# Patient Record
Sex: MALE | Race: WHITE | NOT HISPANIC OR LATINO | Employment: UNEMPLOYED | URBAN - METROPOLITAN AREA
[De-identification: names, ages, dates, MRNs, and addresses within clinical notes are randomized per-mention and may not be internally consistent; named-entity substitution may affect disease eponyms.]

---

## 2018-07-09 ENCOUNTER — COMMUNICATION - HEALTHEAST (OUTPATIENT)
Dept: TELEHEALTH | Facility: CLINIC | Age: 43
End: 2018-07-09

## 2018-07-09 ENCOUNTER — OFFICE VISIT - HEALTHEAST (OUTPATIENT)
Dept: FAMILY MEDICINE | Facility: CLINIC | Age: 43
End: 2018-07-09

## 2018-07-09 DIAGNOSIS — M62.830 BACK MUSCLE SPASM: ICD-10-CM

## 2018-07-09 RX ORDER — CYCLOBENZAPRINE HCL 5 MG
5-10 TABLET ORAL EVERY 8 HOURS PRN
Qty: 30 TABLET | Refills: 0 | Status: SHIPPED | OUTPATIENT
Start: 2018-07-09 | End: 2023-08-23

## 2021-05-28 ENCOUNTER — RECORDS - HEALTHEAST (OUTPATIENT)
Dept: ADMINISTRATIVE | Facility: CLINIC | Age: 46
End: 2021-05-28

## 2021-06-01 VITALS — WEIGHT: 281.1 LBS | BODY MASS INDEX: 40.33 KG/M2

## 2021-06-19 NOTE — PROGRESS NOTES
Assessment/Plan:        1. Back muscle spasm  Exam findings were discussed  Plan:   ibuprofen up to 800 mg three times a day with food.   Try - cyclobenzaprine (FLEXERIL) 5 MG tablet; Take 1-2 tablets (5-10 mg total) by mouth every 8 (eight) hours as needed for muscle spasms.  Dispense: 30 tablet; Refill: 0      Reviewed potential side effects of cognitive disturbance, to avoid operating heavy machinery or driving while under the influence.  Back stretching exercises reviewed  Consider physical therapy if not better in the next few coming days.    Work note given     Subjective:    Patient ID:   Floyd Capps is a 42 y.o. male comes in with having to lower back pain since yesterday following cleaning a ceiling fan.  He was at his usual state of health prior to this, but stating that somehow he has strained his back.  He has no radiation of pain is worse with movement.  He has taken the day off today and is to rest today and tomorrow needs a work note.  His history of having low back pains on and off but nothing chronic.      Review of Systems  Constitutional: negative  Respiratory: negative  CV: negative  GI: negative  : neg   Skin: negative   Musculoskeletal:see HPI      The following patient's history were reviewed and updated as appropriate:   He  has a past medical history of Diabetes type 2, controlled (H)..      Outpatient Encounter Prescriptions as of 7/9/2018   Medication Sig Dispense Refill     cyclobenzaprine (FLEXERIL) 5 MG tablet Take 1-2 tablets (5-10 mg total) by mouth every 8 (eight) hours as needed for muscle spasms. 30 tablet 0     No facility-administered encounter medications on file as of 7/9/2018.          Objective:   /74 (Patient Site: Right Arm, Patient Position: Sitting, Cuff Size: Adult Large)  Pulse 94  Temp 98.2  F (36.8  C) (Oral)   Wt (!) 281 lb 1.6 oz (127.5 kg)  SpO2 96%  BMI 40.33 kg/m2      Physical Exam  General appearance - alert, well appearing, and in no  distress  Neurological - neg SLR  Musculoskeletal - palpable tenderness to the mid to lower back musculature. No mid line or vertebral tenderness.   Skin - normal coloration and turgor, no rashes, no suspicious skin lesions noted

## 2021-09-27 ENCOUNTER — ANCILLARY PROCEDURE (OUTPATIENT)
Dept: ULTRASOUND IMAGING | Facility: HOSPITAL | Age: 46
End: 2021-09-27
Attending: EMERGENCY MEDICINE
Payer: COMMERCIAL

## 2021-09-27 ENCOUNTER — HOSPITAL ENCOUNTER (EMERGENCY)
Facility: HOSPITAL | Age: 46
Discharge: HOME | End: 2021-09-27
Attending: EMERGENCY MEDICINE | Admitting: EMERGENCY MEDICINE
Payer: COMMERCIAL

## 2021-09-27 VITALS
RESPIRATION RATE: 20 BRPM | HEIGHT: 70 IN | WEIGHT: 270 LBS | OXYGEN SATURATION: 100 % | HEART RATE: 90 BPM | SYSTOLIC BLOOD PRESSURE: 177 MMHG | BODY MASS INDEX: 38.65 KG/M2 | DIASTOLIC BLOOD PRESSURE: 91 MMHG | TEMPERATURE: 97.9 F

## 2021-09-27 DIAGNOSIS — L02.214 ABSCESS OF GROIN, LEFT: ICD-10-CM

## 2021-09-27 PROCEDURE — 10060 I&D ABSCESS SIMPLE/SINGLE: CPT

## 2021-09-27 PROCEDURE — 76882 US LMTD JT/FCL EVL NVASC XTR: CPT

## 2021-09-27 PROCEDURE — 99284 EMERGENCY DEPT VISIT MOD MDM: CPT | Mod: 25

## 2021-09-27 RX ORDER — CEPHALEXIN 500 MG/1
500 CAPSULE ORAL 4 TIMES DAILY
Qty: 28 CAPSULE | Refills: 0 | Status: SHIPPED | OUTPATIENT
Start: 2021-09-27 | End: 2021-10-04

## 2021-09-27 RX ORDER — SULFAMETHOXAZOLE/TRIMETHOPRIM 800-160 MG
1 TABLET ORAL 2 TIMES DAILY
Qty: 20 TABLET | Refills: 0 | Status: SHIPPED | OUTPATIENT
Start: 2021-09-27 | End: 2021-10-07

## 2021-09-27 ASSESSMENT — ENCOUNTER SYMPTOMS
WOUND: 1
CONFUSION: 0
ADENOPATHY: 0
FEVER: 0

## 2021-09-27 ASSESSMENT — MIFFLIN-ST. JEOR: SCORE: 2105.96

## 2021-09-27 NOTE — ED TRIAGE NOTES
"Patient reports a cyst on left groin \"that grew overnight\". He is worried about infection. Pain a 2. He states its starting to ooze.  "

## 2021-09-27 NOTE — ED PROVIDER NOTES
EMERGENCY DEPARTMENT ENCOUNTER      NAME: Floyd Capps  AGE: 45 year old male  YOB: 1975  MRN: [id number]  EVALUATION DATE & TIME: 9/27/2021  7:37 AM    PCP: No Ref-Primary, Physician        Chief Complaint   Patient presents with     left groin cyst         FINAL IMPRESSION:  1. Abscess of groin, left          ED COURSE & MEDICAL DECISION MAKING:    Pertinent Labs & Imaging studies reviewed. (See chart for details)  PPE: Goggles and N95 were worn.   45 year old male presents to the Emergency Department for evaluation of left groin wound    ED Course as of Sep 27 0827   Mon Sep 27, 2021   0744 Initial history and physical performed. Plan of care discussed.         0813 Performed incision and drainage procedure. I discussed the plan for discharge with the patient, and patient is agreeable.  We discussed supportive cares at home and reasons for return to the ER including new or worsening symptoms - all questions and concerns addressed.  Patient to be discharged by RN.         Well-appearing overweight male with history of diabetes who is not taking any of his medications who presents with a nontraumatic tender nodule to left groin.  Has had previous groin abscesses that required surgery and hospitalization.  Not septic appearing.  Nuys any new sexual contacts therefore STI unlikely.  Area feels more indurated and not fluctuant however point-of-care ultrasound does show a 1 cm x 1 cm area of fluid collection therefore incision and drainage performed.     Small amount of purulent discharge was expressed from wound.    Plan for discharge home with antibiotics because patient has diabetes and not on his medications.  Will discharge home with Bactrim and Keflex.  Wound care instructions given    At the conclusion of the encounter I discussed the results of all of the tests and the disposition. The questions were answered. The patient or family acknowledged understanding and was agreeable with the care  plan.           MEDICATIONS GIVEN IN THE EMERGENCY:  Medications - No data to display    NEW PRESCRIPTIONS STARTED AT TODAY'S ER VISIT  New Prescriptions    CEPHALEXIN (KEFLEX) 500 MG CAPSULE    Take 1 capsule (500 mg) by mouth 4 times daily for 7 days    SULFAMETHOXAZOLE-TRIMETHOPRIM (BACTRIM DS) 800-160 MG TABLET    Take 1 tablet by mouth 2 times daily for 10 days            =================================================================    HPI    Patient information was obtained from: patient    Use of Intrepreter: N/A      Floyd Capps is a 45 year old male with a pertinent history of diabetes mellitus type 2, obesity, and incision and drainage of a groin abscess who presents to this ED by walk in for evaluation of a left groin cyst.     This morning, patient was going about his day when he noticed blood on his hand which prompted him to discover his opened left groin cyst which is ~1 cm in diameter. Patient reports abscess was not present last night. Patient denies intentionally opening up the cyst. Patient was concerned because he has been hospitalized for an abscess before.   No new sexual partners.     Patient reports he no longer monitors his blood sugar. Patient reports no other complaints at this time.     SHx: smoker, works as     REVIEW OF SYSTEMS   Review of Systems   Constitutional: Negative for fever.   Endocrine: Negative for polyuria.   Skin: Positive for wound.        Positive for left groin abscess.   Allergic/Immunologic: Negative for immunocompromised state.   Hematological: Negative for adenopathy.   Psychiatric/Behavioral: Negative for confusion.   All other systems reviewed and are negative.        PAST MEDICAL HISTORY:  History reviewed. No pertinent past medical history.    PAST SURGICAL HISTORY:  Past Surgical History:   Procedure Laterality Date     APPENDECTOMY       INCISION AND DRAINAGE OF WOUND      groin abscess           CURRENT MEDICATIONS:    Patient's Medications   New  Prescriptions    CEPHALEXIN (KEFLEX) 500 MG CAPSULE    Take 1 capsule (500 mg) by mouth 4 times daily for 7 days    SULFAMETHOXAZOLE-TRIMETHOPRIM (BACTRIM DS) 800-160 MG TABLET    Take 1 tablet by mouth 2 times daily for 10 days   Previous Medications    CYCLOBENZAPRINE (FLEXERIL) 5 MG TABLET    [CYCLOBENZAPRINE (FLEXERIL) 5 MG TABLET] Take 1-2 tablets (5-10 mg total) by mouth every 8 (eight) hours as needed for muscle spasms.   Modified Medications    No medications on file   Discontinued Medications    No medications on file       ALLERGIES:  No Known Allergies    FAMILY HISTORY:  History reviewed. No pertinent family history.    SOCIAL HISTORY:   Social History     Socioeconomic History     Marital status: Single     Spouse name: Not on file     Number of children: Not on file     Years of education: Not on file     Highest education level: Not on file   Occupational History     Not on file   Tobacco Use     Smoking status: Current Every Day Smoker     Types: Cigarettes, Cigarettes     Smokeless tobacco: Never Used   Substance and Sexual Activity     Alcohol use: No     Drug use: No     Sexual activity: Not on file   Other Topics Concern     Not on file   Social History Narrative    Patient reports that he does not have health insurance.     Social Determinants of Health     Financial Resource Strain:      Difficulty of Paying Living Expenses:    Food Insecurity:      Worried About Running Out of Food in the Last Year:      Ran Out of Food in the Last Year:    Transportation Needs:      Lack of Transportation (Medical):      Lack of Transportation (Non-Medical):    Physical Activity:      Days of Exercise per Week:      Minutes of Exercise per Session:    Stress:      Feeling of Stress :    Social Connections:      Frequency of Communication with Friends and Family:      Frequency of Social Gatherings with Friends and Family:      Attends [employer] Services:      Active Member of Clubs or Organizations:       "Attends Club or Organization Meetings:      Marital Status:    Intimate Partner Violence:      Fear of Current or Ex-Partner:      Emotionally Abused:      Physically Abused:      Sexually Abused:        VITALS:  Patient Vitals for the past 24 hrs:   BP Temp Temp src Pulse Resp SpO2 Height Weight   09/27/21 0509 (!) 177/91 97.9  F (36.6  C) Oral 90 20 100 % 1.778 m (5' 10\") 122.5 kg (270 lb)       PHYSICAL EXAM      Vitals: BP (!) 177/91   Pulse 90   Temp 97.9  F (36.6  C) (Oral)   Resp 20   Ht 1.778 m (5' 10\")   Wt 122.5 kg (270 lb)   SpO2 100%   BMI 38.74 kg/m    General: Appears in no acute distress, awake, alert, interactive.  Eyes: Conjunctivae non-injected. Sclera anicteric.  HENT: Atraumatic.  Neck: Supple.  Respiratory/Chest: Respiration unlabored.  Heart: RRR  Abdomen: non distended  Musculoskeletal: Normal extremities. No edema or erythema.  Skin: 1 cm x 1 cm indurated subcutaneous induration that is tender.  No palpable fluctuance.  Appears to be an ingrown hair and she does have a small area of excoriation to top of nodule. Mild 3 cm surrounding erythema. No crepitus.   Neurologic: Face symmetric, moves all extremities spontaneously. Speech clear.  Psychiatric: Oriented to person, place, and time. Affect appropriate.    LAB:  All pertinent labs reviewed and interpreted.       RADIOLOGY:  Reviewed all pertinent imaging. Please see official radiology report.  POC US SOFT TISSUE    (Results Pending)           PROCEDURES:     POC US SOFT TISSUE    Date/Time: 9/27/2021 8:22 AM  Performed by: Jorge A Casey MD  Authorized by: Jorge A Casey MD     Procedure Details & Findings:      Left groin:  Indications: abscess vs lymph node vs focal edema   findings small 1 cm x 1 cm abscess.   Images saved and uploaded      PROCEDURE: Incision and Drainage   INDICATIONS: Localized abscess   PROCEDURE PROVIDER: Duane Casey    SITE: Left groin   MEDICATION: 3 mLs of 1% Lidocaine with epinephrine   NOTE: The area " was prepped with betadine and draped off in the usual sterile fashion.  Local anesthetic was injected subcutaneously with anesthesia effects demonstrated prior to proceeding.  The area of maximal fluctuance was opened with a # 11 Blade (Sharp [place]) using a Single Straight incision to allow for drainage.  The abscess was drained.  The abscess cavity was bluntly explored to separate any loculations. No Packing was placed into the abscess cavity.  A sterile dressing was placed over the area.   COMPLEXITY: Simple       COMPLICATIONS: Patient tolerated procedure well, without complication         I, Sonja Pappas, am serving as a scribe to document services personally performed by Dr. Casey based on my observation and the provider's statements to me. I, Duane Casey MD attest that Sonja Pappas is acting in a scribe capacity, has observed my performance of the services and has documented them in accordance with my direction.    Duane Casey M.D.  Emergency Medicine  [facility] EMERGENCY DEPARTMENT  [address] [place] [id number] [phone]  Dept: [phone]     Jorge A Casey MD  09/27/21 0839

## 2021-09-27 NOTE — DISCHARGE INSTRUCTIONS
Recommend cleaning wound with soap and water daily.  Take Bactrim twice a day for 10 days.  Take Keflex 4 times a day for 7 days.  Use Ibuprofen or Tylenol for pain.  Return to ER for worsening symptoms

## 2021-09-27 NOTE — Clinical Note
Floyd Capps was seen and treated in our emergency department on 9/27/2021.  He may return to work on 09/29/2021.       If you have any questions or concerns, please don't hesitate to call.      Jorge A Casey MD

## 2022-11-13 ENCOUNTER — HOSPITAL ENCOUNTER (EMERGENCY)
Facility: HOSPITAL | Age: 47
Discharge: HOME | End: 2022-11-13
Attending: EMERGENCY MEDICINE | Admitting: EMERGENCY MEDICINE

## 2022-11-13 VITALS
HEART RATE: 97 BPM | OXYGEN SATURATION: 99 % | WEIGHT: 270 LBS | DIASTOLIC BLOOD PRESSURE: 87 MMHG | RESPIRATION RATE: 18 BRPM | SYSTOLIC BLOOD PRESSURE: 199 MMHG | BODY MASS INDEX: 38.65 KG/M2 | TEMPERATURE: 98.9 F | HEIGHT: 70 IN

## 2022-11-13 DIAGNOSIS — K04.7 DENTAL INFECTION: ICD-10-CM

## 2022-11-13 PROCEDURE — 99283 EMERGENCY DEPT VISIT LOW MDM: CPT

## 2022-11-13 NOTE — ED TRIAGE NOTES
Pt presents with sinus pressure and swelling of face and right eye. Pt states it began 2 days ago. No pain unless he pushes on it. Denies fevers.     Triage Assessment     Row Name 11/13/22 0721       Triage Assessment (Adult)    Airway WDL WDL       Respiratory WDL    Respiratory WDL WDL       Skin Circulation/Temperature WDL    Skin Circulation/Temperature WDL WDL       Cardiac WDL    Cardiac WDL WDL       Peripheral/Neurovascular WDL    Peripheral Neurovascular WDL WDL       Cognitive/Neuro/Behavioral WDL    Cognitive/Neuro/Behavioral WDL WDL

## 2022-11-13 NOTE — ED PROVIDER NOTES
EMERGENCY DEPARTMENT ENCOUNTER      NAME: Floyd Capps  AGE: 46 year old male  YOB: 1975  MRN: [id number]  EVALUATION DATE & TIME: 2022  7:23 AM    PCP: No Ref-Primary, Physician    ED PROVIDER: Richie Abarca D.O.      Chief Complaint   Patient presents with     Facial Pain     Facial Swelling       FINAL IMPRESSION:  1. Dental infection        ED COURSE & MEDICAL DECISION MAKIN:33 AM I met with the patient to gather history and to perform my initial exam. I discussed the plan for care while in the Emergency Department.         Pertinent Labs & Imaging studies reviewed. (See chart for details)  46 year old male presents to the Emergency Department for evaluation of swelling of the right side of his face.  Swelling does not extend from rear molar on the right maxilla, no obvious drainable abscess.  There is no throat involvement, and I do not believe this to represent proctitis.  There is no concern for impending airway compromise.  No clinical evidence suggestive of Loy's angina.  At this time I believe the patient can be safely discharged home on oral antibiotics and follow-up as an outpatient with his dentist.  Return precautions were discussed.    Medical Decision Making    Supplemental history from: N/A    External Record(s) Reviewed: N/A    Differential Diagnosis: See MDM charting for differential considered.     I performed an independent interpretation of the: N/A    Discussed with radiology regarding test interpretation: N/A    Discussion of management with another provider: See ED Course    The following testing was considered but ultimately not selected: None    I considered prescription management with: Antibiotic    The patient's care impacted: None    Consideration of Admission/Observation: N/A - Patient discharged without consideration for admission    Care significantly affected by Social Determinants of Health including: N/A    At the conclusion of the encounter I  discussed the results of all of the tests and the disposition. The questions were answered. The patient or family acknowledged understanding and was agreeable with the care plan.        HPI    Patient information was obtained from: Patient     Use of : N/A        Floyd Capps is a 46 year old male with a pertinent medical history of hyperlipidemia and type II diabetes who presents to this ED by private vehicle for evaluation of sinus pressure and facial swelling.     Patient reports right-sided facial swelling that started yesterday morning without associated pain. He notes that he thought that this was related to a sinus infection as he was sick on Tuesday in which he experienced vomiting and diarrhea which has since resolved. Patient denies cough, congestion, sore throat, headache, jaw pain, or additional symptoms or complaints at this time.     Social History: Current smoker      REVIEW OF SYSTEMS  Constitutional:  Denies fever, chills, weight loss or weakness  Eyes:  No pain, discharge, redness  HENT:  Denies sore throat or ear pain. Positive for congestion and facial swelling  Respiratory: No SOB, wheeze or cough  Cardiovascular:  No CP, palpitations  GI:  Denies abdominal pain, nausea, vomiting, diarrhea  : Denies dysuria, hematuria  Musculoskeletal:  Denies any new muscle/joint pain, swelling or loss of function.  Skin:  Denies rash, pallor  Neurologic:  Denies headache, focal weakness or sensory changes  Lymph: Denies swollen nodes    All other systems negative unless noted in HPI.    PAST MEDICAL HISTORY:  History reviewed. No pertinent past medical history.    PAST SURGICAL HISTORY:  Past Surgical History:   Procedure Laterality Date     APPENDECTOMY       INCISION AND DRAINAGE OF WOUND      groin abscess         CURRENT MEDICATIONS:    No current facility-administered medications for this encounter.     Current Outpatient Medications   Medication     amoxicillin-clavulanate (AUGMENTIN)  "875-125 MG tablet     cyclobenzaprine (FLEXERIL) 5 MG tablet         ALLERGIES:  No Known Allergies    FAMILY HISTORY:  History reviewed. No pertinent family history.    SOCIAL HISTORY:  Social History     Socioeconomic History     Marital status: Single   Tobacco Use     Smoking status: Every Day     Types: Cigarettes     Smokeless tobacco: Never   Substance and Sexual Activity     Alcohol use: No     Drug use: No   Social History Narrative    Patient reports that he does not have health insurance.       VITALS:  Patient Vitals for the past 24 hrs:   BP Temp Temp src Pulse Resp SpO2 Height Weight   11/13/22 0720 (!) 199/87 98.9  F (37.2  C) Temporal 97 18 99 % 1.778 m (5' 10\") 122.5 kg (270 lb)       PHYSICAL EXAM    VITAL SIGNS: BP (!) 199/87   Pulse 97   Temp 98.9  F (37.2  C) (Temporal)   Resp 18   Ht 1.778 m (5' 10\")   Wt 122.5 kg (270 lb)   SpO2 99%   BMI 38.74 kg/m      General Appearance: Well-appearing, well-nourished, no acute distress   Head:  Normocephalic, without obvious abnormality, atraumatic  Eyes:  PERRL, conjunctiva/corneas clear, EOM's intact,  ENT:  Lips, mucosa, and tongue normal, membranes are moist without pallor. Right posterior molar missing with exposed root, swollen gumline, right facial swelling  Neck:  Normal ROM, symmetrical, trachea midline    Cardio:  Regular rate and rhythm, no murmur, rub or gallop, 2+ pulses symmetric in all extremities  Pulm:  Clear to auscultation bilaterally, respirations unlabored,  Musculoskeletal: Full ROM, no edema, no cyanosis, good ROM of major joints  Integument:  Warm, Dry, No erythema, No rash.    Neurologic:  Alert & oriented.  No focal deficits appreciated.  Ambulatory.  Psychiatric:  Affect normal, Judgment normal, Mood normal.      LABS  No results found for this or any previous visit (from the past 24 hour(s)).      RADIOLOGY  No orders to display          MEDICATIONS GIVEN IN THE EMERGENCY:  Medications - No data to display    NEW " PRESCRIPTIONS STARTED AT TODAY'S ER VISIT  Discharge Medication List as of 11/13/2022  8:07 AM      START taking these medications    Details   amoxicillin-clavulanate (AUGMENTIN) 875-125 MG tablet Take 1 tablet by mouth 2 times daily for 7 days, Disp-14 tablet, R-0, Local Print            I, Marcus Do, am serving as a scribe to document services personally performed by Dr. Abarca based on my observation and the provider's statements to me. I, Richie Abarca, DO attest that Marcus Do is acting in a scribe capacity, has observed my performance of the services and has documented them in accordance with my direction.     Richie Abarca D.O.  Emergency Medicine  [facility] EMERGENCY DEPARTMENT  [facility] [id number] [phone]  Dept: [phone]     Richie Abarca DO  11/13/22 7551

## 2023-03-26 ENCOUNTER — HOSPITAL ENCOUNTER (EMERGENCY)
Facility: HOSPITAL | Age: 48
Discharge: HOME | End: 2023-03-26

## 2023-03-26 VITALS
TEMPERATURE: 98.2 F | RESPIRATION RATE: 16 BRPM | HEART RATE: 98 BPM | WEIGHT: 270 LBS | BODY MASS INDEX: 38.74 KG/M2 | SYSTOLIC BLOOD PRESSURE: 192 MMHG | DIASTOLIC BLOOD PRESSURE: 92 MMHG | OXYGEN SATURATION: 99 %

## 2023-03-26 DIAGNOSIS — R03.0 ELEVATED BLOOD PRESSURE READING WITHOUT DIAGNOSIS OF HYPERTENSION: ICD-10-CM

## 2023-03-26 DIAGNOSIS — L03.90 CELLULITIS: Primary | ICD-10-CM

## 2023-03-26 DIAGNOSIS — L98.9 SKIN LESION: ICD-10-CM

## 2023-03-26 PROCEDURE — 99283 EMERGENCY DEPT VISIT LOW MDM: CPT

## 2023-03-26 RX ORDER — CEPHALEXIN 500 MG/1
500 CAPSULE ORAL 4 TIMES DAILY
Qty: 28 CAPSULE | Refills: 0 | Status: SHIPPED | OUTPATIENT
Start: 2023-03-26 | End: 2023-04-02

## 2023-03-26 RX ORDER — SULFAMETHOXAZOLE/TRIMETHOPRIM 800-160 MG
1 TABLET ORAL 2 TIMES DAILY
Qty: 14 TABLET | Refills: 0 | Status: SHIPPED | OUTPATIENT
Start: 2023-03-26 | End: 2023-04-02

## 2023-03-26 ASSESSMENT — ENCOUNTER SYMPTOMS
HEMATURIA: 0
CONSTIPATION: 0
ABDOMINAL PAIN: 0
FLANK PAIN: 0
FREQUENCY: 0
DIARRHEA: 0
DIFFICULTY URINATING: 0
WOUND: 1
CHILLS: 0
BACK PAIN: 0
VOMITING: 0
NAUSEA: 0
FEVER: 0
DYSURIA: 0

## 2023-03-26 NOTE — ED TRIAGE NOTES
Pt presents to triage ambulatory for skin problem. Pt has hx of MRSA and reports last night he popped what felt like a zit near his pubic region. Pt felt it growing through the night. Now scabbed over. Denies fevers or chills.

## 2023-03-26 NOTE — DISCHARGE INSTRUCTIONS
Please bring this paperwork with you to your follow-up appointment.    You were seen in the urgent care/emergency department for skin infection.     You have a skin infection, however we did not feel that this needed to be drained during today's exam.  We will treat with 2 antibiotics for this.  Please take Bactrim twice a day for 7 days and Keflex 4 times a day for 7 days.  If the antibiotics do not help clear the infection or you have any drainage, please come back to the ER for us to drain the wound.    For your symptoms:  place warm packs and heat to the area to help clear the infection    Tylenol/ibuprofen as needed  You may take up to 650 mg of Tylenol (acetaminophen) up to 4 times daily and up to 600 mg of ibuprofen up to 4 times daily as needed for fever, pain.  Please do not take more than the daily maximum recommended dose (tylenol = 4 grams, ibuprofen = 2.4 grams) as it can cause harm to your liver, kidneys, stomach.  It is best to take ibuprofen with food. Please read labels of any over-the-counter medicine you may be taking as it may contain Tylenol (acetaminophen) or Advil (ibuprofen).     Follow up with your primary care provider for recheck in 3 days for ER follow up.  Your blood pressure is elevated today, please establish care with a primary care doctor to have repeat your blood pressure check and to start medication if necessary.    Return to the emergency department if you develop worsening drainage, redness, warmth, fevers, chills or other signs of infection, or any other new worsening or concerning symptoms. We'd be happy to see you again.    Thank you for allowing us to be part of your care today.    Take care!  -Penny Hartley PA-C

## 2023-03-26 NOTE — ED PROVIDER NOTES
EMERGENCY DEPARTMENT ENCOUNTER      NAME: Floyd Capps  AGE: 47 year old male  YOB: 1975  MRN: [id number]  EVALUATION DATE & TIME: No admission date for patient encounter.    PCP: No Ref-Primary, Physician    ED PROVIDER: Penny Hartley PA-C    Chief Complaint   Patient presents with     Skin Check     FINAL IMPRESSION:  1. Cellulitis    2. Skin lesion    3. Elevated blood pressure reading without diagnosis of hypertension      MEDICAL DECISION MAKING:    Pertinent Labs & Imaging studies reviewed. (See chart for details)  Floyd Capps is a 47 year old male who presents for evaluation of skin lesion to anterior abdominal wall x1 week.  Reports purulent drainage last week, however has scabbed over since then.  History of groin abscess in the past that required I&D, which concerned the patient prompting ER evaluation today..  Denies systemic symptoms such as fever, chills, nausea, vomiting abdominal pain.    On my initial evaluation, patient hypertensive, remainder of vital signs normal. On physical exam patient is awake, alert, no acute distress, resting comfortably in chair.  He has an obese abdomen.  Heart sounds are normal, lungs are clear without wheezing or crackles.  On inspection of abdominal wall with nurse chaperone present, he does have a 3 cm wound to his perineum just below his umbilicus.  There is a 1 cm area of well-healing scab with mild surrounding erythema.  No obvious purulent drainage.  No tenderness on palpation of this area.  No areas of fluctuance appreciated.  No other groin swelling, testicular swelling, penile lesions, discharge or areas of fluctuance appreciated.  No abdominal tenderness on palpation.  No distention, rebound, guarding or masses..    Differential diagnosis includes cellulitis, erysipelas, necrotizing fasciitis, abscess, Lisa's gangrene, STD.     Based on his exam today, I suspect this is cellulitis versus abscess.  I do not appreciate any areas of  fluctuance that would require incision and drainage today.  Wound does appear to be scabbing over.  No scrotal swelling, penile discharge or concern for STD, so this this were not obtained today.  Low suspicion for necrotizing infection or Lisa's gangrene based on exam today.  No abdominal tenderness to suggest need for laboratory or imaging evaluation today.  Will treat with Bactrim and Keflex.  Otherwise reassuring exam today, low suspicion for any emergent process that require further ER intervention or hospital admission.  Provided expectant management as well as strict return precautions.    Did discuss with patient his hypertension today, he does not take medicines for this as an outpatient.  Advised him to follow-up with his PCP for recheck of blood pressure and ongoing management of hypertension.    Patient has had serial examinations and notes significant improvement.     Patient was discharged in stable condition with treatment plan as below. Instructed to follow up with primary care provider in 3 days and return to the emergency department with any new or worsening of symptoms. Patient expressed understanding, feels comfortable, and is in agreement with this plan. All questions addressed prior to discharge.    Medical Decision Making    History:    Supplemental history from: Documented in chart, if applicable    External Record(s) reviewed: Documented in chart, if applicable. and Inpatient Record: previous ER visits    Work Up:    Chart documentation includes differential considered and any EKGs or imaging independently interpreted by provider, where specified.    In additional to work up documented, I considered the following work up: Documented in chart, if applicable.    External consultation:    Discussion of management with another provider: Documented in chart, if applicable    Complicating factors:    Care impacted by chronic illness: N/A    Care affected by social determinants of health:  N/A    Disposition considerations: Discharge. I prescribed additional prescription strength medication(s) as charted. N/A.    ED COURSE:  8:49 AM  I reviewed the patient's chart. I met with the patient to gather history and to perform my initial exam.    I wore appropriate PPE during this encounter including: facemask & eye protection   9:24 AM I performed  exam with nurse chaperone present. We discussed plan for discharge including treatment plan, follow-up and return precautions to emergency department.  Patient voiced understanding and in agreement with this plan.    At the conclusion of the encounter I discussed the results of all of the tests and the disposition. The questions were answered. The patient or family acknowledged understanding and was agreeable with the care plan.     MEDICATIONS GIVEN IN THE EMERGENCY:  Medications - No data to display    NEW PRESCRIPTIONS STARTED AT TODAY'S ER VISIT  Discharge Medication List as of 3/26/2023  9:36 AM      START taking these medications    Details   cephALEXin (KEFLEX) 500 MG capsule Take 1 capsule (500 mg) by mouth 4 times daily for 7 days, Disp-28 capsule, R-0, E-Prescribe      sulfamethoxazole-trimethoprim (BACTRIM DS) 800-160 MG tablet Take 1 tablet by mouth 2 times daily for 7 days, Disp-14 tablet, R-0, E-Prescribe           =================================================================    HPI:    Patient information was obtained from: patient    Use of Interpretor: N/A     Floyd Capps is a 47 year old male with a pertinent history of type 2 diabetes, hyperlipidemia, hypothyroidism, recurrent skin infection who presents to this ED for evaluation of skin problem.    Per chart review, patient seen multiple times in the past in the ED for abscess of groin in 2015, 2016 and most recently on 9/27/2021.  I&D was performed and sent home on antibiotics.     Patient reports for the past few years, he has been doing well in terms of his skin infections.  However  "about 1 week ago, started developing another skin infection.  Reports in the past he has had infections in his groin and testicular regions, however this is an infection to his lower abdomen.  Patient reports a history of MRSA (I am unable to see a documented result of this).  He reports last week, he \"popped a zit to his lower abdomen\" that drained a little, ultimately it scabbed over without ongoing draining.  Presented to the ER this morning as he was concerned that this would develop into a worsening infection like he has had previously and wanted to \"get it checked out to see if he needs drainage or just antibiotics\".  Denies significant pain, has not taken any medication for the pain.  Denies fever, chills, nausea, vomiting, diarrhea, constipation, chest pain, shortness of breath, urinary symptoms or other symptoms.  Denies scrotal swelling, penile discharge, lesions or concern for STDs.    REVIEW OF SYSTEMS:  Review of Systems   Constitutional: Negative for chills and fever.   Gastrointestinal: Negative for abdominal pain, constipation, diarrhea, nausea and vomiting.   Genitourinary: Negative for difficulty urinating, dysuria, flank pain, frequency, genital sores, hematuria, penile discharge, penile swelling, scrotal swelling, testicular pain and urgency.   Musculoskeletal: Negative for back pain.   Skin: Positive for wound (to skin of lower abdomen).   All other systems reviewed and are negative.       PAST MEDICAL HISTORY:  No past medical history on file.    PAST SURGICAL HISTORY:  Past Surgical History:   Procedure Laterality Date     APPENDECTOMY       INCISION AND DRAINAGE OF WOUND      groin abscess       CURRENT MEDICATIONS:    No current facility-administered medications for this encounter.    Current Outpatient Medications:      cephALEXin (KEFLEX) 500 MG capsule, Take 1 capsule (500 mg) by mouth 4 times daily for 7 days, Disp: 28 capsule, Rfl: 0     sulfamethoxazole-trimethoprim (BACTRIM DS) " 800-160 MG tablet, Take 1 tablet by mouth 2 times daily for 7 days, Disp: 14 tablet, Rfl: 0     cyclobenzaprine (FLEXERIL) 5 MG tablet, [CYCLOBENZAPRINE (FLEXERIL) 5 MG TABLET] Take 1-2 tablets (5-10 mg total) by mouth every 8 (eight) hours as needed for muscle spasms., Disp: 30 tablet, Rfl: 0    ALLERGIES:  No Known Allergies    FAMILY HISTORY:  No family history on file.    SOCIAL HISTORY:   Social History     Socioeconomic History     Marital status: Single   Tobacco Use     Smoking status: Every Day     Types: Cigarettes     Smokeless tobacco: Never   Substance and Sexual Activity     Alcohol use: No     Drug use: No   Social History Narrative    Patient reports that he does not have health insurance.       VITALS:  Patient Vitals for the past 24 hrs:   BP Temp Temp src Pulse Resp SpO2 Weight   03/26/23 0938 (!) 192/92 -- -- 98 16 99 % --   03/26/23 0838 (!) 191/90 98.2  F (36.8  C) Oral 99 16 100 % 122.5 kg (270 lb)       PHYSICAL EXAM    Constitutional: obese, Well nourished, NAD  HENT: Normocephalic, Atraumatic, Bilateral external ears normal, Oropharynx normal, mucous membranes moist, Nose normal.   Neck: Normal range of motion, No tenderness, Supple, No stridor.  Eyes: PERRL, EOMI, Conjunctiva normal, No discharge.   GI//integument: On inspection of abdominal wall with nurse chaperone present, he does have a 3 cm wound to his perineum just below his umbilicus.  There is a 1 cm area of well-healing scab with mild surrounding erythema.  No obvious purulent drainage.  No tenderness on palpation of this area.  No areas of fluctuance appreciated.  No other groin swelling, testicular swelling, penile lesions, discharge or areas of fluctuance appreciated.  No abdominal tenderness on palpation.  No distention, rebound, guarding or masses..Soft,   Musculoskeletal: 2+ DP pulses. No edema. No cyanosis, No clubbing. Good range of motion in all major joints. No tenderness to palpation or major deformities noted. No  tenderness of the CTLS spine.   Neurologic: Alert & oriented x 3, Normal motor function, Normal sensory function, No focal deficits noted. Normal gait.  Psychiatric: Affect normal, Judgment normal, Mood normal. Cooperative.    LAB:  All pertinent labs reviewed and interpreted.  Labs Ordered and Resulted from Time of ED Arrival to Time of ED Departure - No data to display    RADIOLOGY:  Reviewed all pertinent imaging. Please see official radiology report.  No orders to display       EKG:    None     PROCEDURES:   None     Diagnosis:  1. Cellulitis    2. Skin lesion    3. Elevated blood pressure reading without diagnosis of hypertension        Penny Hartley PA-C  Emergency Medicine  [facility]  3/26/2023       Penny Hartley PA-C  03/26/23 1746

## 2023-04-30 ENCOUNTER — HOSPITAL ENCOUNTER (EMERGENCY)
Facility: HOSPITAL | Age: 48
Discharge: HOME | End: 2023-04-30
Admitting: STUDENT IN AN ORGANIZED HEALTH CARE EDUCATION/TRAINING PROGRAM

## 2023-04-30 VITALS
WEIGHT: 270 LBS | OXYGEN SATURATION: 99 % | BODY MASS INDEX: 39.99 KG/M2 | RESPIRATION RATE: 16 BRPM | TEMPERATURE: 97.8 F | HEART RATE: 97 BPM | SYSTOLIC BLOOD PRESSURE: 164 MMHG | HEIGHT: 69 IN | DIASTOLIC BLOOD PRESSURE: 74 MMHG

## 2023-04-30 DIAGNOSIS — L03.115 CELLULITIS OF RIGHT LOWER EXTREMITY: ICD-10-CM

## 2023-04-30 PROCEDURE — 99284 EMERGENCY DEPT VISIT MOD MDM: CPT

## 2023-04-30 RX ORDER — CEPHALEXIN 500 MG/1
500 CAPSULE ORAL 4 TIMES DAILY
Qty: 28 CAPSULE | Refills: 0 | Status: SHIPPED | OUTPATIENT
Start: 2023-04-30 | End: 2023-05-07

## 2023-04-30 RX ORDER — SULFAMETHOXAZOLE/TRIMETHOPRIM 800-160 MG
1 TABLET ORAL 2 TIMES DAILY
Qty: 14 TABLET | Refills: 0 | Status: SHIPPED | OUTPATIENT
Start: 2023-04-30 | End: 2023-05-07

## 2023-04-30 ASSESSMENT — ENCOUNTER SYMPTOMS
FEVER: 0
WOUND: 1
CHILLS: 0

## 2023-04-30 ASSESSMENT — ACTIVITIES OF DAILY LIVING (ADL): ADLS_ACUITY_SCORE: 33

## 2023-04-30 NOTE — ED PROVIDER NOTES
EMERGENCY DEPARTMENT ENCOUNTER      NAME: Floyd Capps  AGE: 47 year old male  YOB: 1975  MRN: [id number]  EVALUATION DATE & TIME: No admission date for patient encounter.    PCP: No Ref-Primary, Physician    ED PROVIDER: Windy Holliday PA-C      Chief Complaint   Patient presents with     right thigh boil         FINAL IMPRESSION:  1. Cellulitis of right lower extremity          MEDICAL DECISION MAKING:    Pertinent Labs & Imaging studies reviewed. (See chart for details)  47 year old male presents to the Emergency Department for evaluation of boil to right thigh x 1.5 weeks. Getting bigger. Denies fevers. Patient is diabetic and has a history of MRSA. Gets boils often.    Vitals reviewed and notable for elevated blood pressure. Afebrile, non-toxic appearing. On exam, scab to right anterior mid thigh with 5 cm of surrounding erythema and induration. No fluctuance or crepitus. No spontaneous drainage. No tenderness to palpation. Differential diagnosis includes abscess, cellulitis, cyst, necrotizing fasciitis.    No fluctuance to drain. Skin is indurated and erythematous consistent with cellulitis. Will put him on keflex and bactrim. History of MRSA and reports this combination is what typically works for him when this happens. We discussed strict return precautions and close follow up with PCP for recheck in 3 days. Patient expressed understanding and discharged home in stable condition.     Medical Decision Making    History:    Supplemental history from: N/A    External Record(s) reviewed: Documented in chart, if applicable.    Work Up:    Chart documentation includes differential considered and any EKGs or imaging independently interpreted by provider, where specified.    In additional to work up documented, I considered the following work up: Documented in chart, if applicable.    External consultation:    Discussion of management with another provider: Documented in chart, if  applicable    Complicating factors:    Care impacted by chronic illness: Diabetes    Care affected by social determinants of health: N/A    Disposition considerations: Discharge. I prescribed additional prescription strength medication(s) as charted. N/A.        0 minutes of critical care time     ED COURSE:  8:24 AM I met with the patient, obtained history, performed an initial exam, and discussed options and plan for diagnostics and treatment here in the ED.  8:46 AM Patient discharged after being provided with extensive anticipatory guidance and given return precautions, importance of PCP follow-up emphasized.    At the conclusion of the encounter I discussed the results of all of the tests and the disposition. The questions were answered. The patient or family acknowledged understanding and was agreeable with the care plan.     MEDICATIONS GIVEN IN THE EMERGENCY:  Medications - No data to display    NEW PRESCRIPTIONS STARTED AT TODAY'S ER VISIT  New Prescriptions    CEPHALEXIN (KEFLEX) 500 MG CAPSULE    Take 1 capsule (500 mg) by mouth 4 times daily for 7 days    SULFAMETHOXAZOLE-TRIMETHOPRIM (BACTRIM DS) 800-160 MG TABLET    Take 1 tablet by mouth 2 times daily for 7 days            =================================================================    HPI:    Patient information was obtained from: Patient    Use of Interpretor: N/A      Floyd Capps is a 47 year old male with a pertinent history of T2DM, hypothyroidism, HLD who presents to this ED via private vehicle for evaluation of abscess.    Per chart review: Patient seen in ED on 3/26/23 for cellulitis to abdominal wall. Started on keflex and bactrim.     Patient reports developing a boil on his right anterior thigh about 1-1/2 weeks ago.  He has been able to squeeze out some purulent drainage last week.  The area of redness has worsened.  He reports a history of MRSA and gets multiple boils.  He denies any fevers, chills, elevated blood sugars.  He is  "diabetic.    REVIEW OF SYSTEMS:  Review of Systems   Constitutional: Negative for chills and fever.   Skin: Positive for wound (boil to right anterior thigh).   Allergic/Immunologic: Positive for immunocompromised state (T2DM).   All other systems reviewed and are negative.      PAST MEDICAL HISTORY:  No past medical history on file.    PAST SURGICAL HISTORY:  Past Surgical History:   Procedure Laterality Date     APPENDECTOMY       INCISION AND DRAINAGE OF WOUND      groin abscess           CURRENT MEDICATIONS:    No current facility-administered medications for this encounter.    Current Outpatient Medications:      cephALEXin (KEFLEX) 500 MG capsule, Take 1 capsule (500 mg) by mouth 4 times daily for 7 days, Disp: 28 capsule, Rfl: 0     sulfamethoxazole-trimethoprim (BACTRIM DS) 800-160 MG tablet, Take 1 tablet by mouth 2 times daily for 7 days, Disp: 14 tablet, Rfl: 0     cyclobenzaprine (FLEXERIL) 5 MG tablet, [CYCLOBENZAPRINE (FLEXERIL) 5 MG TABLET] Take 1-2 tablets (5-10 mg total) by mouth every 8 (eight) hours as needed for muscle spasms., Disp: 30 tablet, Rfl: 0      ALLERGIES:  No Known Allergies    FAMILY HISTORY:  No family history on file.    SOCIAL HISTORY:   Social History     Socioeconomic History     Marital status: Single   Tobacco Use     Smoking status: Every Day     Types: Cigarettes     Smokeless tobacco: Never   Substance and Sexual Activity     Alcohol use: No     Drug use: No   Social History Narrative    Patient reports that he does not have health insurance.       VITALS:  Patient Vitals for the past 24 hrs:   BP Temp Temp src Pulse Resp SpO2 Height Weight   04/30/23 0814 (!) 164/74 97.8  F (36.6  C) Tympanic 97 16 99 % 1.753 m (5' 9\") 122.5 kg (270 lb)       PHYSICAL EXAM  General: Appears in no acute distress, awake, alert, interactive.  Eyes: Conjunctivae non-injected. Sclera anicteric.  HENT: Atraumatic.  Neck: Supple.  Respiratory/Chest: Respiration unlabored.  Abdomen: non " distended  Musculoskeletal: Normal extremities. No edema or erythema.  Skin: Normal color. No rash or diaphoresis. Scab to right anterior mid thigh with 5 cm of surrounding erythema and induration. No fluctuance or crepitus. No spontaneous drainage. No tenderness to palpation. Several scabs to lower extremities.  Neurologic: Face symmetric, moves all extremities spontaneously. Speech clear.  Psychiatric: Oriented to person, place, and time. Affect appropriate.        Windy Holliday PA-C  Emergency Medicine  [facility]  4/30/2023     Windy Holliday PA-C  04/30/23 0849

## 2023-04-30 NOTE — ED TRIAGE NOTES
Pt is here as he has a rigt thigh boil that developed about 1.5 weeks ago. He did squeeze some pus out of it last week but it has gotten bigger.  Pt has multiple sores on LEs. Pt states he has MRSA. Has diabetes.

## 2023-04-30 NOTE — DISCHARGE INSTRUCTIONS
Take both antibiotics as prescribed for the next 7 days.  Complete the entire course of antibiotics even if feeling better.  If you develop any new or worsening symptoms including fevers, chills, significantly elevated blood sugars, increasing redness or drainage you need to return to the emergency department.  Follow-up with primary care provider next week for recheck.

## 2023-05-19 ENCOUNTER — HOSPITAL ENCOUNTER (EMERGENCY)
Facility: HOSPITAL | Age: 48
Discharge: HOME | End: 2023-05-19
Admitting: PHYSICIAN ASSISTANT

## 2023-05-19 VITALS
WEIGHT: 271.17 LBS | RESPIRATION RATE: 18 BRPM | BODY MASS INDEX: 40.04 KG/M2 | TEMPERATURE: 98.3 F | OXYGEN SATURATION: 97 % | SYSTOLIC BLOOD PRESSURE: 155 MMHG | HEART RATE: 94 BPM | DIASTOLIC BLOOD PRESSURE: 101 MMHG

## 2023-05-19 DIAGNOSIS — L03.317 CELLULITIS AND ABSCESS OF BUTTOCK: ICD-10-CM

## 2023-05-19 DIAGNOSIS — L02.31 CELLULITIS AND ABSCESS OF BUTTOCK: ICD-10-CM

## 2023-05-19 DIAGNOSIS — L02.419 CELLULITIS AND ABSCESS OF LEG: ICD-10-CM

## 2023-05-19 DIAGNOSIS — L03.119 CELLULITIS AND ABSCESS OF LEG: ICD-10-CM

## 2023-05-19 PROCEDURE — 10060 I&D ABSCESS SIMPLE/SINGLE: CPT

## 2023-05-19 PROCEDURE — 99284 EMERGENCY DEPT VISIT MOD MDM: CPT | Mod: 25

## 2023-05-19 RX ORDER — SULFAMETHOXAZOLE/TRIMETHOPRIM 800-160 MG
1 TABLET ORAL 2 TIMES DAILY
Qty: 20 TABLET | Refills: 0 | Status: SHIPPED | OUTPATIENT
Start: 2023-05-19 | End: 2023-05-29

## 2023-05-19 RX ORDER — CEPHALEXIN 500 MG/1
500 CAPSULE ORAL 4 TIMES DAILY
Qty: 40 CAPSULE | Refills: 0 | Status: SHIPPED | OUTPATIENT
Start: 2023-05-19 | End: 2023-05-29

## 2023-05-19 ASSESSMENT — ENCOUNTER SYMPTOMS
WOUND: 1
NAUSEA: 0
FEVER: 0
ABDOMINAL PAIN: 0
VOMITING: 0

## 2023-05-19 NOTE — DISCHARGE INSTRUCTIONS
Use warm packs to the area on your thigh and buttocks to help bring the infection out.  Use tylenol and ibuprofen as needed for pain.  Take the antibiotics as prescribed: Bactrim 2 times daily for 10 days and Keflex 4 times daily for 10 days.    I want your wounds re-checked in 2 days. See contact information for [facility] - I believe they have income-based care for those who don't have insurance coverage.     Keep a close eye on your wounds and return to the emergency department if you develop worsening swelling, drainage, redness, or new fevers, nausea, or any other concerning symptoms.

## 2023-05-19 NOTE — ED PROVIDER NOTES
EMERGENCY DEPARTMENT ENCOUNTER      NAME: Floyd Capps  AGE: 47 year old male  YOB: 1975  MRN: [id number]  EVALUATION DATE & TIME: No admission date for patient encounter.    PCP: No Ref-Primary, Physician    ED PROVIDER: Tish Ortega PA-C      Chief Complaint   Patient presents with     Cyst         FINAL IMPRESSION:  1. Cellulitis and abscess of leg    2. Cellulitis and abscess of buttock          ED COURSE & MEDICAL DECISION MAKIN:40 PM I introduced myself to patient, performed initial HPI and examination.   5:05 PM I discussed the plan for discharge with the patient, and patient is agreeable. We discussed supportive cares at home and reasons for return to the ER including new or worsening symptoms - all questions and concerns addressed. Patient to be discharged by RN.     47 year old male with PMH Type 2 diabetes, hypothyroidism presents to the Emergency Department for evaluation of abscess/wound on buttocks. Reports boil for approximately 1 week, denies active bleeding but does have central scab and suspect it has been draining. No associated fevers, chills, nausea or other systemic symptoms. History of prior abscess/cellulitis and history of MRSA. Patient was seen here 3/26 for cellulitis and again  with cellulitis to anterior thigh. This wound is still present, patient drained it by self at home yesterday with moderate amount of purulent drainage. On examination it is still inflamed, surrounding erythema but no active drainage. No indication for further incision and drainage at this time. Tdap up to date with at home I&D. Patient is a diabetic. Wounds most consistent with abscess, likely MRSA. Nothing to suggest necrotizing fasciitis. With no systemic symptoms, further lab testing deferred. I considered admission, but wounds are appropriately drained, no evidence of systemic symptoms and ultimately patient would be appropriately managed with trial of oral antibiotics and close  ED return precautions.     I&D performed to buttocks abscess with very scant purulent drainage, likely because it has already been draining. No indication for packing.    Will treat with bactrim and keflex. Encouraged close follow up with PCP for recheck in 2-3 days but patient does not have established PCP and does not currently have insurance coverage which is a barrier to patient's care currently. Encouraged at home supportive management, provided contact information for [place] [facility], and given close ED return precautions. All questions were answered to the best of my ability and patient is agreeable with plan.         Medical Decision Making    History:    Supplemental history from: Documented in chart, if applicable    External Record(s) reviewed: Documented in chart, if applicable.    Work Up:    Chart documentation includes differential considered and any EKGs or imaging independently interpreted by provider.    In additional to work up documented, I considered the following work up: Documented in chart, if applicable.    External consultation:    Discussion of management with another provider: Documented in chart, if applicable    Complicating factors:    Care impacted by chronic illness: Diabetes and Hyperlipidemia    Care affected by social determinants of health: Access to Affordable Health Care    Disposition considerations: Discharge. I prescribed additional prescription strength medication(s) as charted. See documentation for any additional details.      MEDICATIONS GIVEN IN THE EMERGENCY:  Medications - No data to display    NEW PRESCRIPTIONS STARTED AT TODAY'S ER VISIT  Discharge Medication List as of 5/19/2023  5:24 PM      START taking these medications    Details   cephALEXin (KEFLEX) 500 MG capsule Take 1 capsule (500 mg) by mouth 4 times daily for 10 days, Disp-40 capsule, R-0, Local Print      sulfamethoxazole-trimethoprim (BACTRIM DS) 800-160 MG tablet Take 1 tablet by mouth 2 times daily  for 10 days, Disp-20 tablet, R-0, Local Print                =================================================================    HPI    Patient information was obtained from: Patient    Use of : N/A        Floyd Capps is a 47 year old male with a pertinent history of type 2 diabetes mellitus and hyperlipidemia who presents to this ED via walk in for evaluation of cellulitis/boil on buttocks.     The patient reports the onset of a boil on his buttocks one week ago. He states that he had a similar boil on his right upper thigh, which was diagnosed as cellulitis. He was started on antibiotics for that boil, and he ended up draining it at home. He had draining begin again from the boil on his right upper thigh yesterday (5/18), but he denies any drainage from the wound on his left buttock. The patient denies buttock pain, nausea, vomiting, fever, and any other symptoms or complaints at this time.     ScHx: Patient notes he does not currently have insurance or current primary care follow-up.      REVIEW OF SYSTEMS   Review of Systems   Constitutional: Negative for fever.   Gastrointestinal: Negative for abdominal pain, nausea and vomiting.   Skin: Positive for wound.   All other systems reviewed and are negative.       PAST MEDICAL HISTORY:  History reviewed. No pertinent past medical history.    PAST SURGICAL HISTORY:  Past Surgical History:   Procedure Laterality Date     APPENDECTOMY       INCISION AND DRAINAGE OF WOUND      groin abscess       CURRENT MEDICATIONS:    cephALEXin (KEFLEX) 500 MG capsule  sulfamethoxazole-trimethoprim (BACTRIM DS) 800-160 MG tablet  cyclobenzaprine (FLEXERIL) 5 MG tablet        ALLERGIES:  No Known Allergies    FAMILY HISTORY:  History reviewed. No pertinent family history.    SOCIAL HISTORY:   Social History     Socioeconomic History     Marital status: Single   Tobacco Use     Smoking status: Every Day     Types: Cigarettes     Smokeless tobacco: Never   Substance and  Sexual Activity     Alcohol use: No     Drug use: No   Social History Narrative    Patient reports that he does not have health insurance.       VITALS:  BP (!) 155/101   Pulse 94   Temp 98.3  F (36.8  C) (Temporal)   Resp 18   Wt 123 kg (271 lb 2.7 oz)   SpO2 97%   BMI 40.04 kg/m      PHYSICAL EXAM    Constitutional: Well developed, Well nourished, NAD, GCS 15   HENT: Normocephalic, Atraumatic  Neck- Supple, Normal ROM  Eyes: Conjunctiva normal.  Respiratory: No respiratory distress, speaking in full sentences.   Musculoskeletal: No deformities, Moves all extremities equally.   Integument: Large raised erythematous area to anterior right thigh with central opening and scabbing, no active drainage. No notable tenderness. Raised, erythematous fluctuant area to left buttocks/posterior thigh measuring approximately 1.5 cm diameter. Central scab, no active bleeding.   Neurologic: Alert & oriented x 3, Normal sensory function. No focal deficits.   Psychiatric: Affect normal, Judgment normal, Mood normal. Cooperative.      LAB:  None    RADIOLOGY:  None    Procedures:   PROCEDURE: Incision and Drainage   INDICATIONS: Localized abscess   PROCEDURE PROVIDER: Tish Ortega PA-C   SITE: Left buttocks   MEDICATION: 2 mLs of 1% Lidocaine with epinephrine   NOTE: The area was prepped with chlorhexidine and draped off in the usual sterile fashion.  Local anesthetic was injected subcutaneously with anesthesia effects demonstrated prior to proceeding.  The area of maximal fluctuance was opened with a # 11 Blade (Sharp Point) using cruciate incision to allow for drainage.  The abscess was drained.  The abscess cavity was bluntly explored to separate any loculations. No Packing was placed into the abscess cavity.  A sterile dressing was placed over the area.   COMPLEXITY: Simple    Simple = single, furuncle, paronychia, superficial  Complex = multiple or abscess requiring probing, loculations, packing placement    COMPLICATIONS: Patient tolerated procedure well, without complication         I, Mariaelena Hernandez, am serving as a scribe to document services personally performed by Tish Ortega PA-C based on my observation and the provider's statements to me. I, Tish Ortega PA-C, attest that Mariaelena Hernandez is acting in a scribe capacity, has observed my performance of the services and has documented them in accordance with my direction.    Tish Ortega PA-C  Emergency Medicine  [facility] EMERGENCY DEPARTMENT  [address] [place] [id number]  [phone]             Tish Ortega PA-C  05/19/23 1736

## 2023-07-10 ENCOUNTER — HOSPITAL ENCOUNTER (EMERGENCY)
Facility: HOSPITAL | Age: 48
Discharge: HOME | End: 2023-07-10
Admitting: PHYSICIAN ASSISTANT

## 2023-07-10 VITALS
DIASTOLIC BLOOD PRESSURE: 75 MMHG | RESPIRATION RATE: 18 BRPM | SYSTOLIC BLOOD PRESSURE: 138 MMHG | WEIGHT: 290 LBS | HEART RATE: 97 BPM | HEIGHT: 70 IN | TEMPERATURE: 98.3 F | OXYGEN SATURATION: 97 % | BODY MASS INDEX: 41.52 KG/M2

## 2023-07-10 DIAGNOSIS — L02.811 SCALP ABSCESS: ICD-10-CM

## 2023-07-10 DIAGNOSIS — A49.02 MRSA INFECTION: ICD-10-CM

## 2023-07-10 PROCEDURE — 99284 EMERGENCY DEPT VISIT MOD MDM: CPT | Mod: 25

## 2023-07-10 PROCEDURE — 10060 I&D ABSCESS SIMPLE/SINGLE: CPT

## 2023-07-10 RX ORDER — SULFAMETHOXAZOLE/TRIMETHOPRIM 800-160 MG
1 TABLET ORAL 2 TIMES DAILY
Qty: 20 TABLET | Refills: 0 | Status: SHIPPED | OUTPATIENT
Start: 2023-07-10 | End: 2023-07-20

## 2023-07-10 RX ORDER — CEPHALEXIN 500 MG/1
500 CAPSULE ORAL 4 TIMES DAILY
Qty: 40 CAPSULE | Refills: 0 | Status: SHIPPED | OUTPATIENT
Start: 2023-07-10 | End: 2023-07-20

## 2023-07-10 ASSESSMENT — ACTIVITIES OF DAILY LIVING (ADL): ADLS_ACUITY_SCORE: 33

## 2023-07-10 NOTE — ED PROVIDER NOTES
ED PROVIDER NOTE    EMERGENCY DEPARTMENT ENCOUNTER      NAME: Floyd Capps  AGE: 47 year old male  YOB: 1975  MRN: [id number]  EVALUATION DATE & TIME: 7/10/2023  4:53 PM    PCP: No Ref-Primary, Physician    ED PROVIDER: Violetta Johnston PA-C      Chief Complaint   Patient presents with     Headache         FINAL IMPRESSION:  1. MRSA infection    2. Scalp abscess          MEDICAL DECISION MAKING:    Pertinent Labs & Imaging studies reviewed. (See chart for details)    47-year-old male with a history of MRSA, type 2 diabetes, hypothyroidism, presenting with 2 lesions on his scalp that he believes are MRSA infections.  Had similar presentations in the past with cellulitis and abscesses but more in the groin.    He has 2 pustular lesions on the scalp.  With some mild erythema.  Nothing to suggest sepsis, or bony involvement.  Considered labs like CBC but these infections are simple and superficial and no signs of systemic illness. A small I&D was performed of each of thes after discussion with the patient and will place on Keflex and Bactrim which has worked well for him in the past.     At the conclusion of the encounter I discussed the results of all of the tests and the disposition. The questions were answered. The patient or family acknowledged understanding and was agreeable with the care plan.       Medical Decision Making    History:    Supplemental history from: N/A    External Record(s) reviewed: Documented in chart, if applicable.    Work Up:    Chart documentation includes differential considered and any EKGs or imaging independently interpreted by provider, where specified.    In additional to work up documented, I considered the following work up: Documented in chart, if applicable.    External consultation:    Discussion of management with another provider: Documented in chart, if applicable    Complicating factors:    Care impacted by chronic illness: Other: MRSA    Care affected by social  determinants of health: N/A    Disposition considerations: Discharge. I prescribed additional prescription strength medication(s) as charted. See documentation for any additional details.          ED COURSE  5:00 PM  Met and evaluated patient. Discussed ED plan.   5:55 PM  Incision and drainage procedure. See procedures section of this note for detailed documentation of this procedure.  6:10 PM We discussed the plan for discharge and the patient is agreeable. Reviewed supportive cares, symptomatic treatment, outpatient follow up, and reasons to return to the Emergency Department. Patient to be discharged by ED RN.       MEDICATIONS GIVEN IN THE EMERGENCY:  Medications - No data to display    NEW PRESCRIPTIONS STARTED AT TODAY'S ER VISIT  New Prescriptions    CEPHALEXIN (KEFLEX) 500 MG CAPSULE    Take 1 capsule (500 mg) by mouth 4 times daily for 10 days    SULFAMETHOXAZOLE-TRIMETHOPRIM (BACTRIM DS) 800-160 MG TABLET    Take 1 tablet by mouth 2 times daily for 10 days          =================================================================    HPI    Patient information was obtained from: Patient    Use of : None        Floyd Capps is a 47 year old male, history of diabetes mellitus type II, HLD, and MRSA, who presents for evaluation of scalp lesions.    The patient reports painful spots on his scalp that feel similar to his previous abscess from MRSA infection. He first noticed the spots a couple weeks ago, but over the past couple weeks the spots have become larger and more painful. He describes the pain as pressure-like. He has been taking Excedrin, 5x today, without relief of the pain. He reports a history of similar abscesses in the groin/rectal area. He has not had any fevers or chills.    Chart Review  Per chart review, the patient was seen in [facility] Emergency Department on 5/19/23 presenting with abscess/wound on his buttocks. The patient reported the wound felt similar to his previous  "abscess with MRSA. Patient drained the abscess prior to arrival. Tdap updated. No signs of necrotizing fasciitis. Patient was discharged to home with new prescription(s) for Bactrim and Keflex and plan for out-patient follow up with PCP for wound recheck.      REVIEW OF SYSTEMS   See HPI, otherwise all other systems reviewed and are negative    PAST MEDICAL HISTORY:  History reviewed. No pertinent past medical history.    PAST SURGICAL HISTORY:  Past Surgical History:   Procedure Laterality Date     APPENDECTOMY       INCISION AND DRAINAGE OF WOUND      groin abscess           CURRENT MEDICATIONS:    No current facility-administered medications for this encounter.    Current Outpatient Medications:      cephALEXin (KEFLEX) 500 MG capsule, Take 1 capsule (500 mg) by mouth 4 times daily for 10 days, Disp: 40 capsule, Rfl: 0     sulfamethoxazole-trimethoprim (BACTRIM DS) 800-160 MG tablet, Take 1 tablet by mouth 2 times daily for 10 days, Disp: 20 tablet, Rfl: 0     cyclobenzaprine (FLEXERIL) 5 MG tablet, [CYCLOBENZAPRINE (FLEXERIL) 5 MG TABLET] Take 1-2 tablets (5-10 mg total) by mouth every 8 (eight) hours as needed for muscle spasms., Disp: 30 tablet, Rfl: 0    ALLERGIES:  No Known Allergies    FAMILY HISTORY:  History reviewed. No pertinent family history.    SOCIAL HISTORY:   Smoking: Every Day  Alcohol: None  Illicit drug: None    VITALS:  Vitals:    07/10/23 1548   BP: 138/75   Pulse: 97   Resp: 18   Temp: 98.3  F (36.8  C)   TempSrc: Oral   SpO2: 97%   Weight: 131.5 kg (290 lb)   Height: 1.778 m (5' 10\")       PHYSICAL EXAM    General Appearance:  Alert, cooperative, no distress, appears stated age  HENT: Normocephalic. Mucous membranes moist. Two pustular lesions on the scalp, one over the left parietal scalp and one over the right posterior scalp, with slight erythema and tenderness to palpation and some scabbing.   Eyes: Conjunctiva clear, Lids normal. No discharge.   Respiratory: No " distress.  Musculoskeletal: Moving all extremities. No gross deformities  Integument: Warm, dry, no rashes or lesions  Neurologic: Alert and orientated x3. No focal deficits.  Psych: Normal mood and affect    LAB:  Labs Ordered and Resulted from Time of ED Arrival to Time of ED Departure - No data to display    RADIOLOGY:  No orders to display       PROCEDURES:   PROCEDURE: Incision and Drainage   INDICATIONS: Localized abscesses x2   PROCEDURE PROVIDER: Violetta Johnston PA-C   SITE: Scalp as in PE   MEDICATION: 1.5 mLs of 2% Lidocaine with epinephrine   NOTE: The area was prepped with chlorhexidine and draped off in the usual sterile fashion.  Local anesthetic was injected subcutaneously with anesthesia effects demonstrated prior to proceeding.  The area of maximal fluctuance was opened with a # 11 Blade (Sharp Point) using a Single Straight incision to allow for drainage.  The abscess was drained.  The abscess cavity was bluntly explored to separate any loculations. No Packing was placed into the abscess cavity.  A sterile dressing was placed over the area.   COMPLEXITY: Simple    Simple = single, furuncle, paronychia, superficial  Complex = multiple or abscess requiring probing, loculations, packing placement   COMPLICATIONS: Patient tolerated procedure well, without complication         I, Robert Hidalgo, am serving as a scribe to document services personally performed by Violetta Johnston PA-C based on my observation and the provider's statements to me. I, Violetta Johnston PA-C attest that Robert Hidalgo is acting in a scribe capacity, has observed my performance of the services and has documented them in accordance with my direction.    Violetta Johnston PA-C   Emergency Medicine           Violetta Johnston PA-C  07/10/23 3395

## 2023-07-10 NOTE — ED TRIAGE NOTES
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 Had them for a couple of weeks but has worsen over the weekend. Pt tried to squeeze them but nothing came out. Hx of MRSA 20 years ago in different areas. Pt took 5 Excedrin at 130pm.       Triage Assessment     Row Name 07/10/23 1549       Triage Assessment (Adult)    Airway WDL WDL       Respiratory WDL    Respiratory WDL WDL              "

## 2023-07-10 NOTE — DISCHARGE INSTRUCTIONS
We have performed incision and drainage of the 2 small lesions on your scalp that are likely MRSA.  We did get some purulent material out.  Recommend warm compresses or soaks a couple times a day and close monitoring of the sites.  Would ideally recommend a wound recheck in 2 to 3 days to make sure things are healing well.    Take the antibiotics as prescribed.  Return to the ER if you notice worsening pain, redness, swelling or fevers.

## 2023-07-12 ENCOUNTER — TELEPHONE (OUTPATIENT)
Dept: SCHEDULING | Facility: CLINIC | Age: 48
End: 2023-07-12

## 2023-07-12 ENCOUNTER — HOSPITAL ENCOUNTER (EMERGENCY)
Facility: HOSPITAL | Age: 48
Discharge: HOME | End: 2023-07-12

## 2023-07-12 VITALS
RESPIRATION RATE: 20 BRPM | BODY MASS INDEX: 41.52 KG/M2 | DIASTOLIC BLOOD PRESSURE: 81 MMHG | HEART RATE: 93 BPM | TEMPERATURE: 97.4 F | WEIGHT: 290 LBS | HEIGHT: 70 IN | OXYGEN SATURATION: 97 % | SYSTOLIC BLOOD PRESSURE: 170 MMHG

## 2023-07-12 DIAGNOSIS — L03.811 ABSCESS OR CELLULITIS OF SCALP: ICD-10-CM

## 2023-07-12 DIAGNOSIS — Z86.14 HISTORY OF MRSA INFECTION: ICD-10-CM

## 2023-07-12 DIAGNOSIS — L03.213 PRESEPTAL CELLULITIS OF LEFT EYE: ICD-10-CM

## 2023-07-12 PROCEDURE — 99283 EMERGENCY DEPT VISIT LOW MDM: CPT

## 2023-07-12 RX ORDER — DOXYCYCLINE 100 MG/1
100 CAPSULE ORAL 2 TIMES DAILY
Qty: 20 CAPSULE | Refills: 0 | Status: SHIPPED | OUTPATIENT
Start: 2023-07-12 | End: 2023-07-22

## 2023-07-12 ASSESSMENT — ENCOUNTER SYMPTOMS
EYE DISCHARGE: 1
FEVER: 0
EYE PAIN: 1
EYE ITCHING: 0
EYE REDNESS: 1
CHILLS: 0
NECK PAIN: 0
PHOTOPHOBIA: 0
SHORTNESS OF BREATH: 0
NECK STIFFNESS: 0
HEADACHES: 0

## 2023-07-12 ASSESSMENT — ACTIVITIES OF DAILY LIVING (ADL)
ADLS_ACUITY_SCORE: 35
ADLS_ACUITY_SCORE: 33

## 2023-07-12 NOTE — ED TRIAGE NOTES
On Bactrim for recent scalp infection. He noticed his left eye lid swelled up at 1 PM yesterday, and did not go away. Denies pain.  No vision changes.

## 2023-07-12 NOTE — DISCHARGE INSTRUCTIONS
You were seen in the ER for evaluation of eye swelling and diagnosed with preseptal cellulitis. This does not appear to be an allergic reaction.     Please resume Bactrim twice daily as previously instructed. Please begin Doxycycline 100 mg twice daily as directed. You can hold on Keflex at this time.    Please follow up with your primary care provider in 2 days for reevaluation and ongoing management, especially if your symptoms are not improving, or sooner as needed.    Return to the ER if any new or worsening symptoms develop including increased swelling, redness, warmth, pain with eye movement, fevers/chills, neck stiffness, headache, shortness of breath, chest pain, or any other concerning symptoms.

## 2023-07-12 NOTE — ED PROVIDER NOTES
EMERGENCY DEPARTMENT ENCOUNTER      NAME: Floyd Capps  AGE: 47 year old male  YOB: 1975  MRN: [id number]  EVALUATION DATE & TIME: 2023  6:53 AM    PCP: No Ref-Primary, Physician    ED PROVIDER: Olya Em PA-C      Chief Complaint   Patient presents with     eye problem         FINAL IMPRESSION:  1. Preseptal cellulitis of left eye    2. Abscess or cellulitis of scalp    3. History of MRSA infection          ED COURSE & MEDICAL DECISION MAKIN:02 AM AM Met with patient for initial interview. Plan for care discussed.  8:10 AM Spoke with Pharmacy regarding patient plan of care. Waiting to hear back from pharmacy regarding recommendations.   8:20 AM Discussed case with ED MD who recommends continuation of oral antibiotics.   8:55 AM Discussed case with pharmacy who recommends oral doxycycline, continue Bactrim, and hold Keflex.  9:00 AM Reevaluated and updated patient. Discussed new antibiotic regimen and importance of medication compliance. I discussed the plan for discharge with the patient, and patient is agreeable. We discussed supportive cares at home and reasons for return to the ER including new or worsening symptoms. All questions and concerns addressed. Patient to be discharged by RN.    Pertinent Labs & Imaging studies reviewed. (See chart for details)  47 year old male with a history of non-insulin dependent DM II, hypothyroid, HLD, tobacco use, and MRSA infection who presents to the Emergency Department for evaluation of left eyelid swelling that developed after taking antibiotics yesterday afternoon. He has taken both antibiotics in the past without reaction. No associated rash/hives, itching, oral swelling, or airway involvement. Upon exam, patient is afebrile, hypertensive, but in no acute distress. Unilateral left periorbital edema, redness, and mild warmth concerning for preseptal cellulitis. EOM full without pain, no evidence of orbital cellulitis. Recently drained  scalp abscesses crusted and appear to be healing. No nuchal rigidity. Full ROM neck without pain. Discussed option of laboratories with patient who declines secondary to cost. Given patient only took 1 dose of Bactrim and 2 doses of Keflex yesterday afternoon, I would not consider this a failure of treatment or worsening on treatment. Discussed case with pharmacy and ED MD who recommend oral doxycycline, continue Bactrim, and hold Keflex.     Symptoms and workup most consistent with preseptal cellulitis, no evidence of allergic reaction. Patient was made aware of the above findings. Plan to discharge patient home with prescription Doxycycline and continue Bactrim (hold Keflex). Discussed new antibiotic regimen and importance of medication compliance. Discussed strict return precautions and close follow up with their PCP for reevaluation and ongoing management. The patient was stable and well appearing upon discharge. The patient was advised to return to the ER if any new or worsening symptoms develop. The patient verbalizes understanding and agrees with the plan.     Medical Decision Making    History:    Supplemental history from: Documented in chart, if applicable    External Record(s) reviewed: Inpatient Record: Review from ED visit on 7/10/23    Work Up:    Chart documentation includes differential considered and any EKGs or imaging independently interpreted by provider, where specified.    In additional to work up documented, I considered the following work up: Documented in chart, if applicable.    External consultation:    Discussion of management with another provider: Pharmacist    Complicating factors:    Care impacted by chronic illness: Diabetes, Hyperlipidemia and Smoking / Nicotine Use    Care affected by social determinants of health: N/A    Disposition considerations: Discharge. I prescribed additional prescription strength medication(s) as charted. See documentation for any additional  details.        MEDICATIONS GIVEN IN THE EMERGENCY:  Medications - No data to display    NEW PRESCRIPTIONS STARTED AT TODAY'S ER VISIT  New Prescriptions    DOXYCYCLINE HYCLATE (VIBRAMYCIN) 100 MG CAPSULE    Take 1 capsule (100 mg) by mouth 2 times daily for 10 days          =================================================================    HPI    Patient information was obtained from: Patient    Use of : N/A       Floyd Capps is a 47 year old male with a pertinent history of tobacco use disorder, hyperlipidemia, T2DM, and hypothyroidism who presents to this ED via walk in for evaluation of an eye problem.    Per chart review, the patient presented to [facility] ED on 7/10/23 for evaluation of MRSA infection. He had 2 pustular lesions on the scalp. Nothing to suggest sepsis or bony involvement. A small I&D was performed of each of these after discussion with the patient. He was prescribed Keflex and Bactrim which has worked well for him in the past. Discharged with recommended PCP follow-up.     The patient reports worsening left eye swelling that began yesterday at 1 PM after taking his prescribed dose of Keflex and Bactrim. States that his last dose was at 3 PM, which he was prescribed in response to MRSA of the scalp. He endorses associated pain and drainage. The pain is not exacerbated with movement of the eye. He also notes that he feels flushed, but denies fever or chills. The patient has had similar episodes of cellulitis before; however, they have not been localized to the scalp or face but rather his armpit and groin region. Notes a history of T2DM. He is not currently on insulin nor checking his sugars, but states that he knows how to manage his levels. He would not like any lab work done today.     Denies chest pain, shortness of breath, neck pain, mouth/tongue/throat swelling, or any other complaints at this time.    REVIEW OF SYSTEMS   Review of Systems   Constitutional: Negative for  "chills and fever.   HENT:        Negative for mouth/tongue/throat swelling.   Eyes: Positive for pain (left, mild superficial), discharge (left) and redness. Negative for photophobia, itching and visual disturbance.        Positive for left eye swelling.   Respiratory: Negative for shortness of breath.    Cardiovascular: Negative for chest pain.   Musculoskeletal: Negative for neck pain and neck stiffness.   Skin: Negative for rash.   Neurological: Negative for headaches.   All other systems reviewed and are negative.       PAST MEDICAL HISTORY:  History reviewed. No pertinent past medical history.    PAST SURGICAL HISTORY:  Past Surgical History:   Procedure Laterality Date     APPENDECTOMY       INCISION AND DRAINAGE OF WOUND      groin abscess           CURRENT MEDICATIONS:    doxycycline hyclate (VIBRAMYCIN) 100 MG capsule  cephALEXin (KEFLEX) 500 MG capsule  cyclobenzaprine (FLEXERIL) 5 MG tablet  sulfamethoxazole-trimethoprim (BACTRIM DS) 800-160 MG tablet        ALLERGIES:  No Known Allergies    FAMILY HISTORY:  History reviewed. No pertinent family history.    SOCIAL HISTORY:   Social History     Socioeconomic History     Marital status: Single   Tobacco Use     Smoking status: Every Day     Types: Cigarettes     Smokeless tobacco: Never   Substance and Sexual Activity     Alcohol use: No     Drug use: No   Social History Narrative    Patient reports that he does not have health insurance.       VITALS:  BP (!) 189/88   Pulse 96   Temp 97.4  F (36.3  C) (Temporal)   Resp 20   Ht 1.778 m (5' 10\")   Wt 131.5 kg (290 lb)   SpO2 97%   BMI 41.61 kg/m      PHYSICAL EXAM    Constitutional:  Alert, in no acute distress. Cooperative.  EYES: Left eye: periorbital edema, redness, and mild warmth. Minimal conjunctival injection. EOM full without pain.   HENT:  Atraumatic, normocephalic. Scalp abscesses with crusted appearance from recent I&D, healing well with minimal overlying erythema. No nuchal rigidity. Full " ROM neck without pain.  Respiratory:  Respirations even, unlabored, in no acute respiratory distress. Lungs clear to auscultation bilaterally without wheeze, rhonchi, or rales. No cough. Speaks in full sentences easily.  Cardiovascular:  Regular rate and rhythm, good peripheral perfusion. No peripheral edema. No chest wall tenderness.  GI: Soft, flat.  Musculoskeletal:  No edema. No cyanosis. Range of motion major extremities intact.    Integument: Warm, Dry. No rash to visualized skin. No evidence of hives.  Neurologic:  Alert & oriented. No focal deficits noted.   Psych: Normal mood and affect.      I, Kemi Lyon, am serving as a scribe to document services personally performed by Olya Em PA-C based on my observation and the provider's statements to me. I, Olya Em PA-C, attest that Kemi Lyon is acting in a scribe capacity, has observed my performance of the services and has documented them in accordance with my direction.    Olya Em PA-C  [facility] EMERGENCY DEPARTMENT  [address] [place] [id number]  [phone]      Olya Em PA-C  07/12/23 0905

## 2023-07-12 NOTE — TELEPHONE ENCOUNTER
Reason for Call:  Appointment Request    Patient requesting this type of appt:  Hospital/ED Follow-Up     Requested provider:  Ellis    Reason patient unable to be scheduled: Not within requested timeframe    When does patient want to be seen/preferred time: 1-2 days    Comments: wants Ellis    Okay to leave a detailed message?: Yes at Cell number on file:    Telephone Information:   Mobile [phone]       Call taken on 7/12/2023 at 9:19 AM by Daisha GREGORIO

## 2023-07-12 NOTE — Clinical Note
Floyd Capps was seen and treated in our emergency department on 7/12/2023.  He may return to work on 07/13/2023.       If you have any questions or concerns, please don't hesitate to call.      Olya Em PA-C

## 2023-07-13 NOTE — TELEPHONE ENCOUNTER
Detailed voicemail left for patient at below number-    Okay to leave a detailed message?: Yes at Cell number on file:        Telephone Information:   Mobile [phone]       Message informed that there are no Hosp/ED Follow up appts available this week. Soonest would be sometime next week. Informed that pt can call scheduling back to look at other clinics as well for availability.     If pt calls back and wants to schedule here, please assist in scheduling as needed. Can we also check if pt has a primary care provider they see? And if so, should reach out to that clinic for an appt.

## 2023-07-25 ENCOUNTER — APPOINTMENT (OUTPATIENT)
Dept: CT IMAGING | Facility: HOSPITAL | Age: 48
End: 2023-07-25
Attending: EMERGENCY MEDICINE

## 2023-07-25 ENCOUNTER — HOSPITAL ENCOUNTER (EMERGENCY)
Facility: HOSPITAL | Age: 48
Discharge: HOME | End: 2023-07-25
Attending: EMERGENCY MEDICINE | Admitting: EMERGENCY MEDICINE

## 2023-07-25 VITALS
BODY MASS INDEX: 41.52 KG/M2 | RESPIRATION RATE: 16 BRPM | SYSTOLIC BLOOD PRESSURE: 149 MMHG | WEIGHT: 290 LBS | OXYGEN SATURATION: 98 % | TEMPERATURE: 98.7 F | HEIGHT: 70 IN | HEART RATE: 92 BPM | DIASTOLIC BLOOD PRESSURE: 85 MMHG

## 2023-07-25 DIAGNOSIS — L03.811 CELLULITIS OF HEAD OR SCALP: ICD-10-CM

## 2023-07-25 LAB
ANION GAP SERPL CALCULATED.3IONS-SCNC: 12 MMOL/L (ref 7–15)
BUN SERPL-MCNC: 17.1 MG/DL (ref 6–20)
CALCIUM SERPL-MCNC: 9.3 MG/DL (ref 8.6–10)
CHLORIDE SERPL-SCNC: 100 MMOL/L (ref 98–107)
CREAT SERPL-MCNC: 0.75 MG/DL (ref 0.67–1.17)
DEPRECATED HCO3 PLAS-SCNC: 23 MMOL/L (ref 22–29)
ERYTHROCYTE [DISTWIDTH] IN BLOOD BY AUTOMATED COUNT: 11.9 % (ref 10–15)
GFR SERPL CREATININE-BSD FRML MDRD: >90 ML/MIN/1.73M2
GLUCOSE SERPL-MCNC: 350 MG/DL (ref 70–99)
HCT VFR BLD AUTO: 41.9 % (ref 40–53)
HGB BLD-MCNC: 14.4 G/DL (ref 13.3–17.7)
MCH RBC QN AUTO: 31.6 PG (ref 26.5–33)
MCHC RBC AUTO-ENTMCNC: 34.4 G/DL (ref 31.5–36.5)
MCV RBC AUTO: 92 FL (ref 78–100)
PLATELET # BLD AUTO: 316 10E3/UL (ref 150–450)
POTASSIUM SERPL-SCNC: 4.3 MMOL/L (ref 3.4–5.3)
RBC # BLD AUTO: 4.55 10E6/UL (ref 4.4–5.9)
SODIUM SERPL-SCNC: 135 MMOL/L (ref 136–145)
WBC # BLD AUTO: 13.5 10E3/UL (ref 4–11)

## 2023-07-25 PROCEDURE — 70491 CT SOFT TISSUE NECK W/DYE: CPT

## 2023-07-25 PROCEDURE — 36415 COLL VENOUS BLD VENIPUNCTURE: CPT | Performed by: EMERGENCY MEDICINE

## 2023-07-25 PROCEDURE — 99285 EMERGENCY DEPT VISIT HI MDM: CPT | Mod: 25

## 2023-07-25 PROCEDURE — 85027 COMPLETE CBC AUTOMATED: CPT | Performed by: EMERGENCY MEDICINE

## 2023-07-25 PROCEDURE — 250N000011 HC RX IP 250 OP 636: Performed by: EMERGENCY MEDICINE

## 2023-07-25 PROCEDURE — 80048 BASIC METABOLIC PNL TOTAL CA: CPT | Performed by: EMERGENCY MEDICINE

## 2023-07-25 RX ORDER — SULFAMETHOXAZOLE/TRIMETHOPRIM 800-160 MG
1 TABLET ORAL 2 TIMES DAILY
Qty: 20 TABLET | Refills: 0 | Status: SHIPPED | OUTPATIENT
Start: 2023-07-25 | End: 2023-08-04

## 2023-07-25 RX ORDER — IOPAMIDOL 755 MG/ML
90 INJECTION, SOLUTION INTRAVASCULAR ONCE
Status: COMPLETED | OUTPATIENT
Start: 2023-07-25 | End: 2023-07-25

## 2023-07-25 RX ADMIN — IOPAMIDOL 90 ML: 755 INJECTION, SOLUTION INTRAVENOUS at 05:59

## 2023-07-25 ASSESSMENT — ACTIVITIES OF DAILY LIVING (ADL): ADLS_ACUITY_SCORE: 33

## 2023-07-25 NOTE — ED TRIAGE NOTES
Pt was seen here for scalp abscesses on July 10 th. A couple of those abscesses were drained here. Pt has one on the posterior right side of his head that has caused swelling and pain radiating toward his neck. Reports increased pain.

## 2023-07-25 NOTE — Clinical Note
Floyd Capps was seen and treated in our emergency department on 7/25/2023.         Sincerely,     [facility] Emergency Department

## 2023-07-25 NOTE — DISCHARGE INSTRUCTIONS
You were found to have a skin infection on your scalp.  Take the prescribed Bactrim along with your previously prescribed Keflex and follow-up closely with your primary care doctor.  Return to the ER for any worsening symptoms or other concerns.

## 2023-07-25 NOTE — ED PROVIDER NOTES
EMERGENCY DEPARTMENT ENCOUnter      NAME: Floyd Capps  AGE: 47 year old male  YOB: 1975  MRN: [id number]  EVALUATION DATE & TIME: No admission date for patient encounter.    PCP: No Ref-Primary, Physician    ED PROVIDER: Marc Rothman DO      Chief Complaint   Patient presents with    Scalp Abscesses         FINAL IMPRESSION:  1. Cellulitis of head or scalp          ED COURSE & MEDICAL DECISION MAKIN:07 AM I met with the patient in room 17 and performed my initial interview and exam       The patient presented to the emergency department today complaining of swelling over the posterior right scalp.  He had a large area of swelling and tenderness concerning for possible abscess.  CT imaging was performed showing cellulitis in this area but no drainable abscess.  He has a history of MRSA.  Plan will be to start the patient on antibiotics and have him follow-up closely as an outpatient.  He is comfortable with this plan.      Medical Decision Making    History:  Supplemental history from: Documented in chart, if applicable  External Record(s) reviewed: Documented in chart, if applicable.    Work Up:  Chart documentation includes differential considered and any EKGs or imaging independently interpreted by provider, where specified.  In additional to work up documented, I considered the following work up: Documented in chart, if applicable.    External consultation:  Discussion of management with another provider: Documented in chart, if applicable    Complicating factors:  Care impacted by chronic illness: Diabetes and Smoking / Nicotine Use  Care affected by social determinants of health: N/A    Disposition considerations: Discharge. I prescribed additional prescription strength medication(s) as charted. I considered admission, but discharged the patient after share decision making conversation.        At the conclusion of the encounter I discussed the results of all of the tests and the  "disposition. The questions were answered. The patient or family acknowledged understanding and was agreeable with the care plan.         MEDICATIONS GIVEN IN THE EMERGENCY:  Medications   iopamidol (ISOVUE-370) solution 90 mL (90 mLs Intravenous $Given 7/25/23 0559)       NEW PRESCRIPTIONS STARTED AT TODAY'S ER VISIT  Discharge Medication List as of 7/25/2023  6:58 AM        START taking these medications    Details   sulfamethoxazole-trimethoprim (BACTRIM DS) 800-160 MG tablet Take 1 tablet by mouth 2 times daily for 10 days, Disp-20 tablet, R-0, E-Prescribe                =================================================================    HPI        Floyd Capps is a 47 year old male with a pertinent history of DM type II, HLD, and tobacco use who presents to this ED via walk-in for evaluation of scalp abscess    Per chart review: The patient was seen in this ED on 7/10/23 for a scalp abscess. The patient had two I&D procedures performed and was prescribed Keflex and Bactrim.    The patient reports a history of scalp abscesses requiring incision and drainage (see chart review). The patient now reports a new abscess to the right posterior scalp for two days, but has \"grown really fast\". The patient notes it is painful and feels like \"pressure\". The patient denies any other symptoms or complaints.     PAST MEDICAL HISTORY:  No past medical history on file.    PAST SURGICAL HISTORY:  Past Surgical History:   Procedure Laterality Date    APPENDECTOMY      INCISION AND DRAINAGE OF WOUND      groin abscess           CURRENT MEDICATIONS:    sulfamethoxazole-trimethoprim (BACTRIM DS) 800-160 MG tablet  cyclobenzaprine (FLEXERIL) 5 MG tablet        ALLERGIES:  No Known Allergies    FAMILY HISTORY:  No family history on file.    SOCIAL HISTORY:   Social History     Socioeconomic History    Marital status: Single   Tobacco Use    Smoking status: Every Day     Types: Cigarettes    Smokeless tobacco: Never   Substance and " "Sexual Activity    Alcohol use: No    Drug use: No   Social History Narrative    Patient reports that he does not have health insurance.       VITALS:  Patient Vitals for the past 24 hrs:   BP Temp Temp src Pulse Resp SpO2 Height Weight   07/25/23 0700 (!) 149/85 -- -- -- -- 98 % -- --   07/25/23 0453 (!) 175/91 98.7  F (37.1  C) Oral 92 16 98 % 1.778 m (5' 10\") 131.5 kg (290 lb)       PHYSICAL EXAM    Constitutional:  Well developed, Well nourished,  HENT:  Atraumatic, Oropharynx moist, Nose normal. Large area of swelling and induration to right lower occipital scalp with moderate tenderness, no skin breaks   Eyes:  EOMI, Conjunctiva normal, No discharge.   Respiratory:  Normal breath sounds, No respiratory distress, No wheezing, No chest tenderness.   Cardiovascular:  Normal heart rate, Normal rhythm, No murmurs  GI:  Soft, No tenderness, No guarding, No CVA tenderness.   Musculoskeletal:  No tenderness to palpation or major deformities noted.   Extremities: No lower extremity edema.  Neurologic:  Alert & oriented x 3, No focal deficits noted.   Psychiatric:  Affect normal, Judgment normal, Mood normal.        LAB:  All pertinent labs reviewed and interpreted.  Results for orders placed or performed during the hospital encounter of 07/25/23              CBC with platelets   Result Value Ref Range    WBC Count 13.5 (H) 4.0 - 11.0 10e3/uL    RBC Count 4.55 4.40 - 5.90 10e6/uL    Hemoglobin 14.4 13.3 - 17.7 g/dL    Hematocrit 41.9 40.0 - 53.0 %    MCV 92 78 - 100 fL    MCH 31.6 26.5 - 33.0 pg    MCHC 34.4 31.5 - 36.5 g/dL    RDW 11.9 10.0 - 15.0 %    Platelet Count 316 150 - 450 10e3/uL   Basic metabolic panel   Result Value Ref Range    Sodium 135 (L) 136 - 145 mmol/L    Potassium 4.3 3.4 - 5.3 mmol/L    Chloride 100 98 - 107 mmol/L    Carbon Dioxide (CO2) 23 22 - 29 mmol/L    Anion Gap 12 7 - 15 mmol/L    Urea Nitrogen 17.1 6.0 - 20.0 mg/dL    Creatinine 0.75 0.67 - 1.17 mg/dL    Calcium 9.3 8.6 - 10.0 mg/dL    " Glucose 350 (H) 70 - 99 mg/dL    GFR Estimate >90 >60 mL/min/1.73m2       RADIOLOGY:  I have independently reviewed and interpreted the above imaging, pending the final radiology read.  Soft tissue neck CT w contrast   Final Result   IMPRESSION:    1.  There is nonspecific stranding of the subcutaneous fat in the right occipital and suboccipital area, extending along the length of the posterior right paraspinous musculature. No drainable abscess.   2.  Otherwise unremarkable.               I, Day Piedra, am serving as a scribe to document services personally performed by Dr. Rothman based on my observation and the provider's statements to me. I, Marc Rothman, DO attest that Day Piedra is acting in a scribe capacity, has observed my performance of the services and has documented them in accordance with my direction.    Marc Rothman DO  Emergency Medicine  [facility] EMERGENCY DEPARTMENT  [facility] [facility] [id number]  [phone]  Dept: [phone]     Marc Rothman MD  07/26/23 0413

## 2023-07-25 NOTE — Clinical Note
Floyd Capps was seen and treated in our emergency department on 7/25/2023.         Sincerely,     [facility] Emergency Department

## 2023-08-23 ENCOUNTER — APPOINTMENT (OUTPATIENT)
Dept: CARDIOLOGY | Facility: HOSPITAL | Age: 48
DRG: 065 | End: 2023-08-23

## 2023-08-23 ENCOUNTER — HOSPITAL ENCOUNTER (INPATIENT)
Facility: HOSPITAL | Age: 48
LOS: 3 days | Discharge: HOME | DRG: 065 | End: 2023-08-26
Attending: STUDENT IN AN ORGANIZED HEALTH CARE EDUCATION/TRAINING PROGRAM | Admitting: FAMILY MEDICINE

## 2023-08-23 ENCOUNTER — APPOINTMENT (OUTPATIENT)
Dept: CT IMAGING | Facility: HOSPITAL | Age: 48
DRG: 065 | End: 2023-08-23
Attending: STUDENT IN AN ORGANIZED HEALTH CARE EDUCATION/TRAINING PROGRAM

## 2023-08-23 ENCOUNTER — APPOINTMENT (OUTPATIENT)
Dept: MRI IMAGING | Facility: HOSPITAL | Age: 48
DRG: 065 | End: 2023-08-23
Attending: STUDENT IN AN ORGANIZED HEALTH CARE EDUCATION/TRAINING PROGRAM

## 2023-08-23 ENCOUNTER — APPOINTMENT (OUTPATIENT)
Dept: PHYSICAL THERAPY | Facility: HOSPITAL | Age: 48
DRG: 065 | End: 2023-08-23

## 2023-08-23 DIAGNOSIS — E11.65 TYPE 2 DIABETES MELLITUS WITH HYPERGLYCEMIA, UNSPECIFIED WHETHER LONG TERM INSULIN USE (H): ICD-10-CM

## 2023-08-23 DIAGNOSIS — I63.9 ISCHEMIC STROKE (H): ICD-10-CM

## 2023-08-23 DIAGNOSIS — I10 HYPERTENSION, UNSPECIFIED TYPE: ICD-10-CM

## 2023-08-23 DIAGNOSIS — Z86.79 HISTORY OF ISCHEMIC HEART DISEASE: Primary | ICD-10-CM

## 2023-08-23 DIAGNOSIS — R79.89 ELEVATED TROPONIN: ICD-10-CM

## 2023-08-23 LAB
ANION GAP SERPL CALCULATED.3IONS-SCNC: 12 MMOL/L (ref 7–15)
APTT PPP: 26 SECONDS (ref 22–38)
BASOPHILS # BLD AUTO: 0.1 10E3/UL (ref 0–0.2)
BASOPHILS NFR BLD AUTO: 1 %
BUN SERPL-MCNC: 14.5 MG/DL (ref 6–20)
CALCIUM SERPL-MCNC: 9 MG/DL (ref 8.6–10)
CHLORIDE SERPL-SCNC: 100 MMOL/L (ref 98–107)
CHOLEST SERPL-MCNC: 330 MG/DL
CREAT SERPL-MCNC: 0.78 MG/DL (ref 0.67–1.17)
DEPRECATED HCO3 PLAS-SCNC: 23 MMOL/L (ref 22–29)
EOSINOPHIL # BLD AUTO: 0.3 10E3/UL (ref 0–0.7)
EOSINOPHIL NFR BLD AUTO: 2 %
ERYTHROCYTE [DISTWIDTH] IN BLOOD BY AUTOMATED COUNT: 12.2 % (ref 10–15)
GFR SERPL CREATININE-BSD FRML MDRD: >90 ML/MIN/1.73M2
GLUCOSE BLDC GLUCOMTR-MCNC: 247 MG/DL (ref 70–99)
GLUCOSE BLDC GLUCOMTR-MCNC: 251 MG/DL (ref 70–99)
GLUCOSE BLDC GLUCOMTR-MCNC: 252 MG/DL (ref 70–99)
GLUCOSE BLDC GLUCOMTR-MCNC: 337 MG/DL (ref 70–99)
GLUCOSE SERPL-MCNC: 366 MG/DL (ref 70–99)
HBA1C MFR BLD: 11.1 %
HCT VFR BLD AUTO: 40.1 % (ref 40–53)
HDLC SERPL-MCNC: 55 MG/DL
HGB BLD-MCNC: 13.9 G/DL (ref 13.3–17.7)
IMM GRANULOCYTES # BLD: 0.1 10E3/UL
IMM GRANULOCYTES NFR BLD: 1 %
INR PPP: 0.95 (ref 0.85–1.15)
LDLC SERPL CALC-MCNC: 226 MG/DL
LYMPHOCYTES # BLD AUTO: 1.7 10E3/UL (ref 0.8–5.3)
LYMPHOCYTES NFR BLD AUTO: 15 %
MCH RBC QN AUTO: 31.7 PG (ref 26.5–33)
MCHC RBC AUTO-ENTMCNC: 34.7 G/DL (ref 31.5–36.5)
MCV RBC AUTO: 92 FL (ref 78–100)
MONOCYTES # BLD AUTO: 0.8 10E3/UL (ref 0–1.3)
MONOCYTES NFR BLD AUTO: 7 %
NEUTROPHILS # BLD AUTO: 8.3 10E3/UL (ref 1.6–8.3)
NEUTROPHILS NFR BLD AUTO: 74 %
NONHDLC SERPL-MCNC: 275 MG/DL
NRBC # BLD AUTO: 0 10E3/UL
NRBC BLD AUTO-RTO: 0 /100
NT-PROBNP SERPL-MCNC: 857 PG/ML (ref 0–450)
PLATELET # BLD AUTO: 290 10E3/UL (ref 150–450)
POTASSIUM SERPL-SCNC: 4.1 MMOL/L (ref 3.4–5.3)
RBC # BLD AUTO: 4.38 10E6/UL (ref 4.4–5.9)
SODIUM SERPL-SCNC: 135 MMOL/L (ref 136–145)
TRIGL SERPL-MCNC: 246 MG/DL
TROPONIN T SERPL HS-MCNC: 152 NG/L
TROPONIN T SERPL HS-MCNC: 157 NG/L
TROPONIN T SERPL HS-MCNC: 158 NG/L
TROPONIN T SERPL HS-MCNC: 162 NG/L
TSH SERPL DL<=0.005 MIU/L-ACNC: 2.72 UIU/ML (ref 0.3–4.2)
WBC # BLD AUTO: 11.2 10E3/UL (ref 4–11)

## 2023-08-23 PROCEDURE — 0042T CT HEAD PERFUSION W CONTRAST: CPT

## 2023-08-23 PROCEDURE — 85610 PROTHROMBIN TIME: CPT | Performed by: STUDENT IN AN ORGANIZED HEALTH CARE EDUCATION/TRAINING PROGRAM

## 2023-08-23 PROCEDURE — A9585 GADOBUTROL INJECTION: HCPCS | Mod: JZ | Performed by: STUDENT IN AN ORGANIZED HEALTH CARE EDUCATION/TRAINING PROGRAM

## 2023-08-23 PROCEDURE — 84443 ASSAY THYROID STIM HORMONE: CPT

## 2023-08-23 PROCEDURE — 36415 COLL VENOUS BLD VENIPUNCTURE: CPT | Performed by: STUDENT IN AN ORGANIZED HEALTH CARE EDUCATION/TRAINING PROGRAM

## 2023-08-23 PROCEDURE — 83880 ASSAY OF NATRIURETIC PEPTIDE: CPT

## 2023-08-23 PROCEDURE — 255N000002 HC RX 255 OP 636: Mod: JZ | Performed by: STUDENT IN AN ORGANIZED HEALTH CARE EDUCATION/TRAINING PROGRAM

## 2023-08-23 PROCEDURE — 99207 PR NO CHARGE LOS: CPT | Performed by: NURSE PRACTITIONER

## 2023-08-23 PROCEDURE — 97161 PT EVAL LOW COMPLEX 20 MIN: CPT | Mod: GP

## 2023-08-23 PROCEDURE — 250N000011 HC RX IP 250 OP 636: Performed by: STUDENT IN AN ORGANIZED HEALTH CARE EDUCATION/TRAINING PROGRAM

## 2023-08-23 PROCEDURE — 84484 ASSAY OF TROPONIN QUANT: CPT

## 2023-08-23 PROCEDURE — 83036 HEMOGLOBIN GLYCOSYLATED A1C: CPT

## 2023-08-23 PROCEDURE — 93005 ELECTROCARDIOGRAM TRACING: CPT

## 2023-08-23 PROCEDURE — 250N000013 HC RX MED GY IP 250 OP 250 PS 637: Performed by: STUDENT IN AN ORGANIZED HEALTH CARE EDUCATION/TRAINING PROGRAM

## 2023-08-23 PROCEDURE — 85025 COMPLETE CBC W/AUTO DIFF WBC: CPT | Performed by: STUDENT IN AN ORGANIZED HEALTH CARE EDUCATION/TRAINING PROGRAM

## 2023-08-23 PROCEDURE — 82962 GLUCOSE BLOOD TEST: CPT

## 2023-08-23 PROCEDURE — 36415 COLL VENOUS BLD VENIPUNCTURE: CPT

## 2023-08-23 PROCEDURE — 84484 ASSAY OF TROPONIN QUANT: CPT | Performed by: STUDENT IN AN ORGANIZED HEALTH CARE EDUCATION/TRAINING PROGRAM

## 2023-08-23 PROCEDURE — 80061 LIPID PANEL: CPT

## 2023-08-23 PROCEDURE — 97530 THERAPEUTIC ACTIVITIES: CPT | Mod: GP

## 2023-08-23 PROCEDURE — 80048 BASIC METABOLIC PNL TOTAL CA: CPT | Performed by: STUDENT IN AN ORGANIZED HEALTH CARE EDUCATION/TRAINING PROGRAM

## 2023-08-23 PROCEDURE — 85730 THROMBOPLASTIN TIME PARTIAL: CPT | Performed by: STUDENT IN AN ORGANIZED HEALTH CARE EDUCATION/TRAINING PROGRAM

## 2023-08-23 PROCEDURE — 250N000013 HC RX MED GY IP 250 OP 250 PS 637: Performed by: INTERNAL MEDICINE

## 2023-08-23 PROCEDURE — 70496 CT ANGIOGRAPHY HEAD: CPT

## 2023-08-23 PROCEDURE — 99285 EMERGENCY DEPT VISIT HI MDM: CPT | Mod: 25

## 2023-08-23 PROCEDURE — 255N000002 HC RX 255 OP 636: Performed by: FAMILY MEDICINE

## 2023-08-23 PROCEDURE — 250N000012 HC RX MED GY IP 250 OP 636 PS 637

## 2023-08-23 PROCEDURE — 120N000001 HC R&B MED SURG/OB

## 2023-08-23 PROCEDURE — 93005 ELECTROCARDIOGRAM TRACING: CPT | Performed by: STUDENT IN AN ORGANIZED HEALTH CARE EDUCATION/TRAINING PROGRAM

## 2023-08-23 PROCEDURE — 99222 1ST HOSP IP/OBS MODERATE 55: CPT | Mod: AI

## 2023-08-23 PROCEDURE — 99255 IP/OBS CONSLTJ NEW/EST HI 80: CPT | Performed by: PSYCHIATRY & NEUROLOGY

## 2023-08-23 PROCEDURE — 70553 MRI BRAIN STEM W/O & W/DYE: CPT

## 2023-08-23 PROCEDURE — 99254 IP/OBS CNSLTJ NEW/EST MOD 60: CPT | Mod: 25 | Performed by: INTERNAL MEDICINE

## 2023-08-23 PROCEDURE — 93306 TTE W/DOPPLER COMPLETE: CPT | Mod: 26 | Performed by: INTERNAL MEDICINE

## 2023-08-23 PROCEDURE — 70498 CT ANGIOGRAPHY NECK: CPT

## 2023-08-23 RX ORDER — CLOPIDOGREL BISULFATE 75 MG/1
300 TABLET ORAL ONCE
Status: COMPLETED | OUTPATIENT
Start: 2023-08-23 | End: 2023-08-23

## 2023-08-23 RX ORDER — DEXTROSE MONOHYDRATE 25 G/50ML
25-50 INJECTION, SOLUTION INTRAVENOUS
Status: DISCONTINUED | OUTPATIENT
Start: 2023-08-23 | End: 2023-08-26 | Stop reason: HOSPADM

## 2023-08-23 RX ORDER — GADOBUTROL 604.72 MG/ML
10 INJECTION INTRAVENOUS ONCE
Status: COMPLETED | OUTPATIENT
Start: 2023-08-23 | End: 2023-08-23

## 2023-08-23 RX ORDER — IOPAMIDOL 755 MG/ML
75 INJECTION, SOLUTION INTRAVASCULAR ONCE
Status: COMPLETED | OUTPATIENT
Start: 2023-08-23 | End: 2023-08-23

## 2023-08-23 RX ORDER — ATORVASTATIN CALCIUM 40 MG/1
80 TABLET, FILM COATED ORAL EVERY EVENING
Status: DISCONTINUED | OUTPATIENT
Start: 2023-08-23 | End: 2023-08-26 | Stop reason: HOSPADM

## 2023-08-23 RX ORDER — LOSARTAN POTASSIUM 50 MG/1
50 TABLET ORAL 2 TIMES DAILY
Status: DISCONTINUED | OUTPATIENT
Start: 2023-08-23 | End: 2023-08-24

## 2023-08-23 RX ORDER — IOPAMIDOL 755 MG/ML
50 INJECTION, SOLUTION INTRAVASCULAR ONCE
Status: COMPLETED | OUTPATIENT
Start: 2023-08-23 | End: 2023-08-23

## 2023-08-23 RX ORDER — LIDOCAINE 40 MG/G
CREAM TOPICAL
Status: DISCONTINUED | OUTPATIENT
Start: 2023-08-23 | End: 2023-08-26 | Stop reason: HOSPADM

## 2023-08-23 RX ORDER — ASPIRIN 81 MG/1
81 TABLET, CHEWABLE ORAL ONCE
Status: COMPLETED | OUTPATIENT
Start: 2023-08-23 | End: 2023-08-23

## 2023-08-23 RX ORDER — NICOTINE POLACRILEX 4 MG
15-30 LOZENGE BUCCAL
Status: DISCONTINUED | OUTPATIENT
Start: 2023-08-23 | End: 2023-08-26 | Stop reason: HOSPADM

## 2023-08-23 RX ADMIN — LOSARTAN POTASSIUM 50 MG: 50 TABLET, FILM COATED ORAL at 20:57

## 2023-08-23 RX ADMIN — CLOPIDOGREL BISULFATE 300 MG: 75 TABLET ORAL at 10:44

## 2023-08-23 RX ADMIN — NICOTINE 1 PATCH: 7 PATCH, EXTENDED RELEASE TRANSDERMAL at 18:37

## 2023-08-23 RX ADMIN — INSULIN GLARGINE 10 UNITS: 100 INJECTION, SOLUTION SUBCUTANEOUS at 22:31

## 2023-08-23 RX ADMIN — PERFLUTREN 2 ML: 6.52 INJECTION, SUSPENSION INTRAVENOUS at 15:30

## 2023-08-23 RX ADMIN — ATORVASTATIN CALCIUM 80 MG: 40 TABLET, FILM COATED ORAL at 20:57

## 2023-08-23 RX ADMIN — GADOBUTROL 10 ML: 604.72 INJECTION INTRAVENOUS at 13:18

## 2023-08-23 RX ADMIN — INSULIN ASPART 3 UNITS: 100 INJECTION, SOLUTION INTRAVENOUS; SUBCUTANEOUS at 18:41

## 2023-08-23 RX ADMIN — IOPAMIDOL 75 ML: 755 INJECTION, SOLUTION INTRAVENOUS at 09:59

## 2023-08-23 RX ADMIN — ASPIRIN 81 MG CHEWABLE TABLET 81 MG: 81 TABLET CHEWABLE at 10:44

## 2023-08-23 RX ADMIN — IOPAMIDOL 50 ML: 755 INJECTION, SOLUTION INTRAVENOUS at 10:01

## 2023-08-23 ASSESSMENT — ACTIVITIES OF DAILY LIVING (ADL)
WEAR_GLASSES_OR_BLIND: NO
ADLS_ACUITY_SCORE: 36
DRESS: 0-->INDEPENDENT
DIFFICULTY_EATING/SWALLOWING: NO
CONCENTRATING,_REMEMBERING_OR_MAKING_DECISIONS_DIFFICULTY: NO
ADLS_ACUITY_SCORE: 20
TRANSFERRING: 0-->INDEPENDENT
DRESS: 0-->INDEPENDENT
FALL_HISTORY_WITHIN_LAST_SIX_MONTHS: NO
CHANGE_IN_FUNCTIONAL_STATUS_SINCE_ONSET_OF_CURRENT_ILLNESS/INJURY: YES
BATHING: 0-->INDEPENDENT
DEPENDENT_IADLS:: INDEPENDENT
TRANSFERRING: 0-->INDEPENDENT
HEARING_DIFFICULTY_OR_DEAF: NO
WALKING_OR_CLIMBING_STAIRS_DIFFICULTY: YES
DIFFICULTY_COMMUNICATING: NO
ADLS_ACUITY_SCORE: 20
DRESSING/BATHING_DIFFICULTY: YES
ADLS_ACUITY_SCORE: 36
TOILETING_ISSUES: NO
ADLS_ACUITY_SCORE: 35
WALKING_OR_CLIMBING_STAIRS: STAIR CLIMBING DIFFICULTY, ASSISTANCE 1 PERSON
ADLS_ACUITY_SCORE: 20
ADLS_ACUITY_SCORE: 36
DOING_ERRANDS_INDEPENDENTLY_DIFFICULTY: YES

## 2023-08-23 NOTE — CONSULTS
[facility] team is asked to see Floyd Capps in consultation to evaluate elevated troponin.      Assessment:     Elevated cardiac troponin.  Flat pattern of troponin elevation is not suggestive of acute coronary syndrome.  Symptoms not suggestive of acute coronary syndrome.  Possible regional wall motion abnormalities is worrisome, however and stress testing is appropriate  Acute stroke with subacute presentation.  Apical wall motion normality raises possibility of cardioembolic event, no evidence of atrial fibrillation.  Lacunar infarct more likely to be associated with poorly controlled hypertension.  Hypertension, noncompliant with medical therapy.  Emphasized the importance of treatment to prevent recurrent stroke, heart failure  Hyperlipidemia, noncompliant with medical therapy.  Emphasized the importance of treatment to prevent recurrent stroke, heart failure  Morbid obesity, possible obstructive sleep apnea by symptom description  Diabetes mellitus type II with suspected peripheral neuropathy  Ongoing tobacco use, with no interest in cessation.  Offered nicotine patch     Plan:     Atorvastatin 80 mg daily  Aspirin 81 mg daily  Losartan 50 mg daily  Monitor on telemetry  Serial cardiac troponins  Check pharmacologic nuclear stress test to look for evidence of inducible ischemia  Nicotine patch, encouraged smoking cessation  Discussed benefits of regular moderate exercise regimen.       Current History:     Floyd Capps is a 47-year-old gentleman with a history of morbid obesity, diabetes mellitus type 2, hyperlipidemia, noncompliant with medication therapy, no previous history of cardiac disease.  He does smoke, and presented to the emergency room today with right arm weakness, along with gait instability which initiated yesterday.  Found to have acute stroke.  Initial troponin 59, second level elevated at 120.  Denies lightheadedness, syncope or falls.  Has had lower  "extremity edema intermittently for a couple of years    He reports stopping all medicines couple of years ago, cost was an issue.  Does not believe he has ever been on treatment for hypertension.  Reports that his activities are limited by vague foot pain, bilaterally, though feet are numb to the touch.    For the last 5 weeks or so he has had occasional racing heartbeat when working in the heat.  He has not experienced any chest pain or palpitations otherwise.        Past Medical History:   No past medical history on file.  Sleep history: Frequent nocturnal awakenings for urination, occasionally awakens himself with snoring.  No apnea reported.  Naps daily when not working    Past Surgical History:     Past Surgical History:   Procedure Laterality Date    APPENDECTOMY      INCISION AND DRAINAGE OF WOUND      groin abscess       Family History:   Family history of premature coronary atherosclerosis.  Father  of first myocardial infarction at age 57.  Mother had her first myocardial infarction at 54    Social History:    reports that he has been smoking cigarettes and cigarettes. He has never used smokeless tobacco. He reports that he does not drink alcohol and does not use drugs.  Smokes 1/2 pack of cigarettes daily, with no interest in quitting  Drinks 5 or 6 alcoholic beverages over the course of a month  Exercise history: Minimal.  Lives with mom in her apartment, helps care for her.    Meds:     No current outpatient medications on file.       Allergies:   Patient has no known allergies.    Review of Systems:   A 12 point comprehensive review of systems was negative except as noted in the current history.    Objective:      Physical Exam  Wt Readings from Last 3 Encounters:   23 131.5 kg (290 lb)   23 131.5 kg (290 lb)   23 131.5 kg (290 lb)     Body mass index is 41.61 kg/m .  BP (!) 146/85   Pulse 89   Temp 97.8  F (36.6  C) (Oral)   Resp 19   Ht 1.778 m (5' 10\")   Wt 131.5 kg (290 " lb)   SpO2 96%   BMI 41.61 kg/m      General Appearance:  No apparent distress.  HEENT: No scleral icterus; the mucous membranes were pink and moist.  Conjunctiva slightly injected  Neck:  No cervical bruits, jugular venous distention, or thyromegaly.  Chest:The spine was straight. The chest was symmetric.  Lungs:  Respirations unlabored; the lungs are clear to auscultation.  Cardiovascular:    Nonpalpable point of maximal impulse. Auscultation reveals normal first and second heart sounds with no murmurs, rubs, or gallops. Carotid, radial, and dorsalis pedal pulses are intact and symmetric.  Abdomen: Obese.  No organomegaly, masses, bruits, or tenderness. Bowels sounds are present  Extremities: No cyanosis, clubbing, or edema.  Skin:  No xanthelasma.  Neurologic: Mood and affect are appropriate. Speech is fluent.      EKG (personally reviewed): Sinus rhythm 95 bpm.  Rightward axis.  Cannot rule out anterior infarct.  PVCs on prior study    Imaging   Brain MRI today:  1.  Acute lacunar infarct of the left corona radiata and basal ganglia corresponding with abnormality from prior head CT.  2.  Additional punctate focus of acute ischemia at the right frontal centrum semiovale.  3.  Nonspecific enhancing lesion at the left parietal scalp. Malignancy/abscess not excluded. Recommend correlation with direct visualization.  4.  Soft tissue prominence of the nasopharyngeal lymphoid tissues, suspect mucous retention cysts. If additional evaluation is indicated recommend visualization could be considered.      Cardiac diagnostics    Echocardiogram today:  1. Technically difficult study.  2. Normal left ventricular size and systolic performance with a visually  estimated ejection fraction of 55%.  3. On selected views, there appears to be a fairly small discrete apical wall  motion abnormality (no apical mural thrombus is detected).  4. No significant valvular heart disease is identified on this study.  5. Probable normal  "right ventricular size and systolic performance though  right-sided structures are not clearly visualized on all views on this study.  6. Echo contrast examination was performed using agitated NS as contrast  agent. The right heart opacity was suboptimal. There was no obvious evidence  of right to left shunting during spontaneous respiration or following release  of Valsalva though the sensitivity &specificity are both reduced due to  suboptimal acoustic imaging..        Lab Review     Lab Results   Component Value Date    HGB 13.9 08/23/2023     Lab Results   Component Value Date     08/23/2023     Lab Results   Component Value Date    POTASSIUM 4.1 08/23/2023     Lab Results   Component Value Date    BUN 14.5 08/23/2023     Lab Results   Component Value Date    CR 0.78 08/23/2023     Lab Results   Component Value Date    CHOL 330 08/23/2023    HDL 55 08/23/2023     08/23/2023    TRIG 246 08/23/2023           Dimitry Riley MD  [facility]  8/23/2023    Note created using Dragon voice recognition software.  Sound alike errors may have escaped editing.    Clinically Significant Risk Factors Present on Admission                  # DMII: A1C = 11.1 % (Ref range: <5.7 %) within past 6 months  # Severe Obesity: Estimated body mass index is 41.61 kg/m  as calculated from the following:    Height as of this encounter: 1.778 m (5' 10\").    Weight as of this encounter: 131.5 kg (290 lb).                                  "

## 2023-08-23 NOTE — ED PROVIDER NOTES
EMERGENCY DEPARTMENT ENCOUNTER      NAME: Floyd Capps  AGE: 47 year old male  YOB: 1975  MRN: [id number]  EVALUATION DATE & TIME: 8/23/2023  9:27 AM    PCP: No Ref-Primary, Physician    ED PROVIDER: Salvatore Whitfield M.D.      Chief Complaint   Patient presents with    Stroke Symptoms         FINAL IMPRESSION:  1. History of ischemic heart disease    2. Ischemic stroke (H)    3. Elevated troponin    4. Hypertension, unspecified type    5. Type 2 diabetes mellitus with hyperglycemia, unspecified whether long term insulin use (H)          ED COURSE & MEDICAL DECISION MAKING:    Pertinent Labs & Imaging studies reviewed. (See chart for details)  47 year old male presents to the Emergency Department for evaluation of stroke symptoms.  Initially was concerned with the possibility of stroke.  Patient had a stroke code called and as below lacunar infarct was noted.  Patient's symptoms are fairly stable and if anything are improving.  He is outside of the window for tPA and there is no large vessel occlusion amenable to neuro intervention although both of these were considered.  Patient has an elevated troponin but without any sort of chest pain or ACS symptoms.  I considered heparinization or even an echo here in the ER however without symptoms a nonacute EKG I spoke with cardiologist and we decided to pursue repeat troponin and we will admit the patient to the hospital for further care.  If he develops symptoms or there are any EKG changes or other issues we will reconsider.  9:28 AM I met with the patient for initial interview.   9:50 AM I spoke with the Radiologist.  10:06 AM I spoke with the Stroke Team.  There does appear to be a lacunar infarct noted which would account for the patient's symptoms in his right upper extremity.  This is subacute appearing.  At this time patient is not a candidate for tPA and there is no evidence for large vessel occlusion amenable to neuro intervention.  We will pursue  MRI and admit to the hospital.  11:31 AM PATIENT'S INITIAL TROPONIN IS ELEVATED.  I REEXAMINED HIM SPOKE WITH HIM AND HE HAS NO CHEST PAIN AND HAS NOT HAD ANY RECENTLY.  I spoke with Cardiologist, Dr. Riley.  At this time we will cycle the troponins as the patient does not have any chest pain whatsoever and the EKG is normal appearing.  We will hold off on heparinization.  12:07 PM I spoke with [place] resident.   At the conclusion of the encounter I discussed the results of all of the tests and the disposition. The questions were answered. The patient or family acknowledged understanding and was agreeable with the care plan.     ED Course as of 08/28/23 0843   Wed Aug 23, 2023   1012 Spoke with neurolo   1013 Spoke to radiologist and neurologist.  There is a small lacunar infarct.  Patient has right upper extremity weakness   1052 Troponin is elevated.  Patient does not have any ACS symptoms.  EKG is non acute.  Will call cardiology and consider heparin.  Elevated troponin could be related to stroke.   1257 Second troponin is stable.  Will hold off on heparin bending cardiology input           Medical Decision Making    History:  Supplemental history from: Documented in chart, if applicable  External Record(s) reviewed: Documented in chart, if applicable. and Inpatient Record: hospitalizations    Work Up:  Chart documentation includes differential considered and any EKGs or imaging independently interpreted by provider, where specified.  In additional to work up documented, I considered the following work up: Documented in chart, if applicable.    External consultation:  Discussion of management with another provider: Documented in chart, if applicable    Complicating factors:  Care impacted by chronic illness: Cerebrovascular Disease, Heart Disease, Hyperlipidemia, and Hypertension  Care affected by social determinants of health: Access to Medical Care    Disposition considerations: Admit.        This  patient involved a high degree of complexity in medical decision making, as significant risks were present and assessed. Recent encounters & results in medical record reviewed by me.     All workup (i.e. any EKG/labs/imaging as per charting below) reviewed and independently interpreted by me. See respective sections for details.      30 minutes of critical care time     MEDICATIONS GIVEN IN THE EMERGENCY:  Medications   aminophylline 50 mg (50 mg Intravenous $Given 8/25/23 0759)   iopamidol (ISOVUE-370) solution 75 mL (75 mLs Intravenous $Given 8/23/23 0959)   iopamidol (ISOVUE-370) solution 50 mL (50 mLs Intravenous $Given 8/23/23 1001)   clopidogrel (PLAVIX) tablet 300 mg (300 mg Oral $Given 8/23/23 1044)   aspirin (ASA) chewable tablet 81 mg (81 mg Oral $Given 8/23/23 1044)   gadobutrol (GADAVIST) injection 10 mL (10 mLs Intravenous $Given 8/23/23 1318)   perflutren lipid microsphere (DEFINITY) injection SUSP 2 mL (2 mLs Intravenous $Given 8/23/23 1530)   technetium sestamibi 1 UD per study (Tc99m MiBi) radioisotope injection 7-12 millicurie (10.6 millicuries Intravenous $Given 8/25/23 0709)   technetium sestamibi 2 UD per study (Tc99m MiBi) radioisotope injection 25-45 millicurie (42.6 millicuries Intravenous $Given 8/25/23 0755)   regadenoson (LEXISCAN) injection 0.4 mg (0.4 mg Intravenous $Given 8/25/23 0751)   sodium chloride 0.9% infusion (0 mLs Intravenous Stopped 8/25/23 0844)   lidocaine (viscous) (XYLOCAINE) 2 % solution (15 mLs Mouth/Throat $Given 8/25/23 0824)   benzocaine 20% (HURRICAINE/TOPEX) 20 % spray (0.5 mLs Mouth/Throat $Given 8/25/23 0825)   midazolam (VERSED) injection (1 mg Intravenous $Given 8/25/23 0829)   fentaNYL (PF) (SUBLIMAZE) injection (100 mcg Intravenous $Given 8/25/23 0826)       NEW PRESCRIPTIONS STARTED AT TODAY'S ER VISIT  Discharge Medication List as of 8/26/2023  3:41 PM        START taking these medications    Details   aspirin 81 MG EC tablet Take 1 tablet (81 mg) by mouth  daily for 30 days, Disp-30 tablet, R-0, E-Prescribe      atorvastatin (LIPITOR) 80 MG tablet Take 1 tablet (80 mg) by mouth every evening for 30 days, Disp-30 tablet, R-0, E-Prescribe      carvedilol (COREG) 12.5 MG tablet Take 1 tablet (12.5 mg) by mouth 2 times daily (with meals) for 30 days, Disp-60 tablet, R-0, E-Prescribe      clopidogrel (PLAVIX) 75 MG tablet Take 1 tablet (75 mg) by mouth daily for 30 days, Disp-30 tablet, R-0, E-Prescribe      hydrochlorothiazide (HYDRODIURIL) 12.5 MG tablet Take 1 tablet (12.5 mg) by mouth daily for 30 days, Disp-30 tablet, R-0, E-Prescribe      insulin glargine (LANTUS PEN) 100 UNIT/ML pen Inject 24 Units Subcutaneous At Bedtime for 30 days, Disp-7.2 mL, R-0, E-PrescribeIf Lantus is not covered by insurance, may substitute Basaglar or Semglee or other insulin glargine product per insurance preference at same dose and frequency.        losartan (COZAAR) 100 MG tablet Take 1 tablet (100 mg) by mouth daily for 30 days, Disp-30 tablet, R-0, E-Prescribe      metFORMIN (GLUCOPHAGE) 500 MG tablet Take 1 tablet (500 mg) by mouth 2 times daily (with meals) for 30 days, Disp-60 tablet, R-0, E-Prescribe                =================================================================    HPI    Patient information was obtained from: Patient.     Use of :         Floyd Capps is a 47 year old male with a pertinent history of type 2 diabetes, hypothyroidism, obesity, and hyperlipidemia who presents for evaluation of stroke symptoms.   Patient woke up on 8/22 and noticed right sided arm and leg weakness. He states that he has a hard time grabbing items. Patient notes that the more stressed he gets the miya the weakness increases. He notes that he feels anxious upon arrival to the ED. Patient has never experienced these symptoms before. When he walks, he feels like his right leg isn't working properly.   Patient denies headache, difficulty speaking, numbness or tingling, neck  "pain and other acute symptoms.     REVIEW OF SYSTEMS   Review of Systems   Patient denies headache, difficulty speaking, numbness or tingling, and neck pain.  PAST MEDICAL HISTORY:  No past medical history on file.    PAST SURGICAL HISTORY:  Past Surgical History:   Procedure Laterality Date    APPENDECTOMY      INCISION AND DRAINAGE OF WOUND      groin abscess           CURRENT MEDICATIONS:    aspirin 81 MG EC tablet  atorvastatin (LIPITOR) 80 MG tablet  carvedilol (COREG) 12.5 MG tablet  clopidogrel (PLAVIX) 75 MG tablet  hydrochlorothiazide (HYDRODIURIL) 12.5 MG tablet  insulin glargine (LANTUS PEN) 100 UNIT/ML pen  losartan (COZAAR) 100 MG tablet  metFORMIN (GLUCOPHAGE) 500 MG tablet        ALLERGIES:  No Known Allergies    FAMILY HISTORY:  No family history on file.    SOCIAL HISTORY:   Social History     Socioeconomic History    Marital status: Single   Tobacco Use    Smoking status: Every Day     Types: Cigarettes    Smokeless tobacco: Never   Substance and Sexual Activity    Alcohol use: No    Drug use: No   Social History Narrative    Patient reports that he does not have health insurance.       VITALS:  BP (!) 157/79 (BP Location: Right arm)   Pulse 89   Temp 97.9  F (36.6  C) (Oral)   Resp 16   Ht 1.778 m (5' 10\")   Wt 122.2 kg (269 lb 6.4 oz)   SpO2 98%   BMI 38.65 kg/m        PHYSICAL EXAM    Constitutional: Well developed, Well nourished, NAD, GCS 15  HENT: Normocephalic, Atraumatic, Bilateral external ears normal, Oropharynx normal, mucous membranes moist, Nose normal. Neck-  Normal range of motion, No tenderness, Supple, No stridor.  Eyes: PERRL, EOMI, Conjunctiva normal, No discharge.   Respiratory: Normal breath sounds, No respiratory distress, No wheezing, Speaks full sentences easily. No cough.  Cardiovascular: Normal heart rate, Regular rhythm, No murmurs, No rubs, No gallops. Chest wall nontender.  : Chaperone    Musculoskeletal: 2+ DP pulses. No edema.No cyanosis, No clubbing. Good " range of motion in all major joints. No tenderness to palpation or major deformities noted.   Integument: Warm, Dry, No erythema, No rash. No petechiae.   Neurologic: Alert & oriented x 3,  CN 3-12 intact Normal motor function other than a subtle decrease in strength noticed with  on the left no pronator drift, Normal sensory function, No focal deficits noted. Normal gait. Normal finger to nose bilaterally    Psychiatric: Affect normal, Judgment normal, Mood normal. Cooperative.           LAB:  All pertinent labs reviewed and interpreted.  Labs Ordered and Resulted from Time of ED Arrival to Time of ED Departure   BASIC METABOLIC PANEL - Abnormal       Result Value    Sodium 135 (*)     Potassium 4.1      Chloride 100      Carbon Dioxide (CO2) 23      Anion Gap 12      Urea Nitrogen 14.5      Creatinine 0.78      Calcium 9.0      Glucose 366 (*)     GFR Estimate >90     TROPONIN T, HIGH SENSITIVITY - Abnormal    Troponin T, High Sensitivity 158 (*)    GLUCOSE BY METER - Abnormal    GLUCOSE BY METER POCT 337 (*)    CBC WITH PLATELETS AND DIFFERENTIAL - Abnormal    WBC Count 11.2 (*)     RBC Count 4.38 (*)     Hemoglobin 13.9      Hematocrit 40.1      MCV 92      MCH 31.7      MCHC 34.7      RDW 12.2      Platelet Count 290      % Neutrophils 74      % Lymphocytes 15      % Monocytes 7      % Eosinophils 2      % Basophils 1      % Immature Granulocytes 1      NRBCs per 100 WBC 0      Absolute Neutrophils 8.3      Absolute Lymphocytes 1.7      Absolute Monocytes 0.8      Absolute Eosinophils 0.3      Absolute Basophils 0.1      Absolute Immature Granulocytes 0.1      Absolute NRBCs 0.0     TROPONIN T, HIGH SENSITIVITY - Abnormal    Troponin T, High Sensitivity 162 (*)    TROPONIN T, HIGH SENSITIVITY - Abnormal    Troponin T, High Sensitivity 157 (*)    LIPID PROFILE - Abnormal    Cholesterol 330 (*)     Triglycerides 246 (*)     Direct Measure HDL 55      LDL Cholesterol Calculated 226 (*)     Non HDL Cholesterol  275 (*)    HEMOGLOBIN A1C - Abnormal    Hemoglobin A1C 11.1 (*)    NT PROBNP INPATIENT - Abnormal    N terminal Pro BNP Inpatient 857 (*)    INR - Normal    INR 0.95     PARTIAL THROMBOPLASTIN TIME - Normal    aPTT 26     TSH WITH FREE T4 REFLEX - Normal    TSH 2.72         RADIOLOGY:  Reviewed all pertinent imaging. Please see official radiology report.  NM MPI with Lexiscan   Final Result      Transesophageal Echocardiogram   Final Result      Echocardiogram Complete with Bubble Study   Final Result      MR Brain w/o & w Contrast   Final Result   IMPRESSION:   1.  Acute lacunar infarct of the left corona radiata and basal ganglia corresponding with abnormality from prior head CT.   2.  Additional punctate focus of acute ischemia at the right frontal centrum semiovale.   3.  Nonspecific enhancing lesion at the left parietal scalp. Malignancy/abscess not excluded. Recommend correlation with direct visualization.   4.  Soft tissue prominence of the nasopharyngeal lymphoid tissues, suspect mucous retention cysts. If additional evaluation is indicated recommend visualization could be considered.      CT Head Perfusion w Contrast - For Tier 2 Stroke   Final Result   IMPRESSION:       CT PERFUSION:   1.  Normal cerebral perfusion.      CTA Head Neck with Contrast   Final Result   IMPRESSION:    HEAD CT:   1.  Suspect subacute lacunar infarct involving the left subinsular cortex/left basal ganglia.      2.  Soft tissue swelling over the left paracentral midline scalp vertex may represent a posttraumatic hematoma/soft tissue swelling if there is recent trauma in this region. Other etiologies include a sebaceous cyst. Would recommend direct    visualization/palpation of this finding to determine if it has been long-standing in origin.      HEAD CTA:    1.  Atherosclerotic calcification involving the carotid siphons with weblike plaque involving the paraclinoid segment of the left ICA which results in vessel narrowing less  than 40%. No evidence of large vessel occlusion within the intracranial    circulation.      NECK CTA:   1.  Atherosclerotic calcification at the origin of the right ICA results in moderate stenosis (55-60%).      2.  Mild stenosis less than 25% at the origin of the left ICA within the neck.            Results were discussed with Dr. Whitfield at 9:50 AM          EKG:    Performed at: 10:03 AM    Impression: Sinus rhythm with occasional Premature ventricular complexes. Rightward axis.     Rate: 100 BPM  Rhythm: Sinus   Axis: 57 99 15  MD Interval: 154 ms  QRS Interval: 90 ms  QTc Interval: 461 ms   ST Changes: N/A  Comparison: No change.     I have independently reviewed and interpreted the EKG(s) documented above.    PROCEDURES:         I, Eiljah Rizo, am serving as a scribe to document services personally performed by Dr. Salvatore Whitfield based on my observation and the provider's statements to me. ISalvatore MD attest that Elijah Rizo is acting in a scribe capacity, has observed my performance of the services and has documented them in accordance with my direction.    Salvatore Whitfield M.D.  Emergency Medicine  [facility] EMERGENCY DEPARTMENT  [facility] [facility] [id number] [phone]  Dept: [phone]       Salvatore Whitfield MD  08/28/23 6498

## 2023-08-23 NOTE — CONSULTS
"[facility]    Stroke Telephone Note    I was called by Salvatore Whitfield on 08/23/23 regarding patient Floyd Capps. The patient is a 47 year old male with PMHx significant for DM2, HLD, hypothyroid, tobacco use. He presents with right arm and leg weakness that started yesterday morning. Presenting VS as below; . He is not on antiplatelet therapy or statin PTA.     BP (!) 159/88   Pulse 100   Temp 97.8  F (36.6  C) (Oral)   Resp 20   Ht 1.778 m (5' 10\")   Wt 131.5 kg (290 lb)   SpO2 95%   BMI 41.61 kg/m       Stroke Code Data (for stroke code without tele)  Stroke code activated 08/23/23   0928   Stroke provider first response  08/23/23   0932            Last known normal 08/22/23    (\"morning\")        Time of discovery   (or onset of symptoms) 08/22/23    (\"morning\")   Head CT read by Stroke Neuro Dr/Provider 08/23/23   1004   Was stroke code de-escalated? Yes 08/23/23 1004          Imaging Findings  HEAD CT:  1.  Suspect subacute lacunar infarct involving the left subinsular cortex/left basal ganglia.     2.  Soft tissue swelling over the left paracentral midline scalp vertex may represent a posttraumatic hematoma/soft tissue swelling if there is recent trauma in this region. Other etiologies include a sebaceous cyst. Would recommend direct   visualization/palpation of this finding to determine if it has been long-standing in origin.     HEAD CTA:   1.  Atherosclerotic calcification involving the carotid siphons with weblike plaque involving the paraclinoid segment of the left ICA which results in vessel narrowing less than 40%. No evidence of large vessel occlusion within the intracranial   circulation.     NECK CTA:  1.  Atherosclerotic calcification at the origin of the right ICA results in moderate stenosis (55-60%).     2.  Mild stenosis less than 25% at the origin of the left ICA within the neck.    CT PERFUSION:  1.  Normal cerebral perfusion.    Intravenous " "Thrombolysis  Not given due to:   - unclear or unfavorable risk-benefit profile for extended window thrombolysis beyond the conventional 4.5 hour time window    Endovascular Treatment  Not initiated due to absence of proximal vessel occlusion    Impression  Left basal ganglia lacunar infarct, suspect d/t poorly controlled risk factors     Recommendations   - Use orderset: \"Ischemic Stroke Routine Admission\" or \"Ischemic Stroke No Thrombolytics/No Thrombectomy ICU Admission\"  - Neurochecks and Vital Signs every 4 hours    - long term blood pressure goal 130/80; PRNs for SBP > 180  - Daily aspirin 81 mg for secondary stroke prevention  - Plavix (clopidogrel) 300 mg PO loading dose x 1  - Plavix (clopidogrel) 75 mg PO Daily  - start atorvastatin 40 mg daily   - MRI Brain with and without contrast  - TTE with bubble study   - Telemetry, EKG  - Bedside Glucose Monitoring  - Nutrition: A Mediterranean diet recommended; please provide handout at discharge   - A1c, Lipid Panel, Troponin x 3  - PT/OT/SLP  - Stroke Education/Stroke Class per Patient [facility] ([facility])  - Smoking Cessation  - Depression Screen  - Apnea screening questions  - Euthermia, Euglycemia  - please place IP neurology consult     Case discussed with vascular neurology attending Dr. Us.     My recommendations are based on the information provided over the phone by Floyd Capps's in-person providers. They are not intended to replace the clinical judgment of his in-person providers. I was not requested to personally see or examine the patient at this time.    Mariaelena Doherty NP  Vascular Neurology    To page me or covering stroke neurology team member, click here: [facility]  Choose \"On Call\" tab at top, then select \"NEUROLOGY/ALL SITES\" from middle drop-down box, press Enter, then look for \"stroke\" or \"telestroke\" for your site.   "

## 2023-08-23 NOTE — CONSULTS
"Care Management Initial Consult    General Information  Assessment completed with: PatientFloyd  Type of CM/SW Visit: Initial Assessment    Primary Care Provider verified and updated as needed:  (\"no PMD\")   Readmission within the last 30 days: no previous admission in last 30 days      Reason for Consult: discharge planning  Advance Care Planning: Advance Care Planning Reviewed: no concerns identified          Communication Assessment  Patient's communication style: spoken language (English or Bilingual)          Living Environment:   People in home: parent(s)  Floyd and mother Penny  Current living Arrangements: apartment (\"No elevator access. But we live on the 1st level so don't have any steps we have to use\".)      Able to return to prior arrangements: yes       Family/Social Support:  Care provided by: self  Provides care for: parent(s)     Parent(s), Sibling(s)          Description of Support System: Supportive, Involved    Support Assessment: Adequate family and caregiver support, Adequate social supports, Patient communicates needs well met    Current Resources:   Patient receiving home care services: No     Community Resources: None  Equipment currently used at home: none  Supplies currently used at home: None    Employment/Financial:  Employment Status: unemployed, employment seeking (\"I missed an interview today to come to the ER instead\")     Employment/ Comments: \"no  history\"  Financial Concerns: insurance, none   Referral to Financial Worker: Yes (Darleen Lyons notified.)       Does the patient's insurance plan have a 3 day qualifying hospital stay waiver?  No    Lifestyle & Psychosocial Needs:  Social Determinants of Health     Tobacco Use: High Risk (7/12/2023)    Patient History     Smoking Tobacco Use: Every Day     Smokeless Tobacco Use: Never     Passive Exposure: Not on file   Alcohol Use: Not on file   Financial Resource Strain: Not on file   Food Insecurity: Not on file " "  Transportation Needs: Not on file   Physical Activity: Not on file   Stress: Not on file   Social Connections: Not on file   Intimate Partner Violence: Not on file   Depression: Not on file   Housing Stability: Not on file       Functional Status:  Prior to admission patient needed assistance:   Dependent ADLs:: Independent, Ambulation-no assistive device  Dependent IADLs:: Independent (\"I do all this for me and my mother\")  Assesssment of Functional Status: At functional baseline    Mental Health Status:          Chemical Dependency Status:                Values/Beliefs:  Spiritual, Cultural Beliefs, Gnosticism Practices, Values that affect care:                 Additional Information:  Floyd lives in an apartment with his mother. \"There is no elevator access. But we live on the 1st level so we don't have any steps we have to use\".     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 He also states, \"She also has nursing help for things like bathing. My sister will be checking in on her and will make her meals\". He is independent with ADLs and all IADLs.    He has no insurance and no PMD for follow up. Darleen Lyons was alerted. He states, \"I had a job interview today, but missed it because I had to come to the ER\".    Unknown discharge needs at this time, but he should be able to return home.    CM to follow for medical progression of care, discharge recommendations, and final discharge plan.    Inés Damico RN    "

## 2023-08-23 NOTE — PROGRESS NOTES
08/23/23 1600   Appointment Info   Signing Clinician's Name / Credentials (PT) Gadiel Cifuentes, PT   Living Environment   People in Home parent(s)  (Pt takes care of mother.)   Current Living Arrangements apartment   Home Accessibility no concerns   Living Environment Comments Pt lives with mother in an apartment. No stairs that he needs to do. Takes care of mother.   Self-Care   Usual Activity Tolerance good   Current Activity Tolerance moderate   Equipment Currently Used at Home none   Fall history within last six months no   Activity/Exercise/Self-Care Comment Pt typically IND at baseline with mobility. Works as a .   General Information   Onset of Illness/Injury or Date of Surgery 08/23/23   Referring Physician Dr. Richie Pennington.   Patient/Family Therapy Goals Statement (PT) None stated.   Pertinent History of Current Problem (include personal factors and/or comorbidities that impact the POC) From chart: Floyd Capps is a 47 year old male who presents with right arm weakness.  He woke up yesterday morning and noticed his right arm was weak.  He thought he just slept on it wrong and tried to ignore it throughout the day.  But it continued to act funny and he also noticed his right leg was getting weak. He went to bed last night and when he woke up today the right arm and leg weakness was still there.  Denies any trouble talking.  His mom encouraged him to come in and so he did and on imaging in the ED was found to have a subacute lacunar stroke.   Existing Precautions/Restrictions fall   Weight-Bearing Status - LLE full weight-bearing   Weight-Bearing Status - RLE full weight-bearing   Heart Disease Risk Factors Medical history;Gender   General Observations Male supine in bed.   Cognition   Affect/Mental Status (Cognition) WFL   Orientation Status (Cognition) oriented x 4   Follows Commands (Cognition) WFL   Cognitive Status Comments Pleasant, cooperative.   Pain Assessment   Patient Currently in Pain No    Posture    Posture Forward head position   Range of Motion (ROM)   ROM Comment Grossly WFL except RUE, which is limited d/t weakness.   Strength (Manual Muscle Testing)   Strength Comments WFL except RUE.   Bed Mobility   Comment, (Bed Mobility) Supine-sit with SBA.   Transfers   Comment, (Transfers) Sit-stand with FWW, SBA.   Gait/Stairs (Locomotion)   Comment, (Gait/Stairs) Ambulated x 10 ft with FWW, CGA. Generally steady, though reported feeling like his RLE was dragging. Did not feel safe to attempt ambulation without FWW.   Balance   Balance Comments Adequate for ambulation with AD.   Sensory Examination   Sensory Perception Comments Pt reports some numbness in B feet.   Coordination   Coordination Comments Slightly impaired in RUE.   Muscle Tone   Muscle Tone Comments RUE possibly slightly hypotonic.   Clinical Impression   Criteria for Skilled Therapeutic Intervention Yes, treatment indicated   PT Diagnosis (PT) Impaired ambulation.   Influenced by the following impairments Medical.   Functional limitations due to impairments Ambulation, balance, falls risk.   Clinical Presentation (PT Evaluation Complexity) Stable/Uncomplicated   Clinical Presentation Rationale Clinician judgment.   Clinical Decision Making (Complexity) low complexity   Planned Therapy Interventions (PT) balance training;gait training;home exercise program;neuromuscular re-education;patient/family education;strengthening;transfer training   Anticipated Equipment Needs at Discharge (PT)   (TBD, possibly walker.)   Risk & Benefits of therapy have been explained patient   PT Total Evaluation Time   PT Quinn Low Complexity Minutes (52586) 8   Physical Therapy Goals   PT Frequency Daily   PT Predicted Duration/Target Date for Goal Attainment 08/30/23   PT Goals Transfers;Gait;PT Goal 1   PT: Transfers Independent;Sit to/from stand   PT: Gait Independent;Greater than 200 feet   PT: Goal 1 Pt to score >45/56 on Patel or >22/30 on FGA to  demonstrate low falls risk.   Interventions   Interventions Quick Adds Therapeutic Activity   Therapeutic Activity   Therapeutic Activities: dynamic activities to improve functional performance Minutes (86959) 10   Symptoms Noted During/After Treatment Fatigue   Treatment Detail/Skilled Intervention PT: Eval completed, treatment initiated. Demo'd how to use walker. Pt ambulated additional 90 ft with FWW, CGA. Good gait speed though felt like R foot was dragging and might give out. I did not observe this, however. Given this, decided to defer ambulation without AD until tomorrow. Static balance series- feet together eyes open, then closed, SLS- able on L, tandem stance- generally steady. Discussed recommendation for OP PT. Session short as neurologist in to assess pt.   PT Discharge Planning   PT Plan PT: Ambulate without AD, Patel or FGA.   PT Discharge Recommendation (DC Rec) home;home with outpatient physical therapy   PT Rationale for DC Rec Pt mobilizing well, anticipate will be safe to discharge home when medically cleared. Would recommend OP PT if has residual deficits.   PT Brief overview of current status PT eval completed. Pt is SBA for bed mobility, SBA for sit-stand with FWW, ambulated ~100 ft with FWW, CGA.   Total Session Time   Timed Code Treatment Minutes 10   Total Session Time (sum of timed and untimed services) 18

## 2023-08-23 NOTE — PHARMACY-ADMISSION MEDICATION HISTORY
Pharmacist Admission Medication History    Admission medication history is complete. The information provided in this note is only as accurate as the sources available at the time of the update.    Medication reconciliation/reorder completed by provider prior to medication history? No    Information Source(s): Patient via in-person    Pertinent Information: None    Changes made to PTA medication list:  Added: None  Deleted: cyclobenzaprine  Changed: None    Medication Affordability:  Not including over the counter (OTC) medications, was there a time in the past 3 months when you did not take your medications as prescribed because of cost?: No    Allergies reviewed with patient and updates made in EHR: yes    Medication History Completed By: Violette Campa RPH 8/23/2023 11:00 AM    Prior to Admission medications    Not on File

## 2023-08-23 NOTE — H&P
"    [facility]    History and Physical - Hospitalist Service       Date of Admission:  8/23/2023    Assessment & Plan      Floyd Capps is a 47 year old male admitted on 8/23/2023 for subacute ischemic stroke.    Subacute Stroke  Neurology consulted  Patient is not a candidate for IV thrombolysis or Endovascular treatment.  - frequent neuro checks  - NPO until dysphagia evaluation  - Aspirin 81mg daily  - Clopidogrel 75mg daily  - atorvastatin 40mg daily  - MRI brain  - TTE with bubble study  - telemetry  - A1c, Lipid panel  - PT/OT/SLP consult  - SW consult    Elevated troponin  Elevated BNP  Asymptomatic but has risk factors for coronary artery disease.  He also has had significant lower extremity edema for the past year.  Cardiology consulted  - ECHO with bubble study   - recommend no heparin  - trend troponin    Type 2 Diabetes  Uncontrolled and not on any medications for many years  - startingsliding scale insulin medium resistance  - basal coverage with 10U glargine  - diabetes education consult placed  - Glucose monitoring  - will need diabetes discharge planning    HLD  - cont atorvastatin         Diet: NPO for Medical/Clinical Reasons Except for: No Exceptions    DVT Prophylaxis: Pneumatic Compression Devices  Cazares Catheter: Not present  Fluids: PO  Lines: None     Cardiac Monitoring: None  Code Status:  Full    Clinically Significant Risk Factors Present on Admission                       # Severe Obesity: Estimated body mass index is 41.61 kg/m  as calculated from the following:    Height as of this encounter: 1.778 m (5' 10\").    Weight as of this encounter: 131.5 kg (290 lb).              Disposition Plan      Expected Discharge Date: 08/25/2023                The patient's care was discussed with the Attending Physician, Dr. Pennington .      David Quintero MD  Hospitalist Service  [facility]  Securely message with Vocera (more info)  Text page via " [facility] Paging/Directory   ______________________________________________________________________    Chief Complaint   Right arm weakness    History is obtained from the patient    History of Present Illness   Floyd Capps is a 47 year old male who presents with right arm weakness.  He woke up yesterday morning and noticed his right arm was weak.  He thought he just slept on it wrong and tried to ignore it throughout the day.  But it continued to act funny and he also noticed his right leg was getting weak. He went to bed last night and when he woke up today the right arm and leg weakness was still there.  Denies any trouble talking.  His mom encouraged him to come in and so he did and on imaging in the ED was found to have a subacute lacunar stroke.    He has a past medical history of type 2 diabetes.  He has elevated A1c dating back to 2010.  He was on insulin for a very short time.  He has otherwise been untreated.    He also has hyperlipidemia and hypertension that is untreated.  He smokes 1/2 pack per day and drinks alcohol occasionally.  He denies drug use.    He has been to the ED multiple times for skin infections.  He has had boils that have been surgically drained.      Social Hx: Lives in apartment with his mother.  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  Currently not working.  Was scheduled for a job interview today.  He is a     ROS:  He has noticed lower extremity edema for the past year and also has been waking up with a cough for the past year.  He has tingling burning sensations in his feet    ROS negative for chest pain, shortness of breath, headache, occasionally gets anxiety attacks where his heart will race.    Past Medical History    No past medical history on file.    Past Surgical History   Past Surgical History:   Procedure Laterality Date    APPENDECTOMY      INCISION AND DRAINAGE OF WOUND      groin abscess       Prior to Admission  Medications   None        Allergies   No Known Allergies     Physical Exam   Vital Signs: Temp: 97.8  F (36.6  C) Temp src: Oral BP: (!) 152/82 Pulse: 96   Resp: 20 SpO2: 94 %      Weight: 290 lbs 0 oz    General Appearance: Lying in bed, vitally stable  Mouth: poor dentition, lesion on surface of tongue  Respiratory: normal  Cardiovascular: normal  GIAbdomen:   Obese abdomen, non-tender  Skin: Currently has boils on his scalp and neck.  He also has  skin wounds in various stages of healing on his lower extremities.  Other:   Neuro:  Right upper extremity weakness present    Data     I have personally reviewed the following data over the past 24 hrs:    11.2 (H)  \   13.9   / 290     135 (L) 100 14.5 /  366 (H)   4.1 23 0.78 \     Trop: 157 (HH) BNP: 857 (H)     TSH: 2.72 T4: N/A A1C: 11.1 (H)     INR:  0.95 PTT:  26   D-dimer:  N/A Fibrinogen:  N/A       Imaging results reviewed over the past 24 hrs:   Recent Results (from the past 24 hour(s))   CTA Head Neck with Contrast    Narrative    EXAM: CTA HEAD NECK W CONTRAST  LOCATION: [facility]  DATE: 8/23/2023    INDICATION: Code Stroke to evaluate for potential thrombolysis and thrombectomy. PLEASE READ IMMEDIATELY.  COMPARISON: None.  CONTRAST: isovue 370 75ml  TECHNIQUE: Head and neck CT angiogram with IV contrast. Noncontrast head CT followed by axial helical CT images of the head and neck vessels obtained during the arterial phase of intravenous contrast administration. Axial 2D reconstructed images and   multiplanar 3D MIP reconstructed images of the head and neck vessels were performed by the technologist. Dose reduction techniques were used. All stenosis measurements made according to NASCET criteria unless otherwise specified.    FINDINGS:   NONCONTRAST HEAD CT:   INTRACRANIAL CONTENTS: Small lacunar infarct with ill-defined borders involving the left subinsular cortex extending into the posterior margin of the left basal ganglia at  the level of the putamen. No associated hemorrhage. Posterior fossa structures   including cerebellar hemispheres, fourth ventricle and CP angle cisterns are clear. Nasopharynx is clear. Region of the sella and suprasellar cistern are clear.     VISUALIZED ORBITS/SINUSES/MASTOIDS: No intraorbital abnormality. No paranasal sinus mucosal disease. No middle ear or mastoid effusion.    BONES/SOFT TISSUES: Soft tissue swelling over the left midline scalp vertex (series 7 image #44) may represent a posttraumatic hematoma/soft tissue swelling if there was recent trauma in this region or other etiologies such as a sebaceous cyst.    HEAD CTA:  ANTERIOR CIRCULATION: Atherosclerotic calcification involving the carotid siphons bilaterally. Weblike plaque involving the paraclinoid segment of the left ICA results in vessel narrowing of approximately 40%. No evidence of large vessel occlusion   intracranially.    POSTERIOR CIRCULATION: No stenosis/occlusion, aneurysm, or high flow vascular malformation. Balanced vertebral arteries supply a normal basilar artery.     DURAL VENOUS SINUSES: Expected enhancement of the major dural venous sinuses.    NECK CTA:  RIGHT CAROTID: No measurable stenosis or dissection.    LEFT CAROTID: No measurable stenosis or dissection.    VERTEBRAL ARTERIES: The origin of the vertebral arteries at the level of the thoracic inlet demonstrate calcification at the origin of the right and left vertebral arteries. The degree of luminal narrowing at the origin of the vertebral arteries is   difficult to assess due to beam hardening artifact which obscures fine detail at the origin of these vessels. The remainder of the caliber of the vertebral arteries throughout the neck is normal.    AORTIC ARCH: Classic aortic arch anatomy with no significant stenosis at the origin of the great vessels.    NONVASCULAR STRUCTURES: Unremarkable.      Impression    IMPRESSION:   HEAD CT:  1.  Suspect subacute lacunar infarct  involving the left subinsular cortex/left basal ganglia.    2.  Soft tissue swelling over the left paracentral midline scalp vertex may represent a posttraumatic hematoma/soft tissue swelling if there is recent trauma in this region. Other etiologies include a sebaceous cyst. Would recommend direct   visualization/palpation of this finding to determine if it has been long-standing in origin.    HEAD CTA:   1.  Atherosclerotic calcification involving the carotid siphons with weblike plaque involving the paraclinoid segment of the left ICA which results in vessel narrowing less than 40%. No evidence of large vessel occlusion within the intracranial   circulation.    NECK CTA:  1.  Atherosclerotic calcification at the origin of the right ICA results in moderate stenosis (55-60%).    2.  Mild stenosis less than 25% at the origin of the left ICA within the neck.        Results were discussed with Dr. Whitfield at 9:50 AM   CT Head Perfusion w Contrast - For Tier 2 Stroke    Narrative    EXAM: CT HEAD PERFUSION W CONTRAST  LOCATION: [facility]  DATE: 8/23/2023    INDICATION: Code Stroke to evaluate for potential thrombolysis and thrombectomy. Evaluate mismatch between penumbra and core infarct. READ IMMEDIATELY.  COMPARISON: CT brain/CTA performed same day  TECHNIQUE: CT cerebral perfusion was performed utilizing a second contrast bolus. Perfusion data were post processed with generation of standard perfusion maps and estimation of ischemic/infarcted volumes utilizing standard threshold values. Dose   reduction techniques were used. All stenosis measurements made according to NASCET criteria unless otherwise specified.  CONTRAST: isovue 370 50ml    FINDINGS:     CT PERFUSION:  PERFUSION MAPS: Symmetrical cerebral perfusion. No focal deficits in cerebral blood flow or volume to suggest ischemia/oligemia.    RAPID ANALYSIS:  CBF<30%: 0  Tmax>6sec: 0  Mismatch volume: 0  Mismatch ratio: None       Impression    IMPRESSION:     CT PERFUSION:  1.  Normal cerebral perfusion.   MR Brain w/o & w Contrast    Narrative    EXAM: MR BRAIN W/O and W CONTRAST  LOCATION: [facility]  DATE: 8/23/2023    INDICATION: right arm weakness and lacunar infarct on ct scan  COMPARISON: CT head 08/23/2023  CONTRAST: 10ml gadavist  TECHNIQUE: Routine multiplanar multisequence head MRI without and with intravenous contrast.    FINDINGS:  INTRACRANIAL CONTENTS: There is 15 x 7 mm focus of restricted diffusion at the left corona radiata extending to the left lentiform nucleus. This is consistent with acute lacunar infarct and corresponds with the focal hypodensity on prior head CT. There   is additional 4 mm focus of restricted diffusion at the right frontal centrum semiovale consistent with additional area of acute ischemia. No mass, acute hemorrhage, or extra-axial fluid collections. Scattered nonspecific T2/FLAIR hyperintensities within   the cerebral white matter most consistent with mild chronic microvascular ischemic change. Normal ventricles and sulci. Normal position of the cerebellar tonsils. No pathologic contrast enhancement.    SELLA: No abnormality accounting for technique.    OSSEOUS STRUCTURES/SOFT TISSUES: Normal marrow signal. The major intracranial vascular flow voids are maintained. Nonspecific enhancing ovoid lesion at the left parietal scalp measuring 1.9 x 1.2 cm best seen series 17 image 25.    ORBITS: No abnormality accounting for technique.     SINUSES/MASTOIDS: No paranasal sinus mucosal disease. No middle ear or mastoid effusion. There is nonspecific soft tissue prominence involving the nasopharyngeal endplate tissues without associated enhancement. This is favored to represent mucous   retention cysts.      Impression    IMPRESSION:  1.  Acute lacunar infarct of the left corona radiata and basal ganglia corresponding with abnormality from prior head CT.  2.  Additional punctate focus  of acute ischemia at the right frontal centrum semiovale.  3.  Nonspecific enhancing lesion at the left parietal scalp. Malignancy/abscess not excluded. Recommend correlation with direct visualization.  4.  Soft tissue prominence of the nasopharyngeal lymphoid tissues, suspect mucous retention cysts. If additional evaluation is indicated recommend visualization could be considered.   Echocardiogram Complete with Bubble Study    Narrative    [id number]  [id number]  [id number]  [id number]^SINTIA^MARIAN^[place] [address]     Name: LUIS HODGES  MRN: [id number]  : 1975  Study Date: 2023 03:10 PM  Age: 47 yrs  Gender: Male  Patient [facility]  Reason For Study: Cardiovascular Incident  Ordering Physician: MARIAN PATRICIO  Referring Physician: MARIAN PATRICIO  Performed By: RADHA     BSA: 2.4 m2  Height: 70 in  Weight: 290 lb  HR: 87  ______________________________________________________________________________  Procedure  Complete Bubble Echo Adult. Definity (NDC #63051-280) given intravenously.  ______________________________________________________________________________  Interpretation Summary     1. Technically difficult study.  2. Normal left ventricular size and systolic performance with a visually  estimated ejection fraction of 55%.  3. On selected views, there appears to be a fairly small discrete apical wall  motion abnormality (no apical mural thrombus is detected).  4. No significant valvular heart disease is identified on this study.  5. Probable normal right ventricular size and systolic performance though  right-sided structures are not clearly visualized on all views on this study.  6. Echo contrast examination was performed using agitated NS as contrast  agent. The right heart opacity was suboptimal. There was no obvious evidence  of right to left shunting during spontaneous respiration or following release  of Valsalva though the  sensitivity &specificity are both reduced due to  suboptimal acoustic imaging..  ______________________________________________________________________________  Left ventricle:  Normal left ventricular size and systolic performance with a visually  estimated ejection fraction of 55%. On selected views, there appears to be a  fairly small discrete apical wall motion abnormality (no apical mural thrombus  is detected). Left ventricular wall thickness is normal.     Assessment of LV Diastolic Function: The cumulative findings suggest normal  diastolic filling.     Right ventricle:  Probable normal right ventricular size and systolic performance though right-  sided structures are not clearly visualized on all views on this study.     Left atrium:  There is mild left atrial enlargement.     Right atrium:  The right atrium is of normal size.     IVC:  The IVC is of normal caliber.     Aortic valve:  The aortic valve is not well visualized, but suspected to be comprised of  three cusps. No significant aortic stenosis or aortic insufficiency is  detected on this study.     Mitral valve:  The mitral valve appears morphologically normal. There is probable trace  mitral insufficiency.     Tricuspid valve:  The tricuspid valve is grossly morphologically normal. There is probable trace  tricuspid insufficiency.     Pulmonic valve:  The pulmonic valve is grossly morphologically normal.     Thoracic aorta:  The aortic root and proximal ascending aorta are of normal dimension.     Pericardium:  There is no significant pericardial effusion.  ______________________________________________________________________________  ______________________________________________________________________________  MMode/2D Measurements & Calculations  IVSd: 1.1 cm  LVIDd: 4.7 cm  LVIDs: 2.8 cm  LVPWd: 1.1 cm  FS: 40.8 %  LV mass(C)d: 183.4 grams  LV mass(C)dI: 75.0 grams/m2  Ao root diam: 3.3 cm  LA dimension: 5.2 cm  asc Aorta Diam: 3.1  cm  LA/Ao: 1.6  LVOT diam: 2.6 cm  LVOT area: 5.3 cm2  LA Volume Indexed (AL/bp): 24.5 ml/m2     RV Base: 3.6 cm  RWT: 0.45  TAPSE: 2.6 cm     Time Measurements  MM HR: 87.0 BPM     Doppler Measurements & Calculations  MV E max servando: 68.1 cm/sec  MV A max servando: 59.7 cm/sec  MV E/A: 1.1  MV dec slope: 320.0 cm/sec2  MV dec time: 0.21 sec  Ao V2 max: 125.0 cm/sec  Ao max P.0 mmHg  Ao V2 mean: 93.3 cm/sec  Ao mean P.0 mmHg  Ao V2 VTI: 25.2 cm  NILSA(I,D): 3.8 cm2  NILSA(V,D): 3.8 cm2  LV V1 max PG: 3.3 mmHg  LV V1 max: 90.2 cm/sec  LV V1 VTI: 18.1 cm  SV(LVOT): 96.1 ml  SI(LVOT): 39.3 ml/m2  PA acc time: 0.10 sec  AV Servando Ratio (DI): 0.72  NILSA Index (cm2/m2): 1.6     E/E' av.8  Lateral E/e': 7.6  Medial E/e': 7.9  RV S Servando: 15.6 cm/sec     ______________________________________________________________________________  Report approved by: Ladonna Hall 2023 03:58 PM

## 2023-08-23 NOTE — CONSULTS
This is a neurological consultation    47-year-old admitted to hospital 8/23/2023    Chief complaint  Left subinsular cortex/left basal ganglia subacute stroke  Right arm/leg weakness    HPI  47-year-old presents to the hospital on 8/23/2023.  States that he awoke the day before admission with right arm and leg weakness.  Arm seemed a lot worse though  Denied any specific speech trouble      Patient went to bed feeling okay and then awoke with his arm being clumsy  Had trouble using the mouse  Did not have a lot of trouble talking  Denied any significant headache or chest pain  Did have some clumsiness of his right leg with gait but the arm was weaker than the leg          Past medical history  Diabetes  Hyperlipidemia  Hypothyroidism    Habits  Smoker half a pack per day  Alcohol 5-4/month  Works as a         Family history  Mother with MI and heart disease  Dad with MI and heart disease  Brother older arthritis  Sister younger no diabetes      Work-up  HEAD CT: 8/23/2023  1.  Suspect subacute lacunar infarct involving the left subinsular cortex/left basal ganglia.  2.  Soft tissue swelling over the left paracentral midline scalp vertex may represent a posttraumatic hematoma/soft tissue swelling        if there is recent trauma in this region.        Other etiologies include a sebaceous cyst. Would recommend direct  visualization/palpation of this finding to determine if it has been long-standing in origin.  HEAD CTA: 8/23/2023  1.  Atherosclerotic calcification involving the carotid siphons with weblike plaque involving the paraclinoid segment of the left ICA which results in vessel narrowing less than 40%.   No evidence of large vessel occlusion within the intracranial circulation.  NECK CTA: 8/23/2023  1.  Atherosclerotic calcification at the origin of the right ICA results in moderate stenosis (55-60%).  2.  Mild stenosis less than 25% at the origin of the left ICA within the neck.  CT head perfusion  8/23/2023  1.  Normal cerebral perfusion  MRI head 8/23/2023  1.  Acute lacunar infarct of the left corona radiata and basal ganglia corresponding with abnormality from prior head CT.  2.  Additional punctate focus of acute ischemia at the right frontal centrum semiovale.  3.  Nonspecific enhancing lesion at the left parietal scalp.        Malignancy/abscess not excluded.         Recommend correlation with direct visualization.  4.  Soft tissue prominence of the nasopharyngeal lymphoid tissues, suspect mucous retention cysts.        If additional evaluation is indicated recommend visualization could be considered.    Troponin 158-162      Labs  Sodium 135 potassium 4.1  BUN 14.5, creatinine 0.78  Glucose 366  WBC 11.2, hemoglobin 13.9, platelets 290,000        Exam  Blood pressure 157/81, pulse 91, temperature 97.8  Blood pressure range 152//86  Pulse range 87-1 02  Tmax 97.8    Review of system  Pertinent positives and negatives  No headache no shortness of breath denied chest pain    No diplopia dysarthria dysphagia  Right arm clumsy weak greater than right leg clumsy weak  Trouble with gait due to the above    Otherwise review of systems negative    General exam per primary MD  Alert orient x3  HEENT with abscess/swelling right occipital neck region  Lungs clear  Heart rate regular  Abdomen soft    Neurologic exam  Alert orient x3  Normal prosody of speech  Normal naming  Normal comprehension  Normal repetition  No aphasia  No neglect  Memory recall okay    Cranial nerves II through XII  No ophthalmoplegia  No nystagmus  Visual fields intact  Face symmetrical  Tongue twisters are okay  Smile is symmetrical  Possible slight decreased nasolabial fold on the right compared to left    Upper extremities  Right arm proximal 4 out of 5  Right arm distal 4- out of 5 clumsy rapid alternating movements slow fatigues easily    Left arm normal    Lower extremities  Right leg clumsy decreased coordination  Left leg  okay    Gait  Ambulated with PT with a walker  Has some in balance                Assessment/plan    Left subacute insular cortex/basal ganglion subacute stroke        Additional punctate focus of acute ischemia at the right frontal centrum semiovale.    2.  Intracranial atherosclerosis/calcification carotid siphons, with a weblike plaque left ICA paraclinoid segment (narrowing less than 40%)    3.  Right ICA 55-60% stenosis    4.  Vascular risk factors       Diabetes ( HGBA1C 11.2, 2013)/intracranial atherosclerosis/right ICA stenosis/hyperlipidemia    5.  Elevated troponin on admission       Troponin 158-162        Further assessment per primary MD/cardiology    6.  Soft tissue swelling left paracentral midline scalp vertex.  (Patient with history of scalp abscess drainage 7/25/2023)       Seen on CT and MRI       Nonspecific enhancing lesion at the left parietal scalp.        Malignancy/abscess not excluded.       Further work-up per primary MD      Diagnosis  Left CVA subacute  History of poorly controlled diabetes    Previously was not taking antiplatelet medication  Loaded with Plavix in the  mg  Stroke team recommends    Plavix 75 mg once per day  Aspirin 81 mg once per day  Lipitor 40 mg daily  Risk factor reduction per primary MD (needs an outpatient primary MD)      Echo  PT/OT/speech    80 minutes total care time today

## 2023-08-23 NOTE — ED NOTES
"[facility] ED Handoff Report    ED Chief Complaint: R arm/leg weakness    ED Diagnosis:  (I63.9) Ischemic stroke (H)  Comment: MRI completed  Plan: Q4 neuro checks    (R77.8) Elevated troponin  Plan: Cardiac monitoring, ECHO        PMH:  No past medical history on file.     Code Status:  Full Code     Falls Risk: Yes Band: Applied    Current Living Situation/Residence: lives in an apartment, with mother    Elimination Status: Continent: Yes     Activity Level: SBA    Patients Preferred Language:  English     Needed: No    Vital Signs:  BP (!) 146/85   Pulse 93   Temp 97.8  F (36.6  C) (Oral)   Resp 21   Ht 1.778 m (5' 10\")   Wt 131.5 kg (290 lb)   SpO2 96%   BMI 41.61 kg/m       Cardiac Rhythm: NSR, PVCs    Pain Score: 1/10 - generalized, \"bed is uncomfortable\"    Is the Patient Confused:  No    Last Food or Drink: 08/23/23 at 1300    Focused Assessment:    Eyes: PERRL  Respiratory: Normal breath sounds  Cardiovascular: Normal rate and rhythm  Musculoskeletal: RUE weakness  Neurologic: A&Ox4    Tests Performed: Done: Labs and Imaging    Treatments Provided:  aspirin, plavix    Family Dynamics/Concerns: No    Family Updated On Visitor Policy: Yes    Plan of Care Communicated to Family: Yes    Who Was Updated about Plan of Care: sister and mother    Belongings Checklist Done and Signed by Patient: Yes    Medications sent with patient: insulin    Covid: asymptomatic    Additional Information: Pt arrived to ED on 8/23 with stoke symptoms. Reports waking up on 8/22 and noticed right sided arm and leg weakness. Speech slightly slurred on arrival to ED.    RN: Serenity Bonds RN   8/23/2023 3:05 PM       "

## 2023-08-23 NOTE — ED TRIAGE NOTES
Woke up yesterday morning with rt arm/leg weakness. Arm weakness more significant. No speech deficits. Pt is very anxious and concerned.

## 2023-08-24 ENCOUNTER — APPOINTMENT (OUTPATIENT)
Dept: PHYSICAL THERAPY | Facility: HOSPITAL | Age: 48
DRG: 065 | End: 2023-08-24

## 2023-08-24 ENCOUNTER — APPOINTMENT (OUTPATIENT)
Dept: OCCUPATIONAL THERAPY | Facility: HOSPITAL | Age: 48
DRG: 065 | End: 2023-08-24

## 2023-08-24 ENCOUNTER — APPOINTMENT (OUTPATIENT)
Dept: SPEECH THERAPY | Facility: HOSPITAL | Age: 48
DRG: 065 | End: 2023-08-24

## 2023-08-24 LAB
ANION GAP SERPL CALCULATED.3IONS-SCNC: 9 MMOL/L (ref 7–15)
BUN SERPL-MCNC: 10.1 MG/DL (ref 6–20)
CALCIUM SERPL-MCNC: 8.7 MG/DL (ref 8.6–10)
CHLORIDE SERPL-SCNC: 100 MMOL/L (ref 98–107)
CREAT SERPL-MCNC: 0.64 MG/DL (ref 0.67–1.17)
DEPRECATED HCO3 PLAS-SCNC: 24 MMOL/L (ref 22–29)
ERYTHROCYTE [DISTWIDTH] IN BLOOD BY AUTOMATED COUNT: 12.2 % (ref 10–15)
GFR SERPL CREATININE-BSD FRML MDRD: >90 ML/MIN/1.73M2
GLUCOSE BLDC GLUCOMTR-MCNC: 267 MG/DL (ref 70–99)
GLUCOSE BLDC GLUCOMTR-MCNC: 272 MG/DL (ref 70–99)
GLUCOSE BLDC GLUCOMTR-MCNC: 286 MG/DL (ref 70–99)
GLUCOSE BLDC GLUCOMTR-MCNC: 297 MG/DL (ref 70–99)
GLUCOSE BLDC GLUCOMTR-MCNC: 334 MG/DL (ref 70–99)
GLUCOSE SERPL-MCNC: 272 MG/DL (ref 70–99)
HCT VFR BLD AUTO: 38 % (ref 40–53)
HGB BLD-MCNC: 13.1 G/DL (ref 13.3–17.7)
MCH RBC QN AUTO: 31.6 PG (ref 26.5–33)
MCHC RBC AUTO-ENTMCNC: 34.5 G/DL (ref 31.5–36.5)
MCV RBC AUTO: 92 FL (ref 78–100)
PLATELET # BLD AUTO: 271 10E3/UL (ref 150–450)
POTASSIUM SERPL-SCNC: 3.9 MMOL/L (ref 3.4–5.3)
RBC # BLD AUTO: 4.14 10E6/UL (ref 4.4–5.9)
SODIUM SERPL-SCNC: 133 MMOL/L (ref 136–145)
WBC # BLD AUTO: 11.3 10E3/UL (ref 4–11)

## 2023-08-24 PROCEDURE — 250N000013 HC RX MED GY IP 250 OP 250 PS 637: Performed by: INTERNAL MEDICINE

## 2023-08-24 PROCEDURE — 120N000001 HC R&B MED SURG/OB

## 2023-08-24 PROCEDURE — 250N000013 HC RX MED GY IP 250 OP 250 PS 637: Performed by: PSYCHIATRY & NEUROLOGY

## 2023-08-24 PROCEDURE — 80048 BASIC METABOLIC PNL TOTAL CA: CPT

## 2023-08-24 PROCEDURE — 99232 SBSQ HOSP IP/OBS MODERATE 35: CPT | Mod: GC | Performed by: FAMILY MEDICINE

## 2023-08-24 PROCEDURE — 36415 COLL VENOUS BLD VENIPUNCTURE: CPT

## 2023-08-24 PROCEDURE — 97535 SELF CARE MNGMENT TRAINING: CPT | Mod: GO

## 2023-08-24 PROCEDURE — 97116 GAIT TRAINING THERAPY: CPT | Mod: GP

## 2023-08-24 PROCEDURE — 97110 THERAPEUTIC EXERCISES: CPT | Mod: GO

## 2023-08-24 PROCEDURE — 99232 SBSQ HOSP IP/OBS MODERATE 35: CPT | Performed by: PSYCHIATRY & NEUROLOGY

## 2023-08-24 PROCEDURE — 97165 OT EVAL LOW COMPLEX 30 MIN: CPT | Mod: GO

## 2023-08-24 PROCEDURE — 99232 SBSQ HOSP IP/OBS MODERATE 35: CPT | Performed by: INTERNAL MEDICINE

## 2023-08-24 PROCEDURE — 97110 THERAPEUTIC EXERCISES: CPT | Mod: GP

## 2023-08-24 PROCEDURE — 92523 SPEECH SOUND LANG COMPREHEN: CPT | Mod: GN

## 2023-08-24 PROCEDURE — 85027 COMPLETE CBC AUTOMATED: CPT

## 2023-08-24 PROCEDURE — 999N000033 HC STATISTIC CHRONIC PULMONARY DISEASE SPECIALIST

## 2023-08-24 PROCEDURE — 92610 EVALUATE SWALLOWING FUNCTION: CPT | Mod: GN

## 2023-08-24 RX ORDER — CARVEDILOL 3.12 MG/1
6.25 TABLET ORAL 2 TIMES DAILY WITH MEALS
Status: DISCONTINUED | OUTPATIENT
Start: 2023-08-24 | End: 2023-08-25

## 2023-08-24 RX ORDER — ASPIRIN 81 MG/1
81 TABLET ORAL DAILY
Status: DISCONTINUED | OUTPATIENT
Start: 2023-08-24 | End: 2023-08-26 | Stop reason: HOSPADM

## 2023-08-24 RX ORDER — CLOPIDOGREL BISULFATE 75 MG/1
75 TABLET ORAL DAILY
Status: DISCONTINUED | OUTPATIENT
Start: 2023-08-24 | End: 2023-08-26 | Stop reason: HOSPADM

## 2023-08-24 RX ORDER — LOSARTAN POTASSIUM 50 MG/1
100 TABLET ORAL DAILY
Status: DISCONTINUED | OUTPATIENT
Start: 2023-08-24 | End: 2023-08-26 | Stop reason: HOSPADM

## 2023-08-24 RX ADMIN — CARVEDILOL 6.25 MG: 3.12 TABLET, FILM COATED ORAL at 17:17

## 2023-08-24 RX ADMIN — CLOPIDOGREL BISULFATE 75 MG: 75 TABLET ORAL at 09:30

## 2023-08-24 RX ADMIN — INSULIN ASPART 3 UNITS: 100 INJECTION, SOLUTION INTRAVENOUS; SUBCUTANEOUS at 17:14

## 2023-08-24 RX ADMIN — Medication 81 MG: at 09:30

## 2023-08-24 RX ADMIN — LOSARTAN POTASSIUM 100 MG: 50 TABLET, FILM COATED ORAL at 17:17

## 2023-08-24 RX ADMIN — ATORVASTATIN CALCIUM 80 MG: 40 TABLET, FILM COATED ORAL at 21:37

## 2023-08-24 RX ADMIN — INSULIN ASPART 3 UNITS: 100 INJECTION, SOLUTION INTRAVENOUS; SUBCUTANEOUS at 09:32

## 2023-08-24 RX ADMIN — INSULIN ASPART 4 UNITS: 100 INJECTION, SOLUTION INTRAVENOUS; SUBCUTANEOUS at 13:06

## 2023-08-24 RX ADMIN — NICOTINE 1 PATCH: 7 PATCH, EXTENDED RELEASE TRANSDERMAL at 09:35

## 2023-08-24 RX ADMIN — LOSARTAN POTASSIUM 50 MG: 50 TABLET, FILM COATED ORAL at 09:30

## 2023-08-24 ASSESSMENT — ACTIVITIES OF DAILY LIVING (ADL)
ADLS_ACUITY_SCORE: 20
PREVIOUS_RESPONSIBILITIES: DRIVING;FINANCES;MEDICATION MANAGEMENT;SHOPPING;LAUNDRY;HOUSEKEEPING;MEAL PREP
ADLS_ACUITY_SCORE: 20

## 2023-08-24 NOTE — PROGRESS NOTES
NEUROLOGY INPATIENT PROGRESS NOTE       [facility] NEUROLOGY [place]  [address] [place] [address]  Tel: [phone]  Fax: [phone]  [email or web address]     ASSESSMENT & PLAN   Date: 08/24/2023  Hospital Day#: 1  Visit diagnosis: Cerebral infarction    Left corona radiata, basal ganglia & right frontal semiovale infarction (Risk factors: HLD, DM 2, cigarette smoking)  47-year-old male with history of DM 2, H LD, cigarette smoking who presented to the emergency room with the right arm and leg weakness  CT of the head showed a hypodensity in the left subinsular cortex.  CTA showed intracranial atherosclerosis with 40% left ICA stenosis and 55 to 60% right ICA stenosis.  CT perfusion normal  MRI brain showed acute infarct in the left corona radiata, basal ganglia and additional focus of acute ischemia in the right frontal semiovale  Enhancing lesion in the left parietal scalp and has history of scalp abscess drainage in July 2023.  Echocardiogram showed a normal ejection fraction with a fairly small discrete apical wall motion abnormality but no thrombus detected.  Also no significant valvular heart abnormality  In view of bilateral CVA I would recommend GAIL  Telemetry monitoring and if no cardiac arrhythmia schedule patient for outpatient 30-day event monitor  Dual antiplatelet therapy with Plavix and aspirin for 90 days, afterwards switch to aspirin 325 mg daily  LDL is 226, patient was not on statin.  Started on Lipitor 80 mg daily with goal of LDL 40-70  Permissive hypertension, keep systolic blood pressure less than 180 and diastolic less than 100  Elevated troponin, cardiology recommends no heparin  Physical, occupational, speech and pharmacy consult  Vascular risk factors modification: Healthy diet (fruits, vegetables, low fat dairy & reduced saturated fat), weight loss, exercise at least 30 minutes 5 days/week, BMI goal <25.  Keep systolic blood pressure goal <130.  LDL goal  <70.  Hemoglobin A1c goal <7. If applicable, STOP smoking    Neurology Discharge Planning :   CONCEPCIÓN Rodrigez MD  [facility]  (Former[facility], [facility]     PROBLEM LIST      Patient Active Problem List   Diagnosis Code    Dizziness R42    Type 2 Diabetes Mellitus - Uncomplicated, Controlled E11.9    Hyperlipidemia E78.5    Hypothyroidism E03.9    Obesity E66.9    Tobacco use disorder F17.200    Elevated troponin R77.8    Ischemic stroke (H) I63.9     Past Medical History:   Patient  has no past medical history on file.     SUBJECTIVE     No new complaints.  Denies any weakness.  Refuses to stop smoking realizes it increases the risk of further events     OBJECTIVE     Vital signs in last 24 hours  Temp:  [97.8  F (36.6  C)-98.7  F (37.1  C)] 98.4  F (36.9  C)  Pulse:  [] 90  Resp:  [16-24] 18  BP: (133-170)/(78-88) 170/87  SpO2:  [92 %-98 %] 95 %    Review of Systems   Pertinent items are noted in HPI.    General Physical Exam: Patient is alert and oriented x 3. Vital signs were reviewed and are documented in EMR. Neck was supple, no Carotid bruit, thyromegaly, JVD or lymphadenopathy noted.  Neurological Exam:  Speech is normal with no dysarthria or aphasia.  Comprehension intact cranial nerves II through XII are intact motor strength right upper extremity 4+/5.  Right lower extremity 5 -/5 on the left strength is 5/5.  Right dysmetria on finger-nose testing.  Sensation intact.  Patient walks with a walker     DIAGNOSTIC STUDIES     Pertinent Radiology   Following imaging studies were reviewed:    CT  HEAD CT:  1.  Suspect subacute lacunar infarct involving the left subinsular cortex/left basal ganglia.     2.  Soft tissue swelling over the left paracentral midline scalp vertex may represent a posttraumatic hematoma/soft tissue swelling if there is recent trauma in this region. Other etiologies include a sebaceous cyst. Would recommend direct    visualization/palpation of this finding to determine if it has been long-standing in origin.     HEAD CTA:   1.  Atherosclerotic calcification involving the carotid siphons with weblike plaque involving the paraclinoid segment of the left ICA which results in vessel narrowing less than 40%. No evidence of large vessel occlusion within the intracranial   circulation.     NECK CTA:  1.  Atherosclerotic calcification at the origin of the right ICA results in moderate stenosis (55-60%).    CT PERFUSION:  1.  Normal cerebral perfusion.    MRI  1.  Acute lacunar infarct of the left corona radiata and basal ganglia corresponding with abnormality from prior head CT.  2.  Additional punctate focus of acute ischemia at the right frontal centrum semiovale.  3.  Nonspecific enhancing lesion at the left parietal scalp. Malignancy/abscess not excluded. Recommend correlation with direct visualization.  4.  Soft tissue prominence of the nasopharyngeal lymphoid tissues, suspect mucous retention cysts. If additional evaluation is indicated recommend visualization could be considered.    Echocardiogram  1. Technically difficult study.  2. Normal left ventricular size and systolic performance with a visually  estimated ejection fraction of 55%.  3. On selected views, there appears to be a fairly small discrete apical wall  motion abnormality (no apical mural thrombus is detected).  4. No significant valvular heart disease is identified on this study.  5. Probable normal right ventricular size and systolic performance though  right-sided structures are not clearly visualized on all views on this study.  6. Echo contrast examination was performed using agitated NS as contrast  agent. The right heart opacity was suboptimal. There was no obvious evidence  of right to left shunting during spontaneous respiration or following release  of Valsalva though the sensitivity &specificity are both reduced due to  suboptimal acoustic  imaging..    Pertinent Labs   Lab Results: Personally Reviewed  Recent Results (from the past 24 hour(s))   Glucose by meter    Collection Time: 08/23/23  9:25 AM   Result Value Ref Range    GLUCOSE BY METER POCT 337 (H) 70 - 99 mg/dL   Basic metabolic panel    Collection Time: 08/23/23  9:46 AM   Result Value Ref Range    Sodium 135 (L) 136 - 145 mmol/L    Potassium 4.1 3.4 - 5.3 mmol/L    Chloride 100 98 - 107 mmol/L    Carbon Dioxide (CO2) 23 22 - 29 mmol/L    Anion Gap 12 7 - 15 mmol/L    Urea Nitrogen 14.5 6.0 - 20.0 mg/dL    Creatinine 0.78 0.67 - 1.17 mg/dL    Calcium 9.0 8.6 - 10.0 mg/dL    Glucose 366 (H) 70 - 99 mg/dL    GFR Estimate >90 >60 mL/min/1.73m2   INR    Collection Time: 08/23/23  9:46 AM   Result Value Ref Range    INR 0.95 0.85 - 1.15   Partial thromboplastin time    Collection Time: 08/23/23  9:46 AM   Result Value Ref Range    aPTT 26 22 - 38 Seconds   Troponin T, High Sensitivity    Collection Time: 08/23/23  9:46 AM   Result Value Ref Range    Troponin T, High Sensitivity 158 (HH) <=22 ng/L   CBC with platelets and differential    Collection Time: 08/23/23  9:46 AM   Result Value Ref Range    WBC Count 11.2 (H) 4.0 - 11.0 10e3/uL    RBC Count 4.38 (L) 4.40 - 5.90 10e6/uL    Hemoglobin 13.9 13.3 - 17.7 g/dL    Hematocrit 40.1 40.0 - 53.0 %    MCV 92 78 - 100 fL    MCH 31.7 26.5 - 33.0 pg    MCHC 34.7 31.5 - 36.5 g/dL    RDW 12.2 10.0 - 15.0 %    Platelet Count 290 150 - 450 10e3/uL    % Neutrophils 74 %    % Lymphocytes 15 %    % Monocytes 7 %    % Eosinophils 2 %    % Basophils 1 %    % Immature Granulocytes 1 %    NRBCs per 100 WBC 0 <1 /100    Absolute Neutrophils 8.3 1.6 - 8.3 10e3/uL    Absolute Lymphocytes 1.7 0.8 - 5.3 10e3/uL    Absolute Monocytes 0.8 0.0 - 1.3 10e3/uL    Absolute Eosinophils 0.3 0.0 - 0.7 10e3/uL    Absolute Basophils 0.1 0.0 - 0.2 10e3/uL    Absolute Immature Granulocytes 0.1 <=0.4 10e3/uL    Absolute NRBCs 0.0 10e3/uL   Lipid panel reflex to direct LDL:  Non-fasting    Collection Time: 08/23/23  9:46 AM   Result Value Ref Range    Cholesterol 330 (H) <200 mg/dL    Triglycerides 246 (H) <150 mg/dL    Direct Measure HDL 55 >=40 mg/dL    LDL Cholesterol Calculated 226 (H) <=100 mg/dL    Non HDL Cholesterol 275 (H) <130 mg/dL   Hemoglobin A1c    Collection Time: 08/23/23  9:46 AM   Result Value Ref Range    Hemoglobin A1C 11.1 (H) <5.7 %   Nt probnp inpatient    Collection Time: 08/23/23  9:46 AM   Result Value Ref Range    N terminal Pro BNP Inpatient 857 (H) 0 - 450 pg/mL   Troponin T, High Sensitivity (now)    Collection Time: 08/23/23 12:31 PM   Result Value Ref Range    Troponin T, High Sensitivity 162 (HH) <=22 ng/L   Troponin T, High Sensitivity    Collection Time: 08/23/23  2:33 PM   Result Value Ref Range    Troponin T, High Sensitivity 157 (HH) <=22 ng/L   TSH with free T4 reflex    Collection Time: 08/23/23  2:33 PM   Result Value Ref Range    TSH 2.72 0.30 - 4.20 uIU/mL   Troponin T, High Sensitivity    Collection Time: 08/23/23  5:01 PM   Result Value Ref Range    Troponin T, High Sensitivity 152 (HH) <=22 ng/L   Glucose by meter    Collection Time: 08/23/23  6:40 PM   Result Value Ref Range    GLUCOSE BY METER POCT 247 (H) 70 - 99 mg/dL   Glucose by meter    Collection Time: 08/23/23  9:09 PM   Result Value Ref Range    GLUCOSE BY METER POCT 251 (H) 70 - 99 mg/dL   Glucose by meter    Collection Time: 08/23/23 10:24 PM   Result Value Ref Range    GLUCOSE BY METER POCT 252 (H) 70 - 99 mg/dL   CBC with platelets    Collection Time: 08/24/23  6:45 AM   Result Value Ref Range    WBC Count 11.3 (H) 4.0 - 11.0 10e3/uL    RBC Count 4.14 (L) 4.40 - 5.90 10e6/uL    Hemoglobin 13.1 (L) 13.3 - 17.7 g/dL    Hematocrit 38.0 (L) 40.0 - 53.0 %    MCV 92 78 - 100 fL    MCH 31.6 26.5 - 33.0 pg    MCHC 34.5 31.5 - 36.5 g/dL    RDW 12.2 10.0 - 15.0 %    Platelet Count 271 150 - 450 10e3/uL   Basic metabolic panel    Collection Time: 08/24/23  6:45 AM   Result Value Ref Range     Sodium 133 (L) 136 - 145 mmol/L    Potassium 3.9 3.4 - 5.3 mmol/L    Chloride 100 98 - 107 mmol/L    Carbon Dioxide (CO2) 24 22 - 29 mmol/L    Anion Gap 9 7 - 15 mmol/L    Urea Nitrogen 10.1 6.0 - 20.0 mg/dL    Creatinine 0.64 (L) 0.67 - 1.17 mg/dL    Calcium 8.7 8.6 - 10.0 mg/dL    Glucose 272 (H) 70 - 99 mg/dL    GFR Estimate >90 >60 mL/min/1.73m2         HOSPITAL MEDICATIONS      aspirin  81 mg Oral Daily    atorvastatin  80 mg Oral QPM    clopidogrel  75 mg Oral Daily    insulin aspart  1-7 Units Subcutaneous TID AC    insulin aspart  1-5 Units Subcutaneous At Bedtime    insulin aspart   Subcutaneous Daily with breakfast    insulin aspart   Subcutaneous Daily with lunch    insulin aspart   Subcutaneous Daily with supper    insulin glargine  10 Units Subcutaneous At Bedtime    losartan  50 mg Oral BID    nicotine  1 patch Transdermal Daily    nicotine   Transdermal Q8H    sodium chloride (PF)  3 mL Intracatheter Q8H        Total time spent for face to face visit, reviewing labs/imaging studies, counseling and coordination of care was: 45 Minutes More than 50% of this time was spent on counseling and coordination of care.      This note was dictated using voice recognition software.  Any grammatical or context distortions are unintentional and inherent to the software.

## 2023-08-24 NOTE — PROGRESS NOTES
08/24/23 0730   Appointment Info   Signing Clinician's Name / Credentials (OT) Rossana Sy OTR/L OTD   Living Environment   People in Home parent(s)  (mother)   Current Living Arrangements apartment   Home Accessibility no concerns   Living Environment Comments Tub/shower with grab bars, has tub bench if needed,, grab bars by toilet with standard height toilet. Hoping to find an apartment with walk in shower soon   Self-Care   Equipment Currently Used at Home none   Activity/Exercise/Self-Care Comment Ind with all ADLs and mobility, caregiver for mother, completes all IADLs, driving   Instrumental Activities of Daily Living (IADL)   Previous Responsibilities driving;finances;medication management;shopping;laundry;housekeeping;meal prep   IADL Comments Caregiver for mother   General Information   Onset of Illness/Injury or Date of Surgery 08/23/23   Referring Physician Richie Pennington MD   Patient/Family Therapy Goal Statement (OT) To return home   Additional Occupational Profile Info/Pertinent History of Current Problem Floyd Capps is a 47 year old male who presents with right arm weakness.  He woke up yesterday morning and noticed his right arm was weak.  He thought he just slept on it wrong and tried to ignore it throughout the day.  But it continued to act funny and he also noticed his right leg was getting weak. He went to bed last night and when he woke up today the right arm and leg weakness was still there.  Denies any trouble talking.  His mom encouraged him to come in and so he did and on imaging in the ED was found to have a subacute lacunar stroke.   Cognitive Status Examination   Orientation Status orientation to person, place and time   Affect/Mental Status (Cognitive) WFL   Follows Commands WFL   Cognitive Status Comments Demos some agitation with situation   Visual Perception   Visual Impairment/Limitations WFL   Impact of Vision Impairment on Function (Vision) No visual changes since admission or  prior   Sensory   Sensory Comments No c/o numbness or tingling   Posture   Posture forward head position   Range of Motion Comprehensive   General Range of Motion bilateral upper extremity ROM WFL   Strength Comprehensive (MMT)   General Manual Muscle Testing (MMT) Assessment upper extremity strength deficits identified   Comment, General Manual Muscle Testing (MMT) Assessment R shoulder flexion 4-/5, decreased R  strength   Coordination   Upper Extremity Coordination Right UE impaired   Gross Motor Coordination Demos slow coordination with finger to nose test   Coordination Comments RUE slightly delayed with   Bed Mobility   Bed Mobility supine-sit   Supine-Sit [place] (Bed Mobility) modified independence   Assistive Device (Bed Mobility) bed rails   Transfers   Transfers sit-stand transfer;toilet transfer;shower transfer   Sit-Stand Transfer   Sit-Stand [place] (Transfers) supervision   Sit/Stand Transfer Comments No AD   Shower Transfer   [place] Level (Shower Transfer) not tested   Shower Transfer Comments CGA per clinical judgement   Toilet Transfer   [place] Level (Toilet Transfer) supervision   Assistive Device (Toilet Transfer) grab bars/safety frame   Balance   Balance Assessment standing balance: dynamic   Balance Comments Good with in room mobility   Activities of Daily Living   BADL Assessment/Intervention lower body dressing;toileting;grooming   Lower Body Dressing Assessment/Training   [place] Level (Lower Body Dressing) contact guard assist   Grooming Assessment/Training   [place] Level (Grooming) supervision   Toileting   [place] Level (Toileting) supervision   Clinical Impression   Criteria for Skilled Therapeutic Interventions Met (OT) Yes, treatment indicated   OT Diagnosis Decreased ind with ADLs and safety   Influenced by the following impairments Ischemic stroke   OT Problem List-Impairments impacting ADL activity tolerance impaired;coordination;strength    Assessment of Occupational Performance 3-5 Performance Deficits   Identified Performance Deficits dressing, g/h, fine/gross motor coordination, fxl transfers   Planned Therapy Interventions (OT) ADL retraining;fine motor coordination training;transfer training;home program guidelines;progressive activity/exercise   Clinical Decision Making Complexity (OT) low complexity   Risk & Benefits of therapy have been explained evaluation/treatment results reviewed;care plan/treatment goals reviewed;risks/benefits reviewed;current/potential barriers reviewed;patient   OT Total Evaluation Time   OT Eval, Low Complexity Minutes (09239) 10   OT Goals   Therapy Frequency (OT) Daily   OT Predicted Duration/Target Date for Goal Attainment 08/31/23   OT Goals Hygiene/Grooming;Transfers;OT Goal 1   OT: Hygiene/Grooming modified independent;while standing   OT: Transfer Modified independent  (tub/shower transfer)   OT: Goal 1 Pt will demo understanding of fine motor coordination HEP to progress fxl use of RUE for ADL tasks.   Interventions   Interventions Quick Adds Self-Care/Home Management;Therapeutic Procedures/Exercise   Self-Care/Home Management   Self-Care/Home Mgmt/ADL, Compensatory, Meal Prep Minutes (10221) 10   Symptoms Noted During/After Treatment (Meal Preparation/Planning Training) none   Treatment Detail/Skilled Intervention Evaluation completed, treatment initiated. Fxl mob within room no AD SBA. toileting completed SBA with cueing for pacing and safety. Provided visual demo of hand placement for safety with transfers. Returned to bed SBA , supine SBA.   Therapeutic Procedures/Exercise   Therapeutic Procedure: strength, endurance, ROM, flexibillity minutes (41294) 10   Symptoms Noted During/After Treatment none   Treatment Detail/Skilled Intervention Instructed on UE shoulder AAROM against gravity as well as fine motor coordination activities. Visual demo of all exercises with pt - pt completed 1 set x 10 reps of all  exer with encouragement.   OT Discharge Planning   OT Plan LB dressing, g/h, fine motor coordination for RUE - bring foam or theraputty   OT Discharge Recommendation (DC Rec) home with outpatient occupational therapy;home with assist   OT Rationale for DC Rec Pt mobilizing well and progressing toward OT goals, will benefit from OP OT to progress RUE strength and coordination for return to work and caregiving for mother   OT Brief overview of current status SBA fxl mob, decreased RUE fine and gross motor coordination   Total Session Time   Timed Code Treatment Minutes 20   Total Session Time (sum of timed and untimed services) 30

## 2023-08-24 NOTE — PROGRESS NOTES
Patient is scheduled for NM stress test tomorrow at 0700. After NM stress is complete, patient will go straight to GAIL, and once GAIL complete, patient will have second set of NM images completed. Please keep patient NPO after midnight 8/25/23. Please be sure IV site is patent. Discussed with Belen CHEEK.  iVoletta Dozier RN  [facility]

## 2023-08-24 NOTE — UTILIZATION REVIEW
Admission Status; Secondary Review Determination   Under the authority of the Utilization Management Committee, the utilization review process indicated a secondary review on Floyd Capps. The review outcome is based on review of the medical records, discussions with staff, and applying clinical experience noted on the date of the review.   (x) Inpatient Status Appropriate - This patient's medical care is consistent with medical management for inpatient care and reasonable inpatient medical practice.     RATIONALE FOR DETERMINATION   Floyd Capps is a 47 yr old male with morbid obesity, DM2, Tobacco use, HTN, HLD who presented with acute neuro symptoms.  MRI wth acute infarct in left corona radiata, basal ganglia and acute ischemia in right frontal semiovale.  ECHO with apical wall motion abnormality.  Bilateral CVA thus GAIL needed to assess for thrombus/embolic etiology.  Ongoing neurologic work-up for the CVA but also with cardiac findings, needs also a stress test--troponin elevated but more flat on presentation.  Not stable for discharge with post-acute CVA evaluations and monitoring for stability and etiology for the wall motion abnormality.    At the time of admission with the information available to the attending physician more than 2 nights Hospital complex care was anticipated, based on patient risk of adverse outcome if treated as outpatient and complex care required. Inpatient admission is appropriate based on the Medicare guidelines.   The information on this document is developed by the utilization review team in order for the business office to ensure compliance. This only denotes the appropriateness of proper admission status and does not reflect the quality of care rendered.   The definitions of Inpatient Status and Observation Status used in making the determination above are those provided in the CMS Coverage Manual, Chapter 1 and Chapter 6, section 70.4.   Sincerely,   Glendy Edmonds,  MD  Utilization Review  Physician Advisor  [facility]

## 2023-08-24 NOTE — PROGRESS NOTES
"Care Management Follow Up    Length of Stay (days): 1    Expected Discharge Date: 08/25/2023     Concerns to be Addressed: discharge planning     Patient plan of care discussed at interdisciplinary rounds: Yes    Anticipated Discharge Disposition:       Anticipated Discharge Services:      Education Provided on the Discharge Plan:  Per Care Team   Patient/Family in Agreement with the Plan:      Referrals Placed by CM/SW:    Private pay costs discussed: Not applicable    Additional Information:  Chart reviewed.    Cm updates:  Per rounds pt to get GAIL tomorrow.  Stress test today.     Therapy recs OP PT, pt may not be able to afford no health insurance.       Pt will need follow up appointment, but will likely have to pay out of pocket, writer tried to talk to pt about, pt was tied up this afternoon. Writer will talk with pt in the morning.     Social Hx:  \"Pt lives in an apartment with his mother. \"There is no elevator access. But we live on the 1st level so we don't have any steps we have to use\".      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 He also states, \"She also has nursing help for things like bathing. My sister will be checking in on her and will make her meals\". He is independent with ADLs and all IADLs.     Pt has no insurance and no PMD for follow up. Darleen Lyons was alerted. He states, \"I had a job interview today, but missed it because I had to come to the ER\".        Cm will continue to follow plan of care, review recommendations, and assist with any discharge needs anticipated.     Darleen Bansal RN      "

## 2023-08-24 NOTE — PROGRESS NOTES
"Speech-Language Pathology:  Clinical Swallow Evaluation and Speech Language Cognitive Evaluation     08/24/23 1100   Appointment Info   Signing Clinician's Name / Credentials (SLP) Shobha David MS, CCC-SLP   General Information   Onset of Illness/Injury or Date of Surgery 08/23/23   Referring Physician David Quintero MD   Pertinent History of Current Problem Per H&P, \"47 year old male who presents with right arm weakness.  He woke up yesterday morning and noticed his right arm was weak.  He thought he just slept on it wrong and tried to ignore it throughout the day.  But it continued to act funny and he also noticed his right leg was getting weak. He went to bed last night and when he woke up today the right arm and leg weakness was still there.  Denies any trouble talking.  His mom encouraged him to come in and so he did and on imaging in the ED was found to have a subacute lacunar stroke.\"   General Observations Pt alert and cooperative sitting upright in bed.   Type of Evaluation   Type of Evaluation Swallow Evaluation;Speech, Language, Cognition   Oral Motor   Oral Musculature generally intact   Structural Abnormalities present   Mucosal Quality good   Dentition (Oral Motor)   Comment, Dentition (Oral Motor) Pt reports that he just avoids chewing hard foods and knows what he is able to chew.   Dentition (Oral Motor) natural dentition;significant number of missing teeth   Facial Symmetry (Oral Motor)   Facial Symmetry (Oral Motor) WNL   Lip Function (Oral Motor)   Lip Range of Motion (Oral Motor) WNL   Lip Strength (Oral Motor) WNL   Tongue Function (Oral Motor)   Tongue Strength (Oral Motor) WNL   Tongue Coordination/Speed (Oral Motor) WNL   Tongue ROM (Oral Motor) WNL   Comment, Tongue Function (Oral Motor) Patient with single, red, lesion on lingual surface.   Jaw Function (Oral Motor)   Jaw Function (Oral Motor) WNL   Cough/Swallow/Gag Reflex (Oral Motor)   Soft Palate/Velum (Oral Motor) WNL "   Volitional Throat Clear/Cough (Oral Motor) WNL   Volitional Swallow (Oral Motor) WNL   Vocal Quality/Secretion Management (Oral Motor)   Vocal Quality (Oral Motor) WNL   Secretion Management (Oral Motor) WNL   General Swallowing Observations   Past History of Dysphagia Per EMR review & pt report, none.   Comment, General Swallowing Observations RN completed nursing bedside with no s/s of aspiration and initiated reg/thin diet. Per RN report, pt has consumed about 5 cups of water with no concerns.   Current Diet/Method of Nutritional Intake (General Swallowing Observations, NIS) thin liquids (level 0);regular diet   Swallowing Evaluation Clinical swallow evaluation   Clinical Swallow Evaluation   Feeding Assistance no assistance needed   Additional evaluation(s) completed today No   Clinical Swallow Evaluation Textures Trialed thin liquids;pureed;solid foods   Clinical Swallow Eval: Thin Liquid Texture Trial   Mode of Presentation, Thin Liquids straw;self-fed   Volume of Liquid or Food Presented 6 oz   Oral Phase of Swallow WFL   Pharyngeal Phase of Swallow intact   Clinical Swallow Evaluation: Puree Solid Texture Trial   Mode of Presentation, Puree spoon;self-fed   Volume of Puree Presented 4 oz   Oral Phase, Puree WFL   Pharyngeal Phase, Puree intact   Clinical Swallow Evaluation: Solid Food Texture Trial   Mode of Presentation self-fed   Volume Presented 2 mesha crackers   Oral Phase WFL   Pharyngeal Phase intact   Esophageal Phase of Swallow   Patient reports or presents with symptoms of esophageal dysphagia Yes   Esophageal comments Pt reports acid reflux, which sometimes makes him cough if he doesn't eat.   Swallowing Recommendations   Diet Consistency Recommendations thin liquids (level 0);regular diet   Supervision Level for Intake patient independent   Recommended Feeding/Eating Techniques (Swallow Eval) patient is independent, no specific recommendations;maintain upright sitting position for eating    Medication Administration Recommendations, Swallowing (SLP) Per patient preference   Instrumental Assessment Recommendations instrumental evaluation not recommended at this time   Motor Speech   Vocal Loudness (Motor Speech) WNL   Speech Intelligibility (Motor Speech) WNL   Breath Support (Motor Speech) intact   Resonance (Motor Speech) WNL   Speech Fluency (Motor Speech) WNL   Rate/Prosody (Motor Speech) WNL   Articulation (Motor Speech) WNL   Respiration (motor speech) None   Phonation (motor speech) Adequate   Auditory Comprehension   Follows Commands (Auditory Comprehension) WNL;1-step command;2-step commands   Comment, Assessment (Auditory Comprehension) WNL   Yes/No Questions (Auditory Comprehension) WNL;simple/factual questions;biographical/personal questions;complex questions   1 Step, Follows Commands (Auditory Comprehension) intact   2 Step, Follows Commands (Auditory Comprehension) intact   Biographical/Personal Questions (Auditory Comprehension) intact   Simple/Factual Questions (Auditory Comprehension) intact   Complex Questions (Auditory Comprehension) intact   Verbal Expression   Comment, Assesment (Verbal Expression) WNL   Confrontational Naming (Verbal Expression) WNL;body parts;objects   Conversational Speech (Verbal Expression) WNL;connected speech   Repetition Skills (Verbal Expression) WNL   Word Finding Skills (Verbal Expression) WNL   Objects, Confrontational Naming (Verbal Expression) intact   Body Parts, Confrontational Naming (Verbal Expression) intact   Connected Speech, Conversational (Verbal Expression) intact   Pragmatic Language   Verbal Skills (Pragmatic Language) WNL   Nonverbal Skills (Pragmatic Language) WNL   Reading Comprehension   Comment, Assessment (Reading Comprehension) Patient reading menu upon arrival with no apparent difficulties per observation.   Cognition   Cognitive Function WNL   Cognitive Status Alert and cooperative   Cognitive Status Exam Comments Patient  presents with WNL expressive and receptive language skills and suspected baseline cognition with no deficits. Pt was educated on resources, if new concerns arise related to speech and language, through PCP.   Orientation Status (Cognition) oriented x 4   Affect/Mental Status (Cognition) WNL   Follows Commands (Cognition) WNL   Clinical Impression   Criteria for Skilled Therapeutic Interventions Met (SLP Eval) Evaluation only;Current level of function same as previous level of function   Risks & Benefits of therapy have been explained evaluation/treatment results reviewed;participants voiced agreement with care plan;participants included;patient   Clinical Impression Comments Clinical Swallow Evaluation & Cognitive-Communication Evaluation completed. Patient had no s/s aspiration and no signs of dysphagia. Oral motor function was WNL. Noted small, red lesion at midline of anterior portion of tongue. Mastication was timely & complete. Hyolaryngeal elevation appears intact. Recommend diet of regular solids and thin liquids. Patient presents with WNL expressive and receptive language skills and suspected baseline cognition with no deficits. Pt was educated on resources, if new concerns arise related to speech and language, through PCP. No further ST is warranted at this time. Contact SLP if any questions or concerns.   SLP Total Evaluation Time   Eval: oral/pharyngeal swallow function, clinical swallow Minutes (56784) 15   Eval: Sound production with lang comprehension and expression Minutes (34391) 15   SLP Discharge Planning   SLP Plan Complete orders   SLP Discharge Recommendation   (defer to medical team)   SLP Rationale for DC Rec No further ST warrented. Pt at baseline   SLP Brief overview of current status  Recommend regular solids & thin liquids with no swallowing restrictions. Patient at suspected baseline for cognitive-communication skills. No further speech therapy warrented at this time.   Total Session Time    Total Session Time (sum of timed and untimed services) 30

## 2023-08-24 NOTE — CONSULTS
DIABETES CARE    Situation:  Consulted by Provider for Diabetes Education.  47-year-old male with history of DM 2, H LD, cigarette smoking who presented to the emergency room with the right arm and leg weakness, found to have a subacute lacunar stroke.   Background:  Related comorbidities include: Untreated HTN, HLD, DM, DPN, smokes (doesn't want to quit)  PCP: None  Social: Lives with  mom, is a  but currently unemployed, no insurance    Diabetes History:   Dx in 2007, was on Metformin and Glipizide then, had educationo then    Meds for BG Management PTA:  None    Current Inpatient Meds for BG Management:  10 Lantus at hs  I:C ratio at meals 1:15, Novolog to carb grams  Novolog correction scale: 1/50 over 140 at meals, over 200 at hs      Labs:  Hemoglobin A1C: 11.1%  (estimated average  mg/dL)   GFR: >90 mL/min/1.73m^2    Blood Glucose POC:    Latest Reference Range & Units 08/23/23 09:25 08/23/23 18:40 08/23/23 21:09 08/23/23 22:24 08/24/23 08:17 08/24/23 09:16   GLUCOSE BY METER POCT 70 - 99 mg/dL 337 (H) 247 (H) 251 (H) 252 (H) 286 (H) 267 (H)   (H): Data is abnormally high    Diet Order: 60 grams CHO   Weight: 124.1 kg    BMI: 39.26 kg/m^2    DM EDUCATION/COUNSELING:  Barriers to Learning and/or DM Self-Management: $  Previous DM Education: Years ago  Current education and/or visit with patient   Educated/reviewed diabetes basics: pathophysiology, hyperglycemia, long term complications, treatment.  Educated/reviewed hypoglycemia: symptoms, causes, treatment.  Explained normal/goals of blood sugar control, A1C  Educated/reviewed use of home glucose meter, [employer] Premier Classic (he can get with voucher) and patient able to demonstrate its use.  Educated on normal pancreas function, how oral medications work, GLP1 meds (in future if obtains insurance), and possible need for basal insulin and mealtime insulin.  Specific insulins were explained including how they each acted on blood sugar, their  "timing, and differences in dosing.   The insulin pen technique was demonstrated by patient, right hand weak.   Discussed injection site rotation, proper storage  Carbohydrates were briefly discussed and their effect on BG. Reinforced healthy eating. RD to see here     Written handouts given on all education provided.     Assessment:  Patient broke now so will need to figure out resources for meds/supplies    Recommendations:  1. Will need to use a voucher for 2 free pen boxes until he can get insurance or Job/$  Orals and basal insulin?  Basal/bolus insulin?  Just combo orals Metformin and Glipizide  I can give him a voucher for two free boxes of pens, stop gap til insurance? It would be Novolog and Tresiba or if to use Metformin and Glipizide with basal, could get 2 boxes of Tresiba    2. Will need increase in basal/bolus insulin amounts now, see guidelines (See \"Guidelines for Insulin Initiation and Care in Hospitalized Adults\" link in the Diabetes Management order set for dosing guidelines)  3. Assess BG pattern daily and adjust doses as needed.  Will need PCP; perhaps could go to [facility] where also once a patient, has pharmacy      Refer to \"Guidelines for Insulin Initiation and Care in Hospitalized Adults\" link in Diabetes Management Order set for dosing guidelines.  Hospital goals for blood glucose levels are < 180 mg/dL for improved health outcomes.    DISCHARGE NEEDS:  Await plan for medication at discharge  If insulins, Tresiba Flextouch pens and Novolog Flexpens (or two Tresiba if to be on basal and orals)  Can get pen needles, Premier strips for his free meter at [facility] (voucher)  Will have lancets and lancing device already and some sample pen needles    Thank you,         Sadia Young RN, Certified Diabetes Care and     [id number] [place] [address]  [email or web address]   Office: [phone]  Pager: " [phone]

## 2023-08-24 NOTE — CONSULTS
Clinical Nutrition Services    RN consult: uncontrolled diabetes    Met with pt to discuss carb counting, consistent carbs. Pt states has heard information before and sometimes with follow at home. Asked if wanting to go over information again and pt declined. Did agree to take a handout on foods that contain carbohydrates. Pt seemed very disinterested and do not expect compliance.

## 2023-08-24 NOTE — PLAN OF CARE
Goal Outcome Evaluation:NIH score of 1 for right arm drift. NSR on tele. Insulin given per order. Insulin administration done and patient demonstrate understanding. GAIL and Stress test schedule for tomorrow 7 am. Patient should be NPO at midnight.( Pt. Updated)    Problem: Stroke, Ischemic (Includes Transient Ischemic Attack)  Goal: Optimal Eating and Swallowing without Aspiration  Outcome: Progressing  Patient cleared by speech therapist no difficulty eating and swallowing.      Problem: Stroke, Ischemic (Includes Transient Ischemic Attack)  Goal: Optimal Functional Ability  Outcome: Progressing      Independent with transfers.

## 2023-08-24 NOTE — PLAN OF CARE
Goal Outcome Evaluation:       Patient took his med, took off his PCD's stating he doesn't want them because they are too hot. Patient score a 2 on NIH. Had a small emesis.

## 2023-08-24 NOTE — PHARMACY-CONSULT NOTE
Pharmacy Consult to evaluate for medication related stroke core measures    Floyd Capps, 47 year old male admitted for lacunar infarct on 8/23/2023.    Thrombolytic was not given because of Time from onset contraindications    VTE Prophylaxis: Pneumatic compression devices have been ordered, but are not currently in place. Need mechanical or pharmacologic VTE prophylaxis, pharmacist to follow up with RN and/or MD.    Antithrombotic: aspirin and clopidogrel started on 8/23/23, as appropriate by end of hospital day 2. Continue antithrombotic therapy on discharge to meet quality measures, unless contraindicated.    Anticoagulation if history of A-fib/flutter: Patient does not have history of A-fib/flutter - anticoagulation not required for medication related stroke core measures.     LDL Cholesterol Calculated   Date Value Ref Range Status   08/23/2023 226 (H) <=100 mg/dL Final       Patient currently receiving Lipitor (atorvastatin) continue statin on discharge to meet quality measures, unless contraindicated.    Recommendations:  Ensure VTE prophylaxis is addressed today.    Thank you for the consult.    Meghan Damico RPH 8/24/2023 10:25 AM

## 2023-08-24 NOTE — PROGRESS NOTES
Cardiology Progress Note    Assessment/Plan:    Elevated cardiac troponin, not suggestive of acute coronary syndrome.  With regional wall motion abnormalities on echo, will schedule pharmacologic nuclear stress test to exclude ischemia.  Hypertension, with some improvement in control on medical therapy.  Will increase losartan, add carvedilol.  Hyperlipidemia now on high-dose treatment  Morbid obesity with possible obstructive sleep apnea.  We will check overnight oximetry  Tobacco abuse, ongoing, with no interest in cessation.  Tolerating a nicotine patch    Principal Problem:    Elevated troponin  Active Problems:    Ischemic stroke (H)     LOS: 1 day     Subjective:  No chest pain or shortness of breath.  Right arm still weakened.  Denies lightheadedness or palpitations.      Objective:   Vital signs in last 24 hours:  Vitals:    08/24/23 0713 08/24/23 0926 08/24/23 1108 08/24/23 1500   BP: (!) 170/87 (!) 158/91 (!) 147/72 (!) 141/83   BP Location: Left arm Left arm Left arm Left arm   Patient Position:  Sitting     Cuff Size:  Adult Regular     Pulse: 90 87 95 94   Resp: 18  16 16   Temp: 98.4  F (36.9  C)  98.5  F (36.9  C) 98.4  F (36.9  C)   TempSrc: Oral  Oral Oral   SpO2: 95%  96% 93%   Weight:       Height:         Weight:   Wt Readings from Last 3 Encounters:   08/23/23 124.1 kg (273 lb 9.5 oz)   07/25/23 131.5 kg (290 lb)   07/12/23 131.5 kg (290 lb)           PHYSICAL EXAM    Conjunctive injected bilaterally  Respiratory:  Normal breath sounds, No respiratory distress, No wheezing, No chest tenderness.  Few widely scattered rhonchi.  Cardiovascular: Distant heart tones.  Normal heart rate, Normal rhythm, No murmurs, No rubs, No gallops.   GI: Obese.  Bowel sounds normal, Soft, No tenderness, No masses  Extremities: no edema       Cardiographics:   Telemetry sinus rhythm, 75 to 90 bpm.    Brain MRI 8/23:  1.  Acute lacunar infarct of the left corona radiata and basal ganglia corresponding with  abnormality from prior head CT.  2.  Additional punctate focus of acute ischemia at the right frontal centrum semiovale.  3.  Nonspecific enhancing lesion at the left parietal scalp. Malignancy/abscess not excluded. Recommend correlation with direct visualization.  4.  Soft tissue prominence of the nasopharyngeal lymphoid tissues, suspect mucous retention cysts. If additional evaluation is indicated recommend visualization could be considered.        Cardiac diagnostics    Echocardiogram 8/23:  1. Technically difficult study.  2. Normal left ventricular size and systolic performance with a visually  estimated ejection fraction of 55%.  3. On selected views, there appears to be a fairly small discrete apical wall  motion abnormality (no apical mural thrombus is detected).  4. No significant valvular heart disease is identified on this study.  5. Probable normal right ventricular size and systolic performance though  right-sided structures are not clearly visualized on all views on this study.  6. Echo contrast examination was performed using agitated NS as contrast  agent. The right heart opacity was suboptimal. There was no obvious evidence  of right to left shunting during spontaneous respiration or following release  of Valsalva though the sensitivity &specificity are both reduced due to  suboptimal acoustic imaging..    Lab Results:   Lab Results   Component Value Date    WBC 11.3 (H) 08/24/2023    HGB 13.1 (L) 08/24/2023    HCT 38.0 (L) 08/24/2023     08/24/2023    CHOL 330 (H) 08/23/2023    TRIG 246 (H) 08/23/2023    HDL 55 08/23/2023     (L) 08/24/2023    BUN 10.1 08/24/2023    CO2 24 08/24/2023    TSH 2.72 08/23/2023    INR 0.95 08/23/2023     No results found for: CKTOTAL, CKMB, TROPONINI      Dimitry Riley MD [facility]  8/24/2023

## 2023-08-24 NOTE — DISCHARGE INSTRUCTIONS
Diabetes Care:  1. Check blood sugar 4 x daily (could rotate breakfast/supper with lunch/bedtime so 2x per day), goals before meals  (if check two hours after the start of meals goal is < 180).   2. Call doctor if 2 or more unexplained low blood sugars in a week or if blood sugar is over 400  3. Follow medication regimen on discharge orders until able to see provider where doses may be adjusted based on blood sugar patterns.

## 2023-08-24 NOTE — PLAN OF CARE
Problem: Plan of Care - These are the overarching goals to be used throughout the patient stay.    Goal: Plan of Care Review  Description: The Plan of Care Review/Shift note should be completed every shift.  The Outcome Evaluation is a brief statement about your assessment that the patient is improving, declining, or no change.  This information will be displayed automatically on your shift note.  Outcome: Progressing     Problem: Plan of Care - These are the overarching goals to be used throughout the patient stay.    Goal: Optimal Comfort and Wellbeing  Outcome: Progressing     Problem: Stroke, Ischemic (Includes Transient Ischemic Attack)  Goal: Optimal Cognitive Function  Outcome: Progressing   Goal Outcome Evaluation:       Patient stable through out the night, no complaints of pain in the night.

## 2023-08-24 NOTE — CONSULTS
Tobacco Treatment Consult  8/24/2023, 11:04 AM    Patient admitted on: 8/23/2023   Patient admitted for: Elevated troponin [R77.8]  Ischemic stroke (H) [I63.9]   Patient seen on: 8/24/2023    Reviewed patient's smoking history with patient and updated their smoking status in patient's substance use history. They state  they are currently smoking 1/2 pack a day, started when he was 14. Pt has a 33 pack year history. Floyd declined tobacco treatment counseling and cessation resource materials. He has no interest in quitting smoking at this time. As an inpatient they have been using a 7 mg NRT patch and it is currently working.     Total 2 minutes spent in smoking cessation, and 20 minutes spent in chart review, care coordination, and documentation.    Evelin Crouch, RT, Chronic Pulmonary Disease Specialist & Certified Tobacco Treatment Specialist  Phone [phone]

## 2023-08-24 NOTE — PROGRESS NOTES
[facility]    Progress Note - Hospitalist Service       Date of Admission:  8/23/2023    Assessment & Plan   Floyd Capps is a 47 year old male with history of DM2, HLD, tobacco use wo presents with right arm and leg weakness, found to have a left corona radiata, basal ganglia and right frontal semiovale infarction. He is admitted for stroke work-up and management.      Acute left basal ganglia lacunar infarct  Patient woke up on 8/22 with right sided arm and leg weakness. CT and MRI w/ acute lacunar infarct of the left corona radiata and basal ganglia. Stroke code called in ED-- no thrombolysis due to outside 4.5 hour window. Risk factors notable for uncontrolled diabetes, tobacco use, hypertension and HLD.   - neurology consult  - DAPT w/ aspirin 81 and plavix for 90 days, then switch to asa 325  - s/p ECHO w/ bubble, no obvious evidence of R to L shunting  - GAIL 8/25  - telemetry, if no arrythmia schedule 30 day monitor at discharge  - start on lipitor 80mg, goal LDL 40-70  - permissive HTN, keep SBP <180, diastolic <100  - physical therapy  - occupational therapy  - SLP     Elevated troponin, stable x4  Elevated BNP  Asymptomatic but has risk factors for coronary artery disease. Per cardiology, flat pattern not suggestive ACS; however, possible regional wall motion abnormalities on ECHO is concerning. EF on ECHO 55%, no other valvular abnormalities.   - nuc stress test tomorrow, NPO at [place]   - liptior 80mg  - aspirin 81 mg  - losartan 50mg  - telemetry  - serial troponin     Type 2 diabetes  Hyperglycemia  Uncontrolled and not on any medications for many years. A1C 11.1 on admission.  - 18 units lantus at bedtime  - 1:10 insulin to carb ratio  - medium sliding scale correction  - diabetic educator consult  - will need diabetes discharge planning     Insurance/finance concerns  Patient is without insurance and unemployed at the moment.  - social work consult     Diet: Moderate  "Consistent Carb (60 g CHO per Meal) Diet    DVT Prophylaxis: Pneumatic Compression Devices  Cazares Catheter: Not present  Fluids: none  Lines: None     Cardiac Monitoring: ACTIVE order. Indication: Stroke, acute (48 hours)  Code Status: Full Code      Clinically Significant Risk Factors Present on Admission                      # DMII: A1C = 11.1 % (Ref range: <5.7 %) within past 6 months    # Obesity: Estimated body mass index is 39.26 kg/m  as calculated from the following:    Height as of this encounter: 1.778 m (5' 10\").    Weight as of this encounter: 124.1 kg (273 lb 9.5 oz).              Disposition Plan     Expected Discharge Date: 08/25/2023      Destination: home with family          The patient's care was discussed with the Attending Physician, Dr. Rosenstein .    Violetta Mehta MD  Hospitalist Service  [facility]  Securely message with [employer] (more info)  Text page via [employer] Paging/Directory   ______________________________________________________________________    Interval History   Reviewed chart. No acute events overnight. Patient states he does not have any symptoms of headache, chest pain, shortness of breath. No questions. Awaiting call from financial counselor.     Physical Exam   Vital Signs: Temp: 98.4  F (36.9  C) Temp src: Oral BP: (!) 158/91 Pulse: 87   Resp: 18 SpO2: 95 % O2 Device: None (Room air)    Weight: 273 lbs 9.45 oz    Constitutional: awake, alert, cooperative, no apparent distress, and appears stated age  Eyes: extra-ocular muscles intact  ENT: normocepalic, without obvious abnormality, atramatic  Respiratory: CTAB, no increased WOB  Cardiovascular: RRR  GI: soft, not distended or tender, normal bowel sound  Skin: normal skin color, texture, turgor  Musculoskeletal: no lower extremity pitting edema present  Neurologic: Awake, alert, oriented to name, place and time.  Normal speech. Cranial nerves II-XII are grossly intact.  RUE 4/5, RLE 4/5 compared to 5/5 " strength on left  Neuropsychiatric: General: normal and calm      Data     I have personally reviewed the following data over the past 24 hrs:    11.3 (H)  \   13.1 (L)   / 271     133 (L) 100 10.1 /  297 (H)   3.9 24 0.64 (L) \     Trop: 152 (HH) BNP: N/A     TSH: 2.72 T4: N/A A1C: N/A       Imaging results reviewed over the past 24 hrs:   Recent Results (from the past 24 hour(s))   Echocardiogram Complete with Bubble Study    Narrative    [id number]  [id number]  [id number]  [id number]^SINTIA^MARIAN^[place] [address]     Name: LUIS HODGES  MRN: [id number]  : 1975  Study Date: 2023 03:10 PM  Age: 47 yrs  Gender: Male  Patient [facility]: [facility]  Reason For Study: Cardiovascular Incident  Ordering Physician: MARIAN PATRICIO  Referring Physician: MARIAN PATRICIO  Performed By:      BSA: 2.4 m2  Height: 70 in  Weight: 290 lb  HR: 87  ______________________________________________________________________________  Procedure  Complete Bubble Echo Adult. Definity (NDC #61021-523) given intravenously.  ______________________________________________________________________________  Interpretation Summary     1. Technically difficult study.  2. Normal left ventricular size and systolic performance with a visually  estimated ejection fraction of 55%.  3. On selected views, there appears to be a fairly small discrete apical wall  motion abnormality (no apical mural thrombus is detected).  4. No significant valvular heart disease is identified on this study.  5. Probable normal right ventricular size and systolic performance though  right-sided structures are not clearly visualized on all views on this study.  6. Echo contrast examination was performed using agitated NS as contrast  agent. The right heart opacity was suboptimal. There was no obvious evidence  of right to left shunting during spontaneous respiration or following release  of Valsalva though the  sensitivity &specificity are both reduced due to  suboptimal acoustic imaging..  ______________________________________________________________________________  Left ventricle:  Normal left ventricular size and systolic performance with a visually  estimated ejection fraction of 55%. On selected views, there appears to be a  fairly small discrete apical wall motion abnormality (no apical mural thrombus  is detected). Left ventricular wall thickness is normal.     Assessment of LV Diastolic Function: The cumulative findings suggest normal  diastolic filling.     Right ventricle:  Probable normal right ventricular size and systolic performance though right-  sided structures are not clearly visualized on all views on this study.     Left atrium:  There is mild left atrial enlargement.     Right atrium:  The right atrium is of normal size.     IVC:  The IVC is of normal caliber.     Aortic valve:  The aortic valve is not well visualized, but suspected to be comprised of  three cusps. No significant aortic stenosis or aortic insufficiency is  detected on this study.     Mitral valve:  The mitral valve appears morphologically normal. There is probable trace  mitral insufficiency.     Tricuspid valve:  The tricuspid valve is grossly morphologically normal. There is probable trace  tricuspid insufficiency.     Pulmonic valve:  The pulmonic valve is grossly morphologically normal.     Thoracic aorta:  The aortic root and proximal ascending aorta are of normal dimension.     Pericardium:  There is no significant pericardial effusion.  ______________________________________________________________________________  ______________________________________________________________________________  MMode/2D Measurements & Calculations  IVSd: 1.1 cm  LVIDd: 4.7 cm  LVIDs: 2.8 cm  LVPWd: 1.1 cm  FS: 40.8 %  LV mass(C)d: 183.4 grams  LV mass(C)dI: 75.0 grams/m2  Ao root diam: 3.3 cm  LA dimension: 5.2 cm  asc Aorta Diam: 3.1  cm  LA/Ao: 1.6  LVOT diam: 2.6 cm  LVOT area: 5.3 cm2  LA Volume Indexed (AL/bp): 24.5 ml/m2     RV Base: 3.6 cm  RWT: 0.45  TAPSE: 2.6 cm     Time Measurements  MM HR: 87.0 BPM     Doppler Measurements & Calculations  MV E max servando: 68.1 cm/sec  MV A max servando: 59.7 cm/sec  MV E/A: 1.1  MV dec slope: 320.0 cm/sec2  MV dec time: 0.21 sec  Ao V2 max: 125.0 cm/sec  Ao max P.0 mmHg  Ao V2 mean: 93.3 cm/sec  Ao mean P.0 mmHg  Ao V2 VTI: 25.2 cm  NILSA(I,D): 3.8 cm2  NILSA(V,D): 3.8 cm2  LV V1 max PG: 3.3 mmHg  LV V1 max: 90.2 cm/sec  LV V1 VTI: 18.1 cm  SV(LVOT): 96.1 ml  SI(LVOT): 39.3 ml/m2  PA acc time: 0.10 sec  AV Servando Ratio (DI): 0.72  NILSA Index (cm2/m2): 1.6     E/E' av.8  Lateral E/e': 7.6  Medial E/e': 7.9  RV S Servando: 15.6 cm/sec     ______________________________________________________________________________  Report approved by: Ladonna Hall 2023 03:58 PM

## 2023-08-25 ENCOUNTER — APPOINTMENT (OUTPATIENT)
Dept: CARDIOLOGY | Facility: HOSPITAL | Age: 48
DRG: 065 | End: 2023-08-25
Attending: PSYCHIATRY & NEUROLOGY

## 2023-08-25 ENCOUNTER — APPOINTMENT (OUTPATIENT)
Dept: NUCLEAR MEDICINE | Facility: HOSPITAL | Age: 48
DRG: 065 | End: 2023-08-25
Attending: INTERNAL MEDICINE

## 2023-08-25 ENCOUNTER — APPOINTMENT (OUTPATIENT)
Dept: OCCUPATIONAL THERAPY | Facility: HOSPITAL | Age: 48
DRG: 065 | End: 2023-08-25

## 2023-08-25 ENCOUNTER — APPOINTMENT (OUTPATIENT)
Dept: CARDIOLOGY | Facility: HOSPITAL | Age: 48
DRG: 065 | End: 2023-08-25
Attending: INTERNAL MEDICINE

## 2023-08-25 LAB
CV STRESS CURRENT BP HE: NORMAL
CV STRESS CURRENT HR HE: 81
CV STRESS CURRENT HR HE: 81
CV STRESS CURRENT HR HE: 85
CV STRESS CURRENT HR HE: 86
CV STRESS CURRENT HR HE: 86
CV STRESS CURRENT HR HE: 88
CV STRESS CURRENT HR HE: 89
CV STRESS CURRENT HR HE: 92
CV STRESS CURRENT HR HE: 93
CV STRESS CURRENT HR HE: 94
CV STRESS CURRENT HR HE: 94
CV STRESS CURRENT HR HE: 95
CV STRESS CURRENT HR HE: 95
CV STRESS CURRENT HR HE: 99
CV STRESS DEVIATION TIME HE: NORMAL
CV STRESS ECHO PERCENT HR HE: NORMAL
CV STRESS EXERCISE STAGE HE: NORMAL
CV STRESS FINAL RESTING BP HE: NORMAL
CV STRESS FINAL RESTING HR HE: 85
CV STRESS MAX HR HE: 101
CV STRESS MAX TREADMILL GRADE HE: 0
CV STRESS MAX TREADMILL SPEED HE: 0
CV STRESS PEAK DIA BP HE: NORMAL
CV STRESS PEAK SYS BP HE: NORMAL
CV STRESS PHASE HE: NORMAL
CV STRESS PROTOCOL HE: NORMAL
CV STRESS RESTING PT POSITION HE: NORMAL
CV STRESS ST DEVIATION AMOUNT HE: NORMAL
CV STRESS ST DEVIATION ELEVATION HE: NORMAL
CV STRESS ST EVELATION AMOUNT HE: NORMAL
CV STRESS TEST TYPE HE: NORMAL
CV STRESS TOTAL STAGE TIME MIN 1 HE: NORMAL
GLUCOSE BLDC GLUCOMTR-MCNC: 224 MG/DL (ref 70–99)
GLUCOSE BLDC GLUCOMTR-MCNC: 225 MG/DL (ref 70–99)
GLUCOSE BLDC GLUCOMTR-MCNC: 288 MG/DL (ref 70–99)
GLUCOSE BLDC GLUCOMTR-MCNC: 291 MG/DL (ref 70–99)
GLUCOSE BLDC GLUCOMTR-MCNC: 307 MG/DL (ref 70–99)
NUC STRESS EJECTION FRACTION: 55 %
RATE PRESSURE PRODUCT: NORMAL
STRESS ECHO BASELINE DIASTOLIC HE: 84
STRESS ECHO BASELINE HR: 82
STRESS ECHO BASELINE SYSTOLIC BP: 139
STRESS ECHO CALCULATED PERCENT HR: 59 %
STRESS ECHO LAST STRESS DIASTOLIC BP: 79
STRESS ECHO LAST STRESS HR: 94
STRESS ECHO LAST STRESS SYSTOLIC BP: 147
STRESS ECHO TARGET HR: 171

## 2023-08-25 PROCEDURE — 94762 N-INVAS EAR/PLS OXIMTRY CONT: CPT

## 2023-08-25 PROCEDURE — 93018 CV STRESS TEST I&R ONLY: CPT | Performed by: INTERNAL MEDICINE

## 2023-08-25 PROCEDURE — 93320 DOPPLER ECHO COMPLETE: CPT | Mod: 26 | Performed by: INTERNAL MEDICINE

## 2023-08-25 PROCEDURE — 99232 SBSQ HOSP IP/OBS MODERATE 35: CPT | Performed by: PSYCHIATRY & NEUROLOGY

## 2023-08-25 PROCEDURE — 99152 MOD SED SAME PHYS/QHP 5/>YRS: CPT | Performed by: INTERNAL MEDICINE

## 2023-08-25 PROCEDURE — 93325 DOPPLER ECHO COLOR FLOW MAPG: CPT | Mod: 26 | Performed by: INTERNAL MEDICINE

## 2023-08-25 PROCEDURE — 250N000013 HC RX MED GY IP 250 OP 250 PS 637: Performed by: INTERNAL MEDICINE

## 2023-08-25 PROCEDURE — 93016 CV STRESS TEST SUPVJ ONLY: CPT | Performed by: INTERNAL MEDICINE

## 2023-08-25 PROCEDURE — 78452 HT MUSCLE IMAGE SPECT MULT: CPT | Mod: 26 | Performed by: INTERNAL MEDICINE

## 2023-08-25 PROCEDURE — 250N000013 HC RX MED GY IP 250 OP 250 PS 637: Performed by: PSYCHIATRY & NEUROLOGY

## 2023-08-25 PROCEDURE — 97110 THERAPEUTIC EXERCISES: CPT | Mod: GO

## 2023-08-25 PROCEDURE — 250N000011 HC RX IP 250 OP 636: Performed by: INTERNAL MEDICINE

## 2023-08-25 PROCEDURE — 250N000009 HC RX 250: Performed by: INTERNAL MEDICINE

## 2023-08-25 PROCEDURE — 99232 SBSQ HOSP IP/OBS MODERATE 35: CPT | Mod: GC

## 2023-08-25 PROCEDURE — 250N000011 HC RX IP 250 OP 636: Mod: JZ | Performed by: INTERNAL MEDICINE

## 2023-08-25 PROCEDURE — 78452 HT MUSCLE IMAGE SPECT MULT: CPT

## 2023-08-25 PROCEDURE — 93325 DOPPLER ECHO COLOR FLOW MAPG: CPT

## 2023-08-25 PROCEDURE — 93017 CV STRESS TEST TRACING ONLY: CPT

## 2023-08-25 PROCEDURE — 93312 ECHO TRANSESOPHAGEAL: CPT

## 2023-08-25 PROCEDURE — 343N000001 HC RX 343: Performed by: FAMILY MEDICINE

## 2023-08-25 PROCEDURE — 258N000003 HC RX IP 258 OP 636: Performed by: INTERNAL MEDICINE

## 2023-08-25 PROCEDURE — 99232 SBSQ HOSP IP/OBS MODERATE 35: CPT | Mod: 25 | Performed by: INTERNAL MEDICINE

## 2023-08-25 PROCEDURE — 120N000001 HC R&B MED SURG/OB

## 2023-08-25 PROCEDURE — A9500 TC99M SESTAMIBI: HCPCS | Performed by: FAMILY MEDICINE

## 2023-08-25 PROCEDURE — 93312 ECHO TRANSESOPHAGEAL: CPT | Mod: 26 | Performed by: INTERNAL MEDICINE

## 2023-08-25 PROCEDURE — 250N000013 HC RX MED GY IP 250 OP 250 PS 637

## 2023-08-25 RX ORDER — HYDROCHLOROTHIAZIDE 12.5 MG/1
12.5 TABLET ORAL DAILY
Status: DISCONTINUED | OUTPATIENT
Start: 2023-08-25 | End: 2023-08-26 | Stop reason: HOSPADM

## 2023-08-25 RX ORDER — AMINOPHYLLINE 25 MG/ML
50 INJECTION, SOLUTION INTRAVENOUS
Status: ACTIVE | OUTPATIENT
Start: 2023-08-25 | End: 2023-08-25

## 2023-08-25 RX ORDER — REGADENOSON 0.08 MG/ML
0.4 INJECTION, SOLUTION INTRAVENOUS ONCE
Status: COMPLETED | OUTPATIENT
Start: 2023-08-25 | End: 2023-08-25

## 2023-08-25 RX ORDER — SODIUM CHLORIDE 9 MG/ML
INJECTION, SOLUTION INTRAVENOUS CONTINUOUS
Status: CANCELLED | OUTPATIENT
Start: 2023-08-25

## 2023-08-25 RX ORDER — SODIUM CHLORIDE 9 MG/ML
INJECTION, SOLUTION INTRAVENOUS CONTINUOUS PRN
Status: COMPLETED | OUTPATIENT
Start: 2023-08-25 | End: 2023-08-25

## 2023-08-25 RX ORDER — NICOTINE 21 MG/24HR
1 PATCH, TRANSDERMAL 24 HOURS TRANSDERMAL DAILY
Status: DISCONTINUED | OUTPATIENT
Start: 2023-08-25 | End: 2023-08-26 | Stop reason: HOSPADM

## 2023-08-25 RX ORDER — CARVEDILOL 12.5 MG/1
12.5 TABLET ORAL 2 TIMES DAILY WITH MEALS
Status: DISCONTINUED | OUTPATIENT
Start: 2023-08-25 | End: 2023-08-26 | Stop reason: HOSPADM

## 2023-08-25 RX ORDER — LIDOCAINE HYDROCHLORIDE 20 MG/ML
SOLUTION OROPHARYNGEAL
Status: COMPLETED | OUTPATIENT
Start: 2023-08-25 | End: 2023-08-25

## 2023-08-25 RX ORDER — FENTANYL CITRATE 50 UG/ML
INJECTION, SOLUTION INTRAMUSCULAR; INTRAVENOUS
Status: COMPLETED | OUTPATIENT
Start: 2023-08-25 | End: 2023-08-25

## 2023-08-25 RX ADMIN — AMINOPHYLLINE 50 MG: 25 INJECTION, SOLUTION INTRAVENOUS at 07:59

## 2023-08-25 RX ADMIN — INSULIN ASPART 2 UNITS: 100 INJECTION, SOLUTION INTRAVENOUS; SUBCUTANEOUS at 15:28

## 2023-08-25 RX ADMIN — TOPICAL ANESTHETIC 0.5 ML: 200 SPRAY DENTAL; PERIODONTAL at 08:25

## 2023-08-25 RX ADMIN — Medication 10.6 MILLICURIE: at 07:09

## 2023-08-25 RX ADMIN — MIDAZOLAM 1 MG: 1 INJECTION INTRAMUSCULAR; INTRAVENOUS at 08:25

## 2023-08-25 RX ADMIN — NICOTINE 1 PATCH: 14 PATCH, EXTENDED RELEASE TRANSDERMAL at 11:07

## 2023-08-25 RX ADMIN — INSULIN ASPART 4 UNITS: 100 INJECTION, SOLUTION INTRAVENOUS; SUBCUTANEOUS at 11:04

## 2023-08-25 RX ADMIN — CARVEDILOL 6.25 MG: 3.12 TABLET, FILM COATED ORAL at 11:02

## 2023-08-25 RX ADMIN — MIDAZOLAM 1 MG: 1 INJECTION INTRAMUSCULAR; INTRAVENOUS at 08:29

## 2023-08-25 RX ADMIN — CARVEDILOL 12.5 MG: 12.5 TABLET, FILM COATED ORAL at 17:31

## 2023-08-25 RX ADMIN — HYDROCHLOROTHIAZIDE 12.5 MG: 12.5 TABLET ORAL at 15:23

## 2023-08-25 RX ADMIN — INSULIN ASPART 4 UNITS: 100 INJECTION, SOLUTION INTRAVENOUS; SUBCUTANEOUS at 17:32

## 2023-08-25 RX ADMIN — CLOPIDOGREL BISULFATE 75 MG: 75 TABLET ORAL at 11:03

## 2023-08-25 RX ADMIN — SODIUM CHLORIDE 10 ML/HR: 9 INJECTION, SOLUTION INTRAVENOUS at 08:18

## 2023-08-25 RX ADMIN — REGADENOSON 0.4 MG: 0.08 INJECTION, SOLUTION INTRAVENOUS at 07:51

## 2023-08-25 RX ADMIN — Medication 42.6 MILLICURIE: at 07:55

## 2023-08-25 RX ADMIN — Medication 81 MG: at 11:03

## 2023-08-25 RX ADMIN — LIDOCAINE HYDROCHLORIDE 15 ML: 20 SOLUTION ORAL; TOPICAL at 08:24

## 2023-08-25 RX ADMIN — ATORVASTATIN CALCIUM 80 MG: 40 TABLET, FILM COATED ORAL at 21:30

## 2023-08-25 RX ADMIN — FENTANYL CITRATE 100 MCG: 50 INJECTION, SOLUTION INTRAMUSCULAR; INTRAVENOUS at 08:26

## 2023-08-25 RX ADMIN — LOSARTAN POTASSIUM 100 MG: 50 TABLET, FILM COATED ORAL at 11:03

## 2023-08-25 ASSESSMENT — ACTIVITIES OF DAILY LIVING (ADL)
ADLS_ACUITY_SCORE: 20
ADLS_ACUITY_SCORE: 20
ADLS_ACUITY_SCORE: 22
ADLS_ACUITY_SCORE: 20
ADLS_ACUITY_SCORE: 21
ADLS_ACUITY_SCORE: 22
ADLS_ACUITY_SCORE: 20
ADLS_ACUITY_SCORE: 21
ADLS_ACUITY_SCORE: 22
ADLS_ACUITY_SCORE: 22

## 2023-08-25 NOTE — PROGRESS NOTES
Cardiology Progress Note    Assessment/Plan:    Elevation of cardiac troponin not suggestive of acute coronary syndrome with evidence of fixed defect on nuclear stress testing, minimal perla-infarct ischemia, and preserved left ventricular function.  Stressed the importance of risk factor control to help prevent recurrent events, patient dubious as to whether he will pursue these treatments.  Plan to continue aspirin, clopidogrel long-term.  Stroke syndrome likely related to LV thrombus with infarct of uncertain date.,  Likely within the last few months.  Hypertension with inadequate control  on present medical regimen.  Will increase carvedilol and add HCTZ to help with control.  Hyperlipidemia now on high-dose treatment  Probable obstructive sleep apnea, merits formal evaluation after hospital discharge  Tobacco abuse, ongoing.  No interest in cessation    May benefit from  involvement to help with financial pressures.  Emphasized need for close follow-up with a primary care provider, he currently has none.  Encouraged follow-up to see me in 1 month.  Please call if we can be of further assistance    Principal Problem:    Elevated troponin  Active Problems:    Ischemic stroke (H)     LOS: 2 days     Subjective:  Shocked to hear that he had suffered a stroke, and a heart attack, and probably has sleep apnea.  Denies pain or shortness of breath.  No interest in quitting smoking.  Concerned about costs of therapy, and obtaining medical assistance.      Objective:   Vital signs in last 24 hours:  Vitals:    08/25/23 0842 08/25/23 0845 08/25/23 0850 08/25/23 1046   BP: (!) 166/92 (!) 153/91 (!) 141/84 (!) 152/85   BP Location:    Left arm   Pulse: 83 79 78 84   Resp: 13 14 18 18   Temp:    97.7  F (36.5  C)   TempSrc:    Oral   SpO2: 93% 93% 93% 97%   Weight:       Height:         Weight:   Wt Readings from Last 3 Encounters:   08/23/23 124.1 kg (273 lb 9.5 oz)   07/25/23 131.5 kg (290 lb)   07/12/23  131.5 kg (290 lb)           PHYSICAL EXAM      Respiratory: Few scattered wheezes.  Cardiovascular:   Normal heart rate, Normal rhythm, No murmurs, No rubs, No gallops.   GI: Obese.  Bowel sounds normal, Soft, No tenderness, No masses  Extremities: no edema       Cardiographics:   Telemetry sinus rhythm, 75 to 100 bpm    Overnight oximetry suggests saturation less than 90 for 17% of the night    Transesophageal echo today: Personally reviewed  1. Normal left ventricular size and systolic performance with a visually  estimated ejection fraction of 55-60%.  2. There appears to be a fairly small discrete apical wall motion abnormality  (no apical mural thrombus is detected).  3. No significant valvular heart disease is identified on this study.  4. Normal right ventricular size and systolic performance.  5. There is mild left atrial enlargement.  6. No intracardiac mass or thrombus is detected.  7. Echo contrast examination was performed using agitated NS as contrast  agent. The right heart opacity was good and the left heart was well  visualized. There was no evidence of right to left shunting during spontaneous  respiration or following release of Valsalva.  8. No cardiac source of systemic embolism is identified on the study.    Imaging:   Pharmacologic nuclear stress test today:    Pharmacological regadenoson stress ECG is negative for inducible myocardial ischemia.    Pharmacological regadenoson nuclear study is abnormal.  There is previous almost transmural myocardial infarction in inferior wall and apex.  There is small size of perla-infarct ischemia in distal anteroseptal segment.    Normal left ventricular size with no significant wall motion abnormality.  The calculated left ventricular ejection fraction is 55%.    The patient is at a low risk of future cardiac ischemic events.      Lab Results:   Lab Results   Component Value Date    WBC 11.3 (H) 08/24/2023    HGB 13.1 (L) 08/24/2023    HCT 38.0 (L)  08/24/2023     08/24/2023    CHOL 330 (H) 08/23/2023    TRIG 246 (H) 08/23/2023    HDL 55 08/23/2023     (L) 08/24/2023    BUN 10.1 08/24/2023    CO2 24 08/24/2023    TSH 2.72 08/23/2023    INR 0.95 08/23/2023     No results found for: CKTOTAL, CKMB, TROPONINI      Dimitry Riley MD [facility]  8/25/2023

## 2023-08-25 NOTE — PLAN OF CARE
[id number] Patient off unit doing stress test and GAIL.  Aox4. VSS on RA. Neuros q4. NIHSS score 1. Tele discontinued. Denies chest pain, n/v and sob. Up independently in room, steady gait.   Educating pt on diabetic care; blood sugar checks, sliding scale, carb count and insulin admin. Pt participating with process at times. Encouraging questions and involvement.     Goal Outcome Evaluation:  Problem: Plan of Care - These are the overarching goals to be used throughout the patient stay.    Goal: Absence of Hospital-Acquired Illness or Injury  Intervention: Identify and Manage Fall Risk  Recent Flowsheet Documentation  Taken 8/25/2023 1520 by Kaleigh Bernard, RN  Safety Promotion/Fall Prevention:   assistive device/personal items within reach   clutter free environment maintained   nonskid shoes/slippers when out of bed   patient and family education  Taken 8/25/2023 1050 by Kaleigh Bernard, RN  Safety Promotion/Fall Prevention:   assistive device/personal items within reach   clutter free environment maintained   nonskid shoes/slippers when out of bed   patient and family education  Problem: Plan of Care - These are the overarching goals to be used throughout the patient stay.    Goal: Optimal Comfort and Wellbeing  Outcome: Progressing

## 2023-08-25 NOTE — PLAN OF CARE
"  Problem: Plan of Care - These are the overarching goals to be used throughout the patient stay.    Goal: Plan of Care Review  Description: The Plan of Care Review/Shift note should be completed every shift.  The Outcome Evaluation is a brief statement about your assessment that the patient is improving, declining, or no change.  This information will be displayed automatically on your shift note.  Outcome: Progressing   Goal Outcome Evaluation:    A&O x4. RA. Denies pain. NIH = 2 for right leg drift and slight facial droop. Tele = NSR. Has been NPO since midnight. Patient is aware of the plan today. No new concerns noted.     BP (!) 145/73 (BP Location: Left arm)   Pulse 79   Temp 97.9  F (36.6  C) (Oral)   Resp 18   Ht 1.778 m (5' 10\")   Wt 124.1 kg (273 lb 9.5 oz)   SpO2 94%   BMI 39.26 kg/m        "

## 2023-08-25 NOTE — PROGRESS NOTES
Diabetes Care Follow-Up:    Reviewed with patient regarding previous education provided. Patient denied questions. Reviewed hypoglycemia treatment. Provided patient with voucher for immediate use for two boxes of free insulin. Reviewed the insulin would either be Tresiba Flextouch pens and Novolog Flexpens if using meal insulin (or two Tresiba boxes if to be on basal and orals). Also provided patient with longer term use savings card if on disability or employed but insurance coverage >$35 per insulin fill. Savings card will max cost at $35. Additionally provided patient with 100 insulin pen needles to get him started. Can get additional pen needles at [facility] for low cost, as well as his free meter with voucher and purchase of test strips.     Patient provided with some lancets and lancing device yesterday.     Thank you,  Aminata Don, NATHAN, RDN, CSPCC, LD, [facility]  [address] [place] [address]  [email or web address]   Office: [phone]  Pager: [phone]

## 2023-08-25 NOTE — PLAN OF CARE
Problem: Plan of Care - These are the overarching goals to be used throughout the patient stay.    Goal: Absence of Hospital-Acquired Illness or Injury  Outcome: Progressing  Intervention: Identify and Manage Fall Risk  Recent Flowsheet Documentation  Taken 8/24/2023 1926 by Claudia Su RN  Safety Promotion/Fall Prevention:   room organization consistent   safety round/check completed  Taken 8/24/2023 1552 by Claudia Su RN  Safety Promotion/Fall Prevention:   room organization consistent   safety round/check completed  Intervention: Prevent and Manage VTE (Venous Thromboembolism) Risk  Recent Flowsheet Documentation  Taken 8/24/2023 1926 by Claudia Su RN  VTE Prevention/Management: patient refused intervention  Taken 8/24/2023 1552 by Claudia Su RN  VTE Prevention/Management: patient refused intervention     Problem: Stroke, Ischemic (Includes Transient Ischemic Attack)  Goal: Optimal Functional Ability  Intervention: Optimize Functional Ability  Recent Flowsheet Documentation  Taken 8/24/2023 1926 by Claudia Su RN  Activity Management: up ad macey  Taken 8/24/2023 1552 by Claudia Su RN  Activity Management: up ad macey  Goal: Optimal Eating and Swallowing without Aspiration  Outcome: Progressing  Goal: Effective Urinary Elimination  Outcome: Progressing   Goal Outcome Evaluation: Patient alert and oriented. NIH score 1 at start of shift for R arm drift. Scored 2 midshift for very slight right facial droop and slight right leg drift. Patient denies pain. Up independently in room.

## 2023-08-25 NOTE — PROGRESS NOTES
Nuclear Pharmacological Stress Test:  Sitting Protocol. Lexiscan 0.4mg IV administered over 15sec. Peak VS: HR= 101, BP= 147/79. No symptoms of CP produced. Patient felt typical vasodilator side effects from Lexiscan including nausea so Aminophylline 50mg IV administered to patient which resolved symptoms. No further dietary restrictions according to stress test. Patient having GAIL next. Results pending cardiology interpretation.   Violetta Dozier RN  [facility]

## 2023-08-25 NOTE — PROGRESS NOTES
[facility] [place]    Progress Note - Hospitalist Service       Date of Admission:  8/23/2023    Assessment & Plan   Floyd Capps is a 47 year old male with history of DM2, HLD, tobacco use wo presents with right arm and leg weakness, found to have a left corona radiata, basal ganglia and right frontal semiovale infarction. He is admitted for stroke work-up and management.      Acute left basal ganglia lacunar infarct  Patient woke up on 8/22 with right sided arm and leg weakness. CT and MRI w/ acute lacunar infarct of the left corona radiata and basal ganglia. Stroke code called in ED-- no thrombolysis due to outside 4.5 hour window. Risk factors notable for uncontrolled diabetes, tobacco use, hypertension and HLD.   - neurology consult  - DAPT w/ aspirin 81 and plavix for 90 days, then switch to asa 325  - s/p ECHO w/ bubble, no obvious evidence of R to L shunting  - GAIL completed, awaiting read   - telemetry, if no arrythmia schedule 30 day monitor at discharge  - start on lipitor 80mg, goal LDL 40-70  - permissive HTN, keep SBP <180, diastolic <100  - physical therapy, recommending home vs. home w/ OP PT   - occupational therapy, recommending home OT  - SLP     Elevated troponin, stable x4  Elevated BNP  Asymptomatic but has risk factors for coronary artery disease. Per cardiology, flat pattern not suggestive ACS; however, possible regional wall motion abnormalities on ECHO is concerning. EF on ECHO 55%, no other valvular abnormalities.   - cardiology consult  - nuc stress test 8/25: no inducible ischemia, possible previous infarction in inferior wall  - liptior 80mg  - aspirin 81 mg  - losartan 100mg daily  - carvedilol 6.25 mg BID  - telemetry  - overnight oximeter     Type 2 diabetes  Hyperglycemia  Uncontrolled and not on any medications for many years. A1C 11.1 on admission.  - increase to 24 units lantus at bedtime  - increase to 1:7 insulin to carb ratio  - medium sliding scale  "correction  - diabetic educator consult  - will need diabetes discharge planning; this relies on insurance planning    Tobacco use  Patient has ongoing tobacco use. No interest in quitting.  - nicotine patch     Insurance/finance concerns  Patient is without insurance. Works as a , recently let go due to missing time related to other medical issues. Also caring for, and paying for care for, his mother. Significant financial strains to be considered with discharge plans - will be seeing Financial SW to assist, discuss emergency MA.   - social work consult     Diet: Moderate Consistent Carb (60 g CHO per Meal) Diet    DVT Prophylaxis: Pneumatic Compression Devices  Cazares Catheter: Not present  Fluids: none  Lines: None     Cardiac Monitoring: ACTIVE order. Indication: Stroke, acute (48 hours)  Code Status: Full Code      Clinically Significant Risk Factors                        # DMII: A1C = 11.1 % (Ref range: <5.7 %) within past 6 months  , PRESENT ON ADMISSION    # Obesity: Estimated body mass index is 39.26 kg/m  as calculated from the following:    Height as of this encounter: 1.778 m (5' 10\").    Weight as of this encounter: 124.1 kg (273 lb 9.5 oz)., PRESENT ON ADMISSION            Disposition Plan      Expected Discharge Date: 08/26/2023      Destination: home with family  Discharge Comments: Further blood glucose management; insurance/finance concerns        The patient's care was discussed with the Attending Physician, Dr. Lui .    Violetta Mehta MD  Hospitalist Service  [facility]  Securely message with [employer] (more info)  Text page via [employer] Paging/Directory   ______________________________________________________________________    Interval History   Reviewed chart: no acute events overnight. OT recommending home w/ outpatient OT for RUE strength and helping care for his mother. PT recommending home or home with outpatient therapy.    Today, patient is feeling unchanged. " Still notes some weakness in his right arm and dexterity. Notes some weakness in his right lower extremity. Inquiring about increasing his nicotine patch. Still adamant he has no interest in quitting.      Physical Exam   Vital Signs: Temp: 97.7  F (36.5  C) Temp src: Oral BP: (!) 152/85 (RN notified) Pulse: 84   Resp: 18 SpO2: 97 % O2 Device: None (Room air) Oxygen Delivery: 2 LPM  Weight: 273 lbs 9.45 oz    Constitutional: awake, alert, cooperative, no apparent distress, and appears stated age  Eyes: extra-ocular muscles intact  ENT: normocepalic, without obvious abnormality, atramatic  Respiratory: CTAB, no increased WOB  Cardiovascular: RRR  GI: soft, not distended or tender, normal bowel sound  Skin: normal skin color, texture, turgor  Musculoskeletal: no lower extremity pitting edema present  Neurologic: Awake, alert, oriented to name, place and time.  Normal speech. Cranial nerves II-XII are grossly intact.  RUE 4/5, RLE 4/5 compared to 5/5 strength on left  Neuropsychiatric: General: normal and calm      Data         Imaging results reviewed over the past 24 hrs:   Recent Results (from the past 24 hour(s))   NM MPI with Lexiscan   Result Value    Pharmacologic Protocol Lexiscan    Test Type Pharmacological    Baseline HR 82    Baseline Systolic     Baseline Diastolic BP 84    Last Stress HR 94    Last Stress Systolic     Last Stress Diastolic BP 79    Target     PERCENT HR 85%    ST Deviation Elevation V4 0.1mm    Deviation Time III -0.2mm    ST Elevation Amount V2 1.1mm    ST Deviation Amount he aVR -0.8mm    Final Resting /83    Final Resting HR 85    Max Treadmill Speed 0.0    Max Treadmill Grade 0.0    Peak Systolic /79    Peak Diastolic /83    Max HR  101    Stress Phase Resting    Stress Resting Pt Position MANUAL EVENT    Current HR 95    Current /79    Stress Phase Stress    Stage Minute EXE 00:00    Exercise Stage STAGE 2    Current HR 81    Current BP  139/84    Stress Phase Stress    Stage Minute EXE 01:00    Exercise Stage STAGE 3    Current HR 81    Current /84    Stress Phase Stress    Stage Minute EXE 02:00    Exercise Stage STAGE 4    Current HR 99    Current /84    Stress Phase Stress    Stage Minute EXE 02:01    Exercise Stage STAGE 4    Current HR 99    Current /72    Stress Phase Stress    Stage Minute EXE 03:00    Exercise Stage STAGE 5    Current HR 99    Current /72    Stress Phase Stress    Stage Minute EXE 03:06    Exercise Stage STAGE 5    Current HR 99    Current /79    Stress Phase Stress    Stage Minute EXE 03:11    Exercise Stage STAGE 5    Current HR 99    Current /79    Stress Phase Stress    Stage Minute EXE 04:00    Exercise Stage STAGE 6    Current HR 94    Current /79    Stress Phase Stress    Stage Minute EXE 04:01    Exercise Stage STAGE 6    Current HR 94    Current /79    Stress Phase Recovery    Stage Minute REC 00:22    Exercise Stage Recovery    Current HR 95    Current /79    Stress Phase Recovery    Stage Minute REC 00:53    Exercise Stage Recovery    Current HR 93    Current /79    Stress Phase Recovery    Stage Minute REC 00:58    Exercise Stage Recovery    Current HR 92    Current /79    Stress Phase Recovery    Stage Minute REC 01:58    Exercise Stage Recovery    Current HR 88    Current /79    Stress Phase Recovery    Stage Minute REC 02:18    Exercise Stage Recovery    Current HR 89    Current /83    Stress Phase Recovery    Stage Minute REC 02:58    Exercise Stage Recovery    Current HR 86    Current /83    Stress Phase Recovery    Stage Minute REC 03:58    Exercise Stage Recovery    Current HR 86    Current /83    Stress Phase Recovery    Stage Minute REC 04:05    Exercise Stage Recovery    Current HR 85    Current /83    Max Predicted HR  59    Rate Pressure Product 14,847.0    Left Ventricular EF 55    Narrative      There is  no prior study for comparison.    Pharmacological regadenoson stress ECG is negative for inducible   myocardial ischemia.    Pharmacological regadenoson nuclear study is abnormal.  There is   previous almost transmural myocardial infarction in inferior wall and   apex.  There is small size of perla-infarct ischemia in distal anteroseptal   segment.    Normal left ventricular size with no significant wall motion   abnormality.  The calculated left ventricular ejection fraction is 55%.    The patient is at a low risk of future cardiac ischemic events.      There is no prior study for comparison.

## 2023-08-25 NOTE — PRE-PROCEDURE
GENERAL PRE-PROCEDURE:   Procedure:  Transesophageal echocardiogram  Date/Time:  8/25/2023 8:19 AM    Written consent obtained?: Yes    Risks and benefits: Risks, benefits and alternatives were discussed    Consent given by:  Patient  Patient states understanding of procedure being performed: Yes    Patient's understanding of procedure matches consent: Yes    Procedure consent matches procedure scheduled: Yes    Expected level of sedation:  Moderate  Appropriately NPO:  Yes  Mallampati  :  Grade 1- soft palate, uvula, tonsillar pillars, and posterior pharyngeal wall visible  Lungs:  Lungs clear with good breath sounds bilaterally  Heart:  Normal heart sounds and rate  History & Physical reviewed:  History and physical reviewed and no updates needed  Statement of review:  I have reviewed the lab findings, diagnostic data, medications, and the plan for sedation

## 2023-08-25 NOTE — SEDATION DOCUMENTATION
Transesophageal Echocardiogram:  Patient toleratedprocedure well. VSS. See MAR for sedation dosages. NPO until 0940 and no hot foods/liquids til 1440. Talked to patient's nurse Kaleigh CHEEK to update. Results pending cardiology interpretation. Patient going for final set of NM images next.  Violetta Dozier RN  [facility]

## 2023-08-25 NOTE — PROGRESS NOTES
"Care Management Follow Up    Length of Stay (days): 2    Expected Discharge Date: 08/25/2023     Concerns to be Addressed:  discharge planning      Patient plan of care discussed at interdisciplinary rounds: Yes    Anticipated Discharge Disposition:       Anticipated Discharge Services:    Education Provided on the Discharge Plan:  Per Care Team   Patient/Family in Agreement with the Plan:  Yes    Referrals Placed by CM/SW:    Private pay costs discussed: Not applicable    Additional Information:    Cm updates:  Pt having GAIL today.  Pt will need PCP appointment set up day of discharge pt has not insurance, writer will discuss with pt preferences for clinics and if pt can pay.       Social Hx:  \"Pt lives in an apartment with his mother. \"There is no elevator access. But we live on the 1st level so we don't have any steps we have to use\".      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 He also states, \"She also has nursing help for things like bathing. My sister will be checking in on her and will make her meals\". He is independent with ADLs and all IADLs.     Pt has no insurance and no PMD for follow up. Darleen Lyons was alerted. He states, \"I had a job interview today, but missed it because I had to come to the ER\".           Cm will continue to follow plan of care, review recommendations, and assist with any discharge needs anticipated.          Darleen Bansal RN      "

## 2023-08-25 NOTE — PROGRESS NOTES
NEUROLOGY INPATIENT PROGRESS NOTE       [facility] NEUROLOGY [place]  [address] [place] [address]  Tel: [phone]  Fax: [phone]  [email or web address]     ASSESSMENT & PLAN   Date: 08/25/2023  Hospital Day#: 2  Visit diagnosis:      Left corona radiata, basal ganglia & right frontal semiovale infarction (Risk factors: HLD, DM 2, cigarette smoking)  47-year-old male with history of DM 2, H LD, cigarette smoking who presented to the emergency room with the right arm and leg weakness  CT brain with hypodensity in the subinsular cortex.  CTA with diffuse intracranial atherosclerosis with 40% left ICA stenosis and 55 to 60% right ICA stenosis.  CT perfusion did not show any tissue at risk  MRI brain confirmed left corona radiata, basal ganglia and right frontal semiovale infarction.  Incidentally there was an enhancing lesion in the left parietal scalp likely due to history of scalp abscess that was drained July 2023 echocardiogram with small discrete apical wall motion abnormality but no thrombus detected.  Ejection fraction was normal and no valvular heart abnormality.  However, in view of bilateral CVA,  GAIL is scheduled today.  He will first get NM stress test followed by GAIL  DAPT with aspirin and Plavix for 90 days, afterwards switch to enteric-coated aspirin 325 mg daily  Untreated hyperlipidemia with  on admission.  Started on Lipitor 80 mg daily with goal LDL 40-70  Physical, occupational and speech therapy consults appreciated they feel patient can return home with assistance  Vascular risk factors modification: Healthy diet (fruits, vegetables, low fat dairy & reduced saturated fat), weight loss, exercise at least 30 minutes 5 days/week, BMI goal <25.  Keep systolic blood pressure goal <130.  LDL goal <70.  Hemoglobin A1c goal <7.  I have strongly advised patient to stop smoking but he has no interest in smoking cessation.    Neurology Discharge Planning :    TBD    Michelet Rodrigez MD  [facility]  [facility], [facility]     PROBLEM LIST      Patient Active Problem List   Diagnosis Code    Dizziness R42    Type 2 Diabetes Mellitus - Uncomplicated, Controlled E11.9    Hyperlipidemia E78.5    Hypothyroidism E03.9    Obesity E66.9    Tobacco use disorder F17.200    Elevated troponin R77.8    Ischemic stroke (H) I63.9       Past Medical History:   Patient  has no past medical history on file.     SUBJECTIVE     No change in neuro status.  Continues to have right-sided weakness denies any chest pain     OBJECTIVE     Vital signs in last 24 hours  Temp:  [97.9  F (36.6  C)-98.5  F (36.9  C)] 97.9  F (36.6  C)  Pulse:  [79-95] 79  Resp:  [16-18] 18  BP: (126-170)/(68-91) 145/73  SpO2:  [93 %-96 %] 94 %    Review of Systems   Pertinent items are noted in HPI.    General Physical Exam: Patient is alert and oriented x 3. Vital signs were reviewed and are documented in EMR. Neck was supple, no Carotid bruit, thyromegaly, JVD or lymphadenopathy noted.  Neurological Exam:  Speech is normal with no dysarthria or aphasia.  Comprehension intact cranial nerves II through XII are intact motor strength right upper extremity 4+/5.  Right lower extremity 5 -/5 on the left strength is 5/5.  Right dysmetria on finger-nose testing.  Sensation intact.  Gait testing deferred     DIAGNOSTIC STUDIES     Pertinent Radiology   Following imaging studies were reviewed:    CT  HEAD CT:  1.  Suspect subacute lacunar infarct involving the left subinsular cortex/left basal ganglia.     2.  Soft tissue swelling over the left paracentral midline scalp vertex may represent a posttraumatic hematoma/soft tissue swelling if there is recent trauma in this region. Other etiologies include a sebaceous cyst. Would recommend direct   visualization/palpation of this finding to determine if it has been long-standing in origin.     HEAD CTA:   1.  Atherosclerotic  calcification involving the carotid siphons with weblike plaque involving the paraclinoid segment of the left ICA which results in vessel narrowing less than 40%. No evidence of large vessel occlusion within the intracranial   circulation.     NECK CTA:  1.  Atherosclerotic calcification at the origin of the right ICA results in moderate stenosis (55-60%).     CT PERFUSION:  1.  Normal cerebral perfusion.     MRI  1.  Acute lacunar infarct of the left corona radiata and basal ganglia corresponding with abnormality from prior head CT.  2.  Additional punctate focus of acute ischemia at the right frontal centrum semiovale.  3.  Nonspecific enhancing lesion at the left parietal scalp. Malignancy/abscess not excluded. Recommend correlation with direct visualization.  4.  Soft tissue prominence of the nasopharyngeal lymphoid tissues, suspect mucous retention cysts. If additional evaluation is indicated recommend visualization could be considered.    Echocardiogram  Echo result w/o MOPS: Interpretation Summary 1. Technically difficult study.2. Normal left ventricular size and systolic performance with a visuallyestimated ejection fraction of 55%.3. On selected views, there appears to be a fairly small discrete apical wallmotion abnormality (no apical mural thrombus is detected).4. No significant valvular heart disease is identified on this study.5. Probable normal right ventricular size and systolic performance thoughright-sided structures are not clearly visualized on all views on this study.6. Echo contrast examination was performed using agitated NS as contrastagent. The right heart opacity was suboptimal. There was no obvious evidenceof right to left shunting during spontaneous respiration or following releaseof Valsalva though the sensitivity &specificity are both reduced due tosuboptimal acoustic imaging..    Pertinent Labs   Lab Results: Personally Reviewed  Recent Results (from the past 24 hour(s))   CBC with  platelets    Collection Time: 08/24/23  6:45 AM   Result Value Ref Range    WBC Count 11.3 (H) 4.0 - 11.0 10e3/uL    RBC Count 4.14 (L) 4.40 - 5.90 10e6/uL    Hemoglobin 13.1 (L) 13.3 - 17.7 g/dL    Hematocrit 38.0 (L) 40.0 - 53.0 %    MCV 92 78 - 100 fL    MCH 31.6 26.5 - 33.0 pg    MCHC 34.5 31.5 - 36.5 g/dL    RDW 12.2 10.0 - 15.0 %    Platelet Count 271 150 - 450 10e3/uL   Basic metabolic panel    Collection Time: 08/24/23  6:45 AM   Result Value Ref Range    Sodium 133 (L) 136 - 145 mmol/L    Potassium 3.9 3.4 - 5.3 mmol/L    Chloride 100 98 - 107 mmol/L    Carbon Dioxide (CO2) 24 22 - 29 mmol/L    Anion Gap 9 7 - 15 mmol/L    Urea Nitrogen 10.1 6.0 - 20.0 mg/dL    Creatinine 0.64 (L) 0.67 - 1.17 mg/dL    Calcium 8.7 8.6 - 10.0 mg/dL    Glucose 272 (H) 70 - 99 mg/dL    GFR Estimate >90 >60 mL/min/1.73m2   Glucose by meter    Collection Time: 08/24/23  8:17 AM   Result Value Ref Range    GLUCOSE BY METER POCT 286 (H) 70 - 99 mg/dL   Glucose by meter    Collection Time: 08/24/23  9:16 AM   Result Value Ref Range    GLUCOSE BY METER POCT 267 (H) 70 - 99 mg/dL   Glucose by meter    Collection Time: 08/24/23 12:51 PM   Result Value Ref Range    GLUCOSE BY METER POCT 297 (H) 70 - 99 mg/dL   Glucose by meter    Collection Time: 08/24/23  4:49 PM   Result Value Ref Range    GLUCOSE BY METER POCT 272 (H) 70 - 99 mg/dL   Glucose by meter    Collection Time: 08/24/23  9:01 PM   Result Value Ref Range    GLUCOSE BY METER POCT 334 (H) 70 - 99 mg/dL   Glucose by meter    Collection Time: 08/25/23  1:56 AM   Result Value Ref Range    GLUCOSE BY METER POCT 288 (H) 70 - 99 mg/dL         HOSPITAL MEDICATIONS      aspirin  81 mg Oral Daily    atorvastatin  80 mg Oral QPM    carvedilol  6.25 mg Oral BID w/meals    clopidogrel  75 mg Oral Daily    insulin aspart  1-7 Units Subcutaneous TID AC    insulin aspart  1-5 Units Subcutaneous At Bedtime    insulin aspart   Subcutaneous Daily with breakfast    insulin aspart   Subcutaneous  Daily with lunch    insulin aspart   Subcutaneous Daily with supper    insulin glargine  18 Units Subcutaneous At Bedtime    losartan  100 mg Oral Daily    nicotine  1 patch Transdermal Daily    nicotine   Transdermal Q8H    sodium chloride (PF)  3 mL Intracatheter Q8H        Total time spent for face to face visit, reviewing labs/imaging studies, counseling and coordination of care was: 45 Minutes More than 50% of this time was spent on counseling and coordination of care.      This note was dictated using voice recognition software.  Any grammatical or context distortions are unintentional and inherent to the software.

## 2023-08-26 ENCOUNTER — APPOINTMENT (OUTPATIENT)
Dept: OCCUPATIONAL THERAPY | Facility: HOSPITAL | Age: 48
DRG: 065 | End: 2023-08-26

## 2023-08-26 ENCOUNTER — APPOINTMENT (OUTPATIENT)
Dept: PHYSICAL THERAPY | Facility: HOSPITAL | Age: 48
DRG: 065 | End: 2023-08-26

## 2023-08-26 VITALS
RESPIRATION RATE: 16 BRPM | HEART RATE: 89 BPM | HEIGHT: 70 IN | BODY MASS INDEX: 38.57 KG/M2 | OXYGEN SATURATION: 98 % | WEIGHT: 269.4 LBS | TEMPERATURE: 97.9 F | DIASTOLIC BLOOD PRESSURE: 79 MMHG | SYSTOLIC BLOOD PRESSURE: 157 MMHG

## 2023-08-26 LAB
GLUCOSE BLDC GLUCOMTR-MCNC: 219 MG/DL (ref 70–99)
GLUCOSE BLDC GLUCOMTR-MCNC: 222 MG/DL (ref 70–99)
GLUCOSE BLDC GLUCOMTR-MCNC: 306 MG/DL (ref 70–99)

## 2023-08-26 PROCEDURE — 99238 HOSP IP/OBS DSCHRG MGMT 30/<: CPT | Mod: GC

## 2023-08-26 PROCEDURE — 250N000013 HC RX MED GY IP 250 OP 250 PS 637: Performed by: PSYCHIATRY & NEUROLOGY

## 2023-08-26 PROCEDURE — 250N000013 HC RX MED GY IP 250 OP 250 PS 637

## 2023-08-26 PROCEDURE — 97116 GAIT TRAINING THERAPY: CPT | Mod: GP

## 2023-08-26 PROCEDURE — 99233 SBSQ HOSP IP/OBS HIGH 50: CPT | Performed by: PSYCHIATRY & NEUROLOGY

## 2023-08-26 PROCEDURE — 97535 SELF CARE MNGMENT TRAINING: CPT | Mod: GO

## 2023-08-26 PROCEDURE — 250N000013 HC RX MED GY IP 250 OP 250 PS 637: Performed by: INTERNAL MEDICINE

## 2023-08-26 RX ORDER — ASPIRIN 81 MG/1
81 TABLET ORAL DAILY
Qty: 30 TABLET | Refills: 0 | Status: SHIPPED | OUTPATIENT
Start: 2023-08-27 | End: 2023-09-26

## 2023-08-26 RX ORDER — LOSARTAN POTASSIUM 100 MG/1
100 TABLET ORAL DAILY
Qty: 30 TABLET | Refills: 0 | Status: SHIPPED | OUTPATIENT
Start: 2023-08-27 | End: 2023-10-12

## 2023-08-26 RX ORDER — CARVEDILOL 12.5 MG/1
12.5 TABLET ORAL 2 TIMES DAILY WITH MEALS
Qty: 60 TABLET | Refills: 0 | Status: SHIPPED | OUTPATIENT
Start: 2023-08-26 | End: 2023-10-12

## 2023-08-26 RX ORDER — HYDROCHLOROTHIAZIDE 12.5 MG/1
12.5 TABLET ORAL DAILY
Qty: 30 TABLET | Refills: 0 | Status: SHIPPED | OUTPATIENT
Start: 2023-08-27 | End: 2023-10-12

## 2023-08-26 RX ORDER — ATORVASTATIN CALCIUM 80 MG/1
80 TABLET, FILM COATED ORAL EVERY EVENING
Qty: 30 TABLET | Refills: 0 | Status: SHIPPED | OUTPATIENT
Start: 2023-08-26 | End: 2023-10-12

## 2023-08-26 RX ORDER — CLOPIDOGREL BISULFATE 75 MG/1
75 TABLET ORAL DAILY
Qty: 30 TABLET | Refills: 0 | Status: SHIPPED | OUTPATIENT
Start: 2023-08-27 | End: 2023-10-12

## 2023-08-26 RX ADMIN — INSULIN ASPART 2 UNITS: 100 INJECTION, SOLUTION INTRAVENOUS; SUBCUTANEOUS at 08:10

## 2023-08-26 RX ADMIN — INSULIN ASPART 4 UNITS: 100 INJECTION, SOLUTION INTRAVENOUS; SUBCUTANEOUS at 11:53

## 2023-08-26 RX ADMIN — CLOPIDOGREL BISULFATE 75 MG: 75 TABLET ORAL at 08:01

## 2023-08-26 RX ADMIN — NICOTINE 1 PATCH: 14 PATCH, EXTENDED RELEASE TRANSDERMAL at 08:01

## 2023-08-26 RX ADMIN — Medication 81 MG: at 08:01

## 2023-08-26 RX ADMIN — HYDROCHLOROTHIAZIDE 12.5 MG: 12.5 TABLET ORAL at 08:01

## 2023-08-26 RX ADMIN — LOSARTAN POTASSIUM 100 MG: 50 TABLET, FILM COATED ORAL at 08:01

## 2023-08-26 RX ADMIN — CARVEDILOL 12.5 MG: 12.5 TABLET, FILM COATED ORAL at 08:01

## 2023-08-26 ASSESSMENT — ACTIVITIES OF DAILY LIVING (ADL)
ADLS_ACUITY_SCORE: 22

## 2023-08-26 NOTE — PLAN OF CARE
Physical Therapy Discharge Summary    Reason for therapy discharge:    Discharged to home with home therapy.    Progress towards therapy goal(s). See goals on Care Plan in [facility] electronic health record for goal details.  Goals partially met.  Barriers to achieving goals:   discharge from facility.    Therapy recommendation(s):    Continued therapy is recommended.  Rationale/Recommendations:  to improve strength ad safety with amb.

## 2023-08-26 NOTE — PLAN OF CARE
Problem: Plan of Care - These are the overarching goals to be used throughout the patient stay.    Goal: Absence of Hospital-Acquired Illness or Injury  Intervention: Prevent Skin Injury  Recent Flowsheet Documentation  Taken 8/26/2023 0803 by Pinky Mullen RN  Body Position: position changed independently     Problem: Plan of Care - These are the overarching goals to be used throughout the patient stay.    Goal: Absence of Hospital-Acquired Illness or Injury  Intervention: Identify and Manage Fall Risk  Recent Flowsheet Documentation  Taken 8/26/2023 0803 by Pinky Mullen RN  Safety Promotion/Fall Prevention:   assistive device/personal items within reach   clutter free environment maintained   nonskid shoes/slippers when out of bed   patient and family education   room organization consistent   safety round/check completed   Goal Outcome Evaluation:       Patient AAO. Denies pain. Not scoring on NIHSS, but patient reports ongoing noticeable weakness on right side. No observed or reported changes in neuro status this shift. Ambulating independently in room. Blood sugars 219 and 306 this shift. Wife is present at bedside supporting patient now. Call light within reach. Calls appropriately.

## 2023-08-26 NOTE — PLAN OF CARE
"Goal Outcome Evaluation:      Problem: Stroke, Ischemic (Includes Transient Ischemic Attack)  Goal: Optimal Coping  Outcome: Progressing  Goal: Effective Bowel Elimination  Outcome: Progressing  Goal: Optimal Cerebral Tissue Perfusion  Outcome: Progressing  Goal: Optimal Cognitive Function  Outcome: Progressing  Goal: Improved Communication Skills  Outcome: Progressing  Goal: Optimal Functional Ability  Outcome: Progressing  Intervention: Optimize Functional Ability  Recent Flowsheet Documentation  Taken 8/25/2023 1935 by Holli Melton, RN  Activity Management: ambulated outside room  Goal: Effective Oxygenation and Ventilation  Outcome: Progressing  Goal: Improved Sensorimotor Function  Outcome: Progressing  Goal: Optimal Eating and Swallowing without Aspiration  Outcome: Progressing  Goal: Effective Urinary Elimination  Outcome: Progressing     Pt alert and oriented x4. NIH score 1. Pt tolerated diet. Independent and ambulated in the hallway. He is anxious to go home. Writer answered questions and explained discharge expected 8/26. He refused to self administer insulin at 2130, he stated, \"I am too tired.\"       "

## 2023-08-26 NOTE — PROGRESS NOTES
NEUROLOGY PROGRESS NOTE     ASSESSMENT & PLAN     Hospital Day#: 3  Visit diagnosis:    Left corona radiata, basal ganglia & right frontal semiovale infarction (Risk factors: HLD, DM 2, cigarette smoking)    47-year-old male with history of DM 2, HLD, cigarette smoking who presented to the emergency room with the right arm and leg weakness    CT brain with hypodensity in the subinsular cortex.  CTA with diffuse intracranial atherosclerosis with 40% left ICA stenosis and 55 to 60% right ICA stenosis.  CT perfusion did not show any tissue at risk  MRI brain confirmed left corona radiata, basal ganglia and right frontal semiovale infarction.  Incidentally there was an enhancing lesion in the left parietal scalp likely due to history of scalp abscess that was drained July 2023   Echocardiogram with small discrete apical wall motion abnormality but no thrombus detected.  Ejection fraction was normal and no valvular heart abnormality.  However, in view of bilateral CVA,  NM stress test negative for inducible ischemia however prior infarct noted, GAIL is without shunt  DAPT with aspirin and Plavix for 90 days, afterwards switch to enteric-coated aspirin 325mg daily  Untreated hyperlipidemia with  on admission.  Started on Lipitor 80mg daily with goal LDL 40-70  Physical, occupational and speech therapy consults appreciated; they feel patient can return home with assistance  Vascular risk factors modification: Healthy diet (fruits, vegetables, low fat dairy & reduced saturated fat), weight loss, exercise at least 30 minutes 5 days/week, BMI goal <25.  Keep systolic blood pressure goal <130.  LDL goal <70.  Hemoglobin A1c goal <7 (currently uncontrolled DM with A1c of 11.1. Needs diabetic educator as outpatient - seen inpatient).  We have strongly advised patient to stop smoking but he has no interest in smoking cessation  Sleep apnea assessment as outpatient  Neurology will sign off.  Patient should follow-up in  neurology clinic in 2-3 months  Spoke with primary team about the plan.  I agree that Floyd needs a primary physician to follow as an outpatient     Neurology Discharge Planning: Okay from neurology standpoint.    Patient Active Problem List    Diagnosis Date Noted    Elevated troponin 08/23/2023     Priority: Medium    Ischemic stroke (H) 08/23/2023     Priority: Medium    Dizziness      Priority: Medium     Created by Conversion        Type 2 Diabetes Mellitus - Uncomplicated, Controlled      Priority: Medium     Created by Conversion        Hypothyroidism 10/12/2007     Priority: Medium     Formatting of this note might be different from the original.  Hypothyroidism Primary      Hyperlipidemia 10/08/2007     Priority: Medium    Obesity 08/27/2007     Priority: Medium    Tobacco use disorder 08/27/2007     Priority: Medium     Formatting of this note might be different from the original.  Tobacco Abuse       Medical History  No past medical history on file.     SUBJECTIVE     47-year-old man with history of type 2 diabetes, tobacco use and hyperlipidemia who presented on 8/23 with 1 day history of right arm and leg weakness.  Initially evaluated by the telestroke team.  Imaging revealed bilateral acute strokes.    No acute events overnight per nursing notes.  NIH score of 1.  He was refusing to self-administer his insulin last night.    Floyd says he is getting a little bit more movement back in the right hand.  He is able to walk okay but does not feel like the lower extremity is back to normal yet either.  He is somewhat discouraged.  Wants to know if there are more exercises that he can be doing for strengthening.     OBJECTIVE     Vital signs in last 24 hours  Temp:  [97.7  F (36.5  C)-98.6  F (37  C)] 97.7  F (36.5  C)  Pulse:  [75-92] 77  Resp:  [16-18] 18  BP: (122-166)/(71-86) 166/86  SpO2:  [92 %-97 %] 96 %    Weight:   [unfilled]    Review of Systems   Pertinent items are noted in HPI.    General  Physical Exam: Patient is alert and oriented x 3. Vital signs were reviewed and are documented in EMR.  Blunted affect.  Neurological Exam:  Speech is normal with no dysarthria or aphasia.  Comprehension intact cranial nerves II through XII are intact motor strength right upper extremity 4+/5.  Right lower extremity 5 -/5 on the left strength is 5/5.  Right dysmetria on finger-nose testing, finger tapping is slower on the right.  Toe tapping is slower on the right compared to the left.  Sensation intact.  Gait testing deferred for safety     DIAGNOSTIC STUDIES     Pertinent Radiology   Radiology Results: Personally reviewed image/s    CT/CTA/CTP:  IMPRESSION:   HEAD CT:  1.  Suspect subacute lacunar infarct involving the left subinsular cortex/left basal ganglia.     2.  Soft tissue swelling over the left paracentral midline scalp vertex may represent a posttraumatic hematoma/soft tissue swelling if there is recent trauma in this region. Other etiologies include a sebaceous cyst. Would recommend direct   visualization/palpation of this finding to determine if it has been long-standing in origin.     HEAD CTA:   1.  Atherosclerotic calcification involving the carotid siphons with weblike plaque involving the paraclinoid segment of the left ICA which results in vessel narrowing less than 40%. No evidence of large vessel occlusion within the intracranial   circulation.     NECK CTA:  1.  Atherosclerotic calcification at the origin of the right ICA results in moderate stenosis (55-60%).     2.  Mild stenosis less than 25% at the origin of the left ICA within the neck.    MRI  IMPRESSION:  1.  Acute lacunar infarct of the left corona radiata and basal ganglia corresponding with abnormality from prior head CT.  2.  Additional punctate focus of acute ischemia at the right frontal centrum semiovale.  3.  Nonspecific enhancing lesion at the left parietal scalp. Malignancy/abscess not excluded. Recommend correlation with  direct visualization.  4.  Soft tissue prominence of the nasopharyngeal lymphoid tissues, suspect mucous retention cysts. If additional evaluation is indicated recommend visualization could be considered.    Echocardiogram:  Interpretation Summary     TRANSESOPHAGEAL ECHOCARDIOGRAM     1. Normal left ventricular size and systolic performance with a visually  estimated ejection fraction of 55-60%.  2. There appears to be a fairly small discrete apical wall motion abnormality  (no apical mural thrombus is detected).  3. No significant valvular heart disease is identified on this study.  4. Normal right ventricular size and systolic performance.  5. There is mild left atrial enlargement.  6. No intracardiac mass or thrombus is detected.  7. Echo contrast examination was performed using agitated NS as contrast  agent. The right heart opacity was good and the left heart was well  visualized. There was no evidence of right to left shunting during spontaneous  respiration or following release of Valsalva.  8. No cardiac source of systemic embolism is identified on the study.    Pertinent Labs   Lab Results: personally reviewed.   Recent Results (from the past 24 hour(s))   NM MPI with Lexiscan    Collection Time: 08/25/23  9:37 AM   Result Value Ref Range    Pharmacologic Protocol Lexiscan     Test Type Pharmacological     Baseline HR 82     Baseline Systolic      Baseline Diastolic BP 84     Last Stress HR 94     Last Stress Systolic      Last Stress Diastolic BP 79     Target      PERCENT HR 85%     ST Deviation Elevation V4 0.1mm     Deviation Time III -0.2mm     ST Elevation Amount V2 1.1mm     ST Deviation Amount he aVR -0.8mm     Final Resting /83     Final Resting HR 85     Max Treadmill Speed 0.0     Max Treadmill Grade 0.0     Peak Systolic /79     Peak Diastolic /83     Max HR  101     Stress Phase Resting     Stress Resting Pt Position MANUAL EVENT     Current HR 95      Current /79     Stress Phase Stress     Stage Minute EXE 00:00     Exercise Stage STAGE 2     Current HR 81     Current /84     Stress Phase Stress     Stage Minute EXE 01:00     Exercise Stage STAGE 3     Current HR 81     Current /84     Stress Phase Stress     Stage Minute EXE 02:00     Exercise Stage STAGE 4     Current HR 99     Current /84     Stress Phase Stress     Stage Minute EXE 02:01     Exercise Stage STAGE 4     Current HR 99     Current /72     Stress Phase Stress     Stage Minute EXE 03:00     Exercise Stage STAGE 5     Current HR 99     Current /72     Stress Phase Stress     Stage Minute EXE 03:06     Exercise Stage STAGE 5     Current HR 99     Current /79     Stress Phase Stress     Stage Minute EXE 03:11     Exercise Stage STAGE 5     Current HR 99     Current /79     Stress Phase Stress     Stage Minute EXE 04:00     Exercise Stage STAGE 6     Current HR 94     Current /79     Stress Phase Stress     Stage Minute EXE 04:01     Exercise Stage STAGE 6     Current HR 94     Current /79     Stress Phase Recovery     Stage Minute REC 00:22     Exercise Stage Recovery     Current HR 95     Current /79     Stress Phase Recovery     Stage Minute REC 00:53     Exercise Stage Recovery     Current HR 93     Current /79     Stress Phase Recovery     Stage Minute REC 00:58     Exercise Stage Recovery     Current HR 92     Current /79     Stress Phase Recovery     Stage Minute REC 01:58     Exercise Stage Recovery     Current HR 88     Current /79     Stress Phase Recovery     Stage Minute REC 02:18     Exercise Stage Recovery     Current HR 89     Current /83     Stress Phase Recovery     Stage Minute REC 02:58     Exercise Stage Recovery     Current HR 86     Current /83     Stress Phase Recovery     Stage Minute REC 03:58     Exercise Stage Recovery     Current HR 86     Current /83     Stress Phase  Recovery     Stage Minute REC 04:05     Exercise Stage Recovery     Current HR 85     Current /83     Max Predicted HR  59 %    Rate Pressure Product 14,847.0     Left Ventricular EF 55 %   Glucose by meter    Collection Time: 08/25/23 10:39 AM   Result Value Ref Range    GLUCOSE BY METER POCT 291 (H) 70 - 99 mg/dL   Glucose by meter    Collection Time: 08/25/23  2:29 PM   Result Value Ref Range    GLUCOSE BY METER POCT 225 (H) 70 - 99 mg/dL   Glucose by meter    Collection Time: 08/25/23  4:59 PM   Result Value Ref Range    GLUCOSE BY METER POCT 307 (H) 70 - 99 mg/dL   Glucose by meter    Collection Time: 08/25/23  9:12 PM   Result Value Ref Range    GLUCOSE BY METER POCT 224 (H) 70 - 99 mg/dL   Glucose by meter    Collection Time: 08/26/23  2:02 AM   Result Value Ref Range    GLUCOSE BY METER POCT 222 (H) 70 - 99 mg/dL   Glucose by meter    Collection Time: 08/26/23  7:31 AM   Result Value Ref Range    GLUCOSE BY METER POCT 219 (H) 70 - 99 mg/dL         HOSPITAL MEDICATIONS      aspirin  81 mg Oral Daily    atorvastatin  80 mg Oral QPM    carvedilol  12.5 mg Oral BID w/meals    clopidogrel  75 mg Oral Daily    hydrochlorothiazide  12.5 mg Oral Daily    insulin aspart   Subcutaneous Daily with breakfast    insulin aspart   Subcutaneous Daily with lunch    insulin aspart   Subcutaneous Daily with supper    insulin aspart  1-7 Units Subcutaneous TID AC    insulin aspart  1-5 Units Subcutaneous At Bedtime    insulin glargine  24 Units Subcutaneous At Bedtime    losartan  100 mg Oral Daily    nicotine  1 patch Transdermal Daily    nicotine   Transdermal Q8H    sodium chloride (PF)  3 mL Intracatheter Q8H        Total time spent for face to face visit, reviewing labs/imaging studies, counseling and coordination of care was: 1 Hour. More than 50% of this time was spent on counseling and coordination of care.    Dragon software used in the dictation on this note.    Kathryn Cali MD  [facility]  [facility] - [address], [address]  [place] [address]

## 2023-08-26 NOTE — PLAN OF CARE
Problem: Plan of Care - These are the overarching goals to be used throughout the patient stay.    Goal: Optimal Comfort and Wellbeing  Outcome: Progressing   Goal Outcome Evaluation:  Independent pt. Denies pain. Scoring (0) on NIH but has right sided weakness with hand  and dorsiflexion/plantarflexion. 0200 . Nicotine patch in place on right chest.

## 2023-08-26 NOTE — PROGRESS NOTES
Care Management Discharge Note    Discharge Date: 08/26/2023       Discharge Disposition: Home, Outpatient Rehab (PT, OT, SLP, Cardiac or Pulmonary)    Discharge Services: None    Discharge DME: None    Discharge Transportation: family or friend will provide    Private pay costs discussed: Not applicable    Does the patient's insurance plan have a 3 day qualifying hospital stay waiver?  No    PAS Confirmation Code:  NA  Patient/family educated on Medicare website which has current facility and service quality ratings:  NA    Education Provided on the Discharge Plan:  Yes  Persons Notified of Discharge Plans: patient - per team  Patient/Family in Agreement with the Plan: yes    Handoff Referral Completed: Yes    Additional Information:    Met patient at bedside with his sister, Rubina. Patient discharge to home with recommendations for outpatient PT/OT therapy. Patient has no PCP and no current health insurance. List of clinics given to patient for follow up s/p hospital discharge.    Madison Carter RN

## 2023-08-26 NOTE — PLAN OF CARE
Goal Outcome Evaluation:                 Problem: Plan of Care - These are the overarching goals to be used throughout the patient stay.    Goal: Optimal Comfort and Wellbeing  Outcome: Met     Problem: Plan of Care - These are the overarching goals to be used throughout the patient stay.    Goal: Absence of Hospital-Acquired Illness or Injury  Outcome: Met  Intervention: Identify and Manage Fall Risk  Recent Flowsheet Documentation  Taken 8/26/2023 1548 by Zee Fatima RN  Safety Promotion/Fall Prevention: safety round/check completed  Intervention: Prevent and Manage VTE (Venous Thromboembolism) Risk  Recent Flowsheet Documentation  Taken 8/26/2023 1548 by Zee Fatima RN  VTE Prevention/Management: SCDs (sequential compression devices) off        Pt is alert and oriented x4, on room air. Pt denies any pain or discomfort. Scored 0 on NIHS, reports some weakness in right arm, especially with fine motor skills like writing. Reviewed AVS with pt and wife, answered questions regarding medications. Pt will discharge to home, wife will provide a ride.     Zee Fatima RN.

## 2023-08-26 NOTE — PLAN OF CARE
Occupational Therapy Discharge Summary    Reason for therapy discharge:    All goals and outcomes met, no further needs identified.    Progress towards therapy goal(s). See goals on Care Plan in [facility] electronic health record for goal details.  Goals met    Therapy recommendation(s):    Continued therapy is recommended.  Rationale/Recommendations:  Recommend Home OT for home safety.

## 2023-08-26 NOTE — DISCHARGE SUMMARY
"[facility]  Discharge Summary - Medicine & Pediatrics       Date of Admission:  8/23/2023  Date of Discharge:  8/26/2023  Discharging Provider: JOI Hines  Discharge Service: Hospitalist Service    Discharge Diagnoses   Ischemic stroke (H)  Elevated troponin  History of ischemic heart disease  Hypertension, unspecified type  Type 2 diabetes mellitus with hyperglycemia, unspecified whether long term insulin use (H)        Clinically Significant Risk Factors     # DMII: A1C = 11.1 % (Ref range: <5.7 %) within past 6 months    # Obesity: Estimated body mass index is 38.65 kg/m  as calculated from the following:    Height as of this encounter: 1.778 m (5' 10\").    Weight as of this encounter: 122.2 kg (269 lb 6.4 oz).       Follow-ups Needed After Discharge   Follow up with primary care provider within one week of discharge  Follow up with neurology outpatient in 2-3 months.        Discharge Disposition   Discharged to home with outpatient OT.    Condition at discharge: Stable    Hospital Course   Floyd Capps was admitted on 8/23/2023  with right arm and leg weakness, found to have a left corona radiata, basal ganglia and right frontal semiovale infarction. He is admitted for stroke work-up and management.  The following problems were addressed during his hospitalization:    Ischemic stroke  Acute left basal ganglia lacunar infarct  Patient woke up on 8/22 with right sided arm and leg weakness. CT and MRI w/ acute lacunar infarct of the left corona radiata and basal ganglia. Stroke code called in ED-- no thrombolysis due to outside 4.5 hour window. Risk factors notable for uncontrolled diabetes, tobacco use, hypertension and HLD. Patient was seen by neurology and had workup including brain that confirmed stroke findings.  Also echocardiogram with small discrete apical wall motion abnormality but no thrombus was detected.  Otherwise no abnormal ejection fraction or valvular heart " abnormalities.  Patient had NM stress testing which was negative for inducible ischemia however prior infarct was noted and subsequently GAIL showed no left to right shunt.  Patient was managed with DAPT aspirin and Plavix for 90 days and plan to switch to  mg.  Discussion with patient to quit smoking given the high risk associated since stroke and prior cardiac ischemic findings but patient declines at this time.  Patient had PT/OT while inpatient with improving right sided weakness but still has issues with right arm dexterity.  And plan to continue with OT outpatient.   -Occupational Therapy Referral; Future  - aspirin 81 MG EC tablet; Take 1 tablet (81 mg) by mouth daily for 30 days  - atorvastatin (LIPITOR) 80 MG tablet; Take 1 tablet (80 mg) by mouth every evening for 30 days  - clopidogrel (PLAVIX) 75 MG tablet; Take 1 tablet (75 mg) by mouth daily for 30 days  -Neurology follow-up within 2 to 3 months.  -Information for primary care options given in the AVS    Elevated troponin, stable x4  Elevated BNP  Asymptomatic but has risk factors for coronary artery disease. Per cardiology, flat pattern not suggestive ACS; however, possible regional wall motion abnormalities on ECHO is concerning. EF on ECHO 55%, no other valvular abnormalities.  Cardiology consulted while inpatient regarding elevated troponins.  Patient had multiple work-up including GAIL and NM stress test  8/25: no inducible ischemia, possible previous infarction in inferior wall, recs for lifestyle modification and to continue DAPT.  -carvedilol (COREG) 12.5 MG tablet; Take 1 tablet (12.5 mg) by mouth 2 times daily (with meals) for 30 days  -Sleep apnea study outpatient  - hydrochlorothiazide (HYDRODIURIL) 12.5 MG tablet; Take 1 tablet (12.5 mg) by mouth daily for 30 days  - losartan (COZAAR) 100 MG tablet; Take 1 tablet (100 mg) by mouth daily for 30 days     Type 2 diabetes  Hyperglycemia  Uncontrolled and not on any medications for many  years. A1C 11.1 on admission. Diabetes eduction done while inpatient. Stressed importance of close follow up and medication compliance.   - insulin glargine (LANTUS PEN) 100 UNIT/ML pen; Inject 24 Units Subcutaneous At Bedtime for 30 days  - metFORMIN (GLUCOPHAGE) 500 MG tablet; Take 1 tablet (500 mg) by mouth 2 times daily (with meals) for 30 days  - follow up with primary care provider within one week.       HTN  Poorly controlled blood pressure.  Initially permissive hypertension given acute stroke.  Followed by cardiology while inpatient with recommendation to start HCTZ carvedilol and to continue carvedilol for better control.    Tobacco use  Patient has ongoing tobacco use.  Had few discussions with primary team and neurology but no interest in quitting.  - nicotine patch     Insurance/finance concerns  Patient is without insurance. Works as a , recently let go due to missing time related to other medical issues. Also caring for, and paying for care for, his mother. Significant financial strains to be considered with discharge plans - will be seeing Financial SW to assist, discuss emergency MA. social work consulted and helped coordinate with patient resources.       Consultations This Hospital Stay   CARDIOLOGY IP CONSULT  CARE MANAGEMENT / SOCIAL WORK IP CONSULT  SPEECH LANGUAGE PATH ADULT IP CONSULT  PHARMACY IP CONSULT  PHARMACY IP CONSULT  PHARMACY IP CONSULT  PHYSICAL THERAPY ADULT IP CONSULT  OCCUPATIONAL THERAPY ADULT IP CONSULT  REHAB ADMISSIONS LIAISON IP CONSULT  CARE MANAGEMENT / SOCIAL WORK IP CONSULT  NEUROLOGY IP STROKE CONSULT  DIABETES EDUCATION IP CONSULT  NUTRITION SERVICES ADULT IP CONSULT  CARE MANAGEMENT / SOCIAL WORK IP CONSULT  SMOKING CESSATION PROGRAM IP CONSULT    Code Status   Full Code       The patient was discussed with JOI Gil  [facility] PGY2  [place] [facility]  [address]  [facility] [address]  Phone: [phone]  Fax: [phone]  ______________________________________________________________________    Physical Exam   Vital Signs: Temp: 98.2  F (36.8  C) Temp src: Oral BP: 134/77 Pulse: 77   Resp: 18 SpO2: 96 % O2 Device: None (Room air)    Weight: 269 lbs 6.4 oz  General Appearance: Awake, alert, cooperative, NAD.  Respiratory: No increased work of breathing, clear lungs bilaterally.  Cardiovascular: Normal S1, S2, no murmur  GI: Soft, nondistended or tender.  Normal bowel sounds.  Skin: Warm, no jaundice, no visible rash.  Neuro awake, alert, oriented x3.  Normal speech.  Cranial nerves II to XII grossly intact.  Right upper extremity 4/5, right lower extremity 4/ 5 compared to the left upper and lower extremity.      Primary Care Physician   Physician No Ref-Primary    Discharge Orders       Significant Results and Procedures   Most Recent 3 CBC's:  Recent Labs   Lab Test 08/24/23  0645 08/23/23  0946 07/25/23  0523   WBC 11.3* 11.2* 13.5*   HGB 13.1* 13.9 14.4   MCV 92 92 92    290 316     Most Recent 3 Creatinines:  Recent Labs   Lab Test 08/24/23  0645 08/23/23  0946 07/25/23  0523   CR 0.64* 0.78 0.75     Most Recent 3 Hemoglobins:  Recent Labs   Lab Test 08/24/23  0645 08/23/23  0946 07/25/23  0523   HGB 13.1* 13.9 14.4     Most Recent 3 Troponin's:No lab results found.,   Results for orders placed or performed during the hospital encounter of 08/23/23   CTA Head Neck with Contrast    Narrative    EXAM: CTA HEAD NECK W CONTRAST  LOCATION: [facility]  DATE: 8/23/2023    INDICATION: Code Stroke to evaluate for potential thrombolysis and thrombectomy. PLEASE READ IMMEDIATELY.  COMPARISON: None.  CONTRAST: isovue 370 75ml  TECHNIQUE: Head and neck CT angiogram with IV contrast. Noncontrast head CT followed by axial helical CT images of the head and neck vessels obtained during the arterial phase of intravenous contrast administration.  Axial 2D reconstructed images and   multiplanar 3D MIP reconstructed images of the head and neck vessels were performed by the technologist. Dose reduction techniques were used. All stenosis measurements made according to NASCET criteria unless otherwise specified.    FINDINGS:   NONCONTRAST HEAD CT:   INTRACRANIAL CONTENTS: Small lacunar infarct with ill-defined borders involving the left subinsular cortex extending into the posterior margin of the left basal ganglia at the level of the putamen. No associated hemorrhage. Posterior fossa structures   including cerebellar hemispheres, fourth ventricle and CP angle cisterns are clear. Nasopharynx is clear. Region of the sella and suprasellar cistern are clear.     VISUALIZED ORBITS/SINUSES/MASTOIDS: No intraorbital abnormality. No paranasal sinus mucosal disease. No middle ear or mastoid effusion.    BONES/SOFT TISSUES: Soft tissue swelling over the left midline scalp vertex (series 7 image #44) may represent a posttraumatic hematoma/soft tissue swelling if there was recent trauma in this region or other etiologies such as a sebaceous cyst.    HEAD CTA:  ANTERIOR CIRCULATION: Atherosclerotic calcification involving the carotid siphons bilaterally. Weblike plaque involving the paraclinoid segment of the left ICA results in vessel narrowing of approximately 40%. No evidence of large vessel occlusion   intracranially.    POSTERIOR CIRCULATION: No stenosis/occlusion, aneurysm, or high flow vascular malformation. Balanced vertebral arteries supply a normal basilar artery.     DURAL VENOUS SINUSES: Expected enhancement of the major dural venous sinuses.    NECK CTA:  RIGHT CAROTID: No measurable stenosis or dissection.    LEFT CAROTID: No measurable stenosis or dissection.    VERTEBRAL ARTERIES: The origin of the vertebral arteries at the level of the thoracic inlet demonstrate calcification at the origin of the right and left vertebral arteries. The degree of luminal  narrowing at the origin of the vertebral arteries is   difficult to assess due to beam hardening artifact which obscures fine detail at the origin of these vessels. The remainder of the caliber of the vertebral arteries throughout the neck is normal.    AORTIC ARCH: Classic aortic arch anatomy with no significant stenosis at the origin of the great vessels.    NONVASCULAR STRUCTURES: Unremarkable.      Impression    IMPRESSION:   HEAD CT:  1.  Suspect subacute lacunar infarct involving the left subinsular cortex/left basal ganglia.    2.  Soft tissue swelling over the left paracentral midline scalp vertex may represent a posttraumatic hematoma/soft tissue swelling if there is recent trauma in this region. Other etiologies include a sebaceous cyst. Would recommend direct   visualization/palpation of this finding to determine if it has been long-standing in origin.    HEAD CTA:   1.  Atherosclerotic calcification involving the carotid siphons with weblike plaque involving the paraclinoid segment of the left ICA which results in vessel narrowing less than 40%. No evidence of large vessel occlusion within the intracranial   circulation.    NECK CTA:  1.  Atherosclerotic calcification at the origin of the right ICA results in moderate stenosis (55-60%).    2.  Mild stenosis less than 25% at the origin of the left ICA within the neck.        Results were discussed with Dr. Whitfield at 9:50 AM   CT Head Perfusion w Contrast - For Tier 2 Stroke    Narrative    EXAM: CT HEAD PERFUSION W CONTRAST  LOCATION: [facility]  DATE: 8/23/2023    INDICATION: Code Stroke to evaluate for potential thrombolysis and thrombectomy. Evaluate mismatch between penumbra and core infarct. READ IMMEDIATELY.  COMPARISON: CT brain/CTA performed same day  TECHNIQUE: CT cerebral perfusion was performed utilizing a second contrast bolus. Perfusion data were post processed with generation of standard perfusion maps and estimation  of ischemic/infarcted volumes utilizing standard threshold values. Dose   reduction techniques were used. All stenosis measurements made according to NASCET criteria unless otherwise specified.  CONTRAST: isovue 370 50ml    FINDINGS:     CT PERFUSION:  PERFUSION MAPS: Symmetrical cerebral perfusion. No focal deficits in cerebral blood flow or volume to suggest ischemia/oligemia.    RAPID ANALYSIS:  CBF<30%: 0  Tmax>6sec: 0  Mismatch volume: 0  Mismatch ratio: None      Impression    IMPRESSION:     CT PERFUSION:  1.  Normal cerebral perfusion.   MR Brain w/o & w Contrast    Narrative    EXAM: MR BRAIN W/O and W CONTRAST  LOCATION: [facility]  DATE: 8/23/2023    INDICATION: right arm weakness and lacunar infarct on ct scan  COMPARISON: CT head 08/23/2023  CONTRAST: 10ml gadavist  TECHNIQUE: Routine multiplanar multisequence head MRI without and with intravenous contrast.    FINDINGS:  INTRACRANIAL CONTENTS: There is 15 x 7 mm focus of restricted diffusion at the left corona radiata extending to the left lentiform nucleus. This is consistent with acute lacunar infarct and corresponds with the focal hypodensity on prior head CT. There   is additional 4 mm focus of restricted diffusion at the right frontal centrum semiovale consistent with additional area of acute ischemia. No mass, acute hemorrhage, or extra-axial fluid collections. Scattered nonspecific T2/FLAIR hyperintensities within   the cerebral white matter most consistent with mild chronic microvascular ischemic change. Normal ventricles and sulci. Normal position of the cerebellar tonsils. No pathologic contrast enhancement.    SELLA: No abnormality accounting for technique.    OSSEOUS STRUCTURES/SOFT TISSUES: Normal marrow signal. The major intracranial vascular flow voids are maintained. Nonspecific enhancing ovoid lesion at the left parietal scalp measuring 1.9 x 1.2 cm best seen series 17 image 25.    ORBITS: No abnormality  accounting for technique.     SINUSES/MASTOIDS: No paranasal sinus mucosal disease. No middle ear or mastoid effusion. There is nonspecific soft tissue prominence involving the nasopharyngeal endplate tissues without associated enhancement. This is favored to represent mucous   retention cysts.      Impression    IMPRESSION:  1.  Acute lacunar infarct of the left corona radiata and basal ganglia corresponding with abnormality from prior head CT.  2.  Additional punctate focus of acute ischemia at the right frontal centrum semiovale.  3.  Nonspecific enhancing lesion at the left parietal scalp. Malignancy/abscess not excluded. Recommend correlation with direct visualization.  4.  Soft tissue prominence of the nasopharyngeal lymphoid tissues, suspect mucous retention cysts. If additional evaluation is indicated recommend visualization could be considered.   Echocardiogram Complete with Bubble Study    Narrative    [id number]  [id number]  [id number]  [id number]^SINTIA^MARIAN^[place] [address]     Name: LUIS HODGES  MRN: [id number]  : 1975  Study Date: 2023 03:10 PM  Age: 47 yrs  Gender: Male  Patient Location: [facility]  Reason For Study: Cardiovascular Incident  Ordering Physician: MARIAN PATRICIO  Referring Physician: MARIAN PATRICIO  Performed By:      BSA: 2.4 m2  Height: 70 in  Weight: 290 lb  HR: 87  ______________________________________________________________________________  Procedure  Complete Bubble Echo Adult. Definity (NDC #02536-559) given intravenously.  ______________________________________________________________________________  Interpretation Summary     1. Technically difficult study.  2. Normal left ventricular size and systolic performance with a visually  estimated ejection fraction of 55%.  3. On selected views, there appears to be a fairly small discrete apical wall  motion abnormality (no apical mural thrombus is  detected).  4. No significant valvular heart disease is identified on this study.  5. Probable normal right ventricular size and systolic performance though  right-sided structures are not clearly visualized on all views on this study.  6. Echo contrast examination was performed using agitated NS as contrast  agent. The right heart opacity was suboptimal. There was no obvious evidence  of right to left shunting during spontaneous respiration or following release  of Valsalva though the sensitivity &specificity are both reduced due to  suboptimal acoustic imaging..  ______________________________________________________________________________  Left ventricle:  Normal left ventricular size and systolic performance with a visually  estimated ejection fraction of 55%. On selected views, there appears to be a  fairly small discrete apical wall motion abnormality (no apical mural thrombus  is detected). Left ventricular wall thickness is normal.     Assessment of LV Diastolic Function: The cumulative findings suggest normal  diastolic filling.     Right ventricle:  Probable normal right ventricular size and systolic performance though right-  sided structures are not clearly visualized on all views on this study.     Left atrium:  There is mild left atrial enlargement.     Right atrium:  The right atrium is of normal size.     IVC:  The IVC is of normal caliber.     Aortic valve:  The aortic valve is not well visualized, but suspected to be comprised of  three cusps. No significant aortic stenosis or aortic insufficiency is  detected on this study.     Mitral valve:  The mitral valve appears morphologically normal. There is probable trace  mitral insufficiency.     Tricuspid valve:  The tricuspid valve is grossly morphologically normal. There is probable trace  tricuspid insufficiency.     Pulmonic valve:  The pulmonic valve is grossly morphologically normal.     Thoracic aorta:  The aortic root and proximal ascending  aorta are of normal dimension.     Pericardium:  There is no significant pericardial effusion.  ______________________________________________________________________________  ______________________________________________________________________________  MMode/2D Measurements & Calculations  IVSd: 1.1 cm  LVIDd: 4.7 cm  LVIDs: 2.8 cm  LVPWd: 1.1 cm  FS: 40.8 %  LV mass(C)d: 183.4 grams  LV mass(C)dI: 75.0 grams/m2  Ao root diam: 3.3 cm  LA dimension: 5.2 cm  asc Aorta Diam: 3.1 cm  LA/Ao: 1.6  LVOT diam: 2.6 cm  LVOT area: 5.3 cm2  LA Volume Indexed (AL/bp): 24.5 ml/m2     RV Base: 3.6 cm  RWT: 0.45  TAPSE: 2.6 cm     Time Measurements  MM HR: 87.0 BPM     Doppler Measurements & Calculations  MV E max servando: 68.1 cm/sec  MV A max servando: 59.7 cm/sec  MV E/A: 1.1  MV dec slope: 320.0 cm/sec2  MV dec time: 0.21 sec  Ao V2 max: 125.0 cm/sec  Ao max P.0 mmHg  Ao V2 mean: 93.3 cm/sec  Ao mean P.0 mmHg  Ao V2 VTI: 25.2 cm  NILSA(I,D): 3.8 cm2  NILSA(V,D): 3.8 cm2  LV V1 max PG: 3.3 mmHg  LV V1 max: 90.2 cm/sec  LV V1 VTI: 18.1 cm  SV(LVOT): 96.1 ml  SI(LVOT): 39.3 ml/m2  PA acc time: 0.10 sec  AV Servando Ratio (DI): 0.72  NILSA Index (cm2/m2): 1.6     E/E' av.8  Lateral E/e': 7.6  Medial E/e': 7.9  RV S Servando: 15.6 cm/sec     ______________________________________________________________________________  Report approved by: Ladonna Hall 2023 03:58 PM         Transesophageal Echocardiogram    Narrative    [id number]  [facility]  [id number]  [id number]^CHICHI^[place] [address]     Name: LUIS HODGES  MRN: [id number]  : 1975  Study Date: 2023 08:17 AM  Age: 47 yrs  Gender: Male  Patient Location: [facility]  Reason For Study: CVA  Ordering Physician: KELLE CADET  Referring Physician: KELLE CADET  Performed By: /RADHA     BSA: 2.4 m2  Height: 70 in  Weight: 273 lb  HR:  85  ______________________________________________________________________________  Procedure  Complete GAIL Adult.  ______________________________________________________________________________  Interpretation Summary     TRANSESOPHAGEAL ECHOCARDIOGRAM     1. Normal left ventricular size and systolic performance with a visually  estimated ejection fraction of 55-60%.  2. There appears to be a fairly small discrete apical wall motion abnormality  (no apical mural thrombus is detected).  3. No significant valvular heart disease is identified on this study.  4. Normal right ventricular size and systolic performance.  5. There is mild left atrial enlargement.  6. No intracardiac mass or thrombus is detected.  7. Echo contrast examination was performed using agitated NS as contrast  agent. The right heart opacity was good and the left heart was well  visualized. There was no evidence of right to left shunting during spontaneous  respiration or following release of Valsalva.  8. No cardiac source of systemic embolism is identified on the study.  ______________________________________________________________________________  Left ventricle:  Normal left ventricular size and systolic performance with a visually  estimated ejection fraction of 55%. There appears to be a fairly small  discrete apical wall motion abnormality (no apical mural thrombus is  detected). Left ventricular wall thickness is normal.     Right ventricle:  Normal right ventricular size and systolic performance.     Left atrium:  There is mild left atrial enlargement.     Right atrium:  The right atrium is of normal size.     Aortic valve:  The aortic valve is comprised of three cusps. No significant aortic stenosis  or aortic insufficiency is detected on this study.     Mitral valve:  The mitral valve appears morphologically normal. There is trace mitral  insufficiency.     Tricuspid valve:  The tricuspid valve is grossly morphologically normal. There  is trace  tricuspid insufficiency.     Pulmonic valve:  The pulmonic valve is grossly morphologically normal. There is trace pulmonic  insufficiency.     Thoracic aorta:  The aortic root and proximal ascending aorta are of normal dimension.     Pericardium:  There is no significant pericardial effusion.  ______________________________________________________________________________  The study was performed using a multiplane adult transducer. 2-D, colorflow  Doppler, and spectral Doppler analysis was performed. Informed consent was  obtained prior to the procedure. The patient was assessed upon my arrival to  the procedure room. The patient was felt to be an appropriate candidate for  the procedure and planned sedation. Sedation: fentanyl 100 mcg & versed 2.0  mg  iv. Topical anesthesia was performed with viscous lidocaine and benzocaine  spray. The transducer was introduced without difficulty. Heart rate,  respiratory rate, oxygen saturations, blood pressure, and response to care  were monitored throughout the procedure with nursing assistance. There were no  complications of the procedure. Total sedation time: 16 minutes of continuous  bedside 1:1 monitoring.  ______________________________________________________________________________  GAIL  Patient was sedated using Fentanyl 100 mcg. Patient was sedated using Versed 2  mg. The heart rate, respiratory rate, oxygen saturations, blood pressure, and  response to care were monitored throughout the procedure with the assistance  of the nurse. I determined this patient to be an appropriate candidate for the  planned sedation and procedure and have reassessed the patient immediately  prior to sedation and procedure. Total sedation time: 16 minutes of continuous  bedside 1:1 monitoring. 15ml of lidocaine, 0.5ml of hurricaine spray.  ______________________________________________________________________________  Report approved by: Ladonna Hall 08/25/2023 12:54  PM     ______________________________________________________________________________      NM MPI with Lexiscan     Value    Pharmacologic Protocol Lexiscan    Test Type Pharmacological    Baseline HR 82    Baseline Systolic     Baseline Diastolic BP 84    Last Stress HR 94    Last Stress Systolic     Last Stress Diastolic BP 79    Target     PERCENT HR 85%    ST Deviation Elevation V4 0.1mm    Deviation Time III -0.2mm    ST Elevation Amount V2 1.1mm    ST Deviation Amount he aVR -0.8mm    Final Resting /83    Final Resting HR 85    Max Treadmill Speed 0.0    Max Treadmill Grade 0.0    Peak Systolic /79    Peak Diastolic /83    Max HR  101    Stress Phase Resting    Stress Resting Pt Position MANUAL EVENT    Current HR 95    Current /79    Stress Phase Stress    Stage Minute EXE 00:00    Exercise Stage STAGE 2    Current HR 81    Current /84    Stress Phase Stress    Stage Minute EXE 01:00    Exercise Stage STAGE 3    Current HR 81    Current /84    Stress Phase Stress    Stage Minute EXE 02:00    Exercise Stage STAGE 4    Current HR 99    Current /84    Stress Phase Stress    Stage Minute EXE 02:01    Exercise Stage STAGE 4    Current HR 99    Current /72    Stress Phase Stress    Stage Minute EXE 03:00    Exercise Stage STAGE 5    Current HR 99    Current /72    Stress Phase Stress    Stage Minute EXE 03:06    Exercise Stage STAGE 5    Current HR 99    Current /79    Stress Phase Stress    Stage Minute EXE 03:11    Exercise Stage STAGE 5    Current HR 99    Current /79    Stress Phase Stress    Stage Minute EXE 04:00    Exercise Stage STAGE 6    Current HR 94    Current /79    Stress Phase Stress    Stage Minute EXE 04:01    Exercise Stage STAGE 6    Current HR 94    Current /79    Stress Phase Recovery    Stage Minute REC 00:22    Exercise Stage Recovery    Current HR 95    Current /79    Stress Phase Recovery     Stage Minute REC 00:53    Exercise Stage Recovery    Current HR 93    Current /79    Stress Phase Recovery    Stage Minute REC 00:58    Exercise Stage Recovery    Current HR 92    Current /79    Stress Phase Recovery    Stage Minute REC 01:58    Exercise Stage Recovery    Current HR 88    Current /79    Stress Phase Recovery    Stage Minute REC 02:18    Exercise Stage Recovery    Current HR 89    Current /83    Stress Phase Recovery    Stage Minute REC 02:58    Exercise Stage Recovery    Current HR 86    Current /83    Stress Phase Recovery    Stage Minute REC 03:58    Exercise Stage Recovery    Current HR 86    Current /83    Stress Phase Recovery    Stage Minute REC 04:05    Exercise Stage Recovery    Current HR 85    Current /83    Max Predicted HR  59    Rate Pressure Product 14,847.0    Left Ventricular EF 55    Narrative      There is no prior study for comparison.    Pharmacological regadenoson stress ECG is negative for inducible   myocardial ischemia.    Pharmacological regadenoson nuclear study is abnormal.  There is   previous almost transmural myocardial infarction in inferior wall and   apex.  There is small size of perla-infarct ischemia in distal anteroseptal   segment.    Normal left ventricular size with no significant wall motion   abnormality.  The calculated left ventricular ejection fraction is 55%.    The patient is at a low risk of future cardiac ischemic events.      There is no prior study for comparison.       Discharge Medications   Current Discharge Medication List        START taking these medications    Details   aspirin 81 MG EC tablet Take 1 tablet (81 mg) by mouth daily for 30 days  Qty: 30 tablet, Refills: 0    Associated Diagnoses: Ischemic stroke (H)      atorvastatin (LIPITOR) 80 MG tablet Take 1 tablet (80 mg) by mouth every evening for 30 days  Qty: 30 tablet, Refills: 0    Associated Diagnoses: Ischemic stroke (H)      carvedilol  (COREG) 12.5 MG tablet Take 1 tablet (12.5 mg) by mouth 2 times daily (with meals) for 30 days  Qty: 60 tablet, Refills: 0    Associated Diagnoses: Elevated troponin      clopidogrel (PLAVIX) 75 MG tablet Take 1 tablet (75 mg) by mouth daily for 30 days  Qty: 30 tablet, Refills: 0    Associated Diagnoses: Ischemic stroke (H)      hydrochlorothiazide (HYDRODIURIL) 12.5 MG tablet Take 1 tablet (12.5 mg) by mouth daily for 30 days  Qty: 30 tablet, Refills: 0    Associated Diagnoses: Hypertension, unspecified type      insulin glargine (LANTUS PEN) 100 UNIT/ML pen Inject 24 Units Subcutaneous At Bedtime for 30 days  Qty: 7.2 mL, Refills: 0    Comments: If Lantus is not covered by insurance, may substitute Basaglar or Semglee or other insulin glargine product per insurance preference at same dose and frequency.    Associated Diagnoses: Type 2 diabetes mellitus with hyperglycemia, unspecified whether long term insulin use (H)      losartan (COZAAR) 100 MG tablet Take 1 tablet (100 mg) by mouth daily for 30 days  Qty: 30 tablet, Refills: 0    Associated Diagnoses: Hypertension, unspecified type      metFORMIN (GLUCOPHAGE) 500 MG tablet Take 1 tablet (500 mg) by mouth 2 times daily (with meals) for 30 days  Qty: 60 tablet, Refills: 0    Associated Diagnoses: Type 2 diabetes mellitus with hyperglycemia, unspecified whether long term insulin use (H)           Allergies   No Known Allergies

## 2023-08-28 ENCOUNTER — PATIENT OUTREACH (OUTPATIENT)
Dept: CARE COORDINATION | Facility: CLINIC | Age: 48
End: 2023-08-28

## 2023-08-28 NOTE — PROGRESS NOTES
Clinic Care Coordination Contact  [facility]: Post-Discharge Note  SITUATION                                                      Admission:    Admission Date: 08/23/23   Reason for Admission: Elevated troponin  Discharge:   Discharge Date: 08/26/23  Discharge Diagnosis: Elevated troponin    BACKGROUND                                                      Floyd Capps was admitted on 8/23/2023  with right arm and leg weakness, found to have a left corona radiata, basal ganglia and right frontal semiovale infarction. He is admitted for stroke work-up and management.  The following problems were addressed during his hospitalization:     Ischemic stroke  Acute left basal ganglia lacunar infarct  Patient woke up on 8/22 with right sided arm and leg weakness. CT and MRI w/ acute lacunar infarct of the left corona radiata and basal ganglia. Stroke code called in ED-- no thrombolysis due to outside 4.5 hour window. Risk factors notable for uncontrolled diabetes, tobacco use, hypertension and HLD. Patient was seen by neurology and had workup including brain that confirmed stroke findings.  Also echocardiogram with small discrete apical wall motion abnormality but no thrombus was detected.  Otherwise no abnormal ejection fraction or valvular heart abnormalities.  Patient had NM stress testing which was negative for inducible ischemia however prior infarct was noted and subsequently GAIL showed no left to right shunt.  Patient was managed with DAPT aspirin and Plavix for 90 days and plan to switch to  mg.  Discussion with patient to quit smoking given the high risk associated since stroke and prior cardiac ischemic findings but patient declines at this time.  Patient had PT/OT while inpatient with improving right sided weakness but still has issues with right arm dexterity.  And plan to continue with OT outpatient.   -Occupational Therapy Referral; Future  - aspirin 81 MG EC tablet; Take 1 tablet (81 mg) by mouth  daily for 30 days  - atorvastatin (LIPITOR) 80 MG tablet; Take 1 tablet (80 mg) by mouth every evening for 30 days  - clopidogrel (PLAVIX) 75 MG tablet; Take 1 tablet (75 mg) by mouth daily for 30 days  -Neurology follow-up within 2 to 3 months.  -Information for primary care options given in the AVS     Elevated troponin, stable x4  Elevated BNP  Asymptomatic but has risk factors for coronary artery disease. Per cardiology, flat pattern not suggestive ACS; however, possible regional wall motion abnormalities on ECHO is concerning. EF on ECHO 55%, no other valvular abnormalities.  Cardiology consulted while inpatient regarding elevated troponins.  Patient had multiple work-up including GAIL and NM stress test  8/25: no inducible ischemia, possible previous infarction in inferior wall, recs for lifestyle modification and to continue DAPT.  -carvedilol (COREG) 12.5 MG tablet; Take 1 tablet (12.5 mg) by mouth 2 times daily (with meals) for 30 days  -Sleep apnea study outpatient  - hydrochlorothiazide (HYDRODIURIL) 12.5 MG tablet; Take 1 tablet (12.5 mg) by mouth daily for 30 days  - losartan (COZAAR) 100 MG tablet; Take 1 tablet (100 mg) by mouth daily for 30 days     Type 2 diabetes  Hyperglycemia  Uncontrolled and not on any medications for many years. A1C 11.1 on admission. Diabetes eduction done while inpatient. Stressed importance of close follow up and medication compliance.   - insulin glargine (LANTUS PEN) 100 UNIT/ML pen; Inject 24 Units Subcutaneous At Bedtime for 30 days  - metFORMIN (GLUCOPHAGE) 500 MG tablet; Take 1 tablet (500 mg) by mouth 2 times daily (with meals) for 30 days  - follow up with primary care provider within one week.        HTN  Poorly controlled blood pressure.  Initially permissive hypertension given acute stroke.  Followed by cardiology while inpatient with recommendation to start HCTZ carvedilol and to continue carvedilol for better control.     Tobacco use  Patient has ongoing tobacco  use.  Had few discussions with primary team and neurology but no interest in quitting.  - nicotine patch     Insurance/finance concerns  Patient is without insurance. Works as a , recently let go due to missing time related to other medical issues. Also caring for, and paying for care for, his mother. Significant financial strains to be considered with discharge plans - will be seeing Financial SW to assist, discuss emergency MA. social work consulted and helped coordinate with patient resources.              ASSESSMENT           Discharge Assessment  How are you doing now that you are home?: Patient shares that he is doing well. Is waiting on SW to call back and go over insurance with her but he thinks she may be off this week. Writer offers to send referral to [facility] [place] as they can help him apply for insurance. Patient in agreement with this plan. Patient shares that he is waiting on unemployment, but is no sure if he will be approved or not.  How are your symptoms? (Red Flag symptoms escalate to triage hotline per guidelines): Improved  Do you feel your condition is stable enough to be safe at home until your provider visit?: Yes  Does the patient have their discharge instructions? : Yes  Does the patient have questions regarding their discharge instructions? : No  Were you started on any new medications or were there changes to any of your previous medications? : Yes  Does the patient have all of their medications?: Yes  Do you have questions regarding any of your medications? : No  Do you have all of your needed medical supplies or equipment (DME)?  (i.e. oxygen tank, CPAP, cane, etc.): Yes  Discharge follow-up appointment scheduled within 14 calendar days? : Yes  Discharge Follow Up Appointment Date: 08/22/23  Discharge Follow Up Appointment Scheduled with?: Specialty Care Provider    Post-op ([facility]A Only)  If the patient had a surgery or procedure, do they have any questions for a nurse?: No    Post-op  (Clinicians Only)  Did the patient have surgery or a procedure: No      PLAN                                                      Outpatient Plan:  Follow up with primary care provider, Physician No Ref-Primary, within 7 days to evaluate medication change and for  hospital follow- up.  Close by clinic options that you can establish with for primary care.  [facility]  [address] [place] [address]  [facility]  [address] [place] [address]  Phone:  [phone]  Occupational Therapy Referral  Please be aware that coverage of these services is subject to the terms and limitations of your health insurance plan. Call  member services at your health plan with any benefit or coverage questions.  If you have not heard from the scheduling office within 2 business days, please call [phone] for [facility] [employer],  [phone] for [facility] and [phone] for [place].  Preferred Location: [facility]  Scheduling Instructions: If you have not heard from the scheduling office within 2 business days, please call [phone]  for [employer], [phone] for [place] and [phone] for [place].  Course of Action: Evaluation and Treatment  Adult or Pediatrics: Adult  Specialty Services: Per Associated Diagnosis  Additional Information: Ischemic stroke  Occupational Therapy Referral  Please be aware that coverage    Future Appointments   Date Time Provider Department Center   9/22/2023  1:00 PM Concepcion Hidalgo, OT [facility]   12/6/2023  8:00 AM Daya Hidalgo OTR [facility]         For any urgent concerns, please contact our 24 hour nurse triage line: [phone] ([phone])         TERESA Hebert

## 2023-08-28 NOTE — PROGRESS NOTES
Clinic Care Coordination Contact  Care Team Conversations    Referral sent to [employer] for help with insurance. Information links sent to patient to review.    TERESA Ca   Social Work Clinic Care Coordinator   [facility]  PH: [phone]  [email or web address]

## 2023-09-10 LAB
ATRIAL RATE - MUSE: 100 BPM
ATRIAL RATE - MUSE: 95 BPM
DIASTOLIC BLOOD PRESSURE - MUSE: 85 MMHG
DIASTOLIC BLOOD PRESSURE - MUSE: 88 MMHG
INTERPRETATION ECG - MUSE: NORMAL
INTERPRETATION ECG - MUSE: NORMAL
P AXIS - MUSE: 57 DEGREES
P AXIS - MUSE: 77 DEGREES
PR INTERVAL - MUSE: 146 MS
PR INTERVAL - MUSE: 154 MS
QRS DURATION - MUSE: 90 MS
QRS DURATION - MUSE: 94 MS
QT - MUSE: 358 MS
QT - MUSE: 364 MS
QTC - MUSE: 457 MS
QTC - MUSE: 461 MS
R AXIS - MUSE: 97 DEGREES
R AXIS - MUSE: 99 DEGREES
SYSTOLIC BLOOD PRESSURE - MUSE: 146 MMHG
SYSTOLIC BLOOD PRESSURE - MUSE: 159 MMHG
T AXIS - MUSE: 15 DEGREES
T AXIS - MUSE: 38 DEGREES
VENTRICULAR RATE- MUSE: 100 BPM
VENTRICULAR RATE- MUSE: 95 BPM

## 2023-09-23 ENCOUNTER — HOSPITAL ENCOUNTER (EMERGENCY)
Facility: HOSPITAL | Age: 48
Discharge: HOME | End: 2023-09-23
Attending: FAMILY MEDICINE | Admitting: FAMILY MEDICINE

## 2023-09-23 VITALS
SYSTOLIC BLOOD PRESSURE: 144 MMHG | BODY MASS INDEX: 41.12 KG/M2 | DIASTOLIC BLOOD PRESSURE: 78 MMHG | HEART RATE: 90 BPM | TEMPERATURE: 98 F | RESPIRATION RATE: 18 BRPM | OXYGEN SATURATION: 97 % | WEIGHT: 286.6 LBS

## 2023-09-23 DIAGNOSIS — Z79.4 TYPE 2 DIABETES MELLITUS WITH HYPERGLYCEMIA, WITH LONG-TERM CURRENT USE OF INSULIN (H): ICD-10-CM

## 2023-09-23 DIAGNOSIS — Z86.73 H/O: CVA (CEREBROVASCULAR ACCIDENT): ICD-10-CM

## 2023-09-23 DIAGNOSIS — E11.65 TYPE 2 DIABETES MELLITUS WITH HYPERGLYCEMIA, WITH LONG-TERM CURRENT USE OF INSULIN (H): ICD-10-CM

## 2023-09-23 DIAGNOSIS — L02.11 NECK ABSCESS: ICD-10-CM

## 2023-09-23 LAB
ANION GAP SERPL CALCULATED.3IONS-SCNC: 11 MMOL/L (ref 7–15)
BASOPHILS # BLD AUTO: 0.1 10E3/UL (ref 0–0.2)
BASOPHILS NFR BLD AUTO: 1 %
BUN SERPL-MCNC: 17.8 MG/DL (ref 6–20)
CALCIUM SERPL-MCNC: 9.6 MG/DL (ref 8.6–10)
CHLORIDE SERPL-SCNC: 100 MMOL/L (ref 98–107)
CREAT SERPL-MCNC: 0.68 MG/DL (ref 0.67–1.17)
DEPRECATED HCO3 PLAS-SCNC: 22 MMOL/L (ref 22–29)
EGFRCR SERPLBLD CKD-EPI 2021: >90 ML/MIN/1.73M2
EOSINOPHIL # BLD AUTO: 0.3 10E3/UL (ref 0–0.7)
EOSINOPHIL NFR BLD AUTO: 2 %
ERYTHROCYTE [DISTWIDTH] IN BLOOD BY AUTOMATED COUNT: 11.9 % (ref 10–15)
GLUCOSE SERPL-MCNC: 324 MG/DL (ref 70–99)
HCT VFR BLD AUTO: 41.1 % (ref 40–53)
HGB BLD-MCNC: 14.5 G/DL (ref 13.3–17.7)
IMM GRANULOCYTES # BLD: 0.1 10E3/UL
IMM GRANULOCYTES NFR BLD: 1 %
LYMPHOCYTES # BLD AUTO: 2.7 10E3/UL (ref 0.8–5.3)
LYMPHOCYTES NFR BLD AUTO: 21 %
MCH RBC QN AUTO: 31.7 PG (ref 26.5–33)
MCHC RBC AUTO-ENTMCNC: 35.3 G/DL (ref 31.5–36.5)
MCV RBC AUTO: 90 FL (ref 78–100)
MONOCYTES # BLD AUTO: 1 10E3/UL (ref 0–1.3)
MONOCYTES NFR BLD AUTO: 8 %
NEUTROPHILS # BLD AUTO: 8.5 10E3/UL (ref 1.6–8.3)
NEUTROPHILS NFR BLD AUTO: 67 %
NRBC # BLD AUTO: 0 10E3/UL
NRBC BLD AUTO-RTO: 0 /100
PLATELET # BLD AUTO: 306 10E3/UL (ref 150–450)
POTASSIUM SERPL-SCNC: 4.8 MMOL/L (ref 3.4–5.3)
RBC # BLD AUTO: 4.58 10E6/UL (ref 4.4–5.9)
SODIUM SERPL-SCNC: 133 MMOL/L (ref 136–145)
WBC # BLD AUTO: 12.7 10E3/UL (ref 4–11)

## 2023-09-23 PROCEDURE — 87205 SMEAR GRAM STAIN: CPT | Performed by: FAMILY MEDICINE

## 2023-09-23 PROCEDURE — 99284 EMERGENCY DEPT VISIT MOD MDM: CPT | Mod: 25

## 2023-09-23 PROCEDURE — 258N000003 HC RX IP 258 OP 636: Performed by: FAMILY MEDICINE

## 2023-09-23 PROCEDURE — 80048 BASIC METABOLIC PNL TOTAL CA: CPT | Performed by: FAMILY MEDICINE

## 2023-09-23 PROCEDURE — 85025 COMPLETE CBC W/AUTO DIFF WBC: CPT | Performed by: FAMILY MEDICINE

## 2023-09-23 PROCEDURE — 96365 THER/PROPH/DIAG IV INF INIT: CPT

## 2023-09-23 PROCEDURE — 36415 COLL VENOUS BLD VENIPUNCTURE: CPT | Performed by: FAMILY MEDICINE

## 2023-09-23 PROCEDURE — 250N000012 HC RX MED GY IP 250 OP 636 PS 637: Performed by: FAMILY MEDICINE

## 2023-09-23 PROCEDURE — 96367 TX/PROPH/DG ADDL SEQ IV INF: CPT

## 2023-09-23 PROCEDURE — 250N000011 HC RX IP 250 OP 636: Mod: JZ | Performed by: FAMILY MEDICINE

## 2023-09-23 PROCEDURE — 10061 I&D ABSCESS COMP/MULTIPLE: CPT

## 2023-09-23 PROCEDURE — 87077 CULTURE AEROBIC IDENTIFY: CPT | Performed by: FAMILY MEDICINE

## 2023-09-23 PROCEDURE — 96366 THER/PROPH/DIAG IV INF ADDON: CPT

## 2023-09-23 RX ORDER — CEFAZOLIN SODIUM 1 G/50ML
2500 SOLUTION INTRAVENOUS ONCE
Status: COMPLETED | OUTPATIENT
Start: 2023-09-23 | End: 2023-09-23

## 2023-09-23 RX ORDER — CARVEDILOL 12.5 MG/1
12.5 TABLET ORAL 2 TIMES DAILY WITH MEALS
Qty: 180 TABLET | Refills: 0 | Status: SHIPPED | OUTPATIENT
Start: 2023-09-23 | End: 2024-02-14

## 2023-09-23 RX ORDER — CEFAZOLIN SODIUM 2 G/100ML
2 INJECTION, SOLUTION INTRAVENOUS ONCE
Status: COMPLETED | OUTPATIENT
Start: 2023-09-23 | End: 2023-09-23

## 2023-09-23 RX ORDER — CLOPIDOGREL BISULFATE 75 MG/1
75 TABLET ORAL DAILY
Qty: 90 TABLET | Refills: 0 | Status: SHIPPED | OUTPATIENT
Start: 2023-09-23 | End: 2024-02-14

## 2023-09-23 RX ORDER — LOSARTAN POTASSIUM 100 MG/1
100 TABLET ORAL DAILY
Qty: 90 TABLET | Refills: 0 | Status: SHIPPED | OUTPATIENT
Start: 2023-09-23 | End: 2024-02-14

## 2023-09-23 RX ORDER — ATORVASTATIN CALCIUM 80 MG/1
80 TABLET, FILM COATED ORAL DAILY
Qty: 90 TABLET | Refills: 0 | Status: SHIPPED | OUTPATIENT
Start: 2023-09-23 | End: 2024-02-14

## 2023-09-23 RX ORDER — DOXYCYCLINE 100 MG/1
100 CAPSULE ORAL 2 TIMES DAILY
Qty: 20 CAPSULE | Refills: 0 | Status: SHIPPED | OUTPATIENT
Start: 2023-09-23 | End: 2023-10-03

## 2023-09-23 RX ORDER — HYDROCHLOROTHIAZIDE 12.5 MG/1
12.5 TABLET ORAL DAILY
Qty: 90 TABLET | Refills: 0 | Status: SHIPPED | OUTPATIENT
Start: 2023-09-23 | End: 2023-10-20

## 2023-09-23 RX ADMIN — INSULIN GLARGINE 24 UNITS: 100 INJECTION, SOLUTION SUBCUTANEOUS at 20:18

## 2023-09-23 RX ADMIN — INSULIN ASPART 10 UNITS: 100 INJECTION, SOLUTION INTRAVENOUS; SUBCUTANEOUS at 20:18

## 2023-09-23 RX ADMIN — CEFAZOLIN SODIUM 2 G: 2 INJECTION, SOLUTION INTRAVENOUS at 18:54

## 2023-09-23 RX ADMIN — VANCOMYCIN HYDROCHLORIDE 2500 MG: 1 INJECTION, POWDER, LYOPHILIZED, FOR SOLUTION INTRAVENOUS at 19:32

## 2023-09-23 ASSESSMENT — ACTIVITIES OF DAILY LIVING (ADL)
ADLS_ACUITY_SCORE: 35
ADLS_ACUITY_SCORE: 35

## 2023-09-23 NOTE — ED PROVIDER NOTES
EMERGENCY DEPARTMENT ENCOUNTER      NAME: Floyd Capps  AGE: 47 year old male  YOB: 1975  MRN: [id number]  EVALUATION DATE & TIME: No admission date for patient encounter.    PCP: No Ref-Primary, Physician    ED PROVIDER: Hugh Briones M.D.    Chief Complaint   Patient presents with    Cyst       FINAL IMPRESSION:  1. Type 2 diabetes mellitus with hyperglycemia, with long-term current use of insulin (H)    2. H/O: CVA (cerebrovascular accident)    3. Neck abscess        ED COURSE & MEDICAL DECISION MAKING:    Pertinent Labs & Imaging studies independently interpreted by me. (See chart for details)  5:35 PM Patient seen and examined, reviewed prior emergency department visit from July 15, no July 12, July 10 when patient was treated for abscesses of the scalp and face.  Also reviewed most recent emergency department visit and hospital admission on August 23 when patient was seen and admitted for ischemic stroke and hyperglycemia.  Patient presents today with a mass on the right side of his neck.  This is erythematous and tender consistent with infection.  Consider aneurysm but nonpulsatile, also consider solid mass such as lymph node or lipoma but this is soft and obviously infected.  Discussed treatment with the patient.  He has a history of diabetes and has not been out of his Lantus for quite some time, is not checking his sugars but is taking metformin.  Labs ordered and plan for incision and drainage of abscess on the neck.  5:45 PM Lidocaine with epinephrine injected and will recheck patient after IV start to perform incision and drainage.  6:15 PM I and D performed, culture sent  7:19 PM labs independently turbid by me demonstrate hyperglycemia, mild leukocytosis.  Patient remains stable in the emergency department and is stable for discharge.    At the conclusion of the encounter I discussed the results of all of the tests and the disposition. The questions were answered. The patient or  family acknowledged understanding and was agreeable with the care plan.     Medical Decision Making    History:  Supplemental history from: Documented in chart, if applicable  External Record(s) reviewed: Documented in chart, if applicable.    Work Up:  Chart documentation includes differential considered and any EKGs or imaging independently interpreted by provider, where specified.  In additional to work up documented, I considered the following work up: Documented in chart, if applicable.    External consultation:  Discussion of management with another provider: Documented in chart, if applicable    Complicating factors:  Care impacted by chronic illness: Diabetes and Hyperlipidemia  Care affected by social determinants of health: Access to Medical Care and Medication Noncompliance    Disposition considerations: Discharge. I prescribed additional prescription strength medication(s) as charted. I considered admission, but discharged the patient after share decision making conversation.      PROCEDURES:   PROCEDURE: Incision and Drainage   INDICATIONS: Localized abscess   PROCEDURE PROVIDER: Dr Hugh Briones   SITE: Right posterior neck   MEDICATION: 3 mLs of 2% Lidocaine with epinephrine   NOTE: The area was prepped with chlorhexidine and draped off in the usual sterile fashion.  Local anesthetic was injected subcutaneously with anesthesia effects demonstrated prior to proceeding.  The area of maximal fluctuance was opened with a # 11 Blade ([place]) using a Single Straight incision to allow for drainage.  The abscess was drained.  The abscess cavity was bluntly explored to separate any loculations. Iodoform Gauze was placed into the abscess cavity.  A sterile dressing was placed over the area.   COMPLEXITY: Complex    Simple = single, furuncle, paronychia, superficial  Complex = multiple or abscess requiring probing, loculations, packing placement   COMPLICATIONS: Patient tolerated procedure well, without  complication       MEDICATIONS GIVEN IN THE EMERGENCY:  Medications   vancomycin (VANCOCIN) 2,500 mg in sodium chloride 0.9 % 500 mL intermittent infusion (has no administration in time range)   insulin glargine (LANTUS PEN) injection 24 Units (has no administration in time range)   ceFAZolin (ANCEF) 2 g in 100 mL D5W intermittent infusion (2 g Intravenous $New Bag 9/23/23 1031)       NEW PRESCRIPTIONS STARTED AT TODAY'S ER VISIT  New Prescriptions    AMOXICILLIN-CLAVULANATE (AUGMENTIN) 875-125 MG TABLET    Take 1 tablet by mouth 2 times daily for 10 days    ATORVASTATIN (LIPITOR) 80 MG TABLET    Take 1 tablet (80 mg) by mouth daily    CARVEDILOL (COREG) 12.5 MG TABLET    Take 1 tablet (12.5 mg) by mouth 2 times daily (with meals) for 90 days    CLOPIDOGREL (PLAVIX) 75 MG TABLET    Take 1 tablet (75 mg) by mouth daily for 90 days    DOXYCYCLINE HYCLATE (VIBRAMYCIN) 100 MG CAPSULE    Take 1 capsule (100 mg) by mouth 2 times daily for 10 days    HYDROCHLOROTHIAZIDE (HYDRODIURIL) 12.5 MG TABLET    Take 1 tablet (12.5 mg) by mouth daily    INSULIN GLARGINE (LANTUS PEN) 100 UNIT/ML PEN    Inject 24 Units Subcutaneous At Bedtime for 125 days    LOSARTAN (COZAAR) 100 MG TABLET    Take 1 tablet (100 mg) by mouth daily    METFORMIN (GLUCOPHAGE) 500 MG TABLET    Take 1 tablet (500 mg) by mouth 2 times daily (with meals) for 90 days       =================================================================    HPI    Patient information was obtained from: the patient      Floyd Capps is a 47 year old male with a pertinent history of T2DM, HLD, hypothyroidism, and ischemic stroke, who presents to this ED by private vehicle for evaluation of an abscess.     The patient has a history of abscesses requiring drainage. He currently has one on the back of his neck that has been there since July. Normally, he has them drained and uses antibiotics without issue. He states that he ran out of antibiotics, but when he was on them the  abscess began to improve. He is on Plavix due to a stroke. He is also on losartin for HTN. The patient has been off of his insulin for the past few months due to insurance issues. He denies any other symptoms at this time.      REVIEW OF SYSTEMS   Review of Systems   Skin:         Positive for abscess to back of neck   All other systems reviewed and are negative.     All other systems reviewed and negative    PAST MEDICAL HISTORY:  No past medical history on file.    PAST SURGICAL HISTORY:  Past Surgical History:   Procedure Laterality Date    APPENDECTOMY      INCISION AND DRAINAGE OF WOUND      groin abscess       CURRENT MEDICATIONS:    Current Facility-Administered Medications   Medication    insulin glargine (LANTUS PEN) injection 24 Units    vancomycin (VANCOCIN) 2,500 mg in sodium chloride 0.9 % 500 mL intermittent infusion     Current Outpatient Medications   Medication    amoxicillin-clavulanate (AUGMENTIN) 875-125 MG tablet    atorvastatin (LIPITOR) 80 MG tablet    carvedilol (COREG) 12.5 MG tablet    clopidogrel (PLAVIX) 75 MG tablet    doxycycline hyclate (VIBRAMYCIN) 100 MG capsule    hydrochlorothiazide (HYDRODIURIL) 12.5 MG tablet    insulin glargine (LANTUS PEN) 100 UNIT/ML pen    losartan (COZAAR) 100 MG tablet    metFORMIN (GLUCOPHAGE) 500 MG tablet    aspirin 81 MG EC tablet    atorvastatin (LIPITOR) 80 MG tablet    carvedilol (COREG) 12.5 MG tablet    clopidogrel (PLAVIX) 75 MG tablet    hydrochlorothiazide (HYDRODIURIL) 12.5 MG tablet    insulin glargine (LANTUS PEN) 100 UNIT/ML pen    losartan (COZAAR) 100 MG tablet    metFORMIN (GLUCOPHAGE) 500 MG tablet       ALLERGIES:  No Known Allergies    FAMILY HISTORY:  No family history on file.    SOCIAL HISTORY:   Social History     Socioeconomic History    Marital status: Single   Tobacco Use    Smoking status: Every Day     Packs/day: 0.50     Types: Cigarettes    Smokeless tobacco: Never    Tobacco comments:     Seen IP by [facility] on 8/24/23 and  declined cessation services and materials.    Substance and Sexual Activity    Alcohol use: No    Drug use: No   Social History Narrative    Patient reports that he does not have health insurance.       VITALS:  BP (!) 151/85 (BP Location: Right arm, Patient Position: Sitting)   Pulse 91   Temp 97.8  F (36.6  C) (Temporal)   Resp 18   Wt 130 kg (286 lb 9.6 oz)   SpO2 96%   BMI 41.12 kg/m      PHYSICAL EXAM:  Physical Exam  Vitals and nursing note reviewed.   Constitutional:       Appearance: Normal appearance.   HENT:      Head: Normocephalic and atraumatic.      Right Ear: External ear normal.      Left Ear: External ear normal.      Nose: Nose normal.   Eyes:      Extraocular Movements: Extraocular movements intact.      Conjunctiva/sclera: Conjunctivae normal.      Pupils: Pupils are equal, round, and reactive to light.   Pulmonary:      Effort: Pulmonary effort is normal.   Musculoskeletal:         General: No swelling or deformity. Normal range of motion.      Cervical back: Normal range of motion.   Skin:     Comments: 6 cm area of erythema and induration with central flocculate.   Neurological:      General: No focal deficit present.      Mental Status: He is alert and oriented to person, place, and time. Mental status is at baseline.   Psychiatric:         Mood and Affect: Mood normal.         Behavior: Behavior normal.         Thought Content: Thought content normal.          LAB:  All pertinent labs reviewed and interpreted.  Results for orders placed or performed during the hospital encounter of 09/23/23   Basic metabolic panel   Result Value Ref [place]    Sodium 133 (L) 136 - 145 mmol/L    Potassium 4.8 3.4 - 5.3 mmol/L    Chloride 100 98 - 107 mmol/L    Carbon Dioxide (CO2) 22 22 - 29 mmol/L    Anion Gap 11 7 - 15 mmol/L    Urea Nitrogen 17.8 6.0 - 20.0 mg/dL    Creatinine 0.68 0.67 - 1.17 mg/dL    Calcium 9.6 8.6 - 10.0 mg/dL    Glucose 324 (H) 70 - 99 mg/dL    GFR Estimate >90 >60 mL/min/1.73m2    CBC with platelets and differential   Result Value Ref [place]    WBC Count 12.7 (H) 4.0 - 11.0 10e3/uL    RBC Count 4.58 4.40 - 5.90 10e6/uL    Hemoglobin 14.5 13.3 - 17.7 g/dL    Hematocrit 41.1 40.0 - 53.0 %    MCV 90 78 - 100 fL    MCH 31.7 26.5 - 33.0 pg    MCHC 35.3 31.5 - 36.5 g/dL    RDW 11.9 10.0 - 15.0 %    Platelet Count 306 150 - 450 10e3/uL    % Neutrophils 67 %    % Lymphocytes 21 %    % Monocytes 8 %    % Eosinophils 2 %    % Basophils 1 %    % Immature Granulocytes 1 %    NRBCs per 100 WBC 0 <1 /100    Absolute Neutrophils 8.5 (H) 1.6 - 8.3 10e3/uL    Absolute Lymphocytes 2.7 0.8 - 5.3 10e3/uL    Absolute Monocytes 1.0 0.0 - 1.3 10e3/uL    Absolute Eosinophils 0.3 0.0 - 0.7 10e3/uL    Absolute Basophils 0.1 0.0 - 0.2 10e3/uL    Absolute Immature Granulocytes 0.1 <=0.4 10e3/uL    Absolute NRBCs 0.0 10e3/uL       RADIOLOGY:  Reviewed all pertinent imaging. Please see official radiology report.  No orders to display       I, Opal Marks, am serving as a scribe to document services personally performed by Dr. Briones based on my observation and the provider's statements to me. I, Hugh Briones MD attest that Opal Marks is acting in a scribe capacity, has observed my performance of the services and has documented them in accordance with my direction.    Hugh Briones M.D.  Emergency Medicine  [facility] EMERGENCY DEPARTMENT  [address] [facility] [address] [phone]  Dept: [phone]       Hugh Briones MD  09/23/23 3780

## 2023-09-23 NOTE — ED TRIAGE NOTES
He has a swollen cyst appearing redness on his right neck area. He has been treated for it and has been on antibiotics in the past for it.

## 2023-09-24 NOTE — DISCHARGE INSTRUCTIONS
Take antibiotics as prescribed    Remove packing in 3 days.  You may wet it with a little water if it is sticking to the wound edges.  Insert a new packing of 1 to 2 inches to keep the skin open.  Remove the second packing in 3 days.    You may get the area wet.

## 2023-09-26 LAB
BACTERIA ABSC ANAEROBE+AEROBE CULT: ABNORMAL
BACTERIA ABSC ANAEROBE+AEROBE CULT: ABNORMAL
GRAM STAIN RESULT: ABNORMAL
GRAM STAIN RESULT: ABNORMAL

## 2023-10-12 ENCOUNTER — OFFICE VISIT (OUTPATIENT)
Dept: SURGERY | Facility: CLINIC | Age: 48
End: 2023-10-12
Attending: FAMILY MEDICINE

## 2023-10-12 VITALS — WEIGHT: 283 LBS | BODY MASS INDEX: 40.61 KG/M2

## 2023-10-12 DIAGNOSIS — L02.11 NECK ABSCESS: ICD-10-CM

## 2023-10-12 PROCEDURE — 10060 I&D ABSCESS SIMPLE/SINGLE: CPT | Performed by: SURGERY

## 2023-10-12 PROCEDURE — 99203 OFFICE O/P NEW LOW 30 MIN: CPT | Mod: 25 | Performed by: SURGERY

## 2023-10-12 NOTE — LETTER
10/12/2023         RE: Floyd Capps  [address]  [place] [address]        Dear Colleague,    Thank you for referring your patient, Floyd Capps, to the [facility] [place]. Please see a copy of my visit note below.    HPI  Floyd Capps is a 47 year old male who was referred to me from the emergency department for evaluation of a neck wound.  Patient has a history of multiple cysts that required drainage.  He developed an infected wound on his right neck that was drained in the emergency department several weeks ago.  He was started on doxycycline and amoxicillin which he has already finished.  He states that the area has gotten much smaller since his initial evaluation.  He has a recent history of a stroke and is on Plavix.      Allergies:Patient has no known allergies.    PMH:  Diabetes  Stroke    Past Surgical History:   Procedure Laterality Date     APPENDECTOMY       INCISION AND DRAINAGE OF WOUND      groin abscess       Current Outpatient Medications   Medication Sig Dispense Refill     atorvastatin (LIPITOR) 80 MG tablet Take 1 tablet (80 mg) by mouth daily 90 tablet 0     carvedilol (COREG) 12.5 MG tablet Take 1 tablet (12.5 mg) by mouth 2 times daily (with meals) for 90 days 180 tablet 0     clopidogrel (PLAVIX) 75 MG tablet Take 1 tablet (75 mg) by mouth daily for 90 days 90 tablet 0     hydrochlorothiazide (HYDRODIURIL) 12.5 MG tablet Take 1 tablet (12.5 mg) by mouth daily 90 tablet 0     losartan (COZAAR) 100 MG tablet Take 1 tablet (100 mg) by mouth daily 90 tablet 0     metFORMIN (GLUCOPHAGE) 500 MG tablet Take 1 tablet (500 mg) by mouth 2 times daily (with meals) for 90 days 180 tablet 0     insulin glargine (LANTUS PEN) 100 UNIT/ML pen Inject 24 Units Subcutaneous At Bedtime for 125 days (Patient not taking: Reported on 10/12/2023) 30 mL 0       No family history on file.     reports that he has been smoking cigarettes. He has been  smoking an average of .5 packs per day. He has never used smokeless tobacco. He reports that he does not drink alcohol and does not use drugs.      Wt 128.4 kg (283 lb)   BMI 40.61 kg/m    Body mass index is 40.61 kg/m .    EXAM:  GENERAL: Well developed male in NAD  HEENT: Pupils are round and reactive, sclera are anicteric.   NECK: Firm indurated lesion in the posterior right neck.  There is no clear fluctuance that I can feel.  He has some surrounding erythema as well.  CV: RRR  PULM: Non-labored breathing on RA  NEURO: No obvious deficits noted.  EXT: No edema, no obvious deformities or any other abnormalities    Assessment    Floyd Capps is a 47 year old male presenting with an infection of his right neck.  He has an ultrasound was able to see a small amount of fluid.  I consequently decided to perform a small incision and drainage.    Procedure  The patient's neck was prepped and I injected local anesthetic.  I made a small stab incision with an 11 blade scalpel.  Using blunt dissection hide I found my way into a fluid pocket that had some purulent fluid.  This was easily expressed.  I then packed the wound with quarter inch strip gauze.    Plan  Remove packing in 24 hours  If looks more infected or does not continue to improve, consider antibiotics.       Feliciano Su MD  General Surgeon  [facility]  Surgery [address]  [address]  [place] [address]  Office: [phone]      Again, thank you for allowing me to participate in the care of your patient.        Sincerely,        Feliciano Su MD

## 2023-10-12 NOTE — PROGRESS NOTES
HPI  Floyd Capps is a 47 year old male who was referred to me from the emergency department for evaluation of a neck wound.  Patient has a history of multiple cysts that required drainage.  He developed an infected wound on his right neck that was drained in the emergency department several weeks ago.  He was started on doxycycline and amoxicillin which he has already finished.  He states that the area has gotten much smaller since his initial evaluation.  He has a recent history of a stroke and is on Plavix.      Allergies:Patient has no known allergies.    PMH:  Diabetes  Stroke    Past Surgical History:   Procedure Laterality Date    APPENDECTOMY      INCISION AND DRAINAGE OF WOUND      groin abscess       Current Outpatient Medications   Medication Sig Dispense Refill    atorvastatin (LIPITOR) 80 MG tablet Take 1 tablet (80 mg) by mouth daily 90 tablet 0    carvedilol (COREG) 12.5 MG tablet Take 1 tablet (12.5 mg) by mouth 2 times daily (with meals) for 90 days 180 tablet 0    clopidogrel (PLAVIX) 75 MG tablet Take 1 tablet (75 mg) by mouth daily for 90 days 90 tablet 0    hydrochlorothiazide (HYDRODIURIL) 12.5 MG tablet Take 1 tablet (12.5 mg) by mouth daily 90 tablet 0    losartan (COZAAR) 100 MG tablet Take 1 tablet (100 mg) by mouth daily 90 tablet 0    metFORMIN (GLUCOPHAGE) 500 MG tablet Take 1 tablet (500 mg) by mouth 2 times daily (with meals) for 90 days 180 tablet 0    insulin glargine (LANTUS PEN) 100 UNIT/ML pen Inject 24 Units Subcutaneous At Bedtime for 125 days (Patient not taking: Reported on 10/12/2023) 30 mL 0       No family history on file.     reports that he has been smoking cigarettes. He has been smoking an average of .5 packs per day. He has never used smokeless tobacco. He reports that he does not drink alcohol and does not use drugs.      Wt 128.4 kg (283 lb)   BMI 40.61 kg/m    Body mass index is 40.61 kg/m .    EXAM:  GENERAL: Well developed male in NAD  HEENT: Pupils are round  and reactive, sclera are anicteric.   NECK: Firm indurated lesion in the posterior right neck.  There is no clear fluctuance that I can feel.  He has some surrounding erythema as well.  CV: RRR  PULM: Non-labored breathing on RA  NEURO: No obvious deficits noted.  EXT: No edema, no obvious deformities or any other abnormalities    Assessment    Floyd Capps is a 47 year old male presenting with an infection of his right neck.  He has an ultrasound was able to see a small amount of fluid.  I consequently decided to perform a small incision and drainage.    Procedure  The patient's neck was prepped and I injected local anesthetic.  I made a small stab incision with an 11 blade scalpel.  Using blunt dissection hide I found my way into a fluid pocket that had some purulent fluid.  This was easily expressed.  I then packed the wound with quarter inch strip gauze.    Plan  Remove packing in 24 hours  If looks more infected or does not continue to improve, consider antibiotics.       Feliciano Su MD  General Surgeon  [facility]  Surgery [address]  [address]  [place] [address]  Office: [phone]

## 2023-10-20 ENCOUNTER — OFFICE VISIT (OUTPATIENT)
Dept: INTERNAL MEDICINE | Facility: CLINIC | Age: 48
End: 2023-10-20

## 2023-10-20 ENCOUNTER — TELEPHONE (OUTPATIENT)
Dept: INTERNAL MEDICINE | Facility: CLINIC | Age: 48
End: 2023-10-20

## 2023-10-20 VITALS
WEIGHT: 285.1 LBS | RESPIRATION RATE: 16 BRPM | HEART RATE: 88 BPM | TEMPERATURE: 98.2 F | OXYGEN SATURATION: 96 % | HEIGHT: 70 IN | DIASTOLIC BLOOD PRESSURE: 62 MMHG | BODY MASS INDEX: 40.82 KG/M2 | SYSTOLIC BLOOD PRESSURE: 134 MMHG

## 2023-10-20 DIAGNOSIS — Z79.4 UNCONTROLLED TYPE 2 DIABETES MELLITUS WITH HYPERGLYCEMIA, WITH LONG-TERM CURRENT USE OF INSULIN (H): ICD-10-CM

## 2023-10-20 DIAGNOSIS — Z12.11 SCREEN FOR COLON CANCER: Primary | ICD-10-CM

## 2023-10-20 DIAGNOSIS — L02.411 CUTANEOUS ABSCESS OF RIGHT AXILLA: ICD-10-CM

## 2023-10-20 DIAGNOSIS — Z86.73 HISTORY OF TIA (TRANSIENT ISCHEMIC ATTACK) AND STROKE: ICD-10-CM

## 2023-10-20 DIAGNOSIS — Z11.4 SCREENING FOR HIV (HUMAN IMMUNODEFICIENCY VIRUS): ICD-10-CM

## 2023-10-20 DIAGNOSIS — K13.70 ORAL LESION: ICD-10-CM

## 2023-10-20 DIAGNOSIS — Z11.59 NEED FOR HEPATITIS C SCREENING TEST: ICD-10-CM

## 2023-10-20 DIAGNOSIS — K14.8 TONGUE LESION: ICD-10-CM

## 2023-10-20 DIAGNOSIS — E11.65 UNCONTROLLED TYPE 2 DIABETES MELLITUS WITH HYPERGLYCEMIA, WITH LONG-TERM CURRENT USE OF INSULIN (H): ICD-10-CM

## 2023-10-20 PROCEDURE — 99204 OFFICE O/P NEW MOD 45 MIN: CPT | Performed by: NURSE PRACTITIONER

## 2023-10-20 RX ORDER — SULFAMETHOXAZOLE/TRIMETHOPRIM 800-160 MG
1 TABLET ORAL 2 TIMES DAILY
Qty: 20 TABLET | Refills: 0 | Status: SHIPPED | OUTPATIENT
Start: 2023-10-20 | End: 2023-10-30

## 2023-10-20 NOTE — PROGRESS NOTES
"  Assessment & Plan   Problem List Items Addressed This Visit    None  Visit Diagnoses     Screen for colon cancer    -  Primary    Relevant Orders    Colonoscopy Screening  Referral    Screening for HIV (human immunodeficiency virus)        Need for hepatitis C screening test        Relevant Orders    Hepatitis C Screen Reflex to HCV RNA Quant and Genotype    Uncontrolled type 2 diabetes mellitus with hyperglycemia, with long-term current use of insulin (H)        Relevant Orders    Albumin Random Urine Quantitative with Creat Ratio    Adult Eye  Referral    Primary Care - Care Coordination Referral    History of TIA (transient ischemic attack) and stroke        Relevant Orders    Adult Neurology  Referral    Oral lesion        Tongue lesion        Relevant Orders    Adult ENT  Referral    Cutaneous abscess of right axilla             Patient indicates it takes more time to \"get my coordination and get going\", Patient may have exacerate movement and spacial awareness.  Patient states at times he feels like his right leg will \"flayl\"  increase speed patient indicates will \"wabble\"  He is now able to shave and feels confident with steady hand and hand/eye coordination.      Patient is not checking his blood sugars.  Patient does not have insulin due to cost     47-year-old male patient presents to clinic today for follow-up.  Patient was seen in the emergency department on 2 separate occasions 8/23/2023 and 9/23/2023.  Patient is history of ischemic heart disease, ischemic stroke, hypertension, type 2 diabetes.    Referrals been made to care coordination to assist with options available for patient for insurance as well as medication coverage.     Nicotine/Tobacco Cessation:  He reports that he has been smoking cigarettes. He has been smoking an average of .5 packs per day. He has never used smokeless tobacco.  Nicotine/Tobacco Cessation Plan:   Information offered: Patient not " "interested at this time      BMI:   Estimated body mass index is 40.91 kg/m  as calculated from the following:    Height as of this encounter: 1.778 m (5' 10\").    Weight as of this encounter: 129.3 kg (285 lb 1.6 oz).   Weight management plan: Discussed healthy diet and exercise guidelines        Glendy Benavides NP  [facility] YOLIE Barrientos is a 47 year old, presenting for the following health issues:  Establish Care      10/20/2023     7:36 AM   Additional Questions   Roomed by Jazmyn       History of Present Illness       Reason for visit:  Stroke assesment    He eats 0-1 servings of fruits and vegetables daily.He consumes 3 sweetened beverage(s) daily.He exercises with enough effort to increase his heart rate 9 or less minutes per day.  He exercises with enough effort to increase his heart rate 3 or less days per week.   He is taking medications regularly.                 Review of Systems   Constitutional, HEENT, cardiovascular, pulmonary, GI, , musculoskeletal, neuro, skin, endocrine and psych systems are negative, except as otherwise noted.      Objective    /62 (BP Location: Right arm, Patient Position: Sitting, Cuff Size: Adult Large)   Pulse 88   Temp 98.2  F (36.8  C) (Oral)   Resp 16   Ht 1.778 m (5' 10\")   Wt 129.3 kg (285 lb 1.6 oz)   SpO2 96%   BMI 40.91 kg/m    Body mass index is 40.91 kg/m .  Physical Exam   GENERAL: healthy, alert and no distress  EYES: Eyes grossly normal to inspection, PERRL and conjunctivae and sclerae normal  HENT: ear canals and TM's normal, nose and mouth noted to have a lesion on his tongue   NECK: no adenopathy   RESP: lungs clear to auscultation - no rales, rhonchi or wheezes  CV: regular rate and rhythm, normal S1 S2, no S3 or S4, no murmur, click or rub, no peripheral edema and peripheral pulses strong  MS: no gross musculoskeletal defects noted, no edema  SKIN: abscess right axillary   NEURO: Normal strength and tone, " mentation intact and speech normal  PSYCH: mentation appears normal, affect normal/bright    Admission on 09/23/2023, Discharged on 09/23/2023   Component Date Value Ref Range Status     Sodium 09/23/2023 133 (L)  136 - 145 mmol/L Final     Potassium 09/23/2023 4.8  3.4 - 5.3 mmol/L Final    Specimen slightly hemolyzed. The reported potassium value may be falsely elevated. Analysis of a non-hemolyzed specimen (i.e. re-draw) may result in a lower potassium value.     Chloride 09/23/2023 100  98 - 107 mmol/L Final     Carbon Dioxide (CO2) 09/23/2023 22  22 - 29 mmol/L Final     Anion Gap 09/23/2023 11  7 - 15 mmol/L Final     Urea Nitrogen 09/23/2023 17.8  6.0 - 20.0 mg/dL Final     Creatinine 09/23/2023 0.68  0.67 - 1.17 mg/dL Final     Calcium 09/23/2023 9.6  8.6 - 10.0 mg/dL Final     Glucose 09/23/2023 324 (H)  70 - 99 mg/dL Final     GFR Estimate 09/23/2023 >90  >60 mL/min/1.73m2 Final     Culture 09/23/2023 2+ Staphylococcus aureus MRSA (A)   Final     Culture 09/23/2023 2+ Staphylococcus aureus MRSA (A)   Final     Gram Stain Result 09/23/2023 1+ Gram positive cocci (A)   Final     Gram Stain Result 09/23/2023 4+ WBC seen (A)   Final    Predominantly PMNs     WBC Count 09/23/2023 12.7 (H)  4.0 - 11.0 10e3/uL Final     RBC Count 09/23/2023 4.58  4.40 - 5.90 10e6/uL Final     Hemoglobin 09/23/2023 14.5  13.3 - 17.7 g/dL Final     Hematocrit 09/23/2023 41.1  40.0 - 53.0 % Final     MCV 09/23/2023 90  78 - 100 fL Final     MCH 09/23/2023 31.7  26.5 - 33.0 pg Final     MCHC 09/23/2023 35.3  31.5 - 36.5 g/dL Final     RDW 09/23/2023 11.9  10.0 - 15.0 % Final     Platelet Count 09/23/2023 306  150 - 450 10e3/uL Final     % Neutrophils 09/23/2023 67  % Final     % Lymphocytes 09/23/2023 21  % Final     % Monocytes 09/23/2023 8  % Final     % Eosinophils 09/23/2023 2  % Final     % Basophils 09/23/2023 1  % Final     % Immature Granulocytes 09/23/2023 1  % Final     NRBCs per 100 WBC 09/23/2023 0  <1 /100 Final      Absolute Neutrophils 09/23/2023 8.5 (H)  1.6 - 8.3 10e3/uL Final     Absolute Lymphocytes 09/23/2023 2.7  0.8 - 5.3 10e3/uL Final     Absolute Monocytes 09/23/2023 1.0  0.0 - 1.3 10e3/uL Final     Absolute Eosinophils 09/23/2023 0.3  0.0 - 0.7 10e3/uL Final     Absolute Basophils 09/23/2023 0.1  0.0 - 0.2 10e3/uL Final     Absolute Immature Granulocytes 09/23/2023 0.1  <=0.4 10e3/uL Final     Absolute NRBCs 09/23/2023 0.0  10e3/uL Final                     Current Outpatient Medications:      atorvastatin (LIPITOR) 80 MG tablet, Take 1 tablet (80 mg) by mouth daily, Disp: 90 tablet, Rfl: 0     carvedilol (COREG) 12.5 MG tablet, Take 1 tablet (12.5 mg) by mouth 2 times daily (with meals) for 90 days, Disp: 180 tablet, Rfl: 0     clopidogrel (PLAVIX) 75 MG tablet, Take 1 tablet (75 mg) by mouth daily for 90 days, Disp: 90 tablet, Rfl: 0     insulin glargine (LANTUS PEN) 100 UNIT/ML pen, Inject 24 Units Subcutaneous At Bedtime for 125 days, Disp: 30 mL, Rfl: 0     losartan (COZAAR) 100 MG tablet, Take 1 tablet (100 mg) by mouth daily, Disp: 90 tablet, Rfl: 0     metFORMIN (GLUCOPHAGE) 500 MG tablet, Take 1 tablet (500 mg) by mouth 2 times daily (with meals) for 90 days, Disp: 180 tablet, Rfl: 0

## 2023-10-20 NOTE — COMMUNITY RESOURCES LIST (ENGLISH)
10/20/2023   [facility]  N/A  For questions about this resource list or additional care needs, please contact your primary care clinic or care manager.  Phone: [phone]   Email: N/A   Address: [address] [place] [address]   Hours: N/A        Financial Stability       Rent and mortgage payment assistance  1  [facility] - Rent Payment Assistance Distance: 2.42 miles      Phone/Virtual   [address] [place] [address]  Language: English  Hours: Mon - Fri 9:00 AM - 4:00 PM , Sun 9:30 AM - 12:00 PM  Fees: Free   Phone: (624) 873-7757 Email: [email or web address] Website: [email or web address]/[email or web address]     2  [employer] ([facility]) - Located within [facility] Supportive Housing Assistance Program (SHAP) - Rent and mortgage payment assistance Distance: 4.34 miles      Phone/Virtual   [address] [place] [address]  Language: English, Hmong, Ekaterina, Bolivian  Hours: Mon - Fri 8:30 AM - 5:00 PM  Fees: Free   Phone: (804) 898-9555 Email: yola@ong.org Website: http://www.hmong.org/hap-impact-areas/          Food and Nutrition       Food pantry  3  [employer] [facility] [place] - Department of [employer] To [employer] Work (DIW) Distance: 1.09 miles      Delivery, [facility]   [address] [place] [address]  Language: English, Ojibwe, Nepalese  Hours: Mon 1:00 PM - 6:00 PM , Tue - Thu 9:30 AM - 2:30 PM  Fees: Free   Phone: (208) 231-8881 Email: tosha@[employer] Website: http://[employer].org/diw     4  [employer] [facility] - Food Shelf Distance: 2.06 miles      Delivery, [address] [place] [address]  Language: English  Hours: Sat 9:00 AM - 10:00 AM  Fees: Free   Phone: (486) 145-5423 Email: batool@[employer].net Website: http://[employer].org/sites/vadnaishts/page-8.php     SNAP application assistance  5  [employer]  [place] Distance: 5.75 miles      Phone/Virtual   [address] [place] [address]  Language: English, Hmong, Haitian, Ukrainian, Divehi  Hours: Mon - Fri 8:30 AM - 4:30 PM  Fees: Free   Phone: (868) 713-3758 Email: helpline@[employer].org Website: https://www.[employer].org/programs/[place]-food-helpline/     6  [place] Department of Human Services - [employer] (SNAP) Distance: 5.86 miles      Phone/Virtual   [address] [address] [place] [address]  Language: English, Hmong, Haitian, Ukrainian, Divehi, Welsh  Hours: Mon - Fri 9:00 AM - 5:00 PM  Fees: Free   Phone: (708) 523-2716 Website: https://[place].gov/dhs/people-we-serve/adults/economic-assistance/food-nutrition/programs-and-services/supplemental-nutrition-assistance-program.jsp     Soup kitchen or free meals  7  [facility] - [employer] - Loaves and Fishes Distance: 3.25 miles      [facility]   [address] [place] [address]  Language: English  Hours: Wed 5:30 PM - 6:30 PM  Fees: Free   Phone: (365) 427-1102 Email: office@[facility].org Website: https://www.[facility].org     8  [facility] - [facility] & Programs Distance: 5.48 miles      In-Person   [address] [place] [address]  Language: English  Hours: Mon - Sun 6:30 AM - 7:30 AM , Mon - Sun 12:00 PM - 1:00 PM , Mon - Sun 5:30 PM - 6:30 PM  Fees: Free   Phone: (984) 137-2987 Email: info@[facility].org Website: https://[facility].org/about-us/contact/          Important Numbers & Websites       Emergency Services   911  City Services   311  Poison Control   (656) 221-8845  Suicide Prevention Lifeline   (929) 102-3098 (TALK)  Child Abuse Hotline   (659) 713-3796 (4-A-Child)  Sexual Assault Hotline   (473) 350-3589 (HOPE)  National Runaway Safeline   (417) 889-4381 (RUNAWAY)  All-Options Talkline   (357) 669-1322  Substance Abuse Referral   (184) 558-2532 (HELP)

## 2023-10-20 NOTE — TELEPHONE ENCOUNTER
Pt had FMLA forms at visit today.       Called pt to let him know that form is completed. Faxed form to number on form. Pt will come to clinic to  original copy that is placed up front.       Copies made for scanning and Hold Bin.       Amador Polk Jr., CMA on 10/20/2023 at 3:40 PM

## 2023-10-23 ENCOUNTER — PATIENT OUTREACH (OUTPATIENT)
Dept: CARE COORDINATION | Facility: CLINIC | Age: 48
End: 2023-10-23

## 2023-10-23 NOTE — PROGRESS NOTES
Clinic Care Coordination Contact  [facility]/Voicemail       Clinical Data: Care Coordinator Outreach  Outreach attempted x 1.  Left message on patient's voicemail with call back information and requested return call.  Plan: Care Coordinator CHW to discuss recent CC referral with patient and offer enrollment .   Care Coordinator will try to reach patient again in 1-2 business days.    Order Questions    Question Answer   Reason for Referral: Financial Support    Complex Medical Concerns/Education   Complex Medical Concerns: Chronic Diagnosis - Use Comments    Compliance with medical treatment plan    New Diagnosis - Use Comments   Financial Support: Insurance    Medication Affordability   Clinical Staff have discussed the Care Coordination Referral with the patient and/or caregiver: Yes     Mary PEREYRA  Community Health Worker  [facility] Care Coordination  [facility] [email or web address]  [email or web address]  Office: [phone]

## 2023-10-24 ENCOUNTER — PATIENT OUTREACH (OUTPATIENT)
Dept: CARE COORDINATION | Facility: CLINIC | Age: 48
End: 2023-10-24

## 2023-10-24 NOTE — PROGRESS NOTES
Clinic Care Coordination Contact  [facility]/Voicemail    Clinical Data: Care Coordinator Outreach        Left message on patient's voicemail with call back information and requested return call.    Plan: Care Coordinator CHW to discuss CC referral and offer enrollment, CHW will make final attempt on 10/26/2023  Care Coordinator will try to reach patient again in 1-2 business days.    Order Questions     Question Answer   Reason for Referral: Financial Support     Complex Medical Concerns/Education   Complex Medical Concerns: Chronic Diagnosis - Use Comments     Compliance with medical treatment plan     New Diagnosis - Use Comments   Financial Support: Insurance     Medication Affordability   Clinical Staff have discussed the Care Coordination Referral with the patient and/or caregiver: Yes     Mary PEREYRA  Community Health Worker  [facility] Care Coordination  [email or web address] [email or web address]  [email or web address]  Office: [phone]

## 2023-10-26 ENCOUNTER — PATIENT OUTREACH (OUTPATIENT)
Dept: CARE COORDINATION | Facility: CLINIC | Age: 48
End: 2023-10-26

## 2023-10-26 NOTE — PROGRESS NOTES
Clinic Care Coordination Contact  Community Health Worker Initial Outreach    CHW Initial Information Gathering:  Referral Source: PCP  Preferred Urgent Care: [facility] [place], [phone]  Current living arrangement:: I live in a private home  Type of residence:: Apartment  Community Resources: None  Informal Support system:: Friends, Family  No PCP office visit in Past Year: No  Transportation means:: Friend, Family, Regular car  CHW Additional Questions  If ED/Hospital discharge, follow-up appointment scheduled as recommended?: N/A  Medication changes made following ED/Hospital discharge?: N/A  MyChart active?: No  Patient agreeable to assistance with activating MyChart?: No    Patient accepts CC: Yes. Patient scheduled for assessment with CC JUAN C Stoner on Tuesday 10/31/2023 at 11:30 am. Patient noted desire to discuss health insurance barriers/resources.    Next PCP appointment is scheduled= 11/3/2023     Order Questions     Question Answer   Reason for Referral: Financial Support     Complex Medical Concerns/Education   Complex Medical Concerns: Chronic Diagnosis - Use Comments     Compliance with medical treatment plan     New Diagnosis - Use Comments   Financial Support: Insurance     Medication Affordability   Clinical Staff have discussed the Care Coordination Referral with the patient and/or caregiver: Yes     Mary PEREYRA  Community Health Worker  [facility] Care Coordination  [email or web address] [email or web address]  [email or web address]  Office: [phone]

## 2023-10-31 ENCOUNTER — PATIENT OUTREACH (OUTPATIENT)
Dept: NURSING | Facility: CLINIC | Age: 48
End: 2023-10-31

## 2023-10-31 ASSESSMENT — ACTIVITIES OF DAILY LIVING (ADL): DEPENDENT_IADLS:: INDEPENDENT

## 2023-10-31 NOTE — LETTER
[facility]  Patient Centered Plan of Care  About Me:        Patient Name:  Floyd Capps    YOB: 1975  Age:         47 year old   [place] MRN:    [id number] Telephone Information:  Home Phone [phone]   Mobile [phone]       Address:  [address] [place] [address] [place]   [address] [place] [address] Email address:  [email or web address]      Emergency Contact(s)    Name Relationship Lgl Grd Work Phone Home Phone Mobile Phone   JOSE RAMON SMITH Mother    [phone]           Primary language:  English     needed? No   [employer]:  [phone] op. 1  Other communication barriers:Caregiver    Preferred Method of Communication:     Current living arrangement: I live in a private home with family    Mobility Status/ Medical Equipment: Independent        Health Maintenance  Health Maintenance Reviewed: Due/Overdue   Health Maintenance Due   Topic Date Due    YEARLY PREVENTIVE VISIT  Never done    MICROALBUMIN  Never done    DIABETIC FOOT EXAM  Never done    ADVANCE CARE PLANNING  Never done    EYE EXAM  Never done    HEPATITIS B IMMUNIZATION (1 of 3 - 3-dose series) Never done    COLORECTAL CANCER SCREENING  Never done    HIV SCREENING  Never done    HEPATITIS C SCREENING  Never done    Pneumococcal Vaccine: Pediatrics (0 to 5 Years) and At-Risk Patients (6 to 64 Years) (2 - PCV) 10/11/2008    INFLUENZA VACCINE (1) 09/01/2023    COVID-19 Vaccine (3 - 2023-24 season) 09/01/2023           My Access Plan  Medical Emergency 911   Primary Clinic Line [facility] - [phone]   24 Hour Appointment Line [phone] or  [address] [place] [address] ([phone]) (toll-free)   24 Hour Nurse Line [phone] (toll-free)   Preferred Urgent Care [facility], [phone]     Preferred Hospital [facility]  [phone]     Preferred Pharmacy [id number] [address] [facility]     Behavioral Health Crisis  Line The National Suicide Prevention Lifeline at 1-669.249.2066 or Text/Call 988           My Care Team Members  Patient Care Team         Relationship Specialty Notifications Start End    Glendy Benavides, NP PCP - General  Abnormal results only, Admissions 10/20/23     Phone: [phone] Fax: [phone]         [address] [facility] INTERNAL MED [facility] [address]    Feliciano Su MD Assigned Surgical Provider   10/14/23     Phone: [phone] Fax: [phone]         [facility], [address] [facility] [address]    Eugene Spaulding MD MD Otolaryngology  10/20/23     Phone: [phone] Fax: [phone]         [facility] [address]    Georgiana Isaacs LSW Lead Care Coordinator Primary Care -  Admissions 10/26/23     Phone: [phone]         Yuli Maldonado Community Health Worker  Admissions 10/31/23                 My Care Plans  Self Management and Treatment Plan    Care Plan  Care Plan: Financial Wellbeing       Problem: Patient expresses financial resource strain       Long-Range Goal: Create an action plan to increase financial stability       Start Date: 10/31/2023 Expected End Date: 10/30/2024    Priority: High    Note:     Barriers: uneven income due to unemployment hurdles, not able to work due to stroke, no health insuranc.   Strengths: motivated.  Trying to get back to work.   Patient expressed understanding of goal: yes  Action steps to achieve this goal:  1. I will work with frw to see if I could get MA to help with large medical bills.  2. I will complete any paper work.  3. I will continue to recover from stroke so I can find a job as a .  4. I will report progress towards this goal at scheduled outreach telephone calls from the CCC team.                                 Action Plans on File:          Advance Care Plans/Directives:   Advanced Care Plan/Directives on file:   No    Discussed with patient/caregiver(s):   Declined Further  Information             My Medical and Care Information  Problem List   Patient Active Problem List   Diagnosis    Dizziness    Type 2 Diabetes Mellitus - Uncomplicated, Controlled    Hyperlipidemia    Hypothyroidism    Obesity    Tobacco use disorder    Elevated troponin    Ischemic stroke (H)      Current Medications and Allergies:    Current Outpatient Medications   Medication Instructions    atorvastatin (LIPITOR) 80 mg, Oral, DAILY    carvedilol (COREG) 12.5 mg, Oral, 2 TIMES DAILY WITH MEALS    clopidogrel (PLAVIX) 75 mg, Oral, DAILY    insulin glargine (LANTUS PEN) 24 Units, Subcutaneous, AT BEDTIME    losartan (COZAAR) 100 mg, Oral, DAILY    metFORMIN (GLUCOPHAGE) 500 mg, Oral, 2 TIMES DAILY WITH MEALS         Care Coordination Start Date: 10/20/2023   Frequency of Care Coordination: monthly, more frequently as needed     Form Last Updated: 11/01/2023

## 2023-10-31 NOTE — LETTER
[facility] CARE COORDINATION  [facility]  November 1, 2023    Floyd Capps  [address]     [place] [address]      Dear Floyd,    I am a clinic care coordinator who works with Glendy Benavides NP with the [facility]. I wanted to thank you for spending the time to talk with me.  Below is a description of clinic care coordination and how I can further assist you.       The clinic care coordination team is made up of a registered nurse, , financial resource worker and community health worker who understand the health care system. The goal of clinic care coordination is to help you manage your health and improve access to the health care system. Our team works alongside your provider to assist you in determining your health and social needs. We can help you obtain health care and community resources, providing you with necessary information and education. We can work with you through any barriers and develop a care plan that helps coordinate and strengthen the communication between you and your care team.  Our services are voluntary and are offered without charge to you personally.    Please feel free to contact me with any questions or concerns regarding care coordination and what we can offer.      We are focused on providing you with the highest-quality healthcare experience possible.    Sincerely,     Georgiana Isaacs,   [facility]  [phone]      Enclosed: I have enclosed a copy of the Patient Centered Plan of Care. This has helpful information and goals that we have talked about. Please keep this in an easy to access place to use as needed.

## 2023-10-31 NOTE — PROGRESS NOTES
Clinic Care Coordination Contact  Clinic Care Coordination Contact  OUTREACH    Referral Information:  Referral Source: PCP    Primary Diagnosis: Psychosocial    Chief Complaint   Patient presents with    Clinic Care Coordination - Initial        Universal Utilization: Will address at future calls.   Clinic Utilization  Difficulty keeping appointments:: No  Compliance Concerns: No  No-Show Concerns: No  No PCP office visit in Past Year: No  Utilization      No Show Count (past year)  1             ED Visits  9             Hospital Admissions  1                    Current as of: 10/31/2023  8:00 AM                Clinical Concerns:  Current Medical Concerns:  47 yr old, recovering from CVA.  Not able to do job as , but hopes to be able to return to work soon.  He is too unsteady to be able to perform job tasks at this time.  His spatial awareness is not 100%.  He didn't realize it would take this long to recover.    Current Behavioral Concerns: worried about finances and bills.      Education Provided to patient: reviewed role of care coordination.    Pain  Pain (GOAL):: No  Health Maintenance Reviewed: Due/Overdue   Health Maintenance Due   Topic Date Due    YEARLY PREVENTIVE VISIT  Never done    MICROALBUMIN  Never done    DIABETIC FOOT EXAM  Never done    ADVANCE CARE PLANNING  Never done    EYE EXAM  Never done    HEPATITIS B IMMUNIZATION (1 of 3 - 3-dose series) Never done    COLORECTAL CANCER SCREENING  Never done    HIV SCREENING  Never done    HEPATITIS C SCREENING  Never done    Pneumococcal Vaccine: Pediatrics (0 to 5 Years) and At-Risk Patients (6 to 64 Years) (2 - PCV) 10/11/2008    INFLUENZA VACCINE (1) 09/01/2023    COVID-19 Vaccine (3 - 2023-24 season) 09/01/2023       Clinical Pathway: None    Medication Management:  Medication review status: Medications reviewed and no changes reported per patient.        He has enough medications for several months.      Functional Status:  Dependent ADLs::  Independent, Ambulation-no assistive device  Dependent IADLs:: Independent  Bed or wheelchair confined:: No  Mobility Status: Independent  Fallen 2 or more times in the past year?: No  Any fall with injury in the past year?: No    Living Situation:  Current living arrangement:: I live in a private home with family  Type of residence:: Apartment    Lifestyle & Psychosocial Needs:    Social Determinants of Health     Food Insecurity: High Risk (10/20/2023)    Food Insecurity     Within the past 12 months, did you worry that your food would run out before you got money to buy more?: Yes     Within the past 12 months, did the food you bought just not last and you didn t have money to get more?: No   Depression: Not at risk (10/20/2023)    PHQ-2     PHQ-2 Score: 0   Housing Stability: High Risk (10/20/2023)    Housing Stability     Do you have housing? : Yes     Are you worried about losing your housing?: Yes   Tobacco Use: High Risk (10/20/2023)    Patient History     Smoking Tobacco Use: Every Day     Smokeless Tobacco Use: Never     Passive Exposure: Not on file   Financial Resource Strain: Low Risk  (10/20/2023)    Financial Resource Strain     Within the past 12 months, have you or your family members you live with been unable to get utilities (heat, electricity) when it was really needed?: No   Alcohol Use: Not on file   Transportation Needs: Low Risk  (10/20/2023)    Transportation Needs     Within the past 12 months, has lack of transportation kept you from medical appointments, getting your medicines, non-medical meetings or appointments, work, or from getting things that you need?: No   Physical Activity: Not on file   Interpersonal Safety: Low Risk  (10/20/2023)    Interpersonal Safety     Do you feel physically and emotionally safe where you currently live?: Yes     Within the past 12 months, have you been hit, slapped, kicked or otherwise physically hurt by someone?: No     Within the past 12 months, have  you been humiliated or emotionally abused in other ways by your partner or ex-partner?: No   Stress: Not on file   Social Connections: Not on file     Diet:: Diabetic diet  Inadequate nutrition (GOAL):: No  Tube Feeding: No  Inadequate activity/exercise (GOAL):: No  Significant changes in sleep pattern (GOAL): No  Transportation means:: Friend, Family, Regular car     Presybeterian or spiritual beliefs that impact treatment:: No  Mental health DX:: No  Mental health management concern (GOAL):: No  Chemical Dependency Status: No Current Concerns  Informal Support system:: Friends, Family        Lives with his mom who has her own physical health problems.  She does not file taxes and has limited income.  She is supporting them at this time.  They have food and she has a nurse who sees her.  He has no money left.  He has no health insurance since January 2023.  His last check for work was in July.  He applied for unemployment and did get a check per week for $525, with take home of $491.  He got that weekly in September.  Then in October unemployment asked him to verify his name and provide written statement from PCP.  He is waiting for them to process this so they can restart his payments.  He got a notice that they have received the statement and are reviewing it.  He may get back pay if approved.      In the mean time, he has large medical bills.  A financial resource worker in the hospital saw him and told him that he made too much money in the past year to get MA.  He is willing to try again if there is a change of paying his past due bills.  Checked [place] financial assistance and he made too much to apply.  Encouraged him to call billing to discuss. He is afraid to open up the bills he has gotten.      He has found work as a  using [employer] but most jobs are with temp companies and they don't offer health insurance except for job related health problems.  He was interested in talking to FRW to see if he  could apply for MA if appropriate.      Resources and Interventions:  Current Resources:      Community Resources: None  Supplies Currently Used at Home: None  Equipment Currently Used at Home: none  Employment Status: unemployed, employment seeking         Advance Care Plan/Directive  Advanced Care Plans/Directives on file:: No  Discussed with patient/caregiver:: Declined Further Information    Referrals Placed: Financial Services       Care Plan:  Care Plan: Financial Wellbeing       Problem: Patient expresses financial resource strain       Long-Range Goal: Create an action plan to increase financial stability       Start Date: 10/31/2023 Expected End Date: 10/30/2024    Priority: High    Note:     Barriers: uneven income due to unemployment hurdles, not able to work due to stroke, no health insuranc.   Strengths: motivated.  Trying to get back to work.   Patient expressed understanding of goal: yes  Action steps to achieve this goal:  1. I will work with frw to see if I could get MA to help with large medical bills.  2. I will complete any paper work.  3. I will continue to recover from stroke so I can find a job as a .  4. I will report progress towards this goal at scheduled outreach telephone calls from the CCC team.                                 Patient/Caregiver understanding: enrolled in care coordination. Will expect a call from FRW.     Outreach Frequency: monthly, more frequently as needed  Future Appointments                In 3 days Glendy Benavides NP [facility]    In 2 months Eugene Spaulding MD [facility]            Plan: [facility] SW will continue to monitor, support patient with current goals and will be available to assist as needs arise. [facility] CHW will reach out to patient on a monthly basis to discuss progression of goals.      [facility] SW will perform Chart Review in 45 days.       and Financial Resource Worker  Screening    County Benefits  Is patient requesting help applying for [facility] benefits?: Yes  Have you recently applied for any county benefits?: No  How many people in your household?: 2  Do you buy/eat food together?: Yes  What is the monthly gross income for the household (wages, social security, workers comp, and pension)? : 0    Insurance:  [place] insurance?: No  Is this an insurance renewal?: No  Is this a new insurance application request?: Yes  Have you recently applied for insurance?: No  How many people in your household? : 2  Do you file taxes?: Yes  How many dependents do you claim?: 0    Any other information for the FRW?: complicated financial situation.  He has not worked since July.  Did get a month of unemployment in Sept, $491 per week.  that stopped as needed a form from PCP.  form is done, now waiting for Unemployment to restart.  Has large bills from hospitalization.  can he apply due to large bills and no insurance?  He made too much for lizbet care.  thanks    Care Coordination team will tell patient:   Thank you for answering all the questions, based on screening questions, our Financial Resource Worker will reach out to you with additional questions and next steps.    Georgiana Isaacs,   [facility]  [phone]

## 2023-11-01 ENCOUNTER — PATIENT OUTREACH (OUTPATIENT)
Dept: CARE COORDINATION | Facility: CLINIC | Age: 48
End: 2023-11-01

## 2023-11-01 NOTE — PROGRESS NOTES
Clinic Care Coordination Contact  Program: Health Insurance, [facility] Benefits   County:  [facility] Case #:  [facility] Worker:   Mnsure #:   Subscriber #:   Renewal:  Date Applied:     DEVON Outreach:   11/2/23:    Health Insurance:      Referral/Screening:   complicated financial situation.  He has not worked since July.  Did get a month of unemployment in Sept, $491 per week.  that stopped as needed a form from PCP.  form is done, now waiting for Unemployment to restart.  Has large bills from hospitalization.  can he apply due to large bills and no insurance?  He made too much for lizbet care.  thanks

## 2023-11-01 NOTE — PROGRESS NOTES
Clinic Care Coordination Contact  Program: Health Insurance, County Benefits   County:  [facility] Case #:  [facility] Worker:   [place] #:   Subscriber #:   Renewal:  Date Applied:     FRW Outreach:   11/2/23: FRW called pt on referral. Patient stated it was not a good time to discuss referral and asked FRW to call back Friday.   Sejal Brunner   Financial Resource Worker  [facility]  Clinic Care Coordination  [phone]     Health Insurance:      Referral/Screening:   complicated financial situation.  He has not worked since July.  Did get a month of unemployment in Sept, $491 per week.  that stopped as needed a form from PCP.  form is done, now waiting for Unemployment to restart.  Has large bills from hospitalization.  can he apply due to large bills and no insurance?  He made too much for lizbet care.  thanks

## 2023-11-03 ENCOUNTER — OFFICE VISIT (OUTPATIENT)
Dept: INTERNAL MEDICINE | Facility: CLINIC | Age: 48
End: 2023-11-03

## 2023-11-03 ENCOUNTER — PATIENT OUTREACH (OUTPATIENT)
Dept: CARE COORDINATION | Facility: CLINIC | Age: 48
End: 2023-11-03

## 2023-11-03 ENCOUNTER — TELEPHONE (OUTPATIENT)
Dept: INTERNAL MEDICINE | Facility: CLINIC | Age: 48
End: 2023-11-03

## 2023-11-03 VITALS
SYSTOLIC BLOOD PRESSURE: 138 MMHG | WEIGHT: 276.3 LBS | OXYGEN SATURATION: 99 % | HEART RATE: 97 BPM | HEIGHT: 70 IN | DIASTOLIC BLOOD PRESSURE: 82 MMHG | BODY MASS INDEX: 39.56 KG/M2

## 2023-11-03 DIAGNOSIS — E66.01 CLASS 2 SEVERE OBESITY DUE TO EXCESS CALORIES WITH SERIOUS COMORBIDITY AND BODY MASS INDEX (BMI) OF 39.0 TO 39.9 IN ADULT (H): ICD-10-CM

## 2023-11-03 DIAGNOSIS — I63.9 ISCHEMIC STROKE (H): ICD-10-CM

## 2023-11-03 DIAGNOSIS — E66.812 CLASS 2 SEVERE OBESITY DUE TO EXCESS CALORIES WITH SERIOUS COMORBIDITY AND BODY MASS INDEX (BMI) OF 39.0 TO 39.9 IN ADULT (H): ICD-10-CM

## 2023-11-03 DIAGNOSIS — Z12.11 SCREEN FOR COLON CANCER: Primary | ICD-10-CM

## 2023-11-03 DIAGNOSIS — Z79.4 UNCONTROLLED TYPE 2 DIABETES MELLITUS WITH HYPERGLYCEMIA, WITH LONG-TERM CURRENT USE OF INSULIN (H): ICD-10-CM

## 2023-11-03 DIAGNOSIS — E11.65 UNCONTROLLED TYPE 2 DIABETES MELLITUS WITH HYPERGLYCEMIA, WITH LONG-TERM CURRENT USE OF INSULIN (H): ICD-10-CM

## 2023-11-03 PROCEDURE — 99213 OFFICE O/P EST LOW 20 MIN: CPT | Performed by: NURSE PRACTITIONER

## 2023-11-03 ASSESSMENT — PAIN SCALES - GENERAL: PAINLEVEL: NO PAIN (0)

## 2023-11-03 NOTE — TELEPHONE ENCOUNTER
LDVM regarding forms        Pt gave forms to PCP for Urgent Need for Insulin. Forms were needed to be filled out by pt and handed to pharmacy.      Placed forms up front for .      Amador Polk Jr., CANDELARIO on 11/3/2023 at 1:44 PM

## 2023-11-03 NOTE — PROGRESS NOTES
Clinic Care Coordination Contact  Program: Health Insurance, [facility] Benefits   County:  [facility] Case #:  [facility] Worker:   Joselo #:   Subscriber #:   Renewal:  Date Applied:     FRW Outreach:   11/3/23: FRW called pt and left VM. FRW will make another outreach within 30 days.   Sejal Brunner   Financial Resource Worker  [facility]  Clinic Care Coordination  [phone]   11/2/23: FRW called pt on referral. Patient stated it was not a good time to discuss referral and asked FRW to call back Friday.   Sejal Brunner   Financial Resource Worker  [facility]  Clinic Care Coordination  [phone]     Health Insurance:      Referral/Screening:   complicated financial situation.  He has not worked since July.  Did get a month of unemployment in Sept, $491 per week.  that stopped as needed a form from PCP.  form is done, now waiting for Unemployment to restart.  Has large bills from hospitalization.  can he apply due to large bills and no insurance?  He made too much for lizbet care.  thanks

## 2023-11-03 NOTE — PROGRESS NOTES
"  Assessment & Plan   Problem List Items Addressed This Visit          Digestive    Class 2 severe obesity due to excess calories with serious comorbidity in adult (H)     Other Visit Diagnoses       Screen for colon cancer    -  Primary    Screening for HIV (human immunodeficiency virus)        Uncontrolled type 2 diabetes mellitus with hyperglycemia, with long-term current use of insulin (H)             Patient reports when the temperature was lower, he found \"I had to fight with the right side a little\", though returned to baseline after warming up.Patient reports has chronic history of memory difficulties and this did not worsen post CVA.     Patient reports he is not checking his blood sugars but but does have the equipment and is not taking his lantus due to cost. Recommend testing four times daily.      We will work with care coordination pharmacyTo obtainIn order insulin also did complete form and signed and faxed to [place]'s insulin assistance program.     BMI:   Estimated body mass index is 39.64 kg/m  as calculated from the following:    Height as of this encounter: 1.778 m (5' 10\").    Weight as of this encounter: 125.3 kg (276 lb 4.8 oz).   Weight management plan: Discussed healthy diet and exercise guidelines  Wt Readings from Last 5 Encounters:   11/03/23 125.3 kg (276 lb 4.8 oz)   10/20/23 129.3 kg (285 lb 1.6 oz)   10/12/23 128.4 kg (283 lb)   09/23/23 130 kg (286 lb 9.6 oz)   08/26/23 122.2 kg (269 lb 6.4 oz)       MEDICATIONS:   No orders of the defined types were placed in this encounter.    There are no discontinued medications.       - Continue other medications without change    Glendy Benavides NP  [facility]    Alexandra Barrientos is a 47 year old, presenting for the following health issues:  RECHECK        11/3/2023     7:38 AM   Additional Questions   Roomed by CHAKA Dominguez   Accompanied by faina       History of Present Illness       Reason for visit:  Stroke " "assesment    He eats 0-1 servings of fruits and vegetables daily.He consumes 3 sweetened beverage(s) daily.He exercises with enough effort to increase his heart rate 9 or less minutes per day.  He exercises with enough effort to increase his heart rate 3 or less days per week.   He is taking medications regularly.                 Review of Systems   Constitutional, HEENT, cardiovascular, pulmonary, GI, , musculoskeletal, neuro, skin, endocrine and psych systems are negative, except as otherwise noted.      Objective    /82 (BP Location: Right arm, Patient Position: Sitting, Cuff Size: Adult Regular)   Pulse 97   Ht 1.778 m (5' 10\")   Wt 125.3 kg (276 lb 4.8 oz)   SpO2 99%   BMI 39.64 kg/m    Body mass index is 39.64 kg/m .  Physical Exam   GENERAL: healthy, alert and no distress  EYES: Eyes grossly normal to inspection, conjunctivae and sclerae normal  RESP:rspirations regular nonlabored   PSYCH: mentation appears normal, affect normal/bright    Admission on 09/23/2023, Discharged on 09/23/2023   Component Date Value Ref Range Status     Sodium 09/23/2023 133 (L)  136 - 145 mmol/L Final     Potassium 09/23/2023 4.8  3.4 - 5.3 mmol/L Final    Specimen slightly hemolyzed. The reported potassium value may be falsely elevated. Analysis of a non-hemolyzed specimen (i.e. re-draw) may result in a lower potassium value.     Chloride 09/23/2023 100  98 - 107 mmol/L Final     Carbon Dioxide (CO2) 09/23/2023 22  22 - 29 mmol/L Final     Anion Gap 09/23/2023 11  7 - 15 mmol/L Final     Urea Nitrogen 09/23/2023 17.8  6.0 - 20.0 mg/dL Final     Creatinine 09/23/2023 0.68  0.67 - 1.17 mg/dL Final     Calcium 09/23/2023 9.6  8.6 - 10.0 mg/dL Final     Glucose 09/23/2023 324 (H)  70 - 99 mg/dL Final     GFR Estimate 09/23/2023 >90  >60 mL/min/1.73m2 Final     Culture 09/23/2023 2+ Staphylococcus aureus MRSA (A)   Final     Culture 09/23/2023 2+ Staphylococcus aureus MRSA (A)   Final     Gram Stain Result 09/23/2023 1+ " Gram positive cocci (A)   Final     Gram Stain Result 09/23/2023 4+ WBC seen (A)   Final    Predominantly PMNs     WBC Count 09/23/2023 12.7 (H)  4.0 - 11.0 10e3/uL Final     RBC Count 09/23/2023 4.58  4.40 - 5.90 10e6/uL Final     Hemoglobin 09/23/2023 14.5  13.3 - 17.7 g/dL Final     Hematocrit 09/23/2023 41.1  40.0 - 53.0 % Final     MCV 09/23/2023 90  78 - 100 fL Final     MCH 09/23/2023 31.7  26.5 - 33.0 pg Final     MCHC 09/23/2023 35.3  31.5 - 36.5 g/dL Final     RDW 09/23/2023 11.9  10.0 - 15.0 % Final     Platelet Count 09/23/2023 306  150 - 450 10e3/uL Final     % Neutrophils 09/23/2023 67  % Final     % Lymphocytes 09/23/2023 21  % Final     % Monocytes 09/23/2023 8  % Final     % Eosinophils 09/23/2023 2  % Final     % Basophils 09/23/2023 1  % Final     % Immature Granulocytes 09/23/2023 1  % Final     NRBCs per 100 WBC 09/23/2023 0  <1 /100 Final     Absolute Neutrophils 09/23/2023 8.5 (H)  1.6 - 8.3 10e3/uL Final     Absolute Lymphocytes 09/23/2023 2.7  0.8 - 5.3 10e3/uL Final     Absolute Monocytes 09/23/2023 1.0  0.0 - 1.3 10e3/uL Final     Absolute Eosinophils 09/23/2023 0.3  0.0 - 0.7 10e3/uL Final     Absolute Basophils 09/23/2023 0.1  0.0 - 0.2 10e3/uL Final     Absolute Immature Granulocytes 09/23/2023 0.1  <=0.4 10e3/uL Final     Absolute NRBCs 09/23/2023 0.0  10e3/uL Final     Hemoglobin A1C   Date Value Ref Range Status   08/23/2023 11.1 (H) <5.7 % Final     Comment:     Normal <5.7%   Prediabetes 5.7-6.4%    Diabetes 6.5% or higher     Note: Adopted from ADA consensus guidelines.   12/26/2013 11.2 (H) 4.2 - 6.1 % Final   10/12/2011 8.7 (H) 4.1 - 5.7 % Final   02/11/2011 10.1 (H) 4.1 - 5.7 % Final   06/04/2010 12.1 (H) 4.2 - 6.1 % Final                     Current Outpatient Medications:      atorvastatin (LIPITOR) 80 MG tablet, Take 1 tablet (80 mg) by mouth daily, Disp: 90 tablet, Rfl: 0     carvedilol (COREG) 12.5 MG tablet, Take 1 tablet (12.5 mg) by mouth 2 times daily (with meals) for  90 days, Disp: 180 tablet, Rfl: 0     clopidogrel (PLAVIX) 75 MG tablet, Take 1 tablet (75 mg) by mouth daily for 90 days, Disp: 90 tablet, Rfl: 0     insulin glargine (LANTUS PEN) 100 UNIT/ML pen, Inject 24 Units Subcutaneous At Bedtime for 125 days, Disp: 30 mL, Rfl: 0     losartan (COZAAR) 100 MG tablet, Take 1 tablet (100 mg) by mouth daily, Disp: 90 tablet, Rfl: 0     metFORMIN (GLUCOPHAGE) 500 MG tablet, Take 1 tablet (500 mg) by mouth 2 times daily (with meals) for 90 days, Disp: 180 tablet, Rfl: 0  Wt Readings from Last 5 Encounters:   11/03/23 125.3 kg (276 lb 4.8 oz)   10/20/23 129.3 kg (285 lb 1.6 oz)   10/12/23 128.4 kg (283 lb)   09/23/23 130 kg (286 lb 9.6 oz)   08/26/23 122.2 kg (269 lb 6.4 oz)

## 2023-11-10 ENCOUNTER — TELEPHONE (OUTPATIENT)
Dept: INTERNAL MEDICINE | Facility: CLINIC | Age: 48
End: 2023-11-10

## 2023-11-10 NOTE — TELEPHONE ENCOUNTER
Patient wants form faxed over to [phone] once completed and inform patient when faxed over. No need for pt to  copy.

## 2023-11-10 NOTE — TELEPHONE ENCOUNTER
Pt dropped off a form to have filled out and would like a call when they are done and to pick them up as well, will place ine mailbin

## 2023-11-13 ENCOUNTER — PATIENT OUTREACH (OUTPATIENT)
Dept: CARE COORDINATION | Facility: CLINIC | Age: 48
End: 2023-11-13

## 2023-11-13 NOTE — PROGRESS NOTES
Clinic Care Coordination Contact  Program: Health Insurance, [facility] Benefits   County:  [facility] Case #:  [facility] Worker:   Joselo #:   Subscriber #:   Renewal:  Date Applied:     FRW Outreach:   11/13/23: Pt did not call FRW back. FRW called pt. Pt uninterested in applying at this time. Taking pt off panel.   Sejal Brunner   Financial Resource Worker  [facility] Care Coordination  [phone]   11/3/23: FRW called pt and left VM. FRW will make another outreach within 30 days.   Sejal Brunner   Financial Resource Worker  [facility]  [phone]   11/2/23: FRW called pt on referral. Patient stated it was not a good time to discuss referral and asked FRW to call back Friday.   Sejal Brunner   Financial Resource Worker  [facility]  [phone]     Health Insurance:      Referral/Screening:   complicated financial situation.  He has not worked since July.  Did get a month of unemployment in Sept, $491 per week.  that stopped as needed a form from PCP.  form is done, now waiting for Unemployment to restart.  Has large bills from hospitalization.  can he apply due to large bills and no insurance?  He made too much for [employer] care.  thanks

## 2023-11-13 NOTE — TELEPHONE ENCOUNTER
Patient tried to be seen in place of his mother's appointment today that was scheduled with Fannie Harris. Staff and provider were not aware of this. Patient was told he cannot be seen but covering provider Fannie Harris will review form and see if this can be completed.    Form was given to Fannie Harris.

## 2023-11-16 NOTE — TELEPHONE ENCOUNTER
Mom just called back again, trying to rush this form being filled out. Mom claimed that when Glendy is out nothing ever gets done in a timely matter. I told pts mom that our Providers have 3-5 business days to fill out forms.

## 2023-11-16 NOTE — TELEPHONE ENCOUNTER
Mother Penny calling for update on form,  Would like patient to be called with update.    Per Penny the form was not be faxed but patient, Floyd was to be called to .  Requesting high priority.

## 2023-11-20 ENCOUNTER — TELEPHONE (OUTPATIENT)
Dept: INTERNAL MEDICINE | Facility: CLINIC | Age: 48
End: 2023-11-20

## 2023-11-20 NOTE — TELEPHONE ENCOUNTER
Form completed and placed at the  for .    Copies made for monthly hold bin and sent to scan.    Patient notified.

## 2023-11-21 ENCOUNTER — HOSPITAL ENCOUNTER (EMERGENCY)
Facility: HOSPITAL | Age: 48
Discharge: HOME | End: 2023-11-21
Attending: EMERGENCY MEDICINE | Admitting: EMERGENCY MEDICINE

## 2023-11-21 VITALS
TEMPERATURE: 98.3 F | HEART RATE: 100 BPM | RESPIRATION RATE: 18 BRPM | WEIGHT: 270 LBS | OXYGEN SATURATION: 97 % | DIASTOLIC BLOOD PRESSURE: 97 MMHG | SYSTOLIC BLOOD PRESSURE: 165 MMHG | BODY MASS INDEX: 38.74 KG/M2

## 2023-11-21 DIAGNOSIS — J34.0 ABSCESS OF NOSE: ICD-10-CM

## 2023-11-21 PROCEDURE — 99284 EMERGENCY DEPT VISIT MOD MDM: CPT | Mod: 25

## 2023-11-21 PROCEDURE — 10160 PNXR ASPIR ABSC HMTMA BULLA: CPT

## 2023-11-21 RX ORDER — OXYCODONE HYDROCHLORIDE 5 MG/1
5 TABLET ORAL EVERY 6 HOURS PRN
Qty: 6 TABLET | Refills: 0 | Status: SHIPPED | OUTPATIENT
Start: 2023-11-21 | End: 2023-11-24

## 2023-11-21 RX ORDER — CLINDAMYCIN HCL 300 MG
300 CAPSULE ORAL 4 TIMES DAILY
Qty: 28 CAPSULE | Refills: 0 | Status: SHIPPED | OUTPATIENT
Start: 2023-11-21 | End: 2023-11-28

## 2023-11-21 NOTE — ED TRIAGE NOTES
"The pt presents for evaluation of 5 days of nose pain. He reports \"I don't know if this is MRSA or the biggest zit.\" Nose is red. He reports severe pain.         "

## 2023-11-22 ENCOUNTER — PATIENT OUTREACH (OUTPATIENT)
Dept: CARE COORDINATION | Facility: CLINIC | Age: 48
End: 2023-11-22

## 2023-11-22 NOTE — DISCHARGE INSTRUCTIONS
As we discussed, we did drain a small abscess which is like a pimple in the ER.  There are still 2 open holes 1 on the inside and 1 on the outside of your left nostril.  Keep applying warm compresses meaning hot washcloths over the area to draw anything else.  Take the antibiotic as prescribed and you should notice an improvement over the next 24 to 48 hours.

## 2023-11-22 NOTE — ED PROVIDER NOTES
EMERGENCY DEPARTMENT ENCOUNTER     NAME: Floyd Capps   AGE: 47 year old male   YOB: 1975   MRN: [id number]   EVALUATION DATE & TIME: No admission date for patient encounter.   PCP: Glendy Benavides     Chief Complaint   Patient presents with    nose pain   :    FINAL IMPRESSION       1. Abscess of nose           ED COURSE & MEDICAL DECISION MAKING      Pertinent Labs & Imaging studies reviewed. (See chart for details)   47 year old male  presents to the Emergency Department for evaluation of left-sided nasal pain. Initial Vitals Reviewed. Initial exam notable for generally well-appearing male who is tearful.  On the left distal tip of his nose he does have a very tiny area of fluctuance with some surrounding erythema.  In his chart, he does have a history of MRSA.  This looks like it started as a pimple but he was not able to pop it at home and now has some surrounding erythema.  We did discuss that we did not want to do a scalpel incision and drainage for cosmesis reasons, but did a needle I&D which was quite successful with reduction of the purulence.  He is still tearful and uncomfortable and I just think there is not a lot of space for swelling in the area without discomfort and it pushing on the facial nerves.  I am going to give him a very small amount of oxycodone for the acute pain and a prescription for clindamycin.  We discussed continuation of hot washcloths for any additional purulence but there actually 2 holes now on the external left nostril and on the inside where some purulent material came out.  He is quite comfortable with discharge at this time.           At the conclusion of the encounter I discussed the results of all of the tests and the disposition. The questions were answered. The patient or family acknowledged understanding and was agreeable with the care plan.     0 minutes critical care time, see procedure note below for details if relevant    Medical Decision  Making    History:  Supplemental history from: Documented in chart, if applicable  External Record(s) reviewed: Outpatient Record: hx of MRSA    Work Up:  Chart documentation includes differential considered and any EKGs or imaging independently interpreted by provider, where specified.  In additional to work up documented, I considered the following work up: Documented in chart, if applicable.    External consultation:  Discussion of management with another provider: Documented in chart, if applicable    Complicating factors:  Care impacted by chronic illness: Anticoagulated State  Care affected by social determinants of health: Access to Medical Care    Disposition considerations: Discharge. I prescribed additional prescription strength medication(s) as charted. I considered admission, but ultimately discharged patient with reassuring exam.        MEDICATIONS GIVEN IN THE EMERGENCY:   Medications - No data to display   NEW PRESCRIPTIONS STARTED AT TODAY'S ER VISIT   New Prescriptions    No medications on file     ================================================================   HISTORY OF PRESENT ILLNESS       Patient information was obtained from: patient   Use of Intrepreter: N/A   Floyd Capps is a 47 year old male with history of na who presents evaluation of nasal pain.  He states that on the left side of the tip of his nose he started to notice a pimple develop.  He could not pop it and now it is too tender to touch and he has some surrounding redness.  He apparently has a history of MRSA and I can see this in his chart.    ================================================================        PAST HISTORY     PAST MEDICAL HISTORY:   No past medical history on file.   PAST SURGICAL HISTORY:   Past Surgical History:   Procedure Laterality Date    APPENDECTOMY      INCISION AND DRAINAGE OF WOUND      groin abscess      CURRENT MEDICATIONS:   atorvastatin (LIPITOR) 80 MG tablet  carvedilol (COREG) 12.5 MG  tablet  clopidogrel (PLAVIX) 75 MG tablet  insulin glargine (LANTUS PEN) 100 UNIT/ML pen  losartan (COZAAR) 100 MG tablet  metFORMIN (GLUCOPHAGE) 500 MG tablet      ALLERGIES:   No Known Allergies   FAMILY HISTORY:   No family history on file.   SOCIAL HISTORY:   Social History     Socioeconomic History    Marital status: Single   Tobacco Use    Smoking status: Every Day     Packs/day: .5     Types: Cigarettes    Smokeless tobacco: Never    Tobacco comments:     Seen IP by [facility] on 8/24/23 and declined cessation services and materials.    Vaping Use    Vaping Use: Never used   Substance and Sexual Activity    Alcohol use: No    Drug use: No   Social History Narrative    Patient reports that he does not have health insurance.     Social Determinants of Health     Financial Resource Strain: Low Risk  (10/20/2023)    Financial Resource Strain     Within the past 12 months, have you or your family members you live with been unable to get utilities (heat, electricity) when it was really needed?: No   Food Insecurity: High Risk (10/20/2023)    Food Insecurity     Within the past 12 months, did you worry that your food would run out before you got money to buy more?: Yes     Within the past 12 months, did the food you bought just not last and you didn t have money to get more?: No   Transportation Needs: Low Risk  (10/20/2023)    Transportation Needs     Within the past 12 months, has lack of transportation kept you from medical appointments, getting your medicines, non-medical meetings or appointments, work, or from getting things that you need?: No   Interpersonal Safety: Low Risk  (10/20/2023)    Interpersonal Safety     Do you feel physically and emotionally safe where you currently live?: Yes     Within the past 12 months, have you been hit, slapped, kicked or otherwise physically hurt by someone?: No     Within the past 12 months, have you been humiliated or emotionally abused in other ways by your partner or  ex-partner?: No   Housing Stability: High Risk (10/20/2023)    Housing Stability     Do you have housing? : Yes     Are you worried about losing your housing?: Yes        VITALS  Patient Vitals for the past 24 hrs:   BP Temp Temp src Pulse Resp SpO2 Weight   11/21/23 1747 (!) 208/93 98.3  F (36.8  C) Oral 101 18 100 % 122.5 kg (270 lb)        ================================================================    PHYSICAL EXAM     VITAL SIGNS: BP (!) 208/93   Pulse 101   Temp 98.3  F (36.8  C) (Oral)   Resp 18   Wt 122.5 kg (270 lb)   SpO2 100%   BMI 38.74 kg/m     Constitutional:  Awake, no acute distress   HENT:  Atraumatic, oropharynx without exudate or erythema, membranes moist  Lymph:  No adenopathy  Eyes: EOM intact, PERRL, no injection  Neck: Supple  Respiratory:  Clear to auscultation bilaterally, no wheezes or crackles   Cardiovascular:  Regular rate and rhythm, single S1 and S2   GI:  Soft, nontender, nondistended, no rebound or guarding   Musculoskeletal:  Moves all extremities, no lower extremity edema, no deformities    Skin:  Warm, dry  Neurologic:  Alert and oriented x3, no focal deficits noted       ================================================================  LAB       All pertinent labs reviewed and interpreted.   Labs Ordered and Resulted from Time of ED Arrival to Time of ED Departure - No data to display     ===============================================================  RADIOLOGY       Reviewed all pertinent imaging. Please see official radiology report.   No orders to display         ================================================================  EKG         I have independently reviewed and interpreted the EKG(s) documented above.     ================================================================  PROCEDURES     PROCEDURE: Incision and Drainage   INDICATIONS: Localized abscess   PROCEDURE PROVIDER: Dr Hannah Pang   SITE: L external nose   MEDICATION: none   NOTE:   The area of  maximal fluctuance was opened with a 18g needle using stab incision to allow for drainage.  The abscess was drained.  The abscess cavity was bluntly explored to separate any loculations. No Packing was placed into the abscess cavity.  A sterile dressing was placed over the area.   COMPLEXITY: Simple    Simple = single, furuncle, paronychia, superficial  Complex = multiple or abscess requiring probing, loculations, packing placement   COMPLICATIONS: Patient tolerated procedure well, without complication           Hannah Soliman M.D.   Emergency Medicine   [facility] EMERGENCY DEPARTMENT  [address] [place] [id number]  [phone]  Dept: [phone]        Hannah Soliman MD  11/21/23 [id number]

## 2023-11-22 NOTE — PROGRESS NOTES
Clinic Care Coordination Contact  Follow Up Progress Note      Assessment: Was in ED with abscess on his nose.  They gave him antibiotics and that has resolved the pain.  He was in extreme pain, worse than a migraine.  He won his case for unemployment and got back pay.  He is going to apply for [facility] next week to get health insurance as of 1-1-2024. He will call if he needs help.  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      Care Gaps:    Health Maintenance Due   Topic Date Due    YEARLY PREVENTIVE VISIT  Never done    MICROALBUMIN  Never done    DIABETIC FOOT EXAM  Never done    ADVANCE CARE PLANNING  Never done    EYE EXAM  Never done    HEPATITIS B IMMUNIZATION (1 of 3 - 3-dose series) Never done    COLORECTAL CANCER SCREENING  Never done    HIV SCREENING  Never done    HEPATITIS C SCREENING  Never done    Pneumococcal Vaccine: Pediatrics (0 to 5 Years) and At-Risk Patients (6 to 64 Years) (2 - PCV) 10/11/2008    INFLUENZA VACCINE (1) 09/01/2023    COVID-19 Vaccine (3 - 2023-24 season) 09/01/2023    A1C  11/23/2023       Declined due to not having insurance.      Care Plans  Care Plan: Financial Wellbeing       Problem: Patient expresses financial resource strain       Long-Range Goal: Create an action plan to increase financial stability       Start Date: 10/31/2023 Expected End Date: 10/30/2024    This Visit's Progress: 20%    Priority: High    Note:     Barriers: uneven income due to unemployment hurdles, not able to work due to stroke, no health insuranc.   Strengths: motivated.  Trying to get back to work.   Patient expressed understanding of goal: yes  Action steps to achieve this goal:  1. I will work with frw to see if I could get MA to help with large medical bills.  2. I will complete any paper work.  3. I will continue to recover from stroke so I can find a job as a .  4. I will report progress towards this goal at  scheduled outreach telephone calls from the CCC team.   11-24- looking for work, has won back pay for unemployment                              Intervention/Education provided during outreach: wants to get monthly calls.       Outreach Frequency: monthly, more frequently as needed      Plan:   CCC SW will continue to monitor, support patient with current goals and will be available to assist as needs arise. [facility] CHW will reach out to patient on a monthly basis to discuss progression of goals.      CCC SW will perform Chart Review in 45 days.         [facility]/Voicemail    Clinical Data: Care Coordinator Outreach    Outreach Documentation Number of Outreach Attempt   10/24/2023  12:05 PM 2   10/24/2023  12:12 PM 2   11/22/2023  11:28 AM 1       Left message on patient's voicemail with call back information and requested return call. Was in ED for abscess on his nose.    Plan: Care Coordinator will try to reach patient again in 1-2 business days.    Georgiana Isaacs,   [facility]  [phone]

## 2023-11-24 ENCOUNTER — VIRTUAL VISIT (OUTPATIENT)
Dept: INTERNAL MEDICINE | Facility: CLINIC | Age: 48
End: 2023-11-24

## 2023-11-24 DIAGNOSIS — E11.65 UNCONTROLLED TYPE 2 DIABETES MELLITUS WITH HYPERGLYCEMIA, WITH LONG-TERM CURRENT USE OF INSULIN (H): Primary | ICD-10-CM

## 2023-11-24 DIAGNOSIS — J34.0 ABSCESS OF NOSE: ICD-10-CM

## 2023-11-24 DIAGNOSIS — Z79.4 UNCONTROLLED TYPE 2 DIABETES MELLITUS WITH HYPERGLYCEMIA, WITH LONG-TERM CURRENT USE OF INSULIN (H): Primary | ICD-10-CM

## 2023-11-24 PROCEDURE — 99213 OFFICE O/P EST LOW 20 MIN: CPT | Mod: 95 | Performed by: NURSE PRACTITIONER

## 2023-11-24 RX ORDER — GLIPIZIDE 10 MG/1
10 TABLET, FILM COATED, EXTENDED RELEASE ORAL DAILY
Qty: 90 TABLET | Refills: 1 | Status: SHIPPED | OUTPATIENT
Start: 2023-11-24 | End: 2024-02-14

## 2023-11-24 NOTE — PROGRESS NOTES
Floyd is a 47 year old who is being evaluated via a billable telephone visit.      What phone number would you like to be contacted at? [phone]  How would you like to obtain your AVS? Blanca    Distant Location (provider location):  On-site    Assessment & Plan   Problem List Items Addressed This Visit    None  Visit Diagnoses       Uncontrolled type 2 diabetes mellitus with hyperglycemia, with long-term current use of insulin (H)    -  Primary    Abscess of nose             Recent ED visit for abscess in the nose and did prescribed patient antibiotics improving.      Patient reports he has received all medications though is not taking insulin. Will continue metformin and add glipizide xr as patient unable to afford insulin. Did advise patient he [place].             MEDICATIONS:  Continue current medications without change    Glendy Benavides NP  [facility]   Floyd is a 47 year old, presenting for the following health issues:  No chief complaint on file.      HPI   Follow up       Review of Systems   Constitutional, HEENT, cardiovascular, pulmonary, GI, , musculoskeletal, neuro, skin, endocrine and psych systems are negative, except as otherwise noted.      Objective           Vitals:  No vitals were obtained today due to virtual visit.    Physical Exam   healthy, alert, and no distress  PSYCH: Alert and oriented times 3; coherent speech, normal   rate and volume, able to articulate logical thoughts, able   to abstract reason, no tangential thoughts, no hallucinations   or delusions  His affect is normal  RESP: No cough, no audible wheezing, able to talk in full sentences  Remainder of exam unable to be completed due to telephone visits    Admission on 09/23/2023, Discharged on 09/23/2023   Component Date Value Ref Range Status    Sodium 09/23/2023 133 (L)  136 - 145 mmol/L Final    Potassium 09/23/2023 4.8  3.4 -  5.3 mmol/L Final    Specimen slightly hemolyzed. The reported potassium value may be falsely elevated. Analysis of a non-hemolyzed specimen (i.e. re-draw) may result in a lower potassium value.    Chloride 09/23/2023 100  98 - 107 mmol/L Final    Carbon Dioxide (CO2) 09/23/2023 22  22 - 29 mmol/L Final    Anion Gap 09/23/2023 11  7 - 15 mmol/L Final    Urea Nitrogen 09/23/2023 17.8  6.0 - 20.0 mg/dL Final    Creatinine 09/23/2023 0.68  0.67 - 1.17 mg/dL Final    Calcium 09/23/2023 9.6  8.6 - 10.0 mg/dL Final    Glucose 09/23/2023 324 (H)  70 - 99 mg/dL Final    GFR Estimate 09/23/2023 >90  >60 mL/min/1.73m2 Final    Culture 09/23/2023 2+ Staphylococcus aureus MRSA (A)   Final    Culture 09/23/2023 2+ Staphylococcus aureus MRSA (A)   Final    Gram Stain Result 09/23/2023 1+ Gram positive cocci (A)   Final    Gram Stain Result 09/23/2023 4+ WBC seen (A)   Final    Predominantly PMNs    WBC Count 09/23/2023 12.7 (H)  4.0 - 11.0 10e3/uL Final    RBC Count 09/23/2023 4.58  4.40 - 5.90 10e6/uL Final    Hemoglobin 09/23/2023 14.5  13.3 - 17.7 g/dL Final    Hematocrit 09/23/2023 41.1  40.0 - 53.0 % Final    MCV 09/23/2023 90  78 - 100 fL Final    MCH 09/23/2023 31.7  26.5 - 33.0 pg Final    MCHC 09/23/2023 35.3  31.5 - 36.5 g/dL Final    RDW 09/23/2023 11.9  10.0 - 15.0 % Final    Platelet Count 09/23/2023 306  150 - 450 10e3/uL Final    % Neutrophils 09/23/2023 67  % Final    % Lymphocytes 09/23/2023 21  % Final    % Monocytes 09/23/2023 8  % Final    % Eosinophils 09/23/2023 2  % Final    % Basophils 09/23/2023 1  % Final    % Immature Granulocytes 09/23/2023 1  % Final    NRBCs per 100 WBC 09/23/2023 0  <1 /100 Final    Absolute Neutrophils 09/23/2023 8.5 (H)  1.6 - 8.3 10e3/uL Final    Absolute Lymphocytes 09/23/2023 2.7  0.8 - 5.3 10e3/uL Final    Absolute Monocytes 09/23/2023 1.0  0.0 - 1.3 10e3/uL Final    Absolute Eosinophils 09/23/2023 0.3  0.0 - 0.7 10e3/uL Final    Absolute Basophils 09/23/2023 0.1  0.0 - 0.2  10e3/uL Final    Absolute Immature Granulocytes 09/23/2023 0.1  <=0.4 10e3/uL Final    Absolute NRBCs 09/23/2023 0.0  10e3/uL Final       Current Outpatient Medications:     glipiZIDE (GLUCOTROL XL) 10 MG 24 hr tablet, Take 1 tablet (10 mg) by mouth daily, Disp: 90 tablet, Rfl: 1    atorvastatin (LIPITOR) 80 MG tablet, Take 1 tablet (80 mg) by mouth daily, Disp: 90 tablet, Rfl: 0    carvedilol (COREG) 12.5 MG tablet, Take 1 tablet (12.5 mg) by mouth 2 times daily (with meals) for 90 days, Disp: 180 tablet, Rfl: 0    clindamycin (CLEOCIN) 300 MG capsule, Take 1 capsule (300 mg) by mouth 4 times daily for 7 days, Disp: 28 capsule, Rfl: 0    clopidogrel (PLAVIX) 75 MG tablet, Take 1 tablet (75 mg) by mouth daily for 90 days, Disp: 90 tablet, Rfl: 0    insulin glargine (LANTUS PEN) 100 UNIT/ML pen, Inject 24 Units Subcutaneous At Bedtime for 125 days, Disp: 30 mL, Rfl: 0    losartan (COZAAR) 100 MG tablet, Take 1 tablet (100 mg) by mouth daily, Disp: 90 tablet, Rfl: 0    metFORMIN (GLUCOPHAGE) 500 MG tablet, Take 1 tablet (500 mg) by mouth 2 times daily (with meals) for 90 days, Disp: 180 tablet, Rfl: 0    oxyCODONE (ROXICODONE) 5 MG tablet, Take 1 tablet (5 mg) by mouth every 6 hours as needed for severe pain, Disp: 6 tablet, Rfl: 0            Phone call duration: 8 minutes

## 2023-12-13 ENCOUNTER — PATIENT OUTREACH (OUTPATIENT)
Dept: CARE COORDINATION | Facility: CLINIC | Age: 48
End: 2023-12-13

## 2023-12-13 NOTE — PROGRESS NOTES
Clinic Care Coordination Contact  Follow Up Progress Note      Assessment: call from patient who is trying to take care of things. [place] and [place]. He did not return calls to FRW but wants to work with them if possible.  New referral placed.  He will call back if he gets a message.  No other updates.     Care Gaps:    Health Maintenance Due   Topic Date Due    YEARLY PREVENTIVE VISIT  Never done    MICROALBUMIN  Never done    DIABETIC FOOT EXAM  Never done    ADVANCE CARE PLANNING  Never done    EYE EXAM  Never done    HEPATITIS B IMMUNIZATION (1 of 3 - 3-dose series) Never done    COLORECTAL CANCER SCREENING  Never done    HIV SCREENING  Never done    HEPATITIS C SCREENING  Never done    Pneumococcal Vaccine: Pediatrics (0 to 5 Years) and At-Risk Patients (6 to 64 Years) (2 - PCV) 10/11/2008    INFLUENZA VACCINE (1) 09/01/2023    COVID-19 Vaccine (3 - 2023-24 season) 09/01/2023    A1C  11/23/2023    ZOSTER IMMUNIZATION (1 of 2) 12/19/2023       Postponed to when he has insurance.      Care Plans  Care Plan: Financial Wellbeing       Problem: Patient expresses financial resource strain       Long-Range Goal: Create an action plan to increase financial stability       Start Date: 10/31/2023 Expected End Date: 10/30/2024    This Visit's Progress: 20%    Priority: High    Note:     Barriers: uneven income due to unemployment hurdles, not able to work due to stroke, no health insuranc.   Strengths: motivated.  Trying to get back to work.   Patient expressed understanding of goal: yes  Action steps to achieve this goal:  1. I will work with frw to see if I could get [place] to help with large medical bills.  2. I will complete any paper work.  3. I will continue to recover from stroke so I can find a job as a .  4. I will report progress towards this goal at scheduled outreach telephone calls from the CCC team.   11-24- looking for work, has won back pay for unemployment                               Intervention/Education provided during outreach: help with signing up for health insurance.     Outreach Frequency: monthly, more frequently as needed    Plan:   CCC SW will continue to monitor, support patient with current goals and will be available to assist as needs arise. CCC CHW will reach out to patient on a monthly basis to discuss progression of goals.      CCC SW will perform Chart Review in 45 days.         Clinic Care Coordination Contact  Financial Resource Worker Screening    County Benefits  Is patient requesting help applying for county benefits?: Yes  Have you recently applied for any county benefits?: No  How many people in your household?: 2  What is the monthly gross income for the household (wages, social security, workers comp, and pension)? : 491    [place]:  [place] No  Is this an insurance renewal?: No  Is this a new insurance application request?: Yes  Have you recently applied for insurance?: No  How many people in your household? : 2  Do you file taxes?: Yes  How many dependents do you claim?: 0    Any other information for the FRW?: Nicholas Post, he called and would like help. I told him that he has to return your calls. He really needs help as he doesn't understand how this works.  Thanks, I didn't verify his income again.    Care Coordination team will tell patient:   Thank you for answering all the questions, based on screening questions, our Financial Resource Worker will reach out to you with additional questions and next steps.    Georgiana Isaacs,   [facility]  [phone]

## 2023-12-14 ENCOUNTER — PATIENT OUTREACH (OUTPATIENT)
Dept: CARE COORDINATION | Facility: CLINIC | Age: 48
End: 2023-12-14

## 2023-12-14 NOTE — PROGRESS NOTES
Clinic Care Coordination Contact  Program: [facility] BENEFITS / INSURANCE   County: [facility] Case #:  [facility] Worker:   Carmelitaure #:   Subscriber #:   Renewal:  Date Applied:     FRW Outreach:   12/14/23: Outreach attempted x 1. Left message on voicemail with call back information and requested return call.  Plan: FRW will call again within one week.    Raquel Kong  Care   [facility]  Clinic Care Coordination  [phone]      Health Insurance:      Referral/Screening:  FRW Screening    Row Name 12/13/23 1514       [facility] Benefits   Is patient requesting help applying for [facility] benefits? Yes       Have you recently applied for any [facility] benefits? No       How many people in your household? 2       What is the monthly gross income for the household (wages, social security, workers comp, and pension)? 491       [place]:   [place] No       Is this an insurance renewal? No       Is this a new insurance application request? Yes       Have you recently applied for insurance? No       How many people in your household? 2       Do you file taxes? Yes       How many dependents do you claim? 0       OTHER   Any other information for the FRW? Nicholas Post, he called and would like help. I told him that he has to return your calls. He really needs help as he doesn't understand how this works.  Thanks, I didn't verify his income again.

## 2023-12-15 ENCOUNTER — PATIENT OUTREACH (OUTPATIENT)
Dept: CARE COORDINATION | Facility: CLINIC | Age: 48
End: 2023-12-15

## 2023-12-15 NOTE — PROGRESS NOTES
Clinic Care Coordination Contact  Program: [facility] BENEFITS / INSURANCE   County: [facility] Case #:  [facility] Worker:   Joselo #:   Subscriber #:   Renewal:  Date Applied: 23    FRW Outreach:  12/15/23 FRW called patient and left a final vm with call back information as attempt x2 with no answer or return phone calls. FRW closed the FRW program and remove patient from panel. Patient can be referred back to FRW if needed.      Olga Lidia Gordon   Financial Resource Worker  [facility] Care Coordination  [phone]   12/15/23: Patient called CTA back to go over screening questions. CTA scheduled appointment with FRW on  at 8 am.   Raquel Kong  Care   [facility] Care Coordination  [phone]    SNAP/CASH Application Screenin. Have you had [facility] benefits before? No   2. How many people in the household, do you eat/buy food together? 2  3. What is your monthly income (include all tax members)? $525 every week unemployment.  4. Do you have a bank account?  Yes   5. Do you have any utility bills (electricity, rent, mortgage, phone, insurance, medical bills, etc.)? Yes   6. Do you have social security cards and/or green cards?  Yes   Subscriber is inactive.      23: Outreach attempted x 1. Left message on voicemail with call back information and requested return call.  Plan: FRW will call again within one week.    Raquel Kong  Care   [facility] Care Coordination  [phone]      Health Insurance:      Referral/Screening:  FRW Screening    Row Name 23 1514       County Benefits   Is patient requesting help applying for [facility] benefits? Yes       Have you recently applied for any [facility] benefits? No       How many people in your household? 2       What is the monthly gross income for the household (wages, social security, workers comp, and pension)? 491       [place]:   [place]       Is this an insurance renewal? No       Is this a new insurance application request? Yes       Have you recently applied for insurance? No       How many people in your household? 2       Do you file taxes? Yes       How many dependents do you claim? 0       OTHER   Any other information for the FRW? Nicholas Post, he called and would like help. I told him that he has to return your calls. He really needs help as he doesn't understand how this works.  Thanks, I didn't verify his income again.          No

## 2023-12-15 NOTE — PROGRESS NOTES
Clinic Care Coordination Contact  Program: [facility] BENEFITS / INSURANCE   County: [facility] Case #:  [facility] Worker:   Joselo #:   Subscriber #:   Renewal:  Date Applied: 23    FRW Outreach:  12/15/23: Patient called CTA back to go over screening questions. CTA scheduled appointment with FRW on  at 8 am.   Raquel Kong  Care   [facility]  Clinic Care Coordination  [phone]    SNAP/CASH Application Screenin. Have you had [facility] benefits before? No   2. How many people in the household, do you eat/buy food together? 2  3. What is your monthly income (include all tax members)? $525 every week unemployment.  4. Do you have a bank account?  Yes   5. Do you have any utility bills (electricity, rent, mortgage, phone, insurance, medical bills, etc.)? Yes   6. Do you have social security cards and/or green cards?  Yes   Subscriber is inactive.      23: Outreach attempted x 1. Left message on [employer] with call back information and requested return call.  Plan: FRW will call again within one week.    Raquel Kong  Care   [facility]  Clinic Care Coordination  [phone]      Health Insurance:      Referral/Screening:  FRW Screening    Row Name 23 1514       [facility] Benefits   Is patient requesting help applying for [facility] benefits? Yes       Have you recently applied for any [facility] benefits? No       How many people in your household? 2       What is the monthly gross income for the household (wages, social security, workers comp, and pension)? 491       [place]:   [place] No       Is this an insurance renewal? No       Is this a new insurance application request? Yes       Have you recently applied for insurance? No       How many people in your household? 2       Do you file taxes? Yes       How many dependents do you claim? 0       OTHER   Any other information for the FRW? Hi  Sejal, he called and would like help. I told him that he has to return your calls. He really needs help as he doesn't understand how this works.  Thanks, I didn't verify his income again.

## 2023-12-22 ENCOUNTER — PATIENT OUTREACH (OUTPATIENT)
Dept: CARE COORDINATION | Facility: CLINIC | Age: 48
End: 2023-12-22

## 2023-12-22 NOTE — PROGRESS NOTES
Clinic Care Coordination Contact  Program: [facility] BENEFITS / INSURANCE   County: [facility] Case #:  [facility] Worker:   Joselo #:   Subscriber #:   Renewal:  Date Applied: 23    FRW Outreach:  23 FRW [place] and [place] Floyd [place] And patient is to pick a plan in order to have coverage next year. Patient did not want to apply for [facility] benefits   Olga Lidia Gordon   Financial Resource Worker  [facility] Care Coordination  [phone]     12/15/23 FRW called patient and left a final vm with call back information as attempt x2 with no answer or return phone calls. FRW closed the FRW program and remove patient from panel. Patient can be referred back to FRW if needed.      Olga Lidia Gordon   Financial Resource Worker  [facility] Care Coordination  [phone]   12/15/23: Patient called CTA back to go over screening questions. CTA scheduled appointment with FRW on  at 8 am.   Raquel Hernández   [facility] Care Coordination  [phone]    SNAP/CASH Application Screenin. Have you had [facility] benefits before? No   2. How many people in the household, do you eat/buy food together? 2  3. What is your monthly income (include all tax members)? $525 every week unemployment.  4. Do you have a bank account?  Yes   5. Do you have any utility bills (electricity, rent, mortgage, phone, insurance, medical bills, etc.)? Yes   6. Do you have social security cards and/or green cards?  Yes   Subscriber is inactive.      23: Outreach attempted x 1. Left message on [employer] with call back information and requested return call.  Plan: FRW will call again within one week.    Raquel Hernández   [facility] Care Coordination  [phone]  [address]  Insurance:      Referral/Screening:  [facility] Screening    Row Name 12/13/23 4763       [facility] Benefits   Is patient requesting help applying for [facility] benefits? Yes       Have you recently applied for any [facility] benefits? No       How many people in your household? 2       What is the monthly gross income for the household (wages, social security, workers comp, and pension)? 491       [place]:   [place] No       Is this an insurance renewal? No       Is this a new insurance application request? Yes       Have you recently applied for insurance? No       How many people in your household? 2       Do you file taxes? Yes       How many dependents do you claim? 0       OTHER   Any other information for the [facility]? Nicholas Post, he called and would like help. I told him that he has to return your calls. He really needs help as he doesn't understand how this works.  Thanks, I didn't verify his income again.

## 2024-01-08 ENCOUNTER — APPOINTMENT (OUTPATIENT)
Dept: ULTRASOUND IMAGING | Facility: HOSPITAL | Age: 49
End: 2024-01-08
Attending: EMERGENCY MEDICINE

## 2024-01-08 ENCOUNTER — HOSPITAL ENCOUNTER (EMERGENCY)
Facility: HOSPITAL | Age: 49
Discharge: HOME | End: 2024-01-08
Attending: EMERGENCY MEDICINE | Admitting: EMERGENCY MEDICINE

## 2024-01-08 VITALS
BODY MASS INDEX: 38.65 KG/M2 | OXYGEN SATURATION: 100 % | WEIGHT: 270 LBS | HEART RATE: 106 BPM | HEIGHT: 70 IN | RESPIRATION RATE: 22 BRPM | TEMPERATURE: 97.9 F | SYSTOLIC BLOOD PRESSURE: 186 MMHG | DIASTOLIC BLOOD PRESSURE: 100 MMHG

## 2024-01-08 DIAGNOSIS — R73.9 HYPERGLYCEMIA: ICD-10-CM

## 2024-01-08 DIAGNOSIS — M79.661 RIGHT CALF PAIN: ICD-10-CM

## 2024-01-08 DIAGNOSIS — L84 CALLUS OF HEEL: ICD-10-CM

## 2024-01-08 LAB
ANION GAP SERPL CALCULATED.3IONS-SCNC: 8 MMOL/L (ref 7–15)
BASOPHILS # BLD AUTO: 0.1 10E3/UL (ref 0–0.2)
BASOPHILS NFR BLD AUTO: 1 %
BUN SERPL-MCNC: 11.1 MG/DL (ref 6–20)
CALCIUM SERPL-MCNC: 9.2 MG/DL (ref 8.6–10)
CHLORIDE SERPL-SCNC: 102 MMOL/L (ref 98–107)
CK SERPL-CCNC: 140 U/L (ref 39–308)
CREAT SERPL-MCNC: 0.85 MG/DL (ref 0.67–1.17)
CRP SERPL-MCNC: 36.4 MG/L
DEPRECATED HCO3 PLAS-SCNC: 25 MMOL/L (ref 22–29)
EGFRCR SERPLBLD CKD-EPI 2021: >90 ML/MIN/1.73M2
EOSINOPHIL # BLD AUTO: 0.2 10E3/UL (ref 0–0.7)
EOSINOPHIL NFR BLD AUTO: 2 %
ERYTHROCYTE [DISTWIDTH] IN BLOOD BY AUTOMATED COUNT: 12 % (ref 10–15)
ERYTHROCYTE [SEDIMENTATION RATE] IN BLOOD BY WESTERGREN METHOD: 58 MM/HR (ref 0–20)
GLUCOSE SERPL-MCNC: 308 MG/DL (ref 70–99)
HCT VFR BLD AUTO: 42 % (ref 40–53)
HGB BLD-MCNC: 14.2 G/DL (ref 13.3–17.7)
IMM GRANULOCYTES # BLD: 0.1 10E3/UL
IMM GRANULOCYTES NFR BLD: 1 %
LYMPHOCYTES # BLD AUTO: 1.7 10E3/UL (ref 0.8–5.3)
LYMPHOCYTES NFR BLD AUTO: 15 %
MCH RBC QN AUTO: 30.9 PG (ref 26.5–33)
MCHC RBC AUTO-ENTMCNC: 33.8 G/DL (ref 31.5–36.5)
MCV RBC AUTO: 91 FL (ref 78–100)
MONOCYTES # BLD AUTO: 0.8 10E3/UL (ref 0–1.3)
MONOCYTES NFR BLD AUTO: 7 %
NEUTROPHILS # BLD AUTO: 8.9 10E3/UL (ref 1.6–8.3)
NEUTROPHILS NFR BLD AUTO: 74 %
NRBC # BLD AUTO: 0 10E3/UL
NRBC BLD AUTO-RTO: 0 /100
PLATELET # BLD AUTO: 368 10E3/UL (ref 150–450)
POTASSIUM SERPL-SCNC: 4.9 MMOL/L (ref 3.4–5.3)
RBC # BLD AUTO: 4.6 10E6/UL (ref 4.4–5.9)
SODIUM SERPL-SCNC: 135 MMOL/L (ref 135–145)
WBC # BLD AUTO: 11.8 10E3/UL (ref 4–11)

## 2024-01-08 PROCEDURE — 85652 RBC SED RATE AUTOMATED: CPT | Performed by: EMERGENCY MEDICINE

## 2024-01-08 PROCEDURE — 93971 EXTREMITY STUDY: CPT | Mod: RT

## 2024-01-08 PROCEDURE — 36415 COLL VENOUS BLD VENIPUNCTURE: CPT | Performed by: EMERGENCY MEDICINE

## 2024-01-08 PROCEDURE — 86140 C-REACTIVE PROTEIN: CPT | Performed by: EMERGENCY MEDICINE

## 2024-01-08 PROCEDURE — 80048 BASIC METABOLIC PNL TOTAL CA: CPT | Performed by: EMERGENCY MEDICINE

## 2024-01-08 PROCEDURE — 85004 AUTOMATED DIFF WBC COUNT: CPT | Performed by: EMERGENCY MEDICINE

## 2024-01-08 PROCEDURE — 82550 ASSAY OF CK (CPK): CPT | Performed by: EMERGENCY MEDICINE

## 2024-01-08 PROCEDURE — 99284 EMERGENCY DEPT VISIT MOD MDM: CPT | Mod: 25

## 2024-01-08 RX ORDER — CEPHALEXIN 500 MG/1
500 CAPSULE ORAL 2 TIMES DAILY
Qty: 20 CAPSULE | Refills: 0 | Status: SHIPPED | OUTPATIENT
Start: 2024-01-08 | End: 2024-01-18

## 2024-01-08 NOTE — ED TRIAGE NOTES
Patient comes in with right calf and foot pain. States pain is less when he walks or when leg is elevated. Some edema noted to the right ankle. No history of DVT, states he  takes Plavix.

## 2024-01-08 NOTE — ED PROVIDER NOTES
eMERGENCY dEPARTMENT PROGRESS NOTE         ED COURSE AND MEDICAL DECISION MAKING  Patient was signed out to me by Dr Evans at 2:16 PM      Floyd Capps is a 48 year old male who presents with leg pain.  My evaluation he said he was worried about a wound to his heel.  He was standing in the atrium when I talk to him so was not able to examine it.  The ultrasound did show a reactive lymph node but no DVT.  I discussed that with him.  We opted to put him on a course of antibiotics and he has follow-up referrals already placed.  He was discharged home.    LAB  Pertinent labs results reviewed   Labs Ordered and Resulted from Time of ED Arrival to Time of ED Departure   BASIC METABOLIC PANEL - Abnormal       Result Value    Sodium 135      Potassium 4.9      Chloride 102      Carbon Dioxide (CO2) 25      Anion Gap 8      Urea Nitrogen 11.1      Creatinine 0.85      GFR Estimate >90      Calcium 9.2      Glucose 308 (*)    CRP INFLAMMATION - Abnormal    CRP Inflammation 36.40 (*)    ERYTHROCYTE SEDIMENTATION RATE AUTO - Abnormal    Erythrocyte Sedimentation Rate 58 (*)    CBC WITH PLATELETS AND DIFFERENTIAL - Abnormal    WBC Count 11.8 (*)     RBC Count 4.60      Hemoglobin 14.2      Hematocrit 42.0      MCV 91      MCH 30.9      MCHC 33.8      RDW 12.0      Platelet Count 368      % Neutrophils 74      % Lymphocytes 15      % Monocytes 7      % Eosinophils 2      % Basophils 1      % Immature Granulocytes 1      NRBCs per 100 WBC 0      Absolute Neutrophils 8.9 (*)     Absolute Lymphocytes 1.7      Absolute Monocytes 0.8      Absolute Eosinophils 0.2      Absolute Basophils 0.1      Absolute Immature Granulocytes 0.1      Absolute NRBCs 0.0     CK TOTAL - Normal                 RADIOLOGY    Pertinent imaging reviewed   Please see official radiology report.  US Lower Extremity Venous Duplex Right   Final Result   IMPRESSION:   1.  No deep venous thrombosis in the right leg.   2.  Enlarged right inguinal node. This  may be reactive if there are inflammatory changes in the right leg.    3.  If findings are discordant, consider a follow-up exam with possible FNA in 4-6 weeks.           FINAL IMPRESSION    1. Right calf pain    2. Callus of heel    3. Hyperglycemia         DISCHARGE MEDICATIONS  New Prescriptions    CEPHALEXIN (KEFLEX) 500 MG CAPSULE    Take 1 capsule (500 mg) by mouth 2 times daily for 10 days        Juany Swenson MD  01/08/24 1839

## 2024-01-08 NOTE — ED PROVIDER NOTES
EMERGENCY DEPARTMENT ENCOUNTER      NAME: Floyd Capps  AGE: 48 year old male  YOB: 1975  MRN: [id number]  EVALUATION DATE & TIME: No admission date for patient encounter.    PCP: Glendy Benavides    ED PROVIDER: Nellie Evans MD    Chief Complaint   Patient presents with    Leg Pain         FINAL IMPRESSION:  1. Right calf pain    2. Callus of heel    3. Hyperglycemia          ED COURSE & MEDICAL DECISION MAKING:    Pertinent Labs & Imaging studies reviewed. (See chart for details)  48 year old male with history of HTN, HLD, DM and previous CVA who presents to the Emergency Department for evaluation of right calf pain and slightly right ankle pain over the course the last several weeks.  On examination has venous stasis skin changes bilaterally with some tenderness palpation around the right calf.  No erythema or warmth.  Swelling appears symmetric.  He does have cracked, fissured highly callused heels bilaterally but no associated erythema or drainage.  Differential includes DVT, peripheral neuropathy, rhabdomyolysis and less likely cellulitis, osteomyelitis.    Patient initially seen evaluate by myself in triage area due to boarding crisis.  BMP, CK, CRP notable for blood glucose of 308 and CRP of 36.  ESR, CRP and duplex ultrasound the right lower extremity ordered and pending at time of dictation signout oncoming physician.        ED Course as of 01/08/24 1424   Mon Jan 08, 2024   1337 Glucose(!): 308   1349 CRP Inflammation(!): 36.40       Medical Decision Making    History:  Supplemental history from: Documented in chart  External Record(s) reviewed: 12/22/2023 note trying to get patient enrolled in miniature    Work Up:  Chart documentation includes differential considered and any EKGs or imaging independently interpreted by provider, see MDM  In additional to work up documented, I considered the following work up: see MDM    External consultation:  Discussion of management with another  provider: N/A    Complicating factors:  Care impacted by chronic illness: Diabetes, Hyperlipidemia, and Other: Severe obesity and hypothyroidism  Care affected by social determinants of health: Access to care lack of health insurance    Disposition considerations: Admission considered. Patient was signed out to the oncoming physician, disposition pending.        At the conclusion of the encounter I discussed the results of all of the tests and the disposition. The questions were answered. The patient or family acknowledged understanding and was agreeable with the care plan.      MEDICATIONS GIVEN IN THE EMERGENCY:  Medications - No data to display    NEW PRESCRIPTIONS STARTED AT TODAY'S ER VISIT  New Prescriptions    No medications on file          =================================================================    HPI    Patient information was obtained from: Patient    Use of Intrepreter: N/A       Floyd Capps is a 48 year old male with pertinent medical history of severe obesity, tobacco use disorder, ischemic stroke, T2DM, HLD, and hypothyroidism, who presents with right leg pain.    Patient comes in with right leg pain, mostly to right calf, but does extends down to his foot and right ankle. He had a previous ischemic stroke and notes when he had a previous stroke, he had swelling to his right leg and was put on a diuretic after, but he was discontinued on diuretic. He noticed real redness to right calf/leg the last few days. He has tried Tylenol, ibuprofen, and Percocet he got from an acquaintance, without relief, but after he smoked pot, his right extremity pain has improved and he felt better. Yesterday, he looked down at his bilateral feet and noticed 2 big cracks of skin that appeared like calluses on bilateral heels that has been cracked open. No other reported complaints or concerns at this time.    Notes access to care issues, currently uninsured.      PAST MEDICAL HISTORY:  No past medical history  "on file.    PAST SURGICAL HISTORY:  Past Surgical History:   Procedure Laterality Date    APPENDECTOMY      INCISION AND DRAINAGE OF WOUND      groin abscess       CURRENT MEDICATIONS:    Cannot display prior to admission medications because the patient has not been admitted in this contact.       ALLERGIES:  No Known Allergies    FAMILY HISTORY:  No family history on file.    SOCIAL HISTORY:  Social History     Tobacco Use    Smoking status: Every Day     Packs/day: .5     Types: Cigarettes    Smokeless tobacco: Never    Tobacco comments:     Seen IP by [facility] on 8/24/23 and declined cessation services and materials.    Vaping Use    Vaping Use: Never used   Substance Use Topics    Alcohol use: No    Drug use: No        VITALS:  Patient Vitals for the past 24 hrs:   BP Temp Temp src Pulse Resp SpO2 Height Weight   01/08/24 1241 (!) 186/100 -- -- -- -- -- -- --   01/08/24 1240 -- 97.9  F (36.6  C) Oral 106 22 100 % 1.778 m (5' 10\") 122.5 kg (270 lb)       PHYSICAL EXAM    General Appearance: Well-appearing, well-nourished, disheveled appears older than stated age  Head:  Normocephalic  Cardio:  Regular rate and rhythm, hypertensive.  Peripheral pulses palpated in both bilateral lower extremities  Pulm:  No respiratory distress  Extremities:  full ROM and motor tone intact, bilateral pulses intact upper and lower.  Venous stasis skin changes to bilateral calves. Right slightly more swollen than left. Very significant callus to heels bilaterally that are dry, fissured/cracked. No drainage.Tenderness through calf on the right.  No palpable cords  Skin:  Skin warm, dry, no rashes  Neuro:  Alert and oriented ×3, peripheral neuropathy with stocking-like distribution decrease sensation     RADIOLOGY/LABS:  Reviewed all pertinent imaging. Please see official radiology report. All pertinent labs reviewed and interpreted.    Results for orders placed or performed during the hospital encounter of 01/08/24   Result Value Ref " Range     39 - 308 U/L   Basic metabolic panel   Result Value Ref Range    Sodium 135 135 - 145 mmol/L    Potassium 4.9 3.4 - 5.3 mmol/L    Chloride 102 98 - 107 mmol/L    Carbon Dioxide (CO2) 25 22 - 29 mmol/L    Anion Gap 8 7 - 15 mmol/L    Urea Nitrogen 11.1 6.0 - 20.0 mg/dL    Creatinine 0.85 0.67 - 1.17 mg/dL    GFR Estimate >90 >60 mL/min/1.73m2    Calcium 9.2 8.6 - 10.0 mg/dL    Glucose 308 (H) 70 - 99 mg/dL   Result Value Ref Range    CRP Inflammation 36.40 (H) <5.00 mg/L       The creation of this record is based on the scribe s observations of the work being performed by Nellie Evans MD and the provider s statements to them. It was created on her behalf by Dolores Buaer, a trained medical scribe. This document has been checked and approved by the attending provider.    Nellie Evans MD  Emergency Medicine  [facility] EMERGENCY DEPARTMENT  [address] [place] [id number] [phone]  Dept: [phone]     Nellie Evans MD  01/08/24 0238

## 2024-01-09 ENCOUNTER — PATIENT OUTREACH (OUTPATIENT)
Dept: CARE COORDINATION | Facility: CLINIC | Age: 49
End: 2024-01-09

## 2024-01-09 NOTE — PROGRESS NOTES
[facility]/Voicemail    Clinical Data: Care Coordinator Outreach    Outreach Documentation Number of Outreach Attempt   10/24/2023  12:12 PM 2   11/22/2023  11:28 AM 1   1/9/2024  10:20 AM 1   1/10/2024   4:13 PM 2       Left message on patient's voicemail with call back information and requested return call.    Plan:delegating to CHW to call in one week. If leaving a VM, please send unable to reach letter as well.     Georgiana Isaacs   [facility]  [phone]      [facility]/Voicemail    Clinical Data: Care Coordinator Outreach    Outreach Documentation Number of Outreach Attempt   10/24/2023  12:05 PM 2   10/24/2023  12:12 PM 2   11/22/2023  11:28 AM 1   1/9/2024  10:20 AM 1       Left message on patient's voicemail with call back information and requested return call.    Plan: Care Coordinator will try to reach patient again in 1-2 business days.    Social Shantelle Nuñez  [facility]  [phone]

## 2024-01-16 ENCOUNTER — PATIENT OUTREACH (OUTPATIENT)
Dept: CARE COORDINATION | Facility: CLINIC | Age: 49
End: 2024-01-16

## 2024-01-16 NOTE — LETTER
[facility] CARE COORDINATION  [address] [facility] INTERNAL MED  [facility] [place] [address]    January 16, 2024    Floyd Capps  [address] [place] [address] [address]     [place] [address]      Dear Floyd,    I have been attempting to reach you since our last contact. I would like to continue to work with you and provide any additional support you may need on achieving your health care related goals. I would appreciate if you would give me a call at [phone] to let me know if you would like to continue working together. I know that there are many things that can affect our ability to communicate and I hope we can continue to work together.    All of us at the [facility] are invested in your health and are here to assist you in meeting your goals.     Sincerely,    Mary PEREYRA  Community Health Worker  [facility] Care Coordination  [email or web address]  Office: [phone]

## 2024-01-16 NOTE — PROGRESS NOTES
Clinic Care Coordination Contact  [facility]/Voicemail    Clinical Data: Care Coordinator Outreach    Outreach Documentation Number of Outreach Attempt   11/22/2023  11:28 AM 1   1/9/2024  10:20 AM 1   1/10/2024   4:13 PM 2   1/16/2024  10:49 AM 2       Left message on patient's voicemail with call back information and requested return call.    Plan: Care Coordinator will send unable to contact letter with care coordinator contact information via mail. Care Coordinator will try to reach patient again in 3 weeks= 2/6/2024.    CC Goals:  Financial Stability    Mary PEREYRA  Community Health Worker  [facility] Care Coordination  [facility] [place] [email or web address]  [email or web address]  Office: [phone]

## 2024-01-23 ENCOUNTER — PATIENT OUTREACH (OUTPATIENT)
Dept: CARE COORDINATION | Facility: CLINIC | Age: 49
End: 2024-01-23

## 2024-01-23 NOTE — LETTER
[facility] CARE COORDINATION  [facility]    January 31, 2024        Floyd Hodges  [address] [place] [address] [address]   [address]  [place] [address]          Dear Floyd,     Attached is an updated Complex Care Plan for your continued enrollment in Care Coordination. Please let us know if you have additional questions, concerns, or goals that we can assist with.    Sincerely,    Georgiana Isaacs,   [facility]  [phone]        [facility]  Patient Centered Plan of Care  About Me:        Patient Name:  Floyd Hodges    YOB: 1975  Age:         48 year old   [place] MRN:    [id number] Telephone Information:  Home Phone [phone]   Mobile [phone]       Address:  [address] [place] [address] [address]   [address]  [place] [address] Email address:  [email or web address]      Emergency Contact(s)    Name Relationship Lgl Grd Work Phone Home Phone Mobile Phone   1. JOSE RAMON HODGES Mother    [phone]           Primary language:  English     needed? No   [employer]:  [phone] op. 1  Other communication barriers:Caregiver    Preferred Method of Communication:     Current living arrangement: I live in a private home with family    Mobility Status/ Medical Equipment: Independent        Health Maintenance  Health Maintenance Reviewed: Due/Overdue   Health Maintenance Due   Topic Date Due    YEARLY PREVENTIVE VISIT  Never done    MICROALBUMIN  Never done    DIABETIC FOOT EXAM  Never done    ADVANCE CARE PLANNING  Never done    EYE EXAM  Never done    HEPATITIS B IMMUNIZATION (1 of 3 - 3-dose series) Never done    COLORECTAL CANCER SCREENING  Never done    HIV SCREENING  Never done    HEPATITIS C SCREENING  Never done    Pneumococcal Vaccine: Pediatrics (0 to 5 Years) and At-Risk Patients (6 to 64 Years) (2 of 2 - PCV) 10/11/2008    INFLUENZA VACCINE (1) 09/01/2023    COVID-19 Vaccine (3 - 2023-24 season) 09/01/2023    A1C  11/23/2023    PHQ-2 (once per calendar  year)  01/01/2024    ZOSTER IMMUNIZATION (1 of 2) 12/19/2023           My Access Plan  Medical Emergency 911   Primary Clinic Line [facility] - [phone]   24 Hour Appointment Line [phone] or  [phone]) (toll-free)   24 Hour Nurse Line [phone] (toll-free)   Preferred Urgent Care [facility], [phone]     Preferred [facility]  [phone]     Preferred Pharmacy [id number] - [facility], [place] - [address]     Behavioral Health Crisis Line The National Suicide Prevention Lifeline at 1-949.666.3937 or Text/Call 598           My Care Team Members  Patient Care Team         Relationship Specialty Notifications Start End    Glendy Benavides NP PCP - General  Abnormal results only, Admissions 10/20/23     Phone: [phone] Fax: [phone]         [address] [place] [address]    Glendy Benavides NP Assigned PCP   10/11/23     Phone: [phone] Fax: [phone]         [address] [place] [address]    Feliciano Su MD Assigned Surgical Provider   10/14/23     Phone: [phone] Fax: [phone]         [facility] [facility], [address] [facility] [address]    Eugene Spaulding MD MD Otolaryngology  10/20/23     Phone: [phone] Fax: [phone]         [address] [place] [address]    Georgiana Isaacs LSW Lead Care Coordinator Primary Care - CC Admissions 10/26/23     Phone: [phone]         Yuli Maldonado CHW Community Health Worker  Admissions 10/31/23                 My Care Plans  Self Management and Treatment Plan    Care Plan  Care Plan: Financial Wellbeing       Problem: Patient expresses financial resource strain       Long-Range Goal: Create an action plan to increase financial stability       Start Date: 10/31/2023 Expected End Date: 10/30/2024    This Visit's Progress: 20% Recent Progress: 20%     Priority: High    Note:     Barriers: uneven income due to unemployment hurdles, not able to work due to stroke, no health insuranc.   Strengths: motivated.  Trying to get back to work.   Patient expressed understanding of goal: yes  Action steps to achieve this goal:  1. I will work with frw to see if I could get MA to help with large medical bills.  2. I will complete any paper work.  3. I will continue to recover from stroke so I can find a job as a .  4. I will report progress towards this goal at scheduled outreach telephone calls from the CCC team.   11-24- looking for work, has won back pay for unemployment                              Advance Care Plans/Directives:   Advanced Care Plan/Directives on file:   No    Discussed with patient/caregiver(s):   Declined Further Information             My Medical and Care Information  Problem List   Patient Active Problem List   Diagnosis    Dizziness    Type 2 Diabetes Mellitus - Uncomplicated, Controlled    Hyperlipidemia    Hypothyroidism    Obesity    Tobacco use disorder    Elevated troponin    Ischemic stroke (H)    Class 2 severe obesity due to excess calories with serious comorbidity in adult (H)      Current Medications and Allergies:    Current Outpatient Medications   Medication Instructions    atorvastatin (LIPITOR) 80 mg, Oral, DAILY    carvedilol (COREG) 12.5 mg, Oral, 2 TIMES DAILY WITH MEALS    clopidogrel (PLAVIX) 75 mg, Oral, DAILY    glipiZIDE (GLUCOTROL XL) 10 mg, Oral, DAILY    insulin glargine (LANTUS PEN) 24 Units, Subcutaneous, AT BEDTIME    losartan (COZAAR) 100 mg, Oral, DAILY    metFORMIN (GLUCOPHAGE) 500 mg, Oral, 2 TIMES DAILY WITH MEALS         Care Coordination Start Date: 10/20/2023   Frequency of Care Coordination: monthly, more frequently as needed     Form Last Updated: 01/31/2024

## 2024-01-23 NOTE — PROGRESS NOTES
Care Coordination Clinician Chart Review    Situation: Patient chart reviewed by Care Coordinator.       Background: Care Coordination Program started: 10/20/2023. Initial assessment completed and patient-centered care plan(s) were developed with participation from patient. Lead CC handed patient off to CHW for continued outreaches.       Assessment: Per chart review, patient outreach completed by CC CHW on 1- leaving VM x2.  Patient is actively working to accomplish goal(s). Patient's goal(s) appropriate and relevant at this time. Patient is not due for updated Plan of Care.  Assessments will be completed annually or as needed/with change of patient status.      Care Plan: Financial Wellbeing       Problem: Patient expresses financial resource strain       Long-Range Goal: Create an action plan to increase financial stability       Start Date: 10/31/2023 Expected End Date: 10/30/2024    This Visit's Progress: 20% Recent Progress: 20%    Priority: High    Note:     Barriers: uneven income due to unemployment hurdles, not able to work due to stroke, no health insuranc.   Strengths: motivated.  Trying to get back to work.   Patient expressed understanding of goal: yes  Action steps to achieve this goal:  1. I will work with frw to see if I could get MA to help with large medical bills.  2. I will complete any paper work.  3. I will continue to recover from stroke so I can find a job as a .  4. I will report progress towards this goal at scheduled outreach telephone calls from the CCC team.   11-24- looking for work, has won back pay for unemployment                                 Plan/Recommendations: The patient will continue working with Care Coordination to achieve goal(s) as above. CHW will continue outreaches at minimum every 30 days and will involve Lead CC as needed or if patient is ready to move to Maintenance. Lead CC will continue to monitor CHW outreaches and patient's progress to goal(s)  every 6 weeks.     Plan of Care updated and sent to patient: No

## 2024-02-06 ENCOUNTER — HOSPITAL ENCOUNTER (EMERGENCY)
Facility: HOSPITAL | Age: 49
Discharge: HOME | End: 2024-02-06
Attending: STUDENT IN AN ORGANIZED HEALTH CARE EDUCATION/TRAINING PROGRAM | Admitting: STUDENT IN AN ORGANIZED HEALTH CARE EDUCATION/TRAINING PROGRAM
Payer: MEDICAID

## 2024-02-06 ENCOUNTER — PATIENT OUTREACH (OUTPATIENT)
Dept: CARE COORDINATION | Facility: CLINIC | Age: 49
End: 2024-02-06
Payer: MEDICAID

## 2024-02-06 ENCOUNTER — APPOINTMENT (OUTPATIENT)
Dept: RADIOLOGY | Facility: HOSPITAL | Age: 49
End: 2024-02-06
Attending: STUDENT IN AN ORGANIZED HEALTH CARE EDUCATION/TRAINING PROGRAM

## 2024-02-06 VITALS
BODY MASS INDEX: 38.65 KG/M2 | WEIGHT: 270 LBS | RESPIRATION RATE: 20 BRPM | OXYGEN SATURATION: 98 % | SYSTOLIC BLOOD PRESSURE: 140 MMHG | HEART RATE: 99 BPM | TEMPERATURE: 97.3 F | DIASTOLIC BLOOD PRESSURE: 70 MMHG | HEIGHT: 70 IN

## 2024-02-06 DIAGNOSIS — S52.572A OTHER CLOSED INTRA-ARTICULAR FRACTURE OF DISTAL END OF LEFT RADIUS, INITIAL ENCOUNTER: ICD-10-CM

## 2024-02-06 PROCEDURE — 73110 X-RAY EXAM OF WRIST: CPT | Mod: LT

## 2024-02-06 PROCEDURE — 250N000013 HC RX MED GY IP 250 OP 250 PS 637: Performed by: STUDENT IN AN ORGANIZED HEALTH CARE EDUCATION/TRAINING PROGRAM

## 2024-02-06 PROCEDURE — 99284 EMERGENCY DEPT VISIT MOD MDM: CPT | Mod: 25

## 2024-02-06 PROCEDURE — 25600 CLTX DST RDL FX/EPHYS SEP WO: CPT | Mod: LT

## 2024-02-06 RX ORDER — ACETAMINOPHEN 325 MG/1
975 TABLET ORAL ONCE
Status: COMPLETED | OUTPATIENT
Start: 2024-02-06 | End: 2024-02-06

## 2024-02-06 RX ORDER — OXYCODONE HYDROCHLORIDE 5 MG/1
5 TABLET ORAL EVERY 6 HOURS PRN
Qty: 12 TABLET | Refills: 0 | Status: SHIPPED | OUTPATIENT
Start: 2024-02-06 | End: 2024-02-09

## 2024-02-06 RX ORDER — OXYCODONE HYDROCHLORIDE 5 MG/1
TABLET ORAL
Status: DISCONTINUED
Start: 2024-02-06 | End: 2024-02-06 | Stop reason: HOSPADM

## 2024-02-06 RX ORDER — ACETAMINOPHEN 500 MG
1000 TABLET ORAL EVERY 8 HOURS PRN
Qty: 60 TABLET | Refills: 0 | Status: ON HOLD | OUTPATIENT
Start: 2024-02-06 | End: 2024-03-05

## 2024-02-06 RX ORDER — OXYCODONE HYDROCHLORIDE 5 MG/1
5 TABLET ORAL ONCE
Status: COMPLETED | OUTPATIENT
Start: 2024-02-06 | End: 2024-02-06

## 2024-02-06 RX ORDER — ACETAMINOPHEN 325 MG/1
TABLET ORAL
Status: DISCONTINUED
Start: 2024-02-06 | End: 2024-02-06 | Stop reason: HOSPADM

## 2024-02-06 RX ADMIN — OXYCODONE HYDROCHLORIDE 5 MG: 5 TABLET ORAL at 04:52

## 2024-02-06 RX ADMIN — ACETAMINOPHEN 975 MG: 325 TABLET ORAL at 04:52

## 2024-02-06 ASSESSMENT — ACTIVITIES OF DAILY LIVING (ADL): ADLS_ACUITY_SCORE: 35

## 2024-02-06 NOTE — PROGRESS NOTES
[facility]/Voicemail    Clinical Data: Care Coordinator Outreach    Outreach Documentation Number of Outreach Attempt   1/9/2024  10:20 AM 1   1/10/2024   4:13 PM 2   1/16/2024  10:49 AM 2   2/6/2024  10:44 AM 3       Left message on patient's voicemail with call back information and requested return call.    Plan: Care Coordinator will send disenrollment letter with care coordinator contact information via mail if no call back in one week.    Georgiana Isaacs,   [facility]  [phone]

## 2024-02-06 NOTE — ED TRIAGE NOTES
Carrying groceries and tripped. Caught self on left wrist, endorses severe pain to left wrist     Triage Assessment (Adult)       Row Name 02/06/24 0356          Triage Assessment    Airway WDL WDL        Respiratory WDL    Respiratory WDL WDL        Skin Circulation/Temperature WDL    Skin Circulation/Temperature WDL WDL        Cardiac WDL    Cardiac WDL WDL        Peripheral/Neurovascular WDL    Peripheral Neurovascular WDL WDL        Cognitive/Neuro/Behavioral WDL    Cognitive/Neuro/Behavioral WDL WDL

## 2024-02-06 NOTE — LETTER
[facility] CARE COORDINATION  [facility]    February 14, 2024    Floyd Capps  [address]     [place] [address]      Dear Floyd,    I have been unsuccessful in reaching you since our last contact. At this time the Care Coordination team will make no further attempts to reach you, however this does not change your ability to continue receiving care from your providers at your primary care clinic. If you need additional support from a care coordinator in the future please contact Georgiana at [phone].    All of us at [facility] are invested in your health and are here to assist you in meeting your goals.     Sincerely,    Georgiana Isaacs,   [facility]  [phone]

## 2024-02-06 NOTE — ED PROVIDER NOTES
EMERGENCY DEPARTMENT ENCOUNTER      NAME: Floyd Capps  AGE: 48 year old male  YOB: 1975  MRN: [id number]  EVALUATION DATE & TIME: 2/6/2024  3:58 AM    PCP: Glendy Benavides    ED PROVIDER: Juan Jose Romero MD      Chief Complaint   Patient presents with    Fall    Arm Pain         FINAL IMPRESSION:  No diagnosis found.      ED COURSE & MEDICAL DECISION MAKING:    Pertinent Labs & Imaging studies reviewed. (See chart for details)  48 year old male presents to the Emergency Department for evaluation of wrist pain after ground-level fall.    ED Course as of 02/06/24 0612   Tue Feb 06, 2024   0440 This patient is a 49-year-old emergency room for evaluation of ground-level fall earlier today.  States he was taking groceries slipped and fell in the left wrist.  Been having left wrist pain since injury denies any head or neck or losing consciousness.  Denies any other musculoskeletal pain or injuries other than his left wrist.  No nausea or vomiting.  No numbness or weakness in the arms or legs.  No back pain.    On exam does have tenderness with palpation over the distal left wrist.  Neurovascular intact left upper extremity.  No breaks in the skin.  No elbow tenderness or shoulder tenderness.    Will obtain x-rays left wrist to evaluate for underlying fracture or dislocation.  Patient denies head strike or loss of consciousness and no signs of head or neck trauma on exam.  Do not believe he requires cross-sectional imaging of the head or C-spine at this point in time.   0609 X-rays show a nondisplaced distal radius fracture.  Patient was placed in a sugar-tong splint.  Recommend Tylenol as needed for pain relief.  Additionally will send home with a short course of oxycodone as needed for breakthrough pain.  Plan for close follow-up with orthopedic as this may need surgery or pending given involvement of the articular surface.   orthopedics  referral placed in the EMR.  Otherwise patient safe for  discharge home at this point in time.     0445 I met and evaluated the patient.     Medical Decision Making    History:  Supplemental history from: Documented in chart  External Record(s) reviewed: Documented in chart    Work Up:  Chart documentation includes differential considered and any EKGs or imaging independently interpreted by provider, where specified.  In additional to work up documented, I considered the following work up: Documented in chart, if applicable.    External consultation:  Discussion of management with another provider: Documented in chart, if applicable    Complicating factors:  Care impacted by chronic illness: N/A  Care affected by social determinants of health: N/A    Disposition considerations: Discharge. I prescribed additional prescription strength medication(s) as charted. See documentation for any additional details.       At the conclusion of the encounter I discussed the results of all of the tests and the disposition. The questions were answered. The patient or family acknowledged understanding and was agreeable with the care plan.     0 minutes of critical care time     MEDICATIONS GIVEN IN THE EMERGENCY:  Medications - No data to display    NEW PRESCRIPTIONS STARTED AT TODAY'S ER VISIT  New Prescriptions    No medications on file          =================================================================    [facility]    Patient information was obtained from: patient    This patient is a 49-year-old emergency room for evaluation of ground-level fall earlier today.  States he was taking groceries slipped and fell in the left wrist.  Been having left wrist pain since injury denies any head or neck or losing consciousness.  Denies any other musculoskeletal pain or injuries other than his left wrist.  No nausea or vomiting.  No numbness or weakness in the arms or legs.  No back pain.    REVIEW OF SYSTEMS   Refer to the [facility]    PAST MEDICAL HISTORY:  History reviewed. No pertinent past medical  history.    PAST SURGICAL HISTORY:  Past Surgical History:   Procedure Laterality Date    APPENDECTOMY      INCISION AND DRAINAGE OF WOUND      groin abscess           CURRENT MEDICATIONS:    atorvastatin (LIPITOR) 80 MG tablet  carvedilol (COREG) 12.5 MG tablet  clopidogrel (PLAVIX) 75 MG tablet  glipiZIDE (GLUCOTROL XL) 10 MG 24 hr tablet  insulin glargine (LANTUS PEN) 100 UNIT/ML pen  losartan (COZAAR) 100 MG tablet  metFORMIN (GLUCOPHAGE) 500 MG tablet        ALLERGIES:  No Known Allergies    FAMILY HISTORY:  No family history on file.    SOCIAL HISTORY:   Social History     Socioeconomic History    Marital status: Single     Spouse name: None    Number of children: None    Years of education: None    Highest education level: None   Tobacco Use    Smoking status: Every Day     Packs/day: .5     Types: Cigarettes    Smokeless tobacco: Never    Tobacco comments:     Seen IP by [facility] on 8/24/23 and declined cessation services and materials.    Vaping Use    Vaping Use: Never used   Substance and Sexual Activity    Alcohol use: No    Drug use: No   Social History Narrative    Patient reports that he does not have health insurance.     Social Determinants of Health     Financial Resource Strain: Low Risk  (10/20/2023)    Financial Resource Strain     Within the past 12 months, have you or your family members you live with been unable to get utilities (heat, electricity) when it was really needed?: No   Food Insecurity: High Risk (10/20/2023)    Food Insecurity     Within the past 12 months, did you worry that your food would run out before you got money to buy more?: Yes     Within the past 12 months, did the food you bought just not last and you didn t have money to get more?: No   Transportation Needs: Low Risk  (10/20/2023)    Transportation Needs     Within the past 12 months, has lack of transportation kept you from medical appointments, getting your medicines, non-medical meetings or appointments, work, or from  getting things that you need?: No   Interpersonal Safety: Low Risk  (10/20/2023)    Interpersonal Safety     Do you feel physically and emotionally safe where you currently live?: Yes     Within the past 12 months, have you been hit, slapped, kicked or otherwise physically hurt by someone?: No     Within the past 12 months, have you been humiliated or emotionally abused in other ways by your partner or ex-partner?: No   Housing Stability: High Risk (10/20/2023)    Housing Stability     Do you have housing? : Yes     Are you worried about losing your housing?: Yes       VITALS:  BP (!) 208/102   Pulse 98   Temp 97.3  F (36.3  C)   Resp 17   SpO2 99%     PHYSICAL EXAM    Constitutional: Well developed, Well nourished, NAD,  HENT: Normocephalic, Atraumatic,  mucous membranes moist,   Neck- trachea midline, No stridor. No midline neck pain  Eyes:EOMI, Conjunctiva normal, No discharge.   Respiratory: Normal breath sounds, No respiratory distress, No wheezing.    Cardiovascular: Normal heart rate, Regular rhythm,  No murmurs,   Abdominal: Soft, No tenderness, No rebound or guarding.     Musculoskeletal: small amount of swelling over the distal forearm and dorsal wrist, tenderness of the distal ulna, radius and snuffbox, 2+ radial pulse, intact sensation in distributions of median, ulnar and radial nerves, no radial head tenderness or elbow tenderness, no breaks in the skin  Integument: Warm, Dry, No erythema, No rash.   Neurologic: Alert & oriented x 3         LAB:  All pertinent labs reviewed and interpreted.       RADIOLOGY:  Reviewed all pertinent imaging. Please see official radiology report.  XR Wrist Left G/E 3 Views    (Results Pending)       EKG:        PROCEDURES:       PROCEDURE: Splint Placement   INDICATIONS: left distal radius fracture fracture   PROCEDURE PROVIDER: Dr Juan Jose Romero   NOTE:  A Sugar tong splint made of Orthoglass was applied to the Left upper extremity by the above provider. As noted in the  physical exam, distal CMS was intact prior to placement. The splint was checked and the fit was adjusted to ensure proper positioning after placement. Sensation and circulation, as well as motor function, are unchanged after splint placement and the patient is more comfortable with the splint in place.          [facility] Documentation:   CMS Diagnoses:              Juan Jose Romero MD  [facility] EMERGENCY DEPARTMENT  [address] [place] [id number]  [phone]      Juan Jose Romero MD  02/06/24 0613       Juan Jose Romero MD  02/06/24 0833

## 2024-02-07 ENCOUNTER — TELEPHONE (OUTPATIENT)
Dept: ORTHOPEDICS | Facility: CLINIC | Age: 49
End: 2024-02-07
Payer: MEDICAID

## 2024-02-12 NOTE — PROGRESS NOTES
ASSESSMENT & PLAN    Floyd was seen today for pain.    Diagnoses and all orders for this visit:    Other closed intra-articular fracture of distal end of left radius, initial encounter  -     Orthopedic  Referral  -     XR Wrist Left G/E 3 Views; Future    Other orders  -     Cast/splint application      48-year-old male with a history of diabetes presents with left wrist pain secondary to a distal radius fx after a FOOSH injury 1 week ago.  Imaging of the left wrist reveals a minimally displaced, longitudinal intra-articular fracture of the distal radius.  He will be transitioned from a splint into a cast at this time.  Of note, patient reported he is concerned about his right heel today, and notes that over the past month and a half he has had increased pain, swelling, odor, and redness of his right heel.  He has been unable to clean his heel for the past week secondary to his left wrist fracture and reports it has been getting progressively worse.  Brief visual exam of his heel reveals a large ulcer that appears to be at least stage 3 and I encouraged patient to go to the ED for further management of this.     Plan:  -Short arm cast applied to left wrist today   -Tylenol Extra Strength (1000mg) up to three times daily as needed for pain  -Continue to work on glycemic control with primary care provider   -Consider calcium and vitamin D supplementation as patient does not seem to incorporate foods rich in these in his diet  - Encouraged patient to go to the ED to be further evaluated for his heel ulcer as I have some concern for infection.     Return in about 2 weeks (around 2/27/2024).  Plan for repeat images at that time.    Cast/splint application    Date/Time: 2/13/2024 4:56 PM    Performed by: Fernando Hodges ATC  Authorized by: May Mishra DO    Consent:     Consent obtained:  Verbal    Consent given by:  Patient    Risks discussed:  Discoloration, numbness, pain and swelling    Alternatives  discussed:  Delayed treatment, alternative treatment, observation and no treatment  Pre-procedure details:     Sensation:  Normal  Procedure details:     Laterality:  Left    Location:  Wrist    Wrist:  L wrist    Cast type:  Short arm    Supplies:  Fiberglass  Post-procedure details:     Pain:  Unchanged    Sensation:  Normal    Patient tolerance of procedure:  Tolerated well, no immediate complications    Patient provided with cast or splint care instructions: Yes          Dr. May Mishra, DO  [facility]  Sports Medicine     -----  Chief Complaint   Patient presents with    Left Wrist - Pain       SUBJECTIVE  Floyd Capps is a/an 48 year old male who is seen in consultation at the request of  Juan Jose Romero M.D. for evaluation of left distal radius fracture.     The patient is seen with his sister    Onset: 7 day(s) ago. Injury occurred after FOOSH after carrying groceries in the left hand  Location/characterization of Pain: left wrist  Current method of immobilization?: splint  Associated symptoms: Swelling, some pain with pressure from the splint    Prior injury/Surgical history of affected joint: YES - Date: 1980s thumb injury  Social History/Occupation: not working currently    REVIEW OF SYSTEMS:  Pertinent positives/negative: As stated above in HPI    OBJECTIVE:  BP (!) 142/86   Temp 98.6  F (37  C)    General: Alert and in no distress  Skin: no visable rashes  CV: Extremities appear well perfused   Resp: normal respiratory effort, no conversational dyspnea   Psych: normal mood, affect  MSK:  LEFT Wrist/Hand  Inspection:  mild swelling, bruising  Palpation: Tender to palpation of the distal radius  Range of Motion:  Full at elbow, limited at wrist secondary to pain, swelling  Strength: Deferred   Sensation: Grossly intact to light touch in median, ulnar, radial nerve distributions  Capillary refill: <2 seconds       RADIOLOGY:  Final results and radiologist's interpretation  available in the [facility] health record.  Images were personally reviewed and discussed with the patient in the office today.  My personal interpretation of the performed imaging: X-ray of the left wrist performed in clinic today reveals a minimally displaced longitudinal distal radius fracture with intra-articular involvement      Review of prior external note(s) from - emergency room

## 2024-02-13 ENCOUNTER — OFFICE VISIT (OUTPATIENT)
Dept: ORTHOPEDICS | Facility: CLINIC | Age: 49
End: 2024-02-13
Payer: MEDICAID

## 2024-02-13 ENCOUNTER — ANCILLARY PROCEDURE (OUTPATIENT)
Dept: GENERAL RADIOLOGY | Facility: CLINIC | Age: 49
End: 2024-02-13
Attending: STUDENT IN AN ORGANIZED HEALTH CARE EDUCATION/TRAINING PROGRAM
Payer: MEDICAID

## 2024-02-13 ENCOUNTER — HOSPITAL ENCOUNTER (EMERGENCY)
Facility: HOSPITAL | Age: 49
Discharge: HOME | End: 2024-02-13
Admitting: EMERGENCY MEDICINE

## 2024-02-13 VITALS — TEMPERATURE: 98.6 F | SYSTOLIC BLOOD PRESSURE: 142 MMHG | DIASTOLIC BLOOD PRESSURE: 86 MMHG

## 2024-02-13 VITALS
SYSTOLIC BLOOD PRESSURE: 140 MMHG | OXYGEN SATURATION: 95 % | RESPIRATION RATE: 16 BRPM | WEIGHT: 270 LBS | TEMPERATURE: 98.2 F | DIASTOLIC BLOOD PRESSURE: 80 MMHG | BODY MASS INDEX: 38.74 KG/M2 | HEART RATE: 106 BPM

## 2024-02-13 DIAGNOSIS — S52.572A OTHER CLOSED INTRA-ARTICULAR FRACTURE OF DISTAL END OF LEFT RADIUS, INITIAL ENCOUNTER: ICD-10-CM

## 2024-02-13 DIAGNOSIS — S52.572A OTHER CLOSED INTRA-ARTICULAR FRACTURE OF DISTAL END OF LEFT RADIUS, INITIAL ENCOUNTER: Primary | ICD-10-CM

## 2024-02-13 PROCEDURE — 73110 X-RAY EXAM OF WRIST: CPT | Mod: TC | Performed by: RADIOLOGY

## 2024-02-13 PROCEDURE — 99204 OFFICE O/P NEW MOD 45 MIN: CPT | Mod: 25 | Performed by: STUDENT IN AN ORGANIZED HEALTH CARE EDUCATION/TRAINING PROGRAM

## 2024-02-13 PROCEDURE — 99281 EMR DPT VST MAYX REQ PHY/QHP: CPT

## 2024-02-13 PROCEDURE — 29075 APPL CST ELBW FNGR SHORT ARM: CPT | Mod: LT | Performed by: STUDENT IN AN ORGANIZED HEALTH CARE EDUCATION/TRAINING PROGRAM

## 2024-02-13 NOTE — ED TRIAGE NOTES
Pt c/o right foot pain with ulcers for past 1.5 months, but worse recently. Pt has been seen for this before and placed on antibiotics. Pt broke his left wrist one week ago, so he states he has not been able to clean and wrap foot as he has been doing. No pain meds taken today. Difficult walking.     Triage Assessment (Adult)       Row Name 02/13/24 3095          Triage Assessment    Airway WDL WDL        Respiratory WDL    Respiratory WDL WDL        Skin Circulation/Temperature WDL    Skin Circulation/Temperature WDL X  ulcer to right heel        Cardiac WDL    Cardiac WDL WDL        Peripheral/Neurovascular WDL    Peripheral Neurovascular WDL WDL        Cognitive/Neuro/Behavioral WDL    Cognitive/Neuro/Behavioral WDL WDL

## 2024-02-13 NOTE — PATIENT INSTRUCTIONS
Plan:  -Cast applied to left wrist today   -Tylenol Extra Strength (1000mg) up to three times daily as needed for pain    Thank you for choosing [facility]!    DR. MISHRA'S CLINIC LOCATIONS:     [place]  TRIAGE LINE: [phone]   [facility] [email or web address] APPOINTMENTS: [phone]   [place] [address] RADIOLOGY: [phone]   (Mondays & Tuesdays) HAND THERAPY: [phone]    PHYSICAL THERAPY: [phone]   [place] BILLING QUESTIONS: [phone] [address] FAX: [phone]   [place] [address]    (Thursdays & Fridays)      If you have any questions or concerns after your appointment, you can send Dr. Mishra a 2theloo message or call the clinic number at [phone] at any time.    May Mishra DO, CAM  [facility]  Sports Medicine

## 2024-02-13 NOTE — ED NOTES
Expected Patient Referral to ED  4:43 PM    Referring Clinic/Provider:  Sports med clinic    Reason for referral/Clinical facts:  At sports med for distal radius but mentioned foot wound to them, concern for infected diabetic foot ulcer/osteo on right heal, coming for further eval    Recommendations provided:  Send to ED for further evaluation    Caller was informed that this institution does possess the capabilities and/or resources to provide for patient and should be transferred to our facility.    Discussed that if direct admit is sought and any hurdles are encountered, this ED would be happy to see the patient and evaluate.    Informed caller that recommendations provided are recommendations based only on the facts provided and that they responsible to accept or reject the advice, or to seek a formal in person consultation as needed and that this ED will see/treat patient should they arrive.      Juan Jose Romero MD  [facility] EMERGENCY DEPARTMENT  [address] [place] [id number]  [phone]       Juan Jose Romero MD  02/13/24 8580

## 2024-02-13 NOTE — LETTER
2/13/2024         RE: Floyd Capps  [address]   [address]  [place] [address]        Dear Colleague,    Thank you for referring your patient, Floyd Capps, to the [facility]. Please see a copy of my visit note below.    ASSESSMENT & PLAN    Floyd was seen today for pain.    Diagnoses and all orders for this visit:    Other closed intra-articular fracture of distal end of left radius, initial encounter  -     Orthopedic  Referral  -     XR Wrist Left G/E 3 Views; Future    Other orders  -     Cast/splint application      48-year-old male with a history of diabetes presents with left wrist pain secondary to a distal radius fx after a FOOSH injury 1 week ago.  Imaging of the left wrist reveals a minimally displaced, longitudinal intra-articular fracture of the distal radius.  He will be transitioned from a splint into a cast at this time.  Of note, patient reported he is concerned about his right heel today, and notes that over the past month and a half he has had increased pain, swelling, odor, and redness of his right heel.  He has been unable to clean his heel for the past week secondary to his left wrist fracture and reports it has been getting progressively worse.  Brief visual exam of his heel reveals a large ulcer that appears to be at least stage 3 and I encouraged patient to go to the ED for further management of this.     Plan:  -Short arm cast applied to left wrist today   -Tylenol Extra Strength (1000mg) up to three times daily as needed for pain  -Continue to work on glycemic control with primary care provider   -Consider calcium and vitamin D supplementation as patient does not seem to incorporate foods rich in these in his diet  - Encouraged patient to go to the ED to be further evaluated for his heel ulcer as I have some concern for infection.     Return in about 2 weeks (around 2/27/2024).  Plan for repeat images at that  time.    Cast/splint application    Date/Time: 2/13/2024 4:56 PM    Performed by: Fernando Hodges ATC  Authorized by: May Mishra DO    Consent:     Consent obtained:  Verbal    Consent given by:  Patient    Risks discussed:  Discoloration, numbness, pain and swelling    Alternatives discussed:  Delayed treatment, alternative treatment, observation and no treatment  Pre-procedure details:     Sensation:  Normal  Procedure details:     Laterality:  Left    Location:  Wrist    Wrist:  L wrist    Cast type:  Short arm    Supplies:  Fiberglass  Post-procedure details:     Pain:  Unchanged    Sensation:  Normal    Patient tolerance of procedure:  Tolerated well, no immediate complications    Patient provided with cast or splint care instructions: Yes          Dr. May Mishra DO  [facility]  Sports Medicine     -----  Chief Complaint   Patient presents with     Left Wrist - Pain       SUBJECTIVE  Floyd Capps is a/an 48 year old male who is seen in consultation at the request of  uJan Jose Romero M.D. for evaluation of left distal radius fracture.     The patient is seen with his sister    Onset: 7 day(s) ago. Injury occurred after FOOSH after carrying groceries in the left hand  Location/characterization of Pain: left wrist  Current method of immobilization?: splint  Associated symptoms: Swelling, some pain with pressure from the splint    Prior injury/Surgical history of affected joint: YES - Date: 1980s thumb injury  Social History/Occupation: not working currently    REVIEW OF SYSTEMS:  Pertinent positives/negative: As stated above in HPI    OBJECTIVE:  BP (!) 142/86   Temp 98.6  F (37  C)    General: Alert and in no distress  Skin: no visable rashes  CV: Extremities appear well perfused   Resp: normal respiratory effort, no conversational dyspnea   Psych: normal mood, affect  MSK:  LEFT Wrist/Hand  Inspection:  mild swelling, bruising  Palpation: Tender to palpation of the  distal radius  Range of Motion:  Full at elbow, limited at wrist secondary to pain, swelling  Strength: Deferred   Sensation: Grossly intact to light touch in median, ulnar, radial nerve distributions  Capillary refill: <2 seconds       RADIOLOGY:  Final results and radiologist's interpretation available in the [facility] health record.  Images were personally reviewed and discussed with the patient in the office today.  My personal interpretation of the performed imaging: X-ray of the left wrist performed in clinic today reveals a minimally displaced longitudinal distal radius fracture with intra-articular involvement      Review of prior external note(s) from - emergency room             Again, thank you for allowing me to participate in the care of your patient.        Sincerely,        May Mishra, DO

## 2024-02-14 ENCOUNTER — HOSPITAL ENCOUNTER (INPATIENT)
Facility: HOSPITAL | Age: 49
LOS: 20 days | Discharge: NURSING, REHAB OR LONG-TERM CARE | End: 2024-03-05
Attending: EMERGENCY MEDICINE | Admitting: HOSPITALIST
Payer: MEDICAID

## 2024-02-14 ENCOUNTER — APPOINTMENT (OUTPATIENT)
Dept: MRI IMAGING | Facility: HOSPITAL | Age: 49
End: 2024-02-14
Attending: EMERGENCY MEDICINE
Payer: MEDICAID

## 2024-02-14 DIAGNOSIS — Z86.73 HISTORY OF STROKE: ICD-10-CM

## 2024-02-14 DIAGNOSIS — E11.621 DIABETIC ULCER OF RIGHT HEEL ASSOCIATED WITH TYPE 2 DIABETES MELLITUS, UNSPECIFIED ULCER STAGE (H): ICD-10-CM

## 2024-02-14 DIAGNOSIS — L97.419 DIABETIC ULCER OF RIGHT HEEL ASSOCIATED WITH TYPE 2 DIABETES MELLITUS, UNSPECIFIED ULCER STAGE (H): ICD-10-CM

## 2024-02-14 DIAGNOSIS — E78.01 FAMILIAL HYPERCHOLESTEROLEMIA: ICD-10-CM

## 2024-02-14 DIAGNOSIS — Z98.890 S/P FOOT SURGERY, RIGHT: ICD-10-CM

## 2024-02-14 DIAGNOSIS — R73.9 HYPERGLYCEMIA: ICD-10-CM

## 2024-02-14 DIAGNOSIS — E66.01 CLASS 2 SEVERE OBESITY DUE TO EXCESS CALORIES WITH SERIOUS COMORBIDITY IN ADULT, UNSPECIFIED BMI (H): ICD-10-CM

## 2024-02-14 DIAGNOSIS — R12 HEART BURN: Chronic | ICD-10-CM

## 2024-02-14 DIAGNOSIS — I10 BENIGN ESSENTIAL HYPERTENSION: Chronic | ICD-10-CM

## 2024-02-14 DIAGNOSIS — L03.115 CELLULITIS OF RIGHT LOWER LEG: ICD-10-CM

## 2024-02-14 DIAGNOSIS — I50.21 ACUTE SYSTOLIC HEART FAILURE (H): Primary | ICD-10-CM

## 2024-02-14 DIAGNOSIS — E66.812 CLASS 2 SEVERE OBESITY DUE TO EXCESS CALORIES WITH SERIOUS COMORBIDITY IN ADULT, UNSPECIFIED BMI (H): ICD-10-CM

## 2024-02-14 DIAGNOSIS — E11.65 POORLY CONTROLLED DIABETES MELLITUS (H): ICD-10-CM

## 2024-02-14 DIAGNOSIS — S52.502S CLOSED FRACTURE OF DISTAL END OF LEFT RADIUS, UNSPECIFIED FRACTURE MORPHOLOGY, SEQUELA: ICD-10-CM

## 2024-02-14 DIAGNOSIS — F17.200 TOBACCO USE DISORDER: ICD-10-CM

## 2024-02-14 DIAGNOSIS — R79.89 ELEVATED TROPONIN: ICD-10-CM

## 2024-02-14 PROBLEM — E87.1 HYPONATREMIA: Status: ACTIVE | Noted: 2024-02-14

## 2024-02-14 LAB
ANION GAP SERPL CALCULATED.3IONS-SCNC: 11 MMOL/L (ref 7–15)
BASOPHILS # BLD AUTO: 0.1 10E3/UL (ref 0–0.2)
BASOPHILS NFR BLD AUTO: 1 %
BUN SERPL-MCNC: 9 MG/DL (ref 6–20)
CALCIUM SERPL-MCNC: 8.7 MG/DL (ref 8.6–10)
CHLORIDE SERPL-SCNC: 99 MMOL/L (ref 98–107)
CREAT SERPL-MCNC: 0.82 MG/DL (ref 0.67–1.17)
CRP SERPL-MCNC: 85.1 MG/L
DEPRECATED HCO3 PLAS-SCNC: 23 MMOL/L (ref 22–29)
EGFRCR SERPLBLD CKD-EPI 2021: >90 ML/MIN/1.73M2
EOSINOPHIL # BLD AUTO: 0.2 10E3/UL (ref 0–0.7)
EOSINOPHIL NFR BLD AUTO: 1 %
ERYTHROCYTE [DISTWIDTH] IN BLOOD BY AUTOMATED COUNT: 11.9 % (ref 10–15)
ERYTHROCYTE [SEDIMENTATION RATE] IN BLOOD BY WESTERGREN METHOD: 99 MM/HR (ref 0–20)
GLUCOSE BLDC GLUCOMTR-MCNC: 185 MG/DL (ref 70–99)
GLUCOSE BLDC GLUCOMTR-MCNC: 198 MG/DL (ref 70–99)
GLUCOSE BLDC GLUCOMTR-MCNC: 239 MG/DL (ref 70–99)
GLUCOSE BLDC GLUCOMTR-MCNC: 249 MG/DL (ref 70–99)
GLUCOSE SERPL-MCNC: 244 MG/DL (ref 70–99)
HBA1C MFR BLD: 11.8 %
HCT VFR BLD AUTO: 35 % (ref 40–53)
HGB BLD-MCNC: 11.7 G/DL (ref 13.3–17.7)
IMM GRANULOCYTES # BLD: 0.1 10E3/UL
IMM GRANULOCYTES NFR BLD: 1 %
LYMPHOCYTES # BLD AUTO: 1.5 10E3/UL (ref 0.8–5.3)
LYMPHOCYTES NFR BLD AUTO: 11 %
MCH RBC QN AUTO: 29.9 PG (ref 26.5–33)
MCHC RBC AUTO-ENTMCNC: 33.4 G/DL (ref 31.5–36.5)
MCV RBC AUTO: 90 FL (ref 78–100)
MONOCYTES # BLD AUTO: 0.9 10E3/UL (ref 0–1.3)
MONOCYTES NFR BLD AUTO: 7 %
NEUTROPHILS # BLD AUTO: 11.6 10E3/UL (ref 1.6–8.3)
NEUTROPHILS NFR BLD AUTO: 79 %
NRBC # BLD AUTO: 0 10E3/UL
NRBC BLD AUTO-RTO: 0 /100
PLATELET # BLD AUTO: 416 10E3/UL (ref 150–450)
POTASSIUM SERPL-SCNC: 3.8 MMOL/L (ref 3.4–5.3)
RBC # BLD AUTO: 3.91 10E6/UL (ref 4.4–5.9)
SODIUM SERPL-SCNC: 133 MMOL/L (ref 135–145)
WBC # BLD AUTO: 14.4 10E3/UL (ref 4–11)

## 2024-02-14 PROCEDURE — 250N000011 HC RX IP 250 OP 636: Performed by: EMERGENCY MEDICINE

## 2024-02-14 PROCEDURE — 83036 HEMOGLOBIN GLYCOSYLATED A1C: CPT | Performed by: HOSPITALIST

## 2024-02-14 PROCEDURE — 258N000003 HC RX IP 258 OP 636: Performed by: HOSPITALIST

## 2024-02-14 PROCEDURE — 85652 RBC SED RATE AUTOMATED: CPT | Performed by: EMERGENCY MEDICINE

## 2024-02-14 PROCEDURE — 87186 SC STD MICRODIL/AGAR DIL: CPT | Performed by: EMERGENCY MEDICINE

## 2024-02-14 PROCEDURE — 120N000001 HC R&B MED SURG/OB

## 2024-02-14 PROCEDURE — 99285 EMERGENCY DEPT VISIT HI MDM: CPT | Mod: 25

## 2024-02-14 PROCEDURE — A9585 GADOBUTROL INJECTION: HCPCS | Performed by: HOSPITALIST

## 2024-02-14 PROCEDURE — 87205 SMEAR GRAM STAIN: CPT | Performed by: EMERGENCY MEDICINE

## 2024-02-14 PROCEDURE — 250N000011 HC RX IP 250 OP 636: Performed by: HOSPITALIST

## 2024-02-14 PROCEDURE — 87040 BLOOD CULTURE FOR BACTERIA: CPT | Performed by: EMERGENCY MEDICINE

## 2024-02-14 PROCEDURE — 85025 COMPLETE CBC W/AUTO DIFF WBC: CPT | Performed by: EMERGENCY MEDICINE

## 2024-02-14 PROCEDURE — 99222 1ST HOSP IP/OBS MODERATE 55: CPT | Performed by: PODIATRIST

## 2024-02-14 PROCEDURE — 73720 MRI LWR EXTREMITY W/O&W/DYE: CPT | Mod: RT

## 2024-02-14 PROCEDURE — 96374 THER/PROPH/DIAG INJ IV PUSH: CPT

## 2024-02-14 PROCEDURE — 82962 GLUCOSE BLOOD TEST: CPT

## 2024-02-14 PROCEDURE — 80048 BASIC METABOLIC PNL TOTAL CA: CPT | Performed by: EMERGENCY MEDICINE

## 2024-02-14 PROCEDURE — 255N000002 HC RX 255 OP 636: Performed by: HOSPITALIST

## 2024-02-14 PROCEDURE — 36415 COLL VENOUS BLD VENIPUNCTURE: CPT | Performed by: EMERGENCY MEDICINE

## 2024-02-14 PROCEDURE — 99223 1ST HOSP IP/OBS HIGH 75: CPT | Performed by: HOSPITALIST

## 2024-02-14 PROCEDURE — 86140 C-REACTIVE PROTEIN: CPT | Performed by: EMERGENCY MEDICINE

## 2024-02-14 PROCEDURE — 250N000013 HC RX MED GY IP 250 OP 250 PS 637: Performed by: HOSPITALIST

## 2024-02-14 PROCEDURE — 250N000012 HC RX MED GY IP 250 OP 636 PS 637: Performed by: HOSPITALIST

## 2024-02-14 RX ORDER — GADOBUTROL 604.72 MG/ML
12 INJECTION INTRAVENOUS ONCE
Status: COMPLETED | OUTPATIENT
Start: 2024-02-14 | End: 2024-02-14

## 2024-02-14 RX ORDER — NALOXONE HYDROCHLORIDE 0.4 MG/ML
0.2 INJECTION, SOLUTION INTRAMUSCULAR; INTRAVENOUS; SUBCUTANEOUS
Status: DISCONTINUED | OUTPATIENT
Start: 2024-02-14 | End: 2024-03-05 | Stop reason: HOSPADM

## 2024-02-14 RX ORDER — ACETAMINOPHEN 325 MG/1
650 TABLET ORAL EVERY 4 HOURS PRN
Status: DISCONTINUED | OUTPATIENT
Start: 2024-02-14 | End: 2024-03-05 | Stop reason: HOSPADM

## 2024-02-14 RX ORDER — SODIUM CHLORIDE 9 MG/ML
INJECTION, SOLUTION INTRAVENOUS CONTINUOUS
Status: DISCONTINUED | OUTPATIENT
Start: 2024-02-14 | End: 2024-02-18

## 2024-02-14 RX ORDER — NICOTINE POLACRILEX 4 MG
15-30 LOZENGE BUCCAL
Status: DISCONTINUED | OUTPATIENT
Start: 2024-02-14 | End: 2024-03-05 | Stop reason: HOSPADM

## 2024-02-14 RX ORDER — NICOTINE 21 MG/24HR
1 PATCH, TRANSDERMAL 24 HOURS TRANSDERMAL EVERY 24 HOURS
Status: DISCONTINUED | OUTPATIENT
Start: 2024-02-14 | End: 2024-02-17

## 2024-02-14 RX ORDER — NALOXONE HYDROCHLORIDE 0.4 MG/ML
0.4 INJECTION, SOLUTION INTRAMUSCULAR; INTRAVENOUS; SUBCUTANEOUS
Status: DISCONTINUED | OUTPATIENT
Start: 2024-02-14 | End: 2024-03-05 | Stop reason: HOSPADM

## 2024-02-14 RX ORDER — PIPERACILLIN SODIUM, TAZOBACTAM SODIUM 3; .375 G/15ML; G/15ML
3.38 INJECTION, POWDER, LYOPHILIZED, FOR SOLUTION INTRAVENOUS ONCE
Status: COMPLETED | OUTPATIENT
Start: 2024-02-14 | End: 2024-02-14

## 2024-02-14 RX ORDER — LIDOCAINE 40 MG/G
CREAM TOPICAL
Status: DISCONTINUED | OUTPATIENT
Start: 2024-02-14 | End: 2024-03-05 | Stop reason: HOSPADM

## 2024-02-14 RX ORDER — AMOXICILLIN 250 MG
1 CAPSULE ORAL 2 TIMES DAILY PRN
Status: DISCONTINUED | OUTPATIENT
Start: 2024-02-14 | End: 2024-03-05 | Stop reason: HOSPADM

## 2024-02-14 RX ORDER — AMOXICILLIN 250 MG
2 CAPSULE ORAL 2 TIMES DAILY PRN
Status: DISCONTINUED | OUTPATIENT
Start: 2024-02-14 | End: 2024-03-05 | Stop reason: HOSPADM

## 2024-02-14 RX ORDER — ACETAMINOPHEN 650 MG/1
650 SUPPOSITORY RECTAL EVERY 4 HOURS PRN
Status: DISCONTINUED | OUTPATIENT
Start: 2024-02-14 | End: 2024-03-05 | Stop reason: HOSPADM

## 2024-02-14 RX ORDER — PROCHLORPERAZINE 25 MG
25 SUPPOSITORY, RECTAL RECTAL EVERY 12 HOURS PRN
Status: DISCONTINUED | OUTPATIENT
Start: 2024-02-14 | End: 2024-03-05 | Stop reason: HOSPADM

## 2024-02-14 RX ORDER — ASPIRIN 325 MG
325 TABLET, DELAYED RELEASE (ENTERIC COATED) ORAL DAILY
Status: DISCONTINUED | OUTPATIENT
Start: 2024-02-14 | End: 2024-03-05 | Stop reason: HOSPADM

## 2024-02-14 RX ORDER — ENOXAPARIN SODIUM 100 MG/ML
40 INJECTION SUBCUTANEOUS EVERY 24 HOURS
Status: DISCONTINUED | OUTPATIENT
Start: 2024-02-14 | End: 2024-03-05 | Stop reason: HOSPADM

## 2024-02-14 RX ORDER — ATORVASTATIN CALCIUM 40 MG/1
80 TABLET, FILM COATED ORAL EVERY EVENING
Status: DISCONTINUED | OUTPATIENT
Start: 2024-02-14 | End: 2024-03-01

## 2024-02-14 RX ORDER — LOSARTAN POTASSIUM 25 MG/1
25 TABLET ORAL DAILY
Status: DISCONTINUED | OUTPATIENT
Start: 2024-02-14 | End: 2024-02-24

## 2024-02-14 RX ORDER — PROCHLORPERAZINE MALEATE 5 MG
10 TABLET ORAL EVERY 6 HOURS PRN
Status: DISCONTINUED | OUTPATIENT
Start: 2024-02-14 | End: 2024-03-05 | Stop reason: HOSPADM

## 2024-02-14 RX ORDER — ONDANSETRON 2 MG/ML
4 INJECTION INTRAMUSCULAR; INTRAVENOUS EVERY 6 HOURS PRN
Status: DISCONTINUED | OUTPATIENT
Start: 2024-02-14 | End: 2024-03-05 | Stop reason: HOSPADM

## 2024-02-14 RX ORDER — BISACODYL 10 MG
10 SUPPOSITORY, RECTAL RECTAL DAILY PRN
Status: DISCONTINUED | OUTPATIENT
Start: 2024-02-14 | End: 2024-03-05 | Stop reason: HOSPADM

## 2024-02-14 RX ORDER — CALCIUM CARBONATE 500 MG/1
1000 TABLET, CHEWABLE ORAL 4 TIMES DAILY PRN
Status: DISCONTINUED | OUTPATIENT
Start: 2024-02-14 | End: 2024-03-05 | Stop reason: HOSPADM

## 2024-02-14 RX ORDER — POLYETHYLENE GLYCOL 3350 17 G/17G
17 POWDER, FOR SOLUTION ORAL 2 TIMES DAILY PRN
Status: DISCONTINUED | OUTPATIENT
Start: 2024-02-14 | End: 2024-03-05 | Stop reason: HOSPADM

## 2024-02-14 RX ORDER — DEXTROSE MONOHYDRATE 25 G/50ML
25-50 INJECTION, SOLUTION INTRAVENOUS
Status: DISCONTINUED | OUTPATIENT
Start: 2024-02-14 | End: 2024-03-05 | Stop reason: HOSPADM

## 2024-02-14 RX ORDER — PIPERACILLIN SODIUM, TAZOBACTAM SODIUM 3; .375 G/15ML; G/15ML
3.38 INJECTION, POWDER, LYOPHILIZED, FOR SOLUTION INTRAVENOUS EVERY 8 HOURS
Status: COMPLETED | OUTPATIENT
Start: 2024-02-14 | End: 2024-02-29

## 2024-02-14 RX ORDER — OXYCODONE HYDROCHLORIDE 5 MG/1
5 TABLET ORAL EVERY 6 HOURS PRN
Status: DISCONTINUED | OUTPATIENT
Start: 2024-02-14 | End: 2024-03-01

## 2024-02-14 RX ORDER — OXYCODONE HYDROCHLORIDE 5 MG/1
5 TABLET ORAL EVERY 6 HOURS PRN
Status: ON HOLD | COMMUNITY
End: 2024-03-05

## 2024-02-14 RX ORDER — ONDANSETRON 4 MG/1
4 TABLET, ORALLY DISINTEGRATING ORAL EVERY 6 HOURS PRN
Status: DISCONTINUED | OUTPATIENT
Start: 2024-02-14 | End: 2024-03-05 | Stop reason: HOSPADM

## 2024-02-14 RX ORDER — CEFAZOLIN SODIUM 1 G/50ML
2500 SOLUTION INTRAVENOUS ONCE
Status: COMPLETED | OUTPATIENT
Start: 2024-02-14 | End: 2024-02-14

## 2024-02-14 RX ADMIN — GADOBUTROL 12 ML: 604.72 INJECTION INTRAVENOUS at 11:53

## 2024-02-14 RX ADMIN — SODIUM CHLORIDE: 9 INJECTION, SOLUTION INTRAVENOUS at 12:37

## 2024-02-14 RX ADMIN — OXYCODONE HYDROCHLORIDE 5 MG: 5 TABLET ORAL at 16:55

## 2024-02-14 RX ADMIN — INSULIN GLARGINE 10 UNITS: 100 INJECTION, SOLUTION SUBCUTANEOUS at 22:11

## 2024-02-14 RX ADMIN — NICOTINE 1 PATCH: 14 PATCH, EXTENDED RELEASE TRANSDERMAL at 13:00

## 2024-02-14 RX ADMIN — VANCOMYCIN HYDROCHLORIDE 1500 MG: 5 INJECTION, POWDER, LYOPHILIZED, FOR SOLUTION INTRAVENOUS at 23:15

## 2024-02-14 RX ADMIN — INSULIN ASPART 1 UNITS: 100 INJECTION, SOLUTION INTRAVENOUS; SUBCUTANEOUS at 15:58

## 2024-02-14 RX ADMIN — PIPERACILLIN AND TAZOBACTAM 3.38 G: 3; .375 INJECTION, POWDER, FOR SOLUTION INTRAVENOUS at 15:58

## 2024-02-14 RX ADMIN — ACETAMINOPHEN 650 MG: 325 TABLET ORAL at 12:38

## 2024-02-14 RX ADMIN — SODIUM CHLORIDE: 9 INJECTION, SOLUTION INTRAVENOUS at 20:22

## 2024-02-14 RX ADMIN — LOSARTAN POTASSIUM 25 MG: 25 TABLET, FILM COATED ORAL at 12:37

## 2024-02-14 RX ADMIN — ATORVASTATIN CALCIUM 80 MG: 40 TABLET, FILM COATED ORAL at 20:18

## 2024-02-14 RX ADMIN — ACETAMINOPHEN 650 MG: 325 TABLET ORAL at 22:10

## 2024-02-14 RX ADMIN — VANCOMYCIN HYDROCHLORIDE 2500 MG: 1 INJECTION, POWDER, LYOPHILIZED, FOR SOLUTION INTRAVENOUS at 12:41

## 2024-02-14 RX ADMIN — INSULIN ASPART 2 UNITS: 100 INJECTION, SOLUTION INTRAVENOUS; SUBCUTANEOUS at 20:25

## 2024-02-14 RX ADMIN — INSULIN ASPART 3 UNITS: 100 INJECTION, SOLUTION INTRAVENOUS; SUBCUTANEOUS at 13:02

## 2024-02-14 RX ADMIN — PIPERACILLIN AND TAZOBACTAM 3.38 G: 3; .375 INJECTION, POWDER, FOR SOLUTION INTRAVENOUS at 10:02

## 2024-02-14 RX ADMIN — ASPIRIN 325 MG: 325 TABLET, COATED ORAL at 12:37

## 2024-02-14 RX ADMIN — ENOXAPARIN SODIUM 40 MG: 40 INJECTION SUBCUTANEOUS at 18:45

## 2024-02-14 RX ADMIN — OXYCODONE HYDROCHLORIDE 5 MG: 5 TABLET ORAL at 23:14

## 2024-02-14 ASSESSMENT — ACTIVITIES OF DAILY LIVING (ADL)
ADLS_ACUITY_SCORE: 37
ADLS_ACUITY_SCORE: 35
ADLS_ACUITY_SCORE: 37
ADLS_ACUITY_SCORE: 38
ADLS_ACUITY_SCORE: 35
DEPENDENT_IADLS:: INDEPENDENT

## 2024-02-14 NOTE — ED TRIAGE NOTES
"Patient arrives ambulatory to triage.   Reports right foot pain. He states he was seen in this ED yesterday for same pain but was unable to wait for doctor so left ED.     States his foot is \"full of bacteria\" and was told by a different provider his foot would need to be amputated.       Hx T2DM.        "

## 2024-02-14 NOTE — PHARMACY-VANCOMYCIN DOSING SERVICE
Pharmacy Vancomycin Initial Note  Date of Service 2024  Patient's  1975  48 year old, male    Indication: Osteomyelitis and Skin and Soft Tissue Infection    Current estimated CrCl = Estimated Creatinine Clearance: 136.5 mL/min (based on SCr of 0.85 mg/dL).    Creatinine for last 3 days  No results found for requested labs within last 3 days.    Recent Vancomycin Level(s) for last 3 days  No results found for requested labs within last 3 days.      Vancomycin IV Administrations (past 72 hours)        No vancomycin orders with administrations in past 72 hours.                    Nephrotoxins and other renal medications (From now, onward)      Start     Dose/Rate Route Frequency Ordered Stop    24 1000  piperacillin-tazobactam (ZOSYN) 3.375 g vial to attach to  mL bag         3.375 g  over 30 Minutes Intravenous ONCE 24 0941              Contrast Orders - past 72 hours (72h ago, onward)      None            Plan:  Start vancomycin 2500 mg IV once.  Please re-consult pharmacy if therapy to continue upon admission.    Yuli Quinones, [facility]

## 2024-02-14 NOTE — H&P (VIEW-ONLY)
FOOT AND ANKLE SURGERY/PODIATRY CONSULT NOTE         ASSESSMENT:   Diabetic ulcer right heel  Cellulitis right lower extremity  DM2  Hyperglycemia      TREATMENT:  Recommend surgical debridement with irrigation of the large diabetic ulcer on the posterior aspect of the right heel.  The patient was informed that he would have a large open wound once the procedure has been performed.  This will require several weeks to several months for wound healing.  A wound VAC will be required to assist with wound healing.  I informed the patient that amputation of the foot or lower extremity is always a possibility if there are any complications with wound healing.  The procedure will be done under general anesthesia.  MRIs were negative for any osteomyelitis so I do not expect any bone loss as result of the procedure.  The patient felt well-informed and agreed to have the above procedure performed.        HPI: I was asked to see Floyd Capps today to evaluate and treat a large diabetic ulcer at the posterior aspect of the right heel.  The patient is a 48-year-old male who presented to the  ED on 2/14/2024 complaining of painful ulceration involving his right heel.  The patient indicated that he has had heel pain for approximately 2 to 3 months.  6 weeks ago he was seen in the ER and treated for right leg cellulitis with cephalexin.  The patient indicated that his heel did not improve after taking the antibiotics that he was unable to care for the wounds adequately because of the left broken wrist.  He reports increased pain along with chills and decreased appetite.  The patient mention he has not eaten for 2 to 3 days.  The patient is not in any acute distress.  He is hemodynamically stable with mild tachycardia.  Patient has some mild venous stasis of both lower extremities.  There is some cellulitis of the right lower extremity.  There is a large raised hyperkeratotic lesion on the plantar medial aspect of the right foot.   There is no active drainage or bleeding noted.  The ulceration does not probe to bone and there is minimal drainage noted.  MRIs were negative for osteomyelitis.  Patient currently on vancomycin and Zosyn to manage soft tissue infection.      No past medical history on file.    Social History     Socioeconomic History    Marital status: Single     Spouse name: Not on file    Number of children: Not on file    Years of education: Not on file    Highest education level: Not on file   Occupational History    Not on file   Tobacco Use    Smoking status: Every Day     Packs/day: .5     Types: Cigarettes    Smokeless tobacco: Never    Tobacco comments:     Seen IP by [facility] on 8/24/23 and declined cessation services and materials.    Vaping Use    Vaping Use: Never used   Substance and Sexual Activity    Alcohol use: No    Drug use: No    Sexual activity: Not on file   Other Topics Concern    Not on file   Social History Narrative    Patient reports that he does not have health insurance.     Social Determinants of Health     Financial Resource Strain: Low Risk  (10/20/2023)    Financial Resource Strain     Within the past 12 months, have you or your family members you live with been unable to get utilities (heat, electricity) when it was really needed?: No   Food Insecurity: High Risk (10/20/2023)    Food Insecurity     Within the past 12 months, did you worry that your food would run out before you got money to buy more?: Yes     Within the past 12 months, did the food you bought just not last and you didn t have money to get more?: No   Transportation Needs: Low Risk  (10/20/2023)    Transportation Needs     Within the past 12 months, has lack of transportation kept you from medical appointments, getting your medicines, non-medical meetings or appointments, work, or from getting things that you need?: No   Physical Activity: Not on file   Stress: Not on file   Social Connections: Not on file   Interpersonal Safety: Low  Risk  (10/20/2023)    Interpersonal Safety     Do you feel physically and emotionally safe where you currently live?: Yes     Within the past 12 months, have you been hit, slapped, kicked or otherwise physically hurt by someone?: No     Within the past 12 months, have you been humiliated or emotionally abused in other ways by your partner or ex-partner?: No   Housing Stability: High Risk (10/20/2023)    Housing Stability     Do you have housing? : Yes     Are you worried about losing your housing?: Yes        No Known Allergies       Current Facility-Administered Medications:     acetaminophen (TYLENOL) tablet 650 mg, 650 mg, Oral, Q4H PRN, 650 mg at 02/14/24 1238 **OR** acetaminophen (TYLENOL) Suppository 650 mg, 650 mg, Rectal, Q4H PRN, Kateryna Le MD    aspirin (ASA) EC tablet 325 mg, 325 mg, Oral, Daily, Kateryna Le MD, 325 mg at 02/14/24 1237    atorvastatin (LIPITOR) tablet 80 mg, 80 mg, Oral, QPM, Kateryna Le MD    bisacodyl (DULCOLAX) suppository 10 mg, 10 mg, Rectal, Daily PRN, Kateryna Le MD    calcium carbonate (TUMS) chewable tablet 1,000 mg, 1,000 mg, Oral, 4x Daily PRN, Kateryna Le MD    glucose gel 15-30 g, 15-30 g, Oral, Q15 Min PRN **OR** dextrose 50 % injection 25-50 mL, 25-50 mL, Intravenous, Q15 Min PRN **OR** glucagon injection 1 mg, 1 mg, Subcutaneous, Q15 Min PRN, Kateryna Le MD    enoxaparin ANTICOAGULANT (LOVENOX) injection 40 mg, 40 mg, Subcutaneous, Q24H, Kateryna Le MD    insulin aspart (NovoLOG) injection (RAPID ACTING), 1-6 Units, Subcutaneous, Q4H, Kateryna Le MD, 1 Units at 02/14/24 1558    insulin glargine (LANTUS PEN) injection 10 Units, 10 Units, Subcutaneous, At Bedtime, aKteryna Le MD    lidocaine (LMX4) cream, , Topical, Q1H PRN, Ktaeryna Le MD    lidocaine 1 % 0.1-1 mL, 0.1-1 mL, Other, Q1H PRN, Kateryna Le MD    losartan (COZAAR) tablet 25 mg, 25 mg, Oral, Daily, Kateryna Le MD, 25 mg at 02/14/24 1237    melatonin tablet 5 mg, 5 mg, Oral, At  Bedtime PRN, Kateryna Le MD    naloxone (NARCAN) injection 0.2 mg, 0.2 mg, Intravenous, Q2 Min PRN **OR** naloxone (NARCAN) injection 0.4 mg, 0.4 mg, Intravenous, Q2 Min PRN **OR** naloxone (NARCAN) injection 0.2 mg, 0.2 mg, Intramuscular, Q2 Min PRN **OR** naloxone (NARCAN) injection 0.4 mg, 0.4 mg, Intramuscular, Q2 Min PRN, Kateryna Le MD    nicotine (NICODERM CQ) 14 MG/24HR 24 hr patch 1 patch, 1 patch, Transdermal, Q24H, Kateryna Le MD, 1 patch at 02/14/24 1300    nicotine Patch in Place, , Transdermal, Q8H, Kateryna Le MD    ondansetron (ZOFRAN ODT) ODT tab 4 mg, 4 mg, Oral, Q6H PRN **OR** ondansetron (ZOFRAN) injection 4 mg, 4 mg, Intravenous, Q6H PRN, Kateryna Le MD    oxyCODONE (ROXICODONE) tablet 5 mg, 5 mg, Oral, Q6H PRN, Kateryna Le MD    piperacillin-tazobactam (ZOSYN) 3.375 g vial to attach to  mL bag, 3.375 g, Intravenous, Q8H, Kateryna Le MD, 3.375 g at 02/14/24 1558    polyethylene glycol (MIRALAX) Packet 17 g, 17 g, Oral, BID PRN, Kateryna Le MD    prochlorperazine (COMPAZINE) injection 10 mg, 10 mg, Intravenous, Q6H PRN **OR** prochlorperazine (COMPAZINE) tablet 10 mg, 10 mg, Oral, Q6H PRN **OR** prochlorperazine (COMPAZINE) suppository 25 mg, 25 mg, Rectal, Q12H PRN, Kateryna Le MD    senna-docusate (SENOKOT-S/PERICOLACE) 8.6-50 MG per tablet 1 tablet, 1 tablet, Oral, BID PRN **OR** senna-docusate (SENOKOT-S/PERICOLACE) 8.6-50 MG per tablet 2 tablet, 2 tablet, Oral, BID PRN, Kateryna Le MD    sodium chloride (PF) 0.9% PF flush 3 mL, 3 mL, Intracatheter, Q8H, Kateryna Le MD    sodium chloride (PF) 0.9% PF flush 3 mL, 3 mL, Intracatheter, q1 min prn, Kateryna Le MD    sodium chloride 0.9 % infusion, , Intravenous, Continuous, Kateryna Le MD, Stopped at 02/14/24 1458    vancomycin (VANCOCIN) 1,500 mg in sodium chloride 0.9 % 250 mL intermittent infusion, 1,500 mg, Intravenous, Q12H, Kateryna Le MD    Current Outpatient Medications:     acetaminophen  (TYLENOL) 500 MG tablet, Take 2 tablets (1,000 mg) by mouth every 8 hours as needed for mild pain, Disp: 60 tablet, Rfl: 0    oxyCODONE (ROXICODONE) 5 MG tablet, Take 5 mg by mouth every 6 hours as needed for severe pain, Disp: , Rfl:      No family history on file.     Social History     Socioeconomic History    Marital status: Single     Spouse name: Not on file    Number of children: Not on file    Years of education: Not on file    Highest education level: Not on file   Occupational History    Not on file   Tobacco Use    Smoking status: Every Day     Packs/day: .5     Types: Cigarettes    Smokeless tobacco: Never    Tobacco comments:     Seen IP by [facility] on 8/24/23 and declined cessation services and materials.    Vaping Use    Vaping Use: Never used   Substance and Sexual Activity    Alcohol use: No    Drug use: No    Sexual activity: Not on file   Other Topics Concern    Not on file   Social History Narrative    Patient reports that he does not have health insurance.     Social Determinants of Health     Financial Resource Strain: Low Risk  (10/20/2023)    Financial Resource Strain     Within the past 12 months, have you or your family members you live with been unable to get utilities (heat, electricity) when it was really needed?: No   Food Insecurity: High Risk (10/20/2023)    Food Insecurity     Within the past 12 months, did you worry that your food would run out before you got money to buy more?: Yes     Within the past 12 months, did the food you bought just not last and you didn t have money to get more?: No   Transportation Needs: Low Risk  (10/20/2023)    Transportation Needs     Within the past 12 months, has lack of transportation kept you from medical appointments, getting your medicines, non-medical meetings or appointments, work, or from getting things that you need?: No   Physical Activity: Not on file   Stress: Not on file   Social Connections: Not on file   Interpersonal Safety: Low Risk   "(10/20/2023)    Interpersonal Safety     Do you feel physically and emotionally safe where you currently live?: Yes     Within the past 12 months, have you been hit, slapped, kicked or otherwise physically hurt by someone?: No     Within the past 12 months, have you been humiliated or emotionally abused in other ways by your partner or ex-partner?: No   Housing Stability: High Risk (10/20/2023)    Housing Stability     Do you have housing? : Yes     Are you worried about losing your housing?: Yes        Review of Systems - Patient denies fever, chills, rash, wound, stiffness, limping, numbness, weakness, heart burn, blood in stool, chest pain with activity, calf pain when walking, shortness of breath with activity, chronic cough, easy bleeding/bruising, swelling of ankles, excessive thirst, fatigue, depression, anxiety.  Patient admits to right heel pain, loss of appetite.      OBJECTIVE:  Appearance: alert, well appearing, and in no distress.    BP (!) 158/79   Pulse 95   Temp 98  F (36.7  C) (Temporal)   Resp 18   Ht 1.778 m (5' 10\")   Wt 122.5 kg (270 lb)   SpO2 96%   BMI 38.74 kg/m       Body mass index is 38.74 kg/m .     General appearance: Patient is alert and fully cooperative with history & exam.  No sign of distress is noted during the visit.  Psychiatric: Affect is pleasant & appropriate.  Patient appears motivated to improve health.  Respiratory: Breathing is regular & unlabored while sitting.  HEENT: Hearing is intact to spoken word.  Speech is clear.  No gross evidence of visual impairment that would impact ambulation.    Vascular: Dorsalis pedis and posterior tibial pulses are palpable. There is no is pedal hair growth bilaterally.  CFT < 3 sec from anterior tibial surface to distal digits bilaterally. There is moderate edema and erythema right lower extremity.    Dermatologic: Large, round, black necrotic wound on the posterior aspect of the right heel.  No active drainage noted.  No purulence " noted.  Foul odor noted.  Moderate erythema noted anterior aspect right lower extremity.  Turgor and texture are within normal limits.  Small round raised hyperkeratotic lesion on the plantar medial aspect of the first metatarsal right foot.  No other primary or secondary lesions noted.  Neurologic: All epicritic and proprioceptive sensations are grossly intact bilaterally.  Musculoskeletal: All active and passive ankle, subtalar, midtarsal, and 1st MPJ range of motion are grossly intact without pain or crepitus, with the exception of none. Manual muscle strength is within normal limits bilaterally. All dorsiflexors, plantarflexors, invertors, evertors are intact bilaterally. Tenderness present to the right heel on palpation.  No tenderness to the right lower extremity with range of motion. Calf is soft/non-tender without warmth/induration    Imaging:       [unfilled]     MR Foot Right w/o & w Contrast    Result Date: 2/14/2024  EXAM: MR FOOT RIGHT W/O and W CONTRAST LOCATION: [facility] DATE: 2/14/2024 INDICATION: Ulceration, erythema, infection. COMPARISON: None. TECHNIQUE: Routine. Additional postgadolinium T1 sequences were obtained. IV CONTRAST: 12 mL Gadavist. FINDINGS: JOINTS AND BONES: -No evidence for fracture. There is some reactive signal abnormality within the posterior calcaneus but no T1 marrow signal change and this is unlikely to represent osteomyelitis. No significant effusion to suggest septic arthropathy. There is degenerative change at the calcaneocuboid articulation and tibiotalar joint. TENDONS: -The peroneal tendons are negative. The flexor tendons are negative for tendinopathy, tenosynovitis, or tearing. The extensor tendons are negative. LIGAMENTS: -The anterior and posterior talofibular ligaments are negative. The deltoid ligamentous complex is intact. The calcaneofibular ligament is negative. The ligament of Lisfranc is intact. MUSCLES AND SOFT TISSUES: -There  is a soft tissue ulceration along the posteroinferior aspect of the hindfoot but this is fairly superficial. There is some surrounding edema or potential cellulitis. There is reactive edema versus infectious or inflammatory myositis within the plantar and interosseous musculature. No evidence for abscess. Mild plantar fasciitis.     IMPRESSION: 1.  Soft tissue ulceration along the posteroinferior aspect of the hindfoot. This is fairly superficial. There is some surrounding edema or low-grade cellulitis. 2.  No evidence for osteomyelitis, septic arthropathy, or abscess. 3.  Reactive edema versus infectious or inflammatory myositis within the plantar and interosseous musculature. 4.  No evidence for fracture. 5.  No significant tendinous or ligamentous pathology.    XR Wrist Left G/E 3 Views    Result Date: 2/13/2024  EXAM: XR WRIST LEFT G/E 3 VIEWS LOCATION: [facility] DATE: 2/13/2024 INDICATION:  Other closed intra-articular fracture of distal end of left radius, initial encounter COMPARISON: 02/06/2024     IMPRESSION: Distal radial intra-articular fracture with similar alignment. No definite callus formation. Otherwise unchanged.    XR Wrist Left G/E 3 Views    Result Date: 2/6/2024  EXAM: XR WRIST LEFT G/E 3 VIEWS LOCATION: [facility] DATE: 2/6/2024 INDICATION: fall, left wrist pain COMPARISON: None.     IMPRESSION: There is a nondisplaced, longitudinally oriented fracture of the distal left radius. There is intra-articular extension. There is slight buckling of the dorsal radial cortex on the lateral view. The ulna is intact. Soft tissue swelling.     MR Foot Right w/o & w Contrast    Result Date: 2/14/2024  EXAM: MR FOOT RIGHT W/O and W CONTRAST LOCATION: [facility] DATE: 2/14/2024 INDICATION: Ulceration, erythema, infection. COMPARISON: None. TECHNIQUE: Routine. Additional postgadolinium T1 sequences were obtained. IV CONTRAST: 12 mL  Gadavist. FINDINGS: JOINTS AND BONES: -No evidence for fracture. There is some reactive signal abnormality within the posterior calcaneus but no T1 marrow signal change and this is unlikely to represent osteomyelitis. No significant effusion to suggest septic arthropathy. There is degenerative change at the calcaneocuboid articulation and tibiotalar joint. TENDONS: -The peroneal tendons are negative. The flexor tendons are negative for tendinopathy, tenosynovitis, or tearing. The extensor tendons are negative. LIGAMENTS: -The anterior and posterior talofibular ligaments are negative. The deltoid ligamentous complex is intact. The calcaneofibular ligament is negative. The ligament of Lisfranc is intact. MUSCLES AND SOFT TISSUES: -There is a soft tissue ulceration along the posteroinferior aspect of the hindfoot but this is fairly superficial. There is some surrounding edema or potential cellulitis. There is reactive edema versus infectious or inflammatory myositis within the plantar and interosseous musculature. No evidence for abscess. Mild plantar fasciitis.     IMPRESSION: 1.  Soft tissue ulceration along the posteroinferior aspect of the hindfoot. This is fairly superficial. There is some surrounding edema or low-grade cellulitis. 2.  No evidence for osteomyelitis, septic arthropathy, or abscess. 3.  Reactive edema versus infectious or inflammatory myositis within the plantar and interosseous musculature. 4.  No evidence for fracture. 5.  No significant tendinous or ligamentous pathology.    XR Wrist Left G/E 3 Views    Result Date: 2/13/2024  EXAM: XR WRIST LEFT G/E 3 VIEWS LOCATION: [facility] DATE: 2/13/2024 INDICATION:  Other closed intra-articular fracture of distal end of left radius, initial encounter COMPARISON: 02/06/2024     IMPRESSION: Distal radial intra-articular fracture with similar alignment. No definite callus formation. Otherwise unchanged.    XR Wrist Left G/E 3  Views    Result Date: 2/6/2024  EXAM: XR WRIST LEFT G/E 3 VIEWS LOCATION: [facility] DATE: 2/6/2024 INDICATION: fall, left wrist pain COMPARISON: None.     IMPRESSION: There is a nondisplaced, longitudinally oriented fracture of the distal left radius. There is intra-articular extension. There is slight buckling of the dorsal radial cortex on the lateral view. The ulna is intact. Soft tissue swelling.             Cristofer Jacinto DPM  [facility] Foot & Ankle Surgery/Podiatry

## 2024-02-14 NOTE — ED PROVIDER NOTES
EMERGENCY DEPARTMENT ENCOUNTER      NAME: Floyd Capps  AGE: 48 year old male  YOB: 1975  MRN: [id number]  EVALUATION DATE & TIME: 2/14/2024  9:08 AM    PCP: Glendy Benavides    ED PROVIDER: Nellie Evans MD      Chief Complaint   Patient presents with    Foot Pain         FINAL IMPRESSION:  1. Diabetic ulcer of right heel associated with type 2 diabetes mellitus, unspecified ulcer stage (H)    2. Hyperglycemia    3. Poorly controlled diabetes mellitus (H)    4. Cellulitis of right lower leg          ED COURSE & MEDICAL DECISION MAKING:    Pertinent Labs & Imaging studies reviewed. (See chart for details)  48 year old male presents to the Emergency Department for evaluation of right heel pain for 2 months.    Patient has had 2 to 3 months of right heel pain.  Approximately 6 weeks ago as he was seen in the ER, and treated for right leg cellulitis with cephalexin.  Podiatry consult was placed at that time, unfortunately patient was unable to be reached since then.  In the interim, patient's right heel pain improved with antibiotics initially but then started to worsen 2 weeks ago when he no longer was taking antibiotics and he was unable to care for his chronic wounds as well because he broke his wrist.  He is now presenting with a week and a half of worsening right heel pain along with chills.  He has also had decreased appetite.  On exam patient is hemodynamically stable and not in acute pain.  However he is mildly tachycardic.  Bilateral lower extremities with signs of chronic venous stasis.  Right lower extremity with signs of cellulitis in the right calf.  Of the left foot with large callus and fissure but not ulcerated or infected.  The right heel has a grade 2 stage D ulcer that is not able to be probed to the bone and is not progressing purulent substance.  Culture obtained.  It appears this patient with a history of uncontrolled type 2 diabetes that he has a chronic callus of the right  heel that has transitioned into an ulcer with possible osteomyelitis.  We will get a MRI of the foot to further evaluate and start him on vancomycin and Zosyn and admit to the hospital.  His right lower extremity cellulitis will be covered by this regimen.  -Blood cultures pending  -CBC and BMP pending: Patient with leukocytosis to 14 K and mild anemia to 11.7  -ESR and CRP pending: ESR of 99 up from 58 a month ago  -MRI of the right ankle and foot ordered to evaluate for osteomyelitis  -Will admit to hospital  ED Course as of 02/14/24 1401   Wed Feb 14, 2024   0954 WBC(!): 14.4   0959 Sed Rate(!): 99  Up from 58   1006 CRP Inflammation(!): 85.10  Up from 36       At the conclusion of the encounter I discussed the results of all of the tests and the disposition. The questions were answered. The patient or family acknowledged understanding and was agreeable with the care plan.       MEDICATIONS GIVEN IN THE EMERGENCY:  Medications   vancomycin (VANCOCIN) 2,500 mg in sodium chloride 0.9 % 500 mL intermittent infusion (2,500 mg Intravenous $New Bag 2/14/24 1241)   oxyCODONE (ROXICODONE) tablet 5 mg (has no administration in time range)   lidocaine 1 % 0.1-1 mL (has no administration in time range)   lidocaine (LMX4) cream (has no administration in time range)   sodium chloride (PF) 0.9% PF flush 3 mL (3 mLs Intracatheter Not Given 2/14/24 1239)   sodium chloride (PF) 0.9% PF flush 3 mL (has no administration in time range)   senna-docusate (SENOKOT-S/PERICOLACE) 8.6-50 MG per tablet 1 tablet (has no administration in time range)     Or   senna-docusate (SENOKOT-S/PERICOLACE) 8.6-50 MG per tablet 2 tablet (has no administration in time range)   calcium carbonate (TUMS) chewable tablet 1,000 mg (has no administration in time range)   glucose gel 15-30 g (has no administration in time range)     Or   dextrose 50 % injection 25-50 mL (has no administration in time range)     Or   glucagon injection 1 mg (has no  administration in time range)   enoxaparin ANTICOAGULANT (LOVENOX) injection 40 mg (has no administration in time range)   sodium chloride 0.9 % infusion ( Intravenous $New Bag 2/14/24 1237)   acetaminophen (TYLENOL) tablet 650 mg (650 mg Oral $Given 2/14/24 1238)     Or   acetaminophen (TYLENOL) Suppository 650 mg ( Rectal See Alternative 2/14/24 1238)   melatonin tablet 5 mg (has no administration in time range)   polyethylene glycol (MIRALAX) Packet 17 g (has no administration in time range)   bisacodyl (DULCOLAX) suppository 10 mg (has no administration in time range)   ondansetron (ZOFRAN ODT) ODT tab 4 mg (has no administration in time range)     Or   ondansetron (ZOFRAN) injection 4 mg (has no administration in time range)   prochlorperazine (COMPAZINE) injection 10 mg (has no administration in time range)     Or   prochlorperazine (COMPAZINE) tablet 10 mg (has no administration in time range)     Or   prochlorperazine (COMPAZINE) suppository 25 mg (has no administration in time range)   nicotine Patch in Place ( Transdermal Patch in Place 2/14/24 1305)   nicotine (NICODERM CQ) 14 MG/24HR 24 hr patch 1 patch (1 patch Transdermal $Patch/Med Applied 2/14/24 1300)   insulin aspart (NovoLOG) injection (RAPID ACTING) (3 Units Subcutaneous $Given 2/14/24 1302)   piperacillin-tazobactam (ZOSYN) 3.375 g vial to attach to  mL bag (has no administration in time range)   insulin glargine (LANTUS PEN) injection 10 Units (has no administration in time range)   losartan (COZAAR) tablet 25 mg (25 mg Oral $Given 2/14/24 1237)   aspirin (ASA) EC tablet 325 mg (325 mg Oral $Given 2/14/24 1237)   atorvastatin (LIPITOR) tablet 80 mg (has no administration in time range)   vancomycin (VANCOCIN) 1,500 mg in sodium chloride 0.9 % 250 mL intermittent infusion (has no administration in time range)   piperacillin-tazobactam (ZOSYN) 3.375 g vial to attach to  mL bag (0 g Intravenous Stopped 2/14/24 1032)   gadobutrol  (GADAVIST) injection 12 mL (12 mLs Intravenous $Given 2/14/24 6125)       NEW PRESCRIPTIONS STARTED AT TODAY'S ER VISIT  New Prescriptions    No medications on file          =================================================================    HPI    Patient information was obtained from: Patient        Floyd Capps is a 48 year old male with a pertinent history of 2 diabetes, CVA, hypothyroidism who presents to this ED for evaluation of foot pain    Patient been struggling with bilateral heel pain for few months now.  Specifically his right heel has been more painful for the past 6 to 8 weeks.  About 5 weeks ago he came into the ER and received cephalexin for cellulitis infection.  Is ready if heel pain improved for about 2 to 3 weeks but then he broke his wrist and was unable to care for his chronic wounds.  Resulted in a worsening of his right heel pain along with chills starting as well.    Patient not having active fever.  No chest pain but does endorse some shortness of breath over the past 2 months since he got a cold.  Has a runny nose.    He is unsure what his blood sugars have been running at home.  Podiatry referral was placed in early January however he was unable to complete that follow-up.    Works half a pack a day.  Patient alcohol use.  Nightly marijuana use.    On 1/8/2024 patient received a course of cephalexin for his right heel callus.      REVIEW OF SYSTEMS   Review of Systems     PAST MEDICAL HISTORY:  No past medical history on file.    PAST SURGICAL HISTORY:  Past Surgical History:   Procedure Laterality Date    APPENDECTOMY      INCISION AND DRAINAGE OF WOUND      groin abscess           CURRENT MEDICATIONS:    acetaminophen (TYLENOL) 500 MG tablet  oxyCODONE (ROXICODONE) 5 MG tablet        ALLERGIES:  No Known Allergies    FAMILY HISTORY:  No family history on file.    SOCIAL HISTORY:   Social History     Socioeconomic History    Marital status: Single   Tobacco Use    Smoking status:  "Every Day     Packs/day: .5     Types: Cigarettes    Smokeless tobacco: Never    Tobacco comments:     Seen IP by [facility] on 8/24/23 and declined cessation services and materials.    Vaping Use    Vaping Use: Never used   Substance and Sexual Activity    Alcohol use: No    Drug use: No   Social History Narrative    Patient reports that he does not have health insurance.     Social Determinants of Health     Financial Resource Strain: Low Risk  (10/20/2023)    Financial Resource Strain     Within the past 12 months, have you or your family members you live with been unable to get utilities (heat, electricity) when it was really needed?: No   Food Insecurity: High Risk (10/20/2023)    Food Insecurity     Within the past 12 months, did you worry that your food would run out before you got money to buy more?: Yes     Within the past 12 months, did the food you bought just not last and you didn t have money to get more?: No   Transportation Needs: Low Risk  (10/20/2023)    Transportation Needs     Within the past 12 months, has lack of transportation kept you from medical appointments, getting your medicines, non-medical meetings or appointments, work, or from getting things that you need?: No   Interpersonal Safety: Low Risk  (10/20/2023)    Interpersonal Safety     Do you feel physically and emotionally safe where you currently live?: Yes     Within the past 12 months, have you been hit, slapped, kicked or otherwise physically hurt by someone?: No     Within the past 12 months, have you been humiliated or emotionally abused in other ways by your partner or ex-partner?: No   Housing Stability: High Risk (10/20/2023)    Housing Stability     Do you have housing? : Yes     Are you worried about losing your housing?: Yes       VITALS:  BP (!) 180/118   Pulse 99   Temp 98  F (36.7  C) (Temporal)   Resp 18   Ht 1.778 m (5' 10\")   Wt 122.5 kg (270 lb)   SpO2 96%   BMI 38.74 kg/m      PHYSICAL EXAM    Constitutional: " Disheveled, NAD, GCS, smells of diabetic ulcer after removal of shoe/sock  HENT: Normocephalic, Atraumatic, Bilateral external ears normal, Oropharynx normal, mucous membranes moist, Nose normal.  Eyes: PERRL, EOMI, Conjunctiva normal, No discharge.   Respiratory: Normal breath sounds, No respiratory distress, No wheezing, Speaks full sentences easily. Intermittent, mild, dry cough.   Cardiovascular: Mildly tachycardic heart rate, Regular rhythm,  No murmurs, No rubs, No gallops. Chest wall nontender.    GI: Obese. Soft, very mild diffuse tenderness, No masses, No flank tenderness. No rebound or guarding.     Musculoskeletal: Bilateral venous stasis of the lower extremities.  Left lower extremity noninfected. Right Lower Extremity with Signs of Cellulitis of the calf -warmth, erythema, tenderness.  Left heel with a large callus with deep fissure.  Right heel with a large callus that is now open and ulcerated with a flap of skin sloughing off.  Ulcer is necrotic but is not purulent at this time.  Can probe to soft tissue but not to bone. Grade 2 stage D - see pictures in chart.   Neurologic: Alert & oriented x 3, Normal motor function, Normal sensory function, No focal deficits noted. Normal gait.    Psychiatric: Affect normal, Judgment normal, Mood normal. Cooperative.      LAB:  All pertinent labs reviewed and interpreted.  Results for orders placed or performed during the hospital encounter of 02/14/24   MR Foot Right w/o & w Contrast    Impression    IMPRESSION:  1.  Soft tissue ulceration along the posteroinferior aspect of the hindfoot. This is fairly superficial. There is some surrounding edema or low-grade cellulitis.  2.  No evidence for osteomyelitis, septic arthropathy, or abscess.  3.  Reactive edema versus infectious or inflammatory myositis within the plantar and interosseous musculature.  4.  No evidence for fracture.  5.  No significant tendinous or ligamentous pathology.   Basic metabolic panel    Result Value Ref Range    Sodium 133 (L) 135 - 145 mmol/L    Potassium 3.8 3.4 - 5.3 mmol/L    Chloride 99 98 - 107 mmol/L    Carbon Dioxide (CO2) 23 22 - 29 mmol/L    Anion Gap 11 7 - 15 mmol/L    Urea Nitrogen 9.0 6.0 - 20.0 mg/dL    Creatinine 0.82 0.67 - 1.17 mg/dL    GFR Estimate >90 >60 mL/min/1.73m2    Calcium 8.7 8.6 - 10.0 mg/dL    Glucose 244 (H) 70 - 99 mg/dL   Erythrocyte sedimentation rate auto   Result Value Ref Range    Erythrocyte Sedimentation Rate 99 (H) 0 - 20 mm/hr   Result Value Ref Range    CRP Inflammation 85.10 (H) <5.00 mg/L   CBC with platelets and differential   Result Value Ref Range    WBC Count 14.4 (H) 4.0 - 11.0 10e3/uL    RBC Count 3.91 (L) 4.40 - 5.90 10e6/uL    Hemoglobin 11.7 (L) 13.3 - 17.7 g/dL    Hematocrit 35.0 (L) 40.0 - 53.0 %    MCV 90 78 - 100 fL    MCH 29.9 26.5 - 33.0 pg    MCHC 33.4 31.5 - 36.5 g/dL    RDW 11.9 10.0 - 15.0 %    Platelet Count 416 150 - 450 10e3/uL    % Neutrophils 79 %    % Lymphocytes 11 %    % Monocytes 7 %    % Eosinophils 1 %    % Basophils 1 %    % Immature Granulocytes 1 %    NRBCs per 100 WBC 0 <1 /100    Absolute Neutrophils 11.6 (H) 1.6 - 8.3 10e3/uL    Absolute Lymphocytes 1.5 0.8 - 5.3 10e3/uL    Absolute Monocytes 0.9 0.0 - 1.3 10e3/uL    Absolute Eosinophils 0.2 0.0 - 0.7 10e3/uL    Absolute Basophils 0.1 0.0 - 0.2 10e3/uL    Absolute Immature Granulocytes 0.1 <=0.4 10e3/uL    Absolute NRBCs 0.0 10e3/uL   Result Value Ref Range    Hemoglobin A1C 11.8 (H) <5.7 %   Glucose by meter   Result Value Ref Range    GLUCOSE BY METER POCT 249 (H) 70 - 99 mg/dL       RADIOLOGY:  Reviewed all pertinent imaging. Please see official radiology report.  MR Foot Right w/o & w Contrast   Final Result   IMPRESSION:   1.  Soft tissue ulceration along the posteroinferior aspect of the hindfoot. This is fairly superficial. There is some surrounding edema or low-grade cellulitis.   2.  No evidence for osteomyelitis, septic arthropathy, or abscess.   3.   Reactive edema versus infectious or inflammatory myositis within the plantar and interosseous musculature.   4.  No evidence for fracture.   5.  No significant tendinous or ligamentous pathology.          EKG:    No EKG performed    PROCEDURES:   Culture obtained from right heel ulcer      [facility] Documentation:   CMS Diagnoses:           Nellie Evans MD  [facility] EMERGENCY DEPARTMENT  [address] [place] [id number]  [phone]       Tyler Dobson MD  Resident  02/14/24 1002       Nellie Evans MD  02/14/24 5595

## 2024-02-14 NOTE — PHARMACY-VANCOMYCIN DOSING SERVICE
"Pharmacy Vancomycin Initial Note  Date of Service 2024  Patient's  1975  48 year old, male    Indication: Sepsis    Current estimated CrCl = Estimated Creatinine Clearance: 141.5 mL/min (based on SCr of 0.82 mg/dL).    Creatinine for last 3 days  2024:  9:35 AM Creatinine 0.82 mg/dL    Recent Vancomycin Level(s) for last 3 days  No results found for requested labs within last 3 days.      Vancomycin IV Administrations (past 72 hours)                     vancomycin (VANCOCIN) 2,500 mg in sodium chloride 0.9 % 500 mL intermittent infusion (mg) 2,500 mg New Bag 24 1241                    Nephrotoxins and other renal medications (From now, onward)      Start     Dose/Rate Route Frequency Ordered Stop    24 1600  piperacillin-tazobactam (ZOSYN) 3.375 g vial to attach to  mL bag        Note to Pharmacy: For [facility], [facility] and [facility]: For Zosyn-naive patients, use the \"Zosyn initial dose + extended infusion\" order panel.    3.375 g  over 240 Minutes Intravenous EVERY 8 HOURS 24 1104      24 1100  vancomycin (VANCOCIN) 2,500 mg in sodium chloride 0.9 % 500 mL intermittent infusion         2,500 mg  over 2 Hours Intravenous ONCE 24 0958              Contrast Orders - past 72 hours (72h ago, onward)      Start     Dose/Rate Route Frequency Stop    24 1200  gadobutrol (GADAVIST) injection 12 mL         12 mL Intravenous ONCE 24 1153            [employer] Prediction of Planned Initial Vancomycin Regimen  Regimen: 1500 mg IV every 12 hours.  Start time: 00:41 on 02/15/2024  Exposure target: AUC24 (range)400-600 mg/L.hr   AUC24,ss: 551 mg/L.hr  Probability of AUC24 > 400: 81 %  Ctrough,ss: 17.7 mg/L  Probability of Ctrough,ss > 20: 40 %  Probability of nephrotoxicity (Lodise RUSS ): 14 %          Plan:  Start vancomycin  1500 mg IV q12h.   Vancomycin monitoring method: AUC  Vancomycin therapeutic monitoring goal: 400-600 mg*h/L  Pharmacy will check vancomycin " levels as appropriate in 1-3 Days.    Serum creatinine levels will be ordered daily for the first week of therapy and at least twice weekly for subsequent weeks.      Yuli Quinones RPH

## 2024-02-14 NOTE — H&P
[facility]    History and Physical - Hospitalist Service       Date of Admission:  2024  Floyd Capps,  1975, MRN [id number]  PCP: Glendy Benavides    Assessment & Plan      Floyd Capps is a 48 year old male admitted on 2024. He has history of DM, HTN, CVA, obesity, recent ED visit for nonhealing diabetic foot ulcer with cellulitis, here for worsening pain in the foot, with necrotic tissue noted on exam    #Diabetic foot ulcer, right heel  #Possible sepsis  -chronic wound R heel, recent resulting cellulitis treated with Bactrim  -here for worse pain in the foot, found with ulcer worse with necrotic tissue  -leukocytosis and mild tachycardia not quite meeting SIRS criteria.  CRP has trended up compared to a few weeks ago.  -MRI of the foot to look for osteomyelitis  -Start Vanc & Zosyn  -NPO- podiatry to see for likely debridement  -Care Mgr to see about financial/insurance issues which have been a barrier to followup  -Ok for home dose of oxycodone, tylenol  -Nonweightbearing right foot unless otherwise instructed by podiatry    #N/v, loose stools  -Possibly sepsis symptoms?  -Monitor  -Antiemetics PRN  -Would send for C diff if >4 loose stools in 24 hours    #DM  -Stopped all his home meds lately due to stomach upset while on Bactrim, then neglected to resume meds  -A1c 11.8- likely needs insulin. Historic A1c reviewed- never at goal >14 years  -Hold home metformin and glipizide for now while NPO  -Start Lantus 10 units nightly. Without DM ed support here, and with resolving infection- will probably not titrate this much, and focus on resuming previous home meds when able, and soon followup with DM ed    #Tobacco abuse  -Patch  -Needs cessation counseling prior to discharge    # Left distal radius fracture  -Fell recently  -Cast in place  -PT and OT evals  -Outpatient orthopedics follow-up    # Hyponatremia  -Mild.  Not new.  -He describes poor appetite and vomiting over  "the last few days.  Possibly hypovolemic.  -Currently on gentle IV maintenance fluid due to being NPO.  Recheck sodium in the morning    # Normocytic anemia  -Mild with hemoglobin 11.7.  He has poor follow-up and doubt he has had colonoscopy  -Outpatient follow-up    # Hypertension  -Previously was on carvedilol and losartan  -BP here is mildly elevated but reasonable.  We will cautiously resume a low-dose of losartan    # History of stroke  -Had been taking Plavix and atorvastatin but recently stopped taking all of his medications as above.  -Neurology notes from last August reviewed, they had wanted him on dual antiplatelet therapy for 90 days followed by full dose aspirin alone.  Patient has instead been taking Plavix, unsure reasoning.  -Start full dose aspirin and resume home atorvastatin    #Obesity  -BMI 38 noted    # Poor follow-up, noncompliance, lack of self-care  -Importance of compliance and follow-up needs to be emphasized.  Patient with relatively young age and already with serious comorbidities of chronic disease.  -Unsure if mood issues may be at play.         DVT Prophylaxis: Moderate risk. Lovenox  Diet: NPO per Anesthesia Guidelines for Procedure/Surgery Except for: Meds    Cazares Catheter: Not present  Lines: None     Cardiac Monitoring: None  Code Status: Full Code      Clinically Significant Risk Factors Present on Admission                      # DMII: A1C = N/A within past 6 months    # Obesity: Estimated body mass index is 38.74 kg/m  as calculated from the following:    Height as of this encounter: 1.778 m (5' 10\").    Weight as of this encounter: 122.5 kg (270 lb).              Disposition Plan      Expected Discharge Date: 02/16/2024                The patient's care was discussed with the Patient.    Kateryna Le MD  Hospitalist Service  [facility]  Securely message with [employer] (more info)  Text page via [employer] Paging/Directory "     ______________________________________________________________________    Chief Complaint   Foot pain    History is obtained from the patient    History of Present Illness   Floyd Capps is a 48 year old male who is here for aforementioned symptoms.  Patient was seen in the ED a few weeks ago for a wound on his right heel which was not healing and was causing pain.  Patient was doing soaks and dressing changes of the heel and was planning to see podiatry but he had some insurance issues and never ended up getting scheduled.  Then the foot started to feel better so he continued to not schedule with podiatry.  On 2/6, patient fell and broke his left distal radius.  He was no longer able to do dressing changes on his foot.  It has become more painful.  He is unsure about drainage because he wears shoes with no socks and otherwise goes barefoot.  He has noted increased odor.  Patient endorses chills, no fevers that he is aware of.  Appetite has been reduced, has not eaten anything significant since 2/12.  Has vomited a few times and endorses loose stools.  No urinary issues.      Past Medical History    No past medical history on file.    Past Surgical History   Past Surgical History:   Procedure Laterality Date    APPENDECTOMY      INCISION AND DRAINAGE OF WOUND      groin abscess       Prior to Admission Medications   Prior to Admission Medications   Prescriptions Last Dose Informant Patient Reported? Taking?   acetaminophen (TYLENOL) 500 MG tablet Past Week  No Yes   Sig: Take 2 tablets (1,000 mg) by mouth every 8 hours as needed for mild pain   oxyCODONE (ROXICODONE) 5 MG tablet Unknown  Yes Yes   Sig: Take 5 mg by mouth every 6 hours as needed for severe pain      Facility-Administered Medications: None           Physical Exam   Vital Signs: Temp: 98  F (36.7  C) Temp src: Temporal BP: (!) 145/90 Pulse: 102   Resp: 18 SpO2: 99 % O2 Device: None (Room air)    Weight: 270 lbs 0 oz  General: in no apparent  distress, non-toxic, and alert male lying in hospital bed oriented x3  HEENT: Head normocephalic atraumatic, oral mucosa moist. Sclerae anicteric  CV: Regular rhythm, normal rate, no murmurs  Resp: No wheezes, no rales or rhonchi, no focal consolidations  GI: Belly soft, nondistended, nontender, bowel sounds present  Skin:  foot wound not examined- photos available in chart . Stasis dermatitis BLE noted  Extremities: Trace ankle edema bilaterally  Psych: Normal affect, mood dysthymic  Neuro: Grossly normal    Medical Decision Making                 Data   Imaging results reviewed over the past 24 hrs:   Recent Results (from the past 24 hour(s))   XR Wrist Left G/E 3 Views    Narrative    EXAM: XR WRIST LEFT G/E 3 VIEWS  LOCATION: [facility]  DATE: 2/13/2024    INDICATION:  Other closed intra-articular fracture of distal end of left radius, initial encounter  COMPARISON: 02/06/2024      Impression    IMPRESSION: Distal radial intra-articular fracture with similar alignment. No definite callus formation. Otherwise unchanged.     Recent Results (from the past 12 hour(s))   Basic metabolic panel    Collection Time: 02/14/24  9:35 AM   Result Value Ref Range    Sodium 133 (L) 135 - 145 mmol/L    Potassium 3.8 3.4 - 5.3 mmol/L    Chloride 99 98 - 107 mmol/L    Carbon Dioxide (CO2) 23 22 - 29 mmol/L    Anion Gap 11 7 - 15 mmol/L    Urea Nitrogen 9.0 6.0 - 20.0 mg/dL    Creatinine 0.82 0.67 - 1.17 mg/dL    GFR Estimate >90 >60 mL/min/1.73m2    Calcium 8.7 8.6 - 10.0 mg/dL    Glucose 244 (H) 70 - 99 mg/dL   Erythrocyte sedimentation rate auto    Collection Time: 02/14/24  9:35 AM   Result Value Ref Range    Erythrocyte Sedimentation Rate 99 (H) 0 - 20 mm/hr   CRP inflammation    Collection Time: 02/14/24  9:35 AM   Result Value Ref Range    CRP Inflammation 85.10 (H) <5.00 mg/L   CBC with platelets and differential    Collection Time: 02/14/24  9:35 AM   Result Value Ref Range    WBC Count 14.4 (H)  4.0 - 11.0 10e3/uL    RBC Count 3.91 (L) 4.40 - 5.90 10e6/uL    Hemoglobin 11.7 (L) 13.3 - 17.7 g/dL    Hematocrit 35.0 (L) 40.0 - 53.0 %    MCV 90 78 - 100 fL    MCH 29.9 26.5 - 33.0 pg    MCHC 33.4 31.5 - 36.5 g/dL    RDW 11.9 10.0 - 15.0 %    Platelet Count 416 150 - 450 10e3/uL    % Neutrophils 79 %    % Lymphocytes 11 %    % Monocytes 7 %    % Eosinophils 1 %    % Basophils 1 %    % Immature Granulocytes 1 %    NRBCs per 100 WBC 0 <1 /100    Absolute Neutrophils 11.6 (H) 1.6 - 8.3 10e3/uL    Absolute Lymphocytes 1.5 0.8 - 5.3 10e3/uL    Absolute Monocytes 0.9 0.0 - 1.3 10e3/uL    Absolute Eosinophils 0.2 0.0 - 0.7 10e3/uL    Absolute Basophils 0.1 0.0 - 0.2 10e3/uL    Absolute Immature Granulocytes 0.1 <=0.4 10e3/uL    Absolute NRBCs 0.0 10e3/uL

## 2024-02-14 NOTE — ED NOTES
"[facility] ED Handoff Report    ED Chief Complaint: foot wound    ED Diagnosis:  (E11.621,  L97.419) Diabetic ulcer of right heel associated with type 2 diabetes mellitus, unspecified ulcer stage (H)  Comment: follow by podiatry  Plan: surgery tomorrow    (R73.9) Hyperglycemia  Comment: last reading at 1600 was 185, gave 1 unit novolog  Plan: monitor and treat with insulin    (E11.65) Poorly controlled diabetes mellitus (H)    (L03.115) Cellulitis of right lower leg  Comment: Zosyn running now  Plan: IV antibiotics       PMH:  No past medical history on file.     Code Status:  Full Code     Falls Risk: No Band: Not applicable    Current Living Situation/Residence: lives alone     Elimination Status: Continent: Yes     Activity Level: Independent    Patients Preferred Language:  English     Needed: No    Vital Signs:  BP (!) 158/79   Pulse 95   Temp 98  F (36.7  C) (Temporal)   Resp 18   Ht 1.778 m (5' 10\")   Wt 122.5 kg (270 lb)   SpO2 96%   BMI 38.74 kg/m       Cardiac Rhythm: NA    Pain Score: 6/10    Is the Patient Confused:  No    Last Food or Drink: 02/14/24 at unknown    Focused Assessment:  Extensive wound to right heel. See photo in provider note. Podiatry in seeing pt now. Plan for surgery tomorrow.    Tests Performed: Done: Labs and Imaging    Treatments Provided:  IV antibiotics, PRN pain medications, monitor and treat blood glucose.    Family Dynamics/Concerns: No    Family Updated On Visitor Policy: No    Plan of Care Communicated to Family: No    Who Was Updated about Plan of Care: Pt able to communicate plan independently    Belongings Checklist Done and Signed by Patient: Yes    Medications sent with patient: Novolog pen    Covid: asymptomatic , not tested    Additional Information: Pt is alert, oriented, and independent at baseline. Pt is unhappy with ER times and waiting for surgery.    RN: Zeny Cesar RN 2/14/2024 4:07 PM      "

## 2024-02-14 NOTE — CONSULTS
FOOT AND ANKLE SURGERY/PODIATRY CONSULT NOTE         ASSESSMENT:   Diabetic ulcer right heel  Cellulitis right lower extremity  DM2  Hyperglycemia      TREATMENT:  Recommend surgical debridement with irrigation of the large diabetic ulcer on the posterior aspect of the right heel.  The patient was informed that he would have a large open wound once the procedure has been performed.  This will require several weeks to several months for wound healing.  A wound VAC will be required to assist with wound healing.  I informed the patient that amputation of the foot or lower extremity is always a possibility if there are any complications with wound healing.  The procedure will be done under general anesthesia.  MRIs were negative for any osteomyelitis so I do not expect any bone loss as result of the procedure.  The patient felt well-informed and agreed to have the above procedure performed.        HPI: I was asked to see Floyd Capps today to evaluate and treat a large diabetic ulcer at the posterior aspect of the right heel.  The patient is a 48-year-old male who presented to the  ED on 2/14/2024 complaining of painful ulceration involving his right heel.  The patient indicated that he has had heel pain for approximately 2 to 3 months.  6 weeks ago he was seen in the ER and treated for right leg cellulitis with cephalexin.  The patient indicated that his heel did not improve after taking the antibiotics that he was unable to care for the wounds adequately because of the left broken wrist.  He reports increased pain along with chills and decreased appetite.  The patient mention he has not eaten for 2 to 3 days.  The patient is not in any acute distress.  He is hemodynamically stable with mild tachycardia.  Patient has some mild venous stasis of both lower extremities.  There is some cellulitis of the right lower extremity.  There is a large raised hyperkeratotic lesion on the plantar medial aspect of the right foot.   There is no active drainage or bleeding noted.  The ulceration does not probe to bone and there is minimal drainage noted.  MRIs were negative for osteomyelitis.  Patient currently on vancomycin and Zosyn to manage soft tissue infection.      No past medical history on file.    Social History     Socioeconomic History    Marital status: Single     Spouse name: Not on file    Number of children: Not on file    Years of education: Not on file    Highest education level: Not on file   Occupational History    Not on file   Tobacco Use    Smoking status: Every Day     Packs/day: .5     Types: Cigarettes    Smokeless tobacco: Never    Tobacco comments:     Seen IP by [facility] on 8/24/23 and declined cessation services and materials.    Vaping Use    Vaping Use: Never used   Substance and Sexual Activity    Alcohol use: No    Drug use: No    Sexual activity: Not on file   Other Topics Concern    Not on file   Social History Narrative    Patient reports that he does not have health insurance.     Social Determinants of Health     Financial Resource Strain: Low Risk  (10/20/2023)    Financial Resource Strain     Within the past 12 months, have you or your family members you live with been unable to get utilities (heat, electricity) when it was really needed?: No   Food Insecurity: High Risk (10/20/2023)    Food Insecurity     Within the past 12 months, did you worry that your food would run out before you got money to buy more?: Yes     Within the past 12 months, did the food you bought just not last and you didn t have money to get more?: No   Transportation Needs: Low Risk  (10/20/2023)    Transportation Needs     Within the past 12 months, has lack of transportation kept you from medical appointments, getting your medicines, non-medical meetings or appointments, work, or from getting things that you need?: No   Physical Activity: Not on file   Stress: Not on file   Social Connections: Not on file   Interpersonal Safety: Low  Risk  (10/20/2023)    Interpersonal Safety     Do you feel physically and emotionally safe where you currently live?: Yes     Within the past 12 months, have you been hit, slapped, kicked or otherwise physically hurt by someone?: No     Within the past 12 months, have you been humiliated or emotionally abused in other ways by your partner or ex-partner?: No   Housing Stability: High Risk (10/20/2023)    Housing Stability     Do you have housing? : Yes     Are you worried about losing your housing?: Yes        No Known Allergies       Current Facility-Administered Medications:     acetaminophen (TYLENOL) tablet 650 mg, 650 mg, Oral, Q4H PRN, 650 mg at 02/14/24 1238 **OR** acetaminophen (TYLENOL) Suppository 650 mg, 650 mg, Rectal, Q4H PRN, Kateryna Le MD    aspirin (ASA) EC tablet 325 mg, 325 mg, Oral, Daily, Kateryna Le MD, 325 mg at 02/14/24 1237    atorvastatin (LIPITOR) tablet 80 mg, 80 mg, Oral, QPM, Kateryna Le MD    bisacodyl (DULCOLAX) suppository 10 mg, 10 mg, Rectal, Daily PRN, Kateryna Le MD    calcium carbonate (TUMS) chewable tablet 1,000 mg, 1,000 mg, Oral, 4x Daily PRN, Kateryna Le MD    glucose gel 15-30 g, 15-30 g, Oral, Q15 Min PRN **OR** dextrose 50 % injection 25-50 mL, 25-50 mL, Intravenous, Q15 Min PRN **OR** glucagon injection 1 mg, 1 mg, Subcutaneous, Q15 Min PRN, Kateryna Le MD    enoxaparin ANTICOAGULANT (LOVENOX) injection 40 mg, 40 mg, Subcutaneous, Q24H, Kateryna Le MD    insulin aspart (NovoLOG) injection (RAPID ACTING), 1-6 Units, Subcutaneous, Q4H, Kateryna Le MD, 1 Units at 02/14/24 1558    insulin glargine (LANTUS PEN) injection 10 Units, 10 Units, Subcutaneous, At Bedtime, Kaetryna Le MD    lidocaine (LMX4) cream, , Topical, Q1H PRN, Kateryna Le MD    lidocaine 1 % 0.1-1 mL, 0.1-1 mL, Other, Q1H PRN, Kateryna Le MD    losartan (COZAAR) tablet 25 mg, 25 mg, Oral, Daily, Kateryna Le MD, 25 mg at 02/14/24 1237    melatonin tablet 5 mg, 5 mg, Oral, At  Bedtime PRN, Kateryna Le MD    naloxone (NARCAN) injection 0.2 mg, 0.2 mg, Intravenous, Q2 Min PRN **OR** naloxone (NARCAN) injection 0.4 mg, 0.4 mg, Intravenous, Q2 Min PRN **OR** naloxone (NARCAN) injection 0.2 mg, 0.2 mg, Intramuscular, Q2 Min PRN **OR** naloxone (NARCAN) injection 0.4 mg, 0.4 mg, Intramuscular, Q2 Min PRN, Kateryna Le MD    nicotine (NICODERM CQ) 14 MG/24HR 24 hr patch 1 patch, 1 patch, Transdermal, Q24H, Kateryna Le MD, 1 patch at 02/14/24 1300    nicotine Patch in Place, , Transdermal, Q8H, Kateryna Le MD    ondansetron (ZOFRAN ODT) ODT tab 4 mg, 4 mg, Oral, Q6H PRN **OR** ondansetron (ZOFRAN) injection 4 mg, 4 mg, Intravenous, Q6H PRN, Kateryna Le MD    oxyCODONE (ROXICODONE) tablet 5 mg, 5 mg, Oral, Q6H PRN, Kateryna Le MD    piperacillin-tazobactam (ZOSYN) 3.375 g vial to attach to  mL bag, 3.375 g, Intravenous, Q8H, Kateryna Le MD, 3.375 g at 02/14/24 1558    polyethylene glycol (MIRALAX) Packet 17 g, 17 g, Oral, BID PRN, Kateryna Le MD    prochlorperazine (COMPAZINE) injection 10 mg, 10 mg, Intravenous, Q6H PRN **OR** prochlorperazine (COMPAZINE) tablet 10 mg, 10 mg, Oral, Q6H PRN **OR** prochlorperazine (COMPAZINE) suppository 25 mg, 25 mg, Rectal, Q12H PRN, Kateryna Le MD    senna-docusate (SENOKOT-S/PERICOLACE) 8.6-50 MG per tablet 1 tablet, 1 tablet, Oral, BID PRN **OR** senna-docusate (SENOKOT-S/PERICOLACE) 8.6-50 MG per tablet 2 tablet, 2 tablet, Oral, BID PRN, Kateryna Le MD    sodium chloride (PF) 0.9% PF flush 3 mL, 3 mL, Intracatheter, Q8H, Kateryna Le MD    sodium chloride (PF) 0.9% PF flush 3 mL, 3 mL, Intracatheter, q1 min prn, Kateryna Le MD    sodium chloride 0.9 % infusion, , Intravenous, Continuous, Kateryna Le MD, Stopped at 02/14/24 1458    vancomycin (VANCOCIN) 1,500 mg in sodium chloride 0.9 % 250 mL intermittent infusion, 1,500 mg, Intravenous, Q12H, Kateryna Le MD    Current Outpatient Medications:     acetaminophen  (TYLENOL) 500 MG tablet, Take 2 tablets (1,000 mg) by mouth every 8 hours as needed for mild pain, Disp: 60 tablet, Rfl: 0    oxyCODONE (ROXICODONE) 5 MG tablet, Take 5 mg by mouth every 6 hours as needed for severe pain, Disp: , Rfl:      No family history on file.     Social History     Socioeconomic History    Marital status: Single     Spouse name: Not on file    Number of children: Not on file    Years of education: Not on file    Highest education level: Not on file   Occupational History    Not on file   Tobacco Use    Smoking status: Every Day     Packs/day: .5     Types: Cigarettes    Smokeless tobacco: Never    Tobacco comments:     Seen IP by [facility] on 8/24/23 and declined cessation services and materials.    Vaping Use    Vaping Use: Never used   Substance and Sexual Activity    Alcohol use: No    Drug use: No    Sexual activity: Not on file   Other Topics Concern    Not on file   Social History Narrative    Patient reports that he does not have health insurance.     Social Determinants of Health     Financial Resource Strain: Low Risk  (10/20/2023)    Financial Resource Strain     Within the past 12 months, have you or your family members you live with been unable to get utilities (heat, electricity) when it was really needed?: No   Food Insecurity: High Risk (10/20/2023)    Food Insecurity     Within the past 12 months, did you worry that your food would run out before you got money to buy more?: Yes     Within the past 12 months, did the food you bought just not last and you didn t have money to get more?: No   Transportation Needs: Low Risk  (10/20/2023)    Transportation Needs     Within the past 12 months, has lack of transportation kept you from medical appointments, getting your medicines, non-medical meetings or appointments, work, or from getting things that you need?: No   Physical Activity: Not on file   Stress: Not on file   Social Connections: Not on file   Interpersonal Safety: Low Risk   "(10/20/2023)    Interpersonal Safety     Do you feel physically and emotionally safe where you currently live?: Yes     Within the past 12 months, have you been hit, slapped, kicked or otherwise physically hurt by someone?: No     Within the past 12 months, have you been humiliated or emotionally abused in other ways by your partner or ex-partner?: No   Housing Stability: High Risk (10/20/2023)    Housing Stability     Do you have housing? : Yes     Are you worried about losing your housing?: Yes        Review of Systems - Patient denies fever, chills, rash, wound, stiffness, limping, numbness, weakness, heart burn, blood in stool, chest pain with activity, calf pain when walking, shortness of breath with activity, chronic cough, easy bleeding/bruising, swelling of ankles, excessive thirst, fatigue, depression, anxiety.  Patient admits to right heel pain, loss of appetite.      OBJECTIVE:  Appearance: alert, well appearing, and in no distress.    BP (!) 158/79   Pulse 95   Temp 98  F (36.7  C) (Temporal)   Resp 18   Ht 1.778 m (5' 10\")   Wt 122.5 kg (270 lb)   SpO2 96%   BMI 38.74 kg/m       Body mass index is 38.74 kg/m .     General appearance: Patient is alert and fully cooperative with history & exam.  No sign of distress is noted during the visit.  Psychiatric: Affect is pleasant & appropriate.  Patient appears motivated to improve health.  Respiratory: Breathing is regular & unlabored while sitting.  HEENT: Hearing is intact to spoken word.  Speech is clear.  No gross evidence of visual impairment that would impact ambulation.    Vascular: Dorsalis pedis and posterior tibial pulses are palpable. There is no is pedal hair growth bilaterally.  CFT < 3 sec from anterior tibial surface to distal digits bilaterally. There is moderate edema and erythema right lower extremity.    Dermatologic: Large, round, black necrotic wound on the posterior aspect of the right heel.  No active drainage noted.  No purulence " noted.  Foul odor noted.  Moderate erythema noted anterior aspect right lower extremity.  Turgor and texture are within normal limits.  Small round raised hyperkeratotic lesion on the plantar medial aspect of the first metatarsal right foot.  No other primary or secondary lesions noted.  Neurologic: All epicritic and proprioceptive sensations are grossly intact bilaterally.  Musculoskeletal: All active and passive ankle, subtalar, midtarsal, and 1st MPJ range of motion are grossly intact without pain or crepitus, with the exception of none. Manual muscle strength is within normal limits bilaterally. All dorsiflexors, plantarflexors, invertors, evertors are intact bilaterally. Tenderness present to the right heel on palpation.  No tenderness to the right lower extremity with range of motion. Calf is soft/non-tender without warmth/induration    Imaging:       [unfilled]     MR Foot Right w/o & w Contrast    Result Date: 2/14/2024  EXAM: MR FOOT RIGHT W/O and W CONTRAST LOCATION: [facility] DATE: 2/14/2024 INDICATION: Ulceration, erythema, infection. COMPARISON: None. TECHNIQUE: Routine. Additional postgadolinium T1 sequences were obtained. IV CONTRAST: 12 mL Gadavist. FINDINGS: JOINTS AND BONES: -No evidence for fracture. There is some reactive signal abnormality within the posterior calcaneus but no T1 marrow signal change and this is unlikely to represent osteomyelitis. No significant effusion to suggest septic arthropathy. There is degenerative change at the calcaneocuboid articulation and tibiotalar joint. TENDONS: -The peroneal tendons are negative. The flexor tendons are negative for tendinopathy, tenosynovitis, or tearing. The extensor tendons are negative. LIGAMENTS: -The anterior and posterior talofibular ligaments are negative. The deltoid ligamentous complex is intact. The calcaneofibular ligament is negative. The ligament of Lisfranc is intact. MUSCLES AND SOFT TISSUES: -There  is a soft tissue ulceration along the posteroinferior aspect of the hindfoot but this is fairly superficial. There is some surrounding edema or potential cellulitis. There is reactive edema versus infectious or inflammatory myositis within the plantar and interosseous musculature. No evidence for abscess. Mild plantar fasciitis.     IMPRESSION: 1.  Soft tissue ulceration along the posteroinferior aspect of the hindfoot. This is fairly superficial. There is some surrounding edema or low-grade cellulitis. 2.  No evidence for osteomyelitis, septic arthropathy, or abscess. 3.  Reactive edema versus infectious or inflammatory myositis within the plantar and interosseous musculature. 4.  No evidence for fracture. 5.  No significant tendinous or ligamentous pathology.    XR Wrist Left G/E 3 Views    Result Date: 2/13/2024  EXAM: XR WRIST LEFT G/E 3 VIEWS LOCATION: [facility] DATE: 2/13/2024 INDICATION:  Other closed intra-articular fracture of distal end of left radius, initial encounter COMPARISON: 02/06/2024     IMPRESSION: Distal radial intra-articular fracture with similar alignment. No definite callus formation. Otherwise unchanged.    XR Wrist Left G/E 3 Views    Result Date: 2/6/2024  EXAM: XR WRIST LEFT G/E 3 VIEWS LOCATION: [facility] DATE: 2/6/2024 INDICATION: fall, left wrist pain COMPARISON: None.     IMPRESSION: There is a nondisplaced, longitudinally oriented fracture of the distal left radius. There is intra-articular extension. There is slight buckling of the dorsal radial cortex on the lateral view. The ulna is intact. Soft tissue swelling.     MR Foot Right w/o & w Contrast    Result Date: 2/14/2024  EXAM: MR FOOT RIGHT W/O and W CONTRAST LOCATION: [facility] DATE: 2/14/2024 INDICATION: Ulceration, erythema, infection. COMPARISON: None. TECHNIQUE: Routine. Additional postgadolinium T1 sequences were obtained. IV CONTRAST: 12 mL  Gadavist. FINDINGS: JOINTS AND BONES: -No evidence for fracture. There is some reactive signal abnormality within the posterior calcaneus but no T1 marrow signal change and this is unlikely to represent osteomyelitis. No significant effusion to suggest septic arthropathy. There is degenerative change at the calcaneocuboid articulation and tibiotalar joint. TENDONS: -The peroneal tendons are negative. The flexor tendons are negative for tendinopathy, tenosynovitis, or tearing. The extensor tendons are negative. LIGAMENTS: -The anterior and posterior talofibular ligaments are negative. The deltoid ligamentous complex is intact. The calcaneofibular ligament is negative. The ligament of Lisfranc is intact. MUSCLES AND SOFT TISSUES: -There is a soft tissue ulceration along the posteroinferior aspect of the hindfoot but this is fairly superficial. There is some surrounding edema or potential cellulitis. There is reactive edema versus infectious or inflammatory myositis within the plantar and interosseous musculature. No evidence for abscess. Mild plantar fasciitis.     IMPRESSION: 1.  Soft tissue ulceration along the posteroinferior aspect of the hindfoot. This is fairly superficial. There is some surrounding edema or low-grade cellulitis. 2.  No evidence for osteomyelitis, septic arthropathy, or abscess. 3.  Reactive edema versus infectious or inflammatory myositis within the plantar and interosseous musculature. 4.  No evidence for fracture. 5.  No significant tendinous or ligamentous pathology.    XR Wrist Left G/E 3 Views    Result Date: 2/13/2024  EXAM: XR WRIST LEFT G/E 3 VIEWS LOCATION: [facility] DATE: 2/13/2024 INDICATION:  Other closed intra-articular fracture of distal end of left radius, initial encounter COMPARISON: 02/06/2024     IMPRESSION: Distal radial intra-articular fracture with similar alignment. No definite callus formation. Otherwise unchanged.    XR Wrist Left G/E 3  Views    Result Date: 2/6/2024  EXAM: XR WRIST LEFT G/E 3 VIEWS LOCATION: [facility] DATE: 2/6/2024 INDICATION: fall, left wrist pain COMPARISON: None.     IMPRESSION: There is a nondisplaced, longitudinally oriented fracture of the distal left radius. There is intra-articular extension. There is slight buckling of the dorsal radial cortex on the lateral view. The ulna is intact. Soft tissue swelling.             Cristofer Jacinto DPM  [facility]/Podiatry

## 2024-02-14 NOTE — ED PROVIDER NOTES
Nellie MALDONADO have reviewed the documentation, personally taken the patient's history, performed an exam and agree with the physical finds, diagnosis and management plan.    HPI:  Right foot wound that had been worsening for the past 8 weeks. He was seen in the ED on 01/08/24 and was started on Keflex. He has had difficulty with wound care for the last couple weeks because he broke his left wrist. Reports worsening pain around the wound and chills. Patient has a  history or poorly controlled Type II diabetes. From his last ED visit they have make half a dozen attempts to get him to follow up in podiatry clinic, but patient has never returned these calls.     Physical Exam:  Disheveled, morbidly obese, mild tachycardia. Venous stasis skin changes to both calves. Erythema to right calf. Left heel is calloused and fissured. Right heel has large callous burden that is now open and ulcerated with a large flap of skin that has sloughed off. Ulcer is necrotic. Probed to soft tissue, does not probe to bone. No drainage.               MDM: Symptoms are consistent with a diabetic foot ulcer with associated cellulitis of the right lower extremity.  Concern for osteomyelitis    ED Course/workup: Laboratory workup notable for elevated white blood cell count, inflammatory markers.  Hyperglycemia.  His A1c has been running 11-12.  Covered with vancomycin and Zosyn.  MRI of the foot pending and patient will be admitted for further management.      ED Course as of 02/14/24 1007 Wed Feb 14, 2024   0954 WBC(!): 14.4   0959 Sed Rate(!): 99  Up from 58   1006 CRP Inflammation(!): 85.10  Up from 36         Final Diagnosis:     ICD-10-CM    1. Diabetic ulcer of right heel associated with type 2 diabetes mellitus, unspecified ulcer stage (H)  E11.621     L97.419       2. Hyperglycemia  R73.9       3. Poorly controlled diabetes mellitus (H)  E11.65       4. Cellulitis of right lower leg  L03.115             Medical Decision  Making  Obtained supplemental history:Supplemental history obtained?: No  Reviewed external records: External records reviewed?: Documented in chart and Outpatient Record: [facility] on 02/13/24  Care impacted by chronic illness:Diabetes and Hyperlipidemia  Care significantly affected by social determinants of health: Access to affordable healthcare, currently having insurance issues  Did you consider but not order tests?: Work up considered but not performed and documented in chart, if applicable  Did you interpret images independently?: Independent interpretation of ECG and images noted in documentation, when applicable.  Consultation discussion with other provider:Did you involve another provider (consultant, , pharmacy, etc.)?: I discussed the care with another health care provider, see documentation for details.  Admit.    I personally saw the patient and performed a substantive portion of the visit including all aspects of the medical decision making.  I personally made/approved the management plan and take responsibility for the patient management.     MD Nathan Ivory, Nellie HALL MD  02/14/24 1007

## 2024-02-15 ENCOUNTER — ANESTHESIA EVENT (OUTPATIENT)
Dept: SURGERY | Facility: HOSPITAL | Age: 49
End: 2024-02-15
Payer: MEDICAID

## 2024-02-15 LAB
ANION GAP SERPL CALCULATED.3IONS-SCNC: 6 MMOL/L (ref 7–15)
BUN SERPL-MCNC: 7.3 MG/DL (ref 6–20)
CALCIUM SERPL-MCNC: 8.2 MG/DL (ref 8.6–10)
CHLORIDE SERPL-SCNC: 103 MMOL/L (ref 98–107)
CREAT SERPL-MCNC: 0.9 MG/DL (ref 0.67–1.17)
DEPRECATED HCO3 PLAS-SCNC: 25 MMOL/L (ref 22–29)
EGFRCR SERPLBLD CKD-EPI 2021: >90 ML/MIN/1.73M2
ERYTHROCYTE [DISTWIDTH] IN BLOOD BY AUTOMATED COUNT: 11.9 % (ref 10–15)
GLUCOSE BLDC GLUCOMTR-MCNC: 175 MG/DL (ref 70–99)
GLUCOSE BLDC GLUCOMTR-MCNC: 175 MG/DL (ref 70–99)
GLUCOSE BLDC GLUCOMTR-MCNC: 179 MG/DL (ref 70–99)
GLUCOSE BLDC GLUCOMTR-MCNC: 183 MG/DL (ref 70–99)
GLUCOSE BLDC GLUCOMTR-MCNC: 183 MG/DL (ref 70–99)
GLUCOSE BLDC GLUCOMTR-MCNC: 249 MG/DL (ref 70–99)
GLUCOSE SERPL-MCNC: 170 MG/DL (ref 70–99)
HCT VFR BLD AUTO: 32.9 % (ref 40–53)
HGB BLD-MCNC: 10.8 G/DL (ref 13.3–17.7)
MCH RBC QN AUTO: 29.8 PG (ref 26.5–33)
MCHC RBC AUTO-ENTMCNC: 32.8 G/DL (ref 31.5–36.5)
MCV RBC AUTO: 91 FL (ref 78–100)
PLATELET # BLD AUTO: 374 10E3/UL (ref 150–450)
POTASSIUM SERPL-SCNC: 3.5 MMOL/L (ref 3.4–5.3)
RBC # BLD AUTO: 3.62 10E6/UL (ref 4.4–5.9)
SODIUM SERPL-SCNC: 134 MMOL/L (ref 135–145)
VANCOMYCIN SERPL-MCNC: 13.5 UG/ML
WBC # BLD AUTO: 12.5 10E3/UL (ref 4–11)

## 2024-02-15 PROCEDURE — 36415 COLL VENOUS BLD VENIPUNCTURE: CPT | Performed by: HOSPITALIST

## 2024-02-15 PROCEDURE — 250N000013 HC RX MED GY IP 250 OP 250 PS 637: Performed by: HOSPITALIST

## 2024-02-15 PROCEDURE — 258N000003 HC RX IP 258 OP 636: Performed by: HOSPITALIST

## 2024-02-15 PROCEDURE — 99232 SBSQ HOSP IP/OBS MODERATE 35: CPT | Performed by: HOSPITALIST

## 2024-02-15 PROCEDURE — 80048 BASIC METABOLIC PNL TOTAL CA: CPT | Performed by: HOSPITALIST

## 2024-02-15 PROCEDURE — 250N000011 HC RX IP 250 OP 636: Performed by: HOSPITALIST

## 2024-02-15 PROCEDURE — 120N000001 HC R&B MED SURG/OB

## 2024-02-15 PROCEDURE — 85027 COMPLETE CBC AUTOMATED: CPT | Performed by: HOSPITALIST

## 2024-02-15 PROCEDURE — 80202 ASSAY OF VANCOMYCIN: CPT | Performed by: HOSPITALIST

## 2024-02-15 RX ADMIN — INSULIN ASPART 1 UNITS: 100 INJECTION, SOLUTION INTRAVENOUS; SUBCUTANEOUS at 04:13

## 2024-02-15 RX ADMIN — INSULIN GLARGINE 10 UNITS: 100 INJECTION, SOLUTION SUBCUTANEOUS at 21:44

## 2024-02-15 RX ADMIN — PIPERACILLIN AND TAZOBACTAM 3.38 G: 3; .375 INJECTION, POWDER, FOR SOLUTION INTRAVENOUS at 00:51

## 2024-02-15 RX ADMIN — INSULIN ASPART 1 UNITS: 100 INJECTION, SOLUTION INTRAVENOUS; SUBCUTANEOUS at 00:47

## 2024-02-15 RX ADMIN — VANCOMYCIN HYDROCHLORIDE 1500 MG: 5 INJECTION, POWDER, LYOPHILIZED, FOR SOLUTION INTRAVENOUS at 10:28

## 2024-02-15 RX ADMIN — INSULIN ASPART 1 UNITS: 100 INJECTION, SOLUTION INTRAVENOUS; SUBCUTANEOUS at 08:38

## 2024-02-15 RX ADMIN — PIPERACILLIN AND TAZOBACTAM 3.38 G: 3; .375 INJECTION, POWDER, FOR SOLUTION INTRAVENOUS at 17:01

## 2024-02-15 RX ADMIN — OXYCODONE HYDROCHLORIDE 5 MG: 5 TABLET ORAL at 19:01

## 2024-02-15 RX ADMIN — ASPIRIN 325 MG: 325 TABLET, COATED ORAL at 08:38

## 2024-02-15 RX ADMIN — INSULIN ASPART 1 UNITS: 100 INJECTION, SOLUTION INTRAVENOUS; SUBCUTANEOUS at 12:55

## 2024-02-15 RX ADMIN — VANCOMYCIN HYDROCHLORIDE 1500 MG: 5 INJECTION, POWDER, LYOPHILIZED, FOR SOLUTION INTRAVENOUS at 21:43

## 2024-02-15 RX ADMIN — PIPERACILLIN AND TAZOBACTAM 3.38 G: 3; .375 INJECTION, POWDER, FOR SOLUTION INTRAVENOUS at 08:17

## 2024-02-15 RX ADMIN — ACETAMINOPHEN 650 MG: 325 TABLET ORAL at 20:10

## 2024-02-15 RX ADMIN — ATORVASTATIN CALCIUM 80 MG: 40 TABLET, FILM COATED ORAL at 20:10

## 2024-02-15 RX ADMIN — LOSARTAN POTASSIUM 25 MG: 25 TABLET, FILM COATED ORAL at 08:38

## 2024-02-15 RX ADMIN — ENOXAPARIN SODIUM 40 MG: 40 INJECTION SUBCUTANEOUS at 18:56

## 2024-02-15 RX ADMIN — NICOTINE 1 PATCH: 14 PATCH, EXTENDED RELEASE TRANSDERMAL at 11:49

## 2024-02-15 ASSESSMENT — ACTIVITIES OF DAILY LIVING (ADL)
ADLS_ACUITY_SCORE: 38

## 2024-02-15 NOTE — PROGRESS NOTES
Report given to P4 nurse. Pt complaining of mild RLE pain, controlled with prn tylenol and oxycodone. VSS on RA. IVF running @50ml/hr.

## 2024-02-15 NOTE — PLAN OF CARE
Problem: Adult Inpatient Plan of Care  Goal: Plan of Care Review  Description: The Plan of Care Review/Shift note should be completed every shift.  The Outcome Evaluation is a brief statement about your assessment that the patient is improving, declining, or no change.  This information will be displayed automatically on your shift  note.  Outcome: Progressing  Flowsheets (Taken 2/15/2024 1051)  Plan of Care Reviewed With: patient   Goal Outcome Evaluation:plan for I&D on 2/16 on right lower leg  Problem: Pain Acute  Goal: Optimal Pain Control and Function  Outcome: Progressing-understands pain scale (0-10) and medicate as per mar--no complaints of pain will continue to monitor    Plan of Care Reviewed With: patient  Patient alert and oriented and able to make needs known.  Will be NPO at midnight for I&D on 2/16. Patient is none weight bear on right leg

## 2024-02-15 NOTE — PLAN OF CARE
Problem: Adult Inpatient Plan of Care  Goal: Plan of Care Review  Description: The Plan of Care Review/Shift note should be completed every shift.  The Outcome Evaluation is a brief statement about your assessment that the patient is improving, declining, or no change.  This information will be displayed automatically on your shift  note.  Outcome: Progressing   Goal Outcome Evaluation:       Patient denied pain over night. VSS. Afebril. Dressing to right lower leg CDI. IV fluids and IV antibiotics as ordered. NWB to right lower extremity. Blood sugar checks q 4 hrs as ordered.

## 2024-02-15 NOTE — UTILIZATION REVIEW
Admission Status; Secondary Review Determination   Under the authority of the Utilization Management Committee, the utilization review process indicated a secondary review on Floyd Capps. The review outcome is based on review of the medical records, discussions with staff, and applying clinical experience noted on the date of the review.   (x) Inpatient Status Appropriate - This patient's medical care is consistent with medical management for inpatient care and reasonable inpatient medical practice.     RATIONALE FOR DETERMINATION   48-year-old male with poorly controlled diabetes admitted with nonhealing diabetic foot ulcer with cellulitis and possible sepsis.  Had leukocytosis and tachycardia on admission.  CRP significantly elevated greater than 85.1.  Started on IV antibiotics.  Seen by podiatry and will undergo surgery tomorrow.  Wound cultures growing multiple complex organisms.  Hemoglobin A1c 11.8 and now being started on insulin.  White count improving but still elevated today.    At the time of admission with the information available to the attending physician more than 2 nights Hospital complex care was anticipated, based on patient risk of adverse outcome if treated as outpatient and complex care required. Inpatient admission is appropriate based on the Medicare guidelines.   The information on this document is developed by the utilization review team in order for the business office to ensure compliance. This only denotes the appropriateness of proper admission status and does not reflect the quality of care rendered.   The definitions of Inpatient Status and Observation Status used in making the determination above are those provided in the CMS Coverage Manual, Chapter 1 and Chapter 6, section 70.4.   Sincerely,   Earle Nam MD  Utilization Review  Physician Advisor  [facility]

## 2024-02-15 NOTE — PROGRESS NOTES
[facility]    Medicine Progress Note - Hospitalist Service    Date of Admission:  2/14/2024    Assessment & Plan                Floyd Capps is a 48 year old male with history of DM, HTN, CVA, obesity, recent ED visit for nonhealing diabetic foot ulcer with cellulitis, here for worsening pain in the foot, with necrotic tissue noted on exam. Plan for OR 4/16 for debridement. Hospital Day: 2        #Diabetic foot ulcer, right heel  #Possible sepsis  -chronic wound R heel, recent resulting cellulitis treated with Bactrim  -here for worse pain in the foot, found with ulcer worse with necrotic tissue  -leukocytosis and mild tachycardia not quite meeting SIRS criteria.  CRP has trended up compared to a few weeks ago.  -MRI of the foot no osteomyelitis  -on Vanc & Zosyn  -podiatry planning debridement tomorrow, NPO after midnight  -Care Mgr to see about financial/insurance issues which have been a barrier to followup  -Ok for home dose of oxycodone, tylenol  -Nonweightbearing right foot unless otherwise instructed by podiatry     #N/v, loose stools  -Possibly sepsis symptoms?  -resolved here, eating better and no BMs  -Monitor  -Antiemetics PRN     #DM  -Stopped all his home meds lately due to stomach upset while on Bactrim, then neglected to resume meds  -A1c 11.8- likely needs insulin. Historic A1c reviewed- never at goal >14 years  -Hold home metformin and glipizide for now while NPO  -Started Lantus 10 units nightly. Has been on insulin in the past and received education in hospital about it, but seems to have stopped on his own. Needs outpatient DM ed for ongoing titration.     #Tobacco abuse  -Patch  -Needs cessation counseling prior to discharge     # Left distal radius fracture  -Fell recently  -Cast in place  -PT and OT evals  -Outpatient orthopedics follow-up     # Hyponatremia  -Mild.  Not new. Improving.  -He describes poor appetite and vomiting over the last few days.  Likely was  "hypovolemic.  -Recheck sodium in the morning     # Normocytic anemia  -Mild with hemoglobin 10.8 now that better hydrated.  He has poor follow-up and doubt he has had colonoscopy  -Outpatient follow-up     # Hypertension  -Previously was on carvedilol and losartan  -BP here is mildly elevated but reasonable.  cautiously resumed a low-dose of losartan     # History of stroke  -Had been taking Plavix and atorvastatin but recently stopped taking all of his medications as above.  -Neurology notes from last August reviewed, they had wanted him on dual antiplatelet therapy for 90 days followed by full dose aspirin alone.  Patient has instead been taking Plavix, unsure reasoning.  -Started full dose aspirin and resumed home atorvastatin    #Obesity  -BMI 38 noted     # Poor follow-up, noncompliance, lack of self-care  -Importance of compliance and follow-up needs to be emphasized.  Patient with relatively young age and already with serious comorbidities of chronic disease.  -Unsure if mood issues may be at play.       DVT Prophylaxis: Moderate risk. Lovenox  Diet: Moderate Consistent Carb (60 g CHO per Meal) Diet  NPO per Anesthesia Guidelines for Procedure/Surgery Except for: Meds    Cazares Catheter: Not present  Lines: None     Cardiac Monitoring: None  Code Status: Full Code      Clinically Significant Risk Factors                        # DMII: A1C = 11.8 % (Ref range: <5.7 %) within past 6 months, PRESENT ON ADMISSION  # Obesity: Estimated body mass index is 38.94 kg/m  as calculated from the following:    Height as of this encounter: 1.778 m (5' 10\").    Weight as of this encounter: 123.1 kg (271 lb 6.2 oz)., PRESENT ON ADMISSION     # Financial/Environmental Concerns: none         Disposition Plan   Disposition: Home     Expected Discharge Date: 02/17/2024    Discharge Delays: IV Medication - consider oral or Home Infusion         Medically ready to discharge today: No     The patient's care was discussed with the " Patient.    Kateryna Le MD  Hospitalist Service  [facility]  Securely message with [employer] (more info)  Text page via [employer] Paging/Directory   ______________________________________________________________________      Physical Exam   Vital Signs: Temp: 98.2  F (36.8  C) Temp src: Oral BP: 139/84 Pulse: 97   Resp: 19 SpO2: 97 % O2 Device: None (Room air)    Weight: 271 lbs 6.18 oz  General: in no apparent distress, non-toxic, and alert male lying in hospital bed oriented x3  HEENT: Head normocephalic atraumatic, oral mucosa moist. Sclerae anicteric  Skin:  foot wound not examined- photos available in chart . Stasis dermatitis BLE noted  Extremities: Trace ankle edema bilaterally  Psych: Normal affect, mood dysthymic  Neuro: Grossly normal      Medical Decision Making               Data   Recent Results (from the past 12 hour(s))   Basic metabolic panel    Collection Time: 02/15/24  5:44 AM   Result Value Ref Range    Sodium 134 (L) 135 - 145 mmol/L    Potassium 3.5 3.4 - 5.3 mmol/L    Chloride 103 98 - 107 mmol/L    Carbon Dioxide (CO2) 25 22 - 29 mmol/L    Anion Gap 6 (L) 7 - 15 mmol/L    Urea Nitrogen 7.3 6.0 - 20.0 mg/dL    Creatinine 0.90 0.67 - 1.17 mg/dL    GFR Estimate >90 >60 mL/min/1.73m2    Calcium 8.2 (L) 8.6 - 10.0 mg/dL    Glucose 170 (H) 70 - 99 mg/dL   CBC with platelets    Collection Time: 02/15/24  5:44 AM   Result Value Ref Range    WBC Count 12.5 (H) 4.0 - 11.0 10e3/uL    RBC Count 3.62 (L) 4.40 - 5.90 10e6/uL    Hemoglobin 10.8 (L) 13.3 - 17.7 g/dL    Hematocrit 32.9 (L) 40.0 - 53.0 %    MCV 91 78 - 100 fL    MCH 29.8 26.5 - 33.0 pg    MCHC 32.8 31.5 - 36.5 g/dL    RDW 11.9 10.0 - 15.0 %    Platelet Count 374 150 - 450 10e3/uL   Glucose by meter    Collection Time: 02/15/24  8:14 AM   Result Value Ref Range    GLUCOSE BY METER POCT 183 (H) 70 - 99 mg/dL   Glucose by meter    Collection Time: 02/15/24 12:04 PM   Result Value Ref Range    GLUCOSE BY METER POCT 175 (H) 70 -  99 mg/dL   Glucose by meter    Collection Time: 02/15/24  4:55 PM   Result Value Ref Range    GLUCOSE BY METER POCT 249 (H) 70 - 99 mg/dL     Interval History     Patient states doing fine today.  No new issues.  Pain is controlled.  Ate dinner last night without vomiting.  Surgery planned for tomorrow.  No new concerns.

## 2024-02-15 NOTE — CONSULTS
Care Management Initial Consult    General Information  Assessment completed with: Patient, pt  Type of CM/SW Visit: Initial Assessment    Primary Care Provider verified and updated as needed: Yes   Readmission within the last 30 days: no previous admission in last 30 days      Reason for Consult: discharge planning  Advance Care Planning: Advance Care Planning Reviewed: no concerns identified, questions answered          Communication Assessment  Patient's communication style: spoken language (English or Bilingual)             Cognitive  Cognitive/Neuro/Behavioral: WDL                      Living Environment:   People in home: parent(s)  mom  Current living Arrangements: apartment      Able to return to prior arrangements: yes       Family/Social Support:  Care provided by: self  Provides care for: no one  Marital Status: Single  Parent(s)          Description of Support System: Involved, Supportive    Support Assessment: Adequate family and caregiver support    Current Resources:   Patient receiving home care services: No     Community Resources: None  Equipment currently used at home:    Supplies currently used at home: None    Employment/Financial:  Employment Status:          Financial Concerns: none   Referral to Financial Worker: No       Does the patient's insurance plan have a 3 day qualifying hospital stay waiver?  No    Lifestyle & Psychosocial Needs:  Social Determinants of Health     Food Insecurity: High Risk (10/20/2023)    Food Insecurity     Within the past 12 months, did you worry that your food would run out before you got money to buy more?: Yes     Within the past 12 months, did the food you bought just not last and you didn t have money to get more?: No   Depression: Not at risk (10/20/2023)    PHQ-2     PHQ-2 Score: 0   Housing Stability: High Risk (10/20/2023)    Housing Stability     Do you have housing? : Yes     Are you worried about losing your housing?: Yes   Tobacco Use: High Risk  (11/3/2023)    Patient History     Smoking Tobacco Use: Every Day     Smokeless Tobacco Use: Never     Passive Exposure: Not on file   Financial Resource Strain: Low Risk  (10/20/2023)    Financial Resource Strain     Within the past 12 months, have you or your family members you live with been unable to get utilities (heat, electricity) when it was really needed?: No   Alcohol Use: Not on file   Transportation Needs: Low Risk  (10/20/2023)    Transportation Needs     Within the past 12 months, has lack of transportation kept you from medical appointments, getting your medicines, non-medical meetings or appointments, work, or from getting things that you need?: No   Physical Activity: Not on file   Interpersonal Safety: Low Risk  (10/20/2023)    Interpersonal Safety     Do you feel physically and emotionally safe where you currently live?: Yes     Within the past 12 months, have you been hit, slapped, kicked or otherwise physically hurt by someone?: No     Within the past 12 months, have you been humiliated or emotionally abused in other ways by your partner or ex-partner?: No   Stress: Not on file   Social Connections: Not on file       Functional Status:  Prior to admission patient needed assistance:   Dependent ADLs:: Independent  Dependent IADLs:: Independent  Assesssment of Functional Status: Not at  functional baseline    Mental Health Status:  Mental Health Status: No Current Concerns       Chemical Dependency Status:                Values/Beliefs:  Spiritual, Cultural Beliefs, Shinto Practices, Values that affect care:                 Additional Information:  Assessed, lives w/mom. No svcs and working on selecting insurance. No CM needs identified..    Arik Banerjee RN

## 2024-02-16 ENCOUNTER — ANESTHESIA (OUTPATIENT)
Dept: SURGERY | Facility: HOSPITAL | Age: 49
End: 2024-02-16
Payer: MEDICAID

## 2024-02-16 ENCOUNTER — ANCILLARY PROCEDURE (OUTPATIENT)
Dept: ULTRASOUND IMAGING | Facility: HOSPITAL | Age: 49
End: 2024-02-16
Attending: ANESTHESIOLOGY
Payer: MEDICAID

## 2024-02-16 LAB
ANION GAP SERPL CALCULATED.3IONS-SCNC: 6 MMOL/L (ref 7–15)
BUN SERPL-MCNC: 7.9 MG/DL (ref 6–20)
CALCIUM SERPL-MCNC: 8.4 MG/DL (ref 8.6–10)
CHLORIDE SERPL-SCNC: 103 MMOL/L (ref 98–107)
CREAT SERPL-MCNC: 1.14 MG/DL (ref 0.67–1.17)
DEPRECATED HCO3 PLAS-SCNC: 26 MMOL/L (ref 22–29)
EGFRCR SERPLBLD CKD-EPI 2021: 78 ML/MIN/1.73M2
GLUCOSE BLDC GLUCOMTR-MCNC: 190 MG/DL (ref 70–99)
GLUCOSE BLDC GLUCOMTR-MCNC: 194 MG/DL (ref 70–99)
GLUCOSE BLDC GLUCOMTR-MCNC: 195 MG/DL (ref 70–99)
GLUCOSE BLDC GLUCOMTR-MCNC: 197 MG/DL (ref 70–99)
GLUCOSE BLDC GLUCOMTR-MCNC: 200 MG/DL (ref 70–99)
GLUCOSE BLDC GLUCOMTR-MCNC: 201 MG/DL (ref 70–99)
GLUCOSE BLDC GLUCOMTR-MCNC: 243 MG/DL (ref 70–99)
GLUCOSE SERPL-MCNC: 192 MG/DL (ref 70–99)
GRAM STAIN RESULT: NORMAL
GRAM STAIN RESULT: NORMAL
POTASSIUM SERPL-SCNC: 3.8 MMOL/L (ref 3.4–5.3)
SODIUM SERPL-SCNC: 135 MMOL/L (ref 135–145)

## 2024-02-16 PROCEDURE — 250N000011 HC RX IP 250 OP 636: Performed by: NURSE ANESTHETIST, CERTIFIED REGISTERED

## 2024-02-16 PROCEDURE — 250N000025 HC SEVOFLURANE, PER MIN: Performed by: PODIATRIST

## 2024-02-16 PROCEDURE — 258N000003 HC RX IP 258 OP 636: Performed by: HOSPITALIST

## 2024-02-16 PROCEDURE — 36415 COLL VENOUS BLD VENIPUNCTURE: CPT | Performed by: HOSPITALIST

## 2024-02-16 PROCEDURE — 250N000011 HC RX IP 250 OP 636: Performed by: ANESTHESIOLOGY

## 2024-02-16 PROCEDURE — 11047 DBRDMT BONE EACH ADDL: CPT | Performed by: PODIATRIST

## 2024-02-16 PROCEDURE — 258N000003 HC RX IP 258 OP 636: Performed by: ANESTHESIOLOGY

## 2024-02-16 PROCEDURE — 99233 SBSQ HOSP IP/OBS HIGH 50: CPT | Performed by: STUDENT IN AN ORGANIZED HEALTH CARE EDUCATION/TRAINING PROGRAM

## 2024-02-16 PROCEDURE — 2894A PR VOIDCORRECT: CPT | Performed by: PODIATRIST

## 2024-02-16 PROCEDURE — 250N000009 HC RX 250: Performed by: NURSE ANESTHETIST, CERTIFIED REGISTERED

## 2024-02-16 PROCEDURE — 250N000011 HC RX IP 250 OP 636: Performed by: HOSPITALIST

## 2024-02-16 PROCEDURE — 999N000141 HC STATISTIC PRE-PROCEDURE NURSING ASSESSMENT: Performed by: PODIATRIST

## 2024-02-16 PROCEDURE — 250N000013 HC RX MED GY IP 250 OP 250 PS 637: Performed by: ANESTHESIOLOGY

## 2024-02-16 PROCEDURE — 370N000017 HC ANESTHESIA TECHNICAL FEE, PER MIN: Performed by: PODIATRIST

## 2024-02-16 PROCEDURE — 258N000001 HC RX 258: Performed by: PODIATRIST

## 2024-02-16 PROCEDURE — 250N000013 HC RX MED GY IP 250 OP 250 PS 637: Performed by: INTERNAL MEDICINE

## 2024-02-16 PROCEDURE — 250N000013 HC RX MED GY IP 250 OP 250 PS 637: Performed by: PODIATRIST

## 2024-02-16 PROCEDURE — 258N000003 HC RX IP 258 OP 636: Performed by: NURSE ANESTHETIST, CERTIFIED REGISTERED

## 2024-02-16 PROCEDURE — 272N000001 HC OR GENERAL SUPPLY STERILE: Performed by: PODIATRIST

## 2024-02-16 PROCEDURE — 87186 SC STD MICRODIL/AGAR DIL: CPT | Performed by: PODIATRIST

## 2024-02-16 PROCEDURE — 258N000003 HC RX IP 258 OP 636: Performed by: PODIATRIST

## 2024-02-16 PROCEDURE — 250N000011 HC RX IP 250 OP 636: Performed by: PODIATRIST

## 2024-02-16 PROCEDURE — 360N000075 HC SURGERY LEVEL 2, PER MIN: Performed by: PODIATRIST

## 2024-02-16 PROCEDURE — 87205 SMEAR GRAM STAIN: CPT | Performed by: PODIATRIST

## 2024-02-16 PROCEDURE — 99254 IP/OBS CNSLTJ NEW/EST MOD 60: CPT | Performed by: STUDENT IN AN ORGANIZED HEALTH CARE EDUCATION/TRAINING PROGRAM

## 2024-02-16 PROCEDURE — 11044 DBRDMT BONE 1ST 20 SQ CM/<: CPT | Performed by: PODIATRIST

## 2024-02-16 PROCEDURE — 80048 BASIC METABOLIC PNL TOTAL CA: CPT | Performed by: HOSPITALIST

## 2024-02-16 PROCEDURE — 250N000009 HC RX 250: Performed by: PODIATRIST

## 2024-02-16 PROCEDURE — 710N000010 HC RECOVERY PHASE 1, LEVEL 2, PER MIN: Performed by: PODIATRIST

## 2024-02-16 PROCEDURE — 250N000013 HC RX MED GY IP 250 OP 250 PS 637: Performed by: HOSPITALIST

## 2024-02-16 PROCEDURE — 120N000001 HC R&B MED SURG/OB

## 2024-02-16 PROCEDURE — 87075 CULTR BACTERIA EXCEPT BLOOD: CPT | Performed by: PODIATRIST

## 2024-02-16 RX ORDER — ACETAMINOPHEN 325 MG/1
975 TABLET ORAL ONCE
Status: COMPLETED | OUTPATIENT
Start: 2024-02-16 | End: 2024-02-16

## 2024-02-16 RX ORDER — SODIUM CHLORIDE, SODIUM LACTATE, POTASSIUM CHLORIDE, CALCIUM CHLORIDE 600; 310; 30; 20 MG/100ML; MG/100ML; MG/100ML; MG/100ML
INJECTION, SOLUTION INTRAVENOUS CONTINUOUS
Status: DISCONTINUED | OUTPATIENT
Start: 2024-02-16 | End: 2024-02-16 | Stop reason: HOSPADM

## 2024-02-16 RX ORDER — PROPOFOL 10 MG/ML
INJECTION, EMULSION INTRAVENOUS PRN
Status: DISCONTINUED | OUTPATIENT
Start: 2024-02-16 | End: 2024-02-16

## 2024-02-16 RX ORDER — LIDOCAINE HYDROCHLORIDE 10 MG/ML
INJECTION, SOLUTION INFILTRATION; PERINEURAL PRN
Status: DISCONTINUED | OUTPATIENT
Start: 2024-02-16 | End: 2024-02-16

## 2024-02-16 RX ORDER — BUPIVACAINE HYDROCHLORIDE 5 MG/ML
INJECTION, SOLUTION EPIDURAL; INTRACAUDAL
Status: COMPLETED | OUTPATIENT
Start: 2024-02-16 | End: 2024-02-16

## 2024-02-16 RX ORDER — ONDANSETRON 2 MG/ML
INJECTION INTRAMUSCULAR; INTRAVENOUS PRN
Status: DISCONTINUED | OUTPATIENT
Start: 2024-02-16 | End: 2024-02-16

## 2024-02-16 RX ORDER — ONDANSETRON 2 MG/ML
4 INJECTION INTRAMUSCULAR; INTRAVENOUS EVERY 30 MIN PRN
Status: DISCONTINUED | OUTPATIENT
Start: 2024-02-16 | End: 2024-02-16 | Stop reason: HOSPADM

## 2024-02-16 RX ORDER — FENTANYL CITRATE 50 UG/ML
50 INJECTION, SOLUTION INTRAMUSCULAR; INTRAVENOUS EVERY 5 MIN PRN
Status: DISCONTINUED | OUTPATIENT
Start: 2024-02-16 | End: 2024-02-16 | Stop reason: HOSPADM

## 2024-02-16 RX ORDER — SODIUM CHLORIDE, SODIUM LACTATE, POTASSIUM CHLORIDE, CALCIUM CHLORIDE 600; 310; 30; 20 MG/100ML; MG/100ML; MG/100ML; MG/100ML
INJECTION, SOLUTION INTRAVENOUS CONTINUOUS PRN
Status: DISCONTINUED | OUTPATIENT
Start: 2024-02-16 | End: 2024-02-16

## 2024-02-16 RX ORDER — FENTANYL CITRATE 50 UG/ML
100 INJECTION, SOLUTION INTRAMUSCULAR; INTRAVENOUS
Status: DISCONTINUED | OUTPATIENT
Start: 2024-02-16 | End: 2024-02-16 | Stop reason: HOSPADM

## 2024-02-16 RX ORDER — FENTANYL CITRATE 50 UG/ML
25 INJECTION, SOLUTION INTRAMUSCULAR; INTRAVENOUS EVERY 5 MIN PRN
Status: DISCONTINUED | OUTPATIENT
Start: 2024-02-16 | End: 2024-02-16 | Stop reason: HOSPADM

## 2024-02-16 RX ORDER — LIDOCAINE 40 MG/G
CREAM TOPICAL
Status: DISCONTINUED | OUTPATIENT
Start: 2024-02-16 | End: 2024-02-16 | Stop reason: HOSPADM

## 2024-02-16 RX ORDER — MAGNESIUM HYDROXIDE 1200 MG/15ML
LIQUID ORAL PRN
Status: DISCONTINUED | OUTPATIENT
Start: 2024-02-16 | End: 2024-03-01

## 2024-02-16 RX ORDER — ONDANSETRON 4 MG/1
4 TABLET, ORALLY DISINTEGRATING ORAL EVERY 30 MIN PRN
Status: DISCONTINUED | OUTPATIENT
Start: 2024-02-16 | End: 2024-02-16 | Stop reason: HOSPADM

## 2024-02-16 RX ORDER — HYDROMORPHONE HYDROCHLORIDE 1 MG/ML
0.4 INJECTION, SOLUTION INTRAMUSCULAR; INTRAVENOUS; SUBCUTANEOUS EVERY 5 MIN PRN
Status: DISCONTINUED | OUTPATIENT
Start: 2024-02-16 | End: 2024-02-16 | Stop reason: HOSPADM

## 2024-02-16 RX ORDER — HYDROMORPHONE HYDROCHLORIDE 1 MG/ML
0.2 INJECTION, SOLUTION INTRAMUSCULAR; INTRAVENOUS; SUBCUTANEOUS EVERY 5 MIN PRN
Status: DISCONTINUED | OUTPATIENT
Start: 2024-02-16 | End: 2024-02-16 | Stop reason: HOSPADM

## 2024-02-16 RX ADMIN — PIPERACILLIN AND TAZOBACTAM 3.38 G: 3; .375 INJECTION, POWDER, FOR SOLUTION INTRAVENOUS at 00:06

## 2024-02-16 RX ADMIN — Medication 5 MG: at 21:14

## 2024-02-16 RX ADMIN — BUPIVACAINE HYDROCHLORIDE 25 ML: 5 INJECTION, SOLUTION EPIDURAL; INTRACAUDAL; PERINEURAL at 09:56

## 2024-02-16 RX ADMIN — ENOXAPARIN SODIUM 40 MG: 40 INJECTION SUBCUTANEOUS at 17:45

## 2024-02-16 RX ADMIN — VANCOMYCIN HYDROCHLORIDE 1500 MG: 5 INJECTION, POWDER, LYOPHILIZED, FOR SOLUTION INTRAVENOUS at 21:19

## 2024-02-16 RX ADMIN — OXYCODONE HYDROCHLORIDE 5 MG: 5 TABLET ORAL at 21:20

## 2024-02-16 RX ADMIN — FENTANYL CITRATE 100 MCG: 50 INJECTION, SOLUTION INTRAMUSCULAR; INTRAVENOUS at 09:50

## 2024-02-16 RX ADMIN — LIDOCAINE HYDROCHLORIDE 5 ML: 10 INJECTION, SOLUTION INFILTRATION; PERINEURAL at 10:58

## 2024-02-16 RX ADMIN — BUPIVACAINE HYDROCHLORIDE 15 ML: 5 INJECTION, SOLUTION EPIDURAL; INTRACAUDAL at 09:56

## 2024-02-16 RX ADMIN — INSULIN GLARGINE 10 UNITS: 100 INJECTION, SOLUTION SUBCUTANEOUS at 21:15

## 2024-02-16 RX ADMIN — ONDANSETRON 4 MG: 2 INJECTION INTRAMUSCULAR; INTRAVENOUS at 11:28

## 2024-02-16 RX ADMIN — ATORVASTATIN CALCIUM 80 MG: 40 TABLET, FILM COATED ORAL at 21:18

## 2024-02-16 RX ADMIN — SODIUM CHLORIDE 50 ML/HR: 9 INJECTION, SOLUTION INTRAVENOUS at 12:55

## 2024-02-16 RX ADMIN — MIDAZOLAM 2 MG: 1 INJECTION INTRAMUSCULAR; INTRAVENOUS at 09:50

## 2024-02-16 RX ADMIN — PIPERACILLIN AND TAZOBACTAM 3.38 G: 3; .375 INJECTION, POWDER, FOR SOLUTION INTRAVENOUS at 07:30

## 2024-02-16 RX ADMIN — NICOTINE 1 PATCH: 14 PATCH, EXTENDED RELEASE TRANSDERMAL at 13:52

## 2024-02-16 RX ADMIN — VANCOMYCIN HYDROCHLORIDE 1500 MG: 5 INJECTION, POWDER, LYOPHILIZED, FOR SOLUTION INTRAVENOUS at 10:40

## 2024-02-16 RX ADMIN — ACETAMINOPHEN 975 MG: 325 TABLET ORAL at 09:17

## 2024-02-16 RX ADMIN — PROPOFOL 200 MG: 10 INJECTION, EMULSION INTRAVENOUS at 10:58

## 2024-02-16 RX ADMIN — PIPERACILLIN AND TAZOBACTAM 3.38 G: 3; .375 INJECTION, POWDER, FOR SOLUTION INTRAVENOUS at 15:38

## 2024-02-16 RX ADMIN — SODIUM CHLORIDE, POTASSIUM CHLORIDE, SODIUM LACTATE AND CALCIUM CHLORIDE: 600; 310; 30; 20 INJECTION, SOLUTION INTRAVENOUS at 10:48

## 2024-02-16 RX ADMIN — ACETAMINOPHEN 650 MG: 325 TABLET ORAL at 21:15

## 2024-02-16 RX ADMIN — LOSARTAN POTASSIUM 25 MG: 25 TABLET, FILM COATED ORAL at 07:30

## 2024-02-16 RX ADMIN — ROCURONIUM BROMIDE 50 MG: 50 INJECTION, SOLUTION INTRAVENOUS at 10:59

## 2024-02-16 RX ADMIN — SODIUM CHLORIDE, POTASSIUM CHLORIDE, SODIUM LACTATE AND CALCIUM CHLORIDE: 600; 310; 30; 20 INJECTION, SOLUTION INTRAVENOUS at 09:35

## 2024-02-16 RX ADMIN — Medication 1 LOZENGE: at 22:10

## 2024-02-16 RX ADMIN — SODIUM CHLORIDE: 9 INJECTION, SOLUTION INTRAVENOUS at 00:06

## 2024-02-16 ASSESSMENT — ACTIVITIES OF DAILY LIVING (ADL)
ADLS_ACUITY_SCORE: 24
ADLS_ACUITY_SCORE: 24
ADLS_ACUITY_SCORE: 25
ADLS_ACUITY_SCORE: 24
ADLS_ACUITY_SCORE: 38
ADLS_ACUITY_SCORE: 24
ADLS_ACUITY_SCORE: 25
ADLS_ACUITY_SCORE: 38
ADLS_ACUITY_SCORE: 24
ADLS_ACUITY_SCORE: 24

## 2024-02-16 ASSESSMENT — ENCOUNTER SYMPTOMS: SEIZURES: 0

## 2024-02-16 ASSESSMENT — LIFESTYLE VARIABLES: TOBACCO_USE: 1

## 2024-02-16 NOTE — PLAN OF CARE
Problem: Comorbidity Management  Goal: Maintenance of COPD Symptom Control  Outcome: Progressing     Problem: Comorbidity Management  Goal: Blood Glucose Levels Within Targeted Range  Outcome: Progressing  Intervention: Monitor and Manage Glycemia  Recent Flowsheet Documentation  Taken 2/15/2024 1648 by Lizzy Anaya RN  Glycemic Management: blood glucose monitored     Problem: Pain Acute  Goal: Optimal Pain Control and Function  Outcome: Progressing  Intervention: Develop Pain Management Plan  Recent Flowsheet Documentation  Taken 2/15/2024 2010 by Lizzy Anaya RN  Pain Management Interventions: medication (see MAR)  Taken 2/15/2024 1901 by Lizzy Anaya RN  Pain Management Interventions: medication (see MAR)  Taken 2/15/2024 1658 by Lizzy Anaya RN  Pain Management Interventions: medication offered but refused     Goal Outcome Evaluation:       Pt presented to the hospital on 2/14 with a nonhealing diabetic foot ulcer, RLE cellulitis and possible sepsis. Left heel has eschar with mild serosanguineous drainage and odor present. Dressing was changed. MRI shows no signs of osteomyelitis. + wound cultures. Pt is scheduled to have an I&D of the right foot tomorrow at 10:30. NPO after midnight. NWB to the RLE. Receiving IV abx in the form of Vancomycin and Zosyn. WBC is trending down. Pt is A&O. Right wrist is casted for old fx. Tolerating a diabetic diet. Blood sugars were 249 before meal and 179 at hs. Receiving s/s and scheduled Lantus insulin. IV fluids running at 50 ml/hr of NS. Using urinal at bedside. Received prn Oxycodone at 1900 and prn Tylenol at 2010 for pain management.

## 2024-02-16 NOTE — OP NOTE
Date of surgery: 2/16/2024    Surgeon: Cristofer Sims D.P.M.    Preoperative diagnosis: Diabetic ulcer right foot    Postoperative diagnosis: Same    Procedure: Debridement with irrigation of diabetic ulcer right foot    Anesthesia: General with popliteal block    Hemostasis: Pneumatic thigh tourniquet 20 mmHg    Blood loss: None    Specimen: Aerobic and anaerobic wound cultures    Complication: None    Description: The patient was taken to the operating room and placed on the table in the prone position.  Under general anesthesia with a popliteal block the right lower extremity was prepped and draped in usual aseptic manner.  Following exsanguination of the right foot with a Audi's bandage the tourniquet was inflated and a formal procedure was performed.  Attention was directed to the posterior aspect of the right heel where a large round black necrotic wound was noted.  Utilizing #20 blade the entire ulceration was circumscribed approximately 2 mm beyond the borders of the ulceration.  Scant purulent drainage was noted.  With sharp debridement of all necrotic and nonviable tissue was removed utilizing a # 20 blade.  Sharp excisional debridement was carried through the fat layer and down to the level of bone.  Once the necrotic tissue had been removed intraoperative wound cultures were then obtained.  Sharp excisional debridement was carried throughout the wound until good bleeding margins were noted. Utilizing a rongeur all necrotic fat tissue was removed.  Blunt debridement was then carried down to the level of the bone.  The size of the wound following debridement was 8.0 cm x 7.0 cm x 4.0 cm.  The wound was then copiously lavaged with sterile saline solution utilizing pulse lavage.  The wound was then packed with a Betadine soaked 4 x 4 gauze.  A sterile dressing was then applied comprising of Xeroform gauze, 4 x 4 gauze, ABD, Kerlix and an Ace bandage.  The tourniquet was then deflated and normal color  return to the right foot.  The patient appeared to tolerate the procedure and anesthesia well and was transported from the operating room to the recovery room with vital signs stable neurovascular status intact.  Wound VAC will be applied to facilitate wound healing.

## 2024-02-16 NOTE — PROGRESS NOTES
[facility]    Medicine Progress Note - Hospitalist Service    Date of Admission:  2/14/2024    Assessment & Plan   Floyd Capps is a 48 year old male with history of DM, HTN, CVA, obesity, recent ED visit for nonhealing diabetic foot ulcer with cellulitis, here for worsening pain in the foot, with necrotic tissue noted on exam.       Diabetic foot ulcer, right heel  Possible sepsis  -chronic wound R heel, recent resulting cellulitis treated with Bactrim  -here for worse pain in the foot, found with ulcer worse with necrotic tissue  -MRI of the foot no osteomyelitis  -on Vanc & Zosyn  -s/p I and D on 02/16  -Care Mgr to see about financial/insurance issues which have been a barrier to followup  -home oxycodone, tylenol  -Nonweightbearing right foot unless otherwise instructed by podiatry  - ID consult    DM  -Stopped all his home meds lately due to stomach upset while on Bactrim, then neglected to resume meds  -A1c 11.8- likely needs insulin. Historic A1c reviewed- never at goal >14 years  -Hold home metformin and glipizide for now while NPO  -Started Lantus 10 units nightly. Has been on insulin in the past and received education in hospital about it, but seems to have stopped on his own. Needs outpatient DM ed for ongoing titration.     Tobacco abuse  -Patch  -Needs cessation counseling prior to discharge     Left distal radius fracture  -Fell recently  -Cast in place  -PT and OT evals  -Outpatient orthopedics follow-up     Normocytic anemia  -Mild with hemoglobin 10.8 now that better hydrated.  He has poor follow-up and doubt he has had colonoscopy  -Outpatient follow-up     Hypertension  -Previously was on carvedilol and losartan  -Losartan 25 mg oral daily     History of stroke  -Had been taking Plavix and atorvastatin but recently stopped taking all of his medications as above.  -Neurology notes from last August reviewed, they had wanted him on dual antiplatelet therapy for 90 days  "followed by full dose aspirin alone.  Patient has instead been taking Plavix, unsure reasoning.  -Started full dose aspirin and resumed home atorvastatin    Obesity  -BMI 38 noted     Poor follow-up, noncompliance, lack of self-care  -Importance of compliance and follow-up needs to be emphasized.  Patient with relatively young age and already with serious comorbidities of chronic disease.  -Unsure if mood issues may be at play.       Diet: Moderate Consistent Carb (60 g CHO per Meal) Diet    DVT Prophylaxis: Enoxaparin (Lovenox) SQ  Cazares Catheter: Not present  Lines: None     Cardiac Monitoring: None  Code Status: Full Code      Clinically Significant Risk Factors                        # DMII: A1C = 11.8 % (Ref range: <5.7 %) within past 6 months, PRESENT ON ADMISSION  # Obesity: Estimated body mass index is 38.94 kg/m  as calculated from the following:    Height as of this encounter: 1.778 m (5' 10\").    Weight as of this encounter: 123.1 kg (271 lb 6.2 oz)., PRESENT ON ADMISSION     # Financial/Environmental Concerns: none         Disposition Plan      Expected Discharge Date: 02/19/2024    Discharge Delays: IV Medication - consider oral or Home Infusion  Procedure Pending (enter procedure & time in comments)  Destination: home with family              Camila Vargas MD  Hospitalist Service  [facility]  Securely message with [employer] (more info)  Text page via [employer] Paging/Directory   ______________________________________________________________________    Interval History   Patient new to me today.  Chart, labs and imaging results reviewed.  Patient underwent I& D to right foot ulcer today. He denies having significant pain. Management plan discussed with the patient.     Physical Exam   Vital Signs: Temp: 97.6  F (36.4  C) Temp src: Oral BP: (!) 151/85 Pulse: 92   Resp: 16 SpO2: 100 % O2 Device: Nasal cannula Oxygen Delivery: 2 LPM  Weight: 271 lbs 6.18 oz    General " Appearance: No distress noted  Respiratory: Good air entry bilaterally  Cardiovascular: S1 and S2 well heard  GI: Soft abdomen, no tenderness, normoactive bowel sound  Skin: Clean right foot dressing, left foot with significant callus but no obvious sign of infection      Medical Decision Making       50 MINUTES SPENT BY ME on the date of service doing chart review, history, exam, documentation & further activities per the note.      Data

## 2024-02-16 NOTE — ANESTHESIA CARE TRANSFER NOTE
Patient: Floyd Capps    Procedure: Procedure(s):  IRRIGATION AND DEBRIDEMENT right foot       Diagnosis: Diabetic ulcer of right heel associated with type 2 diabetes mellitus, unspecified ulcer stage (H) [E11.621, L97.419]  Diagnosis Additional Information: No value filed.    Anesthesia Type:   General     Note:    Oropharynx: oropharynx clear of all foreign objects  Level of Consciousness: awake  Oxygen Supplementation: face mask    Independent Airway: airway patency satisfactory and stable  Dentition: dentition unchanged  Vital Signs Stable: post-procedure vital signs reviewed and stable  Report to RN Given: handoff report given  Patient transferred to: PACU    Handoff Report: Identifed the Patient, Identified the Reponsible Provider, Reviewed the pertinent medical history, Discussed the surgical course, Reviewed Intra-OP anesthesia mangement and issues during anesthesia, Set expectations for post-procedure period and Allowed opportunity for questions and acknowledgement of understanding      Vitals:  Vitals Value Taken Time   /96 02/16/24 1151   Temp     Pulse 89 02/16/24 1153   Resp 18 02/16/24 1153   SpO2 100 % 02/16/24 1153   Vitals shown include unfiled device data.    Electronically Signed By: CHAMP Hirsch CRNA  February 16, 2024  11:55 AM

## 2024-02-16 NOTE — ANESTHESIA PREPROCEDURE EVALUATION
Anesthesia Pre-Procedure Evaluation    Patient: Floyd Capps   MRN: [id number] : 1975        Procedure : Procedure(s):  IRRIGATION AND DEBRIDEMENT right foot          Past Medical History:   Diagnosis Date    Diabetes (H)       Past Surgical History:   Procedure Laterality Date    APPENDECTOMY      INCISION AND DRAINAGE OF WOUND      groin abscess      No Known Allergies   Social History     Tobacco Use    Smoking status: Every Day     Packs/day: .5     Types: Cigarettes    Smokeless tobacco: Never    Tobacco comments:     Seen IP by CTTS on 23 and declined cessation services and materials.    Substance Use Topics    Alcohol use: No      Wt Readings from Last 1 Encounters:   24 123.1 kg (271 lb 6.2 oz)        Anesthesia Evaluation            ROS/MED HX  ENT/Pulmonary:  - neg pulmonary ROS   (+)                tobacco use,                     (-) sleep apnea   Neurologic:     (+)          CVA,  with deficits, - Right hand weakness.                (-) no seizures   Cardiovascular:     (+)  - -   -  - -                                 Previous cardiac testing   Echo: Date: 2023 Results:  1. Normal left ventricular size and systolic performance with a visually  estimated ejection fraction of 55-60%.  2. There appears to be a fairly small discrete apical wall motion abnormality  (no apical mural thrombus is detected).  3. No significant valvular heart disease is identified on this study.  4. Normal right ventricular size and systolic performance.  5. There is mild left atrial enlargement.  6. No intracardiac mass or thrombus is detected.  7. Echo contrast examination was performed using agitated NS as contrast  agent. The right heart opacity was good and the left heart was well  visualized. There was no evidence of right to left shunting during spontaneous  respiration or following release of Valsalva.  8. No cardiac source of systemic embolism is identified on the study.    Stress Test:  Date:  8/2023 Results:  There is no prior study for comparison.     Pharmacological regadenoson stress ECG is negative for inducible myocardial ischemia.     Pharmacological regadenoson nuclear study is abnormal.  There is previous almost transmural myocardial infarction in inferior wall and apex.  There is small size of perla-infarct ischemia in distal anteroseptal segment.     Normal left ventricular size with no significant wall motion abnormality.  The calculated left ventricular ejection fraction is 55%.     The patient is at a low risk of future cardiac ischemic events.    ECG Reviewed:  Date: Results:    Cath:  Date: Results:      METS/Exercise Tolerance:     Hematologic:     (+)      anemia,          Musculoskeletal:  - neg musculoskeletal ROS     GI/Hepatic:  - neg GI/hepatic ROS     Renal/Genitourinary:  - neg Renal ROS     Endo:     (+)  type II DM,        thyroid problem,     Obesity,       Psychiatric/Substance Use:  - neg psychiatric ROS     Infectious Disease:  - neg infectious disease ROS     Malignancy:       Other:            Physical Exam    Airway  airway exam normal      Mallampati: II   TM distance: > 3 FB   Neck ROM: full   Mouth opening: > 3 cm    Respiratory Devices and Support         Dental  no notable dental history     (+) Multiple visibly decayed, broken teeth      Cardiovascular   cardiovascular exam normal       Rhythm and rate: regular and normal     Pulmonary   pulmonary exam normal        breath sounds clear to auscultation           OUTSIDE LABS:  CBC:   Lab Results   Component Value Date    WBC 12.5 (H) 02/15/2024    WBC 14.4 (H) 02/14/2024    HGB 10.8 (L) 02/15/2024    HGB 11.7 (L) 02/14/2024    HCT 32.9 (L) 02/15/2024    HCT 35.0 (L) 02/14/2024     02/15/2024     02/14/2024     BMP:   Lab Results   Component Value Date     02/16/2024     (L) 02/15/2024    POTASSIUM 3.8 02/16/2024    POTASSIUM 3.5 02/15/2024    CHLORIDE 103 02/16/2024    CHLORIDE 103 02/15/2024  "   CO2 26 02/16/2024    CO2 25 02/15/2024    BUN 7.9 02/16/2024    BUN 7.3 02/15/2024    CR 1.14 02/16/2024    CR 0.90 02/15/2024     (H) 02/16/2024     (H) 02/16/2024     COAGS:   Lab Results   Component Value Date    PTT 26 08/23/2023    INR 0.95 08/23/2023     POC: No results found for: \"BGM\", \"HCG\", \"HCGS\"  HEPATIC: No results found for: \"ALBUMIN\", \"PROTTOTAL\", \"ALT\", \"AST\", \"GGT\", \"ALKPHOS\", \"BILITOTAL\", \"BILIDIRECT\", \"SEVERIANO\"  OTHER:   Lab Results   Component Value Date    A1C 11.8 (H) 02/14/2024    SARAH 8.4 (L) 02/16/2024    TSH 2.72 08/23/2023    SED 99 (H) 02/14/2024       Anesthesia Plan    ASA Status:  3       Anesthesia Type: General.     - Airway: ETT              Consents    Anesthesia Plan(s) and associated risks, benefits, and realistic alternatives discussed. Questions answered and patient/representative(s) expressed understanding.     - Discussed: Risks, Benefits and Alternatives for BOTH SEDATION and the PROCEDURE were discussed     - Discussed with:  Patient            Postoperative Care    Pain management: Peripheral nerve block (Single Shot).   PONV prophylaxis: Ondansetron (or other 5HT-3)     Comments:               Wes Retana MD    I have reviewed the pertinent notes and labs in the chart from the past 30 days and (re)examined the patient.  Any updates or changes from those notes are reflected in this note.             "

## 2024-02-16 NOTE — PROGRESS NOTES
"Care Management Follow Up    Length of Stay (days): 2    Expected Discharge Date: 02/17/2024     Concerns to be Addressed:     Care progression  2/16 surgical debridement with irrigation of the large diabetic ulcer on the posterior aspect of the right heel.   Patient plan of care discussed at interdisciplinary rounds: Yes    Anticipated Discharge Disposition:  TBD     Anticipated Discharge Services:  TBD  Anticipated Discharge DME:  NA    Patient/family educated on Medicare website which has current facility and service quality ratings:  NA  Education Provided on the Discharge Plan:  Yes per team  Patient/Family in Agreement with the Plan:  NA    Referrals Placed by CM/SW:  NA  Private pay costs discussed: Not applicable    Additional Information:  Per provider, Dr. Vargas, patient will be here through the weekend. Also awaiting PT/OT eval.    Social Hx: \"Lives w/mom in apartment. No svcs and working on selecting insurance. 2/16 DM ulcer R heel detriment. Transport TBD.\"    RNCM to follow for medical progression, recommendations, and final discharge plan.     Madison Carter RN     "

## 2024-02-16 NOTE — PROGRESS NOTES
Patient arrived back to room from PACU at this time. Alert and denies any discomfort.  Rashmi Oliveros RN

## 2024-02-16 NOTE — ANESTHESIA PROCEDURE NOTES
"Popliteal Procedure Note    Pre-Procedure   Staff -        Anesthesiologist:  Wes Retana MD       Performed By: anesthesiologist       Location: pre-op       Procedure Start/Stop Times: 2/16/2024 9:56 AM and 2/16/2024 9:56 AM       Pre-Anesthestic Checklist: patient identified, IV checked, site marked, risks and benefits discussed, informed consent, monitors and equipment checked, pre-op evaluation, at physician/surgeon's request and post-op pain management  Timeout:       Correct Patient: Yes        Correct Procedure: Yes        Correct Site: Yes        Correct Position: Yes        Correct Laterality: Yes        Site Marked: Yes  Procedure Documentation  Procedure: Popliteal       Laterality: right       Patient Position: supine       Skin prep: Chloraprep       Local skin infiltrated with mL of 1% lidocaine.        Needle Type: short bevel       Needle Gauge: 21.        Needle Length (Inches): 4        Ultrasound guided       1. Ultrasound was used to identify targeted nerve, plexus, vascular marker, or fascial plane and place a needle adjacent to it in real-time.       2. Ultrasound was used to visualize the spread of anesthetic in close proximity to the above referenced structure.       3. A permanent image is entered into the patient's record.    Assessment/Narrative         The placement was negative for: blood aspirated, painful injection and site bleeding       Paresthesias: No.       Bolus given via needle..        Secured via.        Insertion/Infusion Method: Single Shot       Complications: none    Medication(s) Administered   Bupivacaine 0.5% PF (Infiltration) - Infiltration   25 mL - 2/16/2024 9:56:00 AM  Medication Administration Time: 2/16/2024 9:56 AM      FOR [facility] ([facility]) ONLY:   Pain Team Contact information: please page the Pain Team Via [employer]. Search \"Pain\". During daytime hours, please page the attending first. At night please page the resident first.      "

## 2024-02-16 NOTE — ANESTHESIA PROCEDURE NOTES
"Adductor canal Procedure Note    Pre-Procedure   Staff -        Anesthesiologist:  Wes Retana MD       Performed By: anesthesiologist       Location: pre-op       Procedure Start/Stop Times: 2/16/2024 9:56 AM and 2/16/2024 9:56 AM       Pre-Anesthestic Checklist: patient identified, IV checked, site marked, risks and benefits discussed, informed consent, monitors and equipment checked, pre-op evaluation, at physician/surgeon's request and post-op pain management  Timeout:       Correct Patient: Yes        Correct Procedure: Yes        Correct Site: Yes        Correct Position: Yes        Correct Laterality: Yes        Site Marked: Yes  Procedure Documentation  Procedure: Adductor canal       Laterality: right       Patient Position: supine       Skin prep: Chloraprep       Local skin infiltrated with mL of 1% lidocaine.        Needle Type: short bevel       Needle Gauge: 21.        Needle Length (Inches): 4        Ultrasound guided       1. Ultrasound was used to identify targeted nerve, plexus, vascular marker, or fascial plane and place a needle adjacent to it in real-time.       2. Ultrasound was used to visualize the spread of anesthetic in close proximity to the above referenced structure.       3. A permanent image is entered into the patient's record.    Assessment/Narrative         The placement was negative for: blood aspirated, painful injection and site bleeding       Paresthesias: No.       Bolus given via needle..        Secured via.        Insertion/Infusion Method: Single Shot       Complications: none    Medication(s) Administered   Bupivacaine 0.5% PF (Infiltration) - Infiltration   15 mL - 2/16/2024 9:56:00 AM  Medication Administration Time: 2/16/2024 9:56 AM      FOR [facility] ([facility] ONLY:   Pain Team Contact information: please page the Pain Team Via [employer]. Search \"Pain\". During daytime hours, please page the attending first. At night please page the resident first.      "

## 2024-02-16 NOTE — ANESTHESIA POSTPROCEDURE EVALUATION
Patient: Floyd Capps    Procedure: Procedure(s):  IRRIGATION AND DEBRIDEMENT right foot       Anesthesia Type:  General    Note:  Disposition: Inpatient   Postop Pain Control: Uneventful            Sign Out: Well controlled pain   PONV: No   Neuro/Psych: Uneventful            Sign Out: Acceptable/Baseline neuro status   Airway/Respiratory: Uneventful            Sign Out: Acceptable/Baseline resp. status   CV/Hemodynamics: Uneventful            Sign Out: Acceptable CV status; No obvious hypovolemia; No obvious fluid overload   Other NRE: NONE   DID A NON-ROUTINE EVENT OCCUR? No           Last vitals:  Vitals Value Taken Time   /80 02/16/24 1215   Temp 36.3  C (97.4  F) 02/16/24 1155   Pulse 96 02/16/24 1216   Resp 16 02/16/24 1216   SpO2 99 % 02/16/24 1216   Vitals shown include unfiled device data.    Electronically Signed By: Wes Retana MD  February 16, 2024  1:43 PM

## 2024-02-16 NOTE — CONSULTS
Consultation - INFECTIOUS DISEASE CONSULTATION  Floyd Capps,  1975, MRN [id number]      Hyperglycemia [R73.9]  Poorly controlled diabetes mellitus (H) [E11.65]  Cellulitis of right lower leg [L03.115]  Diabetic ulcer of right heel associated with type 2 diabetes mellitus, unspecified ulcer stage (H) [E11.621, L97.419]    PCP: Glendy Benavides, [phone]   Code status:  Full Code               Chief Complaint: Cellulitis of right lower leg     Assessment:  Floyd Capps is a 48 year old old male with   Poorly controlled type 2 diabetes mellitus with last A1c of 11.8 on 2024.  Morbid obesity with BMI of 38.94.  Active tobacco use.  Diabetic foot infection status post debridement on 2024.  Surgical cultures in process.  Superficial cultures with Proteus vulgaris, Pseudomonas aeruginosa, Streptococcus agalactiae, Enterococcus faecalis, and Staphylococcus aureus.       Recommendations:   Continue IV vancomycin and Zosyn.  Follow-up pending susceptibility of the organisms listed above.  Follow surgical cultures.  Wound care.    Discussed with the patient, nursing staff.    Thank you for letting us be part of the patient care team. We will follow.    Luis Galloway MD,MD  [facility].   [facility]  Office Telephone [phone].  Fax [phone]  [facility] paging    HPI:    Floyd Capps is a 48 year old old male. History is provided by the patient, chart review.  ID asked to see this patient for diabetic foot infection.  The patient has a history of type 2 diabetes mellitus with poor control, hypertension, CVA, obesity.  He has chronic ulcer on the right heel.  Last year, he had scalp abscess and was treated with oral Bactrim outpatient which led to nausea and vomiting.  As a result he stopped all his medications.  He also started drinking a lot of Mountain Dew and not following any diabetic diet for several months.  He is admitted on  2/14/2024 with worsening foot pain.  MRI did not show osteomyelitis.  Superficial cultures as outlined above.  He underwent debridement of right diabetic foot infection.  Cultures in process.  On IV vancomycin and Zosyn.      ===========================================    Medical History  Past Medical History:   Diagnosis Date    Diabetes (H)         Surgical History  Past Surgical History:   Procedure Laterality Date    APPENDECTOMY      INCISION AND DRAINAGE OF WOUND      groin abscess    IRRIGATION AND DEBRIDEMENT FOOT, COMBINED Right 2/16/2024    Procedure: IRRIGATION AND DEBRIDEMENT RIGHT FOOT;  Surgeon: Cristofer Sims DPM;  Location: [facility] Main OR            Social History  Reviewed, and he  reports that he has been smoking cigarettes. He has been smoking an average of 0.5 packs per day. He has never used smokeless tobacco. He reports that he does not drink alcohol and does not use drugs.        Family History  History reviewed. No pertinent family history.   Psychosocial Needs  Social History     Social History Narrative    Patient reports that he does not have health insurance.     Additional psychosocial needs reviewed per nursing assessment.       No Known Allergies     Medications Prior to Admission   Medication Sig Dispense Refill Last Dose    acetaminophen (TYLENOL) 500 MG tablet Take 2 tablets (1,000 mg) by mouth every 8 hours as needed for mild pain 60 tablet 0 Past Week    oxyCODONE (ROXICODONE) 5 MG tablet Take 5 mg by mouth every 6 hours as needed for severe pain   Unknown        Review of Systems:  Negative except for findings in the HPI Physical Exam:  Temp:  [97.4  F (36.3  C)-98.5  F (36.9  C)] 98.2  F (36.8  C)  Pulse:  [87-98] 95  Resp:  [4-20] 20  BP: (121-161)/(74-96) 144/89  SpO2:  [93 %-100 %] 97 %    Gen: Pleasant in no acute distress.  HEENT: NCAT. EOMI. PERRL.  Neck: No bruit, JVD or thyromegaly.  Lungs: Clear to ascultation bilat with no crackles or wheezes.  Card: RRR. NSR. No  RMG. Peripheral pulses present and symmetric. No edema.  Abd: Soft NT ND. No mass. Normal bowel sounds.  Skin: No rash.  Extr: Surgical dressing in place.  Neuro: Alert and oriented to place time and person.   Media Information    Document Information    Other: Photograph      02/14/2024 9:38 AM   Attached To:   Hospital Encounter on 2/14/24   Source Information                 ============================    Pertinent Labs  personally reviewed.   Recent Labs   Lab 02/15/24  0544 02/14/24  0935   WBC 12.5* 14.4*   HGB 10.8* 11.7*   HCT 32.9* 35.0*    416       Recent Labs   Lab 02/16/24 0630 02/15/24  0544 02/14/24  0935    134* 133*   CO2 26 25 23   BUN 7.9 7.3 9.0       MICROBIOLOGY DATA:  Personally reviewed   Contains abnormal data Wound Aerobic Bacterial Culture Routine With Gram Stain  Order: [id number]  Collected 2/14/2024  9:58 AM       Status: Preliminary result   Visible to patient: No (not released)    Specimen Information: Heel, Right; Wound   0 Result Notes  Culture 3+ Proteus vulgaris Abnormal       2+ Pseudomonas aeruginosa Abnormal       3+ Streptococcus agalactiae (Group B Streptococcus) Abnormal    This organism is susceptible to ampicillin, penicillin, vancomycin and the cephalosporins. If treatment is required and your patient is allergic to penicillin, contact the microbiology lab within 5 days to request susceptibility testing.   3+ Enterococcus faecalis Abnormal       1+ Staphylococcus aureus Abnormal       2+ Normal melody               Gram Stain Result  Abnormal   3+ Gram positive cocci      3+ Gram negative bacilli      No white blood cells seen              Resulting Agency: [facility]           Specimen Collected: 02/14/24  9:58 AM Last Resulted: 02/16/24 10:19 AM             Pertinent Radiology  personally reviewed.   Narrative & Impression   EXAM: MR FOOT RIGHT W/O and W CONTRAST  LOCATION: [facility]  DATE: 2/14/2024     INDICATION: Ulceration, erythema,  infection.  COMPARISON: None.  TECHNIQUE: Routine. Additional postgadolinium T1 sequences were obtained.  IV CONTRAST: 12 mL Gadavist.     FINDINGS:      JOINTS AND BONES:   -No evidence for fracture. There is some reactive signal abnormality within the posterior calcaneus but no T1 marrow signal change and this is unlikely to represent osteomyelitis. No significant effusion to suggest septic arthropathy. There is   degenerative change at the calcaneocuboid articulation and tibiotalar joint.     TENDONS:   -The peroneal tendons are negative. The flexor tendons are negative for tendinopathy, tenosynovitis, or tearing. The extensor tendons are negative.      LIGAMENTS:   -The anterior and posterior talofibular ligaments are negative. The deltoid ligamentous complex is intact. The calcaneofibular ligament is negative. The ligament of Lisfranc is intact.     MUSCLES AND SOFT TISSUES:   -There is a soft tissue ulceration along the posteroinferior aspect of the hindfoot but this is fairly superficial. There is some surrounding edema or potential cellulitis. There is reactive edema versus infectious or inflammatory myositis within the   plantar and interosseous musculature. No evidence for abscess. Mild plantar fasciitis.                                                                      IMPRESSION:  1.  Soft tissue ulceration along the posteroinferior aspect of the hindfoot. This is fairly superficial. There is some surrounding edema or low-grade cellulitis.  2.  No evidence for osteomyelitis, septic arthropathy, or abscess.  3.  Reactive edema versus infectious or inflammatory myositis within the plantar and interosseous musculature.  4.  No evidence for fracture.  5.  No significant tendinous or ligamentous pathology.     Order-Level Documents:

## 2024-02-16 NOTE — PLAN OF CARE
Problem: Adult Inpatient Plan of Care  Goal: Plan of Care Review  Description: The Plan of Care Review/Shift note should be completed every shift.  The Outcome Evaluation is a brief statement about your assessment that the patient is improving, declining, or no change.  This information will be displayed automatically on your shift  note.  Outcome: Progressing   Goal Outcome Evaluation:       Patient denied pain. VSS. NPO for surgery in am. Dressing to left foot CDI. IV fluids and IV antibiotics infusing as ordered.

## 2024-02-16 NOTE — ANESTHESIA PROCEDURE NOTES
Airway       Patient location during procedure: OR       Procedure Start/Stop Times: 2/16/2024 11:01 AM  Staff -        CRNA: Kenan Garcia APRN CRNA       Performed By: CRNAIndications and Patient Condition       Indications for airway management: perla-procedural       Induction type:intravenous       Mask difficulty assessment: 2 - vent by mask + OA or adjuvant +/- NMBA    Final Airway Details       Final airway type: endotracheal airway       Successful airway: ETT - single  Endotracheal Airway Details        ETT size (mm): 7.5       Cuffed: yes       Successful intubation technique: direct laryngoscopy       DL Blade Type: Rivas 2       Grade View of Cords: 1       Adjucts: stylet       Position: Center    Post intubation assessment        Placement verified by: capnometry, equal breath sounds and chest rise        Number of attempts at approach: 1       Ease of procedure: easy       Dentition: Unchanged    Medication(s) Administered   Medication Administration Time: 2/16/2024 11:01 AM

## 2024-02-16 NOTE — PHARMACY-VANCOMYCIN DOSING SERVICE
"Pharmacy Vancomycin Note  Date of Service February 15, 2024  Patient's  1975   48 year old, male    Indication: Sepsis and Skin and Soft Tissue Infection  Day of Therapy: 2  Current vancomycin regimen:  1500 mg IV q12h  Current vancomycin monitoring method: AUC  Current vancomycin therapeutic monitoring goal: 400-600 mg*h/L    Insight[employer] Prediction of Current Vancomycin Regimen  Regimen: 1500 mg IV every 12 hours.  Start time: 00:41 on 02/15/2024  Exposure target: AUC24 (range)400-600 mg/L.hr   AUC24,ss: 551 mg/L.hr  Probability of AUC24 > 400: 81 %  Ctrough,ss: 17.7 mg/L  Probability of Ctrough,ss > 20: 40 %  Probability of nephrotoxicity (Lodise RUSS ): 14 %    Current estimated CrCl = Estimated Creatinine Clearance: 129.2 mL/min (based on SCr of 0.9 mg/dL).    Creatinine for last 3 days  2024:  9:35 AM Creatinine 0.82 mg/dL  2/15/2024:  5:44 AM Creatinine 0.90 mg/dL    Recent Vancomycin Levels (past 3 days)  2/15/2024:  8:02 PM Vancomycin 13.5 ug/mL    Vancomycin IV Administrations (past 72 hours)                     vancomycin (VANCOCIN) 1,500 mg in sodium chloride 0.9 % 250 mL intermittent infusion (mg) 1,500 mg New Bag 02/15/24 2143     1,500 mg New Bag  1028     1,500 mg New Bag 24 2315    vancomycin (VANCOCIN) 2,500 mg in sodium chloride 0.9 % 500 mL intermittent infusion (mg) 2,500 mg New Bag 24 1241                    Nephrotoxins and other renal medications (From now, onward)      Start     Dose/Rate Route Frequency Ordered Stop    24 2200  vancomycin (VANCOCIN) 1,500 mg in sodium chloride 0.9 % 250 mL intermittent infusion         1,500 mg  over 90 Minutes Intravenous EVERY 12 HOURS 24 1357      24 1600  piperacillin-tazobactam (ZOSYN) 3.375 g vial to attach to  mL bag        Note to Pharmacy: For [facility], [facility] and [facility]: For Zosyn-naive patients, use the \"Zosyn initial dose + extended infusion\" order panel.    3.375 g  over 240 Minutes Intravenous EVERY 8 " HOURS 02/14/24 1104                 Contrast Orders - past 72 hours (72h ago, onward)      Start     Dose/Rate Route Frequency Stop    02/14/24 1200  gadobutrol (GADAVIST) injection 12 mL         12 mL Intravenous ONCE 02/14/24 1153            Interpretation of levels and current regimen:  Vancomycin level is reflective of -600    Has serum creatinine changed greater than 50% in last 72 hours: No    Urine output:  good urine output    Renal Function: Stable    InsightRX Prediction of Planned New Vancomycin Regimen  Loading dose: N/A  Regimen: 1500 mg IV every 12 hours.  Start time: 09:43 on 02/16/2024  Exposure target: AUC24 (range)400-600 mg/L.hr   AUC24,ss: 448 mg/L.hr  Probability of AUC24 > 400: 75 %  Ctrough,ss: 13.6 mg/L  Probability of Ctrough,ss > 20: 12 %  Probability of nephrotoxicity (Lodise RUSS 2009): 9 %    Plan:  Continue Current Dose  Vancomycin monitoring method: AUC  Vancomycin therapeutic monitoring goal: 400-600 mg*h/L  Pharmacy will check vancomycin levels as appropriate in 3-5 Days.  Serum creatinine levels will be ordered daily for the first week of therapy and at least twice weekly for subsequent weeks.    Nicollette McMann, [facility]

## 2024-02-16 NOTE — PLAN OF CARE
Problem: Adult Inpatient Plan of Care  Goal: Plan of Care Review  Outcome: Progressing   Goal Outcome Evaluation:       Patient alert and oriented x4. Able to communicate his needs. Denies any discomfort. Small amounts of foul smelling serosanguinous drainage noted from right heel. Dressing reinforced. Numbness bilateral lower extremities. IV zosyn infusing. Blood sugar 197 this morning. CHG bath completed. Scheduled for I&D right heel at 10:30am today.  Rashmi Oliveros RN

## 2024-02-17 ENCOUNTER — APPOINTMENT (OUTPATIENT)
Dept: PHYSICAL THERAPY | Facility: HOSPITAL | Age: 49
End: 2024-02-17
Attending: HOSPITALIST
Payer: MEDICAID

## 2024-02-17 LAB
ALBUMIN UR-MCNC: 300 MG/DL
ANION GAP SERPL CALCULATED.3IONS-SCNC: 9 MMOL/L (ref 7–15)
APPEARANCE UR: CLEAR
BACTERIA #/AREA URNS HPF: ABNORMAL /HPF
BILIRUB UR QL STRIP: NEGATIVE
BUN SERPL-MCNC: 9.2 MG/DL (ref 6–20)
CALCIUM SERPL-MCNC: 8.1 MG/DL (ref 8.6–10)
CHLORIDE SERPL-SCNC: 104 MMOL/L (ref 98–107)
CK SERPL-CCNC: 97 U/L (ref 39–308)
COLOR UR AUTO: COLORLESS
CREAT SERPL-MCNC: 1.37 MG/DL (ref 0.67–1.17)
DEPRECATED HCO3 PLAS-SCNC: 23 MMOL/L (ref 22–29)
EGFRCR SERPLBLD CKD-EPI 2021: 63 ML/MIN/1.73M2
ERYTHROCYTE [DISTWIDTH] IN BLOOD BY AUTOMATED COUNT: 12.1 % (ref 10–15)
GLUCOSE BLDC GLUCOMTR-MCNC: 185 MG/DL (ref 70–99)
GLUCOSE BLDC GLUCOMTR-MCNC: 186 MG/DL (ref 70–99)
GLUCOSE BLDC GLUCOMTR-MCNC: 190 MG/DL (ref 70–99)
GLUCOSE BLDC GLUCOMTR-MCNC: 196 MG/DL (ref 70–99)
GLUCOSE BLDC GLUCOMTR-MCNC: 212 MG/DL (ref 70–99)
GLUCOSE SERPL-MCNC: 189 MG/DL (ref 70–99)
GLUCOSE UR STRIP-MCNC: 300 MG/DL
HCT VFR BLD AUTO: 33.2 % (ref 40–53)
HGB BLD-MCNC: 10.7 G/DL (ref 13.3–17.7)
HGB UR QL STRIP: ABNORMAL
KETONES UR STRIP-MCNC: NEGATIVE MG/DL
LEUKOCYTE ESTERASE UR QL STRIP: ABNORMAL
MCH RBC QN AUTO: 29.7 PG (ref 26.5–33)
MCHC RBC AUTO-ENTMCNC: 32.2 G/DL (ref 31.5–36.5)
MCV RBC AUTO: 92 FL (ref 78–100)
NITRATE UR QL: NEGATIVE
PH UR STRIP: 6 [PH] (ref 5–7)
PLATELET # BLD AUTO: 377 10E3/UL (ref 150–450)
POTASSIUM SERPL-SCNC: 3.9 MMOL/L (ref 3.4–5.3)
RBC # BLD AUTO: 3.6 10E6/UL (ref 4.4–5.9)
RBC URINE: 3 /HPF
SODIUM SERPL-SCNC: 136 MMOL/L (ref 135–145)
SP GR UR STRIP: 1.01 (ref 1–1.03)
SQUAMOUS EPITHELIAL: 1 /HPF
UROBILINOGEN UR STRIP-MCNC: <2 MG/DL
VANCOMYCIN SERPL-MCNC: 17.2 UG/ML
WBC # BLD AUTO: 14.8 10E3/UL (ref 4–11)
WBC URINE: 5 /HPF

## 2024-02-17 PROCEDURE — 80202 ASSAY OF VANCOMYCIN: CPT | Performed by: STUDENT IN AN ORGANIZED HEALTH CARE EDUCATION/TRAINING PROGRAM

## 2024-02-17 PROCEDURE — 250N000011 HC RX IP 250 OP 636: Performed by: PODIATRIST

## 2024-02-17 PROCEDURE — 82550 ASSAY OF CK (CPK): CPT | Performed by: INTERNAL MEDICINE

## 2024-02-17 PROCEDURE — 250N000013 HC RX MED GY IP 250 OP 250 PS 637: Performed by: INTERNAL MEDICINE

## 2024-02-17 PROCEDURE — 250N000011 HC RX IP 250 OP 636: Mod: JW | Performed by: INTERNAL MEDICINE

## 2024-02-17 PROCEDURE — 36415 COLL VENOUS BLD VENIPUNCTURE: CPT | Performed by: STUDENT IN AN ORGANIZED HEALTH CARE EDUCATION/TRAINING PROGRAM

## 2024-02-17 PROCEDURE — 97161 PT EVAL LOW COMPLEX 20 MIN: CPT | Mod: GP

## 2024-02-17 PROCEDURE — 81001 URINALYSIS AUTO W/SCOPE: CPT | Performed by: INTERNAL MEDICINE

## 2024-02-17 PROCEDURE — 250N000013 HC RX MED GY IP 250 OP 250 PS 637: Performed by: PODIATRIST

## 2024-02-17 PROCEDURE — 80048 BASIC METABOLIC PNL TOTAL CA: CPT | Performed by: STUDENT IN AN ORGANIZED HEALTH CARE EDUCATION/TRAINING PROGRAM

## 2024-02-17 PROCEDURE — 99232 SBSQ HOSP IP/OBS MODERATE 35: CPT | Performed by: INTERNAL MEDICINE

## 2024-02-17 PROCEDURE — 258N000003 HC RX IP 258 OP 636: Performed by: INTERNAL MEDICINE

## 2024-02-17 PROCEDURE — 97530 THERAPEUTIC ACTIVITIES: CPT | Mod: GP

## 2024-02-17 PROCEDURE — 258N000003 HC RX IP 258 OP 636: Performed by: PODIATRIST

## 2024-02-17 PROCEDURE — 120N000001 HC R&B MED SURG/OB

## 2024-02-17 PROCEDURE — 85027 COMPLETE CBC AUTOMATED: CPT | Performed by: STUDENT IN AN ORGANIZED HEALTH CARE EDUCATION/TRAINING PROGRAM

## 2024-02-17 RX ORDER — MINERAL OIL/HYDROPHIL PETROLAT
OINTMENT (GRAM) TOPICAL 2 TIMES DAILY
Status: DISCONTINUED | OUTPATIENT
Start: 2024-02-17 | End: 2024-03-05 | Stop reason: HOSPADM

## 2024-02-17 RX ORDER — NICOTINE 21 MG/24HR
1 PATCH, TRANSDERMAL 24 HOURS TRANSDERMAL EVERY 24 HOURS
Status: DISCONTINUED | OUTPATIENT
Start: 2024-02-17 | End: 2024-03-05 | Stop reason: HOSPADM

## 2024-02-17 RX ADMIN — NICOTINE 1 PATCH: 14 PATCH, EXTENDED RELEASE TRANSDERMAL at 11:21

## 2024-02-17 RX ADMIN — ASPIRIN 325 MG: 325 TABLET, COATED ORAL at 07:51

## 2024-02-17 RX ADMIN — PIPERACILLIN AND TAZOBACTAM 3.38 G: 3; .375 INJECTION, POWDER, FOR SOLUTION INTRAVENOUS at 00:22

## 2024-02-17 RX ADMIN — DAPTOMYCIN 750 MG: 500 INJECTION, POWDER, LYOPHILIZED, FOR SOLUTION INTRAVENOUS at 17:30

## 2024-02-17 RX ADMIN — ENOXAPARIN SODIUM 40 MG: 40 INJECTION SUBCUTANEOUS at 17:30

## 2024-02-17 RX ADMIN — CALCIUM CARBONATE (ANTACID) CHEW TAB 500 MG 1000 MG: 500 CHEW TAB at 19:34

## 2024-02-17 RX ADMIN — WHITE PETROLATUM: 1.75 OINTMENT TOPICAL at 21:49

## 2024-02-17 RX ADMIN — CALCIUM CARBONATE (ANTACID) CHEW TAB 500 MG 1000 MG: 500 CHEW TAB at 09:18

## 2024-02-17 RX ADMIN — ONDANSETRON 4 MG: 2 INJECTION INTRAMUSCULAR; INTRAVENOUS at 06:11

## 2024-02-17 RX ADMIN — NICOTINE 1 PATCH: 14 PATCH, EXTENDED RELEASE TRANSDERMAL at 15:32

## 2024-02-17 RX ADMIN — SODIUM CHLORIDE: 9 INJECTION, SOLUTION INTRAVENOUS at 06:13

## 2024-02-17 RX ADMIN — LOSARTAN POTASSIUM 25 MG: 25 TABLET, FILM COATED ORAL at 07:51

## 2024-02-17 RX ADMIN — SODIUM CHLORIDE: 9 INJECTION, SOLUTION INTRAVENOUS at 21:54

## 2024-02-17 RX ADMIN — PIPERACILLIN AND TAZOBACTAM 3.38 G: 3; .375 INJECTION, POWDER, FOR SOLUTION INTRAVENOUS at 15:32

## 2024-02-17 RX ADMIN — PIPERACILLIN AND TAZOBACTAM 3.38 G: 3; .375 INJECTION, POWDER, FOR SOLUTION INTRAVENOUS at 07:51

## 2024-02-17 ASSESSMENT — ACTIVITIES OF DAILY LIVING (ADL)
ADLS_ACUITY_SCORE: 25
ADLS_ACUITY_SCORE: 29
ADLS_ACUITY_SCORE: 25
ADLS_ACUITY_SCORE: 29

## 2024-02-17 NOTE — PROGRESS NOTES
[facility]    Medicine Progress Note - Hospitalist Service    Date of Admission:  2/14/2024    Assessment & Plan   Floyd Capps is a 48 year old male with history of DM, HTN, CVA, obesity, recent ED visit for nonhealing diabetic foot ulcer with cellulitis, here for worsening pain in the foot, with necrotic tissue noted on exam.       Diabetic foot ulcer, right heel  Possible sepsis  -chronic wound R heel, recent resulting cellulitis treated with Bactrim  -here for worse pain in the foot, found with ulcer worse with necrotic tissue  -MRI of the foot no osteomyelitis  -on Vanc & Zosyn  -s/p I and D on 02/16  -Care Mgr to see about financial/insurance issues which have been a barrier to followup  -home oxycodone, tylenol  -Nonweightbearing right foot unless otherwise instructed by podiatry  - ID consult. Per ID, they recommend to continue broad spectrum abx till intraoperative cultures are available     DM Type , Uncontrolled   -Stopped all his home meds lately due to stomach upset while on Bactrim, then neglected to resume meds  -A1c 11.8- likely needs insulin. Historic A1c reviewed- never at goal >14 years  -Hold home metformin and glipizide for now while NPO  -Started Lantus 10 units nightly. Has been on insulin in the past and received education in hospital about it, but seems to have stopped on his own. Needs outpatient DM ed for ongoing titration.  Lantus increased to 12 units on 2/17     Tobacco abuse  -Patch  -Needs cessation counseling prior to discharge     Left distal radius fracture  -Fell recently  -Cast in place  -PT and OT evals  -Outpatient orthopedics follow-up     Normocytic anemia  -Mild with hemoglobin 10.8 now that better hydrated.  He has poor follow-up and doubt he has had colonoscopy  -Outpatient follow-up     Hypertension  -Previously was on carvedilol and losartan  -Losartan 25 mg oral daily    Elevated Creatinine:  At 1.37 on 2/17.unclear cause.  On a combination of  "broadd spectrum abx  No evidence of dehydration. On maintenance IV fluids .  Continue for another 24 hrs   Will check UA  Repeat creatinine am        History of stroke  -Had been taking Plavix and atorvastatin but recently stopped taking all of his medications as above.  -Neurology notes from last August reviewed, they had wanted him on dual antiplatelet therapy for 90 days followed by full dose aspirin alone.  Patient has instead been taking Plavix, unsure reasoning.  -Started full dose aspirin and resumed home atorvastatin    Obesity  -BMI 38 noted     Poor follow-up, noncompliance, lack of self-care  -Importance of compliance and follow-up needs to be emphasized.  Patient with relatively young age and already with serious comorbidities of chronic disease.  -Unsure if mood issues may be at play.       Diet: Moderate Consistent Carb (60 g CHO per Meal) Diet    DVT Prophylaxis: Enoxaparin (Lovenox) SQ  Cazares Catheter: Not present  Lines: None     Cardiac Monitoring: None  Code Status: Full Code      Clinically Significant Risk Factors                 # Acute Kidney Injury, unspecified: based on a >150% or 0.3 mg/dL increase in last creatinine compared to past 90 day average, will monitor renal function        # DMII: A1C = 11.8 % (Ref range: <5.7 %) within past 6 months, PRESENT ON ADMISSION  # Obesity: Estimated body mass index is 38.94 kg/m  as calculated from the following:    Height as of this encounter: 1.778 m (5' 10\").    Weight as of this encounter: 123.1 kg (271 lb 6.2 oz)., PRESENT ON ADMISSION       # Financial/Environmental Concerns: none         Disposition Plan     Expected Discharge Date: 02/19/2024    Discharge Delays: IV Medication - consider oral or Home Infusion  Procedure Pending (enter procedure & time in comments)  Destination: home with family              Robsonjane Sheldon San MD  Hospitalist Service  [facility]  Securely message with [employer] (more info)  Text " "page via [employer] Paging/Directory   ______________________________________________________________________    Interval History   Charts were reviewed and patient examined.  This was my first time meeting patient. He was understandably very upset as he received news of his mum's death the the same hospital.  Expressed sympathy  He did not want to discuss much, but just wanted me to get straight to the point\" and leave  I discussed that he will continue to be on IV antibiotics for now, till we have the cultures finalized.  I also discussed that we will need to titrate his insulin requirements while he is here       Physical Exam   Vital Signs: Temp: 98.2  F (36.8  C) Temp src: Oral BP: 133/77 Pulse: 90   Resp: 18 SpO2: 94 % O2 Device: None (Room air) Oxygen Delivery: 2 LPM  Weight: 271 lbs 6.18 oz    General Appearance: No distress noted  Respiratory: Good air entry bilaterally  Cardiovascular: S1 and S2 well heard  GI: Soft abdomen, no tenderness, normoactive bowel sound  Skin: Clean right foot dressing, left foot with significant callus but no obvious sign of infection      Medical Decision Making       40  MINUTES SPENT BY ME on the date of service doing chart review, history, exam, documentation & further activities per the note.      Data     "

## 2024-02-17 NOTE — PROGRESS NOTES
POD# 1: Incision and drainage right heel decubitus ulcer    The patient was seen at bedside in no apparent distress.  The patient relates a good appetite with no nausea or vomiting.  The patient denies any fever or chills.  The patient relates the pain is under control.  The patient relates being able to go the the bathroom.     The patient's vitals are stable and is affebrile    Operative Cultures:     Gram stain: No organisms see, no WBCs  Aerobic: pending  Anaerobic: pending    Labs:   WBC: 14.8   RBC:3.6   Hgb:10.7   Hematocrit:33.2     Glucose: 196    Exam:  Neurovascular status remains intact with positive epicritic sensation and capillary filling to the digits on the right.  Motor is intact to the right.       Bandages are clean, dry and intact.    Assessment: Infected decubitus ulcer, right heel status post incision and drainage    Plan:     Wound: keep dressings clean, dry and intact  Infection: Per ID recommendations  Disposition: Keep right foot elevated; non weight bearing right foot will order knee walker for mobilization along with physical therapy referral.    I appreciate the assistance in the care of this patient from the Hospitalist Service.      MAGDA Miner, AMY, FACFAS

## 2024-02-17 NOTE — PROGRESS NOTES
"INFECTIOUS DISEASE FOLLOW UP NOTE      ASSESSMENT:  Floyd Capps is a 48 year old old male with   Poorly controlled type 2 diabetes mellitus with last A1c of 11.8 on 2024.  Morbid obesity with BMI of 38.94.  Active tobacco use.  Diabetic foot infection status post debridement on 2024.  {Per op note debridement was down to bone. Surgical cultures GNR.  Superficial cultures with Proteus vulgaris, Pseudomonas aeruginosa, Streptococcus agalactiae, Enterococcus faecalis, and MRSA.  SCHUYLER.  Mother passed away       PLAN:  Continue Zosyn and stop vancomycin.  Start daptomycin once vancomycin level is <15    Gadiel Godfrey MD  [facility]  [phone] clinic  [employer] paging    ______________________________________________________________________    SUBJECTIVE / INTERVAL HISTORY: didn't sleep well. His mother passed away in this hospital overnight. Reviewed results. Nerve block remains in effect.    ROS: All other systems negative except as listed above.        OBJECTIVE:  /77 (BP Location: Left arm, Patient Position: Supine)   Pulse 90   Temp 98.2  F (36.8  C) (Oral)   Resp 18   Ht 1.778 m (5' 10\")   Wt 123.1 kg (271 lb 6.2 oz)   SpO2 94%   BMI 38.94 kg/m         Vital Signs  Temp: 98.2  F (36.8  C)  Temp src: Oral  Resp: 18  Pulse: 90  Pulse Rate Source: Monitor  BP: 133/77  BP Location: Left arm    Temp (24hrs), Av.9  F (36.6  C), Min:97.4  F (36.3  C), Max:98.2  F (36.8  C)      GEN: sleeping when I entered, no distress.  RESPIRATORY:  Normal breathing pattern. Clear to auscultation  CARDIOVASCULAR:  Regular rate and rhythm. Normal S1 and S2. No murmur  ABDOMEN:  Soft, normal bowel sounds, non-tender  EXTREMITIES: R ankle wrapped, drainage on dressing at heel.  SKIN/HAIR/NAILS:  No rashes  IV: peripheral        Antibiotics:  Pip/tazo -  Vancomycin 2/14-16    Pertinent labs:    Recent Labs   Lab 24  0552 02/15/24  0544 24  0935   WBC 14.8* 12.5* 14.4* " "  HGB 10.7* 10.8* 11.7*   HCT 33.2* 32.9* 35.0*    374 416        Recent Labs   Lab 02/17/24  0551 02/16/24  0630 02/15/24  0544    135 134*   CO2 23 26 25   BUN 9.2 7.9 7.3          MICROBIOLOGY DATA:  2/14 blood negative  2/14 wound Proteus, pseudomonas, Group B strep, MRSA, enterococcus  2/16 surgical GNR    RADIOLOGY:  POC US Guidance Needle Placement    Result Date: 2/16/2024  Ultrasound was performed as guidance to an anesthesia procedure.  Click \"PACS images\" hyperlink below to view any stored images.  For specific procedure details, view procedure note authored by anesthesia.    MR Foot Right w/o & w Contrast    Result Date: 2/14/2024  EXAM: MR FOOT RIGHT W/O and W CONTRAST LOCATION: [facility] DATE: 2/14/2024 INDICATION: Ulceration, erythema, infection. COMPARISON: None. TECHNIQUE: Routine. Additional postgadolinium T1 sequences were obtained. IV CONTRAST: 12 mL Gadavist. FINDINGS: JOINTS AND BONES: -No evidence for fracture. There is some reactive signal abnormality within the posterior calcaneus but no T1 marrow signal change and this is unlikely to represent osteomyelitis. No significant effusion to suggest septic arthropathy. There is degenerative change at the calcaneocuboid articulation and tibiotalar joint. TENDONS: -The peroneal tendons are negative. The flexor tendons are negative for tendinopathy, tenosynovitis, or tearing. The extensor tendons are negative. LIGAMENTS: -The anterior and posterior talofibular ligaments are negative. The deltoid ligamentous complex is intact. The calcaneofibular ligament is negative. The ligament of Lisfranc is intact. MUSCLES AND SOFT TISSUES: -There is a soft tissue ulceration along the posteroinferior aspect of the hindfoot but this is fairly superficial. There is some surrounding edema or potential cellulitis. There is reactive edema versus infectious or inflammatory myositis within the plantar and interosseous musculature. " No evidence for abscess. Mild plantar fasciitis.     IMPRESSION: 1.  Soft tissue ulceration along the posteroinferior aspect of the hindfoot. This is fairly superficial. There is some surrounding edema or low-grade cellulitis. 2.  No evidence for osteomyelitis, septic arthropathy, or abscess. 3.  Reactive edema versus infectious or inflammatory myositis within the plantar and interosseous musculature. 4.  No evidence for fracture. 5.  No significant tendinous or ligamentous pathology.    XR Wrist Left G/E 3 Views    Result Date: 2/13/2024  EXAM: XR WRIST LEFT G/E 3 VIEWS LOCATION: [facility] DATE: 2/13/2024 INDICATION:  Other closed intra-articular fracture of distal end of left radius, initial encounter COMPARISON: 02/06/2024     IMPRESSION: Distal radial intra-articular fracture with similar alignment. No definite callus formation. Otherwise unchanged.    XR Wrist Left G/E 3 Views    Result Date: 2/6/2024  EXAM: XR WRIST LEFT G/E 3 VIEWS LOCATION: [facility] DATE: 2/6/2024 INDICATION: fall, left wrist pain COMPARISON: None.     IMPRESSION: There is a nondisplaced, longitudinally oriented fracture of the distal left radius. There is intra-articular extension. There is slight buckling of the dorsal radial cortex on the lateral view. The ulna is intact. Soft tissue swelling.    US Lower Extremity Venous Duplex Right    Result Date: 1/8/2024  EXAM: US LOWER EXTREMITY VENOUS DUPLEX RIGHT LOCATION: [facility] DATE: 1/8/2024 INDICATION: pain swelling eval dvt COMPARISON: None. TECHNIQUE: Venous Duplex ultrasound of the right lower extremity with and without compression, augmentation and duplex. Color flow and spectral Doppler with waveform analysis performed. FINDINGS: Linear and curved transducers used due to body habitus. Exam includes the common femoral, femoral, popliteal, and contralateral common femoral veins as well as segmentally visualized  deep calf veins and greater saphenous vein. RIGHT: No deep vein thrombosis. No superficial thrombophlebitis. No popliteal cyst. There is a enlarged right inguinal node measuring 4 x 2 x 1.3 cm.     IMPRESSION: 1.  No deep venous thrombosis in the right leg. 2.  Enlarged right inguinal node. This may be reactive if there are inflammatory changes in the right leg. 3.  If findings are discordant, consider a follow-up exam with possible FNA in 4-6 weeks.          no

## 2024-02-17 NOTE — PROGRESS NOTES
"   02/17/24 1340   Appointment Info   Signing Clinician's Name / Credentials (PT) Lyudmila Griffin PT DPT   Living Environment   People in Home alone   Current Living Arrangements apartment   Home Accessibility no concerns;wheelchair accessible   Transportation Anticipated family or friend will provide   Living Environment Comments Pt used to live with his mother, who passed away today.   Self-Care   Usual Activity Tolerance moderate   Current Activity Tolerance fair   Equipment Currently Used at Home none   Fall history within last six months yes   Number of times patient has fallen within last six months 1   Activity/Exercise/Self-Care Comment Pt sustained a recent fall which resulted in a left wrist fracture, which is casted. Pt owns a manual WC   General Information   Onset of Illness/Injury or Date of Surgery 02/14/24   Referring Physician Cristofer Sims DPM   Patient/Family Therapy Goals Statement (PT) Discharge home   Pertinent History of Current Problem (include personal factors and/or comorbidities that impact the POC) Per pt's chart: \"48 year old male with history of DM, HTN, CVA, obesity, recent ED visit for nonhealing diabetic foot ulcer with cellulitis, here for worsening pain in the foot, with necrotic tissue noted on exam. \"   Existing Precautions/Restrictions fall;weight bearing   Weight-Bearing Status - LUE nonweight-bearing  (left wrist fracture, casted)   Weight-Bearing Status - RLE nonweight-bearing   Cognition   Affect/Mental Status (Cognition) agitated   Orientation Status (Cognition) oriented x 4   Follows Commands (Cognition) WFL   Cognitive Status Comments Pt is understandably distraught, withdrawn, and irritable today likely related to pt's mother passing this morning   Pain Assessment   Patient Currently in Pain No   Integumentary/Edema   Integumentary/Edema Comments Bandage applied to left foot/ankle   Posture    Posture Forward head position;Protracted shoulders   Range of Motion (ROM) "   Range of Motion ROM deficits secondary to surgical procedure   Strength (Manual Muscle Testing)   Strength (Manual Muscle Testing) Deficits observed during functional mobility   Bed Mobility   Comment, (Bed Mobility) Bed mobility supine<>sitting EOB SBA with heavy reliance on bed rails and elevated HOB. Pt utilizes momentum to achieve sitting   Transfers   Comment, (Transfers) Sit<>stand transfers impulsive, CGA provided, FWW with left platform utilized. Pt cued verbally and visually how to maintain NWB through RLE. Pt unable to maintain.   Gait/Stairs (Locomotion)   Vance Level (Gait) contact guard   Assistive Device (Gait) walker, front-wheeled  (left platform walker)   Deviations/Abnormal Patterns (Gait) right sided deviations;antalgic;gait speed decreased   Balance   Balance Comments CGA in standing with BUE support at FWW   Clinical Impression   Criteria for Skilled Therapeutic Intervention Yes, treatment indicated   PT Diagnosis (PT) Impaired functional mobility   Influenced by the following impairments Surgical intervention, weakness, balance, NWB status through LUE and RLE   Functional limitations due to impairments Bed mobility, transfers, ambulation, community participation   Clinical Presentation (PT Evaluation Complexity) stable   Clinical Presentation Rationale Pt presents as medically diagnosed   Clinical Decision Making (Complexity) low complexity   Planned Therapy Interventions (PT) balance training;bed mobility training;gait training;neuromuscular re-education;patient/family education;strengthening;transfer training;progressive activity/exercise;home program guidelines   Risk & Benefits of therapy have been explained evaluation/treatment results reviewed;care plan/treatment goals reviewed;risks/benefits reviewed;current/potential barriers reviewed;participants voiced agreement with care plan;participants included;patient   PT Total Evaluation Time   PT Eval, Low Complexity Minutes (73334)  14   Physical Therapy Goals   PT Frequency 5x/week   PT Predicted Duration/Target Date for Goal Attainment 02/24/24   PT Goals Bed Mobility;Transfers;Gait   PT: Bed Mobility Modified independent;Supine to/from sit   PT: Transfers Modified independent;Sit to/from stand;Assistive device;Within precautions   PT: Gait Modified independent;Assistive device;Rolling walker;Within precautions;100 feet   Interventions   Interventions Quick Adds Therapeutic Activity   Therapeutic Activity   Therapeutic Activities: dynamic activities to improve functional performance Minutes (95151) 17   Symptoms Noted During/After Treatment Fatigue   Treatment Detail/Skilled Intervention PT: Pt facilitated in bed mobility supine>sitting EOB. Pt impulsive, demonstrating use of momentum to achieve full sitting. Pt performs sit<>stand at EOB and pivot to recliner, CGA provided and FWW with left platform utilized. Pt unable to maintain NWB through RLE, and poor eccentric lowering to chair. Pt declines additional activity. Pt left in recliner with chair alarm engaged and needs within reach at PT exit.   PT Discharge Planning   PT Plan Bed mobility, progress transfers and ambulation as tolerated, NWB through LUE and RLE (L platform walker in room)   PT Discharge Recommendation (DC Rec) Transitional Care Facility   PT Rationale for DC Rec Pt is currently below functional baseline and is unable to maintain NWB status through LUE or RLE with transfers and mobility. Pt with recent history of fall at home (see L wrist fracture). Pt would benefit from further skilled care at TCU to maximize independence within precautions and facilitate safe discharge home, where pt now lives alone.   PT Brief overview of current status CGA bed mobility and transfers, unable to maintain NWB through LUE and RLE   PT Equipment Needed at Discharge walker, rolling;walker, platform;wheelchair   Total Session Time   Timed Code Treatment Minutes 17   Total Session Time (sum of  timed and untimed services) 31

## 2024-02-17 NOTE — PROGRESS NOTES
"Care Management Follow Up    Length of Stay (days): 3    Expected Discharge Date: 02/19/2024     Concerns to be Addressed: discharge planning       Patient plan of care discussed at interdisciplinary rounds: Yes    Anticipated Discharge Disposition:       Anticipated Discharge Services:    Education Provided on the Discharge Plan:  Per Care Team   Patient/Family in Agreement with the Plan:  Yes    Referrals Placed by CM/SW:    Private pay costs discussed: Not applicable    Additional Information:  Chart reviewed.    CM updates:  RNCM spoke with pt today, he has questions about insurance and disability.     RNCM provided patient with disability information and who to contact to get process going. RNCM also told pt that he will need to follow up with PCP to get that going as well. 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       Pt has been talking with [employer], and is possibly willing to pay for insurance for a little bit to be covered, but is also wondering if he qualifies for MA. RNCM had [facility] referral to FSW, writer has not been able to access the referral for FSW. FSW to follow up with pt likely Monday.      Patient's mother was staying at the hospital on [facility] and passed this AM. 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       Therapy consults placed awaiting recs.       Social hx:   \"Lives w/mom in apartment. No svcs and working on selecting insurance. 2/16 DM ulcer R heel detriment. Transport TBD.\"       Cm will continue to follow plan of care, review recommendations, and assist with any discharge needs anticipated.       Darleen Bansal, RN      "

## 2024-02-17 NOTE — PLAN OF CARE
Problem: Adult Inpatient Plan of Care  Goal: Optimal Comfort and Wellbeing  Outcome: Progressing     Problem: Pain Acute  Goal: Optimal Pain Control and Function  Outcome: Progressing  Intervention: Prevent or Manage Pain  Recent Flowsheet Documentation  Taken 2/17/2024 0440 by Yuli Mukherjee RN  Medication Review/Management: medications reviewed  Taken 2/17/2024 0021 by Yuli Mukherjee RN  Medication Review/Management: medications reviewed   Goal Outcome Evaluation:  Pt with no c/o pain this shift.  Reports stomach upset this am and received iv zofran.  Coughing induced emesis around 0400.  Right leg remains numb.  Small drainage noted on heel though has not progressed since previous shift.  Leg elevated overnight.

## 2024-02-17 NOTE — PROGRESS NOTES
Care Management Assist    1:57 PM  SW assisted RNCM with sending referral to the FSW. Referral sent. No FSW on weekends. Anticipate FSW will reach out and follow-up with Pt on Monday (2/19).      TERESA Flowers

## 2024-02-17 NOTE — PLAN OF CARE
Problem: Adult Inpatient Plan of Care  Goal: Plan of Care Review  Outcome: Progressing   Goal Outcome Evaluation:       Patient alert and oriented x4. Flat quiet affect d/t his mothers passing this morning. Spiritual care has been called to visit with patient. Active listening and therapeutic presence utilized. Surgical dressing intact to right foot. Elevated up on pillows. NWB RLE. PT/OT scheduled. ID following. IV antibiotics scheduled. Blood sugars elevated, prn novolog insulin given as ordered.   Rashmi Oliveros RN

## 2024-02-18 LAB
ALBUMIN SERPL BCG-MCNC: 2.2 G/DL (ref 3.5–5.2)
ALP SERPL-CCNC: 75 U/L (ref 40–150)
ALT SERPL W P-5'-P-CCNC: 7 U/L (ref 0–70)
ANION GAP SERPL CALCULATED.3IONS-SCNC: 5 MMOL/L (ref 7–15)
AST SERPL W P-5'-P-CCNC: 8 U/L (ref 0–45)
BACTERIA WND CULT: ABNORMAL
BILIRUB SERPL-MCNC: <0.2 MG/DL
BUN SERPL-MCNC: 9.4 MG/DL (ref 6–20)
CALCIUM SERPL-MCNC: 8.6 MG/DL (ref 8.6–10)
CHLORIDE SERPL-SCNC: 107 MMOL/L (ref 98–107)
CREAT SERPL-MCNC: 1.48 MG/DL (ref 0.67–1.17)
DEPRECATED HCO3 PLAS-SCNC: 25 MMOL/L (ref 22–29)
EGFRCR SERPLBLD CKD-EPI 2021: 57 ML/MIN/1.73M2
ERYTHROCYTE [DISTWIDTH] IN BLOOD BY AUTOMATED COUNT: 12.1 % (ref 10–15)
GLUCOSE BLDC GLUCOMTR-MCNC: 179 MG/DL (ref 70–99)
GLUCOSE BLDC GLUCOMTR-MCNC: 180 MG/DL (ref 70–99)
GLUCOSE BLDC GLUCOMTR-MCNC: 181 MG/DL (ref 70–99)
GLUCOSE BLDC GLUCOMTR-MCNC: 190 MG/DL (ref 70–99)
GLUCOSE SERPL-MCNC: 183 MG/DL (ref 70–99)
GRAM STAIN RESULT: ABNORMAL
HCT VFR BLD AUTO: 34.8 % (ref 40–53)
HGB BLD-MCNC: 11.1 G/DL (ref 13.3–17.7)
MCH RBC QN AUTO: 29.7 PG (ref 26.5–33)
MCHC RBC AUTO-ENTMCNC: 31.9 G/DL (ref 31.5–36.5)
MCV RBC AUTO: 93 FL (ref 78–100)
PLATELET # BLD AUTO: 384 10E3/UL (ref 150–450)
POTASSIUM SERPL-SCNC: 3.8 MMOL/L (ref 3.4–5.3)
PROT SERPL-MCNC: 6.2 G/DL (ref 6.4–8.3)
RBC # BLD AUTO: 3.74 10E6/UL (ref 4.4–5.9)
SODIUM SERPL-SCNC: 137 MMOL/L (ref 135–145)
WBC # BLD AUTO: 13.8 10E3/UL (ref 4–11)

## 2024-02-18 PROCEDURE — 36415 COLL VENOUS BLD VENIPUNCTURE: CPT | Performed by: INTERNAL MEDICINE

## 2024-02-18 PROCEDURE — 250N000013 HC RX MED GY IP 250 OP 250 PS 637: Performed by: PODIATRIST

## 2024-02-18 PROCEDURE — 250N000013 HC RX MED GY IP 250 OP 250 PS 637: Performed by: INTERNAL MEDICINE

## 2024-02-18 PROCEDURE — 250N000011 HC RX IP 250 OP 636: Mod: JZ | Performed by: INTERNAL MEDICINE

## 2024-02-18 PROCEDURE — 99232 SBSQ HOSP IP/OBS MODERATE 35: CPT | Performed by: INTERNAL MEDICINE

## 2024-02-18 PROCEDURE — 80053 COMPREHEN METABOLIC PANEL: CPT | Performed by: INTERNAL MEDICINE

## 2024-02-18 PROCEDURE — 258N000003 HC RX IP 258 OP 636: Performed by: INTERNAL MEDICINE

## 2024-02-18 PROCEDURE — 120N000001 HC R&B MED SURG/OB

## 2024-02-18 PROCEDURE — 85027 COMPLETE CBC AUTOMATED: CPT | Performed by: INTERNAL MEDICINE

## 2024-02-18 PROCEDURE — 250N000011 HC RX IP 250 OP 636: Performed by: PODIATRIST

## 2024-02-18 RX ADMIN — LOSARTAN POTASSIUM 25 MG: 25 TABLET, FILM COATED ORAL at 07:50

## 2024-02-18 RX ADMIN — NICOTINE 1 PATCH: 14 PATCH, EXTENDED RELEASE TRANSDERMAL at 13:33

## 2024-02-18 RX ADMIN — ENOXAPARIN SODIUM 40 MG: 40 INJECTION SUBCUTANEOUS at 18:03

## 2024-02-18 RX ADMIN — PIPERACILLIN AND TAZOBACTAM 3.38 G: 3; .375 INJECTION, POWDER, FOR SOLUTION INTRAVENOUS at 00:30

## 2024-02-18 RX ADMIN — PIPERACILLIN AND TAZOBACTAM 3.38 G: 3; .375 INJECTION, POWDER, FOR SOLUTION INTRAVENOUS at 16:08

## 2024-02-18 RX ADMIN — DAPTOMYCIN 750 MG: 500 INJECTION, POWDER, LYOPHILIZED, FOR SOLUTION INTRAVENOUS at 16:11

## 2024-02-18 RX ADMIN — CALCIUM CARBONATE (ANTACID) CHEW TAB 500 MG 1000 MG: 500 CHEW TAB at 10:52

## 2024-02-18 RX ADMIN — ASPIRIN 325 MG: 325 TABLET, COATED ORAL at 07:50

## 2024-02-18 RX ADMIN — WHITE PETROLATUM: 1.75 OINTMENT TOPICAL at 10:52

## 2024-02-18 RX ADMIN — WHITE PETROLATUM: 1.75 OINTMENT TOPICAL at 22:06

## 2024-02-18 RX ADMIN — PIPERACILLIN AND TAZOBACTAM 3.38 G: 3; .375 INJECTION, POWDER, FOR SOLUTION INTRAVENOUS at 07:47

## 2024-02-18 ASSESSMENT — ACTIVITIES OF DAILY LIVING (ADL)
ADLS_ACUITY_SCORE: 29

## 2024-02-18 NOTE — PLAN OF CARE
Problem: Adult Inpatient Plan of Care  Goal: Plan of Care Review  Outcome: Progressing   Goal Outcome Evaluation:       Surgical dressing to right foot is dry and intact. Refuses to put RLE up on pillows. ID, Podiatry following. IV antibiotics scheduled. Refused to get out of bed today. Diabetic diet, 100% intact. Blood sugars 189 and 183 today, prn novolog insulin administered. Right wrist cast dry and intact. CMS+. Denies any discomfort.   Rashmi Oliveros RN

## 2024-02-18 NOTE — PROGRESS NOTES
POD# 2: Incision and drainage right heel decubitus ulcer    The patient was seen at bedside in no apparent distress.  The patient relates a good appetite with no nausea or vomiting.  The patient denies any fever or chills.  The patient relates the pain is under control.  The patient relates being able to go the the bathroom.     The patient's vitals are stable and is affebrile    Operative Cultures:     Gram stain: No organisms see, no WBCs  Aerobic:   1+ Gram negative bacilli Abnormal       2+ Streptococcus agalactiae (Group B Streptococcus) Abnormal      Anaerobic: No growth    Labs:   WBC: 13.8   RBC:3.74   Hgb:11.1   Hematocrit:34.8     Glucose: 183    Exam:  Neurovascular status remains intact with positive epicritic sensation and capillary filling to the digits on the right.  Motor is intact to the right.       Bandages are clean, dry and intact.    Assessment: Infected decubitus ulcer, right heel status post incision and drainage    Plan:     Wound: keep dressings clean, dry and intact; WOC RN to evaluate wound tomorrow  Infection: Per ID recommendations  Disposition: Keep right foot elevated with complete offloading of the right heel; non weight bearing right foot.    I appreciate the assistance in the care of this patient from the Hospitalist Service.      MAGDA Miner DPM, FACFAS

## 2024-02-18 NOTE — PLAN OF CARE
Problem: Adult Inpatient Plan of Care  Goal: Optimal Comfort and Wellbeing  Outcome: Progressing   Goal Outcome Evaluation:  No new issues overnight.  Slept well.    Flat affect.  Minimal interactions while in room.  Iv zosyn infused without incident.  Voiding in good amounts in urinal.  Right foot dressing remains the same.  No new drainage.

## 2024-02-18 NOTE — PROGRESS NOTES
Care Management Follow Up    Length of Stay (days): 4    Expected Discharge Date: 02/19/2024     Concerns to be Addressed:     patient needing TCU and is self-pay  Patient plan of care discussed at interdisciplinary rounds: Yes    Anticipated Discharge Disposition:  therapy is recommending TCU     Anticipated Discharge Services:  current recommendation TCU  Anticipated Discharge DME:  as per care team recommendation    Patient/family educated on Medicare website which has current facility and service quality ratings:  not yet  Education Provided on the Discharge Plan:    Patient/Family in Agreement with the Plan:      Referrals Placed by CM/SW:  financial  (referral made on 2/16)  Private pay costs discussed: private room/amenity fees    Additional Information:  Patient is a 47 yo male admitted with leg cellulitis. He had been residing in an apartment with his mother. Unfortunately she passes away yesterday. Therapy is recommending TCU and patient is agreeable however he currently does not have a payor source and indicates he cannot afford to pay for a facility from his own finances. SWCM made referral to financial  yesterday however they do not work on the weekend, so we anticipate patient will be contacted by them tomorrow. Once financial assessment and recommendations are completed, hospital CM will meet with patient to review option and make appropriate referrals.    Patient not yet medically ready for discharge. CM to follow for medical progression and to assist with discharge planning needs as warranted.    Iraida White LGSW

## 2024-02-18 NOTE — PLAN OF CARE
"Goal Outcome Evaluation:    Problem: Comorbidity Management  Goal: Blood Glucose Levels Within Targeted Range  Outcome: Progressing     Problem: Infection  Goal: Absence of Infection Signs and Symptoms  Outcome: Progressing  Intervention: Prevent or Manage Infection  Flowsheets (Taken 2/17/2024 3151)  Isolation Precautions: contact precautions initiated          Patient A&Ox4. Denied pain this shift. HS . Patient mood was hypoactive with flat affect, but he was cooperative with cares.            BP (!) 144/77 (BP Location: Right arm)   Pulse 99   Temp 98.7  F (37.1  C) (Oral)   Resp 20   Ht 1.778 m (5' 10\")   Wt 123.1 kg (271 lb 6.2 oz)   SpO2 96%   BMI 38.94 kg/m      "

## 2024-02-18 NOTE — PROGRESS NOTES
[facility]    Medicine Progress Note - Hospitalist Service    Date of Admission:  2/14/2024    Assessment & Plan   Floyd Capps is a 48 year old male with history of DM, HTN, CVA, obesity, recent ED visit for nonhealing diabetic foot ulcer with cellulitis, here for worsening pain in the foot, with necrotic tissue noted on exam.       Diabetic foot ulcer, right heel  Possible sepsis  -chronic wound R heel, recent resulting cellulitis treated with Bactrim  -here for worse pain in the foot, found with ulcer worse with necrotic tissue  -MRI of the foot no osteomyelitis  -on Vanc & Zosyn  -s/p I and D on 02/16  -Care Mgr to see about financial/insurance issues which have been a barrier to followup  -home oxycodone, tylenol  -Nonweightbearing right foot unless otherwise instructed by podiatry  - ID consult. Per ID, they recommend to continue broad spectrum abx till intraoperative cultures are available, and also to start Daptomycin once Vanc levels < 15  Intraoperative cultures with proteus, Gp B Strep and staph ( awaiting sensitivities)  Will likely need a PICC line once this is finalized     DM Type , Uncontrolled   -Stopped all his home meds lately due to stomach upset while on Bactrim, then neglected to resume meds  -A1c 11.8- likely needs insulin. Historic A1c reviewed- never at goal >14 years  -Hold home metformin and glipizide for now while NPO  -Started Lantus 10 units nightly. Has been on insulin in the past and received education in hospital about it, but seems to have stopped on his own. Needs outpatient DM ed for ongoing titration.  Lantus increased to 14 units on 2/18     Tobacco abuse  -Patch  -Needs cessation counseling prior to discharge     Left distal radius fracture  -Fell recently  -Cast in place  -PT and OT evals  -Outpatient orthopedics follow-up     Normocytic anemia  -Mild with hemoglobin 10.8 now that better hydrated.  He has poor follow-up and doubt he has had  "colonoscopy  -Outpatient follow-up     Hypertension  -Previously was on carvedilol and losartan  -Losartan 25 mg oral daily    SCHUYLER:  At 1.37 on 2/17, increased to 1.48unclear cause. Baseline is 0.6-0.8  On a combination of broadd spectrum abx ( Vanc and Zosyn)  No evidence of dehydration. On maintenance IV fluids .  Check UA with no significant RBC's or casts   Repeat creatinine  on 2/18 stable/ mildly incrased  at 1.48 ( 48 hrs)  Continue to monitor.        History of stroke  -Had been taking Plavix and atorvastatin but recently stopped taking all of his medications as above.  -Neurology notes from last August reviewed, they had wanted him on dual antiplatelet therapy for 90 days followed by full dose aspirin alone.  Patient has instead been taking Plavix, unsure reasoning.  -Started full dose aspirin and resumed home atorvastatin    Obesity  -BMI 38 noted     Poor follow-up, noncompliance, lack of self-care  -Importance of compliance and follow-up needs to be emphasized.  Patient with relatively young age and already with serious comorbidities of chronic disease.  -Unsure if mood issues may be at play.       Diet: Moderate Consistent Carb (60 g CHO per Meal) Diet    DVT Prophylaxis: Enoxaparin (Lovenox) SQ  Cazares Catheter: Not present  Lines: None     Cardiac Monitoring: None  Code Status: Full Code      Clinically Significant Risk Factors              # Hypoalbuminemia: Lowest albumin = 2.2 g/dL at 2/18/2024  6:03 AM, will monitor as appropriate    # Acute Kidney Injury, unspecified: based on a >150% or 0.3 mg/dL increase in last creatinine compared to past 90 day average, will monitor renal function        # DMII: A1C = 11.8 % (Ref range: <5.7 %) within past 6 months   # Obesity: Estimated body mass index is 38.94 kg/m  as calculated from the following:    Height as of this encounter: 1.778 m (5' 10\").    Weight as of this encounter: 123.1 kg (271 lb 6.2 oz).        # Financial/Environmental Concerns: none     "     Disposition Plan     Expected Discharge Date: 02/19/2024    Discharge Delays: IV Medication - consider oral or Home Infusion  Procedure Pending (enter procedure & time in comments)  Destination: home with family              Camdenfadia Sheldon San MD  Hospitalist Service  [facility]  Securely message with [employer] (more info)  Text page via [employer] Paging/Directory   ______________________________________________________________________    Interval History   Charts were reviewed and patient examined.  Uneventful night.   Patient is understandably depressed and has a flat affect  Denies fever or chills  Eating and drinking well       Physical Exam   Vital Signs: Temp: 97.6  F (36.4  C) Temp src: Oral BP: (!) 152/92 Pulse: 104   Resp: 20 SpO2: 93 % O2 Device: None (Room air)    Weight: 271 lbs 6.18 oz    General Appearance: No distress noted  Respiratory: Good air entry bilaterally  Cardiovascular: S1 and S2 well heard  GI: Soft abdomen, no tenderness, normoactive bowel sound  Skin: Clean right foot dressing, left foot with significant callus but no obvious sign of infection      Medical Decision Making       40  MINUTES SPENT BY ME on the date of service doing chart review, history, exam, documentation & further activities per the note.      Data

## 2024-02-18 NOTE — PROGRESS NOTES
ID chart check    Continuing daptomycin and pip/tazo. Will clarify with podiatry, based on operative report it appears wound tracked down to bone, if bone was debrided would treat as osteomyelitis.     Gadiel Godfrey MD

## 2024-02-19 ENCOUNTER — APPOINTMENT (OUTPATIENT)
Dept: CT IMAGING | Facility: HOSPITAL | Age: 49
End: 2024-02-19
Attending: INTERNAL MEDICINE
Payer: MEDICAID

## 2024-02-19 ENCOUNTER — APPOINTMENT (OUTPATIENT)
Dept: OCCUPATIONAL THERAPY | Facility: HOSPITAL | Age: 49
End: 2024-02-19
Attending: PODIATRIST
Payer: MEDICAID

## 2024-02-19 LAB
ALBUMIN SERPL BCG-MCNC: 2.2 G/DL (ref 3.5–5.2)
ALP SERPL-CCNC: 74 U/L (ref 40–150)
ALT SERPL W P-5'-P-CCNC: 9 U/L (ref 0–70)
ANION GAP SERPL CALCULATED.3IONS-SCNC: 10 MMOL/L (ref 7–15)
AST SERPL W P-5'-P-CCNC: 10 U/L (ref 0–45)
BACTERIA BLD CULT: NO GROWTH
BACTERIA BLD CULT: NO GROWTH
BILIRUB SERPL-MCNC: 0.2 MG/DL
BUN SERPL-MCNC: 8.2 MG/DL (ref 6–20)
CALCIUM SERPL-MCNC: 8.6 MG/DL (ref 8.6–10)
CHLORIDE SERPL-SCNC: 105 MMOL/L (ref 98–107)
CREAT SERPL-MCNC: 1.42 MG/DL (ref 0.67–1.17)
DEPRECATED HCO3 PLAS-SCNC: 23 MMOL/L (ref 22–29)
EGFRCR SERPLBLD CKD-EPI 2021: 60 ML/MIN/1.73M2
ERYTHROCYTE [DISTWIDTH] IN BLOOD BY AUTOMATED COUNT: 12 % (ref 10–15)
GLUCOSE BLDC GLUCOMTR-MCNC: 170 MG/DL (ref 70–99)
GLUCOSE BLDC GLUCOMTR-MCNC: 176 MG/DL (ref 70–99)
GLUCOSE BLDC GLUCOMTR-MCNC: 179 MG/DL (ref 70–99)
GLUCOSE BLDC GLUCOMTR-MCNC: 215 MG/DL (ref 70–99)
GLUCOSE SERPL-MCNC: 173 MG/DL (ref 70–99)
HCT VFR BLD AUTO: 34.4 % (ref 40–53)
HGB BLD-MCNC: 11 G/DL (ref 13.3–17.7)
LIPASE SERPL-CCNC: 14 U/L (ref 13–60)
MCH RBC QN AUTO: 29.4 PG (ref 26.5–33)
MCHC RBC AUTO-ENTMCNC: 32 G/DL (ref 31.5–36.5)
MCV RBC AUTO: 92 FL (ref 78–100)
NT-PROBNP SERPL-MCNC: ABNORMAL PG/ML (ref 0–900)
PLATELET # BLD AUTO: 400 10E3/UL (ref 150–450)
POTASSIUM SERPL-SCNC: 3.8 MMOL/L (ref 3.4–5.3)
PROT SERPL-MCNC: 6.2 G/DL (ref 6.4–8.3)
RBC # BLD AUTO: 3.74 10E6/UL (ref 4.4–5.9)
SODIUM SERPL-SCNC: 138 MMOL/L (ref 135–145)
WBC # BLD AUTO: 12.4 10E3/UL (ref 4–11)

## 2024-02-19 PROCEDURE — 250N000011 HC RX IP 250 OP 636: Performed by: INTERNAL MEDICINE

## 2024-02-19 PROCEDURE — 83880 ASSAY OF NATRIURETIC PEPTIDE: CPT | Performed by: INTERNAL MEDICINE

## 2024-02-19 PROCEDURE — 36415 COLL VENOUS BLD VENIPUNCTURE: CPT | Performed by: INTERNAL MEDICINE

## 2024-02-19 PROCEDURE — F08L5YZ WOUND MANAGEMENT TREATMENT OF MUSCULOSKELETAL SYSTEM - LOWER BACK / LOWER EXTREMITY USING OTHER EQUIPMENT: ICD-10-PCS | Performed by: PODIATRIST

## 2024-02-19 PROCEDURE — 0JBQ0ZZ EXCISION OF RIGHT FOOT SUBCUTANEOUS TISSUE AND FASCIA, OPEN APPROACH: ICD-10-PCS | Performed by: PODIATRIST

## 2024-02-19 PROCEDURE — G0463 HOSPITAL OUTPT CLINIC VISIT: HCPCS | Mod: 25

## 2024-02-19 PROCEDURE — C9113 INJ PANTOPRAZOLE SODIUM, VIA: HCPCS | Performed by: INTERNAL MEDICINE

## 2024-02-19 PROCEDURE — 99232 SBSQ HOSP IP/OBS MODERATE 35: CPT

## 2024-02-19 PROCEDURE — 83690 ASSAY OF LIPASE: CPT | Performed by: INTERNAL MEDICINE

## 2024-02-19 PROCEDURE — 97606 NEG PRS WND THER DME>50 SQCM: CPT

## 2024-02-19 PROCEDURE — 97165 OT EVAL LOW COMPLEX 30 MIN: CPT | Mod: GO

## 2024-02-19 PROCEDURE — 258N000003 HC RX IP 258 OP 636: Performed by: INTERNAL MEDICINE

## 2024-02-19 PROCEDURE — 85027 COMPLETE CBC AUTOMATED: CPT | Performed by: INTERNAL MEDICINE

## 2024-02-19 PROCEDURE — 74176 CT ABD & PELVIS W/O CONTRAST: CPT

## 2024-02-19 PROCEDURE — 80053 COMPREHEN METABOLIC PANEL: CPT | Performed by: INTERNAL MEDICINE

## 2024-02-19 PROCEDURE — 250N000013 HC RX MED GY IP 250 OP 250 PS 637: Performed by: PODIATRIST

## 2024-02-19 PROCEDURE — 250N000011 HC RX IP 250 OP 636: Performed by: PODIATRIST

## 2024-02-19 PROCEDURE — 99233 SBSQ HOSP IP/OBS HIGH 50: CPT | Performed by: INTERNAL MEDICINE

## 2024-02-19 PROCEDURE — 120N000001 HC R&B MED SURG/OB

## 2024-02-19 PROCEDURE — 250N000013 HC RX MED GY IP 250 OP 250 PS 637: Performed by: INTERNAL MEDICINE

## 2024-02-19 PROCEDURE — 250N000011 HC RX IP 250 OP 636: Mod: JZ | Performed by: INTERNAL MEDICINE

## 2024-02-19 RX ORDER — PANTOPRAZOLE SODIUM 40 MG/1
40 TABLET, DELAYED RELEASE ORAL
Status: DISCONTINUED | OUTPATIENT
Start: 2024-02-20 | End: 2024-03-05 | Stop reason: HOSPADM

## 2024-02-19 RX ORDER — FUROSEMIDE 10 MG/ML
20 INJECTION INTRAMUSCULAR; INTRAVENOUS ONCE
Status: DISCONTINUED | OUTPATIENT
Start: 2024-02-19 | End: 2024-02-19

## 2024-02-19 RX ORDER — CARVEDILOL 3.12 MG/1
6.25 TABLET ORAL 2 TIMES DAILY WITH MEALS
Status: DISCONTINUED | OUTPATIENT
Start: 2024-02-19 | End: 2024-02-19

## 2024-02-19 RX ORDER — HYDRALAZINE HYDROCHLORIDE 10 MG/1
10 TABLET, FILM COATED ORAL EVERY 6 HOURS PRN
Status: DISCONTINUED | OUTPATIENT
Start: 2024-02-19 | End: 2024-03-03

## 2024-02-19 RX ORDER — CARVEDILOL 12.5 MG/1
12.5 TABLET ORAL 2 TIMES DAILY WITH MEALS
Status: DISCONTINUED | OUTPATIENT
Start: 2024-02-19 | End: 2024-02-24

## 2024-02-19 RX ORDER — METOCLOPRAMIDE 5 MG/1
5 TABLET ORAL
Status: COMPLETED | OUTPATIENT
Start: 2024-02-19 | End: 2024-02-21

## 2024-02-19 RX ORDER — HYDRALAZINE HYDROCHLORIDE 20 MG/ML
10 INJECTION INTRAMUSCULAR; INTRAVENOUS EVERY 6 HOURS PRN
Status: DISCONTINUED | OUTPATIENT
Start: 2024-02-19 | End: 2024-03-03

## 2024-02-19 RX ORDER — FUROSEMIDE 10 MG/ML
40 INJECTION INTRAMUSCULAR; INTRAVENOUS ONCE
Status: COMPLETED | OUTPATIENT
Start: 2024-02-19 | End: 2024-02-19

## 2024-02-19 RX ORDER — SIMETHICONE 80 MG
80 TABLET,CHEWABLE ORAL EVERY 6 HOURS PRN
Status: DISCONTINUED | OUTPATIENT
Start: 2024-02-19 | End: 2024-03-05 | Stop reason: HOSPADM

## 2024-02-19 RX ADMIN — PIPERACILLIN AND TAZOBACTAM 3.38 G: 3; .375 INJECTION, POWDER, FOR SOLUTION INTRAVENOUS at 00:04

## 2024-02-19 RX ADMIN — CALCIUM CARBONATE (ANTACID) CHEW TAB 500 MG 1000 MG: 500 CHEW TAB at 00:02

## 2024-02-19 RX ADMIN — FUROSEMIDE 40 MG: 10 INJECTION, SOLUTION INTRAMUSCULAR; INTRAVENOUS at 17:10

## 2024-02-19 RX ADMIN — ACETAMINOPHEN 650 MG: 325 TABLET ORAL at 09:09

## 2024-02-19 RX ADMIN — CALCIUM CARBONATE (ANTACID) CHEW TAB 500 MG 1000 MG: 500 CHEW TAB at 13:48

## 2024-02-19 RX ADMIN — WHITE PETROLATUM: 1.75 OINTMENT TOPICAL at 09:10

## 2024-02-19 RX ADMIN — CARVEDILOL 6.25 MG: 3.12 TABLET, FILM COATED ORAL at 14:03

## 2024-02-19 RX ADMIN — OXYCODONE HYDROCHLORIDE 5 MG: 5 TABLET ORAL at 01:15

## 2024-02-19 RX ADMIN — METOCLOPRAMIDE 5 MG: 5 TABLET ORAL at 17:31

## 2024-02-19 RX ADMIN — OXYCODONE HYDROCHLORIDE 5 MG: 5 TABLET ORAL at 09:09

## 2024-02-19 RX ADMIN — PIPERACILLIN AND TAZOBACTAM 3.38 G: 3; .375 INJECTION, POWDER, FOR SOLUTION INTRAVENOUS at 16:08

## 2024-02-19 RX ADMIN — ENOXAPARIN SODIUM 40 MG: 40 INJECTION SUBCUTANEOUS at 17:11

## 2024-02-19 RX ADMIN — NICOTINE 1 PATCH: 14 PATCH, EXTENDED RELEASE TRANSDERMAL at 13:58

## 2024-02-19 RX ADMIN — DAPTOMYCIN 750 MG: 500 INJECTION, POWDER, LYOPHILIZED, FOR SOLUTION INTRAVENOUS at 16:08

## 2024-02-19 RX ADMIN — PIPERACILLIN AND TAZOBACTAM 3.38 G: 3; .375 INJECTION, POWDER, FOR SOLUTION INTRAVENOUS at 09:08

## 2024-02-19 RX ADMIN — PANTOPRAZOLE SODIUM 40 MG: 40 INJECTION, POWDER, FOR SOLUTION INTRAVENOUS at 17:10

## 2024-02-19 RX ADMIN — ASPIRIN 325 MG: 325 TABLET, COATED ORAL at 09:09

## 2024-02-19 RX ADMIN — CARVEDILOL 12.5 MG: 12.5 TABLET, FILM COATED ORAL at 21:13

## 2024-02-19 RX ADMIN — WHITE PETROLATUM: 1.75 OINTMENT TOPICAL at 21:13

## 2024-02-19 ASSESSMENT — ACTIVITIES OF DAILY LIVING (ADL)
IADL_COMMENTS: INDEPENDENT AT BASELINE
ADLS_ACUITY_SCORE: 33
ADLS_ACUITY_SCORE: 28
ADLS_ACUITY_SCORE: 33
ADLS_ACUITY_SCORE: 28
ADLS_ACUITY_SCORE: 29
ADLS_ACUITY_SCORE: 33
ADLS_ACUITY_SCORE: 29
ADLS_ACUITY_SCORE: 33
ADLS_ACUITY_SCORE: 28
ADLS_ACUITY_SCORE: 28

## 2024-02-19 NOTE — CONSULTS
[facility]  WO Nurse Inpatient Assessment     Consulted for: wound vac to right heel    Summary: 2/19 Patient in bed, going between flat affect to anger.  Did listen to wound vac teaching and the rationale for its use.  Is concerned with finances and how to care for it once out of hospital.    Patient History (according to provider note(s):      Floyd Capps is a 48 year old male with history of DM, HTN, CVA, obesity, recent ED visit for nonhealing diabetic foot ulcer with cellulitis, here for worsening pain in the foot, with necrotic tissue noted on exam.     Assessment:      Areas visualized during today's visit:  right foot    Negative pressure wound therapy applied to: right heel       Last photo: 2/19  Wound due to: Diabetic Ulcer   Wound history/plan of care:    Surgical date: 2/16   Service following: podiatry  Date Negative Pressure Wound Therapy initiated: 2/19   Interventions in place: offloading and elevation  Is patient s nutritional status compromised? no   If yes, what interventions are in place? N/A  Reason for initiating vac therapy? Presence of co-morbidities, High risk of infections, and Need for accelerated granulation tissue  Which?of?the?following?co-morbidities?apply? Diabetes, Obesity, and Smoking  If diabetic is patient on a diabetic management program? Yes   Is osteomyelitis present in wound? no   If yes what treatments are in place? N/A    Wound base: 70 % non-granular tissue, 30 % bone      Palpation of the wound bed: firm       Drainage: none      Volume in cannister: na     Last cannister change date: new on 2/19     Description of drainage: none      Measurements (length x width x depth, in cm) 8 cm  x 7 cm  x  3.5 cm       Tunneling N/A      Undermining N/A   Periwound skin: Intact       Color: pale and pink       Temperature: normal    Odor: none   Pain: mild, throbbing   Pain intervention prior to dressing change: patient tolerated well, oral oxycodone, and  "slow and gentle cares   Treatment goal: Heal  and Protection  STATUS: initial assessment   Supplies ordered: gathered, ordered vashe, pump supplies, and supplies stored on unit    Number of foam pieces removed from a wound (excluding foam for bridge) :  na    Verified this matched the number of foam pieces applied last dressing change: N/A   Number of foam pieces packed into wound (excluding foam for bridge) :  1 Sarah Black       Treatment Plan:     Negative pressure wound therapy plan:  Wound location: right heel   Change Days:  M/Th  by WOC RN    Supplies (including all accessories) used: medium Black foam   Cleanse with Vashe prior to replacing NPWT  Suction setting: -125   Methods used: Window paned all periwound skin with vac drape prior to applying sponge and Bridged trac pad off bony prominences    Staff RN to assess integrity of dressing and ensure suction is set at appropriate level every shift.   Date canister. Chart canister output every shift. Change cannister weekly and PRN if full/occluded     Remove foam dressing and replace with BID normal saline moist gauze dressing if:   -a dressing failure which cannot be repaired within 2 hours   -patient is discharging to home without a home pump   -patient is discharging to a facility outside the local area   -if a dressing is a \"Silver Foam\", remove before Radiation Therapy or MRI     The hospital VAC pump is not to be discharged with the patient.?Ensure to disconnect patient from machine prior to discharge. Then,    - If a home KCI VAC pump has been delivered, connect home cannister to dressing tubing then connect cannister to home pump and turn on machine    - If transferring to a nearby facility with a KCI vac, can disconnect and clamp tubing then cover end with glove so can be reconnected within 2 hours       Orders: Written    RECOMMEND PRIMARY TEAM ORDER: None, at this time  Education provided: plan of care, Infection prevention , and Off-loading " pressure  Discussed plan of care with: Patient  WO nurse follow-up plan:  M/Th  Notify WOC if wound(s) deteriorate.  Nursing to notify the Provider(s) and re-consult the WO Nurse if new skin concern.    DATA:     Current support surface: Standard  Standard gel/foam mattress (IsoFlex, Atmos air, etc)  Containment of urine/stool: Continent of bladder and Continent of bowel  BMI: Body mass index is 38.94 kg/m .   Active diet order: Orders Placed This Encounter      Moderate Consistent Carb (60 g CHO per Meal) Diet     Output: I/O last 3 completed shifts:  In: 518 [P.O.:450; I.V.:68]  Out: 1150 [Urine:1150]     Labs:   Recent Labs   Lab 02/19/24  0547 02/15/24  0544 02/14/24  0935   ALBUMIN 2.2*   < >  --    HGB 11.0*   < > 11.7*   WBC 12.4*   < > 14.4*   A1C  --   --  11.8*    < > = values in this interval not displayed.     Pressure injury risk assessment:   Sensory Perception: 3-->slightly limited  Moisture: 3-->occasionally moist  Activity: 2-->chairfast  Mobility: 3-->slightly limited  Nutrition: 3-->adequate  Friction and Shear: 2-->potential problem  Rolo Score: 16    BHARATHI Gregory RN CWON  Pager no longer in use, please contact through [employer] group: [facility] [employer]

## 2024-02-19 NOTE — PROGRESS NOTES
"Care Management Follow Up    Length of Stay (days): 5    Expected Discharge Date: 02/19/2024     Concerns to be Addressed:  discharge planning      Patient plan of care discussed at interdisciplinary rounds: Yes    Anticipated Discharge Disposition:       Anticipated Discharge Services:    Education Provided on the Discharge Plan:  Per Care Team   Patient/Family in Agreement with the Plan:  Yes    Referrals Placed by CM/SW:    Private pay costs discussed: Not applicable    Additional Information:  Chart reviewed    Cm updates:  Therapy recs TCU for pt, pt would like to go to TCU, but pt is working on verifying insurance.       Pt was given resources on how to file for disability this weekend see note 2/17 and directed to follow up with PCP in regards to .       Per account notes FSW Darleen Lyons is working with pt, and she tried to connect with pt 2/16, pt not avail.\" Per note , \"Pt needs to connect with [facility] to verify JJ and if anything else is needed. \"        Social Hx:  \"\"Lives in mother's apartment, she has recently passed. No svcs and working on selecting insurance. 2/16 DM ulcer R heel detriment. Transport TBD.\"         Cm will continue to follow plan of care, review recommendations, and assist with any discharge needs anticipated.        Darleen Bansal RN      "

## 2024-02-19 NOTE — PROGRESS NOTES
"   02/19/24 8555   Appointment Info   Signing Clinician's Name / Credentials (OT) Judith Melodie, OTR/L   Living Environment   People in Home alone   Current Living Arrangements apartment   Home Accessibility no concerns;wheelchair accessible   Transportation Anticipated family or friend will provide   Living Environment Comments Pt used to live with and care for his mother, she passed away over the weekend.   Self-Care   Fall history within last six months yes   Number of times patient has fallen within last six months 1   Activity/Exercise/Self-Care Comment Independent at baseline   Instrumental Activities of Daily Living (IADL)   IADL Comments Independent at baseline   General Information   Onset of Illness/Injury or Date of Surgery 02/14/24   Referring Physician Cristofer Sims DPM   Patient/Family Therapy Goal Statement (OT) none stated   Additional Occupational Profile Info/Pertinent History of Current Problem Per chart review, pt \"is a 48 year old male with history of DM, HTN, CVA, obesity, recent ED visit for nonhealing diabetic foot ulcer with cellulitis, here for worsening pain in the foot, with necrotic tissue noted on exam.\"   Existing Precautions/Restrictions fall;weight bearing  (wound vac RLE)   Limitations/Impairments safety/cognitive   Left Upper Extremity (Weight-bearing Status) non weight-bearing (NWB)   Right Lower Extremity (Weight-bearing Status) non weight-bearing (NWB)   Cognitive Status Examination   Cognitive Status Comments Pt impulsive, decreased safety awareness, easily agitated and does not respond well to cueing to improve safety techniques.   Pain Assessment   Patient Currently in Pain No   Range of Motion Comprehensive   General Range of Motion no range of motion deficits identified   Strength Comprehensive (MMT)   General Manual Muscle Testing (MMT) Assessment no strength deficits identified   Bed Mobility   Bed Mobility supine-sit   Supine-Sit [place] (Bed Mobility) contact " guard;verbal cues   Assistive Device (Bed Mobility) bed rails   Transfers   Transfers sit-stand transfer;bed-chair transfer   Transfer Skill: Bed to Chair/Chair to Bed   Bed-Chair [place] (Transfers) minimum assist (75% patient effort)   Assistive Device (Bed-Chair Transfers) other (see comments)  (rolling walker with LUE platform attachment)   Transfer Comments decreased safety, impulsive   Sit-Stand Transfer   Sit-Stand [place] (Transfers) minimum assist (75% patient effort)   Assistive Device (Sit-Stand Transfers) other (see comments);walker, rolling platform  (rolling walker with LUE platform attachment)   Balance   Balance Comments Min A needed to maintain standing balance and balance with transfers while adhering to NWB precautions   Activities of Daily Living   BADL Assessment/Intervention upper body dressing;toileting   Upper Body Dressing Assessment/Training   Position (Upper Body Dressing) supine   [place] Level (Upper Body Dressing) minimum assist (75% patient effort);set up   Toileting   Comment, (Toileting) Recommend Ax1 pivot transfer to [facility] for toileting safety at this time.   Clinical Impression   Criteria for Skilled Therapeutic Interventions Met (OT) Yes, treatment indicated   OT Diagnosis Impaired ability to perform ADLs, IADLs, and functional mobility.   Influenced by the following impairments s/p R foot I&D   OT Problem List-Impairments impacting ADL problems related to;activity tolerance impaired;balance;cognition;mobility;post-surgical precautions;postural control   Assessment of Occupational Performance 1-3 Performance Deficits   Identified Performance Deficits toileting, dressing   Planned Therapy Interventions (OT) ADL retraining;IADL retraining;balance training;bed mobility training;cognition;strengthening;transfer training;home program guidelines;progressive activity/exercise;risk factor education   Clinical Decision Making Complexity (OT) problem focused assessment/low  complexity   Risk & Benefits of therapy have been explained evaluation/treatment results reviewed;care plan/treatment goals reviewed;risks/benefits reviewed;current/potential barriers reviewed;participants voiced agreement with care plan;participants included;patient   OT Total Evaluation Time   OT Quinn Low Complexity Minutes (67613) 11   OT Goals   Therapy Frequency (OT) 4 times/week   OT Predicted Duration/Target Date for Goal Attainment 02/26/24   OT Goals Upper Body Dressing;Toilet Transfer/Toileting   OT: Upper Body Dressing Modified independent;within precautions   OT: Toilet Transfer/Toileting Supervision/stand-by assist;toilet transfer;cleaning and garment management;using adaptive equipment;within precautions   OT Discharge Planning   OT Plan NWB RLE and LUE, close transfers, standing erica NWB RLE, toileting on commode   OT Discharge Recommendation (DC Rec) Transitional Care Facility   OT Rationale for DC Rec Pt lives alone and requires significant assist for ADLs and mobility d/t NWB precautions.   OT Brief overview of current status Min A close transfers, decreased safety   Total Session Time   Total Session Time (sum of timed and untimed services) 11

## 2024-02-19 NOTE — PROGRESS NOTES
"INFECTIOUS DISEASE FOLLOW UP NOTE      ASSESSMENT:  Floyd Capps is a 48 year old old male with   Poorly controlled type 2 diabetes mellitus with last A1c of 11.8 on 2/14/2024.  Morbid obesity with BMI of 38.94.  Active tobacco use.  Diabetic foot infection status post debridement on 2/16/2024.   {Per op note debridement was \"down to bone\" Surgical cultures polymicrobial Superficial cultures with Proteus vulgaris, Pseudomonas aeruginosa, Streptococcus agalactiae, Enterococcus faecalis, and MRSA.  SCHUYLER.  Mother passed away       PLAN:  Continue Zosyn and stop vancomycin.  Start daptomycin once vancomycin level is <15    Message sent to Dr Sims requesting to clarify if bone is involved.     ROSSY FLORES MD   [facility]  [phone] clinic  [facility] paging    ______________________________________________________________________    SUBJECTIVE / INTERVAL HISTORY: complains of RUQ \"pressure\"    ROS: All other systems negative except as listed above.        OBJECTIVE:  BP (!) 156/93 (BP Location: Left arm)   Pulse 101   Temp 98.2  F (36.8  C) (Oral)   Resp 20   Ht 1.778 m (5' 10\")   Wt 123.1 kg (271 lb 6.2 oz)   SpO2 94%   BMI 38.94 kg/m                GEN: sleeping when I entered, no distress.  RESPIRATORY:  Normal breathing pattern. Clear to auscultation  CARDIOVASCULAR:  Regular rate and rhythm. Normal S1 and S2. No murmur  ABDOMEN:  Soft, normal bowel sounds, non-tender  EXTREMITIES: R ankle wrapped, drainage on dressing at heel.  SKIN/HAIR/NAILS:  No rashes  IV: peripheral        Antibiotics:  Pip/tazo 2/14-  Vancomycin 2/14-16  Daptomycin 2/16 -     Pertinent labs:    Recent Labs   Lab 02/19/24  0547 02/18/24  0603 02/17/24  0552   WBC 12.4* 13.8* 14.8*   HGB 11.0* 11.1* 10.7*   HCT 34.4* 34.8* 33.2*    384 377        Recent Labs   Lab 02/19/24  0547 02/18/24  0603 02/17/24  0551    137 136   CO2 23 25 23   BUN 8.2 9.4 9.2     Creatinine   Date Value Ref Range " Statement Selected Status   02/19/2024 1.42 (H) 0.67 - 1.17 mg/dL Final     Liver Function Studies -   Recent Labs   Lab Test 02/19/24  0547   PROTTOTAL 6.2*   ALBUMIN 2.2*   BILITOTAL 0.2   ALKPHOS 74   AST 10   ALT 9         MICROBIOLOGY DATA:  7-Day Micro Results       Collected Updated Procedure Result Status      02/16/2024 1119 02/18/2024 1247 Anaerobic Bacterial Culture Routine [[id number]]   (Abnormal)   Wound from Foot, Right    Preliminary result Component Value   Culture 2+ Finegoldia magna  [P]     Susceptibilities not routinely done, refer to antibiogram to view typical susceptibility profiles               02/16/2024 1119 02/16/2024 1538 Gram Stain [[id number]]   Wound from Foot, Right    Final result Component Value   Gram Stain Result No organisms seen   Gram Stain Result No white blood cells seen            02/16/2024 1119 02/19/2024 0920 Wound Aerobic Bacterial Culture Routine [[id number]]    (Abnormal)   Wound from Foot, Right    Preliminary result Component Value   Culture 1+ Proteus hauseri  [P]     2+ Streptococcus agalactiae (Group B Streptococcus)  [P]     This organism is susceptible to ampicillin, penicillin, vancomycin and the cephalosporins. If treatment is required and your patient is allergic to penicillin, contact the microbiology lab within 5 days to request susceptibility testing.    1+ Staphylococcus aureus  [P]     1+ Normal melody  [P]         Susceptibility        Proteus hauseri      SAILAJA (Preliminary)      Amikacin <=2 ug/mL Susceptible  [*]       Ampicillin >=32 ug/mL Resistant      Cefoxitin 8 ug/mL Susceptible  [*]       Ciprofloxacin <=0.25 ug/mL Susceptible      Gentamicin <=1 ug/mL Susceptible      Levofloxacin <=0.12 ug/mL Susceptible      Meropenem 1 ug/mL Susceptible      Nitrofurantoin 128 ug/mL Resistant  [*,1]       Piperacillin/Tazobactam <=4 ug/mL Susceptible      Tobramycin <=1 ug/mL Susceptible      Trimethoprim/Sulfamethoxazole <=1/19 ug/mL Susceptible                   [*]   Suppressed Antibiotic     [1]  Intrinsically Resistant              Susceptibility Comments       Proteus hauseri    Additional susceptibilities in progress.               02/14/2024 0958 02/19/2024 1302 Blood Culture Peripheral Blood [[id number]]   Peripheral Blood    Final result Component Value   Culture No Growth               02/14/2024 0958 02/18/2024 0817 Wound Aerobic Bacterial Culture Routine With Gram Stain [[id number]]    (Abnormal)   Wound from Heel, Right    Final result Component Value   Culture 3+ Proteus vulgaris    2+ Pseudomonas aeruginosa    3+ Streptococcus agalactiae (Group B Streptococcus)    This organism is susceptible to ampicillin, penicillin, vancomycin and the cephalosporins. If treatment is required and your patient is allergic to penicillin, contact the microbiology lab within 5 days to request susceptibility testing.    3+ Enterococcus faecalis    1+ Staphylococcus aureus MRSA    2+ Normal melody   Gram Stain Result 3+ Gram positive cocci    3+ Gram negative bacilli    No white blood cells seen        Susceptibility        Proteus vulgaris      SAILAJA      Amikacin <=2 ug/mL Susceptible  [*]       Ampicillin >=32 ug/mL Resistant      Ampicillin/ Sulbactam 16 ug/mL Intermediate      Cefepime <=0.25 ug/mL Susceptible      Cefoxitin 8 ug/mL Susceptible      Ceftazidime <=0.5 ug/mL Susceptible      Ceftriaxone 32 ug/mL Resistant      Ciprofloxacin <=0.25 ug/mL Susceptible      Extended Spectrum Beta-Lactamase Negative ug/mL ESBL Negative  [*]       Gentamicin <=1 ug/mL Susceptible      Levofloxacin <=0.12 ug/mL Susceptible      Meropenem 0.094 ug/mL Susceptible      Nitrofurantoin 128 ug/mL Resistant  [*,1]       Piperacillin/Tazobactam <=4 ug/mL Susceptible      Tobramycin <=1 ug/mL Susceptible      Trimethoprim/Sulfamethoxazole <=1/19 ug/mL Susceptible                   [*]  Suppressed Antibiotic     [1]  Intrinsically Resistant              Susceptibility        Pseudomonas aeruginosa       SAILAJA      Amikacin 4 ug/mL Susceptible      Cefepime 2 ug/mL Susceptible      Ceftazidime 4 ug/mL Susceptible      Ciprofloxacin >=4 ug/mL Resistant      Gentamicin <=1 ug/mL Susceptible      Levofloxacin >=8 ug/mL Resistant      Meropenem 4 ug/mL Intermediate      Piperacillin/Tazobactam 16 ug/mL Susceptible      Tobramycin 2 ug/mL Susceptible                      Susceptibility        Enterococcus faecalis      SAILAJA      Ampicillin <=2 ug/mL Susceptible      Ciprofloxacin <=0.5 ug/mL Susceptible  [*]       Daptomycin 2 ug/mL Susceptible  [*]       Doxycycline >=16 ug/mL Resistant  [*]       Gentamicin Synergy Resistant ug/mL Resistant  [1]       Levofloxacin 0.5 ug/mL Susceptible  [*]       Linezolid 2 ug/mL Susceptible  [*]       Nitrofurantoin <=16 ug/mL Susceptible  [*]       Streptomycin Synergy Susceptible ug/mL Susceptible  [*]       Tigecycline <=0.12 ug/mL Susceptible  [*]       Vancomycin 1 ug/mL Susceptible                   [*]  Suppressed Antibiotic     [1]  High level gentamicin resistance was found, and this is predictive of resistance to tobramycin and amikacin.              Susceptibility        Staphylococcus aureus MRSA      SAILAJA      Ciprofloxacin >=8 ug/mL Resistant  [*]       Clindamycin >=4 ug/mL Resistant      Daptomycin 0.25 ug/mL Susceptible      Doxycycline 8 ug/mL Intermediate      Erythromycin >=8 ug/mL Resistant      Gentamicin <=0.5 ug/mL Susceptible      Inducible macrolide resistance test Negative ug/mL Negative  [*]       Levofloxacin 4 ug/mL Resistant  [*]       Linezolid 2 ug/mL Susceptible      Nitrofurantoin <=16 ug/mL Susceptible  [*]       Oxacillin >=4 ug/mL Resistant  [1]       Rifampin <=0.5 ug/mL Susceptible  [*]       Tetracycline >=16 ug/mL Resistant      Tigecycline <=0.12 ug/mL Susceptible  [*]       Trimethoprim/Sulfamethoxazole >16/304 ug/mL Resistant      Vancomycin 1 ug/mL Susceptible                   [*]  Suppressed Antibiotic     [1]  Oxacillin susceptible  "isolates are susceptible to cephalosporins (example: cefazolin and cephalexin) and beta lactam combination agents. Oxacillin resistant isolates are resistant to these agents.              Susceptibility Comments       Proteus vulgaris    This isolate does not meet the criteria of an ESBL . A different resistance mechanism may be present.      Staphylococcus aureus MRSA    MRSA requires contact precautions.               02/14/2024 0935 02/19/2024 1302 Blood Culture Peripheral Blood [[id number]]   Peripheral Blood    Final result Component Value   Culture No Growth                         RADIOLOGY:  POC US Guidance Needle Placement    Result Date: 2/16/2024  Ultrasound was performed as guidance to an anesthesia procedure.  Click \"PACS images\" hyperlink below to view any stored images.  For specific procedure details, view procedure note authored by anesthesia.    MR Foot Right w/o & w Contrast    Result Date: 2/14/2024  EXAM: MR FOOT RIGHT W/O and W CONTRAST LOCATION: [facility] DATE: 2/14/2024 INDICATION: Ulceration, erythema, infection. COMPARISON: None. TECHNIQUE: Routine. Additional postgadolinium T1 sequences were obtained. IV CONTRAST: 12 mL Gadavist. FINDINGS: JOINTS AND BONES: -No evidence for fracture. There is some reactive signal abnormality within the posterior calcaneus but no T1 marrow signal change and this is unlikely to represent osteomyelitis. No significant effusion to suggest septic arthropathy. There is degenerative change at the calcaneocuboid articulation and tibiotalar joint. TENDONS: -The peroneal tendons are negative. The flexor tendons are negative for tendinopathy, tenosynovitis, or tearing. The extensor tendons are negative. LIGAMENTS: -The anterior and posterior talofibular ligaments are negative. The deltoid ligamentous complex is intact. The calcaneofibular ligament is negative. The ligament of Lisfranc is intact. MUSCLES AND SOFT TISSUES: -There is " a soft tissue ulceration along the posteroinferior aspect of the hindfoot but this is fairly superficial. There is some surrounding edema or potential cellulitis. There is reactive edema versus infectious or inflammatory myositis within the plantar and interosseous musculature. No evidence for abscess. Mild plantar fasciitis.     IMPRESSION: 1.  Soft tissue ulceration along the posteroinferior aspect of the hindfoot. This is fairly superficial. There is some surrounding edema or low-grade cellulitis. 2.  No evidence for osteomyelitis, septic arthropathy, or abscess. 3.  Reactive edema versus infectious or inflammatory myositis within the plantar and interosseous musculature. 4.  No evidence for fracture. 5.  No significant tendinous or ligamentous pathology.    XR Wrist Left G/E 3 Views    Result Date: 2/13/2024  EXAM: XR WRIST LEFT G/E 3 VIEWS LOCATION: [facility] DATE: 2/13/2024 INDICATION:  Other closed intra-articular fracture of distal end of left radius, initial encounter COMPARISON: 02/06/2024     IMPRESSION: Distal radial intra-articular fracture with similar alignment. No definite callus formation. Otherwise unchanged.    XR Wrist Left G/E 3 Views    Result Date: 2/6/2024  EXAM: XR WRIST LEFT G/E 3 VIEWS LOCATION: [facility] DATE: 2/6/2024 INDICATION: fall, left wrist pain COMPARISON: None.     IMPRESSION: There is a nondisplaced, longitudinally oriented fracture of the distal left radius. There is intra-articular extension. There is slight buckling of the dorsal radial cortex on the lateral view. The ulna is intact. Soft tissue swelling.    US Lower Extremity Venous Duplex Right    Result Date: 1/8/2024  EXAM: US LOWER EXTREMITY VENOUS DUPLEX RIGHT LOCATION: [facility] DATE: 1/8/2024 INDICATION: pain swelling eval dvt COMPARISON: None. TECHNIQUE: Venous Duplex ultrasound of the right lower extremity with and without compression,  augmentation and duplex. Color flow and spectral Doppler with waveform analysis performed. FINDINGS: Linear and curved transducers used due to body habitus. Exam includes the common femoral, femoral, popliteal, and contralateral common femoral veins as well as segmentally visualized deep calf veins and greater saphenous vein. RIGHT: No deep vein thrombosis. No superficial thrombophlebitis. No popliteal cyst. There is a enlarged right inguinal node measuring 4 x 2 x 1.3 cm.     IMPRESSION: 1.  No deep venous thrombosis in the right leg. 2.  Enlarged right inguinal node. This may be reactive if there are inflammatory changes in the right leg. 3.  If findings are discordant, consider a follow-up exam with possible FNA in 4-6 weeks.

## 2024-02-19 NOTE — PROGRESS NOTES
[facility]    Medicine Progress Note - Hospitalist Service    Date of Admission:  2/14/2024    Assessment & Plan   Floyd Capps is a 48 year old male with history of DM, HTN, CVA, obesity, recent ED visit for nonhealing diabetic foot ulcer with cellulitis, here for worsening pain in the foot, with necrotic tissue noted on exam.  Patient admitted on 2/14/2024.      Diabetic foot ulcer, right heel;  -chronic wound R heel, recent resulting cellulitis treated with Bactrim  -here for worse pain in the foot, found with ulcer worse with necrotic tissue  -MRI of the foot no osteomyelitis  -On 2/16, s/p debridement with irrigation of right foot diabetic ulcer. NWB on right foot per podiatrist  -On 2/19, wound VAC  -Surgical cultures polymicrobials.  Initially IV Vanco and IV Zosyn, now changed to IV Vanco and daptomycin.  Appreciated ID input  -Care Mgr to see about financial/insurance issues which have been a barrier to followup  -Pain management with Tylenol, home oxycodone.     DM Type II, Uncontrolled;  -Stopped all his home meds lately due to stomach upset while on Bactrim, then neglected to resume meds. Hold home metformin and glipizide for now.  -A1c 11.8- likely needs insulin.    -Started Lantus, increase to 20 unit at night. Of note, patient was on insulin in the past and received education in the hospital about it, but seems to have stopped on its own.  Patient needs outpatient diabetic educator for ongoing titration  -Insulin sliding scale and hypoglycemic protocol  -Given poor appetite and already satiety, trial of Reglan.  Also added PPI    SCHUYLER: ? Cause, ? CHF  - Baseline is 0.6-0.8  -On a combination of broad spectrum abx ( Vanc and Zosyn)  -No evidence of dehydration.   -Check UA with no significant RBC's or casts   -CT imaging reported anasarca, IV diuretics as ordered, if creatinine function get worse then will get nephrology consult    Shortness of breath, likely due to volume  overload;  CT imaging reported moderate bilateral pleural effusion and diffuse anasarca;  -- Check BNP, echo on 8/2023 with EF 55%, given patient noncompliance with medications, recheck echocardiogram  --Ordered IV Lasix x 1, reassess in a.m.  --Currently on room air     History of stroke in 8/2023;  -Had been taking Plavix and atorvastatin but recently stopped taking all of his medications as above.  -Neurologist note from 8/26/2023 reviewed- at that time recommended DAPT with aspirin and Plavix for 90 days, afterwards switch to enteric-coated aspirin 325 mg daily.  -Started full dose aspirin and resumed home atorvastatin    Essential hypertension, uncontrolled; due to noncompliance  -- Discharge summary from 8/26/2023 reviewed, patient was discharged home on Coreg 12.5 mg twice daily, HCTZ 12.5 mg daily, losartan 100 mg daily  --On 2/19 restarted Coreg 12.5 mg twice daily.  As needed hydralazine.  Monitor vitals and adjust medications accordingly.    Tobacco use disorder  -Nicotine Patch  -Needs cessation counseling prior to discharge     Left distal radius fracture  -Fell recently  -Cast in place  -PT and OT evals  -Outpatient orthopedics follow-up     Normocytic anemia, likely due to chronic wound;  -No active/obvious bleeding.  Trend     Poor follow-up, noncompliance;  -Importance of compliance and follow-up needs to be emphasized.  Patient with relatively young age and already with serious comorbidities of chronic disease.          Diet: Moderate Consistent Carb (60 g CHO per Meal) Diet    DVT Prophylaxis: Enoxaparin (Lovenox) SQ  Cazares Catheter: Not present  Lines: None     Cardiac Monitoring: None  Code Status: Full Code      Clinically Significant Risk Factors              # Hypoalbuminemia: Lowest albumin = 2.2 g/dL at 2/19/2024  5:47 AM, will monitor as appropriate           # DMII: A1C = 11.8 % (Ref range: <5.7 %) within past 6 months   # Obesity: Estimated body mass index is 38.94 kg/m  as calculated  "from the following:    Height as of this encounter: 1.778 m (5' 10\").    Weight as of this encounter: 123.1 kg (271 lb 6.2 oz).      # Financial/Environmental Concerns: none         Disposition Plan      Expected Discharge Date: 2024    Discharge Delays: IV Medication - consider oral or Home Infusion  Procedure Pending (enter procedure & time in comments)  Placement - TCU  Lab Result Pending (enter specific test & time in comments)  Destination: home with family              Madan Yu MD  Hospitalist Service  [facility]  Securely message with [employer] (more info)  Text page via [facility] Paging/Directory   ______________________________________________________________________    Interval History   Patient is new to me.  Patient seen and examined.  Notes, labs, imaging report personally reviewed.  Patient complaining of intermittent abdominal pain, poor appetite and early satiety, reported intermittent nausea.  Patient also reports intermittent shortness of breath when having abdominal discomfort, denied chest pain or cough.  CT imaging showed pulmonary congestion and moderate bilateral pleural effusion and anasarca.  Patient currently on room air.  Pain at wound fairly controlled.  Discussed with wound care nurse at bedside.  Discussed with nursing staffs, reported patient's mother recently     Physical Exam   Vital Signs: Temp: 98.2  F (36.8  C) Temp src: Oral BP: (!) 156/93 Pulse: 101   Resp: 20 SpO2: 94 % O2 Device: None (Room air)    Weight: 271 lbs 6.18 oz    General: Not in obvious distress.  HEENT: Normocephalic, supple neck  Chest: Clear to auscultation bilateral anteriorly, no wheezing  Heart: S1S2 normal, regular  Abdomen: Soft.  Obese, no significant tenderness appreciated.   Extremities: + legs swelling, right leg healed with dressing in place  Neuro: alert and awake, moves all extremities        Medical Decision Making             Data     I have personally reviewed the " following data over the past 24 hrs:    12.4 (H)  \   11.0 (L)   / 400     138 105 8.2 /  176 (H)   3.8 23 1.42 (H) \     ALT: 9 AST: 10 AP: 74 TBILI: 0.2   ALB: 2.2 (L) TOT PROTEIN: 6.2 (L) LIPASE: 14       Imaging results reviewed over the past 24 hrs:   Recent Results (from the past 24 hour(s))   CT Abdomen Pelvis w/o Contrast    Narrative    EXAM: CT ABDOMEN PELVIS W/O CONTRAST  LOCATION: [facility]  DATE: 2/19/2024    INDICATION: acute abdominal pain with N V and poor appetite  COMPARISON: None.  TECHNIQUE: CT scan of the abdomen and pelvis was performed without IV contrast. Multiplanar reformats were obtained. Dose reduction techniques were used.  CONTRAST: None.    FINDINGS:   LOWER CHEST: Smooth intralobular septal thickening is in the lower lobes suggestive of pulmonary interstitial edema. Moderate bilateral pleural effusions with associated associated compressive atelectasis in the lung bases lobes.    HEPATOBILIARY: Normal.    PANCREAS: Normal.    SPLEEN: Normal.    ADRENAL GLANDS: Normal.    KIDNEYS/BLADDER: Fat-containing lesion in the upper pole the left kidney measuring 0.8 cm compatible with an angiomyolipoma.    BOWEL: No obstruction or inflammatory change.    LYMPH NODES: Normal.    VASCULATURE: Mild scattered vascular calcifications.    PELVIC ORGANS: Trace low-density free fluid in the pelvis.    MUSCULOSKELETAL: Diffuse anasarca. Multilevel degenerative changes in the thoracolumbar spine      Impression    IMPRESSION:   1.  No acute findings in the abdomen or pelvis.  2.  Findings suggesting fluid overload with moderate bilateral pleural effusions and diffuse anasarca.  3.  Subcentimeter left renal angiomyolipoma.

## 2024-02-19 NOTE — DISCHARGE INSTRUCTIONS
[facility] DISCHARGE INSTRUCTIONS:  Negative pressure wound therapy plan: may use NPWT on formulary at receiving facility including Medela  Wound location: right heel   Change Days:  T/F    Supplies (including all accessories) used: medium Black foam   Cleanse with Vashe prior to replacing NPWT  Suction setting: -125 mmHg  Methods used: Window paned all periwound skin with vac drape prior to applying sponge and Bridged trac pad off bony prominences     Remove foam dressing and replace with BID normal saline moist gauze dressing if:   -a dressing failure which cannot be repaired within 2 hours

## 2024-02-19 NOTE — PLAN OF CARE
Problem: Adult Inpatient Plan of Care  Goal: Optimal Comfort and Wellbeing  Outcome: Progressing  Intervention: Monitor Pain and Promote Comfort  Recent Flowsheet Documentation  Taken 2/19/2024 0115 by Yuli Mukherjee RN  Pain Management Interventions: medication (see MAR)     Problem: Pain Acute  Goal: Optimal Pain Control and Function  Outcome: Progressing  Intervention: Develop Pain Management Plan  Recent Flowsheet Documentation  Taken 2/19/2024 0115 by Yuli Mukherjee RN  Pain Management Interventions: medication (see MAR)  Intervention: Prevent or Manage Pain  Recent Flowsheet Documentation  Taken 2/19/2024 0117 by Yuli Mukherjee RN  Medication Review/Management: medications reviewed   Goal Outcome Evaluation:  Pt with c/o abdominal pain.  Pt initially wanted Tums to help but ultimately requested oxycodone 5mg.  Pt had good effect from pain meds.  Pt continues to be withdrawn and limiting in conversation and cares with staff.  Right leg dressing with some drainage though no change since day of surgery.  Zosyn infused without incidence.

## 2024-02-19 NOTE — PLAN OF CARE
"  Problem: Adult Inpatient Plan of Care  Goal: Plan of Care Review  Description: The Plan of Care Review/Shift note should be completed every shift.  The Outcome Evaluation is a brief statement about your assessment that the patient is improving, declining, or no change.  This information will be displayed automatically on your shift  note.  Outcome: Progressing  Goal: Patient-Specific Goal (Individualized)  Description: You can add care plan individualizations to a care plan. Examples of Individualization might be:  \"Parent requests to be called daily at 9am for status\", \"I have a hard time hearing out of my right ear\", or \"Do not touch me to wake me up as it startles  me\".  Outcome: Progressing  Goal: Absence of Hospital-Acquired Illness or Injury  Outcome: Progressing  Intervention: Identify and Manage Fall Risk  Recent Flowsheet Documentation  Taken 2/18/2024 1622 by Rodrick Man RN  Safety Promotion/Fall Prevention:   assistive device/personal items within reach   activity supervised  Intervention: Prevent Infection  Recent Flowsheet Documentation  Taken 2/18/2024 1622 by Rodrick Man RN  Infection Prevention: rest/sleep promoted  Goal: Optimal Comfort and Wellbeing  Outcome: Progressing  Goal: Readiness for Transition of Care  Outcome: Progressing     Problem: Pain Acute  Goal: Optimal Pain Control and Function  Outcome: Progressing  Intervention: Prevent or Manage Pain  Recent Flowsheet Documentation  Taken 2/18/2024 1622 by Rodrick Man RN  Medication Review/Management: medications reviewed     Problem: Comorbidity Management  Goal: Maintenance of Asthma Control  Outcome: Progressing  Intervention: Maintain Asthma Symptom Control  Recent Flowsheet Documentation  Taken 2/18/2024 1622 by Rodrick Man RN  Medication Review/Management: medications reviewed  Goal: Maintenance of Behavioral Health Symptom Control  Outcome: Progressing  Intervention: Maintain Behavioral Health Symptom " Control  Recent Flowsheet Documentation  Taken 2/18/2024 1622 by Rodrick Man RN  Medication Review/Management: medications reviewed  Goal: Maintenance of COPD Symptom Control  Outcome: Progressing  Intervention: Maintain COPD Symptom Control  Recent Flowsheet Documentation  Taken 2/18/2024 1622 by Rodrick Man RN  Medication Review/Management: medications reviewed  Goal: Blood Glucose Levels Within Targeted Range  Outcome: Progressing  Intervention: Monitor and Manage Glycemia  Recent Flowsheet Documentation  Taken 2/18/2024 1622 by Rodrick Man RN  Medication Review/Management: medications reviewed  Goal: Maintenance of Heart Failure Symptom Control  Outcome: Progressing  Intervention: Maintain Heart Failure Management  Recent Flowsheet Documentation  Taken 2/18/2024 1622 by Rodrick Man RN  Medication Review/Management: medications reviewed  Goal: Blood Pressure in Desired Range  Outcome: Progressing  Intervention: Maintain Blood Pressure Management  Recent Flowsheet Documentation  Taken 2/18/2024 1622 by Rodrick Man RN  Medication Review/Management: medications reviewed  Goal: Maintenance of Osteoarthritis Symptom Control  Outcome: Progressing  Intervention: Maintain Osteoarthritis Symptom Control  Recent Flowsheet Documentation  Taken 2/18/2024 1622 by Rodrick Man RN  Medication Review/Management: medications reviewed  Goal: Bariatric Home Regimen Maintained  Outcome: Progressing  Intervention: Maintain and Manage Postbariatric Surgery Care  Recent Flowsheet Documentation  Taken 2/18/2024 1622 by Rodrick Man RN  Medication Review/Management: medications reviewed  Goal: Maintenance of Seizure Control  Outcome: Progressing  Intervention: Maintain Seizure Symptom Control  Recent Flowsheet Documentation  Taken 2/18/2024 1622 by Rodrick Man RN  Medication Review/Management: medications reviewed     Problem: Infection  Goal: Absence of Infection Signs and  Symptoms  Outcome: Progressing  Intervention: Prevent or Manage Infection  Recent Flowsheet Documentation  Taken 2/18/2024 1622 by Rodrick Man, RN  Isolation Precautions: contact precautions maintained   Goal Outcome Evaluation:       Pt is alert and oriented x4, able to communicate his needs, pt is calm and flat affect, not interacting with staff. Pt denies pain, IV Abx administered as ordered. Dressing is intact. VSS and will continue to monitor.

## 2024-02-20 ENCOUNTER — APPOINTMENT (OUTPATIENT)
Dept: CARDIOLOGY | Facility: HOSPITAL | Age: 49
End: 2024-02-20
Attending: INTERNAL MEDICINE
Payer: MEDICAID

## 2024-02-20 ENCOUNTER — APPOINTMENT (OUTPATIENT)
Dept: PHYSICAL THERAPY | Facility: HOSPITAL | Age: 49
End: 2024-02-20
Payer: MEDICAID

## 2024-02-20 LAB
ALBUMIN MFR UR ELPH: 797.3 MG/DL
ALBUMIN SERPL BCG-MCNC: 2 G/DL (ref 3.5–5.2)
ALBUMIN UR-MCNC: 600 MG/DL
ANION GAP SERPL CALCULATED.3IONS-SCNC: 4 MMOL/L (ref 7–15)
APPEARANCE UR: CLEAR
BACTERIA WND CULT: ABNORMAL
BILIRUB UR QL STRIP: NEGATIVE
BUN SERPL-MCNC: 9.3 MG/DL (ref 6–20)
CALCIUM SERPL-MCNC: 8.3 MG/DL (ref 8.6–10)
CHLORIDE SERPL-SCNC: 103 MMOL/L (ref 98–107)
COLOR UR AUTO: ABNORMAL
CREAT SERPL-MCNC: 1.48 MG/DL (ref 0.67–1.17)
CREAT UR-MCNC: 84 MG/DL
DEPRECATED HCO3 PLAS-SCNC: 29 MMOL/L (ref 22–29)
EGFRCR SERPLBLD CKD-EPI 2021: 57 ML/MIN/1.73M2
ERYTHROCYTE [DISTWIDTH] IN BLOOD BY AUTOMATED COUNT: 12 % (ref 10–15)
GLUCOSE BLDC GLUCOMTR-MCNC: 140 MG/DL (ref 70–99)
GLUCOSE BLDC GLUCOMTR-MCNC: 169 MG/DL (ref 70–99)
GLUCOSE BLDC GLUCOMTR-MCNC: 180 MG/DL (ref 70–99)
GLUCOSE BLDC GLUCOMTR-MCNC: 205 MG/DL (ref 70–99)
GLUCOSE SERPL-MCNC: 142 MG/DL (ref 70–99)
GLUCOSE UR STRIP-MCNC: 200 MG/DL
HCT VFR BLD AUTO: 32.2 % (ref 40–53)
HGB BLD-MCNC: 10.3 G/DL (ref 13.3–17.7)
HGB UR QL STRIP: ABNORMAL
KETONES UR STRIP-MCNC: NEGATIVE MG/DL
LEUKOCYTE ESTERASE UR QL STRIP: NEGATIVE
LVEF ECHO: NORMAL
MAGNESIUM SERPL-MCNC: 1.6 MG/DL (ref 1.7–2.3)
MCH RBC QN AUTO: 29.3 PG (ref 26.5–33)
MCHC RBC AUTO-ENTMCNC: 32 G/DL (ref 31.5–36.5)
MCV RBC AUTO: 92 FL (ref 78–100)
MUCOUS THREADS #/AREA URNS LPF: PRESENT /LPF
NITRATE UR QL: NEGATIVE
PH UR STRIP: 6.5 [PH] (ref 5–7)
PLATELET # BLD AUTO: 357 10E3/UL (ref 150–450)
PLATELET # BLD AUTO: 357 10E3/UL (ref 150–450)
POTASSIUM SERPL-SCNC: 3.5 MMOL/L (ref 3.4–5.3)
PROT/CREAT 24H UR: 9.49 MG/MG CR (ref 0–0.2)
RBC # BLD AUTO: 3.52 10E6/UL (ref 4.4–5.9)
RBC URINE: 2 /HPF
SODIUM SERPL-SCNC: 136 MMOL/L (ref 135–145)
SP GR UR STRIP: 1.01 (ref 1–1.03)
SQUAMOUS EPITHELIAL: 1 /HPF
UROBILINOGEN UR STRIP-MCNC: <2 MG/DL
WBC # BLD AUTO: 12 10E3/UL (ref 4–11)
WBC URINE: 2 /HPF

## 2024-02-20 PROCEDURE — 82040 ASSAY OF SERUM ALBUMIN: CPT | Performed by: PHYSICIAN ASSISTANT

## 2024-02-20 PROCEDURE — 999N000208 ECHOCARDIOGRAM COMPLETE

## 2024-02-20 PROCEDURE — 258N000003 HC RX IP 258 OP 636: Performed by: INTERNAL MEDICINE

## 2024-02-20 PROCEDURE — 85049 AUTOMATED PLATELET COUNT: CPT | Performed by: PODIATRIST

## 2024-02-20 PROCEDURE — 250N000013 HC RX MED GY IP 250 OP 250 PS 637: Performed by: PODIATRIST

## 2024-02-20 PROCEDURE — 84156 ASSAY OF PROTEIN URINE: CPT | Performed by: INTERNAL MEDICINE

## 2024-02-20 PROCEDURE — 36415 COLL VENOUS BLD VENIPUNCTURE: CPT | Performed by: PODIATRIST

## 2024-02-20 PROCEDURE — 255N000002 HC RX 255 OP 636: Performed by: INTERNAL MEDICINE

## 2024-02-20 PROCEDURE — 93306 TTE W/DOPPLER COMPLETE: CPT | Mod: 26 | Performed by: STUDENT IN AN ORGANIZED HEALTH CARE EDUCATION/TRAINING PROGRAM

## 2024-02-20 PROCEDURE — 99254 IP/OBS CNSLTJ NEW/EST MOD 60: CPT | Mod: FS | Performed by: INTERNAL MEDICINE

## 2024-02-20 PROCEDURE — 99232 SBSQ HOSP IP/OBS MODERATE 35: CPT

## 2024-02-20 PROCEDURE — 85027 COMPLETE CBC AUTOMATED: CPT | Performed by: INTERNAL MEDICINE

## 2024-02-20 PROCEDURE — 97110 THERAPEUTIC EXERCISES: CPT | Mod: GP

## 2024-02-20 PROCEDURE — 250N000011 HC RX IP 250 OP 636: Performed by: PODIATRIST

## 2024-02-20 PROCEDURE — 80048 BASIC METABOLIC PNL TOTAL CA: CPT | Performed by: INTERNAL MEDICINE

## 2024-02-20 PROCEDURE — 120N000001 HC R&B MED SURG/OB

## 2024-02-20 PROCEDURE — 250N000011 HC RX IP 250 OP 636: Performed by: PHYSICIAN ASSISTANT

## 2024-02-20 PROCEDURE — 81001 URINALYSIS AUTO W/SCOPE: CPT | Performed by: PHYSICIAN ASSISTANT

## 2024-02-20 PROCEDURE — 99233 SBSQ HOSP IP/OBS HIGH 50: CPT | Performed by: INTERNAL MEDICINE

## 2024-02-20 PROCEDURE — 83735 ASSAY OF MAGNESIUM: CPT | Performed by: INTERNAL MEDICINE

## 2024-02-20 PROCEDURE — 250N000011 HC RX IP 250 OP 636: Mod: JZ | Performed by: INTERNAL MEDICINE

## 2024-02-20 PROCEDURE — 250N000013 HC RX MED GY IP 250 OP 250 PS 637: Performed by: INTERNAL MEDICINE

## 2024-02-20 RX ORDER — MAGNESIUM OXIDE 400 MG/1
400 TABLET ORAL EVERY 4 HOURS
Status: COMPLETED | OUTPATIENT
Start: 2024-02-20 | End: 2024-02-20

## 2024-02-20 RX ORDER — POTASSIUM CHLORIDE 1500 MG/1
20 TABLET, EXTENDED RELEASE ORAL ONCE
Status: COMPLETED | OUTPATIENT
Start: 2024-02-20 | End: 2024-02-20

## 2024-02-20 RX ORDER — BUMETANIDE 0.25 MG/ML
2 INJECTION INTRAMUSCULAR; INTRAVENOUS ONCE
Status: COMPLETED | OUTPATIENT
Start: 2024-02-20 | End: 2024-02-20

## 2024-02-20 RX ADMIN — ACETAMINOPHEN 650 MG: 325 TABLET ORAL at 23:59

## 2024-02-20 RX ADMIN — METOCLOPRAMIDE 5 MG: 5 TABLET ORAL at 12:53

## 2024-02-20 RX ADMIN — BUMETANIDE 2 MG: 0.25 INJECTION INTRAMUSCULAR; INTRAVENOUS at 14:38

## 2024-02-20 RX ADMIN — ASPIRIN 325 MG: 325 TABLET, COATED ORAL at 09:39

## 2024-02-20 RX ADMIN — WHITE PETROLATUM: 1.75 OINTMENT TOPICAL at 21:06

## 2024-02-20 RX ADMIN — CARVEDILOL 12.5 MG: 12.5 TABLET, FILM COATED ORAL at 17:47

## 2024-02-20 RX ADMIN — PIPERACILLIN AND TAZOBACTAM 3.38 G: 3; .375 INJECTION, POWDER, FOR SOLUTION INTRAVENOUS at 23:48

## 2024-02-20 RX ADMIN — PERFLUTREN 8 ML: 6.52 INJECTION, SUSPENSION INTRAVENOUS at 10:55

## 2024-02-20 RX ADMIN — POTASSIUM CHLORIDE 20 MEQ: 1500 TABLET, EXTENDED RELEASE ORAL at 09:39

## 2024-02-20 RX ADMIN — MAGNESIUM OXIDE TAB 400 MG (241.3 MG ELEMENTAL MG) 400 MG: 400 (241.3 MG) TAB at 21:06

## 2024-02-20 RX ADMIN — DAPTOMYCIN 750 MG: 500 INJECTION, POWDER, LYOPHILIZED, FOR SOLUTION INTRAVENOUS at 17:48

## 2024-02-20 RX ADMIN — METOCLOPRAMIDE 5 MG: 5 TABLET ORAL at 17:47

## 2024-02-20 RX ADMIN — OXYCODONE HYDROCHLORIDE 5 MG: 5 TABLET ORAL at 21:05

## 2024-02-20 RX ADMIN — ENOXAPARIN SODIUM 40 MG: 40 INJECTION SUBCUTANEOUS at 17:48

## 2024-02-20 RX ADMIN — PIPERACILLIN AND TAZOBACTAM 3.38 G: 3; .375 INJECTION, POWDER, FOR SOLUTION INTRAVENOUS at 09:37

## 2024-02-20 RX ADMIN — NICOTINE 1 PATCH: 14 PATCH, EXTENDED RELEASE TRANSDERMAL at 12:55

## 2024-02-20 RX ADMIN — PIPERACILLIN AND TAZOBACTAM 3.38 G: 3; .375 INJECTION, POWDER, FOR SOLUTION INTRAVENOUS at 00:19

## 2024-02-20 RX ADMIN — WHITE PETROLATUM: 1.75 OINTMENT TOPICAL at 10:56

## 2024-02-20 RX ADMIN — CARVEDILOL 12.5 MG: 12.5 TABLET, FILM COATED ORAL at 09:39

## 2024-02-20 RX ADMIN — PANTOPRAZOLE SODIUM 40 MG: 40 TABLET, DELAYED RELEASE ORAL at 06:39

## 2024-02-20 RX ADMIN — METOCLOPRAMIDE 5 MG: 5 TABLET ORAL at 06:39

## 2024-02-20 RX ADMIN — MAGNESIUM OXIDE TAB 400 MG (241.3 MG ELEMENTAL MG) 400 MG: 400 (241.3 MG) TAB at 15:55

## 2024-02-20 RX ADMIN — PIPERACILLIN AND TAZOBACTAM 3.38 G: 3; .375 INJECTION, POWDER, FOR SOLUTION INTRAVENOUS at 15:55

## 2024-02-20 RX ADMIN — OXYCODONE HYDROCHLORIDE 5 MG: 5 TABLET ORAL at 00:28

## 2024-02-20 ASSESSMENT — ACTIVITIES OF DAILY LIVING (ADL)
ADLS_ACUITY_SCORE: 33

## 2024-02-20 NOTE — PROGRESS NOTES
"INFECTIOUS DISEASE FOLLOW UP NOTE      ASSESSMENT:  Floyd Capps is a 48 year old old male with   Poorly controlled type 2 diabetes mellitus with last A1c of 11.8 on 2/14/2024.  Morbid obesity with BMI of 38.94.  Active tobacco use.  Diabetic foot infection status post debridement on 2/16/2024.  Per discussion with Dr Sims, was down to bone but not into bone. Surgical cultures polymicrobial Superficial cultures with Proteus vulgaris, Pseudomonas aeruginosa, Streptococcus agalactiae, Enterococcus faecalis, Finegoldia and MRSA. No oral options to treat the Ps aeruginosa.   SCHUYLER.  Mother passed away       PLAN:  Continue Zosyn and daptomycin until 3/1.    OK to discharge to home anytime from Infectious Disease standpoint.       ROSSY FLORES MD   [facility]  [phone] clinic  [facility] [place] paging    ______________________________________________________________________    SUBJECTIVE / INTERVAL HISTORY: abdominal discomfort is much improved.     ROS: All other systems negative except as listed above.        OBJECTIVE:  /79 (BP Location: Left arm)   Pulse 72   Temp 97.4  F (36.3  C) (Oral)   Resp 19   Ht 1.778 m (5' 10\")   Wt 123.1 kg (271 lb 6.2 oz)   SpO2 97%   BMI 38.94 kg/m                GEN: sleeping when I entered, no distress.  RESPIRATORY:  Normal breathing pattern. Clear to auscultation  CARDIOVASCULAR:  Regular rate and rhythm. Normal S1 and S2. No murmur  ABDOMEN:  Soft, normal bowel sounds, non-tender  EXTREMITIES: R ankle wrapped, drainage on dressing at heel.  SKIN/HAIR/NAILS:  No rashes  IV: peripheral        Antibiotics:  Pip/tazo 2/14-  Vancomycin 2/14-16  Daptomycin 2/16 -     Pertinent labs:    Recent Labs   Lab 02/20/24  0546 02/19/24  0547 02/18/24  0603   WBC 12.0* 12.4* 13.8*   HGB 10.3* 11.0* 11.1*   HCT 32.2* 34.4* 34.8*     357 400 384        Recent Labs   Lab 02/20/24  0546 02/19/24  0547 02/18/24  0603    138 137   CO2 29 23 25   BUN " 9.3 8.2 9.4     Creatinine   Date Value Ref Range Status   02/20/2024 1.48 (H) 0.67 - 1.17 mg/dL Final     Liver Function Studies -   Recent Labs   Lab Test 02/19/24  0547   PROTTOTAL 6.2*   ALBUMIN 2.2*   BILITOTAL 0.2   ALKPHOS 74   AST 10   ALT 9         MICROBIOLOGY DATA:  7-Day Micro Results       Collected Updated Procedure Result Status      02/16/2024 1119 02/18/2024 1247 Anaerobic Bacterial Culture Routine [[id number]]   (Abnormal)   Wound from Foot, Right    Preliminary result Component Value   Culture 2+ Finegoldia magna  [P]     Susceptibilities not routinely done, refer to antibiogram to view typical susceptibility profiles               02/16/2024 1119 02/16/2024 1538 Gram Stain [[id number]]   Wound from Foot, Right    Final result Component Value   Gram Stain Result No organisms seen   Gram Stain Result No white blood cells seen            02/16/2024 1119 02/20/2024 0813 Wound Aerobic Bacterial Culture Routine [[id number]]    (Abnormal)   Wound from Foot, Right    Final result Component Value   Culture 1+ Proteus hauseri    2+ Streptococcus agalactiae (Group B Streptococcus)    This organism is susceptible to ampicillin, penicillin, vancomycin and the cephalosporins. If treatment is required and your patient is allergic to penicillin, contact the microbiology lab within 5 days to request susceptibility testing.    1+ Staphylococcus aureus MRSA    1+ Normal melody        Susceptibility        Proteus hauseri      SAILAJA      Amikacin <=2 ug/mL Susceptible  [*]       Ampicillin >=32 ug/mL Resistant      Ampicillin/ Sulbactam 16 ug/mL Intermediate      Cefepime <=0.25 ug/mL Susceptible      Cefoxitin 8 ug/mL Susceptible  [*]       Ceftazidime <=0.50 ug/mL Susceptible      Ceftriaxone 0.016 ug/mL Susceptible      Ciprofloxacin <=0.25 ug/mL Susceptible      Extended Spectrum Beta-Lactamase Negative ug/mL ESBL Negative  [*]       Gentamicin <=1 ug/mL Susceptible      Levofloxacin <=0.12 ug/mL Susceptible       Meropenem 1 ug/mL Susceptible      Nitrofurantoin 128 ug/mL Resistant  [*,1]       Piperacillin/Tazobactam <=4 ug/mL Susceptible      Tobramycin <=1 ug/mL Susceptible      Trimethoprim/Sulfamethoxazole <=1/19 ug/mL Susceptible                   [*]  Suppressed Antibiotic     [1]  Intrinsically Resistant              Susceptibility        Staphylococcus aureus MRSA      SAILAJA      Ciprofloxacin >=8 ug/mL Resistant  [*]       Clindamycin 0.25 ug/mL Susceptible  [1]       Daptomycin 0.25 ug/mL Susceptible      Doxycycline 8 ug/mL Intermediate      Erythromycin >=8 ug/mL Resistant      Gentamicin <=0.5 ug/mL Susceptible      Inducible macrolide resistance test Negative ug/mL Negative  [*]       Levofloxacin 4 ug/mL Resistant  [*]       Linezolid 2 ug/mL Susceptible      Nitrofurantoin <=16 ug/mL Susceptible  [*]       Oxacillin >=4 ug/mL Resistant  [2]       Rifampin <=0.5 ug/mL Susceptible  [*]       Tetracycline >=16 ug/mL Resistant      Tigecycline <=0.12 ug/mL Susceptible  [*]       Trimethoprim/Sulfamethoxazole >16/304 ug/mL Resistant      Vancomycin 1 ug/mL Susceptible                   [*]  Suppressed Antibiotic     [1]  This isolate DOES NOT demonstrate inducible clindamycin resistance in vitro. Clindamycin is susceptible and could be used when indicated, however, erythromycin is resistant and should not be used.     [2]  Oxacillin susceptible isolates are susceptible to cephalosporins (example: cefazolin and cephalexin) and beta lactam combination agents. Oxacillin resistant isolates are resistant to these agents.              Susceptibility Comments       Staphylococcus aureus MRSA    MRSA requires contact precautions.               02/14/2024 0958 02/19/2024 1302 Blood Culture Peripheral Blood [[id number]]   Peripheral Blood    Final result Component Value   Culture No Growth               02/14/2024 0958 02/18/2024 0817 Wound Aerobic Bacterial Culture Routine With Gram Stain [[id number]]    (Abnormal)    Wound from Heel, Right    Final result Component Value   Culture 3+ Proteus vulgaris    2+ Pseudomonas aeruginosa    3+ Streptococcus agalactiae (Group B Streptococcus)    This organism is susceptible to ampicillin, penicillin, vancomycin and the cephalosporins. If treatment is required and your patient is allergic to penicillin, contact the microbiology lab within 5 days to request susceptibility testing.    3+ Enterococcus faecalis    1+ Staphylococcus aureus MRSA    2+ Normal melody   Gram Stain Result 3+ Gram positive cocci    3+ Gram negative bacilli    No white blood cells seen        Susceptibility        Proteus vulgaris      SAILAJA      Amikacin <=2 ug/mL Susceptible  [*]       Ampicillin >=32 ug/mL Resistant      Ampicillin/ Sulbactam 16 ug/mL Intermediate      Cefepime <=0.25 ug/mL Susceptible      Cefoxitin 8 ug/mL Susceptible      Ceftazidime <=0.5 ug/mL Susceptible      Ceftriaxone 32 ug/mL Resistant      Ciprofloxacin <=0.25 ug/mL Susceptible      Extended Spectrum Beta-Lactamase Negative ug/mL ESBL Negative  [*]       Gentamicin <=1 ug/mL Susceptible      Levofloxacin <=0.12 ug/mL Susceptible      Meropenem 0.094 ug/mL Susceptible      Nitrofurantoin 128 ug/mL Resistant  [*,1]       Piperacillin/Tazobactam <=4 ug/mL Susceptible      Tobramycin <=1 ug/mL Susceptible      Trimethoprim/Sulfamethoxazole <=1/19 ug/mL Susceptible                   [*]  Suppressed Antibiotic     [1]  Intrinsically Resistant              Susceptibility        Pseudomonas aeruginosa      SAILAJA      Amikacin 4 ug/mL Susceptible      Cefepime 2 ug/mL Susceptible      Ceftazidime 4 ug/mL Susceptible      Ciprofloxacin >=4 ug/mL Resistant      Gentamicin <=1 ug/mL Susceptible      Levofloxacin >=8 ug/mL Resistant      Meropenem 4 ug/mL Intermediate      Piperacillin/Tazobactam 16 ug/mL Susceptible      Tobramycin 2 ug/mL Susceptible                      Susceptibility        Enterococcus faecalis      SAILAJA      Ampicillin <=2 ug/mL  Susceptible      Ciprofloxacin <=0.5 ug/mL Susceptible  [*]       Daptomycin 2 ug/mL Susceptible  [*]       Doxycycline >=16 ug/mL Resistant  [*]       Gentamicin Synergy Resistant ug/mL Resistant  [1]       Levofloxacin 0.5 ug/mL Susceptible  [*]       Linezolid 2 ug/mL Susceptible  [*]       Nitrofurantoin <=16 ug/mL Susceptible  [*]       Streptomycin Synergy Susceptible ug/mL Susceptible  [*]       Tigecycline <=0.12 ug/mL Susceptible  [*]       Vancomycin 1 ug/mL Susceptible                   [*]  Suppressed Antibiotic     [1]  High level gentamicin resistance was found, and this is predictive of resistance to tobramycin and amikacin.              Susceptibility        Staphylococcus aureus MRSA      SAILAJA      Ciprofloxacin >=8 ug/mL Resistant  [*]       Clindamycin >=4 ug/mL Resistant      Daptomycin 0.25 ug/mL Susceptible      Doxycycline 8 ug/mL Intermediate      Erythromycin >=8 ug/mL Resistant      Gentamicin <=0.5 ug/mL Susceptible      Inducible macrolide resistance test Negative ug/mL Negative  [*]       Levofloxacin 4 ug/mL Resistant  [*]       Linezolid 2 ug/mL Susceptible      Nitrofurantoin <=16 ug/mL Susceptible  [*]       Oxacillin >=4 ug/mL Resistant  [1]       Rifampin <=0.5 ug/mL Susceptible  [*]       Tetracycline >=16 ug/mL Resistant      Tigecycline <=0.12 ug/mL Susceptible  [*]       Trimethoprim/Sulfamethoxazole >16/304 ug/mL Resistant      Vancomycin 1 ug/mL Susceptible                   [*]  Suppressed Antibiotic     [1]  Oxacillin susceptible isolates are susceptible to cephalosporins (example: cefazolin and cephalexin) and beta lactam combination agents. Oxacillin resistant isolates are resistant to these agents.              Susceptibility Comments       Proteus vulgaris    This isolate does not meet the criteria of an ESBL . A different resistance mechanism may be present.      Staphylococcus aureus MRSA    MRSA requires contact precautions.               02/14/2024 0935  "02/19/2024 1302 Blood Culture Peripheral Blood [[id number]]   Peripheral Blood    Final result Component Value   Culture No Growth                         RADIOLOGY:  CT Abdomen Pelvis w/o Contrast    Result Date: 2/19/2024  EXAM: CT ABDOMEN PELVIS W/O CONTRAST LOCATION: [facility] DATE: 2/19/2024 INDICATION: acute abdominal pain with N V and poor appetite COMPARISON: None. TECHNIQUE: CT scan of the abdomen and pelvis was performed without IV contrast. Multiplanar reformats were obtained. Dose reduction techniques were used. CONTRAST: None. FINDINGS: LOWER CHEST: Smooth intralobular septal thickening is in the lower lobes suggestive of pulmonary interstitial edema. Moderate bilateral pleural effusions with associated associated compressive atelectasis in the lung bases lobes. HEPATOBILIARY: Normal. PANCREAS: Normal. SPLEEN: Normal. ADRENAL GLANDS: Normal. KIDNEYS/BLADDER: Fat-containing lesion in the upper pole the left kidney measuring 0.8 cm compatible with an angiomyolipoma. BOWEL: No obstruction or inflammatory change. LYMPH NODES: Normal. VASCULATURE: Mild scattered vascular calcifications. PELVIC ORGANS: Trace low-density free fluid in the pelvis. MUSCULOSKELETAL: Diffuse anasarca. Multilevel degenerative changes in the thoracolumbar spine     IMPRESSION: 1.  No acute findings in the abdomen or pelvis. 2.  Findings suggesting fluid overload with moderate bilateral pleural effusions and diffuse anasarca. 3.  Subcentimeter left renal angiomyolipoma.     POC US Guidance Needle Placement    Result Date: 2/16/2024  Ultrasound was performed as guidance to an anesthesia procedure.  Click \"PACS images\" hyperlink below to view any stored images.  For specific procedure details, view procedure note authored by anesthesia.    MR Foot Right w/o & w Contrast    Result Date: 2/14/2024  EXAM: MR FOOT RIGHT W/O and W CONTRAST LOCATION: [facility] DATE: 2/14/2024 INDICATION: " Ulceration, erythema, infection. COMPARISON: None. TECHNIQUE: Routine. Additional postgadolinium T1 sequences were obtained. IV CONTRAST: 12 mL Gadavist. FINDINGS: JOINTS AND BONES: -No evidence for fracture. There is some reactive signal abnormality within the posterior calcaneus but no T1 marrow signal change and this is unlikely to represent osteomyelitis. No significant effusion to suggest septic arthropathy. There is degenerative change at the calcaneocuboid articulation and tibiotalar joint. TENDONS: -The peroneal tendons are negative. The flexor tendons are negative for tendinopathy, tenosynovitis, or tearing. The extensor tendons are negative. LIGAMENTS: -The anterior and posterior talofibular ligaments are negative. The deltoid ligamentous complex is intact. The calcaneofibular ligament is negative. The ligament of Lisfranc is intact. MUSCLES AND SOFT TISSUES: -There is a soft tissue ulceration along the posteroinferior aspect of the hindfoot but this is fairly superficial. There is some surrounding edema or potential cellulitis. There is reactive edema versus infectious or inflammatory myositis within the plantar and interosseous musculature. No evidence for abscess. Mild plantar fasciitis.     IMPRESSION: 1.  Soft tissue ulceration along the posteroinferior aspect of the hindfoot. This is fairly superficial. There is some surrounding edema or low-grade cellulitis. 2.  No evidence for osteomyelitis, septic arthropathy, or abscess. 3.  Reactive edema versus infectious or inflammatory myositis within the plantar and interosseous musculature. 4.  No evidence for fracture. 5.  No significant tendinous or ligamentous pathology.    XR Wrist Left G/E 3 Views    Result Date: 2/13/2024  EXAM: XR WRIST LEFT G/E 3 VIEWS LOCATION: [facility] DATE: 2/13/2024 INDICATION:  Other closed intra-articular fracture of distal end of left radius, initial encounter COMPARISON: 02/06/2024     IMPRESSION:  Distal radial intra-articular fracture with similar alignment. No definite callus formation. Otherwise unchanged.    XR Wrist Left G/E 3 Views    Result Date: 2/6/2024  EXAM: XR WRIST LEFT G/E 3 VIEWS LOCATION: [facility] DATE: 2/6/2024 INDICATION: fall, left wrist pain COMPARISON: None.     IMPRESSION: There is a nondisplaced, longitudinally oriented fracture of the distal left radius. There is intra-articular extension. There is slight buckling of the dorsal radial cortex on the lateral view. The ulna is intact. Soft tissue swelling.

## 2024-02-20 NOTE — PLAN OF CARE
Problem: Adult Inpatient Plan of Care  Goal: Optimal Comfort and Wellbeing  Outcome: Progressing  Intervention: Monitor Pain and Promote Comfort  Recent Flowsheet Documentation  Taken 2/20/2024 0028 by Yuli Mukherjee RN  Pain Management Interventions: medication (see MAR)   Goal Outcome Evaluation:  Pt feeling better this shift.  Reports breathing effort has improved since lasix given.  Denies abdominal pain but now c/o aching/itching pain in RLE.  Medicated with oxycodone and had good effect.  Zosyn infused overnight without incident.  Wound vac dressing intact and patent.

## 2024-02-20 NOTE — PLAN OF CARE
"  Problem: Adult Inpatient Plan of Care  Goal: Plan of Care Review  Description: The Plan of Care Review/Shift note should be completed every shift.  The Outcome Evaluation is a brief statement about your assessment that the patient is improving, declining, or no change.  This information will be displayed automatically on your shift  note.  Outcome: Progressing  Goal: Patient-Specific Goal (Individualized)  Description: You can add care plan individualizations to a care plan. Examples of Individualization might be:  \"Parent requests to be called daily at 9am for status\", \"I have a hard time hearing out of my right ear\", or \"Do not touch me to wake me up as it startles  me\".  Outcome: Progressing  Goal: Absence of Hospital-Acquired Illness or Injury  Outcome: Progressing  Intervention: Identify and Manage Fall Risk  Recent Flowsheet Documentation  Taken 2/19/2024 1629 by Rodrick Man RN  Safety Promotion/Fall Prevention: assistive device/personal items within reach  Intervention: Prevent Infection  Recent Flowsheet Documentation  Taken 2/19/2024 1629 by Rodrick Man RN  Infection Prevention:   rest/sleep promoted   hand hygiene promoted  Goal: Optimal Comfort and Wellbeing  Outcome: Progressing  Goal: Readiness for Transition of Care  Outcome: Progressing     Problem: Pain Acute  Goal: Optimal Pain Control and Function  Outcome: Progressing  Intervention: Prevent or Manage Pain  Recent Flowsheet Documentation  Taken 2/19/2024 1629 by Rodrick Man RN  Medication Review/Management: medications reviewed     Problem: Comorbidity Management  Goal: Maintenance of Asthma Control  Outcome: Progressing  Intervention: Maintain Asthma Symptom Control  Recent Flowsheet Documentation  Taken 2/19/2024 1629 by Rodrick Man RN  Medication Review/Management: medications reviewed  Goal: Maintenance of Behavioral Health Symptom Control  Outcome: Progressing  Intervention: Maintain Behavioral Health Symptom " Control  Recent Flowsheet Documentation  Taken 2/19/2024 1629 by Rodrick Man RN  Medication Review/Management: medications reviewed  Goal: Maintenance of COPD Symptom Control  Outcome: Progressing  Intervention: Maintain COPD Symptom Control  Recent Flowsheet Documentation  Taken 2/19/2024 1629 by Rodrick Man RN  Medication Review/Management: medications reviewed  Goal: Blood Glucose Levels Within Targeted Range  Outcome: Progressing  Intervention: Monitor and Manage Glycemia  Recent Flowsheet Documentation  Taken 2/19/2024 1629 by Rodrick Man RN  Medication Review/Management: medications reviewed  Goal: Maintenance of Heart Failure Symptom Control  Outcome: Progressing  Intervention: Maintain Heart Failure Management  Recent Flowsheet Documentation  Taken 2/19/2024 1629 by Rodrick Man RN  Medication Review/Management: medications reviewed  Goal: Blood Pressure in Desired Range  Outcome: Progressing  Intervention: Maintain Blood Pressure Management  Recent Flowsheet Documentation  Taken 2/19/2024 1629 by Rodrick Man RN  Medication Review/Management: medications reviewed  Goal: Maintenance of Osteoarthritis Symptom Control  Outcome: Progressing  Intervention: Maintain Osteoarthritis Symptom Control  Recent Flowsheet Documentation  Taken 2/19/2024 1629 by Rodrick Man RN  Medication Review/Management: medications reviewed  Goal: Bariatric Home Regimen Maintained  Outcome: Progressing  Intervention: Maintain and Manage Postbariatric Surgery Care  Recent Flowsheet Documentation  Taken 2/19/2024 1629 by Rodrick Man RN  Medication Review/Management: medications reviewed  Goal: Maintenance of Seizure Control  Outcome: Progressing  Intervention: Maintain Seizure Symptom Control  Recent Flowsheet Documentation  Taken 2/19/2024 1629 by Rodrick Man RN  Medication Review/Management: medications reviewed     Problem: Infection  Goal: Absence of Infection Signs and  Symptoms  Outcome: Progressing  Intervention: Prevent or Manage Infection  Recent Flowsheet Documentation  Taken 2/19/2024 1629 by Rodrick Man RN  Isolation Precautions: contact precautions maintained   Goal Outcome Evaluation:         Pt is alert and oriented x4, Pt was up in the chair during the shift, furosemide was administered as ordered for fluid overload and pt has been voided a lot using three uranal at the bedside. Wound vac intact, VSS and will continue to monitor.

## 2024-02-20 NOTE — PROGRESS NOTES
[facility]    Medicine Progress Note - Hospitalist Service    Date of Admission:  2/14/2024    Assessment & Plan   Floyd Capps is a 48 year old male with history of DM, HTN, CVA, obesity, recent ED visit for nonhealing diabetic foot ulcer with cellulitis, here for worsening pain in the foot, with necrotic tissue noted on exam.  Patient admitted on 2/14/2024.      Diabetic foot ulcer, right heel;  -chronic wound R heel, recent resulting cellulitis treated with Bactrim  -here for worse pain in the foot, found with ulcer worse with necrotic tissue  -MRI of the foot no osteomyelitis  -On 2/16, s/p debridement with irrigation of right foot diabetic ulcer. NWB on right foot per podiatrist  -On 2/19, wound VAC  -Surgical cultures polymicrobials.  Initially IV Vanco and IV Zosyn, now changed to IV Vanco and daptomycin.  Appreciated ID input  -Care Mgr to see about financial/insurance issues which have been a barrier to followup  -Pain management with Tylenol, home oxycodone.     DM Type II, Uncontrolled;  -Stopped all his home meds lately due to stomach upset while on Bactrim, then neglected to resume meds. Hold home metformin and glipizide for now.  -A1c 11.8- likely needs insulin.    -Started Lantus, increase to 20 unit at night. Of note, patient was on insulin in the past and received education in the hospital about it, but seems to have stopped on its own.  Patient needs outpatient diabetic educator for ongoing titration  -Insulin sliding scale and hypoglycemic protocol  -Given poor appetite and already satiety, trial of Reglan.  Also added PPI    SCHUYLER: Likely due to CHF exacerbation  - Baseline is 0.6-0.8  -On a combination of broad spectrum abx ( Vanc and Zosyn)  -Check UA with no significant RBC's or casts   -CT imaging reported anasarca, patient responded well with diuretics yesterday.  Creatinine slightly trending up, nephrology consulted, appreciated input    Acute new onset systolic  heart failure;  CT imaging reported moderate bilateral pleural effusion and diffuse anasarca;  -- On 2/19, patient reported feeling shortness of breath, imaging workup showed bilateral moderate pleural effusion and diffuse anasarca  --BNP significantly elevated   --Echo on 8/23 reported EF 55% but given patient noncompliance with medications, rechecked echocardiogram with EF 30 to 35% with global hypokinesis.  --Of note, patient had nuclear stress test on 8/23 which was negative for inducible myocardial ischemia  --Continue diuretics per nephrology  --Monitor intake/output, weight, labs closely    History of stroke in 8/2023;  -Had been taking Plavix and atorvastatin but recently stopped taking all of his medications as above.  -Neurologist note from 8/26/2023 reviewed- at that time recommended DAPT with aspirin and Plavix for 90 days, afterwards switch to enteric-coated aspirin 325 mg daily.  -Started full dose aspirin and resumed home atorvastatin    Essential hypertension, uncontrolled; due to noncompliance  -- Discharge summary from 8/26/2023 reviewed, patient was discharged home on Coreg 12.5 mg twice daily, HCTZ 12.5 mg daily, losartan 100 mg daily  --On 2/19 restarted Coreg 12.5 mg twice daily.  As needed hydralazine.  Monitor vitals and adjust medications accordingly.    Tobacco use disorder  -Nicotine Patch  -Advised to quit smoking     Left distal radius fracture  -Fell recently  -Cast in place  -PT and OT evals  -Outpatient orthopedics follow-up     Normocytic anemia, likely due to chronic wound;  -No active/obvious bleeding.  Trend     Poor follow-up, noncompliance;  -Importance of compliance and follow-up needs to be emphasized.  Patient with relatively young age and already with serious comorbidities of chronic disease.          Diet: Moderate Consistent Carb (60 g CHO per Meal) Diet    DVT Prophylaxis: Enoxaparin (Lovenox) SQ  Cazares Catheter: Not present  Lines: None     Cardiac Monitoring: None  Code  "Status: Full Code      Clinically Significant Risk Factors           # Hypercalcemia: corrected calcium is >10.1, will monitor as appropriate  # Hypomagnesemia: Lowest Mg = 1.6 mg/dL in last 2 days, will replace as needed   # Hypoalbuminemia: Lowest albumin = 2 g/dL at 2/20/2024  5:46 AM, will monitor as appropriate      # Acute heart failure with reduced ejection fraction: last echo with EF <40% and receiving IV diuretics      # DMII: A1C = 11.8 % (Ref range: <5.7 %) within past 6 months   # Obesity: Estimated body mass index is 38.94 kg/m  as calculated from the following:    Height as of this encounter: 1.778 m (5' 10\").    Weight as of this encounter: 123.1 kg (271 lb 6.2 oz).      # Financial/Environmental Concerns: none         Disposition Plan      Expected Discharge Date: 02/21/2024    Discharge Delays: IV Medication - consider oral or Home Infusion  Procedure Pending (enter procedure & time in comments)  Placement - TCU  Lab Result Pending (enter specific test & time in comments)  Destination: home with family  Discharge Comments: insurance needs            Madan Yu MD  Hospitalist Service  [facility]  Securely message with [employer] (more info)  Text page via [employer] Paging/Directory   ______________________________________________________________________    Interval History   Patient seen and examined.  Notes, labs, imaging report personally reviewed.  During my morning visit, patient reported breathing much better after he peed a lot, also reported abdominal bloating and discomfort improving.  Discussed with nursing staffs.    Physical Exam   Vital Signs: Temp: 98.4  F (36.9  C) Temp src: Oral BP: 139/75 Pulse: 85   Resp: 20 SpO2: 95 % O2 Device: None (Room air)    Weight: 271 lbs 6.18 oz      General: Not in obvious distress.  HEENT: Normocephalic, supple neck  Chest: Clear to auscultation bilateral anteriorly, no wheezing  Heart: S1S2 normal, regular  Abdomen: Soft.  Obese, " nontender. Bowel sounds- active.  Extremities: ++ legs swelling.  Right heel wound with wound VAC  Neuro: alert and awake, moves all extremities        Medical Decision Making             Data     I have personally reviewed the following data over the past 24 hrs:    12.0 (H)  \   10.3 (L)   / 357; 357     136 103 9.3 /  180 (H)   3.5 29 1.48 (H) \     ALT: N/A AST: N/A AP: N/A TBILI: N/A   ALB: 2.0 (L) TOT PROTEIN: N/A LIPASE: N/A     Trop: N/A BNP: N/A       Imaging results reviewed over the past 24 hrs:   Recent Results (from the past 24 hour(s))   Echocardiogram Complete   Result Value    LVEF  30-35% (moderately reduced)    Narrative    [id number]  [id number]  [id number]  [id number]^BERLIN^[place] [address]     Name: LUIS HODGES  MRN: [id number]  : 1975  Study Date: 2024 09:49 AM  Age: 48 yrs  Gender: Male  Patient Location: [facility]  Reason For Study: Pulmonary Edema  Ordering Physician: HARRIS SLADE  Referring Physician: HARRIS SLADE  Performed By: [place]     BSA: 2.4 m2  Height: 70 in  Weight: 271 lb  HR: 78  ______________________________________________________________________________  Procedure  Complete Echo Adult. Definity (NDC #38622-810) given intravenously. Adequate  quality two-dimensional was performed and interpreted.  ______________________________________________________________________________  Interpretation Summary     The left ventricle is normal in size. There is mild concentric left  ventricular hypertrophy.  Left ventricular function is decreased. The ejection fraction is 30-35%  (moderately reduced). There is global hypokinesis with severe hypokinesis of  the mid to apical inferior wall.  Diastolic Doppler findings (E/E' ratio and/or other parameters) suggest left  ventricular filling pressures are increased.     The right ventricle is normal in size and function.  Normal left atrial size. Right atrial size is normal.  There is mild (1+) mitral  regurgitation.  IVC diameter >2.1 cm collapsing <50% with sniff suggests a high RA pressure  estimated at 15 mmHg or greater.     Compared to previous study from 8/23/2023 the LV systolic function has  declined and there are regional wall motion abnormalities.  ______________________________________________________________________________  Left Ventricle  The left ventricle is normal in size. Left ventricular function is decreased.  The ejection fraction is 30-35% (moderately reduced). There is mild concentric  left ventricular hypertrophy. Diastolic Doppler findings (E/E' ratio and/or  other parameters) suggest left ventricular filling pressures are increased.  There is global hypokinesis with severe hypokinesis of the mid to apical  inferior wall.     Right Ventricle  The right ventricle is normal in size and function.     Atria  Normal left atrial size. Right atrial size is normal.     Mitral Valve  Mitral valve leaflets appear normal. There is mild (1+) mitral regurgitation.  There is no mitral valve stenosis.     Tricuspid Valve  The tricuspid valve is not well visualized. There is trace tricuspid  regurgitation. Right ventricular systolic pressure could not be approximated  due to inadequate tricuspid regurgitation.     Aortic Valve  The aortic valve is trileaflet. Aortic valve leaflets appear normal. No aortic  regurgitation is present. No aortic stenosis is present.     Pulmonic Valve  The pulmonic valve is not well visualized. There is trace pulmonic valvular  regurgitation.     Vessels  The aorta root is normal. IVC diameter >2.1 cm collapsing <50% with sniff  suggests a high RA pressure estimated at 15 mmHg or greater.     Pericardium  There is no pericardial effusion.     Rhythm  Sinus rhythm was noted.  ______________________________________________________________________________  MMode/2D Measurements & Calculations     IVSd: 1.3 cm  LVIDd: 5.2 cm  LVIDs: 4.0 cm  LVPWd: 1.3 cm  FS: 21.7 %  LV  mass(C)d: 274.4 grams  LV mass(C)dI: 115.5 grams/m2  Ao root diam: 3.1 cm  LVOT diam: 2.0 cm  LVOT area: 3.1 cm2  Ao root diam index Ht(cm/m): 1.7  Ao root diam index BSA (cm/m2): 1.3  LA Volume (BP): 63.0 ml  LA Volume Index (BP): 26.5 ml/m2     LA Volume Indexed (AL/bp): 28.2 ml/m2  RV Base: 3.4 cm  RWT: 0.51  TAPSE: 2.1 cm     Doppler Measurements & Calculations  MV E max servando: 104.0 cm/sec  MV A max servando: 40.4 cm/sec  MV E/A: 2.6  MV dec slope: 886.0 cm/sec2  MV dec time: 0.12 sec  Ao V2 max: 116.0 cm/sec  Ao max P.0 mmHg  Ao V2 mean: 78.1 cm/sec  Ao mean PG: 3.0 mmHg  Ao V2 VTI: 21.8 cm  NILSA(I,D): 2.1 cm2  NILSA(V,D): 1.9 cm2     LV V1 max P.0 mmHg  LV V1 max: 70.5 cm/sec  LV V1 VTI: 14.4 cm  SV(LVOT): 45.2 ml  SI(LVOT): 19.0 ml/m2  PA acc time: 0.10 sec  AV Servando Ratio (DI): 0.61  NILSA Index (cm2/m2): 0.87  E/E' av.7  Lateral E/e': 10.7  Medial E/e': 12.6  RV S Servando: 12.9 cm/sec     ______________________________________________________________________________  Report approved by: Ladonna Ornelas 2024 11:41 AM

## 2024-02-20 NOTE — CONSULTS
RENAL CONSULT NOTE    REQUESTING PHYSICIAN: Dr Yu    REASON FOR CONSULT: SCHUYLER - worsening, volume overload    ASSESSMENT/PLAN:    SCHUYLER  Baseline Cr 0.8  Risk factors for uderlying CKD include poor controlled DM2,   CT abd/pelvis 2/19/24 do not note any signficant findings aside from Fat-containing, sub-centimeter lesion in the upper pole the left kidney compatible with an angiomyolipoma.   UA this admit significant for Urinary protein, RBC, WBC. UPCR pending  No historical UAs    Cr gradual rise to 1.5 this admit  SCHUYLER may be related to Abx use, or may be related to intervascular dryness from  with 3rd spacing fluid d/t hypoalbuminuria (worsening).   One time IV lasix 40mg 2/19  Pt was on ARB (started in hospital) with last dose 2/18    PLAN  - Obtain UPCR, updated UA   - IV Bumex to help diurese in context of low serum albumin.     Lytes: No derangements    Volume  Robust urine output measured - >4L yesterday    HTN  BP holding 120-150s this admit  Coreg 12.5 bid (started this admit)  Losartan 25mg (held 2/19)  Hydralazine PRN    Trial bumex for diuresis, pt with well preserved blood pressures    HLD - statin    DM2 - insulin this admit     Diabetic foot ulcer  2/16 debridement. Now on  Zosyn and daptomycin until 3/1 per ID    HPI:   Floyd is a 49 yo M   PMH: HTN, DM2, CVA, Obesity, tobacco use, chronic mild hyponatremia, anemia. Nonhealing diabetic foot ucer w cellulitis.   Recent Left distal radial fracture of the wrist d/t FOOSH - with injury unable to properly care for his foot wound  Presented to hospital for foot pain 2/14/24  Had been treating with bactrim, then ID consulted and changes to IV Vanco. Now on  Zosyn and daptomycin until 3/1     Pt seen today in his room laying in bed  Nurse taking blood sugar finger stick when in room     Patient shares that he was admitted to the hospital due to nonhealing wound on foot. Inability to properly clean due to arm fracture and hand in cast.     Pt shares  "appetite is good - improved after lasix IV dose relieved swelling sensation in abdomen.   Reports good urination without problem  Presently no SOB, but did prior to lasix dose  No NVD  When asked, endorses that his urine has been with bubbles in it \"for a long time\"   Endorses poor control of diabetes, Review of Epic, A1C has been 11s last decade  NSAID use - reports \"excedrin about once every 3 months for headaches\"   Reports PTA \"good hydration\" although admits at times has drank significant amounts of pop (high sugar)   When asked today if his swelling is worse or better than baseline, he does not know how to answer.     Shares that he had a stroke in summer 2023  Since this time, not working  Has noted swelling in his legs since this time.   Was on \"a water pill\" for total of 3 days and discontinued it d/t diarrhea.  When asked about losartan, said it took for a while but then ran out and never followed up or refilled. (Taken in summer 2023)       REVIEW OF SYSTEMS:  ROS was completely reviewed and otherwise negative and non-contributory       Past Medical History:   Diagnosis Date    Diabetes (H)      Social History     Socioeconomic History    Marital status: Single     Spouse name: Not on file    Number of children: Not on file    Years of education: Not on file    Highest education level: Not on file   Occupational History    Not on file   Tobacco Use    Smoking status: Every Day     Packs/day: .5     Types: Cigarettes    Smokeless tobacco: Never    Tobacco comments:     Seen IP by [facility] on 8/24/23 and declined cessation services and materials.    Vaping Use    Vaping Use: Never used   Substance and Sexual Activity    Alcohol use: No    Drug use: No    Sexual activity: Not on file   Other Topics Concern    Not on file   Social History Narrative    Patient reports that he does not have health insurance.     Social Determinants of Health     Financial Resource Strain: Low Risk  (10/20/2023)    Financial " Resource Strain     Within the past 12 months, have you or your family members you live with been unable to get utilities (heat, electricity) when it was really needed?: No   Food Insecurity: High Risk (10/20/2023)    Food Insecurity     Within the past 12 months, did you worry that your food would run out before you got money to buy more?: Yes     Within the past 12 months, did the food you bought just not last and you didn t have money to get more?: No   Transportation Needs: Low Risk  (10/20/2023)    Transportation Needs     Within the past 12 months, has lack of transportation kept you from medical appointments, getting your medicines, non-medical meetings or appointments, work, or from getting things that you need?: No   Physical Activity: Not on file   Stress: Not on file   Social Connections: Not on file   Interpersonal Safety: Low Risk  (10/20/2023)    Interpersonal Safety     Do you feel physically and emotionally safe where you currently live?: Yes     Within the past 12 months, have you been hit, slapped, kicked or otherwise physically hurt by someone?: No     Within the past 12 months, have you been humiliated or emotionally abused in other ways by your partner or ex-partner?: No   Housing Stability: High Risk (10/20/2023)    Housing Stability     Do you have housing? : Yes     Are you worried about losing your housing?: Yes          ALLERGIES/SENSITIVITIES:  No Known Allergies      PHYSICAL EXAM:  Physical Exam   Temp: 97.4  F (36.3  C) Temp src: Oral BP: 125/79 Pulse: 72   Resp: 19 SpO2: 97 % O2 Device: None (Room air)    Vitals:    02/14/24 0904 02/14/24 2148   Weight: 122.5 kg (270 lb) 123.1 kg (271 lb 6.2 oz)     Vital Signs with Ranges  Temp:  [97.4  F (36.3  C)-98.5  F (36.9  C)] 97.4  F (36.3  C)  Pulse:  [72-98] 72  Resp:  [19-20] 19  BP: (125-160)/(76-90) 125/79  SpO2:  [92 %-97 %] 97 %  I/O last 3 completed shifts:  In: 680 [P.O.:680]  Out: 4850 [Urine:4350; Emesis/NG output:500]    Temp  (24hrs), Av.1  F (36.7  C), Min:97.4  F (36.3  C), Max:98.5  F (36.9  C)      No data found.    General: A&Ox3, NAD  HEENT:  no scleral icterus  NECK:  no carotid bruits, no JVD  RESPIRATORY:  Lungs without abnormalities on anterior auscultation , normal effort , room air CARDIOVASCULAR:  RRR no murmur heard my myself   VOLUME 2+ pitting legs, distended abdomen  ABDOMEN:  soft, BS present, non-tender, distended   GENITOURINARY:  No donovan   INTEGUMENTARY: Warm, dry, no rash  NEUROLOGIC: grossly intact   Psych: calm, cooperative    Laboratory:     Recent Labs   Lab 24  0546 24  0547 24  0603 24  0552 02/15/24  0544 24  0935   WBC 12.0* 12.4* 13.8* 14.8* 12.5* 14.4*   RBC 3.52* 3.74* 3.74* 3.60* 3.62* 3.91*   HGB 10.3* 11.0* 11.1* 10.7* 10.8* 11.7*   HCT 32.2* 34.4* 34.8* 33.2* 32.9* 35.0*     357 400 384 377 374 416       Basic Metabolic Panel:  Recent Labs   Lab 24  0922 24  0546 24  2026 24  1714 24  1405 24  0739 24  0547 24  0743 24  0603 24  0758 24  0551 24  0739 24  0630 02/15/24  0814 02/15/24  0544   NA  --  136  --   --   --   --  138  --  137  --  136  --  135  --  134*   POTASSIUM  --  3.5  --   --   --   --  3.8  --  3.8  --  3.9  --  3.8  --  3.5   CHLORIDE  --  103  --   --   --   --  105  --  107  --  104  --  103  --  103   CO2  --  29  --   --   --   --  23  --  25  --  23  --  26  --  25   BUN  --  9.3  --   --   --   --  8.2  --  9.4  --  9.2  --  7.9  --  7.3   CR  --  1.48*  --   --   --   --  1.42*  --  1.48*  --  1.37*  --  1.14  --  0.90   * 142* 215* 179* 176* 170* 173*   < > 183*   < > 189*   < > 192*   < > 170*   SARAH  --  8.3*  --   --   --   --  8.6  --  8.6  --  8.1*  --  8.4*  --  8.2*    < > = values in this interval not displayed.       INRNo lab results found in last 7 days.    Recent Labs   Lab Test 24  0546 24  0547   POTASSIUM 3.5 3.8   CHLORIDE  103 105   BUN 9.3 8.2      Recent Labs   Lab Test 02/19/24  0547 02/18/24  0603 02/17/24  2117   ALBUMIN 2.2* 2.2*  --    BILITOTAL 0.2 <0.2  --    ALT 9 7  --    AST 10 8  --    PROTEIN  --   --  300*       Personally reviewed today's laboratory studies      Thank you for involving us in the care of this patient. We will continue to follow along with you.      Kay Montoya PA-C   [facility]  [phone]    Pt seen and examined by me independently and assessment and plans represents ATIYA Montoya and my shared decision making.   Pt with history of poorly controlled T2DM with neuropathy, obesity, poor compliance with meds/ f/up and now admitted with severe DFU R heel, no osteo, s/p debridement and wound vac.   S/p traumatic L wrist fracture.   Morbidly obese  Poor dentition, missing teeth.     Suspect he has underlying CKD 1 with likely heavy proteinuria contributing to SCHUYLER risk and anasarca and needs max RAAS inhib + prob SGLT2 inhib.   Now with mild SCHUYLER likely due to infx, no iv contrast, no voiding sx/ UTI sx, no NSAIDs, no overt hypotension. SomeWBC RBC and 1 squamous epi in urine on admit(before abx).  RBCs in urine can be due to diabetic nephropathy but this is diagnosis of exclusion. Denies taking any regular home meds so less likely interstitial nephritis.  Will see if persistent and await UPC. Consider additional serologies if persistent hematuria, proteinuria.      Hypoalbuminemia suspect he's nephrotic.    Anasarca- some diuresis last noc with lasix. Will schedule some bumex for ongoing diuresis.     HTN ultimately will get back on high dose ARB    Mild normocytic anemia ?infx related, ?iron defic. Trend for now.     Obesity       I saw and evaluated patient as part of a shared APRN/PA visit. I personally reviewed pt's history, current course, all labs, imaging and personally provided a substantive portion of care for this patient, and I approve the care plan as written by the PADDY. I  was involved with Medical Decision Making including: Please see A&P for additional details of medical decision making.       Will follow.     Glendy Pastrana MD  [facility]  [phone]

## 2024-02-21 LAB
ANION GAP SERPL CALCULATED.3IONS-SCNC: 7 MMOL/L (ref 7–15)
BUN SERPL-MCNC: 11.2 MG/DL (ref 6–20)
C3 SERPL-MCNC: 185 MG/DL (ref 81–157)
C4 SERPL-MCNC: 35 MG/DL (ref 13–39)
CALCIUM SERPL-MCNC: 8.7 MG/DL (ref 8.6–10)
CHLORIDE SERPL-SCNC: 99 MMOL/L (ref 98–107)
CHOLEST SERPL-MCNC: 156 MG/DL
CREAT SERPL-MCNC: 1.54 MG/DL (ref 0.67–1.17)
DEPRECATED HCO3 PLAS-SCNC: 31 MMOL/L (ref 22–29)
EGFRCR SERPLBLD CKD-EPI 2021: 55 ML/MIN/1.73M2
GLUCOSE BLDC GLUCOMTR-MCNC: 202 MG/DL (ref 70–99)
GLUCOSE BLDC GLUCOMTR-MCNC: 205 MG/DL (ref 70–99)
GLUCOSE BLDC GLUCOMTR-MCNC: 221 MG/DL (ref 70–99)
GLUCOSE BLDC GLUCOMTR-MCNC: 233 MG/DL (ref 70–99)
GLUCOSE SERPL-MCNC: 209 MG/DL (ref 70–99)
HAV IGM SERPL QL IA: NONREACTIVE
HBV CORE IGM SERPL QL IA: NONREACTIVE
HBV SURFACE AG SERPL QL IA: NONREACTIVE
HCV AB SERPL QL IA: NONREACTIVE
HDLC SERPL-MCNC: 39 MG/DL
HOLD SPECIMEN: NORMAL
LDLC SERPL CALC-MCNC: 77 MG/DL
MAGNESIUM SERPL-MCNC: 1.7 MG/DL (ref 1.7–2.3)
NONHDLC SERPL-MCNC: 117 MG/DL
POTASSIUM SERPL-SCNC: 4 MMOL/L (ref 3.4–5.3)
SODIUM SERPL-SCNC: 137 MMOL/L (ref 135–145)
TRIGL SERPL-MCNC: 199 MG/DL

## 2024-02-21 PROCEDURE — 120N000001 HC R&B MED SURG/OB

## 2024-02-21 PROCEDURE — 250N000011 HC RX IP 250 OP 636: Performed by: PODIATRIST

## 2024-02-21 PROCEDURE — 83520 IMMUNOASSAY QUANT NOS NONAB: CPT | Performed by: INTERNAL MEDICINE

## 2024-02-21 PROCEDURE — 250N000013 HC RX MED GY IP 250 OP 250 PS 637: Performed by: PODIATRIST

## 2024-02-21 PROCEDURE — 86334 IMMUNOFIX E-PHORESIS SERUM: CPT | Mod: 26 | Performed by: PATHOLOGY

## 2024-02-21 PROCEDURE — 250N000013 HC RX MED GY IP 250 OP 250 PS 637: Performed by: INTERNAL MEDICINE

## 2024-02-21 PROCEDURE — 36415 COLL VENOUS BLD VENIPUNCTURE: CPT | Performed by: INTERNAL MEDICINE

## 2024-02-21 PROCEDURE — P9047 ALBUMIN (HUMAN), 25%, 50ML: HCPCS | Performed by: INTERNAL MEDICINE

## 2024-02-21 PROCEDURE — 250N000011 HC RX IP 250 OP 636: Mod: JZ | Performed by: INTERNAL MEDICINE

## 2024-02-21 PROCEDURE — 86334 IMMUNOFIX E-PHORESIS SERUM: CPT | Performed by: PATHOLOGY

## 2024-02-21 PROCEDURE — 99233 SBSQ HOSP IP/OBS HIGH 50: CPT | Performed by: INTERNAL MEDICINE

## 2024-02-21 PROCEDURE — 80048 BASIC METABOLIC PNL TOTAL CA: CPT | Performed by: INTERNAL MEDICINE

## 2024-02-21 PROCEDURE — 250N000011 HC RX IP 250 OP 636: Performed by: INTERNAL MEDICINE

## 2024-02-21 PROCEDURE — 999N000248 HC STATISTIC IV INSERT WITH US BY RN

## 2024-02-21 PROCEDURE — 99255 IP/OBS CONSLTJ NEW/EST HI 80: CPT | Performed by: INTERNAL MEDICINE

## 2024-02-21 PROCEDURE — 86160 COMPLEMENT ANTIGEN: CPT | Performed by: INTERNAL MEDICINE

## 2024-02-21 PROCEDURE — 258N000003 HC RX IP 258 OP 636: Performed by: INTERNAL MEDICINE

## 2024-02-21 PROCEDURE — 80061 LIPID PANEL: CPT | Performed by: INTERNAL MEDICINE

## 2024-02-21 PROCEDURE — 99232 SBSQ HOSP IP/OBS MODERATE 35: CPT | Performed by: INTERNAL MEDICINE

## 2024-02-21 PROCEDURE — 80074 ACUTE HEPATITIS PANEL: CPT | Performed by: INTERNAL MEDICINE

## 2024-02-21 PROCEDURE — 86335 IMMUNFIX E-PHORSIS/URINE/CSF: CPT | Mod: 26 | Performed by: PATHOLOGY

## 2024-02-21 PROCEDURE — 86335 IMMUNFIX E-PHORSIS/URINE/CSF: CPT | Performed by: PATHOLOGY

## 2024-02-21 PROCEDURE — 86038 ANTINUCLEAR ANTIBODIES: CPT | Performed by: PATHOLOGY

## 2024-02-21 PROCEDURE — 83735 ASSAY OF MAGNESIUM: CPT | Performed by: INTERNAL MEDICINE

## 2024-02-21 RX ORDER — BUMETANIDE 0.25 MG/ML
2 INJECTION INTRAMUSCULAR; INTRAVENOUS
Status: DISCONTINUED | OUTPATIENT
Start: 2024-02-21 | End: 2024-02-21

## 2024-02-21 RX ORDER — BUMETANIDE 0.25 MG/ML
2 INJECTION INTRAMUSCULAR; INTRAVENOUS
Status: DISCONTINUED | OUTPATIENT
Start: 2024-02-21 | End: 2024-02-23

## 2024-02-21 RX ORDER — MAGNESIUM OXIDE 400 MG/1
400 TABLET ORAL EVERY 4 HOURS
Status: COMPLETED | OUTPATIENT
Start: 2024-02-21 | End: 2024-02-21

## 2024-02-21 RX ORDER — ALBUMIN (HUMAN) 12.5 G/50ML
50 SOLUTION INTRAVENOUS ONCE
Status: COMPLETED | OUTPATIENT
Start: 2024-02-21 | End: 2024-02-21

## 2024-02-21 RX ORDER — BUMETANIDE 0.25 MG/ML
2 INJECTION INTRAMUSCULAR; INTRAVENOUS ONCE
Status: COMPLETED | OUTPATIENT
Start: 2024-02-21 | End: 2024-02-21

## 2024-02-21 RX ADMIN — ASPIRIN 325 MG: 325 TABLET, COATED ORAL at 08:14

## 2024-02-21 RX ADMIN — OXYCODONE HYDROCHLORIDE 5 MG: 5 TABLET ORAL at 05:38

## 2024-02-21 RX ADMIN — ALBUMIN HUMAN 50 G: 0.25 SOLUTION INTRAVENOUS at 14:36

## 2024-02-21 RX ADMIN — PANTOPRAZOLE SODIUM 40 MG: 40 TABLET, DELAYED RELEASE ORAL at 06:33

## 2024-02-21 RX ADMIN — WHITE PETROLATUM: 1.75 OINTMENT TOPICAL at 08:16

## 2024-02-21 RX ADMIN — DAPTOMYCIN 750 MG: 500 INJECTION, POWDER, LYOPHILIZED, FOR SOLUTION INTRAVENOUS at 17:50

## 2024-02-21 RX ADMIN — METOCLOPRAMIDE 5 MG: 5 TABLET ORAL at 06:32

## 2024-02-21 RX ADMIN — PIPERACILLIN AND TAZOBACTAM 3.38 G: 3; .375 INJECTION, POWDER, FOR SOLUTION INTRAVENOUS at 08:14

## 2024-02-21 RX ADMIN — PIPERACILLIN AND TAZOBACTAM 3.38 G: 3; .375 INJECTION, POWDER, FOR SOLUTION INTRAVENOUS at 17:49

## 2024-02-21 RX ADMIN — INSULIN ASPART 5 UNITS: 100 INJECTION, SOLUTION INTRAVENOUS; SUBCUTANEOUS at 18:35

## 2024-02-21 RX ADMIN — NICOTINE 1 PATCH: 14 PATCH, EXTENDED RELEASE TRANSDERMAL at 14:02

## 2024-02-21 RX ADMIN — WHITE PETROLATUM: 1.75 OINTMENT TOPICAL at 21:42

## 2024-02-21 RX ADMIN — CARVEDILOL 12.5 MG: 12.5 TABLET, FILM COATED ORAL at 17:54

## 2024-02-21 RX ADMIN — ENOXAPARIN SODIUM 40 MG: 40 INJECTION SUBCUTANEOUS at 17:56

## 2024-02-21 RX ADMIN — BUMETANIDE 2 MG: 0.25 INJECTION INTRAMUSCULAR; INTRAVENOUS at 08:14

## 2024-02-21 RX ADMIN — OXYCODONE HYDROCHLORIDE 5 MG: 5 TABLET ORAL at 21:41

## 2024-02-21 RX ADMIN — METOCLOPRAMIDE 5 MG: 5 TABLET ORAL at 12:35

## 2024-02-21 RX ADMIN — MAGNESIUM OXIDE TAB 400 MG (241.3 MG ELEMENTAL MG) 400 MG: 400 (241.3 MG) TAB at 08:14

## 2024-02-21 RX ADMIN — BUMETANIDE 2 MG: 0.25 INJECTION INTRAMUSCULAR; INTRAVENOUS at 17:56

## 2024-02-21 RX ADMIN — MAGNESIUM OXIDE TAB 400 MG (241.3 MG ELEMENTAL MG) 400 MG: 400 (241.3 MG) TAB at 11:44

## 2024-02-21 RX ADMIN — CARVEDILOL 12.5 MG: 12.5 TABLET, FILM COATED ORAL at 08:14

## 2024-02-21 ASSESSMENT — ACTIVITIES OF DAILY LIVING (ADL)
ADLS_ACUITY_SCORE: 33

## 2024-02-21 NOTE — PLAN OF CARE
Problem: Adult Inpatient Plan of Care  Goal: Plan of Care Review  Description: The Plan of Care Review/Shift note should be completed every shift.  The Outcome Evaluation is a brief statement about your assessment that the patient is improving, declining, or no change.  This information will be displayed automatically on your shift  note.  Outcome: Progressing     Problem: Pain Acute  Goal: Optimal Pain Control and Function  Outcome: Progressing  Intervention: Develop Pain Management Plan  Recent Flowsheet Documentation  Taken 2/20/2024 2359 by Tonya Veliz RN  Pain Management Interventions: medication (see MAR)  Intervention: Prevent or Manage Pain  Recent Flowsheet Documentation  Taken 2/20/2024 2359 by Tonya Veliz RN  Medication Review/Management: medications reviewed     Problem: Comorbidity Management  Goal: Blood Pressure in Desired Range  Outcome: Progressing  Intervention: Maintain Blood Pressure Management  Recent Flowsheet Documentation  Taken 2/20/2024 2359 by Toyna Veliz RN  Medication Review/Management: medications reviewed     Problem: Infection  Goal: Absence of Infection Signs and Symptoms  Outcome: Progressing  Intervention: Prevent or Manage Infection  Recent Flowsheet Documentation  Taken 2/20/2024 2359 by Tonya Veliz RN  Isolation Precautions: contact precautions maintained     Problem: Comorbidity Management  Goal: Blood Pressure in Desired Range  Outcome: Progressing  Intervention: Maintain Blood Pressure Management  Recent Flowsheet Documentation  Taken 2/20/2024 2359 by Tonya Veliz RN  Medication Review/Management: medications reviewed   Goal Outcome Evaluation:  Pt is alert and oriented. VSS. C/o pain to rt foot, PRN Tylenol 650 mg, effective. Telemetry reading NSR. Wound vac dressing intact and patent. Sleeping between cares. C/o pain to rt heel 7/10 pain rate, given PRN Oxy 5 mg. Pt had a large formed BM, urinating with adequate amount. 06AM Mag 1.7, K 4.0, WNL.

## 2024-02-21 NOTE — PROGRESS NOTES
RENAL NOTE    REQUESTING PHYSICIAN: Madan Montano    REASON FOR CONSULT: SCHUYLER - worsening, volume overload    ASSESSMENT/PLAN:    Nephrotic range proteinuria, CKD1  Baseline Cr 0.8, with severe nephrotic range proteinuria and some cells on UA. Suspect he has CKD/GN most likely related to diabetic nephropathy but will send serologies to assess for other causes. Consider kidney bx in future if +w/up.   GFR at baseline appears normal.  Would rec maxing ARB, SGLT2 inhib eventually.    SCHUYLER suspect hemodynamic due to infx, hemodynamics. Lytes ok and Creat rise relatively modest. Follow with additional diuresis planned.     HTN bp controlled. Holding ARB but will try to resume soon to assist with proteinuria control    New HFrEF with WMA, prob high risk for CAD but had negative stress test last fall.  Cards eval pending, plan diuresis.     Anemia normocytic, can assess iron stores eventuall    Lytes: No derangements    HLD - statin    DM2 -historically poor control,  insulin this admit     Diabetic foot ulcer  2/16 debridement. Now on  Zosyn and daptomycin until 3/1 per ID. Wd vac, no osteo    Obesity      Will follow.   Glendy Pastrana MD  [facility]  [phone]      HPI:   Floyd is a 49 yo M   PMH: HTN, DM2, CVA, Obesity, tobacco use, chronic mild hyponatremia, anemia. Nonhealing diabetic foot ucer w cellulitis. Hx poor compliance with medical care.   Recent Left distal radial fracture of the wrist    R foot pain, now s/p debridement and wound vac on Zosyn and daptomycin per ID, no osteo.     Now SCHUYLER creat up from 0.8-->1.4-1.5    Got sob this am when up to BR for BM.   Got iv bumex and good response ongoing.   Eating ok  Feels he's emptying bladder ok. Urine pale.   Denies CP.     Echo shows new EF down to 30-35% with WMA and inc filling pressures.     Urine prot/creat 9 grms  REVIEW OF SYSTEMS:  ROS was completely reviewed and otherwise negative and non-contributory       Past Medical  History:   Diagnosis Date    Diabetes (H)      Social History     Socioeconomic History    Marital status: Single     Spouse name: Not on file    Number of children: Not on file    Years of education: Not on file    Highest education level: Not on file   Occupational History    Not on file   Tobacco Use    Smoking status: Every Day     Packs/day: .5     Types: Cigarettes    Smokeless tobacco: Never    Tobacco comments:     Seen IP by [facility] on 8/24/23 and declined cessation services and materials.    Vaping Use    Vaping Use: Never used   Substance and Sexual Activity    Alcohol use: No    Drug use: No    Sexual activity: Not on file   Other Topics Concern    Not on file   Social History Narrative    Patient reports that he does not have health insurance.     Social Determinants of Health     Financial Resource Strain: Low Risk  (10/20/2023)    Financial Resource Strain     Within the past 12 months, have you or your family members you live with been unable to get utilities (heat, electricity) when it was really needed?: No   Food Insecurity: High Risk (10/20/2023)    Food Insecurity     Within the past 12 months, did you worry that your food would run out before you got money to buy more?: Yes     Within the past 12 months, did the food you bought just not last and you didn t have money to get more?: No   Transportation Needs: Low Risk  (10/20/2023)    Transportation Needs     Within the past 12 months, has lack of transportation kept you from medical appointments, getting your medicines, non-medical meetings or appointments, work, or from getting things that you need?: No   Physical Activity: Not on file   Stress: Not on file   Social Connections: Not on file   Interpersonal Safety: Low Risk  (10/20/2023)    Interpersonal Safety     Do you feel physically and emotionally safe where you currently live?: Yes     Within the past 12 months, have you been hit, slapped, kicked or otherwise physically hurt by someone?: No      Within the past 12 months, have you been humiliated or emotionally abused in other ways by your partner or ex-partner?: No   Housing Stability: High Risk (10/20/2023)    Housing Stability     Do you have housing? : Yes     Are you worried about losing your housing?: Yes          ALLERGIES/SENSITIVITIES:  No Known Allergies      PHYSICAL EXAM:  Physical Exam   Temp: 97.9  F (36.6  C) Temp src: Oral BP: 133/76 Pulse: 78   Resp: 20 SpO2: 96 % O2 Device: None (Room air)    Vitals:    24 0904 24 2148   Weight: 122.5 kg (270 lb) 123.1 kg (271 lb 6.2 oz)     Vital Signs with Ranges  Temp:  [97.6  F (36.4  C)-98.4  F (36.9  C)] 97.9  F (36.6  C)  Pulse:  [77-87] 78  Resp:  [20] 20  BP: (127-139)/(74-78) 133/76  SpO2:  [95 %-96 %] 96 %  I/O last 3 completed shifts:  In: 680 [P.O.:680]  Out: 4750 [Urine:4750]    Temp (24hrs), Av.1  F (36.7  C), Min:97.4  F (36.3  C), Max:98.5  F (36.9  C)      No data found.    General: A&Ox3, NAD  HEENT: AT NC  NECK:  no JVD noted but thick neck   RESPIRATORY: clear bilat no crackles   CARDIOVASCULAR:  RRR no murmur noted  ABDOMEN:  soft, BS present, non-tender, distended   EXT with pitting edema to upper legs and lower trunk  GENITOURINARY:  No donovan   INTEGUMENTARY: Warm, dry, no rash but pale  NEUROLOGIC: grossly intact   Psych: calm, cooperative    Laboratory:     Recent Labs   Lab 24  0546 24  0547 24  0603 24  0552 02/15/24  0544 24  0935   WBC 12.0* 12.4* 13.8* 14.8* 12.5* 14.4*   RBC 3.52* 3.74* 3.74* 3.60* 3.62* 3.91*   HGB 10.3* 11.0* 11.1* 10.7* 10.8* 11.7*   HCT 32.2* 34.4* 34.8* 33.2* 32.9* 35.0*     357 400 384 377 374 416       Basic Metabolic Panel:  Recent Labs   Lab 24  0758 24  0537 24  2101 24  1723 24  1223 24  0922 24  0546 24  0739 24  0547 24  0743 24  0603 24  0758 24  0551 24  0739 24  0630   NA  --  137  --   --   --    --  136  --  138  --  137  --  136  --  135   POTASSIUM  --  4.0  --   --   --   --  3.5  --  3.8  --  3.8  --  3.9  --  3.8   CHLORIDE  --  99  --   --   --   --  103  --  105  --  107  --  104  --  103   CO2  --  31*  --   --   --   --  29  --  23  --  25  --  23  --  26   BUN  --  11.2  --   --   --   --  9.3  --  8.2  --  9.4  --  9.2  --  7.9   CR  --  1.54*  --   --   --   --  1.48*  --  1.42*  --  1.48*  --  1.37*  --  1.14   * 209* 205* 169* 180* 140* 142*   < > 173*   < > 183*   < > 189*   < > 192*   SARAH  --  8.7  --   --   --   --  8.3*  --  8.6  --  8.6  --  8.1*  --  8.4*    < > = values in this interval not displayed.       INRNo lab results found in last 7 days.    Recent Labs   Lab Test 02/20/24  0546 02/19/24  0547   POTASSIUM 3.5 3.8   CHLORIDE 103 105   BUN 9.3 8.2      Recent Labs   Lab Test 02/19/24  0547 02/18/24  0603 02/17/24  2117   ALBUMIN 2.2* 2.2*  --    BILITOTAL 0.2 <0.2  --    ALT 9 7  --    AST 10 8  --    PROTEIN  --   --  300*       Personally reviewed today's laboratory studies      Thank you for involving us in the care of this patient. We will continue to follow along with you.    Glendy Pastrana MD  [facility]  [phone]

## 2024-02-21 NOTE — PLAN OF CARE
Problem: Adult Inpatient Plan of Care  Goal: Plan of Care Review  Description: The Plan of Care Review/Shift note should be completed every shift.  The Outcome Evaluation is a brief statement about your assessment that the patient is improving, declining, or no change.  This information will be displayed automatically on your shift  note.  Outcome: Progressing  Goal: Absence of Hospital-Acquired Illness or Injury  Intervention: Identify and Manage Fall Risk  Recent Flowsheet Documentation  Taken 2/20/2024 1639 by Ina Chiang, RN  Safety Promotion/Fall Prevention:   activity supervised   assistive device/personal items within reach  Intervention: Prevent Infection  Recent Flowsheet Documentation  Taken 2/20/2024 1639 by Ina Chiang, RN  Infection Prevention: hand hygiene promoted  Goal: Optimal Comfort and Wellbeing  Intervention: Monitor Pain and Promote Comfort  Recent Flowsheet Documentation  Taken 2/20/2024 1518 by Ina Chiang, RN  Pain Management Interventions:   repositioned   rest     Problem: Pain Acute  Goal: Optimal Pain Control and Function  Outcome: Progressing  Intervention: Develop Pain Management Plan  Recent Flowsheet Documentation  Taken 2/20/2024 1518 by Ina Chiang RN  Pain Management Interventions:   repositioned   rest  Intervention: Prevent or Manage Pain  Recent Flowsheet Documentation  Taken 2/20/2024 1639 by Ina Chiang, RN  Medication Review/Management: medications reviewed     Goal Outcome Evaluation:  Alert and oriented x 4. Patient remains NWB to RLE. Reviewing IV abx. Pain controlled with PRN oxycodone x 1. Wound vac dressing in place and patent.

## 2024-02-21 NOTE — PROGRESS NOTES
"Care Management Follow Up    Length of Stay (days): 7    Expected Discharge Date: 02/22/2024     Concerns to be Addressed:     discharge planning   Patient plan of care discussed at interdisciplinary rounds: Yes    Anticipated Discharge Disposition:       Anticipated Discharge Services:    Education Provided on the Discharge Plan:  Per Care Team   Patient/Family in Agreement with the Plan:  Yes    Referrals Placed by CM/SW:    Private pay costs discussed: Not applicable    Additional Information:  Chart reviewed.    Cm updates:  Therapy recs TCU for pt, pt would like to go to TCU, but pt is working on verifying insurance.      Per Darleen Lyons FSW note she tried to reach out to pt in person yesterday and pt yelled, \"go away through the door.\"      Darleen Lyons plans on reaching out to pt today, and she provided writer with [facility] paperwork with phone # for pt to call the [facility]. Writer dropped this off in pts room. Pt plans to call. Writer will check with pt in the AM.       Social Hx:  \" Lives in mother's apartment, she has recently passed. No svcs and working on selecting insurance. 2/16 DM ulcer R heel detriment. Transport TBD.\"        Cm will continue to follow plan of care, review recommendations, and assist with any discharge needs anticipated.        Darleen Bansal RN      "

## 2024-02-21 NOTE — PROGRESS NOTES
"CLINICAL NUTRITION SERVICES - ASSESSMENT NOTE     Nutrition Prescription    RECOMMENDATIONS FOR MDs/PROVIDERS TO ORDER:  Nephro kathy daily or similar    Malnutrition Status:    Moderate in context of acute illness    Recommendations already ordered by Registered Dietitian (RD):  Expedite daily at breakfast    Future/Additional Recommendations:  Will monitor intake, wt, labs, wound healing per surgery/woc, offer education for DM again, HF, kidney health     REASON FOR ASSESSMENT  Floyd Capps is a/an 48 year old male assessed by the dietitian for LOS    Per chart review, pt with history of DM, HTN, CVA, obesity, recent ED visit for nonhealing diabetic foot ulcer with cellulitis, here for worsening pain in the foot, with necrotic tissue noted on exam .   S/p right heel foot ulcer I&D 2/16/24 and wound vac 2/19.  Acute onset new heart failure, SCHUYLER, HTN. Tobacco use disorder    NUTRITION HISTORY  Per chart review, stomach upset at home, pt said from Bactrim.  Nausea, vomiting, loose stools.      CURRENT NUTRITION ORDERS  Diet: 60 gm cho per meal  Intake/Tolerance: 100% of meals recorded, not all meals are recorded.  Most days he orders 2 meals daily.  Pt has only ordered one meal today.  Yesterday 3 meals ordered- if he eat everything would provide ~1733 calories and 89 gm protein  2/19 2 meals 695 calories, 35 gm protein  2/18 2 meals 560 calories, 19 gm protein    Visited pt,he seemed sleeping and groggy, he did not open his eyes but, he did briefly answer questions.  He says he is eating well and he does not have questions about eating with DM.    LABS  Labs reviewed  Creat 1.54 H  GFR 55 L  Glucose 209 H  Cholesterol 156  HDL 39 L  LDL 77  Triglycerides 199 H  FSBG 202, 233    MEDICATIONS  Medications reviewed  Bumex  Iv ABX  Novolog  Lantus 20 units bedtime  Mag Ox  Reglan - done  Nicoderm  Pantoprazole  Prn, oxycodone    ANTHROPOMETRICS  Height: 177.8 cm (5' 10\")  Most Recent Weight: 123.1 kg (271 lb 6.2 oz)  "   IBW: 75.4 kg  BMI: Obesity Grade II BMI 35-39.9  Weight History:   Weight of 270 lb for the past 3 months is questionable.  Wt Readings from Last 3 Encounters:   02/14/24 123.1 kg (271 lb 6.2 oz)   02/13/24 122.5 kg (270 lb)   02/06/24 122.5 kg (270 lb)     01/08/24 122.5 kg (270 lb)  11/21/23 122.5 kg (270 lb)    11/03/23 125.3 kg (276 lb 4.8 oz)   10/20/23 129.3 kg (285 lb 1.6 oz)   10/12/23 128.4 kg (283 lb)   09/23/23 130 kg (286 lb 9.6 oz)   08/26/23 122.2 kg (269 lb 6.4 oz)       Dosing Weight: 87.3 kg    ASSESSED NUTRITION NEEDS  Estimated Energy Needs: 2200 - 2620 kcals/day (25 - 30 kcals/kg)  Justification: Obese and Wound healing  Estimated Protein Needs: 87 +grams protein/day (1 gm/kg)  Justification: SCHUYLER and Wound healing  Estimated Fluid Needs: 2200 mL/day (25 mL/kg), or per provider  Justification: acute HF    PHYSICAL FINDINGS  Per chart,  Tooth decay  Edema +1 left lower extremity, +2 right lower extremity  Last BM 2/21/24    MALNUTRITION:  % Weight Loss:  Weight loss does not meet criteria for malnutrition   % Intake:  <75% for > 7 days (moderate malnutrition)  Subcutaneous Fat Loss:  None observed, observation not full exam, bare to waste  Muscle Loss:  None observed  Fluid Retention:  Mild     Malnutrition Diagnosis: Moderate malnutrition  In Context of:  Acute illness or injury    NUTRITION DIAGNOSIS  Malnutrition related to decreased intake as evidenced by <75% >7 days and fluid accumulation      INTERVENTIONS  Implementation  Nutrition Education: Pt says he understands eating with DM  Medical food supplement therapy     Goals  Patient to consume % of nutritionally adequate meals three times per day, or the equivalent with supplements/snacks.    Meet estimated nutrition needs    BG < 180    Wound healing     Monitoring/Evaluation  Progress toward goals will be monitored and evaluated per protocol.

## 2024-02-22 ENCOUNTER — APPOINTMENT (OUTPATIENT)
Dept: OCCUPATIONAL THERAPY | Facility: HOSPITAL | Age: 49
End: 2024-02-22
Payer: MEDICAID

## 2024-02-22 LAB
ALBUMIN SERPL BCG-MCNC: 2.7 G/DL (ref 3.5–5.2)
ANA SER QL IF: NEGATIVE
ANION GAP SERPL CALCULATED.3IONS-SCNC: 8 MMOL/L (ref 7–15)
BUN SERPL-MCNC: 12.7 MG/DL (ref 6–20)
CALCIUM SERPL-MCNC: 8.3 MG/DL (ref 8.6–10)
CHLORIDE SERPL-SCNC: 100 MMOL/L (ref 98–107)
CREAT SERPL-MCNC: 1.58 MG/DL (ref 0.67–1.17)
DEPRECATED HCO3 PLAS-SCNC: 29 MMOL/L (ref 22–29)
EGFRCR SERPLBLD CKD-EPI 2021: 53 ML/MIN/1.73M2
GLUCOSE BLDC GLUCOMTR-MCNC: 118 MG/DL (ref 70–99)
GLUCOSE BLDC GLUCOMTR-MCNC: 152 MG/DL (ref 70–99)
GLUCOSE BLDC GLUCOMTR-MCNC: 177 MG/DL (ref 70–99)
GLUCOSE BLDC GLUCOMTR-MCNC: 186 MG/DL (ref 70–99)
GLUCOSE SERPL-MCNC: 133 MG/DL (ref 70–99)
HOLD SPECIMEN: NORMAL
MAGNESIUM SERPL-MCNC: 1.6 MG/DL (ref 1.7–2.3)
PHOSPHATE SERPL-MCNC: 3.8 MG/DL (ref 2.5–4.5)
PLA2R IGG SER IA-ACNC: <2 RU/ML
POTASSIUM SERPL-SCNC: 3.5 MMOL/L (ref 3.4–5.3)
SODIUM SERPL-SCNC: 137 MMOL/L (ref 135–145)

## 2024-02-22 PROCEDURE — 83735 ASSAY OF MAGNESIUM: CPT | Performed by: INTERNAL MEDICINE

## 2024-02-22 PROCEDURE — 250N000011 HC RX IP 250 OP 636: Mod: JZ | Performed by: INTERNAL MEDICINE

## 2024-02-22 PROCEDURE — 120N000001 HC R&B MED SURG/OB

## 2024-02-22 PROCEDURE — 250N000013 HC RX MED GY IP 250 OP 250 PS 637: Performed by: PODIATRIST

## 2024-02-22 PROCEDURE — 97535 SELF CARE MNGMENT TRAINING: CPT | Mod: GO

## 2024-02-22 PROCEDURE — G0463 HOSPITAL OUTPT CLINIC VISIT: HCPCS | Mod: 25

## 2024-02-22 PROCEDURE — 250N000011 HC RX IP 250 OP 636: Performed by: PODIATRIST

## 2024-02-22 PROCEDURE — 99232 SBSQ HOSP IP/OBS MODERATE 35: CPT | Performed by: PHYSICIAN ASSISTANT

## 2024-02-22 PROCEDURE — 97606 NEG PRS WND THER DME>50 SQCM: CPT

## 2024-02-22 PROCEDURE — 250N000011 HC RX IP 250 OP 636: Performed by: INTERNAL MEDICINE

## 2024-02-22 PROCEDURE — 258N000003 HC RX IP 258 OP 636: Performed by: INTERNAL MEDICINE

## 2024-02-22 PROCEDURE — 250N000013 HC RX MED GY IP 250 OP 250 PS 637: Performed by: INTERNAL MEDICINE

## 2024-02-22 PROCEDURE — 99233 SBSQ HOSP IP/OBS HIGH 50: CPT | Performed by: INTERNAL MEDICINE

## 2024-02-22 PROCEDURE — 84132 ASSAY OF SERUM POTASSIUM: CPT | Performed by: INTERNAL MEDICINE

## 2024-02-22 PROCEDURE — P9047 ALBUMIN (HUMAN), 25%, 50ML: HCPCS | Performed by: INTERNAL MEDICINE

## 2024-02-22 PROCEDURE — 36415 COLL VENOUS BLD VENIPUNCTURE: CPT | Performed by: INTERNAL MEDICINE

## 2024-02-22 RX ORDER — METOCLOPRAMIDE 5 MG/1
5 TABLET ORAL
Status: DISCONTINUED | OUTPATIENT
Start: 2024-02-22 | End: 2024-02-22

## 2024-02-22 RX ORDER — ALBUMIN (HUMAN) 12.5 G/50ML
50 SOLUTION INTRAVENOUS ONCE
Status: COMPLETED | OUTPATIENT
Start: 2024-02-22 | End: 2024-02-22

## 2024-02-22 RX ORDER — POTASSIUM CHLORIDE 1500 MG/1
20 TABLET, EXTENDED RELEASE ORAL ONCE
Status: COMPLETED | OUTPATIENT
Start: 2024-02-22 | End: 2024-02-22

## 2024-02-22 RX ORDER — MAGNESIUM OXIDE 400 MG/1
400 TABLET ORAL EVERY 4 HOURS
Status: COMPLETED | OUTPATIENT
Start: 2024-02-22 | End: 2024-02-22

## 2024-02-22 RX ADMIN — ALBUMIN HUMAN 50 G: 0.25 SOLUTION INTRAVENOUS at 05:41

## 2024-02-22 RX ADMIN — MAGNESIUM OXIDE TAB 400 MG (241.3 MG ELEMENTAL MG) 400 MG: 400 (241.3 MG) TAB at 14:02

## 2024-02-22 RX ADMIN — CARVEDILOL 12.5 MG: 12.5 TABLET, FILM COATED ORAL at 09:26

## 2024-02-22 RX ADMIN — ASPIRIN 325 MG: 325 TABLET, COATED ORAL at 09:27

## 2024-02-22 RX ADMIN — PIPERACILLIN AND TAZOBACTAM 3.38 G: 3; .375 INJECTION, POWDER, FOR SOLUTION INTRAVENOUS at 09:15

## 2024-02-22 RX ADMIN — ENOXAPARIN SODIUM 40 MG: 40 INJECTION SUBCUTANEOUS at 18:48

## 2024-02-22 RX ADMIN — NICOTINE 1 PATCH: 14 PATCH, EXTENDED RELEASE TRANSDERMAL at 14:03

## 2024-02-22 RX ADMIN — BUMETANIDE 2 MG: 0.25 INJECTION INTRAMUSCULAR; INTRAVENOUS at 18:48

## 2024-02-22 RX ADMIN — POTASSIUM CHLORIDE 20 MEQ: 1500 TABLET, EXTENDED RELEASE ORAL at 11:17

## 2024-02-22 RX ADMIN — PIPERACILLIN AND TAZOBACTAM 3.38 G: 3; .375 INJECTION, POWDER, FOR SOLUTION INTRAVENOUS at 16:51

## 2024-02-22 RX ADMIN — ACETAMINOPHEN 650 MG: 325 TABLET ORAL at 16:55

## 2024-02-22 RX ADMIN — MAGNESIUM OXIDE TAB 400 MG (241.3 MG ELEMENTAL MG) 400 MG: 400 (241.3 MG) TAB at 11:17

## 2024-02-22 RX ADMIN — INSULIN ASPART 7 UNITS: 100 INJECTION, SOLUTION INTRAVENOUS; SUBCUTANEOUS at 18:47

## 2024-02-22 RX ADMIN — OXYCODONE HYDROCHLORIDE 5 MG: 5 TABLET ORAL at 09:16

## 2024-02-22 RX ADMIN — PIPERACILLIN AND TAZOBACTAM 3.38 G: 3; .375 INJECTION, POWDER, FOR SOLUTION INTRAVENOUS at 00:30

## 2024-02-22 RX ADMIN — BUMETANIDE 2 MG: 0.25 INJECTION INTRAMUSCULAR; INTRAVENOUS at 09:16

## 2024-02-22 RX ADMIN — PANTOPRAZOLE SODIUM 40 MG: 40 TABLET, DELAYED RELEASE ORAL at 06:25

## 2024-02-22 RX ADMIN — WHITE PETROLATUM: 1.75 OINTMENT TOPICAL at 21:43

## 2024-02-22 RX ADMIN — WHITE PETROLATUM: 1.75 OINTMENT TOPICAL at 09:29

## 2024-02-22 RX ADMIN — DAPTOMYCIN 750 MG: 500 INJECTION, POWDER, LYOPHILIZED, FOR SOLUTION INTRAVENOUS at 16:55

## 2024-02-22 ASSESSMENT — ACTIVITIES OF DAILY LIVING (ADL)
ADLS_ACUITY_SCORE: 39
ADLS_ACUITY_SCORE: 33
ADLS_ACUITY_SCORE: 39
ADLS_ACUITY_SCORE: 37
ADLS_ACUITY_SCORE: 33
ADLS_ACUITY_SCORE: 33
ADLS_ACUITY_SCORE: 39

## 2024-02-22 NOTE — PLAN OF CARE
Problem: Adult Inpatient Plan of Care  Goal: Absence of Hospital-Acquired Illness or Injury  Intervention: Identify and Manage Fall Risk  Recent Flowsheet Documentation  Taken 2/22/2024 1700 by Shandra Do RN  Safety Promotion/Fall Prevention: activity supervised  Intervention: Prevent and Manage VTE (Venous Thromboembolism) Risk  Recent Flowsheet Documentation  Taken 2/22/2024 1700 by Shandra Do RN  VTE Prevention/Management: (Lovenox) other (see comments)  Intervention: Prevent Infection  Recent Flowsheet Documentation  Taken 2/22/2024 1700 by Shandra Do RN  Infection Prevention: hand hygiene promoted     Problem: Pain Acute  Goal: Optimal Pain Control and Function  Intervention: Prevent or Manage Pain  Recent Flowsheet Documentation  Taken 2/22/2024 1700 by Shandra Do RN  Medication Review/Management: medications reviewed     Problem: Comorbidity Management  Goal: Maintenance of Asthma Control  Intervention: Maintain Asthma Symptom Control  Recent Flowsheet Documentation  Taken 2/22/2024 1700 by Shandra Do RN  Medication Review/Management: medications reviewed  Goal: Maintenance of Behavioral Health Symptom Control  Intervention: Maintain Behavioral Health Symptom Control  Recent Flowsheet Documentation  Taken 2/22/2024 1700 by Shandra Do RN  Medication Review/Management: medications reviewed  Goal: Maintenance of COPD Symptom Control  Intervention: Maintain COPD Symptom Control  Recent Flowsheet Documentation  Taken 2/22/2024 1700 by Shandra Do RN  Medication Review/Management: medications reviewed  Goal: Blood Glucose Levels Within Targeted Range  Intervention: Monitor and Manage Glycemia  Recent Flowsheet Documentation  Taken 2/22/2024 1700 by Shandra Do RN  Glycemic Management: blood glucose monitored  Medication Review/Management: medications reviewed  Goal: Maintenance of Heart Failure Symptom Control  Intervention: Maintain Heart Failure Management  Recent Flowsheet  "Documentation  Taken 2/22/2024 1700 by Shandra Do RN  Medication Review/Management: medications reviewed  Goal: Blood Pressure in Desired Range  Intervention: Maintain Blood Pressure Management  Recent Flowsheet Documentation  Taken 2/22/2024 1700 by Shandra Do RN  Medication Review/Management: medications reviewed  Goal: Maintenance of Osteoarthritis Symptom Control  Intervention: Maintain Osteoarthritis Symptom Control  Recent Flowsheet Documentation  Taken 2/22/2024 1700 by Shandra Do RN  Medication Review/Management: medications reviewed  Goal: Maintenance of Seizure Control  Intervention: Maintain Seizure Symptom Control  Recent Flowsheet Documentation  Taken 2/22/2024 1700 by Shandra Do RN  Medication Review/Management: medications reviewed     Problem: Infection  Goal: Absence of Infection Signs and Symptoms  Intervention: Prevent or Manage Infection  Recent Flowsheet Documentation  Taken 2/22/2024 1700 by Shandra Do RN  Isolation Precautions: contact precautions maintained     Problem: Malnutrition  Goal: Improved Nutritional Intake  Intervention: Promote and Optimize Oral Intake  Recent Flowsheet Documentation  Taken 2/22/2024 1700 by Shandra Do RN  Nutrition Interventions: supplemental drinks provided but refused fearing \"it might be bad for my kidney's\"   Goal Outcome Evaluation:  Pain managed with Tylenol.  Wound vac dressing intact to the right heel.  Aquaphor used on dry flaky legs and feet and fissured left heel.  Pt got IV Zosyn and Daptomycin.  IV Bumex administered with less output today.                        "

## 2024-02-22 NOTE — PROGRESS NOTES
[facility]  WO Nurse Inpatient Assessment     Consulted for: wound vac to right heel    Summary: 2/22 Patient in bed, very flat affect, did not speak during wound vac dressing change except to ask if I was done yet.    2/19 Patient in bed, going between flat affect to anger.  Did listen to wound vac teaching and the rationale for its use.  Is concerned with finances and how to care for it once out of hospital.    Patient History (according to provider note(s):      Floyd Capps is a 48 year old male with history of DM, HTN, CVA, obesity, recent ED visit for nonhealing diabetic foot ulcer with cellulitis, here for worsening pain in the foot, with necrotic tissue noted on exam.     Assessment:      Areas visualized during today's visit:  right foot    Negative pressure wound therapy applied to: right heel       Last photo: 2/19  Wound due to: Diabetic Ulcer   Wound history/plan of care:    Surgical date: 2/16   Service following: podiatry  Date Negative Pressure Wound Therapy initiated: 2/19   Interventions in place: offloading and elevation  Is patient s nutritional status compromised? no   If yes, what interventions are in place? N/A  Reason for initiating vac therapy? Presence of co-morbidities, High risk of infections, and Need for accelerated granulation tissue  Which?of?the?following?co-morbidities?apply? Diabetes, Obesity, and Smoking  If diabetic is patient on a diabetic management program? Yes   Is osteomyelitis present in wound? no   If yes what treatments are in place? N/A    Wound base: 70 % non-granular tissue, 30 % bone      Palpation of the wound bed: firm       Drainage: none      Volume in cannister: 0     Last cannister change date: new on 2/19     Description of drainage: none      Measurements (length x width x depth, in cm) 8 cm  x 7 cm  x  3.5 cm       Tunneling N/A      Undermining N/A   Periwound skin: Macerated and peeling        Color: pale and pink        "Temperature: normal    Odor: none   Pain: mild, throbbing   Pain intervention prior to dressing change: patient tolerated well, oral oxycodone, and slow and gentle cares   Treatment goal: Heal  and Protection  STATUS: stable   Supplies ordered: gathered, ordered vashe, pump supplies, and supplies stored on unit    Number of foam pieces removed from a wound (excluding foam for bridge) :  1 Sarah Black   Verified this matched the number of foam pieces applied last dressing change: N/A   Number of foam pieces packed into wound (excluding foam for bridge) :  1 Jesusoam Black       Treatment Plan:     Negative pressure wound therapy plan:  Wound location: right heel   Change Days:  M/Th  by WOC RN    Supplies (including all accessories) used: medium Black foam   Cleanse with Vashe prior to replacing NPWT  Suction setting: -125   Methods used: Window paned all periwound skin with vac drape prior to applying sponge and Bridged trac pad off bony prominences    Staff RN to assess integrity of dressing and ensure suction is set at appropriate level every shift.   Date canister. Chart canister output every shift. Change cannister weekly and PRN if full/occluded     Remove foam dressing and replace with BID normal saline moist gauze dressing if:   -a dressing failure which cannot be repaired within 2 hours   -patient is discharging to home without a home pump   -patient is discharging to a facility outside the local area   -if a dressing is a \"Silver Foam\", remove before Radiation Therapy or MRI     The hospital VAC pump is not to be discharged with the patient.?Ensure to disconnect patient from machine prior to discharge. Then,    - If a home KCI VAC pump has been delivered, connect home cannister to dressing tubing then connect cannister to home pump and turn on machine    - If transferring to a nearby facility with a KCI vac, can disconnect and clamp tubing then cover end with glove so can be reconnected within 2 hours  "      Orders: Reviewed    RECOMMEND PRIMARY TEAM ORDER: None, at this time  Education provided: plan of care, Infection prevention , and Off-loading pressure  Discussed plan of care with: Patient  WOC nurse follow-up plan:  M/Th  Notify WOC if wound(s) deteriorate.  Nursing to notify the Provider(s) and re-consult the WOC Nurse if new skin concern.    DATA:     Current support surface: Standard  Standard gel/foam mattress (IsoFlex, Atmos air, etc)  Containment of urine/stool: Continent of bladder and Continent of bowel  BMI: Body mass index is 39.67 kg/m .   Active diet order: Orders Placed This Encounter      Moderate Consistent Carb (60 g CHO per Meal) Diet     Output: I/O last 3 completed shifts:  In: 1465 [P.O.:1260; I.V.:205]  Out: 4575 [Urine:4575]     Labs:   Recent Labs   Lab 02/22/24  0635 02/20/24  0546   ALBUMIN 2.7* 2.0*   HGB  --  10.3*   WBC  --  12.0*     Pressure injury risk assessment:   Sensory Perception: 4-->no impairment  Moisture: 4-->rarely moist  Activity: 1-->bedfast  Mobility: 3-->slightly limited  Nutrition: 3-->adequate  Friction and Shear: 2-->potential problem  Rolo Score: 17    BHARATHI Gregory RN CWON  Pager no longer in use, please contact through [employer] group: [facility]

## 2024-02-22 NOTE — PROGRESS NOTES
[facility]    Medicine Progress Note - Hospitalist Service    Date of Admission:  2/14/2024    Assessment & Plan   Floyd Capps is a 48 year old male with history of DM, HTN, CVA, obesity, recent ED visit for nonhealing diabetic foot ulcer with cellulitis, here for worsening pain in the foot, with necrotic tissue noted on exam.  Patient admitted on 2/14/2024.      Diabetic foot ulcer, right heel;  -chronic wound R heel, recent resulting cellulitis treated with Bactrim  -here for worse pain in the foot, found with ulcer worse with necrotic tissue  -MRI of the foot no osteomyelitis  -On 2/16, s/p debridement with irrigation of right foot diabetic ulcer. NWB on right foot per podiatrist  -On 2/19, wound VAC  -Surgical cultures polymicrobials.  Initially IV Vanco and IV Zosyn, now changed to IV Vanco and daptomycin.  Appreciated ID input  -Care Mgr to see about financial/insurance issues which have been a barrier to followup  -Pain management with Tylenol, home oxycodone.     Acute new onset systolic heart failure;  CT imaging reported moderate bilateral pleural effusion and diffuse anasarca;  -- On 2/19, patient reported feeling shortness of breath, imaging workup showed bilateral moderate pleural effusion and diffuse anasarca  --BNP significantly elevated   --Echo on 8/23 reported EF 55% but given patient noncompliance with medications, rechecked echocardiogram with EF 30 to 35% with global hypokinesis.  --Of note, patient had nuclear stress test on 8/23 which was negative for inducible myocardial ischemia  --On 2/21, personally discussed with cardiology, anticipating ischemic evaluation at some point pending kidney function  --Continue IV diuretics.  Patient currently receiving IV albumin  --Monitor intake/output, weight, labs closely    SCHUYLER: Likely due to CHF exacerbation, possibly due to diabetic nephropathy  - Baseline is 0.6-0.8  -On a combination of broad spectrum abx ( Vanc and  Zosyn)  -Check UA with no significant RBC's or casts   -On 2/21, personally discussed with nephrology, given proteinuria multiple serological test ordered and pending  -Electrolyte imbalance, replace per protocol    DM Type II, Uncontrolled;  -Stopped all his home meds lately due to stomach upset while on Bactrim, then neglected to resume meds. Hold home metformin and glipizide for now.  -A1c 11.8  -Started Lantus, increase to 24 unit at night on 2/21. Of note, patient was on insulin in the past and received education in the hospital about it, but seems to have stopped on its own.  Patient needs outpatient diabetic educator for ongoing titration  -Insulin sliding scale and hypoglycemic protocol  -Given poor appetite and early satiety, trial of Reglan which helped, no appetite good.    -Also added PPI    History of stroke in 8/2023;  -Had been taking Plavix and atorvastatin but recently stopped taking all of his medications as above.  -Neurologist note from 8/26/2023 reviewed- at that time recommended DAPT with aspirin and Plavix for 90 days, afterwards switch to enteric-coated aspirin 325 mg daily.  -Started full dose aspirin and resumed home atorvastatin    Essential hypertension, uncontrolled; due to noncompliance-improving  -- Discharge summary from 8/26/2023 reviewed, patient was discharged home on Coreg 12.5 mg twice daily, HCTZ 12.5 mg daily, losartan 100 mg daily  --On 2/19 restarted Coreg 12.5 mg twice daily.  --Diuretics as above.  As needed hydralazine.  Monitor vitals and adjust medications accordingly.    Tobacco use disorder  -Nicotine Patch  -Advised to quit smoking     Left distal radius fracture  -Fell recently  -Cast in place  -PT and OT evals  -Outpatient orthopedics follow-up     Normocytic anemia, likely due to chronic wound;  -No active/obvious bleeding.  Trend     Poor follow-up, noncompliance;  -Importance of compliance and follow-up needs to be emphasized.  Patient with relatively young age  "and already with serious comorbidities of chronic disease.          Diet: Moderate Consistent Carb (60 g CHO per Meal) Diet  Fluid restriction 1800 ML FLUID    DVT Prophylaxis: Enoxaparin (Lovenox) SQ  Cazares Catheter: Not present  Lines: None     Cardiac Monitoring: ACTIVE order. Indication: Acute decompensated heart failure (48 hours)  Code Status: Full Code      Clinically Significant Risk Factors            # Hypomagnesemia: Lowest Mg = 1.6 mg/dL in last 2 days, will replace as needed   # Hypoalbuminemia: Lowest albumin = 2 g/dL at 2/20/2024  5:46 AM, will monitor as appropriate    # Acute Kidney Injury, unspecified: based on a >150% or 0.3 mg/dL increase in last creatinine compared to past 90 day average, will monitor renal function   # Acute heart failure with reduced ejection fraction: last echo with EF <40% and receiving IV diuretics      # DMII: A1C = 11.8 % (Ref range: <5.7 %) within past 6 months   # Obesity: Estimated body mass index is 39.67 kg/m  as calculated from the following:    Height as of this encounter: 1.778 m (5' 10\").    Weight as of this encounter: 125.4 kg (276 lb 7.3 oz).   # Moderate Malnutrition: based on nutrition assessment    # Financial/Environmental Concerns: none         Disposition Plan      Expected Discharge Date: 02/23/2024    Discharge Delays: IV Medication - consider oral or Home Infusion  Procedure Pending (enter procedure & time in comments)  Placement - TCU  Specialist Consult (enter specialist & decision needed in comments)  Destination: home with family  Discharge Comments: insurance needs, angio            Madan Yu MD  Hospitalist Service  [facility]  Securely message with [employer] (more info)  Text page via [facility] Paging/Directory   ______________________________________________________________________    Interval History   Patient seen and examined.  Notes, labs, imaging report personally reviewed.  No acute issues reported overnight.  This " morning patient reported having some muscle twitching, tightness on his abdomen and also some shortness of breath but reports breathing better than yesterday.  Patient denied nausea or vomiting and reports that he might ate too much last night.  Discussed with nursing staff.  Discussed with nephrology team.    Physical Exam   Vital Signs: Temp: 98.6  F (37  C) Temp src: Oral BP: 130/74 Pulse: 78   Resp: 18 SpO2: 93 % O2 Device: None (Room air)    Weight: 276 lbs 7.31 oz      General: Not in obvious distress.  HEENT: Normocephalic, supple neck  Chest: Clear to auscultation bilateral anteriorly, no wheezing  Heart: S1S2 normal, regular  Abdomen: Soft.  Obese, mild tenderness around periumbilical area, no rebound or guarding. Bowel sounds- active.  Extremities: Bilateral lower extremity swelling improving.  Right heel wound VAC in place  Neuro: alert and awake, moves all extremities      Medical Decision Making             Data     I have personally reviewed the following data over the past 24 hrs:    N/A  \   N/A   / N/A     137 100 12.7 /  118 (H)   3.5 29 1.58 (H) \     ALT: N/A AST: N/A AP: N/A TBILI: N/A   ALB: 2.7 (L) TOT PROTEIN: N/A LIPASE: N/A       Imaging results reviewed over the past 24 hrs:   No results found for this or any previous visit (from the past 24 hour(s)).

## 2024-02-22 NOTE — PLAN OF CARE
Problem: Adult Inpatient Plan of Care  Goal: Absence of Hospital-Acquired Illness or Injury  Intervention: Identify and Manage Fall Risk  Recent Flowsheet Documentation  Taken 2/21/2024 1634 by Shandra Do RN  Safety Promotion/Fall Prevention: activity supervised  Intervention: Prevent and Manage VTE (Venous Thromboembolism) Risk  Recent Flowsheet Documentation  Taken 2/21/2024 1634 by Shandra Do RN  VTE Prevention/Management: (Lovenox) other (see comments)  Intervention: Prevent Infection  Recent Flowsheet Documentation  Taken 2/21/2024 1634 by Shandra Do RN  Infection Prevention: hand hygiene promoted     Problem: Pain Acute  Goal: Optimal Pain Control and Function  Intervention: Prevent or Manage Pain  Recent Flowsheet Documentation  Taken 2/21/2024 1634 by Shandra Do RN  Medication Review/Management: medications reviewed     Problem: Comorbidity Management  Goal: Maintenance of Asthma Control  Intervention: Maintain Asthma Symptom Control  Recent Flowsheet Documentation  Taken 2/21/2024 1634 by Shandra Do RN  Medication Review/Management: medications reviewed  Goal: Maintenance of Behavioral Health Symptom Control  Intervention: Maintain Behavioral Health Symptom Control  Recent Flowsheet Documentation  Taken 2/21/2024 1634 by Shandra Do RN  Medication Review/Management: medications reviewed  Goal: Maintenance of COPD Symptom Control  Intervention: Maintain COPD Symptom Control  Recent Flowsheet Documentation  Taken 2/21/2024 1634 by Shandra Do RN  Medication Review/Management: medications reviewed  Goal: Blood Glucose Levels Within Targeted Range  Intervention: Monitor and Manage Glycemia  Recent Flowsheet Documentation  Taken 2/21/2024 1634 by Shandra Do RN  Medication Review/Management: medications reviewed  Goal: Maintenance of Heart Failure Symptom Control  Intervention: Maintain Heart Failure Management  Recent Flowsheet Documentation  Taken 2/21/2024 1634 by Shandra Do  RN  Medication Review/Management: medications reviewed  Goal: Blood Pressure in Desired Range  Intervention: Maintain Blood Pressure Management  Recent Flowsheet Documentation  Taken 2/21/2024 1634 by Shandra Do RN  Medication Review/Management: medications reviewed  Goal: Maintenance of Osteoarthritis Symptom Control  Intervention: Maintain Osteoarthritis Symptom Control  Recent Flowsheet Documentation  Taken 2/21/2024 1634 by Shandra Do RN  Medication Review/Management: medications reviewed  Goal: Bariatric Home Regimen Maintained  Intervention: Maintain and Manage Postbariatric Surgery Care  Recent Flowsheet Documentation  Taken 2/21/2024 1634 by Shandra Do RN  Medication Review/Management: medications reviewed  Goal: Maintenance of Seizure Control  Intervention: Maintain Seizure Symptom Control  Recent Flowsheet Documentation  Taken 2/21/2024 1634 by Shandra Do RN  Medication Review/Management: medications reviewed     Problem: Infection  Goal: Absence of Infection Signs and Symptoms  Intervention: Prevent or Manage Infection  Recent Flowsheet Documentation  Taken 2/21/2024 1634 by Shandra Do RN  Isolation Precautions: contact precautions maintained     Problem: Malnutrition  Goal: Improved Nutritional Intake  Intervention: Promote and Optimize Oral Intake  Recent Flowsheet Documentation  Taken 2/21/2024 1634 by Shandra Do RN  Nutrition Interventions: supplemental drinks provided   Goal Outcome Evaluation: Pt complains of foot pain with movement.  He got Oxycodone 5 mg x 1 this shift.  He had IV Bumex with good output total this shift of 2075 ml with 1250 in.  He continues to have a dry cough that is more clearing of throat.  He reports occasional dyspnea.  Lungs are clear.  O2 sats are in 90s on room air.  Right heel has a wound vac on with no drainage on continuous suction at 125.  Glucose levels were 205 and 221.  Sliding scale and Carb coverage as well as Lantus insulin given this  shift.

## 2024-02-22 NOTE — PROGRESS NOTES
RENAL NOTE    REQUESTING PHYSICIAN: Madan Montano    REASON FOR CONSULT: SCHUYLER - worsening, volume overload    ASSESSMENT/PLAN:    Nephrotic range proteinuria, CKD1  Baseline Cr 0.8, with severe nephrotic range proteinuria and some cells on UA. Suspect he has CKD/GN most likely related to diabetic nephropathy but will send serologies to assess for other causes.   Nephrotic proteinuria - 9g  - Complements normal/elevated  - Hep Panel Neg     Consider kidney bx in future if +w/up.   GFR at baseline appears normal.  Would rec maxing ARB, SGLT2 inhib eventually.    SCHUYLER suspect hemodynamic due to infx, hemodynamics.   - Cr stable today, despite significant diuresis    - Lytes WNL   - Inconsistent weight measurements, although pt w/ 2 days of 4L urine output, IV bumex 2mg bid and IV albumin on board    HTN bp controlled.   Holding ARB but will try to resume soon to assist with proteinuria control    Volume:  Noted edema  On albumin / bumex IV,     New HFrEF with WMA, prob high risk for CAD but had negative stress test last fall.  Cards eval pending, plan diuresis.   Echo shows new EF down to 30-35% with WMA and inc filling pressures.     Anemia normocytic, can assess iron stores eventuall    Lytes: No derangements    HLD - statin    DM2 -historically poor control,  insulin this admit     Diabetic foot ulcer  2/16 debridement. Now on  Zosyn and daptomycin until 3/1 per ID. Wound vac, no osteo    Obesity    Tobacco use - nicotine patches in use this admit        HPI:   Floyd is a 49 yo M   PMH: HTN, DM2, CVA, Obesity, tobacco use, chronic mild hyponatremia, anemia. Nonhealing diabetic foot ucer w cellulitis. Hx poor compliance with medical care.   Recent Left distal radial fracture of the wrist    R foot pain, now s/p debridement and wound vac on Zosyn and daptomycin per ID, no osteo.     Now SCHUYLER creat up from 0.8-->1.4-1.5    Pt seen in room in bed  Pt was talking with Dr Yu when I arrived     Pt reports  "doing+/-  Complaints of \"jaylan sensation in abdomen\" When asked to qualify pain, cramp, etc - he is unable to explain  Urinating - says overnight had accident in bed and was in wet sheets for hours   Says appetite this morning poor due to abdominal 'pain', notes he ate perhaps too much last night?   No NV  No perceived dizziness   No acute concerns , but would like to rest   We reviewed his kidney function, answered questions       Urine prot/creat 9 grms  REVIEW OF SYSTEMS:  ROS was completely reviewed and otherwise negative and non-contributory       Past Medical History:   Diagnosis Date    Diabetes (H)      Social History     Socioeconomic History    Marital status: Single     Spouse name: Not on file    Number of children: Not on file    Years of education: Not on file    Highest education level: Not on file   Occupational History    Not on file   Tobacco Use    Smoking status: Every Day     Packs/day: .5     Types: Cigarettes    Smokeless tobacco: Never    Tobacco comments:     Seen IP by [facility] on 8/24/23 and declined cessation services and materials.    Vaping Use    Vaping Use: Never used   Substance and Sexual Activity    Alcohol use: No    Drug use: No    Sexual activity: Not on file   Other Topics Concern    Not on file   Social History Narrative    Patient reports that he does not have health insurance.     Social Determinants of Health     Financial Resource Strain: Low Risk  (10/20/2023)    Financial Resource Strain     Within the past 12 months, have you or your family members you live with been unable to get utilities (heat, electricity) when it was really needed?: No   Food Insecurity: High Risk (10/20/2023)    Food Insecurity     Within the past 12 months, did you worry that your food would run out before you got money to buy more?: Yes     Within the past 12 months, did the food you bought just not last and you didn t have money to get more?: No   Transportation Needs: Low Risk  (10/20/2023)    " Transportation Needs     Within the past 12 months, has lack of transportation kept you from medical appointments, getting your medicines, non-medical meetings or appointments, work, or from getting things that you need?: No   Physical Activity: Not on file   Stress: Not on file   Social Connections: Not on file   Interpersonal Safety: Low Risk  (10/20/2023)    Interpersonal Safety     Do you feel physically and emotionally safe where you currently live?: Yes     Within the past 12 months, have you been hit, slapped, kicked or otherwise physically hurt by someone?: No     Within the past 12 months, have you been humiliated or emotionally abused in other ways by your partner or ex-partner?: No   Housing Stability: High Risk (10/20/2023)    Housing Stability     Do you have housing? : Yes     Are you worried about losing your housing?: Yes          ALLERGIES/SENSITIVITIES:  No Known Allergies      PHYSICAL EXAM:  Physical Exam   Temp: 98.6  F (37  C) Temp src: Oral BP: 130/74 Pulse: 78   Resp: 18 SpO2: 93 % O2 Device: None (Room air)    Vitals:    24 0904 24 2148 24 0409   Weight: 122.5 kg (270 lb) 123.1 kg (271 lb 6.2 oz) 125.4 kg (276 lb 7.3 oz)     Vital Signs with Ranges  Temp:  [98.1  F (36.7  C)-98.8  F (37.1  C)] 98.6  F (37  C)  Pulse:  [77-85] 78  Resp:  [17-20] 18  BP: (114-146)/(66-76) 130/74  SpO2:  [91 %-96 %] 93 %  I/O last 3 completed shifts:  In: 1465 [P.O.:1260; I.V.:205]  Out: 4575 [Urine:4575]    Temp (24hrs), Av.1  F (36.7  C), Min:97.4  F (36.3  C), Max:98.5  F (36.9  C)      Patient Vitals for the past 72 hrs:   Weight   24 0409 125.4 kg (276 lb 7.3 oz)       General: A&Ox3, NAD  HEENT: No abnormalities   NECK:  no JVD noted  RESPIRATORY: clear bilat no crackles   CARDIOVASCULAR:  RRR no murmur noted  ABDOMEN: Distended. Pt asks not to press due to discomfort. BS heard.   EXT with pitting edema to upper legs and lower trunk  GENITOURINARY:  No donovan   INTEGUMENTARY:  Warm, dry, no rash but pale  NEUROLOGIC: grossly intact   Psych: calm, cooperative    Laboratory:     Recent Labs   Lab 02/20/24  0546 02/19/24  0547 02/18/24  0603 02/17/24  0552   WBC 12.0* 12.4* 13.8* 14.8*   RBC 3.52* 3.74* 3.74* 3.60*   HGB 10.3* 11.0* 11.1* 10.7*   HCT 32.2* 34.4* 34.8* 33.2*     357 400 384 377       Basic Metabolic Panel:  Recent Labs   Lab 02/22/24  0744 02/22/24  0635 02/21/24  2114 02/21/24  1706 02/21/24  1215 02/21/24  0758 02/21/24  0537 02/20/24  0922 02/20/24  0546 02/19/24  0739 02/19/24  0547 02/18/24  0743 02/18/24  0603 02/17/24  0758 02/17/24  0551   NA  --  137  --   --   --   --  137  --  136  --  138  --  137  --  136   POTASSIUM  --  3.5  --   --   --   --  4.0  --  3.5  --  3.8  --  3.8  --  3.9   CHLORIDE  --  100  --   --   --   --  99  --  103  --  105  --  107  --  104   CO2  --  29  --   --   --   --  31*  --  29  --  23  --  25  --  23   BUN  --  12.7  --   --   --   --  11.2  --  9.3  --  8.2  --  9.4  --  9.2   CR  --  1.58*  --   --   --   --  1.54*  --  1.48*  --  1.42*  --  1.48*  --  1.37*   * 133* 221* 205* 233* 202* 209*   < > 142*   < > 173*   < > 183*   < > 189*   SARAH  --  8.3*  --   --   --   --  8.7  --  8.3*  --  8.6  --  8.6  --  8.1*    < > = values in this interval not displayed.       INRNo lab results found in last 7 days.    Recent Labs   Lab Test 02/20/24  0546 02/19/24  0547   POTASSIUM 3.5 3.8   CHLORIDE 103 105   BUN 9.3 8.2      Recent Labs   Lab Test 02/19/24  0547 02/18/24  0603 02/17/24  2117   ALBUMIN 2.2* 2.2*  --    BILITOTAL 0.2 <0.2  --    ALT 9 7  --    AST 10 8  --    PROTEIN  --   --  300*       Personally reviewed today's laboratory studies      Thank you for involving us in the care of this patient. We will continue to follow along with you.    Kay Montoya PA-C  [facility]  [phone]

## 2024-02-22 NOTE — PROGRESS NOTES
HEART CARE NOTE          Assessment/Recommendations     1. Severe HFrEF c/b ADHF  Assessment / Plan  New onset; will need ischemia eval; will plan for coronary angiogram +/- PCI when renal function stable to improved; I discussed this with him including potential risk of stroke, myocardial infarction, or death.  We also discussed the possibility of vascular injury, bleeding requiring blood transfusion, or reaction to x-ray dye although he tolerated x-ray dye.  Hypervolemic on physical exam - continues to diurese well on current regimen- no changes at this time; continue to monitor UOP and renal function closely  Patient is high risk for adverse cardiac events 2/2  severe systolic dysfunction, elevated NTproBNP, non-compliance  GDMT as detailed below     Current Pharmacotherapy AHA Guideline-Directed Medical Therapy   Losartan 25 mg - on hold given SCHUYLER Lisinopril 20 mg twice daily   Carvedilol 12.5 mg twice daily - held to optimize favourable hemodynamics Carvedilol 25 mg twice daily   Spironolactone not started Spironolactone 25 mg once daily   Hydralazine NA Hydralazine 100 mg three times daily   Isosorbide dinitrate NA Isosorbide dinitrate 40 mg three times daily   SGLT2 inhibitor:Dapagliflozin/Empagliflozin - not started Dapagliflozin or Empagliflozin 10 mg daily      2. SCHUYLER in CKD  Assessment / Plan  Likely CRS in the setting of ADHF - diuresis as above  Continue to monitor UOP and renal function closely     3. DM2  Assessment / Plan  C/b DM foot ulcer  Management per primary team  Currently on ISS     4. Tobacco abuse  Assessment / Plan  Counseled regarding cessation     5. HTN  Assessment / Plan  Adequately controlled on current regimen - no changes at this time       Plan of care discussed on February 22, 2024 with patient at bedside, and primary team overseeing patient's care    55 minutes spent reviewing prior records (including documentation, laboratory studies, cardiac testing/imaging), history and  "physical exam, planning, and subsequent documentation.      History of Present Illness/Subjective    Mr. Floyd Capps is a 48 year old male with a PMHx significant for (per Epic notation) DM, HTN, CVA, obesity, recent ED visit for nonhealing diabetic foot ulcer with cellulitis, here for worsening pain in the foot, with necrotic tissue noted on exam now found to be in new onset HFrEF     Today, Mr. Capps  denies acute cardiac events or complaints; Management plan as detailed above     ECG: Personally reviewed. normal sinus rhythm, nonspecific ST and T waves changes.     ECHO (personnaly Reviewed on 2/21/24):   The left ventricle is normal in size. There is mild concentric left  ventricular hypertrophy.  Left ventricular function is decreased. The ejection fraction is 30-35%  (moderately reduced). There is global hypokinesis with severe hypokinesis of  the mid to apical inferior wall.  Diastolic Doppler findings (E/E' ratio and/or other parameters) suggest left  ventricular filling pressures are increased.     The right ventricle is normal in size and function.  Normal left atrial size. Right atrial size is normal.  There is mild (1+) mitral regurgitation.  IVC diameter >2.1 cm collapsing <50% with sniff suggests a high RA pressure  estimated at 15 mmHg or greater.     Compared to previous study from 8/23/2023 the LV systolic function has  declined and there are regional wall motion abnormalities.    Telemetry: personally reviewed February 22, 2024; notable for sinus rhythm     Lab results: personally reviewed February 22, 2024; notable for SCHUYLER on CKD    Medical history and pertinent documents reviewed in Care Everywhere please where applicable see details above        Physical Examination Review of Systems   /75 (BP Location: Left arm)   Pulse 84   Temp 98.4  F (36.9  C) (Oral)   Resp 18   Ht 1.778 m (5' 10\")   Wt 125.4 kg (276 lb 7.3 oz)   SpO2 93%   BMI 39.67 kg/m    Body mass index is 39.67 " kg/m .  Wt Readings from Last 3 Encounters:   02/22/24 125.4 kg (276 lb 7.3 oz)   02/13/24 122.5 kg (270 lb)   02/06/24 122.5 kg (270 lb)     General Appearance:   no distress, normal body habitus   ENT/Mouth: membranes moist, no oral lesions or bleeding gums.      EYES:  no scleral icterus, normal conjunctivae   Neck: no carotid bruits or thyromegaly   Chest/Lungs:   lungs are clear to auscultation, no rales or wheezing, equal chest wall expansion    Cardiovascular:   Regular. Normal first and second heart sounds with no murmurs, rubs, or gallops; the carotid, radial and posterior tibial pulses are intact, + JVD and LE edema bilaterally    Abdomen:  no organomegaly, masses, bruits, or tenderness; bowel sounds are present   Extremities: no cyanosis or clubbing   Skin: no xanthelasma, warm.    Neurologic: NAD     Psychiatric: alert and oriented x3, calm     A complete 10 systems ROS was reviewed  And is negative except what is listed in the HPI.          Medical History  Surgical History Family History Social History   Past Medical History:   Diagnosis Date    Diabetes (H)     Past Surgical History:   Procedure Laterality Date    APPENDECTOMY      INCISION AND DRAINAGE OF WOUND      groin abscess    IRRIGATION AND DEBRIDEMENT FOOT, COMBINED Right 2/16/2024    Procedure: IRRIGATION AND DEBRIDEMENT RIGHT FOOT;  Surgeon: Cristofer Sims DPM;  Location: [facility] OR    no family history of premature coronary artery disease Social History     Socioeconomic History    Marital status: Single     Spouse name: Not on file    Number of children: Not on file    Years of education: Not on file    Highest education level: Not on file   Occupational History    Not on file   Tobacco Use    Smoking status: Every Day     Packs/day: .5     Types: Cigarettes    Smokeless tobacco: Never    Tobacco comments:     Seen IP by [facility] on 8/24/23 and declined cessation services and materials.    Vaping Use    Vaping Use: Never used    Substance and Sexual Activity    Alcohol use: No    Drug use: No    Sexual activity: Not on file   Other Topics Concern    Not on file   Social History Narrative    Patient reports that he does not have health insurance.     Social Determinants of Health     Financial Resource Strain: Low Risk  (10/20/2023)    Financial Resource Strain     Within the past 12 months, have you or your family members you live with been unable to get utilities (heat, electricity) when it was really needed?: No   Food Insecurity: High Risk (10/20/2023)    Food Insecurity     Within the past 12 months, did you worry that your food would run out before you got money to buy more?: Yes     Within the past 12 months, did the food you bought just not last and you didn t have money to get more?: No   Transportation Needs: Low Risk  (10/20/2023)    Transportation Needs     Within the past 12 months, has lack of transportation kept you from medical appointments, getting your medicines, non-medical meetings or appointments, work, or from getting things that you need?: No   Physical Activity: Not on file   Stress: Not on file   Social Connections: Not on file   Interpersonal Safety: Low Risk  (10/20/2023)    Interpersonal Safety     Do you feel physically and emotionally safe where you currently live?: Yes     Within the past 12 months, have you been hit, slapped, kicked or otherwise physically hurt by someone?: No     Within the past 12 months, have you been humiliated or emotionally abused in other ways by your partner or ex-partner?: No   Housing Stability: High Risk (10/20/2023)    Housing Stability     Do you have housing? : Yes     Are you worried about losing your housing?: Yes           Lab Results    Chemistry/lipid CBC Cardiac Enzymes/BNP/TSH/INR   Lab Results   Component Value Date    CHOL 156 02/21/2024    HDL 39 (L) 02/21/2024    TRIG 199 (H) 02/21/2024    BUN 11.2 02/21/2024     02/21/2024    CO2 31 (H) 02/21/2024    Lab  "Results   Component Value Date    WBC 12.0 (H) 02/20/2024    HGB 10.3 (L) 02/20/2024    HCT 32.2 (L) 02/20/2024    MCV 92 02/20/2024     02/20/2024     02/20/2024    Lab Results   Component Value Date    TSH 2.72 08/23/2023    INR 0.95 08/23/2023     No results found for: \"CKTOTAL\", \"CKMB\", \"TROPONINI\"       Weight:    Wt Readings from Last 3 Encounters:   02/22/24 125.4 kg (276 lb 7.3 oz)   02/13/24 122.5 kg (270 lb)   02/06/24 122.5 kg (270 lb)       Allergies  No Known Allergies      Surgical History  Past Surgical History:   Procedure Laterality Date    APPENDECTOMY      INCISION AND DRAINAGE OF WOUND      groin abscess    IRRIGATION AND DEBRIDEMENT FOOT, COMBINED Right 2/16/2024    Procedure: IRRIGATION AND DEBRIDEMENT RIGHT FOOT;  Surgeon: Cristofer Sims DPM;  Location: [facility] OR       Social History  Tobacco:   History   Smoking Status    Every Day    Packs/day: 0.50    Types: Cigarettes   Smokeless Tobacco    Never    Alcohol:   Social History    Substance and Sexual Activity      Alcohol use: No   Illicit Drugs:   History   Drug Use No       Family History  History reviewed. No pertinent family history.       Shayna Arndt MD on 2/22/2024      cc: Glendy Benavides    "

## 2024-02-22 NOTE — PROGRESS NOTES
"Care Management Follow Up    Length of Stay (days): 8    Expected Discharge Date: 02/23/2024     Concerns to be Addressed:   discharge planning     Patient plan of care discussed at interdisciplinary rounds: Yes    Anticipated Discharge Disposition:       Anticipated Discharge Services:    Education Provided on the Discharge Plan:  Per Care Team   Patient/Family in Agreement with the Plan:  Yes    Referrals Placed by CM/SW:    Private pay costs discussed: Not applicable    Additional Information:  Chart reviewed.    Cm updates:  Pt' s sister Rubina has a mother in law Vince Cortes #[phone] (writer listed her in pts chart as a friend to Floyd) , who would like to assist pt with a plan of care and assist Floyd filling out paperwork for insurance. RNCM gave pt HAMIDA to allow writer to talk with Vince Cortes pt was agreeable and signed HAMIDA, located in pts chart.       Vince plans on connecting with Floyd via phone call and will set up a time later today to come to the hospital to assist Floyd in calling [facility] and getting pt set up for disability. She will call writer with that time.      She wanted to give her contact info to Darleen MEDINA as well so that Darleen Lyons can reach out to guide them in the process if needed. RNCM messaged Darleen Lyons via teams.       RNCM updated pts brother Balwinder, Balwinder understands and will be returning to [place] tomorrow, so he directed writer to contact Rubina pts sister.        RNCM called Rubina and updated her a well via VM.       Social hx:  \"Lives in mother's apartment, she has recently passed. No svcs and working on selecting insurance. 2/16 DM ulcer R heel detriment. Transport TBD.\"         Cm will continue to follow plan of care, review recommendations, and assist with any discharge needs anticipated.       Darleen Bansal RN      "

## 2024-02-22 NOTE — PLAN OF CARE
"  Problem: Adult Inpatient Plan of Care  Goal: Absence of Hospital-Acquired Illness or Injury  Intervention: Identify and Manage Fall Risk  Recent Flowsheet Documentation  Taken 2/22/2024 0915 by Isha Tong RN  Safety Promotion/Fall Prevention: activity supervised     Problem: Pain Acute  Goal: Optimal Pain Control and Function  Intervention: Prevent or Manage Pain  Recent Flowsheet Documentation  Taken 2/22/2024 0915 by Isha Tong RN  Sensory Stimulation Regulation: care clustered  Medication Review/Management: medications reviewed     Problem: Comorbidity Management  Goal: Maintenance of Asthma Control  Intervention: Maintain Asthma Symptom Control  Recent Flowsheet Documentation  Taken 2/22/2024 0915 by Isha Tong RN  Medication Review/Management: medications reviewed   Goal Outcome Evaluation:       Patient alert and oriented x 4. X 1 dose of oxycodone given prior to wound vac dressing change. He declined offer to transfer to chair x 2, declined physical therapy x 1. No breakfast or lunch except for some juice. He stated \"he was not hungary\". Wound vac functional continuously at 125 mm Hg.                  "

## 2024-02-22 NOTE — PLAN OF CARE
Problem: Adult Inpatient Plan of Care  Goal: Optimal Comfort and Wellbeing  Intervention: Monitor Pain and Promote Comfort  Recent Flowsheet Documentation  Taken 2/22/2024 0030 by Yuli Mukherjee RN  Pain Management Interventions: emotional support   Goal Outcome Evaluation:  Pt more pleasant this shift.  Slept between cares.  Iv zosyn infused without incident.  Pt did have a cough which induced incontinence of urine.  Voiding good amounts in urinal.  Would vac dressing intact.  3 of the 4 bags of albumin given this shift.  Oncoming nurse notified to finish with last bag.

## 2024-02-23 LAB
ANION GAP SERPL CALCULATED.3IONS-SCNC: 8 MMOL/L (ref 7–15)
BACTERIA WND CULT: ABNORMAL
BUN SERPL-MCNC: 13.4 MG/DL (ref 6–20)
CALCIUM SERPL-MCNC: 8.6 MG/DL (ref 8.6–10)
CHLORIDE SERPL-SCNC: 98 MMOL/L (ref 98–107)
CK SERPL-CCNC: 74 U/L (ref 39–308)
CREAT SERPL-MCNC: 1.53 MG/DL (ref 0.67–1.17)
DEPRECATED HCO3 PLAS-SCNC: 31 MMOL/L (ref 22–29)
EGFRCR SERPLBLD CKD-EPI 2021: 55 ML/MIN/1.73M2
ERYTHROCYTE [DISTWIDTH] IN BLOOD BY AUTOMATED COUNT: 12 % (ref 10–15)
GLUCOSE BLDC GLUCOMTR-MCNC: 123 MG/DL (ref 70–99)
GLUCOSE BLDC GLUCOMTR-MCNC: 203 MG/DL (ref 70–99)
GLUCOSE BLDC GLUCOMTR-MCNC: 219 MG/DL (ref 70–99)
GLUCOSE BLDC GLUCOMTR-MCNC: 226 MG/DL (ref 70–99)
GLUCOSE SERPL-MCNC: 125 MG/DL (ref 70–99)
HCT VFR BLD AUTO: 33.1 % (ref 40–53)
HGB BLD-MCNC: 10.6 G/DL (ref 13.3–17.7)
MAGNESIUM SERPL-MCNC: 1.8 MG/DL (ref 1.7–2.3)
MCH RBC QN AUTO: 29.7 PG (ref 26.5–33)
MCHC RBC AUTO-ENTMCNC: 32 G/DL (ref 31.5–36.5)
MCV RBC AUTO: 93 FL (ref 78–100)
PLATELET # BLD AUTO: 337 10E3/UL (ref 150–450)
POTASSIUM SERPL-SCNC: 3.6 MMOL/L (ref 3.4–5.3)
PROT ELPH PNL UR ELPH: NORMAL
PROT PATTERN SERPL IFE-IMP: NORMAL
RBC # BLD AUTO: 3.57 10E6/UL (ref 4.4–5.9)
SODIUM SERPL-SCNC: 137 MMOL/L (ref 135–145)
WBC # BLD AUTO: 11.3 10E3/UL (ref 4–11)

## 2024-02-23 PROCEDURE — 83735 ASSAY OF MAGNESIUM: CPT | Performed by: INTERNAL MEDICINE

## 2024-02-23 PROCEDURE — 36415 COLL VENOUS BLD VENIPUNCTURE: CPT | Performed by: INTERNAL MEDICINE

## 2024-02-23 PROCEDURE — 250N000011 HC RX IP 250 OP 636: Mod: JZ | Performed by: INTERNAL MEDICINE

## 2024-02-23 PROCEDURE — 99233 SBSQ HOSP IP/OBS HIGH 50: CPT | Performed by: INTERNAL MEDICINE

## 2024-02-23 PROCEDURE — 250N000013 HC RX MED GY IP 250 OP 250 PS 637: Performed by: INTERNAL MEDICINE

## 2024-02-23 PROCEDURE — 99232 SBSQ HOSP IP/OBS MODERATE 35: CPT | Performed by: PHYSICIAN ASSISTANT

## 2024-02-23 PROCEDURE — 250N000011 HC RX IP 250 OP 636: Performed by: PODIATRIST

## 2024-02-23 PROCEDURE — 82565 ASSAY OF CREATININE: CPT | Performed by: INTERNAL MEDICINE

## 2024-02-23 PROCEDURE — 99232 SBSQ HOSP IP/OBS MODERATE 35: CPT | Performed by: INTERNAL MEDICINE

## 2024-02-23 PROCEDURE — 250N000013 HC RX MED GY IP 250 OP 250 PS 637: Performed by: PODIATRIST

## 2024-02-23 PROCEDURE — 82550 ASSAY OF CK (CPK): CPT | Performed by: INTERNAL MEDICINE

## 2024-02-23 PROCEDURE — 258N000003 HC RX IP 258 OP 636: Performed by: INTERNAL MEDICINE

## 2024-02-23 PROCEDURE — 82374 ASSAY BLOOD CARBON DIOXIDE: CPT | Performed by: INTERNAL MEDICINE

## 2024-02-23 PROCEDURE — 120N000001 HC R&B MED SURG/OB

## 2024-02-23 PROCEDURE — 85027 COMPLETE CBC AUTOMATED: CPT | Performed by: INTERNAL MEDICINE

## 2024-02-23 RX ORDER — POTASSIUM CHLORIDE 1500 MG/1
20 TABLET, EXTENDED RELEASE ORAL ONCE
Status: COMPLETED | OUTPATIENT
Start: 2024-02-23 | End: 2024-02-23

## 2024-02-23 RX ORDER — MULTIPLE VITAMINS W/ MINERALS TAB 9MG-400MCG
1 TAB ORAL DAILY
Status: DISCONTINUED | OUTPATIENT
Start: 2024-02-23 | End: 2024-03-05 | Stop reason: HOSPADM

## 2024-02-23 RX ORDER — METOCLOPRAMIDE 5 MG/1
5 TABLET ORAL
Status: COMPLETED | OUTPATIENT
Start: 2024-02-23 | End: 2024-02-25

## 2024-02-23 RX ORDER — BUMETANIDE 2 MG/1
2 TABLET ORAL
Status: DISCONTINUED | OUTPATIENT
Start: 2024-02-23 | End: 2024-02-24

## 2024-02-23 RX ORDER — HYDROXYZINE HYDROCHLORIDE 10 MG/1
10 TABLET, FILM COATED ORAL 3 TIMES DAILY PRN
Status: DISCONTINUED | OUTPATIENT
Start: 2024-02-23 | End: 2024-03-05 | Stop reason: HOSPADM

## 2024-02-23 RX ORDER — METOCLOPRAMIDE 5 MG/1
5 TABLET ORAL
Status: DISCONTINUED | OUTPATIENT
Start: 2024-02-23 | End: 2024-02-23

## 2024-02-23 RX ORDER — MAGNESIUM OXIDE 400 MG/1
400 TABLET ORAL EVERY 4 HOURS
Status: COMPLETED | OUTPATIENT
Start: 2024-02-23 | End: 2024-02-23

## 2024-02-23 RX ADMIN — BUMETANIDE 2 MG: 2 TABLET ORAL at 17:43

## 2024-02-23 RX ADMIN — BUMETANIDE 2 MG: 2 TABLET ORAL at 08:38

## 2024-02-23 RX ADMIN — WHITE PETROLATUM: 1.75 OINTMENT TOPICAL at 20:15

## 2024-02-23 RX ADMIN — INSULIN ASPART 6 UNITS: 100 INJECTION, SOLUTION INTRAVENOUS; SUBCUTANEOUS at 12:49

## 2024-02-23 RX ADMIN — DAPTOMYCIN 750 MG: 500 INJECTION, POWDER, LYOPHILIZED, FOR SOLUTION INTRAVENOUS at 17:39

## 2024-02-23 RX ADMIN — ASPIRIN 325 MG: 325 TABLET, COATED ORAL at 08:38

## 2024-02-23 RX ADMIN — INSULIN ASPART 4 UNITS: 100 INJECTION, SOLUTION INTRAVENOUS; SUBCUTANEOUS at 09:30

## 2024-02-23 RX ADMIN — Medication 1 TABLET: at 12:53

## 2024-02-23 RX ADMIN — MAGNESIUM OXIDE TAB 400 MG (241.3 MG ELEMENTAL MG) 400 MG: 400 (241.3 MG) TAB at 16:13

## 2024-02-23 RX ADMIN — METOCLOPRAMIDE 5 MG: 5 TABLET ORAL at 09:30

## 2024-02-23 RX ADMIN — PIPERACILLIN AND TAZOBACTAM 3.38 G: 3; .375 INJECTION, POWDER, FOR SOLUTION INTRAVENOUS at 00:08

## 2024-02-23 RX ADMIN — INSULIN ASPART 5 UNITS: 100 INJECTION, SOLUTION INTRAVENOUS; SUBCUTANEOUS at 17:45

## 2024-02-23 RX ADMIN — Medication 60 ML: at 08:42

## 2024-02-23 RX ADMIN — CARVEDILOL 12.5 MG: 12.5 TABLET, FILM COATED ORAL at 16:13

## 2024-02-23 RX ADMIN — OXYCODONE HYDROCHLORIDE 5 MG: 5 TABLET ORAL at 00:21

## 2024-02-23 RX ADMIN — MAGNESIUM OXIDE TAB 400 MG (241.3 MG ELEMENTAL MG) 400 MG: 400 (241.3 MG) TAB at 20:04

## 2024-02-23 RX ADMIN — WHITE PETROLATUM: 1.75 OINTMENT TOPICAL at 08:38

## 2024-02-23 RX ADMIN — METOCLOPRAMIDE 5 MG: 5 TABLET ORAL at 17:42

## 2024-02-23 RX ADMIN — METOCLOPRAMIDE 5 MG: 5 TABLET ORAL at 12:47

## 2024-02-23 RX ADMIN — POTASSIUM CHLORIDE 20 MEQ: 1500 TABLET, EXTENDED RELEASE ORAL at 16:13

## 2024-02-23 RX ADMIN — ENOXAPARIN SODIUM 40 MG: 40 INJECTION SUBCUTANEOUS at 17:44

## 2024-02-23 RX ADMIN — NICOTINE 1 PATCH: 14 PATCH, EXTENDED RELEASE TRANSDERMAL at 13:57

## 2024-02-23 RX ADMIN — PIPERACILLIN AND TAZOBACTAM 3.38 G: 3; .375 INJECTION, POWDER, FOR SOLUTION INTRAVENOUS at 08:37

## 2024-02-23 RX ADMIN — PANTOPRAZOLE SODIUM 40 MG: 40 TABLET, DELAYED RELEASE ORAL at 06:00

## 2024-02-23 RX ADMIN — PIPERACILLIN AND TAZOBACTAM 3.38 G: 3; .375 INJECTION, POWDER, FOR SOLUTION INTRAVENOUS at 16:14

## 2024-02-23 ASSESSMENT — ACTIVITIES OF DAILY LIVING (ADL)
ADLS_ACUITY_SCORE: 39
ADLS_ACUITY_SCORE: 38
ADLS_ACUITY_SCORE: 39
ADLS_ACUITY_SCORE: 39
ADLS_ACUITY_SCORE: 38
ADLS_ACUITY_SCORE: 39
ADLS_ACUITY_SCORE: 38
ADLS_ACUITY_SCORE: 39
ADLS_ACUITY_SCORE: 38
ADLS_ACUITY_SCORE: 39
ADLS_ACUITY_SCORE: 38
ADLS_ACUITY_SCORE: 39
ADLS_ACUITY_SCORE: 38
ADLS_ACUITY_SCORE: 39

## 2024-02-23 NOTE — PROGRESS NOTES
"CLINICAL NUTRITION SERVICES - ASSESSMENT NOTE     Nutrition Prescription    RECOMMENDATIONS FOR MDs/PROVIDERS TO ORDER:  -Recommend mvi with minerals d/t not meeting RDIs with inadequate intake     Malnutrition Status:    Moderate in context of acute illness    Recommendations already ordered by Registered Dietitian (RD):  Glucerna daily     Future/Additional Recommendations:  Adjust supplements pending intake, weight, tolerance, acceptance, wound healing, labs, BG  -consider adding vit C to promote wound healing if kidney function improves     NUTRITION HISTORY  Pt follows low CHO, low NA diet at home.       CURRENT NUTRITION ORDERS  Diet: 60 gm cho per meal. 1800 ml fluid restriction added    Intake/Tolerance: Poor    Pt only ate 1 meal yesterday and the expedite. Estimate intake 671 kcal, 31 g protein, meeting <50% of estimated needs    He reports he was \"not feeling up to it\". He states he has to force himself to eat to some extent. He self limits Na here   He does not like the expedite but is taking it  Pt having frustration d/t getting mixed messages about protein needs from different providers d/t increased needs for wound healing vs \"too much protein\" d/t increased CR with SCHUYLER/CKD1  Discussed need for adequate protein intake and current low intake may be contributing to fluid retention and increased needs for wound healing but need to not overdo protein intake  Pt is willing to try Glucerna.     LABS  -221 mgl/dl past 24 hours, in fair control    ANTHROPOMETRICS  Height: 177.8 cm (5' 10\")  Weight History:     Weight of 270 lb for the past 3 months is questionable.  Wt Readings from Last 3 Encounters:   02/23/24 123.7 kg (272 lb 11.3 oz)   02/13/24 122.5 kg (270 lb)   02/06/24 122.5 kg (270 lb)     01/08/24 122.5 kg (270 lb)  11/21/23 122.5 kg (270 lb)    11/03/23 125.3 kg (276 lb 4.8 oz)   10/20/23 129.3 kg (285 lb 1.6 oz)   10/12/23 128.4 kg (283 lb)   09/23/23 130 kg (286 lb 9.6 oz)   08/26/23 122.2 " kg (269 lb 6.4 oz)       Dosing Weight: 87.3 kg    ASSESSED NUTRITION NEEDS  Estimated Energy Needs: 2200 - 2620 kcals/day (25 - 30 kcals/kg)  Justification: Obese and Wound healing  Estimated Protein Needs: 87 +grams protein/day (1 gm/kg)  Justification: SCHUYLER and Wound healing  Estimated Fluid Needs: 2200 mL/day (25 mL/kg), or per provider  Justification: acute HF    PHYSICAL FINDINGS  Per chart,  Heel wound with vac-stable per WOC 2/22    MALNUTRITION:  % Weight Loss:  Weight loss does not meet criteria for malnutrition   % Intake:  <75% for > 7 days (moderate malnutrition)  Subcutaneous Fat Loss:  None observed, observation not full exam, bare to waste  Muscle Loss:  None observed  Fluid Retention:  Mild     Malnutrition Diagnosis: Moderate malnutrition  In Context of:  Acute illness or injury    NUTRITION DIAGNOSIS  Malnutrition related to decreased intake as evidenced by <75% >7 days and fluid accumulation  -not progressing    INTERVENTIONS  Implementation  -Trial Glucerna daily = 220 kcal, 10 g protein  -Encouraged good intake and reviewed protein foods  -Recommend mvi with minerals d/t not meeting RDIs with inadequate intake  -consider adding vit C to promote wound healing if kidney function improves    Goals  Patient to consume % of nutritionally adequate meals three times per day, or the equivalent with supplements/snacks.- not progressing    Meet estimated nutrition needs- not met    BG < 180- progressing    Wound healing- in progress     Monitoring/Evaluation  Progress toward goals will be monitored and evaluated per protocol.

## 2024-02-23 NOTE — PROGRESS NOTES
[facility]    Medicine Progress Note - Hospitalist Service    Date of Admission:  2/14/2024    Assessment & Plan   Floyd Capps is a 48 year old male with history of DM, HTN, CVA, obesity, recent ED visit for nonhealing diabetic foot ulcer with cellulitis, here for worsening pain in the foot, with necrotic tissue noted on exam.  Patient admitted on 2/14/2024.      Diabetic foot ulcer, right heel;  -chronic wound R heel, recent resulting cellulitis treated with Bactrim  -here for worse pain in the foot, found with ulcer worse with necrotic tissue  -MRI of the foot no osteomyelitis  -On 2/16, s/p debridement with irrigation of right foot diabetic ulcer. NWB on right foot per podiatrist  -On 2/19, wound VAC placed, continue  -Surgical cultures polymicrobials.  Initially IV Vanco and IV Zosyn, now changed to IV Zosyn and IV daptomycin.  Appreciated ID input  -Care Mgr to see about financial/insurance issues which have been a barrier to followup  -Pain management with Tylenol, home oxycodone.     Acute new onset systolic heart failure;  CT imaging reported moderate bilateral pleural effusion and diffuse anasarca;  -- On 2/19, patient reported feeling shortness of breath, imaging workup showed bilateral moderate pleural effusion and diffuse anasarca  --BNP significantly elevated   --Echo on 8/23 reported EF 55% but given patient noncompliance with medications, rechecked echocardiogram with EF 30 to 35% with global hypokinesis.  --Of note, patient had nuclear stress test on 8/23 which was negative for inducible myocardial ischemia  --On 2/21, personally discussed with cardiology, anticipating ischemic evaluation at some point pending kidney function  -- On 2/23, IV Bumex changed to oral.  --Monitor intake/output, weight, labs closely    SCHUYLER: Likely due to CHF exacerbation, possibly due to diabetic nephropathy  - Baseline is 0.6-0.8  -On a combination of broad spectrum abx ( Vanc and Zosyn)  -Check  UA with no significant RBC's or casts   -On 2/21, personally discussed with nephrology, given proteinuria multiple serological test ordered and process.  May need kidney biopsy in future pending test results  -Electrolyte imbalance, replace per protocol    DM Type II, Uncontrolled; improving  -Stopped all his home meds lately due to stomach upset while on Bactrim, then neglected to resume meds. Hold home metformin and glipizide for now.  -A1c 11.8  -Started Lantus, increase to 24 unit at night on 2/21. Of note, patient was on insulin in the past and received education in the hospital about it, but seems to have stopped on its own.  Patient needs outpatient diabetic educator for ongoing titration  -Insulin sliding scale and hypoglycemic protocol  -Given poor appetite and early satiety, trial of Reglan which seems helping  -Also added PPI    History of stroke in 8/2023;  -Had been taking Plavix and atorvastatin but recently stopped taking all of his medications as above.  -Neurologist note from 8/26/2023 reviewed- at that time recommended DAPT with aspirin and Plavix for 90 days, afterwards switch to enteric-coated aspirin 325 mg daily.  -Started full dose aspirin and resumed home atorvastatin    Essential hypertension, uncontrolled; due to noncompliance-improving  -- Discharge summary from 8/26/2023 reviewed, patient was discharged home on Coreg 12.5 mg twice daily, HCTZ 12.5 mg daily, losartan 100 mg daily  --On 2/19 restarted Coreg 12.5 mg twice daily but cardiology held Coreg at this time.  --Diuretics as above.  As needed hydralazine.  Monitor vitals and adjust medications accordingly.    Tobacco use disorder  -Nicotine Patch  -Advised to quit smoking     Left distal radius fracture due to recent fall on 2/6/2023  -Cast in place  -PT and OT evals  -Outpatient orthopedics follow-up     Normocytic anemia, likely due to chronic wound;  -No active/obvious bleeding.  Trend     Poor follow-up,  "noncompliance;  -Importance of compliance and follow-up needs to be emphasized.  Patient with relatively young age and already with serious comorbidities of chronic disease.          Diet: Moderate Consistent Carb (60 g CHO per Meal) Diet  Fluid restriction 1800 ML FLUID    DVT Prophylaxis: Enoxaparin (Lovenox) SQ  Cazares Catheter: Not present  Lines: None     Cardiac Monitoring: ACTIVE order. Indication: Acute decompensated heart failure (48 hours)  Code Status: Full Code      Clinically Significant Risk Factors            # Hypomagnesemia: Lowest Mg = 1.6 mg/dL in last 2 days, will replace as needed   # Hypoalbuminemia: Lowest albumin = 2 g/dL at 2/20/2024  5:46 AM, will monitor as appropriate        # Acute heart failure with reduced ejection fraction: last echo with EF <40% and receiving IV diuretics      # DMII: A1C = 11.8 % (Ref range: <5.7 %) within past 6 months   # Obesity: Estimated body mass index is 39.13 kg/m  as calculated from the following:    Height as of this encounter: 1.778 m (5' 10\").    Weight as of this encounter: 123.7 kg (272 lb 11.3 oz).   # Moderate Malnutrition: based on nutrition assessment      # Financial/Environmental Concerns: none         Disposition Plan      Expected Discharge Date: 02/24/2024    Discharge Delays: IV Medication - consider oral or Home Infusion  Procedure Pending (enter procedure & time in comments)  Placement - TCU  Specialist Consult (enter specialist & decision needed in comments)  Destination: home with family  Discharge Comments: insurance needs, angio            Madan Yu MD  Hospitalist Service  [facility]  Securely message with [employer] (more info)  Text page via [employer] Paging/Directory   ______________________________________________________________________    Interval History   Patient seen and examined.  Notes, labs, imaging report personally reviewed.  Overnight patient reported wound VAC leaked and nursing staff did not provide " much information and he was upset.  Discussed with patient's nurse and requested to communicate appropriately with the patient and if needed further information then reach out to MD.  Patient reported breathing improving.  Denied chest pain.  Reported appetite improving.  Discussed with nursing staffs    Physical Exam   Vital Signs: Temp: 98.4  F (36.9  C) Temp src: Oral BP: 129/67 Pulse: 76   Resp: 18 SpO2: 97 % O2 Device: None (Room air)    Weight: 272 lbs 11.34 oz      General: Not in obvious distress.  HEENT: Normocephalic, supple neck  Chest: Clear to auscultation bilateral anteriorly, no wheezing  Heart: S1S2 normal, regular  Abdomen: Soft.  Obese, nontender extremities: Still has bilateral lower extremity swelling but improving.  Right heel with wound VAC  Neuro: alert and awake, grossly non-focal        Medical Decision Making             Data     I have personally reviewed the following data over the past 24 hrs:    11.3 (H)  \   10.6 (L)   / 337     137 98 13.4 /  123 (H)   3.6 31 (H) 1.53 (H) \       Imaging results reviewed over the past 24 hrs:   No results found for this or any previous visit (from the past 24 hour(s)).

## 2024-02-23 NOTE — PROGRESS NOTES
RENAL NOTE    REQUESTING PHYSICIAN: Madan Montano    REASON FOR CONSULT: SCHUYLER - worsening, volume overload    ASSESSMENT/PLAN:    Nephrotic range proteinuria, CKD1  Baseline Cr 0.8, with severe nephrotic range proteinuria and some cells on UA. Suspect he has CKD/GN most likely related to diabetic nephropathy but will send serologies to assess for other causes.  Nephrotic proteinuria - 9g  - Complements normal/elevated, BJORN neg  - Hep Panel Neg   - PLA2R neg  - Faint monoclonal Ab on urine mmunofixation, none on serum. Consider repat test at future date.   Consider kidney bx in future if +w/up.   GFR at baseline appears normal.  Would rec maxing ARB, SGLT2 inhib eventually.  Would benefit from close outpt follow up for his SCHUYLER, for better ongoing management for his nephrotic range proteinuria and anticipated angio in future.   Plan for outpt follow up at our clinic:   Monday March 18 at 11:00AM with Ruth Mike NP at [facility]     SCHUYLER suspect hemodynamic due to infx, hemodynamics.   - Cr stable today, despite significant diuresis    - Lytes WNL   - Inconsistent weight measurements, although pt w/ 2 days of 4L urine output, IV bumex 2mg bid and IV albumin on board  -- Changed to oral bumex today. Will trend response of urine output    HTN bp controlled.   Holding ARB but will try to resume soon to assist with proteinuria control    Volume:  Noted edema  On albumin / bumex IV,  - 5L outpt yesterday  Weights relatively unchanged on bed scale  -- Changed to oral bumex today. Will trend response of urine output    New HFrEF with WMA, prob high risk for CAD but had negative stress test last fall. Echo shows new EF down to 30-35% with WMA and inc filling pressures.   Cards plan for coronary angio     Anemia normocytic, can assess iron stores eventually    Lytes: No derangements    HLD - statin    DM2 -historically poor control,  insulin this admit     Diabetic foot ulcer  2/16 debridement. Now  on  Zosyn and daptomycin until 3/1 per ID. Wound vac, no osteo    Obesity    Tobacco use - nicotine patches in use this admit    HPI:   Seen in room laying in bed  Pt voicing that he is overwhelmed with all the new problems arisen this admit.   No NVD  Lesser appetite reported today - says normally does not eat 3 meals a day (just 2)  No SOB  Endorses good urine outpt   Mentions that his wound vac? Was bothering him  No acute concerns otherwise    He shared that PTA stopped taking all his meds (diabetic because he didn't feel any different so did not think important to take  Discussed importance of good diabetic care for long term health of kidneys and rest of body. Discussed that may not always have symptoms we can feel, but that diabetes can have deleterious effects to rest of body. Encourage to be more consistent with meds after discharge.     Reviewed with him benefits of out pt nephrology follow up. Has concerns about mobility and transport         REVIEW OF SYSTEMS:  ROS was completely reviewed and otherwise negative and non-contributory       Past Medical History:   Diagnosis Date    Diabetes (H)      Social History     Socioeconomic History    Marital status: Single     Spouse name: Not on file    Number of children: Not on file    Years of education: Not on file    Highest education level: Not on file   Occupational History    Not on file   Tobacco Use    Smoking status: Every Day     Packs/day: .5     Types: Cigarettes    Smokeless tobacco: Never    Tobacco comments:     Seen IP by [facility] on 8/24/23 and declined cessation services and materials.    Vaping Use    Vaping Use: Never used   Substance and Sexual Activity    Alcohol use: No    Drug use: No    Sexual activity: Not on file   Other Topics Concern    Not on file   Social History Narrative    Patient reports that he does not have health insurance.     Social Determinants of Health     Financial Resource Strain: Low Risk  (10/20/2023)    Financial  Resource Strain     Within the past 12 months, have you or your family members you live with been unable to get utilities (heat, electricity) when it was really needed?: No   Food Insecurity: High Risk (10/20/2023)    Food Insecurity     Within the past 12 months, did you worry that your food would run out before you got money to buy more?: Yes     Within the past 12 months, did the food you bought just not last and you didn t have money to get more?: No   Transportation Needs: Low Risk  (10/20/2023)    Transportation Needs     Within the past 12 months, has lack of transportation kept you from medical appointments, getting your medicines, non-medical meetings or appointments, work, or from getting things that you need?: No   Physical Activity: Not on file   Stress: Not on file   Social Connections: Not on file   Interpersonal Safety: Low Risk  (10/20/2023)    Interpersonal Safety     Do you feel physically and emotionally safe where you currently live?: Yes     Within the past 12 months, have you been hit, slapped, kicked or otherwise physically hurt by someone?: No     Within the past 12 months, have you been humiliated or emotionally abused in other ways by your partner or ex-partner?: No   Housing Stability: High Risk (10/20/2023)    Housing Stability     Do you have housing? : Yes     Are you worried about losing your housing?: Yes          ALLERGIES/SENSITIVITIES:  No Known Allergies      PHYSICAL EXAM:  Physical Exam   Temp: 98.4  F (36.9  C) Temp src: Oral BP: 129/67 Pulse: 76   Resp: 18 SpO2: 97 % O2 Device: None (Room air)    Vitals:    02/14/24 2148 02/22/24 0409 02/23/24 0620   Weight: 123.1 kg (271 lb 6.2 oz) 125.4 kg (276 lb 7.3 oz) 123.7 kg (272 lb 11.3 oz)     Vital Signs with Ranges  Temp:  [97.7  F (36.5  C)-98.6  F (37  C)] 98.4  F (36.9  C)  Pulse:  [71-81] 76  Resp:  [18-20] 18  BP: (123-160)/(64-83) 129/67  SpO2:  [92 %-97 %] 97 %  I/O last 3 completed shifts:  In: 1107 [P.O.:902;  I.V.:205]  Out: 4800 [Urine:4800]    Temp (24hrs), Av.1  F (36.7  C), Min:97.4  F (36.3  C), Max:98.5  F (36.9  C)      Patient Vitals for the past 72 hrs:   Weight   24 0620 123.7 kg (272 lb 11.3 oz)   24 0409 125.4 kg (276 lb 7.3 oz)       General: A&Ox3, NAD laying in bed  HEENT: No abnormalities   NECK:  no JVD noted  RESPIRATORY: clear bilat no crackles   CARDIOVASCULAR:  RRR no murmur noted  ABDOMEN: Distended. Pt asks not to press due to discomfort. BS heard.   EXT Obesity + edema, seems improved compared to yesterday  GENITOURINARY:  No donovan   INTEGUMENTARY: Warm, dry, no rash but pale  NEUROLOGIC: grossly intact   Psych: calm, cooperative    Laboratory:     Recent Labs   Lab 24  0618 24  0546 24  0547 24  0603 24  0552   WBC 11.3* 12.0* 12.4* 13.8* 14.8*   RBC 3.57* 3.52* 3.74* 3.74* 3.60*   HGB 10.6* 10.3* 11.0* 11.1* 10.7*   HCT 33.1* 32.2* 34.4* 34.8* 33.2*    357  357 400 384 377       Basic Metabolic Panel:  Recent Labs   Lab 24  0726 24  0618 24  2032 24  1734 24  1202 24  0744 24  0635 24  0758 24  0537 24  0922 24  0546 24  0739 24  0547 24  0743 24  0603   NA  --  137  --   --   --   --  137  --  137  --  136  --  138  --  137   POTASSIUM  --  3.6  --   --   --   --  3.5  --  4.0  --  3.5  --  3.8  --  3.8   CHLORIDE  --  98  --   --   --   --  100  --  99  --  103  --  105  --  107   CO2  --  31*  --   --   --   --  29  --  31*  --  29  --  23  --  25   BUN  --  13.4  --   --   --   --  12.7  --  11.2  --  9.3  --  8.2  --  9.4   CR  --  1.53*  --   --   --   --  1.58*  --  1.54*  --  1.48*  --  1.42*  --  1.48*   * 125* 186* 177* 152* 118* 133*   < > 209*   < > 142*   < > 173*   < > 183*   SARAH  --  8.6  --   --   --   --  8.3*  --  8.7  --  8.3*  --  8.6  --  8.6    < > = values in this interval not displayed.       INRNo lab results found in last  7 days.    Recent Labs   Lab Test 02/20/24  0546 02/19/24  0547   POTASSIUM 3.5 3.8   CHLORIDE 103 105   BUN 9.3 8.2      Recent Labs   Lab Test 02/19/24  0547 02/18/24  0603 02/17/24  2117   ALBUMIN 2.2* 2.2*  --    BILITOTAL 0.2 <0.2  --    ALT 9 7  --    AST 10 8  --    PROTEIN  --   --  300*       Personally reviewed today's laboratory studies      Thank you for involving us in the care of this patient. We will continue to follow along with you.    Kay Montoya PA-C  [facility]  [phone]

## 2024-02-23 NOTE — PLAN OF CARE
Problem: Mobility Impairment  Goal: Optimal Mobility  Intervention: Optimize Mobility  Recent Flowsheet Documentation  Taken 2/23/2024 0837 by Isha Tong RN  Activity Management: activity adjusted per tolerance  Positioning/Transfer Devices:   pillows   applied     Problem: Adult Inpatient Plan of Care  Goal: Absence of Hospital-Acquired Illness or Injury  Intervention: Prevent Skin Injury  Recent Flowsheet Documentation  Taken 2/23/2024 0837 by Isha Tong RN  Body Position: position changed independently     Problem: Adult Inpatient Plan of Care  Goal: Absence of Hospital-Acquired Illness or Injury  Intervention: Prevent Infection  Recent Flowsheet Documentation  Taken 2/23/2024 0837 by Isha Tong RN  Infection Prevention:   hand hygiene promoted   personal protective equipment utilized   rest/sleep promoted     Problem: Infection  Goal: Absence of Infection Signs and Symptoms  Intervention: Prevent or Manage Infection  Recent Flowsheet Documentation  Taken 2/23/2024 0837 by Isha Tong RN  Isolation Precautions: contact precautions maintained     Problem: Comorbidity Management  Goal: Maintenance of Asthma Control  Intervention: Maintain Asthma Symptom Control  Recent Flowsheet Documentation  Taken 2/23/2024 0837 by Isha Tong RN  Medication Review/Management: medications reviewed     Problem: Comorbidity Management  Goal: Maintenance of Behavioral Health Symptom Control  Intervention: Maintain Behavioral Health Symptom Control  Recent Flowsheet Documentation  Taken 2/23/2024 0837 by Isha Tong RN  Medication Review/Management: medications reviewed     Problem: Comorbidity Management  Goal: Maintenance of COPD Symptom Control  Intervention: Maintain COPD Symptom Control  Recent Flowsheet Documentation  Taken 2/23/2024 0837 by Isha Tong RN  Supportive Measures: active listening utilized  Medication Review/Management: medications reviewed   Goal Outcome  Evaluation:       Patient alert and oriented x 4. No complain of pain. On room air, no complain of shortness of breath. Wound vac dressing clean, dry intact and functional. Had 100% of breakfast and lunch. Voids spontaneously.

## 2024-02-23 NOTE — PLAN OF CARE
Problem: Adult Inpatient Plan of Care  Goal: Optimal Comfort and Wellbeing  Intervention: Monitor Pain and Promote Comfort  Recent Flowsheet Documentation  Taken 2/23/2024 0021 by Yuli Mukherjee RN  Pain Management Interventions: medication (see MAR)   Goal Outcome Evaluation:  Pt irritable at the start of the shift.  Wound vac was alarming and making pt anxious.  Upon inspection trac pad had become detached from dressing.  Reapplied with transparent drape and suction resumed.  Pt venting about lack of communication from staff with questions he has.  Active listening to pt.  Strict I&O.  Pain in right foot managed with oxycodone.  Voiding in urinal.  No further incidents overnight.

## 2024-02-23 NOTE — PROGRESS NOTES
Care Management Follow Up    Length of Stay (days): 9    Expected Discharge Date: 02/24/2024     Concerns to be Addressed:  discharge planning      Patient plan of care discussed at interdisciplinary rounds: Yes    Anticipated Discharge Disposition:       Anticipated Discharge Services:      Education Provided on the Discharge Plan:  Per Care Team   Patient/Family in Agreement with the Plan:      Referrals Placed by CM/SW:    Private pay costs discussed: Not applicable    Additional Information:  Chart reviewed.    Cm updates:  Pt has been working with Darleen Lyons, pt has the form that pt needs with #s provided to call [facility] to verify wage, and establish pts health insurance. This was given to pt by RNCM in the middle of week. Patient has not called the [facility] on his own, and he did sign an HAMIDA to have his friend Vince (pts sister Rubina's mother in law) help assist him in calling the [facility].       RNCM called Vince Cortes. She was not able to come and assist pt in person with calling the [facility] yesterday. She did say pts siblings Balwinder and Rubina visited patient last evening, but not sure if they tried to call the [facility]. Vince's plan is to call pt over the phone today and see if he will call the [facility]. Awaiting call back.       RNCM called pts sister Rubina and left VM. Awaiting call back.       ID following.   Neph following, worsening volume overload.   CARDS following.   Therapy recs TCU, pt does not have insurance at this time, working on.         2:10pm- RNCM spoke to pts sister Rubina she said that when pt tries to call the [facility] all the specialities come in when he is on hold and has not been able to get to them. Rubina works until the evening Mon-Fri.  Rubina is going to work on a time for either her mother in Law Vince or herself to come and work on this stuff with pt. She also wants to get pt going with disability. Writer gave pt all these resources for disability at the beginning of the week. Awaiting plan.  "        Social Hx:  \"Lives in mother's apartment, she has recently passed. No svcs and working on selecting insurance. 2/16 DM ulcer R heel detriment. Transport TBD.\"         Cm will continue to follow plan of care, review recommendations, and assist with any discharge needs anticipated.       Darleen Bansal RN      "

## 2024-02-23 NOTE — PROGRESS NOTES
"    Cardiology Progress Note    Assessment:  Acute heart failure reduced ejection fraction, improving volume status  Dilated cardiomyopathy with segmental wall motion abnormalities suggestive of ischemic etiology  Nephrotic syndrome  Diabetes mellitus  Hypertension, controlled    Plan:  Continue diuresis  Restart carvedilol  Continue to hold losartan for now  Invasive coronary angiogram was planned by Dr. Arndt.  This can be performed next week    Subjective:   Denies chest pain, less short of breath    Objective:   /67 (BP Location: Left arm)   Pulse 76   Temp 98.4  F (36.9  C) (Oral)   Resp 18   Ht 1.778 m (5' 10\")   Wt 123.7 kg (272 lb 11.3 oz)   SpO2 97%   BMI 39.13 kg/m      Intake/Output Summary (Last 24 hours) at 2/23/2024 1434  Last data filed at 2/23/2024 1336  Gross per 24 hour   Intake 1207 ml   Output 4450 ml   Net -3243 ml         Physical Exam:  GENERAL: no distress  NECK: JVD is not visible  LUNGS: Clear to auscultation.  CARDIAC: regular  rhythm, S1 & S2 normal.  No heaves, thrills, gallops or murmurs.  ABDOMEN: flat, negative hepatosplenomegaly, soft and non-tender.  EXTREMITIES: No evidence of cyanosis, clubbing 2+ edema.    Current Facility-Administered Medications Ordered in Epic   Medication Dose Route Frequency Provider Last Rate Last Admin    acetaminophen (TYLENOL) tablet 650 mg  650 mg Oral Q4H PRN Cristofer Sims DPM   650 mg at 02/22/24 1655    Or    acetaminophen (TYLENOL) Suppository 650 mg  650 mg Rectal Q4H PRN Cristofer Sims, DPM        aspirin (ASA) EC tablet 325 mg  325 mg Oral Daily Cristofer Sims DPM   325 mg at 02/23/24 0838    [Held by provider] atorvastatin (LIPITOR) tablet 80 mg  80 mg Oral QPM Cristofer Sims DPM   80 mg at 02/16/24 2118    benzocaine-menthol (CHLORASEPTIC) 6-10 MG lozenge 1 lozenge  1 lozenge Buccal Q1H PRN Reilly Cox MD   1 lozenge at 02/16/24 2210    bisacodyl (DULCOLAX) suppository 10 mg  10 mg Rectal Daily PRN Cristofer Sims, " AMY        bumetanide (BUMEX) tablet 2 mg  2 mg Oral BID Shayna Arndt MD   2 mg at 02/23/24 0838    calcium carbonate (TUMS) chewable tablet 1,000 mg  1,000 mg Oral 4x Daily PRN Cristofer Sims DPM   1,000 mg at 02/19/24 1348    [Held by provider] carvedilol (COREG) tablet 12.5 mg  12.5 mg Oral BID w/meals Madan Yu MD   12.5 mg at 02/22/24 0926    DAPTOmycin (CUBICIN) 750 mg in sodium chloride 0.9 % 100 mL intermittent infusion  6 mg/kg Intravenous Q24H Gadiel Godfrey MD   750 mg at 02/22/24 1655    glucose gel 15-30 g  15-30 g Oral Q15 Min PRN Cristofer Sims DPM        Or    dextrose 50 % injection 25-50 mL  25-50 mL Intravenous Q15 Min PRN Cristofer Sims DPM        Or    glucagon injection 1 mg  1 mg Subcutaneous Q15 Min PRN Cristofer Sims DPM        enoxaparin ANTICOAGULANT (LOVENOX) injection 40 mg  40 mg Subcutaneous Q24H Cristofer Sims DPM   40 mg at 02/22/24 1848    hydrALAZINE (APRESOLINE) injection 10 mg  10 mg Intravenous Q6H PRN Madan Yu MD        Or    hydrALAZINE (APRESOLINE) tablet 10 mg  10 mg Oral Q6H PRN Madan Yu MD        hydrOXYzine HCl (ATARAX) tablet 10 mg  10 mg Oral TID PRN Madan Yu MD        insulin aspart (NovoLOG) injection (RAPID ACTING)   Subcutaneous TID w/meals Madan Yu MD   6 Units at 02/23/24 1249    insulin aspart (NovoLOG) injection (RAPID ACTING)  1-6 Units Subcutaneous TID w/meals Cristofer Sims DPM   3 Units at 02/23/24 1247    insulin glargine (LANTUS PEN) injection 24 Units  24 Units Subcutaneous At Bedtime Madan Yu MD   24 Units at 02/22/24 2143    lidocaine (LMX4) cream   Topical Q1H PRN Cristofer Sims DPM        lidocaine 1 % 0.1-1 mL  0.1-1 mL Other Q1H PRN Cristofer Sims DPM        [Held by provider] losartan (COZAAR) tablet 25 mg  25 mg Oral Daily Cristofer Sims DPM   25 mg at 02/18/24 0750    melatonin tablet 5 mg  5 mg Oral At Bedtime PRN Cristofer Sims DPM   5 mg at 02/16/24 2114    metoclopramide (REGLAN) tablet 5 mg  5 mg  Oral TID AC Madan Yu MD   5 mg at 02/23/24 1247    mineral oil-hydrophilic petrolatum (AQUAPHOR)   Topical BID Sheldon Liang MD   Given at 02/23/24 0838    multivitamin w/minerals (THERA-VIT-M) tablet 1 tablet  1 tablet Oral Daily Madan Yu MD   1 tablet at 02/23/24 1253    naloxone (NARCAN) injection 0.2 mg  0.2 mg Intravenous Q2 Min PRN Cristofer Sims DPM        Or    naloxone (NARCAN) injection 0.4 mg  0.4 mg Intravenous Q2 Min PRN Cristofer Sims DPM        Or    naloxone (NARCAN) injection 0.2 mg  0.2 mg Intramuscular Q2 Min PRN Cristofer Sims DPM        Or    naloxone (NARCAN) injection 0.4 mg  0.4 mg Intramuscular Q2 Min PRN Cristofer Sims DPM        nicotine (NICODERM CQ) 14 MG/24HR 24 hr patch 1 patch  1 patch Transdermal Q24H Sheldon Liang MD   1 patch at 02/23/24 1357    nicotine Patch in Place   Transdermal Q8H Cristofer Sims DPM        ondansetron (ZOFRAN ODT) ODT tab 4 mg  4 mg Oral Q6H PRN Cristofer Sims DPM        Or    ondansetron (ZOFRAN) injection 4 mg  4 mg Intravenous Q6H PRN Cristofer Sims DPM   4 mg at 02/17/24 0611    oxyCODONE (ROXICODONE) tablet 5 mg  5 mg Oral Q6H PRN Cristofer Sims DPM   5 mg at 02/23/24 0021    pantoprazole (PROTONIX) EC tablet 40 mg  40 mg Oral QAM AC Madan Yu MD   40 mg at 02/23/24 0600    piperacillin-tazobactam (ZOSYN) 3.375 g vial to attach to  mL bag  3.375 g Intravenous Q8H Cristofer Sims DPM   3.375 g at 02/23/24 0837    polyethylene glycol (MIRALAX) Packet 17 g  17 g Oral BID PRN Cristofer Sims DPM        prochlorperazine (COMPAZINE) injection 10 mg  10 mg Intravenous Q6H PRN Cristofer Sims, DPM        Or    prochlorperazine (COMPAZINE) tablet 10 mg  10 mg Oral Q6H PRN Cristofer Sims DPM        Or    prochlorperazine (COMPAZINE) suppository 25 mg  25 mg Rectal Q12H PRN Cristofer Sims, DPM        senna-docusate (SENOKOT-S/PERICOLACE) 8.6-50 MG per tablet 1 tablet  1 tablet Oral BID PRN Bethany,  AMY [place]        Or    senna-docusate (SENOKOT-S/PERICOLACE) 8.6-50 MG per tablet 2 tablet  2 tablet Oral BID PRN Cristofer Sims DPM        simethicone (MYLICON) chewable tablet 80 mg  80 mg Oral Q6H PRN Madan Yu MD        sodium chloride (PF) 0.9% PF flush 3 mL  3 mL Intracatheter Q8H Cristofer Sims DPM   3 mL at 02/23/24 1247    sodium chloride (PF) 0.9% PF flush 3 mL  3 mL Intracatheter q1 min prn Cristofer Sims DPM        sodium chloride 0.9% (bottle) irrigation    PRN Cristofer Sims DPM   1,000 mL at 02/16/24 1120    wound support modular (EXPEDITE) bottle 60 mL  60 mL Oral Daily Madan Yu MD   60 mL at 02/23/24 0842     No current [facility]-ordered outpatient medications on file.       Cardiographics:        Echocardiogram:   The left ventricle is normal in size. There is mild concentric left  ventricular hypertrophy.  Left ventricular function is decreased. The ejection fraction is 30-35%  (moderately reduced). There is global hypokinesis with severe hypokinesis of  the mid to apical inferior wall.  Diastolic Doppler findings (E/E' ratio and/or other parameters) suggest left  ventricular filling pressures are increased.     The right ventricle is normal in size and function.  Normal left atrial size. Right atrial size is normal.  There is mild (1+) mitral regurgitation.  IVC diameter >2.1 cm collapsing <50% with sniff suggests a high RA pressure  estimated at 15 mmHg or greater.       Lab Results    Chemistry/lipid CBC Cardiac Enzymes/BNP/TSH/INR   Recent Labs   Lab Test 02/21/24  0537   CHOL 156   HDL 39*   LDL 77   TRIG 199*     Recent Labs   Lab Test 02/21/24  0537 08/23/23  0946   LDL 77 226*     Recent Labs   Lab Test 02/23/24  1134 02/23/24  0726 02/23/24  0618   NA  --   --  137   POTASSIUM  --   --  3.6   CHLORIDE  --   --  98   CO2  --   --  31*   *   < > 125*   BUN  --   --  13.4   CR  --   --  1.53*   GFRESTIMATED  --   --  55*   SARAH  --   --  8.6    < > = values in this interval  "not displayed.     Recent Labs   Lab Test 02/23/24  0618 02/22/24  0635 02/21/24  0537   CR 1.53* 1.58* 1.54*     Recent Labs   Lab Test 02/14/24  0935 08/23/23  0946   A1C 11.8* 11.1*          Recent Labs   Lab Test 02/23/24  0618   WBC 11.3*   HGB 10.6*   HCT 33.1*   MCV 93        Recent Labs   Lab Test 02/23/24  0618 02/20/24  0546 02/19/24  0547   HGB 10.6* 10.3* 11.0*    No results for input(s): \"TROPONINI\" in the last 02700 hours.  Recent Labs   Lab Test 02/19/24  0547 08/23/23  0946   NTBNPI 12,085* 857*     Recent Labs   Lab Test 08/23/23  1433   TSH 2.72     Recent Labs   Lab Test 08/23/23  0946   INR 0.95                   "

## 2024-02-24 LAB
ANION GAP SERPL CALCULATED.3IONS-SCNC: 5 MMOL/L (ref 7–15)
BUN SERPL-MCNC: 16 MG/DL (ref 6–20)
CALCIUM SERPL-MCNC: 8.8 MG/DL (ref 8.6–10)
CHLORIDE SERPL-SCNC: 96 MMOL/L (ref 98–107)
CK SERPL-CCNC: 63 U/L (ref 39–308)
CREAT SERPL-MCNC: 1.72 MG/DL (ref 0.67–1.17)
DEPRECATED HCO3 PLAS-SCNC: 34 MMOL/L (ref 22–29)
EGFRCR SERPLBLD CKD-EPI 2021: 48 ML/MIN/1.73M2
GLUCOSE BLDC GLUCOMTR-MCNC: 169 MG/DL (ref 70–99)
GLUCOSE BLDC GLUCOMTR-MCNC: 192 MG/DL (ref 70–99)
GLUCOSE BLDC GLUCOMTR-MCNC: 224 MG/DL (ref 70–99)
GLUCOSE BLDC GLUCOMTR-MCNC: 229 MG/DL (ref 70–99)
GLUCOSE SERPL-MCNC: 174 MG/DL (ref 70–99)
MAGNESIUM SERPL-MCNC: 1.7 MG/DL (ref 1.7–2.3)
POTASSIUM SERPL-SCNC: 4.1 MMOL/L (ref 3.4–5.3)
POTASSIUM SERPL-SCNC: 4.1 MMOL/L (ref 3.4–5.3)
SODIUM SERPL-SCNC: 135 MMOL/L (ref 135–145)

## 2024-02-24 PROCEDURE — 84132 ASSAY OF SERUM POTASSIUM: CPT | Performed by: INTERNAL MEDICINE

## 2024-02-24 PROCEDURE — 83735 ASSAY OF MAGNESIUM: CPT | Performed by: INTERNAL MEDICINE

## 2024-02-24 PROCEDURE — 99232 SBSQ HOSP IP/OBS MODERATE 35: CPT | Performed by: INTERNAL MEDICINE

## 2024-02-24 PROCEDURE — 82550 ASSAY OF CK (CPK): CPT | Performed by: INTERNAL MEDICINE

## 2024-02-24 PROCEDURE — 250N000013 HC RX MED GY IP 250 OP 250 PS 637: Performed by: INTERNAL MEDICINE

## 2024-02-24 PROCEDURE — 258N000003 HC RX IP 258 OP 636: Performed by: INTERNAL MEDICINE

## 2024-02-24 PROCEDURE — 120N000001 HC R&B MED SURG/OB

## 2024-02-24 PROCEDURE — 250N000013 HC RX MED GY IP 250 OP 250 PS 637: Performed by: PODIATRIST

## 2024-02-24 PROCEDURE — 80048 BASIC METABOLIC PNL TOTAL CA: CPT | Performed by: INTERNAL MEDICINE

## 2024-02-24 PROCEDURE — 250N000011 HC RX IP 250 OP 636: Performed by: PODIATRIST

## 2024-02-24 PROCEDURE — 36415 COLL VENOUS BLD VENIPUNCTURE: CPT | Performed by: INTERNAL MEDICINE

## 2024-02-24 PROCEDURE — 99233 SBSQ HOSP IP/OBS HIGH 50: CPT | Performed by: INTERNAL MEDICINE

## 2024-02-24 PROCEDURE — 250N000011 HC RX IP 250 OP 636: Mod: JZ | Performed by: INTERNAL MEDICINE

## 2024-02-24 RX ORDER — MAGNESIUM OXIDE 400 MG/1
400 TABLET ORAL EVERY 4 HOURS
Status: COMPLETED | OUTPATIENT
Start: 2024-02-24 | End: 2024-02-24

## 2024-02-24 RX ORDER — CARVEDILOL 12.5 MG/1
25 TABLET ORAL 2 TIMES DAILY WITH MEALS
Status: DISCONTINUED | OUTPATIENT
Start: 2024-02-24 | End: 2024-03-05 | Stop reason: HOSPADM

## 2024-02-24 RX ORDER — BUMETANIDE 1 MG/1
1 TABLET ORAL
Status: DISCONTINUED | OUTPATIENT
Start: 2024-02-25 | End: 2024-02-28

## 2024-02-24 RX ADMIN — ACETAMINOPHEN 650 MG: 325 TABLET ORAL at 13:19

## 2024-02-24 RX ADMIN — PIPERACILLIN AND TAZOBACTAM 3.38 G: 3; .375 INJECTION, POWDER, FOR SOLUTION INTRAVENOUS at 20:56

## 2024-02-24 RX ADMIN — INSULIN ASPART 6 UNITS: 100 INJECTION, SOLUTION INTRAVENOUS; SUBCUTANEOUS at 18:35

## 2024-02-24 RX ADMIN — DAPTOMYCIN 750 MG: 500 INJECTION, POWDER, LYOPHILIZED, FOR SOLUTION INTRAVENOUS at 17:53

## 2024-02-24 RX ADMIN — PANTOPRAZOLE SODIUM 40 MG: 40 TABLET, DELAYED RELEASE ORAL at 06:32

## 2024-02-24 RX ADMIN — WHITE PETROLATUM: 1.75 OINTMENT TOPICAL at 09:29

## 2024-02-24 RX ADMIN — ACETAMINOPHEN 650 MG: 325 TABLET ORAL at 00:46

## 2024-02-24 RX ADMIN — METOCLOPRAMIDE 5 MG: 5 TABLET ORAL at 06:32

## 2024-02-24 RX ADMIN — OXYCODONE HYDROCHLORIDE 5 MG: 5 TABLET ORAL at 00:46

## 2024-02-24 RX ADMIN — INSULIN ASPART 6 UNITS: 100 INJECTION, SOLUTION INTRAVENOUS; SUBCUTANEOUS at 13:18

## 2024-02-24 RX ADMIN — ASPIRIN 325 MG: 325 TABLET, COATED ORAL at 09:28

## 2024-02-24 RX ADMIN — PIPERACILLIN AND TAZOBACTAM 3.38 G: 3; .375 INJECTION, POWDER, FOR SOLUTION INTRAVENOUS at 09:27

## 2024-02-24 RX ADMIN — CARVEDILOL 25 MG: 12.5 TABLET, FILM COATED ORAL at 17:46

## 2024-02-24 RX ADMIN — ENOXAPARIN SODIUM 40 MG: 40 INJECTION SUBCUTANEOUS at 17:44

## 2024-02-24 RX ADMIN — PIPERACILLIN AND TAZOBACTAM 3.38 G: 3; .375 INJECTION, POWDER, FOR SOLUTION INTRAVENOUS at 00:27

## 2024-02-24 RX ADMIN — Medication 1 TABLET: at 09:28

## 2024-02-24 RX ADMIN — METOCLOPRAMIDE 5 MG: 5 TABLET ORAL at 17:43

## 2024-02-24 RX ADMIN — NICOTINE 1 PATCH: 14 PATCH, EXTENDED RELEASE TRANSDERMAL at 13:31

## 2024-02-24 RX ADMIN — WHITE PETROLATUM: 1.75 OINTMENT TOPICAL at 20:57

## 2024-02-24 RX ADMIN — INSULIN GLARGINE 28 UNITS: 100 INJECTION, SOLUTION SUBCUTANEOUS at 21:45

## 2024-02-24 RX ADMIN — MAGNESIUM OXIDE TAB 400 MG (241.3 MG ELEMENTAL MG) 400 MG: 400 (241.3 MG) TAB at 13:26

## 2024-02-24 RX ADMIN — Medication 60 ML: at 09:29

## 2024-02-24 RX ADMIN — CARVEDILOL 12.5 MG: 12.5 TABLET, FILM COATED ORAL at 09:28

## 2024-02-24 RX ADMIN — MAGNESIUM OXIDE TAB 400 MG (241.3 MG ELEMENTAL MG) 400 MG: 400 (241.3 MG) TAB at 09:28

## 2024-02-24 ASSESSMENT — ACTIVITIES OF DAILY LIVING (ADL)
ADLS_ACUITY_SCORE: 38
ADLS_ACUITY_SCORE: 36
ADLS_ACUITY_SCORE: 36
ADLS_ACUITY_SCORE: 38
ADLS_ACUITY_SCORE: 36
ADLS_ACUITY_SCORE: 38
ADLS_ACUITY_SCORE: 38
ADLS_ACUITY_SCORE: 36
ADLS_ACUITY_SCORE: 38
ADLS_ACUITY_SCORE: 38
ADLS_ACUITY_SCORE: 36
ADLS_ACUITY_SCORE: 36
ADLS_ACUITY_SCORE: 38
ADLS_ACUITY_SCORE: 36
ADLS_ACUITY_SCORE: 38

## 2024-02-24 NOTE — PROGRESS NOTES
Care Management Follow Up    Length of Stay (days): 10    Expected Discharge Date: 02/27/2024     Concerns to be Addressed:       Patient plan of care discussed at interdisciplinary rounds: Yes    Anticipated Discharge Disposition:  pending progression of care    Referrals Placed by CM/SW:    Private pay costs discussed: Not applicable    Additional Information:  VM received from patient's family Friend Vince Cortes [phone]. Gene states patient reports he received a phone call from [email or web address] on 2/23/2024 and case will be expedtited to activate insurance. May hear back on Monday 2/26/2024.   CM will follow and assist as needed.     KYUNG Alicia

## 2024-02-24 NOTE — PROGRESS NOTES
"    Cardiology Progress Note    Assessment:  Acute heart failure reduced ejection fraction, improving volume status  Dilated cardiomyopathy with segmental wall motion abnormalities suggestive of ischemic etiology  Nephrotic syndrome  Diabetes mellitus  Hypertension, controlled    Plan:  Continue diuresis as per nephrology  Increase carvedilol today  Continue to hold losartan for now  Invasive coronary angiogram was planned by Dr. Arndt.  This can be performed on Monday if creatinine allows    Subjective:   Denies chest pain, less short of breath    Objective:   BP (!) (P) 144/77 (BP Location: Left arm)   Pulse (P) 79   Temp (P) 98.3  F (36.8  C) (Oral)   Resp (P) 16   Ht 1.778 m (5' 10\")   Wt 123.7 kg (272 lb 11.3 oz)   SpO2 (P) 97%   BMI 39.13 kg/m      Intake/Output Summary (Last 24 hours) at 2/23/2024 1434  Last data filed at 2/23/2024 1336  Gross per 24 hour   Intake 1207 ml   Output 4450 ml   Net -3243 ml         Physical Exam:  GENERAL: no distress  NECK: JVD is not visible  LUNGS: Clear to auscultation.  CARDIAC: regular  rhythm, S1 & S2 normal.  No heaves, thrills, gallops or murmurs.  ABDOMEN: flat, negative hepatosplenomegaly, soft and non-tender.  EXTREMITIES: No evidence of cyanosis, clubbing 2+ edema.    Current Facility-Administered Medications Ordered in Epic   Medication Dose Route Frequency Provider Last Rate Last Admin    acetaminophen (TYLENOL) tablet 650 mg  650 mg Oral Q4H PRN Cristofer Sims, DPM   650 mg at 02/24/24 0046    Or    acetaminophen (TYLENOL) Suppository 650 mg  650 mg Rectal Q4H PRN Cristofer Sims, DPM        aspirin (ASA) EC tablet 325 mg  325 mg Oral Daily Cristofer Sims, DPM   325 mg at 02/24/24 0928    [Held by provider] atorvastatin (LIPITOR) tablet 80 mg  80 mg Oral QPM Cristofer Sims, DPM   80 mg at 02/16/24 2118    benzocaine-menthol (CHLORASEPTIC) 6-10 MG lozenge 1 lozenge  1 lozenge Buccal Q1H PRN Reilly Cox MD   1 lozenge at 02/16/24 2210    bisacodyl " (DULCOLAX) suppository 10 mg  10 mg Rectal Daily PRN Cristofer Sims DPM        [Held by provider] bumetanide (BUMEX) tablet 1 mg  1 mg Oral BID Glendy Pastrana MD        calcium carbonate (TUMS) chewable tablet 1,000 mg  1,000 mg Oral 4x Daily PRN Cristofer Sims DPM   1,000 mg at 02/19/24 1348    carvedilol (COREG) tablet 12.5 mg  12.5 mg Oral BID w/meals Arpit Gandara MD   12.5 mg at 02/24/24 0928    DAPTOmycin (CUBICIN) 750 mg in sodium chloride 0.9 % 100 mL intermittent infusion  6 mg/kg Intravenous Q24H Gadiel Godfrey  mL/hr at 02/23/24 1739 750 mg at 02/23/24 1739    glucose gel 15-30 g  15-30 g Oral Q15 Min PRN Cristofer Sims DPM        Or    dextrose 50 % injection 25-50 mL  25-50 mL Intravenous Q15 Min PRN Cristofer Sims DPM        Or    glucagon injection 1 mg  1 mg Subcutaneous Q15 Min PRN Cristofer Sims DPM        enoxaparin ANTICOAGULANT (LOVENOX) injection 40 mg  40 mg Subcutaneous Q24H Cristofer Sims DPM   40 mg at 02/23/24 1744    hydrALAZINE (APRESOLINE) injection 10 mg  10 mg Intravenous Q6H PRN Madan Yu MD        Or    hydrALAZINE (APRESOLINE) tablet 10 mg  10 mg Oral Q6H PRN Madan Yu MD        hydrOXYzine HCl (ATARAX) tablet 10 mg  10 mg Oral TID PRN Madan Yu MD        insulin aspart (NovoLOG) injection (RAPID ACTING)   Subcutaneous TID w/meals Madan Yu MD   5 Units at 02/23/24 1745    insulin aspart (NovoLOG) injection (RAPID ACTING)  1-6 Units Subcutaneous TID w/meals Cristofer Sims DPM   2 Units at 02/23/24 1743    insulin glargine (LANTUS PEN) injection 28 Units  28 Units Subcutaneous At Bedtime Madan Yu MD        lidocaine (LMX4) cream   Topical Q1H PRN Cristofer Sims DPM        lidocaine 1 % 0.1-1 mL  0.1-1 mL Other Q1H PRN Cristofer Sims DPM        magnesium oxide (MAG-OX) tablet 400 mg  400 mg Oral Q4H Madan Yu MD   400 mg at 02/24/24 0928    melatonin tablet 5 mg  5 mg Oral At Bedtime PRN Cristofer Sims DPM   5 mg at 02/16/24 2114     metoclopramide (REGLAN) tablet 5 mg  5 mg Oral BID AC Madan Yu MD   5 mg at 02/24/24 0632    mineral oil-hydrophilic petrolatum (AQUAPHOR)   Topical BID Sheldon Liang MD   Given at 02/24/24 0929    multivitamin w/minerals (THERA-VIT-M) tablet 1 tablet  1 tablet Oral Daily Madan Yu MD   1 tablet at 02/24/24 0928    naloxone (NARCAN) injection 0.2 mg  0.2 mg Intravenous Q2 Min PRN Cristofer Sims DPM        Or    naloxone (NARCAN) injection 0.4 mg  0.4 mg Intravenous Q2 Min PRN Cristofer Sims DPM        Or    naloxone (NARCAN) injection 0.2 mg  0.2 mg Intramuscular Q2 Min PRN Cristofer Sims DPM        Or    naloxone (NARCAN) injection 0.4 mg  0.4 mg Intramuscular Q2 Min PRN Cristofer Sims DPM        nicotine (NICODERM CQ) 14 MG/24HR 24 hr patch 1 patch  1 patch Transdermal Q24H Sheldon Liang MD   1 patch at 02/23/24 1357    nicotine Patch in Place   Transdermal Q8H Cristofer Sims DPM        ondansetron (ZOFRAN ODT) ODT tab 4 mg  4 mg Oral Q6H PRN Cristofer Sims DPM        Or    ondansetron (ZOFRAN) injection 4 mg  4 mg Intravenous Q6H PRN Cristofer Sims DPM   4 mg at 02/17/24 0611    oxyCODONE (ROXICODONE) tablet 5 mg  5 mg Oral Q6H PRN Cristofer Sims DPM   5 mg at 02/24/24 0046    pantoprazole (PROTONIX) EC tablet 40 mg  40 mg Oral QAM AC Madan Yu MD   40 mg at 02/24/24 0632    piperacillin-tazobactam (ZOSYN) 3.375 g vial to attach to  mL bag  3.375 g Intravenous Q8H Cristofer Sims DPM   3.375 g at 02/24/24 0927    polyethylene glycol (MIRALAX) Packet 17 g  17 g Oral BID PRN Cristofer Sims DPM        prochlorperazine (COMPAZINE) injection 10 mg  10 mg Intravenous Q6H PRN Cristofer Sims DPM        Or    prochlorperazine (COMPAZINE) tablet 10 mg  10 mg Oral Q6H PRN Cristofer Sims DPM        Or    prochlorperazine (COMPAZINE) suppository 25 mg  25 mg Rectal Q12H PRN Cristofer Sims DPM        senna-docusate (SENOKOT-S/PERICOLACE) 8.6-50 MG per tablet 1  tablet  1 tablet Oral BID PRN Cristofer Sims DPM        Or    senna-docusate (SENOKOT-S/PERICOLACE) 8.6-50 MG per tablet 2 tablet  2 tablet Oral BID PRN Cristofer Sims DPM        simethicone (MYLICON) chewable tablet 80 mg  80 mg Oral Q6H PRN Madan Yu MD        sodium chloride (PF) 0.9% PF flush 3 mL  3 mL Intracatheter Q8H Cristofer Sims DPM   3 mL at 02/23/24 2014    sodium chloride (PF) 0.9% PF flush 3 mL  3 mL Intracatheter q1 min prn Cristofer Sims DPM        sodium chloride 0.9% (bottle) irrigation    PRN Cristofer Sims DPM   1,000 mL at 02/16/24 1120    wound support modular (EXPEDITE) bottle 60 mL  60 mL Oral Daily Madan Yu MD   60 mL at 02/24/24 0929     No current [facility]-ordered outpatient medications on file.       Cardiographics:        Echocardiogram:   The left ventricle is normal in size. There is mild concentric left  ventricular hypertrophy.  Left ventricular function is decreased. The ejection fraction is 30-35%  (moderately reduced). There is global hypokinesis with severe hypokinesis of  the mid to apical inferior wall.  Diastolic Doppler findings (E/E' ratio and/or other parameters) suggest left  ventricular filling pressures are increased.     The right ventricle is normal in size and function.  Normal left atrial size. Right atrial size is normal.  There is mild (1+) mitral regurgitation.  IVC diameter >2.1 cm collapsing <50% with sniff suggests a high RA pressure  estimated at 15 mmHg or greater.       Lab Results    Chemistry/lipid CBC Cardiac Enzymes/BNP/TSH/INR   Recent Labs   Lab Test 02/21/24  0537   CHOL 156   HDL 39*   LDL 77   TRIG 199*     Recent Labs   Lab Test 02/21/24  0537 08/23/23  0946   LDL 77 226*     Recent Labs   Lab Test 02/23/24  1134 02/23/24  0726 02/23/24  0618   NA  --   --  137   POTASSIUM  --   --  3.6   CHLORIDE  --   --  98   CO2  --   --  31*   *   < > 125*   BUN  --   --  13.4   CR  --   --  1.53*   GFRESTIMATED  --   --  55*   SARAH  --   --   "8.6    < > = values in this interval not displayed.     Recent Labs   Lab Test 02/23/24  0618 02/22/24  0635 02/21/24  0537   CR 1.53* 1.58* 1.54*     Recent Labs   Lab Test 02/14/24  0935 08/23/23  0946   A1C 11.8* 11.1*          Recent Labs   Lab Test 02/23/24  0618   WBC 11.3*   HGB 10.6*   HCT 33.1*   MCV 93        Recent Labs   Lab Test 02/23/24  0618 02/20/24  0546 02/19/24  0547   HGB 10.6* 10.3* 11.0*    No results for input(s): \"TROPONINI\" in the last 82242 hours.  Recent Labs   Lab Test 02/19/24  0547 08/23/23  0946   NTBNPI 12,085* 857*     Recent Labs   Lab Test 08/23/23  1433   TSH 2.72     Recent Labs   Lab Test 08/23/23  0946   INR 0.95                   "

## 2024-02-24 NOTE — PROGRESS NOTES
"        RENAL NOTE    REQUESTING PHYSICIAN: Madan Montano    REASON FOR CONSULT: SCHUYLER - worsening, volume overload    ASSESSMENT/PLAN:  Nephrotic range proteinuria, CKD1  Baseline Cr 0.8, with severe nephrotic range proteinuria and some cells on UA. Suspect he has CKD/GN most likely related to diabetic nephropathy, all serologies neg except sl monoclonal AB on urine immunofix, plan to f/up several months.   Could consider bx in future to confirm DN but not urgent and low priority given overall non compliance hx.   Would rec maxing ARB, SGLT2 inhib eventually.  Would benefit from close outpt follow up for his SCHUYLER, for better ongoing management for his nephrotic range proteinuria and anticipated angio in future.   Plan for outpt follow up at our clinic:   Monday March 18 at 11:00AM with Ruth Mike NP at [facility]     SCHUYLER suspect hemodynamic due to infx, hemodynamics.   - Cr now up with ongoing diuresis, some contraction alkalosis.    On bumex 2mg bid, given worse creat, hold and cut to 1 mg bid resuming tomorrow.     HTN bp controlled.   Holding ARB but will try to resume soon to assist with proteinuria control    Volume overload better by exam, large urine output.   -- Changed to oral bumex - see above.     New HFrEF with WMA, prob high risk for CAD but had negative stress test last fall. Echo shows new EF down to 30-35% with WMA and inc filling pressures.   Cards plan for coronary angio     Anemia normocytic, can assess iron stores eventually    Lytes: No derangements    HLD - statin    DM2 -historically poor control,  insulin this admit     Diabetic foot ulcer  2/16 debridement. Now on  Zosyn and daptomycin until 3/1 per ID. Wound vac, no osteo    Obesity    Tobacco use - nicotine patches in use this admit    D/w Dr Yu and no defined discharge plans. Will follow.       HPI:   In bed, \"not awake yet\" \"no idea about dispo plans\"   Eating ok  No sob  Unaware of edema improved or not.   Voiding a " lot.     He shared that PTA stopped taking all his meds (diabetic because he didn't feel any different so did not think important to take  Discussed importance of good diabetic care for long term health of kidneys and rest of body. Discussed that may not always have symptoms we can feel, but that diabetes can have deleterious effects to rest of body. Encourage to be more consistent with meds after discharge.     Reviewed with him benefits of out pt nephrology follow up. Has concerns about mobility and transport         REVIEW OF SYSTEMS:  ROS was completely reviewed and otherwise negative and non-contributory       Past Medical History:   Diagnosis Date    Diabetes (H)      Social History     Socioeconomic History    Marital status: Single     Spouse name: Not on file    Number of children: Not on file    Years of education: Not on file    Highest education level: Not on file   Occupational History    Not on file   Tobacco Use    Smoking status: Every Day     Packs/day: .5     Types: Cigarettes    Smokeless tobacco: Never    Tobacco comments:     Seen IP by [facility] on 8/24/23 and declined cessation services and materials.    Vaping Use    Vaping Use: Never used   Substance and Sexual Activity    Alcohol use: No    Drug use: No    Sexual activity: Not on file   Other Topics Concern    Not on file   Social History Narrative    Patient reports that he does not have health insurance.     Social Determinants of Health     Financial Resource Strain: Low Risk  (10/20/2023)    Financial Resource Strain     Within the past 12 months, have you or your family members you live with been unable to get utilities (heat, electricity) when it was really needed?: No   Food Insecurity: High Risk (10/20/2023)    Food Insecurity     Within the past 12 months, did you worry that your food would run out before you got money to buy more?: Yes     Within the past 12 months, did the food you bought just not last and you didn t have money to get  more?: No   Transportation Needs: Low Risk  (10/20/2023)    Transportation Needs     Within the past 12 months, has lack of transportation kept you from medical appointments, getting your medicines, non-medical meetings or appointments, work, or from getting things that you need?: No   Physical Activity: Not on file   Stress: Not on file   Social Connections: Not on file   Interpersonal Safety: Low Risk  (10/20/2023)    Interpersonal Safety     Do you feel physically and emotionally safe where you currently live?: Yes     Within the past 12 months, have you been hit, slapped, kicked or otherwise physically hurt by someone?: No     Within the past 12 months, have you been humiliated or emotionally abused in other ways by your partner or ex-partner?: No   Housing Stability: High Risk (10/20/2023)    Housing Stability     Do you have housing? : Yes     Are you worried about losing your housing?: Yes          ALLERGIES/SENSITIVITIES:  No Known Allergies      PHYSICAL EXAM:  Physical Exam   Temp: 98.5  F (36.9  C) Temp src: Oral BP: 119/69 Pulse: 73   Resp: 18 SpO2: 92 % O2 Device: None (Room air)    Vitals:    24 2148 24 0409 24 0620   Weight: 123.1 kg (271 lb 6.2 oz) 125.4 kg (276 lb 7.3 oz) 123.7 kg (272 lb 11.3 oz)     Vital Signs with Ranges  Temp:  [97.8  F (36.6  C)-99.2  F (37.3  C)] 98.5  F (36.9  C)  Pulse:  [73-90] 73  Resp:  [16-18] 18  BP: (119-145)/(67-81) 119/69  SpO2:  [91 %-95 %] 92 %  I/O last 3 completed shifts:  In: 1750 [P.O.:1750]  Out: 7200 [Urine:7200]    Temp (24hrs), Av.1  F (36.7  C), Min:97.4  F (36.3  C), Max:98.5  F (36.9  C)      Patient Vitals for the past 72 hrs:   Weight   24 0620 123.7 kg (272 lb 11.3 oz)   24 0409 125.4 kg (276 lb 7.3 oz)       General: A&Ox3, NAD laying in bed  HEENT: ATNC  NECK:  no JVD noted  RESPIRATORY: clear bilat no crackles   CARDIOVASCULAR:  RRR no murmur noted  ABDOMEN: soft NT  EXT Obesity + edema, seems much better than  earlier in week, still has some in lower legs.   GENITOURINARY:  No donovan   INTEGUMENTARY: Warm, dry, no rash but pale  NEUROLOGIC: grossly intact   Psych: calm, cooperative    Laboratory:     Recent Labs   Lab 02/23/24  0618 02/20/24  0546 02/19/24  0547 02/18/24  0603   WBC 11.3* 12.0* 12.4* 13.8*   RBC 3.57* 3.52* 3.74* 3.74*   HGB 10.6* 10.3* 11.0* 11.1*   HCT 33.1* 32.2* 34.4* 34.8*    357  357 400 384       Basic Metabolic Panel:  Recent Labs   Lab 02/24/24  0728 02/24/24  0527 02/23/24  2151 02/23/24  1714 02/23/24  1134 02/23/24  0726 02/23/24  0618 02/22/24  0744 02/22/24  0635 02/21/24  0758 02/21/24  0537 02/20/24  0922 02/20/24  0546 02/19/24  0739 02/19/24  0547   NA  --  135  --   --   --   --  137  --  137  --  137  --  136  --  138   POTASSIUM  --  4.1  4.1  --   --   --   --  3.6  --  3.5  --  4.0  --  3.5  --  3.8   CHLORIDE  --  96*  --   --   --   --  98  --  100  --  99  --  103  --  105   CO2  --  34*  --   --   --   --  31*  --  29  --  31*  --  29  --  23   BUN  --  16.0  --   --   --   --  13.4  --  12.7  --  11.2  --  9.3  --  8.2   CR  --  1.72*  --   --   --   --  1.53*  --  1.58*  --  1.54*  --  1.48*  --  1.42*   * 174* 219* 203* 226* 123* 125*   < > 133*   < > 209*   < > 142*   < > 173*   SARAH  --  8.8  --   --   --   --  8.6  --  8.3*  --  8.7  --  8.3*  --  8.6    < > = values in this interval not displayed.       INRNo lab results found in last 7 days.    Recent Labs   Lab Test 02/20/24  0546 02/19/24  0547   POTASSIUM 3.5 3.8   CHLORIDE 103 105   BUN 9.3 8.2      Recent Labs   Lab Test 02/19/24  0547 02/18/24  0603 02/17/24  2117   ALBUMIN 2.2* 2.2*  --    BILITOTAL 0.2 <0.2  --    ALT 9 7  --    AST 10 8  --    PROTEIN  --   --  300*       Personally reviewed today's laboratory studies      Thank you for involving us in the care of this patient. We will continue to follow along with you.

## 2024-02-24 NOTE — PLAN OF CARE
The negative pressure wound vac is at 125mmHg. This nurse Reinforced drape to achieve continuous pressure. Lifting of drape near tubing was noted, most likely from rubbing foot on bedding. Telemetry in place, normal sinus rhythm noted. Potassium protocol in place 3.6mmol/L, administered a dose of potassium, recheck in the morning. The patient has an order for 1800ml fluid restriction, he was educated on the reason, however is non-compliant.       Goal Outcome Evaluation:         Problem: Adult Inpatient Plan of Care  Goal: Optimal Comfort and Wellbeing  Outcome: Progressing

## 2024-02-24 NOTE — PROGRESS NOTES
[facility]    Medicine Progress Note - Hospitalist Service    Date of Admission:  2/14/2024    Assessment & Plan   Floyd Capps is a 48 year old male with history of DM, HTN, CVA, obesity, recent ED visit for nonhealing diabetic foot ulcer with cellulitis, here for worsening pain in the foot, with necrotic tissue noted on exam.  Patient admitted on 2/14/2024.      Diabetic foot ulcer, right heel;  -chronic wound R heel, recent resulting cellulitis treated with Bactrim  -here for worse pain in the foot, found with ulcer worse with necrotic tissue  -MRI of the foot no osteomyelitis  -On 2/16, s/p debridement with irrigation of right foot diabetic ulcer. NWB on right foot per podiatrist  -On 2/19, wound VAC placed, continue  -Surgical cultures polymicrobials.  Initially IV Vanco and IV Zosyn, now changed to IV Zosyn and IV daptomycin.  Appreciated ID input  -Care Mgr to see about financial/insurance issues which have been a barrier to followup  -Pain management with Tylenol, home oxycodone.     Acute new onset systolic heart failure;  CT imaging reported moderate bilateral pleural effusion and diffuse anasarca;  -- On 2/19, patient reported feeling shortness of breath, imaging workup showed bilateral moderate pleural effusion and diffuse anasarca  --BNP significantly elevated   --Echo on 8/23 reported EF 55% but given patient noncompliance with medications, rechecked echocardiogram with EF 30 to 35% with global hypokinesis.  --Of note, patient had nuclear stress test on 8/23 which was negative for inducible myocardial ischemia  --On 2/21, personally discussed with cardiology, anticipating ischemic evaluation at some point pending kidney function  -- On 2/23, IV Bumex changed to oral, holding today due to worsening creatinine.  --Monitor intake/output, weight, labs closely    SCHUYLER: Likely due to CHF exacerbation, possibly due to diabetic nephropathy  - Baseline is 0.6-0.8  -On a combination of  broad spectrum abx ( Vanc and Zosyn)  -Check UA with no significant RBC's or casts   -On 2/21, personally discussed with nephrology, given proteinuria multiple serological test ordered and process.  May need kidney biopsy in future pending test results  -Electrolyte imbalance, replace per protocol    DM Type II, Uncontrolled; improving  -Stopped all his home meds lately due to stomach upset while on Bactrim, then neglected to resume meds. Hold home metformin and glipizide for now.  -A1c 11.8  -Started Lantus and gradually titrating, increased to 248 unit at night on 2/21. Of note, patient was on insulin in the past and received education in the hospital about it, but seems to have stopped on its own.  Patient needs outpatient diabetic educator for ongoing titration  -Insulin sliding scale and hypoglycemic protocol  -Given poor appetite and early satiety, trial of Reglan which seems helping  -Also added PPI    History of stroke in 8/2023;  -Had been taking Plavix and atorvastatin but recently stopped taking all of his medications as above.  -Neurologist note from 8/26/2023 reviewed- at that time recommended DAPT with aspirin and Plavix for 90 days, afterwards switch to enteric-coated aspirin 325 mg daily.  -Started full dose aspirin and resumed home atorvastatin    Essential hypertension, uncontrolled; due to noncompliance-improving  -- Discharge summary from 8/26/2023 reviewed, patient was discharged home on Coreg 12.5 mg twice daily, HCTZ 12.5 mg daily, losartan 100 mg daily  --On 2/19 restarted Coreg 12.5 mg twice daily, increased to 25 mg twice daily on 2/24 by cardiology.  --Diuretics as above.  As needed hydralazine.  Monitor vitals and adjust medications accordingly.    Tobacco use disorder  -Nicotine Patch  -Advised to quit smoking     Left distal radius fracture due to recent fall on 2/6/2023  -Cast in place  -PT and OT evals  -Outpatient orthopedics follow-up     Normocytic anemia, likely due to chronic  "wound;  -No active/obvious bleeding.  Trend     Poor follow-up, noncompliance;  -Importance of compliance and follow-up needs to be emphasized.  Patient with relatively young age and already with serious comorbidities of chronic disease.          Diet: Moderate Consistent Carb (60 g CHO per Meal) Diet  Fluid restriction 1800 ML FLUID  Snacks/Supplements Adult: Expedite Bottle; With Meals  Snacks/Supplements Adult: Glucerna; With Meals    DVT Prophylaxis: Enoxaparin (Lovenox) SQ  Cazares Catheter: Not present  Lines: None     Cardiac Monitoring: ACTIVE order. Indication: Acute decompensated heart failure (48 hours)  Code Status: Full Code      Clinically Significant Risk Factors              # Hypoalbuminemia: Lowest albumin = 2 g/dL at 2/20/2024  5:46 AM, will monitor as appropriate    # Acute Kidney Injury, unspecified: based on a >150% or 0.3 mg/dL increase in last creatinine compared to past 90 day average, will monitor renal function   # Acute heart failure with reduced ejection fraction: last echo with EF <40% and receiving IV diuretics      # DMII: A1C = 11.8 % (Ref range: <5.7 %) within past 6 months   # Obesity: Estimated body mass index is 39.13 kg/m  as calculated from the following:    Height as of this encounter: 1.778 m (5' 10\").    Weight as of this encounter: 123.7 kg (272 lb 11.3 oz).   # Moderate Malnutrition: based on nutrition assessment    # Financial/Environmental Concerns: none         Disposition Plan     Expected Discharge Date: 02/24/2024    Discharge Delays: IV Medication - consider oral or Home Infusion  Procedure Pending (enter procedure & time in comments)  Placement - TCU  Specialist Consult (enter specialist & decision needed in comments)  Destination: home with family  Discharge Comments: insurance needs, angio            Madan Yu MD  Hospitalist Service  [facility]  Securely message with [employer] (more info)  Text page via [employer] Paging/Directory "   ______________________________________________________________________    Interval History   Patient seen and examined.  Notes, labs, imaging report personally reviewed.  Patient denied new concerning complaints.  Creatinine worsening, held Bumex.  Blood sugar still high, increase Lantus.  Discussed with nursing staff.  Discussed with nephrologist.    Physical Exam   Vital Signs: Temp: 98.5  F (36.9  C) Temp src: Oral BP: 119/69 Pulse: 73   Resp: 18 SpO2: 92 % O2 Device: None (Room air)    Weight: 272 lbs 11.34 oz      General: Not in obvious distress.  HEENT: Normocephalic, supple neck  Chest: Clear to auscultation bilateral anteriorly, no wheezing  Heart: S1S2 normal, regular  Abdomen: Soft.  Obese, nontender. Bowel sounds- active.  Extremities: Bilateral lower extremity swelling, right foot with wound VAC  Neuro: alert and awake, grossly non-focal        Medical Decision Making             Data     I have personally reviewed the following data over the past 24 hrs:    N/A  \   N/A   / N/A     135 96 (L) 16.0 /  192 (H)   4.1; 4.1 34 (H) 1.72 (H) \       Imaging results reviewed over the past 24 hrs:   No results found for this or any previous visit (from the past 24 hour(s)).

## 2024-02-24 NOTE — PLAN OF CARE
"  Problem: Malnutrition  Goal: Improved Nutritional Intake  Outcome: Progressing     Problem: Mobility Impairment  Goal: Optimal Mobility  Outcome: Progressing  Intervention: Optimize Mobility  Recent Flowsheet Documentation  Taken 2/24/2024 0420 by Corrine Ricketts RN  Activity Management: activity adjusted per tolerance  Positioning/Transfer Devices:   pillows   applied  Taken 2/24/2024 0005 by Corrine Ricketts RN  Activity Management: activity adjusted per tolerance  Positioning/Transfer Devices:   pillows   applied     Problem: Adult Inpatient Plan of Care  Goal: Plan of Care Review  Description: The Plan of Care Review/Shift note should be completed every shift.  The Outcome Evaluation is a brief statement about your assessment that the patient is improving, declining, or no change.  This information will be displayed automatically on your shift  note.  Outcome: Progressing  Goal: Patient-Specific Goal (Individualized)  Description: You can add care plan individualizations to a care plan. Examples of Individualization might be:  \"Parent requests to be called daily at 9am for status\", \"I have a hard time hearing out of my right ear\", or \"Do not touch me to wake me up as it startles  me\".  Outcome: Progressing  Goal: Absence of Hospital-Acquired Illness or Injury  Outcome: Progressing  Intervention: Prevent Skin Injury  Recent Flowsheet Documentation  Taken 2/24/2024 0420 by Corrine Ricketts RN  Body Position: position changed independently  Taken 2/24/2024 0005 by Corrine Ricketts RN  Body Position: position changed independently  Intervention: Prevent and Manage VTE (Venous Thromboembolism) Risk  Recent Flowsheet Documentation  Taken 2/24/2024 0420 by Corrine Ricketts RN  VTE Prevention/Management: other (see comments)  Taken 2/24/2024 0005 by Corrine Ricketts RN  VTE Prevention/Management: other (see comments)  Intervention: Prevent Infection  Recent Flowsheet Documentation  Taken 2/24/2024 0420 by Corrine Ricketts" RN  Infection Prevention:   hand hygiene promoted   personal protective equipment utilized   rest/sleep promoted  Taken 2/24/2024 0005 by Corrine Ricketts RN  Infection Prevention:   hand hygiene promoted   personal protective equipment utilized   rest/sleep promoted  Goal: Optimal Comfort and Wellbeing  Outcome: Progressing  Goal: Readiness for Transition of Care  Outcome: Progressing     Problem: Infection  Goal: Absence of Infection Signs and Symptoms  Outcome: Progressing  Intervention: Prevent or Manage Infection  Recent Flowsheet Documentation  Taken 2/24/2024 0420 by Corrine Ricketts RN  Isolation Precautions: contact precautions maintained  Taken 2/24/2024 0005 by Corrine Ricketts RN  Isolation Precautions: contact precautions maintained     Problem: Comorbidity Management  Goal: Maintenance of Asthma Control  Outcome: Progressing  Intervention: Maintain Asthma Symptom Control  Recent Flowsheet Documentation  Taken 2/24/2024 0420 by Corrine Ricketts RN  Medication Review/Management: medications reviewed  Taken 2/24/2024 0005 by Corrine Ricketts RN  Medication Review/Management: medications reviewed  Goal: Maintenance of Behavioral Health Symptom Control  Outcome: Progressing  Intervention: Maintain Behavioral Health Symptom Control  Recent Flowsheet Documentation  Taken 2/24/2024 0420 by Corrine Ricketts RN  Medication Review/Management: medications reviewed  Taken 2/24/2024 0005 by Corrine Ricketts RN  Medication Review/Management: medications reviewed  Goal: Maintenance of COPD Symptom Control  Outcome: Progressing  Intervention: Maintain COPD Symptom Control  Recent Flowsheet Documentation  Taken 2/24/2024 0420 by Corrine Ricketts RN  Supportive Measures: active listening utilized  Medication Review/Management: medications reviewed  Taken 2/24/2024 0005 by Corrine Ricketts RN  Supportive Measures: active listening utilized  Medication Review/Management: medications reviewed  Goal: Blood Glucose Levels Within Targeted  Range  Outcome: Progressing  Intervention: Monitor and Manage Glycemia  Recent Flowsheet Documentation  Taken 2/24/2024 0420 by Corrine Ricketts RN  Glycemic Management: blood glucose monitored  Medication Review/Management: medications reviewed  Taken 2/24/2024 0005 by Corrine Ricketts RN  Glycemic Management: blood glucose monitored  Medication Review/Management: medications reviewed  Goal: Maintenance of Heart Failure Symptom Control  Outcome: Progressing  Intervention: Maintain Heart Failure Management  Recent Flowsheet Documentation  Taken 2/24/2024 0420 by Corrine Ricketts RN  Medication Review/Management: medications reviewed  Taken 2/24/2024 0005 by Corrine Ricketts RN  Medication Review/Management: medications reviewed  Goal: Blood Pressure in Desired Range  Outcome: Progressing  Intervention: Maintain Blood Pressure Management  Recent Flowsheet Documentation  Taken 2/24/2024 0420 by Corrine Ricketts RN  Medication Review/Management: medications reviewed  Taken 2/24/2024 0005 by Corrine Ricketts RN  Medication Review/Management: medications reviewed  Goal: Maintenance of Osteoarthritis Symptom Control  Outcome: Progressing  Intervention: Maintain Osteoarthritis Symptom Control  Recent Flowsheet Documentation  Taken 2/24/2024 0420 by Corrine Ricketts RN  Activity Management: activity adjusted per tolerance  Medication Review/Management: medications reviewed  Taken 2/24/2024 0005 by Corrine Ricketts RN  Activity Management: activity adjusted per tolerance  Medication Review/Management: medications reviewed  Goal: Bariatric Home Regimen Maintained  Outcome: Progressing  Intervention: Maintain and Manage Postbariatric Surgery Care  Recent Flowsheet Documentation  Taken 2/24/2024 0420 by Corrine Ricketts RN  Medication Review/Management: medications reviewed  Taken 2/24/2024 0005 by Corrine Ricketts RN  Medication Review/Management: medications reviewed  Goal: Maintenance of Seizure Control  Outcome: Progressing  Intervention:  Maintain Seizure Symptom Control  Recent Flowsheet Documentation  Taken 2/24/2024 0420 by Corrine Ricketts, RN  Sensory Stimulation Regulation: care clustered  Medication Review/Management: medications reviewed  Taken 2/24/2024 0005 by Corrine Ricketts, RN  Sensory Stimulation Regulation: care clustered  Medication Review/Management: medications reviewed   Goal Outcome Evaluation:  Pt cont to have wound vac on right foot, at 125 cont. Pt is on tele and is running NSR. Pt complained of pain, prn oxycodone and tylenol were given and pt slept well following. Iv antibiotics ran as ordered and vital signs wnl for patient.

## 2024-02-24 NOTE — PLAN OF CARE
Problem: Mobility Impairment  Goal: Optimal Mobility  Intervention: Optimize Mobility  Recent Flowsheet Documentation  Taken 2/24/2024 0925 by Isha Tong, RN  Activity Management: activity adjusted per tolerance  Positioning/Transfer Devices:   pillows   applied     Problem: Adult Inpatient Plan of Care  Goal: Absence of Hospital-Acquired Illness or Injury  Intervention: Identify and Manage Fall Risk  Recent Flowsheet Documentation  Taken 2/24/2024 0925 by Isha Tong, RN  Safety Promotion/Fall Prevention: activity supervised     Problem: Adult Inpatient Plan of Care  Goal: Absence of Hospital-Acquired Illness or Injury  Intervention: Prevent Infection  Recent Flowsheet Documentation  Taken 2/24/2024 0925 by Isha Tong RN  Infection Prevention:   hand hygiene promoted   personal protective equipment utilized   rest/sleep promoted     Problem: Infection  Goal: Absence of Infection Signs and Symptoms  Intervention: Prevent or Manage Infection  Recent Flowsheet Documentation  Taken 2/24/2024 0925 by Isha Tong, RN  Isolation Precautions: contact precautions maintained   Goal Outcome Evaluation:       Denies pain this shift. Blood sugars 169 and 192, no breakfast this morning, had lunch, coverage insuline given. Dressing on Right lower extremity clean, dry and intact. Wound vac in place functional at 125 mm Hg. Declined offer to be up in the chair x 2.

## 2024-02-25 LAB
ANION GAP SERPL CALCULATED.3IONS-SCNC: 9 MMOL/L (ref 7–15)
BUN SERPL-MCNC: 16.2 MG/DL (ref 6–20)
CALCIUM SERPL-MCNC: 8.6 MG/DL (ref 8.6–10)
CHLORIDE SERPL-SCNC: 99 MMOL/L (ref 98–107)
CREAT SERPL-MCNC: 1.4 MG/DL (ref 0.67–1.17)
DEPRECATED HCO3 PLAS-SCNC: 28 MMOL/L (ref 22–29)
EGFRCR SERPLBLD CKD-EPI 2021: 61 ML/MIN/1.73M2
GLUCOSE BLDC GLUCOMTR-MCNC: 147 MG/DL (ref 70–99)
GLUCOSE BLDC GLUCOMTR-MCNC: 206 MG/DL (ref 70–99)
GLUCOSE BLDC GLUCOMTR-MCNC: 209 MG/DL (ref 70–99)
GLUCOSE BLDC GLUCOMTR-MCNC: 213 MG/DL (ref 70–99)
GLUCOSE SERPL-MCNC: 157 MG/DL (ref 70–99)
MAGNESIUM SERPL-MCNC: 2 MG/DL (ref 1.7–2.3)
POTASSIUM SERPL-SCNC: 3.9 MMOL/L (ref 3.4–5.3)
SODIUM SERPL-SCNC: 136 MMOL/L (ref 135–145)

## 2024-02-25 PROCEDURE — 250N000011 HC RX IP 250 OP 636: Performed by: INTERNAL MEDICINE

## 2024-02-25 PROCEDURE — 250N000011 HC RX IP 250 OP 636: Performed by: PODIATRIST

## 2024-02-25 PROCEDURE — 99232 SBSQ HOSP IP/OBS MODERATE 35: CPT | Performed by: INTERNAL MEDICINE

## 2024-02-25 PROCEDURE — 250N000013 HC RX MED GY IP 250 OP 250 PS 637: Performed by: PODIATRIST

## 2024-02-25 PROCEDURE — 120N000001 HC R&B MED SURG/OB

## 2024-02-25 PROCEDURE — 250N000013 HC RX MED GY IP 250 OP 250 PS 637: Performed by: INTERNAL MEDICINE

## 2024-02-25 PROCEDURE — 83735 ASSAY OF MAGNESIUM: CPT | Performed by: INTERNAL MEDICINE

## 2024-02-25 PROCEDURE — 36415 COLL VENOUS BLD VENIPUNCTURE: CPT | Performed by: INTERNAL MEDICINE

## 2024-02-25 PROCEDURE — 258N000003 HC RX IP 258 OP 636: Performed by: INTERNAL MEDICINE

## 2024-02-25 PROCEDURE — 80048 BASIC METABOLIC PNL TOTAL CA: CPT | Performed by: INTERNAL MEDICINE

## 2024-02-25 RX ORDER — MAGNESIUM OXIDE 400 MG/1
400 TABLET ORAL EVERY 4 HOURS
Status: COMPLETED | OUTPATIENT
Start: 2024-02-25 | End: 2024-02-25

## 2024-02-25 RX ADMIN — OXYCODONE HYDROCHLORIDE 5 MG: 5 TABLET ORAL at 16:43

## 2024-02-25 RX ADMIN — INSULIN ASPART 6 UNITS: 100 INJECTION, SOLUTION INTRAVENOUS; SUBCUTANEOUS at 13:30

## 2024-02-25 RX ADMIN — ASPIRIN 325 MG: 325 TABLET, COATED ORAL at 09:04

## 2024-02-25 RX ADMIN — CARVEDILOL 25 MG: 12.5 TABLET, FILM COATED ORAL at 09:04

## 2024-02-25 RX ADMIN — OXYCODONE HYDROCHLORIDE 5 MG: 5 TABLET ORAL at 23:59

## 2024-02-25 RX ADMIN — PIPERACILLIN AND TAZOBACTAM 3.38 G: 3; .375 INJECTION, POWDER, FOR SOLUTION INTRAVENOUS at 12:20

## 2024-02-25 RX ADMIN — METOCLOPRAMIDE 5 MG: 5 TABLET ORAL at 06:31

## 2024-02-25 RX ADMIN — Medication 1 TABLET: at 09:04

## 2024-02-25 RX ADMIN — PANTOPRAZOLE SODIUM 40 MG: 40 TABLET, DELAYED RELEASE ORAL at 06:31

## 2024-02-25 RX ADMIN — OXYCODONE HYDROCHLORIDE 5 MG: 5 TABLET ORAL at 00:11

## 2024-02-25 RX ADMIN — WHITE PETROLATUM: 1.75 OINTMENT TOPICAL at 21:05

## 2024-02-25 RX ADMIN — DAPTOMYCIN 750 MG: 500 INJECTION, POWDER, LYOPHILIZED, FOR SOLUTION INTRAVENOUS at 16:35

## 2024-02-25 RX ADMIN — BUMETANIDE 1 MG: 1 TABLET ORAL at 09:04

## 2024-02-25 RX ADMIN — PIPERACILLIN AND TAZOBACTAM 3.38 G: 3; .375 INJECTION, POWDER, FOR SOLUTION INTRAVENOUS at 21:05

## 2024-02-25 RX ADMIN — Medication 60 ML: at 09:06

## 2024-02-25 RX ADMIN — WHITE PETROLATUM: 1.75 OINTMENT TOPICAL at 09:05

## 2024-02-25 RX ADMIN — INSULIN GLARGINE 28 UNITS: 100 INJECTION, SOLUTION SUBCUTANEOUS at 21:08

## 2024-02-25 RX ADMIN — ENOXAPARIN SODIUM 40 MG: 40 INJECTION SUBCUTANEOUS at 18:15

## 2024-02-25 RX ADMIN — MAGNESIUM OXIDE TAB 400 MG (241.3 MG ELEMENTAL MG) 400 MG: 400 (241.3 MG) TAB at 12:20

## 2024-02-25 RX ADMIN — INSULIN ASPART 5 UNITS: 100 INJECTION, SOLUTION INTRAVENOUS; SUBCUTANEOUS at 18:17

## 2024-02-25 RX ADMIN — MAGNESIUM OXIDE TAB 400 MG (241.3 MG ELEMENTAL MG) 400 MG: 400 (241.3 MG) TAB at 09:05

## 2024-02-25 RX ADMIN — INSULIN ASPART 5 UNITS: 100 INJECTION, SOLUTION INTRAVENOUS; SUBCUTANEOUS at 09:06

## 2024-02-25 RX ADMIN — BUMETANIDE 1 MG: 1 TABLET ORAL at 18:15

## 2024-02-25 RX ADMIN — ACETAMINOPHEN 650 MG: 325 TABLET ORAL at 00:11

## 2024-02-25 RX ADMIN — PIPERACILLIN AND TAZOBACTAM 3.38 G: 3; .375 INJECTION, POWDER, FOR SOLUTION INTRAVENOUS at 04:26

## 2024-02-25 RX ADMIN — NICOTINE 1 PATCH: 14 PATCH, EXTENDED RELEASE TRANSDERMAL at 13:31

## 2024-02-25 RX ADMIN — CARVEDILOL 25 MG: 12.5 TABLET, FILM COATED ORAL at 18:15

## 2024-02-25 ASSESSMENT — ACTIVITIES OF DAILY LIVING (ADL)
ADLS_ACUITY_SCORE: 36
ADLS_ACUITY_SCORE: 38
ADLS_ACUITY_SCORE: 36
ADLS_ACUITY_SCORE: 38
ADLS_ACUITY_SCORE: 36
ADLS_ACUITY_SCORE: 38
ADLS_ACUITY_SCORE: 36
ADLS_ACUITY_SCORE: 38
ADLS_ACUITY_SCORE: 36
ADLS_ACUITY_SCORE: 38
ADLS_ACUITY_SCORE: 38
ADLS_ACUITY_SCORE: 36

## 2024-02-25 NOTE — PLAN OF CARE
Problem: Mobility Impairment  Goal: Optimal Mobility  Intervention: Optimize Mobility  Recent Flowsheet Documentation  Taken 2/25/2024 0900 by Isha Tong, RN  Activity Management: activity adjusted per tolerance  Positioning/Transfer Devices:   pillows   applied     Problem: Adult Inpatient Plan of Care  Goal: Absence of Hospital-Acquired Illness or Injury  Intervention: Identify and Manage Fall Risk  Recent Flowsheet Documentation  Taken 2/25/2024 0900 by Isha Tong, RN  Safety Promotion/Fall Prevention: activity supervised     Problem: Adult Inpatient Plan of Care  Goal: Absence of Hospital-Acquired Illness or Injury  Intervention: Prevent Infection  Recent Flowsheet Documentation  Taken 2/25/2024 0900 by Isha Tong RN  Infection Prevention:   hand hygiene promoted   personal protective equipment utilized   rest/sleep promoted   Goal Outcome Evaluation:       Patient alert and oriented. Denied pain this shift. Blood sugars 147 and 212, coverage given as ordered. Wound vac functional. Had x 1 BM. Had breakfast and lunch today.

## 2024-02-25 NOTE — PROGRESS NOTES
"    Cardiology Progress Note    Assessment:  Acute heart failure reduced ejection fraction, improving volume status  Dilated cardiomyopathy with segmental wall motion abnormalities suggestive of ischemic etiology  Acute chronic kidney failure, improving creatinine  Nephrotic syndrome  Diabetes mellitus  Hypertension, controlled    Plan:  Continue diuresis as per nephrology  No medication changes  Invasive coronary angiogram was requested by Dr. Arndt.  Discussed with nephrologist Dr. Pastrana today.  She recommended to wait until creatinine returns to baseline before proceeding with coronary angiogram.  Hopefully we can do it on 2/27/2024    Subjective:   Denies chest pain, less short of breath    Objective:   /61 (BP Location: Left arm)   Pulse 73   Temp 97.8  F (36.6  C) (Oral)   Resp 18   Ht 1.778 m (5' 10\")   Wt 123.7 kg (272 lb 11.3 oz)   SpO2 93%   BMI 39.13 kg/m      Intake/Output Summary (Last 24 hours) at 2/23/2024 1434  Last data filed at 2/23/2024 1336  Gross per 24 hour   Intake 1207 ml   Output 4450 ml   Net -3243 ml         Physical Exam:  GENERAL: no distress  NECK: JVD is not visible  LUNGS: Clear to auscultation.  CARDIAC: regular  rhythm, S1 & S2 normal.  No heaves, thrills, gallops or murmurs.  ABDOMEN: flat, negative hepatosplenomegaly, soft and non-tender.  EXTREMITIES: No evidence of cyanosis, clubbing 2+ edema.    Current Facility-Administered Medications Ordered in Epic   Medication Dose Route Frequency Provider Last Rate Last Admin    acetaminophen (TYLENOL) tablet 650 mg  650 mg Oral Q4H PRN Cristofer Sims DPM   650 mg at 02/25/24 0011    Or    acetaminophen (TYLENOL) Suppository 650 mg  650 mg Rectal Q4H PRN Cristofer Sims, DPM        aspirin (ASA) EC tablet 325 mg  325 mg Oral Daily Cristofer Sims DPM   325 mg at 02/25/24 0904    [Held by provider] atorvastatin (LIPITOR) tablet 80 mg  80 mg Oral QPM Cristofer Sims DPM   80 mg at 02/16/24 2118    benzocaine-menthol " (CHLORASEPTIC) 6-10 MG lozenge 1 lozenge  1 lozenge Buccal Q1H PRN Reilly Cox MD   1 lozenge at 02/16/24 2210    bisacodyl (DULCOLAX) suppository 10 mg  10 mg Rectal Daily PRN Cristofer Sims DPM        bumetanide (BUMEX) tablet 1 mg  1 mg Oral BID Glendy Pastrana MD   1 mg at 02/25/24 0904    calcium carbonate (TUMS) chewable tablet 1,000 mg  1,000 mg Oral 4x Daily PRN Cristofer Sims DPM   1,000 mg at 02/19/24 1348    carvedilol (COREG) tablet 25 mg  25 mg Oral BID w/meals Arpit Gandara MD   25 mg at 02/25/24 0904    DAPTOmycin (CUBICIN) 750 mg in sodium chloride 0.9 % 100 mL intermittent infusion  6 mg/kg Intravenous Q24H Gadiel Godfrey  mL/hr at 02/23/24 1739 750 mg at 02/24/24 1753    glucose gel 15-30 g  15-30 g Oral Q15 Min PRN Cristofer Sims DPM        Or    dextrose 50 % injection 25-50 mL  25-50 mL Intravenous Q15 Min PRN Cristofer Sims DPM        Or    glucagon injection 1 mg  1 mg Subcutaneous Q15 Min PRN Cristofer Sims DPM        enoxaparin ANTICOAGULANT (LOVENOX) injection 40 mg  40 mg Subcutaneous Q24H Cristofer Sims DPM   40 mg at 02/24/24 1744    hydrALAZINE (APRESOLINE) injection 10 mg  10 mg Intravenous Q6H PRN Madan Yu MD        Or    hydrALAZINE (APRESOLINE) tablet 10 mg  10 mg Oral Q6H PRN Madan Yu MD        hydrOXYzine HCl (ATARAX) tablet 10 mg  10 mg Oral TID PRN Madan Yu MD        insulin aspart (NovoLOG) injection (RAPID ACTING)   Subcutaneous TID w/meals Madan Yu MD   5 Units at 02/25/24 0906    insulin aspart (NovoLOG) injection (RAPID ACTING)  1-6 Units Subcutaneous TID w/meals Cristofer Sims DPM   1 Units at 02/25/24 0908    insulin glargine (LANTUS PEN) injection 28 Units  28 Units Subcutaneous At Bedtime Madan Yu MD   28 Units at 02/24/24 2145    lidocaine (LMX4) cream   Topical Q1H PRN Cristofer Sims DPM        lidocaine 1 % 0.1-1 mL  0.1-1 mL Other Q1H PRN Cristofer Sims DPM        magnesium oxide (MAG-OX) tablet 400 mg  400 mg Oral  Q4H Madan Yu MD   400 mg at 02/25/24 0905    melatonin tablet 5 mg  5 mg Oral At Bedtime PRN Cristofer Sims DPM   5 mg at 02/16/24 2114    mineral oil-hydrophilic petrolatum (AQUAPHOR)   Topical BID Sheldon Liang MD   Given at 02/25/24 0905    multivitamin w/minerals (THERA-VIT-M) tablet 1 tablet  1 tablet Oral Daily Madan Yu MD   1 tablet at 02/25/24 0904    naloxone (NARCAN) injection 0.2 mg  0.2 mg Intravenous Q2 Min PRN Cristofer Sims DPM        Or    naloxone (NARCAN) injection 0.4 mg  0.4 mg Intravenous Q2 Min PRN Cristofer Sims DPM        Or    naloxone (NARCAN) injection 0.2 mg  0.2 mg Intramuscular Q2 Min PRN Cristofer Sims DPM        Or    naloxone (NARCAN) injection 0.4 mg  0.4 mg Intramuscular Q2 Min PRN Cristofer Sims DPM        nicotine (NICODERM CQ) 14 MG/24HR 24 hr patch 1 patch  1 patch Transdermal Q24H Sheldon Liang MD   1 patch at 02/24/24 1331    nicotine Patch in Place   Transdermal Q8H Cristofer Sims DPM        ondansetron (ZOFRAN ODT) ODT tab 4 mg  4 mg Oral Q6H PRN Cristofer Sims DPM        Or    ondansetron (ZOFRAN) injection 4 mg  4 mg Intravenous Q6H PRN Cristofer Sims DPM   4 mg at 02/17/24 0611    oxyCODONE (ROXICODONE) tablet 5 mg  5 mg Oral Q6H PRN Cristofer Sims DPM   5 mg at 02/25/24 0011    pantoprazole (PROTONIX) EC tablet 40 mg  40 mg Oral QAM AC Madan Yu MD   40 mg at 02/25/24 0631    piperacillin-tazobactam (ZOSYN) 3.375 g vial to attach to  mL bag  3.375 g Intravenous Q8H Cristofer Sims DPM   3.375 g at 02/25/24 0426    polyethylene glycol (MIRALAX) Packet 17 g  17 g Oral BID PRN Cristofer Sims, DPM        prochlorperazine (COMPAZINE) injection 10 mg  10 mg Intravenous Q6H PRN Cristofer Sims DPM        Or    prochlorperazine (COMPAZINE) tablet 10 mg  10 mg Oral Q6H PRN Cristofer Sims, AMY        Or    prochlorperazine (COMPAZINE) suppository 25 mg  25 mg Rectal Q12H PRN Cristofer Sims, DPM         senna-docusate (SENOKOT-S/PERICOLACE) 8.6-50 MG per tablet 1 tablet  1 tablet Oral BID PRN Bethany, Cristofer, DPM        Or    senna-docusate (SENOKOT-S/PERICOLACE) 8.6-50 MG per tablet 2 tablet  2 tablet Oral BID PRN Bethany, Cristofer, DPM        simethicone (MYLICON) chewable tablet 80 mg  80 mg Oral Q6H PRN Madan Yu MD        sodium chloride (PF) 0.9% PF flush 3 mL  3 mL Intracatheter Q8H Bethany, Cristofer, DPM   3 mL at 02/25/24 0425    sodium chloride (PF) 0.9% PF flush 3 mL  3 mL Intracatheter q1 min prn Ravi Simsneth, DPM        sodium chloride 0.9% (bottle) irrigation    PRN Bethany, Cristofer, DPM   1,000 mL at 02/16/24 1120    wound support modular (EXPEDITE) bottle 60 mL  60 mL Oral Daily Madan Yu MD   60 mL at 02/25/24 0906     No current [facility]-ordered outpatient medications on file.       Cardiographics:        Echocardiogram:   The left ventricle is normal in size. There is mild concentric left  ventricular hypertrophy.  Left ventricular function is decreased. The ejection fraction is 30-35%  (moderately reduced). There is global hypokinesis with severe hypokinesis of  the mid to apical inferior wall.  Diastolic Doppler findings (E/E' ratio and/or other parameters) suggest left  ventricular filling pressures are increased.     The right ventricle is normal in size and function.  Normal left atrial size. Right atrial size is normal.  There is mild (1+) mitral regurgitation.  IVC diameter >2.1 cm collapsing <50% with sniff suggests a high RA pressure  estimated at 15 mmHg or greater.       Lab Results    Chemistry/lipid CBC Cardiac Enzymes/BNP/TSH/INR   Recent Labs   Lab Test 02/21/24  0537   CHOL 156   HDL 39*   LDL 77   TRIG 199*     Recent Labs   Lab Test 02/21/24  0537 08/23/23  0946   LDL 77 226*     Recent Labs   Lab Test 02/23/24  1134 02/23/24  0726 02/23/24  0618   NA  --   --  137   POTASSIUM  --   --  3.6   CHLORIDE  --   --  98   CO2  --   --  31*   *   < > 125*   BUN  --   --  13.4   CR   "--   --  1.53*   GFRESTIMATED  --   --  55*   SARAH  --   --  8.6    < > = values in this interval not displayed.     Recent Labs   Lab Test 02/23/24  0618 02/22/24  0635 02/21/24  0537   CR 1.53* 1.58* 1.54*     Recent Labs   Lab Test 02/14/24  0935 08/23/23  0946   A1C 11.8* 11.1*          Recent Labs   Lab Test 02/23/24  0618   WBC 11.3*   HGB 10.6*   HCT 33.1*   MCV 93        Recent Labs   Lab Test 02/23/24  0618 02/20/24  0546 02/19/24  0547   HGB 10.6* 10.3* 11.0*    No results for input(s): \"TROPONINI\" in the last 88028 hours.  Recent Labs   Lab Test 02/19/24  0547 08/23/23  0946   NTBNPI 12,085* 857*     Recent Labs   Lab Test 08/23/23  1433   TSH 2.72     Recent Labs   Lab Test 08/23/23  0946   INR 0.95                   "

## 2024-02-25 NOTE — PLAN OF CARE
"  Problem: Malnutrition  Goal: Improved Nutritional Intake  Outcome: Progressing     Problem: Mobility Impairment  Goal: Optimal Mobility  Outcome: Progressing  Intervention: Optimize Mobility  Recent Flowsheet Documentation  Taken 2/25/2024 0400 by Corrine Ricketts RN  Activity Management: activity adjusted per tolerance  Positioning/Transfer Devices:   pillows   applied  Taken 2/25/2024 0000 by Corrine Ricketts RN  Activity Management: activity adjusted per tolerance  Positioning/Transfer Devices:   pillows   applied     Problem: Adult Inpatient Plan of Care  Goal: Plan of Care Review  Description: The Plan of Care Review/Shift note should be completed every shift.  The Outcome Evaluation is a brief statement about your assessment that the patient is improving, declining, or no change.  This information will be displayed automatically on your shift  note.  Outcome: Progressing  Goal: Patient-Specific Goal (Individualized)  Description: You can add care plan individualizations to a care plan. Examples of Individualization might be:  \"Parent requests to be called daily at 9am for status\", \"I have a hard time hearing out of my right ear\", or \"Do not touch me to wake me up as it startles  me\".  Outcome: Progressing  Goal: Absence of Hospital-Acquired Illness or Injury  Outcome: Progressing  Intervention: Prevent Skin Injury  Recent Flowsheet Documentation  Taken 2/25/2024 0400 by Corrine Ricketts RN  Body Position: position changed independently  Taken 2/25/2024 0000 by Corrine Ricketts RN  Body Position: position changed independently  Intervention: Prevent and Manage VTE (Venous Thromboembolism) Risk  Recent Flowsheet Documentation  Taken 2/25/2024 0400 by Corrine Ricketts RN  VTE Prevention/Management: other (see comments)  Taken 2/25/2024 0000 by Corrine Ricketts RN  VTE Prevention/Management: other (see comments)  Intervention: Prevent Infection  Recent Flowsheet Documentation  Taken 2/25/2024 0400 by Corrine Ricketts" RN  Infection Prevention:   hand hygiene promoted   personal protective equipment utilized   rest/sleep promoted  Taken 2/25/2024 0000 by Corrine Ricketts RN  Infection Prevention:   hand hygiene promoted   personal protective equipment utilized   rest/sleep promoted  Goal: Optimal Comfort and Wellbeing  Outcome: Progressing  Goal: Readiness for Transition of Care  Outcome: Progressing     Problem: Infection  Goal: Absence of Infection Signs and Symptoms  Outcome: Progressing  Intervention: Prevent or Manage Infection  Recent Flowsheet Documentation  Taken 2/25/2024 0400 by Corrine Ricketts RN  Isolation Precautions: contact precautions maintained  Taken 2/25/2024 0000 by Corrine Ricketts RN  Isolation Precautions: contact precautions maintained     Problem: Comorbidity Management  Goal: Maintenance of Asthma Control  Outcome: Progressing  Intervention: Maintain Asthma Symptom Control  Recent Flowsheet Documentation  Taken 2/25/2024 0400 by Corrine Ricketts RN  Medication Review/Management: medications reviewed  Taken 2/25/2024 0000 by Corrine Ricketts RN  Medication Review/Management: medications reviewed  Goal: Maintenance of Behavioral Health Symptom Control  Outcome: Progressing  Intervention: Maintain Behavioral Health Symptom Control  Recent Flowsheet Documentation  Taken 2/25/2024 0400 by Corrine Ricketts RN  Medication Review/Management: medications reviewed  Taken 2/25/2024 0000 by Corrine Ricketts RN  Medication Review/Management: medications reviewed  Goal: Maintenance of COPD Symptom Control  Outcome: Progressing  Intervention: Maintain COPD Symptom Control  Recent Flowsheet Documentation  Taken 2/25/2024 0400 by Corrine Ricketts RN  Supportive Measures: active listening utilized  Medication Review/Management: medications reviewed  Taken 2/25/2024 0000 by Corrine Ricketts RN  Supportive Measures: active listening utilized  Medication Review/Management: medications reviewed  Goal: Blood Glucose Levels Within Targeted  Range  Outcome: Progressing  Intervention: Monitor and Manage Glycemia  Recent Flowsheet Documentation  Taken 2/25/2024 0400 by Corrine Ricketts RN  Glycemic Management: blood glucose monitored  Medication Review/Management: medications reviewed  Taken 2/25/2024 0000 by Corrine Ricketts RN  Glycemic Management: blood glucose monitored  Medication Review/Management: medications reviewed  Goal: Maintenance of Heart Failure Symptom Control  Outcome: Progressing  Intervention: Maintain Heart Failure Management  Recent Flowsheet Documentation  Taken 2/25/2024 0400 by Corrine Ricketts RN  Medication Review/Management: medications reviewed  Taken 2/25/2024 0000 by Corrine Ricketts RN  Medication Review/Management: medications reviewed  Goal: Blood Pressure in Desired Range  Outcome: Progressing  Intervention: Maintain Blood Pressure Management  Recent Flowsheet Documentation  Taken 2/25/2024 0400 by Corrine Ricketts RN  Medication Review/Management: medications reviewed  Taken 2/25/2024 0000 by Corrine Ricketts RN  Medication Review/Management: medications reviewed  Goal: Maintenance of Osteoarthritis Symptom Control  Outcome: Progressing  Intervention: Maintain Osteoarthritis Symptom Control  Recent Flowsheet Documentation  Taken 2/25/2024 0400 by Corrine Ricketts RN  Activity Management: activity adjusted per tolerance  Medication Review/Management: medications reviewed  Taken 2/25/2024 0000 by Corrine Ricketts RN  Activity Management: activity adjusted per tolerance  Medication Review/Management: medications reviewed  Goal: Bariatric Home Regimen Maintained  Outcome: Progressing  Intervention: Maintain and Manage Postbariatric Surgery Care  Recent Flowsheet Documentation  Taken 2/25/2024 0400 by Corrine Ricketts RN  Medication Review/Management: medications reviewed  Taken 2/25/2024 0000 by Corrine Ricketts RN  Medication Review/Management: medications reviewed  Goal: Maintenance of Seizure Control  Outcome: Progressing  Intervention:  Maintain Seizure Symptom Control  Recent Flowsheet Documentation  Taken 2/25/2024 0400 by Corrine Ricketts, RN  Sensory Stimulation Regulation: care clustered  Medication Review/Management: medications reviewed  Taken 2/25/2024 0000 by Corrine Ricketts RN  Sensory Stimulation Regulation: care clustered  Medication Review/Management: medications reviewed   Goal Outcome Evaluation:  Pt is pod 9 from a right foot I/D. Complained of pain 8/10 at start of shift, prn oxycodone and tylenol was given and patient slept following. Pt is due to have coronary angiogram Monday if creatine is okay. Pt using urinal at bedside and is alert and able to make his needs known.

## 2024-02-25 NOTE — PROGRESS NOTES
RENAL NOTE    REQUESTING PHYSICIAN: Madan Montano    REASON FOR CONSULT: SCHUYLER - worsening, volume overload    ASSESSMENT/PLAN:  Nephrotic range proteinuria, CKD1  Baseline Cr 0.8, with severe nephrotic range proteinuria and some cells on UA. Suspect he has CKD/GN most likely related to diabetic nephropathy, all serologies neg except sl monoclonal AB on urine immunofix, plan to f/up several months.   No renal biopsy needed now and not too inclined to do in future to confirm DN given overall non compliance hx and MMP. .   Would rec maxing ARB, SGLT2 inhib eventually.  Would benefit from close outpt follow up for his SCHUYLER, for better ongoing management for his nephrotic range proteinuria and anticipated angio in future.   Plan for outpt follow up at our clinic:   Monday March 18 at 11:00AM with Ruth Mike NP at [facility]     SCHUYLER suspect hemodynamic due to infx, hemodynamics.   - Cr trended up with diuresis, some contraction alkalosis.    Held bumex 2mg bid, creat trend better, down to 1.4, and will resume bumex 1 mg bid today. .     HTN bp controlled.   Holding ARB but will try to resume soon to assist with proteinuria control    Volume overload better by exam, large urine output.   -- Changed to oral bumex - see above.     New HFrEF with WMA, prob high risk for CAD but had negative stress test last fall. Echo shows new EF down to 30-35% with WMA and inc filling pressures.   Cards plan for coronary angio, creat better but still above baseline range 0.8 mg/dL.   Prefer to hold off to allow more renal recovery unless urgent.     Anemia normocytic, can assess iron stores eventually and replete. Can do in kidney clinic f/up after finishes abx.     Lytes: No derangements    HLD - statin    DM2 -historically poor control,  insulin this admit     Diabetic foot ulcer  2/16 debridement. Now on  Zosyn and daptomycin until 3/1 per ID. Wound vac, no osteo    Obesity    Tobacco use - nicotine patches in  use this admit    D/w Dr Gandara today.   Glendy Pastrana MD  [facility]  [phone]        HPI:   Sleeping soundly today.       He shared that PTA stopped taking all his meds (diabetic because he didn't feel any different so did not think needed.)  Regarding out pt nephrology follow up. Has concerns about mobility and transport         REVIEW OF SYSTEMS:  ROS was completely reviewed and otherwise negative and non-contributory       Past Medical History:   Diagnosis Date    Diabetes (H)      Social History     Socioeconomic History    Marital status: Single     Spouse name: Not on file    Number of children: Not on file    Years of education: Not on file    Highest education level: Not on file   Occupational History    Not on file   Tobacco Use    Smoking status: Every Day     Packs/day: .5     Types: Cigarettes    Smokeless tobacco: Never    Tobacco comments:     Seen IP by [facility] on 8/24/23 and declined cessation services and materials.    Vaping Use    Vaping Use: Never used   Substance and Sexual Activity    Alcohol use: No    Drug use: No    Sexual activity: Not on file   Other Topics Concern    Not on file   Social History Narrative    Patient reports that he does not have health insurance.     Social Determinants of Health     Financial Resource Strain: Low Risk  (10/20/2023)    Financial Resource Strain     Within the past 12 months, have you or your family members you live with been unable to get utilities (heat, electricity) when it was really needed?: No   Food Insecurity: High Risk (10/20/2023)    Food Insecurity     Within the past 12 months, did you worry that your food would run out before you got money to buy more?: Yes     Within the past 12 months, did the food you bought just not last and you didn t have money to get more?: No   Transportation Needs: Low Risk  (10/20/2023)    Transportation Needs     Within the past 12 months, has lack of transportation kept you from  medical appointments, getting your medicines, non-medical meetings or appointments, work, or from getting things that you need?: No   Physical Activity: Not on file   Stress: Not on file   Social Connections: Not on file   Interpersonal Safety: Low Risk  (10/20/2023)    Interpersonal Safety     Do you feel physically and emotionally safe where you currently live?: Yes     Within the past 12 months, have you been hit, slapped, kicked or otherwise physically hurt by someone?: No     Within the past 12 months, have you been humiliated or emotionally abused in other ways by your partner or ex-partner?: No   Housing Stability: High Risk (10/20/2023)    Housing Stability     Do you have housing? : Yes     Are you worried about losing your housing?: Yes          ALLERGIES/SENSITIVITIES:  No Known Allergies      PHYSICAL EXAM:  Physical Exam   Temp: 97.8  F (36.6  C) Temp src: Oral BP: 108/61 Pulse: 73   Resp: 18 SpO2: 93 % O2 Device: None (Room air)    Vitals:    24 2148 24 0409 24 0620   Weight: 123.1 kg (271 lb 6.2 oz) 125.4 kg (276 lb 7.3 oz) 123.7 kg (272 lb 11.3 oz)     Vital Signs with Ranges  Temp:  [97.8  F (36.6  C)-98.3  F (36.8  C)] 97.8  F (36.6  C)  Pulse:  [73-83] 73  Resp:  [16-18] 18  BP: (108-144)/(61-77) 108/61  SpO2:  [90 %-97 %] 93 %  I/O last 3 completed shifts:  In: 1340 [P.O.:1340]  Out: 2440 [Urine:2440]    Temp (24hrs), Av.1  F (36.7  C), Min:97.4  F (36.3  C), Max:98.5  F (36.9  C)      Patient Vitals for the past 72 hrs:   Weight   24 0620 123.7 kg (272 lb 11.3 oz)       General: asleep RA NAD  HEENT: ATNC  NECK:  supple.   EXT Obesity + edema, seems much better than earlier in week, still has some in lower legs.   GENITOURINARY:  No donovan   INTEGUMENTARY: Warm, dry, no rash but pale  NEUROLOGIC: NFC    Laboratory:     Recent Labs   Lab 24  0618 24  0546 24  0547   WBC 11.3* 12.0* 12.4*   RBC 3.57* 3.52* 3.74*   HGB 10.6* 10.3* 11.0*   HCT 33.1* 32.2*  34.4*    357  357 400       Basic Metabolic Panel:  Recent Labs   Lab 02/25/24  0719 02/25/24  0549 02/24/24  2127 02/24/24  1741 02/24/24  1131 02/24/24  0728 02/24/24  0527 02/23/24  0726 02/23/24  0618 02/22/24  0744 02/22/24  0635 02/21/24  0758 02/21/24  0537 02/20/24  0922 02/20/24  0546   NA  --  136  --   --   --   --  135  --  137  --  137  --  137  --  136   POTASSIUM  --  3.9  --   --   --   --  4.1  4.1  --  3.6  --  3.5  --  4.0  --  3.5   CHLORIDE  --  99  --   --   --   --  96*  --  98  --  100  --  99  --  103   CO2  --  28  --   --   --   --  34*  --  31*  --  29  --  31*  --  29   BUN  --  16.2  --   --   --   --  16.0  --  13.4  --  12.7  --  11.2  --  9.3   CR  --  1.40*  --   --   --   --  1.72*  --  1.53*  --  1.58*  --  1.54*  --  1.48*   * 157* 224* 229* 192* 169* 174*   < > 125*   < > 133*   < > 209*   < > 142*   SARAH  --  8.6  --   --   --   --  8.8  --  8.6  --  8.3*  --  8.7  --  8.3*    < > = values in this interval not displayed.       INRNo lab results found in last 7 days.    Recent Labs   Lab Test 02/20/24  0546 02/19/24  0547   POTASSIUM 3.5 3.8   CHLORIDE 103 105   BUN 9.3 8.2      Recent Labs   Lab Test 02/19/24  0547 02/18/24  0603 02/17/24  2117   ALBUMIN 2.2* 2.2*  --    BILITOTAL 0.2 <0.2  --    ALT 9 7  --    AST 10 8  --    PROTEIN  --   --  300*       Personally reviewed today's laboratory studies      Thank you for involving us in the care of this patient. We will continue to follow along with you.

## 2024-02-26 ENCOUNTER — TELEPHONE (OUTPATIENT)
Dept: CARDIOLOGY | Facility: CLINIC | Age: 49
End: 2024-02-26
Payer: MEDICAID

## 2024-02-26 DIAGNOSIS — I50.9 ACUTE DECOMPENSATED HEART FAILURE (H): Primary | ICD-10-CM

## 2024-02-26 LAB
ALBUMIN SERPL BCG-MCNC: 2.6 G/DL (ref 3.5–5.2)
CREAT SERPL-MCNC: 1.59 MG/DL (ref 0.67–1.17)
DEPRECATED HCO3 PLAS-SCNC: 28 MMOL/L (ref 22–29)
EGFRCR SERPLBLD CKD-EPI 2021: 53 ML/MIN/1.73M2
GLUCOSE BLDC GLUCOMTR-MCNC: 138 MG/DL (ref 70–99)
GLUCOSE BLDC GLUCOMTR-MCNC: 188 MG/DL (ref 70–99)
GLUCOSE BLDC GLUCOMTR-MCNC: 224 MG/DL (ref 70–99)
GLUCOSE BLDC GLUCOMTR-MCNC: 233 MG/DL (ref 70–99)
MAGNESIUM SERPL-MCNC: 1.9 MG/DL (ref 1.7–2.3)
PLATELET # BLD AUTO: 326 10E3/UL (ref 150–450)
POTASSIUM SERPL-SCNC: 4 MMOL/L (ref 3.4–5.3)
SODIUM SERPL-SCNC: 137 MMOL/L (ref 135–145)

## 2024-02-26 PROCEDURE — 250N000011 HC RX IP 250 OP 636: Mod: JZ | Performed by: INTERNAL MEDICINE

## 2024-02-26 PROCEDURE — 99233 SBSQ HOSP IP/OBS HIGH 50: CPT | Performed by: INTERNAL MEDICINE

## 2024-02-26 PROCEDURE — 250N000013 HC RX MED GY IP 250 OP 250 PS 637: Performed by: INTERNAL MEDICINE

## 2024-02-26 PROCEDURE — 82374 ASSAY BLOOD CARBON DIOXIDE: CPT | Performed by: PHYSICIAN ASSISTANT

## 2024-02-26 PROCEDURE — 99232 SBSQ HOSP IP/OBS MODERATE 35: CPT | Performed by: INTERNAL MEDICINE

## 2024-02-26 PROCEDURE — 250N000011 HC RX IP 250 OP 636: Performed by: INTERNAL MEDICINE

## 2024-02-26 PROCEDURE — 82565 ASSAY OF CREATININE: CPT | Performed by: PODIATRIST

## 2024-02-26 PROCEDURE — 120N000001 HC R&B MED SURG/OB

## 2024-02-26 PROCEDURE — 250N000013 HC RX MED GY IP 250 OP 250 PS 637: Performed by: PODIATRIST

## 2024-02-26 PROCEDURE — 250N000011 HC RX IP 250 OP 636: Performed by: PODIATRIST

## 2024-02-26 PROCEDURE — 84295 ASSAY OF SERUM SODIUM: CPT | Performed by: PHYSICIAN ASSISTANT

## 2024-02-26 PROCEDURE — 85049 AUTOMATED PLATELET COUNT: CPT | Performed by: PODIATRIST

## 2024-02-26 PROCEDURE — 36415 COLL VENOUS BLD VENIPUNCTURE: CPT | Performed by: PODIATRIST

## 2024-02-26 PROCEDURE — 83735 ASSAY OF MAGNESIUM: CPT | Performed by: INTERNAL MEDICINE

## 2024-02-26 PROCEDURE — 84132 ASSAY OF SERUM POTASSIUM: CPT | Performed by: INTERNAL MEDICINE

## 2024-02-26 PROCEDURE — P9047 ALBUMIN (HUMAN), 25%, 50ML: HCPCS | Performed by: INTERNAL MEDICINE

## 2024-02-26 PROCEDURE — 99232 SBSQ HOSP IP/OBS MODERATE 35: CPT | Performed by: PHYSICIAN ASSISTANT

## 2024-02-26 PROCEDURE — G0463 HOSPITAL OUTPT CLINIC VISIT: HCPCS

## 2024-02-26 PROCEDURE — 258N000003 HC RX IP 258 OP 636: Performed by: INTERNAL MEDICINE

## 2024-02-26 PROCEDURE — 82040 ASSAY OF SERUM ALBUMIN: CPT | Performed by: PHYSICIAN ASSISTANT

## 2024-02-26 RX ORDER — ALBUMIN (HUMAN) 12.5 G/50ML
50 SOLUTION INTRAVENOUS ONCE
Status: COMPLETED | OUTPATIENT
Start: 2024-02-26 | End: 2024-02-26

## 2024-02-26 RX ORDER — MAGNESIUM OXIDE 400 MG/1
400 TABLET ORAL EVERY 4 HOURS
Status: COMPLETED | OUTPATIENT
Start: 2024-02-26 | End: 2024-02-26

## 2024-02-26 RX ADMIN — MAGNESIUM OXIDE TAB 400 MG (241.3 MG ELEMENTAL MG) 400 MG: 400 (241.3 MG) TAB at 12:45

## 2024-02-26 RX ADMIN — NICOTINE 1 PATCH: 14 PATCH, EXTENDED RELEASE TRANSDERMAL at 14:27

## 2024-02-26 RX ADMIN — INSULIN ASPART 6 UNITS: 100 INJECTION, SOLUTION INTRAVENOUS; SUBCUTANEOUS at 08:14

## 2024-02-26 RX ADMIN — CARVEDILOL 25 MG: 12.5 TABLET, FILM COATED ORAL at 18:02

## 2024-02-26 RX ADMIN — ALBUMIN HUMAN 50 G: 0.25 SOLUTION INTRAVENOUS at 12:50

## 2024-02-26 RX ADMIN — BUMETANIDE 1 MG: 1 TABLET ORAL at 18:02

## 2024-02-26 RX ADMIN — PIPERACILLIN AND TAZOBACTAM 3.38 G: 3; .375 INJECTION, POWDER, FOR SOLUTION INTRAVENOUS at 04:47

## 2024-02-26 RX ADMIN — Medication 1 TABLET: at 08:13

## 2024-02-26 RX ADMIN — BUMETANIDE 1 MG: 1 TABLET ORAL at 08:14

## 2024-02-26 RX ADMIN — PIPERACILLIN AND TAZOBACTAM 3.38 G: 3; .375 INJECTION, POWDER, FOR SOLUTION INTRAVENOUS at 12:43

## 2024-02-26 RX ADMIN — INSULIN ASPART 6 UNITS: 100 INJECTION, SOLUTION INTRAVENOUS; SUBCUTANEOUS at 18:07

## 2024-02-26 RX ADMIN — INSULIN ASPART 3 UNITS: 100 INJECTION, SOLUTION INTRAVENOUS; SUBCUTANEOUS at 12:46

## 2024-02-26 RX ADMIN — PIPERACILLIN AND TAZOBACTAM 3.38 G: 3; .375 INJECTION, POWDER, FOR SOLUTION INTRAVENOUS at 21:27

## 2024-02-26 RX ADMIN — ENOXAPARIN SODIUM 40 MG: 40 INJECTION SUBCUTANEOUS at 18:02

## 2024-02-26 RX ADMIN — MAGNESIUM OXIDE TAB 400 MG (241.3 MG ELEMENTAL MG) 400 MG: 400 (241.3 MG) TAB at 08:13

## 2024-02-26 RX ADMIN — WHITE PETROLATUM: 1.75 OINTMENT TOPICAL at 21:29

## 2024-02-26 RX ADMIN — ASPIRIN 325 MG: 325 TABLET, COATED ORAL at 08:13

## 2024-02-26 RX ADMIN — DAPTOMYCIN 750 MG: 500 INJECTION, POWDER, LYOPHILIZED, FOR SOLUTION INTRAVENOUS at 17:58

## 2024-02-26 RX ADMIN — Medication 60 ML: at 08:16

## 2024-02-26 RX ADMIN — ONDANSETRON 4 MG: 2 INJECTION INTRAMUSCULAR; INTRAVENOUS at 21:20

## 2024-02-26 RX ADMIN — CARVEDILOL 25 MG: 12.5 TABLET, FILM COATED ORAL at 08:13

## 2024-02-26 RX ADMIN — PROCHLORPERAZINE EDISYLATE 10 MG: 5 INJECTION INTRAMUSCULAR; INTRAVENOUS at 22:39

## 2024-02-26 RX ADMIN — INSULIN GLARGINE 28 UNITS: 100 INJECTION, SOLUTION SUBCUTANEOUS at 21:29

## 2024-02-26 RX ADMIN — PANTOPRAZOLE SODIUM 40 MG: 40 TABLET, DELAYED RELEASE ORAL at 06:40

## 2024-02-26 RX ADMIN — WHITE PETROLATUM 1 G: 1.75 OINTMENT TOPICAL at 08:15

## 2024-02-26 RX ADMIN — OXYCODONE HYDROCHLORIDE 5 MG: 5 TABLET ORAL at 08:13

## 2024-02-26 ASSESSMENT — ACTIVITIES OF DAILY LIVING (ADL)
ADLS_ACUITY_SCORE: 36
ADLS_ACUITY_SCORE: 37
ADLS_ACUITY_SCORE: 36
ADLS_ACUITY_SCORE: 37
ADLS_ACUITY_SCORE: 36

## 2024-02-26 NOTE — PROGRESS NOTES
[facility]    Medicine Progress Note - Hospitalist Service    Date of Admission:  2/14/2024    Assessment & Plan   Floyd Capps is a 48 year old male with history of DM, HTN, CVA, obesity, recent ED visit for nonhealing diabetic foot ulcer with cellulitis, here for worsening pain in the foot, with necrotic tissue noted on exam.  Patient admitted on 2/14/2024.      INTERVAL HISTORY :       Feb 25 :       No new issues noted today  Continue antibiotics per ID - on Daptomycin, zosyn  Cardiology, nephrology following        Not medically ready for discharge at this time.              A/p :       Diabetic foot ulcer, right heel;  -chronic wound R heel, recent resulting cellulitis treated with Bactrim  -here for worse pain in the foot, found with ulcer worse with necrotic tissue  -MRI of the foot no osteomyelitis  -On 2/16, s/p debridement with irrigation of right foot diabetic ulcer. NWB on right foot per podiatrist  -On 2/19, wound VAC placed, continue  -Surgical cultures polymicrobials.  Initially IV Vanco and IV Zosyn, now changed to IV Zosyn and IV daptomycin.  Appreciated ID input  -Care Mgr to see about financial/insurance issues which have been a barrier to followup  -Pain management with Tylenol, home oxycodone.     Acute new onset systolic heart failure;  CT imaging reported moderate bilateral pleural effusion and diffuse anasarca;  -- On 2/19, patient reported feeling shortness of breath, imaging workup showed bilateral moderate pleural effusion and diffuse anasarca  --BNP significantly elevated   --Echo on 8/23 reported EF 55% but given patient noncompliance with medications, rechecked echocardiogram with EF 30 to 35% with global hypokinesis.  --Of note, patient had nuclear stress test on 8/23 which was negative for inducible myocardial ischemia  --On 2/21, personally discussed with cardiology, anticipating ischemic evaluation at some point pending kidney function  -- On 2/23, IV  Bumex changed to oral, holding today due to worsening creatinine.  --Monitor intake/output, weight, labs closely    SCHUYLER: Likely due to CHF exacerbation, possibly due to diabetic nephropathy  - Baseline is 0.6-0.8  -On a combination of broad spectrum abx ( Vanc and Zosyn)  -Check UA with no significant RBC's or casts   -On 2/21, personally discussed with nephrology, given proteinuria multiple serological test ordered and process.  May need kidney biopsy in future pending test results  -Electrolyte imbalance, replace per protocol    DM Type II, Uncontrolled; improving  -Stopped all his home meds lately due to stomach upset while on Bactrim, then neglected to resume meds. Hold home metformin and glipizide for now.  -A1c 11.8  -Started Lantus and gradually titrating, increased to 248 unit at night on 2/21. Of note, patient was on insulin in the past and received education in the hospital about it, but seems to have stopped on its own.  Patient needs outpatient diabetic educator for ongoing titration  -Insulin sliding scale and hypoglycemic protocol  -Given poor appetite and early satiety, trial of Reglan which seems helping  -Also added PPI    History of stroke in 8/2023;  -Had been taking Plavix and atorvastatin but recently stopped taking all of his medications as above.  -Neurologist note from 8/26/2023 reviewed- at that time recommended DAPT with aspirin and Plavix for 90 days, afterwards switch to enteric-coated aspirin 325 mg daily.  -Started full dose aspirin and resumed home atorvastatin    Essential hypertension, uncontrolled; due to noncompliance-improving  -- Discharge summary from 8/26/2023 reviewed, patient was discharged home on Coreg 12.5 mg twice daily, HCTZ 12.5 mg daily, losartan 100 mg daily  --On 2/19 restarted Coreg 12.5 mg twice daily, increased to 25 mg twice daily on 2/24 by cardiology.  --Diuretics as above.  As needed hydralazine.  Monitor vitals and adjust medications accordingly.    Tobacco  "use disorder  -Nicotine Patch  -Advised to quit smoking     Left distal radius fracture due to recent fall on 2/6/2023  -Cast in place  -PT and OT evals  -Outpatient orthopedics follow-up     Normocytic anemia, likely due to chronic wound;  -No active/obvious bleeding.  Trend     Poor follow-up, noncompliance;  -Importance of compliance and follow-up needs to be emphasized.  Patient with relatively young age and already with serious comorbidities of chronic disease.          Diet: Moderate Consistent Carb (60 g CHO per Meal) Diet  Fluid restriction 1800 ML FLUID  Snacks/Supplements Adult: Expedite Bottle; With Meals  Snacks/Supplements Adult: Glucerna; With Meals    DVT Prophylaxis: Enoxaparin (Lovenox) SQ  Cazares Catheter: Not present  Lines: None     Cardiac Monitoring: ACTIVE order. Indication: Acute decompensated heart failure (48 hours)  Code Status: Full Code      Clinically Significant Risk Factors              # Hypoalbuminemia: Lowest albumin = 2 g/dL at 2/20/2024  5:46 AM, will monitor as appropriate        # Acute heart failure with reduced ejection fraction: last echo with EF <40% and receiving IV diuretics      # DMII: A1C = 11.8 % (Ref range: <5.7 %) within past 6 months   # Obesity: Estimated body mass index is 39.13 kg/m  as calculated from the following:    Height as of this encounter: 1.778 m (5' 10\").    Weight as of this encounter: 123.7 kg (272 lb 11.3 oz).   # Moderate Malnutrition: based on nutrition assessment      # Financial/Environmental Concerns: none         Disposition Plan      Expected Discharge Date: 02/27/2024    Discharge Delays: IV Medication - consider oral or Home Infusion  Procedure Pending (enter procedure & time in comments)  Placement - TCU  Specialist Consult (enter specialist & decision needed in comments)  Destination: home with family  Discharge Comments: insurance needs, , coronary angio this week            Moses Dos Santos MD  Hospitalist Service  [facility]" [facility]  Securely message with [employer] (more info)  Text page via [employer] Paging/Directory   ______________________________________________________________________      Physical Exam   Vital Signs: Temp: 98.3  F (36.8  C) Temp src: Oral BP: (!) 149/79 Pulse: 79   Resp: 20 SpO2: 92 % O2 Device: None (Room air)    Weight: 272 lbs 11.34 oz      General: Not in obvious distress.  HEENT: Normocephalic, supple neck  Chest: Clear to auscultation bilateral anteriorly, no wheezing  Heart: S1S2 normal, regular  Abdomen: Soft.  Obese, nontender. Bowel sounds- active.  Extremities: Bilateral lower extremity swelling, right foot with wound VAC  Neuro: alert and awake, grossly non-focal        Medical Decision Making             Data     I have personally reviewed the following data over the past 24 hrs:    N/A  \   N/A   / N/A     136 99 16.2 /  209 (H)   3.9 28 1.40 (H) \       Imaging results reviewed over the past 24 hrs:   No results found for this or any previous visit (from the past 24 hour(s)).

## 2024-02-26 NOTE — PLAN OF CARE
Goal Outcome Evaluation:  Pt remains alert and oriented-he is somewhat irritable-does have many meds and interruptions on the day shift. He refused Therapy -he was eating breakfast at the time.He is able to turn himself side to side. He was given oxycodone 5mg in am in anticipation of WOC changing wound vac to right heel area. He tolerated this well. Mg was low and he was given po supplementation. Albumin was given this afternoon without difficulty.

## 2024-02-26 NOTE — PROGRESS NOTES
[facility]  WO Nurse Inpatient Assessment     Consulted for: wound vac to right heel    Summary: 2/26 Patient asked WOC nurse not to talk during change but did ask about status of wound. Also asked about timing of fixing leaks/concerns with VAC dressing.     2/22 Patient in bed, very flat affect, did not speak during wound vac dressing change except to ask if I was done yet.    2/19 Patient in bed, going between flat affect to anger.  Did listen to wound vac teaching and the rationale for its use.  Is concerned with finances and how to care for it once out of hospital.    Patient History (according to provider note(s):      Floyd Capps is a 48 year old male with history of DM, HTN, CVA, obesity, recent ED visit for nonhealing diabetic foot ulcer with cellulitis, here for worsening pain in the foot, with necrotic tissue noted on exam.     Assessment:      Areas visualized during today's visit:  right foot    Negative pressure wound therapy applied to: right heel        2/19 2/26    Last photo: 2/26  Wound due to: Diabetic Ulcer   Wound history/plan of care:    Surgical date: 2/16   Service following: podiatry  Date Negative Pressure Wound Therapy initiated: 2/19   Interventions in place: offloading and elevation  Is patient s nutritional status compromised? no   If yes, what interventions are in place? N/A  Reason for initiating vac therapy? Presence of co-morbidities, High risk of infections, and Need for accelerated granulation tissue  Which?of?the?following?co-morbidities?apply? Diabetes, Obesity, and Smoking  If diabetic is patient on a diabetic management program? Yes   Is osteomyelitis present in wound? no   If yes what treatments are in place? N/A    Wound base: Unhealthy granulation/adipose mix, bone no longer visualized but palpable     Palpation of the wound bed: firm       Drainage: scant      Volume in cannister: 50     Last cannister change date: 2/26     Description of  "drainage: none      Measurements (length x width x depth, in cm) 5 cm  x 7 cm  x  up to 3 cm       Tunneling N/A      Undermining N/A   Periwound skin: Macerated and peeling        Color: pale and pink       Temperature: normal    Odor: none   Pain: did not report any during visit  Pain intervention prior to dressing change: patient tolerated well, oral oxycodone, and slow and gentle cares   Treatment goal: Heal  and Protection  STATUS: improving   Supplies ordered: gathered    Number of foam pieces removed from a wound (excluding foam for bridge) :  1 GranuFoam Black   Verified this matched the number of foam pieces applied last dressing change: Yes   Number of foam pieces packed into wound (excluding foam for bridge) :  1 GranGreysonoam Black       Treatment Plan:     Negative pressure wound therapy plan:  Wound location: right heel   Change Days:  M/Th  by WOC RN    Supplies (including all accessories) used: medium Black foam   Cleanse with Vashe prior to replacing NPWT  Suction setting: -125   Methods used: Window paned all periwound skin with vac drape prior to applying sponge and Bridged trac pad off bony prominences    Staff RN to assess integrity of dressing and ensure suction is set at appropriate level every shift.   Date canister. Chart canister output every shift. Change cannister weekly and PRN if full/occluded     Remove foam dressing and replace with BID normal saline moist gauze dressing if:   -a dressing failure which cannot be repaired within 2 hours   -patient is discharging to home without a home pump   -patient is discharging to a facility outside the local area   -if a dressing is a \"Silver Foam\", remove before Radiation Therapy or MRI     The hospital VAC pump is not to be discharged with the patient.?Ensure to disconnect patient from machine prior to discharge. Then,    - If a home KCI VAC pump has been delivered, connect home cannister to dressing tubing then connect cannister to home pump and " turn on machine    - If transferring to a nearby facility with a KCI vac, can disconnect and clamp tubing then cover end with glove so can be reconnected within 2 hours       Orders: Reviewed    RECOMMEND PRIMARY TEAM ORDER: None, at this time  Education provided: plan of care, Infection prevention , and Off-loading pressure  Discussed plan of care with: Patient  WO nurse follow-up plan:  M/Th  Notify WOC if wound(s) deteriorate.  Nursing to notify the Provider(s) and re-consult the WOC Nurse if new skin concern.    DATA:     Current support surface: Standard  Standard gel/foam mattress (IsoFlex, Atmos air, etc)  Containment of urine/stool: Continent of bladder and Continent of bowel  BMI: Body mass index is 39.13 kg/m .   Active diet order: Orders Placed This Encounter      Moderate Consistent Carb (60 g CHO per Meal) Diet     Output: I/O last 3 completed shifts:  In: 1020 [P.O.:1020]  Out: 4910 [Urine:4910]     Labs:   Recent Labs   Lab 02/23/24  0618 02/22/24  0635   ALBUMIN  --  2.7*   HGB 10.6*  --    WBC 11.3*  --      Pressure injury risk assessment:   Sensory Perception: (P) 4-->no impairment  Moisture: (P) 4-->rarely moist  Activity: (P) 2-->chairfast  Mobility: (P) 3-->slightly limited  Nutrition: (P) 3-->adequate  Friction and Shear: (P) 2-->potential problem  Rolo Score: (P) 18    DAYAN PisanoN, RN, PHN, HNB-BC, CWOCN  Pager no longer is use, please contact through [employer] group: [facility] [employer] [facility]

## 2024-02-26 NOTE — PROGRESS NOTES
HEART CARE NOTE          Assessment/Recommendations   1. Severe HFrEF c/b ADHF  Assessment / Plan  New onset; will need ischemia eval; will plan for coronary angiogram +/- PCI when renal function stable to improved; I discussed this with him including potential risk of stroke, myocardial infarction, or death.  We also discussed the possibility of vascular injury, bleeding requiring blood transfusion, or reaction to x-ray dye although he tolerated x-ray dye.  Tolerating current diuretic regimen - no changes at this time; continue to monitor UOP and renal function closely  Patient is high risk for adverse cardiac events 2/2  severe systolic dysfunction, elevated NTproBNP, non-compliance  GDMT as detailed below     Current Pharmacotherapy AHA Guideline-Directed Medical Therapy   Losartan 25 mg - on hold given SHCUYLER Lisinopril 20 mg twice daily   Carvedilol 25 mg twice daily Carvedilol 25 mg twice daily   Spironolactone not started Spironolactone 25 mg once daily   Hydralazine NA Hydralazine 100 mg three times daily   Isosorbide dinitrate NA Isosorbide dinitrate 40 mg three times daily   SGLT2 inhibitor:Dapagliflozin/Empagliflozin - not started Dapagliflozin or Empagliflozin 10 mg daily      2. SCHUYLER in CKD  Assessment / Plan  Likely CRS in the setting of ADHF c/b antibiotic regimen- diuresis as above  Continue to monitor UOP and renal function closely     3. DM2  Assessment / Plan  C/b DM foot ulcer  Management per primary team  Currently on ISS     4. Tobacco abuse  Assessment / Plan  Counseled regarding cessation     5. HTN  Assessment / Plan  Adequately controlled on current regimen - no changes at this time    Plan of care discussed on February 26, 2024 with patient at bedside, and primary team overseeing patient's care    50 minutes spent reviewing prior records (including documentation, laboratory studies, cardiac testing/imaging), history and physical exam, planning, and subsequent documentation.      History of  "Present Illness/Subjective    Mr. Floyd Capps is a 48 year old male with a PMHx significant for (per Epic notation) DM, HTN, CVA, obesity, recent ED visit for nonhealing diabetic foot ulcer with cellulitis, here for worsening pain in the foot, with necrotic tissue noted on exam now found to be in new onset HFrEF     Today, Mr. Capps denies acute cardiac events or complaints; Management plan as detailed above     ECG: Personally reviewed. normal sinus rhythm, nonspecific ST and T waves changes.     ECHO (personnaly Reviewed on 2/21/24):   The left ventricle is normal in size. There is mild concentric left  ventricular hypertrophy.  Left ventricular function is decreased. The ejection fraction is 30-35%  (moderately reduced). There is global hypokinesis with severe hypokinesis of  the mid to apical inferior wall.  Diastolic Doppler findings (E/E' ratio and/or other parameters) suggest left  ventricular filling pressures are increased.     The right ventricle is normal in size and function.  Normal left atrial size. Right atrial size is normal.  There is mild (1+) mitral regurgitation.  IVC diameter >2.1 cm collapsing <50% with sniff suggests a high RA pressure  estimated at 15 mmHg or greater.     Compared to previous study from 8/23/2023 the LV systolic function has  declined and there are regional wall motion abnormalities.    Telemetry: personally reviewed February 26, 2024; notable for sinus rhythm     Lab results: personally reviewed February 26, 2024; notable for SCHUYLER on CKD    Medical history and pertinent documents reviewed in Care Everywhere please where applicable see details above        Physical Examination Review of Systems   /65 (BP Location: Left arm)   Pulse 76   Temp 98.2  F (36.8  C) (Oral)   Resp 18   Ht 1.778 m (5' 10\")   Wt 123.7 kg (272 lb 11.3 oz)   SpO2 93%   BMI 39.13 kg/m    Body mass index is 39.13 kg/m .  Wt Readings from Last 3 Encounters:   02/23/24 123.7 kg (272 lb 11.3 oz) "   02/13/24 122.5 kg (270 lb)   02/06/24 122.5 kg (270 lb)     General Appearance:   no distress, normal body habitus   ENT/Mouth: membranes moist, no oral lesions or bleeding gums.      EYES:  no scleral icterus, normal conjunctivae   Neck: no carotid bruits or thyromegaly   Chest/Lungs:   lungs are clear to auscultation, no rales or wheezing, equal chest wall expansion    Cardiovascular:   Regular. Normal first and second heart sounds with no murmurs, rubs, or gallops; the carotid, radial and posterior tibial pulses are intact, + JVD and LE edema bilaterally    Abdomen:  no organomegaly, masses, bruits, or tenderness; bowel sounds are present   Extremities: no cyanosis or clubbing   Skin: no xanthelasma, warm.    Neurologic: NAD     Psychiatric: alert and oriented x3, calm     A complete 10 systems ROS was reviewed  And is negative except what is listed in the HPI.          Medical History  Surgical History Family History Social History   Past Medical History:   Diagnosis Date    Diabetes (H)     Past Surgical History:   Procedure Laterality Date    APPENDECTOMY      INCISION AND DRAINAGE OF WOUND      groin abscess    IRRIGATION AND DEBRIDEMENT FOOT, COMBINED Right 2/16/2024    Procedure: IRRIGATION AND DEBRIDEMENT RIGHT FOOT;  Surgeon: Cristofer Sims DPM;  Location: [facility] [place] [place] OR    no family history of premature coronary artery disease Social History     Socioeconomic History    Marital status: Single     Spouse name: Not on file    Number of children: Not on file    Years of education: Not on file    Highest education level: Not on file   Occupational History    Not on file   Tobacco Use    Smoking status: Every Day     Packs/day: .5     Types: Cigarettes    Smokeless tobacco: Never    Tobacco comments:     Seen IP by [facility] on 8/24/23 and declined cessation services and materials.    Vaping Use    Vaping Use: Never used   Substance and Sexual Activity    Alcohol use: No    Drug use: No    Sexual activity:  Not on file   Other Topics Concern    Not on file   Social History Narrative    Patient reports that he does not have health insurance.     Social Determinants of Health     Financial Resource Strain: Low Risk  (10/20/2023)    Financial Resource Strain     Within the past 12 months, have you or your family members you live with been unable to get utilities (heat, electricity) when it was really needed?: No   Food Insecurity: High Risk (10/20/2023)    Food Insecurity     Within the past 12 months, did you worry that your food would run out before you got money to buy more?: Yes     Within the past 12 months, did the food you bought just not last and you didn t have money to get more?: No   Transportation Needs: Low Risk  (10/20/2023)    Transportation Needs     Within the past 12 months, has lack of transportation kept you from medical appointments, getting your medicines, non-medical meetings or appointments, work, or from getting things that you need?: No   Physical Activity: Not on file   Stress: Not on file   Social Connections: Not on file   Interpersonal Safety: Low Risk  (10/20/2023)    Interpersonal Safety     Do you feel physically and emotionally safe where you currently live?: Yes     Within the past 12 months, have you been hit, slapped, kicked or otherwise physically hurt by someone?: No     Within the past 12 months, have you been humiliated or emotionally abused in other ways by your partner or ex-partner?: No   Housing Stability: High Risk (10/20/2023)    Housing Stability     Do you have housing? : Yes     Are you worried about losing your housing?: Yes           Lab Results    Chemistry/lipid CBC Cardiac Enzymes/BNP/TSH/INR   Lab Results   Component Value Date    CHOL 156 02/21/2024    HDL 39 (L) 02/21/2024    TRIG 199 (H) 02/21/2024    BUN 16.2 02/25/2024     02/25/2024    CO2 28 02/25/2024    Lab Results   Component Value Date    WBC 11.3 (H) 02/23/2024    HGB 10.6 (L) 02/23/2024    HCT 33.1  "(L) 02/23/2024    MCV 93 02/23/2024     02/26/2024    Lab Results   Component Value Date    TSH 2.72 08/23/2023    INR 0.95 08/23/2023     No results found for: \"CKTOTAL\", \"CKMB\", \"TROPONINI\"       Weight:    Wt Readings from Last 3 Encounters:   02/23/24 123.7 kg (272 lb 11.3 oz)   02/13/24 122.5 kg (270 lb)   02/06/24 122.5 kg (270 lb)       Allergies  No Known Allergies      Surgical History  Past Surgical History:   Procedure Laterality Date    APPENDECTOMY      INCISION AND DRAINAGE OF WOUND      groin abscess    IRRIGATION AND DEBRIDEMENT FOOT, COMBINED Right 2/16/2024    Procedure: IRRIGATION AND DEBRIDEMENT RIGHT FOOT;  Surgeon: Cristofer Sims DPM;  Location: [facility] [place]       Social History  Tobacco:   History   Smoking Status    Every Day    Packs/day: 0.50    Types: Cigarettes   Smokeless Tobacco    Never    Alcohol:   Social History    Substance and Sexual Activity      Alcohol use: No   Illicit Drugs:   History   Drug Use No       Family History  History reviewed. No pertinent family history.       Shayna Arndt MD on 2/26/2024      cc: Glendy Benavides    "

## 2024-02-26 NOTE — PROGRESS NOTES
RENAL PROGRESS NOTE    ASSESSMENT & PLAN:   Nephrotic range proteinuria, CKD1  Baseline Cr 0.8 with severe nephrotic range proteinuria and some cells on UA. Suspect he has CKD/GN most likely related to diabetic nephropathy, all serologies neg except sl monoclonal AB on urine immunofix, plan to f/up and repeat in several months.   No renal biopsy needed now and not too inclined to do in future to confirm DN given overall non compliance hx and MMP. .   Would rec maxing ARB, SGLT2 inhib eventually once cr stable and medically optimized from CHF standpoint.  Would benefit from close outpt renal f/u, he will need close management for his nephrotic range proteinuria and anticipated angio in future.     Plan for outpt follow up at our clinic:   Monday March 18 at 11:00AM with Ruth Mike NP at [facility]      ARF suspect hemodynamic due to infx, hemodynamics.   Cr labile in the setting of ongoing diuresis  Now briskly diuresing with resumption of Bumex now at 1 mg twice daily.  Certainly needs ongoing diuresis from a CHF standpoint, cardiology ordered some albumin, this may help with some volume expansion and help him better tolerate diuresis from a renal standpoint.  If creatinine rises further tomorrow we may need to hold diuretics for a day or de-escalate Bumex dosing.  Renal panel in the morning.  We will follow     HTN bp controlled.   Holding ARB but will try to resume soon to assist with proteinuria control     Volume overload better by exam, large urine output.   Currently on oral Bumex 1 mg twice daily.  As above.     New HFrEF with WMA, prob high risk for CAD but had negative stress test last fall. Echo shows new EF down to 30-35% with WMA and inc filling pressures.   Cards plan for coronary angio, holding off on coronary angiography until more optimized from a CHF standpoint and creatinine closer to baseline.  We prefer to hold off to allow more renal recovery unless urgent.      Anemia  normocytic, can assess iron stores eventually and replete. Can do in kidney clinic f/up after finishes abx.      Lytes: No derangements     HLD - statin     DM2 -historically poor control,  insulin this admit      Diabetic foot ulcer  2/16 debridement. Now on  Zosyn and daptomycin until 3/1 per ID. Wound vac, no osteo     Obesity     Tobacco use - nicotine patches in use this admit    SUBJECTIVE:    Patient was seen bedside  Eating lunch  Discussed management of above, he does not seem to grasp the complexity of his medical issues  Mostly just concerned about the pain in his right leg  All questions answered    OBJECTIVE:  Physical Exam   Temp: 98.3  F (36.8  C) Temp src: Oral BP: 129/74 Pulse: 78   Resp: 18 SpO2: 98 % O2 Device: None (Room air)    Vitals:    02/14/24 2148 02/22/24 0409 02/23/24 0620   Weight: 123.1 kg (271 lb 6.2 oz) 125.4 kg (276 lb 7.3 oz) 123.7 kg (272 lb 11.3 oz)     Vital Signs with Ranges  Temp:  [97  F (36.1  C)-98.8  F (37.1  C)] 98.3  F (36.8  C)  Pulse:  [76-84] 78  Resp:  [18-20] 18  BP: (117-149)/(65-80) 129/74  SpO2:  [92 %-98 %] 98 %  I/O last 3 completed shifts:  In: 1020 [P.O.:1020]  Out: 4910 [Urine:4910]    No data found.  Intake/Output Summary (Last 24 hours) at 2/26/2024 1338  Last data filed at 2/26/2024 1000  Gross per 24 hour   Intake 830 ml   Output 4250 ml   Net -3420 ml       PHYSICAL EXAM:  General: asleep RA NAD  HEENT: ATNC  NECK:  supple.   Respiratory: Lung sounds are clear anteriorly, no advantageous breath sounds.  EXT Obesity + edema, improving, still has some in lower legs.   GENITOURINARY:  No donovan   INTEGUMENTARY: Warm, dry, no rash but pale  NEUROLOGIC: Nonfocal    LABORATORY STUDIES:     Recent Labs   Lab 02/26/24  0508 02/23/24  0618 02/20/24  0546   WBC  --  11.3* 12.0*   RBC  --  3.57* 3.52*   HGB  --  10.6* 10.3*   HCT  --  33.1* 32.2*    337 357  357       Basic Metabolic Panel:  Recent Labs   Lab 02/26/24  1228 02/26/24  0738 02/26/24  0501  02/25/24  2107 02/25/24  1710 02/25/24  1219 02/25/24  0719 02/25/24  0549 02/24/24  0728 02/24/24  0527 02/23/24  0726 02/23/24  0618 02/22/24  0744 02/22/24  0635 02/21/24  0758 02/21/24  0537 02/20/24  0922 02/20/24  0546   NA  --   --  137  --   --   --   --  136  --  135  --  137  --  137  --  137  --  136   POTASSIUM  --   --  4.0  --   --   --   --  3.9  --  4.1  4.1  --  3.6  --  3.5  --  4.0  --  3.5   CHLORIDE  --   --   --   --   --   --   --  99  --  96*  --  98  --  100  --  99  --  103   CO2  --   --  28  --   --   --   --  28  --  34*  --  31*  --  29  --  31*  --  29   BUN  --   --   --   --   --   --   --  16.2  --  16.0  --  13.4  --  12.7  --  11.2  --  9.3   CR  --   --  1.59*  --   --   --   --  1.40*  --  1.72*  --  1.53*  --  1.58*  --  1.54*  --  1.48*   * 138*  --  206* 209* 213* 147* 157*   < > 174*   < > 125*   < > 133*   < > 209*   < > 142*   SARAH  --   --   --   --   --   --   --  8.6  --  8.8  --  8.6  --  8.3*  --  8.7  --  8.3*    < > = values in this interval not displayed.       INRNo lab results found in last 7 days.     Recent Labs   Lab Test 02/26/24  0508 02/23/24  0618 02/20/24  0546 08/24/23  0645 08/23/23  0946   INR  --   --   --   --  0.95   WBC  --  11.3* 12.0*   < > 11.2*   HGB  --  10.6* 10.3*   < > 13.9    337 357  357   < > 290    < > = values in this interval not displayed.       Personally reviewed current labs    This note was dictated using voice recognition     Saulo Dobbs PA-C  [facility]  [phone]

## 2024-02-26 NOTE — PROGRESS NOTES
Care Management Follow Up    Length of Stay (days): 12    Expected Discharge Date: 02/29/2024     Concerns to be Addressed:     Care Progression   Patient plan of care discussed at interdisciplinary rounds: Yes    Anticipated Discharge Disposition:  TCU     Anticipated Discharge Services:  NA  Anticipated Discharge DME:  [facility]    Patient/family educated on Medicare website which has current facility and service quality ratings:  NA  Education Provided on the Discharge Plan:  NA  Patient/Family in Agreement with the Plan:  NA    Referrals Placed by CM/SW:  NA  Private pay costs discussed: Not applicable    Additional Information:  Chart reviewed. Awaiting plan for angio this week. Cardiology and Nephrology following.     Discharge Planning - Will need TCU, awaiting insurance plan for placement. Referrals not yet sent due to no payor source.     No Insurance - Financial SW following and assisting; Friend - Gene states patient reports he received a phone call from [employer] on 2/23/2024 and case will be expedtited to activate insurance. May hear back on Monday 2/26/2024.     Social HX: Lives in home alone, no services.     CM will continue to follow care progression and aide in discharge planning as needed.     Sarah Martinez RN

## 2024-02-26 NOTE — PLAN OF CARE
Problem: Adult Inpatient Plan of Care  Goal: Plan of Care Review  Description: The Plan of Care Review/Shift note should be completed every shift.  The Outcome Evaluation is a brief statement about your assessment that the patient is improving, declining, or no change.  This information will be displayed automatically on your shift  note.  Outcome: Progressing   Goal Outcome Evaluation:       Oxycodone given for pain. Dressing CDI. No output from wound vac. IV antibiotics as ordered. Telemetry monitoring. Sinus rhythm. 1800ml fluid restriction. Strict I&O.

## 2024-02-26 NOTE — PROGRESS NOTES
"CLINICAL NUTRITION SERVICES - ASSESSMENT NOTE     Nutrition Prescription    RECOMMENDATIONS FOR MDs/PROVIDERS TO ORDER:  -Recommend increasing meal time insulin with consistent meal BG >200    Malnutrition Status:    Moderate in context of acute illness    Recommendations already ordered by Registered Dietitian (RD):  No new    Future/Additional Recommendations:  Adjust supplements pending intake, weight, tolerance, acceptance, wound healing, labs, BG  -consider adding vit C to promote wound healing if kidney function improves  -Monitor protein intake vs need to reduce with SCHUYLER       CURRENT NUTRITION ORDERS  Diet: 60 gm cho per meal. 1800 ml fluid restriction   Supplement: Glucerrna daily, expedite daily     Intake/Tolerance: good, 100%  2/3 days ate 3 meals,with supplements at 2000 kcal, 120-150 g protein/day  1/3 days ate 2 meals at 1200 kcal, 53 g protein     Meeting >90%of estimated needs most day with meals and supplements     Taking expedite. Pt reports he does not like the taste but is willing to continue taking. Also taking glucerna which is \"ok\". He reports he is focusing in eating high protein but feels \"limited\" here. Pt continuing to watch his CHO and Na intake    MEDS  Bumex bid, iv abx, ssi, novolog 1u: 10 g cho, lantus daily, magox today, mvi with minerals, protonix, iv abx  Lantus increased 2/24    LABS  -221 mgl/dl past 24 hours, in fair control. BG consistently > 200 at meals and <150 fasting    ANTHROPOMETRICS  Height: 177.8 cm (5' 10\")  Weight History:     Weight of 270 lb for the past 3 months is questionable.  Wt Readings from Last 3 Encounters:   02/23/24 123.7 kg (272 lb 11.3 oz)   02/13/24 122.5 kg (270 lb)   02/06/24 122.5 kg (270 lb)     01/08/24 122.5 kg (270 lb)  11/21/23 122.5 kg (270 lb)    11/03/23 125.3 kg (276 lb 4.8 oz)   10/20/23 129.3 kg (285 lb 1.6 oz)   10/12/23 128.4 kg (283 lb)   09/23/23 130 kg (286 lb 9.6 oz)   08/26/23 122.2 kg (269 lb 6.4 oz)       Dosing Weight: " 87.3 kg adjusted weight    ASSESSED NUTRITION NEEDS  Estimated Energy Needs: 2200 - 2620 kcals/day (25 - 30 kcals/kg)  Justification: Obese and Wound healing  Estimated Protein Needs: 87 +grams protein/day (1 gm/kg)  Justification: SCHUYLER and Wound healing  Estimated Fluid Needs: 2200 mL/day (25 mL/kg), or per provider  Justification: acute HF    PHYSICAL FINDINGS  Per chart,  Heel wound with vac-stable per WOC 2/22    MALNUTRITION:  % Weight Loss:  Weight loss does not meet criteria for malnutrition   % Intake:  <75% for > 7 days (moderate malnutrition)  Subcutaneous Fat Loss:  None observed, observation not full exam, bare to waste  Muscle Loss:  None observed  Fluid Retention:  Mild     Malnutrition Diagnosis: Moderate malnutrition  In Context of:  Acute illness or injury    NUTRITION DIAGNOSIS  Malnutrition related to decreased intake as evidenced by <75% >7 days and fluid accumulation  -not progressing    INTERVENTIONS  Implementation  -Recommend increasing meal time insulin with consistent meal BG >200    Goals  Meet > 75% of estimated nutrition needs- progressing  BG < 180- progressing  Wound healing- in progress     Monitoring/Evaluation  Progress toward goals will be monitored and evaluated per protocol.

## 2024-02-26 NOTE — PLAN OF CARE
"  Problem: Malnutrition  Goal: Improved Nutritional Intake  Outcome: Progressing     Problem: Adult Inpatient Plan of Care  Description: The Care Plan Review/Shift Note, Individualized Goals, Hospital-Acquired Illness or Injury, Comfort and Wellbeing, and Transition Planning are the \"Overarching Goals\" and should be updated throughout the hospitalization.  Please hover over the (i) for specific information on each goal topic.  Goal: Plan of Care Review  Description: The Plan of Care Review/Shift note should be completed every shift.  The Outcome Evaluation is a brief statement about your assessment that the patient is improving, declining, or no change.  This information will be displayed automatically on your shift  note.  Outcome: Progressing  Flowsheets (Taken 2/26/2024 1116)  Outcome Evaluation: Pt is meeting > 75% of estimated nutrition needs with meals and supplements. Wound healing. BG consistently >200 at meals.  Plan of Care Reviewed With: patient  Overall Patient Progress: improving   Goal Outcome Evaluation:      Plan of Care Reviewed With: patient    Overall Patient Progress: improvingOverall Patient Progress: improving    Outcome Evaluation: Pt is meeting > 75% of estimated nutrition needs with meals and supplements. Wound healing. BG consistently >200 at meals.      "

## 2024-02-26 NOTE — PROGRESS NOTES
"INFECTIOUS DISEASE FOLLOW UP NOTE      ASSESSMENT:  Floyd Capps is a 48 year old old male with   Poorly controlled type 2 diabetes mellitus with last A1c of 11.8 on 2/14/2024.  Morbid obesity with BMI of 38.94.  Active tobacco use.  Diabetic foot infection status post debridement on 2/16/2024.  Per discussion with Dr Sims, was down to bone but not into bone. Surgical cultures polymicrobial Superficial cultures with Proteus vulgaris, Pseudomonas aeruginosa, Streptococcus agalactiae, Enterococcus faecalis, Finegoldia and MRSA. No oral options to treat the Ps aeruginosa. Active issue.   SCHUYLER.  Mother passed away       PLAN:  Continue Zosyn and daptomycin, end 2/29. Inpatient orders have been updated.   Monitoring labs have been ok  - ID will sign off. Please call with additional questions or change in clinical status.        Windy Soliz MD   [facility]  [phone] [facility]  [employer] paging    ______________________________________________________________________    SUBJECTIVE / INTERVAL HISTORY: first visit by me. Just had vac changed. Tolerating antibiotics.     ROS: All other systems negative except as listed above.        OBJECTIVE:  /65 (BP Location: Left arm)   Pulse 76   Temp 98.2  F (36.8  C) (Oral)   Resp 18   Ht 1.778 m (5' 10\")   Wt 123.7 kg (272 lb 11.3 oz)   SpO2 93%   BMI 39.13 kg/m                GEN: no distress  RESPIRATORY:  Normal breathing pattern.   EXTREMITIES: vac  SKIN/HAIR/NAILS:  No rashes  IV: peripheral        Antibiotics:  Pip/tazo 2/14-  Vancomycin 2/14-16  Daptomycin 2/16 -     Pertinent labs:    Recent Labs   Lab 02/26/24  0508 02/23/24  0618 02/20/24  0546   WBC  --  11.3* 12.0*   HGB  --  10.6* 10.3*   HCT  --  33.1* 32.2*    337 357  357        Recent Labs   Lab 02/25/24  0549 02/24/24  0527 02/23/24  0618    135 137   CO2 28 34* 31*   BUN 16.2 16.0 13.4     Creatinine   Date Value Ref Range Status   02/26/2024 1.59 (H) 0.67 " - 1.17 mg/dL Final     Liver Function Studies -   Recent Labs   Lab Test 24  0547   PROTTOTAL 6.2*   ALBUMIN 2.2*   BILITOTAL 0.2   ALKPHOS 74   AST 10   ALT 9         MICROBIOLOGY DATA:  7-Day Micro Results       No results found for the last 168 hours.              RADIOLOGY:  Echocardiogram Complete    Result Date: 2024  [id number]^[place] [address]  Name: LUIS HODGES MRN: [id number] : 1975 Study Date: 2024 09:49 AM Age: 48 yrs Gender: Male Patient Location: [facility] Reason For Study: Pulmonary Edema Ordering Physician: HARRIS SLADE Referring Physician: HARRIS SLADE Performed By: LA  BSA: 2.4 m2 Height: 70 in Weight: 271 lb HR: 78 ______________________________________________________________________________ Procedure Complete Echo Adult. Definity (NDC #23808-457) given intravenously. Adequate quality two-dimensional was performed and interpreted. ______________________________________________________________________________ Interpretation Summary  The left ventricle is normal in size. There is mild concentric left ventricular hypertrophy. Left ventricular function is decreased. The ejection fraction is 30-35% (moderately reduced). There is global hypokinesis with severe hypokinesis of the mid to apical inferior wall. Diastolic Doppler findings (E/E' ratio and/or other parameters) suggest left ventricular filling pressures are increased.  The right ventricle is normal in size and function. Normal left atrial size. Right atrial size is normal. There is mild (1+) mitral regurgitation. IVC diameter >2.1 cm collapsing <50% with sniff suggests a high RA pressure estimated at 15 mmHg or greater.  Compared to previous study from 2023 the LV systolic function has declined and there are regional wall motion abnormalities. ______________________________________________________________________________ Left Ventricle The left  ventricle is normal in size. Left ventricular function is decreased. The ejection fraction is 30-35% (moderately reduced). There is mild concentric left ventricular hypertrophy. Diastolic Doppler findings (E/E' ratio and/or other parameters) suggest left ventricular filling pressures are increased. There is global hypokinesis with severe hypokinesis of the mid to apical inferior wall.  Right Ventricle The right ventricle is normal in size and function.  Atria Normal left atrial size. Right atrial size is normal.  Mitral Valve Mitral valve leaflets appear normal. There is mild (1+) mitral regurgitation. There is no mitral valve stenosis.  Tricuspid Valve The tricuspid valve is not well visualized. There is trace tricuspid regurgitation. Right ventricular systolic pressure could not be approximated due to inadequate tricuspid regurgitation.  Aortic Valve The aortic valve is trileaflet. Aortic valve leaflets appear normal. No aortic regurgitation is present. No aortic stenosis is present.  Pulmonic Valve The pulmonic valve is not well visualized. There is trace pulmonic valvular regurgitation.  Vessels The aorta root is normal. IVC diameter >2.1 cm collapsing <50% with sniff suggests a high RA pressure estimated at 15 mmHg or greater.  Pericardium There is no pericardial effusion.  Rhythm Sinus rhythm was noted. ______________________________________________________________________________ MMode/2D Measurements & Calculations  IVSd: 1.3 cm LVIDd: 5.2 cm LVIDs: 4.0 cm LVPWd: 1.3 cm FS: 21.7 % LV mass(C)d: 274.4 grams LV mass(C)dI: 115.5 grams/m2 Ao root diam: 3.1 cm LVOT diam: 2.0 cm LVOT area: 3.1 cm2 Ao root diam index Ht(cm/m): 1.7 Ao root diam index BSA (cm/m2): 1.3 LA Volume (BP): 63.0 ml LA Volume Index (BP): 26.5 ml/m2  LA Volume Indexed (AL/bp): 28.2 ml/m2 RV Base: 3.4 cm RWT: 0.51 TAPSE: 2.1 cm  Doppler Measurements & Calculations MV E max gretchen: 104.0 cm/sec MV A max gretchen: 40.4 cm/sec MV E/A: 2.6 MV dec slope:  886.0 cm/sec2 MV dec time: 0.12 sec Ao V2 max: 116.0 cm/sec Ao max P.0 mmHg Ao V2 mean: 78.1 cm/sec Ao mean PG: 3.0 mmHg Ao V2 VTI: 21.8 cm NILSA(I,D): 2.1 cm2 NILSA(V,D): 1.9 cm2  LV V1 max P.0 mmHg LV V1 max: 70.5 cm/sec LV V1 VTI: 14.4 cm SV(LVOT): 45.2 ml SI(LVOT): 19.0 ml/m2 PA acc time: 0.10 sec AV Servando Ratio (DI): 0.61 NILSA Index (cm2/m2): 0.87 E/E' av.7 Lateral E/e': 10.7 Medial E/e': 12.6 RV S Servando: 12.9 cm/sec  ______________________________________________________________________________ Report approved by: Ladonna Ornelas 2024 11:41 AM       CT Abdomen Pelvis w/o Contrast    Result Date: 2024  EXAM: CT ABDOMEN PELVIS W/O CONTRAST LOCATION: [facility] DATE: 2024 INDICATION: acute abdominal pain with N V and poor appetite COMPARISON: None. TECHNIQUE: CT scan of the abdomen and pelvis was performed without IV contrast. Multiplanar reformats were obtained. Dose reduction techniques were used. CONTRAST: None. FINDINGS: LOWER CHEST: Smooth intralobular septal thickening is in the lower lobes suggestive of pulmonary interstitial edema. Moderate bilateral pleural effusions with associated associated compressive atelectasis in the lung bases lobes. HEPATOBILIARY: Normal. PANCREAS: Normal. SPLEEN: Normal. ADRENAL GLANDS: Normal. KIDNEYS/BLADDER: Fat-containing lesion in the upper pole the left kidney measuring 0.8 cm compatible with an angiomyolipoma. BOWEL: No obstruction or inflammatory change. LYMPH NODES: Normal. VASCULATURE: Mild scattered vascular calcifications. PELVIC ORGANS: Trace low-density free fluid in the pelvis. MUSCULOSKELETAL: Diffuse anasarca. Multilevel degenerative changes in the thoracolumbar spine     IMPRESSION: 1.  No acute findings in the abdomen or pelvis. 2.  Findings suggesting fluid overload with moderate bilateral pleural effusions and diffuse anasarca. 3.  Subcentimeter left renal angiomyolipoma.     POC US Guidance Needle  "Placement    Result Date: 2/16/2024  Ultrasound was performed as guidance to an anesthesia procedure.  Click \"PACS images\" hyperlink below to view any stored images.  For specific procedure details, view procedure note authored by anesthesia.    MR Foot Right w/o & w Contrast    Result Date: 2/14/2024  EXAM: MR FOOT RIGHT W/O and W CONTRAST LOCATION: [facility] DATE: 2/14/2024 INDICATION: Ulceration, erythema, infection. COMPARISON: None. TECHNIQUE: Routine. Additional postgadolinium T1 sequences were obtained. IV CONTRAST: 12 mL Gadavist. FINDINGS: JOINTS AND BONES: -No evidence for fracture. There is some reactive signal abnormality within the posterior calcaneus but no T1 marrow signal change and this is unlikely to represent osteomyelitis. No significant effusion to suggest septic arthropathy. There is degenerative change at the calcaneocuboid articulation and tibiotalar joint. TENDONS: -The peroneal tendons are negative. The flexor tendons are negative for tendinopathy, tenosynovitis, or tearing. The extensor tendons are negative. LIGAMENTS: -The anterior and posterior talofibular ligaments are negative. The deltoid ligamentous complex is intact. The calcaneofibular ligament is negative. The ligament of Lisfranc is intact. MUSCLES AND SOFT TISSUES: -There is a soft tissue ulceration along the posteroinferior aspect of the hindfoot but this is fairly superficial. There is some surrounding edema or potential cellulitis. There is reactive edema versus infectious or inflammatory myositis within the plantar and interosseous musculature. No evidence for abscess. Mild plantar fasciitis.     IMPRESSION: 1.  Soft tissue ulceration along the posteroinferior aspect of the hindfoot. This is fairly superficial. There is some surrounding edema or low-grade cellulitis. 2.  No evidence for osteomyelitis, septic arthropathy, or abscess. 3.  Reactive edema versus infectious or inflammatory myositis " within the plantar and interosseous musculature. 4.  No evidence for fracture. 5.  No significant tendinous or ligamentous pathology.    XR Wrist Left G/E 3 Views    Result Date: 2/13/2024  EXAM: XR WRIST LEFT G/E 3 VIEWS LOCATION: [facility] DATE: 2/13/2024 INDICATION:  Other closed intra-articular fracture of distal end of left radius, initial encounter COMPARISON: 02/06/2024     IMPRESSION: Distal radial intra-articular fracture with similar alignment. No definite callus formation. Otherwise unchanged.    XR Wrist Left G/E 3 Views    Result Date: 2/6/2024  EXAM: XR WRIST LEFT G/E 3 VIEWS LOCATION: [facility] DATE: 2/6/2024 INDICATION: fall, left wrist pain COMPARISON: None.     IMPRESSION: There is a nondisplaced, longitudinally oriented fracture of the distal left radius. There is intra-articular extension. There is slight buckling of the dorsal radial cortex on the lateral view. The ulna is intact. Soft tissue swelling.

## 2024-02-26 NOTE — PLAN OF CARE
Problem: Adult Inpatient Plan of Care  Goal: Absence of Hospital-Acquired Illness or Injury  Intervention: Identify and Manage Fall Risk  Recent Flowsheet Documentation  Taken 2/25/2024 1643 by Ayleen Nielsen RN  Safety Promotion/Fall Prevention:   activity supervised   assistive device/personal items within reach   nonskid shoes/slippers when out of bed   safety round/check completed  Goal: Optimal Comfort and Wellbeing  Intervention: Monitor Pain and Promote Comfort  Recent Flowsheet Documentation  Taken 2/25/2024 1643 by Ayleen Nielsen RN  Pain Management Interventions: medication (see MAR)     Problem: Pain Acute  Goal: Optimal Pain Control and Function  Intervention: Develop Pain Management Plan  Recent Flowsheet Documentation  Taken 2/25/2024 1643 by Ayleen Nielsen RN  Pain Management Interventions: medication (see MAR)  Intervention: Prevent or Manage Pain  Recent Flowsheet Documentation  Taken 2/25/2024 1643 by Ayleen Nielsen RN  Medication Review/Management: medications reviewed     Problem: Comorbidity Management  Goal: Maintenance of Asthma Control  Intervention: Maintain Asthma Symptom Control  Recent Flowsheet Documentation  Taken 2/25/2024 1643 by Ayleen Nielsen RN  Medication Review/Management: medications reviewed  Goal: Maintenance of Behavioral Health Symptom Control  Intervention: Maintain Behavioral Health Symptom Control  Recent Flowsheet Documentation  Taken 2/25/2024 1643 by Ayleen Nielsen RN  Medication Review/Management: medications reviewed  Goal: Maintenance of COPD Symptom Control  Intervention: Maintain COPD Symptom Control  Recent Flowsheet Documentation  Taken 2/25/2024 1643 by Ayleen Nielsen RN  Medication Review/Management: medications reviewed  Goal: Blood Glucose Levels Within Targeted Range  Intervention: Monitor and Manage Glycemia  Recent Flowsheet Documentation  Taken 2/25/2024 1643 by Ayleen Nielsen RN  Medication Review/Management: medications  reviewed  Goal: Maintenance of Heart Failure Symptom Control  Intervention: Maintain Heart Failure Management  Recent Flowsheet Documentation  Taken 2/25/2024 1643 by Ayleen Nielsen RN  Medication Review/Management: medications reviewed  Goal: Blood Pressure in Desired Range  Intervention: Maintain Blood Pressure Management  Recent Flowsheet Documentation  Taken 2/25/2024 1643 by Ayleen Nielsen RN  Medication Review/Management: medications reviewed  Goal: Maintenance of Osteoarthritis Symptom Control  Intervention: Maintain Osteoarthritis Symptom Control  Recent Flowsheet Documentation  Taken 2/25/2024 1643 by Ayleen Nielsen RN  Activity Management:   activity adjusted per tolerance   activity encouraged  Medication Review/Management: medications reviewed  Goal: Bariatric Home Regimen Maintained  Intervention: Maintain and Manage Postbariatric Surgery Care  Recent Flowsheet Documentation  Taken 2/25/2024 1643 by Ayleen Nielsen RN  Medication Review/Management: medications reviewed  Goal: Maintenance of Seizure Control  Intervention: Maintain Seizure Symptom Control  Recent Flowsheet Documentation  Taken 2/25/2024 1643 by Ayleen Nielsen RN  Medication Review/Management: medications reviewed     Problem: Infection  Goal: Absence of Infection Signs and Symptoms  Intervention: Prevent or Manage Infection  Recent Flowsheet Documentation  Taken 2/25/2024 1643 by Ayleen Nielsen RN  Isolation Precautions: contact precautions maintained     Problem: Mobility Impairment  Goal: Optimal Mobility  Intervention: Optimize Mobility  Recent Flowsheet Documentation  Taken 2/25/2024 1643 by Ayleen Nielsen RN  Activity Management:   activity adjusted per tolerance   activity encouraged     Problem: Adult Inpatient Plan of Care  Goal: Absence of Hospital-Acquired Illness or Injury  Intervention: Identify and Manage Fall Risk  Recent Flowsheet Documentation  Taken 2/25/2024 1643 by Ayleen Nielsen RN  Safety Promotion/Fall  Prevention:   activity supervised   assistive device/personal items within reach   nonskid shoes/slippers when out of bed   safety round/check completed  Goal: Optimal Comfort and Wellbeing  Intervention: Monitor Pain and Promote Comfort  Recent Flowsheet Documentation  Taken 2/25/2024 1643 by Ayleen Nielsen RN  Pain Management Interventions: medication (see MAR)     Problem: Infection  Goal: Absence of Infection Signs and Symptoms  Intervention: Prevent or Manage Infection  Recent Flowsheet Documentation  Taken 2/25/2024 1643 by Ayleen Nielsen RN  Isolation Precautions: contact precautions maintained     Problem: Comorbidity Management  Goal: Maintenance of Asthma Control  Intervention: Maintain Asthma Symptom Control  Recent Flowsheet Documentation  Taken 2/25/2024 1643 by Ayleen Nielsen RN  Medication Review/Management: medications reviewed  Goal: Maintenance of Behavioral Health Symptom Control  Intervention: Maintain Behavioral Health Symptom Control  Recent Flowsheet Documentation  Taken 2/25/2024 1643 by Ayleen Nielsen RN  Medication Review/Management: medications reviewed  Goal: Maintenance of COPD Symptom Control  Intervention: Maintain COPD Symptom Control  Recent Flowsheet Documentation  Taken 2/25/2024 1643 by Ayleen Nielsen RN  Medication Review/Management: medications reviewed  Goal: Blood Glucose Levels Within Targeted Range  Intervention: Monitor and Manage Glycemia  Recent Flowsheet Documentation  Taken 2/25/2024 1643 by Ayleen Nielsen RN  Medication Review/Management: medications reviewed  Goal: Maintenance of Heart Failure Symptom Control  Intervention: Maintain Heart Failure Management  Recent Flowsheet Documentation  Taken 2/25/2024 1643 by Ayleen Nielsen RN  Medication Review/Management: medications reviewed  Goal: Blood Pressure in Desired Range  Intervention: Maintain Blood Pressure Management  Recent Flowsheet Documentation  Taken 2/25/2024 1643 by Ayleen Nielsen  RN  Medication Review/Management: medications reviewed  Goal: Maintenance of Osteoarthritis Symptom Control  Intervention: Maintain Osteoarthritis Symptom Control  Recent Flowsheet Documentation  Taken 2/25/2024 1643 by Ayleen Nielsen RN  Activity Management:   activity adjusted per tolerance   activity encouraged  Medication Review/Management: medications reviewed  Goal: Bariatric Home Regimen Maintained  Intervention: Maintain and Manage Postbariatric Surgery Care  Recent Flowsheet Documentation  Taken 2/25/2024 1643 by Ayleen Nielsen, RN  Medication Review/Management: medications reviewed  Goal: Maintenance of Seizure Control  Intervention: Maintain Seizure Symptom Control  Recent Flowsheet Documentation  Taken 2/25/2024 1643 by Ayleen Nielsen RN  Medication Review/Management: medications reviewed   Goal Outcome Evaluation:             Aox4, irritable and withdrawn but cooperative with care. Care clustered. Vitals stable this shift, on telemetry- normal sinus rhythm. Wound vacc in right foot intact, no output. CMS intact. Severe pain at start of shift, stable and controlled with prn oxycodone. Refused to get out of bed this shift, use of urinal for toileting.

## 2024-02-27 ENCOUNTER — APPOINTMENT (OUTPATIENT)
Dept: PHYSICAL THERAPY | Facility: HOSPITAL | Age: 49
End: 2024-02-27
Payer: MEDICAID

## 2024-02-27 ENCOUNTER — APPOINTMENT (OUTPATIENT)
Dept: CT IMAGING | Facility: HOSPITAL | Age: 49
End: 2024-02-27
Attending: INTERNAL MEDICINE
Payer: MEDICAID

## 2024-02-27 LAB
ALBUMIN SERPL BCG-MCNC: 3 G/DL (ref 3.5–5.2)
ALBUMIN SERPL BCG-MCNC: 3.1 G/DL (ref 3.5–5.2)
ALP SERPL-CCNC: 58 U/L (ref 40–150)
ALT SERPL W P-5'-P-CCNC: 8 U/L (ref 0–70)
ANION GAP SERPL CALCULATED.3IONS-SCNC: 5 MMOL/L (ref 7–15)
AST SERPL W P-5'-P-CCNC: 12 U/L (ref 0–45)
BASOPHILS # BLD AUTO: 0.1 10E3/UL (ref 0–0.2)
BASOPHILS NFR BLD AUTO: 1 %
BILIRUB DIRECT SERPL-MCNC: <0.2 MG/DL (ref 0–0.3)
BILIRUB SERPL-MCNC: 0.6 MG/DL
BUN SERPL-MCNC: 20.9 MG/DL (ref 6–20)
CALCIUM SERPL-MCNC: 9.1 MG/DL (ref 8.6–10)
CHLORIDE SERPL-SCNC: 97 MMOL/L (ref 98–107)
CREAT SERPL-MCNC: 1.7 MG/DL (ref 0.67–1.17)
DEPRECATED HCO3 PLAS-SCNC: 30 MMOL/L (ref 22–29)
EGFRCR SERPLBLD CKD-EPI 2021: 49 ML/MIN/1.73M2
EOSINOPHIL # BLD AUTO: 0.3 10E3/UL (ref 0–0.7)
EOSINOPHIL NFR BLD AUTO: 2 %
ERYTHROCYTE [DISTWIDTH] IN BLOOD BY AUTOMATED COUNT: 12.3 % (ref 10–15)
GLUCOSE BLDC GLUCOMTR-MCNC: 215 MG/DL (ref 70–99)
GLUCOSE BLDC GLUCOMTR-MCNC: 227 MG/DL (ref 70–99)
GLUCOSE BLDC GLUCOMTR-MCNC: 251 MG/DL (ref 70–99)
GLUCOSE BLDC GLUCOMTR-MCNC: 274 MG/DL (ref 70–99)
GLUCOSE SERPL-MCNC: 200 MG/DL (ref 70–99)
HCT VFR BLD AUTO: 34.6 % (ref 40–53)
HGB BLD-MCNC: 10.2 G/DL (ref 13.3–17.7)
HOLD SPECIMEN: NORMAL
IMM GRANULOCYTES # BLD: 0.1 10E3/UL
IMM GRANULOCYTES NFR BLD: 0 %
LIPASE SERPL-CCNC: 16 U/L (ref 13–60)
LYMPHOCYTES # BLD AUTO: 1.4 10E3/UL (ref 0.8–5.3)
LYMPHOCYTES NFR BLD AUTO: 10 %
MAGNESIUM SERPL-MCNC: 1.9 MG/DL (ref 1.7–2.3)
MCH RBC QN AUTO: 27.1 PG (ref 26.5–33)
MCHC RBC AUTO-ENTMCNC: 29.5 G/DL (ref 31.5–36.5)
MCV RBC AUTO: 92 FL (ref 78–100)
MONOCYTES # BLD AUTO: 1.1 10E3/UL (ref 0–1.3)
MONOCYTES NFR BLD AUTO: 8 %
NEUTROPHILS # BLD AUTO: 10.5 10E3/UL (ref 1.6–8.3)
NEUTROPHILS NFR BLD AUTO: 79 %
NRBC # BLD AUTO: 0 10E3/UL
NRBC BLD AUTO-RTO: 0 /100
PHOSPHATE SERPL-MCNC: 3.3 MG/DL (ref 2.5–4.5)
PLATELET # BLD AUTO: 308 10E3/UL (ref 150–450)
POTASSIUM SERPL-SCNC: 4.3 MMOL/L (ref 3.4–5.3)
PROCALCITONIN SERPL IA-MCNC: 0.55 NG/ML
PROT SERPL-MCNC: 6.8 G/DL (ref 6.4–8.3)
RBC # BLD AUTO: 3.76 10E6/UL (ref 4.4–5.9)
SODIUM SERPL-SCNC: 132 MMOL/L (ref 135–145)
WBC # BLD AUTO: 13.5 10E3/UL (ref 4–11)

## 2024-02-27 PROCEDURE — 97530 THERAPEUTIC ACTIVITIES: CPT | Mod: GP

## 2024-02-27 PROCEDURE — 74176 CT ABD & PELVIS W/O CONTRAST: CPT

## 2024-02-27 PROCEDURE — 99232 SBSQ HOSP IP/OBS MODERATE 35: CPT | Performed by: NURSE PRACTITIONER

## 2024-02-27 PROCEDURE — 83735 ASSAY OF MAGNESIUM: CPT | Performed by: INTERNAL MEDICINE

## 2024-02-27 PROCEDURE — 83690 ASSAY OF LIPASE: CPT | Performed by: INTERNAL MEDICINE

## 2024-02-27 PROCEDURE — 250N000013 HC RX MED GY IP 250 OP 250 PS 637: Performed by: PODIATRIST

## 2024-02-27 PROCEDURE — 250N000011 HC RX IP 250 OP 636: Performed by: PODIATRIST

## 2024-02-27 PROCEDURE — 85004 AUTOMATED DIFF WBC COUNT: CPT | Performed by: INTERNAL MEDICINE

## 2024-02-27 PROCEDURE — 84145 PROCALCITONIN (PCT): CPT | Performed by: INTERNAL MEDICINE

## 2024-02-27 PROCEDURE — 250N000013 HC RX MED GY IP 250 OP 250 PS 637: Performed by: INTERNAL MEDICINE

## 2024-02-27 PROCEDURE — 250N000012 HC RX MED GY IP 250 OP 636 PS 637: Performed by: INTERNAL MEDICINE

## 2024-02-27 PROCEDURE — 80069 RENAL FUNCTION PANEL: CPT | Performed by: PHYSICIAN ASSISTANT

## 2024-02-27 PROCEDURE — 82248 BILIRUBIN DIRECT: CPT | Performed by: INTERNAL MEDICINE

## 2024-02-27 PROCEDURE — 250N000011 HC RX IP 250 OP 636: Performed by: INTERNAL MEDICINE

## 2024-02-27 PROCEDURE — 99233 SBSQ HOSP IP/OBS HIGH 50: CPT | Performed by: INTERNAL MEDICINE

## 2024-02-27 PROCEDURE — 258N000003 HC RX IP 258 OP 636: Performed by: INTERNAL MEDICINE

## 2024-02-27 PROCEDURE — 210N000001 HC R&B IMCU HEART CARE

## 2024-02-27 PROCEDURE — 36415 COLL VENOUS BLD VENIPUNCTURE: CPT | Performed by: INTERNAL MEDICINE

## 2024-02-27 PROCEDURE — 250N000011 HC RX IP 250 OP 636: Mod: JZ | Performed by: INTERNAL MEDICINE

## 2024-02-27 RX ORDER — DOBUTAMINE HYDROCHLORIDE 200 MG/100ML
2.5 INJECTION INTRAVENOUS CONTINUOUS
Status: DISCONTINUED | OUTPATIENT
Start: 2024-02-27 | End: 2024-03-01

## 2024-02-27 RX ORDER — NYSTATIN 100000 U/G
CREAM TOPICAL 2 TIMES DAILY
Status: DISCONTINUED | OUTPATIENT
Start: 2024-02-27 | End: 2024-03-05 | Stop reason: HOSPADM

## 2024-02-27 RX ORDER — MAGNESIUM SULFATE HEPTAHYDRATE 40 MG/ML
2 INJECTION, SOLUTION INTRAVENOUS ONCE
Qty: 50 ML | Refills: 0 | Status: COMPLETED | OUTPATIENT
Start: 2024-02-27 | End: 2024-02-27

## 2024-02-27 RX ADMIN — Medication 1 TABLET: at 09:00

## 2024-02-27 RX ADMIN — DAPTOMYCIN 750 MG: 500 INJECTION, POWDER, LYOPHILIZED, FOR SOLUTION INTRAVENOUS at 17:07

## 2024-02-27 RX ADMIN — ENOXAPARIN SODIUM 40 MG: 40 INJECTION SUBCUTANEOUS at 18:02

## 2024-02-27 RX ADMIN — PIPERACILLIN AND TAZOBACTAM 3.38 G: 3; .375 INJECTION, POWDER, FOR SOLUTION INTRAVENOUS at 12:11

## 2024-02-27 RX ADMIN — WHITE PETROLATUM: 1.75 OINTMENT TOPICAL at 21:35

## 2024-02-27 RX ADMIN — ACETAMINOPHEN 650 MG: 325 TABLET ORAL at 00:27

## 2024-02-27 RX ADMIN — OXYCODONE HYDROCHLORIDE 5 MG: 5 TABLET ORAL at 00:27

## 2024-02-27 RX ADMIN — WHITE PETROLATUM: 1.75 OINTMENT TOPICAL at 09:01

## 2024-02-27 RX ADMIN — INSULIN GLARGINE 28 UNITS: 100 INJECTION, SOLUTION SUBCUTANEOUS at 21:34

## 2024-02-27 RX ADMIN — PIPERACILLIN AND TAZOBACTAM 3.38 G: 3; .375 INJECTION, POWDER, FOR SOLUTION INTRAVENOUS at 03:24

## 2024-02-27 RX ADMIN — PIPERACILLIN AND TAZOBACTAM 3.38 G: 3; .375 INJECTION, POWDER, FOR SOLUTION INTRAVENOUS at 20:33

## 2024-02-27 RX ADMIN — CARVEDILOL 25 MG: 12.5 TABLET, FILM COATED ORAL at 09:00

## 2024-02-27 RX ADMIN — NYSTATIN: 100000 CREAM TOPICAL at 21:35

## 2024-02-27 RX ADMIN — ASPIRIN 325 MG: 325 TABLET, COATED ORAL at 09:00

## 2024-02-27 RX ADMIN — NICOTINE 1 PATCH: 14 PATCH, EXTENDED RELEASE TRANSDERMAL at 14:36

## 2024-02-27 RX ADMIN — ACETAMINOPHEN 650 MG: 325 TABLET ORAL at 23:54

## 2024-02-27 RX ADMIN — DOBUTAMINE HYDROCHLORIDE 2.5 MCG/KG/MIN: 200 INJECTION INTRAVENOUS at 17:15

## 2024-02-27 RX ADMIN — MAGNESIUM SULFATE HEPTAHYDRATE 2 G: 40 INJECTION, SOLUTION INTRAVENOUS at 08:53

## 2024-02-27 RX ADMIN — INSULIN ASPART 6 UNITS: 100 INJECTION, SOLUTION INTRAVENOUS; SUBCUTANEOUS at 12:12

## 2024-02-27 RX ADMIN — INSULIN ASPART 5 UNITS: 100 INJECTION, SOLUTION INTRAVENOUS; SUBCUTANEOUS at 18:00

## 2024-02-27 RX ADMIN — OXYCODONE HYDROCHLORIDE 5 MG: 5 TABLET ORAL at 23:54

## 2024-02-27 RX ADMIN — PANTOPRAZOLE SODIUM 40 MG: 40 TABLET, DELAYED RELEASE ORAL at 06:35

## 2024-02-27 RX ADMIN — OXYCODONE HYDROCHLORIDE 5 MG: 5 TABLET ORAL at 08:59

## 2024-02-27 ASSESSMENT — ACTIVITIES OF DAILY LIVING (ADL)
ADLS_ACUITY_SCORE: 39
ADLS_ACUITY_SCORE: 37
ADLS_ACUITY_SCORE: 39
ADLS_ACUITY_SCORE: 37
ADLS_ACUITY_SCORE: 38
ADLS_ACUITY_SCORE: 37
ADLS_ACUITY_SCORE: 39
ADLS_ACUITY_SCORE: 37

## 2024-02-27 NOTE — PLAN OF CARE
"  Problem: Malnutrition  Goal: Improved Nutritional Intake  Outcome: Progressing     Problem: Mobility Impairment  Goal: Optimal Mobility  Outcome: Progressing     Problem: Adult Inpatient Plan of Care  Goal: Plan of Care Review  Description: The Plan of Care Review/Shift note should be completed every shift.  The Outcome Evaluation is a brief statement about your assessment that the patient is improving, declining, or no change.  This information will be displayed automatically on your shift  note.  Outcome: Progressing  Goal: Patient-Specific Goal (Individualized)  Description: You can add care plan individualizations to a care plan. Examples of Individualization might be:  \"Parent requests to be called daily at 9am for status\", \"I have a hard time hearing out of my right ear\", or \"Do not touch me to wake me up as it startles  me\".  Outcome: Progressing  Goal: Absence of Hospital-Acquired Illness or Injury  Outcome: Progressing  Intervention: Identify and Manage Fall Risk  Recent Flowsheet Documentation  Taken 2/26/2024 1621 by Rodrick Man RN  Safety Promotion/Fall Prevention:   activity supervised   assistive device/personal items within reach  Intervention: Prevent Infection  Recent Flowsheet Documentation  Taken 2/26/2024 1621 by Rodrick Man RN  Infection Prevention: hand hygiene promoted  Goal: Optimal Comfort and Wellbeing  Outcome: Progressing  Goal: Readiness for Transition of Care  Outcome: Progressing     Problem: Infection  Goal: Absence of Infection Signs and Symptoms  Outcome: Progressing  Intervention: Prevent or Manage Infection  Recent Flowsheet Documentation  Taken 2/26/2024 1621 by Rodrick Man RN  Isolation Precautions: contact precautions maintained     Problem: Comorbidity Management  Goal: Maintenance of Asthma Control  Outcome: Progressing  Intervention: Maintain Asthma Symptom Control  Recent Flowsheet Documentation  Taken 2/26/2024 1621 by Rodrick Man" RN  Medication Review/Management: medications reviewed  Goal: Maintenance of Behavioral Health Symptom Control  Outcome: Progressing  Intervention: Maintain Behavioral Health Symptom Control  Recent Flowsheet Documentation  Taken 2/26/2024 1621 by Rodrick Man RN  Medication Review/Management: medications reviewed  Goal: Maintenance of COPD Symptom Control  Outcome: Progressing  Intervention: Maintain COPD Symptom Control  Recent Flowsheet Documentation  Taken 2/26/2024 1621 by Rodrick Man RN  Medication Review/Management: medications reviewed  Goal: Blood Glucose Levels Within Targeted Range  Outcome: Progressing  Intervention: Monitor and Manage Glycemia  Recent Flowsheet Documentation  Taken 2/26/2024 1621 by Rodrick Man RN  Medication Review/Management: medications reviewed  Goal: Maintenance of Heart Failure Symptom Control  Outcome: Progressing  Intervention: Maintain Heart Failure Management  Recent Flowsheet Documentation  Taken 2/26/2024 1621 by Rodrick Man RN  Medication Review/Management: medications reviewed  Goal: Blood Pressure in Desired Range  Outcome: Progressing  Intervention: Maintain Blood Pressure Management  Recent Flowsheet Documentation  Taken 2/26/2024 1621 by Rodrick Man RN  Medication Review/Management: medications reviewed  Goal: Maintenance of Osteoarthritis Symptom Control  Outcome: Progressing  Intervention: Maintain Osteoarthritis Symptom Control  Recent Flowsheet Documentation  Taken 2/26/2024 1621 by Rodrick Man RN  Medication Review/Management: medications reviewed  Goal: Bariatric Home Regimen Maintained  Outcome: Progressing  Intervention: Maintain and Manage Postbariatric Surgery Care  Recent Flowsheet Documentation  Taken 2/26/2024 1621 by Rodrick Man RN  Medication Review/Management: medications reviewed  Goal: Maintenance of Seizure Control  Outcome: Progressing  Intervention: Maintain Seizure Symptom Control  Recent  Flowsheet Documentation  Taken 2/26/2024 1621 by Rodrick Man, RN  Medication Review/Management: medications reviewed   Goal Outcome Evaluation:       Pt is alert and oriented x4 able to communicate his needs, Client refused to get out of the bed use bed pan for BM elimination. Pt state that he tried to avoid vomiting in his dinner food and swallow it and ever since he has a nausea and vomiting. Writer administered Zofran and compazine and got some relief. Denies pain, VSS and will continue to monitor. Wound vac in tact 0ml on the canister.

## 2024-02-27 NOTE — PROGRESS NOTES
Pt up in chair at this time-right abdominal /chest pain has settled down.Able to eat a light lunch. CT showing Pneumonia-pt will transfer to P3 per carilology fo Dobutamine drip.Room 303

## 2024-02-27 NOTE — PROVIDER NOTIFICATION
Spoke with Dr. Arndt on the phone, STAT order for Dobutamine drip cannot be done on P4, patient needs to be transferred to cardiac tele floor. Ok for patient to be transferred to P3.  This writer also spoke with the P4 pharmacist Judit to relay this message and also spoke with the nursing supervisor to help facilitate this transfer to P3

## 2024-02-27 NOTE — PROGRESS NOTES
[facility]    Medicine Progress Note - Hospitalist Service    Date of Admission:  2/14/2024    Assessment & Plan   Floyd Capps is a 48 year old male with history of DM, HTN, CVA, obesity, recent ED visit for nonhealing diabetic foot ulcer with cellulitis, here for worsening pain in the foot, with necrotic tissue noted on exam.  Patient admitted on 2/14/2024.      INTERVAL HISTORY :       Feb 25 :       No new issues noted today  Continue antibiotics per ID - on Daptomycin, zosyn  Cardiology, nephrology following        Not medically ready for discharge at this time.          Feb 26 :       No new issues noted today  Continue antibiotics per ID - on Daptomycin, zosyn  Cardiology, nephrology following        Not medically ready for discharge at this time.        A/p :       Diabetic foot ulcer, right heel;  -chronic wound R heel, recent resulting cellulitis treated with Bactrim  -here for worse pain in the foot, found with ulcer worse with necrotic tissue  -MRI of the foot no osteomyelitis  -On 2/16, s/p debridement with irrigation of right foot diabetic ulcer. NWB on right foot per podiatrist  -On 2/19, wound VAC placed, continue  -Surgical cultures polymicrobials.  Initially IV Vanco and IV Zosyn, now changed to IV Zosyn and IV daptomycin.  Appreciated ID input  -Care Mgr to see about financial/insurance issues which have been a barrier to followup  -Pain management with Tylenol, home oxycodone.     Acute new onset systolic heart failure;  CT imaging reported moderate bilateral pleural effusion and diffuse anasarca;  -- On 2/19, patient reported feeling shortness of breath, imaging workup showed bilateral moderate pleural effusion and diffuse anasarca  --BNP significantly elevated   --Echo on 8/23 reported EF 55% but given patient noncompliance with medications, rechecked echocardiogram with EF 30 to 35% with global hypokinesis.  --Of note, patient had nuclear stress test on 8/23 which was  negative for inducible myocardial ischemia  --On 2/21, personally discussed with cardiology, anticipating ischemic evaluation at some point pending kidney function  -- On 2/23, IV Bumex changed to oral, holding today due to worsening creatinine.  --Monitor intake/output, weight, labs closely    SCHUYLER: Likely due to CHF exacerbation, possibly due to diabetic nephropathy  - Baseline is 0.6-0.8  -On a combination of broad spectrum abx ( Vanc and Zosyn)  -Check UA with no significant RBC's or casts   -On 2/21, personally discussed with nephrology, given proteinuria multiple serological test ordered and process.  May need kidney biopsy in future pending test results  -Electrolyte imbalance, replace per protocol    DM Type II, Uncontrolled; improving  -Stopped all his home meds lately due to stomach upset while on Bactrim, then neglected to resume meds. Hold home metformin and glipizide for now.  -A1c 11.8  -Started Lantus and gradually titrating, increased to 248 unit at night on 2/21. Of note, patient was on insulin in the past and received education in the hospital about it, but seems to have stopped on its own.  Patient needs outpatient diabetic educator for ongoing titration  -Insulin sliding scale and hypoglycemic protocol  -Given poor appetite and early satiety, trial of Reglan which seems helping  -Also added PPI    History of stroke in 8/2023;  -Had been taking Plavix and atorvastatin but recently stopped taking all of his medications as above.  -Neurologist note from 8/26/2023 reviewed- at that time recommended DAPT with aspirin and Plavix for 90 days, afterwards switch to enteric-coated aspirin 325 mg daily.  -Started full dose aspirin and resumed home atorvastatin    Essential hypertension, uncontrolled; due to noncompliance-improving  -- Discharge summary from 8/26/2023 reviewed, patient was discharged home on Coreg 12.5 mg twice daily, HCTZ 12.5 mg daily, losartan 100 mg daily  --On 2/19 restarted Coreg 12.5  "mg twice daily, increased to 25 mg twice daily on 2/24 by cardiology.  --Diuretics as above.  As needed hydralazine.  Monitor vitals and adjust medications accordingly.    Tobacco use disorder  -Nicotine Patch  -Advised to quit smoking     Left distal radius fracture due to recent fall on 2/6/2023  -Cast in place  -PT and OT evals  -Outpatient orthopedics follow-up     Normocytic anemia, likely due to chronic wound;  -No active/obvious bleeding.  Trend     Poor follow-up, noncompliance;  -Importance of compliance and follow-up needs to be emphasized.  Patient with relatively young age and already with serious comorbidities of chronic disease.          Diet: Moderate Consistent Carb (60 g CHO per Meal) Diet  Fluid restriction 1800 ML FLUID  Snacks/Supplements Adult: Expedite Bottle; With Meals  Snacks/Supplements Adult: Glucerna; With Meals    DVT Prophylaxis: Enoxaparin (Lovenox) SQ  Cazares Catheter: Not present  Lines: None     Cardiac Monitoring: ACTIVE order. Indication: Acute decompensated heart failure (48 hours)  Code Status: Full Code      Clinically Significant Risk Factors              # Hypoalbuminemia: Lowest albumin = 2 g/dL at 2/20/2024  5:46 AM, will monitor as appropriate        # Chronic heart failure with reduced ejection fraction: last echo with EF <40%      # DMII: A1C = 11.8 % (Ref range: <5.7 %) within past 6 months   # Obesity: Estimated body mass index is 39.13 kg/m  as calculated from the following:    Height as of this encounter: 1.778 m (5' 10\").    Weight as of this encounter: 123.7 kg (272 lb 11.3 oz).   # Moderate Malnutrition: based on nutrition assessment      # Financial/Environmental Concerns: none         Disposition Plan      Expected Discharge Date: 02/29/2024    Discharge Delays: Procedure Pending (enter procedure & time in comments)  Placement - TCU  Specialist Consult (enter specialist & decision needed in comments)  Destination: home with family  Discharge Comments: insurance " needs, coronary angio this week            Moses Dos Santos MD  Hospitalist Service  [facility]  Securely message with [employer] (more info)  Text page via [employer] Paging/Directory   ______________________________________________________________________      Physical Exam   Vital Signs: Temp: 98.2  F (36.8  C) Temp src: Oral BP: 129/67 Pulse: 73   Resp: 20 SpO2: 97 % O2 Device: None (Room air)    Weight: 272 lbs 11.34 oz      General: Not in obvious distress.  HEENT: Normocephalic, supple neck  Chest: Clear to auscultation bilateral anteriorly, no wheezing  Heart: S1S2 normal, regular  Abdomen: Soft.  Obese, nontender. Bowel sounds- active.  Extremities: Bilateral lower extremity swelling, right foot with wound VAC  Neuro: alert and awake, grossly non-focal        Medical Decision Making             Data     I have personally reviewed the following data over the past 24 hrs:    N/A  \   N/A   / 326     137 N/A N/A /  233 (H)   4.0 28 1.59 (H) \     ALT: N/A AST: N/A AP: N/A TBILI: N/A   ALB: 2.6 (L) TOT PROTEIN: N/A LIPASE: N/A       Imaging results reviewed over the past 24 hrs:   No results found for this or any previous visit (from the past 24 hour(s)).

## 2024-02-27 NOTE — PLAN OF CARE
Problem: Adult Inpatient Plan of Care  Goal: Plan of Care Review  Description: The Plan of Care Review/Shift note should be completed every shift.  The Outcome Evaluation is a brief statement about your assessment that the patient is improving, declining, or no change.  This information will be displayed automatically on your shift  note.  2/27/2024 0522 by Tonya Veliz RN  Outcome: Progressing  2/27/2024 0454 by Tonya Veliz RN  Outcome: Progressing     Problem: Comorbidity Management  Goal: Blood Pressure in Desired Range  Intervention: Maintain Blood Pressure Management  Recent Flowsheet Documentation  Taken 2/27/2024 0027 by Tonya Veliz RN  Medication Review/Management: medications reviewed     Problem: Infection  Goal: Absence of Infection Signs and Symptoms  Outcome: Progressing  Intervention: Prevent or Manage Infection  Recent Flowsheet Documentation  Taken 2/27/2024 0027 by Tonya Veliz RN  Isolation Precautions: contact precautions maintained   Goal Outcome Evaluation:  Pt is pleasant and calm. VSS. C/o pain to rt foot, PRN Oxy 5 mg and Tylenol given, effective. Tele nsr. Changes position in bed independently. Wound vac 0 output. Slept well. Zosyn IV infusing.

## 2024-02-27 NOTE — PROGRESS NOTES
HEART CARE NOTE          Assessment/Recommendations     1. Severe HFrEF c/b ADHF  Assessment / Plan  New onset; will need ischemia eval; will plan for coronary angiogram +/- PCI when renal function stable to improved; I discussed this with him including potential risk of stroke, myocardial infarction, or death.  We also discussed the possibility of vascular injury, bleeding requiring blood transfusion, or reaction to x-ray dye although he tolerated x-ray dye.  Progressive SCHUYLER with associated contraction alkalosis - hold further diuresis and continue to monitor UOP and renal function closely   Patient is high risk for adverse cardiac events 2/2  severe systolic dysfunction, elevated NTproBNP, non-compliance  GDMT as detailed below     Current Pharmacotherapy AHA Guideline-Directed Medical Therapy   Losartan 25 mg - on hold given SCHUYLER Lisinopril 20 mg twice daily   Carvedilol 25 mg twice daily Carvedilol 25 mg twice daily   Spironolactone not started Spironolactone 25 mg once daily   Hydralazine NA Hydralazine 100 mg three times daily   Isosorbide dinitrate NA Isosorbide dinitrate 40 mg three times daily   SGLT2 inhibitor:Dapagliflozin/Empagliflozin - not started Dapagliflozin or Empagliflozin 10 mg daily      2. SCHUYLER in CKD  Assessment / Plan  Likely CRS in the setting of ADHF c/b antibiotic regimen- diuresis as above  Continue to monitor UOP and renal function closely     3. DM2  Assessment / Plan  C/b DM foot ulcer  Management per primary team  Currently on ISS     4. Tobacco abuse  Assessment / Plan  Counseled regarding cessation     5. HTN  Assessment / Plan  Adequately controlled on current regimen - no changes at this time    Plan of care discussed on February 27, 2024 with patient at bedside, and primary team overseeing patient's care     50 minutes spent reviewing prior records (including documentation, laboratory studies, cardiac testing/imaging), history and physical exam, planning, and subsequent  "documentation.    History of Present Illness/Subjective    Mr. Floyd Capps is a 48 year old male with a PMHx significant for (per Epic notation) DM, HTN, CVA, obesity, recent ED visit for nonhealing diabetic foot ulcer with cellulitis, here for worsening pain in the foot, with necrotic tissue noted on exam now found to be in new onset HFrEF     Today, Mr. Capps denies acute cardiac events or complaints; Management plan as detailed above     ECG: Personally reviewed. normal sinus rhythm, nonspecific ST and T waves changes.     ECHO (personnaly Reviewed on 2/21/24):   The left ventricle is normal in size. There is mild concentric left  ventricular hypertrophy.  Left ventricular function is decreased. The ejection fraction is 30-35%  (moderately reduced). There is global hypokinesis with severe hypokinesis of  the mid to apical inferior wall.  Diastolic Doppler findings (E/E' ratio and/or other parameters) suggest left  ventricular filling pressures are increased.     The right ventricle is normal in size and function.  Normal left atrial size. Right atrial size is normal.  There is mild (1+) mitral regurgitation.  IVC diameter >2.1 cm collapsing <50% with sniff suggests a high RA pressure  estimated at 15 mmHg or greater.     Compared to previous study from 8/23/2023 the LV systolic function has  declined and there are regional wall motion abnormalities.    Lab results: personally reviewed February 27, 2024; notable for SCHUYLER and progressive contraction alkalosis    Medical history and pertinent documents reviewed in Care Everywhere please where applicable see details above        Physical Examination Review of Systems   /60 (BP Location: Left arm)   Pulse 85   Temp 98  F (36.7  C) (Oral)   Resp 20   Ht 1.778 m (5' 10\")   Wt 123.7 kg (272 lb 11.3 oz)   SpO2 90%   BMI 39.13 kg/m    Body mass index is 39.13 kg/m .  Wt Readings from Last 3 Encounters:   02/23/24 123.7 kg (272 lb 11.3 oz)   02/13/24 122.5 kg " (270 lb)   02/06/24 122.5 kg (270 lb)     General Appearance:   no distress, normal body habitus   ENT/Mouth: membranes moist, no oral lesions or bleeding gums.      EYES:  no scleral icterus, normal conjunctivae   Neck: no carotid bruits or thyromegaly   Chest/Lungs:   lungs are clear to auscultation, no rales or wheezing, equal chest wall expansion    Cardiovascular:   Regular. Normal first and second heart sounds with no murmurs, rubs, or gallops; the carotid, radial and posterior tibial pulses are intact, + JVD and LE edema bilaterally    Abdomen:  no organomegaly, masses, bruits, or tenderness; bowel sounds are present   Extremities: no cyanosis or clubbing   Skin: no xanthelasma, warm.    Neurologic: NAD     Psychiatric: alert and oriented x3, calm     A complete 10 systems ROS was reviewed  And is negative except what is listed in the HPI.          Medical History  Surgical History Family History Social History   Past Medical History:   Diagnosis Date    Diabetes (H)     Past Surgical History:   Procedure Laterality Date    APPENDECTOMY      INCISION AND DRAINAGE OF WOUND      groin abscess    IRRIGATION AND DEBRIDEMENT FOOT, COMBINED Right 2/16/2024    Procedure: IRRIGATION AND DEBRIDEMENT RIGHT FOOT;  Surgeon: Cristofer Sims DPM;  Location: [facility] Main OR    no family history of premature coronary artery disease Social History     Socioeconomic History    Marital status: Single     Spouse name: Not on file    Number of children: Not on file    Years of education: Not on file    Highest education level: Not on file   Occupational History    Not on file   Tobacco Use    Smoking status: Every Day     Packs/day: .5     Types: Cigarettes    Smokeless tobacco: Never    Tobacco comments:     Seen IP by [facility] on 8/24/23 and declined cessation services and materials.    Vaping Use    Vaping Use: Never used   Substance and Sexual Activity    Alcohol use: No    Drug use: No    Sexual activity: Not on file   Other  Topics Concern    Not on file   Social History Narrative    Patient reports that he does not have health insurance.     Social Determinants of Health     Financial Resource Strain: Low Risk  (10/20/2023)    Financial Resource Strain     Within the past 12 months, have you or your family members you live with been unable to get utilities (heat, electricity) when it was really needed?: No   Food Insecurity: High Risk (10/20/2023)    Food Insecurity     Within the past 12 months, did you worry that your food would run out before you got money to buy more?: Yes     Within the past 12 months, did the food you bought just not last and you didn t have money to get more?: No   Transportation Needs: Low Risk  (10/20/2023)    Transportation Needs     Within the past 12 months, has lack of transportation kept you from medical appointments, getting your medicines, non-medical meetings or appointments, work, or from getting things that you need?: No   Physical Activity: Not on file   Stress: Not on file   Social Connections: Not on file   Interpersonal Safety: Low Risk  (10/20/2023)    Interpersonal Safety     Do you feel physically and emotionally safe where you currently live?: Yes     Within the past 12 months, have you been hit, slapped, kicked or otherwise physically hurt by someone?: No     Within the past 12 months, have you been humiliated or emotionally abused in other ways by your partner or ex-partner?: No   Housing Stability: High Risk (10/20/2023)    Housing Stability     Do you have housing? : Yes     Are you worried about losing your housing?: Yes           Lab Results    Chemistry/lipid CBC Cardiac Enzymes/BNP/TSH/INR   Lab Results   Component Value Date    CHOL 156 02/21/2024    HDL 39 (L) 02/21/2024    TRIG 199 (H) 02/21/2024    BUN 20.9 (H) 02/27/2024     (L) 02/27/2024    CO2 30 (H) 02/27/2024    Lab Results   Component Value Date    WBC 11.3 (H) 02/23/2024    HGB 10.6 (L) 02/23/2024    HCT 33.1 (L)  "02/23/2024    MCV 93 02/23/2024     02/26/2024    Lab Results   Component Value Date    TSH 2.72 08/23/2023    INR 0.95 08/23/2023     No results found for: \"CKTOTAL\", \"CKMB\", \"TROPONINI\"       Weight:    Wt Readings from Last 3 Encounters:   02/23/24 123.7 kg (272 lb 11.3 oz)   02/13/24 122.5 kg (270 lb)   02/06/24 122.5 kg (270 lb)       Allergies  No Known Allergies      Surgical History  Past Surgical History:   Procedure Laterality Date    APPENDECTOMY      INCISION AND DRAINAGE OF WOUND      groin abscess    IRRIGATION AND DEBRIDEMENT FOOT, COMBINED Right 2/16/2024    Procedure: IRRIGATION AND DEBRIDEMENT RIGHT FOOT;  Surgeon: Cristofer Sims DPM;  Location: [facility] Main OR       Social History  Tobacco:   History   Smoking Status    Every Day    Packs/day: 0.50    Types: Cigarettes   Smokeless Tobacco    Never    Alcohol:   Social History    Substance and Sexual Activity      Alcohol use: No   Illicit Drugs:   History   Drug Use No       Family History  History reviewed. No pertinent family history.       Shayna Arndt MD on 2/27/2024      cc: Glendy Benavides    "

## 2024-02-27 NOTE — PLAN OF CARE
"Goal Outcome Evaluation:  Pt has stable VS. MG slightly low at 1.9. Getting 2gram IVPB at this time. Pt c/o upper right abdominal pain rated at 8/10 this morning had nausea and emesis on previous shift. Pain is described as \"stabbing' Abdomen is soft-tender on right side. Bowel sounds are present.Pt had bm last night.Hospitalist notified and CT abdomen ordered.                      "

## 2024-02-27 NOTE — PROGRESS NOTES
RENAL PROGRESS NOTE    PLAN:  Hold Bumex with worsening RF  Defer timing of coronary angio to cards, but would hold for now if possible in light of worsening RF and abd pain w/up today  Cont to hold ACE/ARB therapy   Ck urine eos  BMP in a.m. and daily x 3     ASSESSMENT:   Nephrotic range proteinuria, CKD2:  Baseline Cr 0.8 with severe nephrotic range proteinuria and some cells on UA. Suspect he has CKD/GN most likely related to diabetic nephropathy, all serologies neg except sl monoclonal AB on urine immunofix, plan to f/up and repeat in several months.   No renal biopsy needed now and not too inclined to do in future to confirm DN given overall non compliance hx and MMP  Would rec maxing ARB, SGLT2 inhib eventually once cr stable and medically optimized from CHF standpoint.  Plan for outpt follow up at our clinic:   Monday March 18 at 11:00AM with Ruth Mike NP at [facility]      ARF   suspect hemodynamic due to infx, hemodynamics.  Prior Vanco/Zosyn may be contributing, now on Dapto and Zosyn, ck urine Eos (not particularly sensitive, but rather specific if + for AIN).   Cr labile in the setting of ongoing diuresis  briskly diuresing with resumption of Bumex, but on hold d/t rising sCR again (labs suggest he is volume contracted)   UAs x 2 this admit with few 2-3 RBC, otherwise bland     HTN   controlled.   Holding ARB/ACE with labile renal function, would resume once RF stable d/t proteinuria      Volume overload  Much improved, net neg 23kg, suspect not all intake documented given reports that pt has been non-compliant with FR per RN notes in chart, (bed wt down 9kg),   Labs suggest contraction, diuretics on hold as of 2/26.     New HFrEF:   had negative stress test last fall, but concern for occult ASCAD with wall motion abnormalities, cards following, holding off on angio until RF improves.   EF down to 30-35% with global hypokinesis, and inc filling pressures. Severe Diastolic  "dysfunction     Anemia normocytic, can assess iron stores eventually and replete. Can do in kidney clinic f/up after finishes abx.      Lytes:   Mild alkalosis, likely d/t aggressive diuresis/contraction     HLD - statin     DM2 -  historically poor control, A1c ~ 12 insulin this admit      Diabetic foot ulcer   debridement, wound vac  . Now on Zosyn and daptomycin until 3/1 per ID, no osteo     Obesity     Tobacco use - nicotine patches in use this admit      SUBJECTIVE:  this is my first time meeting Floyd, he is min interactive, c/o abd pain and just returned  from CT to w/up abd pain.  No sig edema, says he is voiding \"dark\" urine.  Feels like his mouth is dry.  Having abd pain and LE pain.  Tired.  RN x 3 in room transferring pt to the bed.  Juan F wt today.     OBJECTIVE:  Physical Exam   Temp: 98  F (36.7  C) Temp src: Oral BP: 123/70 Pulse: 82   Resp: 20 SpO2: 90 % O2 Device: None (Room air)    Vitals:    24 2148 24 0409 24 0620   Weight: 123.1 kg (271 lb 6.2 oz) 125.4 kg (276 lb 7.3 oz) 123.7 kg (272 lb 11.3 oz)     Vital Signs with Ranges  Temp:  [98  F (36.7  C)-98.6  F (37  C)] 98  F (36.7  C)  Pulse:  [73-94] 82  Resp:  [18-20] 20  BP: (114-129)/(60-74) 123/70  SpO2:  [90 %-98 %] 90 %  I/O last 3 completed shifts:  In: 1800 [P.O.:1800]  Out: 2500 [Urine:2500]    Temp (24hrs), Av.2  F (36.8  C), Min:98  F (36.7  C), Max:98.6  F (37  C)      No data found.  Intake/Output Summary (Last 24 hours) at 2024 1057  Last data filed at 2024 0900  Gross per 24 hour   Intake 1450 ml   Output 2400 ml   Net -950 ml       PHYSICAL EXAM:  General - Alert and oriented x3, appears uncomfortable, NAD, dry mucous membranes   Cardiovascular - Rate controlled, SBP controlled   Respiratory - Clear to auscultation bilaterally, no crackles or wheezes by ant exam only d/t positioning in bed.   Abd: BS present, no guarding or pain with palpation, no ascites  Extremities - No sig lower " extremity edema bilaterally  Skin: quite dry, R cellulitis with some erythema.  No obvious drainage   Neuro:  Grossly intact, no focal deficits  MSK:  Grossly intact  Psych:  Normal affect    LABORATORY STUDIES:     Recent Labs   Lab 02/27/24  0540 02/26/24  0508 02/23/24  0618   WBC 13.5*  --  11.3*   RBC 3.76*  --  3.57*   HGB 10.2*  --  10.6*   HCT 34.6*  --  33.1*    326 337       Basic Metabolic Panel:  Recent Labs   Lab 02/27/24  0834 02/27/24  0540 02/26/24  2131 02/26/24  1759 02/26/24  1228 02/26/24  0738 02/26/24  0508 02/25/24  0719 02/25/24  0549 02/24/24  0728 02/24/24  0527 02/23/24  0726 02/23/24  0618 02/22/24  0744 02/22/24  0635 02/21/24  0758 02/21/24  0537   NA  --  132*  --   --   --   --  137  --  136  --  135  --  137  --  137  --  137   POTASSIUM  --  4.3  --   --   --   --  4.0  --  3.9  --  4.1  4.1  --  3.6  --  3.5  --  4.0   CHLORIDE  --  97*  --   --   --   --   --   --  99  --  96*  --  98  --  100  --  99   CO2  --  30*  --   --   --   --  28  --  28  --  34*  --  31*  --  29  --  31*   BUN  --  20.9*  --   --   --   --   --   --  16.2  --  16.0  --  13.4  --  12.7  --  11.2   CR  --  1.70*  --   --   --   --  1.59*  --  1.40*  --  1.72*  --  1.53*  --  1.58*  --  1.54*   * 200* 224* 233* 188* 138*  --    < > 157*   < > 174*   < > 125*   < > 133*   < > 209*   SARAH  --  9.1  --   --   --   --   --   --  8.6  --  8.8  --  8.6  --  8.3*  --  8.7    < > = values in this interval not displayed.       INRNo lab results found in last 7 days.     Recent Labs   Lab Test 02/27/24  0540 02/26/24  0508 02/23/24  0618 08/24/23  0645 08/23/23  0946   INR  --   --   --   --  0.95   WBC 13.5*  --  11.3*   < > 11.2*   HGB 10.2*  --  10.6*   < > 13.9    326 337   < > 290    < > = values in this interval not displayed.       Personally reviewed current labs      PAULETTE Sandoval-BC  [facility]  [phone]

## 2024-02-28 ENCOUNTER — PATIENT OUTREACH (OUTPATIENT)
Dept: CARE COORDINATION | Facility: CLINIC | Age: 49
End: 2024-02-28
Payer: MEDICAID

## 2024-02-28 LAB
ANION GAP SERPL CALCULATED.3IONS-SCNC: 11 MMOL/L (ref 7–15)
BUN SERPL-MCNC: 22.6 MG/DL (ref 6–20)
CALCIUM SERPL-MCNC: 8.9 MG/DL (ref 8.6–10)
CHLORIDE SERPL-SCNC: 96 MMOL/L (ref 98–107)
CREAT SERPL-MCNC: 1.6 MG/DL (ref 0.67–1.17)
DEPRECATED HCO3 PLAS-SCNC: 25 MMOL/L (ref 22–29)
EGFRCR SERPLBLD CKD-EPI 2021: 52 ML/MIN/1.73M2
GLUCOSE BLDC GLUCOMTR-MCNC: 198 MG/DL (ref 70–99)
GLUCOSE BLDC GLUCOMTR-MCNC: 204 MG/DL (ref 70–99)
GLUCOSE BLDC GLUCOMTR-MCNC: 215 MG/DL (ref 70–99)
GLUCOSE BLDC GLUCOMTR-MCNC: 222 MG/DL (ref 70–99)
GLUCOSE SERPL-MCNC: 246 MG/DL (ref 70–99)
MAGNESIUM SERPL-MCNC: 2 MG/DL (ref 1.7–2.3)
POTASSIUM SERPL-SCNC: 4.4 MMOL/L (ref 3.4–5.3)
SODIUM SERPL-SCNC: 132 MMOL/L (ref 135–145)

## 2024-02-28 PROCEDURE — 250N000011 HC RX IP 250 OP 636: Performed by: INTERNAL MEDICINE

## 2024-02-28 PROCEDURE — G0463 HOSPITAL OUTPT CLINIC VISIT: HCPCS

## 2024-02-28 PROCEDURE — 80048 BASIC METABOLIC PNL TOTAL CA: CPT | Performed by: NURSE PRACTITIONER

## 2024-02-28 PROCEDURE — 210N000001 HC R&B IMCU HEART CARE

## 2024-02-28 PROCEDURE — 250N000013 HC RX MED GY IP 250 OP 250 PS 637: Performed by: INTERNAL MEDICINE

## 2024-02-28 PROCEDURE — 258N000003 HC RX IP 258 OP 636: Performed by: INTERNAL MEDICINE

## 2024-02-28 PROCEDURE — 99233 SBSQ HOSP IP/OBS HIGH 50: CPT | Performed by: INTERNAL MEDICINE

## 2024-02-28 PROCEDURE — 97605 NEG PRS WND THER DME<=50SQCM: CPT

## 2024-02-28 PROCEDURE — 99291 CRITICAL CARE FIRST HOUR: CPT | Performed by: INTERNAL MEDICINE

## 2024-02-28 PROCEDURE — 250N000013 HC RX MED GY IP 250 OP 250 PS 637: Performed by: PODIATRIST

## 2024-02-28 PROCEDURE — 83735 ASSAY OF MAGNESIUM: CPT | Performed by: INTERNAL MEDICINE

## 2024-02-28 PROCEDURE — 250N000012 HC RX MED GY IP 250 OP 636 PS 637: Performed by: INTERNAL MEDICINE

## 2024-02-28 PROCEDURE — 250N000011 HC RX IP 250 OP 636: Performed by: PODIATRIST

## 2024-02-28 PROCEDURE — 99232 SBSQ HOSP IP/OBS MODERATE 35: CPT | Performed by: INTERNAL MEDICINE

## 2024-02-28 PROCEDURE — 36415 COLL VENOUS BLD VENIPUNCTURE: CPT | Performed by: NURSE PRACTITIONER

## 2024-02-28 PROCEDURE — 99232 SBSQ HOSP IP/OBS MODERATE 35: CPT | Performed by: NURSE PRACTITIONER

## 2024-02-28 PROCEDURE — 250N000011 HC RX IP 250 OP 636: Mod: JZ | Performed by: INTERNAL MEDICINE

## 2024-02-28 RX ORDER — BUMETANIDE 2 MG/1
2 TABLET ORAL
Status: DISCONTINUED | OUTPATIENT
Start: 2024-02-28 | End: 2024-03-01

## 2024-02-28 RX ORDER — MAGNESIUM OXIDE 400 MG/1
400 TABLET ORAL EVERY 4 HOURS
Status: COMPLETED | OUTPATIENT
Start: 2024-02-28 | End: 2024-02-28

## 2024-02-28 RX ADMIN — NYSTATIN: 100000 CREAM TOPICAL at 11:34

## 2024-02-28 RX ADMIN — NYSTATIN: 100000 CREAM TOPICAL at 20:18

## 2024-02-28 RX ADMIN — WHITE PETROLATUM: 1.75 OINTMENT TOPICAL at 20:18

## 2024-02-28 RX ADMIN — PANTOPRAZOLE SODIUM 40 MG: 40 TABLET, DELAYED RELEASE ORAL at 06:36

## 2024-02-28 RX ADMIN — PIPERACILLIN AND TAZOBACTAM 3.38 G: 3; .375 INJECTION, POWDER, FOR SOLUTION INTRAVENOUS at 20:13

## 2024-02-28 RX ADMIN — PIPERACILLIN AND TAZOBACTAM 3.38 G: 3; .375 INJECTION, POWDER, FOR SOLUTION INTRAVENOUS at 12:34

## 2024-02-28 RX ADMIN — MAGNESIUM OXIDE TAB 400 MG (241.3 MG ELEMENTAL MG) 400 MG: 400 (241.3 MG) TAB at 12:27

## 2024-02-28 RX ADMIN — INSULIN GLARGINE 28 UNITS: 100 INJECTION, SOLUTION SUBCUTANEOUS at 21:51

## 2024-02-28 RX ADMIN — Medication 1 TABLET: at 08:42

## 2024-02-28 RX ADMIN — DAPTOMYCIN 750 MG: 500 INJECTION, POWDER, LYOPHILIZED, FOR SOLUTION INTRAVENOUS at 17:53

## 2024-02-28 RX ADMIN — NICOTINE 1 PATCH: 14 PATCH, EXTENDED RELEASE TRANSDERMAL at 14:10

## 2024-02-28 RX ADMIN — Medication 60 ML: at 09:49

## 2024-02-28 RX ADMIN — INSULIN ASPART 4 UNITS: 100 INJECTION, SOLUTION INTRAVENOUS; SUBCUTANEOUS at 13:05

## 2024-02-28 RX ADMIN — MAGNESIUM OXIDE TAB 400 MG (241.3 MG ELEMENTAL MG) 400 MG: 400 (241.3 MG) TAB at 08:42

## 2024-02-28 RX ADMIN — PIPERACILLIN AND TAZOBACTAM 3.38 G: 3; .375 INJECTION, POWDER, FOR SOLUTION INTRAVENOUS at 04:14

## 2024-02-28 RX ADMIN — INSULIN ASPART 3 UNITS: 100 INJECTION, SOLUTION INTRAVENOUS; SUBCUTANEOUS at 18:27

## 2024-02-28 RX ADMIN — ASPIRIN 325 MG: 325 TABLET, COATED ORAL at 08:42

## 2024-02-28 RX ADMIN — OXYCODONE HYDROCHLORIDE 5 MG: 5 TABLET ORAL at 09:46

## 2024-02-28 RX ADMIN — WHITE PETROLATUM: 1.75 OINTMENT TOPICAL at 11:33

## 2024-02-28 RX ADMIN — BUMETANIDE 2 MG: 2 TABLET ORAL at 08:42

## 2024-02-28 RX ADMIN — ENOXAPARIN SODIUM 40 MG: 40 INJECTION SUBCUTANEOUS at 17:46

## 2024-02-28 RX ADMIN — DOBUTAMINE HYDROCHLORIDE 2.5 MCG/KG/MIN: 200 INJECTION INTRAVENOUS at 20:15

## 2024-02-28 RX ADMIN — BUMETANIDE 2 MG: 2 TABLET ORAL at 17:46

## 2024-02-28 ASSESSMENT — ACTIVITIES OF DAILY LIVING (ADL)
ADLS_ACUITY_SCORE: 33
ADLS_ACUITY_SCORE: 35
ADLS_ACUITY_SCORE: 33
ADLS_ACUITY_SCORE: 35
ADLS_ACUITY_SCORE: 33
ADLS_ACUITY_SCORE: 35
ADLS_ACUITY_SCORE: 37
ADLS_ACUITY_SCORE: 35
ADLS_ACUITY_SCORE: 37
ADLS_ACUITY_SCORE: 37
ADLS_ACUITY_SCORE: 35
ADLS_ACUITY_SCORE: 37
ADLS_ACUITY_SCORE: 35

## 2024-02-28 NOTE — PROGRESS NOTES
[facility]    Medicine Progress Note - Hospitalist Service    Date of Admission:  2/14/2024    Assessment & Plan   Floyd Capps is a 48 year old male with history of DM, HTN, CVA, obesity, recent ED visit for nonhealing diabetic foot ulcer with cellulitis, here for worsening pain in the foot, with necrotic tissue noted on exam.  Patient admitted on 2/14/2024.      INTERVAL HISTORY :       Feb 25 :       No new issues noted today  Continue antibiotics per ID - on Daptomycin, zosyn  Cardiology, nephrology following        Not medically ready for discharge at this time.          Feb 26 :       No new issues noted today  Continue antibiotics per ID - on Daptomycin, zosyn  Cardiology, nephrology following        Not medically ready for discharge at this time.        Feb 27 :       No new issues noted today  Continue antibiotics per ID - on Daptomycin, zosyn  On dobutamine drip  Cardiology, nephrology following    C/o abdominal pain, nausea, vomiting today  CT abdomen done : concerning for pneumonia  Patient already on dapto, zosyn  Consulted ID        Not medically ready for discharge at this time.        A/p :       Diabetic foot ulcer, right heel;  -chronic wound R heel, recent resulting cellulitis treated with Bactrim  -here for worse pain in the foot, found with ulcer worse with necrotic tissue  -MRI of the foot no osteomyelitis  -On 2/16, s/p debridement with irrigation of right foot diabetic ulcer. NWB on right foot per podiatrist  -On 2/19, wound VAC placed, continue  -Surgical cultures polymicrobials.  Initially IV Vanco and IV Zosyn, now changed to IV Zosyn and IV daptomycin.  Appreciated ID input  -Care Mgr to see about financial/insurance issues which have been a barrier to followup  -Pain management with Tylenol, home oxycodone.     Acute new onset systolic heart failure;  CT imaging reported moderate bilateral pleural effusion and diffuse anasarca;  -- On 2/19, patient reported  feeling shortness of breath, imaging workup showed bilateral moderate pleural effusion and diffuse anasarca  --BNP significantly elevated   --Echo on 8/23 reported EF 55% but given patient noncompliance with medications, rechecked echocardiogram with EF 30 to 35% with global hypokinesis.  --Of note, patient had nuclear stress test on 8/23 which was negative for inducible myocardial ischemia  --On 2/21, personally discussed with cardiology, anticipating ischemic evaluation at some point pending kidney function  -- On 2/23, IV Bumex changed to oral, holding today due to worsening creatinine.  --Monitor intake/output, weight, labs closely    SCHUYLER: Likely due to CHF exacerbation, possibly due to diabetic nephropathy  - Baseline is 0.6-0.8  -On a combination of broad spectrum abx ( Vanc and Zosyn)  -Check UA with no significant RBC's or casts   -On 2/21, personally discussed with nephrology, given proteinuria multiple serological test ordered and process.  May need kidney biopsy in future pending test results  -Electrolyte imbalance, replace per protocol    DM Type II, Uncontrolled; improving  -Stopped all his home meds lately due to stomach upset while on Bactrim, then neglected to resume meds. Hold home metformin and glipizide for now.  -A1c 11.8  -Started Lantus and gradually titrating, increased to 248 unit at night on 2/21. Of note, patient was on insulin in the past and received education in the hospital about it, but seems to have stopped on its own.  Patient needs outpatient diabetic educator for ongoing titration  -Insulin sliding scale and hypoglycemic protocol  -Given poor appetite and early satiety, trial of Reglan which seems helping  -Also added PPI    History of stroke in 8/2023;  -Had been taking Plavix and atorvastatin but recently stopped taking all of his medications as above.  -Neurologist note from 8/26/2023 reviewed- at that time recommended DAPT with aspirin and Plavix for 90 days, afterwards switch  to enteric-coated aspirin 325 mg daily.  -Started full dose aspirin and resumed home atorvastatin    Essential hypertension, uncontrolled; due to noncompliance-improving  -- Discharge summary from 8/26/2023 reviewed, patient was discharged home on Coreg 12.5 mg twice daily, HCTZ 12.5 mg daily, losartan 100 mg daily  --On 2/19 restarted Coreg 12.5 mg twice daily, increased to 25 mg twice daily on 2/24 by cardiology.  --Diuretics as above.  As needed hydralazine.  Monitor vitals and adjust medications accordingly.    Tobacco use disorder  -Nicotine Patch  -Advised to quit smoking     Left distal radius fracture due to recent fall on 2/6/2023  -Cast in place  -PT and OT evals  -Outpatient orthopedics follow-up     Normocytic anemia, likely due to chronic wound;  -No active/obvious bleeding.  Trend     Poor follow-up, noncompliance;  -Importance of compliance and follow-up needs to be emphasized.  Patient with relatively young age and already with serious comorbidities of chronic disease.          Diet: Moderate Consistent Carb (60 g CHO per Meal) Diet  Fluid restriction 1800 ML FLUID  Snacks/Supplements Adult: Expedite Bottle; With Meals  Snacks/Supplements Adult: Glucerna; With Meals    DVT Prophylaxis: Enoxaparin (Lovenox) SQ  Cazares Catheter: Not present  Lines: None     Cardiac Monitoring: ACTIVE order. Indication: Acute decompensated heart failure (48 hours)  Code Status: Full Code      Clinically Significant Risk Factors           # Hypercalcemia: corrected calcium is >10.1, will monitor as appropriate    # Hypoalbuminemia: Lowest albumin = 2 g/dL at 2/20/2024  5:46 AM, will monitor as appropriate      # Acute Kidney Injury, unspecified: based on a >150% or 0.3 mg/dL increase in last creatinine compared to past 90 day average, will monitor renal function   # Chronic heart failure with reduced ejection fraction: last echo with EF <40%      # DMII: A1C = 11.8 % (Ref range: <5.7 %) within past 6 months   # Obesity:  "Estimated body mass index is 37.41 kg/m  as calculated from the following:    Height as of this encounter: 1.778 m (5' 10\").    Weight as of this encounter: 118.3 kg (260 lb 11.2 oz).   # Moderate Malnutrition: based on nutrition assessment      # Financial/Environmental Concerns: none         Disposition Plan      Expected Discharge Date: 02/29/2024    Discharge Delays: Procedure Pending (enter procedure & time in comments)  Placement - TCU  Specialist Consult (enter specialist & decision needed in comments)  Destination: home with family  Discharge Comments: insurance needs, coronary angio this week            Moses Dos Santos MD  Hospitalist Service  [facility]  Securely message with [employer] (more info)  Text page via [employer] Paging/Directory   ______________________________________________________________________      Physical Exam   Vital Signs: Temp: 98.6  F (37  C) Temp src: Oral BP: 131/78 Pulse: 76   Resp: 16 SpO2: 92 % O2 Device: None (Room air)    Weight: 260 lbs 11.2 oz      General: Not in obvious distress.  HEENT: Normocephalic, supple neck  Chest: Clear to auscultation bilateral anteriorly, no wheezing  Heart: S1S2 normal, regular  Abdomen: Soft.  Obese, nontender. Bowel sounds- active.  Extremities: Bilateral lower extremity swelling, right foot with wound VAC  Neuro: alert and awake, grossly non-focal        Medical Decision Making             Data     I have personally reviewed the following data over the past 24 hrs:    13.5 (H)  \   10.2 (L)   / 308     132 (L) 97 (L) 20.9 (H) /  251 (H)   4.3 30 (H) 1.70 (H) \     ALT: 8 AST: 12 AP: 58 TBILI: 0.6   ALB: 3.1 (L); 3.0 (L) TOT PROTEIN: 6.8 LIPASE: 16     Procal: 0.55 (H) CRP: N/A Lactic Acid: N/A         Imaging results reviewed over the past 24 hrs:   Recent Results (from the past 24 hour(s))   CT Abdomen Pelvis w/o Contrast    Narrative    EXAM: CT ABDOMEN PELVIS W/O CONTRAST  LOCATION: [facility]" HOSPITAL  DATE: 2/27/2024    INDICATION: abdominal pain  COMPARISON: CT 02/19/2024  TECHNIQUE: CT scan of the abdomen and pelvis was performed without IV contrast. Multiplanar reformats were obtained. Dose reduction techniques were used.  CONTRAST: None.    FINDINGS:   LOWER CHEST: Small bilateral pleural effusion, decreased from previous. New nodular bilateral lower lobe consolidation suggests pneumonia including aspiration.    Small pericardial effusion. Coronary atherosclerosis.    HEPATOBILIARY: Normal.    PANCREAS: Partially atrophic.    SPLEEN: Normal.    ADRENAL GLANDS: Normal.    KIDNEYS/BLADDER: Left upper renal 9 mm fat-containing lesion is most likely a small angiomyolipoma.    BOWEL: No obstruction or inflammatory change. Moderate amount stool in colon.    LYMPH NODES: Normal.    VASCULATURE: Unremarkable.    PELVIC ORGANS: Normal.    MUSCULOSKELETAL: Improved diffuse subcutaneous edema. Tiny fat-containing umbilical hernia.      Impression    IMPRESSION:   1.  New bilateral lower lobe consolidation consistent with pneumonia.  2.  Decreased bilateral pleural effusions.  3.  Decreased diffuse subcutaneous edema.

## 2024-02-28 NOTE — PLAN OF CARE
Goal Outcome Evaluation:  A/Ox4. VSS. Tele NSR. Dobutamine gtt running at 2.5mcg/kg/min. LS diminished. Sats in 90s on 1L NC. BLE edema. Prn oxycodone given for pain, with relief. Wound dressing changed. Wound vac to suction. 1800 FR. Bumex resumed today. Good UOP. Refused turns, and refused to get OOB. K and Mag protocol, AM recheck. Alarm on, call light in reach. Makes needs known.         Problem: Infection  Goal: Absence of Infection Signs and Symptoms  Outcome: Progressing  Intervention: Prevent or Manage Infection  Recent Flowsheet Documentation  Taken 2/28/2024 1500 by Autumn Baker RN  Isolation Precautions: contact precautions maintained  Taken 2/28/2024 0900 by Autumn Baker RN  Isolation Precautions: contact precautions maintained     Problem: Mobility Impairment  Goal: Optimal Mobility  Outcome: Progressing  Intervention: Optimize Mobility  Recent Flowsheet Documentation  Taken 2/28/2024 1500 by Autumn Baker RN  Assistive Device Utilized: walker  Activity Management: activity adjusted per tolerance  Taken 2/28/2024 0900 by Autumn Baker RN  Assistive Device Utilized: walker  Activity Management: activity adjusted per tolerance     Problem: Pain Acute  Goal: Optimal Pain Control and Function  Intervention: Develop Pain Management Plan  Recent Flowsheet Documentation  Taken 2/28/2024 0946 by Autumn Baker RN  Pain Management Interventions: medication (see MAR)  Intervention: Prevent or Manage Pain  Recent Flowsheet Documentation  Taken 2/28/2024 1500 by Autumn Baker RN  Medication Review/Management: medications reviewed  Taken 2/28/2024 0900 by Autumn Baker RN  Medication Review/Management: medications reviewed  Intervention: Optimize Psychosocial Wellbeing  Recent Flowsheet Documentation  Taken 2/28/2024 1500 by Autumn Baker RN  Supportive Measures: active listening utilized  Taken 2/28/2024 0900 by Autumn Baker RN  Supportive Measures: active  listening utilized

## 2024-02-28 NOTE — PLAN OF CARE
Problem: Infection  Goal: Absence of Infection Signs and Symptoms  Outcome: Progressing  Intervention: Prevent or Manage Infection  Recent Flowsheet Documentation  Taken 2/28/2024 0411 by Vadim Trejo RN  Isolation Precautions: contact precautions maintained     Problem: Comorbidity Management  Goal: Maintenance of Heart Failure Symptom Control  Intervention: Maintain Heart Failure Management  Recent Flowsheet Documentation  Taken 2/28/2024 0411 by Vadim Trejo RN  Medication Review/Management: medications reviewed  Taken 2/27/2024 2338 by Vadim Trejo RN  Medication Review/Management: medications reviewed   Goal Outcome Evaluation:       Pt is alert & oriented x4. On 2L NC, pt desat to 80s w/o oxygen supplement. Lung sounds clear. Vital stable. Tele: NSR. Pt c/o pain 8/10 on right heels, pain managed w/ prn oxy & tylenol. Pt has baseline neuropathy. Education provided on heart failure & fluid restriction, pt verbalized understanding. On dobutamine gtt. No acute changes NOC. Pt is able to make needs known. Call light is within reach.

## 2024-02-28 NOTE — PROGRESS NOTES
Care Management Follow Up    Length of Stay (days): 14    Expected Discharge Date: 02/29/2024     Concerns to be Addressed:     Discharge planning/insurance  Patient plan of care discussed at interdisciplinary rounds: Yes    Anticipated Discharge Disposition:  TCU     Anticipated Discharge Services:    Anticipated Discharge DME:      Patient/family educated on Medicare website which has current facility and service quality ratings:  yes  Education Provided on the Discharge Plan:  yes  Patient/Family in Agreement with the Plan:  yes    Referrals Placed by CM/SW:    Private pay costs discussed: insurance costs cost to sign up for MNsure    Additional Information:  SW and SW intern met with pt in pt room, introduced self and role. SW enquired if pt has heard about insurance or had updates. Pt stated he had spoken to [facility] on Friday and was told they would contact him to chose a plan for Mnsure. He reports he has not had a financial worker assigned as of yet, and is unsure of who he has spoken to. Pt stated he will call the [facility] today, he has the number and will call them and also check his email for updates.   Pt has been followed by  care coordinator, and was assisted by the [employer]  in December for applications.   SW received a call from pt clinic care coordinator, he called her regarding MA. JUAN C sent message to [employer] .    NARA Meeks

## 2024-02-28 NOTE — PLAN OF CARE
"  Problem: Malnutrition  Goal: Improved Nutritional Intake  Outcome: Progressing     Problem: Mobility Impairment  Goal: Optimal Mobility  Outcome: Progressing     Problem: Adult Inpatient Plan of Care  Goal: Plan of Care Review  Description: The Plan of Care Review/Shift note should be completed every shift.  The Outcome Evaluation is a brief statement about your assessment that the patient is improving, declining, or no change.  This information will be displayed automatically on your shift  note.  Outcome: Progressing  Flowsheets (Taken 2/27/2024 2208)  Plan of Care Reviewed With: patient  Goal: Patient-Specific Goal (Individualized)  Description: You can add care plan individualizations to a care plan. Examples of Individualization might be:  \"Parent requests to be called daily at 9am for status\", \"I have a hard time hearing out of my right ear\", or \"Do not touch me to wake me up as it startles  me\".  Outcome: Progressing  Goal: Absence of Hospital-Acquired Illness or Injury  Outcome: Progressing  Goal: Optimal Comfort and Wellbeing  Outcome: Progressing  Intervention: Monitor Pain and Promote Comfort  Recent Flowsheet Documentation  Taken 2/27/2024 2000 by Earnest Reno, RN  Pain Management Interventions: (Refused pain meds, states the pain comes and goes very abruptly) --  Taken 2/27/2024 1600 by Earnest Reno, RN  Pain Management Interventions:   medication (see MAR)   emotional support  Goal: Readiness for Transition of Care  Outcome: Progressing     Problem: Infection  Goal: Absence of Infection Signs and Symptoms  Outcome: Progressing     Problem: Comorbidity Management  Goal: Maintenance of Asthma Control  Outcome: Progressing  Goal: Maintenance of Behavioral Health Symptom Control  Outcome: Progressing  Goal: Maintenance of COPD Symptom Control  Outcome: Progressing  Goal: Blood Glucose Levels Within Targeted Range  Outcome: Progressing  Goal: Maintenance of Heart Failure Symptom Control  Outcome: " Progressing  Goal: Blood Pressure in Desired Range  Outcome: Progressing  Goal: Maintenance of Osteoarthritis Symptom Control  Outcome: Progressing  Goal: Bariatric Home Regimen Maintained  Outcome: Progressing  Goal: Maintenance of Seizure Control  Outcome: Progressing    Goal Outcome Evaluation:      Plan of Care Reviewed With: patient    Pt arrived on unit at approx 1600 hours from P4. A/O x 4, vitally stable. Began to desaturate to mid 80s on RA - placed on 2LPM now saturating well in the 90s - pt denies SOB but has congested cough. Tele shows NSR. Pt endorses intermittent r-sided abdominal pain, tender on palpation - refused pain meds for this. Denies chest pain, dizziness, or N/V. Wound vac in place to RLE per orders. Dobutamine gtt initiated at 8.6 ml/hr - continues infusing, well tolerated. K & Mag for recheck tomorrow AM. Will continue to monitor.     Earnest Reno RN on 2/27/2024 at 10:27 PM

## 2024-02-28 NOTE — PROGRESS NOTES
RENAL PROGRESS NOTE    PLAN:  Bumex resumed per cards  Dobutamine initiated per cards  Cont to hold ACE/ARB therapy   BMP in a.m. and daily x 3     ASSESSMENT:   Nephrotic range proteinuria, CKD2:  Baseline Cr 0.8 with severe nephrotic range proteinuria and some cells on UA. Suspect he has CKD/GN most likely related to diabetic nephropathy, all serologies neg except sl monoclonal AB on urine immunofix, plan to f/up and repeat in several months.   No renal biopsy needed now and not too inclined to do in future to confirm DN given overall non compliance hx and MMP  Would rec maxing ARB, SGLT2 inhib eventually once cr stable and medically optimized from CHF standpoint.  Plan for outpt follow up at our clinic:   Monday March 18 at 11:00AM with Ruth Mike NP at [facility]      ARF   suspect hemodynamic due to infx, hemodynamics with severe systolic dysfunction (cards added Dobutamine 2/28).  Prior Vanco/Zosyn may be contributing, now on Dapto and Zosyn, urine eos never collected, but Dc'd in light of renal improvement holding diuretics  Cr labile in the setting of ongoing diuretic needs  UAs x 2 this admit with few 2-3 RBC, otherwise bland     HTN   Mostly controlled.   Holding ARB/ACE with labile renal function, would resume once RF stable d/t proteinuria      Volume overload  Much improved, net neg 23kg, suspect not all intake documented given reports that pt has been non-compliant with FR per RN notes in chart, (bed wt down 9kg), cards resumed diuretics again on 2/28 (added dobutamine)   Labs suggest contraction improved     New HFrEF:   had negative stress test last fall, but concern for occult ASCAD with wall motion abnormalities, cards following, holding off on angio until RF improves.   EF down to 30-35% with global hypokinesis, and inc filling pressures. Severe Diastolic dysfunction     Anemia normocytic, can assess iron stores eventually and replete. Can do in kidney clinic f/up after  finishes abx.      Lytes:   Mild alkalosis resolved holding diuretics, trend now that bumex resumed      HLD - statin     DM2 -  historically poor control, A1c ~ 12 insulin this admit      Diabetic foot ulcer   debridement, wound vac  . Now on Zosyn and daptomycin until 3/1 per ID, no osteo     Obesity     Tobacco use - nicotine patches in use this admit      SUBJECTIVE:  Floyd denies abd pain today, but expresssed many frustrations about gen staff care, interruptions, people assuming what he needs etc. Chart reviewed, no issues overnoc, cards started dobutamine today, pt is making urine, denies edema, no SOB, still on supp . .     OBJECTIVE:  Physical Exam   Temp: 98.8  F (37.1  C) Temp src: Oral BP: (!) 154/83 Pulse: 77   Resp: 20 SpO2: 94 % O2 Device: Nasal cannula Oxygen Delivery: 1 LPM  Vitals:    24 1100 24 1700 24 0332   Weight: 114.6 kg (252 lb 10.4 oz) 118.3 kg (260 lb 11.2 oz) 118.4 kg (261 lb 1.6 oz)     Vital Signs with Ranges  Temp:  [98.2  F (36.8  C)-98.8  F (37.1  C)] 98.8  F (37.1  C)  Pulse:  [76-89] 77  Resp:  [16-24] 20  BP: (122-169)/(72-89) 154/83  SpO2:  [87 %-97 %] 94 %  I/O last 3 completed shifts:  In: 1570 [P.O.:1570]  Out: 2250 [Urine:2250]    Temp (24hrs), Av.2  F (36.8  C), Min:98  F (36.7  C), Max:98.6  F (37  C)      Patient Vitals for the past 72 hrs:   Weight   24 0332 118.4 kg (261 lb 1.6 oz)   24 1700 118.3 kg (260 lb 11.2 oz)   24 1100 114.6 kg (252 lb 10.4 oz)     Intake/Output Summary (Last 24 hours) at 2024 1057  Last data filed at 2024 0900  Gross per 24 hour   Intake 1450 ml   Output 2400 ml   Net -950 ml       PHYSICAL EXAM:  General - Alert and oriented x3, appears comfortable, NAD, sitting up eating and drinking liquid lunch   Cardiovascular - Rate controlled, SBP 150s, on dobut gtt   Respiratory - sats good on 1-2 L   Abd: BS present, no guarding or pain with palpation, no ascites  Extremities - No sig lower  extremity edema bilaterally  Skin: quite dry, R cellulitis with some erythema.  No obvious drainage   Neuro:  Grossly intact, no focal deficits  MSK:  Grossly intact, R foot wrapped with wound vac   Psych: irritable affect  Wt stable    LABORATORY STUDIES:     Recent Labs   Lab 02/27/24  0540 02/26/24  0508 02/23/24  0618   WBC 13.5*  --  11.3*   RBC 3.76*  --  3.57*   HGB 10.2*  --  10.6*   HCT 34.6*  --  33.1*    326 337       Basic Metabolic Panel:  Recent Labs   Lab 02/28/24  1155 02/28/24  0845 02/28/24  0420 02/27/24  2124 02/27/24  1732 02/27/24  1157 02/27/24  0834 02/27/24  0540 02/26/24  0738 02/26/24  0508 02/25/24  0719 02/25/24  0549 02/24/24  0728 02/24/24  0527 02/23/24  0726 02/23/24  0618 02/22/24  0744 02/22/24  0635   NA  --   --  132*  --   --   --   --  132*  --  137  --  136  --  135  --  137  --  137   POTASSIUM  --   --  4.4  --   --   --   --  4.3  --  4.0  --  3.9  --  4.1  4.1  --  3.6  --  3.5   CHLORIDE  --   --  96*  --   --   --   --  97*  --   --   --  99  --  96*  --  98  --  100   CO2  --   --  25  --   --   --   --  30*  --  28  --  28  --  34*  --  31*  --  29   BUN  --   --  22.6*  --   --   --   --  20.9*  --   --   --  16.2  --  16.0  --  13.4  --  12.7   CR  --   --  1.60*  --   --   --   --  1.70*  --  1.59*  --  1.40*  --  1.72*  --  1.53*  --  1.58*   * 198* 246* 274* 251* 215*   < > 200*   < >  --    < > 157*   < > 174*   < > 125*   < > 133*   SARAH  --   --  8.9  --   --   --   --  9.1  --   --   --  8.6  --  8.8  --  8.6  --  8.3*    < > = values in this interval not displayed.       INRNo lab results found in last 7 days.     Recent Labs   Lab Test 02/27/24  0540 02/26/24  0508 02/23/24  0618 08/24/23  0645 08/23/23  0946   INR  --   --   --   --  0.95   WBC 13.5*  --  11.3*   < > 11.2*   HGB 10.2*  --  10.6*   < > 13.9    326 337   < > 290    < > = values in this interval not displayed.       Personally reviewed current labs      Yuli MATTHEW  PAULETTE Quigley-BC  [facility]  [phone]

## 2024-02-28 NOTE — PROGRESS NOTES
Contact   Chart Review     Situation: Patient chart reviewed by .    Background: Had worked with patient to assist with getting health insurance.  He did not respond to messages and was closed to care coordination earlier this month.      Assessment: Call from patient who is in hospital with serious health concerns.  Has wound on foot, kidney and heart are failing.  He is trying to get his health insurance figured out.  When he was working with care coordination, [place] from [place].  That is done and he has no income.  He has been trying to work with the county to sign up for health insurance like [employer]. Unsure if [place] or [place].  He has been working with  at hospital.  Talked to SW at hospital and they will continue to see what he needs to do to get insurance.  has encouraged him to stay on hold with [facility] to try and finish up whatever is holding up his insurance.  The [facility] has been assisting him as well.      Plan/Recommendations: No further assistance until he discharges from hospital and if order is placed for care coordination.     Georgiana Isaacs,   [facility]  [phone]

## 2024-02-28 NOTE — PROGRESS NOTES
[facility]  WO Nurse Inpatient Assessment     Consulted for: wound vac to right heel    Summary: 2/28 Nurse contacted WOC RN about leaking VAC dressing. Dressing changed today and will be done again on Friday; then will begin Tues/Fri dressing changes again next week.    2/26 Patient asked WOC nurse not to talk during change but did ask about status of wound. Also asked about timing of fixing leaks/concerns with VAC dressing.     2/22 Patient in bed, very flat affect, did not speak during wound vac dressing change except to ask if I was done yet.    2/19 Patient in bed, going between flat affect to anger.  Did listen to wound vac teaching and the rationale for its use.  Is concerned with finances and how to care for it once out of hospital.    Patient History (according to provider note(s):      Floyd Capps is a 48 year old male with history of DM, HTN, CVA, obesity, recent ED visit for nonhealing diabetic foot ulcer with cellulitis, here for worsening pain in the foot, with necrotic tissue noted on exam.     Assessment:      Areas visualized during today's visit:  right foot    Negative pressure wound therapy applied to: right heel         2/19 2/26 2/28    Last photo: 2/28  Wound due to: Diabetic Ulcer   Wound history/plan of care:    Surgical date: 2/16   Service following: podiatry  Date Negative Pressure Wound Therapy initiated: 2/19   Interventions in place: offloading and elevation  Is patient s nutritional status compromised? no   If yes, what interventions are in place? N/A  Reason for initiating vac therapy? Presence of co-morbidities, High risk of infections, and Need for accelerated granulation tissue  Which?of?the?following?co-morbidities?apply? Diabetes, Obesity, and Smoking  If diabetic is patient on a diabetic management program? Yes   Is osteomyelitis present in wound? no   If yes what treatments are in  "place? N/A    Wound base: Unhealthy granulation/adipose mix, bone no longer visualized but palpable     Palpation of the wound bed: firm       Drainage: scant      Volume in cannister: 50     Last cannister change date: 2/26     Description of drainage: none      Measurements (length x width x depth, in cm) 3 cm  x 5 cm  x  up to 2 cm       Tunneling N/A      Undermining N/A   Periwound skin: Macerated and peeling        Color: pale and pink       Temperature: normal    Odor: none   Pain: did not report any during visit  Pain intervention prior to dressing change: patient tolerated well, oral oxycodone, and slow and gentle cares   Treatment goal: Heal  and Protection  STATUS: improving   Supplies ordered: gathered    Number of foam pieces removed from a wound (excluding foam for bridge) :  1 GranuFoam Black   Verified this matched the number of foam pieces applied last dressing change: Yes   Number of foam pieces packed into wound (excluding foam for bridge) :  1 Jesusoam Black       Treatment Plan:     Negative pressure wound therapy plan:  Wound location: right heel   Change Days:  M/Th  by WOC RN    Supplies (including all accessories) used: medium Black foam   Cleanse with Vashe prior to replacing NPWT  Suction setting: -125   Methods used: Window paned all periwound skin with vac drape prior to applying sponge and Bridged trac pad off bony prominences    Staff RN to assess integrity of dressing and ensure suction is set at appropriate level every shift.   Date canister. Chart canister output every shift. Change cannister weekly and PRN if full/occluded     Remove foam dressing and replace with BID normal saline moist gauze dressing if:   -a dressing failure which cannot be repaired within 2 hours   -patient is discharging to home without a home pump   -patient is discharging to a facility outside the local area   -if a dressing is a \"Silver Foam\", remove before Radiation Therapy or MRI     The hospital VAC " pump is not to be discharged with the patient.?Ensure to disconnect patient from machine prior to discharge. Then,    - If a home KCI VAC pump has been delivered, connect home cannister to dressing tubing then connect cannister to home pump and turn on machine    - If transferring to a nearby facility with a KCI vac, can disconnect and clamp tubing then cover end with glove so can be reconnected within 2 hours       Orders: Reviewed    RECOMMEND PRIMARY TEAM ORDER: None, at this time  Education provided: plan of care, Infection prevention , and Off-loading pressure  Discussed plan of care with: Patient  WOC nurse follow-up plan: Monday/WednesdayFriday and then back to twice weekly (T/F) next week  Notify WOC if wound(s) deteriorate.  Nursing to notify the Provider(s) and re-consult the WOC Nurse if new skin concern.    DATA:     Current support surface: Standard  Standard gel/foam mattress (IsoFlex, Atmos air, etc)  Containment of urine/stool: Continent of bladder and Continent of bowel  BMI: Body mass index is 37.46 kg/m .   Active diet order: Orders Placed This Encounter      Moderate Consistent Carb (60 g CHO per Meal) Diet     Output: I/O last 3 completed shifts:  In: 1570 [P.O.:1570]  Out: 2250 [Urine:2250]     Labs:   Recent Labs   Lab 02/27/24  0540   ALBUMIN 3.0*  3.1*   HGB 10.2*   WBC 13.5*     Pressure injury risk assessment:   Sensory Perception: 3-->slightly limited  Moisture: 3-->occasionally moist  Activity: 2-->chairfast  Mobility: 3-->slightly limited  Nutrition: 3-->adequate  Friction and Shear: 3-->no apparent problem  Rolo Score: 17    Vidya Villanueva, MSN RN CWOCN  Pager no longer is use, please contact through [employer] group: [facility] [email or web address] [facility]

## 2024-02-28 NOTE — PROGRESS NOTES
HEART CARE NOTE          Assessment/Recommendations   1. Severe HFrEF c/b cardiogenic shock  Assessment / Plan  New onset; will need ischemia eval; will plan for coronary angiogram +/- PCI when renal function stable to improved; I discussed this with him including potential risk of stroke, myocardial infarction, or death.  We also discussed the possibility of vascular injury, bleeding requiring blood transfusion, or reaction to x-ray dye although he tolerated x-ray dye.  Started dobutamine gtt given known severe systolic dysfunction and renal function compromised by CRS and poor renal perfusion - renal function stable to improved; resume oral diuresis  Patient is high risk for adverse cardiac events 2/2  severe systolic dysfunction, elevated NTproBNP, non-compliance  GDMT as detailed below     Current Pharmacotherapy AHA Guideline-Directed Medical Therapy   Losartan 25 mg - on hold given SCHUYLER Lisinopril 20 mg twice daily   Carvedilol 25 mg twice daily - held while on inotrope Carvedilol 25 mg twice daily   Spironolactone not started Spironolactone 25 mg once daily   Hydralazine NA Hydralazine 100 mg three times daily   Isosorbide dinitrate NA Isosorbide dinitrate 40 mg three times daily   SGLT2 inhibitor:Dapagliflozin/Empagliflozin - not started Dapagliflozin or Empagliflozin 10 mg daily      2. SCHUYLER in CKD  Assessment / Plan  Likely CRS in the setting of ADHF c/b antibiotic regimen- diuresis as above  Continue to monitor UOP and renal function closely     3. DM2  Assessment / Plan  C/b DM foot ulcer  Management per primary team  Currently on ISS     4. Tobacco abuse  Assessment / Plan  Counseled regarding cessation     5. HTN  Assessment / Plan  Adequately controlled on current regimen - no changes at this time    Plan of care discussed on February 28, 2024 with patient at bedside, and primary team overseeing patient's care    Patient remains critically ill requiring hemodynamic support via dobutamine for  "cardiogenic shock. 50 minutes spent on critical care.      History of Present Illness/Subjective    Mr. Floyd Capps is a 48 year old male with a PMHx significant for (per Epic notation) DM, HTN, CVA, obesity, recent ED visit for nonhealing diabetic foot ulcer with cellulitis, here for worsening pain in the foot, with necrotic tissue noted on exam now found to be in new onset HFrEF     Today, Mr. Capps denies acute cardiac events or complaints; Management plan as detailed above     ECG: Personally reviewed. normal sinus rhythm, nonspecific ST and T waves changes.     ECHO (personnaly Reviewed on 2/21/24):   The left ventricle is normal in size. There is mild concentric left  ventricular hypertrophy.  Left ventricular function is decreased. The ejection fraction is 30-35%  (moderately reduced). There is global hypokinesis with severe hypokinesis of  the mid to apical inferior wall.  Diastolic Doppler findings (E/E' ratio and/or other parameters) suggest left  ventricular filling pressures are increased.     The right ventricle is normal in size and function.  Normal left atrial size. Right atrial size is normal.  There is mild (1+) mitral regurgitation.  IVC diameter >2.1 cm collapsing <50% with sniff suggests a high RA pressure  estimated at 15 mmHg or greater.     Compared to previous study from 8/23/2023 the LV systolic function has  declined and there are regional wall motion abnormalities.    Lab results: personally reviewed February 28, 2024; notable for SCHUYLER    Medical history and pertinent documents reviewed in Care Everywhere please where applicable see details above        Physical Examination Review of Systems   BP (!) 142/79 (BP Location: Right arm)   Pulse 89   Temp 98.2  F (36.8  C) (Oral)   Resp 20   Ht 1.778 m (5' 10\")   Wt 118.4 kg (261 lb 1.6 oz)   SpO2 92%   BMI 37.46 kg/m    Body mass index is 37.46 kg/m .  Wt Readings from Last 3 Encounters:   02/28/24 118.4 kg (261 lb 1.6 oz)   02/13/24 " 122.5 kg (270 lb)   02/06/24 122.5 kg (270 lb)     General Appearance:   no distress, normal body habitus   ENT/Mouth: membranes moist, no oral lesions or bleeding gums.      EYES:  no scleral icterus, normal conjunctivae   Neck: no carotid bruits or thyromegaly   Chest/Lungs:   lungs are clear to auscultation, no rales or wheezing, equal chest wall expansion    Cardiovascular:   Irregular. Normal first and second heart sounds with no murmurs, rubs, or gallops; the carotid, radial and posterior tibial pulses are intact, + JVD and LE edema bilaterally    Abdomen:  no organomegaly, masses, bruits, or tenderness; bowel sounds are present   Extremities: no cyanosis or clubbing   Skin: no xanthelasma, warm.    Neurologic: NAD     Psychiatric: alert and oriented x3, calm     A complete 10 systems ROS was reviewed  And is negative except what is listed in the HPI.          Medical History  Surgical History Family History Social History   Past Medical History:   Diagnosis Date    Diabetes (H)     Past Surgical History:   Procedure Laterality Date    APPENDECTOMY      INCISION AND DRAINAGE OF WOUND      groin abscess    IRRIGATION AND DEBRIDEMENT FOOT, COMBINED Right 2/16/2024    Procedure: IRRIGATION AND DEBRIDEMENT RIGHT FOOT;  Surgeon: Cristofer Sims DPM;  Location: [facility] [place] [place] OR    no family history of premature coronary artery disease Social History     Socioeconomic History    Marital status: Single     Spouse name: Not on file    Number of children: Not on file    Years of education: Not on file    Highest education level: Not on file   Occupational History    Not on file   Tobacco Use    Smoking status: Every Day     Packs/day: .5     Types: Cigarettes    Smokeless tobacco: Never    Tobacco comments:     Seen IP by [facility] on 8/24/23 and declined cessation services and materials.    Vaping Use    Vaping Use: Never used   Substance and Sexual Activity    Alcohol use: No    Drug use: No    Sexual activity: Not on  file   Other Topics Concern    Not on file   Social History Narrative    Patient reports that he does not have health insurance.     Social Determinants of Health     Financial Resource Strain: Low Risk  (10/20/2023)    Financial Resource Strain     Within the past 12 months, have you or your family members you live with been unable to get utilities (heat, electricity) when it was really needed?: No   Food Insecurity: High Risk (10/20/2023)    Food Insecurity     Within the past 12 months, did you worry that your food would run out before you got money to buy more?: Yes     Within the past 12 months, did the food you bought just not last and you didn t have money to get more?: No   Transportation Needs: Low Risk  (10/20/2023)    Transportation Needs     Within the past 12 months, has lack of transportation kept you from medical appointments, getting your medicines, non-medical meetings or appointments, work, or from getting things that you need?: No   Physical Activity: Not on file   Stress: Not on file   Social Connections: Not on file   Interpersonal Safety: Low Risk  (10/20/2023)    Interpersonal Safety     Do you feel physically and emotionally safe where you currently live?: Yes     Within the past 12 months, have you been hit, slapped, kicked or otherwise physically hurt by someone?: No     Within the past 12 months, have you been humiliated or emotionally abused in other ways by your partner or ex-partner?: No   Housing Stability: High Risk (10/20/2023)    Housing Stability     Do you have housing? : Yes     Are you worried about losing your housing?: Yes           Lab Results    Chemistry/lipid CBC Cardiac Enzymes/BNP/TSH/INR   Lab Results   Component Value Date    CHOL 156 02/21/2024    HDL 39 (L) 02/21/2024    TRIG 199 (H) 02/21/2024    BUN 22.6 (H) 02/28/2024     (L) 02/28/2024    CO2 25 02/28/2024    Lab Results   Component Value Date    WBC 13.5 (H) 02/27/2024    HGB 10.2 (L) 02/27/2024    HCT  "34.6 (L) 02/27/2024    MCV 92 02/27/2024     02/27/2024    Lab Results   Component Value Date    TSH 2.72 08/23/2023    INR 0.95 08/23/2023     No results found for: \"CKTOTAL\", \"CKMB\", \"TROPONINI\"       Weight:    Wt Readings from Last 3 Encounters:   02/28/24 118.4 kg (261 lb 1.6 oz)   02/13/24 122.5 kg (270 lb)   02/06/24 122.5 kg (270 lb)       Allergies  No Known Allergies      Surgical History  Past Surgical History:   Procedure Laterality Date    APPENDECTOMY      INCISION AND DRAINAGE OF WOUND      groin abscess    IRRIGATION AND DEBRIDEMENT FOOT, COMBINED Right 2/16/2024    Procedure: IRRIGATION AND DEBRIDEMENT RIGHT FOOT;  Surgeon: Cristofer Sims DPM;  Location: [facility] Main OR       Social History  Tobacco:   History   Smoking Status    Every Day    Packs/day: 0.50    Types: Cigarettes   Smokeless Tobacco    Never    Alcohol:   Social History    Substance and Sexual Activity      Alcohol use: No   Illicit Drugs:   History   Drug Use No       Family History  History reviewed. No pertinent family history.       Shayna Arndt MD on 2/28/2024      cc: Glendy Benavides    "

## 2024-02-28 NOTE — PROGRESS NOTES
INFECTIOUS DISEASE FOLLOW UP NOTE      ASSESSMENT:  Floyd Capps is a 48 year old old male with   Poorly controlled type 2 diabetes mellitus with last A1c of 11.8 on 2/14/2024.  Morbid obesity with BMI of 38.94.  Active tobacco use.  Diabetic foot infection status post debridement on 2/16/2024.  Per discussion with Dr Sims, was down to bone but not into bone. Surgical cultures polymicrobial Superficial cultures with Proteus vulgaris, Pseudomonas aeruginosa, Streptococcus agalactiae, Enterococcus faecalis, Finegoldia and MRSA. No oral options to treat the Ps aeruginosa. Active issue.   SCHUYLER.  Mother passed away  New onset severe HCF-SCHUYLER with diuresis so now on hold. On dobutamine drip presently. Concern for cardiorenal syndrome.   Possible PNA: noted on CT abdomen 2/27 potential aspiration event. No coughing. On 1L with 99% O2 sats. CT abdomen 2/18 with moderate effusions, repeat 2/27 with lower lobe consolidations and smaller effusions-difficult to tell if new. Clinically no coughing or shortness of breath, afebrile, persistent leukocytosis since 2007.     PLAN:  Continue Zosyn and daptomycin, end 2/29 for DFI  Zosyn will also cover possible pneumonia. Monitor clinically. If symptoms develop will consider adjusting or extending antibiotics  ID will follow, thanks    Windy Soliz MD   [facility]  [phone] [facility] paging    ______________________________________________________________________    SUBJECTIVE / INTERVAL HISTORY:   Asked to reevaluate as developed abdominal pain yesterday and CT abdomen/pelvis with possible pneumonia. He states this breathing is better than is was previously. Placed on 1-2L, sats 99% at my visit. Doesn't feel short of breath. No coughing. Yesterday was eating and drinking food when choked a little bit and coughed things out. Then a couple hours later developed sharp RUQ pain prompting CT. Now on dobutamine drip. No fevers or chills.     ROS:  "All other systems negative except as listed above.        OBJECTIVE:  BP (!) 144/85   Pulse 76   Temp 98.6  F (37  C) (Oral)   Resp 20   Ht 1.778 m (5' 10\")   Wt 118.4 kg (261 lb 1.6 oz)   SpO2 96%   BMI 37.46 kg/m                GEN: no distress  RESPIRATORY:  Normal breathing pattern. Slightly decreased bases on 1L, O2 sats 99%  EXTREMITIES: vac  SKIN/HAIR/NAILS:  No rashes  IV: peripheral        Antibiotics:  Pip/tazo 2/14-  Vancomycin 2/14-16  Daptomycin 2/16 -     Pertinent labs:    Recent Labs   Lab 02/27/24  0540 02/26/24  0508 02/23/24  0618   WBC 13.5*  --  11.3*   HGB 10.2*  --  10.6*   HCT 34.6*  --  33.1*    326 337        Recent Labs   Lab 02/28/24  0420 02/27/24  0540 02/26/24  0508 02/25/24  0549   * 132* 137 136   CO2 25 30* 28 28   BUN 22.6* 20.9*  --  16.2     Creatinine   Date Value Ref Range Status   02/28/2024 1.60 (H) 0.67 - 1.17 mg/dL Final     Liver Function Studies -   Recent Labs   Lab Test 02/19/24  0547   PROTTOTAL 6.2*   ALBUMIN 2.2*   BILITOTAL 0.2   ALKPHOS 74   AST 10   ALT 9         MICROBIOLOGY DATA:  7-Day Micro Results       No results found for the last 168 hours.              RADIOLOGY:  CT Abdomen Pelvis w/o Contrast    Result Date: 2/27/2024  EXAM: CT ABDOMEN PELVIS W/O CONTRAST LOCATION: [facility] DATE: 2/27/2024 INDICATION: abdominal pain COMPARISON: CT 02/19/2024 TECHNIQUE: CT scan of the abdomen and pelvis was performed without IV contrast. Multiplanar reformats were obtained. Dose reduction techniques were used. CONTRAST: None. FINDINGS: LOWER CHEST: Small bilateral pleural effusion, decreased from previous. New nodular bilateral lower lobe consolidation suggests pneumonia including aspiration. Small pericardial effusion. Coronary atherosclerosis. HEPATOBILIARY: Normal. PANCREAS: Partially atrophic. SPLEEN: Normal. ADRENAL GLANDS: Normal. KIDNEYS/BLADDER: Left upper renal 9 mm fat-containing lesion is most likely a small " angiomyolipoma. BOWEL: No obstruction or inflammatory change. Moderate amount stool in colon. LYMPH NODES: Normal. VASCULATURE: Unremarkable. PELVIC ORGANS: Normal. MUSCULOSKELETAL: Improved diffuse subcutaneous edema. Tiny fat-containing umbilical hernia.     IMPRESSION: 1.  New bilateral lower lobe consolidation consistent with pneumonia. 2.  Decreased bilateral pleural effusions. 3.  Decreased diffuse subcutaneous edema.     Echocardiogram Complete    Result Date: 2024  [id number]^[place] [address]  Name: LUIS HODGES MRN: [id number] : 1975 Study Date: 2024 09:49 AM Age: 48 yrs Gender: Male Patient Location: [facility] Reason For Study: Pulmonary Edema Ordering Physician: HARRIS SLADE Referring Physician: HARRIS SLADE Performed By: LA  BSA: 2.4 m2 Height: 70 in Weight: 271 lb HR: 78 ______________________________________________________________________________ Procedure Complete Echo Adult. Definity (NDC #93995-994) given intravenously. Adequate quality two-dimensional was performed and interpreted. ______________________________________________________________________________ Interpretation Summary  The left ventricle is normal in size. There is mild concentric left ventricular hypertrophy. Left ventricular function is decreased. The ejection fraction is 30-35% (moderately reduced). There is global hypokinesis with severe hypokinesis of the mid to apical inferior wall. Diastolic Doppler findings (E/E' ratio and/or other parameters) suggest left ventricular filling pressures are increased.  The right ventricle is normal in size and function. Normal left atrial size. Right atrial size is normal. There is mild (1+) mitral regurgitation. IVC diameter >2.1 cm collapsing <50% with sniff suggests a high RA pressure estimated at 15 mmHg or greater.  Compared to previous study from 2023 the LV systolic function has declined and there  are regional wall motion abnormalities. ______________________________________________________________________________ Left Ventricle The left ventricle is normal in size. Left ventricular function is decreased. The ejection fraction is 30-35% (moderately reduced). There is mild concentric left ventricular hypertrophy. Diastolic Doppler findings (E/E' ratio and/or other parameters) suggest left ventricular filling pressures are increased. There is global hypokinesis with severe hypokinesis of the mid to apical inferior wall.  Right Ventricle The right ventricle is normal in size and function.  Atria Normal left atrial size. Right atrial size is normal.  Mitral Valve Mitral valve leaflets appear normal. There is mild (1+) mitral regurgitation. There is no mitral valve stenosis.  Tricuspid Valve The tricuspid valve is not well visualized. There is trace tricuspid regurgitation. Right ventricular systolic pressure could not be approximated due to inadequate tricuspid regurgitation.  Aortic Valve The aortic valve is trileaflet. Aortic valve leaflets appear normal. No aortic regurgitation is present. No aortic stenosis is present.  Pulmonic Valve The pulmonic valve is not well visualized. There is trace pulmonic valvular regurgitation.  Vessels The aorta root is normal. IVC diameter >2.1 cm collapsing <50% with sniff suggests a high RA pressure estimated at 15 mmHg or greater.  Pericardium There is no pericardial effusion.  Rhythm Sinus rhythm was noted. ______________________________________________________________________________ MMode/2D Measurements & Calculations  IVSd: 1.3 cm LVIDd: 5.2 cm LVIDs: 4.0 cm LVPWd: 1.3 cm FS: 21.7 % LV mass(C)d: 274.4 grams LV mass(C)dI: 115.5 grams/m2 Ao root diam: 3.1 cm LVOT diam: 2.0 cm LVOT area: 3.1 cm2 Ao root diam index Ht(cm/m): 1.7 Ao root diam index BSA (cm/m2): 1.3 LA Volume (BP): 63.0 ml LA Volume Index (BP): 26.5 ml/m2  LA Volume Indexed (AL/bp): 28.2 ml/m2 RV Base: 3.4 cm  RWT: 0.51 TAPSE: 2.1 cm  Doppler Measurements & Calculations MV E max servando: 104.0 cm/sec MV A max servando: 40.4 cm/sec MV E/A: 2.6 MV dec slope: 886.0 cm/sec2 MV dec time: 0.12 sec Ao V2 max: 116.0 cm/sec Ao max P.0 mmHg Ao V2 mean: 78.1 cm/sec Ao mean PG: 3.0 mmHg Ao V2 VTI: 21.8 cm NILSA(I,D): 2.1 cm2 NILSA(V,D): 1.9 cm2  LV V1 max P.0 mmHg LV V1 max: 70.5 cm/sec LV V1 VTI: 14.4 cm SV(LVOT): 45.2 ml SI(LVOT): 19.0 ml/m2 PA acc time: 0.10 sec AV Servando Ratio (DI): 0.61 NILSA Index (cm2/m2): 0.87 E/E' av.7 Lateral E/e': 10.7 Medial E/e': 12.6 RV S Servando: 12.9 cm/sec  ______________________________________________________________________________ Report approved by: Ladonna Ornelas 2024 11:41 AM       CT Abdomen Pelvis w/o Contrast    Result Date: 2024  EXAM: CT ABDOMEN PELVIS W/O CONTRAST LOCATION: [facility] DATE: 2024 INDICATION: acute abdominal pain with N V and poor appetite COMPARISON: None. TECHNIQUE: CT scan of the abdomen and pelvis was performed without IV contrast. Multiplanar reformats were obtained. Dose reduction techniques were used. CONTRAST: None. FINDINGS: LOWER CHEST: Smooth intralobular septal thickening is in the lower lobes suggestive of pulmonary interstitial edema. Moderate bilateral pleural effusions with associated associated compressive atelectasis in the lung bases lobes. HEPATOBILIARY: Normal. PANCREAS: Normal. SPLEEN: Normal. ADRENAL GLANDS: Normal. KIDNEYS/BLADDER: Fat-containing lesion in the upper pole the left kidney measuring 0.8 cm compatible with an angiomyolipoma. BOWEL: No obstruction or inflammatory change. LYMPH NODES: Normal. VASCULATURE: Mild scattered vascular calcifications. PELVIC ORGANS: Trace low-density free fluid in the pelvis. MUSCULOSKELETAL: Diffuse anasarca. Multilevel degenerative changes in the thoracolumbar spine     IMPRESSION: 1.  No acute findings in the abdomen or pelvis. 2.  Findings suggesting fluid overload with  "moderate bilateral pleural effusions and diffuse anasarca. 3.  Subcentimeter left renal angiomyolipoma.     POC US Guidance Needle Placement    Result Date: 2/16/2024  Ultrasound was performed as guidance to an anesthesia procedure.  Click \"PACS images\" hyperlink below to view any stored images.  For specific procedure details, view procedure note authored by anesthesia.    MR Foot Right w/o & w Contrast    Result Date: 2/14/2024  EXAM: MR FOOT RIGHT W/O and W CONTRAST LOCATION: [facility] DATE: 2/14/2024 INDICATION: Ulceration, erythema, infection. COMPARISON: None. TECHNIQUE: Routine. Additional postgadolinium T1 sequences were obtained. IV CONTRAST: 12 mL Gadavist. FINDINGS: JOINTS AND BONES: -No evidence for fracture. There is some reactive signal abnormality within the posterior calcaneus but no T1 marrow signal change and this is unlikely to represent osteomyelitis. No significant effusion to suggest septic arthropathy. There is degenerative change at the calcaneocuboid articulation and tibiotalar joint. TENDONS: -The peroneal tendons are negative. The flexor tendons are negative for tendinopathy, tenosynovitis, or tearing. The extensor tendons are negative. LIGAMENTS: -The anterior and posterior talofibular ligaments are negative. The deltoid ligamentous complex is intact. The calcaneofibular ligament is negative. The ligament of Lisfranc is intact. MUSCLES AND SOFT TISSUES: -There is a soft tissue ulceration along the posteroinferior aspect of the hindfoot but this is fairly superficial. There is some surrounding edema or potential cellulitis. There is reactive edema versus infectious or inflammatory myositis within the plantar and interosseous musculature. No evidence for abscess. Mild plantar fasciitis.     IMPRESSION: 1.  Soft tissue ulceration along the posteroinferior aspect of the hindfoot. This is fairly superficial. There is some surrounding edema or low-grade cellulitis. 2.  " No evidence for osteomyelitis, septic arthropathy, or abscess. 3.  Reactive edema versus infectious or inflammatory myositis within the plantar and interosseous musculature. 4.  No evidence for fracture. 5.  No significant tendinous or ligamentous pathology.    XR Wrist Left G/E 3 Views    Result Date: 2/13/2024  EXAM: XR WRIST LEFT G/E 3 VIEWS LOCATION: [facility] DATE: 2/13/2024 INDICATION:  Other closed intra-articular fracture of distal end of left radius, initial encounter COMPARISON: 02/06/2024     IMPRESSION: Distal radial intra-articular fracture with similar alignment. No definite callus formation. Otherwise unchanged.    XR Wrist Left G/E 3 Views    Result Date: 2/6/2024  EXAM: XR WRIST LEFT G/E 3 VIEWS LOCATION: [facility] DATE: 2/6/2024 INDICATION: fall, left wrist pain COMPARISON: None.     IMPRESSION: There is a nondisplaced, longitudinally oriented fracture of the distal left radius. There is intra-articular extension. There is slight buckling of the dorsal radial cortex on the lateral view. The ulna is intact. Soft tissue swelling.

## 2024-02-29 LAB
ANION GAP SERPL CALCULATED.3IONS-SCNC: 6 MMOL/L (ref 7–15)
BUN SERPL-MCNC: 22 MG/DL (ref 6–20)
CALCIUM SERPL-MCNC: 9.2 MG/DL (ref 8.6–10)
CHLORIDE SERPL-SCNC: 95 MMOL/L (ref 98–107)
CREAT SERPL-MCNC: 1.59 MG/DL (ref 0.67–1.17)
DEPRECATED HCO3 PLAS-SCNC: 34 MMOL/L (ref 22–29)
EGFRCR SERPLBLD CKD-EPI 2021: 53 ML/MIN/1.73M2
GLUCOSE BLDC GLUCOMTR-MCNC: 152 MG/DL (ref 70–99)
GLUCOSE BLDC GLUCOMTR-MCNC: 181 MG/DL (ref 70–99)
GLUCOSE BLDC GLUCOMTR-MCNC: 232 MG/DL (ref 70–99)
GLUCOSE BLDC GLUCOMTR-MCNC: 254 MG/DL (ref 70–99)
GLUCOSE SERPL-MCNC: 175 MG/DL (ref 70–99)
IRON BINDING CAPACITY (ROCHE): 189 UG/DL (ref 240–430)
IRON SATN MFR SERPL: 10 % (ref 15–46)
IRON SERPL-MCNC: 18 UG/DL (ref 61–157)
MAGNESIUM SERPL-MCNC: 1.9 MG/DL (ref 1.7–2.3)
PLATELET # BLD AUTO: 344 10E3/UL (ref 150–450)
POTASSIUM SERPL-SCNC: 4 MMOL/L (ref 3.4–5.3)
SODIUM SERPL-SCNC: 135 MMOL/L (ref 135–145)

## 2024-02-29 PROCEDURE — 36415 COLL VENOUS BLD VENIPUNCTURE: CPT | Performed by: NURSE PRACTITIONER

## 2024-02-29 PROCEDURE — 250N000011 HC RX IP 250 OP 636: Performed by: INTERNAL MEDICINE

## 2024-02-29 PROCEDURE — 250N000011 HC RX IP 250 OP 636: Mod: JW | Performed by: INTERNAL MEDICINE

## 2024-02-29 PROCEDURE — 258N000003 HC RX IP 258 OP 636: Performed by: INTERNAL MEDICINE

## 2024-02-29 PROCEDURE — 82728 ASSAY OF FERRITIN: CPT | Performed by: NURSE PRACTITIONER

## 2024-02-29 PROCEDURE — 99291 CRITICAL CARE FIRST HOUR: CPT | Performed by: INTERNAL MEDICINE

## 2024-02-29 PROCEDURE — 250N000013 HC RX MED GY IP 250 OP 250 PS 637: Performed by: INTERNAL MEDICINE

## 2024-02-29 PROCEDURE — 99232 SBSQ HOSP IP/OBS MODERATE 35: CPT | Performed by: INTERNAL MEDICINE

## 2024-02-29 PROCEDURE — 80048 BASIC METABOLIC PNL TOTAL CA: CPT | Performed by: NURSE PRACTITIONER

## 2024-02-29 PROCEDURE — 250N000011 HC RX IP 250 OP 636: Performed by: PODIATRIST

## 2024-02-29 PROCEDURE — 210N000001 HC R&B IMCU HEART CARE

## 2024-02-29 PROCEDURE — 99232 SBSQ HOSP IP/OBS MODERATE 35: CPT | Performed by: NURSE PRACTITIONER

## 2024-02-29 PROCEDURE — 250N000013 HC RX MED GY IP 250 OP 250 PS 637: Performed by: PODIATRIST

## 2024-02-29 PROCEDURE — 86900 BLOOD TYPING SEROLOGIC ABO: CPT | Performed by: INTERNAL MEDICINE

## 2024-02-29 PROCEDURE — 250N000009 HC RX 250: Performed by: INTERNAL MEDICINE

## 2024-02-29 PROCEDURE — 85049 AUTOMATED PLATELET COUNT: CPT | Performed by: PODIATRIST

## 2024-02-29 PROCEDURE — P9047 ALBUMIN (HUMAN), 25%, 50ML: HCPCS | Performed by: INTERNAL MEDICINE

## 2024-02-29 PROCEDURE — 83735 ASSAY OF MAGNESIUM: CPT | Performed by: INTERNAL MEDICINE

## 2024-02-29 PROCEDURE — 83550 IRON BINDING TEST: CPT | Performed by: NURSE PRACTITIONER

## 2024-02-29 PROCEDURE — 272N000451 HC KIT SHRLOCK 5FR POWER PICC DOUBLE LUMEN

## 2024-02-29 PROCEDURE — 36569 INSJ PICC 5 YR+ W/O IMAGING: CPT

## 2024-02-29 RX ORDER — ALBUMIN (HUMAN) 12.5 G/50ML
50 SOLUTION INTRAVENOUS ONCE
Status: COMPLETED | OUTPATIENT
Start: 2024-02-29 | End: 2024-02-29

## 2024-02-29 RX ORDER — LIDOCAINE 40 MG/G
CREAM TOPICAL
Status: ACTIVE | OUTPATIENT
Start: 2024-02-29 | End: 2024-03-03

## 2024-02-29 RX ORDER — CEFDINIR 300 MG/1
300 CAPSULE ORAL EVERY 12 HOURS SCHEDULED
Status: COMPLETED | OUTPATIENT
Start: 2024-03-01 | End: 2024-03-04

## 2024-02-29 RX ORDER — MAGNESIUM OXIDE 400 MG/1
400 TABLET ORAL EVERY 4 HOURS
Status: COMPLETED | OUTPATIENT
Start: 2024-02-29 | End: 2024-02-29

## 2024-02-29 RX ADMIN — NYSTATIN: 100000 CREAM TOPICAL at 20:31

## 2024-02-29 RX ADMIN — BUMETANIDE 2 MG: 2 TABLET ORAL at 08:37

## 2024-02-29 RX ADMIN — NICOTINE 1 PATCH: 14 PATCH, EXTENDED RELEASE TRANSDERMAL at 13:15

## 2024-02-29 RX ADMIN — ACETAMINOPHEN 650 MG: 325 TABLET ORAL at 22:40

## 2024-02-29 RX ADMIN — PIPERACILLIN AND TAZOBACTAM 3.38 G: 3; .375 INJECTION, POWDER, FOR SOLUTION INTRAVENOUS at 13:10

## 2024-02-29 RX ADMIN — NYSTATIN: 100000 CREAM TOPICAL at 08:38

## 2024-02-29 RX ADMIN — Medication 1 TABLET: at 08:37

## 2024-02-29 RX ADMIN — WHITE PETROLATUM: 1.75 OINTMENT TOPICAL at 08:39

## 2024-02-29 RX ADMIN — MAGNESIUM OXIDE TAB 400 MG (241.3 MG ELEMENTAL MG) 400 MG: 400 (241.3 MG) TAB at 13:02

## 2024-02-29 RX ADMIN — OXYCODONE HYDROCHLORIDE 5 MG: 5 TABLET ORAL at 00:10

## 2024-02-29 RX ADMIN — PIPERACILLIN AND TAZOBACTAM 3.38 G: 3; .375 INJECTION, POWDER, FOR SOLUTION INTRAVENOUS at 20:31

## 2024-02-29 RX ADMIN — LIDOCAINE HYDROCHLORIDE 2 ML: 10 INJECTION, SOLUTION EPIDURAL; INFILTRATION; INTRACAUDAL; PERINEURAL at 23:45

## 2024-02-29 RX ADMIN — DOBUTAMINE HYDROCHLORIDE 2.5 MCG/KG/MIN: 200 INJECTION INTRAVENOUS at 23:49

## 2024-02-29 RX ADMIN — ONDANSETRON 4 MG: 4 TABLET, ORALLY DISINTEGRATING ORAL at 22:40

## 2024-02-29 RX ADMIN — INSULIN GLARGINE 28 UNITS: 100 INJECTION, SOLUTION SUBCUTANEOUS at 22:35

## 2024-02-29 RX ADMIN — INSULIN ASPART 3 UNITS: 100 INJECTION, SOLUTION INTRAVENOUS; SUBCUTANEOUS at 18:24

## 2024-02-29 RX ADMIN — INSULIN ASPART 3 UNITS: 100 INJECTION, SOLUTION INTRAVENOUS; SUBCUTANEOUS at 10:19

## 2024-02-29 RX ADMIN — PIPERACILLIN AND TAZOBACTAM 3.38 G: 3; .375 INJECTION, POWDER, FOR SOLUTION INTRAVENOUS at 03:27

## 2024-02-29 RX ADMIN — PANTOPRAZOLE SODIUM 40 MG: 40 TABLET, DELAYED RELEASE ORAL at 06:30

## 2024-02-29 RX ADMIN — MAGNESIUM OXIDE TAB 400 MG (241.3 MG ELEMENTAL MG) 400 MG: 400 (241.3 MG) TAB at 08:37

## 2024-02-29 RX ADMIN — WHITE PETROLATUM: 1.75 OINTMENT TOPICAL at 20:31

## 2024-02-29 RX ADMIN — ALBUMIN HUMAN 50 G: 0.25 SOLUTION INTRAVENOUS at 10:31

## 2024-02-29 RX ADMIN — ASPIRIN 325 MG: 325 TABLET, COATED ORAL at 08:36

## 2024-02-29 RX ADMIN — OXYCODONE HYDROCHLORIDE 5 MG: 5 TABLET ORAL at 20:41

## 2024-02-29 RX ADMIN — ACETAMINOPHEN 650 MG: 325 TABLET ORAL at 00:10

## 2024-02-29 RX ADMIN — OXYCODONE HYDROCHLORIDE 5 MG: 5 TABLET ORAL at 11:05

## 2024-02-29 RX ADMIN — BUMETANIDE 2 MG: 2 TABLET ORAL at 17:20

## 2024-02-29 RX ADMIN — DAPTOMYCIN 750 MG: 500 INJECTION, POWDER, LYOPHILIZED, FOR SOLUTION INTRAVENOUS at 17:31

## 2024-02-29 RX ADMIN — Medication 60 ML: at 10:19

## 2024-02-29 RX ADMIN — ENOXAPARIN SODIUM 40 MG: 40 INJECTION SUBCUTANEOUS at 17:20

## 2024-02-29 ASSESSMENT — ACTIVITIES OF DAILY LIVING (ADL)
ADLS_ACUITY_SCORE: 33

## 2024-02-29 NOTE — PROGRESS NOTES
"INFECTIOUS DISEASE FOLLOW UP NOTE      ASSESSMENT:  Floyd Capps is a 48 year old old male with   Poorly controlled type 2 diabetes mellitus with last A1c of 11.8 on 2/14/2024.  Morbid obesity with BMI of 38.94.  Active tobacco use.  Diabetic foot infection status post debridement on 2/16/2024.  Per discussion with Dr Sims, was down to bone but not into bone. Surgical cultures polymicrobial Superficial cultures with Proteus vulgaris, Pseudomonas aeruginosa, Streptococcus agalactiae, Enterococcus faecalis, Finegoldia and MRSA. No oral options to treat the Ps aeruginosa. Active issue.   SCHUYLER.  Mother passed away  New onset severe HCF-SCHUYLER with diuresis so now on hold. On dobutamine drip presently. Concern for cardiorenal syndrome.   Possible PNA: noted on CT abdomen 2/27 potential aspiration event. No coughing. On 1L with 99% O2 sats. CT abdomen 2/18 with moderate effusions, repeat 2/27 with lower lobe consolidations and smaller effusions-difficult to tell if new. Clinically no coughing or shortness of breath, afebrile, persistent leukocytosis since 2007. Stable. Active issue.     PLAN:  Continue Zosyn and daptomycin, end 2/29 for DFI  Then cefdinir PO for 4 days for possible aspiration PNA  - ID will sign off. Please call with additional questions or change in clinical status.     Windy Soliz MD   [facility]  [phone] [facility] p[facility]    ______________________________________________________________________    SUBJECTIVE / INTERVAL HISTORY:   Feels the same today. Tolerating antibiotics.     ROS: All other systems negative except as listed above.        OBJECTIVE:  BP (!) 141/79 (BP Location: Right arm)   Pulse 84   Temp 98.3  F (36.8  C) (Oral)   Resp 18   Ht 1.778 m (5' 10\")   Wt 118.4 kg (261 lb 1.6 oz)   SpO2 95%   BMI 37.46 kg/m          GEN: no distress  RESPIRATORY:  Normal breathing pattern. Slightly decreased bases  EXTREMITIES: vac  SKIN/HAIR/NAILS:  No " rashes  IV: peripheral        Antibiotics:  Pip/tazo 2/14-  Vancomycin 2/14-16  Daptomycin 2/16 -     Pertinent labs:    Recent Labs   Lab 02/29/24  0422 02/27/24  0540 02/26/24  0508 02/23/24  0618   WBC  --  13.5*  --  11.3*   HGB  --  10.2*  --  10.6*   HCT  --  34.6*  --  33.1*    308 326 337        Recent Labs   Lab 02/29/24  0422 02/28/24  0420 02/27/24  0540    132* 132*   CO2 34* 25 30*   BUN 22.0* 22.6* 20.9*     Creatinine   Date Value Ref Range Status   02/29/2024 1.59 (H) 0.67 - 1.17 mg/dL Final     Liver Function Studies -   Recent Labs   Lab Test 02/19/24  0547   PROTTOTAL 6.2*   ALBUMIN 2.2*   BILITOTAL 0.2   ALKPHOS 74   AST 10   ALT 9         MICROBIOLOGY DATA:  7-Day Micro Results       No results found for the last 168 hours.              RADIOLOGY:  CT Abdomen Pelvis w/o Contrast    Result Date: 2/27/2024  EXAM: CT ABDOMEN PELVIS W/O CONTRAST LOCATION: [facility] DATE: 2/27/2024 INDICATION: abdominal pain COMPARISON: CT 02/19/2024 TECHNIQUE: CT scan of the abdomen and pelvis was performed without IV contrast. Multiplanar reformats were obtained. Dose reduction techniques were used. CONTRAST: None. FINDINGS: LOWER CHEST: Small bilateral pleural effusion, decreased from previous. New nodular bilateral lower lobe consolidation suggests pneumonia including aspiration. Small pericardial effusion. Coronary atherosclerosis. HEPATOBILIARY: Normal. PANCREAS: Partially atrophic. SPLEEN: Normal. ADRENAL GLANDS: Normal. KIDNEYS/BLADDER: Left upper renal 9 mm fat-containing lesion is most likely a small angiomyolipoma. BOWEL: No obstruction or inflammatory change. Moderate amount stool in colon. LYMPH NODES: Normal. VASCULATURE: Unremarkable. PELVIC ORGANS: Normal. MUSCULOSKELETAL: Improved diffuse subcutaneous edema. Tiny fat-containing umbilical hernia.     IMPRESSION: 1.  New bilateral lower lobe consolidation consistent with pneumonia. 2.  Decreased bilateral pleural  effusions. 3.  Decreased diffuse subcutaneous edema.     Echocardiogram Complete    Result Date: 2024  [id number]^[place] [address]  Name: LUIS HODGES MRN: [id number] : 1975 Study Date: 2024 09:49 AM Age: 48 yrs Gender: Male Patient Location: [facility] Reason For Study: Pulmonary Edema Ordering Physician: HARRIS SLADE Referring Physician: HARRIS SLADE Performed By: LA  BSA: 2.4 m2 Height: 70 in Weight: 271 lb HR: 78 ______________________________________________________________________________ Procedure Complete Echo Adult. Definity (NDC #63101-605) given intravenously. Adequate quality two-dimensional was performed and interpreted. ______________________________________________________________________________ Interpretation Summary  The left ventricle is normal in size. There is mild concentric left ventricular hypertrophy. Left ventricular function is decreased. The ejection fraction is 30-35% (moderately reduced). There is global hypokinesis with severe hypokinesis of the mid to apical inferior wall. Diastolic Doppler findings (E/E' ratio and/or other parameters) suggest left ventricular filling pressures are increased.  The right ventricle is normal in size and function. Normal left atrial size. Right atrial size is normal. There is mild (1+) mitral regurgitation. IVC diameter >2.1 cm collapsing <50% with sniff suggests a high RA pressure estimated at 15 mmHg or greater.  Compared to previous study from 2023 the LV systolic function has declined and there are regional wall motion abnormalities. ______________________________________________________________________________ Left Ventricle The left ventricle is normal in size. Left ventricular function is decreased. The ejection fraction is 30-35% (moderately reduced). There is mild concentric left ventricular hypertrophy. Diastolic Doppler findings (E/E' ratio and/or other  parameters) suggest left ventricular filling pressures are increased. There is global hypokinesis with severe hypokinesis of the mid to apical inferior wall.  Right Ventricle The right ventricle is normal in size and function.  Atria Normal left atrial size. Right atrial size is normal.  Mitral Valve Mitral valve leaflets appear normal. There is mild (1+) mitral regurgitation. There is no mitral valve stenosis.  Tricuspid Valve The tricuspid valve is not well visualized. There is trace tricuspid regurgitation. Right ventricular systolic pressure could not be approximated due to inadequate tricuspid regurgitation.  Aortic Valve The aortic valve is trileaflet. Aortic valve leaflets appear normal. No aortic regurgitation is present. No aortic stenosis is present.  Pulmonic Valve The pulmonic valve is not well visualized. There is trace pulmonic valvular regurgitation.  Vessels The aorta root is normal. IVC diameter >2.1 cm collapsing <50% with sniff suggests a high RA pressure estimated at 15 mmHg or greater.  Pericardium There is no pericardial effusion.  Rhythm Sinus rhythm was noted. ______________________________________________________________________________ MMode/2D Measurements & Calculations  IVSd: 1.3 cm LVIDd: 5.2 cm LVIDs: 4.0 cm LVPWd: 1.3 cm FS: 21.7 % LV mass(C)d: 274.4 grams LV mass(C)dI: 115.5 grams/m2 Ao root diam: 3.1 cm LVOT diam: 2.0 cm LVOT area: 3.1 cm2 Ao root diam index Ht(cm/m): 1.7 Ao root diam index BSA (cm/m2): 1.3 LA Volume (BP): 63.0 ml LA Volume Index (BP): 26.5 ml/m2  LA Volume Indexed (AL/bp): 28.2 ml/m2 RV Base: 3.4 cm RWT: 0.51 TAPSE: 2.1 cm  Doppler Measurements & Calculations MV E max gretchen: 104.0 cm/sec MV A max gretchen: 40.4 cm/sec MV E/A: 2.6 MV dec slope: 886.0 cm/sec2 MV dec time: 0.12 sec Ao V2 max: 116.0 cm/sec Ao max P.0 mmHg Ao V2 mean: 78.1 cm/sec Ao mean PG: 3.0 mmHg Ao V2 VTI: 21.8 cm NILSA(I,D): 2.1 cm2 NILSA(V,D): 1.9 cm2  LV V1 max P.0 mmHg LV V1 max: 70.5 cm/sec LV V1  "VTI: 14.4 cm SV(LVOT): 45.2 ml SI(LVOT): 19.0 ml/m2 PA acc time: 0.10 sec AV Servando Ratio (DI): 0.61 NILSA Index (cm2/m2): 0.87 E/E' av.7 Lateral E/e': 10.7 Medial E/e': 12.6 RV S Servando: 12.9 cm/sec  ______________________________________________________________________________ Report approved by: Ladonna Ornelas 2024 11:41 AM       CT Abdomen Pelvis w/o Contrast    Result Date: 2024  EXAM: CT ABDOMEN PELVIS W/O CONTRAST LOCATION: [facility] DATE: 2024 INDICATION: acute abdominal pain with N V and poor appetite COMPARISON: None. TECHNIQUE: CT scan of the abdomen and pelvis was performed without IV contrast. Multiplanar reformats were obtained. Dose reduction techniques were used. CONTRAST: None. FINDINGS: LOWER CHEST: Smooth intralobular septal thickening is in the lower lobes suggestive of pulmonary interstitial edema. Moderate bilateral pleural effusions with associated associated compressive atelectasis in the lung bases lobes. HEPATOBILIARY: Normal. PANCREAS: Normal. SPLEEN: Normal. ADRENAL GLANDS: Normal. KIDNEYS/BLADDER: Fat-containing lesion in the upper pole the left kidney measuring 0.8 cm compatible with an angiomyolipoma. BOWEL: No obstruction or inflammatory change. LYMPH NODES: Normal. VASCULATURE: Mild scattered vascular calcifications. PELVIC ORGANS: Trace low-density free fluid in the pelvis. MUSCULOSKELETAL: Diffuse anasarca. Multilevel degenerative changes in the thoracolumbar spine     IMPRESSION: 1.  No acute findings in the abdomen or pelvis. 2.  Findings suggesting fluid overload with moderate bilateral pleural effusions and diffuse anasarca. 3.  Subcentimeter left renal angiomyolipoma.     POC US Guidance Needle Placement    Result Date: 2024  Ultrasound was performed as guidance to an anesthesia procedure.  Click \"PACS images\" hyperlink below to view any stored images.  For specific procedure details, view procedure note authored by " anesthesia.    MR Foot Right w/o & w Contrast    Result Date: 2/14/2024  EXAM: MR FOOT RIGHT W/O and W CONTRAST LOCATION: [facility] DATE: 2/14/2024 INDICATION: Ulceration, erythema, infection. COMPARISON: None. TECHNIQUE: Routine. Additional postgadolinium T1 sequences were obtained. IV CONTRAST: 12 mL Gadavist. FINDINGS: JOINTS AND BONES: -No evidence for fracture. There is some reactive signal abnormality within the posterior calcaneus but no T1 marrow signal change and this is unlikely to represent osteomyelitis. No significant effusion to suggest septic arthropathy. There is degenerative change at the calcaneocuboid articulation and tibiotalar joint. TENDONS: -The peroneal tendons are negative. The flexor tendons are negative for tendinopathy, tenosynovitis, or tearing. The extensor tendons are negative. LIGAMENTS: -The anterior and posterior talofibular ligaments are negative. The deltoid ligamentous complex is intact. The calcaneofibular ligament is negative. The ligament of Lisfranc is intact. MUSCLES AND SOFT TISSUES: -There is a soft tissue ulceration along the posteroinferior aspect of the hindfoot but this is fairly superficial. There is some surrounding edema or potential cellulitis. There is reactive edema versus infectious or inflammatory myositis within the plantar and interosseous musculature. No evidence for abscess. Mild plantar fasciitis.     IMPRESSION: 1.  Soft tissue ulceration along the posteroinferior aspect of the hindfoot. This is fairly superficial. There is some surrounding edema or low-grade cellulitis. 2.  No evidence for osteomyelitis, septic arthropathy, or abscess. 3.  Reactive edema versus infectious or inflammatory myositis within the plantar and interosseous musculature. 4.  No evidence for fracture. 5.  No significant tendinous or ligamentous pathology.    XR Wrist Left G/E 3 Views    Result Date: 2/13/2024  EXAM: XR WRIST LEFT G/E 3 VIEWS LOCATION:   [facility] DATE: 2/13/2024 INDICATION:  Other closed intra-articular fracture of distal end of left radius, initial encounter COMPARISON: 02/06/2024     IMPRESSION: Distal radial intra-articular fracture with similar alignment. No definite callus formation. Otherwise unchanged.    XR Wrist Left G/E 3 Views    Result Date: 2/6/2024  EXAM: XR WRIST LEFT G/E 3 VIEWS LOCATION: [facility] DATE: 2/6/2024 INDICATION: fall, left wrist pain COMPARISON: None.     IMPRESSION: There is a nondisplaced, longitudinally oriented fracture of the distal left radius. There is intra-articular extension. There is slight buckling of the dorsal radial cortex on the lateral view. The ulna is intact. Soft tissue swelling.

## 2024-02-29 NOTE — PLAN OF CARE
Goal Outcome Evaluation:  A/Ox4. Flat affect. VSS. Prn oxycodone given for 8/10 RLE pain, with relief. Tele NSR. Dobutamine gtt running at 2.5mcg/kg/min. Sats in 90s on 1L NC. LS diminished. BLE edema. Wound vac to suction. Noncompliant with fluid restriction. Refused turns. Refused to get OOB, and refused therapy. K and Mag protocol, AM recheck. Call light in reach. Makes needs known.       Problem: Infection  Goal: Absence of Infection Signs and Symptoms  Intervention: Prevent or Manage Infection  Recent Flowsheet Documentation  Taken 2/29/2024 0800 by Autumn Baker RN  Isolation Precautions: contact precautions maintained     Problem: Acute Kidney Injury/Impairment  Goal: Fluid and Electrolyte Balance  Outcome: Progressing  Goal: Improved Oral Intake  Outcome: Progressing  Goal: Effective Renal Function  Outcome: Progressing  Intervention: Monitor and Support Renal Function  Recent Flowsheet Documentation  Taken 2/29/2024 0800 by Autumn Baker RN  Medication Review/Management: medications reviewed     Problem: Heart Failure  Goal: Optimal Coping  Outcome: Progressing  Intervention: Support Psychosocial Response  Recent Flowsheet Documentation  Taken 2/29/2024 0800 by Autumn Baker RN  Supportive Measures:   active listening utilized   decision-making supported  Goal: Optimal Cardiac Output  Outcome: Progressing  Goal: Stable Heart Rate and Rhythm  Outcome: Progressing  Goal: Optimal Functional Ability  Outcome: Progressing  Goal: Fluid and Electrolyte Balance  Outcome: Progressing  Goal: Improved Oral Intake  Outcome: Progressing  Goal: Effective Oxygenation and Ventilation  Outcome: Progressing  Intervention: Promote Airway Secretion Clearance  Recent Flowsheet Documentation  Taken 2/29/2024 0800 by Autumn Baker RN  Cough And Deep Breathing: done independently per patient  Goal: Effective Breathing Pattern During Sleep  Outcome: Progressing  Intervention: Monitor and Manage Obstructive  Sleep Apnea  Recent Flowsheet Documentation  Taken 2/29/2024 0800 by Autumn Baker RN  Medication Review/Management: medications reviewed     Problem: Pain Acute  Goal: Optimal Pain Control and Function  Intervention: Prevent or Manage Pain  Recent Flowsheet Documentation  Taken 2/29/2024 0800 by Autumn Baker RN  Sleep/Rest Enhancement:   noise level reduced   regular sleep/rest pattern promoted   room darkened   comfort measures  Medication Review/Management: medications reviewed  Intervention: Optimize Psychosocial Wellbeing  Recent Flowsheet Documentation  Taken 2/29/2024 0800 by Autumn Baker RN  Supportive Measures:   active listening utilized   decision-making supported

## 2024-02-29 NOTE — PROGRESS NOTES
RENAL PROGRESS NOTE    PLAN:  Diuretics/dobutamine per cards  Dobutamine initiated per cards  Timing of coronary angio TBD (we may have reached that point of relative renal stability as CRS hemodynamics/diuretics seem to be the primary  of renal lability)  Cont to hold ACE/ARB therapy   Iron studies pending  BMP in a.m.     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 And ongoing diuretic needs seems to be primary issue  Prior Vanco/Zosyn may be contributing, now on Dapto and Zosyn, urine eos never collected, but Dc'd in light of renal stability will not pursue  UAs x 2 this admit with few 2-3 RBC, otherwise bland  Nephrotic range proteinuria,   Baseline Cr 0.8 with severe nephrotic range proteinuria and some cells on UA. Suspect he has CKD/GN most likely related to diabetic nephropathy, all serologies neg except sl monoclonal AB on urine immunofix, plan to f/up and repeat in several months.   No renal biopsy needed now and not too inclined to do in future to confirm DN given overall non compliance hx and MMP, relative stability and would likely not change POC  Would rec maxing ARB, SGLT2 inhib eventually once cr stable and medically optimized CHF   Plan for outpt follow up at our clinic:   Monday March 18 at 11:00AM with Ruth Mike NP at [facility]     HTN   Mostly controlled.   Currently managing per cards (Imdur/BB/Hydral/ACE/ARB on hold, on dobutamine gtt  would resume ACE/ARB once RF stable d/t proteinuria      Volume overload  Looks euvolemic Much improved, net neg 30+L, though suspect not all intake documented given reports that pt has been non-compliant with FR per RN notes in chart, (bed weight inconsistent), cards resumed diuretics again on 2/28 (added dobutamine)   Labs suggest some contraction, most recent chest imaging showed reduced bilat pleural effusions  (though ? Asp pneumonia)    Hypoxia:  Stable on 2L per NC, CT imaging  showed reduced bilat pleural effusions (though ? Asp pneumonia), less convinced the hypervolemia driving     New HFrEF combined systolic/diastolic:    had negative stress test last fall, but concern for occult ASCAD with wall motion abnormalities, cards following, holding off on angio until RF improves to at least stable (and we may have reached that point).   EF down to 30-35% with global hypokinesis, and inc filling pressures. Severe Diastolic dysfunction     Anemia   normocytic, can assess iron stores eventually and replete. Can do in kidney clinic f/up after finishes abx.      Lytes:   Mild alkalosis with diuresis, bumex resumed ,seems a bit contracted again     HLD - statin     DM2 -  historically poor control, A1c ~ 12 insulin this admit      Diabetic foot ulcer   debridement, wound vac  . Now on Zosyn and daptomycin until 3/1 per ID, no osteo     Obesity     Tobacco use - nicotine patches in use this admit      SUBJECTIVE:  Floyd denies abd pain today, tired, doesn't like all the interruptions, saw cards this a.m. but he is uncertain of plans for timing of angio; he is urinating, denies edema, says SOB about the same, has modest appetite, eating and drinking  Wants to sleep, seems less engaged with writer today     OBJECTIVE:  Physical Exam   Temp: 98  F (36.7  C) Temp src: Oral BP: (!) 141/79 (notified nurse) Pulse: 82   Resp: 20 SpO2: 97 % O2 Device: Nasal cannula Oxygen Delivery: 2 LPM  Vitals:    24 1100 24 1700 24 0332   Weight: 114.6 kg (252 lb 10.4 oz) 118.3 kg (260 lb 11.2 oz) 118.4 kg (261 lb 1.6 oz)     Vital Signs with Ranges  Temp:  [98  F (36.7  C)-98.8  F (37.1  C)] 98  F (36.7  C)  Pulse:  [76-82] 82  Resp:  [20] 20  BP: (141-155)/(78-86) 141/79  SpO2:  [94 %-97 %] 97 %  I/O last 3 completed shifts:  In: 1210 [P.O.:960; I.V.:250]  Out: 4525 [Urine:4525]    Temp (24hrs), Av.2  F (36.8   C), Min:98  F (36.7  C), Max:98.6  F (37  C)      Patient Vitals for the past 72 hrs:   Weight   02/28/24 0332 118.4 kg (261 lb 1.6 oz)   02/27/24 1700 118.3 kg (260 lb 11.2 oz)   02/27/24 1100 114.6 kg (252 lb 10.4 oz)     Intake/Output Summary (Last 24 hours) at 2/27/2024 1057  Last data filed at 2/27/2024 0900  Gross per 24 hour   Intake 1450 ml   Output 2400 ml   Net -950 ml       PHYSICAL EXAM:  General - Alert and oriented x3, appears comfortable, NAD, supine in bed, was sleeping when I entered, somewhat irritable affect today   Cardiovascular - Rate controlled, SBP 150s, on dobut gtt   Respiratory - sats good on 1-2 L , CTA by ant exam only d/t positioning in bed   Abd:no guarding or pain with palpation, no overt ascites  Extremities - No sig lower extremity edema bilaterally, R foot dressing intact/wound vac in place  Skin: quite dry, R cellulitis with some erythema.  No obvious drainage   Neuro:  Grossly intact, no focal deficits  MSK:  Grossly intact, by cursory bed exam    Psych: irritable affect  Wts erratic    LABORATORY STUDIES:     Recent Labs   Lab 02/29/24  0422 02/27/24  0540 02/26/24  0508 02/23/24  0618   WBC  --  13.5*  --  11.3*   RBC  --  3.76*  --  3.57*   HGB  --  10.2*  --  10.6*   HCT  --  34.6*  --  33.1*    308 326 337       Basic Metabolic Panel:  Recent Labs   Lab 02/29/24  0834 02/29/24  0422 02/28/24  2140 02/28/24  1655 02/28/24  1155 02/28/24  0845 02/28/24  0420 02/27/24  0834 02/27/24  0540 02/26/24  0738 02/26/24  0508 02/25/24  0719 02/25/24  0549 02/24/24  0728 02/24/24  0527 02/23/24  0726 02/23/24  0618   NA  --  135  --   --   --   --  132*  --  132*  --  137  --  136  --  135  --  137   POTASSIUM  --  4.0  --   --   --   --  4.4  --  4.3  --  4.0  --  3.9  --  4.1  4.1  --  3.6   CHLORIDE  --  95*  --   --   --   --  96*  --  97*  --   --   --  99  --  96*  --  98   CO2  --  34*  --   --   --   --  25  --  30*  --  28  --  28  --  34*  --  31*   BUN  --  22.0*  --    --   --   --  22.6*  --  20.9*  --   --   --  16.2  --  16.0  --  13.4   CR  --  1.59*  --   --   --   --  1.60*  --  1.70*  --  1.59*  --  1.40*  --  1.72*  --  1.53*   * 175* 222* 215* 204* 198* 246*   < > 200*   < >  --    < > 157*   < > 174*   < > 125*   SARAH  --  9.2  --   --   --   --  8.9  --  9.1  --   --   --  8.6  --  8.8  --  8.6    < > = values in this interval not displayed.       INRNo lab results found in last 7 days.     Recent Labs   Lab Test 02/29/24  0422 02/27/24  0540 02/26/24  0508 02/23/24  0618 08/24/23  0645 08/23/23  0946   INR  --   --   --   --   --  0.95   WBC  --  13.5*  --  11.3*   < > 11.2*   HGB  --  10.2*  --  10.6*   < > 13.9    308   < > 337   < > 290    < > = values in this interval not displayed.       Personally reviewed current labs      Yuli Quigley [facility]  [facility]  [phone]

## 2024-02-29 NOTE — PLAN OF CARE
Problem: Mobility Impairment  Goal: Optimal Mobility  Outcome: Progressing  Intervention: Optimize Mobility  Recent Flowsheet Documentation  Taken 2/29/2024 0400 by Jeanine Dobson RN  Activity Management: activity adjusted per tolerance  Positioning/Transfer Devices:   pillows   in use  Taken 2/29/2024 0000 by Jeanine Dobson RN  Activity Management: activity adjusted per tolerance  Positioning/Transfer Devices:   pillows   in use  Taken 2/28/2024 2000 by Jeanine Dobson RN  Activity Management: activity adjusted per tolerance  Positioning/Transfer Devices:   pillows   in use     Problem: Comorbidity Management  Goal: Blood Glucose Levels Within Targeted Range  Outcome: Progressing  Intervention: Monitor and Manage Glycemia  Recent Flowsheet Documentation  Taken 2/29/2024 0400 by Jeanine Dobson RN  Glycemic Management: blood glucose monitored  Medication Review/Management: medications reviewed  Taken 2/29/2024 0000 by Jeanine Dobson RN  Glycemic Management: blood glucose monitored  Medication Review/Management: medications reviewed  Taken 2/28/2024 2000 by Jeanine Dobson RN  Glycemic Management: blood glucose monitored  Medication Review/Management: medications reviewed     Problem: Comorbidity Management  Goal: Maintenance of Heart Failure Symptom Control  Outcome: Progressing  Intervention: Maintain Heart Failure Management  Recent Flowsheet Documentation  Taken 2/29/2024 0400 by Jeanine Dobson RN  Medication Review/Management: medications reviewed  Taken 2/29/2024 0000 by Jeanine Dobson RN  Medication Review/Management: medications reviewed  Taken 2/28/2024 2000 by Jeanine Dobson RN  Medication Review/Management: medications reviewed     Problem: Comorbidity Management  Goal: Blood Pressure in Desired Range  Outcome: Progressing  Intervention: Maintain Blood Pressure Management  Recent Flowsheet Documentation  Taken 2/29/2024 0400 by Jeanine Dobson RN  Medication  Review/Management: medications reviewed  Taken 2/29/2024 0000 by Jeanine Dobson, RN  Medication Review/Management: medications reviewed  Taken 2/28/2024 2000 by Jeanine Dobson RN  Medication Review/Management: medications reviewed     Goal Outcome Evaluation:  Pt endorses pain in his right heel. PRN tylenol and oxy given x1 with some relief. Wound vac in place on right heel and working well overnight. 1800 FR maintained with good urine output. NWB right foot so using the bedpan for BM. Dobutamine gtt running as ordered. 2L NC overnight with O2 sats in the mid 90s.  at HS. NSR. A/O with flat affect. VSS. Will continue to monitor and notify MD of any changes.

## 2024-02-29 NOTE — PROGRESS NOTES
[facility]    Medicine Progress Note - Hospitalist Service    Date of Admission:  2/14/2024    Assessment & Plan   Floyd Cpaps is a 48 year old male with history of DM, HTN, CVA, obesity, recent ED visit for nonhealing diabetic foot ulcer with cellulitis, here for worsening pain in the foot, with necrotic tissue noted on exam.  Patient admitted on 2/14/2024.      INTERVAL HISTORY :       Feb 25 :       No new issues noted today  Continue antibiotics per ID - on Daptomycin, zosyn  Cardiology, nephrology following        Not medically ready for discharge at this time.          Feb 26 :       No new issues noted today  Continue antibiotics per ID - on Daptomycin, zosyn  Cardiology, nephrology following        Not medically ready for discharge at this time.        Feb 27 :       No new issues noted today  Continue antibiotics per ID - on Daptomycin, zosyn  On dobutamine drip  Cardiology, nephrology following    C/o abdominal pain, nausea, vomiting today  CT abdomen done : concerning for pneumonia  Patient already on daptoalexian  Consulted ID        Not medically ready for discharge at this time.            Feb 28 :       No new issues noted today  Continue antibiotics per ID - on Daptomycin, zosyn for now  On dobutamine drip  Cardiology, nephrology following    C/o abdominal pain, nausea, vomiting today  CT abdomen done : concerning for pneumonia but patient well  covered on current antibiotics  Patient already on dapto, zosyn  Consulted ID        Not medically ready for discharge at this time.        A/p :       Diabetic foot ulcer, right heel;  -chronic wound R heel, recent resulting cellulitis treated with Bactrim  -here for worse pain in the foot, found with ulcer worse with necrotic tissue  -MRI of the foot no osteomyelitis  -On 2/16, s/p debridement with irrigation of right foot diabetic ulcer. NWB on right foot per podiatrist  -On 2/19, wound VAC placed, continue  -Surgical cultures  polymicrobials.  Initially IV Vanco and IV Zosyn, now changed to IV Zosyn and IV daptomycin.  Appreciated ID input  -Care Mgr to see about financial/insurance issues which have been a barrier to followup  -Pain management with Tylenol, home oxycodone.     Acute new onset systolic heart failure;  CT imaging reported moderate bilateral pleural effusion and diffuse anasarca;  -- On 2/19, patient reported feeling shortness of breath, imaging workup showed bilateral moderate pleural effusion and diffuse anasarca  --BNP significantly elevated   --Echo on 8/23 reported EF 55% but given patient noncompliance with medications, rechecked echocardiogram with EF 30 to 35% with global hypokinesis.  --Of note, patient had nuclear stress test on 8/23 which was negative for inducible myocardial ischemia  --On 2/21, personally discussed with cardiology, anticipating ischemic evaluation at some point pending kidney function  -- On 2/23, IV Bumex changed to oral, holding today due to worsening creatinine.  --Monitor intake/output, weight, labs closely    SCHUYLER: Likely due to CHF exacerbation, possibly due to diabetic nephropathy  - Baseline is 0.6-0.8  -On a combination of broad spectrum abx ( Vanc and Zosyn)  -Check UA with no significant RBC's or casts   -On 2/21, personally discussed with nephrology, given proteinuria multiple serological test ordered and process.  May need kidney biopsy in future pending test results  -Electrolyte imbalance, replace per protocol    DM Type II, Uncontrolled; improving  -Stopped all his home meds lately due to stomach upset while on Bactrim, then neglected to resume meds. Hold home metformin and glipizide for now.  -A1c 11.8  -Started Lantus and gradually titrating, increased to 248 unit at night on 2/21. Of note, patient was on insulin in the past and received education in the hospital about it, but seems to have stopped on its own.  Patient needs outpatient diabetic educator for ongoing  titration  -Insulin sliding scale and hypoglycemic protocol  -Given poor appetite and early satiety, trial of Reglan which seems helping  -Also added PPI    History of stroke in 8/2023;  -Had been taking Plavix and atorvastatin but recently stopped taking all of his medications as above.  -Neurologist note from 8/26/2023 reviewed- at that time recommended DAPT with aspirin and Plavix for 90 days, afterwards switch to enteric-coated aspirin 325 mg daily.  -Started full dose aspirin and resumed home atorvastatin    Essential hypertension, uncontrolled; due to noncompliance-improving  -- Discharge summary from 8/26/2023 reviewed, patient was discharged home on Coreg 12.5 mg twice daily, HCTZ 12.5 mg daily, losartan 100 mg daily  --On 2/19 restarted Coreg 12.5 mg twice daily, increased to 25 mg twice daily on 2/24 by cardiology.  --Diuretics as above.  As needed hydralazine.  Monitor vitals and adjust medications accordingly.    Tobacco use disorder  -Nicotine Patch  -Advised to quit smoking     Left distal radius fracture due to recent fall on 2/6/2023  -Cast in place  -PT and OT evals  -Outpatient orthopedics follow-up     Normocytic anemia, likely due to chronic wound;  -No active/obvious bleeding.  Trend     Poor follow-up, noncompliance;  -Importance of compliance and follow-up needs to be emphasized.  Patient with relatively young age and already with serious comorbidities of chronic disease.          Diet: Moderate Consistent Carb (60 g CHO per Meal) Diet  Fluid restriction 1800 ML FLUID  Snacks/Supplements Adult: Expedite Bottle; With Meals  Snacks/Supplements Adult: Glucerna; With Meals    DVT Prophylaxis: Enoxaparin (Lovenox) SQ  Cazares Catheter: Not present  Lines: None     Cardiac Monitoring: ACTIVE order. Indication: Acute decompensated heart failure (48 hours)  Code Status: Full Code      Clinically Significant Risk Factors              # Hypoalbuminemia: Lowest albumin = 2 g/dL at 2/20/2024  5:46 AM, will  "monitor as appropriate        # Chronic heart failure with reduced ejection fraction: last echo with EF <40%      # DMII: A1C = 11.8 % (Ref range: <5.7 %) within past 6 months   # Obesity: Estimated body mass index is 37.46 kg/m  as calculated from the following:    Height as of this encounter: 1.778 m (5' 10\").    Weight as of this encounter: 118.4 kg (261 lb 1.6 oz).   # Moderate Malnutrition: based on nutrition assessment      # Financial/Environmental Concerns: none         Disposition Plan      Expected Discharge Date: 02/29/2024    Discharge Delays: Procedure Pending (enter procedure & time in comments)  Placement - TCU  Specialist Consult (enter specialist & decision needed in comments)  Destination: home with family  Discharge Comments: insurance needs, coronary angio this week, TCU?            Moses Dos Santos MD  Hospitalist Service  [facility]  Securely message with [employer] (more info)  Text page via [employer] Paging/Directory   ______________________________________________________________________      Physical Exam   Vital Signs: Temp: 98.3  F (36.8  C) Temp src: Oral BP: (!) 146/78 Pulse: 77   Resp: 20 SpO2: 96 % O2 Device: Nasal cannula Oxygen Delivery: 1 LPM  Weight: 261 lbs 1.6 oz      General: Not in obvious distress.  HEENT: Normocephalic, supple neck  Chest: Clear to auscultation bilateral anteriorly, no wheezing  Heart: S1S2 normal, regular  Abdomen: Soft.  Obese, nontender. Bowel sounds- active.  Extremities: Bilateral lower extremity swelling, right foot with wound VAC  Neuro: alert and awake, grossly non-focal        Medical Decision Making             Data     I have personally reviewed the following data over the past 24 hrs:    N/A  \   N/A   / N/A     132 (L) 96 (L) 22.6 (H) /  215 (H)   4.4 25 1.60 (H) \       Imaging results reviewed over the past 24 hrs:   No results found for this or any previous visit (from the past 24 hour(s)).    "

## 2024-02-29 NOTE — PROGRESS NOTES
"CLINICAL NUTRITION SERVICES     Nutrition Prescription    RECOMMENDATIONS FOR MDs/PROVIDERS TO ORDER:  Recommendation of addition of vitamin C dosing per nephrology to promote wound healing d/t Nephrology consult noting improvements/stability in renal function    Recommendations already ordered by Registered Dietitian (RD):  Continue with Glucerna and Expedite supplements    Future/Additional Recommendations:  Monitor supplement intake     Intake:  100% of meals eaten overall, 1 outlier 2/27: \"unable to eat\" per flowsheets  Average meals from last 3 days, if consuming 100% of meals/supps:  1193 kcals, 78 g protein (54% kcal, 90% protein needs)    Today, Floyd was sleeping with the lights off and did not answer to his name. Based on Nephrology consult with noted improvements/stability in renal function, recommendations for addition of vitamin C to promote wound healing are plausible. Will continue to monitor intake of protein based on SCHUYLER, but currently consuming adequate protein (~90%).    Labs:  Na, K, Mg WNL  No noted Phos  Cl 95 - L  BUN 22 - H, trending up overall, lower from yesterday (22.6-->22)  Cr 1.59 - H, relatively stable  GFR 53 - L trending upwards the past 3 days, relatively stable (49-->53)  -175    Dosing Weight: 87.3 kg adjusted weight      ASSESSED NUTRITION NEEDS  Estimated Energy Needs: 2200 - 2620 kcals/day (25 - 30 kcals/kg)  Justification: Obese and Wound healing  Estimated Protein Needs: 87+ grams protein/day (1 gm/kg)  Justification: SCHUYLER and Wound healing  Estimated Fluid Needs: 1800 mL per provider     INTERVENTIONS:  Implementation:  Recommend vitamin C addition - per nephrology    Follow up/Monitoring:  Per protocol     Keely Lewis  Dietetic Intern  "

## 2024-02-29 NOTE — PROGRESS NOTES
HEART CARE NOTE          Assessment/Recommendations     1. Severe HFrEF c/b cardiogenic shock  Assessment / Plan  New onset; will need ischemia eval; will plan for coronary angiogram +/- PCI when renal function stable to improved; I discussed this with him including potential risk of stroke, myocardial infarction, or death.  We also discussed the possibility of vascular injury, bleeding requiring blood transfusion, or reaction to x-ray dye although he tolerated x-ray dye.  Diuresing well on current regimen; renal function stable to improved; continue Bumex BID and dobutamine gtt  Patient is high risk for adverse cardiac events 2/2  severe systolic dysfunction, elevated NTproBNP, non-compliance  GDMT as detailed below     Current Pharmacotherapy AHA Guideline-Directed Medical Therapy   Losartan 25 mg - on hold given SCHUYLER Lisinopril 20 mg twice daily   Carvedilol 25 mg twice daily - held while on inotrope Carvedilol 25 mg twice daily   Spironolactone not started Spironolactone 25 mg once daily   Hydralazine NA Hydralazine 100 mg three times daily   Isosorbide dinitrate NA Isosorbide dinitrate 40 mg three times daily   SGLT2 inhibitor:Dapagliflozin/Empagliflozin - not started Dapagliflozin or Empagliflozin 10 mg daily      2. SCHUYLER in CKD  Assessment / Plan  Likely CRS in the setting of ADHF c/b antibiotic regimen- diuresis as above - no changes to regimen at this time  Continue to monitor UOP and renal function closely     3. DM2  Assessment / Plan  C/b DM foot ulcer  Management per primary team  Currently on ISS     4. Tobacco abuse  Assessment / Plan  Counseled regarding cessation     5. HTN  Assessment / Plan  Adequately controlled on current regimen - no changes at this time    Plan of care discussed on February 29, 2024 with patient at bedside, and primary team overseeing patient's care    Patient remains critically ill requiring hemodynamic support via dobutamine for cardiogenic shock. 60 minutes spent on  "critical care time    History of Present Illness/Subjective    Mr. Floyd Capps is a 48 year old male with a PMHx significant for (per Epic notation) DM, HTN, CVA, obesity, recent ED visit for nonhealing diabetic foot ulcer with cellulitis, here for worsening pain in the foot, with necrotic tissue noted on exam now found to be in new onset HFrEF     Today, Mr. Capps denies acute cardiac events or complaints; Management plan as detailed above     ECG: Personally reviewed. normal sinus rhythm, nonspecific ST and T waves changes.     ECHO (personnaly Reviewed on 2/21/24):   The left ventricle is normal in size. There is mild concentric left  ventricular hypertrophy.  Left ventricular function is decreased. The ejection fraction is 30-35%  (moderately reduced). There is global hypokinesis with severe hypokinesis of  the mid to apical inferior wall.  Diastolic Doppler findings (E/E' ratio and/or other parameters) suggest left  ventricular filling pressures are increased.     The right ventricle is normal in size and function.  Normal left atrial size. Right atrial size is normal.  There is mild (1+) mitral regurgitation.  IVC diameter >2.1 cm collapsing <50% with sniff suggests a high RA pressure  estimated at 15 mmHg or greater.     Compared to previous study from 8/23/2023 the LV systolic function has  declined and there are regional wall motion abnormalities.      Lab results: personally reviewed February 29, 2024; notable for stable to improved renal function    Medical history and pertinent documents reviewed in Care Everywhere please where applicable see details above        Physical Examination Review of Systems   BP (!) 155/86 (BP Location: Right arm, Patient Position: Semi-Arnett's)   Pulse 82   Temp 98  F (36.7  C) (Oral)   Resp 20   Ht 1.778 m (5' 10\")   Wt 118.4 kg (261 lb 1.6 oz)   SpO2 97%   BMI 37.46 kg/m    Body mass index is 37.46 kg/m .  Wt Readings from Last 3 Encounters:   02/28/24 118.4 kg " (261 lb 1.6 oz)   02/13/24 122.5 kg (270 lb)   02/06/24 122.5 kg (270 lb)     General Appearance:   no distress, normal body habitus   ENT/Mouth: membranes moist, no oral lesions or bleeding gums.      EYES:  no scleral icterus, normal conjunctivae   Neck: no carotid bruits or thyromegaly   Chest/Lungs:   lungs are clear to auscultation, no rales or wheezing, equal chest wall expansion    Cardiovascular:   Regular. Normal first and second heart sounds with no murmurs, rubs, or gallops; the carotid, radial and posterior tibial pulses are intact, + JVD and LE edema bilaterally    Abdomen:  no organomegaly, masses, bruits, or tenderness; bowel sounds are present   Extremities: no cyanosis or clubbing   Skin: no xanthelasma, warm.    Neurologic: NAD     Psychiatric: alert and oriented x3, calm     A complete 10 systems ROS was reviewed  And is negative except what is listed in the HPI.          Medical History  Surgical History Family History Social History   Past Medical History:   Diagnosis Date    Diabetes (H)     Past Surgical History:   Procedure Laterality Date    APPENDECTOMY      INCISION AND DRAINAGE OF WOUND      groin abscess    IRRIGATION AND DEBRIDEMENT FOOT, COMBINED Right 2/16/2024    Procedure: IRRIGATION AND DEBRIDEMENT RIGHT FOOT;  Surgeon: Cristofer Sims DPM;  Location: [facility]    no family history of premature coronary artery disease Social History     Socioeconomic History    Marital status: Single     Spouse name: Not on file    Number of children: Not on file    Years of education: Not on file    Highest education level: Not on file   Occupational History    Not on file   Tobacco Use    Smoking status: Every Day     Packs/day: .5     Types: Cigarettes    Smokeless tobacco: Never    Tobacco comments:     Seen IP by [facility] on 8/24/23 and declined cessation services and materials.    Vaping Use    Vaping Use: Never used   Substance and Sexual Activity    Alcohol use: No    Drug use: No     Sexual activity: Not on file   Other Topics Concern    Not on file   Social History Narrative    Patient reports that he does not have health insurance.     Social Determinants of Health     Financial Resource Strain: Low Risk  (10/20/2023)    Financial Resource Strain     Within the past 12 months, have you or your family members you live with been unable to get utilities (heat, electricity) when it was really needed?: No   Food Insecurity: High Risk (10/20/2023)    Food Insecurity     Within the past 12 months, did you worry that your food would run out before you got money to buy more?: Yes     Within the past 12 months, did the food you bought just not last and you didn t have money to get more?: No   Transportation Needs: Low Risk  (10/20/2023)    Transportation Needs     Within the past 12 months, has lack of transportation kept you from medical appointments, getting your medicines, non-medical meetings or appointments, work, or from getting things that you need?: No   Physical Activity: Not on file   Stress: Not on file   Social Connections: Not on file   Interpersonal Safety: Low Risk  (10/20/2023)    Interpersonal Safety     Do you feel physically and emotionally safe where you currently live?: Yes     Within the past 12 months, have you been hit, slapped, kicked or otherwise physically hurt by someone?: No     Within the past 12 months, have you been humiliated or emotionally abused in other ways by your partner or ex-partner?: No   Housing Stability: High Risk (10/20/2023)    Housing Stability     Do you have housing? : Yes     Are you worried about losing your housing?: Yes           Lab Results    Chemistry/lipid CBC Cardiac Enzymes/BNP/TSH/INR   Lab Results   Component Value Date    CHOL 156 02/21/2024    HDL 39 (L) 02/21/2024    TRIG 199 (H) 02/21/2024    BUN 22.0 (H) 02/29/2024     02/29/2024    CO2 34 (H) 02/29/2024    Lab Results   Component Value Date    WBC 13.5 (H) 02/27/2024    HGB 10.2  "(L) 02/27/2024    HCT 34.6 (L) 02/27/2024    MCV 92 02/27/2024     02/29/2024    Lab Results   Component Value Date    TSH 2.72 08/23/2023    INR 0.95 08/23/2023     No results found for: \"CKTOTAL\", \"CKMB\", \"TROPONINI\"       Weight:    Wt Readings from Last 3 Encounters:   02/28/24 118.4 kg (261 lb 1.6 oz)   02/13/24 122.5 kg (270 lb)   02/06/24 122.5 kg (270 lb)       Allergies  No Known Allergies      Surgical History  Past Surgical History:   Procedure Laterality Date    APPENDECTOMY      INCISION AND DRAINAGE OF WOUND      groin abscess    IRRIGATION AND DEBRIDEMENT FOOT, COMBINED Right 2/16/2024    Procedure: IRRIGATION AND DEBRIDEMENT RIGHT FOOT;  Surgeon: Cristofer Sims DPM;  Location[facility] Main OR       Social History  Tobacco:   History   Smoking Status    Every Day    Packs/day: 0.50    Types: Cigarettes   Smokeless Tobacco    Never    Alcohol:   Social History    Substance and Sexual Activity      Alcohol use: No   Illicit Drugs:   History   Drug Use No       Family History  History reviewed. No pertinent family history.       Shayna Arndt MD on 2/29/2024      cc: Glendy Benavides    "

## 2024-02-29 NOTE — PROGRESS NOTES
Occupational Therapy Discharge Summary    Reason for therapy discharge:    Multiple refusals and declines despite education regarding importance of therapy/OOB activity    Progress towards therapy goal(s). See goals on Care Plan in [facility] electronic health record for goal details.  Goals not met.  Barriers to achieving goals:   patient refusal.    Therapy recommendation(s):    Further therapy is recommended - however pt continues to decline therapy services. Please re-order if status and patient willingness to participate changes.

## 2024-03-01 ENCOUNTER — PREP FOR PROCEDURE (OUTPATIENT)
Dept: ADMINISTRATIVE | Facility: CLINIC | Age: 49
End: 2024-03-01
Payer: MEDICAID

## 2024-03-01 DIAGNOSIS — I25.10 CAD (CORONARY ARTERY DISEASE): Primary | ICD-10-CM

## 2024-03-01 PROBLEM — I50.21 ACUTE SYSTOLIC HEART FAILURE (H): Status: ACTIVE | Noted: 2024-03-01

## 2024-03-01 LAB
ABO/RH(D): NORMAL
ANION GAP SERPL CALCULATED.3IONS-SCNC: 6 MMOL/L (ref 7–15)
ANTIBODY SCREEN: NEGATIVE
BUN SERPL-MCNC: 21.4 MG/DL (ref 6–20)
CALCIUM SERPL-MCNC: 9.1 MG/DL (ref 8.6–10)
CHLORIDE SERPL-SCNC: 92 MMOL/L (ref 98–107)
CREAT SERPL-MCNC: 0.74 MG/DL (ref 0.67–1.17)
DEPRECATED HCO3 PLAS-SCNC: 33 MMOL/L (ref 22–29)
EGFRCR SERPLBLD CKD-EPI 2021: >90 ML/MIN/1.73M2
FERRITIN SERPL-MCNC: 280 NG/ML (ref 31–409)
GLUCOSE BLDC GLUCOMTR-MCNC: 168 MG/DL (ref 70–99)
GLUCOSE BLDC GLUCOMTR-MCNC: 181 MG/DL (ref 70–99)
GLUCOSE BLDC GLUCOMTR-MCNC: 221 MG/DL (ref 70–99)
GLUCOSE SERPL-MCNC: 265 MG/DL (ref 70–99)
HOLD SPECIMEN: NORMAL
MAGNESIUM SERPL-MCNC: 1.8 MG/DL (ref 1.7–2.3)
POTASSIUM SERPL-SCNC: 4 MMOL/L (ref 3.4–5.3)
SODIUM SERPL-SCNC: 131 MMOL/L (ref 135–145)
SPECIMEN EXPIRATION DATE: NORMAL

## 2024-03-01 PROCEDURE — C1894 INTRO/SHEATH, NON-LASER: HCPCS | Performed by: INTERNAL MEDICINE

## 2024-03-01 PROCEDURE — 99152 MOD SED SAME PHYS/QHP 5/>YRS: CPT | Performed by: INTERNAL MEDICINE

## 2024-03-01 PROCEDURE — 250N000013 HC RX MED GY IP 250 OP 250 PS 637: Performed by: INTERNAL MEDICINE

## 2024-03-01 PROCEDURE — 99232 SBSQ HOSP IP/OBS MODERATE 35: CPT | Performed by: NURSE PRACTITIONER

## 2024-03-01 PROCEDURE — 272N000001 HC OR GENERAL SUPPLY STERILE: Performed by: INTERNAL MEDICINE

## 2024-03-01 PROCEDURE — 250N000011 HC RX IP 250 OP 636: Performed by: INTERNAL MEDICINE

## 2024-03-01 PROCEDURE — 4A023N7 MEASUREMENT OF CARDIAC SAMPLING AND PRESSURE, LEFT HEART, PERCUTANEOUS APPROACH: ICD-10-PCS | Performed by: INTERNAL MEDICINE

## 2024-03-01 PROCEDURE — 250N000013 HC RX MED GY IP 250 OP 250 PS 637: Performed by: NURSE PRACTITIONER

## 2024-03-01 PROCEDURE — 99233 SBSQ HOSP IP/OBS HIGH 50: CPT | Performed by: INTERNAL MEDICINE

## 2024-03-01 PROCEDURE — 83735 ASSAY OF MAGNESIUM: CPT | Performed by: INTERNAL MEDICINE

## 2024-03-01 PROCEDURE — 93458 L HRT ARTERY/VENTRICLE ANGIO: CPT | Mod: 26 | Performed by: INTERNAL MEDICINE

## 2024-03-01 PROCEDURE — P9047 ALBUMIN (HUMAN), 25%, 50ML: HCPCS | Performed by: INTERNAL MEDICINE

## 2024-03-01 PROCEDURE — 80048 BASIC METABOLIC PNL TOTAL CA: CPT | Performed by: NURSE PRACTITIONER

## 2024-03-01 PROCEDURE — 93458 L HRT ARTERY/VENTRICLE ANGIO: CPT | Performed by: INTERNAL MEDICINE

## 2024-03-01 PROCEDURE — B211YZZ FLUOROSCOPY OF MULTIPLE CORONARY ARTERIES USING OTHER CONTRAST: ICD-10-PCS | Performed by: INTERNAL MEDICINE

## 2024-03-01 PROCEDURE — G0463 HOSPITAL OUTPT CLINIC VISIT: HCPCS

## 2024-03-01 PROCEDURE — C1887 CATHETER, GUIDING: HCPCS | Performed by: INTERNAL MEDICINE

## 2024-03-01 PROCEDURE — C1769 GUIDE WIRE: HCPCS | Performed by: INTERNAL MEDICINE

## 2024-03-01 PROCEDURE — 250N000009 HC RX 250: Performed by: INTERNAL MEDICINE

## 2024-03-01 PROCEDURE — 255N000002 HC RX 255 OP 636: Performed by: INTERNAL MEDICINE

## 2024-03-01 PROCEDURE — 210N000001 HC R&B IMCU HEART CARE

## 2024-03-01 RX ORDER — ATROPINE SULFATE 0.1 MG/ML
0.5 INJECTION INTRAVENOUS
Status: ACTIVE | OUTPATIENT
Start: 2024-03-01 | End: 2024-03-01

## 2024-03-01 RX ORDER — IODIXANOL 320 MG/ML
INJECTION, SOLUTION INTRAVASCULAR
Status: DISCONTINUED | OUTPATIENT
Start: 2024-03-01 | End: 2024-03-01

## 2024-03-01 RX ORDER — OXYCODONE HYDROCHLORIDE 5 MG/1
5 TABLET ORAL EVERY 4 HOURS PRN
Status: DISCONTINUED | OUTPATIENT
Start: 2024-03-01 | End: 2024-03-05 | Stop reason: HOSPADM

## 2024-03-01 RX ORDER — BUMETANIDE 2 MG/1
2 TABLET ORAL
Status: DISCONTINUED | OUTPATIENT
Start: 2024-03-01 | End: 2024-03-04

## 2024-03-01 RX ORDER — NALOXONE HYDROCHLORIDE 0.4 MG/ML
0.4 INJECTION, SOLUTION INTRAMUSCULAR; INTRAVENOUS; SUBCUTANEOUS
Status: DISCONTINUED | OUTPATIENT
Start: 2024-03-01 | End: 2024-03-01

## 2024-03-01 RX ORDER — BUMETANIDE 1 MG/1
1 TABLET ORAL
Status: DISCONTINUED | OUTPATIENT
Start: 2024-03-01 | End: 2024-03-01

## 2024-03-01 RX ORDER — NALOXONE HYDROCHLORIDE 0.4 MG/ML
0.2 INJECTION, SOLUTION INTRAMUSCULAR; INTRAVENOUS; SUBCUTANEOUS
Status: DISCONTINUED | OUTPATIENT
Start: 2024-03-01 | End: 2024-03-01

## 2024-03-01 RX ORDER — DIAZEPAM 5 MG
10 TABLET ORAL
Status: COMPLETED | OUTPATIENT
Start: 2024-03-01 | End: 2024-03-01

## 2024-03-01 RX ORDER — ALBUMIN (HUMAN) 12.5 G/50ML
50 SOLUTION INTRAVENOUS ONCE
Status: COMPLETED | OUTPATIENT
Start: 2024-03-01 | End: 2024-03-01

## 2024-03-01 RX ORDER — SODIUM CHLORIDE 9 MG/ML
75 INJECTION, SOLUTION INTRAVENOUS CONTINUOUS
Status: ACTIVE | OUTPATIENT
Start: 2024-03-01 | End: 2024-03-01

## 2024-03-01 RX ORDER — MAGNESIUM OXIDE 400 MG/1
400 TABLET ORAL EVERY 4 HOURS
Qty: 2 TABLET | Refills: 0 | Status: COMPLETED | OUTPATIENT
Start: 2024-03-01 | End: 2024-03-01

## 2024-03-01 RX ORDER — ACETAMINOPHEN 325 MG/1
650 TABLET ORAL EVERY 4 HOURS PRN
Status: DISCONTINUED | OUTPATIENT
Start: 2024-03-01 | End: 2024-03-01

## 2024-03-01 RX ORDER — HYDRALAZINE HYDROCHLORIDE 20 MG/ML
10 INJECTION INTRAMUSCULAR; INTRAVENOUS
Status: DISCONTINUED | OUTPATIENT
Start: 2024-03-01 | End: 2024-03-05 | Stop reason: HOSPADM

## 2024-03-01 RX ORDER — FERROUS GLUCONATE 324(38)MG
324 TABLET ORAL
Status: DISCONTINUED | OUTPATIENT
Start: 2024-03-01 | End: 2024-03-05 | Stop reason: HOSPADM

## 2024-03-01 RX ORDER — ATORVASTATIN CALCIUM 40 MG/1
80 TABLET, FILM COATED ORAL EVERY EVENING
Status: DISCONTINUED | OUTPATIENT
Start: 2024-03-01 | End: 2024-03-05 | Stop reason: HOSPADM

## 2024-03-01 RX ORDER — FENTANYL CITRATE 50 UG/ML
25 INJECTION, SOLUTION INTRAMUSCULAR; INTRAVENOUS
Status: DISCONTINUED | OUTPATIENT
Start: 2024-03-01 | End: 2024-03-01

## 2024-03-01 RX ORDER — FUROSEMIDE 10 MG/ML
80 INJECTION INTRAMUSCULAR; INTRAVENOUS ONCE
Status: COMPLETED | OUTPATIENT
Start: 2024-03-01 | End: 2024-03-01

## 2024-03-01 RX ORDER — ATORVASTATIN CALCIUM 80 MG/1
80 TABLET, FILM COATED ORAL DAILY
Qty: 90 TABLET | Refills: 3 | Status: SHIPPED | OUTPATIENT
Start: 2024-03-01 | End: 2024-07-11

## 2024-03-01 RX ORDER — ONDANSETRON 2 MG/ML
INJECTION INTRAMUSCULAR; INTRAVENOUS
Status: DISCONTINUED | OUTPATIENT
Start: 2024-03-01 | End: 2024-03-01

## 2024-03-01 RX ORDER — ASPIRIN 81 MG/1
81 TABLET ORAL DAILY
Qty: 90 TABLET | Refills: 3 | Status: SHIPPED | OUTPATIENT
Start: 2024-03-02 | End: 2024-03-05

## 2024-03-01 RX ORDER — LIDOCAINE 40 MG/G
CREAM TOPICAL
Status: DISCONTINUED | OUTPATIENT
Start: 2024-03-01 | End: 2024-03-01

## 2024-03-01 RX ORDER — ASPIRIN 325 MG
325 TABLET ORAL ONCE
Qty: 1 TABLET | Refills: 0 | Status: DISCONTINUED | OUTPATIENT
Start: 2024-03-01 | End: 2024-03-01

## 2024-03-01 RX ORDER — FENTANYL CITRATE 50 UG/ML
INJECTION, SOLUTION INTRAMUSCULAR; INTRAVENOUS
Status: DISCONTINUED | OUTPATIENT
Start: 2024-03-01 | End: 2024-03-01

## 2024-03-01 RX ORDER — ASPIRIN 325 MG
325 TABLET ORAL ONCE
Status: COMPLETED | OUTPATIENT
Start: 2024-03-01 | End: 2024-03-01

## 2024-03-01 RX ORDER — FLUMAZENIL 0.1 MG/ML
0.2 INJECTION, SOLUTION INTRAVENOUS
Status: ACTIVE | OUTPATIENT
Start: 2024-03-01 | End: 2024-03-01

## 2024-03-01 RX ORDER — ASPIRIN 81 MG/1
243 TABLET, CHEWABLE ORAL ONCE
Qty: 3 TABLET | Refills: 0 | Status: DISCONTINUED | OUTPATIENT
Start: 2024-03-01 | End: 2024-03-01

## 2024-03-01 RX ORDER — OXYCODONE HYDROCHLORIDE 5 MG/1
10 TABLET ORAL EVERY 4 HOURS PRN
Status: DISCONTINUED | OUTPATIENT
Start: 2024-03-01 | End: 2024-03-05 | Stop reason: HOSPADM

## 2024-03-01 RX ADMIN — FUROSEMIDE 80 MG: 10 INJECTION, SOLUTION INTRAMUSCULAR; INTRAVENOUS at 15:53

## 2024-03-01 RX ADMIN — Medication 60 ML: at 18:01

## 2024-03-01 RX ADMIN — PANTOPRAZOLE SODIUM 40 MG: 40 TABLET, DELAYED RELEASE ORAL at 06:30

## 2024-03-01 RX ADMIN — DIAZEPAM 10 MG: 10 TABLET ORAL at 12:54

## 2024-03-01 RX ADMIN — ENOXAPARIN SODIUM 40 MG: 40 INJECTION SUBCUTANEOUS at 17:58

## 2024-03-01 RX ADMIN — INSULIN ASPART: 100 INJECTION, SOLUTION INTRAVENOUS; SUBCUTANEOUS at 17:58

## 2024-03-01 RX ADMIN — BUMETANIDE 2 MG: 2 TABLET ORAL at 17:58

## 2024-03-01 RX ADMIN — FERROUS GLUCONATE 324 MG: 324 TABLET ORAL at 17:58

## 2024-03-01 RX ADMIN — OXYCODONE HYDROCHLORIDE 5 MG: 5 TABLET ORAL at 23:48

## 2024-03-01 RX ADMIN — INSULIN GLARGINE 28 UNITS: 100 INJECTION, SOLUTION SUBCUTANEOUS at 21:09

## 2024-03-01 RX ADMIN — WHITE PETROLATUM: 1.75 OINTMENT TOPICAL at 21:12

## 2024-03-01 RX ADMIN — NICOTINE 1 PATCH: 14 PATCH, EXTENDED RELEASE TRANSDERMAL at 15:53

## 2024-03-01 RX ADMIN — MAGNESIUM OXIDE TAB 400 MG (241.3 MG ELEMENTAL MG) 400 MG: 400 (241.3 MG) TAB at 08:49

## 2024-03-01 RX ADMIN — MAGNESIUM OXIDE TAB 400 MG (241.3 MG ELEMENTAL MG) 400 MG: 400 (241.3 MG) TAB at 15:52

## 2024-03-01 RX ADMIN — ALBUMIN HUMAN 50 G: 0.25 SOLUTION INTRAVENOUS at 06:16

## 2024-03-01 RX ADMIN — CEFDINIR 300 MG: 300 CAPSULE ORAL at 08:49

## 2024-03-01 RX ADMIN — ATORVASTATIN CALCIUM 80 MG: 40 TABLET, FILM COATED ORAL at 21:08

## 2024-03-01 RX ADMIN — BUMETANIDE 1 MG: 1 TABLET ORAL at 08:49

## 2024-03-01 RX ADMIN — ASPIRIN 325 MG ORAL TABLET 325 MG: 325 PILL ORAL at 08:49

## 2024-03-01 RX ADMIN — NYSTATIN: 100000 CREAM TOPICAL at 21:19

## 2024-03-01 RX ADMIN — WHITE PETROLATUM: 1.75 OINTMENT TOPICAL at 08:55

## 2024-03-01 RX ADMIN — CEFDINIR 300 MG: 300 CAPSULE ORAL at 21:08

## 2024-03-01 RX ADMIN — Medication 1 TABLET: at 15:53

## 2024-03-01 RX ADMIN — NYSTATIN: 100000 CREAM TOPICAL at 08:55

## 2024-03-01 ASSESSMENT — ACTIVITIES OF DAILY LIVING (ADL)
ADLS_ACUITY_SCORE: 33
ADLS_ACUITY_SCORE: 35
ADLS_ACUITY_SCORE: 33
ADLS_ACUITY_SCORE: 35
ADLS_ACUITY_SCORE: 35
ADLS_ACUITY_SCORE: 33
ADLS_ACUITY_SCORE: 33
ADLS_ACUITY_SCORE: 35
ADLS_ACUITY_SCORE: 33
ADLS_ACUITY_SCORE: 35
ADLS_ACUITY_SCORE: 33
ADLS_ACUITY_SCORE: 35
ADLS_ACUITY_SCORE: 35
ADLS_ACUITY_SCORE: 33
ADLS_ACUITY_SCORE: 35
ADLS_ACUITY_SCORE: 33

## 2024-03-01 ASSESSMENT — EJECTION FRACTION: EF_VALUE: .17

## 2024-03-01 NOTE — PROGRESS NOTES
Brief CV progress note:    Patient is pre angiogram. When performing Malapati assessment, suspicious growth was observed on patient's tongue that has patient reports has been present for a year and a half and has been increasing in size. Lesion is roughly dime sized and has irregular borders. Patient reports that it is not painful but is bothersome and bleeds easily. Findings reported to Dr. Dos Santos.

## 2024-03-01 NOTE — PROGRESS NOTES
[facility]  WO Nurse Inpatient Assessment     Consulted for: wound vac to right heel    Summary: 2/26 Patient asked WOC nurse not to talk during change but did ask about status of wound. Also asked about timing of fixing leaks/concerns with VAC dressing.     2/22 Patient in bed, very flat affect, did not speak during wound vac dressing change except to ask if I was done yet.    2/19 Patient in bed, going between flat affect to anger.  Did listen to wound vac teaching and the rationale for its use.  Is concerned with finances and how to care for it once out of hospital.    Patient History (according to provider note(s):      Floyd Capps is a 48 year old male with history of DM, HTN, CVA, obesity, recent ED visit for nonhealing diabetic foot ulcer with cellulitis, here for worsening pain in the foot, with necrotic tissue noted on exam.     Assessment:      Areas visualized during today's visit:  right foot    Negative pressure wound therapy applied to: right heel          2/19       2/26      3/1    Last photo: 2/26  Wound due to: Diabetic Ulcer   Wound history/plan of care:    Surgical date: 2/16   Service following: podiatry  Date Negative Pressure Wound Therapy initiated: 2/19   Interventions in place: offloading and elevation  Is patient s nutritional status compromised? no   If yes, what interventions are in place? N/A  Reason for initiating vac therapy? Presence of co-morbidities, High risk of infections, and Need for accelerated granulation tissue  Which?of?the?following?co-morbidities?apply? Diabetes, Obesity, and Smoking  If diabetic is patient on a diabetic management program? Yes   Is osteomyelitis present in wound? no   If yes what treatments are in place? N/A    Wound base: Unhealthy granulation/adipose mix, bone palpable     Palpation of the wound bed: firm       Drainage: scant      Volume in cannister: 50     Last cannister change date: 2/26     Description of drainage: none  "     Measurements (length x width x depth, in cm) 4.5 cm  x 7 cm  x  up to 2.4 cm       Tunneling N/A      Undermining N/A   Periwound skin: Macerated  but improving       Color: pale and pink       Temperature: normal    Odor: none   Pain: did not report any during visit  Pain intervention prior to dressing change: patient tolerated well, oral oxycodone, and slow and gentle cares   Treatment goal: Heal  and Protection  STATUS: improving   Supplies ordered: gathered    Number of foam pieces removed from a wound (excluding foam for bridge) :  1 GranuFoam Black   Verified this matched the number of foam pieces applied last dressing change: Yes   Number of foam pieces packed into wound (excluding foam for bridge) :  1 GranGreysonoam Black       Treatment Plan:     Negative pressure wound therapy plan:  Wound location: right heel   Change Days:  M/Th  by WOC RN    Supplies (including all accessories) used: medium Black foam   Cleanse with Vashe prior to replacing NPWT  Suction setting: -125   Methods used: Window paned all periwound skin with vac drape prior to applying sponge and Bridged trac pad off bony prominences    Staff RN to assess integrity of dressing and ensure suction is set at appropriate level every shift.   Date canister. Chart canister output every shift. Change cannister weekly and PRN if full/occluded     Remove foam dressing and replace with BID normal saline moist gauze dressing if:   -a dressing failure which cannot be repaired within 2 hours   -patient is discharging to home without a home pump   -patient is discharging to a facility outside the local area   -if a dressing is a \"Silver Foam\", remove before Radiation Therapy or MRI     The hospital VAC pump is not to be discharged with the patient.?Ensure to disconnect patient from machine prior to discharge. Then,    - If a home KCI VAC pump has been delivered, connect home cannister to dressing tubing then connect cannister to home pump and turn on " machine    - If transferring to a nearby facility with a KCI vac, can disconnect and clamp tubing then cover end with glove so can be reconnected within 2 hours       Orders: Reviewed    RECOMMEND PRIMARY TEAM ORDER: None, at this time  Education provided: plan of care, Infection prevention , and Off-loading pressure  Discussed plan of care with: Patient  WOC nurse follow-up plan:  M/Th  Notify WOC if wound(s) deteriorate.  Nursing to notify the Provider(s) and re-consult the WOC Nurse if new skin concern.    DATA:     Current support surface: Standard  Standard gel/foam mattress (IsoFlex, Atmos air, etc)  Containment of urine/stool: Continent of bladder and Continent of bowel  BMI: Body mass index is 34.83 kg/m .   Active diet order: Orders Placed This Encounter      Advance Diet as Tolerated: Fully Advanced to diet(s) per Provider order; 2 gm NA Diet     Output: I/O last 3 completed shifts:  In: 1330 [P.O.:1330]  Out: 3250 [Urine:3250]     Labs:   Recent Labs   Lab 02/27/24  0540   ALBUMIN 3.0*  3.1*   HGB 10.2*   WBC 13.5*     Pressure injury risk assessment:   Sensory Perception: 3-->slightly limited  Moisture: 3-->occasionally moist  Activity: 2-->chairfast  Mobility: 3-->slightly limited  Nutrition: 3-->adequate  Friction and Shear: 3-->no apparent problem  Rolo Score: 17    DAYAN PisanoN, RN, PHN, HNB-BC, CWOCN  Pager no longer is use, please contact through [facility]: [facility]

## 2024-03-01 NOTE — PROGRESS NOTES
RENAL PROGRESS NOTE    PLAN:  Hold Bumex this p.m. with planned contrast, likely resume tomorrow  coronary angio today  Cont to hold ACE/ARB therapy and rec introducing in ~ 48 hrs if RF stable post contrast  HTNives per cards  BMP in a.m. 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 And ongoing diuretic needs seems to be primary issue; dramatic improvement in sCR 3/1 despite heavy diuresis support CRS as   Prior Vanco/Zosyn may be contributing, now off IV antibx  UAs x 2 this admit with few 2-3 RBC, otherwise bland  Nephrotic range proteinuria,   Baseline Cr 0.8 with severe nephrotic range proteinuria and some cells on UA. Suspect he has CKD/GN most likely related to diabetic nephropathy, all serologies neg except sl monoclonal AB on urine immunofix, plan to f/up and repeat in several months.   No renal biopsy needed now and not too inclined to do in future to confirm DN given overall non compliance hx and MMP, relative stability and would likely not change POC  Would rec maxing ARB, SGLT2 inhib eventually once cr stable and medically optimized CHF   Plan for outpt follow up at our clinic:   Monday March 18 at 11:00AM with Ruth Mike NP at [facility]     HTN   Mostly controlled.   Currently managing per cards (Imdur/BB/Hydral/ACE/ARB on hold, on dobutamine gtt)  would resume ACE/ARB once RF stable d/t proteinuria      Volume overload  Looks euvolemic Much improved, net neg 33+L, though suspect not all intake documented given reports that pt has been non-compliant with FR per RN notes in chart, (bed weight inconsistent), cards resumed diuretics again on 2/28 (added dobutamine)   Labs suggest some contraction, most recent chest imaging showed reduced bilat pleural effusions (though ? Asp pneumonia)    Hypoxia:  Stable on 2L per NC, CT imaging 2/27 showed  reduced bilat pleural effusions (though ? Asp pneumonia), less convinced the hypervolemia driving     New HFrEF combined systolic/diastolic:    had negative stress test last fall, but concern for occult ASCAD with wall motion abnormalities, cardiac angio today   EF down to 30-35% with global hypokinesis, and inc filling pressures. Severe Diastolic dysfunction     Anemia   Hgb 10's and stable normocytic  Iron stores low, hold off on IV iron with infection, consider IV iron once cleared and off antibx  Start oral daily iron      Lytes:   Mild alkalosis with diuresis, stable     HLD -   statin     DM2 -  historically poor control, A1c ~ 12 insulin this admit   Management per [facility]     Diabetic foot ulcer   debridement, wound vac  .Zosyn and daptomycin completed 3/1 per ID, no osteo    PNA:  On oral Cefdinir x 4 days , ID signed off       Obesity     Tobacco use - nicotine patches in use this admit      SUBJECTIVE:  Floyd is again quite  tired and min interactive, doesn't like all the interruptions, NPO for cardiac  angio; he is urinating, denies edema, says SOB about the same, reviewed labs with dramatic improvement in serum CR with dobutamine and diuresis supporting CRS as primary etiol.  Angio today and reviewed contrast risk, but necessary to eval.      OBJECTIVE:  Physical Exam   Temp: 98.1  F (36.7  C) Temp src: Oral BP: 119/71 Pulse: 73   Resp: 18 SpO2: 95 % O2 Device: Nasal cannula Oxygen Delivery: 3 LPM  Vitals:    24 1700 24 0332 24 0633   Weight: 118.3 kg (260 lb 11.2 oz) 118.4 kg (261 lb 1.6 oz) 110.1 kg (242 lb 11.6 oz)     Vital Signs with Ranges  Temp:  [97.8  F (36.6  C)-98.3  F (36.8  C)] 98.1  F (36.7  C)  Pulse:  [73-85] 73  Resp:  [18-20] 18  BP: (119-157)/(71-83) 119/71  SpO2:  [93 %-97 %] 95 %  I/O last 3 completed shifts:  In: 1860 [P.O.:1860]  Out: 4925 [Urine:4925]    Temp (24hrs), Av.2  F (36.8  C), Min:98  F (36.7  C), Max:98.6  F (37  C)      Patient Vitals for  the past 72 hrs:   Weight   03/01/24 0633 110.1 kg (242 lb 11.6 oz)   02/28/24 0332 118.4 kg (261 lb 1.6 oz)   02/27/24 1700 118.3 kg (260 lb 11.2 oz)   02/27/24 1100 114.6 kg (252 lb 10.4 oz)     Intake/Output Summary (Last 24 hours) at 2/27/2024 1057  Last data filed at 2/27/2024 0900  Gross per 24 hour   Intake 1450 ml   Output 2400 ml   Net -950 ml       PHYSICAL EXAM:  General - was sleeping initially, then Alert and oriented x3, appears comfortable, NAD, supine in bed,somewhat irritable affect today   Cardiovascular - Rate controlled, SBP 150s, on dobut gtt   Respiratory - sats good on 1-2 L , CTA by ant exam only d/t positioning in bed   Abd:no guarding or pain with palpation, no overt ascites  Extremities - No sig lower extremity edema bilaterally, R foot dressing intact/wound vac in place  Skin: quite dry, R cellulitis with some erythema.  No obvious drainage   Neuro:  Grossly intact, no focal deficits  MSK:  Grossly intact, by cursory bed exam    Psych: irritable affect  Wts erratic    LABORATORY STUDIES:     Recent Labs   Lab 02/29/24  0422 02/27/24  0540 02/26/24  0508   WBC  --  13.5*  --    RBC  --  3.76*  --    HGB  --  10.2*  --    HCT  --  34.6*  --     308 326       Basic Metabolic Panel:  Recent Labs   Lab 03/01/24  0840 03/01/24  0441 02/29/24  2147 02/29/24  1725 02/29/24  1259 02/29/24  0834 02/29/24  0422 02/28/24  0845 02/28/24  0420 02/27/24  0834 02/27/24  0540 02/26/24  0738 02/26/24  0508 02/25/24  0719 02/25/24  0549 02/24/24  0728 02/24/24  0527   NA  --  131*  --   --   --   --  135  --  132*  --  132*  --  137  --  136  --  135   POTASSIUM  --  4.0  --   --   --   --  4.0  --  4.4  --  4.3  --  4.0  --  3.9  --  4.1  4.1   CHLORIDE  --  92*  --   --   --   --  95*  --  96*  --  97*  --   --   --  99  --  96*   CO2  --  33*  --   --   --   --  34*  --  25  --  30*  --  28  --  28  --  34*   BUN  --  21.4*  --   --   --   --  22.0*  --  22.6*  --  20.9*  --   --   --  16.2  --   16.0   CR  --  0.74  --   --   --   --  1.59*  --  1.60*  --  1.70*  --  1.59*  --  1.40*  --  1.72*   * 265* 232* 181* 254* 152* 175*   < > 246*   < > 200*   < >  --    < > 157*   < > 174*   SARAH  --  9.1  --   --   --   --  9.2  --  8.9  --  9.1  --   --   --  8.6  --  8.8    < > = values in this interval not displayed.       INRNo lab results found in last 7 days.     Recent Labs   Lab Test 02/29/24  0422 02/27/24  0540 02/26/24  0508 02/23/24  0618 08/24/23  0645 08/23/23  0946   INR  --   --   --   --   --  0.95   WBC  --  13.5*  --  11.3*   < > 11.2*   HGB  --  10.2*  --  10.6*   < > 13.9    308   < > 337   < > 290    < > = values in this interval not displayed.       Personally reviewed current labs      PAULETTE Sandoval-BC  [facility]  [phone]

## 2024-03-01 NOTE — PRE-PROCEDURE
GENERAL PRE-PROCEDURE:   Procedure:  Coronary angiogram, possible percutaneous coronary intervention  Date/Time:  3/1/2024 12:42 PM    Written consent obtained?: Yes    Risks and benefits: Risks, benefits and alternatives were discussed    Consent given by:  Patient  Patient states understanding of procedure being performed: Yes    Patient's understanding of procedure matches consent: Yes    Procedure consent matches procedure scheduled: Yes    Expected level of sedation:  Moderate  Appropriately NPO:  Yes  ASA Class:  4 (HFrEF, cardiogenic shock, CKD stage II-III, DM type II, tobacco use disorder, HTN, dyslipidemia, class I obesity; BMI 34.83 kg/m , anemia)  Mallampati  :  Grade 3- soft palate visible, posterior pharyngeal wall not visible (Irregular growth on tongue)  Lungs:  Lungs clear with good breath sounds bilaterally  Heart:  Normal heart sounds and rate  History & Physical reviewed:  History and physical reviewed and updates made (see comment)  H&P Comments:  History & Physical reviewed:  History and physical reviewed and updates made (see comment)  H&P Comments:  Clinically Significant Risk Factors Present on Admission     Cardiovascular : HFrEF, cardiogenic shock, CKD stage II-III, DM type II, tobacco use disorder, HTN, dyslipidemia, Class I obesity; BMI 34.83 kg/m      Fluid & Electrolyte Disorders : Not present on admission     Gastroenterology : Not present on admission     Hematology/Oncology : Anemia; acute, stable     Nephrology : Not present on admission     Neurology : Not present on admission     Pulmonology : Not present on admission     Systemic : DM Type II, chronic LE wound    Statement of review:  I have reviewed the lab findings, diagnostic data, medications, and the plan for sedation    Statement of review:  I have reviewed the lab findings, diagnostic data, medications, and the plan for sedation

## 2024-03-01 NOTE — PLAN OF CARE
Pt alert and oriented. Vss on room air. Pt prepped and ready for cardiac procedure.

## 2024-03-01 NOTE — PLAN OF CARE
Problem: Heart Failure  Goal: Stable Heart Rate and Rhythm  Outcome: Progressing     Problem: Heart Failure  Goal: Fluid and Electrolyte Balance  Outcome: Progressing  Intervention: Monitor and Manage Fluid and Electrolyte Balance  Recent Flowsheet Documentation  Taken 3/1/2024 0400 by Jeanine Dobson RN  Fluid/Electrolyte Management: fluids restricted  Taken 3/1/2024 0000 by Jeanine Dobson RN  Fluid/Electrolyte Management: fluids restricted  Taken 2/29/2024 2000 by Jeanine Dobson RN  Fluid/Electrolyte Management: fluids restricted     Problem: Heart Failure  Goal: Effective Oxygenation and Ventilation  Outcome: Progressing  Intervention: Promote Airway Secretion Clearance  Recent Flowsheet Documentation  Taken 3/1/2024 0400 by Jeanine Dobson RN  Cough And Deep Breathing: done independently per patient  Activity Management: activity adjusted per tolerance  Taken 3/1/2024 0000 by Jeanine Dobson RN  Cough And Deep Breathing: done independently per patient  Activity Management: activity adjusted per tolerance  Taken 2/29/2024 2000 by Jeanine Dobson RN  Cough And Deep Breathing: done independently per patient  Activity Management: activity adjusted per tolerance  Intervention: Optimize Oxygenation and Ventilation  Recent Flowsheet Documentation  Taken 3/1/2024 0400 by Jeanine Dobson RN  Head of Bed (HOB) Positioning: HOB at 20-30 degrees  Taken 3/1/2024 0000 by Jeanine Dobson RN  Head of Bed (HOB) Positioning: HOB at 20-30 degrees  Taken 2/29/2024 2000 by Jeanine Dobson RN  Head of Bed (HOB) Positioning: HOB at 20-30 degrees     Problem: Pain Acute  Goal: Optimal Pain Control and Function  Outcome: Progressing  Intervention: Develop Pain Management Plan  Recent Flowsheet Documentation  Taken 2/29/2024 2000 by Jeanine Dobson RN  Pain Management Interventions: medication (see MAR)  Intervention: Prevent or Manage Pain  Recent Flowsheet Documentation  Taken 3/1/2024 0400  by Jeanine Dobson RN  Sensory Stimulation Regulation:   lighting decreased   quiet environment promoted   television on  Sleep/Rest Enhancement:   awakenings minimized   noise level reduced   regular sleep/rest pattern promoted   room darkened  Medication Review/Management: medications reviewed  Taken 3/1/2024 0000 by Jeanine Dobson RN  Sensory Stimulation Regulation:   lighting decreased   quiet environment promoted   television on  Sleep/Rest Enhancement:   awakenings minimized   noise level reduced   regular sleep/rest pattern promoted   room darkened  Medication Review/Management: medications reviewed  Taken 2/29/2024 2000 by Jeanine Dobson RN  Sensory Stimulation Regulation:   lighting decreased   quiet environment promoted   television on  Sleep/Rest Enhancement:   awakenings minimized   noise level reduced   regular sleep/rest pattern promoted   room darkened  Medication Review/Management: medications reviewed  Intervention: Optimize Psychosocial Wellbeing  Recent Flowsheet Documentation  Taken 3/1/2024 0400 by Jeanine Dobson RN  Supportive Measures:   active listening utilized   decision-making supported  Taken 3/1/2024 0000 by Jeanine Dobson RN  Supportive Measures:   active listening utilized   decision-making supported  Taken 2/29/2024 2000 by Jeanine Dobson RN  Supportive Measures:   active listening utilized   decision-making supported     Problem: Adult Inpatient Plan of Care  Goal: Absence of Hospital-Acquired Illness or Injury  Intervention: Prevent Skin Injury  Recent Flowsheet Documentation  Taken 3/1/2024 0400 by Jeanine Dobson RN  Body Position:   position changed independently   refuses positioning   supine, legs elevated  Device Skin Pressure Protection: tubing/devices free from skin contact  Taken 3/1/2024 0000 by Jeanine Dobson RN  Body Position:   position changed independently   refuses positioning   supine, legs elevated  Device Skin Pressure  Protection: tubing/devices free from skin contact  Taken 2/29/2024 2000 by Jeanine Dobson RN  Body Position:   position changed independently   refuses positioning   supine, legs elevated  Device Skin Pressure Protection: tubing/devices free from skin contact     Goal Outcome Evaluation:  Pt endorses pain in his right foot. PRN Oxycodone and tylenol given with some relief. Pt also experiencing some nausea so zofran given x1 with relief. Dobutamine gtt running as ordered. Pt was also receiving IV abx when PIV infiltrated. Dr. Mahmood notified and ordered PICC placement for continued dobutamine infusion and med incompatibility after unsuccessful new PIV placement. Pt tolerated procedure well. Wound vac was beeping during the evening shift so seal and dressing changed/reapplied and has been working well since. Contact precautions in place. 1800 FR maintained. Urinal at bedside with great urine output.  at HS. O2 sats in the mid to upper 90s on 2L NC. NSR. A/O. VSS. Will continue to monitor and notify MD of any changes.

## 2024-03-01 NOTE — PROGRESS NOTES
[facility]    Medicine Progress Note - Hospitalist Service    Date of Admission:  2/14/2024    Assessment & Plan   Floyd Capps is a 48 year old male with history of DM, HTN, CVA, obesity, recent ED visit for nonhealing diabetic foot ulcer with cellulitis, here for worsening pain in the foot, with necrotic tissue noted on exam.  Patient admitted on 2/14/2024.            A/p :       Diabetic foot ulcer, right heel;  -chronic wound R heel, recent resulting cellulitis treated with Bactrim  -here for worse pain in the foot, found with ulcer worse with necrotic tissue  -MRI of the foot no osteomyelitis  -On 2/16, s/p debridement with irrigation of right foot diabetic ulcer. NWB on right foot per podiatrist  -On 2/19, wound VAC placed, continue  -Surgical cultures polymicrobials.  Initially IV Vanco and IV Zosyn, now changed to IV Zosyn and IV daptomycin - ended 2/29, Then cefdinir PO for 4 days for possible aspiration PNA .  Appreciated ID input  -- on Feb 27 - CT abdomen done due to abdominal pain, nausea, vomiting - concern for possible pneumonia, discussed with ID and patient well covered and treated with broad spectrum antibiotics - zosyn, dapto, may have had a aspiration episode, seems stable now.  -Care Mgr to see about financial/insurance issues which have been a barrier to followup  -Pain management with Tylenol, home oxycodone.     Acute new onset systolic heart failure;  CT imaging reported moderate bilateral pleural effusion and diffuse anasarca;  -- On 2/19, patient reported feeling shortness of breath, imaging workup showed bilateral moderate pleural effusion and diffuse anasarca  --BNP significantly elevated   --Echo on 8/23 reported EF 55% but given patient noncompliance with medications, rechecked echocardiogram with EF 30 to 35% with global hypokinesis.  --Of note, patient had nuclear stress test on 8/23 which was negative for inducible myocardial ischemia  -- On 2/23, IV Bumex  changed to oral, holding today due to worsening creatinine - now resumed - on oral bumex 2 mg bid.  -- also put on dobutamine drip - now weaned off.  --Monitor intake/output, weight, labs closely  - creatinine stable at 1.59      CAD :   S/p cardiac cath today - March 1 : multivessel CAD, CT surgery consulted, on aspirin, statin.    SCHUYLER: Likely due to CHF exacerbation, possibly due to diabetic nephropathy  - Baseline is 0.6-0.8  -On a combination of broad spectrum abx ( Vanc and Zosyn)  -Check UA with no significant RBC's or casts   -On 2/21, personally discussed with nephrology, given proteinuria multiple serological test ordered and process.  May need kidney biopsy in future pending test results  -Electrolyte imbalance, replace per protocol  creatinine stable at 1.59    DM Type II, Uncontrolled; improving  -Stopped all his home meds lately due to stomach upset while on Bactrim, then neglected to resume meds. Hold home metformin and glipizide for now.  -A1c 11.8  -Started Lantus and gradually titrating, increased to 248 unit at night on 2/21. Of note, patient was on insulin in the past and received education in the hospital about it, but seems to have stopped on its own.  Patient needs outpatient diabetic educator for ongoing titration  -Insulin sliding scale and hypoglycemic protocol  -Given poor appetite and early satiety, trial of Reglan which seems helping  -Also added PPI    History of stroke in 8/2023;  -Had been taking Plavix and atorvastatin but recently stopped taking all of his medications as above.  -Neurologist note from 8/26/2023 reviewed- at that time recommended DAPT with aspirin and Plavix for 90 days, afterwards switch to enteric-coated aspirin 325 mg daily.  -Started full dose aspirin and resumed home atorvastatin    Essential hypertension, uncontrolled; due to noncompliance-improving  -- Discharge summary from 8/26/2023 reviewed, patient was discharged home on Coreg 12.5 mg twice daily, HCTZ 12.5  "mg daily, losartan 100 mg daily  --On 2/19 restarted Coreg 12.5 mg twice daily, increased to 25 mg twice daily on 2/24 by cardiology.  --Diuretics as above.  As needed hydralazine.  Monitor vitals and adjust medications accordingly.    Tobacco use disorder  -Nicotine Patch  -Advised to quit smoking     Left distal radius fracture due to recent fall on 2/6/2023  -Cast in place  -PT and OT evals  -Outpatient orthopedics follow-up     Normocytic anemia, likely due to chronic wound;  -No active/obvious bleeding.  Trend     Poor follow-up, noncompliance;  -Importance of compliance and follow-up needs to be emphasized.  Patient with relatively young age and already with serious comorbidities of chronic disease.          Diet: Fluid restriction 1800 ML FLUID  Snacks/Supplements Adult: Expedite Bottle; With Meals  Snacks/Supplements Adult: Glucerna; With Meals  Advance Diet as Tolerated: Fully Advanced to diet(s) per Provider order; 2 gm NA Diet    DVT Prophylaxis: Enoxaparin (Lovenox) SQ  Cazares Catheter: Not present  Lines: PRESENT      PICC 02/29/24 Double Lumen Right Basilic Dobutamine drip, Access-Site Assessment: WDL    Cardiac Monitoring: ACTIVE order. Indication: Post- PCI/Angiogram (24 hours)  Code Status: Full Code      Clinically Significant Risk Factors              # Hypoalbuminemia: Lowest albumin = 2 g/dL at 2/20/2024  5:46 AM, will monitor as appropriate        # Acute heart failure with reduced ejection fraction: last echo with EF <40% and receiving IV diuretics      # DMII: A1C = 11.8 % (Ref range: <5.7 %) within past 6 months   # Obesity: Estimated body mass index is 34.83 kg/m  as calculated from the following:    Height as of this encounter: 1.778 m (5' 10\").    Weight as of this encounter: 110.1 kg (242 lb 11.6 oz).   # Moderate Malnutrition: based on nutrition assessment      # Financial/Environmental Concerns: none         Disposition Plan      Expected Discharge Date: 03/03/2024    Discharge Delays: " Placement - TCU  Destination: home with family  Discharge Comments: insurance needs, coronary angio this week, TCU?            Moses Dos Santos MD  Hospitalist Service  [facility]  Securely message with [employer] (more info)  Text page via [employer] Paging/Directory   ______________________________________________________________________      INTERVAL HISTORY :       March 1 :     S/p cardiac cath today : multivessel CAD, CT surgery consulted  On oral bumex, weaned off dobutamine drip  Cardiology following     antibiotics per ID -  Daptomycin, zosyn - ended 2/29,   Then cefdinir PO for 4 days for possible aspiration PNA     Creatinine stable   nephrology following            Physical Exam   Vital Signs: Temp: 97.4  F (36.3  C) Temp src: Oral BP: (!) 148/78 Pulse: 74   Resp: 16 SpO2: 91 % O2 Device: Nasal cannula Oxygen Delivery: 2 LPM  Weight: 242 lbs 11.62 oz      General: Not in obvious distress.  HEENT: Normocephalic, supple neck  Chest: Clear to auscultation bilateral anteriorly, no wheezing  Heart: S1S2 normal, regular  Abdomen: Soft.  Obese, nontender. Bowel sounds- active.  Extremities: Bilateral lower extremity swelling, right foot with wound VAC  Neuro: alert and awake, grossly non-focal        Medical Decision Making             Data     I have personally reviewed the following data over the past 24 hrs:    N/A  \   N/A   / N/A     131 (L) 92 (L) 21.4 (H) /  181 (H)   4.0 33 (H) 0.74 \     Ferritin:  N/A % Retic:  N/A LDH:  N/A       Imaging results reviewed over the past 24 hrs:   Recent Results (from the past 24 hour(s))   Cardiac Catheterization    Narrative    Images from the original result were not included.  Floyd Capps is a 48 year old old male with HTN, HL, DM, smoking, high   BMI who is here for w/up of new cardiomyopathy/admission for ADHF.    - multi-vessel native CAD; severely elevated L-sided filling pressures  - will stop to obtain CTS consult  - continue ASA 81mg daily  indefinitely, atorva 80  - continue aggressive risk factor modification          Findings:  LM:no obstruction  LAD:proximal Ca2+ 70% narrowing, mid-distal 90% narrowing. Small D1 w/   diffuse 90% narrowing  Lcx:OM1 proximal 90%, small superior subdivision 90%, inferior 80%. OM1   small 90% narrowing  RCA:dominant, distal 85%, rPDA 95% narrowing    LVEDP:35    Access:  R Radial artery    Closure:   Vasc Band

## 2024-03-01 NOTE — PROCEDURES
"PICC Line Insertion Procedure Note  Pt. Name: Floyd Capps  MRN:        [id number]    Procedure: Insertion of a  dual Lumen  5 fr  Bard SOLO (valved) Power PICC, Lot number [id number]    Indications: Dobutamine drip, Access    Contraindications : Left arm fracture    Procedure Details     Patient identified with 2 identifiers and \"Time Out\" conducted.  .     Central line insertion bundle followed: hand hygiene performed prior to procedure, site cleansed with cholraprep, hat, mask, sterile gloves, sterile gown worn, patient draped with maximum barrier head to toe drape, sterile field maintained.    The vein was assessed and found to be compressible and of adequate size.     Lidocaine 1% 2 ml administered sq to the insertion site. A 5 Fr PICC was inserted into the basilic vein of the right arm with ultrasound guidance. 1 attempt(s) required to access vein.   Catheter threaded without difficulty. Good blood return noted.    Modified Seldinger Technique used for insertion.    The 8 sharps that are included in the PICC insertion kit were accounted for and disposed of in the sharps container prior to breakdown of the sterile field.    Catheter secured with Statlock, biopatch and Tegaderm dressing applied.    Findings:    Total catheter length  43 cm, with 0 cm exposed. Mid upper arm circumference is 30 cm. Catheter was flushed with 20 cc NS. Patient  tolerated procedure well.    Tip placement verified by [employer] technology  . Tip placement in the SVC/RA junction.    CLABSI prevention brochure left at bedside.    Patient's primary RN notified PICC is ready for use.      Comments:        Rani Oliver RN  Vascular Access - [facility]      "

## 2024-03-01 NOTE — PROGRESS NOTES
Care Management Follow Up    Length of Stay (days): 16    Expected Discharge Date: 03/03/2024     Concerns to be Addressed:    medical progression, off drips, TCU placement   Patient plan of care discussed at interdisciplinary rounds: Yes    Anticipated Discharge Disposition: TCU      Anticipated Discharge Services:    Anticipated Discharge DME:      Patient/family educated on Medicare website which has current facility and service quality ratings:    Education Provided on the Discharge Plan:    Patient/Family in Agreement with the Plan:      Referrals Placed by CM/SW:  TCU  Private pay costs discussed: Not applicable    Additional Information:  Social History per previous CM note:  Pt lives alone in an apartment.    Therapy recommendation is TCU and pt is accepting of this however up until today pt's insurance has been pending. As of today pt has Medicaid, and will now be able to go to TCU without being private pay. This was discussed with the pt and he doesn't care where he goes he would like to try and stay in [place], [place], [place], [place] area. CM sent referrals out to TCUs in this area.     Per nursing update pt is to have angio today, not medically ready to discharge.    RNCM to follow for medical progression, recommendations, and final discharge plan.      Rashmi Plunkett RN

## 2024-03-01 NOTE — PROGRESS NOTES
HEART CARE NOTE          Assessment/Recommendations   1. Severe HFrEF c/b cardiogenic shock  Assessment / Plan  New onset; will need ischemia eval; olamide function demonstrating recovery - will proceed with coronary angiogram +/- PCI; I discussed this with him including potential risk of stroke, myocardial infarction, or death.  We also discussed the possibility of vascular injury, bleeding requiring blood transfusion, or reaction to x-ray dye   Diuresing well on current regimen; renal function stable to improved; continue Bumex BID; wean dobutamine gtt  Patient is high risk for adverse cardiac events 2/2  severe systolic dysfunction, elevated NTproBNP, non-compliance  GDMT as detailed below     Current Pharmacotherapy AHA Guideline-Directed Medical Therapy   Losartan 25 mg - on hold given SCHUYLER Lisinopril 20 mg twice daily   Carvedilol 25 mg twice daily - held while on inotrope Carvedilol 25 mg twice daily   Spironolactone not started Spironolactone 25 mg once daily   Hydralazine NA Hydralazine 100 mg three times daily   Isosorbide dinitrate NA Isosorbide dinitrate 40 mg three times daily   SGLT2 inhibitor:Dapagliflozin/Empagliflozin - not started Dapagliflozin or Empagliflozin 10 mg daily      2. SCHUYLER in CKD  Assessment / Plan  Likely CRS in the setting of ADHF c/b antibiotic regimen- resolving; diuresis as above   Continue to monitor UOP and renal function closely     3. DM2  Assessment / Plan  C/b DM foot ulcer  Management per primary team  Currently on ISS     4. Tobacco abuse  Assessment / Plan  Counseled regarding cessation     5. HTN  Assessment / Plan  Adequately controlled on current regimen - no changes at this time    Plan of care discussed on March 1, 2024 with patient at bedside, and primary team overseeing patient's care     50 minutes spent reviewing prior records (including documentation, laboratory studies, cardiac testing/imaging), history and physical exam, planning, and subsequent  "documentation.      History of Present Illness/Subjective    Mr. Floyd Capps is a 48 year old male with a PMHx significant for (per Epic notation) DM, HTN, CVA, obesity, recent ED visit for nonhealing diabetic foot ulcer with cellulitis, here for worsening pain in the foot, with necrotic tissue noted on exam now found to be in new onset HFrEF     Today, Mr. Capps denies acute cardiac events or complaints; Management plan as detailed above     ECG: Personally reviewed. normal sinus rhythm, nonspecific ST and T waves changes.     ECHO (personnaly Reviewed on 2/21/24):   The left ventricle is normal in size. There is mild concentric left  ventricular hypertrophy.  Left ventricular function is decreased. The ejection fraction is 30-35%  (moderately reduced). There is global hypokinesis with severe hypokinesis of  the mid to apical inferior wall.  Diastolic Doppler findings (E/E' ratio and/or other parameters) suggest left  ventricular filling pressures are increased.     The right ventricle is normal in size and function.  Normal left atrial size. Right atrial size is normal.  There is mild (1+) mitral regurgitation.  IVC diameter >2.1 cm collapsing <50% with sniff suggests a high RA pressure  estimated at 15 mmHg or greater.     Compared to previous study from 8/23/2023 the LV systolic function has  declined and there are regional wall motion abnormalities.    Lab results: personally reviewed March 1, 2024; notable for resolving SCHUYLER    Medical history and pertinent documents reviewed in Care Everywhere please where applicable see details above        Physical Examination Review of Systems   /77 (BP Location: Left arm, Patient Position: Semi-Arnett's)   Pulse 77   Temp 97.8  F (36.6  C) (Oral)   Resp 20   Ht 1.778 m (5' 10\")   Wt 118.4 kg (261 lb 1.6 oz)   SpO2 97%   BMI 37.46 kg/m    Body mass index is 37.46 kg/m .  Wt Readings from Last 3 Encounters:   02/28/24 118.4 kg (261 lb 1.6 oz)   02/13/24 122.5 " kg (270 lb)   02/06/24 122.5 kg (270 lb)     General Appearance:   no distress, normal body habitus   ENT/Mouth: membranes moist, no oral lesions or bleeding gums.      EYES:  no scleral icterus, normal conjunctivae   Neck: no carotid bruits or thyromegaly   Chest/Lungs:   lungs are clear to auscultation, no rales or wheezing, equal chest wall expansion    Cardiovascular:   Regular. Normal first and second heart sounds with no murmurs, rubs, or gallops; the carotid, radial and posterior tibial pulses are intact, no JVD and mild LE edema bilaterally    Abdomen:  no organomegaly, masses, bruits, or tenderness; bowel sounds are present   Extremities: no cyanosis or clubbing   Skin: no xanthelasma, warm.    Neurologic: NAD   Psychiatric: alert and oriented x3, calm     A complete 10 systems ROS was reviewed  And is negative except what is listed in the HPI.          Medical History  Surgical History Family History Social History   Past Medical History:   Diagnosis Date    Diabetes (H)     Past Surgical History:   Procedure Laterality Date    APPENDECTOMY      INCISION AND DRAINAGE OF WOUND      groin abscess    IRRIGATION AND DEBRIDEMENT FOOT, COMBINED Right 2/16/2024    Procedure: IRRIGATION AND DEBRIDEMENT RIGHT FOOT;  Surgeon: Cristofer Sims DPM;  Location: [facility] [place] OR    PICC DOUBLE LUMEN PLACEMENT  2/29/2024    no family history of premature coronary artery disease Social History     Socioeconomic History    Marital status: Single     Spouse name: Not on file    Number of children: Not on file    Years of education: Not on file    Highest education level: Not on file   Occupational History    Not on file   Tobacco Use    Smoking status: Every Day     Packs/day: .5     Types: Cigarettes    Smokeless tobacco: Never    Tobacco comments:     Seen IP by CTTS on 8/24/23 and declined cessation services and materials.    Vaping Use    Vaping Use: Never used   Substance and Sexual Activity    Alcohol use: No    Drug  use: No    Sexual activity: Not on file   Other Topics Concern    Not on file   Social History Narrative    Patient reports that he does not have health insurance.     Social Determinants of Health     Financial Resource Strain: Low Risk  (10/20/2023)    Financial Resource Strain     Within the past 12 months, have you or your family members you live with been unable to get utilities (heat, electricity) when it was really needed?: No   Food Insecurity: High Risk (10/20/2023)    Food Insecurity     Within the past 12 months, did you worry that your food would run out before you got money to buy more?: Yes     Within the past 12 months, did the food you bought just not last and you didn t have money to get more?: No   Transportation Needs: Low Risk  (10/20/2023)    Transportation Needs     Within the past 12 months, has lack of transportation kept you from medical appointments, getting your medicines, non-medical meetings or appointments, work, or from getting things that you need?: No   Physical Activity: Not on file   Stress: Not on file   Social Connections: Not on file   Interpersonal Safety: Low Risk  (10/20/2023)    Interpersonal Safety     Do you feel physically and emotionally safe where you currently live?: Yes     Within the past 12 months, have you been hit, slapped, kicked or otherwise physically hurt by someone?: No     Within the past 12 months, have you been humiliated or emotionally abused in other ways by your partner or ex-partner?: No   Housing Stability: High Risk (10/20/2023)    Housing Stability     Do you have housing? : Yes     Are you worried about losing your housing?: Yes           Lab Results    Chemistry/lipid CBC Cardiac Enzymes/BNP/TSH/INR   Lab Results   Component Value Date    CHOL 156 02/21/2024    HDL 39 (L) 02/21/2024    TRIG 199 (H) 02/21/2024    BUN 21.4 (H) 03/01/2024     (L) 03/01/2024    CO2 33 (H) 03/01/2024    Lab Results   Component Value Date    WBC 13.5 (H)  "02/27/2024    HGB 10.2 (L) 02/27/2024    HCT 34.6 (L) 02/27/2024    MCV 92 02/27/2024     02/29/2024    Lab Results   Component Value Date    TSH 2.72 08/23/2023    INR 0.95 08/23/2023     No results found for: \"CKTOTAL\", \"CKMB\", \"TROPONINI\"       Weight:    Wt Readings from Last 3 Encounters:   02/28/24 118.4 kg (261 lb 1.6 oz)   02/13/24 122.5 kg (270 lb)   02/06/24 122.5 kg (270 lb)       Allergies  No Known Allergies      Surgical History  Past Surgical History:   Procedure Laterality Date    APPENDECTOMY      INCISION AND DRAINAGE OF WOUND      groin abscess    IRRIGATION AND DEBRIDEMENT FOOT, COMBINED Right 2/16/2024    Procedure: IRRIGATION AND DEBRIDEMENT RIGHT FOOT;  Surgeon: Cristofer Sims DPM;  Location: [facility] [place] OR    PICC DOUBLE LUMEN PLACEMENT  2/29/2024       Social History  Tobacco:   History   Smoking Status    Every Day    Packs/day: 0.50    Types: Cigarettes   Smokeless Tobacco    Never    Alcohol:   Social History    Substance and Sexual Activity      Alcohol use: No   Illicit Drugs:   History   Drug Use No       Family History  History reviewed. No pertinent family history.       Shayna Arndt MD on 3/1/2024      cc: Glendy Benavides    "

## 2024-03-01 NOTE — PROGRESS NOTES
[facility]    Medicine Progress Note - Hospitalist Service    Date of Admission:  2/14/2024    Assessment & Plan   Floyd Capps is a 48 year old male with history of DM, HTN, CVA, obesity, recent ED visit for nonhealing diabetic foot ulcer with cellulitis, here for worsening pain in the foot, with necrotic tissue noted on exam.  Patient admitted on 2/14/2024.      INTERVAL HISTORY :       Feb 25 :       No new issues noted today  Continue antibiotics per ID - on Daptomycin, zosyn  Cardiology, nephrology following        Not medically ready for discharge at this time.          Feb 26 :       No new issues noted today  Continue antibiotics per ID - on Daptomycin, zosyn  Cardiology, nephrology following        Not medically ready for discharge at this time.        Feb 27 :       No new issues noted today  Continue antibiotics per ID - on Daptomycin, zosyn  On dobutamine drip  Cardiology, nephrology following    C/o abdominal pain, nausea, vomiting today  CT abdomen done : concerning for pneumonia  Patient already on dapto, zosyn  Consulted ID        Not medically ready for discharge at this time.            Feb 28 :       No new issues noted today  Continue antibiotics per ID - on Daptomycin, zosyn for now  On dobutamine drip  Cardiology, nephrology following    C/o abdominal pain, nausea, vomiting today  CT abdomen done : concerning for pneumonia but patient well  covered on current antibiotics  Patient already on dapto, zosyn  Consulted ID        Not medically ready for discharge at this time.            Feb 29 :       No new issues noted today  Continue antibiotics per ID - on Daptomycin, zosyn for now - end 2/29, Then cefdinir PO for 4 days for possible aspiration PNA   On dobutamine drip, oral bumex  Cardiology, nephrology following    C/o abdominal pain, nausea, vomiting today  CT abdomen done : concerning for pneumonia but patient well  covered on current antibiotics  Patient already on  dapto, zosyn  Consulted ID        Not medically ready for discharge at this time.      A/p :       Diabetic foot ulcer, right heel;  -chronic wound R heel, recent resulting cellulitis treated with Bactrim  -here for worse pain in the foot, found with ulcer worse with necrotic tissue  -MRI of the foot no osteomyelitis  -On 2/16, s/p debridement with irrigation of right foot diabetic ulcer. NWB on right foot per podiatrist  -On 2/19, wound VAC placed, continue  -Surgical cultures polymicrobials.  Initially IV Vanco and IV Zosyn, now changed to IV Zosyn and IV daptomycin.  Appreciated ID input  -Care Mgr to see about financial/insurance issues which have been a barrier to followup  -Pain management with Tylenol, home oxycodone.     Acute new onset systolic heart failure;  CT imaging reported moderate bilateral pleural effusion and diffuse anasarca;  -- On 2/19, patient reported feeling shortness of breath, imaging workup showed bilateral moderate pleural effusion and diffuse anasarca  --BNP significantly elevated   --Echo on 8/23 reported EF 55% but given patient noncompliance with medications, rechecked echocardiogram with EF 30 to 35% with global hypokinesis.  --Of note, patient had nuclear stress test on 8/23 which was negative for inducible myocardial ischemia  --On 2/21, personally discussed with cardiology, anticipating ischemic evaluation at some point pending kidney function  -- On 2/23, IV Bumex changed to oral, holding today due to worsening creatinine.  --Monitor intake/output, weight, labs closely    SCHUYLER: Likely due to CHF exacerbation, possibly due to diabetic nephropathy  - Baseline is 0.6-0.8  -On a combination of broad spectrum abx ( Vanc and Zosyn)  -Check UA with no significant RBC's or casts   -On 2/21, personally discussed with nephrology, given proteinuria multiple serological test ordered and process.  May need kidney biopsy in future pending test results  -Electrolyte imbalance, replace per  protocol    DM Type II, Uncontrolled; improving  -Stopped all his home meds lately due to stomach upset while on Bactrim, then neglected to resume meds. Hold home metformin and glipizide for now.  -A1c 11.8  -Started Lantus and gradually titrating, increased to 248 unit at night on 2/21. Of note, patient was on insulin in the past and received education in the hospital about it, but seems to have stopped on its own.  Patient needs outpatient diabetic educator for ongoing titration  -Insulin sliding scale and hypoglycemic protocol  -Given poor appetite and early satiety, trial of Reglan which seems helping  -Also added PPI    History of stroke in 8/2023;  -Had been taking Plavix and atorvastatin but recently stopped taking all of his medications as above.  -Neurologist note from 8/26/2023 reviewed- at that time recommended DAPT with aspirin and Plavix for 90 days, afterwards switch to enteric-coated aspirin 325 mg daily.  -Started full dose aspirin and resumed home atorvastatin    Essential hypertension, uncontrolled; due to noncompliance-improving  -- Discharge summary from 8/26/2023 reviewed, patient was discharged home on Coreg 12.5 mg twice daily, HCTZ 12.5 mg daily, losartan 100 mg daily  --On 2/19 restarted Coreg 12.5 mg twice daily, increased to 25 mg twice daily on 2/24 by cardiology.  --Diuretics as above.  As needed hydralazine.  Monitor vitals and adjust medications accordingly.    Tobacco use disorder  -Nicotine Patch  -Advised to quit smoking     Left distal radius fracture due to recent fall on 2/6/2023  -Cast in place  -PT and OT evals  -Outpatient orthopedics follow-up     Normocytic anemia, likely due to chronic wound;  -No active/obvious bleeding.  Trend     Poor follow-up, noncompliance;  -Importance of compliance and follow-up needs to be emphasized.  Patient with relatively young age and already with serious comorbidities of chronic disease.          Diet: Moderate Consistent Carb (60 g CHO per  "Meal) Diet  Fluid restriction 1800 ML FLUID  Snacks/Supplements Adult: Expedite Bottle; With Meals  Snacks/Supplements Adult: Glucerna; With Meals    DVT Prophylaxis: Enoxaparin (Lovenox) SQ  Cazares Catheter: Not present  Lines: None     Cardiac Monitoring: ACTIVE order. Indication: Acute decompensated heart failure (48 hours)  Code Status: Full Code      Clinically Significant Risk Factors              # Hypoalbuminemia: Lowest albumin = 2 g/dL at 2/20/2024  5:46 AM, will monitor as appropriate        # Chronic heart failure with reduced ejection fraction: last echo with EF <40%      # DMII: A1C = 11.8 % (Ref range: <5.7 %) within past 6 months   # Obesity: Estimated body mass index is 37.46 kg/m  as calculated from the following:    Height as of this encounter: 1.778 m (5' 10\").    Weight as of this encounter: 118.4 kg (261 lb 1.6 oz).   # Moderate Malnutrition: based on nutrition assessment      # Financial/Environmental Concerns: none         Disposition Plan      Expected Discharge Date: 03/01/2024    Discharge Delays: Procedure Pending (enter procedure & time in comments)  Placement - TCU  IV Medication - consider oral or Home Infusion  Destination: home with family  Discharge Comments: insurance needs, coronary angio this week, TCU?            Moses Dos Santos MD  Hospitalist Service  [facility]  Securely message with [employer] (more info)  Text page via [employer] Paging/Directory   ______________________________________________________________________      Physical Exam   Vital Signs: Temp: 98  F (36.7  C) Temp src: Oral BP: (!) 157/83 Pulse: 85   Resp: 20 SpO2: 93 % O2 Device: Nasal cannula Oxygen Delivery: 2 LPM  Weight: 261 lbs 1.6 oz      General: Not in obvious distress.  HEENT: Normocephalic, supple neck  Chest: Clear to auscultation bilateral anteriorly, no wheezing  Heart: S1S2 normal, regular  Abdomen: Soft.  Obese, nontender. Bowel sounds- active.  Extremities: Bilateral lower " extremity swelling, right foot with wound VAC  Neuro: alert and awake, grossly non-focal        Medical Decision Making             Data     I have personally reviewed the following data over the past 24 hrs:    N/A  \   N/A   / 344     135 95 (L) 22.0 (H) /  181 (H)   4.0 34 (H) 1.59 (H) \       Imaging results reviewed over the past 24 hrs:   No results found for this or any previous visit (from the past 24 hour(s)).

## 2024-03-02 LAB
ANION GAP SERPL CALCULATED.3IONS-SCNC: 7 MMOL/L (ref 7–15)
BASE EXCESS BLDV CALC-SCNC: 9.5 MMOL/L (ref -3–3)
BUN SERPL-MCNC: 23.4 MG/DL (ref 6–20)
CALCIUM SERPL-MCNC: 9.7 MG/DL (ref 8.6–10)
CHLORIDE SERPL-SCNC: 93 MMOL/L (ref 98–107)
CREAT SERPL-MCNC: 1.59 MG/DL (ref 0.67–1.17)
DEPRECATED HCO3 PLAS-SCNC: 35 MMOL/L (ref 22–29)
EGFRCR SERPLBLD CKD-EPI 2021: 53 ML/MIN/1.73M2
GLUCOSE BLDC GLUCOMTR-MCNC: 204 MG/DL (ref 70–99)
GLUCOSE BLDC GLUCOMTR-MCNC: 219 MG/DL (ref 70–99)
GLUCOSE BLDC GLUCOMTR-MCNC: 261 MG/DL (ref 70–99)
GLUCOSE BLDC GLUCOMTR-MCNC: 305 MG/DL (ref 70–99)
GLUCOSE SERPL-MCNC: 173 MG/DL (ref 70–99)
HCO3 BLDV-SCNC: 34 MMOL/L (ref 21–28)
MAGNESIUM SERPL-MCNC: 2.1 MG/DL (ref 1.7–2.3)
O2/TOTAL GAS SETTING VFR VENT: 1 %
OXYHGB MFR BLDV: 60 % (ref 70–75)
PCO2 BLDV: 54 MM HG (ref 40–50)
PH BLDV: 7.41 [PH] (ref 7.32–7.43)
PO2 BLDV: 33 MM HG (ref 25–47)
POTASSIUM SERPL-SCNC: 3.9 MMOL/L (ref 3.4–5.3)
SAO2 % BLDV: 60.8 % (ref 70–75)
SODIUM SERPL-SCNC: 135 MMOL/L (ref 135–145)

## 2024-03-02 PROCEDURE — 99222 1ST HOSP IP/OBS MODERATE 55: CPT | Mod: FS | Performed by: THORACIC SURGERY (CARDIOTHORACIC VASCULAR SURGERY)

## 2024-03-02 PROCEDURE — 82805 BLOOD GASES W/O2 SATURATION: CPT | Performed by: INTERNAL MEDICINE

## 2024-03-02 PROCEDURE — 250N000013 HC RX MED GY IP 250 OP 250 PS 637: Performed by: INTERNAL MEDICINE

## 2024-03-02 PROCEDURE — 99233 SBSQ HOSP IP/OBS HIGH 50: CPT | Performed by: INTERNAL MEDICINE

## 2024-03-02 PROCEDURE — 83735 ASSAY OF MAGNESIUM: CPT | Performed by: INTERNAL MEDICINE

## 2024-03-02 PROCEDURE — 80048 BASIC METABOLIC PNL TOTAL CA: CPT | Performed by: INTERNAL MEDICINE

## 2024-03-02 PROCEDURE — 250N000011 HC RX IP 250 OP 636: Performed by: INTERNAL MEDICINE

## 2024-03-02 PROCEDURE — P9047 ALBUMIN (HUMAN), 25%, 50ML: HCPCS | Performed by: INTERNAL MEDICINE

## 2024-03-02 PROCEDURE — 99233 SBSQ HOSP IP/OBS HIGH 50: CPT | Performed by: STUDENT IN AN ORGANIZED HEALTH CARE EDUCATION/TRAINING PROGRAM

## 2024-03-02 PROCEDURE — 210N000001 HC R&B IMCU HEART CARE

## 2024-03-02 RX ORDER — ASPIRIN 81 MG/1
81 TABLET, CHEWABLE ORAL DAILY
Status: DISCONTINUED | OUTPATIENT
Start: 2024-03-02 | End: 2024-03-02

## 2024-03-02 RX ORDER — ALBUMIN (HUMAN) 12.5 G/50ML
50 SOLUTION INTRAVENOUS ONCE
Status: COMPLETED | OUTPATIENT
Start: 2024-03-02 | End: 2024-03-02

## 2024-03-02 RX ADMIN — INSULIN GLARGINE 28 UNITS: 100 INJECTION, SOLUTION SUBCUTANEOUS at 20:38

## 2024-03-02 RX ADMIN — Medication 60 ML: at 13:11

## 2024-03-02 RX ADMIN — ATORVASTATIN CALCIUM 80 MG: 40 TABLET, FILM COATED ORAL at 20:36

## 2024-03-02 RX ADMIN — CEFDINIR 300 MG: 300 CAPSULE ORAL at 20:36

## 2024-03-02 RX ADMIN — CARVEDILOL 25 MG: 12.5 TABLET, FILM COATED ORAL at 18:17

## 2024-03-02 RX ADMIN — INSULIN ASPART 6 UNITS: 100 INJECTION, SOLUTION INTRAVENOUS; SUBCUTANEOUS at 13:11

## 2024-03-02 RX ADMIN — NICOTINE 1 PATCH: 14 PATCH, EXTENDED RELEASE TRANSDERMAL at 14:28

## 2024-03-02 RX ADMIN — ASPIRIN 325 MG: 325 TABLET, COATED ORAL at 08:38

## 2024-03-02 RX ADMIN — BUMETANIDE 2 MG: 2 TABLET ORAL at 13:11

## 2024-03-02 RX ADMIN — Medication 1 TABLET: at 13:12

## 2024-03-02 RX ADMIN — CEFDINIR 300 MG: 300 CAPSULE ORAL at 08:33

## 2024-03-02 RX ADMIN — ENOXAPARIN SODIUM 40 MG: 40 INJECTION SUBCUTANEOUS at 18:17

## 2024-03-02 RX ADMIN — ALBUMIN HUMAN 50 G: 0.25 SOLUTION INTRAVENOUS at 08:28

## 2024-03-02 RX ADMIN — INSULIN ASPART 4 UNITS: 100 INJECTION, SOLUTION INTRAVENOUS; SUBCUTANEOUS at 18:18

## 2024-03-02 RX ADMIN — PANTOPRAZOLE SODIUM 40 MG: 40 TABLET, DELAYED RELEASE ORAL at 08:34

## 2024-03-02 RX ADMIN — FERROUS GLUCONATE 324 MG: 324 TABLET ORAL at 13:11

## 2024-03-02 ASSESSMENT — ACTIVITIES OF DAILY LIVING (ADL)
ADLS_ACUITY_SCORE: 35
ADLS_ACUITY_SCORE: 35
ADLS_ACUITY_SCORE: 40
ADLS_ACUITY_SCORE: 40
ADLS_ACUITY_SCORE: 35
ADLS_ACUITY_SCORE: 40
ADLS_ACUITY_SCORE: 40
ADLS_ACUITY_SCORE: 35
ADLS_ACUITY_SCORE: 40
ADLS_ACUITY_SCORE: 35
ADLS_ACUITY_SCORE: 40
ADLS_ACUITY_SCORE: 35
ADLS_ACUITY_SCORE: 40
ADLS_ACUITY_SCORE: 35
ADLS_ACUITY_SCORE: 35

## 2024-03-02 NOTE — PLAN OF CARE
Problem: Pain Acute  Goal: Optimal Pain Control and Function  Outcome: Progressing     Problem: Infection  Goal: Absence of Infection Signs and Symptoms  Intervention: Prevent or Manage Infection  Recent Flowsheet Documentation  Taken 3/2/2024 0448 by Rehana Hubbard RN  Isolation Precautions: contact precautions maintained  Taken 3/1/2024 2348 by Rehana Hubbard RN  Isolation Precautions: contact precautions maintained  Taken 3/1/2024 2105 by Rehana Hubbard RN  Isolation Precautions: contact precautions maintained     Problem: Heart Failure  Goal: Stable Heart Rate and Rhythm  Outcome: Progressing  Goal: Optimal Functional Ability  Intervention: Optimize Functional Ability  Recent Flowsheet Documentation  Taken 3/2/2024 0448 by Rehana Hubbard RN  Activity Management: activity adjusted per tolerance  Taken 3/1/2024 2348 by Rehana Hubbard RN  Activity Management: activity adjusted per tolerance  Taken 3/1/2024 2105 by Rehana Hubbard RN  Activity Management: activity adjusted per tolerance  Goal: Fluid and Electrolyte Balance  Outcome: Progressing  Goal: Effective Oxygenation and Ventilation  Outcome: Progressing  Intervention: Promote Airway Secretion Clearance  Recent Flowsheet Documentation  Taken 3/2/2024 0448 by Rehana Hubbard RN  Activity Management: activity adjusted per tolerance  Taken 3/1/2024 2348 by Rehana Hubbard RN  Activity Management: activity adjusted per tolerance  Taken 3/1/2024 2105 by Rehana Hubbard RN  Activity Management: activity adjusted per tolerance     Goal Outcome Evaluation:         Right heel wound vac. Pain on right foot. PRN oxycodone given. Slept after. Oxygen saturation down to mid 80's on room air. Otherwise on 3L of oxygen per nasal cannula. On oral antibiotic. Getting oral bumex.

## 2024-03-02 NOTE — PLAN OF CARE
Goal Outcome Evaluation:    Pt alert and oriented. Irritable most of the time.  Denied SOB and chest pain. Vitals checked and WNL.  Woundvac to suction; intact.  Uses urinal at bedside. Refused to be up on chair for meals.  On A1 pivot with transfers with use of his walker.  Refused to use commode, prefers using a bedpan.  Call light within reach, calls appropriately.  Weaned to 1 lpm O2 via NC. Pt removing O2 in between complaining of irritated nostril with minimal blood noted.        Problem: Pain Acute  Goal: Optimal Pain Control and Function  Intervention: Optimize Psychosocial Wellbeing  Recent Flowsheet Documentation  Taken 3/2/2024 1600 by Kalen Polk RN  Supportive Measures: active listening utilized     Problem: Comorbidity Management  Goal: Maintenance of COPD Symptom Control  Intervention: Maintain COPD Symptom Control  Recent Flowsheet Documentation  Taken 3/2/2024 1600 by Kalen Polk RN  Supportive Measures: active listening utilized     Problem: Infection  Goal: Absence of Infection Signs and Symptoms  Intervention: Prevent or Manage Infection  Recent Flowsheet Documentation  Taken 3/2/2024 1600 by Kalen Polk RN  Isolation Precautions: contact precautions maintained  Taken 3/2/2024 0800 by Kalen Polk RN  Isolation Precautions: contact precautions maintained     Problem: Infection  Goal: Absence of Infection Signs and Symptoms  Intervention: Prevent or Manage Infection  Recent Flowsheet Documentation  Taken 3/2/2024 1600 by Kalen Polk RN  Isolation Precautions: contact precautions maintained  Taken 3/2/2024 0800 by Kalen Polk RN  Isolation Precautions: contact precautions maintained     Problem: Comorbidity Management  Goal: Maintenance of COPD Symptom Control  Intervention: Maintain COPD Symptom Control  Recent Flowsheet Documentation  Taken 3/2/2024 1600 by Kalen Polk RN  Supportive Measures: active listening utilized     Problem: Heart Failure  Goal: Optimal  Coping  Intervention: Support Psychosocial Response  Recent Flowsheet Documentation  Taken 3/2/2024 1600 by Kalen Polk RN  Supportive Measures: active listening utilized  Goal: Optimal Cardiac Output  Intervention: Optimize Cardiac Output  Recent Flowsheet Documentation  Taken 3/2/2024 1600 by Kalen Polk, RN  Environmental Support:   distractions minimized   personal routine supported   calm environment promoted  Taken 3/2/2024 0800 by Kalen Polk RN  Environmental Support:   distractions minimized   personal routine supported   calm environment promoted     Problem: Pain Acute  Goal: Optimal Pain Control and Function  Intervention: Optimize Psychosocial Wellbeing  Recent Flowsheet Documentation  Taken 3/2/2024 1600 by Kalen Polk, RN  Supportive Measures: active listening utilized

## 2024-03-02 NOTE — PROGRESS NOTES
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 And ongoing diuretic needs seems to be primary issue; dramatic improvement in sCR 3/1 despite heavy diuresis support CRS as   Prior Vanco/Zosyn may be contributing, now off IV antibx  UAs x 2 this admit with few 2-3 RBC, otherwise bland  Nephrotic range proteinuria of 9 g/g  Baseline Cr 0.8 with severe nephrotic range proteinuria and some cells on UA. Presented with nephrotic syndrome9 albumin 2.2, UPCR 9 g/g, anasarca). Suspect he has CKD/GN most likely related to diabetic nephropathy, all serologies neg except sl monoclonal AB on urine immunofix, plan to f/up and repeat in several months.   No renal biopsy needed now and not too inclined to do in future to confirm DN given overall non compliance hx and MMP, relative stability and would likely not change POC  Patient serum creatinine has been ranging between 1.4 and 1.7 mg/dL since February 17, 2024.  Patient serum creatinine was 0.7 mg/dL 03/01 ? lab error.  Today serum creatinine is 1.59 mg/dL.  Recs:  Would rec maxing ARB, SGLT2 inhib eventually once cr stable and medically optimized CHF   Plan for outpt follow up at our clinic:   Monday March 18 at 11:00AM with Ruth Mike NP at [facility]   BMP in a.m. daily for now     Electrolytes  Mild hyponatremia-resolved on today labs.  Trend.  Normal serum potassium of 3.9.  Normal serum magnesium of 2.1.    Acid base status-serum bicarbonate is trended up to 35 secondary to contraction alkalosis.   Check VBG.    HTN -BP is mostly controlled.   Currently managing per cards (Imdur/BB/Hydral/ACE/ARB on hold, on dobutamine gtt)  would resume ACE/ARB once RF stable d/t proteinuria      Volume overload  Looks euvolemic on exam.   Net neg 36+L  Most recent chest imaging showed reduced bilat pleural  effusions (though ? Asp pneumonia).  Cards resumed diuretics again on 2/28   Labs suggest some contraction serum bicarbonate is trending up to 35 and serum calcium corrected for hypoalbuminemia is ~ 10.5 on today labs  However Cardiac catheterization 03/01 showed multiple vessel native coronary disease and severely elevated left-sided filling pressure.  Received IV Lasix 80 mg bolus 03/01/24.  Per discussion with cardiologist Dr. Arndt, the plan is to continue oral Bumex 2 mg twice daily.  Patient is receiving daily Albumin infusions.      Hypoxia:Stable  On 1-3 L per NC, CT imaging 2/27 showed reduced bilat pleural effusions (though ? Asp pneumonia), less convinced the hypervolemia driving     New HFrEF combined systolic/diastolic:    had negative stress test last fall, but concern for occult ASCAD with wall motion abnormalities. Cardiac catheterization 03/01 showed multiple vessel native coronary disease  EF down to 30-35% with global hypokinesis, and inc filling pressures. Severe Diastolic dysfunction.  Will defer management to cardiology service.     Anemia   Hgb 10's and stable normocytic  Iron stores low, hold off on IV iron with infection, consider IV iron once cleared and off antibx  Started on oral daily iron       HLD - statin     DM2 -  historically poor control, A1c ~ 12 insulin this admit   Management per [facility]     Diabetic foot ulcer  2/16 debridement, wound vac 2/19 .Zosyn and daptomycin completed 3/1 per ID, no osteo    PNA:  On oral Cefdinir x 4 days , ID signed off       Obesity     Tobacco use - nicotine patches in use this admit    Case discussed with cardiologist Dr. Arndt.    Will follow.    Annabelle Perez MD  [facility]  [address] [place] [address], [address]  [place] [address]  Phone# [phone]  Fax# [phone]        SUBJECTIVE:    Was seen at the bedside and events were reviewed with nursing.  No acute issues overnight.  Patient states that he feels very  tired  today. He doesn't like all the interruptions and wants to sleep.   Patient reports fair appetite.  Patient states that he has been having bowel movements and urinating laying in the bed.  Patient denies: fever, chills, dizziness,  cough, shortness of breath , chest pain, palpitations, orthopnea, nausea, vomiting, abdominal pain, changes in bowel habits, dysuria, urinary frequency, urgency, hematuria.  Updated on labs.  All questions answered.        OBJECTIVE:  Physical Exam   Temp: 97.9  F (36.6  C) Temp src: Oral BP: (!) 141/79 Pulse: 76   Resp: 18 SpO2: 93 % O2 Device: Nasal cannula Oxygen Delivery: 3 LPM  Vitals:    24 0332 24 0633 248   Weight: 118.4 kg (261 lb 1.6 oz) 110.1 kg (242 lb 11.6 oz) 108.2 kg (238 lb 8.6 oz)     Vital Signs with Ranges  Temp:  [97.4  F (36.3  C)-98.4  F (36.9  C)] 97.9  F (36.6  C)  Pulse:  [71-81] 76  Resp:  [16-20] 18  BP: (132-148)/(71-85) 141/79  SpO2:  [86 %-96 %] 93 %  I/O last 3 completed shifts:  In: 980 [P.O.:980]  Out: 3425 [Urine:3425]    Temp (24hrs), Av.2  F (36.8  C), Min:98  F (36.7  C), Max:98.6  F (37  C)      Patient Vitals for the past 72 hrs:   Weight   24 0448 108.2 kg (238 lb 8.6 oz)   24 0633 110.1 kg (242 lb 11.6 oz)     Intake/Output Summary (Last 24 hours) at 2024 1057  Last data filed at 2024 0900  Gross per 24 hour   Intake 1450 ml   Output 2400 ml   Net -950 ml       PHYSICAL EXAM:  General - Alert and oriented x3, appears comfortable, NAD, supine in bed,somewhat irritable affect today   Cardiovascular - Rate controlled, SBP 150s, on dobut gtt   Respiratory - sats good on 3 L , CTA by ant exam only d/t positioning in bed   Abd:no guarding or pain with palpation, no overt ascites  Extremities - No sig lower extremity edema bilaterally, R foot dressing intact/wound vac in place  Skin: quite dry, R cellulitis with some erythema.  No obvious drainage   Neuro:  Grossly intact, no focal deficits  MSK:   Grossly intact, by cursory bed exam    Psych: irritable affect    LABORATORY STUDIES:     Recent Labs   Lab 02/29/24  0422 02/27/24  0540 02/26/24  0508   WBC  --  13.5*  --    RBC  --  3.76*  --    HGB  --  10.2*  --    HCT  --  34.6*  --     308 326       Basic Metabolic Panel:  Recent Labs   Lab 03/02/24  0442 03/01/24  2107 03/01/24  1710 03/01/24  0840 03/01/24  0441 02/29/24  2147 02/29/24  0834 02/29/24  0422 02/28/24  0845 02/28/24  0420 02/27/24  0834 02/27/24  0540 02/26/24  0738 02/26/24  0508 02/25/24  0719 02/25/24  0549     --   --   --  131*  --   --  135  --  132*  --  132*  --  137  --  136   POTASSIUM 3.9  --   --   --  4.0  --   --  4.0  --  4.4  --  4.3  --  4.0  --  3.9   CHLORIDE 93*  --   --   --  92*  --   --  95*  --  96*  --  97*  --   --   --  99   CO2 35*  --   --   --  33*  --   --  34*  --  25  --  30*  --  28  --  28   BUN 23.4*  --   --   --  21.4*  --   --  22.0*  --  22.6*  --  20.9*  --   --   --  16.2   CR 1.59*  --   --   --  0.74  --   --  1.59*  --  1.60*  --  1.70*  --  1.59*  --  1.40*   * 221* 181* 168* 265* 232*   < > 175*   < > 246*   < > 200*   < >  --    < > 157*   SARAH 9.7  --   --   --  9.1  --   --  9.2  --  8.9  --  9.1  --   --   --  8.6    < > = values in this interval not displayed.       INRNo lab results found in last 7 days.     Recent Labs   Lab Test 02/29/24  0422 02/27/24  0540 02/26/24  0508 02/23/24  0618 08/24/23  0645 08/23/23  0946   INR  --   --   --   --   --  0.95   WBC  --  13.5*  --  11.3*   < > 11.2*   HGB  --  10.2*  --  10.6*   < > 13.9    308   < > 337   < > 290    < > = values in this interval not displayed.       Personally reviewed current labs    ~ 50 Minutes today spent in exam, POC, education regarding renal disease and management, counseling and/or discussion with patient's care team.    Annabelle Perez MD  [facility]  [address] [place] [address], [address]  [place] [address]  Phone#  [phone]  Fax# [phone]

## 2024-03-02 NOTE — PROGRESS NOTES
[facility]    Medicine Progress Note - Hospitalist Service    Date of Admission:  2/14/2024    Assessment & Plan   Floyd Capps is a 48 year old male with history of DM, HTN, CVA, obesity, recent ED visit for nonhealing diabetic foot ulcer with cellulitis, here for worsening pain in the foot, with necrotic tissue noted on exam.  Patient admitted on 2/14/2024     Diabetic foot ulcer, right heel;  -chronic wound R heel, recent resulting cellulitis treated with Bactrim  -here for worse pain in the foot, found with ulcer worse with necrotic tissue  -MRI of the foot no osteomyelitis  -On 2/16, s/p debridement with irrigation of right foot diabetic ulcer. NWB on right foot per podiatrist  -On 2/19, wound VAC placed, continue  -Surgical cultures polymicrobials.  Initially IV Vanco and IV Zosyn, now changed to IV Zosyn and IV daptomycin - ended 2/29, Then cefdinir PO for 4 days for possible aspiration PNA .  Appreciated ID input  -- on Feb 27 - CT abdomen done due to abdominal pain, nausea, vomiting - concern for possible pneumonia, discussed with ID and patient well covered and treated with broad spectrum antibiotics - zosyn, dapto, may have had a aspiration episode, seems stable now.  -Pain management with Tylenol, home oxycodone.     Acute hypoxemic respiratory failure 2/2  Acute new onset systolic heart failure;  CT imaging reported moderate bilateral pleural effusion and diffuse anasarca;  -- On 2/19, patient reported feeling shortness of breath, imaging workup showed bilateral moderate pleural effusion and diffuse anasarca  --BNP significantly elevated   --Echo on 8/23 reported EF 55% but given patient noncompliance with medications  -- Repeat echo EF 30 to 35% with global hypokinesis:   --Of note, patient had nuclear stress test on 8/23 which was negative for inducible myocardial ischemia  -- Was on IV duresis, now on po Bumex - held today due to SCHUYLER  -- was on dobutamine drip - now weaned  off.  --Monitor intake/output, weight, labs closely  -- wean oxygen as tolerated    CAD   Multivessel disease  S/p cardiac cath 03/01:  multivessel CAD  CT surgery consulted  on aspirin, statin, coreg  US carotid and US lower extremity venous mapping     # SCHUYLER on CKD, multifactorial  -Secondary to infection, CRS, diuretics, contrast use, diabetic nephropathy  -Has nephrotic range proteinuria  -Nephrology following  -Bumex held 03/02  -Continue to hold ACE or ARB therapy, can resume if creatinine stable 48 hours postcontrast  -No renal biopsy indicated now  -Once stable, nephrology recommending maximize ARB, SGLT2 inhibitors     DM Type II, Uncontrolled; improving  -Stopped all his home meds lately due to stomach upset while on Bactrim, then neglected to resume meds. Hold home metformin and glipizide for now.  -A1c 11.8  -Of note, patient was on insulin in the past and received education in the hospital about it, but seems to have stopped on its own.  Patient needs outpatient diabetic educator for ongoing titration  -Insulin sliding scale, I:C 1:10, Lantus 28u and hypoglycemic protocol  -Given poor appetite and early satiety, trial of Reglan which seems helping  -Also added PPI     History of stroke in 8/2023;  -Had been taking Plavix and atorvastatin but recently stopped taking all of his medications as above.  -Neurologist note from 8/26/2023 reviewed- at that time recommended DAPT with aspirin and Plavix for 90 days, afterwards switch to enteric-coated aspirin 325 mg daily.  -Started full dose aspirin and resumed home atorvastatin     Essential hypertension, uncontrolled; due to noncompliance-improving  -- Discharge summary from 8/26/2023 reviewed, patient was discharged home on Coreg 12.5 mg twice daily, HCTZ 12.5 mg daily, losartan 100 mg daily  --On 2/19 restarted Coreg 12.5 mg twice daily, increased to 25 mg twice daily on 2/24 by cardiology.  --Diuretics as above.  As needed hydralazine.  Monitor vitals and  "adjust medications accordingly.     Tobacco use disorder  -Nicotine Patch  -Advised to quit smoking     Left distal radius fracture due to recent fall on 2/6/2023  -Cast in place  -PT and OT evals  -Outpatient orthopedics follow-up     Normocytic anemia, likely due to chronic wound;  -No active/obvious bleeding.  Trend     Poor follow-up, noncompliance;  -Importance of compliance and follow-up needs to be emphasized.  Patient with relatively young age and already with serious comorbidities of chronic disease    PT/OT - recommend TCU          Diet: Fluid restriction 1800 ML FLUID  Snacks/Supplements Adult: Expedite Bottle; With Meals  Snacks/Supplements Adult: Glucerna; With Meals  Advance Diet as Tolerated: Fully Advanced to diet(s) per Provider order; 2 gm NA Diet    DVT Prophylaxis: Enoxaparin (Lovenox) SQ  Cazares Catheter: Not present  Lines: PRESENT      PICC 02/29/24 Double Lumen Right Basilic Dobutamine drip, Access-Site Assessment: WDL      Cardiac Monitoring: ACTIVE order. Indication: Post- PCI/Angiogram (24 hours)  Code Status: Full Code      Clinically Significant Risk Factors              # Hypoalbuminemia: Lowest albumin = 2 g/dL at 2/20/2024  5:46 AM, will monitor as appropriate      # Acute heart failure with reduced ejection fraction: last echo with EF <40% and receiving IV diuretics      # DMII: A1C = 11.8 % (Ref range: <5.7 %) within past 6 months   # Obesity: Estimated body mass index is 34.23 kg/m  as calculated from the following:    Height as of this encounter: 1.778 m (5' 10\").    Weight as of this encounter: 108.2 kg (238 lb 8.6 oz).   # Moderate Malnutrition: based on nutrition assessment    # Financial/Environmental Concerns: none         Disposition Plan     Expected Discharge Date: 03/03/2024    Discharge Delays: Placement - TCU  Destination: home with family  Discharge Comments: insurance needs, coronary angio this week, TCU?            Nata Baca MD  Hospitalist Service  [facility] " [employer]  Securely message with [employer] (more info)  Text page via [employer] Paging/Directory   ______________________________________________________________________    Interval History   Was examined at the bedside today new to me.  Denies any new complaints.  Cardiothoracic surgery evaluation for CABG    Physical Exam   Vital Signs: Temp: 97.9  F (36.6  C) Temp src: Oral BP: (!) 141/79 Pulse: 76   Resp: 18 SpO2: 93 % O2 Device: Nasal cannula Oxygen Delivery: 3 LPM  Weight: 238 lbs 8.6 oz    General: Not in obvious distress  HEENT: supple neck  Chest: Clear to auscultation bilateral anteriorly, no wheezing  Heart: S1S2 normal, regular  Abdomen: Soft.  Obese, nontender. Bowel sounds- active.  Extremities: Bilateral lower extremity swelling, right foot with wound VAC  Neuro: alert and awake, grossly non-focal    Medical Decision Making       60 MINUTES SPENT BY ME on the date of service doing chart review, history, exam, documentation & further activities per the note.      Data     I have personally reviewed the following data over the past 24 hrs:    N/A  \   N/A   / N/A     135 93 (L) 23.4 (H) /  219 (H)   3.9 35 (H) 1.59 (H) \       Imaging results reviewed over the past 24 hrs:   No results found for this or any previous visit (from the past 24 hour(s)).

## 2024-03-02 NOTE — PROGRESS NOTES
HEART CARE NOTE          Assessment/Recommendations   1. Severe HFrEF c/b cardiogenic shock  Assessment / Plan  New onset; ICM; s/p coronary angiogram which demonstrated severe multi-vessel CAD - CTS consulted regarding CABG; awaiting recs  LVED ~ 35 - resume diuresis   Patient is high risk for adverse cardiac events 2/2  severe systolic dysfunction, elevated NTproBNP, non-compliance  GDMT as detailed below     Current Pharmacotherapy AHA Guideline-Directed Medical Therapy   Losartan 25 mg - on hold given SCHUYLER Lisinopril 20 mg twice daily   Carvedilol 25 mg twice daily - held while on inotrope Carvedilol 25 mg twice daily   Spironolactone not started Spironolactone 25 mg once daily   Hydralazine NA Hydralazine 100 mg three times daily   Isosorbide dinitrate NA Isosorbide dinitrate 40 mg three times daily   SGLT2 inhibitor:Dapagliflozin/Empagliflozin - not started Dapagliflozin or Empagliflozin 10 mg daily      2. SCHUYLER in CKD  Assessment / Plan  Likely CRS in the setting of ADHF c/b antibiotic regimen; LVEDP remains significantly elevated; diuresis as above   Continue to monitor UOP and renal function closely     3. DM2  Assessment / Plan  C/b DM foot ulcer  Management per primary team  Currently on ISS     4. Tobacco abuse  Assessment / Plan  Counseled regarding cessation     5. HTN  Assessment / Plan  Adequately controlled on current regimen - no changes at this time     5. CAD  Assessment / Plan  Severe , multivessel CAD - CTS consulted re:CABG  Denies chest pain or anginal equivalent  Continue ASA and high intensity atorvastatin    Plan of care discussed on March 2, 2024 with patient at bedside, and primary team overseeing patient's care     65 minutes spent reviewing prior records (including documentation, laboratory studies, cardiac testing/imaging), history and physical exam, planning, and subsequent documentation.    History of Present Illness/Subjective    Mr. Floyd Capps is a 48 year old male with a PMHx  "significant for (per Epic notation) DM, HTN, CVA, obesity, recent ED visit for nonhealing diabetic foot ulcer with cellulitis, here for worsening pain in the foot, with necrotic tissue noted on exam now found to be in new onset HFrEF     Today, Mr. Capps denies acute cardiac events or complaints; Management plan as detailed above     ECG: Personally reviewed. normal sinus rhythm, nonspecific ST and T waves changes.     ECHO (personnaly Reviewed on 2/21/24):   The left ventricle is normal in size. There is mild concentric left  ventricular hypertrophy.  Left ventricular function is decreased. The ejection fraction is 30-35%  (moderately reduced). There is global hypokinesis with severe hypokinesis of  the mid to apical inferior wall.  Diastolic Doppler findings (E/E' ratio and/or other parameters) suggest left  ventricular filling pressures are increased.     The right ventricle is normal in size and function.  Normal left atrial size. Right atrial size is normal.  There is mild (1+) mitral regurgitation.  IVC diameter >2.1 cm collapsing <50% with sniff suggests a high RA pressure  estimated at 15 mmHg or greater.     Compared to previous study from 8/23/2023 the LV systolic function has  declined and there are regional wall motion abnormalities.    Lab results: personally reviewed March 2, 2024; notable for SCHUYLER on CKD    Medical history and pertinent documents reviewed in Care Everywhere please where applicable see details above        Physical Examination Review of Systems   /71   Pulse 75   Temp 98.3  F (36.8  C) (Oral)   Resp 20   Ht 1.778 m (5' 10\")   Wt 108.2 kg (238 lb 8.6 oz)   SpO2 94%   BMI 34.23 kg/m    Body mass index is 34.23 kg/m .  Wt Readings from Last 3 Encounters:   03/02/24 108.2 kg (238 lb 8.6 oz)   02/13/24 122.5 kg (270 lb)   02/06/24 122.5 kg (270 lb)     General Appearance:   no distress, normal body habitus   ENT/Mouth: membranes moist, no oral lesions or bleeding gums.    "   EYES:  no scleral icterus, normal conjunctivae   Neck: no carotid bruits or thyromegaly   Chest/Lungs:   lungs are clear to auscultation, no rales or wheezing, equal chest wall expansion    Cardiovascular:   Regular. Normal first and second heart sounds with no murmurs, rubs, or gallops; the carotid, radial and posterior tibial pulses are intact, + JVD    Abdomen:  no organomegaly, masses, bruits, or tenderness; bowel sounds are present   Extremities: no cyanosis or clubbing   Skin: no xanthelasma, warm.    Neurologic: NAD   Psychiatric: alert and oriented x3, calm     A complete 10 systems ROS was reviewed  And is negative except what is listed in the HPI.          Medical History  Surgical History Family History Social History   Past Medical History:   Diagnosis Date    Diabetes (H)     Past Surgical History:   Procedure Laterality Date    APPENDECTOMY      INCISION AND DRAINAGE OF WOUND      groin abscess    IRRIGATION AND DEBRIDEMENT FOOT, COMBINED Right 2/16/2024    Procedure: IRRIGATION AND DEBRIDEMENT RIGHT FOOT;  Surgeon: Cristofer Sims DPM;  Location: [facility] [place]    PICC DOUBLE LUMEN PLACEMENT  2/29/2024    no family history of premature coronary artery disease Social History     Socioeconomic History    Marital status: Single     Spouse name: Not on file    Number of children: Not on file    Years of education: Not on file    Highest education level: Not on file   Occupational History    Not on file   Tobacco Use    Smoking status: Every Day     Packs/day: .5     Types: Cigarettes    Smokeless tobacco: Never    Tobacco comments:     Seen IP by CTTS on 8/24/23 and declined cessation services and materials.    Vaping Use    Vaping Use: Never used   Substance and Sexual Activity    Alcohol use: No    Drug use: No    Sexual activity: Not on file   Other Topics Concern    Not on file   Social History Narrative    Patient reports that he does not have health insurance.     Social Determinants of Health      Financial Resource Strain: Low Risk  (10/20/2023)    Financial Resource Strain     Within the past 12 months, have you or your family members you live with been unable to get utilities (heat, electricity) when it was really needed?: No   Food Insecurity: High Risk (10/20/2023)    Food Insecurity     Within the past 12 months, did you worry that your food would run out before you got money to buy more?: Yes     Within the past 12 months, did the food you bought just not last and you didn t have money to get more?: No   Transportation Needs: Low Risk  (10/20/2023)    Transportation Needs     Within the past 12 months, has lack of transportation kept you from medical appointments, getting your medicines, non-medical meetings or appointments, work, or from getting things that you need?: No   Physical Activity: Not on file   Stress: Not on file   Social Connections: Not on file   Interpersonal Safety: Low Risk  (10/20/2023)    Interpersonal Safety     Do you feel physically and emotionally safe where you currently live?: Yes     Within the past 12 months, have you been hit, slapped, kicked or otherwise physically hurt by someone?: No     Within the past 12 months, have you been humiliated or emotionally abused in other ways by your partner or ex-partner?: No   Housing Stability: High Risk (10/20/2023)    Housing Stability     Do you have housing? : Yes     Are you worried about losing your housing?: Yes           Lab Results    Chemistry/lipid CBC Cardiac Enzymes/BNP/TSH/INR   Lab Results   Component Value Date    CHOL 156 02/21/2024    HDL 39 (L) 02/21/2024    TRIG 199 (H) 02/21/2024    BUN 23.4 (H) 03/02/2024     03/02/2024    CO2 35 (H) 03/02/2024    Lab Results   Component Value Date    WBC 13.5 (H) 02/27/2024    HGB 10.2 (L) 02/27/2024    HCT 34.6 (L) 02/27/2024    MCV 92 02/27/2024     02/29/2024    Lab Results   Component Value Date    TSH 2.72 08/23/2023    INR 0.95 08/23/2023     No results  "found for: \"CKTOTAL\", \"CKMB\", \"TROPONINI\"       Weight:    Wt Readings from Last 3 Encounters:   03/02/24 108.2 kg (238 lb 8.6 oz)   02/13/24 122.5 kg (270 lb)   02/06/24 122.5 kg (270 lb)       Allergies  No Known Allergies      Surgical History  Past Surgical History:   Procedure Laterality Date    APPENDECTOMY      INCISION AND DRAINAGE OF WOUND      groin abscess    IRRIGATION AND DEBRIDEMENT FOOT, COMBINED Right 2/16/2024    Procedure: IRRIGATION AND DEBRIDEMENT RIGHT FOOT;  Surgeon: Cristofer Sims DPM;  [place] [facility] [place]    PICC DOUBLE LUMEN PLACEMENT  2/29/2024       Social History  Tobacco:   History   Smoking Status    Every Day    Packs/day: 0.50    Types: Cigarettes   Smokeless Tobacco    Never    Alcohol:   Social History    Substance and Sexual Activity      Alcohol use: No   Illicit Drugs:   History   Drug Use No       Family History  History reviewed. No pertinent family history.       Shayna Arndt MD on 3/2/2024      cc: Glendy Benavides    "

## 2024-03-02 NOTE — CONSULTS
Cardiothoracic Surgery Consult Note    Consult Reason: multivessel CAD    HPI: Floyd Capps is a right-handed, 48 year old male with PMH of obesity, poorly controlled DM2 with complications, HTN, CVA, nicotine dependence, and non-healing diabetic foot ulcer who presented 2/14/2024 worsening foot pain. During the course of his hospitalization, Cardiology was consulted for new onset severe HFrEF. Mr. Wolfe's underwent coronary angiogram which demonstrated severe multivessel CAD.  CVTS was consulted for consideration of surgical revascularization.    Activity:  Works as a , recent fall casted with LUE fracture, NWB on RLE 2/2 diabetic foot ulcer.  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 Capps is well aware of his heart disease and is frustrated to have to discuss the topic further.  He is aware of his coronary artery disease, his reduced heart function, and the recommendation for surgery to bypass his diseased coronary arteries; nevertheless, he is adamant that he will not undergo cardiac surgery this year as he reports that financially, he is unable to withstand being off of work for several months.  He is fearful that this would result in loss of housing and possibly medical insurance benefits not to mention, income.  He is aware that delaying surgery may well worsen his condition, reduce his lifespan, and/or potentially result in death and he states that these risks are all acceptable and in fact, preferable to him in comparison to the prospect of becoming unhoused and/or destitute with his health intact.    PMH:  Past Medical History:   Diagnosis Date     Diabetes (H)        PSH:  Past Surgical History:   Procedure Laterality Date     APPENDECTOMY       INCISION AND DRAINAGE OF WOUND      groin abscess     IRRIGATION AND DEBRIDEMENT FOOT, COMBINED Right 2/16/2024    Procedure: IRRIGATION AND DEBRIDEMENT RIGHT FOOT;  Surgeon: Cristofer Sims,  AMY;  Location: [facility] OR     PICC DOUBLE LUMEN PLACEMENT  2/29/2024       FH:  History reviewed. No pertinent family history.    SH:  Social History     Socioeconomic History     Marital status: Single     Spouse name: None     Number of children: None     Years of education: None     Highest education level: None   Tobacco Use     Smoking status: Every Day     Packs/day: .5     Types: Cigarettes     Smokeless tobacco: Never     Tobacco comments:     Seen IP by CTTS on 8/24/23 and declined cessation services and materials.    Vaping Use     Vaping Use: Never used   Substance and Sexual Activity     Alcohol use: No     Drug use: No   Social History Narrative    Patient reports that he does not have health insurance.     Social Determinants of Health     Financial Resource Strain: Low Risk  (10/20/2023)    Financial Resource Strain      Within the past 12 months, have you or your family members you live with been unable to get utilities (heat, electricity) when it was really needed?: No   Food Insecurity: High Risk (10/20/2023)    Food Insecurity      Within the past 12 months, did you worry that your food would run out before you got money to buy more?: Yes      Within the past 12 months, did the food you bought just not last and you didn t have money to get more?: No   Transportation Needs: Low Risk  (10/20/2023)    Transportation Needs      Within the past 12 months, has lack of transportation kept you from medical appointments, getting your medicines, non-medical meetings or appointments, work, or from getting things that you need?: No   Interpersonal Safety: Low Risk  (10/20/2023)    Interpersonal Safety      Do you feel physically and emotionally safe where you currently live?: Yes      Within the past 12 months, have you been hit, slapped, kicked or otherwise physically hurt by someone?: No      Within the past 12 months, have you been humiliated or emotionally abused in other ways by your partner or  ex-partner?: No   Housing Stability: High Risk (10/20/2023)    Housing Stability      Do you have housing? : Yes      Are you worried about losing your housing?: Yes       Home Meds:  Medications Prior to Admission   Medication Sig Dispense Refill Last Dose     [] acetaminophen (TYLENOL) 500 MG tablet Take 2 tablets (1,000 mg) by mouth every 8 hours as needed for mild pain 60 tablet 0 Past Week     oxyCODONE (ROXICODONE) 5 MG tablet Take 5 mg by mouth every 6 hours as needed for severe pain   Unknown       Allergies:  No Known Allergies    ROS: Negative except as noted above, under HPI.    Physical Exam:  Temp:  [97.4  F (36.3  C)-98.4  F (36.9  C)] 97.9  F (36.6  C)  Pulse:  [71-81] 76  Resp:  [16-20] 18  BP: (132-148)/(71-85) 141/79  SpO2:  [86 %-96 %] 93 %    Gen: NAD, in bed, mildly agitated but conversational  HEENT: normocephalic, atraumatic cranium, EOMI, sclerae anicteric, midline trachea  Lungs: unlabored breathing on supplemental oxygen via NC  CV: WWP, RRR on monitor  Abd: obese  Musculoskeletal: RLE wound vac in place, LUE cast in place  Neuro: A&O, CN II-VII grossly intact, MIX, face symmetric, speech clear  Mental: mild agitation, logical thinking/reasoning    Labs:  ABG No lab results found in last 7 days.  CBC  Recent Labs   Lab 24  0422 24  0540 24  0508   WBC  --  13.5*  --    HGB  --  10.2*  --     308 326     BMP  Recent Labs   Lab 24  0442 24  2107 24  1710 24  0840 24  0441 24  0834 24  0422 24  0845 24  0420     --   --   --  131*  --  135  --  132*   POTASSIUM 3.9  --   --   --  4.0  --  4.0  --  4.4   CHLORIDE 93*  --   --   --  92*  --  95*  --  96*   CO2 35*  --   --   --  33*  --  34*  --  25   BUN 23.4*  --   --   --  21.4*  --  22.0*  --  22.6*   CR 1.59*  --   --   --  0.74  --  1.59*  --  1.60*   * 221* 181* 168* 265*   < > 175*   < > 246*    < > = values in this interval not displayed.      LFT  Recent Labs   Lab 24  0540 24  0508   AST 12  --    ALT 8  --    ALKPHOS 58  --    BILITOTAL 0.6  --    ALBUMIN 3.0*  3.1* 2.6*     Pancreas  Recent Labs   Lab 24  0540   LIPASE 16       Imaging:  Cardiac Catheterization   Final Result      CT Abdomen Pelvis w/o Contrast   Final Result   IMPRESSION:    1.  New bilateral lower lobe consolidation consistent with pneumonia.   2.  Decreased bilateral pleural effusions.   3.  Decreased diffuse subcutaneous edema.         Echocardiogram Complete   Final Result      CT Abdomen Pelvis w/o Contrast   Final Result   IMPRESSION:    1.  No acute findings in the abdomen or pelvis.   2.  Findings suggesting fluid overload with moderate bilateral pleural effusions and diffuse anasarca.   3.  Subcentimeter left renal angiomyolipoma.         POC US Guidance Needle Placement   Final Result      MR Foot Right w/o & w Contrast   Final Result   IMPRESSION:   1.  Soft tissue ulceration along the posteroinferior aspect of the hindfoot. This is fairly superficial. There is some surrounding edema or low-grade cellulitis.   2.  No evidence for osteomyelitis, septic arthropathy, or abscess.   3.  Reactive edema versus infectious or inflammatory myositis within the plantar and interosseous musculature.   4.  No evidence for fracture.   5.  No significant tendinous or ligamentous pathology.        Recent Results (from the past 4320 hour(s))   Echocardiogram Complete   Result Value    LVEF  30-35% (moderately reduced)    Narrative    [id number]  [id number]  [id number]  [id number]^BERLIN^[place] [address]     Name: LUIS HODGES  MRN: [id number]  : 1975  Study Date: 2024 09:49 AM  Age: 48 yrs  Gender: Male  Patient Location: [facility]  Reason For Study: Pulmonary Edema  Ordering Physician: HARRIS SLADE  Referring Physician: HARRIS SLADE  Performed By: LA     BSA: 2.4 m2  Height: 70 in  Weight: 271 lb  HR:  78  ______________________________________________________________________________  Procedure  Complete Echo Adult. Definity (NDC #41749-805) given intravenously. Adequate  quality two-dimensional was performed and interpreted.  ______________________________________________________________________________  Interpretation Summary     The left ventricle is normal in size. There is mild concentric left  ventricular hypertrophy.  Left ventricular function is decreased. The ejection fraction is 30-35%  (moderately reduced). There is global hypokinesis with severe hypokinesis of  the mid to apical inferior wall.  Diastolic Doppler findings (E/E' ratio and/or other parameters) suggest left  ventricular filling pressures are increased.     The right ventricle is normal in size and function.  Normal left atrial size. Right atrial size is normal.  There is mild (1+) mitral regurgitation.  IVC diameter >2.1 cm collapsing <50% with sniff suggests a high RA pressure  estimated at 15 mmHg or greater.     Compared to previous study from 8/23/2023 the LV systolic function has  declined and there are regional wall motion abnormalities.  ______________________________________________________________________________  Left Ventricle  The left ventricle is normal in size. Left ventricular function is decreased.  The ejection fraction is 30-35% (moderately reduced). There is mild concentric  left ventricular hypertrophy. Diastolic Doppler findings (E/E' ratio and/or  other parameters) suggest left ventricular filling pressures are increased.  There is global hypokinesis with severe hypokinesis of the mid to apical  inferior wall.     Right Ventricle  The right ventricle is normal in size and function.     Atria  Normal left atrial size. Right atrial size is normal.     Mitral Valve  Mitral valve leaflets appear normal. There is mild (1+) mitral regurgitation.  There is no mitral valve stenosis.     Tricuspid Valve  The tricuspid valve  is not well visualized. There is trace tricuspid  regurgitation. Right ventricular systolic pressure could not be approximated  due to inadequate tricuspid regurgitation.     Aortic Valve  The aortic valve is trileaflet. Aortic valve leaflets appear normal. No aortic  regurgitation is present. No aortic stenosis is present.     Pulmonic Valve  The pulmonic valve is not well visualized. There is trace pulmonic valvular  regurgitation.     Vessels  The aorta root is normal. IVC diameter >2.1 cm collapsing <50% with sniff  suggests a high RA pressure estimated at 15 mmHg or greater.     Pericardium  There is no pericardial effusion.     Rhythm  Sinus rhythm was noted.  ______________________________________________________________________________  MMode/2D Measurements & Calculations     IVSd: 1.3 cm  LVIDd: 5.2 cm  LVIDs: 4.0 cm  LVPWd: 1.3 cm  FS: 21.7 %  LV mass(C)d: 274.4 grams  LV mass(C)dI: 115.5 grams/m2  Ao root diam: 3.1 cm  LVOT diam: 2.0 cm  LVOT area: 3.1 cm2  Ao root diam index Ht(cm/m): 1.7  Ao root diam index BSA (cm/m2): 1.3  LA Volume (BP): 63.0 ml  LA Volume Index (BP): 26.5 ml/m2     LA Volume Indexed (AL/bp): 28.2 ml/m2  RV Base: 3.4 cm  RWT: 0.51  TAPSE: 2.1 cm     Doppler Measurements & Calculations  MV E max servando: 104.0 cm/sec  MV A max servando: 40.4 cm/sec  MV E/A: 2.6  MV dec slope: 886.0 cm/sec2  MV dec time: 0.12 sec  Ao V2 max: 116.0 cm/sec  Ao max P.0 mmHg  Ao V2 mean: 78.1 cm/sec  Ao mean PG: 3.0 mmHg  Ao V2 VTI: 21.8 cm  NILSA(I,D): 2.1 cm2  NILSA(V,D): 1.9 cm2     LV V1 max P.0 mmHg  LV V1 max: 70.5 cm/sec  LV V1 VTI: 14.4 cm  SV(LVOT): 45.2 ml  SI(LVOT): 19.0 ml/m2  PA acc time: 0.10 sec  AV Servando Ratio (DI): 0.61  NILSA Index (cm2/m2): 0.87  E/E' av.7  Lateral E/e': 10.7  Medial E/e': 12.6  RV S Servando: 12.9 cm/sec     ______________________________________________________________________________  Report approved by: Ladonna Ornelas 2024 11:41 AM             A/P: Floyd Capps is  a 48 year old male with HTN, HFrEF, CKD, poorly controlled DM2 with complications including diabetic foot ulcer, severe multivessel CAD, and nicotine depdence, among others.  CV Surgery consulted for consideration of surgical revascularization.  Patient is quite clear that he will not consent to CAB at this time as he would prefer to defer surgical intervention until next year despite the marked risks of doing so.    - Acceptable surgical candidate; potential OR for CAB as early as Monday afternoon if the patient were to reconsider his position.  Will proceed with CXR, carotid US and vein mapping whilst inpatient in the event surgery is contemplated in the short term.  - If pt elects not to pursue surgery at this time, he should be referred for outpatient follow up in our clinic, with Dr. Boateng, for further discussion of surgical revascularization at a later date.   - Could consider the relative benefits of PCI as an alternative to forgone treatment.  - Thank you for the opportunity to participate in the care of this patient; CV Surgery will sign off at this time.  Please call/page with questions.    Patient and plan discussed with attending, Dr. Boateng.    Tori De La Rosa, CNP, [facility]-  Cardiothoracic Surgery  Pager [phone]    9:26 AM  March 2, 2024

## 2024-03-02 NOTE — PLAN OF CARE
Goal Outcome Evaluation:    Pt alert and oriented. Flat affect noted.  Vitals checked and SBP on 140s.  On 2lpm O2 via NC.  On FR 1800mL. Angiogram done today; MVD.  TRBand removal done per order. CMS intact, no bleeding or hematoma noted.  Clear liquids tolerated well; Diet advanced to cardiac diet.  Woundvac to suction; dressing done by WOC today.  Uses urinal at bedside.  Call light within reach, make needs known.      Problem: Adult Inpatient Plan of Care  Goal: Absence of Hospital-Acquired Illness or Injury  Intervention: Identify and Manage Fall Risk  Recent Flowsheet Documentation  Taken 3/1/2024 0800 by Kalen Polk, RN  Safety Promotion/Fall Prevention:   activity supervised   clutter free environment maintained   increase visualization of patient   lighting adjusted   nonskid shoes/slippers when out of bed

## 2024-03-03 LAB
ANION GAP SERPL CALCULATED.3IONS-SCNC: 12 MMOL/L (ref 7–15)
BUN SERPL-MCNC: 27.7 MG/DL (ref 6–20)
CALCIUM SERPL-MCNC: 9.5 MG/DL (ref 8.6–10)
CHLORIDE SERPL-SCNC: 94 MMOL/L (ref 98–107)
CREAT SERPL-MCNC: 1.71 MG/DL (ref 0.67–1.17)
DEPRECATED HCO3 PLAS-SCNC: 30 MMOL/L (ref 22–29)
EGFRCR SERPLBLD CKD-EPI 2021: 48 ML/MIN/1.73M2
GLUCOSE BLDC GLUCOMTR-MCNC: 182 MG/DL (ref 70–99)
GLUCOSE BLDC GLUCOMTR-MCNC: 234 MG/DL (ref 70–99)
GLUCOSE BLDC GLUCOMTR-MCNC: 237 MG/DL (ref 70–99)
GLUCOSE BLDC GLUCOMTR-MCNC: 283 MG/DL (ref 70–99)
GLUCOSE SERPL-MCNC: 187 MG/DL (ref 70–99)
MAGNESIUM SERPL-MCNC: 2 MG/DL (ref 1.7–2.3)
PLATELET # BLD AUTO: 358 10E3/UL (ref 150–450)
POTASSIUM SERPL-SCNC: 4 MMOL/L (ref 3.4–5.3)
SODIUM SERPL-SCNC: 136 MMOL/L (ref 135–145)

## 2024-03-03 PROCEDURE — 250N000013 HC RX MED GY IP 250 OP 250 PS 637: Performed by: INTERNAL MEDICINE

## 2024-03-03 PROCEDURE — 99233 SBSQ HOSP IP/OBS HIGH 50: CPT | Performed by: INTERNAL MEDICINE

## 2024-03-03 PROCEDURE — 250N000011 HC RX IP 250 OP 636: Performed by: INTERNAL MEDICINE

## 2024-03-03 PROCEDURE — 210N000001 HC R&B IMCU HEART CARE

## 2024-03-03 PROCEDURE — 80048 BASIC METABOLIC PNL TOTAL CA: CPT | Performed by: INTERNAL MEDICINE

## 2024-03-03 PROCEDURE — 85049 AUTOMATED PLATELET COUNT: CPT | Performed by: INTERNAL MEDICINE

## 2024-03-03 PROCEDURE — P9047 ALBUMIN (HUMAN), 25%, 50ML: HCPCS | Performed by: INTERNAL MEDICINE

## 2024-03-03 PROCEDURE — 99233 SBSQ HOSP IP/OBS HIGH 50: CPT | Performed by: STUDENT IN AN ORGANIZED HEALTH CARE EDUCATION/TRAINING PROGRAM

## 2024-03-03 PROCEDURE — 83735 ASSAY OF MAGNESIUM: CPT | Performed by: INTERNAL MEDICINE

## 2024-03-03 PROCEDURE — 250N000011 HC RX IP 250 OP 636: Performed by: STUDENT IN AN ORGANIZED HEALTH CARE EDUCATION/TRAINING PROGRAM

## 2024-03-03 RX ORDER — LOSARTAN POTASSIUM 25 MG/1
25 TABLET ORAL DAILY
Status: DISCONTINUED | OUTPATIENT
Start: 2024-03-03 | End: 2024-03-05 | Stop reason: HOSPADM

## 2024-03-03 RX ORDER — MAGNESIUM SULFATE HEPTAHYDRATE 40 MG/ML
2 INJECTION, SOLUTION INTRAVENOUS ONCE
Qty: 50 ML | Refills: 0 | Status: COMPLETED | OUTPATIENT
Start: 2024-03-03 | End: 2024-03-03

## 2024-03-03 RX ORDER — ALBUMIN (HUMAN) 12.5 G/50ML
50 SOLUTION INTRAVENOUS ONCE
Status: COMPLETED | OUTPATIENT
Start: 2024-03-03 | End: 2024-03-03

## 2024-03-03 RX ADMIN — CEFDINIR 300 MG: 300 CAPSULE ORAL at 08:52

## 2024-03-03 RX ADMIN — Medication 60 ML: at 12:13

## 2024-03-03 RX ADMIN — CARVEDILOL 25 MG: 12.5 TABLET, FILM COATED ORAL at 08:52

## 2024-03-03 RX ADMIN — ACETAMINOPHEN 650 MG: 325 TABLET ORAL at 23:54

## 2024-03-03 RX ADMIN — WHITE PETROLATUM: 1.75 OINTMENT TOPICAL at 21:55

## 2024-03-03 RX ADMIN — PANTOPRAZOLE SODIUM 40 MG: 40 TABLET, DELAYED RELEASE ORAL at 08:50

## 2024-03-03 RX ADMIN — CEFDINIR 300 MG: 300 CAPSULE ORAL at 21:55

## 2024-03-03 RX ADMIN — OXYCODONE HYDROCHLORIDE 5 MG: 5 TABLET ORAL at 23:54

## 2024-03-03 RX ADMIN — NICOTINE 1 PATCH: 14 PATCH, EXTENDED RELEASE TRANSDERMAL at 14:25

## 2024-03-03 RX ADMIN — ALBUMIN HUMAN 50 G: 0.25 SOLUTION INTRAVENOUS at 08:50

## 2024-03-03 RX ADMIN — NYSTATIN: 100000 CREAM TOPICAL at 21:55

## 2024-03-03 RX ADMIN — ATORVASTATIN CALCIUM 80 MG: 40 TABLET, FILM COATED ORAL at 21:56

## 2024-03-03 RX ADMIN — MAGNESIUM SULFATE HEPTAHYDRATE 2 G: 40 INJECTION, SOLUTION INTRAVENOUS at 06:53

## 2024-03-03 RX ADMIN — CARVEDILOL 25 MG: 12.5 TABLET, FILM COATED ORAL at 18:03

## 2024-03-03 RX ADMIN — INSULIN ASPART 6 UNITS: 100 INJECTION, SOLUTION INTRAVENOUS; SUBCUTANEOUS at 18:05

## 2024-03-03 RX ADMIN — ACETAMINOPHEN 650 MG: 325 TABLET ORAL at 00:40

## 2024-03-03 RX ADMIN — FERROUS GLUCONATE 324 MG: 324 TABLET ORAL at 12:13

## 2024-03-03 RX ADMIN — OXYCODONE HYDROCHLORIDE 5 MG: 5 TABLET ORAL at 00:40

## 2024-03-03 RX ADMIN — ASPIRIN 325 MG: 325 TABLET, COATED ORAL at 08:52

## 2024-03-03 RX ADMIN — Medication 1 TABLET: at 12:13

## 2024-03-03 RX ADMIN — LOSARTAN POTASSIUM 25 MG: 25 TABLET, FILM COATED ORAL at 18:03

## 2024-03-03 RX ADMIN — ENOXAPARIN SODIUM 40 MG: 40 INJECTION SUBCUTANEOUS at 18:03

## 2024-03-03 RX ADMIN — INSULIN ASPART 7 UNITS: 100 INJECTION, SOLUTION INTRAVENOUS; SUBCUTANEOUS at 12:13

## 2024-03-03 ASSESSMENT — ACTIVITIES OF DAILY LIVING (ADL)
ADLS_ACUITY_SCORE: 35
ADLS_ACUITY_SCORE: 36
ADLS_ACUITY_SCORE: 35
ADLS_ACUITY_SCORE: 36
ADLS_ACUITY_SCORE: 35

## 2024-03-03 NOTE — PLAN OF CARE
"Goal Outcome Evaluation:    Pt alert and oriented. Flat affect noted and irritable this AM.  Vitals checked and WNL.  Refused to eat bfast, verbalized \"I don't know when I am going to eat if I will eat!\"  Refused to be up on chair for meals, prefers to be on bed instead.  On 2 lpm O2 via NC. Pt been removing his NC couple of times.  Uses urinal at bedside.  Calls appropriately, call within reach.    Problem: Adult Inpatient Plan of Care  Goal: Absence of Hospital-Acquired Illness or Injury  Intervention: Identify and Manage Fall Risk  Recent Flowsheet Documentation  Taken 3/3/2024 1600 by Kalen Polk, RN  Safety Promotion/Fall Prevention:   increase visualization of patient   lighting adjusted  Taken 3/3/2024 1200 by Kalen Polk, RN  Safety Promotion/Fall Prevention: increase visualization of patient  Taken 3/3/2024 0800 by Kalen Polk, RN  Safety Promotion/Fall Prevention:   increase visualization of patient   lighting adjusted       "

## 2024-03-03 NOTE — PROGRESS NOTES
[facility]    Medicine Progress Note - Hospitalist Service    Date of Admission:  2/14/2024    Assessment & Plan   Floyd Capps is a 48 year old male with history of DM, HTN, CVA, obesity, recent ED visit for nonhealing diabetic foot ulcer with cellulitis, here for worsening pain in the foot, with necrotic tissue noted on exam.  Patient admitted on 2/14/2024     Diabetic foot ulcer, right heel;  -chronic wound R heel, recent resulting cellulitis treated with Bactrim  -here for worse pain in the foot, found with ulcer worse with necrotic tissue  -MRI of the foot no osteomyelitis  -On 2/16, s/p debridement with irrigation of right foot diabetic ulcer. NWB on right foot per podiatrist  -On 2/19, wound VAC placed, continue  -Surgical cultures polymicrobials.  Initially IV Vanco and IV Zosyn, now changed to IV Zosyn and IV daptomycin - ended 2/29, Then cefdinir PO for 4 days for possible aspiration PNA .  Appreciated ID input  -- on Feb 27 - CT abdomen done due to abdominal pain, nausea, vomiting - concern for possible pneumonia, discussed with ID and patient well covered and treated with broad spectrum antibiotics - zosyn, dapto, may have had a aspiration episode, seems stable now.  -Pain management with Tylenol, home oxycodone.     Acute hypoxemic respiratory failure 2/2  Acute new onset systolic heart failure;  CT imaging reported moderate bilateral pleural effusion and diffuse anasarca;  -- On 2/19, patient reported feeling shortness of breath, imaging workup showed bilateral moderate pleural effusion and diffuse anasarca  --BNP significantly elevated   --Echo on 8/23 reported EF 55% but given patient noncompliance with medications  -- Repeat echo EF 30 to 35% with global hypokinesis:   --Of note, patient had nuclear stress test on 8/23 which was negative for inducible myocardial ischemia  -- Was on IV duresis, now on po Bumex - held today due to SCHUYLER  -- was on dobutamine drip - now weaned  off.  --Monitor intake/output, weight, labs closely  -- wean oxygen as tolerated    CAD   Multivessel disease  S/p cardiac cath 03/01:  multivessel CAD  CT surgery consulted, patient declined CABG at this time, follow up outpatient. Risks discussed  on aspirin, statin, coreg  -To discuss PCI options once renal function is stable  US carotid and US lower extremity venous mapping for future     # SCHUYLER on CKD, multifactorial  -Secondary to infection, CRS, diuretics, contrast use, diabetic nephropathy  -Cr uptrending 1.71  -Has nephrotic range proteinuria  -Nephrology following  -Bumex held 03/02  -Started Losaran 25mg daily 03/03  -No renal biopsy indicated now  -Once stable, nephrology recommending maximize ARB, SGLT2 inhibitors     DM Type II, Uncontrolled; improving  -Stopped all his home meds lately due to stomach upset while on Bactrim, then neglected to resume meds. Hold home metformin and glipizide for now.  -A1c 11.8  -Of note, patient was on insulin in the past and received education in the hospital about it, but seems to have stopped on its own.  Patient needs outpatient diabetic educator for ongoing titration  -Inc Insulin sliding scale, I:C 1:8, Lantus 30u and hypoglycemic protocol  -Given poor appetite and early satiety, trial of Reglan which seems helping  -Also added PPI     History of stroke in 8/2023;  -Had been taking Plavix and atorvastatin but recently stopped taking all of his medications as above.  -Neurologist note from 8/26/2023 reviewed- at that time recommended DAPT with aspirin and Plavix for 90 days, afterwards switch to enteric-coated aspirin 325 mg daily.  -Started full dose aspirin and resumed home atorvastatin     Essential hypertension, uncontrolled; due to noncompliance-improving  -- Discharge summary from 8/26/2023 reviewed, patient was discharged home on Coreg 12.5 mg twice daily, HCTZ 12.5 mg daily, losartan 100 mg daily  --On 2/19 restarted Coreg 12.5 mg twice daily, increased to 25  "mg twice daily on 2/24 by cardiology.  --Diuretics as above.  As needed hydralazine.  Monitor vitals and adjust medications accordingly.     Tobacco use disorder  -Nicotine Patch  -Advised to quit smoking     Left distal radius fracture due to recent fall on 2/6/2023  -Cast in place  -PT and OT evals  -Outpatient orthopedics follow-up     Normocytic anemia, likely due to chronic wound;  -No active/obvious bleeding.  Trend     Poor follow-up, noncompliance;  -Importance of compliance and follow-up needs to be emphasized.  Patient with relatively young age and already with serious comorbidities of chronic disease    PT/OT - recommend TCU          Diet: Fluid restriction 1800 ML FLUID  Snacks/Supplements Adult: Expedite Bottle; With Meals  Snacks/Supplements Adult: Glucerna; With Meals  Advance Diet as Tolerated: Fully Advanced to diet(s) per Provider order; 2 gm NA Diet    DVT Prophylaxis: Enoxaparin (Lovenox) SQ  Cazares Catheter: Not present  Lines: PRESENT      PICC 02/29/24 Double Lumen Right Basilic Dobutamine drip, Access-Site Assessment: WDL      Cardiac Monitoring: ACTIVE order. Indication: Post- PCI/Angiogram (24 hours)  Code Status: Full Code      Clinically Significant Risk Factors              # Hypoalbuminemia: Lowest albumin = 2 g/dL at 2/20/2024  5:46 AM, will monitor as appropriate    # Acute Kidney Injury, unspecified: based on a >150% or 0.3 mg/dL increase in last creatinine compared to past 90 day average, will monitor renal function   # Acute heart failure with reduced ejection fraction: last echo with EF <40% and receiving IV diuretics      # DMII: A1C = 11.8 % (Ref range: <5.7 %) within past 6 months   # Obesity: Estimated body mass index is 33.94 kg/m  as calculated from the following:    Height as of this encounter: 1.778 m (5' 10\").    Weight as of this encounter: 107.3 kg (236 lb 8.9 oz).   # Moderate Malnutrition: based on nutrition assessment    # Financial/Environmental Concerns: none     "     Disposition Plan     Expected Discharge Date: 03/04/2024    Discharge Delays: Placement - TCU  Destination: home with family  Discharge Comments: insurance needs, coronary angio this week, TCU?            Nata Baca MD  Hospitalist Service  [facility]  Securely message with [employer] (more info)  Text page via [employer] Paging/Directory   ______________________________________________________________________    Interval History   Was examined at the bedside.  Denies any new complaint.  Refused CABG    Physical Exam   Vital Signs: Temp: 97.9  F (36.6  C) Temp src: Oral BP: 132/74 Pulse: 78   Resp: 18 SpO2: 97 % O2 Device: Nasal cannula with humidification Oxygen Delivery: 2 LPM  Weight: 236 lbs 8.86 oz    General: Not in obvious distress  HEENT: supple neck  Chest: Clear to auscultation bilateral anteriorly, no wheezing  Heart: S1S2 normal, regular  Abdomen: Soft.  Obese, nontender. Bowel sounds- active.  Extremities: Bilateral lower extremity swelling, right foot with wound VAC  Neuro: alert and awake, grossly non-focal    Medical Decision Making       55 MINUTES SPENT BY ME on the date of service doing chart review, history, exam, documentation & further activities per the note.      Data     I have personally reviewed the following data over the past 24 hrs:    N/A  \   N/A   / 358     136 94 (L) 27.7 (H) /  234 (H)   4.0 30 (H) 1.71 (H) \       Imaging results reviewed over the past 24 hrs:   No results found for this or any previous visit (from the past 24 hour(s)).

## 2024-03-03 NOTE — PROGRESS NOTES
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 And ongoing diuretic needs seems to be primary issue; dramatic improvement in sCR 3/1 despite heavy diuresis support CRS as   Prior Vanco/Zosyn may be contributing, now off IV antibx  UAs x 2 this admit with few 2-3 RBC, otherwise bland  Nephrotic range proteinuria of 9 g/g  Baseline Cr 0.8 with severe nephrotic range proteinuria and some cells on UA. Presented with nephrotic syndrome (albumin 2.2, UPCR 9 g/g, anasarca). Suspect he has CKD/GN most likely related to diabetic nephropathy, all serologies neg except sl monoclonal AB on urine immunofix, plan to f/up and repeat in several months.   No renal biopsy needed now and not too inclined to do in future to confirm DN given overall non compliance hx and MMP, relative stability and would likely not change POC  Patient serum creatinine has been ranging between 1.4 and 1.7 mg/dL since February 17, 2024.  Patient serum creatinine was 0.7 mg/dL 03/01 ? lab error.  Today serum creatinine is 1.71 mg/dL.  Recs:  Recommend to start on losartan 25 mg daily d/t proteinuria and monitor renal function closely.  Would rec SGLT2 inhib eventually once cr stable and medically optimized CHF   Plan for outpt follow up at our clinic:   Monday March 18 at 11:00AM with Ruth Mike NP at [facility]   BMP in a.m. daily for now     Electrolytes  Mild hyponatremia-resolved .  Trend.  Normal serum potassium of 4.0  Normal serum magnesium of 2.0.    Acid base status-VBG 03/02/24 recent visit metabolic alkalosis which is compensated.  Serum bicarbonate is trending down from 35 to 30 this morning.  Trend with diuresis.    HTN -BP is mostly controlled.  On carvedilol 25 mg twice daily. Currently managing per cards .   Recommend to start on losartan 25 mg daily d/t  proteinuria.  Monitor blood pressure closely, avoid hypotension.     Volume overload  Looks euvolemic on exam.   Net neg 37+L.  Weight is trending down.  Most recent chest imaging showed reduced bilat pleural effusions (though ? Asp pneumonia).  Cards resumed diuretics again on 2/28   Labs suggest some contraction serum bicarbonate is 30 and serum calcium corrected for hypoalbuminemia is ~ 10.2 on today labs  Cardiac catheterization 03/01 showed multiple vessel native coronary disease and severely elevated left-sided filling pressure.  Received IV Lasix 80 mg bolus 03/01/24.  As per  discussion with cardiologist Dr. Arndt, the plan is to continue oral Bumex 2 mg twice daily.  Patient has been receiving daily Albumin infusions.      Hypoxia:Stable  On 1-3 L per [place], CT imaging 2/27 showed reduced bilat pleural effusions (though ? Asp pneumonia), less convinced the hypervolemia driving     New HFrEF combined systolic/diastolic:    had negative stress test last fall, but concern for occult ASCAD with wall motion abnormalities. Cardiac catheterization 03/01 showed multiple vessel native coronary disease.  EF down to 30-35% with global hypokinesis, and inc filling pressures. Severe Diastolic dysfunction.  Received Dobutamin drip 02/27-03/01/24.  Evaluated by [facility] but declined to surgical revascularization due to financial issues.  Reviewed cardiothoracic surgery note from yesterday.  Will defer management to cardiology service.     Anemia   Hgb 10's and stable normocytic on last check 02/27  Iron stores low, hold off on IV iron with infection, consider IV iron once cleared and off antibx  Started on oral daily iron       HLD - statin     DM2 -  historically poor control, A1c ~ 12 insulin this admit   Management per [facility]     Diabetic foot ulcer  2/16 debridement, wound vac 2/19 .Zosyn and daptomycin completed , per ID, no osteo.     PNA:  On oral Cefdinir x 4 days , ID signed off .        Tobacco use - nicotine patches  in use this admit      Will follow.    Annabelle Perez MD  [facility]  [address], [address]  [place] [address]  Phone# [phone]  Fax# [phone]        SUBJECTIVE:    Was seen at the bedside and events were reviewed with nursing.  No acute issues overnight.    Patient states that he feels the same.  No new complaints.  Patient stated that he declined surgical revascularization of his multivessel coronary disease due to financial issues.  Patient stated he is unable to withstand being off work for several months due to financial issues.  Patient reports fair appetite.  Patient states that he has been having bowel movements and urinating laying in the bed.  Patient denies: fever, chills, dizziness,  cough, shortness of breath , chest pain, palpitations, orthopnea, nausea, vomiting, abdominal pain, changes in bowel habits, dysuria, urinary frequency, urgency, hematuria.  Updated on labs.  All questions answered.        OBJECTIVE:  Physical Exam   Temp: 97.8  F (36.6  C) Temp src: Oral BP: 130/75 Pulse: 74   Resp: 18 SpO2: 95 % O2 Device: Nasal cannula with humidification Oxygen Delivery: 2 LPM  Vitals:    24 0633 24 0448 24 0506   Weight: 110.1 kg (242 lb 11.6 oz) 108.2 kg (238 lb 8.6 oz) 107.3 kg (236 lb 8.9 oz)     Vital Signs with Ranges  Temp:  [97.8  F (36.6  C)-98.8  F (37.1  C)] 97.8  F (36.6  C)  Pulse:  [73-86] 74  Resp:  [18] 18  BP: (118-154)/(66-82) 130/75  SpO2:  [60 %-97 %] 95 %  I/O last 3 completed shifts:  In: 1225 [P.O.:1225]  Out: 2775 [Urine:2775]    Temp (24hrs), Av.2  F (36.8  C), Min:98  F (36.7  C), Max:98.6  F (37  C)      Patient Vitals for the past 72 hrs:   Weight   24 0506 107.3 kg (236 lb 8.9 oz)   24 0448 108.2 kg (238 lb 8.6 oz)   24 0633 110.1 kg (242 lb 11.6 oz)     Intake/Output Summary (Last 24 hours) at 2024 1057  Last data filed at 2024 0900  Gross per 24 hour   Intake 1450 ml   Output  2400 ml   Net -950 ml       PHYSICAL EXAM:  General - Alert and oriented x4, appears comfortable, NAD, supine in bed,somewhat irritable affect today   Cardiovascular - Rate controlled, SBP 150s, on dobut gtt   Respiratory - sats good on 2L , CTA by ant exam only d/t positioning in bed   Abd:no guarding or pain with palpation, no overt ascites  Extremities - No sig lower extremity edema bilaterally, R foot dressing intact/wound vac in place  Skin: quite dry, R cellulitis with some erythema.  No obvious drainage   Neuro:  Grossly intact, no focal deficits  MSK:  Grossly intact,   Psych: irritable affect    LABORATORY STUDIES:     Recent Labs   Lab 03/03/24  0502 02/29/24  0422 02/27/24  0540 02/26/24  0508   WBC  --   --  13.5*  --    RBC  --   --  3.76*  --    HGB  --   --  10.2*  --    HCT  --   --  34.6*  --     344 308 326       Basic Metabolic Panel:  Recent Labs   Lab 03/03/24  0825 03/03/24  0502 03/02/24  2035 03/02/24  1816 03/02/24  1203 03/02/24  0827 03/02/24  0442 03/01/24  0840 03/01/24  0441 02/29/24  0834 02/29/24  0422 02/28/24  0845 02/28/24  0420 02/27/24  0834 02/27/24  0540   NA  --  136  --   --   --   --  135  --  131*  --  135  --  132*  --  132*   POTASSIUM  --  4.0  --   --   --   --  3.9  --  4.0  --  4.0  --  4.4  --  4.3   CHLORIDE  --  94*  --   --   --   --  93*  --  92*  --  95*  --  96*  --  97*   CO2  --  30*  --   --   --   --  35*  --  33*  --  34*  --  25  --  30*   BUN  --  27.7*  --   --   --   --  23.4*  --  21.4*  --  22.0*  --  22.6*  --  20.9*   CR  --  1.71*  --   --   --   --  1.59*  --  0.74  --  1.59*  --  1.60*  --  1.70*   * 187* 305* 261* 219* 204* 173*   < > 265*   < > 175*   < > 246*   < > 200*   SARAH  --  9.5  --   --   --   --  9.7  --  9.1  --  9.2  --  8.9  --  9.1    < > = values in this interval not displayed.       INRNo lab results found in last 7 days.     Recent Labs   Lab Test 03/03/24  0502 02/29/24  0422 02/27/24  0540 02/26/24  0508  02/23/24  0618 08/24/23  0645 08/23/23  0946   INR  --   --   --   --   --   --  0.95   WBC  --   --  13.5*  --  11.3*   < > 11.2*   HGB  --   --  10.2*  --  10.6*   < > 13.9    344 308   < > 337   < > 290    < > = values in this interval not displayed.       Personally reviewed current labs    ~ 50 Minutes today spent in exam, POC, education regarding renal disease and management, counseling and/or discussion with patient's care team.    Annabelle Perez MD  [facility]  [address], [address]  [place] [address]  Phone# [phone]  Fax# [phone]

## 2024-03-03 NOTE — PLAN OF CARE
Heart Failure Care Map  GOALS TO BE MET BEFORE DISCHARGE:    1. Decrease congestion and/or edema with diuretic therapy to achieve near optimal volume status.     Dyspnea improved: Denied.   Edema improved: Trace edema.        Last 24 hour I/O:   Intake/Output Summary (Last 24 hours) at 3/3/2024 0540  Last data filed at 3/3/2024 0500  Gross per 24 hour   Intake 1225 ml   Output 2775 ml   Net -1550 ml           Net I/O and Weights since admission:   02/02 0700 - 03/03 0659  In: 79727 [P.O.:86340; I.V.:3320]  Out: 91454 [Urine:99674]  Net: -37264     Vitals:    02/14/24 0904 02/14/24 2148 02/22/24 0409 02/23/24 0620   Weight: 122.5 kg (270 lb) 123.1 kg (271 lb 6.2 oz) 125.4 kg (276 lb 7.3 oz) 123.7 kg (272 lb 11.3 oz)    02/27/24 1100 02/27/24 1700 02/28/24 0332 03/01/24 0633   Weight: 114.6 kg (252 lb 10.4 oz) 118.3 kg (260 lb 11.2 oz) 118.4 kg (261 lb 1.6 oz) 110.1 kg (242 lb 11.6 oz)    03/02/24 0448 03/03/24 0506   Weight: 108.2 kg (238 lb 8.6 oz) 107.3 kg (236 lb 8.9 oz)       2.  O2 sats > 90% on room air, or at prior home O2 therapy level.      Able to wean O2 this shift to keep sats above 90%?: No.   Does patient use Home O2? No          Current oxygenation status:   SpO2: 93 %     O2 Device: Nasal cannula, Oxygen Delivery: 2 LPM    3.  Tolerates ambulation and mobility near baseline.     Ambulation: Not applicable this shift.   Times patient ambulated with staff this shift: 0         Diurese on hold due to worsening kidney function. Oxygen dropped down to mid 60's while sleeping on room air. Increased oxygen to 4L while sleeping. Right foot pain. PRN tylenol and oxycodone given once tonight. Right heel wound vac in place. Had bowel movement tonight. TCU at discharge.       Oxygen dropped down to 2L.       Please review the Heart Failure Care Map for additional HF goal outcomes.    Rehana Hubbard RN  3/3/2024 Denied

## 2024-03-03 NOTE — PROGRESS NOTES
HEART CARE NOTE          Assessment/Recommendations   1. Severe HFrEF c/b cardiogenic shock  Assessment / Plan  New onset; ICM; s/p coronary angiogram which demonstrated severe multi-vessel CAD - CTS consulted regarding CABG, patient declines  LVED ~ 35 - resume diuresis   Diuresis on hold given tenuous renal status  Patient is high risk for adverse cardiac events 2/2  severe systolic dysfunction, elevated NTproBNP, non-compliance  GDMT as detailed below     Current Pharmacotherapy AHA Guideline-Directed Medical Therapy   Losartan 25 mg - on hold given SCHUYLER Lisinopril 20 mg twice daily   Carvedilol 25 mg twice daily - held while on inotrope Carvedilol 25 mg twice daily   Spironolactone not started Spironolactone 25 mg once daily   Hydralazine NA Hydralazine 100 mg three times daily   Isosorbide dinitrate NA Isosorbide dinitrate 40 mg three times daily   SGLT2 inhibitor:Dapagliflozin/Empagliflozin - not started Dapagliflozin or Empagliflozin 10 mg daily      2. SCHUYLER in CKD  Assessment / Plan  Likely CRS in the setting of ADHF c/b antibiotic regimen; LVEDP remains significantly elevated  Continue to monitor UOP and renal function closely     3. DM2  Assessment / Plan  C/b DM foot ulcer  Management per primary team  Currently on ISS     4. Tobacco abuse  Assessment / Plan  Counseled regarding cessation     5. HTN  Assessment / Plan  Adequately controlled on current regimen - no changes at this time     5. CAD  Assessment / Plan  Severe , multivessel CAD - CTS consulted re:CABG which patient declines at this time; continue medical optimization; will discuss PCI options with IC that may be pursued once renal function is stable to improved  Denies chest pain or anginal equivalent  Continue ASA and high intensity atorvastatin       Plan of care discussed on March 3, 2024 with patient at bedside, and primary team overseeing patient's care    50 minutes spent reviewing prior records (including documentation, laboratory  "studies, cardiac testing/imaging), history and physical exam, planning, and subsequent documentation.        History of Present Illness/Subjective    Mr. Floyd Capps is a 48 year old male with a PMHx significant for (per Epic notation) DM, HTN, CVA, obesity, recent ED visit for nonhealing diabetic foot ulcer with cellulitis, here for worsening pain in the foot, with necrotic tissue noted on exam now found to be in new onset HFrEF     Today, Mr. Capps denies acute cardiac events or complaints; Management plan as detailed above     ECG: Personally reviewed. normal sinus rhythm, nonspecific ST and T waves changes.     ECHO (personnaly Reviewed on 2/21/24):   The left ventricle is normal in size. There is mild concentric left  ventricular hypertrophy.  Left ventricular function is decreased. The ejection fraction is 30-35%  (moderately reduced). There is global hypokinesis with severe hypokinesis of  the mid to apical inferior wall.  Diastolic Doppler findings (E/E' ratio and/or other parameters) suggest left  ventricular filling pressures are increased.     The right ventricle is normal in size and function.  Normal left atrial size. Right atrial size is normal.  There is mild (1+) mitral regurgitation.  IVC diameter >2.1 cm collapsing <50% with sniff suggests a high RA pressure  estimated at 15 mmHg or greater.     Compared to previous study from 8/23/2023 the LV systolic function has  declined and there are regional wall motion abnormalities.    Telemetry: personally reviewed March 3, 2024; notable for sinus rhythm     Lab results: personally reviewed March 3, 2024; notable for SCHUYLER    Medical history and pertinent documents reviewed in Care Everywhere please where applicable see details above        Physical Examination Review of Systems   /66 (BP Location: Left arm)   Pulse 73   Temp 97.8  F (36.6  C) (Oral)   Resp 18   Ht 1.778 m (5' 10\")   Wt 107.3 kg (236 lb 8.9 oz)   SpO2 91%   BMI 33.94 kg/m  "   Body mass index is 33.94 kg/m .  Wt Readings from Last 3 Encounters:   03/03/24 107.3 kg (236 lb 8.9 oz)   02/13/24 122.5 kg (270 lb)   02/06/24 122.5 kg (270 lb)     General Appearance:   no distress, normal body habitus   ENT/Mouth: membranes moist, no oral lesions or bleeding gums.      EYES:  no scleral icterus, normal conjunctivae   Neck: no carotid bruits or thyromegaly   Chest/Lungs:   lungs are clear to auscultation, no rales or wheezing, equal chest wall expansion    Cardiovascular:   Regular. Normal first and second heart sounds with no murmurs, rubs, or gallops; the carotid, radial and posterior tibial pulses are intact, + JVD and trace LE edema bilaterally    Abdomen:  no organomegaly, masses, bruits, or tenderness; bowel sounds are present   Extremities: no cyanosis or clubbing   Skin: no xanthelasma, warm.    Neurologic: NAD     Psychiatric: alert and oriented x3, calm     A complete 10 systems ROS was reviewed  And is negative except what is listed in the HPI.          Medical History  Surgical History Family History Social History   Past Medical History:   Diagnosis Date    Diabetes (H)     Past Surgical History:   Procedure Laterality Date    APPENDECTOMY      INCISION AND DRAINAGE OF WOUND      groin abscess    IRRIGATION AND DEBRIDEMENT FOOT, COMBINED Right 2/16/2024    Procedure: IRRIGATION AND DEBRIDEMENT RIGHT FOOT;  Surgeon: Cristofer Sims DPM;  Location: [facility] OR    PICC DOUBLE LUMEN PLACEMENT  2/29/2024    no family history of premature coronary artery disease Social History     Socioeconomic History    Marital status: Single     Spouse name: Not on file    Number of children: Not on file    Years of education: Not on file    Highest education level: Not on file   Occupational History    Not on file   Tobacco Use    Smoking status: Every Day     Packs/day: .5     Types: Cigarettes    Smokeless tobacco: Never    Tobacco comments:     Seen IP by CTTS on 8/24/23 and declined  cessation services and materials.    Vaping Use    Vaping Use: Never used   Substance and Sexual Activity    Alcohol use: No    Drug use: No    Sexual activity: Not on file   Other Topics Concern    Not on file   Social History Narrative    Patient reports that he does not have health insurance.     Social Determinants of Health     Financial Resource Strain: Low Risk  (10/20/2023)    Financial Resource Strain     Within the past 12 months, have you or your family members you live with been unable to get utilities (heat, electricity) when it was really needed?: No   Food Insecurity: High Risk (10/20/2023)    Food Insecurity     Within the past 12 months, did you worry that your food would run out before you got money to buy more?: Yes     Within the past 12 months, did the food you bought just not last and you didn t have money to get more?: No   Transportation Needs: Low Risk  (10/20/2023)    Transportation Needs     Within the past 12 months, has lack of transportation kept you from medical appointments, getting your medicines, non-medical meetings or appointments, work, or from getting things that you need?: No   Physical Activity: Not on file   Stress: Not on file   Social Connections: Not on file   Interpersonal Safety: Low Risk  (10/20/2023)    Interpersonal Safety     Do you feel physically and emotionally safe where you currently live?: Yes     Within the past 12 months, have you been hit, slapped, kicked or otherwise physically hurt by someone?: No     Within the past 12 months, have you been humiliated or emotionally abused in other ways by your partner or ex-partner?: No   Housing Stability: High Risk (10/20/2023)    Housing Stability     Do you have housing? : Yes     Are you worried about losing your housing?: Yes           Lab Results    Chemistry/lipid CBC Cardiac Enzymes/BNP/TSH/INR   Lab Results   Component Value Date    CHOL 156 02/21/2024    HDL 39 (L) 02/21/2024    TRIG 199 (H) 02/21/2024    BUN  "27.7 (H) 03/03/2024     03/03/2024    CO2 30 (H) 03/03/2024    Lab Results   Component Value Date    WBC 13.5 (H) 02/27/2024    HGB 10.2 (L) 02/27/2024    HCT 34.6 (L) 02/27/2024    MCV 92 02/27/2024     03/03/2024    Lab Results   Component Value Date    TSH 2.72 08/23/2023    INR 0.95 08/23/2023     No results found for: \"CKTOTAL\", \"CKMB\", \"TROPONINI\"       Weight:    Wt Readings from Last 3 Encounters:   03/03/24 107.3 kg (236 lb 8.9 oz)   02/13/24 122.5 kg (270 lb)   02/06/24 122.5 kg (270 lb)       Allergies  No Known Allergies      Surgical History  Past Surgical History:   Procedure Laterality Date    APPENDECTOMY      INCISION AND DRAINAGE OF WOUND      groin abscess    IRRIGATION AND DEBRIDEMENT FOOT, COMBINED Right 2/16/2024    Procedure: IRRIGATION AND DEBRIDEMENT RIGHT FOOT;  Surgeon: Cristofer Sims DPM;  Location: [facility] OR    PICC DOUBLE LUMEN PLACEMENT  2/29/2024       Social History  Tobacco:   History   Smoking Status    Every Day    Packs/day: 0.50    Types: Cigarettes   Smokeless Tobacco    Never    Alcohol:   Social History    Substance and Sexual Activity      Alcohol use: No   Illicit Drugs:   History   Drug Use No       Family History  History reviewed. No pertinent family history.       Shayna Arndt MD on 3/3/2024      cc: Glendy Benavides    "

## 2024-03-03 NOTE — PROGRESS NOTES
Care Management Follow Up    Length of Stay (days): 18    Expected Discharge Date: 03/04/2024     Concerns to be Addressed:   pt is declining CABG    Patient plan of care discussed at interdisciplinary rounds: Yes    Anticipated Discharge Disposition:  TCU     Anticipated Discharge Services:    Anticipated Discharge DME:      Patient/family educated on Medicare website which has current facility and service quality ratings:    Education Provided on the Discharge Plan:    Patient/Family in Agreement with the Plan:      Referrals Placed by CM/SW:    Private pay costs discussed: Not applicable    Additional Information:  Social History:  Pt lives alone in an apartment.    Therapy recommendation is TCU and pt is accepting of this. Pt has been accepted to [facility]. When CM went to discuss this with the pt he told CM that he wants us to keep looking because he does not like the location of the TCU. I told the pt that since he is not medically ready we can cont to look for other TCUs but if we do not get any other accepting TCUs and he is medically ready to discharge will will have to discuss [facility] again. Pt was accepting of this plan.    Per provider update pt is not medically ready to discharge. Pt currently has hospital wound vac and this will need to cont at discharge. [facility] is aware of this.    RNCM to follow for medical progression, recommendations, and final discharge plan.      .  Rashmi Plunkett RN

## 2024-03-04 ENCOUNTER — APPOINTMENT (OUTPATIENT)
Dept: PHYSICAL THERAPY | Facility: HOSPITAL | Age: 49
End: 2024-03-04
Payer: MEDICAID

## 2024-03-04 LAB
ANION GAP SERPL CALCULATED.3IONS-SCNC: 9 MMOL/L (ref 7–15)
BUN SERPL-MCNC: 35 MG/DL (ref 6–20)
CALCIUM SERPL-MCNC: 9.9 MG/DL (ref 8.6–10)
CHLORIDE SERPL-SCNC: 94 MMOL/L (ref 98–107)
CREAT SERPL-MCNC: 1.72 MG/DL (ref 0.67–1.17)
DEPRECATED HCO3 PLAS-SCNC: 31 MMOL/L (ref 22–29)
EGFRCR SERPLBLD CKD-EPI 2021: 48 ML/MIN/1.73M2
GLUCOSE BLDC GLUCOMTR-MCNC: 204 MG/DL (ref 70–99)
GLUCOSE BLDC GLUCOMTR-MCNC: 256 MG/DL (ref 70–99)
GLUCOSE BLDC GLUCOMTR-MCNC: 267 MG/DL (ref 70–99)
GLUCOSE BLDC GLUCOMTR-MCNC: 310 MG/DL (ref 70–99)
GLUCOSE SERPL-MCNC: 220 MG/DL (ref 70–99)
MAGNESIUM SERPL-MCNC: 2.5 MG/DL (ref 1.7–2.3)
POTASSIUM SERPL-SCNC: 4.2 MMOL/L (ref 3.4–5.3)
SODIUM SERPL-SCNC: 134 MMOL/L (ref 135–145)

## 2024-03-04 PROCEDURE — 97530 THERAPEUTIC ACTIVITIES: CPT | Mod: GP

## 2024-03-04 PROCEDURE — 99232 SBSQ HOSP IP/OBS MODERATE 35: CPT

## 2024-03-04 PROCEDURE — 83735 ASSAY OF MAGNESIUM: CPT | Performed by: STUDENT IN AN ORGANIZED HEALTH CARE EDUCATION/TRAINING PROGRAM

## 2024-03-04 PROCEDURE — 250N000013 HC RX MED GY IP 250 OP 250 PS 637: Performed by: INTERNAL MEDICINE

## 2024-03-04 PROCEDURE — 99233 SBSQ HOSP IP/OBS HIGH 50: CPT | Performed by: INTERNAL MEDICINE

## 2024-03-04 PROCEDURE — 250N000011 HC RX IP 250 OP 636: Performed by: INTERNAL MEDICINE

## 2024-03-04 PROCEDURE — 99233 SBSQ HOSP IP/OBS HIGH 50: CPT | Performed by: STUDENT IN AN ORGANIZED HEALTH CARE EDUCATION/TRAINING PROGRAM

## 2024-03-04 PROCEDURE — 210N000001 HC R&B IMCU HEART CARE

## 2024-03-04 PROCEDURE — 80048 BASIC METABOLIC PNL TOTAL CA: CPT | Performed by: STUDENT IN AN ORGANIZED HEALTH CARE EDUCATION/TRAINING PROGRAM

## 2024-03-04 RX ORDER — BUMETANIDE 1 MG/1
1 TABLET ORAL
Status: DISCONTINUED | OUTPATIENT
Start: 2024-03-04 | End: 2024-03-05

## 2024-03-04 RX ADMIN — CARVEDILOL 25 MG: 12.5 TABLET, FILM COATED ORAL at 09:25

## 2024-03-04 RX ADMIN — CARVEDILOL 25 MG: 12.5 TABLET, FILM COATED ORAL at 17:46

## 2024-03-04 RX ADMIN — ATORVASTATIN CALCIUM 80 MG: 40 TABLET, FILM COATED ORAL at 20:40

## 2024-03-04 RX ADMIN — INSULIN ASPART 7 UNITS: 100 INJECTION, SOLUTION INTRAVENOUS; SUBCUTANEOUS at 18:28

## 2024-03-04 RX ADMIN — NICOTINE 1 PATCH: 14 PATCH, EXTENDED RELEASE TRANSDERMAL at 14:16

## 2024-03-04 RX ADMIN — ENOXAPARIN SODIUM 40 MG: 40 INJECTION SUBCUTANEOUS at 17:46

## 2024-03-04 RX ADMIN — WHITE PETROLATUM: 1.75 OINTMENT TOPICAL at 20:46

## 2024-03-04 RX ADMIN — ASPIRIN 325 MG: 325 TABLET, COATED ORAL at 09:31

## 2024-03-04 RX ADMIN — Medication 1 TABLET: at 14:12

## 2024-03-04 RX ADMIN — CEFDINIR 300 MG: 300 CAPSULE ORAL at 09:23

## 2024-03-04 RX ADMIN — FERROUS GLUCONATE 324 MG: 324 TABLET ORAL at 12:46

## 2024-03-04 RX ADMIN — BUMETANIDE 1 MG: 1 TABLET ORAL at 09:24

## 2024-03-04 RX ADMIN — LOSARTAN POTASSIUM 25 MG: 25 TABLET, FILM COATED ORAL at 09:25

## 2024-03-04 RX ADMIN — NYSTATIN: 100000 CREAM TOPICAL at 20:46

## 2024-03-04 RX ADMIN — CEFDINIR 300 MG: 300 CAPSULE ORAL at 20:40

## 2024-03-04 RX ADMIN — PANTOPRAZOLE SODIUM 40 MG: 40 TABLET, DELAYED RELEASE ORAL at 09:26

## 2024-03-04 RX ADMIN — BUMETANIDE 1 MG: 1 TABLET ORAL at 17:46

## 2024-03-04 NOTE — PROGRESS NOTES
RENAL PROGRESS NOTE        ASSESSMENT & PLAN:     PLAN:  -continue expectant management (avoid nephrotoxins, renal dose medication, avoid hypo/hypertension, daily wt, daily I/o)  -Resume ARB for proteinuria  -I would recommend an SGLT2 inhibitor once more stable for renal, cardiac benefits, and for Tx of proteinuria. This can be done in the out-pt setting at follow up nephrology appts.   -Pt has an out-pt Nephrology follow up appointment on 3/18/24 at 11:00AM with Ruth Mike NP at [facility].   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 And ongoing diuretic needs seems to be primary issue, dramatic improvement in sCR 3/1 despite heavy diuresis support CRS as   Prior Vanco/Zosyn may be contributing, now off IV antibx. UAs x 2 this admit with few 2-3 RBC, otherwise bland.     Nephrotic range proteinuria of 9 g/g: Baseline Cr 0.8 with severe nephrotic range proteinuria and some cells on UA. Presented with nephrotic syndrome (albumin 2.2, UPCR 9 g/g, anasarca). Suspect he has CKD/GN most likely related to diabetic nephropathy, all serologies neg except sl monoclonal AB on urine immunofix, plan to f/up and repeat in several months.   No renal biopsy needed now and not too inclined to do in future to confirm DN given overall non- compliance hx and MMP, relative stability and would likely not change POC.  Patient serum creatinine has been ranging between 1.4 and 1.7 mg/dL since 2/17/24.  Patient serum creatinine was 0.7 mg/dL 03/01 ? lab error.  Today serum creatinine is 1.71 mg/dL. Arb resumed.         Acid base status-VBG 03/02/24 recent visit metabolic alkalosis which is compensated.  Serum bicarbonate is trending down from 35 to 31 this morning.  Trend with diuresis.     HTN: controlled.  On carvedilol 25 mg BID. Currently managing per cards. Resumed  losartan 25 mg daily d/t proteinuria.  Monitor blood pressure closely, avoid hypotension.     Volume overload: appears euvolemic. Net neg 37+L.  Weight is trending down.  Most recent chest imaging showed reduced bilat pleural effusions (though ? Asp pneumonia). resumed diuretics again on 2/28. Labs suggest some contraction serum bicarbonate is 31 and   Cardiac catheterization 03/01 showed multiple vessel native coronary disease and severely elevated left-sided filling pressure.  Received IV Lasix 80 mg bolus 03/01/24. per cardiologist Dr. Arndt, the plan is to continue oral Bumex 2 mg BID. Patient has been receiving daily Albumin infusions.     Hypoxia:Stable. On 1-3 L per NC, CT imaging 2/27 showed reduced bilat pleural effusions (though ? Asp pneumonia), less convinced the hypervolemia driving.      New HFrEF combined systolic/diastolic: Neg stress test last fall, but concern for occult ASCAD with wall motion abnormalities. Cardiac catheterization 03/01 showed multiple vessel native coronary disease. EF down to 30-35% with global hypokinesis, and inc filling pressures. Severe Diastolic dysfunction. Received Dobutamin drip 02/27-03/01/24. Evaluated by [facility] but declined to surgical revascularization due to financial issues.  Will defer management to cardiology service.     Anemia: Hgb 10's and stable normocytic on last check 02/27. Iron stores low, hold off on IV iron with infection, consider IV iron once cleared and off antibx. Started on oral daily iron      HLD: on statin     DM2: historically poor control, A1c ~ 12 insulin this admit. Management per [facility]     Diabetic foot ulcer: s/p 2/16 debridement, wound vac 2/19 .Zosyn and daptomycin completed , per ID, no osteo.      PNA:On oral Cefdinir x 4 days , ID signed off      Tobacco use: nicotine patches in use this ad    SUBJECTIVE:    Pt new to me today  Pt laying in bed, appears comfortable  Pt reports feeling better  Denies n, v, c, d, sob, fever, rash or  CP  FR 1800 ml, pt able to do this.   Denies HA, feeling dizzy  Denies edema  Discussed labs/plan, and answered all questions.       OBJECTIVE:  Physical Exam   Temp: 97.7  F (36.5  C) Temp src: Oral BP: 114/68 Pulse: 77   Resp: 18 SpO2: 100 % O2 Device: Nasal cannula Oxygen Delivery: 2 LPM  Vitals:    03/02/24 0448 03/03/24 0506 03/04/24 0437   Weight: 108.2 kg (238 lb 8.6 oz) 107.3 kg (236 lb 8.9 oz) 107.5 kg (236 lb 15.9 oz)     Vital Signs with Ranges  Temp:  [97.7  F (36.5  C)-98.6  F (37  C)] 97.7  F (36.5  C)  Pulse:  [77-85] 77  Resp:  [18-20] 18  BP: (114-151)/(68-84) 114/68  SpO2:  [93 %-100 %] 100 %  I/O last 3 completed shifts:  In: 1440 [P.O.:1440]  Out: 1600 [Urine:1600]    Patient Vitals for the past 72 hrs:   Weight   03/04/24 0437 107.5 kg (236 lb 15.9 oz)   03/03/24 0506 107.3 kg (236 lb 8.9 oz)   03/02/24 0448 108.2 kg (238 lb 8.6 oz)     Intake/Output Summary (Last 24 hours) at 3/4/2024 1115  Last data filed at 3/4/2024 0815  Gross per 24 hour   Intake 1510 ml   Output 1600 ml   Net -90 ml       PHYSICAL EXAM:  GEN: NAD, Aox3  CV: RRR  Lung: bases diminished BL  Ab: soft and NT  Ext: no edema and well perfused. Left forearm cast.   Skin: no rash, + DM foot wound/did not observe   : good UO      LABORATORY STUDIES:     Recent Labs   Lab 03/03/24  0502 02/29/24  0422 02/27/24  0540   WBC  --   --  13.5*   RBC  --   --  3.76*   HGB  --   --  10.2*   HCT  --   --  34.6*    344 308       Basic Metabolic Panel:  Recent Labs   Lab 03/04/24  0745 03/04/24  0442 03/03/24  2117 03/03/24  1759 03/03/24  1158 03/03/24  0825 03/03/24  0502 03/02/24  0827 03/02/24  0442 03/01/24  0840 03/01/24  0441 02/29/24  0834 02/29/24  0422 02/28/24  0845 02/28/24  0420   NA  --  134*  --   --   --   --  136  --  135  --  131*  --  135  --  132*   POTASSIUM  --  4.2  --   --   --   --  4.0  --  3.9  --  4.0  --  4.0  --  4.4   CHLORIDE  --  94*  --   --   --   --  94*  --  93*  --  92*  --  95*  --  96*   CO2  --   31*  --   --   --   --  30*  --  35*  --  33*  --  34*  --  25   BUN  --  35.0*  --   --   --   --  27.7*  --  23.4*  --  21.4*  --  22.0*  --  22.6*   CR  --  1.72*  --   --   --   --  1.71*  --  1.59*  --  0.74  --  1.59*  --  1.60*   * 220* 283* 237* 234* 182* 187*   < > 173*   < > 265*   < > 175*   < > 246*   SARAH  --  9.9  --   --   --   --  9.5  --  9.7  --  9.1  --  9.2  --  8.9    < > = values in this interval not displayed.       INRNo lab results found in last 7 days.     Recent Labs   Lab Test 03/03/24  0502 02/29/24  0422 02/27/24  0540 02/26/24  0508 02/23/24  0618 08/24/23  0645 08/23/23  0946   INR  --   --   --   --   --   --  0.95   WBC  --   --  13.5*  --  11.3*   < > 11.2*   HGB  --   --  10.2*  --  10.6*   < > 13.9    344 308   < > 337   < > 290    < > = values in this interval not displayed.       Personally reviewed current labs    Inés CALDWELL-BC  [facility]  [phone]

## 2024-03-04 NOTE — PLAN OF CARE
Problem: Pain Acute  Goal: Optimal Pain Control and Function  Outcome: Progressing     Problem: Heart Failure  Goal: Stable Heart Rate and Rhythm  Outcome: Progressing  Goal: Optimal Functional Ability  Intervention: Optimize Functional Ability  Recent Flowsheet Documentation  Taken 3/4/2024 0437 by Rehana Hubbard RN  Activity Management: activity adjusted per tolerance  Taken 3/3/2024 2354 by Rehana Hubbard RN  Activity Management: activity adjusted per tolerance  Goal: Effective Oxygenation and Ventilation  Intervention: Promote Airway Secretion Clearance  Recent Flowsheet Documentation  Taken 3/4/2024 0437 by Rehana Hubbard RN  Activity Management: activity adjusted per tolerance  Taken 3/3/2024 2354 by Rehana Hubbard RN  Activity Management: activity adjusted per tolerance  Intervention: Optimize Oxygenation and Ventilation  Recent Flowsheet Documentation  Taken 3/4/2024 0437 by Rehana Hubbard RN  Head of Bed (HOB) Positioning: HOB at 30 degrees  Taken 3/3/2024 2354 by Rehana Hubbard RN  Head of Bed (HOB) Positioning: HOB at 30 degrees     Problem: Acute Kidney Injury/Impairment  Goal: Fluid and Electrolyte Balance  Outcome: Progressing  Goal: Effective Renal Function  Outcome: Progressing     Goal Outcome Evaluation:         Right foot pain. PRN oxycodone and tylenol given. Diurese on hold due to worsening kidney function. On magnesium and potassium protocol. On 2L of oxygen. TCU at discharge.

## 2024-03-04 NOTE — PROGRESS NOTES
[facility]    Medicine Progress Note - Hospitalist Service    Date of Admission:  2/14/2024    Assessment & Plan   Floyd Capps is a 48 year old male with history of DM, HTN, CVA, obesity, recent ED visit for nonhealing diabetic foot ulcer with cellulitis, here for worsening pain in the foot, with necrotic tissue noted on exam.  Patient admitted on 2/14/2024     Diabetic foot ulcer, right heel;  -chronic wound R heel, recent resulting cellulitis treated with Bactrim  -here for worse pain in the foot, found with ulcer worse with necrotic tissue  -MRI of the foot no osteomyelitis  -On 2/16, s/p debridement with irrigation of right foot diabetic ulcer. NWB on right foot per podiatrist  -On 2/19, wound VAC placed, continue  -Surgical cultures polymicrobials.  Initially IV Vanco and IV Zosyn, now changed to IV Zosyn and IV daptomycin - ended 2/29, Then cefdinir PO for 4 days for possible aspiration PNA .  Appreciated ID input  -- on Feb 27 - CT abdomen done due to abdominal pain, nausea, vomiting - concern for possible pneumonia, discussed with ID and patient well covered and treated with broad spectrum antibiotics - zosyn, dapto, may have had a aspiration episode, seems stable now.  -Pain management with Tylenol, home oxycodone.     Acute hypoxemic respiratory failure 2/2  Acute new onset systolic heart failure;  CT imaging reported moderate bilateral pleural effusion and diffuse anasarca;  -- On 2/19, patient reported feeling shortness of breath, imaging workup showed bilateral moderate pleural effusion and diffuse anasarca  --BNP significantly elevated   --Echo on 8/23 reported EF 55% but given patient noncompliance with medications  -- Repeat echo EF 30 to 35% with global hypokinesis:   --Of note, patient had nuclear stress test on 8/23 which was negative for inducible myocardial ischemia  -- Was on IV duresis, resumed po Bumex 03/04  -- was on dobutamine drip - now weaned off.  --Monitor  intake/output, weight, labs closely  -- wean oxygen as tolerated    CAD   Multivessel disease  S/p cardiac cath 03/01:  multivessel CAD  CT surgery consulted, patient declined CABG at this time, follow up outpatient. Risks discussed  on aspirin, statin, coreg  -To discuss PCI options once renal function is stable  US carotid and US lower extremity venous mapping for future     # SCHUYLER on CKD, multifactorial  -Secondary to infection, CRS, diuretics, contrast use, diabetic nephropathy  -Cr 1.72  -Has nephrotic range proteinuria  -Nephrology following  -Started Losaran 25mg daily 03/03  -US renal pending  -No renal biopsy indicated now  -Once stable, nephrology recommending maximize ARB, SGLT2 inhibitors    # Pseudohyponatremia 2/2 hyperglycemia     DM Type II, Uncontrolled; improving  -Stopped all his home meds lately due to stomach upset while on Bactrim, then neglected to resume meds. Hold home metformin and glipizide for now.  -A1c 11.8  -Of note, patient was on insulin in the past and received education in the hospital about it, but seems to have stopped on its own.  Patient needs outpatient diabetic educator for ongoing titration  -Inc Insulin sliding scale, I:C 1:8, Inc Lantus 34u and hypoglycemic protocol  -Given poor appetite and early satiety, trial of Reglan which seems helping  -Also added PPI     History of stroke in 8/2023;  -Had been taking Plavix and atorvastatin but recently stopped taking all of his medications as above.  -Neurologist note from 8/26/2023 reviewed- at that time recommended DAPT with aspirin and Plavix for 90 days, afterwards switch to enteric-coated aspirin 325 mg daily.  -Started full dose aspirin and resumed home atorvastatin     Essential hypertension, uncontrolled; due to noncompliance-improving  -- Discharge summary from 8/26/2023 reviewed, patient was discharged home on Coreg 12.5 mg twice daily, HCTZ 12.5 mg daily, losartan 100 mg daily  --On 2/19 restarted Coreg 12.5 mg twice  "daily, increased to 25 mg twice daily on 2/24 by cardiology.  --Diuretics as above.  As needed hydralazine.  Monitor vitals and adjust medications accordingly.     Tobacco use disorder  -Nicotine Patch  -Advised to quit smoking     Left distal radius fracture due to recent fall on 2/6/2023  -Cast in place  -PT and OT evals  -Outpatient orthopedics follow-up     Normocytic anemia, likely due to chronic wound;  -No active/obvious bleeding.  Trend     Poor follow-up, noncompliance;  -Importance of compliance and follow-up needs to be emphasized.  Patient with relatively young age and already with serious comorbidities of chronic disease    PT/OT - recommend TCU          Diet: Fluid restriction 1800 ML FLUID  Snacks/Supplements Adult: Expedite Bottle; With Meals  Snacks/Supplements Adult: Glucerna; With Meals  Advance Diet as Tolerated: Fully Advanced to diet(s) per Provider order; 2 gm NA Diet    DVT Prophylaxis: Enoxaparin (Lovenox) SQ  Cazares Catheter: Not present  Lines: PRESENT      PICC 02/29/24 Double Lumen Right Basilic Dobutamine drip, Access-Site Assessment: WDL      Cardiac Monitoring: ACTIVE order. Indication: Post- PCI/Angiogram (24 hours)  Code Status: Full Code      Clinically Significant Risk Factors              # Hypoalbuminemia: Lowest albumin = 2 g/dL at 2/20/2024  5:46 AM, will monitor as appropriate    # Acute Kidney Injury, unspecified: based on a >150% or 0.3 mg/dL increase in last creatinine compared to past 90 day average, will monitor renal function   # Chronic heart failure with reduced ejection fraction: last echo with EF <40%      # DMII: A1C = 11.8 % (Ref range: <5.7 %) within past 6 months   # Obesity: Estimated body mass index is 34.01 kg/m  as calculated from the following:    Height as of this encounter: 1.778 m (5' 10\").    Weight as of this encounter: 107.5 kg (236 lb 15.9 oz).   # Severe Malnutrition: based on nutrition assessment    # Financial/Environmental Concerns: none     "     Disposition Plan      Expected Discharge Date: 03/05/2024    Discharge Delays: Placement - TCU  Destination: home with family  Discharge Comments: insurance needs, coronary angio this week, TCU?            Nata Baca MD  Hospitalist Service  [facility]  Securely message with [employer] (more info)  Text page via [employer] Paging/Directory   ______________________________________________________________________    Interval History   Patient was examined at the bedside, lying comfortably in the bed.  Denies any new complaints    Physical Exam   Vital Signs: Temp: 97.7  F (36.5  C) Temp src: Oral BP: (!) 156/81 Pulse: 79   Resp: 20 SpO2: 94 % O2 Device: Nasal cannula Oxygen Delivery: 2 LPM  Weight: 236 lbs 15.91 oz    General: Not in obvious distress  HEENT: supple neck  Chest: Clear to auscultation bilateral anteriorly, no wheezing  Heart: S1S2 normal, regular  Abdomen: Soft.  Obese, nontender. Bowel sounds- active.  Extremities: Bilateral lower extremity swelling, right foot with wound VAC  Neuro: alert and awake, grossly non-focal    Medical Decision Making       50 MINUTES SPENT BY ME on the date of service doing chart review, history, exam, documentation & further activities per the note.      Data     I have personally reviewed the following data over the past 24 hrs:    N/A  \   N/A   / N/A     134 (L) 94 (L) 35.0 (H) /  256 (H)   4.2 31 (H) 1.72 (H) \       Imaging results reviewed over the past 24 hrs:   No results found for this or any previous visit (from the past 24 hour(s)).

## 2024-03-04 NOTE — PROGRESS NOTES
"Care Management Follow Up    Length of Stay (days): 19    Expected Discharge Date: 2024     Concerns to be Addressed:     Care progression  Patient plan of care discussed at interdisciplinary rounds: Yes    Anticipated Discharge Disposition:  Transitional care     Anticipated Discharge Services:  Transitional care  Anticipated Discharge DME:  NA    Patient/family educated on Medicare website which has current facility and service quality ratings:  NA  Education Provided on the Discharge Plan:  Yes per team  Patient/Family in Agreement with the Plan:  Yes    Referrals Placed by CM/SW:  yes  Private pay costs discussed: transportation costs    Additional Information:  [id number] called [facility]. TCU bed is available for today. Will need order to say OK for Medela wound vac.    Sent message to provider, Dr. Baca.     0896 per Dr. Baca, waiting for cardiology and nephrology to give clearance.    Social Hx: \"Was living w/mom in apartment however she is now . No svcs and working on selecting insurance. Referral sent to FSW.  DM ulcer R heel detriment. Rehab rec TCU. Family working with pt to verify insurance and FSW. Transport TBD. Disability information/resources given to pt. Escalated .\"    RNCM to follow for medical progression, recommendations, and final discharge plan.     Madison Carter RN     Met with patient to discuss the above and accepting TCU    1114 per Dr. Baca, patient is not ready to discharge today.  Called to update Carey with Jeremy    "

## 2024-03-04 NOTE — PROGRESS NOTES
[facility]  WO Nurse Inpatient Assessment     Consulted for: wound vac to right heel    Summary: 3/4 - VAC dressing change schedule now T/F not update on Friday, so all orders and documentation updated today.      2/26 Patient asked WOC nurse not to talk during change but did ask about status of wound. Also asked about timing of fixing leaks/concerns with VAC dressing.     2/22 Patient in bed, very flat affect, did not speak during wound vac dressing change except to ask if I was done yet.    2/19 Patient in bed, going between flat affect to anger.  Did listen to wound vac teaching and the rationale for its use.  Is concerned with finances and how to care for it once out of hospital.    Patient History (according to provider note(s):      Floyd Capps is a 48 year old male with history of DM, HTN, CVA, obesity, recent ED visit for nonhealing diabetic foot ulcer with cellulitis, here for worsening pain in the foot, with necrotic tissue noted on exam.     Assessment:      Areas visualized during today's visit:  right foot    Negative pressure wound therapy applied to: right heel          2/19       2/26      3/1    Last photo: 2/26  Wound due to: Diabetic Ulcer   Wound history/plan of care:    Surgical date: 2/16   Service following: podiatry  Date Negative Pressure Wound Therapy initiated: 2/19   Interventions in place: offloading and elevation  Is patient s nutritional status compromised? no   If yes, what interventions are in place? N/A  Reason for initiating vac therapy? Presence of co-morbidities, High risk of infections, and Need for accelerated granulation tissue  Which?of?the?following?co-morbidities?apply? Diabetes, Obesity, and Smoking  If diabetic is patient on a diabetic management program? Yes   Is osteomyelitis present in wound? no   If yes what treatments are in place? N/A    Wound base: Unhealthy granulation/adipose mix, bone palpable     Palpation of the wound bed: firm       " Drainage: scant      Volume in cannister: 50     Last cannister change date: 2/26     Description of drainage: none      Measurements (length x width x depth, in cm) 4.5 cm  x 7 cm  x  up to 2.4 cm       Tunneling N/A      Undermining N/A   Periwound skin: Macerated  but improving       Color: pale and pink       Temperature: normal    Odor: none   Pain: did not report any during visit  Pain intervention prior to dressing change: patient tolerated well, oral oxycodone, and slow and gentle cares   Treatment goal: Heal  and Protection  STATUS: improving   Supplies ordered: gathered    Number of foam pieces removed from a wound (excluding foam for bridge) :  1 GranuFoam Black   Verified this matched the number of foam pieces applied last dressing change: Yes   Number of foam pieces packed into wound (excluding foam for bridge) :  1 GranuFoam Black       Treatment Plan:     Negative pressure wound therapy plan:  Wound location: right heel   Change Days:  M/Th  by WOC RN    Supplies (including all accessories) used: medium Black foam   Cleanse with Vashe prior to replacing NPWT  Suction setting: -125   Methods used: Window paned all periwound skin with vac drape prior to applying sponge and Bridged trac pad off bony prominences    Staff RN to assess integrity of dressing and ensure suction is set at appropriate level every shift.   Date canister. Chart canister output every shift. Change cannister weekly and PRN if full/occluded     Remove foam dressing and replace with BID normal saline moist gauze dressing if:   -a dressing failure which cannot be repaired within 2 hours   -patient is discharging to home without a home pump   -patient is discharging to a facility outside the local area   -if a dressing is a \"Silver Foam\", remove before Radiation Therapy or MRI     The hospital VAC pump is not to be discharged with the patient.?Ensure to disconnect patient from machine prior to discharge. Then,    - If a home KCI VAC " pump has been delivered, connect home cannister to dressing tubing then connect cannister to home pump and turn on machine    - If transferring to a nearby facility with a KCI vac, can disconnect and clamp tubing then cover end with glove so can be reconnected within 2 hours       Orders: Reviewed    RECOMMEND PRIMARY TEAM ORDER: None, at this time  Education provided: plan of care, Infection prevention , and Off-loading pressure  Discussed plan of care with: Patient  WO nurse follow-up plan:  T/F - begin this week after VAC dressing leakage last week changed schedule  Notify WOC if wound(s) deteriorate.  Nursing to notify the Provider(s) and re-consult the WO Nurse if new skin concern.    DATA:     Current support surface: Standard  Standard gel/foam mattress (IsoFlex, Atmos air, etc)  Containment of urine/stool: Continent of bladder and Continent of bowel  BMI: Body mass index is 34.01 kg/m .   Active diet order: Orders Placed This Encounter      Advance Diet as Tolerated: Fully Advanced to diet(s) per Provider order; 2 gm NA Diet     Output: I/O last 3 completed shifts:  In: 1440 [P.O.:1440]  Out: 1600 [Urine:1600]     Labs:   Recent Labs   Lab 02/27/24  0540   ALBUMIN 3.0*  3.1*   HGB 10.2*   WBC 13.5*     Pressure injury risk assessment:   Sensory Perception: 3-->slightly limited  Moisture: 3-->occasionally moist  Activity: 2-->chairfast  Mobility: 3-->slightly limited  Nutrition: 3-->adequate  Friction and Shear: 3-->no apparent problem  Rolo Score: 17    DAYAN PisanoN, RN, PHN, HNB-BC, CWOCN  Pager no longer is use, please contact through [employer] group: [facility] [employer]

## 2024-03-04 NOTE — PROGRESS NOTES
HEART CARE NOTE          Assessment/Recommendations   1. Severe HFrEF c/b cardiogenic shock  Assessment / Plan  New onset; ICM; s/p coronary angiogram which demonstrated severe multi-vessel CAD - CTS consulted regarding CABG, patient declines  Resume oral diuretic regimen and continue to monitor UOP and renal function closely  Patient is high risk for adverse cardiac events 2/2  severe systolic dysfunction, elevated NTproBNP, non-compliance  GDMT as detailed below     Current Pharmacotherapy AHA Guideline-Directed Medical Therapy   Losartan 25 mg - on hold given SCHUYLER Lisinopril 20 mg twice daily   Carvedilol 25 mg twice daily - held while on inotrope Carvedilol 25 mg twice daily   Spironolactone not started Spironolactone 25 mg once daily   Hydralazine NA Hydralazine 100 mg three times daily   Isosorbide dinitrate NA Isosorbide dinitrate 40 mg three times daily   SGLT2 inhibitor:Dapagliflozin/Empagliflozin - not started Dapagliflozin or Empagliflozin 10 mg daily      2. SCHUYLER in CKD  Assessment / Plan  Likely CRS in the setting of ADHF c/b antibiotic regimen; LVEDP remains significantly elevated  Continue to monitor UOP and renal function closely     3. DM2  Assessment / Plan  C/b DM foot ulcer  Management per primary team  Currently on ISS     4. Tobacco abuse  Assessment / Plan  Counseled regarding cessation     5. HTN  Assessment / Plan  Adequately controlled on current regimen - no changes at this time     5. CAD  Assessment / Plan  Severe , multivessel CAD - CTS consulted re:CABG which patient declines at this time; continue medical optimization; will discuss PCI options with IC that may be pursued once renal function is stable to improved  Denies chest pain or anginal equivalent  Continue ASA and high intensity atorvastatin    Plan of care discussed on March 4, 2024 with patient at bedside, and primary team overseeing patient's care.      History of Present Illness/Subjective    Mr. Floyd Capps is a 48 year  "old male with a PMHx significant for (per Epic notation) DM, HTN, CVA, obesity, recent ED visit for nonhealing diabetic foot ulcer with cellulitis, here for worsening pain in the foot, with necrotic tissue noted on exam now found to be in new onset HFrEF     Today, Mr. Capps denies acute cardiac events or complaints; Management plan as detailed above     ECG: Personally reviewed. normal sinus rhythm, nonspecific ST and T waves changes.     ECHO (personnaly Reviewed on 2/21/24):   The left ventricle is normal in size. There is mild concentric left  ventricular hypertrophy.  Left ventricular function is decreased. The ejection fraction is 30-35%  (moderately reduced). There is global hypokinesis with severe hypokinesis of  the mid to apical inferior wall.  Diastolic Doppler findings (E/E' ratio and/or other parameters) suggest left  ventricular filling pressures are increased.     The right ventricle is normal in size and function.  Normal left atrial size. Right atrial size is normal.  There is mild (1+) mitral regurgitation.  IVC diameter >2.1 cm collapsing <50% with sniff suggests a high RA pressure  estimated at 15 mmHg or greater.     Compared to previous study from 8/23/2023 the LV systolic function has  declined and there are regional wall motion abnormalities.     Telemetry: personally reviewed March 4, 2024; notable for sinus rhythm     Lab results: personally reviewed March 4, 2024; notable for stable SCHUYLER on CKD    Medical history and pertinent documents reviewed in Care Everywhere please where applicable see details above        Physical Examination Review of Systems   /69   Pulse 80   Temp 97.8  F (36.6  C) (Oral)   Resp 18   Ht 1.778 m (5' 10\")   Wt 107.5 kg (236 lb 15.9 oz)   SpO2 100%   BMI 34.01 kg/m    Body mass index is 34.01 kg/m .  Wt Readings from Last 3 Encounters:   03/04/24 107.5 kg (236 lb 15.9 oz)   02/13/24 122.5 kg (270 lb)   02/06/24 122.5 kg (270 lb)     General Appearance:   " no distress, normal body habitus   ENT/Mouth: membranes moist, no oral lesions or bleeding gums.      EYES:  no scleral icterus, normal conjunctivae   Neck: no carotid bruits or thyromegaly   Chest/Lungs:   lungs are clear to auscultation, no rales or wheezing, equal chest wall expansion    Cardiovascular:   Regular. Normal first and second heart sounds with no murmurs, rubs, or gallops; the carotid, radial and posterior tibial pulses are intact, + JVD and trace LE edema bilaterally    Abdomen:  no organomegaly, masses, bruits, or tenderness; bowel sounds are present   Extremities: no cyanosis or clubbing   Skin: no xanthelasma, warm.    Neurologic: NAD     Psychiatric: alert and oriented x3, calm     A complete 10 systems ROS was reviewed  And is negative except what is listed in the HPI.          Medical History  Surgical History Family History Social History   Past Medical History:   Diagnosis Date    Diabetes (H)     Past Surgical History:   Procedure Laterality Date    APPENDECTOMY      INCISION AND DRAINAGE OF WOUND      groin abscess    IRRIGATION AND DEBRIDEMENT FOOT, COMBINED Right 2/16/2024    Procedure: IRRIGATION AND DEBRIDEMENT RIGHT FOOT;  Surgeon: Cristofer Sims DPM;  Location: [facility] OR    PICC DOUBLE LUMEN PLACEMENT  2/29/2024    no family history of premature coronary artery disease Social History     Socioeconomic History    Marital status: Single     Spouse name: Not on file    Number of children: Not on file    Years of education: Not on file    Highest education level: Not on file   Occupational History    Not on file   Tobacco Use    Smoking status: Every Day     Packs/day: .5     Types: Cigarettes    Smokeless tobacco: Never    Tobacco comments:     Seen IP by CTTS on 8/24/23 and declined cessation services and materials.    Vaping Use    Vaping Use: Never used   Substance and Sexual Activity    Alcohol use: No    Drug use: No    Sexual activity: Not on file   Other Topics Concern     Not on file   Social History Narrative    Patient reports that he does not have health insurance.     Social Determinants of Health     Financial Resource Strain: Low Risk  (10/20/2023)    Financial Resource Strain     Within the past 12 months, have you or your family members you live with been unable to get utilities (heat, electricity) when it was really needed?: No   Food Insecurity: High Risk (10/20/2023)    Food Insecurity     Within the past 12 months, did you worry that your food would run out before you got money to buy more?: Yes     Within the past 12 months, did the food you bought just not last and you didn t have money to get more?: No   Transportation Needs: Low Risk  (10/20/2023)    Transportation Needs     Within the past 12 months, has lack of transportation kept you from medical appointments, getting your medicines, non-medical meetings or appointments, work, or from getting things that you need?: No   Physical Activity: Not on file   Stress: Not on file   Social Connections: Not on file   Interpersonal Safety: Low Risk  (10/20/2023)    Interpersonal Safety     Do you feel physically and emotionally safe where you currently live?: Yes     Within the past 12 months, have you been hit, slapped, kicked or otherwise physically hurt by someone?: No     Within the past 12 months, have you been humiliated or emotionally abused in other ways by your partner or ex-partner?: No   Housing Stability: High Risk (10/20/2023)    Housing Stability     Do you have housing? : Yes     Are you worried about losing your housing?: Yes           Lab Results    Chemistry/lipid CBC Cardiac Enzymes/BNP/TSH/INR   Lab Results   Component Value Date    CHOL 156 02/21/2024    HDL 39 (L) 02/21/2024    TRIG 199 (H) 02/21/2024    BUN 27.7 (H) 03/03/2024     03/03/2024    CO2 30 (H) 03/03/2024    Lab Results   Component Value Date    WBC 13.5 (H) 02/27/2024    HGB 10.2 (L) 02/27/2024    HCT 34.6 (L) 02/27/2024    MCV 92  "02/27/2024     03/03/2024    Lab Results   Component Value Date    TSH 2.72 08/23/2023    INR 0.95 08/23/2023     No results found for: \"CKTOTAL\", \"CKMB\", \"TROPONINI\"       Weight:    Wt Readings from Last 3 Encounters:   03/04/24 107.5 kg (236 lb 15.9 oz)   02/13/24 122.5 kg (270 lb)   02/06/24 122.5 kg (270 lb)       Allergies  No Known Allergies      Surgical History  Past Surgical History:   Procedure Laterality Date    APPENDECTOMY      INCISION AND DRAINAGE OF WOUND      groin abscess    IRRIGATION AND DEBRIDEMENT FOOT, COMBINED Right 2/16/2024    Procedure: IRRIGATION AND DEBRIDEMENT RIGHT FOOT;  Surgeon: Cristofer Sims DPM;  Location: [facility] OR    PICC DOUBLE LUMEN PLACEMENT  2/29/2024       Social History  Tobacco:   History   Smoking Status    Every Day    Packs/day: 0.50    Types: Cigarettes   Smokeless Tobacco    Never    Alcohol:   Social History    Substance and Sexual Activity      Alcohol use: No   Illicit Drugs:   History   Drug Use No       Family History  History reviewed. No pertinent family history.       Shayna Arndt MD on 3/4/2024      cc: Glendy Benavides    "

## 2024-03-04 NOTE — PROGRESS NOTES
CLINICAL NUTRITION SERVICES - REASSESSMENT NOTE    Recommendations Ordered by Registered Dietitian (RD):   None at this time, per pt request   Future/Additional Recommendations:   Monitor intake, weight trending, appropriate labs, stooling patterns   Malnutrition:   % Weight Loss:  > 2% in 1 week (severe malnutrition)  % Intake:  </= 75% for >/= 7 days (moderate malnutrition)  Subcutaneous Fat Loss:  Upper arm region mild depletion and Thoracic region mild depletion (evidenced by some loose skin and cellulite suggesting weight loss)  Muscle Loss:  Temporal region moderate depletion, posterior calf mild depletion  Fluid Retention:  +1 Trace    Malnutrition Diagnosis: Severe malnutrition  In Context of: Acute illness or injury     EVALUATION OF PROGRESS TOWARD GOALS   Diet:  2 gm Na  Fluid restriction 1800 mL  Expedite btl w/breakfast  Glucerna chocolate w/lunch    Intake/Tolerance:    Last BM 3/3 or 3/2 (x1), previously 2/29 (x2)  Experiencing nausea, per flowsheets    % intake, per flowsheets (sandwich/yogurt/pineapple/diet soda/diet pudding)  HealthTouch shows average of 2 meals/day ordered  3-day average: 1036 kcal, 72 g protein (meets 47% calorie and 83% protein needs)  NPO 2/29 partial day  Per RN, typically eats meals in bed    Today, Floyd was lying in bed and denied N/V/D and noted some constipation yesterday. Will monitor.  He indicated he felt like he was getting enough to eat, and is usually eating 2x/day (lunch/dinner). He enjoys the Glucerna and hates taking the Expedite, as he does not like the flavor.   He felt he has lost muscle weight recently d/t laying in bed frequently.  He inquired about why he cannot have 3 total diet sodas in one day, and this writer told him we could look at how much fluid he is getting per meal and see why, but he quickly rescinded his question. He did not want to change anything about his supplements or add anything to increase eating patterns at the moment, and seemed  "to accept these treatment options were just what he needed to do for right now.     ASSESSED NUTRITION NEEDS:  Dosing Weight: 87.3 kg adjusted weight      ASSESSED NUTRITION NEEDS  Estimated Energy Needs: 2200 - 2620 kcals/day (25 - 30 kcals/kg)  Justification: Obese and Wound healing  Estimated Protein Needs: 87+ grams protein/day (1 gm/kg)  Justification: SCHUYLER and Wound healing  Estimated Fluid Needs: 1800 mL per provider     NEW FINDINGS:   Pt not interested in pursuing surgical revascularization at this time  3/1: WOC wound status - improving  3/1: Pre-angiogram findings of tongue growth which \"is bothersome and bleeds easily\", present for 1.5 yrs  2/29: Pt refusing therapies and not participating in fluid restriction per RN; PICC placement    Weight:  Weight trending since admission has been declining, likely d/t diuresing and inadequate intake. Noted 12% weight loss in 2+ weeks, 9% in 1 week.  03/04/24 0437 107.5 kg (236 lb 15.9 oz) Bed scale   03/03/24 0506 107.3 kg (236 lb 8.9 oz) Bed scale   03/02/24 0448 108.2 kg (238 lb 8.6 oz) Bed scale   03/01/24 0633 110.1 kg (242 lb 11.6 oz) Bed scale   02/28/24 0332 118.4 kg (261 lb 1.6 oz) Standing scale   02/27/24 1700 118.3 kg (260 lb 11.2 oz) Standing scale   02/27/24 1100 114.6 kg (252 lb 10.4 oz) Bed scale   02/23/24 0620 123.7 kg (272 lb 11.3 oz) Bed scale   02/22/24 0409 125.4 kg (276 lb 7.3 oz) Bed scale   02/14/24 2148 123.1 kg (271 lb 6.2 oz) Bed scale   02/14/24 0904 122.5 kg (270 lb)      I/O:  -37 L since admission    Labs:  Na 134 - L (trended down)  Mg 2.5 - H (trended up)  Cl 94 - L  K WNL  No current Phos (2/27 WNL)  Cr 1.72 - H (trended up recently)  BUN 35 - H (trended up)  -305 (usually trending in the 200's)    Medications:  Bumex BID  Fergon  Novolog rapid acting 1 unit/8 g TID, 1-6 units TID w/meals  Lantus 30 units  MVIM  Protonix  Expedite daily  Dulcolax, Miralax, Senokot PRN  TUMS PRN  Zofran, Compazine PRN    Previous Goals:   Meet > " 75% of estimated nutrition needs - Partially met, ongoing  BG < 180 - Not met, ongoing  Wound healing - Progressing, ongoing    Previous Nutrition Diagnosis:   Malnutrition related to decreased intake as evidenced by <75% >7 days   Evaluation: Modified; ongoing    MALNUTRITION  % Weight Loss:  > 2% in 1 week (severe malnutrition)  % Intake:  </= 75% for >/= 7 days (moderate malnutrition)  Subcutaneous Fat Loss:  Upper arm region mild depletion and Thoracic region mild depletion (evidenced by some loose skin and cellulite suggesting weight loss)  Muscle Loss:  Temporal region moderate depletion, posterior calf mild depletion  Fluid Retention:  +1 Trace    Malnutrition Diagnosis: Severe malnutrition  In Context of: Acute illness or injury    CURRENT NUTRITION DIAGNOSIS  Malnutrition related to decreased intake as evidenced by <50% >7 days and fluid accumulation     INTERVENTIONS  Recommendations / Nutrition Prescription  None at this time.    Implementation  Medical Food Supplement and Collaboration and Referral of Nutrition care    Goals  BG < 180  Meet >75% of estimated nutrition needs  Wound healing    MONITORING AND EVALUATION:  Progress towards goals will be monitored and evaluated per protocol and Practice Guidelines    Keely Lewis  Dietetic Intern

## 2024-03-04 NOTE — PLAN OF CARE
"Goal Outcome Evaluation:      Plan of Care Reviewed With: patient      /88 (BP Location: Left arm)   Pulse 77   Temp 97.9  F (36.6  C) (Oral)   Resp 18   Ht 1.778 m (5' 10\")   Wt 107.5 kg (236 lb 15.9 oz)   SpO2 94%   BMI 34.01 kg/m        Pt is A/Ox4, but flat affect. On 2L NC, but refused continuous O2 monitoring. Denies pain. Mg and K protocol, recheck in the am. Pt prefers minimal disturbances, clustered care preferred. Woundvac in place.     Nik Gordon      "

## 2024-03-05 ENCOUNTER — APPOINTMENT (OUTPATIENT)
Dept: ULTRASOUND IMAGING | Facility: HOSPITAL | Age: 49
End: 2024-03-05
Attending: INTERNAL MEDICINE
Payer: MEDICAID

## 2024-03-05 VITALS
SYSTOLIC BLOOD PRESSURE: 121 MMHG | HEART RATE: 78 BPM | RESPIRATION RATE: 20 BRPM | TEMPERATURE: 98 F | BODY MASS INDEX: 32.6 KG/M2 | DIASTOLIC BLOOD PRESSURE: 70 MMHG | HEIGHT: 70 IN | OXYGEN SATURATION: 90 % | WEIGHT: 227.74 LBS

## 2024-03-05 PROBLEM — S52.502A CLOSED FRACTURE OF DISTAL END OF LEFT RADIUS: Status: ACTIVE | Noted: 2024-03-05

## 2024-03-05 PROBLEM — I10 BENIGN ESSENTIAL HYPERTENSION: Chronic | Status: ACTIVE | Noted: 2024-03-05

## 2024-03-05 PROBLEM — R12 HEART BURN: Chronic | Status: ACTIVE | Noted: 2024-03-05

## 2024-03-05 LAB
ANION GAP SERPL CALCULATED.3IONS-SCNC: 8 MMOL/L (ref 7–15)
BUN SERPL-MCNC: 37.5 MG/DL (ref 6–20)
CALCIUM SERPL-MCNC: 10 MG/DL (ref 8.6–10)
CHLORIDE SERPL-SCNC: 93 MMOL/L (ref 98–107)
CREAT SERPL-MCNC: 1.71 MG/DL (ref 0.67–1.17)
DEPRECATED HCO3 PLAS-SCNC: 32 MMOL/L (ref 22–29)
EGFRCR SERPLBLD CKD-EPI 2021: 48 ML/MIN/1.73M2
GLUCOSE BLDC GLUCOMTR-MCNC: 203 MG/DL (ref 70–99)
GLUCOSE BLDC GLUCOMTR-MCNC: 239 MG/DL (ref 70–99)
GLUCOSE SERPL-MCNC: 233 MG/DL (ref 70–99)
MAGNESIUM SERPL-MCNC: 2.1 MG/DL (ref 1.7–2.3)
POTASSIUM SERPL-SCNC: 4.1 MMOL/L (ref 3.4–5.3)
SODIUM SERPL-SCNC: 133 MMOL/L (ref 135–145)

## 2024-03-05 PROCEDURE — 250N000013 HC RX MED GY IP 250 OP 250 PS 637: Performed by: INTERNAL MEDICINE

## 2024-03-05 PROCEDURE — 83735 ASSAY OF MAGNESIUM: CPT | Performed by: STUDENT IN AN ORGANIZED HEALTH CARE EDUCATION/TRAINING PROGRAM

## 2024-03-05 PROCEDURE — 99233 SBSQ HOSP IP/OBS HIGH 50: CPT

## 2024-03-05 PROCEDURE — 99232 SBSQ HOSP IP/OBS MODERATE 35: CPT | Performed by: INTERNAL MEDICINE

## 2024-03-05 PROCEDURE — 80048 BASIC METABOLIC PNL TOTAL CA: CPT | Performed by: STUDENT IN AN ORGANIZED HEALTH CARE EDUCATION/TRAINING PROGRAM

## 2024-03-05 PROCEDURE — 36415 COLL VENOUS BLD VENIPUNCTURE: CPT | Performed by: STUDENT IN AN ORGANIZED HEALTH CARE EDUCATION/TRAINING PROGRAM

## 2024-03-05 PROCEDURE — 99239 HOSP IP/OBS DSCHRG MGMT >30: CPT | Performed by: STUDENT IN AN ORGANIZED HEALTH CARE EDUCATION/TRAINING PROGRAM

## 2024-03-05 PROCEDURE — G0463 HOSPITAL OUTPT CLINIC VISIT: HCPCS

## 2024-03-05 PROCEDURE — 76770 US EXAM ABDO BACK WALL COMP: CPT

## 2024-03-05 RX ORDER — MULTIPLE VITAMINS W/ MINERALS TAB 9MG-400MCG
1 TAB ORAL DAILY
DISCHARGE
Start: 2024-03-05

## 2024-03-05 RX ORDER — CARVEDILOL 25 MG/1
25 TABLET ORAL 2 TIMES DAILY WITH MEALS
DISCHARGE
Start: 2024-03-05 | End: 2024-08-22

## 2024-03-05 RX ORDER — MINERAL OIL/HYDROPHIL PETROLAT
OINTMENT (GRAM) TOPICAL 2 TIMES DAILY
DISCHARGE
Start: 2024-03-05

## 2024-03-05 RX ORDER — OXYCODONE HYDROCHLORIDE 5 MG/1
5 TABLET ORAL EVERY 6 HOURS PRN
Qty: 15 TABLET | Status: SHIPPED | DISCHARGE
Start: 2024-03-05 | End: 2024-09-04

## 2024-03-05 RX ORDER — BUMETANIDE 2 MG/1
2 TABLET ORAL
Status: DISCONTINUED | OUTPATIENT
Start: 2024-03-05 | End: 2024-03-05 | Stop reason: HOSPADM

## 2024-03-05 RX ORDER — ACETAMINOPHEN 500 MG
1000 TABLET ORAL EVERY 8 HOURS PRN
DISCHARGE
Start: 2024-03-05

## 2024-03-05 RX ORDER — NICOTINE 21 MG/24HR
1 PATCH, TRANSDERMAL 24 HOURS TRANSDERMAL EVERY 24 HOURS
DISCHARGE
Start: 2024-03-05

## 2024-03-05 RX ORDER — BUMETANIDE 2 MG/1
2 TABLET ORAL
DISCHARGE
Start: 2024-03-05 | End: 2024-08-22

## 2024-03-05 RX ORDER — ASPIRIN 325 MG
325 TABLET, DELAYED RELEASE (ENTERIC COATED) ORAL DAILY
DISCHARGE
Start: 2024-03-06

## 2024-03-05 RX ORDER — PANTOPRAZOLE SODIUM 40 MG/1
40 TABLET, DELAYED RELEASE ORAL
DISCHARGE
Start: 2024-03-06

## 2024-03-05 RX ORDER — METFORMIN HCL 500 MG
500 TABLET, EXTENDED RELEASE 24 HR ORAL
DISCHARGE
Start: 2024-03-05 | End: 2024-08-22

## 2024-03-05 RX ORDER — LOSARTAN POTASSIUM 25 MG/1
25 TABLET ORAL DAILY
DISCHARGE
Start: 2024-03-06

## 2024-03-05 RX ORDER — INSULIN ASPART 100 [IU]/ML
5 INJECTION, SOLUTION INTRAVENOUS; SUBCUTANEOUS
DISCHARGE
Start: 2024-03-05

## 2024-03-05 RX ADMIN — NICOTINE 1 PATCH: 14 PATCH, EXTENDED RELEASE TRANSDERMAL at 14:40

## 2024-03-05 RX ADMIN — INSULIN ASPART 3 UNITS: 100 INJECTION, SOLUTION INTRAVENOUS; SUBCUTANEOUS at 12:28

## 2024-03-05 RX ADMIN — LOSARTAN POTASSIUM 25 MG: 25 TABLET, FILM COATED ORAL at 09:28

## 2024-03-05 RX ADMIN — ACETAMINOPHEN 650 MG: 325 TABLET ORAL at 14:47

## 2024-03-05 RX ADMIN — NYSTATIN: 100000 CREAM TOPICAL at 10:14

## 2024-03-05 RX ADMIN — Medication 1 TABLET: at 14:41

## 2024-03-05 RX ADMIN — OXYCODONE HYDROCHLORIDE 5 MG: 5 TABLET ORAL at 02:19

## 2024-03-05 RX ADMIN — ACETAMINOPHEN 650 MG: 325 TABLET ORAL at 02:19

## 2024-03-05 RX ADMIN — OXYCODONE HYDROCHLORIDE 5 MG: 5 TABLET ORAL at 14:47

## 2024-03-05 RX ADMIN — CARVEDILOL 25 MG: 12.5 TABLET, FILM COATED ORAL at 09:28

## 2024-03-05 RX ADMIN — ASPIRIN 325 MG: 325 TABLET, COATED ORAL at 10:11

## 2024-03-05 RX ADMIN — FERROUS GLUCONATE 324 MG: 324 TABLET ORAL at 11:35

## 2024-03-05 RX ADMIN — WHITE PETROLATUM: 1.75 OINTMENT TOPICAL at 10:12

## 2024-03-05 RX ADMIN — OXYCODONE HYDROCHLORIDE 5 MG: 5 TABLET ORAL at 10:11

## 2024-03-05 RX ADMIN — BUMETANIDE 2 MG: 2 TABLET ORAL at 09:28

## 2024-03-05 RX ADMIN — PANTOPRAZOLE SODIUM 40 MG: 40 TABLET, DELAYED RELEASE ORAL at 06:30

## 2024-03-05 ASSESSMENT — ACTIVITIES OF DAILY LIVING (ADL)
ADLS_ACUITY_SCORE: 35
ADLS_ACUITY_SCORE: 36
ADLS_ACUITY_SCORE: 36
ADLS_ACUITY_SCORE: 35
ADLS_ACUITY_SCORE: 36
ADLS_ACUITY_SCORE: 35

## 2024-03-05 NOTE — PROGRESS NOTES
"Care Management Follow Up    Length of Stay (days): 20    Expected Discharge Date: 2024     Concerns to be Addressed:     Care progression  Patient plan of care discussed at interdisciplinary rounds: Yes    Anticipated Discharge Disposition:  Transitional care     Anticipated Discharge Services:  Transitional care  Anticipated Discharge DME:  NA    Patient/family educated on Medicare website which has current facility and service quality ratings:  NA  Education Provided on the Discharge Plan:  Yes per team  Patient/Family in Agreement with the Plan:  Yes    Referrals Placed by CM/SW:  yes  Private pay costs discussed: transportation costs    Additional Information:  09 Carey from [facility] called to check if patient is being discharge today?  Sent message to provider, Dr. Baca.    Social Hx: \"Was living w/mom in apartment however she is now . No svcs and working on selecting insurance. Referral sent to FSW.  DM ulcer R heel detriment. Rehab rec TCU. Family working with pt to verify insurance and FSW. Transport TBD. Disability information/resources given to pt. Escalated .\"    RNCM to follow for medical progression, recommendations, and final discharge plan.     Madison Carter, RN     0988 Dr. Baca responded, \"Mostly likely.\" Waiting for nephrology to give clearance.  John Patino    Met with patient at bedside to update on the TCU and discussed transport.  Patient said he has no transport and agreed to [facility] transport. Patient also requested to remove the cast on his left wrist.  Discussed out of pocket cost of [facility] medical transportation by wheelchair with patient. Patient agreed with the plan to have transportation arranged by [facility] transport.      Called to set up [facility] transport for 3:07 - 3:52 PM  John Patino  Updated bedside nurse and [facility]     Called to notify [facility] nurse of discharge today and to add the Medela to the " "wound vac order.    1220 Dr. Baca stated, \"Nephrology gave clearance, OK to discharge.\"  Patient will need to follow up outpatient with Ortho to remove the cast on the left wrist.  "

## 2024-03-05 NOTE — PROGRESS NOTES
[facility] Nurse Inpatient Assessment     Consulted for: wound vac to right heel    Summary: 3/5 VAC dressing changed early this morning by nursing staff due to a leak. Site assessed - dressing appropriate placed and good seal noted per machine settings. Patient is discharging to TCU today. Pump to stay here, but may leave dressing in place and place tubing end inside glove for transfer. See below for last in person [facility] assessment.    Vidya Villanueva, MSN RN CWOCN  Pager no longer is use, please contact through [employer] group: [facility] [employer]      3/4 - VAC dressing change schedule now T/F not update on Friday, so all orders and documentation updated today.    2/26 Patient asked [facility] nurse not to talk during change but did ask about status of wound. Also asked about timing of fixing leaks/concerns with VAC dressing.     2/22 Patient in bed, very flat affect, did not speak during wound vac dressing change except to ask if I was done yet.    2/19 Patient in bed, going between flat affect to anger.  Did listen to wound vac teaching and the rationale for its use.  Is concerned with finances and how to care for it once out of hospital.    Patient History (according to provider note(s):      Floyd Capps is a 48 year old male with history of DM, HTN, CVA, obesity, recent ED visit for nonhealing diabetic foot ulcer with cellulitis, here for worsening pain in the foot, with necrotic tissue noted on exam.     Assessment:      Areas visualized during today's visit:  right foot    Negative pressure wound therapy applied to: right heel          2/19       2/26      3/1    Last photo: 2/26  Wound due to: Diabetic Ulcer   Wound history/plan of care:    Surgical date: 2/16   Service following: podiatry  Date Negative Pressure Wound Therapy initiated: 2/19   Interventions in place: offloading and elevation  Is patient s nutritional status compromised? no   If yes,  what interventions are in place? N/A  Reason for initiating vac therapy? Presence of co-morbidities, High risk of infections, and Need for accelerated granulation tissue  Which?of?the?following?co-morbidities?apply? Diabetes, Obesity, and Smoking  If diabetic is patient on a diabetic management program? Yes   Is osteomyelitis present in wound? no   If yes what treatments are in place? N/A    Wound base: Unhealthy granulation/adipose mix, bone palpable     Palpation of the wound bed: firm       Drainage: scant      Volume in cannister: 50     Last cannister change date: 2/26     Description of drainage: none      Measurements (length x width x depth, in cm) 4.5 cm  x 7 cm  x  up to 2.4 cm       Tunneling N/A      Undermining N/A   Periwound skin: Macerated  but improving       Color: pale and pink       Temperature: normal    Odor: none   Pain: did not report any during visit  Pain intervention prior to dressing change: patient tolerated well, oral oxycodone, and slow and gentle cares   Treatment goal: Heal  and Protection  STATUS: improving   Supplies ordered: gathered    Number of foam pieces removed from a wound (excluding foam for bridge) :  1 GranuFoam Black   Verified this matched the number of foam pieces applied last dressing change: Yes   Number of foam pieces packed into wound (excluding foam for bridge) :  1 Jesusoam Black       Treatment Plan:     Negative pressure wound therapy plan:  Wound location: right heel   Change Days:  M/Th  by WOC RN    Supplies (including all accessories) used: medium Black foam   Cleanse with Vashe prior to replacing NPWT  Suction setting: -125   Methods used: Window paned all periwound skin with vac drape prior to applying sponge and Bridged trac pad off bony prominences    Staff RN to assess integrity of dressing and ensure suction is set at appropriate level every shift.   Date canister. Chart canister output every shift. Change cannister weekly and PRN if full/occluded  "    Remove foam dressing and replace with BID normal saline moist gauze dressing if:   -a dressing failure which cannot be repaired within 2 hours   -patient is discharging to home without a home pump   -patient is discharging to a facility outside the local area   -if a dressing is a \"Silver Foam\", remove before Radiation Therapy or MRI     The hospital VAC pump is not to be discharged with the patient.?Ensure to disconnect patient from machine prior to discharge. Then,    - If a home KCI VAC pump has been delivered, connect home cannister to dressing tubing then connect cannister to home pump and turn on machine    - If transferring to a nearby facility with a KCI vac, can disconnect and clamp tubing then cover end with glove so can be reconnected within 2 hours       Orders: Reviewed    RECOMMEND PRIMARY TEAM ORDER: None, at this time  Education provided: plan of care, Infection prevention , and Off-loading pressure  Discussed plan of care with: Patient  WOC nurse follow-up plan:  T/F - begin this week after VAC dressing leakage last week changed schedule  Notify WOC if wound(s) deteriorate.  Nursing to notify the Provider(s) and re-consult the WOC Nurse if new skin concern.    DATA:     Current support surface: Standard  Standard gel/foam mattress (IsoFlex, Atmos air, etc)  Containment of urine/stool: Continent of bladder and Continent of bowel  BMI: Body mass index is 32.68 kg/m .   Active diet order: Orders Placed This Encounter      Advance Diet as Tolerated: Fully Advanced to diet(s) per Provider order; 2 gm NA Diet     Output: I/O last 3 completed shifts:  In: 1394 [P.O.:1394]  Out: 2615 [Urine:2615]     Labs:   No lab results found in last 7 days.    Invalid input(s): \"GLUCOMBO\"    Pressure injury risk assessment:   Sensory Perception: 3-->slightly limited  Moisture: 4-->rarely moist  Activity: 2-->chairfast  Mobility: 3-->slightly limited  Nutrition: 3-->adequate  Friction and Shear: 2-->potential " problem  Rolo Score: 17    DAYAN PisanoN, RN, PHN, HNB-BC, CWOCN  Pager no longer is use, please contact through [employer] group: [facility]

## 2024-03-05 NOTE — PROGRESS NOTES
Care Management Discharge Note    Discharge Date: 03/05/2024       Discharge Disposition: Transitional Care - [facility]    Discharge Services: Transportation Services, Transitional Care - [facility]    Discharge DME: None    Discharge Transportation: family or friend will provide    Private pay costs discussed: transportation costs    Does the patient's insurance plan have a 3 day qualifying hospital stay waiver?  No    PAS Confirmation Code: [id number]  Patient/family educated on Medicare website which has current facility and service quality ratings: yes    Education Provided on the Discharge Plan: Yes per team  Persons Notified of Discharge Plans: Patient per team  Patient/Family in Agreement with the Plan: yes    Handoff Referral Completed: Yes    Additional Information:  Patient discharge to Transitional Care - [facility] via [facility] transport for 3:07 - 3:52 PM.    Orders sent    Madison Carter RN

## 2024-03-05 NOTE — PLAN OF CARE
Problem: Acute Kidney Injury/Impairment  Goal: Fluid and Electrolyte Balance  Outcome: Progressing   Denies pain. Did not feel comfortable getting out of the wheelchair to complete the renal US. Will be done at bedside when available.   PICC removed per order.

## 2024-03-05 NOTE — PLAN OF CARE
Problem: Adult Inpatient Plan of Care  Goal: Readiness for Transition of Care  3/5/2024 1615 by Makayla Mares RN  Outcome: Adequate for Care Transition     Problem: Acute Kidney Injury/Impairment  Goal: Fluid and Electrolyte Balance  3/5/2024 1615 by Makayla Mares RN  Outcome: Adequate for Care Transition     Problem: Pain Acute  Goal: Optimal Pain Control and Function  Intervention: Develop Pain Management Plan  Recent Flowsheet Documentation  Taken 3/5/2024 1439 by Makayla Mares RN  Pain Management Interventions: medication (see MAR)  Taken 3/5/2024 1011 by Makayla Mares RN  Pain Management Interventions: medication (see MAR)     Pt a/ox4. Pain controlled with prn tylenol and oxy per pt. Up with an assist of 1 pivot. Discharge orders placed per MD. Wound vac removed and wet-to-dry dressing applied to pts foot wound for transport per care management. [facility] picked up pt at 1600 via wheelchair to transport to TCU.

## 2024-03-05 NOTE — DISCHARGE SUMMARY
[facility]    Hospitalist Discharge Summary       Date of Admission:  2/14/2024  Date of Discharge:  3/5/2024  4:10 PM  Discharging Provider: Nata Baca MD      Discharge Diagnoses   Sepsis 2/2 Diabetic foot ulcer, right heel   Suspected aspiration pneumonia  Acute hypoxemic respiratory failure 2/2  Acute new onset systolic heart failure  CAD , Multivessel disease  SCHUYLER on CKD, multifactorial   Pseudohyponatremia 2/2 hyperglycemia  DM Type II  History of stroke in 8/2023   Essential hypertension   Tobacco use disorder   Left distal radius fracture due to recent fall on 2/23   Normocytic anemia       Follow-ups Needed After Discharge   Follow-up Appointments     Follow Up and recommended labs and tests      Follow up with halfway physician.  The following labs/tests are   recommended: BMP - Cr  DM management - titrate insulin as needed    Follow up with Cardiology for heart failure management, appointment will   be made  Follow up with Nephrology, referral provided  Follow up with Orthopedic within one week, referral provided  Follow up with Wound care for RLE wound, appointment will be made    Call our scheduling line at [phone] to make an   appointment, if you do not already have one scheduled            Unresulted Labs Ordered in the Past 30 Days of this Admission       No orders found from 1/15/2024 to 2/15/2024.            Hospital Course   Floyd Capps is a 48 year old male with history of DM, HTN, CVA, obesity, recent ED visit for nonhealing diabetic foot ulcer with cellulitis, here for worsening pain in the foot, with necrotic tissue noted on exam.  Patient admitted on 2/14/2024      Diabetic foot ulcer, right heel  Sepsis 2/2 diabetic foot ulcer on presentation  - found with ulcer worse with necrotic tissue  -MRI of the foot no osteomyelitis  -On 2/16, s/p debridement with irrigation of right foot diabetic ulcer. NWB on right foot per podiatrist  -On 2/19, wound VAC  placed, continued at discharge  -Surgical cultures polymicrobials.  Initially IV Vanco and IV Zosyn, changed to IV Zosyn and IV daptomycin - ended 2/29, Then cefdinir PO for 4 days for possible aspiration PNA .  Seen by ID    # Suspected aspiration pneumonia  Imaging concern for possible pneumonia  - received antibiotics as above     # Acute hypoxemic respiratory failure 2/2  Acute new onset systolic heart failure;  -- On 2/19, patient reported feeling shortness of breath, imaging workup showed bilateral moderate pleural effusion and diffuse anasarca  --Echo on 8/23 reported EF 55% but given patient noncompliance with medications  -- Repeat echo EF 30 to 35% with global hypokinesis:   --Of note, patient had nuclear stress test on 8/23 which was negative for inducible myocardial ischemia  -- Was on IV duresis, changed to po Bumex 03/04  -- was on dobutamine drip, which was weaned off  -- wDischarged on home O2     CAD , Multivessel disease  S/p cardiac cath 03/01:  multivessel CAD  CT surgery consulted, patient declined CABG at this time, follow up outpatient with Cardiology. Risks discussed  on aspirin, statin, coreg  US carotid and US lower extremity venous mapping done for future    # SCHUYLER on CKD, multifactorial  -Secondary to infection, CRS, diuretics, contrast use, diabetic nephropathy  -Has nephrotic range proteinuria  -Seen by Nephrology  -Started Losaran 25mg daily 03/03  -US renal no hydronephrosis  -Nephrology outpatient follow up     # Pseudohyponatremia 2/2 hyperglycemia     DM Type II, Uncontrolled; improving  -Stopped all his home meds lately due to stomach upset while on Bactrim, then neglected to resume meds  -A1c 11.8  -Of note, patient was on insulin in the past and received education in the hospital about it, but seems to have stopped on its own  -Discharged on Lantus 32u, Novolog 5u TID, Metformin 500mg  -Medication compliance reinforced, outpatient follow up    History of stroke in 8/2023;  -Had  been taking Plavix and atorvastatin but recently stopped taking all of his medications as above.  -Neurologist note from 8/26/2023 reviewed- at that time recommended DAPT with aspirin and Plavix for 90 days, afterwards switch to enteric-coated aspirin 325 mg daily.  -Started full dose aspirin and resumed home atorvastatin    Essential hypertension, uncontrolled  noncompliance-improving  -- Discharged on Creg 25 bid, Losartan 25mg, Bumex 2mg bid     Tobacco use disorder  -Nicotine Patch  -Advised to quit smoking    Left distal radius fracture due to recent fall on 2/6/2023  -Cast in place  -Outpatient orthopedics follow-up    Normocytic anemia, likely due to chronic wound  -No active/obvious bleeding    Poor follow-up, noncompliance;  - Reinforced need for compliance and follow ups     PT/OT - recommend TCU    Consultations This Hospital Stay   PHARMACY TO DOSE VANCO  PODIATRY IP CONSULT  PHYSICAL THERAPY ADULT IP CONSULT  OCCUPATIONAL THERAPY ADULT IP CONSULT  CARE MANAGEMENT / SOCIAL WORK IP CONSULT  PHARMACY TO DOSE VANCO  WOUND OSTOMY CONTINENCE NURSE  IP CONSULT  INFECTIOUS DISEASES IP CONSULT  PHYSICAL THERAPY ADULT IP CONSULT  INFECTIOUS DISEASES IP CONSULT  WOUND OSTOMY CONTINENCE NURSE  IP CONSULT  NEPHROLOGY IP CONSULT  CARDIOLOGY IP CONSULT  VASCULAR ACCESS ADULT IP CONSULT  PHARMACY IP CONSULT  CARDIOTHORACIC SURGERY IP CONSULT  PHARMACY IP CONSULT  PHYSICAL THERAPY ADULT IP CONSULT  OCCUPATIONAL THERAPY ADULT IP CONSULT  SMOKING CESSATION PROGRAM IP CONSULT    Code Status   Full Code    Time Spent on this Encounter   I,Nata Baca, personally saw the patient today and spent approximately 40 minutes discharging this patient.       Nata Baca MD  [facility]  ______________________________________________________________________    Physical Exam   Vital Signs: Temp: 98  F (36.7  C) Temp src: Oral BP: 121/70 Pulse: 78   Resp: 20 SpO2: 90 % O2 Device: None (Room air)     Weight: 227 lbs 11.76 oz    Physical Exam    General: Not in obvious distress  HEENT: supple neck  Chest: Clear to auscultation bilateral anteriorly, no wheezing  Heart: S1S2 normal, regular  Abdomen: Soft.  Obese, nontender. Bowel sounds- active.  Extremities: Bilateral lower extremity swelling, right foot with wound VAC  Neuro: alert and awake, grossly non-focal      Primary Care Physician   Glendy Benavides    Discharge Disposition   Discharged to TCU  Condition at discharge: Stable    Significant Results and Procedures   Most Recent 3 CBC's:  Recent Labs   Lab Test 03/03/24  0502 02/29/24  0422 02/27/24  0540 02/26/24  0508 02/23/24  0618 02/20/24  0546   WBC  --   --  13.5*  --  11.3* 12.0*   HGB  --   --  10.2*  --  10.6* 10.3*   MCV  --   --  92  --  93 92    344 308   < > 337 357  357    < > = values in this interval not displayed.     Most Recent 3 BMP's:  Recent Labs   Lab Test 03/05/24  1125 03/05/24  0736 03/05/24  0440 03/04/24  0745 03/04/24  0442 03/03/24  0825 03/03/24  0502   NA  --   --  133*  --  134*  --  136   POTASSIUM  --   --  4.1  --  4.2  --  4.0   CHLORIDE  --   --  93*  --  94*  --  94*   CO2  --   --  32*  --  31*  --  30*   BUN  --   --  37.5*  --  35.0*  --  27.7*   CR  --   --  1.71*  --  1.72*  --  1.71*   ANIONGAP  --   --  8  --  9  --  12   SARAH  --   --  10.0  --  9.9  --  9.5   * 203* 233*   < > 220*   < > 187*    < > = values in this interval not displayed.     Most Recent 2 LFT's:  Recent Labs   Lab Test 02/27/24  0540 02/19/24  0547   AST 12 10   ALT 8 9   ALKPHOS 58 74   BILITOTAL 0.6 0.2     Most Recent 3 INR's:  Recent Labs   Lab Test 08/23/23  0946   INR 0.95     Most Recent 3 Troponin's:No lab results found.  Most Recent 3 BNP's:  Recent Labs   Lab Test 02/19/24  0547 08/23/23  0946   NTBNPI 12,085* 857*     Most Recent D-dimer:No lab results found.  Most Recent Cholesterol Panel:  Recent Labs   Lab Test 02/21/24  0537   CHOL 156   LDL 77   HDL 39*   TRIG  199*       Results for orders placed or performed during the hospital encounter of 02/14/24   MR Foot Right w/o & w Contrast    Narrative    EXAM: MR FOOT RIGHT W/O and W CONTRAST  LOCATION: [facility]  DATE: 2/14/2024    INDICATION: Ulceration, erythema, infection.  COMPARISON: None.  TECHNIQUE: Routine. Additional postgadolinium T1 sequences were obtained.  IV CONTRAST: 12 mL Gadavist.    FINDINGS:     JOINTS AND BONES:   -No evidence for fracture. There is some reactive signal abnormality within the posterior calcaneus but no T1 marrow signal change and this is unlikely to represent osteomyelitis. No significant effusion to suggest septic arthropathy. There is   degenerative change at the calcaneocuboid articulation and tibiotalar joint.    TENDONS:   -The peroneal tendons are negative. The flexor tendons are negative for tendinopathy, tenosynovitis, or tearing. The extensor tendons are negative.     LIGAMENTS:   -The anterior and posterior talofibular ligaments are negative. The deltoid ligamentous complex is intact. The calcaneofibular ligament is negative. The ligament of Lisfranc is intact.    MUSCLES AND SOFT TISSUES:   -There is a soft tissue ulceration along the posteroinferior aspect of the hindfoot but this is fairly superficial. There is some surrounding edema or potential cellulitis. There is reactive edema versus infectious or inflammatory myositis within the   plantar and interosseous musculature. No evidence for abscess. Mild plantar fasciitis.      Impression    IMPRESSION:  1.  Soft tissue ulceration along the posteroinferior aspect of the hindfoot. This is fairly superficial. There is some surrounding edema or low-grade cellulitis.  2.  No evidence for osteomyelitis, septic arthropathy, or abscess.  3.  Reactive edema versus infectious or inflammatory myositis within the plantar and interosseous musculature.  4.  No evidence for fracture.  5.  No significant tendinous or  "ligamentous pathology.   POC US Guidance Needle Placement    Narrative    Ultrasound was performed as guidance to an anesthesia procedure.  Click   \"PACS images\" hyperlink below to view any stored images.  For specific   procedure details, view procedure note authored by anesthesia.   CT Abdomen Pelvis w/o Contrast    Narrative    EXAM: CT ABDOMEN PELVIS W/O CONTRAST  LOCATION: [facility]  DATE: 2/19/2024    INDICATION: acute abdominal pain with N V and poor appetite  COMPARISON: None.  TECHNIQUE: CT scan of the abdomen and pelvis was performed without IV contrast. Multiplanar reformats were obtained. Dose reduction techniques were used.  CONTRAST: None.    FINDINGS:   LOWER CHEST: Smooth intralobular septal thickening is in the lower lobes suggestive of pulmonary interstitial edema. Moderate bilateral pleural effusions with associated associated compressive atelectasis in the lung bases lobes.    HEPATOBILIARY: Normal.    PANCREAS: Normal.    SPLEEN: Normal.    ADRENAL GLANDS: Normal.    KIDNEYS/BLADDER: Fat-containing lesion in the upper pole the left kidney measuring 0.8 cm compatible with an angiomyolipoma.    BOWEL: No obstruction or inflammatory change.    LYMPH NODES: Normal.    VASCULATURE: Mild scattered vascular calcifications.    PELVIC ORGANS: Trace low-density free fluid in the pelvis.    MUSCULOSKELETAL: Diffuse anasarca. Multilevel degenerative changes in the thoracolumbar spine      Impression    IMPRESSION:   1.  No acute findings in the abdomen or pelvis.  2.  Findings suggesting fluid overload with moderate bilateral pleural effusions and diffuse anasarca.  3.  Subcentimeter left renal angiomyolipoma.     CT Abdomen Pelvis w/o Contrast    Narrative    EXAM: CT ABDOMEN PELVIS W/O CONTRAST  LOCATION: [facility]  DATE: 2/27/2024    INDICATION: abdominal pain  COMPARISON: CT 02/19/2024  TECHNIQUE: CT scan of the abdomen and pelvis was performed without " IV contrast. Multiplanar reformats were obtained. Dose reduction techniques were used.  CONTRAST: None.    FINDINGS:   LOWER CHEST: Small bilateral pleural effusion, decreased from previous. New nodular bilateral lower lobe consolidation suggests pneumonia including aspiration.    Small pericardial effusion. Coronary atherosclerosis.    HEPATOBILIARY: Normal.    PANCREAS: Partially atrophic.    SPLEEN: Normal.    ADRENAL GLANDS: Normal.    KIDNEYS/BLADDER: Left upper renal 9 mm fat-containing lesion is most likely a small angiomyolipoma.    BOWEL: No obstruction or inflammatory change. Moderate amount stool in colon.    LYMPH NODES: Normal.    VASCULATURE: Unremarkable.    PELVIC ORGANS: Normal.    MUSCULOSKELETAL: Improved diffuse subcutaneous edema. Tiny fat-containing umbilical hernia.      Impression    IMPRESSION:   1.  New bilateral lower lobe consolidation consistent with pneumonia.  2.  Decreased bilateral pleural effusions.  3.  Decreased diffuse subcutaneous edema.     US Renal Complete Non-Vascular    Narrative    EXAM: US RENAL COMPLETE NON-VASCULAR  LOCATION: [facility]  DATE: 3/5/2024    INDICATION: CKD  COMPARISON: CT 2024  TECHNIQUE: Routine Bilateral Renal and Bladder Ultrasound.    FINDINGS:    RIGHT KIDNEY: 14.0 x 5.4 x 6.4 cm. Normal without hydronephrosis or masses. The renal echotexture is slightly hyperechoic relative to the liver.    LEFT KIDNEY: 12.9 x 7.0 x 6.7 cm. Normal without hydronephrosis or masses.     BLADDER: Normal.      Impression    IMPRESSION:  1.  Slightly hyperechoic renal echotexture, which can be seen with medical renal disease.  2.  No hydronephrosis.   Echocardiogram Complete     Value    LVEF  30-35% (moderately reduced)    Narrative    [id number]  [id number]  [id number]  [id number]^BERLIN^[place] [address]     Name: LUIS HODGES  MRN: [id number]  : 1975  Study Date: 2024 09:49  AM  Age: 48 yrs  Gender: Male  Patient Location: [facility]  Reason For Study: Pulmonary Edema  Ordering Physician: HARRIS SLADE  Referring Physician: HARRIS SLADE  Performed By: LA     BSA: 2.4 m2  Height: 70 in  Weight: 271 lb  HR: 78  ______________________________________________________________________________  Procedure  Complete Echo Adult. Definity (NDC #29761-655) given intravenously. Adequate  quality two-dimensional was performed and interpreted.  ______________________________________________________________________________  Interpretation Summary     The left ventricle is normal in size. There is mild concentric left  ventricular hypertrophy.  Left ventricular function is decreased. The ejection fraction is 30-35%  (moderately reduced). There is global hypokinesis with severe hypokinesis of  the mid to apical inferior wall.  Diastolic Doppler findings (E/E' ratio and/or other parameters) suggest left  ventricular filling pressures are increased.     The right ventricle is normal in size and function.  Normal left atrial size. Right atrial size is normal.  There is mild (1+) mitral regurgitation.  IVC diameter >2.1 cm collapsing <50% with sniff suggests a high RA pressure  estimated at 15 mmHg or greater.     Compared to previous study from 8/23/2023 the LV systolic function has  declined and there are regional wall motion abnormalities.  ______________________________________________________________________________  Left Ventricle  The left ventricle is normal in size. Left ventricular function is decreased.  The ejection fraction is 30-35% (moderately reduced). There is mild concentric  left ventricular hypertrophy. Diastolic Doppler findings (E/E' ratio and/or  other parameters) suggest left ventricular filling pressures are increased.  There is global hypokinesis with severe hypokinesis of the mid to apical  inferior wall.     Right Ventricle  The right ventricle is normal in size and function.     Atria  Normal  left atrial size. Right atrial size is normal.     Mitral Valve  Mitral valve leaflets appear normal. There is mild (1+) mitral regurgitation.  There is no mitral valve stenosis.     Tricuspid Valve  The tricuspid valve is not well visualized. There is trace tricuspid  regurgitation. Right ventricular systolic pressure could not be approximated  due to inadequate tricuspid regurgitation.     Aortic Valve  The aortic valve is trileaflet. Aortic valve leaflets appear normal. No aortic  regurgitation is present. No aortic stenosis is present.     Pulmonic Valve  The pulmonic valve is not well visualized. There is trace pulmonic valvular  regurgitation.     Vessels  The aorta root is normal. IVC diameter >2.1 cm collapsing <50% with sniff  suggests a high RA pressure estimated at 15 mmHg or greater.     Pericardium  There is no pericardial effusion.     Rhythm  Sinus rhythm was noted.  ______________________________________________________________________________  MMode/2D Measurements & Calculations     IVSd: 1.3 cm  LVIDd: 5.2 cm  LVIDs: 4.0 cm  LVPWd: 1.3 cm  FS: 21.7 %  LV mass(C)d: 274.4 grams  LV mass(C)dI: 115.5 grams/m2  Ao root diam: 3.1 cm  LVOT diam: 2.0 cm  LVOT area: 3.1 cm2  Ao root diam index Ht(cm/m): 1.7  Ao root diam index BSA (cm/m2): 1.3  LA Volume (BP): 63.0 ml  LA Volume Index (BP): 26.5 ml/m2     LA Volume Indexed (AL/bp): 28.2 ml/m2  RV Base: 3.4 cm  RWT: 0.51  TAPSE: 2.1 cm     Doppler Measurements & Calculations  MV E max servando: 104.0 cm/sec  MV A max servando: 40.4 cm/sec  MV E/A: 2.6  MV dec slope: 886.0 cm/sec2  MV dec time: 0.12 sec  Ao V2 max: 116.0 cm/sec  Ao max P.0 mmHg  Ao V2 mean: 78.1 cm/sec  Ao mean PG: 3.0 mmHg  Ao V2 VTI: 21.8 cm  NILSA(I,D): 2.1 cm2  NILSA(V,D): 1.9 cm2     LV V1 max P.0 mmHg  LV V1 max: 70.5 cm/sec  LV V1 VTI: 14.4 cm  SV(LVOT): 45.2 ml  SI(LVOT): 19.0 ml/m2  PA acc time: 0.10 sec  AV Servando Ratio (DI): 0.61  NILSA Index (cm2/m2): 0.87  E/E' av.7  Lateral E/e':  10.7  Medial E/e': 12.6  RV S Servando: 12.9 cm/sec     ______________________________________________________________________________  Report approved by: Ladonna Ornelas 02/20/2024 11:41 AM         Cardiac Catheterization    Narrative    Images from the original result were not included.  Floyd Capps is a 48 year old old male with HTN, HL, DM, smoking, high   BMI who is here for w/up of new cardiomyopathy/admission for ADHF.    - multi-vessel native CAD; severely elevated L-sided filling pressures  - will stop to obtain CTS consult  - continue ASA 81mg daily indefinitely, atorva 80  - continue aggressive risk factor modification          Findings:  LM:no obstruction  LAD:proximal Ca2+ 70% narrowing, mid-distal 90% narrowing. Small D1 w/   diffuse 90% narrowing  Lcx:OM1 proximal 90%, small superior subdivision 90%, inferior 80%. OM1   small 90% narrowing  RCA:dominant, distal 85%, rPDA 95% narrowing    LVEDP:35    Access:  R Radial artery    Closure:   Vasc Band             Discharge Orders      Medication Therapy Management Referral      Orthopedic  Referral      Follow Up (TCM)    Kidney / Nephrology    Has an appointment scheduled:   Monday March 18 at 11:00AM with Ruth Mike NP  [facility]   [address]  [facility]  Phone: [phone]     General info for SNF    Length of Stay Estimate: Short Term Care: Estimated # of Days <30  Condition at Discharge: Improving  Level of care:skilled   Rehabilitation Potential: Fair  Admission H&P remains valid and up-to-date: Yes  Recent Chemotherapy: N/A  Use Nursing Home Standing Orders: Yes     Follow Up and recommended labs and tests    Follow up with group home physician.  The following labs/tests are recommended: BMP - Cr  DM management - titrate insulin as needed    Follow up with Cardiology for heart failure management, appointment will be made  Follow up with Nephrology, referral provided  Follow up with Orthopedic  within one week, referral provided  Follow up with Wound care for RLE wound, appointment will be made    Call our scheduling line at [phone] to make an   appointment, if you do not already have one scheduled     Reason for your hospital stay    Diabetic foot ulcer, right heel  Acute hypoxemic respiratory failure 2/2  Acute new onset systolic heart failure  SCHUYLER on CKD  DM2     Wound care (specify)    Instructions:  Negative pressure wound therapy plan  Wound location: right heel   Change Days:  Mon/Thur  Supplies  used: medium Black foam   Cleanse with Vashe prior to replacing NPWT  Suction setting: -125   Methods used: Window paned all periwound skin with vac drape prior to applying sponge and Bridged trac pad off bony prominences    OK to use Medela wound vac     Weight bearing status    NWB RLE     Activity - Up ad macey     Physical Therapy Adult Consult    Evaluate and treat as clinically indicated.    Reason:  weakness     Occupational Therapy Adult Consult    Evaluate and treat as clinically indicated.    Reason:  weakness     Contact Isolation    MRSA     Oxygen (SNF/TCU) Discharge     Rolling Knee Walker/Scooter Order    DME Documentation:   Describe the reason for need to support medical necessity: The patient was prescribed a knee walker. The patient is unsteady utilizing crutches, quad walker or a cane.  The knee walker will allow the patient to ambulate safely without the use of the operative foot and reduce risk of falls.  .     I, the undersigned, certify that the above prescribed supplies are medically necessary for this patient and is both reasonable and necessary in reference to accepted standards of medical and necessary in reference to accepted standards of medical practice in the treatment of this patient's condition and is not prescribed as a convenience.     Diet    Follow this diet upon discharge:       Fluid restriction 1800 ML FLUID      Snacks/Supplements Adult: Expedite Bottle; With  Meals      Snacks/Supplements Adult: Glucerna; With Meals      Advance Diet as Tolerated: Fully Advanced to diet(s) per Provider order; 2 gm NA Diet     Discharge Medications   Current Discharge Medication List        START taking these medications    Details   aspirin (ASA) 325 MG EC tablet Take 1 tablet (325 mg) by mouth daily    Associated Diagnoses: History of stroke      atorvastatin (LIPITOR) 80 MG tablet Take 1 tablet (80 mg) by mouth daily  Qty: 90 tablet, Refills: 3    Associated Diagnoses: S/P foot surgery, right; Acute systolic heart failure (H); Familial hypercholesterolemia; Class 2 severe obesity due to excess calories with serious comorbidity in adult, unspecified BMI (H); History of stroke; Elevated troponin; Tobacco use disorder      bumetanide (BUMEX) 2 MG tablet Take 1 tablet (2 mg) by mouth 2 times daily    Associated Diagnoses: Acute systolic heart failure (H)      carvedilol (COREG) 25 MG tablet Take 1 tablet (25 mg) by mouth 2 times daily (with meals)    Associated Diagnoses: Acute systolic heart failure (H)      insulin aspart (NOVOLOG VIAL) 100 UNITS/ML vial Inject 5 Units Subcutaneous 3 times daily (with meals)    Associated Diagnoses: Poorly controlled diabetes mellitus (H)      insulin glargine (LANTUS PEN) 100 UNIT/ML pen Inject 32 Units Subcutaneous at bedtime    Comments: If Lantus is not covered by insurance, may substitute Basaglar or Semglee or other insulin glargine product per insurance preference at same dose and frequency.    Associated Diagnoses: Poorly controlled diabetes mellitus (H)      losartan (COZAAR) 25 MG tablet Take 1 tablet (25 mg) by mouth daily    Associated Diagnoses: Benign essential hypertension      metFORMIN (GLUCOPHAGE XR) 500 MG 24 hr tablet Take 1 tablet (500 mg) by mouth daily (with dinner)    Associated Diagnoses: Poorly controlled diabetes mellitus (H)      mineral oil-hydrophilic petrolatum (AQUAPHOR) external ointment Apply topically 2 times daily     Associated Diagnoses: Diabetic ulcer of right heel associated with type 2 diabetes mellitus, unspecified ulcer stage (H)      multivitamin w/minerals (THERA-VIT-M) tablet Take 1 tablet by mouth daily    Associated Diagnoses: Diabetic ulcer of right heel associated with type 2 diabetes mellitus, unspecified ulcer stage (H)      nicotine (NICODERM CQ) 14 MG/24HR 24 hr patch Place 1 patch onto the skin every 24 hours    Associated Diagnoses: Tobacco use disorder      pantoprazole (PROTONIX) 40 MG EC tablet Take 1 tablet (40 mg) by mouth every morning (before breakfast)    Associated Diagnoses: Heart burn           CONTINUE these medications which have CHANGED    Details   acetaminophen (TYLENOL) 500 MG tablet Take 2 tablets (1,000 mg) by mouth every 8 hours as needed for mild pain    Associated Diagnoses: Diabetic ulcer of right heel associated with type 2 diabetes mellitus, unspecified ulcer stage (H)      oxyCODONE (ROXICODONE) 5 MG tablet Take 1 tablet (5 mg) by mouth every 6 hours as needed for severe pain  Qty: 15 tablet    Associated Diagnoses: Hyperglycemia           Allergies   No Known Allergies

## 2024-03-05 NOTE — PLAN OF CARE
Problem: Pain Acute  Goal: Optimal Pain Control and Function  Outcome: Met  Intervention: Develop Pain Management Plan  Recent Flowsheet Documentation  Taken 3/5/2024 0300 by Brandan Munson RN  Pain Management Interventions:   rest   relaxation techniques promoted  Taken 3/5/2024 0219 by Brandan Munson RN  Pain Management Interventions: medication (see MAR)  Intervention: Prevent or Manage Pain  Recent Flowsheet Documentation  Taken 3/5/2024 0030 by Brandan Munson RN  Medication Review/Management: medications reviewed  Intervention: Optimize Psychosocial Wellbeing  Recent Flowsheet Documentation  Taken 3/5/2024 0030 by Brandan Munson RN  Supportive Measures: active listening utilized  Taken 3/4/2024 2030 by Brandan Munson RN  Supportive Measures: active listening utilized     Problem: Adult Inpatient Plan of Care  Goal: Absence of Hospital-Acquired Illness or Injury  Outcome: Met  Intervention: Identify and Manage Fall Risk  Recent Flowsheet Documentation  Taken 3/5/2024 0030 by Brandan Munson RN  Safety Promotion/Fall Prevention:   activity supervised   room near nurse's station  Intervention: Prevent Skin Injury  Recent Flowsheet Documentation  Taken 3/5/2024 0200 by Brandan Munson RN  Body Position: left  Taken 3/5/2024 0030 by Brandan Munson RN  Body Position: position changed independently  Goal: Optimal Comfort and Wellbeing  Outcome: Met  Intervention: Monitor Pain and Promote Comfort  Recent Flowsheet Documentation  Taken 3/5/2024 0300 by Brandan Munson RN  Pain Management Interventions:   rest   relaxation techniques promoted  Taken 3/5/2024 0219 by Brandan Munson RN  Pain Management Interventions: medication (see MAR)   Goal Outcome Evaluation:       Alert and oriented. Wound vac working well to right heel. Telemetry NSR. Using urinal. Using call light for all needs. Requested pain med for right foot. Oxycodone and Tylenol effective for pain control. Vital signs stable. Will continue to  monitor.    Brandan Munson RN

## 2024-03-05 NOTE — PROGRESS NOTES
RENAL PROGRESS NOTE        ASSESSMENT & PLAN:     PLAN:  -continue expectant management (avoid nephrotoxins, renal dose medication, avoid hypo/hypertension, daily wt, daily I/o)  -Resumed ARB for proteinuria  -I would recommend an SGLT2 inhibitor once more stable for renal/cardiac benefits, and for Tx of proteinuria. This can be done in the out-pt setting at follow up nephrology appts.   -Pt has an out-pt Nephrology follow up appointment on 3/18/24 at 11:00AM with Ruth Mike NP at [facility].   -Renal okay with discharge to TCU when medically cleared.   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 And ongoing diuretic needs seems to be primary issue, dramatic improvement in sCR 3/1 despite heavy diuresis support CRS as   Prior Vanco/Zosyn may be contributing, now off IV antibx. UAs x 2 this admit with few 2-3 RBC, otherwise bland.     Nephrotic range proteinuria of 9 g/g: Baseline Cr 0.8 with severe nephrotic range proteinuria and some cells on UA. Presented with nephrotic syndrome (albumin 2.2, UPCR 9 g/g, anasarca). Suspect he has CKD/GN most likely related to diabetic nephropathy, all serologies neg except sl monoclonal AB on urine immunofix, plan to f/up and repeat in several months.   No renal biopsy needed now and not too inclined to do in future to confirm DN given overall non- compliance hx and MMP, relative stability and would likely not change POC.  Patient serum creatinine has been ranging between 1.4 and 1.7 mg/dL since 2/17/24.  Patient serum creatinine was 0.7 mg/dL 03/01 ? lab error.  Today serum creatinine is 1.72>1.71 mg/dL. Arb resumed.     Pseudohyponatremia: NA+ WNL (135) when corrected for hyperglycemia     Acid base status-VBG 03/02/24 recent visit metabolic alkalosis which is compensated.  Serum bicarbonate is trending down  from 35 to 31 this morning.  Trend with diuresis.     HTN: controlled.  On carvedilol 25 mg BID. Currently managing per cards. Resumed losartan 25 mg daily d/t proteinuria.  Monitor blood pressure closely, avoid hypotension.     Volume overload: appears euvolemic. Net neg 37+L.  Weight is trending down.  Most recent chest imaging showed reduced bilat pleural effusions (though ? Asp pneumonia). resumed diuretics again on 2/28. Labs suggest some contraction serum bicarbonate is 31 and   Cardiac catheterization 03/01 showed multiple vessel native coronary disease and severely elevated left-sided filling pressure.  Received IV Lasix 80 mg bolus 03/01/24. per cardiologist Dr. Arndt, the plan is to continue oral Bumex 2 mg BID. Patient has been receiving daily Albumin infusions.     Hypoxia:Stable. On 1-3 L per NC, CT imaging 2/27 showed reduced bilat pleural effusions (though ? Asp pneumonia), less convinced the hypervolemia driving.      New HFrEF combined systolic/diastolic: Neg stress test last fall, but concern for occult ASCAD with wall motion abnormalities. Cardiac catheterization 03/01 showed multiple vessel native coronary disease. EF down to 30-35% with global hypokinesis, and inc filling pressures. Severe Diastolic dysfunction. Received Dobutamin drip 02/27-03/01/24. Evaluated by [facility] but declined to surgical revascularization due to financial issues.  Will defer management to cardiology service.     Anemia: Hgb 10's and stable normocytic on last check 02/27. Iron stores low, hold off on IV iron with infection, consider IV iron once cleared and off antibx. Started on oral daily iron      HLD: on statin     DM2: historically poor control, A1c ~ 12 insulin this admit. [facility]     Diabetic foot ulcer: s/p 2/16 debridement, wound vac 2/19 .Zosyn and daptomycin completed , per ID, no osteo.      PNA:On oral Cefdinir x 4 days , ID signed off      Tobacco use: nicotine patches in use this  ad    SUBJECTIVE:    Pt laying in bed, appears comfortable  Pt reports feeling better overall  Denies n, v, c, d, sob, fever, rash or CP  No edema  FR 1800 ml, pt able to do this.   Denies HA, feeling dizzy  Pts wound vac fell off, waiting for wound care RN to replace. RN aware per pt.   Plan is to discharge to TCU, [place]  Renal okay with discharge if medically cleared, has nephrology follow up. Discussed with Primary MD  Discussed labs/plan, and answered all questions.       OBJECTIVE:  Physical Exam   Temp: 97.7  F (36.5  C) Temp src: Oral BP: 118/67 Pulse: 74   Resp: 20 SpO2: 93 % O2 Device: None (Room air) Oxygen Delivery: 2 LPM  Vitals:    03/03/24 0506 03/04/24 0437 03/05/24 0426   Weight: 107.3 kg (236 lb 8.9 oz) 107.5 kg (236 lb 15.9 oz) 103.3 kg (227 lb 11.8 oz)     Vital Signs with Ranges  Temp:  [97.7  F (36.5  C)-98.1  F (36.7  C)] 97.7  F (36.5  C)  Pulse:  [74-83] 74  Resp:  [18-20] 20  BP: ()/(58-88) 118/67  SpO2:  [90 %-94 %] 93 %  I/O last 3 completed shifts:  In: 1394 [P.O.:1394]  Out: 2615 [Urine:2615]    Patient Vitals for the past 72 hrs:   Weight   03/05/24 0426 103.3 kg (227 lb 11.8 oz)   03/04/24 0437 107.5 kg (236 lb 15.9 oz)   03/03/24 0506 107.3 kg (236 lb 8.9 oz)     Intake/Output Summary (Last 24 hours) at 3/4/2024 1115  Last data filed at 3/4/2024 0815  Gross per 24 hour   Intake 1510 ml   Output 1600 ml   Net -90 ml       PHYSICAL EXAM:  GEN: NAD, Aox3  CV: RRR  Lung: bases diminished BL  Ab: soft and NT  Ext: no edema and well perfused. Left forearm cast.   Skin: no rash, + DM foot wound/did not observe   : good UO      LABORATORY STUDIES:     Recent Labs   Lab 03/03/24  0502 02/29/24  0422    344       Basic Metabolic Panel:  Recent Labs   Lab 03/05/24  0736 03/05/24  0440 03/04/24  2044 03/04/24  1751 03/04/24  1145 03/04/24  0745 03/04/24  0442 03/03/24  0825 03/03/24  0502 03/02/24  0827 03/02/24  0442 03/01/24  0840 03/01/24  0441 02/29/24  0834 02/29/24  0422    NA  --  133*  --   --   --   --  134*  --  136  --  135  --  131*  --  135   POTASSIUM  --  4.1  --   --   --   --  4.2  --  4.0  --  3.9  --  4.0  --  4.0   CHLORIDE  --  93*  --   --   --   --  94*  --  94*  --  93*  --  92*  --  95*   CO2  --  32*  --   --   --   --  31*  --  30*  --  35*  --  33*  --  34*   BUN  --  37.5*  --   --   --   --  35.0*  --  27.7*  --  23.4*  --  21.4*  --  22.0*   CR  --  1.71*  --   --   --   --  1.72*  --  1.71*  --  1.59*  --  0.74  --  1.59*   * 233* 310* 267* 256* 204* 220*   < > 187*   < > 173*   < > 265*   < > 175*   SARAH  --  10.0  --   --   --   --  9.9  --  9.5  --  9.7  --  9.1  --  9.2    < > = values in this interval not displayed.       INRNo lab results found in last 7 days.     Recent Labs   Lab Test 03/03/24  0502 02/29/24  0422 02/27/24  0540 02/26/24  0508 02/23/24  0618 08/24/23  0645 08/23/23  0946   INR  --   --   --   --   --   --  0.95   WBC  --   --  13.5*  --  11.3*   < > 11.2*   HGB  --   --  10.2*  --  10.6*   < > 13.9    344 308   < > 337   < > 290    < > = values in this interval not displayed.       Personally reviewed current labs    Inés CALDWELL-BC  [facility]  [phone]

## 2024-03-05 NOTE — PROGRESS NOTES
HEART CARE NOTE          Assessment/Recommendations   1. Severe HFrEF c/b cardiogenic shock  Assessment / Plan  New onset; ICM; s/p coronary angiogram which demonstrated severe multi-vessel CAD - CTS consulted regarding CABG, patient declines  Tolerating oral diuretic regimen; continue to monitor UOP and renal function closely  Patient is high risk for adverse cardiac events 2/2  severe systolic dysfunction, elevated NTproBNP, non-compliance  GDMT as detailed below     Current Pharmacotherapy AHA Guideline-Directed Medical Therapy   Losartan 25 mg  Lisinopril 20 mg twice daily   Carvedilol 25 mg twice daily  Carvedilol 25 mg twice daily   Spironolactone not started Spironolactone 25 mg once daily   Hydralazine NA Hydralazine 100 mg three times daily   Isosorbide dinitrate NA Isosorbide dinitrate 40 mg three times daily   SGLT2 inhibitor:Dapagliflozin/Empagliflozin - not started Dapagliflozin or Empagliflozin 10 mg daily      2. SCHUYLER in CKD  Assessment / Plan  Likely CRS in the setting of ADHF c/b antibiotic regimen  Continue to monitor UOP and renal function closely     3. DM2  Assessment / Plan  C/b DM foot ulcer  Management per primary team  Currently on ISS     4. Tobacco abuse  Assessment / Plan  Counseled regarding cessation     5. HTN  Assessment / Plan  Adequately controlled on current regimen - no changes at this time     5. CAD  Assessment / Plan  Severe , multivessel CAD - CTS consulted re:CABG which patient declines at this time; continue medical optimization; will discuss PCI options with IC that may be pursued once renal function is stable to improved  Denies chest pain or anginal equivalent  Continue ASA and high intensity atorvastatin      Plan of care discussed on March 5, 2024 with patient at bedside, and primary team overseeing patient's care    Cardiology team will sign-off for now. Please do not hesitate to consult us again if new questions or concerns arise. Follow-up appointment will be  "arranged by CORE/HF clinic.       History of Present Illness/Subjective    Mr. Floyd Capps is a 48 year old male with a PMHx significant for (per Epic notation) DM, HTN, CVA, obesity, recent ED visit for nonhealing diabetic foot ulcer with cellulitis, here for worsening pain in the foot, with necrotic tissue noted on exam now found to be in new onset HFrEF     Today, Mr. Capps denies acute cardiac events or complaints; Management plan as detailed above     ECG: Personally reviewed. normal sinus rhythm, nonspecific ST and T waves changes.     ECHO (personnaly Reviewed on 2/21/24):   The left ventricle is normal in size. There is mild concentric left  ventricular hypertrophy.  Left ventricular function is decreased. The ejection fraction is 30-35%  (moderately reduced). There is global hypokinesis with severe hypokinesis of  the mid to apical inferior wall.  Diastolic Doppler findings (E/E' ratio and/or other parameters) suggest left  ventricular filling pressures are increased.     The right ventricle is normal in size and function.  Normal left atrial size. Right atrial size is normal.  There is mild (1+) mitral regurgitation.  IVC diameter >2.1 cm collapsing <50% with sniff suggests a high RA pressure  estimated at 15 mmHg or greater.     Compared to previous study from 8/23/2023 the LV systolic function has  declined and there are regional wall motion abnormalities.    Telemetry: personally reviewed March 5, 2024; notable for sinus rhythm     Lab results: personally reviewed March 5, 2024; notable for stable SCHUYLER on CKD    Medical history and pertinent documents reviewed in Care Everywhere please where applicable see details above        Physical Examination Review of Systems   BP 99/58 (BP Location: Left arm)   Pulse 77   Temp 98  F (36.7  C) (Oral)   Resp 20   Ht 1.778 m (5' 10\")   Wt 103.3 kg (227 lb 11.8 oz)   SpO2 90%   BMI 32.68 kg/m    Body mass index is 32.68 kg/m .  Wt Readings from Last 3 " Encounters:   03/05/24 103.3 kg (227 lb 11.8 oz)   02/13/24 122.5 kg (270 lb)   02/06/24 122.5 kg (270 lb)     General Appearance:   no distress, normal body habitus   ENT/Mouth: membranes moist, no oral lesions or bleeding gums.      EYES:  no scleral icterus, normal conjunctivae   Neck: no carotid bruits or thyromegaly   Chest/Lungs:   lungs are clear to auscultation, no rales or wheezing, equal chest wall expansion    Cardiovascular:   Regular. Normal first and second heart sounds with no murmurs, rubs, or gallops; the carotid, radial and posterior tibial pulses are intact, no JVD or LE edema bilaterally    Abdomen:  no organomegaly, masses, bruits, or tenderness; bowel sounds are present   Extremities: no cyanosis or clubbing   Skin: no xanthelasma, warm.    Neurologic: NAD   Psychiatric: alert and oriented x3, calm     A complete 10 systems ROS was reviewed  And is negative except what is listed in the HPI.          Medical History  Surgical History Family History Social History   Past Medical History:   Diagnosis Date    Diabetes (H)     Past Surgical History:   Procedure Laterality Date    APPENDECTOMY      CV CORONARY ANGIOGRAM N/A 3/1/2024    Procedure: CV CORONARY ANGIOGRAM;  Surgeon: Stephen Berger MD;  Location: [facility] LAB CV    CV LEFT HEART CATH N/A 3/1/2024    Procedure: Left Heart Catheterization;  Surgeon: Stephen Berger MD;  Location: [facility] CATH LAB CV    INCISION AND DRAINAGE OF WOUND      groin abscess    IRRIGATION AND DEBRIDEMENT FOOT, COMBINED Right 2/16/2024    Procedure: IRRIGATION AND DEBRIDEMENT RIGHT FOOT;  Surgeon: Cristofer Sims DPM;  Location: [facility]    PICC DOUBLE LUMEN PLACEMENT  2/29/2024    no family history of premature coronary artery disease Social History     Socioeconomic History    Marital status: Single     Spouse name: Not on file    Number of children: Not on file    Years of education: Not on file    Highest education level: Not on file    Occupational History    Not on file   Tobacco Use    Smoking status: Every Day     Packs/day: .5     Types: Cigarettes    Smokeless tobacco: Never    Tobacco comments:     Seen IP by CTTS on 8/24/23 and declined cessation services and materials.    Vaping Use    Vaping Use: Never used   Substance and Sexual Activity    Alcohol use: No    Drug use: No    Sexual activity: Not on file   Other Topics Concern    Not on file   Social History Narrative    Patient reports that he does not have health insurance.     Social Determinants of Health     Financial Resource Strain: Low Risk  (10/20/2023)    Financial Resource Strain     Within the past 12 months, have you or your family members you live with been unable to get utilities (heat, electricity) when it was really needed?: No   Food Insecurity: High Risk (10/20/2023)    Food Insecurity     Within the past 12 months, did you worry that your food would run out before you got money to buy more?: Yes     Within the past 12 months, did the food you bought just not last and you didn t have money to get more?: No   Transportation Needs: Low Risk  (10/20/2023)    Transportation Needs     Within the past 12 months, has lack of transportation kept you from medical appointments, getting your medicines, non-medical meetings or appointments, work, or from getting things that you need?: No   Physical Activity: Not on file   Stress: Not on file   Social Connections: Not on file   Interpersonal Safety: Low Risk  (10/20/2023)    Interpersonal Safety     Do you feel physically and emotionally safe where you currently live?: Yes     Within the past 12 months, have you been hit, slapped, kicked or otherwise physically hurt by someone?: No     Within the past 12 months, have you been humiliated or emotionally abused in other ways by your partner or ex-partner?: No   Housing Stability: High Risk (10/20/2023)    Housing Stability     Do you have housing? : Yes     Are you worried about  "losing your housing?: Yes           Lab Results    Chemistry/lipid CBC Cardiac Enzymes/BNP/TSH/INR   Lab Results   Component Value Date    CHOL 156 02/21/2024    HDL 39 (L) 02/21/2024    TRIG 199 (H) 02/21/2024    BUN 37.5 (H) 03/05/2024     (L) 03/05/2024    CO2 32 (H) 03/05/2024    Lab Results   Component Value Date    WBC 13.5 (H) 02/27/2024    HGB 10.2 (L) 02/27/2024    HCT 34.6 (L) 02/27/2024    MCV 92 02/27/2024     03/03/2024    Lab Results   Component Value Date    TSH 2.72 08/23/2023    INR 0.95 08/23/2023     No results found for: \"CKTOTAL\", \"CKMB\", \"TROPONINI\"       Weight:    Wt Readings from Last 3 Encounters:   03/05/24 103.3 kg (227 lb 11.8 oz)   02/13/24 122.5 kg (270 lb)   02/06/24 122.5 kg (270 lb)       Allergies  No Known Allergies      Surgical History  Past Surgical History:   Procedure Laterality Date    APPENDECTOMY      CV CORONARY ANGIOGRAM N/A 3/1/2024    Procedure: CV CORONARY ANGIOGRAM;  Surgeon: Stephen Berger MD;  Location: [facility] CV    CV LEFT HEART CATH N/A 3/1/2024    Procedure: Left Heart Catheterization;  Surgeon: Stephen Berger MD;  Location: [facility] LAB CV    INCISION AND DRAINAGE OF WOUND      groin abscess    IRRIGATION AND DEBRIDEMENT FOOT, COMBINED Right 2/16/2024    Procedure: IRRIGATION AND DEBRIDEMENT RIGHT FOOT;  Surgeon: Cristofer Sims DPM;  Location: [facility]    PICC DOUBLE LUMEN PLACEMENT  2/29/2024       Social History  Tobacco:   History   Smoking Status    Every Day    Packs/day: 0.50    Types: Cigarettes   Smokeless Tobacco    Never    Alcohol:   Social History    Substance and Sexual Activity      Alcohol use: No   Illicit Drugs:   History   Drug Use No       Family History  History reviewed. No pertinent family history.       Shayna Arndt MD on 3/5/2024      cc: Glendy Benavides    "

## 2024-03-06 ENCOUNTER — PATIENT OUTREACH (OUTPATIENT)
Dept: CARE COORDINATION | Facility: CLINIC | Age: 49
End: 2024-03-06
Payer: MEDICAID

## 2024-03-06 NOTE — LETTER
[facility]       To:  [facility]            Please give to facility      From:   Georgiana Isaacs [facility]  Care Coordinator   [facility]   P: [phone]  [email or web address]        Patient Name:  Floyd Capps     YOB: 1975   Admit date:   3-5-2024        Information Needed:  Please contact me when the patient will discharge (or if they will move to long term care)- include the discharge date, disposition, and main diagnosis       If the patient is discharged with home care services, please provide the name of the agency    Also- Please inform me if a care conference is being held.     Phone or Email with information                              Thank you

## 2024-03-06 NOTE — PROGRESS NOTES
Clinic Care Coordination Contact  Care Coordination Transition Communication    Clinical Data: Patient was hospitalized at [facility] from 2- to 3-5-2024 with diagnosis of Reason for your hospital stay  Diabetic foot ulcer, right heel  Acute hypoxemic respiratory failure 2/2  Acute new onset systolic heart failure  SCHUYLER on CKD  DM2    Assessment: Patient has transitioned to TCU/ARU for short term rehabilitation:    Facility Name: [facility]  Transition Communication:  Notified facility of Primary Care- Care Coordination support via Epic fax.    Plan: Care Coordinator will await notification from facility staff informing of patient's discharge plans/needs. Care Coordinator will review chart and outreach to facility staff every 4 weeks and as needed.     Georgiana Isaacs,   [facility]  [phone]

## 2024-03-06 NOTE — PLAN OF CARE
Physical Therapy Discharge Summary    Reason for therapy discharge:    Discharged to transitional care facility.    Progress towards therapy goal(s). See goals on Care Plan in [facility] electronic health record for goal details.  Goals not met.  Barriers to achieving goals:   patient non compiant with PT .    Therapy recommendation(s):    Patient not compliant with PT -high refusals rate

## 2024-03-07 ENCOUNTER — TELEPHONE (OUTPATIENT)
Dept: INTERNAL MEDICINE | Facility: CLINIC | Age: 49
End: 2024-03-07
Payer: MEDICAID

## 2024-03-07 NOTE — TELEPHONE ENCOUNTER
MTM referral from: Transitions of Care (recent hospital discharge or ED visit)    MTM referral outreach attempt #1 on March 7, 2024 at 8:53 AM      Outcome: Spoke with patient, he declined services    Use ma (rivka) for the carrier/Plan on the flowsheet          Haydee Gomez CPhT  MTM

## 2024-03-08 NOTE — PROGRESS NOTES
ASSESSMENT & PLAN    Floyd was seen today for pain and fracture followup.    Diagnoses and all orders for this visit:    Other closed intra-articular fracture of distal end of left radius with routine healing, subsequent encounter  -     XR Wrist Left G/E 3 Views; Future  -     Wrist/Arm/Hand Bracking Supplies Order Wrist Brace; Left; non-thumb spica    Left wrist pain      48-year-old male presents to follow-up on the left nondisplaced intra-articular distal radius fracture sustained approximately 6 weeks ago.  He has been in a cast and was recently discharged from the hospital after an extensive stay.  He is currently in a wheelchair due to inability to weight-bear on the right foot for wound healing.  I did recommend repeating testing today due to his need to use a manual wheelchair, however he was extremely opposed to this and we will plan to provide him with a wrist splint instead.  Discussed importance of wearing this at all times except when bathing and avoiding any lifting or pushing off with the left wrist.  Repeat imaging today shows redemonstration of distal radius fracture with evidence of bony remodeling.    Plan:  -Wrist splint to left wrist today, only for remove for bathing  -No lifting with the left arm or using to push off   -Tylenol Extra Strength (1000mg) up to three times daily as needed for pain  -Follow-up in 2 weeks for repeat imaging       Return in about 2 weeks (around 3/26/2024).      Dr. May Mishra, DO  [facility]  Sports Medicine     -----  Chief Complaint   Patient presents with    Left Wrist - Pain, Fracture Followup       SUBJECTIVE  Floyd Capps is a/an 48 year old male who is seen to follow-up on left radius fracture. The patient was last seen 2/13/24. Since last visit, patient has been casted and resting as he recovers from some other medical conditions and procedures he had following his last sports medicine visit. He reports no pain in the left  wrist.     The patient is seen alone        REVIEW OF SYSTEMS:  Pertinent positives/negative: As stated above in HPI    OBJECTIVE:  There were no vitals taken for this visit.   General: Alert and in no distress  Skin: no visable rashes  CV: Extremities appear well perfused   Resp: normal respiratory effort, no conversational dyspnea   Psych: normal mood, affect  MSK:  LEFT Wrist/Hand  Inspection:  No swelling, bruising  Palpation: Tender to palpation of the distal radius  Range of Motion:    ROM (active and passive) limited in all planes secondary to stiffness  Strength: Intact thumb extension, able to make OK sign  Sensation: Grossly intact to light touch in median, ulnar, radial nerve distributions  Capillary refill: <2 seconds       RADIOLOGY:  Final results and radiologist's interpretation available in the [facility] health record.  Images below were personally reviewed and discussed with the patient in the office today.  My personal interpretation of the performed imaging: X-ray of the left wrist performed today in clinic redemonstrates distal radius fracture with intra-articular extension, no displacement, interval healing noted with no significant callus formation                 Disclaimer: This note consists of symbols derived from dictation and/or voice recognition software. As a result, there may be errors in the script that have gone undetected. Please consider this when interpreting information found in this chart.

## 2024-03-11 ENCOUNTER — TELEPHONE (OUTPATIENT)
Dept: ORTHOPEDICS | Facility: CLINIC | Age: 49
End: 2024-03-11
Payer: MEDICAID

## 2024-03-11 NOTE — TELEPHONE ENCOUNTER
[facility] Call Center    Phone Message    May a detailed message be left on voicemail: yes     Reason for Call: Other: Patient's sister is calling in because patient doesn't have ID for check in and hasn't received his insurance card yet. Please call to verify patient will still be able to have appointment. ID is uploaded, but no insurance card.      Action Taken: Other: [id number]    Travel Screening: Not Applicable

## 2024-03-11 NOTE — TELEPHONE ENCOUNTER
Called sister back, OK to see. No ID needed per .   Insurance: is on file, if not current, will generate bill until sent to new insurance, sister understands.     Fernando Hodges, ATC

## 2024-03-12 ENCOUNTER — OFFICE VISIT (OUTPATIENT)
Dept: ORTHOPEDICS | Facility: CLINIC | Age: 49
End: 2024-03-12
Payer: MEDICAID

## 2024-03-12 ENCOUNTER — ANCILLARY PROCEDURE (OUTPATIENT)
Dept: GENERAL RADIOLOGY | Facility: CLINIC | Age: 49
End: 2024-03-12
Attending: STUDENT IN AN ORGANIZED HEALTH CARE EDUCATION/TRAINING PROGRAM
Payer: MEDICAID

## 2024-03-12 DIAGNOSIS — S52.572D OTHER CLOSED INTRA-ARTICULAR FRACTURE OF DISTAL END OF LEFT RADIUS WITH ROUTINE HEALING, SUBSEQUENT ENCOUNTER: Primary | ICD-10-CM

## 2024-03-12 DIAGNOSIS — M25.532 LEFT WRIST PAIN: ICD-10-CM

## 2024-03-12 DIAGNOSIS — S52.572D OTHER CLOSED INTRA-ARTICULAR FRACTURE OF DISTAL END OF LEFT RADIUS WITH ROUTINE HEALING, SUBSEQUENT ENCOUNTER: ICD-10-CM

## 2024-03-12 PROCEDURE — 99213 OFFICE O/P EST LOW 20 MIN: CPT | Performed by: STUDENT IN AN ORGANIZED HEALTH CARE EDUCATION/TRAINING PROGRAM

## 2024-03-12 PROCEDURE — 73110 X-RAY EXAM OF WRIST: CPT | Mod: TC | Performed by: RADIOLOGY

## 2024-03-12 NOTE — PATIENT INSTRUCTIONS
1. Other closed intra-articular fracture of distal end of left radius with routine healing, subsequent encounter    2. Left wrist pain        Plan:  -Wrist splint to left wrist today, only for remove for bathing  -No lifting with the left arm or using to push off   -Tylenol Extra Strength (1000mg) up to three times daily as needed for pain  -Follow-up in 2 weeks for repeat imaging       If you have any questions or concerns after your appointment, please send a [email or web address] message or call the clinic at [phone]    May Mishra DO, [facility]  Sports Medicine    Thank you for choosing [facility]!    DR. MISHRA'S CLINIC LOCATIONS:     [place]  TRIAGE LINE: [phone]   [address] [place] [address] APPOINTMENTS: [phone]   [place] [address] RADIOLOGY: [phone]   (Mondays & Tuesdays) HAND THERAPY: [phone]    PHYSICAL THERAPY: [phone]   [place] BILLING QUESTIONS: [phone] [address] FAX: [phone]   [place] [address]    (Thursdays & Fridays)

## 2024-03-12 NOTE — LETTER
3/12/2024         RE: Floyd Capps  [address]  [place] [address]        Dear Colleague,    Thank you for referring your patient, Floyd Capps, to the [facility]. Please see a copy of my visit note below.    ASSESSMENT & PLAN    Floyd was seen today for pain and fracture followup.    Diagnoses and all orders for this visit:    Other closed intra-articular fracture of distal end of left radius with routine healing, subsequent encounter  -     XR Wrist Left G/E 3 Views; Future  -     Wrist/Arm/Hand Bracking Supplies Order Wrist Brace; Left; non-thumb spica    Left wrist pain      48-year-old male presents to follow-up on the left nondisplaced intra-articular distal radius fracture sustained approximately 6 weeks ago.  He has been in a cast and was recently discharged from the hospital after an extensive stay.  He is currently in a wheelchair due to inability to weight-bear on the right foot for wound healing.  I did recommend repeating testing today due to his need to use a manual wheelchair, however he was extremely opposed to this and we will plan to provide him with a wrist splint instead.  Discussed importance of wearing this at all times except when bathing and avoiding any lifting or pushing off with the left wrist.  Repeat imaging today shows redemonstration of distal radius fracture with evidence of bony remodeling.    Plan:  -Wrist splint to left wrist today, only for remove for bathing  -No lifting with the left arm or using to push off   -Tylenol Extra Strength (1000mg) up to three times daily as needed for pain  -Follow-up in 2 weeks for repeat imaging       Return in about 2 weeks (around 3/26/2024).      Dr. May Mishra, DO  [facility]  Sports Medicine     -----  Chief Complaint   Patient presents with     Left Wrist - Pain, Fracture Followup       SUBJECTIVE  Floyd Capps is a/an 48 year old male  who is seen to follow-up on left radius fracture. The patient was last seen 2/13/24. Since last visit, patient has been casted and resting as he recovers from some other medical conditions and procedures he had following his last sports medicine visit. He reports no pain in the left wrist.     The patient is seen alone        REVIEW OF SYSTEMS:  Pertinent positives/negative: As stated above in HPI    OBJECTIVE:  There were no vitals taken for this visit.   General: Alert and in no distress  Skin: no visable rashes  CV: Extremities appear well perfused   Resp: normal respiratory effort, no conversational dyspnea   Psych: normal mood, affect  MSK:  LEFT Wrist/Hand  Inspection:  No swelling, bruising  Palpation: Tender to palpation of the distal radius  Range of Motion:    ROM (active and passive) limited in all planes secondary to stiffness  Strength: Intact thumb extension, able to make OK sign  Sensation: Grossly intact to light touch in median, ulnar, radial nerve distributions  Capillary refill: <2 seconds       RADIOLOGY:  Final results and radiologist's interpretation available in the [facility] health record.  Images below were personally reviewed and discussed with the patient in the office today.  My personal interpretation of the performed imaging: X-ray of the left wrist performed today in clinic redemonstrates distal radius fracture with intra-articular extension, no displacement, interval healing noted with no significant callus formation                 Disclaimer: This note consists of symbols derived from dictation and/or voice recognition software. As a result, there may be errors in the script that have gone undetected. Please consider this when interpreting information found in this chart.        Again, thank you for allowing me to participate in the care of your patient.        Sincerely,        May Mishra, DO

## 2024-03-22 ENCOUNTER — OFFICE VISIT (OUTPATIENT)
Dept: VASCULAR SURGERY | Facility: CLINIC | Age: 49
End: 2024-03-22
Attending: PODIATRIST
Payer: MEDICAID

## 2024-03-22 VITALS
OXYGEN SATURATION: 99 % | HEIGHT: 70 IN | WEIGHT: 227 LBS | HEART RATE: 86 BPM | BODY MASS INDEX: 32.5 KG/M2 | SYSTOLIC BLOOD PRESSURE: 119 MMHG | DIASTOLIC BLOOD PRESSURE: 73 MMHG

## 2024-03-22 DIAGNOSIS — L97.415 DIABETIC ULCER OF RIGHT HEEL ASSOCIATED WITH DIABETES MELLITUS DUE TO UNDERLYING CONDITION, WITH MUSCLE INVOLVEMENT WITHOUT EVIDENCE OF NECROSIS (H): Primary | ICD-10-CM

## 2024-03-22 DIAGNOSIS — E08.621 DIABETIC ULCER OF RIGHT HEEL ASSOCIATED WITH DIABETES MELLITUS DUE TO UNDERLYING CONDITION, WITH MUSCLE INVOLVEMENT WITHOUT EVIDENCE OF NECROSIS (H): Primary | ICD-10-CM

## 2024-03-22 PROCEDURE — G0463 HOSPITAL OUTPT CLINIC VISIT: HCPCS | Mod: 25 | Performed by: PODIATRIST

## 2024-03-22 PROCEDURE — 99203 OFFICE O/P NEW LOW 30 MIN: CPT | Mod: 25 | Performed by: PODIATRIST

## 2024-03-22 PROCEDURE — 11043 DBRDMT MUSC&/FSCA 1ST 20/<: CPT | Performed by: PODIATRIST

## 2024-03-22 ASSESSMENT — PAIN SCALES - GENERAL: PAINLEVEL: NO PAIN (0)

## 2024-03-22 NOTE — PROGRESS NOTES
FOOT AND ANKLE SURGERY/PODIATRY CONSULT NOTE        ASSESSMENT: Diabetic ulceration right heel into muscle      TREATMENT:  -I discussed with the patient that overall the right heel ulceration is stable without signs of infection.  He does have small areas of slough which I was able to sharply debride today.    -I discussed the principles of wound healing today including the importance of nonweightbearing on the involved limb, good vascular perfusion, good glycemic control and the absence of infection.  Most recent hemoglobin A1c on 2/14/2024 was 11.8%.  Discussed importance of reducing hemoglobin A1c below 8%.    -Recommend continued use of the wound VAC on the right heel at 125 mmHg.    -Nonweightbearing right foot.  PRAFO boot right foot at all times for offloading of the right heel including overnight    -Orders placed for diabetic educator while at [facility]    -I will asked my office to prior Auth for skin grafts    -After discussion of risk factors nursing staff removed dressing, cleansed wound and consent obtained 2% Lidocaine HCL jelly was applied, under clean conditions, the right heel ulceration(s) were debrided using .into the muscle/ fascia.  Devitalized and nonviable tissue, along with any fibrin and slough, was removed to improve granulation tissue formation, stimulate wound healing, decrease overall bacteria load, disrupt biofilm formation and decrease edge senescence. Wound drainage was scant No. Total excisional debridement was 16.56 sq cm into the muscle/fascia with a depth of 0.7 cm.   Ulcers were improved afterwards and .  Measures were as noted on the flow sheet.  Gauze dressing applied today.    -He will follow-up in 2 to 3 weeks    Dong Sanders DPM  [facility] [place]      HPI: Floyd Capps was seen today at the request of Dr. Sims for a right heel ulceration.  Patient underwent I&D of the right heel with Dr. Sims on 2/16 for infected abscess of the right heel.  He has  since been discharged in the hospital and is currently residing at [facility].  He is using a wound VAC on the right heel.  Patient reports that he has been served cakes and ice cream while at [facility] and is unable to get a good meal.  Past medical history significant for poorly controlled type 2 diabetes.    Past Medical History:   Diagnosis Date    Diabetes (H)        Past Surgical History:   Procedure Laterality Date    APPENDECTOMY      CV CORONARY ANGIOGRAM N/A 3/1/2024    Procedure: CV CORONARY ANGIOGRAM;  Surgeon: Stephen Berger MD;  Location: [facility] CATH LAB CV    CV LEFT HEART CATH N/A 3/1/2024    Procedure: Left Heart Catheterization;  Surgeon: Stephen Berger MD;  Location: [facility] CATH LAB CV    INCISION AND DRAINAGE OF WOUND      groin abscess    IRRIGATION AND DEBRIDEMENT FOOT, COMBINED Right 2/16/2024    Procedure: IRRIGATION AND DEBRIDEMENT RIGHT FOOT;  Surgeon: Cristofer Sims DPM;  Location[facility]    PICC DOUBLE LUMEN PLACEMENT  2/29/2024        No Known Allergies      Current Outpatient Medications:     acetaminophen (TYLENOL) 500 MG tablet, Take 2 tablets (1,000 mg) by mouth every 8 hours as needed for mild pain, Disp: , Rfl:     aspirin (ASA) 325 MG EC tablet, Take 1 tablet (325 mg) by mouth daily, Disp: , Rfl:     atorvastatin (LIPITOR) 80 MG tablet, Take 1 tablet (80 mg) by mouth daily, Disp: 90 tablet, Rfl: 3    bumetanide (BUMEX) 2 MG tablet, Take 1 tablet (2 mg) by mouth 2 times daily, Disp: , Rfl:     carvedilol (COREG) 25 MG tablet, Take 1 tablet (25 mg) by mouth 2 times daily (with meals), Disp: , Rfl:     insulin aspart (NOVOLOG VIAL) 100 UNITS/ML vial, Inject 5 Units Subcutaneous 3 times daily (with meals), Disp: , Rfl:     insulin glargine (LANTUS PEN) 100 UNIT/ML pen, Inject 32 Units Subcutaneous at bedtime, Disp: , Rfl:     losartan (COZAAR) 25 MG tablet, Take 1 tablet (25 mg) by mouth daily, Disp: , Rfl:     metFORMIN (GLUCOPHAGE XR) 500 MG 24 hr tablet, Take 1 tablet  "(500 mg) by mouth daily (with dinner), Disp: , Rfl:     mineral oil-hydrophilic petrolatum (AQUAPHOR) external ointment, Apply topically 2 times daily, Disp: , Rfl:     multivitamin w/minerals (THERA-VIT-M) tablet, Take 1 tablet by mouth daily, Disp: , Rfl:     nicotine (NICODERM CQ) 14 MG/24HR 24 hr patch, Place 1 patch onto the skin every 24 hours, Disp: , Rfl:     oxyCODONE (ROXICODONE) 5 MG tablet, Take 1 tablet (5 mg) by mouth every 6 hours as needed for severe pain, Disp: 15 tablet, Rfl:     pantoprazole (PROTONIX) 40 MG EC tablet, Take 1 tablet (40 mg) by mouth every morning (before breakfast), Disp: , Rfl:     Social History     Social History Narrative    Patient reports that he does not have health insurance.       History reviewed. No pertinent family history.    Review of Systems - 10 point Review of Systems is negative except for right heel ulcer which is noted in HPI.      OBJECTIVE:  Appearance: alert, well appearing, and in no distress.    /73   Pulse 86   Ht 5' 10\" (1.778 m)   Wt 227 lb (103 kg)   SpO2 99%   BMI 32.57 kg/m      BMI= Body mass index is 32.57 kg/m .    General appearance: Patient is alert and fully cooperative with history & exam.  No sign of distress is noted during the visit.     Psychiatric: Affect is pleasant & appropriate.  Patient appears motivated to improve health.     Respiratory: Breathing is regular & unlabored while sitting.     HEENT: Hearing is intact to spoken word.  Speech is clear.  No gross evidence of visual impairment that would impact ambulation.    Vascular: Trace palpable dorsalis pedis palpableRight.  Dermatologic:   Negative Pressure Wound Therapy Heel Right (Active)   Wound Type Surgical 03/05/24 1144   Dressing Pieces Removed (# of Each Type) Other (comment) 03/05/24 1144   Dressing Pieces Applied (# of Each Type) Black foam;Non-adherent contact layer 03/05/24 1144   Cycle Continuous 03/05/24 1144   Target Pressure (mmHg) 125 03/05/24 1144 "   Drainage Color/Characteristics Serosanguineous 03/05/24 1144   Cannister changed? Yes 03/05/24 1144   Output (ml) 0 ml 03/02/24 0500       VASC Wound Right heel (Active)   Pre Size Length 2.3 03/22/24 1400   Pre Size Width 7.2 03/22/24 1400   Pre Size Depth 0.8 03/22/24 1400   Pre Total Sq cm 16.56 03/22/24 1400       Incision/Surgical Site with Packing 02/16/24 Right Heel Qty Placed: 1 (Active)   Incision Assessment UTV 03/05/24 0915   Dressing Vacuum based 03/05/24 0915   Luly-Incision Assessment UTV 02/29/24 0800   Closure KEENA 02/29/24 1500   Incision Drainage Amount None 02/26/24 0455   Drainage Description Serosanguinous 02/29/24 1500   Incision Care Therapy - negative pressure 03/05/24 0915   Dressing Intervention New dressing applied;Clean, dry, intact 03/05/24 0915   Drainage Amount none 02/28/24 0900   Drainage Appearance/Odor serosanguineous 02/23/24 1610       Incision/Surgical Site 02/16/24 Right;Inner Plantar (Active)   Incision Assessment WDL 03/01/24 0400   Dressing Open to air 02/29/24 0400   Luly-Incision Assessment UTV 02/25/24 1643   Closure KEENA 02/29/24 1500   Incision Drainage Amount None 02/29/24 0400   Incision Care Therapy - negative pressure 02/25/24 1643   Dressing Intervention Open to air / No Dressing 03/01/24 0400   Ulceration posterior aspect of the right heel has granular tissue with small areas of slough along the lateral margin, no erythema.  Neurologic: Diminished to light touch Right.  Musculoskeletal: Contracted digits noted Right.

## 2024-03-22 NOTE — PATIENT INSTRUCTIONS
Please dietician evaluate diet. Should be on a diabetic diet.     Please order patient a PRAFO boot.     Wound care supplies were not ordered or needed today.    Important lnstructions      WEIGHT BEARING STATUS: You are to remain NON WEIGHT BEARING on your right foot. NON WEIGHT BEARING MEANS NO PRESSURE ON YOUR FOOT OR HEEL AT ANY TIME FOR ANY REASON!    2. OFFLOADING DEVICE: Must use a A WHEELCHAIR at all times! (do not use affected foot to push wheelchair)    3. STABILIZATION DEVICE: Use a PRAFO BOOT . You will need to WEAR THIS AT ALL TIMES EVEN WHILE IN BED.     4. ELEVATE: Elevating your leg means laying with your head on a pillow and your foot ABOVE YOUR HEART.     5. DO NOT MOVE YOUR FOOT.  There is a risk of worsening the wound or incision. To give yourself a higher chance of healing, please DO NOT swing foot back and forth and wiggle foot/toes especially when inside a stabilization device. Limited movement is allowable with therapy as recommended by the doctor.     6. TAKE A PROTEIN SHAKE TWICE A DAY.  (For ex: Boost, Ensure, Glucerna)    7. KEEP YOUR WOUND DRY AT ALL TIMES    Use a shower bag or a cast cover to keep your foot/leg dry during showers. These can be purchased on [employer] or any pharmacy.         Dressing Change lnstructions    Standard - Wound Vac Instructions    1. 3x weekly and as needed cleanse the area with NS    2. Pat dry    3. Apply Cavilon no sting barrier wipe to the skin surrounding the wound to protect from drainage/maceration    4. Apply drape around incision. Cut strip of drape and apply to skin if bridging is needed; plan this area in advance; should not be over bony prominence     5. Cut the foam to fit the area of the incision    6. Cut narrow strip of foam if bridging    7. Cover foam with drape to obtain air tight seal    8. Cut opening the size of a quarter for where the suction pad will be applied    9. Apply Suction pad    10. 125mmHG suction continuous    [facility] Contact  Center can be reached at [phone], 24 hours a day 7 days a week     - Back up plan in place:     If the negative pressure wound therapy malfunctions or unable to maintain seal: dressing must be removed and reapplied within 2 hours of the incident. If unable to reapply negative pressure wound dressing, place Normal Saline moistened gauze in wound bed and cover with appropriate dressing to keep wound bed moist.  Change wet-to-dry dressing two times a day until healthcare staff can re-implement negative pressure therapy. Change canister at least weekly.  Critical access hospital Contact Center can be reached at [phone], 24 hours a day 7 days a week    Information on Vacuum-Assisted Closure of a Wound  Vacuum-assisted closure (VAC) of a wound is a type of treatment to help wounds heal. It s also known as negative pressure wound therapy. During the treatment, a device lowers air pressure on the wound. This can help the wound heal more quickly.  Understanding the wound VAC system  A wound VAC system has several parts. A foam or gauze dressing is put directly on the wound. The dressing is changed every 24 to 72 hours. An adhesive film covers and seals the dressing and wound. A drainage tube leads from under the adhesive film and connects to a portable vacuum pump. This pump removes air pressure over the wound. It may do this constantly. Or it may do it in cycles. During the treatment, you ll need to carry the portable pump everywhere you go.  Why wound VAC is used  You might need this therapy for a recent traumatic wound. Or you may need it for a chronic wound. This is a wound that does not heal the way it should over time. This can happen with wounds in people who have diabetes. You may need a wound VAC if you ve had a recent skin graft. And you may need a wound VAC for a large wound. Large wounds can take a longer time to heal.  A wound vacuum system may help your wound heal more quickly by:  Draining extra fluid from the  wound  Reducing swelling  Reducing bacteria in the wound  Keeping your wound moist and warm  Helping draw together wound edges  Increasing blood flow to your wound  Decreasing inflammation  Wound VAC offers some other advantages over other types of wound care. It may decrease your overall discomfort. The dressings usually need to be changed less often. And they may be easier to keep in position.             SEEK MEDICAL CARE IF:  You have an increase in swelling, pain, or redness around the wound.  You have an increase in the amount of pus coming from the wound.  There is a bad smell coming from the wound.  The wound appears to be worsening/enlarging  You have a fever greater than 101.5 F      It is ok to continue current wound care treatment/products for the next 2-3 days until new wound care supplies are ordered and arrive. If longer than this please contact our office at [phone].      We want to hear from you!   In the next few weeks, you should receive a call or email to complete a survey about your visit at [facility]. Please help us improve your appointment experience by letting us know how we did today. We strive to make your experience good and value any ways in which we could do better.      We value your input and suggestions.    Thank you for choosing the [facility]!

## 2024-04-01 ENCOUNTER — ANCILLARY PROCEDURE (OUTPATIENT)
Dept: GENERAL RADIOLOGY | Facility: CLINIC | Age: 49
End: 2024-04-01
Attending: STUDENT IN AN ORGANIZED HEALTH CARE EDUCATION/TRAINING PROGRAM
Payer: MEDICAID

## 2024-04-01 ENCOUNTER — OFFICE VISIT (OUTPATIENT)
Dept: ORTHOPEDICS | Facility: CLINIC | Age: 49
End: 2024-04-01
Payer: MEDICAID

## 2024-04-01 DIAGNOSIS — S52.572D OTHER CLOSED INTRA-ARTICULAR FRACTURE OF DISTAL END OF LEFT RADIUS WITH ROUTINE HEALING, SUBSEQUENT ENCOUNTER: Primary | ICD-10-CM

## 2024-04-01 DIAGNOSIS — S52.572D OTHER CLOSED INTRA-ARTICULAR FRACTURE OF DISTAL END OF LEFT RADIUS WITH ROUTINE HEALING, SUBSEQUENT ENCOUNTER: ICD-10-CM

## 2024-04-01 DIAGNOSIS — R53.81 PHYSICAL DEBILITY: ICD-10-CM

## 2024-04-01 PROCEDURE — 99214 OFFICE O/P EST MOD 30 MIN: CPT | Performed by: STUDENT IN AN ORGANIZED HEALTH CARE EDUCATION/TRAINING PROGRAM

## 2024-04-01 PROCEDURE — 73110 X-RAY EXAM OF WRIST: CPT | Mod: TC | Performed by: RADIOLOGY

## 2024-04-01 NOTE — Clinical Note
4/1/2024         RE: Floyd Capps  [address]   [address]  [place] [address]        Dear Colleague,    Thank you for referring your patient, Floyd Capps, to the [facility]. Please see a copy of my visit note below.    ASSESSMENT & PLAN    Floyd was seen today for fracture followup.    Diagnoses and all orders for this visit:    Other closed intra-articular fracture of distal end of left radius with routine healing, subsequent encounter  -     XR Wrist Left G/E 3 Views; Future            No follow-ups on file.      Dr. May Mishra, DO  [facility]  Sports Medicine     -----  Chief Complaint   Patient presents with    Left Wrist - Fracture Followup       SUBJECTIVE  Floyd Capps is a/an 48 year old male who is seen to follow-up on left wrist fracture. The patient was last seen 3/12/24, transitioned into brace and instructed to wear at all times. Since last visit, he reports no pain and has been using wrist without a brace on. Only wore brace for a week, then took it off.     The patient is seen alone        REVIEW OF SYSTEMS:  Pertinent positives/negative: As stated above in HPI    OBJECTIVE:  There were no vitals taken for this visit.   General: Alert and in no distress  Skin: no visable rashes  CV: Extremities appear well perfused   Resp: normal respiratory effort, no conversational dyspnea   Psych: normal mood, affect  MSK:  LEFT Wrist/Hand  Inspection:  *** swelling, bruising  Palpation: Tender to palpation of the ***  Range of Motion:    {FSOC WRIST ROM REV:381524}  Strength: Deferred   Sensation: Grossly intact to light touch in median, ulnar, radial nerve distributions  Capillary refill: <2 seconds       RADIOLOGY:  Final results and radiologist's interpretation available in the [facility] health record.  Images below were personally reviewed and discussed with the patient in the office today.  My personal interpretation  of the performed imaging: ***      {[facility] 2021 Documentation (Optional):981151}  {2021 E&M time (Optional):097730}  {Provider  Link to [facility] Help Grid :292302}       Disclaimer: This note consists of symbols derived from dictation and/or voice recognition software. As a result, there may be errors in the script that have gone undetected. Please consider this when interpreting information found in this chart.        Again, thank you for allowing me to participate in the care of your patient.        Sincerely,        May Mishra, DO

## 2024-04-01 NOTE — LETTER
April 1, 2024      Floyd Capps  [address]     [place] [address]        To Whom It May Concern:    Floyd Capps was seen in our clinic. He may use a platform walker with forearm weightbearing on the left or a knee scooter but is not cleared for weightbearing through the left wrist or use of crutches. He must wear his wrist splint at all times.     Sincerely,        May Mishra, DO

## 2024-04-01 NOTE — PATIENT INSTRUCTIONS
1. Other closed intra-articular fracture of distal end of left radius with routine healing, subsequent encounter    2. Physical debility        Plan:  -Non-weight bearing through left wrist  -Continue to work on glycemic control, adequate nutrition. Recommend nutrition consult   -Avoid nicotine as this will slow bone healing  -Start Vitamin D 1000 international unit(s) daily  -Wear left wrist splint at all times  -Use moleskin over area of irritation  -Must be on diabetic diet, aim for at least 2-3 servings of dairy daily    If you have any questions or concerns after your appointment, please send a [employer] message or call the clinic at [phone]    May Mishra DO, CAM  [facility]  Sports Medicine    Thank you for choosing [facility]!    DR. MISHRA'S CLINIC LOCATIONS:     [place]  TR[employer] LINE: [phone]   [facility] [place] [place] [facility] [place] APPOINTMENTS: [phone]   [place] [address] RADIOLOGY: [phone]   (Mondays & Tuesdays) HAND THERAPY: [phone]    PHYSICAL THERAPY: [phone]   [place] BILLING QUESTIONS: [phone] [address] FAX: [phone]   [place] [address]    (Thursdays & Fridays)

## 2024-04-01 NOTE — PROGRESS NOTES
ASSESSMENT & PLAN    Floyd was seen today for fracture followup.    Diagnoses and all orders for this visit:    Other closed intra-articular fracture of distal end of left radius with routine healing, subsequent encounter  -     XR Wrist Left G/E 3 Views; Future  -     Primary Care - Care Coordination Referral; Future    Physical debility      48-year-old male presents to follow-up on left distal radius intra-articular fracture.  He has been intermittently compliant with his wrist brace, we discussed importance of wearing this at all times.  I again highly recommended recasting his arm for complete immobilization, however he refused today.  We discussed that he is still not cleared to weight-bear through his wrist, and he is very frustrated about this as he reports he is not receiving good care at his current facility and is frustrated he is not able to independently use the bathroom.  Reports that his wrist brace has been rubbing against his hand, and he is receiving glycemic rich foods at his current facility.      Plan:  -Non-weight bearing through left wrist  -Continue to work on glycemic control, adequate nutrition. Recommend nutrition consult   -Avoid nicotine as this will slow bone healing  -Start Vitamin D 1000 international unit(s) daily  -Wear left wrist splint at all times  -Use moleskin over area of irritation on hand  -Referral to social work placed per patient request due to desire to switch facilities  -Must be on diabetic diet, aim for at least 2-3 servings of dairy daily  -May need to consider bone stimulator after 90 days if still not healed, multiple medical comorbidities that are likely delaying his bone healing including nicotine use, poor glycemic control, poor nutrition      Return in about 3 weeks (around 4/22/2024).      Dr. May Mishra,   [facility]  Sports Medicine     -----  Chief Complaint   Patient presents with    Left Wrist - Fracture Followup        SUBJECTIVE  Floyd Capps is a/an 48 year old male who is seen to follow-up on left wrist fracture. The patient was last seen 3/12/24, transitioned into brace and instructed to wear at all times. Since last visit, he reports no pain and has been using wrist without a brace on. Only wore brace for a week, then took it off.     The patient is seen alone        REVIEW OF SYSTEMS:  Pertinent positives/negative: As stated above in HPI    OBJECTIVE:  There were no vitals taken for this visit.   General: Alert and in no distress  Skin: no visable rashes  CV: Extremities appear well perfused   Resp: normal respiratory effort, no conversational dyspnea   Psych: normal mood, affect  MSK:  LEFT Wrist/Hand  Inspection:  No swelling, bruising.  Small scab on the dorsal hand just distal to brace  Palpation: Tender to palpation of the distal radius  Range of Motion:  Decreased secondary to stiffness  Strength: Deferred   Sensation: Grossly intact to light touch in median, ulnar, radial nerve distributions  Capillary refill: <2 seconds       RADIOLOGY:  Final results and radiologist's interpretation available in the [facility] health record.  Images below were personally reviewed and discussed with the patient in the office today.  My personal interpretation of the performed imaging: X-ray of the left wrist today reveals redemonstration of intra-articular distal radius fracture with some interval healing noted.                 Disclaimer: This note consists of symbols derived from dictation and/or voice recognition software. As a result, there may be errors in the script that have gone undetected. Please consider this when interpreting information found in this chart.

## 2024-04-02 ENCOUNTER — PATIENT OUTREACH (OUTPATIENT)
Dept: CARE COORDINATION | Facility: CLINIC | Age: 49
End: 2024-04-02
Payer: MEDICAID

## 2024-04-02 NOTE — PROGRESS NOTES
[facility]/Voicemail    Clinical Data: Care Coordinator Outreach    Outreach Documentation Number of Outreach Attempt   4/2/2024   3:01 PM 1   4/9/2024  10:08 AM 2       Left message on patient's voicemail with call back information and requested return call.    Plan: Care Coordinator will call upon discharge notification or will do chart review around 4-.    Social Shantelle Nuñez  [facility]  [phone]      [facility]    Clinical Data: Care Coordinator Outreach    Outreach Documentation Number of Outreach Attempt   4/2/2024   3:01 PM 1       Left message on patient's voicemail with call back information and requested return call.    Patient currently in TCU. Had ortho appt yesterday and wants to change TCU's.      Plan:Care Coordinator will try to reach patient again in 3-5 business days.    Social Shantelle Nuñez  [facility]  [phone]

## 2024-04-10 ENCOUNTER — OFFICE VISIT (OUTPATIENT)
Dept: VASCULAR SURGERY | Facility: CLINIC | Age: 49
End: 2024-04-10
Attending: PODIATRIST
Payer: MEDICAID

## 2024-04-10 VITALS — SYSTOLIC BLOOD PRESSURE: 126 MMHG | RESPIRATION RATE: 22 BRPM | HEART RATE: 84 BPM | DIASTOLIC BLOOD PRESSURE: 70 MMHG

## 2024-04-10 DIAGNOSIS — E08.621 DIABETIC ULCER OF RIGHT HEEL ASSOCIATED WITH DIABETES MELLITUS DUE TO UNDERLYING CONDITION, WITH MUSCLE INVOLVEMENT WITHOUT EVIDENCE OF NECROSIS (H): Primary | ICD-10-CM

## 2024-04-10 DIAGNOSIS — L97.415 DIABETIC ULCER OF RIGHT HEEL ASSOCIATED WITH DIABETES MELLITUS DUE TO UNDERLYING CONDITION, WITH MUSCLE INVOLVEMENT WITHOUT EVIDENCE OF NECROSIS (H): Primary | ICD-10-CM

## 2024-04-10 PROCEDURE — 11043 DBRDMT MUSC&/FSCA 1ST 20/<: CPT | Performed by: PODIATRIST

## 2024-04-10 ASSESSMENT — PAIN SCALES - GENERAL: PAINLEVEL: NO PAIN (0)

## 2024-04-10 NOTE — PROGRESS NOTES
FOOT AND ANKLE SURGERY/PODIATRY Progress Note      ASSESSMENT: Diabetic ulceration right heel into muscle         TREATMENT:  -I discussed with the patient that overall the right heel ulceration is measuring smaller today with decreased depth.  Please with his progress I recommend we continue with the wound VAC along with nonweightbearing and use of a PRAFO boot at all times including overnight.    -After discussion of risk factors, nursing staff removed dressing, cleansed wound and consent obtained 2% Lidocaine HCL jelly was applied, under clean conditions, the right heel ulceration(s) were debrided using #15 blade scalpel.  Devitalized and nonviable tissue, along with any fibrin and slough, was removed to improve granulation tissue formation, stimulate wound healing, decrease overall bacteria load, disrupt biofilm formation and decrease edge senescence. Wound drainage was scant No. Total excisional debridement was 11.2 sq cm into the muscle/fascia with a depth of 0.4 cm.   Ulcers were improved afterwards and .  Measures were as noted on the flow sheet.  Gauze dressing applied today.  Wound VAC to be applied later today.    -He will follow-up in 2 weeks    Dong Sanders DPM  formerly [facility]      HPI: Floyd Capps was seen again today for a right heel ulceration.  Since his last visit with me patient was using wound VAC as directed remain nonweightbearing on his right foot.  Patient reports that he did not receive a PRAFO boot as previously recommended.    Past Medical History:   Diagnosis Date    Diabetes (H)        Past Surgical History:   Procedure Laterality Date    APPENDECTOMY      CV CORONARY ANGIOGRAM N/A 3/1/2024    Procedure: CV CORONARY ANGIOGRAM;  Surgeon: Stephen Berger MD;  Location: [facility] CATH [facility] CV    CV LEFT HEART CATH N/A 3/1/2024    Procedure: Left Heart Catheterization;  Surgeon: Stephen Berger MD;  Location: [facility] CV    INCISION AND DRAINAGE OF  WOUND      groin abscess    IRRIGATION AND DEBRIDEMENT FOOT, COMBINED Right 2/16/2024    Procedure: IRRIGATION AND DEBRIDEMENT RIGHT FOOT;  Surgeon: Cristofer Sims DPM;  Location: [facility] OR    PICC DOUBLE LUMEN PLACEMENT  2/29/2024       No Known Allergies      Current Outpatient Medications:     acetaminophen (TYLENOL) 500 MG tablet, Take 2 tablets (1,000 mg) by mouth every 8 hours as needed for mild pain, Disp: , Rfl:     aspirin (ASA) 325 MG EC tablet, Take 1 tablet (325 mg) by mouth daily, Disp: , Rfl:     atorvastatin (LIPITOR) 80 MG tablet, Take 1 tablet (80 mg) by mouth daily, Disp: 90 tablet, Rfl: 3    bumetanide (BUMEX) 2 MG tablet, Take 1 tablet (2 mg) by mouth 2 times daily, Disp: , Rfl:     carvedilol (COREG) 25 MG tablet, Take 1 tablet (25 mg) by mouth 2 times daily (with meals), Disp: , Rfl:     insulin aspart (NOVOLOG VIAL) 100 UNITS/ML vial, Inject 5 Units Subcutaneous 3 times daily (with meals), Disp: , Rfl:     insulin glargine (LANTUS PEN) 100 UNIT/ML pen, Inject 32 Units Subcutaneous at bedtime, Disp: , Rfl:     losartan (COZAAR) 25 MG tablet, Take 1 tablet (25 mg) by mouth daily, Disp: , Rfl:     metFORMIN (GLUCOPHAGE XR) 500 MG 24 hr tablet, Take 1 tablet (500 mg) by mouth daily (with dinner), Disp: , Rfl:     mineral oil-hydrophilic petrolatum (AQUAPHOR) external ointment, Apply topically 2 times daily, Disp: , Rfl:     multivitamin w/minerals (THERA-VIT-M) tablet, Take 1 tablet by mouth daily, Disp: , Rfl:     nicotine (NICODERM CQ) 14 MG/24HR 24 hr patch, Place 1 patch onto the skin every 24 hours, Disp: , Rfl:     oxyCODONE (ROXICODONE) 5 MG tablet, Take 1 tablet (5 mg) by mouth every 6 hours as needed for severe pain, Disp: 15 tablet, Rfl:     pantoprazole (PROTONIX) 40 MG EC tablet, Take 1 tablet (40 mg) by mouth every morning (before breakfast), Disp: , Rfl:     Review of Systems - 10 point Review of Systems is negative except for right heel ulcer which is noted in  HPI.      OBJECTIVE:  /70   Pulse 84   Resp 22   General appearance: Patient is alert and fully cooperative with history & exam.  No sign of distress is noted during the visit.    Vascular: Dorsalis pedis palpableRight.  Dermatologic:    Negative Pressure Wound Therapy Heel Right (Active)       VASC Wound Right heel (Active)   Pre Size Length 2 04/10/24 0900   Pre Size Width 5.6 04/10/24 0900   Pre Size Depth 0.4 04/10/24 0900   Pre Total Sq cm 11.2 04/10/24 0900       Incision/Surgical Site with Packing 02/16/24 Right Heel Qty Placed: 1 (Active)       Incision/Surgical Site 02/16/24 Right;Inner Plantar (Active)   Majority of right heel ulceration has a granular base with small areas of slough, increased depth.  No erythema right lower extremity.  Neurologic: Diminished to light touch Right.  Musculoskeletal: Contracted digits noted Right.      Picture:

## 2024-04-10 NOTE — PATIENT INSTRUCTIONS
Please order patient a PRAFO boot. NEEDS ASAP!     Wound care supplies were not ordered or needed today.    Important lnstructions      WEIGHT BEARING STATUS: You are to remain NON WEIGHT BEARING on your right foot. NON WEIGHT BEARING MEANS NO PRESSURE ON YOUR FOOT OR HEEL AT ANY TIME FOR ANY REASON!    2. OFFLOADING DEVICE: Must use a A WHEELCHAIR at all times! (do not use affected foot to push wheelchair)    3. STABILIZATION DEVICE: Use a PRAFO BOOT . You will need to WEAR THIS AT ALL TIMES EVEN WHILE IN BED.     4. ELEVATE: Elevating your leg means laying with your head on a pillow and your foot ABOVE YOUR HEART.     5. DO NOT MOVE YOUR FOOT.  There is a risk of worsening the wound or incision. To give yourself a higher chance of healing, please DO NOT swing foot back and forth and wiggle foot/toes especially when inside a stabilization device. Limited movement is allowable with therapy as recommended by the doctor.     6. TAKE A PROTEIN SHAKE TWICE A DAY.  (For ex: Boost, Ensure, Glucerna)    7. KEEP YOUR WOUND DRY AT ALL TIMES    Use a shower bag or a cast cover to keep your foot/leg dry during showers. These can be purchased on [employer] or any pharmacy.         Dressing Change lnstructions    Standard - Wound Vac Instructions    1. 3x weekly and as needed cleanse the area with NS    2. Pat dry    3. Apply Cavilon no sting barrier wipe to the skin surrounding the wound to protect from drainage/maceration    4. Apply drape around incision. Cut strip of drape and apply to skin if bridging is needed; plan this area in advance; should not be over bony prominence     5. Cut the foam to fit the area of the incision    6. Cut narrow strip of foam if bridging    7. Cover foam with drape to obtain air tight seal    8. Cut opening the size of a quarter for where the suction pad will be applied    9. Apply Suction pad    10. 125mmHG suction continuous    [employer] Contact Center can be reached at [phone], 24 hours a day 7  days a week     - Back up plan in place:     If the negative pressure wound therapy malfunctions or unable to maintain seal: dressing must be removed and reapplied within 2 hours of the incident. If unable to reapply negative pressure wound dressing, place Normal Saline moistened gauze in wound bed and cover with appropriate dressing to keep wound bed moist.  Change wet-to-dry dressing two times a day until healthcare staff can re-implement negative pressure therapy. Change canister at least weekly.  [facility] Contact [facility] can be reached at [phone], 24 hours a day 7 days a week    Information on Vacuum-Assisted Closure of a Wound  Vacuum-assisted closure (VAC) of a wound is a type of treatment to help wounds heal. It s also known as negative pressure wound therapy. During the treatment, a device lowers air pressure on the wound. This can help the wound heal more quickly.  Understanding the wound VAC system  A wound VAC system has several parts. A foam or gauze dressing is put directly on the wound. The dressing is changed every 24 to 72 hours. An adhesive film covers and seals the dressing and wound. A drainage tube leads from under the adhesive film and connects to a portable vacuum pump. This pump removes air pressure over the wound. It may do this constantly. Or it may do it in cycles. During the treatment, you ll need to carry the portable pump everywhere you go.  Why wound VAC is used  You might need this therapy for a recent traumatic wound. Or you may need it for a chronic wound. This is a wound that does not heal the way it should over time. This can happen with wounds in people who have diabetes. You may need a wound VAC if you ve had a recent skin graft. And you may need a wound VAC for a large wound. Large wounds can take a longer time to heal.  A wound vacuum system may help your wound heal more quickly by:  Draining extra fluid from the wound  Reducing swelling  Reducing bacteria in the  wound  Keeping your wound moist and warm  Helping draw together wound edges  Increasing blood flow to your wound  Decreasing inflammation  Wound VAC offers some other advantages over other types of wound care. It may decrease your overall discomfort. The dressings usually need to be changed less often. And they may be easier to keep in position.             SEEK MEDICAL CARE IF:  You have an increase in swelling, pain, or redness around the wound.  You have an increase in the amount of pus coming from the wound.  There is a bad smell coming from the wound.  The wound appears to be worsening/enlarging  You have a fever greater than 101.5 F      It is ok to continue current wound care treatment/products for the next 2-3 days until new wound care supplies are ordered and arrive. If longer than this please contact our office at [phone].      We want to hear from you!   In the next few weeks, you should receive a call or email to complete a survey about your visit at [facility]. Please help us improve your appointment experience by letting us know how we did today. We strive to make your experience good and value any ways in which we could do better.      We value your input and suggestions.    Thank you for choosing the [facility]!

## 2024-04-15 ENCOUNTER — TELEPHONE (OUTPATIENT)
Dept: VASCULAR SURGERY | Facility: CLINIC | Age: 49
End: 2024-04-15
Payer: MEDICAID

## 2024-04-15 NOTE — TELEPHONE ENCOUNTER
4/15/2024 EpiFix COVERED  4/15/2024 Dermacell NOT covered  4/15/2024 TheraSkin COVERED    Blake Yates RN on 4/15/2024 at 3:29 PM

## 2024-04-16 NOTE — PROGRESS NOTES
"ASSESSMENT & PLAN    Floyd was seen today for wrist fracture followup.    Diagnoses and all orders for this visit:    Other closed intra-articular fracture of distal end of left radius with routine healing, subsequent encounter  -     XR Wrist Left G/E 3 Views; Future    Left wrist pain  -     XR Wrist Left G/E 3 Views; Future      48-year-old male presents to follow-up on left distal radius fracture.  X-ray today does show interval healing with good alignment, no significant callus formation.  On exam, he only has slight range of motion restriction, no significant tenderness, and a normal neurovascular exam.  Will plan to discontinue splint and allow range of motion as tolerated.  Not yet cleared to weight-bear through the left wrist at this time.    Plan:  -Ok to discontinue left wrist brace   -Range of motion of the left wrist as tolerated  -Ok to lift up to 10 pounds with left hand/wrist, do not exceed this  -No full weight-bearing through arm, OK for platform walker  -Tylenol Extra Strength (1000mg) up to three times daily as needed for pain  -Avoid nicotine use and work on glycemic control   -Please start therapy to help patient work on strengthening and to prevent muscle loss during treatment forgot the      Return in about 4 weeks (around 5/20/2024).      Dr. May Mishra, DO  [facility]  Sports Medicine     -----  Chief Complaint   Patient presents with    Left Wrist - Wrist Fracture Followup       SUBJECTIVE  Floyd Capps is a/an 48 year old male who is seen to follow-up on left wrist, distal radius fracture that is now 11 week post-injury. The patient was last seen 4/1/2024. Since last visit relatively minimal pain.  Patient is hoping discontinue wrist bracing soon.      The patient is seen alone        REVIEW OF SYSTEMS:  Pertinent positives/negative: As stated above in HPI    OBJECTIVE:  Ht 1.778 m (5' 10\")   BMI 32.57 kg/m     General: Alert and in no distress  Skin: " no visable rashes  CV: Extremities appear well perfused   Resp: normal respiratory effort, no conversational dyspnea   Psych: normal mood, affect  MSK:  LEFT WRIST  Inspection:    No swelling, bruising, discoloration, or obvious deformity or asymmetry  Palpation:  Remainder of bony and ligamentous line marks are nontender.    Thumb: normal  Range of Motion:    Slightly limited secondary to stiffness  Intact sensation  Cap refill <2 seconds       RADIOLOGY:  Final results and radiologist's interpretation available in the [facility] health record.  Images below were personally reviewed and discussed with the patient in the office today.  My personal interpretation of the performed imaging: X-ray of the left wrist today redemonstrates intra-articular distal radius fracture with interval healing noted and good alignment.  No significant callus formation yet noted on imaging.                 Disclaimer: This note consists of symbols derived from dictation and/or voice recognition software. As a result, there may be errors in the script that have gone undetected. Please consider this when interpreting information found in this chart.

## 2024-04-16 NOTE — H&P (VIEW-ONLY)
"ASSESSMENT & PLAN    Floyd was seen today for wrist fracture followup.    Diagnoses and all orders for this visit:    Other closed intra-articular fracture of distal end of left radius with routine healing, subsequent encounter  -     XR Wrist Left G/E 3 Views; Future    Left wrist pain  -     XR Wrist Left G/E 3 Views; Future      48-year-old male presents to follow-up on left distal radius fracture.  X-ray today does show interval healing with good alignment, no significant callus formation.  On exam, he only has slight range of motion restriction, no significant tenderness, and a normal neurovascular exam.  Will plan to discontinue splint and allow range of motion as tolerated.  Not yet cleared to weight-bear through the left wrist at this time.    Plan:  -Ok to discontinue left wrist brace   -Range of motion of the left wrist as tolerated  -Ok to lift up to 10 pounds with left hand/wrist, do not exceed this  -No full weight-bearing through arm, OK for platform walker  -Tylenol Extra Strength (1000mg) up to three times daily as needed for pain  -Avoid nicotine use and work on glycemic control   -Please start therapy to help patient work on strengthening and to prevent muscle loss during treatment forgot the      Return in about 4 weeks (around 5/20/2024).      Dr. May Mishra, DO  [facility]  Sports Medicine     -----  Chief Complaint   Patient presents with    Left Wrist - Wrist Fracture Followup       SUBJECTIVE  Floyd Capps is a/an 48 year old male who is seen to follow-up on left wrist, distal radius fracture that is now 11 week post-injury. The patient was last seen 4/1/2024. Since last visit relatively minimal pain.  Patient is hoping discontinue wrist bracing soon.      The patient is seen alone        REVIEW OF SYSTEMS:  Pertinent positives/negative: As stated above in HPI    OBJECTIVE:  Ht 1.778 m (5' 10\")   BMI 32.57 kg/m     General: Alert and in no distress  Skin: " no visable rashes  CV: Extremities appear well perfused   Resp: normal respiratory effort, no conversational dyspnea   Psych: normal mood, affect  MSK:  LEFT WRIST  Inspection:    No swelling, bruising, discoloration, or obvious deformity or asymmetry  Palpation:  Remainder of bony and ligamentous line marks are nontender.    Thumb: normal  Range of Motion:    Slightly limited secondary to stiffness  Intact sensation  Cap refill <2 seconds       RADIOLOGY:  Final results and radiologist's interpretation available in the [facility] health record.  Images below were personally reviewed and discussed with the patient in the office today.  My personal interpretation of the performed imaging: X-ray of the left wrist today redemonstrates intra-articular distal radius fracture with interval healing noted and good alignment.  No significant callus formation yet noted on imaging.                 Disclaimer: This note consists of symbols derived from dictation and/or voice recognition software. As a result, there may be errors in the script that have gone undetected. Please consider this when interpreting information found in this chart.

## 2024-04-22 ENCOUNTER — ANCILLARY PROCEDURE (OUTPATIENT)
Dept: GENERAL RADIOLOGY | Facility: CLINIC | Age: 49
End: 2024-04-22
Attending: STUDENT IN AN ORGANIZED HEALTH CARE EDUCATION/TRAINING PROGRAM
Payer: MEDICAID

## 2024-04-22 ENCOUNTER — OFFICE VISIT (OUTPATIENT)
Dept: ORTHOPEDICS | Facility: CLINIC | Age: 49
End: 2024-04-22
Payer: MEDICAID

## 2024-04-22 ENCOUNTER — TRANSFERRED RECORDS (OUTPATIENT)
Dept: HEALTH INFORMATION MANAGEMENT | Facility: CLINIC | Age: 49
End: 2024-04-22

## 2024-04-22 VITALS — HEIGHT: 70 IN | BODY MASS INDEX: 32.57 KG/M2

## 2024-04-22 DIAGNOSIS — S52.572D OTHER CLOSED INTRA-ARTICULAR FRACTURE OF DISTAL END OF LEFT RADIUS WITH ROUTINE HEALING, SUBSEQUENT ENCOUNTER: Primary | ICD-10-CM

## 2024-04-22 DIAGNOSIS — M25.532 LEFT WRIST PAIN: ICD-10-CM

## 2024-04-22 DIAGNOSIS — S52.572D OTHER CLOSED INTRA-ARTICULAR FRACTURE OF DISTAL END OF LEFT RADIUS WITH ROUTINE HEALING, SUBSEQUENT ENCOUNTER: ICD-10-CM

## 2024-04-22 PROCEDURE — 73110 X-RAY EXAM OF WRIST: CPT | Mod: TC | Performed by: RADIOLOGY

## 2024-04-22 PROCEDURE — 99213 OFFICE O/P EST LOW 20 MIN: CPT | Performed by: STUDENT IN AN ORGANIZED HEALTH CARE EDUCATION/TRAINING PROGRAM

## 2024-04-22 NOTE — LETTER
4/22/2024         RE: Floyd Capps  [address]   [address]  [place] [address]        Dear Colleague,    Thank you for referring your patient, Floyd Capps, to the [facility]. Please see a copy of my visit note below.    ASSESSMENT & PLAN    Floyd was seen today for wrist fracture followup.    Diagnoses and all orders for this visit:    Other closed intra-articular fracture of distal end of left radius with routine healing, subsequent encounter  -     XR Wrist Left G/E 3 Views; Future    Left wrist pain  -     XR Wrist Left G/E 3 Views; Future      48-year-old male presents to follow-up on left distal radius fracture.  X-ray today does show interval healing with good alignment, no significant callus formation.  On exam, he only has slight range of motion restriction, no significant tenderness, and a normal neurovascular exam.  Will plan to discontinue splint and allow range of motion as tolerated.  Not yet cleared to weight-bear through the left wrist at this time.    Plan:  -Ok to discontinue left wrist brace   -Range of motion of the left wrist as tolerated  -Ok to lift up to 10 pounds with left hand/wrist, do not exceed this  -No full weight-bearing through arm, OK for platform walker  -Tylenol Extra Strength (1000mg) up to three times daily as needed for pain  -Avoid nicotine use and work on glycemic control   -Please start therapy to help patient work on strengthening and to prevent muscle loss during treatment forgot the      Return in about 4 weeks (around 5/20/2024).      Dr. May Mishra, DO  [facility]  Sports Medicine     -----  Chief Complaint   Patient presents with     Left Wrist - Wrist Fracture Followup       SUBJECTIVE  Floyd Capps is a/an 48 year old male who is seen to follow-up on left wrist, distal radius fracture that is now 11 week post-injury. The patient was last seen 4/1/2024. Since last visit  "relatively minimal pain.  Patient is hoping discontinue wrist bracing soon.      The patient is seen alone        REVIEW OF SYSTEMS:  Pertinent positives/negative: As stated above in HPI    OBJECTIVE:  Ht 1.778 m (5' 10\")   BMI 32.57 kg/m     General: Alert and in no distress  Skin: no visable rashes  CV: Extremities appear well perfused   Resp: normal respiratory effort, no conversational dyspnea   Psych: normal mood, affect  MSK:  LEFT WRIST  Inspection:    No swelling, bruising, discoloration, or obvious deformity or asymmetry  Palpation:  Remainder of bony and ligamentous line marks are nontender.    Thumb: normal  Range of Motion:    Slightly limited secondary to stiffness  Intact sensation  Cap refill <2 seconds       RADIOLOGY:  Final results and radiologist's interpretation available in the [facility] health record.  Images below were personally reviewed and discussed with the patient in the office today.  My personal interpretation of the performed imaging: X-ray of the left wrist today redemonstrates intra-articular distal radius fracture with interval healing noted and good alignment.  No significant callus formation yet noted on imaging.                 Disclaimer: This note consists of symbols derived from dictation and/or voice recognition software. As a result, there may be errors in the script that have gone undetected. Please consider this when interpreting information found in this chart.        Again, thank you for allowing me to participate in the care of your patient.        Sincerely,        May Mishra, DO  "

## 2024-04-22 NOTE — PATIENT INSTRUCTIONS
1. Other closed intra-articular fracture of distal end of left radius with routine healing, subsequent encounter    2. Left wrist pain        Plan:  -Ok to discontinue left wrist brace   -Range of motion of the left wrist as tolerated  -Ok to lift up to 10 pounds with left hand/wrist, do not exceed this  -No full weight-bearing through arm, OK for platform walker  -Tylenol Extra Strength (1000mg) up to three times daily as needed for pain  -Avoid nicotine use and work on glycemic control   -Please start therapy to help patient work on strengthening and to prevent muscle loss during treatment     If you have any questions or concerns after your appointment, please send a [employer] message or call the clinic at [phone]    May Mishra DO, CAM  [facility]  Sports Medicine    Thank you for choosing [facility]!    DR. MISHRA'S CLINIC LOCATIONS:     [place]  TRIAGE LINE: [phone]   [address] [place], [place] [address] APPOINTMENTS: [phone]   [place] [address] RADIOLOGY: [phone]   (Mondays & Tuesdays) HAND THERAPY: [phone]    PHYSICAL THERAPY: [phone]   [place] BILLING QUESTIONS: [phone] [address] FAX: [phone]   [place] [address]    (Thursdays & Fridays)

## 2024-04-24 ENCOUNTER — DOCUMENTATION ONLY (OUTPATIENT)
Dept: VASCULAR SURGERY | Facility: CLINIC | Age: 49
End: 2024-04-24
Payer: MEDICAID

## 2024-04-24 ENCOUNTER — OFFICE VISIT (OUTPATIENT)
Dept: VASCULAR SURGERY | Facility: CLINIC | Age: 49
End: 2024-04-24
Attending: PODIATRIST
Payer: MEDICAID

## 2024-04-24 ENCOUNTER — PREP FOR PROCEDURE (OUTPATIENT)
Dept: OTHER | Facility: CLINIC | Age: 49
End: 2024-04-24
Payer: MEDICAID

## 2024-04-24 VITALS — SYSTOLIC BLOOD PRESSURE: 104 MMHG | HEART RATE: 84 BPM | DIASTOLIC BLOOD PRESSURE: 72 MMHG | OXYGEN SATURATION: 100 %

## 2024-04-24 DIAGNOSIS — E11.621 DIABETIC ULCER OF RIGHT HEEL ASSOCIATED WITH TYPE 2 DIABETES MELLITUS, WITH NECROSIS OF BONE (H): Primary | ICD-10-CM

## 2024-04-24 DIAGNOSIS — L97.414 DIABETIC ULCER OF RIGHT HEEL ASSOCIATED WITH TYPE 2 DIABETES MELLITUS, WITH NECROSIS OF BONE (H): Primary | ICD-10-CM

## 2024-04-24 PROCEDURE — 11044 DBRDMT BONE 1ST 20 SQ CM/<: CPT | Performed by: PODIATRIST

## 2024-04-24 RX ORDER — CEFAZOLIN SODIUM/WATER 2 G/20 ML
2 SYRINGE (ML) INTRAVENOUS SEE ADMIN INSTRUCTIONS
Status: CANCELLED | OUTPATIENT
Start: 2024-05-02

## 2024-04-24 RX ORDER — CEFAZOLIN SODIUM/WATER 2 G/20 ML
2 SYRINGE (ML) INTRAVENOUS
Status: CANCELLED | OUTPATIENT
Start: 2024-05-02

## 2024-04-24 ASSESSMENT — PAIN SCALES - GENERAL: PAINLEVEL: MILD PAIN (3)

## 2024-04-24 NOTE — PROGRESS NOTES
FOOT AND ANKLE SURGERY/PODIATRY Progress Note      ASSESSMENT: Diabetic ulceration right heel into bone       TREATMENT:  -I discussed with the patient today that the majority of the right heel ulceration has a granular base and is progressing well.  Measuring smaller today.  However, there is a small area of increased depth which appears to probe to bone along the lateral margin.    -Based on the above, we discussed continued use of the wound VAC at this time.  He has also been approved for TheraSkin graft and I recommend we proceed with surgical I&D to remove nonviable tissue and apply the skin graft.  Because the wound probes to bone I also recommend bone biopsy for aerobic/anaerobic culture and also to pathology to determine if osteomyelitis is present.  Most recent MRI was negative for osteomyelitis of the right heel.  Reviewed postop course to include continue nonweightbearing on the right foot with use of wound VAC.  Risk include but limited to need for additional surgical intervention.All questions invited and answered.  Patient consents to surgery.    -After discussion of risk factors, nursing staff removed dressing, cleansed wound and consent obtained 2% Lidocaine HCL jelly was applied, under clean conditions, the right heel ulceration(s) were debrided using currette.  Devitalized and nonviable tissue, along with any fibrin and slough, was removed to improve granulation tissue formation, stimulate wound healing, decrease overall bacteria load, disrupt biofilm formation and decrease edge senescence. Wound drainage was scant No. Total excisional debridement was 6 sq cm into the bone with a depth of 0.8 cm.   Ulcers were improved afterwards and .  Measures were as noted on the flow sheet.  Gauze dressing applied today.  He will continue with the wound VAC until the surgical date.    -I will asked my office to coordinate outpatient surgery at [facility] for next week    AMY James  [facility] [place]      HPI: Floyd Capps was seen again today for a right heel ulceration.  [facility] has remained nonweightbearing on his right foot.    Past Medical History:   Diagnosis Date    Diabetes (H)        Past Surgical History:   Procedure Laterality Date    APPENDECTOMY      CV CORONARY ANGIOGRAM N/A 3/1/2024    Procedure: CV CORONARY ANGIOGRAM;  Surgeon: Stephen Berger MD;  Location: [facility] CATH LAB CV    CV LEFT HEART CATH N/A 3/1/2024    Procedure: Left Heart Catheterization;  Surgeon: Stephen Berger MD;  Location: [facility] CATH LAB CV    INCISION AND DRAINAGE OF WOUND      groin abscess    IRRIGATION AND DEBRIDEMENT FOOT, COMBINED Right 2/16/2024    Procedure: IRRIGATION AND DEBRIDEMENT RIGHT FOOT;  Surgeon: Cristofer Sims DPM;  Location: [facility] Main OR    PICC DOUBLE LUMEN PLACEMENT  2/29/2024       No Known Allergies      Current Outpatient Medications:     acetaminophen (TYLENOL) 500 MG tablet, Take 2 tablets (1,000 mg) by mouth every 8 hours as needed for mild pain, Disp: , Rfl:     aspirin (ASA) 325 MG EC tablet, Take 1 tablet (325 mg) by mouth daily, Disp: , Rfl:     atorvastatin (LIPITOR) 80 MG tablet, Take 1 tablet (80 mg) by mouth daily, Disp: 90 tablet, Rfl: 3    bumetanide (BUMEX) 2 MG tablet, Take 1 tablet (2 mg) by mouth 2 times daily, Disp: , Rfl:     carvedilol (COREG) 25 MG tablet, Take 1 tablet (25 mg) by mouth 2 times daily (with meals), Disp: , Rfl:     insulin aspart (NOVOLOG VIAL) 100 UNITS/ML vial, Inject 5 Units Subcutaneous 3 times daily (with meals), Disp: , Rfl:     insulin glargine (LANTUS PEN) 100 UNIT/ML pen, Inject 32 Units Subcutaneous at bedtime, Disp: , Rfl:     losartan (COZAAR) 25 MG tablet, Take 1 tablet (25 mg) by mouth daily, Disp: , Rfl:     metFORMIN (GLUCOPHAGE XR) 500 MG 24 hr tablet, Take 1 tablet (500 mg) by mouth daily (with dinner), Disp: , Rfl:     mineral oil-hydrophilic petrolatum (AQUAPHOR)  external ointment, Apply topically 2 times daily, Disp: , Rfl:     multivitamin w/minerals (THERA-VIT-M) tablet, Take 1 tablet by mouth daily, Disp: , Rfl:     nicotine (NICODERM CQ) 14 MG/24HR 24 hr patch, Place 1 patch onto the skin every 24 hours, Disp: , Rfl:     oxyCODONE (ROXICODONE) 5 MG tablet, Take 1 tablet (5 mg) by mouth every 6 hours as needed for severe pain, Disp: 15 tablet, Rfl:     pantoprazole (PROTONIX) 40 MG EC tablet, Take 1 tablet (40 mg) by mouth every morning (before breakfast), Disp: , Rfl:     Review of Systems - 10 point Review of Systems is negative except for right heel ulcer which is noted in HPI.      OBJECTIVE:  /72   Pulse 84   SpO2 100%   General appearance: Patient is alert and fully cooperative with history & exam.  No sign of distress is noted during the visit.    Vascular: Dorsalis pedis palpableRight.  Dermatologic:    Negative Pressure Wound Therapy Heel Right (Active)       VASC Wound Right heel (Active)   Pre Size Length 2 04/24/24 0800   Pre Size Width 3 04/24/24 0800   Pre Size Depth 0.8 04/24/24 0800   Pre Total Sq cm 6 04/24/24 0800       Incision/Surgical Site with Packing 02/16/24 Right Heel Qty Placed: 1 (Active)       Incision/Surgical Site 02/16/24 Right;Inner Plantar (Active)   Majority of right heel ulceration has a granular base, increased depth along the lateral margin which probes deep tissues and to bone.  No erythema right foot.  Neurologic: Diminished to light touch Right.  Musculoskeletal: Contracted digits noted Right.      Picture:

## 2024-04-24 NOTE — PATIENT INSTRUCTIONS
Please order patient a PRAFO or PREVALON BOOT, NEEDS ASAP!     Wound care supplies were not ordered or needed today.    Important lnstructions      WEIGHT BEARING STATUS: You are to remain NON WEIGHT BEARING on your right foot. NON WEIGHT BEARING MEANS NO PRESSURE ON YOUR FOOT OR HEEL AT ANY TIME FOR ANY REASON!    2. OFFLOADING DEVICE: Must use a A WHEELCHAIR at all times! (do not use affected foot to push wheelchair)    3. STABILIZATION DEVICE: Use a PRAFO BOOT . You will need to WEAR THIS AT ALL TIMES EVEN WHILE IN BED.     4. ELEVATE: Elevating your leg means laying with your head on a pillow and your foot ABOVE YOUR HEART.     5. DO NOT MOVE YOUR FOOT.  There is a risk of worsening the wound or incision. To give yourself a higher chance of healing, please DO NOT swing foot back and forth and wiggle foot/toes especially when inside a stabilization device. Limited movement is allowable with therapy as recommended by the doctor.     6. TAKE A PROTEIN SHAKE TWICE A DAY.  (For ex: Boost, Ensure, Glucerna)    7. KEEP YOUR WOUND DRY AT ALL TIMES    Use a shower bag or a cast cover to keep your foot/leg dry during showers. These can be purchased on [employer] or any pharmacy.         Dressing Change lnstructions    RIGHT FOOT; PRE-OPERATIVELY    Standard - Wound Vac Instructions    1. 3x weekly and as needed cleanse the area with NS    2. Pat dry    3. Apply Cavilon no sting barrier wipe to the skin surrounding the wound to protect from drainage/maceration    4. Apply drape around incision. Cut strip of drape and apply to skin if bridging is needed; plan this area in advance; should not be over bony prominence     5. Cut the foam to fit the area of the incision    6. Cut narrow strip of foam if bridging    7. Cover foam with drape to obtain air tight seal    8. Cut opening the size of a quarter for where the suction pad will be applied    9. Apply Suction pad    10. 125mmHG suction continuous    [employer] Contact [facility] can be  reached at [phone], 24 hours a day 7 days a week     - Back up plan in place:     If the negative pressure wound therapy malfunctions or unable to maintain seal: dressing must be removed and reapplied within 2 hours of the incident. If unable to reapply negative pressure wound dressing, place Normal Saline moistened gauze in wound bed and cover with appropriate dressing to keep wound bed moist.  Change wet-to-dry dressing two times a day until healthcare staff can re-implement negative pressure therapy. Change canister at least weekly.  [facility] Contact Center can be reached at [phone], 24 hours a day 7 days a week    Information on Vacuum-Assisted Closure of a Wound  Vacuum-assisted closure (VAC) of a wound is a type of treatment to help wounds heal. It s also known as negative pressure wound therapy. During the treatment, a device lowers air pressure on the wound. This can help the wound heal more quickly.  Understanding the wound VAC system  A wound VAC system has several parts. A foam or gauze dressing is put directly on the wound. The dressing is changed every 24 to 72 hours. An adhesive film covers and seals the dressing and wound. A drainage tube leads from under the adhesive film and connects to a portable vacuum pump. This pump removes air pressure over the wound. It may do this constantly. Or it may do it in cycles. During the treatment, you ll need to carry the portable pump everywhere you go.  Why wound VAC is used  You might need this therapy for a recent traumatic wound. Or you may need it for a chronic wound. This is a wound that does not heal the way it should over time. This can happen with wounds in people who have diabetes. You may need a wound VAC if you ve had a recent skin graft. And you may need a wound VAC for a large wound. Large wounds can take a longer time to heal.  A wound vacuum system may help your wound heal more quickly by:  Draining extra fluid from the wound  Reducing  swelling  Reducing bacteria in the wound  Keeping your wound moist and warm  Helping draw together wound edges  Increasing blood flow to your wound  Decreasing inflammation  Wound VAC offers some other advantages over other types of wound care. It may decrease your overall discomfort. The dressings usually need to be changed less often. And they may be easier to keep in position.           Floyd,    Your surgery with [facility] has been scheduled. Please read thoroughly and follow instructions.     Procedure:   I&D right heel with application of TheraSkin graft  Procedure Date :   TBD  *A surgery nurse will call you 1-2 days before surgery to inform you of the time of arrival for surgery.  Surgeon:   Dr. Dong Sanders  Admission Type:   Outpatient  Procedure Location:   [facility]          Preparation Instructions to complete:    []  You will need a Pre-op Physical within 30 days before surgery with your primary care provider. This exam is required by the anesthesiologist to ensure a safe surgical experience.    Failure  to obtain your pre-op physical will lead to cancellation of your surgery  Call them right away to schedule this. Please ensure your Preoperative Physical is faxed to the Hospital (numbers listed above)    [] Preoperative Medication Instructions  We would like you to stop your anticoagulation medications 3-5 days before surgery HOWEVER contact your prescribing provider for instructions before discontinuing any medications. (Examples: Coumadin, Plavix, Xarelto, Eliquis, Pradaxa, Effient, Brilinta) If you are on Coumadin, we would like the goal INR ? 1.4.  IT IS OK TO STAY ON ASPIRIN PRIOR TO SURGERY.   Hold Ibuprofen, Herbal Supplements and Vitamin E 7 days before  Stop Cialis, Levitra and Viagra 2-3 days before surgery  If you take these diabetic medications, please discuss with your primary doctor and follow the hold instructions:   Hold seven (7) days prior for once weekly  injectable doses [semaglutide (Ozempic, Wegovy), dulaglutide (Trulicity), exenatide ER (Bydureon), tirzepatide (Mounjaro)]  Hold the day before and day of for once daily injectable GLP-1 agonists [exenatide (Byetta), liraglutide (Saxenda, Victoza)]  Hold seven (7) days for oral semaglutide (Rybelsus)     [] Fasting Requirements  Nothing to eat for 8 hours before surgery unless instructed differently by the surgery nurse.  You may have clear liquids up to two hours before your arrival time (coffee, tea, water, or Gatorade. No cream or milk)  If your primary care provider has instructed you to continue oral medications, you may take them on the morning of surgery with a small sip of water.    No alcohol or smoking after 12:00am the day of surgery    []  COVID-19 test is no longer needed  If you are experiencing COVID symptoms or have tested positive for COVID-19 within 14 days of procedure date, reach out to the care team to reschedule please.     [] Contact your insurance regarding coverage  If you would like a Good Tania Estimate for your upcoming procedure at [facility] Location, contact Cost of Care Estimates   Advocates are available Monday through Friday 8am - 5pm   [phone]  You may also submit a request online through your [employer] account.  For all self-pay, estimate, or anesthesia billing questions at [facility], the contact information is below:  Who to contact: Violette MERINO  [facility] number: [phone]  Prepay number: [phone]    [] DO NOT BRING FMLA WITH TO SURGERY.  These should be sent to the provider's office by fax to [phone].     [] Day of Surgery  Medications - Take as indicated with sips of water.   Wear comfortable loose fitting clothes. Wear your glasses-Not contacts. Do not wear jewelry and remove body piercing's. Surgery may be cancelled if they are not removed.   You may have 1 family member wait in the lobby during your surgery. Visitor  restrictions are subject to change. Please verify with the surgery nurse when they call.   If same day surgery-Have a someone come with you to surgery that can help you understand the surgeon's instructions, drive you home and stay with you overnight the first night.    [] If the community sees a new COVID-19 surge, your procedure may need to be postponed. We will contact you if this happens.    [] You will receive a call from a surgery nurse 1-3 days prior to surgery. They will go over more details with you.             ** AFTER SURGERY INSTRUCTIONS **      [] You are to remain NON WEIGHT BEARING on your right foot NON WEIGHT BEARING MEANS NO PRESSURE ON YOUR FOOT OR HEEL AT ANY TIME FOR ANY REASON!    [] Prior to surgery you should already have a  PRAFO or PREVALON  which you will need to WEAR THIS AT ALL TIMES EVEN WHILE IN BED Please bring this with you on the day of surgery.    [] You should already have a A WHEELCHAIR to help you ambulate after surgery. Please also bring this with you on the day of surgery.    [] During surgery Dr. Sanders will apply a gauze dressing to your foot. This will remain intact until the wound vac is applied. The wound vac should be applied within 24 hours after surgery.    [] It is NOT OKAY to get your surgical site wet while bathing, you will need to purchase a cast cover to protect your foot from getting wet. You can purchase this at [facility] or your local pharmacy.    [] It is important that you elevate your affected foot after surgery. Proper elevation is raising your foot above your waist. The fluid in your lower extremities needs to get back to your heart so it can get pumped to your kidneys and expelled through urination. So if you notice you have to go to the bathroom more frequently when you are elevating your leg this is a good sign that it is working.     [] It is important that you increase your protein intake after surgery. Protein is essential for wound  healing. We recommend you take a protein supplement twice per day. This is in addition to your normal diet. Examples of protein supplements are Ensure, Boost, Glucerna (if you are diabetic), or protein powder. You can purchase these at your local retailer or grocery store.       RIGHT FOOT; POST-OPERATIVELY:    - Wound Vac Instructions     1. 2x weekly and as needed cleanse the wound with NS     2. Pat dry     3. Apply Cavilon no sting barrier wipe to the skin surrounding the wound to protect from drainage/maceration     4. Apply drape to perla wound. Cut strip of drape and apply to skin if bridging is needed; plan this area in advance; should not be over bony prominence      5. Cut the foam to fit the size of the wound. APPLY ADAPTIC TOUCH (or other similar nonadherent) OVER WOUND/GRAFT SITE. Then     6. Apply foam to wound     7. Cut narrow strip of foam if bridging if needed     8. Cover foam with drape to obtain air tight seal     9. Cut opening the size of a quarter for where the suction pad will be applied     10. Apply Suction pad     11. 100mmHG suction continuous      [employer] Contact [facility] can be reached at [phone], 24 hours a day 7 days a week      - If you do not have a back up plan in place:      If the negative pressure wound therapy malfunctions or unable to maintain seal: dressing must be removed and reapplied within 2 hours of the incident. If unable to reapply negative pressure wound dressing, place Normal Saline moistened gauze in wound bed and cover with appropriate dressing to keep wound bed moist.  Change wet-to-dry dressing two times a day until healthcare staff can re-implement negative pressure therapy. Change canister at least weekly.  [employer] Contact [facility] can be reached at [phone], 24 hours a day 7 days a week     - Information on Vacuum-Assisted Closure of a Wound  Vacuum-assisted closure (VAC) of a wound is a type of treatment to help wounds heal. It's also known as negative  pressure wound therapy. During the treatment, a device lowers air pressure on the wound. This can help the wound heal more quickly.  - Understanding the wound VAC system  A wound VAC system has several parts. A foam or gauze dressing is put directly on the wound. The dressing is changed every 24 to 72 hours. An adhesive film covers and seals the dressing and wound. A drainage tube leads from under the adhesive film and connects to a portable vacuum pump. This pump removes air pressure over the wound. It may do this constantly. Or it may do it in cycles. During the treatment, you'll need to carry the portable pump everywhere you go.  - Why wound VAC is used  You might need this therapy for a recent traumatic wound. Or you may need it for a chronic wound. This is a wound that does not heal the way it should over time. This can happen with wounds in people who have diabetes. You may need a wound VAC if you've had a recent skin graft. And you may need a wound VAC for a large wound. Large wounds can take a longer time to heal.  A wound vacuum system may help your wound heal more quickly by:  Draining extra fluid from the wound  Reducing swelling  Reducing bacteria in the wound  Keeping your wound moist and warm  Helping draw together wound edges  Increasing blood flow to your wound  Decreasing inflammation  Wound VAC offers some other advantages over other types of wound care. It may decrease your overall discomfort. The dressings usually need to be changed less often. And they may be easier to keep in position.  - Risks of wound VAC  Wound VAC has some rare risks, such as:  Bleeding (which may be severe)  Wound infection  An abnormal connection between the intestinal tract and the skin (enteric fistula)  Proper training in dressing changes can help reduce the risk for these complications. Also, your doctor will carefully evaluate you to make sure you are a good candidate for the therapy. Certain problems can increase  your risk for complications. These include:  Exposed organs or blood vessels  High risk of bleeding from another medical problem  Wound infection  Nearby bone infection  Dead wound tissue  Cancer tissue  Fragile skin, such as from aging or longtime use of topical steroids  Allergy to adhesive  Very poor blood flow to your wound  Wounds close to joints that may reopen because of movement  Your doctor will discuss the risks that apply to you. Make sure to talk with him or her about all of your questions and concerns.  - Getting ready for wound VAC  You likely won't need to do much to get ready for wound VAC. In some cases, you may need to wait a while before having this therapy. For example, your doctor may first need to treat an infection in your wound. Dead or damaged tissue may also need to be removed from your wound.  You or a caregiver may need training on how to use the wound VAC device. This is done if you will be able to have your wound vacuum therapy at home. In other cases, you may need to have your wound vacuum therapy in a health care facility.  - On the day of your procedure  A health care provider will cover your wound with foam or gauze wound dressing. An adhesive film will be put over the dressing and wound. This seals the wound. The foam connects to a drainage tube, which leads to a vacuum pump. This pump is portable. When the pump is turned on, it draws fluid through the foam and out the drainage tubing. The pump may run constantly, or it may cycle off and on. Your exact setup will depend on the specific type of wound vacuum system that you use.  - Managing your wound  You may need the dressing changed about once a day. You may need it changed more or less often, depending on your wound. You or your caregiver may be trained to do this at home. Or it may be done by a visiting health care provider. Your doctor may prescribe a pain medicine. This is to prevent or reduce pain during the dressing  change.  You will likely need to use the wound VAC system for several weeks or months. During this time, you'll carry the portable pump everywhere you go.  - Nutrition for wound healing  During this time, make sure you follow a healthy diet. This is needed so the wound can heal and to prevent infection. Your doctor can tell you more about what to include in your diet during this time.  follow up with your doctor if you have a medical condition that led to your wound, such as diabetes. He or she can help you prevent future wounds.  - Follow-up care  Your doctor will carefully keep track of your healing. Make sure to keep all follow-up appointments.  - When to call your health care provider  Call your health care provider right away if you have any of these:  Fever of 100.4 F (38.0 C) or higher  Increased redness, swelling, or warmth around wound  Increased pain  Bright red blood or blood clots in tubing or the collection chamber of the vacuum     Notify our office right away, if you have any changes in your health status, or if you develop a cold, flu, diarrhea, infection, fever or sore throat before your scheduled surgery date. We can be reached at [phone]   Monday-Friday 8 am-4:30 pm if you have any questions.     Thank you for trusting us with your care!                 SEEK MEDICAL CARE IF:  You have an increase in swelling, pain, or redness around the wound.  You have an increase in the amount of pus coming from the wound.  There is a bad smell coming from the wound.  The wound appears to be worsening/enlarging  You have a fever greater than 101.5 F      It is ok to continue current wound care treatment/products for the next 2-3 days until new wound care supplies are ordered and arrive. If longer than this please contact our office at [phone].      We want to hear from you!   In the next few weeks, you should receive a call or email to complete a survey about your visit at [facility].  Please help us improve your appointment experience by letting us know how we did today. We strive to make your experience good and value any ways in which we could do better.      We value your input and suggestions.    Thank you for choosing the [facility]!

## 2024-04-24 NOTE — PROGRESS NOTES
Surgery Scheduled    Pt given instructions while in clinic. LMTCB at pt's TCU ([facility]) to notify them that pt needs preop physical exam to be done prior to surgery.    Surgery/Procedure: Incision and Drainage, right heel with excision of bone from calcaneus with application of theraskin    CPT: 04596     Equipment: theraskin, pulse lavage     Location: [facility]:  [address] [place] [address] (phone: [phone], Fax: [phone])    Date: 5/2/2024    Time: 825 AM    Admission Type: Outpatient    Surgeon: Dr. Sanders    OR Confirmed & :  Yes with Katrina on 4/24/2024    Entered on provider calendar:  Yes    Post Op: see appt desk    Wound Vac Needed:  TBD    Home Care Needed:  pt at TCU    Blood Thinners Addressed: N/A    Stress Test Clearance: NO

## 2024-04-30 ENCOUNTER — PATIENT OUTREACH (OUTPATIENT)
Dept: CARE COORDINATION | Facility: CLINIC | Age: 49
End: 2024-04-30
Payer: MEDICAID

## 2024-04-30 NOTE — PROGRESS NOTES
Contact   Chart Review     Situation: Patient chart reviewed by .    Background: following for discharge from TCU.     Assessment: Patient has been in TCU for 8 weeks.      Plan/Recommendations: per protocol, closing to care coordination.  If needs arise, care team available for support.     Georgiana Isaacs,   [facility]  [phone]

## 2024-05-02 ENCOUNTER — HOSPITAL ENCOUNTER (OUTPATIENT)
Facility: HOSPITAL | Age: 49
Discharge: HOME | End: 2024-05-02
Attending: PODIATRIST | Admitting: PODIATRIST
Payer: COMMERCIAL

## 2024-05-02 ENCOUNTER — ANESTHESIA EVENT (OUTPATIENT)
Dept: SURGERY | Facility: HOSPITAL | Age: 49
End: 2024-05-02
Payer: COMMERCIAL

## 2024-05-02 ENCOUNTER — TELEPHONE (OUTPATIENT)
Dept: VASCULAR SURGERY | Facility: CLINIC | Age: 49
End: 2024-05-02

## 2024-05-02 ENCOUNTER — ANESTHESIA (OUTPATIENT)
Dept: SURGERY | Facility: HOSPITAL | Age: 49
End: 2024-05-02
Payer: COMMERCIAL

## 2024-05-02 VITALS
HEART RATE: 80 BPM | HEIGHT: 70 IN | BODY MASS INDEX: 34.36 KG/M2 | DIASTOLIC BLOOD PRESSURE: 64 MMHG | SYSTOLIC BLOOD PRESSURE: 106 MMHG | OXYGEN SATURATION: 95 % | TEMPERATURE: 97.3 F | RESPIRATION RATE: 20 BRPM | WEIGHT: 240 LBS

## 2024-05-02 DIAGNOSIS — L97.415 DIABETIC ULCER OF RIGHT HEEL ASSOCIATED WITH DIABETES MELLITUS DUE TO UNDERLYING CONDITION, WITH MUSCLE INVOLVEMENT WITHOUT EVIDENCE OF NECROSIS (H): ICD-10-CM

## 2024-05-02 DIAGNOSIS — L97.414 DIABETIC ULCER OF RIGHT HEEL ASSOCIATED WITH TYPE 2 DIABETES MELLITUS, WITH NECROSIS OF BONE (H): Primary | ICD-10-CM

## 2024-05-02 DIAGNOSIS — E08.621 DIABETIC ULCER OF RIGHT HEEL ASSOCIATED WITH DIABETES MELLITUS DUE TO UNDERLYING CONDITION, WITH MUSCLE INVOLVEMENT WITHOUT EVIDENCE OF NECROSIS (H): ICD-10-CM

## 2024-05-02 DIAGNOSIS — E11.621 DIABETIC ULCER OF RIGHT HEEL ASSOCIATED WITH TYPE 2 DIABETES MELLITUS, WITH NECROSIS OF BONE (H): Primary | ICD-10-CM

## 2024-05-02 LAB
GLUCOSE BLDC GLUCOMTR-MCNC: 298 MG/DL (ref 70–99)
GRAM STAIN RESULT: ABNORMAL
GRAM STAIN RESULT: ABNORMAL

## 2024-05-02 PROCEDURE — 28002 TREATMENT OF FOOT INFECTION: CPT | Mod: 51 | Performed by: PODIATRIST

## 2024-05-02 PROCEDURE — 82962 GLUCOSE BLOOD TEST: CPT

## 2024-05-02 PROCEDURE — 272N000001 HC OR GENERAL SUPPLY STERILE: Performed by: PODIATRIST

## 2024-05-02 PROCEDURE — 710N000012 HC RECOVERY PHASE 2, PER MINUTE: Performed by: PODIATRIST

## 2024-05-02 PROCEDURE — 250N000009 HC RX 250: Performed by: PODIATRIST

## 2024-05-02 PROCEDURE — 258N000001 HC RX 258: Performed by: PODIATRIST

## 2024-05-02 PROCEDURE — 370N000017 HC ANESTHESIA TECHNICAL FEE, PER MIN: Performed by: PODIATRIST

## 2024-05-02 PROCEDURE — 15002 WOUND PREP TRK/ARM/LEG: CPT | Mod: 51 | Performed by: PODIATRIST

## 2024-05-02 PROCEDURE — 250N000011 HC RX IP 250 OP 636: Mod: JZ | Performed by: ANESTHESIOLOGY

## 2024-05-02 PROCEDURE — 87186 SC STD MICRODIL/AGAR DIL: CPT | Performed by: PODIATRIST

## 2024-05-02 PROCEDURE — 87075 CULTR BACTERIA EXCEPT BLOOD: CPT | Performed by: PODIATRIST

## 2024-05-02 PROCEDURE — 250N000011 HC RX IP 250 OP 636: Performed by: NURSE ANESTHETIST, CERTIFIED REGISTERED

## 2024-05-02 PROCEDURE — 88311 DECALCIFY TISSUE: CPT | Mod: TC | Performed by: PODIATRIST

## 2024-05-02 PROCEDURE — 999N000141 HC STATISTIC PRE-PROCEDURE NURSING ASSESSMENT: Performed by: PODIATRIST

## 2024-05-02 PROCEDURE — 360N000075 HC SURGERY LEVEL 2, PER MIN: Performed by: PODIATRIST

## 2024-05-02 PROCEDURE — 250N000011 HC RX IP 250 OP 636: Performed by: ANESTHESIOLOGY

## 2024-05-02 PROCEDURE — 28120 PART REMOVAL OF ANKLE/HEEL: CPT | Mod: RT | Performed by: PODIATRIST

## 2024-05-02 PROCEDURE — 15275 SKIN SUB GRAFT FACE/NK/HF/G: CPT | Mod: 51 | Performed by: PODIATRIST

## 2024-05-02 PROCEDURE — 258N000003 HC RX IP 258 OP 636: Performed by: ANESTHESIOLOGY

## 2024-05-02 PROCEDURE — 250N000012 HC RX MED GY IP 250 OP 636 PS 637: Performed by: ANESTHESIOLOGY

## 2024-05-02 PROCEDURE — 87205 SMEAR GRAM STAIN: CPT | Performed by: PODIATRIST

## 2024-05-02 PROCEDURE — 250N000011 HC RX IP 250 OP 636: Performed by: PODIATRIST

## 2024-05-02 PROCEDURE — 258N000003 HC RX IP 258 OP 636: Performed by: NURSE ANESTHETIST, CERTIFIED REGISTERED

## 2024-05-02 DEVICE — GRAFT EPIDERMIS MESHED THERASKIN CRYO 1X2" PER SQ CM- 13: Type: IMPLANTABLE DEVICE | Site: FOOT | Status: FUNCTIONAL

## 2024-05-02 RX ORDER — FENTANYL CITRATE 50 UG/ML
50 INJECTION, SOLUTION INTRAMUSCULAR; INTRAVENOUS EVERY 5 MIN PRN
Status: DISCONTINUED | OUTPATIENT
Start: 2024-05-02 | End: 2024-05-02 | Stop reason: HOSPADM

## 2024-05-02 RX ORDER — SODIUM CHLORIDE, SODIUM LACTATE, POTASSIUM CHLORIDE, CALCIUM CHLORIDE 600; 310; 30; 20 MG/100ML; MG/100ML; MG/100ML; MG/100ML
INJECTION, SOLUTION INTRAVENOUS CONTINUOUS PRN
Status: DISCONTINUED | OUTPATIENT
Start: 2024-05-02 | End: 2024-05-02

## 2024-05-02 RX ORDER — SODIUM CHLORIDE, SODIUM LACTATE, POTASSIUM CHLORIDE, CALCIUM CHLORIDE 600; 310; 30; 20 MG/100ML; MG/100ML; MG/100ML; MG/100ML
INJECTION, SOLUTION INTRAVENOUS CONTINUOUS
Status: DISCONTINUED | OUTPATIENT
Start: 2024-05-02 | End: 2024-05-02 | Stop reason: HOSPADM

## 2024-05-02 RX ORDER — ONDANSETRON 4 MG/1
4 TABLET, ORALLY DISINTEGRATING ORAL EVERY 30 MIN PRN
Status: DISCONTINUED | OUTPATIENT
Start: 2024-05-02 | End: 2024-05-02 | Stop reason: HOSPADM

## 2024-05-02 RX ORDER — FENTANYL CITRATE 50 UG/ML
25-100 INJECTION, SOLUTION INTRAMUSCULAR; INTRAVENOUS
Status: DISCONTINUED | OUTPATIENT
Start: 2024-05-02 | End: 2024-05-02 | Stop reason: HOSPADM

## 2024-05-02 RX ORDER — HYDROMORPHONE HCL IN WATER/PF 6 MG/30 ML
0.4 PATIENT CONTROLLED ANALGESIA SYRINGE INTRAVENOUS EVERY 5 MIN PRN
Status: DISCONTINUED | OUTPATIENT
Start: 2024-05-02 | End: 2024-05-02 | Stop reason: HOSPADM

## 2024-05-02 RX ORDER — ONDANSETRON 2 MG/ML
4 INJECTION INTRAMUSCULAR; INTRAVENOUS EVERY 30 MIN PRN
Status: DISCONTINUED | OUTPATIENT
Start: 2024-05-02 | End: 2024-05-02 | Stop reason: HOSPADM

## 2024-05-02 RX ORDER — FENTANYL CITRATE 50 UG/ML
25 INJECTION, SOLUTION INTRAMUSCULAR; INTRAVENOUS EVERY 5 MIN PRN
Status: DISCONTINUED | OUTPATIENT
Start: 2024-05-02 | End: 2024-05-02 | Stop reason: HOSPADM

## 2024-05-02 RX ORDER — LIDOCAINE 40 MG/G
CREAM TOPICAL
Status: DISCONTINUED | OUTPATIENT
Start: 2024-05-02 | End: 2024-05-02 | Stop reason: HOSPADM

## 2024-05-02 RX ORDER — MAGNESIUM HYDROXIDE 1200 MG/15ML
LIQUID ORAL PRN
Status: DISCONTINUED | OUTPATIENT
Start: 2024-05-02 | End: 2024-05-02 | Stop reason: HOSPADM

## 2024-05-02 RX ORDER — ONDANSETRON 2 MG/ML
INJECTION INTRAMUSCULAR; INTRAVENOUS PRN
Status: DISCONTINUED | OUTPATIENT
Start: 2024-05-02 | End: 2024-05-02

## 2024-05-02 RX ORDER — HYDROMORPHONE HCL IN WATER/PF 6 MG/30 ML
0.2 PATIENT CONTROLLED ANALGESIA SYRINGE INTRAVENOUS EVERY 5 MIN PRN
Status: DISCONTINUED | OUTPATIENT
Start: 2024-05-02 | End: 2024-05-02 | Stop reason: HOSPADM

## 2024-05-02 RX ORDER — PROPOFOL 10 MG/ML
INJECTION, EMULSION INTRAVENOUS CONTINUOUS PRN
Status: DISCONTINUED | OUTPATIENT
Start: 2024-05-02 | End: 2024-05-02

## 2024-05-02 RX ORDER — NALOXONE HYDROCHLORIDE 0.4 MG/ML
0.1 INJECTION, SOLUTION INTRAMUSCULAR; INTRAVENOUS; SUBCUTANEOUS
Status: DISCONTINUED | OUTPATIENT
Start: 2024-05-02 | End: 2024-05-02 | Stop reason: HOSPADM

## 2024-05-02 RX ORDER — LOPERAMIDE HCL 2 MG
2 CAPSULE ORAL PRN
COMMUNITY

## 2024-05-02 RX ORDER — CHOLECALCIFEROL (VITAMIN D3) 50 MCG
1 TABLET ORAL DAILY
COMMUNITY

## 2024-05-02 RX ORDER — NALOXONE HYDROCHLORIDE 0.4 MG/ML
0.4 INJECTION, SOLUTION INTRAMUSCULAR; INTRAVENOUS; SUBCUTANEOUS
Status: DISCONTINUED | OUTPATIENT
Start: 2024-05-02 | End: 2024-05-02 | Stop reason: HOSPADM

## 2024-05-02 RX ORDER — NALOXONE HYDROCHLORIDE 0.4 MG/ML
0.2 INJECTION, SOLUTION INTRAMUSCULAR; INTRAVENOUS; SUBCUTANEOUS
Status: DISCONTINUED | OUTPATIENT
Start: 2024-05-02 | End: 2024-05-02 | Stop reason: HOSPADM

## 2024-05-02 RX ORDER — ONDANSETRON 4 MG/1
4 TABLET, FILM COATED ORAL EVERY 8 HOURS PRN
COMMUNITY

## 2024-05-02 RX ORDER — DEXTROSE MONOHYDRATE 25 G/50ML
25-50 INJECTION, SOLUTION INTRAVENOUS
Status: DISCONTINUED | OUTPATIENT
Start: 2024-05-02 | End: 2024-05-02 | Stop reason: HOSPADM

## 2024-05-02 RX ORDER — PROPOFOL 10 MG/ML
INJECTION, EMULSION INTRAVENOUS PRN
Status: DISCONTINUED | OUTPATIENT
Start: 2024-05-02 | End: 2024-05-02

## 2024-05-02 RX ORDER — OXYCODONE HYDROCHLORIDE 5 MG/1
5-10 TABLET ORAL EVERY 4 HOURS PRN
Qty: 12 TABLET | Refills: 0 | Status: SHIPPED | OUTPATIENT
Start: 2024-05-02 | End: 2024-09-04

## 2024-05-02 RX ORDER — NICOTINE POLACRILEX 4 MG
15-30 LOZENGE BUCCAL
Status: DISCONTINUED | OUTPATIENT
Start: 2024-05-02 | End: 2024-05-02 | Stop reason: HOSPADM

## 2024-05-02 RX ORDER — DEXAMETHASONE SODIUM PHOSPHATE 10 MG/ML
4 INJECTION, SOLUTION INTRAMUSCULAR; INTRAVENOUS
Status: DISCONTINUED | OUTPATIENT
Start: 2024-05-02 | End: 2024-05-02 | Stop reason: HOSPADM

## 2024-05-02 RX ORDER — BUPIVACAINE HYDROCHLORIDE 5 MG/ML
INJECTION, SOLUTION EPIDURAL; INTRACAUDAL
Status: COMPLETED | OUTPATIENT
Start: 2024-05-02 | End: 2024-05-02

## 2024-05-02 RX ORDER — INSULIN LISPRO 100 [IU]/ML
INJECTION, SOLUTION INTRAVENOUS; SUBCUTANEOUS
COMMUNITY

## 2024-05-02 RX ORDER — CEFAZOLIN SODIUM/WATER 2 G/20 ML
2 SYRINGE (ML) INTRAVENOUS SEE ADMIN INSTRUCTIONS
Status: DISCONTINUED | OUTPATIENT
Start: 2024-05-02 | End: 2024-05-02 | Stop reason: HOSPADM

## 2024-05-02 RX ORDER — INSULIN LISPRO 100 [IU]/ML
5 INJECTION, SOLUTION INTRAVENOUS; SUBCUTANEOUS
COMMUNITY
End: 2024-07-11

## 2024-05-02 RX ORDER — CEFAZOLIN SODIUM/WATER 2 G/20 ML
2 SYRINGE (ML) INTRAVENOUS
Status: COMPLETED | OUTPATIENT
Start: 2024-05-02 | End: 2024-05-02

## 2024-05-02 RX ORDER — ERGOCALCIFEROL 1.25 MG/1
50000 CAPSULE ORAL WEEKLY
COMMUNITY

## 2024-05-02 RX ORDER — OXYCODONE HYDROCHLORIDE 5 MG/1
5 TABLET ORAL EVERY 6 HOURS PRN
Qty: 12 TABLET | Refills: 0 | Status: SHIPPED | OUTPATIENT
Start: 2024-05-02 | End: 2024-05-05

## 2024-05-02 RX ADMIN — PHENYLEPHRINE HYDROCHLORIDE 100 MCG: 10 INJECTION INTRAVENOUS at 09:09

## 2024-05-02 RX ADMIN — PROPOFOL 150 MCG/KG/MIN: 10 INJECTION, EMULSION INTRAVENOUS at 08:43

## 2024-05-02 RX ADMIN — SODIUM CHLORIDE, POTASSIUM CHLORIDE, SODIUM LACTATE AND CALCIUM CHLORIDE: 600; 310; 30; 20 INJECTION, SOLUTION INTRAVENOUS at 07:40

## 2024-05-02 RX ADMIN — PHENYLEPHRINE HYDROCHLORIDE 100 MCG: 10 INJECTION INTRAVENOUS at 09:06

## 2024-05-02 RX ADMIN — SODIUM CHLORIDE, POTASSIUM CHLORIDE, SODIUM LACTATE AND CALCIUM CHLORIDE: 600; 310; 30; 20 INJECTION, SOLUTION INTRAVENOUS at 08:38

## 2024-05-02 RX ADMIN — PROPOFOL 40 MG: 10 INJECTION, EMULSION INTRAVENOUS at 08:43

## 2024-05-02 RX ADMIN — Medication 2 G: at 08:43

## 2024-05-02 RX ADMIN — MEPIVACAINE HYDROCHLORIDE 10 ML: 20 INJECTION, SOLUTION EPIDURAL; INFILTRATION at 08:20

## 2024-05-02 RX ADMIN — FENTANYL CITRATE 100 MCG: 50 INJECTION, SOLUTION INTRAMUSCULAR; INTRAVENOUS at 08:21

## 2024-05-02 RX ADMIN — INSULIN ASPART 8 UNITS: 100 INJECTION, SOLUTION INTRAVENOUS; SUBCUTANEOUS at 08:29

## 2024-05-02 RX ADMIN — ONDANSETRON 4 MG: 2 INJECTION INTRAMUSCULAR; INTRAVENOUS at 09:15

## 2024-05-02 RX ADMIN — BUPIVACAINE HYDROCHLORIDE 20 ML: 5 INJECTION, SOLUTION EPIDURAL; INTRACAUDAL; PERINEURAL at 08:20

## 2024-05-02 RX ADMIN — MIDAZOLAM HYDROCHLORIDE 2 MG: 1 INJECTION, SOLUTION INTRAMUSCULAR; INTRAVENOUS at 08:21

## 2024-05-02 ASSESSMENT — ACTIVITIES OF DAILY LIVING (ADL)
ADLS_ACUITY_SCORE: 28
ADLS_ACUITY_SCORE: 36
ADLS_ACUITY_SCORE: 28

## 2024-05-02 ASSESSMENT — LIFESTYLE VARIABLES: TOBACCO_USE: 1

## 2024-05-02 NOTE — TELEPHONE ENCOUNTER
Mariaelena pharmacist from [facility] pharmacy calling, recommending modifying current oxycodone order received today, which is 1-2 every 4 hours as needed to add maximum of 6 per day. Please advise if ok.     Daisha Mandujano RN, CWOCN

## 2024-05-02 NOTE — ANESTHESIA CARE TRANSFER NOTE
Patient: Floyd Capps    Procedure: Procedure(s):  INCISION AND DRAINAGE, right heel with  excision of bone from calcaneus with application of theraskin       Diagnosis: Diabetic ulcer of right heel associated with type 2 diabetes mellitus, with necrosis of bone (H) [E11.621, L97.414]  Diagnosis Additional Information: No value filed.    Anesthesia Type:   General     Note:    Oropharynx: oropharynx clear of all foreign objects and spontaneously breathing  Level of Consciousness: awake  Oxygen Supplementation: room air    Independent Airway: airway patency satisfactory and stable  Dentition: dentition unchanged  Vital Signs Stable: post-procedure vital signs reviewed and stable  Report to RN Given: handoff report given  Patient transferred to: Phase II    Handoff Report: Identifed the Patient, Identified the Reponsible Provider, Reviewed the pertinent medical history, Discussed the surgical course, Reviewed Intra-OP anesthesia mangement and issues during anesthesia, Set expectations for post-procedure period and Allowed opportunity for questions and acknowledgement of understanding      Vitals:  Vitals Value Taken Time   /62 05/02/24 0930   Temp 36.3  C (97.3  F) 05/02/24 0930   Pulse 85 05/02/24 0931   Resp     SpO2 98 % 05/02/24 0931   Vitals shown include unfiled device data.    Electronically Signed By: CHAMP Santos CRNA  May 2, 2024  9:32 AM

## 2024-05-02 NOTE — INTERVAL H&P NOTE
"I have reviewed the surgical (or preoperative) H&P that is linked to this encounter, and examined the patient. There are no significant changes    Clinical Conditions Present on Arrival:  Clinically Significant Risk Factors Present on Admission                 # Drug Induced Platelet Defect: home medication list includes an antiplatelet medication  # DMII: A1C = 11.8 % (Ref range: <5.7 %) within past 6 months  # Obesity: Estimated body mass index is 34.44 kg/m  as calculated from the following:    Height as of this encounter: 1.778 m (5' 10\").    Weight as of this encounter: 108.9 kg (240 lb).       "

## 2024-05-02 NOTE — ANESTHESIA PREPROCEDURE EVALUATION
Anesthesia Pre-Procedure Evaluation    Patient: Floyd Capps   MRN: [id number] : 1975        Procedure : Procedure(s):  INCISION AND DRAINAGE, right heel with  excision of bone from calcaneus with application of theraskin          Past Medical History:   Diagnosis Date    Acute hypoxemic respiratory failure (H)     Acute renal failure, unspecified acute renal failure type (H24)     CAD (coronary artery disease)     Callus of foot     Diabetes (H)     Diarrhea     Essential hypertension     Fracture of left distal radius     History of stroke     Insomnia     Nausea     Non-pressure chronic ulcer of right heel and midfoot with unspecified severity (H)     Normocytic anemia     Type 2 diabetes mellitus with foot ulcer (H)       Past Surgical History:   Procedure Laterality Date    APPENDECTOMY      CV CORONARY ANGIOGRAM N/A 3/1/2024    Procedure: CV CORONARY ANGIOGRAM;  Surgeon: Stephen Berger MD;  Location: [facility] CATH LAB CV    CV LEFT HEART CATH N/A 3/1/2024    Procedure: Left Heart Catheterization;  Surgeon: Stephen Berger MD;  Location: [facility] CATH LAB CV    INCISION AND DRAINAGE OF WOUND      groin abscess    IRRIGATION AND DEBRIDEMENT FOOT, COMBINED Right 2024    Procedure: IRRIGATION AND DEBRIDEMENT RIGHT FOOT;  Surgeon: Cristofer Sims DPM;  [place] [facility] [place]    PICC DOUBLE LUMEN PLACEMENT  2024      No Known Allergies   Social History     Tobacco Use    Smoking status: Former     Current packs/day: 0.50     Types: Cigarettes    Smokeless tobacco: Never    Tobacco comments:     Pt wears a nicotine patch   Substance Use Topics    Alcohol use: Yes     Comment: once a week      Wt Readings from Last 1 Encounters:   24 108.9 kg (240 lb)        Anesthesia Evaluation   Pt has had prior anesthetic.         ROS/MED HX  ENT/Pulmonary:  - neg pulmonary ROS   (+)                tobacco use, Current use,                       Neurologic:     (+)          CVA,    TIA,         "          Cardiovascular:     (+)  hypertension- -  CAD -  - -      CHF                           Previous cardiac testing     METS/Exercise Tolerance:     Hematologic:       Musculoskeletal:       GI/Hepatic:     (+) GERD,                   Renal/Genitourinary:     (+) renal disease, type: CRI,            Endo:     (+)  type II DM,        thyroid problem,     Obesity,       Psychiatric/Substance Use:       Infectious Disease:       Malignancy:       Other:      (+)  , H/O Chronic Pain,         Physical Exam    Airway  airway exam normal      Mallampati: II   TM distance: > 3 FB   Neck ROM: full   Mouth opening: > 3 cm    Respiratory Devices and Support         Dental       (+) Multiple visibly decayed, broken teeth      Cardiovascular   cardiovascular exam normal       Rhythm and rate: regular and normal     Pulmonary   pulmonary exam normal        breath sounds clear to auscultation           OUTSIDE LABS:  CBC:   Lab Results   Component Value Date    WBC 13.5 (H) 02/27/2024    WBC 11.3 (H) 02/23/2024    HGB 10.2 (L) 02/27/2024    HGB 10.6 (L) 02/23/2024    HCT 34.6 (L) 02/27/2024    HCT 33.1 (L) 02/23/2024     03/03/2024     02/29/2024     BMP:   Lab Results   Component Value Date     (L) 03/05/2024     (L) 03/04/2024    POTASSIUM 4.1 03/05/2024    POTASSIUM 4.2 03/04/2024    CHLORIDE 93 (L) 03/05/2024    CHLORIDE 94 (L) 03/04/2024    CO2 32 (H) 03/05/2024    CO2 31 (H) 03/04/2024    BUN 37.5 (H) 03/05/2024    BUN 35.0 (H) 03/04/2024    CR 1.71 (H) 03/05/2024    CR 1.72 (H) 03/04/2024     (H) 05/02/2024     (H) 03/05/2024     COAGS:   Lab Results   Component Value Date    PTT 26 08/23/2023    INR 0.95 08/23/2023     POC: No results found for: \"BGM\", \"HCG\", \"HCGS\"  HEPATIC:   Lab Results   Component Value Date    ALBUMIN 3.1 (L) 02/27/2024    ALBUMIN 3.0 (L) 02/27/2024    PROTTOTAL 6.8 02/27/2024    ALT 8 02/27/2024    AST 12 02/27/2024    ALKPHOS 58 02/27/2024    BILITOTAL " "0.6 02/27/2024     OTHER:   Lab Results   Component Value Date    A1C 11.8 (H) 02/14/2024    SARAH 10.0 03/05/2024    PHOS 3.3 02/27/2024    MAG 2.1 03/05/2024    LIPASE 16 02/27/2024    TSH 2.72 08/23/2023    SED 99 (H) 02/14/2024       Anesthesia Plan    ASA Status:  3    NPO Status:  NPO Appropriate    Anesthesia Type: General.     - Airway: Mask Only   Induction: Propofol, Intravenous.   Maintenance: TIVA.        Consents    Anesthesia Plan(s) and associated risks, benefits, and realistic alternatives discussed. Questions answered and patient/representative(s) expressed understanding.     - Discussed: Risks, Benefits and Alternatives for BOTH SEDATION and the PROCEDURE were discussed     - Discussed with:  Patient      - Extended Intubation/Ventilatory Support Discussed: No.      - Patient is DNR/DNI Status: No     Use of blood products discussed: No .     Postoperative Care    Pain management: IV analgesics, Peripheral nerve block (Single Shot), Multi-modal analgesia.   PONV prophylaxis: Ondansetron (or other 5HT-3)     Comments:               Alex Thomas MD    I have reviewed the pertinent notes and labs in the chart from the past 30 days and (re)examined the patient.  Any updates or changes from those notes are reflected in this note.             # Drug Induced Platelet Defect: home medication list includes an antiplatelet medication  # DMII: A1C = 11.8 % (Ref range: <5.7 %) within past 6 months  # Obesity: Estimated body mass index is 34.44 kg/m  as calculated from the following:    Height as of this encounter: 1.778 m (5' 10\").    Weight as of this encounter: 108.9 kg (240 lb).      "

## 2024-05-02 NOTE — PROGRESS NOTES
Called [facility] [phone]. Discharge Instructions read and explain to [facility] Coordinator. Copy is sent with the patient and also patient has his own copy.

## 2024-05-02 NOTE — TELEPHONE ENCOUNTER
Call back to pharmacy, message left on pharmacy voicemail ok to add to oxycodone prescription maximum of 6 per day.    Daisha Mandujano RN, CWOCN

## 2024-05-02 NOTE — OP NOTE
Date: 5/2/2024    Surgeon: NAS Sanders DPM    Preoperative diagnosis: Diabetic ulceration right heel and a bone    Postoperativediagnosis: Same    Procedure:   Incision and drainage right heel  Exostectomy right calcaneus  Excisional debridement ulceration right heel with application of Theraskin (02752)  Wound prep right foot     Anesthesia: Popliteal with IV-sedation    Hemostasis: Pneumatic ankle tourniquet 250 mmHg    Pathology: Bone to pathology, bone for aerobic/anaerobic culture    Injectables: None    Materials: Theraskin, Staples     Complications: None    Blood loss: 1 cc      Findings: Patient presents for operative intervention for a longstanding wound along the posterior lateral right heel which probes to bone.  We discussed today's procedure to include sharp debridement of the wound and removal of all non-viable tissue with resection of bone from the calcaneus for aerobic/anaerobic culture including pathology and application of Theraskin.  Consent has been obtained.  Conservative measures were attempted and exhausted. Patient questionsinvited and answered, including appropriate risk, benefits and complications. No guarantees given or implied. Patient has been NPO.    Description: Patient was brought to the operating room and placed on the table insupine position. IV-sedation with popliteal block was administered by the anesthesia department. The foot was then prepped and draped in usual aseptic manner. Theextremity was elevated and exsanguinated. Well-padded ankle pneumatic tourniquet was inflated to 250mmHg and the following procedure was then performed: Attention was directed to the ulceration along the posterior lateral aspect of the right heel where nonviable tissue was identified.  At this time a #15 blade was then used to sharply excisionally debride the nonviable tissue into the level of muscle and bone of the calcaneus.  Next, a rongeur was then used to resect bone from the calcaneus which was  then sent to pathology and also for aerobic/anaerobic culture.  Again a #15 blade was then used to sharply excisionally debride the nonviable tissue into the level of muscle and bone.  Next a 1000 cc pulse lavage was then used irrigate the surgical site.  Following irrigation debridement no remaining nonviable tissue was then noted.    Theraskin size 2.5 x 2.5 ([id number], 5/7/2024) was prepared per  specifications. The graft was placed on the wound bed per  instructions and secured with staples along the peripheral graftand center portion of the graft to the wound bed. Next, adaptic was placed over the graft and secured with staples.     Dressings consistent of moistened 4x4, 4x4's, ABD, kerlix/stephanie roll and an ace wrap. The pneumatictourniquet was released and a hyperemic response was noted to the digits on the right foot.     The patient appeared to tolerate all the procedures and anesthesia well without apparent complications. Patient wastransported from the operating room to the recovery room with vital signs stable and neurovascular status as it was pre-operatively to the right foot. Patient to be discharged home per anesthesia. Written and verbal homecareinstructions given to remain non-weight bearing, keep the surgical dressing intact until the wound vac is applied either later today or tomorrow. Patient to follow up in office for post-operative management in one week.

## 2024-05-02 NOTE — ANESTHESIA POSTPROCEDURE EVALUATION
Patient: Floyd Capps    Procedure: Procedure(s):  INCISION AND DRAINAGE, right heel with  excision of bone from calcaneus with application of theraskin       Anesthesia Type:  General    Note:  Disposition: Outpatient   Postop Pain Control: Uneventful            Sign Out: Well controlled pain   PONV: No   Neuro/Psych: Uneventful            Sign Out: Acceptable/Baseline neuro status   Airway/Respiratory: Uneventful            Sign Out: Acceptable/Baseline resp. status   CV/Hemodynamics: Uneventful            Sign Out: Acceptable CV status; No obvious hypovolemia; No obvious fluid overload   Other NRE: NONE   DID A NON-ROUTINE EVENT OCCUR? No           Last vitals:  Vitals Value Taken Time   /67 05/02/24 1101   Temp 36.3  C (97.3  F) 05/02/24 0930   Pulse 84 05/02/24 1101   Resp 20 05/02/24 1015   SpO2 98 % 05/02/24 1101   Vitals shown include unfiled device data.    Electronically Signed By: Alex Thomas MD  May 2, 2024  2:18 PM

## 2024-05-02 NOTE — ANESTHESIA PROCEDURE NOTES
Sciatic Procedure Note    Pre-Procedure   Staff -        Anesthesiologist:  Alex Thomas MD       Performed By: anesthesiologist       Location: pre-op       Procedure Start/Stop Times: 5/2/2024 8:20 AM and 5/2/2024 8:25 AM       Pre-Anesthestic Checklist: patient identified, IV checked, site marked, risks and benefits discussed, informed consent, monitors and equipment checked, pre-op evaluation, at physician/surgeon's request and post-op pain management  Timeout:       Correct Patient: Yes        Correct Procedure: Yes        Correct Site: Yes        Correct Position: Yes        Correct Laterality: Yes        Site Marked: Yes  Procedure Documentation  Procedure: Sciatic       Laterality: right       Patient Position: supine       Patient Prep/Sterile Barriers: sterile gloves, mask       Skin prep: Chloraprep (popliteal approach).       Needle Type: short bevel and insulated       Needle Gauge: 20.        Needle Length (Inches): 4        Ultrasound guided       1. Ultrasound was used to identify targeted nerve, plexus, vascular marker, or fascial plane and place a needle adjacent to it in real-time.       2. Ultrasound was used to visualize the spread of anesthetic in close proximity to the above referenced structure.       3. A permanent image is entered into the patient's record.       4. The visualized anatomic structures appeared normal.       5. There were no apparent abnormal pathologic findings.    Assessment/Narrative         The placement was negative for: blood aspirated, painful injection and site bleeding       Paresthesias: No.       Bolus given via needle..        Secured via.        Insertion/Infusion Method: Single Shot       Complications: none    Medication(s) Administered   Bupivacaine 0.5% PF (Infiltration) - Infiltration   20 mL - 5/2/2024 8:20:00 AM  Mepivacaine 2% PF (Perineural) - Perineural   10 mL - 5/2/2024 8:20:00 AM  Medication Administration Time: 5/2/2024 8:20 AM      FOR  "[facility] ONLY:   Pain Team Contact information: please page the Pain Team Via [employer]. Search \"Pain\". During daytime hours, please page the attending first. At night please page the resident first.      "

## 2024-05-02 NOTE — TELEPHONE ENCOUNTER
Hannah vasquez [facility] called, clarified with her that the wound vac should be applied today and stay on until next appointment, over the adaptic covering the Theraskin graft site.

## 2024-05-03 DIAGNOSIS — M86.179: Primary | ICD-10-CM

## 2024-05-03 LAB
PATH REPORT.COMMENTS IMP SPEC: NORMAL
PATH REPORT.COMMENTS IMP SPEC: NORMAL
PATH REPORT.FINAL DX SPEC: NORMAL
PATH REPORT.GROSS SPEC: NORMAL
PATH REPORT.MICROSCOPIC SPEC OTHER STN: NORMAL
PATH REPORT.RELEVANT HX SPEC: NORMAL
PHOTO IMAGE: NORMAL

## 2024-05-03 PROCEDURE — 88307 TISSUE EXAM BY PATHOLOGIST: CPT | Mod: 26 | Performed by: PATHOLOGY

## 2024-05-03 PROCEDURE — 88311 DECALCIFY TISSUE: CPT | Mod: 26 | Performed by: PATHOLOGY

## 2024-05-03 NOTE — RESULT ENCOUNTER NOTE
TCB to discuss results per provider below.  If patient does not hear from infectious disease in the next 2 business days he should call  [phone] to schedule.    Dong Sanders DPM  5/3/2024  3:38 PM CDT Back to Top    I reviewed the pathology report which is positive for acute osteomyelitis of the calcaneus.  I refer the patient to infectious disease.  Please call.  Thank you

## 2024-05-05 RX ORDER — SULFAMETHOXAZOLE/TRIMETHOPRIM 800-160 MG
1 TABLET ORAL 2 TIMES DAILY
Status: DISCONTINUED | OUTPATIENT
Start: 2024-05-05 | End: 2024-05-05 | Stop reason: HOSPADM

## 2024-05-06 ENCOUNTER — TELEPHONE (OUTPATIENT)
Dept: VASCULAR SURGERY | Facility: CLINIC | Age: 49
End: 2024-05-06
Payer: COMMERCIAL

## 2024-05-06 ENCOUNTER — NURSE TRIAGE (OUTPATIENT)
Dept: VASCULAR SURGERY | Facility: CLINIC | Age: 49
End: 2024-05-06
Payer: COMMERCIAL

## 2024-05-06 ENCOUNTER — PATIENT OUTREACH (OUTPATIENT)
Dept: CARE COORDINATION | Facility: CLINIC | Age: 49
End: 2024-05-06
Payer: COMMERCIAL

## 2024-05-06 LAB
BACTERIA TISS BX CULT: ABNORMAL

## 2024-05-06 RX ORDER — SULFAMETHOXAZOLE/TRIMETHOPRIM 800-160 MG
1 TABLET ORAL 2 TIMES DAILY
Status: DISCONTINUED | OUTPATIENT
Start: 2024-05-06 | End: 2024-05-06 | Stop reason: HOSPADM

## 2024-05-06 NOTE — TELEPHONE ENCOUNTER
Caller: [facility]    Provider: AMY Sanders    Detailed reason for call: [facility] is calling requesting we fax the rx bactrim to them at [phone]     Best phone number to contact: [phone]    Best time to contact: any    Ok to leave a detailed message: Yes    Ok to speak to authorized person if needed: no      (Noted to patient if reason is related to wound or incision, to please send a photo via email or [employer].)

## 2024-05-06 NOTE — RESULT ENCOUNTER NOTE
[facility].      Dong Sanders DPM  5/3/2024  3:38 PM CDT Back to Top    I reviewed the pathology report which is positive for acute osteomyelitis of the calcaneus.  I refer the patient to infectious disease.  Please call.  Thank you    If patient does not hear from infectious disease in the next 2 business days he should call  [phone] to schedule.

## 2024-05-06 NOTE — PROGRESS NOTES
Contact  5-7-2024 Call from patient.  He has set up a PCP appt on 5-14 at [facility] as his provider is booked out a ways.  Informed him that podiatrist will discuss his desire to move out of the TCU. Patient is adamant that he has to move home.     Plan- call patient after discharge.       Situation: Patient chart reviewed by .    Assessment: Call from patient. He does not feel well cared for in this TCU and wants to leave.  He feels the NP at the TCU has threatened him that he will lose his insurance if he leaves the TCU. They are not working with him to plan a discharge. He feels like he would be safer at home. He doesn't want to transfer to a different TCU.  Discussed that TCU has no power over his MA.  They could make a report to Adult Protection if they feel he is a vulnerable adult.      He is worried that he won't get the new antibiotic he has been prescribed.  He doesn't get answers from the staff.  Reviewed that if he feels he can care for himself at home safely, he can leave anytime.  They may not order home care if he leaves [place], but he could set up appt with PCP to work with that provider to get the order for home care in place. Or his podiatrist could also put in the order.      He thinks he will have his sister pick him up on Friday and take him home.  He has the needed equipment to stay off his wound.He feels confident that he will be safe.     Plan/Recommendations: Will call patient after discharge.  Will route to podiatrist in case they could order home care after his appt on 5-9.    Georgiana Isaacs,   [facility]  [phone]

## 2024-05-06 NOTE — TELEPHONE ENCOUNTER
It looks like you discontinued the order for Bactrim but your note states you want the pt to be taking it. Would you please enter a new order for Bactrim? The pt is still at a TCU.    Thanks!    Brionna Rico RN, CWOCN

## 2024-05-07 ENCOUNTER — TELEPHONE (OUTPATIENT)
Dept: VASCULAR SURGERY | Facility: CLINIC | Age: 49
End: 2024-05-07
Payer: COMMERCIAL

## 2024-05-07 DIAGNOSIS — M86.179: Primary | ICD-10-CM

## 2024-05-07 RX ORDER — SULFAMETHOXAZOLE/TRIMETHOPRIM 800-160 MG
1 TABLET ORAL 2 TIMES DAILY
Qty: 20 TABLET | Refills: 0 | Status: SHIPPED | OUTPATIENT
Start: 2024-05-07 | End: 2024-07-11

## 2024-05-07 NOTE — TELEPHONE ENCOUNTER
Fernando from the [facility] called to clarify end date for Bactrim, original prescription sent yesterday was for 1 tab only. Sent new RX for 10 days.

## 2024-05-09 ENCOUNTER — PATIENT OUTREACH (OUTPATIENT)
Dept: CARE COORDINATION | Facility: CLINIC | Age: 49
End: 2024-05-09

## 2024-05-09 ENCOUNTER — OFFICE VISIT (OUTPATIENT)
Dept: VASCULAR SURGERY | Facility: CLINIC | Age: 49
End: 2024-05-09
Attending: PODIATRIST
Payer: COMMERCIAL

## 2024-05-09 VITALS
HEART RATE: 75 BPM | TEMPERATURE: 98.4 F | SYSTOLIC BLOOD PRESSURE: 103 MMHG | RESPIRATION RATE: 12 BRPM | DIASTOLIC BLOOD PRESSURE: 71 MMHG

## 2024-05-09 DIAGNOSIS — E11.621 DIABETIC ULCER OF RIGHT HEEL ASSOCIATED WITH TYPE 2 DIABETES MELLITUS, WITH NECROSIS OF BONE (H): ICD-10-CM

## 2024-05-09 DIAGNOSIS — L97.414 DIABETIC ULCER OF RIGHT HEEL ASSOCIATED WITH TYPE 2 DIABETES MELLITUS, WITH NECROSIS OF BONE (H): ICD-10-CM

## 2024-05-09 DIAGNOSIS — M86.179: Primary | ICD-10-CM

## 2024-05-09 LAB
BACTERIA TISS BX CULT: ABNORMAL
BACTERIA TISS BX CULT: ABNORMAL

## 2024-05-09 PROCEDURE — 99024 POSTOP FOLLOW-UP VISIT: CPT | Performed by: PODIATRIST

## 2024-05-09 PROCEDURE — G0463 HOSPITAL OUTPT CLINIC VISIT: HCPCS | Performed by: PODIATRIST

## 2024-05-09 ASSESSMENT — PAIN SCALES - GENERAL: PAINLEVEL: NO PAIN (0)

## 2024-05-09 NOTE — PROGRESS NOTES
Contact   Chart Review     Situation: Patient chart reviewed by .    Background: following for TCU.     Assessment: Call from patient who met with podiatrist and he realizes that he cannot leave the TCU while using the wound vac or the consequences would be severe.  He is most concerned that he doesn't get a diabetic diet even though he has asked for one.  Discussed process for moving to another TCU. Writer can provide names of TCU's and he would need to work with the current TCU SW to transfer.  He expects that he will be in TCU for another month.  He is disappointed with not being able to leave the TCU yet.     Offered resource for [place] [facility] and he would like the phone number.  Emailed per his request.  Writer communicated the risks of unencrypted electronic communication and the patient and/or patient representative has agreed to accept the risks and receive unencrypted communication related to the information or resources we have discussed. We reviewed that no PHI will be included.  Email address verified with the patient.  Will include electronic communication form for patient/caregiver to complete.      Plan/Recommendations: will follow for discharge from TCU.      Georgiana Isaacs,   [facility]  [phone]

## 2024-05-09 NOTE — PATIENT INSTRUCTIONS
Important lnstructions      WEIGHT BEARING STATUS: You are to remain NON WEIGHT BEARING on your right foot. NON WEIGHT BEARING MEANS NO PRESSURE ON YOUR FOOT OR HEEL AT ANY TIME FOR ANY REASON!    2. OFFLOADING DEVICE: Must use a A WHEELCHAIR at all times! (do not use affected foot to push wheelchair)    3. STABILIZATION DEVICE: Use a PRAFO BOOT . You will need to WEAR THIS AT ALL TIMES EVEN WHILE IN BED.     4. ELEVATE: Elevating your leg means laying with your head on a pillow and your foot ABOVE YOUR HEART.     5. DO NOT MOVE YOUR FOOT.  There is a risk of worsening the wound or incision. To give yourself a higher chance of healing, please DO NOT swing foot back and forth and wiggle foot/toes especially when inside a stabilization device. Limited movement is allowable with therapy as recommended by the doctor.     6. TAKE A PROTEIN SHAKE TWICE A DAY.  (For ex: Boost, Ensure, Glucerna)    7. KEEP YOUR WOUND DRY AT ALL TIMES    Use a shower bag or a cast cover to keep your foot/leg dry during showers. These can be purchased on [employer] or any pharmacy.         Dressing Change lnstructions    - Wound Vac Instructions     1. 2x weekly and as needed cleanse the wound with NS     2. Pat dry     3. Apply Cavilon no sting barrier wipe to the skin surrounding the wound to protect from drainage/maceration     4. Apply drape to perla wound. Cut strip of drape and apply to skin if bridging is needed; plan this area in advance; should not be over bony prominence      5. Cut the foam to fit the size of the wound. APPLY ADAPTIC TOUCH (or other similar nonadherent) OVER WOUND/GRAFT SITE. Then     6. Apply foam to wound     7. Cut narrow strip of foam if bridging if needed     8. Cover foam with drape to obtain air tight seal     9. Cut opening the size of a quarter for where the suction pad will be applied     10. Apply Suction pad     11. 100mmHG suction continuous      [employer] [facility] can be reached at [phone], 24  hours a day 7 days a week      - If you do not have a back up plan in place:      If the negative pressure wound therapy malfunctions or unable to maintain seal: dressing must be removed and reapplied within 2 hours of the incident. If unable to reapply negative pressure wound dressing, place Normal Saline moistened gauze in wound bed and cover with appropriate dressing to keep wound bed moist.  Change wet-to-dry dressing two times a day until healthcare staff can re-implement negative pressure therapy. Change canister at least weekly.  [facility] Contact Center can be reached at [phone], 24 hours a day 7 days a week     - Information on Vacuum-Assisted Closure of a Wound  Vacuum-assisted closure (VAC) of a wound is a type of treatment to help wounds heal. It's also known as negative pressure wound therapy. During the treatment, a device lowers air pressure on the wound. This can help the wound heal more quickly.  - Understanding the wound VAC system  A wound VAC system has several parts. A foam or gauze dressing is put directly on the wound. The dressing is changed every 24 to 72 hours. An adhesive film covers and seals the dressing and wound. A drainage tube leads from under the adhesive film and connects to a portable vacuum pump. This pump removes air pressure over the wound. It may do this constantly. Or it may do it in cycles. During the treatment, you'll need to carry the portable pump everywhere you go.  - Why wound VAC is used  You might need this therapy for a recent traumatic wound. Or you may need it for a chronic wound. This is a wound that does not heal the way it should over time. This can happen with wounds in people who have diabetes. You may need a wound VAC if you've had a recent skin graft. And you may need a wound VAC for a large wound. Large wounds can take a longer time to heal.  A wound vacuum system may help your wound heal more quickly by:  Draining extra fluid from the wound  Reducing  swelling  Reducing bacteria in the wound  Keeping your wound moist and warm  Helping draw together wound edges  Increasing blood flow to your wound  Decreasing inflammation  Wound VAC offers some other advantages over other types of wound care. It may decrease your overall discomfort. The dressings usually need to be changed less often. And they may be easier to keep in position.  - Risks of wound VAC  Wound VAC has some rare risks, such as:  Bleeding (which may be severe)  Wound infection  An abnormal connection between the intestinal tract and the skin (enteric fistula)  Proper training in dressing changes can help reduce the risk for these complications. Also, your doctor will carefully evaluate you to make sure you are a good candidate for the therapy. Certain problems can increase your risk for complications. These include:  Exposed organs or blood vessels  High risk of bleeding from another medical problem  Wound infection  Nearby bone infection  Dead wound tissue  Cancer tissue  Fragile skin, such as from aging or longtime use of topical steroids  Allergy to adhesive  Very poor blood flow to your wound  Wounds close to joints that may reopen because of movement  Your doctor will discuss the risks that apply to you. Make sure to talk with him or her about all of your questions and concerns.  - Getting ready for wound VAC  You likely won't need to do much to get ready for wound VAC. In some cases, you may need to wait a while before having this therapy. For example, your doctor may first need to treat an infection in your wound. Dead or damaged tissue may also need to be removed from your wound.  You or a caregiver may need training on how to use the wound VAC device. This is done if you will be able to have your wound vacuum therapy at home. In other cases, you may need to have your wound vacuum therapy in a health care facility.  - On the day of your procedure  A health care provider will cover your wound  with foam or gauze wound dressing. An adhesive film will be put over the dressing and wound. This seals the wound. The foam connects to a drainage tube, which leads to a vacuum pump. This pump is portable. When the pump is turned on, it draws fluid through the foam and out the drainage tubing. The pump may run constantly, or it may cycle off and on. Your exact setup will depend on the specific type of wound vacuum system that you use.  - Managing your wound  You may need the dressing changed about once a day. You may need it changed more or less often, depending on your wound. You or your caregiver may be trained to do this at home. Or it may be done by a visiting health care provider. Your doctor may prescribe a pain medicine. This is to prevent or reduce pain during the dressing change.  You will likely need to use the wound VAC system for several weeks or months. During this time, you'll carry the portable pump everywhere you go.  - Nutrition for wound healing  During this time, make sure you follow a healthy diet. This is needed so the wound can heal and to prevent infection. Your doctor can tell you more about what to include in your diet during this time.  follow up with your doctor if you have a medical condition that led to your wound, such as diabetes. He or she can help you prevent future wounds.  - Follow-up care  Your doctor will carefully keep track of your healing. Make sure to keep all follow-up appointments.  - When to call your health care provider  Call your health care provider right away if you have any of these:  Fever of 100.4 F (38.0 C) or higher  Increased redness, swelling, or warmth around wound  Increased pain  Bright red blood or blood clots in tubing or the collection chamber of the vacuum

## 2024-05-09 NOTE — PROGRESS NOTES
Podiatry Progress Note        ASSESSMENT:   Acute osteomyelitis right calcaneus  Ulceration right heel and a bone  DM2      TREATMENT:  -Ulceration on the right heel is progressing well. Theraskin graft is intact.     -Patient reports that he is likely leaving TCU at the end of the week because he does not approve of the diet at this facility.  I reviewed risks of leaving the TCU facility including lack of use of the wound VAC and without assistance with nonweightbearing.  We reviewed that the skin graft may not survive without the use of a wound VAC and home health care assistance is not always guaranteed.  Associated risks also include nonhealing wound of the right heel and loss of limb.  All questions invited and answered.    -Patient has appointment with infectious disease next week    -Recommend he continue with the wound vac at 100 mmHg, changed 2x per week with application of adaptic with each wound vac change. Continue non-weight bearing on the right foot.     -He will follow-up with me in 2 weeks.     30 minutes spent on the day of encounter doing chart review, history and exam, documentation, and further activities as noted.     Dong Sanders DPM  [facility] Podiatry/Foot & Ankle Surgery      HPI: Floyd Capps was seen again today for a right heel ulceration s/p application of Theraskin.  Patient reports that he is currently residing at [facility] but is expected to leave later this week because he does not like the food they provide.    Past Medical History:   Diagnosis Date    Acute hypoxemic respiratory failure (H)     Acute renal failure, unspecified acute renal failure type (H24)     CAD (coronary artery disease)     Callus of foot     Diabetes (H)     Diarrhea     Essential hypertension     Fracture of left distal radius     History of stroke     Insomnia     Nausea     Non-pressure chronic ulcer of right heel and midfoot with unspecified severity (H)     Normocytic anemia     Type 2 diabetes  mellitus with foot ulcer (H)        No Known Allergies      Current Outpatient Medications:     acetaminophen (TYLENOL) 500 MG tablet, Take 2 tablets (1,000 mg) by mouth every 8 hours as needed for mild pain, Disp: , Rfl:     Amino Acids-Protein Hydrolys (PRO-STAT PO), Take 30 mLs by mouth 2 times daily Give 2 times a day for due to patient not getting double protein on meals. 4/24/2024, Disp: , Rfl:     aspirin (ASA) 325 MG EC tablet, Take 1 tablet (325 mg) by mouth daily, Disp: , Rfl:     atorvastatin (LIPITOR) 80 MG tablet, Take 1 tablet (80 mg) by mouth daily, Disp: 90 tablet, Rfl: 3    bumetanide (BUMEX) 2 MG tablet, Take 1 tablet (2 mg) by mouth 2 times daily, Disp: , Rfl:     carvedilol (COREG) 25 MG tablet, Take 1 tablet (25 mg) by mouth 2 times daily (with meals), Disp: , Rfl:     ergocalciferol (ERGOCALCIFEROL) 1.25 MG (20216 UT) capsule, Take 50,000 Units by mouth once a week Every Wednesday for anemia for 12 weeks ACTIVE 4/24/24- 6/26/24, Disp: , Rfl:     insulin aspart (NOVOLOG VIAL) 100 UNITS/ML vial, Inject 5 Units Subcutaneous 3 times daily (with meals), Disp: , Rfl:     insulin glargine (LANTUS PEN) 100 UNIT/ML pen, Inject 32 Units Subcutaneous at bedtime, Disp: , Rfl:     insulin lispro (HUMALOG KWIKPEN) 100 UNIT/ML (1 unit dial) KWIKPEN, Inject 5 Units Subcutaneous 3 times daily (with meals), Disp: , Rfl:     insulin lispro (HUMALOG VIAL) 100 UNIT/ML vial, Inject Subcutaneous 3 times daily (before meals) Inject as per sliding scale: if  = 4 units, 70 or below see hypoglycemia protocol: 150-199= 8 units;  200-249 =10 units;  250-299= 12 units; 300-349= 14 units; 350-399= 16 units; 400 or greater give 18 units, if >400x 2 call MD/NP, Disp: , Rfl:     loperamide (IMODIUM) 2 MG capsule, Take 2 mg by mouth as needed for diarrhea (give 4mg every 6 hours as needed for Diarrhea), Disp: , Rfl:     losartan (COZAAR) 25 MG tablet, Take 1 tablet (25 mg) by mouth daily, Disp: , Rfl:     melatonin 5 MG  tablet, Take 5 mg by mouth nightly as needed for sleep, Disp: , Rfl:     metFORMIN (GLUCOPHAGE XR) 500 MG 24 hr tablet, Take 1 tablet (500 mg) by mouth daily (with dinner), Disp: , Rfl:     mineral oil-hydrophilic petrolatum (AQUAPHOR) external ointment, Apply topically 2 times daily, Disp: , Rfl:     multivitamin w/minerals (THERA-VIT-M) tablet, Take 1 tablet by mouth daily, Disp: , Rfl:     nicotine (NICODERM CQ) 14 MG/24HR 24 hr patch, Place 1 patch onto the skin every 24 hours, Disp: , Rfl:     ondansetron (ZOFRAN) 4 MG tablet, Take 4 mg by mouth every 8 hours as needed for nausea, Disp: , Rfl:     oxyCODONE (ROXICODONE) 5 MG tablet, Take 1-2 tablets (5-10 mg) by mouth every 4 hours as needed for moderate to severe pain, Disp: 12 tablet, Rfl: 0    oxyCODONE (ROXICODONE) 5 MG tablet, Take 1 tablet (5 mg) by mouth every 6 hours as needed for severe pain, Disp: 15 tablet, Rfl:     pantoprazole (PROTONIX) 40 MG EC tablet, Take 1 tablet (40 mg) by mouth every morning (before breakfast), Disp: , Rfl:     sulfamethoxazole-trimethoprim (BACTRIM DS) 800-160 MG tablet, Take 1 tablet by mouth 2 times daily, Disp: 20 tablet, Rfl: 0    Urea-Lactic Acid 10-4 % CREA, Externally apply topically 2 times daily Apply to left foot/ heel topically two times a day for moisturizing 10-4%, Disp: , Rfl:     vitamin D3 (CHOLECALCIFEROL) 50 mcg (2000 units) tablet, Take 1 tablet by mouth daily, Disp: , Rfl:     Review of Systems - Negative       OBJECTIVE:  Appearance: alert, well appearing, and in no distress.    /71   Pulse 75   Temp 98.4  F (36.9  C) (Oral)   Resp 12     @LDAVASC(10,16,17)@           Ulceration on the right foot has intact Theraskin graft. No erythema right foot. Neurovascular status unchanged right foot.

## 2024-05-14 ENCOUNTER — TELEPHONE (OUTPATIENT)
Dept: VASCULAR SURGERY | Facility: CLINIC | Age: 49
End: 2024-05-14

## 2024-05-14 NOTE — TELEPHONE ENCOUNTER
Spoke with Dr Sanders and his preference is that the patient stay in TCU as long as possible.    Melania Montana NP, updated with above information. She states understanding and will reach out with any further questions or concerns.

## 2024-05-14 NOTE — TELEPHONE ENCOUNTER
Caller: nurse practitionerMelania    Provider: AMY Sanders    Detailed reason for call: Melania would like a call back to clarify the plans/orders for this patient.  The patient is extremely unhappy in the TCU and is becoming increasingly belligerent/non compliant.  She would like to discuss if the patient would be able to discharge to home with home care to manage the wound vac, or if Dr. Sanders wants to keep him in the TCU as long as possible.     Best phone number to contact: [phone] this is a triage phone tree system, let them know you are returning a call to NPMelania and they will get you to her.     Best time to contact: any    Ok to leave a detailed message: No- she would like to have a discussion    Ok to speak to authorized person if needed: No      (Noted to patient if reason is related to wound or incision, to please send a photo via email or [email or web address].)

## 2024-05-16 ENCOUNTER — TELEPHONE (OUTPATIENT)
Dept: INFECTIOUS DISEASES | Facility: CLINIC | Age: 49
End: 2024-05-16

## 2024-05-16 ENCOUNTER — VIRTUAL VISIT (OUTPATIENT)
Dept: INFECTIOUS DISEASES | Facility: CLINIC | Age: 49
End: 2024-05-16
Payer: COMMERCIAL

## 2024-05-16 DIAGNOSIS — M86.179: ICD-10-CM

## 2024-05-16 PROCEDURE — G2211 COMPLEX E/M VISIT ADD ON: HCPCS | Mod: 95 | Performed by: INTERNAL MEDICINE

## 2024-05-16 PROCEDURE — 99204 OFFICE O/P NEW MOD 45 MIN: CPT | Mod: 95 | Performed by: INTERNAL MEDICINE

## 2024-05-16 RX ORDER — DOXYCYCLINE 100 MG/1
100 CAPSULE ORAL 2 TIMES DAILY
Qty: 42 CAPSULE | Refills: 0 | Status: SHIPPED | OUTPATIENT
Start: 2024-05-16 | End: 2024-06-06

## 2024-05-16 RX ORDER — CEFUROXIME AXETIL 500 MG/1
500 TABLET ORAL 2 TIMES DAILY
Qty: 42 TABLET | Refills: 0 | Status: SHIPPED | OUTPATIENT
Start: 2024-05-16 | End: 2024-06-06

## 2024-05-16 NOTE — TELEPHONE ENCOUNTER
[facility] Call Center    Phone Message    May a detailed message be left on voicemail: yes     Reason for Call: Other: .     Patient is wanting to get a call back ASAP. Patient is wanting to get checked in for his appt at 3:00pm. Patient is also wanting to have someone walk through the process of the video visit as patient states he has never done this before and would like some help. Please advise      Action Taken: Message routed to:  [facility] ID    Travel Screening: Not Applicable

## 2024-05-16 NOTE — PROGRESS NOTES
Infectious Disease Consultation:  Requesting MD:  Reason for consult:    Time start:  300  Time end: 318    HISTORY:  Pt seen by us feb 16 thru 29 for infection down to but not into bone.  Proteus, PA, strep, enterococci, finegoldia, MRSA.  Treated dapto and zosyn to feb 29th.  This was of the R heel.     Now with R heel infection ongoing.  May 2 hospitalized for debridement.  Currently just on bactrim.  Wants to leave TCU AMA.      Culture 1+ Streptococcus mitis/oralis Abnormal    Not isolated or reported on routine aerobic culture  This organism is susceptible to ampicillin, penicillin, vancomycin and the cephalosporins. If treatment is required and your patient is allergic to penicillin, contact the microbiology lab within 5 days to request susceptibility testing.   2+ Mixed Aerobic and Anaerobic melody            Micro:    Culture 4+ Staphylococcus aureus MRSA Abnormal       2+ Proteus hauseri Abnormal       2+ Enterococcus faecalis Abnormal           Path:  Final Diagnosis   BONE, RIGHT CALCANEUS, BIOPSY:  -FOCAL NONVIABLE BONE WITH ACUTE INFLAMMATION COMPATIBLE WITH OSTEOMYELITIS  -GRANULATION TISSUE WITH MARKED ACUTE AND CHRONIC INFLAMMATION          This is pasted from Tommy recent visit:    ASSESSMENT:   Acute osteomyelitis right calcaneus  Ulceration right heel and a bone  DM2        TREATMENT:  -Ulceration on the right heel is progressing well. Theraskin graft is intact.      -Patient reports that he is likely leaving TCU at the end of the week because he does not approve of the diet at this facility.  I reviewed risks of leaving the TCU facility including lack of use of the wound VAC and without assistance with nonweightbearing.  We reviewed that the skin graft may not survive without the use of a wound VAC and home health care assistance is not always guaranteed.  Associated risks also include nonhealing wound of the right heel and loss of limb.  All questions invited and  "answered.                    Pertinent past history, past infectious disease history:  DM  Obesity  HTN  Elevated lipids  RLE cellulitis    Medications:  Reviewed prior to admission meds as applicable in chart review.  Current meds are reviewed in the EMR listed MAR.     ANTIBIOTICS:    Current:bactrim   Prior:   Allergy to:    SH/FH and  travel history(if applicable to consult):  Smoker active  FH non contrib to this illness    REVIEW OF SYSTEMS:  All other systems negative    EXAMINATION:  There were no vitals taken for this visit.  Alert, awake  Vitals tabulated above, reviewed  HEENT:  Neck supple without lymphadenopathy  Sclera clear  CARDIOVASCULAR regular rate and rhythm, no murmur  Lungs CLEAR TO AUSCULTATION   Abdomen soft, NT/ND, absent HEPATOSPLENOMEGALY  Skin normal  Joints normal  Neurologic exam non focal  Wound:  NA        CLINICAL DATABASE FOR---LAB/MICRO/CULTURES/IMAGING STUDIES:  Lab Results   Component Value Date    WBC 13.5 02/27/2024     Lab Results   Component Value Date    RBC 3.76 02/27/2024     Lab Results   Component Value Date    HGB 10.2 02/27/2024     Lab Results   Component Value Date    HCT 34.6 02/27/2024     No components found for: \"MCT\"  Lab Results   Component Value Date    MCV 92 02/27/2024     Lab Results   Component Value Date    MCH 27.1 02/27/2024     Lab Results   Component Value Date    MCHC 29.5 02/27/2024     Lab Results   Component Value Date    RDW 12.3 02/27/2024     Lab Results   Component Value Date     03/03/2024       Last Comprehensive Metabolic Panel:  Sodium   Date Value Ref Range Status   03/05/2024 133 (L) 135 - 145 mmol/L Final     Comment:     Reference intervals for this test were updated on 09/26/2023 to more accurately reflect our healthy population. There may be differences in the flagging of prior results with similar values performed with this method. Interpretation of those prior results can be made in the context of the updated reference " "intervals.      Potassium   Date Value Ref Range Status   03/05/2024 4.1 3.4 - 5.3 mmol/L Final     Chloride   Date Value Ref Range Status   03/05/2024 93 (L) 98 - 107 mmol/L Final     Carbon Dioxide (CO2)   Date Value Ref Range Status   03/05/2024 32 (H) 22 - 29 mmol/L Final     Anion Gap   Date Value Ref Range Status   03/05/2024 8 7 - 15 mmol/L Final     GLUCOSE BY METER POCT   Date Value Ref Range Status   05/02/2024 298 (H) 70 - 99 mg/dL Final     Urea Nitrogen   Date Value Ref Range Status   03/05/2024 37.5 (H) 6.0 - 20.0 mg/dL Final     Creatinine   Date Value Ref Range Status   03/05/2024 1.71 (H) 0.67 - 1.17 mg/dL Final     GFR Estimate   Date Value Ref Range Status   03/05/2024 48 (L) >60 mL/min/1.73m2 Final     Calcium   Date Value Ref Range Status   03/05/2024 10.0 8.6 - 10.0 mg/dL Final       Liver Function Studies -   Recent Labs   Lab Test 02/27/24  0540   PROTTOTAL 6.8   ALBUMIN 3.0*  3.1*   BILITOTAL 0.6   ALKPHOS 58   AST 12   ALT 8       Erythrocyte Sedimentation Rate   Date Value Ref Range Status   02/14/2024 99 (H) 0 - 20 mm/hr Final       No results found for: \"CRP\"      MRSA may 2 R to sulfa, I to doxy  Proteus S to bactrim, R to amp  Enterococcus S to pcn      IMPRESSION:  DMFI---->calcaneal osteo  Bacterial soup cultured from wound swab, not sure which/any are causing deep infection  TCU pt without a picc line, does have osteo on surgical path but we have grafted over this area as in Tommy note so I am not thinking this is a severe osteo.  The site is notoriously hard to heal even WITH iv abx and long durations...    PLAN:  Next Thursday CRP, ESR, CMP  Dump bactrim  Doxy 100 BID  Augmentin 875mg PO BID  Ceftin 500 BID  Plan 21 days of all 3  If not improving at Tommy visit next week, then picc and IV abx.  Creatinine was 1.7 in march.  Will get the above labs.          MAGDA YAÑEZ MD  [facility]  Office [phone]      "

## 2024-05-20 ENCOUNTER — OFFICE VISIT (OUTPATIENT)
Dept: ORTHOPEDICS | Facility: CLINIC | Age: 49
End: 2024-05-20
Payer: COMMERCIAL

## 2024-05-20 ENCOUNTER — ANCILLARY PROCEDURE (OUTPATIENT)
Dept: GENERAL RADIOLOGY | Facility: CLINIC | Age: 49
End: 2024-05-20
Attending: STUDENT IN AN ORGANIZED HEALTH CARE EDUCATION/TRAINING PROGRAM
Payer: COMMERCIAL

## 2024-05-20 VITALS — BODY MASS INDEX: 34.44 KG/M2 | HEIGHT: 70 IN

## 2024-05-20 DIAGNOSIS — S52.572D OTHER CLOSED INTRA-ARTICULAR FRACTURE OF DISTAL END OF LEFT RADIUS WITH ROUTINE HEALING, SUBSEQUENT ENCOUNTER: ICD-10-CM

## 2024-05-20 DIAGNOSIS — S52.572D OTHER CLOSED INTRA-ARTICULAR FRACTURE OF DISTAL END OF LEFT RADIUS WITH ROUTINE HEALING, SUBSEQUENT ENCOUNTER: Primary | ICD-10-CM

## 2024-05-20 PROCEDURE — 73110 X-RAY EXAM OF WRIST: CPT | Mod: TC | Performed by: RADIOLOGY

## 2024-05-20 PROCEDURE — 99213 OFFICE O/P EST LOW 20 MIN: CPT | Performed by: STUDENT IN AN ORGANIZED HEALTH CARE EDUCATION/TRAINING PROGRAM

## 2024-05-20 NOTE — LETTER
"    5/20/2024         RE: Floyd Capps  [address]   [address]  [place] [address]        Dear Colleague,    Thank you for referring your patient, Floyd Capps, to the [facility] SP[facility]. Please see a copy of my visit note below.    ASSESSMENT & PLAN    Floyd was seen today for wrist fracture followup.    Diagnoses and all orders for this visit:    Other closed intra-articular fracture of distal end of left radius with routine healing, subsequent encounter  -     XR Wrist Left G/E 3 Views; Future      48-year-old male presents to follow-up on left distal radius fracture.  He has been going without his wrist splint and denies any pain, longer has any tenderness to palpation on exam, and has near equivalent range of motion and strength to opposite wrist.  Discussed that at this time I would clear him to weight-bear through the wrist as tolerated and he can follow-up as needed.    Plan:  -OK to wear bear as tolerated through left wrist as tolerated. Can use crutches to ambulate. Please allow patient to use knee scooter.   -Recommend allowing Floyd to use knee scooter   -Continue physical therapy   -Recommend diabetic diet       Return if symptoms worsen or fail to improve.      Dr. May Mishra, DO  [facility]  Sports Medicine     -----  Chief Complaint   Patient presents with     Left Wrist - Wrist Fracture Followup       SUBJECTIVE  Floyd Capps is a/an 48 year old male who is seen to follow-up on left wrist fracture. The patient was last seen 4/22/24. Since last visit,patient minimal left wrist discomfort.  He is hoping to be allowed to put more weight through his wrist, so he can ambulate on his injured foot.    The patient is seen alone        REVIEW OF SYSTEMS:  Pertinent positives/negative: As stated above in HPI    OBJECTIVE:  Ht 1.778 m (5' 10\")   BMI 34.44 kg/m     General: Alert and in no distress  Skin: no visable rashes  CV: " Extremities appear well perfused   Resp: normal respiratory effort, no conversational dyspnea   Psych: normal mood, affect  MSK:  LEFT WRIST  Inspection:    No swelling, bruising, discoloration, or obvious deformity or asymmetry  Palpation:  Bony and ligamentous line marks are nontender.  Range of Motion:    Full (active and passive) flexion, extension, pronation/supination, and ulnar/radial deviation.  Strength:    No deficits in flexion, extension, ulnar/radial deviation, or  strength.  Special Tests:    Positive: None       RADIOLOGY:  Final results and radiologist's interpretation available in the [facility] health record.  Images below were personally reviewed and discussed with the patient in the office today.  My personal interpretation of the performed imaging: X-ray of the left wrist redemonstrates old distal radius fracture in good alignment. No significant callus formation.                  Disclaimer: This note consists of symbols derived from dictation and/or voice recognition software. As a result, there may be errors in the script that have gone undetected. Please consider this when interpreting information found in this chart.        Again, thank you for allowing me to participate in the care of your patient.        Sincerely,        May Mishra, DO

## 2024-05-20 NOTE — PROGRESS NOTES
"ASSESSMENT & PLAN    Floyd was seen today for wrist fracture followup.    Diagnoses and all orders for this visit:    Other closed intra-articular fracture of distal end of left radius with routine healing, subsequent encounter  -     XR Wrist Left G/E 3 Views; Future      48-year-old male presents to follow-up on left distal radius fracture.  He has been going without his wrist splint and denies any pain, longer has any tenderness to palpation on exam, and has near equivalent range of motion and strength to opposite wrist.  Discussed that at this time I would clear him to weight-bear through the wrist as tolerated and he can follow-up as needed.    Plan:  -OK to wear bear as tolerated through left wrist as tolerated. Can use crutches to ambulate. Please allow patient to use knee scooter.   -Recommend allowing Floyd to use knee scooter   -Continue physical therapy   -Recommend diabetic diet       Return if symptoms worsen or fail to improve.      Dr. May Mishra, DO  [facility]  Sports Medicine     -----  Chief Complaint   Patient presents with    Left Wrist - Wrist Fracture Followup       SUBJECTIVE  Floyd Capps is a/an 48 year old male who is seen to follow-up on left wrist fracture. The patient was last seen 4/22/24. Since last visit,patient minimal left wrist discomfort.  He is hoping to be allowed to put more weight through his wrist, so he can ambulate on his injured foot.    The patient is seen alone        REVIEW OF SYSTEMS:  Pertinent positives/negative: As stated above in HPI    OBJECTIVE:  Ht 1.778 m (5' 10\")   BMI 34.44 kg/m     General: Alert and in no distress  Skin: no visable rashes  CV: Extremities appear well perfused   Resp: normal respiratory effort, no conversational dyspnea   Psych: normal mood, affect  MSK:  LEFT WRIST  Inspection:    No swelling, bruising, discoloration, or obvious deformity or asymmetry  Palpation:  Bony and ligamentous line marks are " nontender.  Range of Motion:    Full (active and passive) flexion, extension, pronation/supination, and ulnar/radial deviation.  Strength:    No deficits in flexion, extension, ulnar/radial deviation, or  strength.  Special Tests:    Positive: None       RADIOLOGY:  Final results and radiologist's interpretation available in the [facility] health record.  Images below were personally reviewed and discussed with the patient in the office today.  My personal interpretation of the performed imaging: X-ray of the left wrist redemonstrates old distal radius fracture in good alignment. No significant callus formation.                  Disclaimer: This note consists of symbols derived from dictation and/or voice recognition software. As a result, there may be errors in the script that have gone undetected. Please consider this when interpreting information found in this chart.

## 2024-05-20 NOTE — PATIENT INSTRUCTIONS
1. Other closed intra-articular fracture of distal end of left radius with routine healing, subsequent encounter        Plan:  -OK to wear bear as tolerated through left wrist as tolerated. Can use crutches to ambulate. Please allow patient to use knee scooter.   -Recommend allowing Floyd to use knee scooter   -Continue physical therapy   -Recommend diabetic diet     If you have any questions or concerns after your appointment, please send a [employer] message or call the clinic at [phone]     Dr. May Mishra, , [facility]  Sports Medicine and Orthopedics    Thank you for choosing [facility]!  Dr. Mishra's Clinic Locations:     [place]  TRIAGE LINE: [phone] [address] APPOINTMENTS: [phone]   [place] [address] RADIOLOGY: [phone]   (Mondays & Tuesdays) HAND THERAPY: [phone]    PHYSICAL THERAPY: [phone]   [place] BILLING QUESTIONS: [phone] [address] FAX: [phone]   [place] [address]    (Thursdays & Fridays)

## 2024-05-23 ENCOUNTER — OFFICE VISIT (OUTPATIENT)
Dept: VASCULAR SURGERY | Facility: CLINIC | Age: 49
End: 2024-05-23
Attending: PODIATRIST
Payer: COMMERCIAL

## 2024-05-23 VITALS
HEART RATE: 85 BPM | RESPIRATION RATE: 12 BRPM | SYSTOLIC BLOOD PRESSURE: 78 MMHG | OXYGEN SATURATION: 99 % | TEMPERATURE: 97.9 F | DIASTOLIC BLOOD PRESSURE: 51 MMHG

## 2024-05-23 DIAGNOSIS — M86.179: ICD-10-CM

## 2024-05-23 DIAGNOSIS — L97.414 DIABETIC ULCER OF RIGHT HEEL ASSOCIATED WITH TYPE 2 DIABETES MELLITUS, WITH NECROSIS OF BONE (H): Primary | ICD-10-CM

## 2024-05-23 DIAGNOSIS — E11.621 DIABETIC ULCER OF RIGHT HEEL ASSOCIATED WITH TYPE 2 DIABETES MELLITUS, WITH NECROSIS OF BONE (H): Primary | ICD-10-CM

## 2024-05-23 PROCEDURE — 11044 DBRDMT BONE 1ST 20 SQ CM/<: CPT | Performed by: PODIATRIST

## 2024-05-23 ASSESSMENT — PAIN SCALES - GENERAL: PAINLEVEL: NO PAIN (0)

## 2024-05-23 NOTE — PATIENT INSTRUCTIONS
Important lnstructions      WEIGHT BEARING STATUS: You are to remain NON WEIGHT BEARING on your right foot. NON WEIGHT BEARING MEANS NO PRESSURE ON YOUR FOOT OR HEEL AT ANY TIME FOR ANY REASON!    2. OFFLOADING DEVICE: Must use a A WHEELCHAIR at all times! (do not use affected foot to push wheelchair)    3. STABILIZATION DEVICE: Use a PRAFO BOOT . You will need to WEAR THIS AT ALL TIMES EVEN WHILE IN BED.     4. ELEVATE: Elevating your leg means laying with your head on a pillow and your foot ABOVE YOUR HEART.     5. DO NOT MOVE YOUR FOOT.  There is a risk of worsening the wound or incision. To give yourself a higher chance of healing, please DO NOT swing foot back and forth and wiggle foot/toes especially when inside a stabilization device. Limited movement is allowable with therapy as recommended by the doctor.     6. TAKE A PROTEIN SHAKE TWICE A DAY.  (For ex: Boost, Ensure, Glucerna)    7. KEEP YOUR WOUND DRY AT ALL TIMES    Use a shower bag or a cast cover to keep your foot/leg dry during showers. These can be purchased on [employer] or any pharmacy.         Dressing Change lnstructions    - Wound Vac Instructions     1. 2x weekly and as needed cleanse the wound with NS     2. Pat dry     3. Apply Cavilon no sting barrier wipe to the skin surrounding the wound to protect from drainage/maceration     4. Apply drape to perla wound. Cut strip of drape and apply to skin if bridging is needed; plan this area in advance; should not be over bony prominence      5. Cut the foam to fit the size of the wound     6. Apply foam to wound     7. Cut narrow strip of foam if bridging if needed     8. Cover foam with drape to obtain air tight seal     9. Cut opening the size of a quarter for where the suction pad will be applied     10. Apply Suction pad     11. 125mmHG suction continuous      [employer] Contact Center can be reached at [phone], 24 hours a day 7 days a week      - If you do not have a back up plan in place:       If the negative pressure wound therapy malfunctions or unable to maintain seal: dressing must be removed and reapplied within 2 hours of the incident. If unable to reapply negative pressure wound dressing, place Normal Saline moistened gauze in wound bed and cover with appropriate dressing to keep wound bed moist.  Change wet-to-dry dressing two times a day until healthcare staff can re-implement negative pressure therapy. Change canister at least weekly.  [facility] Contact Center can be reached at [phone], 24 hours a day 7 days a week     - Information on Vacuum-Assisted Closure of a Wound  Vacuum-assisted closure (VAC) of a wound is a type of treatment to help wounds heal. It's also known as negative pressure wound therapy. During the treatment, a device lowers air pressure on the wound. This can help the wound heal more quickly.  - Understanding the wound VAC system  A wound VAC system has several parts. A foam or gauze dressing is put directly on the wound. The dressing is changed every 24 to 72 hours. An adhesive film covers and seals the dressing and wound. A drainage tube leads from under the adhesive film and connects to a portable vacuum pump. This pump removes air pressure over the wound. It may do this constantly. Or it may do it in cycles. During the treatment, you'll need to carry the portable pump everywhere you go.  - Why wound VAC is used  You might need this therapy for a recent traumatic wound. Or you may need it for a chronic wound. This is a wound that does not heal the way it should over time. This can happen with wounds in people who have diabetes. You may need a wound VAC if you've had a recent skin graft. And you may need a wound VAC for a large wound. Large wounds can take a longer time to heal.  A wound vacuum system may help your wound heal more quickly by:  Draining extra fluid from the wound  Reducing swelling  Reducing bacteria in the wound  Keeping your wound moist and warm  Helping  draw together wound edges  Increasing blood flow to your wound  Decreasing inflammation  Wound VAC offers some other advantages over other types of wound care. It may decrease your overall discomfort. The dressings usually need to be changed less often. And they may be easier to keep in position.  - Risks of wound VAC  Wound VAC has some rare risks, such as:  Bleeding (which may be severe)  Wound infection  An abnormal connection between the intestinal tract and the skin (enteric fistula)  Proper training in dressing changes can help reduce the risk for these complications. Also, your doctor will carefully evaluate you to make sure you are a good candidate for the therapy. Certain problems can increase your risk for complications. These include:  Exposed organs or blood vessels  High risk of bleeding from another medical problem  Wound infection  Nearby bone infection  Dead wound tissue  Cancer tissue  Fragile skin, such as from aging or longtime use of topical steroids  Allergy to adhesive  Very poor blood flow to your wound  Wounds close to joints that may reopen because of movement  Your doctor will discuss the risks that apply to you. Make sure to talk with him or her about all of your questions and concerns.  - Getting ready for wound VAC  You likely won't need to do much to get ready for wound VAC. In some cases, you may need to wait a while before having this therapy. For example, your doctor may first need to treat an infection in your wound. Dead or damaged tissue may also need to be removed from your wound.  You or a caregiver may need training on how to use the wound VAC device. This is done if you will be able to have your wound vacuum therapy at home. In other cases, you may need to have your wound vacuum therapy in a health care facility.  - On the day of your procedure  A health care provider will cover your wound with foam or gauze wound dressing. An adhesive film will be put over the dressing and  wound. This seals the wound. The foam connects to a drainage tube, which leads to a vacuum pump. This pump is portable. When the pump is turned on, it draws fluid through the foam and out the drainage tubing. The pump may run constantly, or it may cycle off and on. Your exact setup will depend on the specific type of wound vacuum system that you use.  - Managing your wound  You may need the dressing changed about once a day. You may need it changed more or less often, depending on your wound. You or your caregiver may be trained to do this at home. Or it may be done by a visiting health care provider. Your doctor may prescribe a pain medicine. This is to prevent or reduce pain during the dressing change.  You will likely need to use the wound VAC system for several weeks or months. During this time, you'll carry the portable pump everywhere you go.  - Nutrition for wound healing  During this time, make sure you follow a healthy diet. This is needed so the wound can heal and to prevent infection. Your doctor can tell you more about what to include in your diet during this time.  follow up with your doctor if you have a medical condition that led to your wound, such as diabetes. He or she can help you prevent future wounds.  - Follow-up care  Your doctor will carefully keep track of your healing. Make sure to keep all follow-up appointments.  - When to call your health care provider  Call your health care provider right away if you have any of these:  Fever of 100.4 F (38.0 C) or higher  Increased redness, swelling, or warmth around wound  Increased pain  Bright red blood or blood clots in tubing or the collection chamber of the vacuum

## 2024-05-23 NOTE — PROGRESS NOTES
FOOT AND ANKLE SURGERY/PODIATRY Progress Note      ASSESSMENT:   Acute osteomyelitis right calcaneus  Ulceration right heel into bone  DM2      TREATMENT:  -I discussed with the patient that the posterior lateral right heel ulceration overall has improved from the previous visit with increasing granulation tissue.  However, there is a small area of increased depth which probes to bone.    -Based on this information I recommend continued use of the wound VAC and will increase pressure to 125 mmHg.  Also recommend reapplication of TheraSkin graft.  Patient consents to application.    -Continue nonweightbearing right foot at all times.  PRAFO boot for offloading.    -Antibiotics per ID    -Staples removed today.    -After discussion of risk factors, nursing staff removed dressing, cleansed wound and consent obtained 2% Lidocaine HCL jelly was applied, under clean conditions, the right heel ulceration(s) were debrided using #15 blade scalpel.  Devitalized and nonviable tissue, along with any fibrin and slough, was removed to improve granulation tissue formation, stimulate wound healing, decrease overall bacteria load, disrupt biofilm formation and decrease edge senescence. Wound drainage was scant No. Total excisional debridement was 1.5 sq cm into the bone with a depth of 1.5 cm.   Ulcers were improved afterwards and .  Measures were as noted on the flow sheet.  Gauze dressing applied today.  Wound VAC to be applied either later today or tomorrow.    -He will follow-up in 2 weeks    Dong Sanders DPM  formerly [facility]      HPI: Floyd Capps was seen again today for a right heel ulceration.  Patient reports he is remain nonweightbearing on his right foot and has used the wound VAC as directed.  Currently on antibiotics per ID.    Past Medical History:   Diagnosis Date    Acute hypoxemic respiratory failure (H)     Acute renal failure, unspecified acute renal failure type (H24)     CAD  (coronary artery disease)     Callus of foot     Diabetes (H)     Diarrhea     Essential hypertension     Fracture of left distal radius     History of stroke     Insomnia     Nausea     Non-pressure chronic ulcer of right heel and midfoot with unspecified severity (H)     Normocytic anemia     Type 2 diabetes mellitus with foot ulcer (H)        Past Surgical History:   Procedure Laterality Date    APPENDECTOMY      CV CORONARY ANGIOGRAM N/A 3/1/2024    Procedure: CV CORONARY ANGIOGRAM;  Surgeon: Stephen Berger MD;  Location: [facility] CATH LAB CV    CV LEFT HEART CATH N/A 3/1/2024    Procedure: Left Heart Catheterization;  Surgeon: Stephen Berger MD;  Location: [facility] CATH LAB CV    EXCISE EXOSTOSIS FOOT Right 5/2/2024    Procedure: excision of bone from calcaneus with application of theraskin;  Surgeon: Dong Sanders DPM;  Location: [facility] OR    INCISION AND DRAINAGE FOOT, COMBINED Right 5/2/2024    Procedure: INCISION AND DRAINAGE, right heel with;  Surgeon: Dong Sanders DPM;  Location: [facility] OR    INCISION AND DRAINAGE OF WOUND      groin abscess    IRRIGATION AND DEBRIDEMENT FOOT, COMBINED Right 2/16/2024    Procedure: IRRIGATION AND DEBRIDEMENT RIGHT FOOT;  Surgeon: Cristofer Sims DPM;  Location: [facility] OR    PICC DOUBLE LUMEN PLACEMENT  2/29/2024       No Known Allergies      Current Outpatient Medications:     acetaminophen (TYLENOL) 500 MG tablet, Take 2 tablets (1,000 mg) by mouth every 8 hours as needed for mild pain, Disp: , Rfl:     Amino Acids-Protein Hydrolys (PRO-STAT PO), Take 30 mLs by mouth 2 times daily Give 2 times a day for due to patient not getting double protein on meals. 4/24/2024, Disp: , Rfl:     amoxicillin-clavulanate (AUGMENTIN) 875-125 MG tablet, Take 1 tablet by mouth 2 times daily for 21 days, Disp: 42 tablet, Rfl: 0    aspirin (ASA) 325 MG EC tablet, Take 1 tablet (325 mg) by mouth daily, Disp: , Rfl:     atorvastatin (LIPITOR) 80  MG tablet, Take 1 tablet (80 mg) by mouth daily, Disp: 90 tablet, Rfl: 3    bumetanide (BUMEX) 2 MG tablet, Take 1 tablet (2 mg) by mouth 2 times daily, Disp: , Rfl:     carvedilol (COREG) 25 MG tablet, Take 1 tablet (25 mg) by mouth 2 times daily (with meals), Disp: , Rfl:     cefuroxime (CEFTIN) 500 MG tablet, Take 1 tablet (500 mg) by mouth 2 times daily for 21 days, Disp: 42 tablet, Rfl: 0    doxycycline hyclate (VIBRAMYCIN) 100 MG capsule, Take 1 capsule (100 mg) by mouth 2 times daily for 21 days, Disp: 42 capsule, Rfl: 0    ergocalciferol (ERGOCALCIFEROL) 1.25 MG (17153 UT) capsule, Take 50,000 Units by mouth once a week Every Wednesday for anemia for 12 weeks ACTIVE 4/24/24- 6/26/24, Disp: , Rfl:     insulin aspart (NOVOLOG VIAL) 100 UNITS/ML vial, Inject 5 Units Subcutaneous 3 times daily (with meals), Disp: , Rfl:     insulin glargine (LANTUS PEN) 100 UNIT/ML pen, Inject 32 Units Subcutaneous at bedtime, Disp: , Rfl:     insulin lispro (HUMALOG KWIKPEN) 100 UNIT/ML (1 unit dial) KWIKPEN, Inject 5 Units Subcutaneous 3 times daily (with meals), Disp: , Rfl:     insulin lispro (HUMALOG VIAL) 100 UNIT/ML vial, Inject Subcutaneous 3 times daily (before meals) Inject as per sliding scale: if  = 4 units, 70 or below see hypoglycemia protocol: 150-199= 8 units;  200-249 =10 units;  250-299= 12 units; 300-349= 14 units; 350-399= 16 units; 400 or greater give 18 units, if >400x 2 call MD/NP, Disp: , Rfl:     loperamide (IMODIUM) 2 MG capsule, Take 2 mg by mouth as needed for diarrhea (give 4mg every 6 hours as needed for Diarrhea), Disp: , Rfl:     losartan (COZAAR) 25 MG tablet, Take 1 tablet (25 mg) by mouth daily, Disp: , Rfl:     melatonin 5 MG tablet, Take 5 mg by mouth nightly as needed for sleep, Disp: , Rfl:     metFORMIN (GLUCOPHAGE XR) 500 MG 24 hr tablet, Take 1 tablet (500 mg) by mouth daily (with dinner), Disp: , Rfl:     mineral oil-hydrophilic petrolatum (AQUAPHOR) external ointment, Apply  topically 2 times daily, Disp: , Rfl:     multivitamin w/minerals (THERA-VIT-M) tablet, Take 1 tablet by mouth daily, Disp: , Rfl:     nicotine (NICODERM CQ) 14 MG/24HR 24 hr patch, Place 1 patch onto the skin every 24 hours, Disp: , Rfl:     ondansetron (ZOFRAN) 4 MG tablet, Take 4 mg by mouth every 8 hours as needed for nausea, Disp: , Rfl:     oxyCODONE (ROXICODONE) 5 MG tablet, Take 1-2 tablets (5-10 mg) by mouth every 4 hours as needed for moderate to severe pain, Disp: 12 tablet, Rfl: 0    oxyCODONE (ROXICODONE) 5 MG tablet, Take 1 tablet (5 mg) by mouth every 6 hours as needed for severe pain, Disp: 15 tablet, Rfl:     pantoprazole (PROTONIX) 40 MG EC tablet, Take 1 tablet (40 mg) by mouth every morning (before breakfast), Disp: , Rfl:     sulfamethoxazole-trimethoprim (BACTRIM DS) 800-160 MG tablet, Take 1 tablet by mouth 2 times daily, Disp: 20 tablet, Rfl: 0    Urea-Lactic Acid 10-4 % CREA, Externally apply topically 2 times daily Apply to left foot/ heel topically two times a day for moisturizing 10-4%, Disp: , Rfl:     vitamin D3 (CHOLECALCIFEROL) 50 mcg (2000 units) tablet, Take 1 tablet by mouth daily, Disp: , Rfl:     Review of Systems - 10 point Review of Systems is negative except for right heel ulcer which is noted in HPI.      OBJECTIVE:  BP (!) 78/51   Pulse 85   Temp 97.9  F (36.6  C) (Oral)   Resp 12   SpO2 99%   General appearance: Patient is alert and fully cooperative with history & exam.  No sign of distress is noted during the visit.    Vascular: Dorsalis pedis non-palpableRight.  Dermatologic:    Negative Pressure Wound Therapy Heel Right (Active)       VASC Wound Right heel (Active)   Pre Size Length 1.5 05/23/24 1300   Pre Size Width 1.9 05/23/24 1300   Pre Size Depth 0.3 05/23/24 1300   Pre Total Sq cm 2.85 05/23/24 1300   Post Size Length 1.5 05/23/24 1300   Post Size Width 1 05/23/24 1300   Post Size Depth 1.5 05/23/24 1300   Post Total Sq cm 1.5 05/23/24 1300        Incision/Surgical Site with Packing 02/16/24 Right Heel Qty Placed: 1 (Active)       Incision/Surgical Site 02/16/24 Right;Inner Plantar (Active)       Incision/Surgical Site 05/02/24 Right Foot (Active)   Incision Assessment WDL 05/02/24 0938   Dressing Dry gauze;Moist gauze;Protective barrier;Pressure dressing 05/02/24 0935   Closure Luke 05/02/24 0935   Dressing Intervention Clean, dry, intact 05/02/24 1105   Majority of posterior lateral right heel ulceration has a granular base with increased depth along the distal margin which probes to bone, no erythema.  Neurologic: Diminished to light touch Right.  Musculoskeletal: Contracted digits noted Right.      Picture:

## 2024-06-06 ENCOUNTER — OFFICE VISIT (OUTPATIENT)
Dept: VASCULAR SURGERY | Facility: CLINIC | Age: 49
End: 2024-06-06
Attending: PODIATRIST
Payer: COMMERCIAL

## 2024-06-06 VITALS
DIASTOLIC BLOOD PRESSURE: 72 MMHG | RESPIRATION RATE: 12 BRPM | HEART RATE: 86 BPM | TEMPERATURE: 98.1 F | OXYGEN SATURATION: 98 % | SYSTOLIC BLOOD PRESSURE: 110 MMHG

## 2024-06-06 DIAGNOSIS — E08.621 DIABETIC ULCER OF RIGHT HEEL ASSOCIATED WITH DIABETES MELLITUS DUE TO UNDERLYING CONDITION, WITH MUSCLE INVOLVEMENT WITHOUT EVIDENCE OF NECROSIS (H): Primary | ICD-10-CM

## 2024-06-06 DIAGNOSIS — L97.415 DIABETIC ULCER OF RIGHT HEEL ASSOCIATED WITH DIABETES MELLITUS DUE TO UNDERLYING CONDITION, WITH MUSCLE INVOLVEMENT WITHOUT EVIDENCE OF NECROSIS (H): Primary | ICD-10-CM

## 2024-06-06 PROCEDURE — 15275 SKIN SUB GRAFT FACE/NK/HF/G: CPT | Performed by: PODIATRIST

## 2024-06-06 PROCEDURE — 15004 WOUND PREP F/N/HF/G: CPT | Performed by: PODIATRIST

## 2024-06-06 ASSESSMENT — PAIN SCALES - GENERAL: PAINLEVEL: NO PAIN (0)

## 2024-06-06 NOTE — PATIENT INSTRUCTIONS
Important lnstructions      WEIGHT BEARING STATUS: You are to remain NON WEIGHT BEARING on your right foot. NON WEIGHT BEARING MEANS NO PRESSURE ON YOUR FOOT OR HEEL AT ANY TIME FOR ANY REASON!    2. OFFLOADING DEVICE: Must use a A WHEELCHAIR at all times! (do not use affected foot to push wheelchair)    3. STABILIZATION DEVICE: Use a PRAFO BOOT . You will need to WEAR THIS AT ALL TIMES EVEN WHILE IN BED.     4. ELEVATE: Elevating your leg means laying with your head on a pillow and your foot ABOVE YOUR HEART.     5. DO NOT MOVE YOUR FOOT.  There is a risk of worsening the wound or incision. To give yourself a higher chance of healing, please DO NOT swing foot back and forth and wiggle foot/toes especially when inside a stabilization device. Limited movement is allowable with therapy as recommended by the doctor.     6. TAKE A PROTEIN SHAKE TWICE A DAY.  (For ex: Boost, Ensure, Glucerna)    7. KEEP YOUR WOUND DRY AT ALL TIMES    Use a shower bag or a cast cover to keep your foot/leg dry during showers. These can be purchased on [facility] or any pharmacy.         Dressing Change lnstructions    - Wound Vac Instructions post Theraskin application:     1. Apply wound vac to the RIGHT HEEL within 24 hours fromtime of surgery. Wound vac must be set at 100 mmHg continuous suction.     2. LEAVE WOUND VAC IN PLACE UNTIL AFTER 1 WEEK POST OP FOLLOW UP APPOINTMENT.  Home care nurse to re-apply vac after the 1 wk follow-up appointment.     3. Apply Cavilon no-sting barrier wipe to the skin surrounding the wound to protect from drainage/maceration.     4. Apply drape to perla wound. Cut strip of drape and apply to skin if bridging isneeded; plan this area in advance; should not be over bony prominence.     5. Cut the foam to fit the size of the wound.     6. Apply foam over the ADAPTIC TOUCH (or similar non-adherent dressing) THAT IS STAPLED/SUTURED IN PLACE OVER the WOUND/GRAFT SITE. DO NOT REMOVE NON-ADHERENT.      7. Cut  narrow strip of foam if bridging if needed.     8. Cover foam with drape to obtain air tight seal.     9.Cut opening the size of a quarter for where the suction pad will be applied.     10. Apply Suction pad.     11. 100mmHG suction continuous.     [facility] Contact Center can be reached at [phone], 24 hours aday 7 days a week     - If you do not have a back up plan in place:     If the negative pressure wound therapy malfunctions or unable to maintain seal: dressing must be removed and reapplied within 2 hours of the incident. If unable to reapply negative pressure wound dressing, place Normal Saline moistened gauze in wound bed and cover with appropriate dressing to keep wound bed moist.  Change wet-to-moist dressing two times aday until healthcare staff can re-implement negative pressure therapy. Change canister at least weekly.  [facility] Contact Center can be reached at [phone], 24 hours a day 7 days a week

## 2024-06-06 NOTE — PROGRESS NOTES
FOOT AND ANKLE SURGERY/PODIATRY Progress Note      ASSESSMENT:   Acute osteomyelitis right calcaneus  Ulceration right heel into bone  DM2      TREATMENT:  -I discussed with the patient that overall the right heel ulceration is improved from the previous visit with improved depth and overall size.  We discussed application of TheraSkin graft today which she has been approved for.  We reviewed the application process including need for continued use of the wound VAC and strict nonweightbearing on the right foot.  Risks include but limited to need for antibiotic therapy or surgical intervention.  All questions invited and answered.  Patient states that he would like to proceed with TheraSkin graft today.    -Continue nonweightbearing right foot.    Sharp, excisional debridement of the wound into muscle tissue was performed using currette for a total of 0.48 square centimeters. Debridement was done to reduce pressure, remove non-viable, devitalized tissue and promote wound healing. Patient tolerated this well.  Due to the slow healing rate of the ulcer and the diagnosis of Diabetic Ulcer due to increased pressure} of the Right heel and is free of infection  Theraskin was recommended and consent was obtained for the application.  This is application # 1.   There is no evidence of osteomyelitis.  There is adequate blood flow for healing.       For the procedure, the patient was placed in a comfortable position with the wound bed exposed. Theraskin  application is being performed in a staged procedure in an attempt to achieve rapid wound closure. Tissue verified within acceptable temperature. The graft size is 2.5x2.5 and the estimated wastage was 0.  The entire graft was utilized by stacking the graft into the deeper portions of the wound for improved healing. Tissue identification number: [id number]. Expiration: 1/10/2029. The graft was prepared and applied following the  guidelines.  The graft was secured  with staples and covered with a non-adherent contact layer and secured with staples.  Then,  a wet-to-dry dressing was applied.  Wound VAC should be applied either later today or tomorrow and set at 100 mmHg.  Wound VAC to remain intact x 7 days until his next follow-up appointment with me.  The patient will continue to utilize rolling knee walker for off loading.  Patient tolerated the procedure without any complications and was educated and expressed an understanding of the proper wound treatment until their follow up.    -He will follow-up in 1 week    Dong Sanders DPM  [facility]      HPI: Floyd Capps was seen again today for a right heel ulceration.  Patient has remained mostly nonweightbearing on the right foot while at TCU.    Past Medical History:   Diagnosis Date    Acute hypoxemic respiratory failure (H)     Acute renal failure, unspecified acute renal failure type (H24)     CAD (coronary artery disease)     Callus of foot     Diabetes (H)     Diarrhea     Essential hypertension     Fracture of left distal radius     History of stroke     Insomnia     Nausea     Non-pressure chronic ulcer of right heel and midfoot with unspecified severity (H)     Normocytic anemia     Type 2 diabetes mellitus with foot ulcer (H)        Past Surgical History:   Procedure Laterality Date    APPENDECTOMY      CV CORONARY ANGIOGRAM N/A 3/1/2024    Procedure: CV CORONARY ANGIOGRAM;  Surgeon: Stephen Berger MD;  Location: [facility] CATH LAB CV    CV LEFT HEART CATH N/A 3/1/2024    Procedure: Left Heart Catheterization;  Surgeon: Stephen Berger MD;  Location: [facility] CATH LAB CV    EXCISE EXOSTOSIS FOOT Right 5/2/2024    Procedure: excision of bone from calcaneus with application of theraskin;  Surgeon: Dong Sanders DPM;  Location: [facility] [place]    INCISION AND DRAINAGE FOOT, COMBINED Right 5/2/2024    Procedure: INCISION AND DRAINAGE, right heel with;  Surgeon: Dong Sanders  AMY Young;  [place] [facility] [place]    INCISION AND DRAINAGE OF WOUND      groin abscess    IRRIGATION AND DEBRIDEMENT FOOT, COMBINED Right 2/16/2024    Procedure: IRRIGATION AND DEBRIDEMENT RIGHT FOOT;  Surgeon: Cristofer Sims DPM;  [place] [facility] [place]    PICC DOUBLE LUMEN PLACEMENT  2/29/2024       No Known Allergies      Current Outpatient Medications:     acetaminophen (TYLENOL) 500 MG tablet, Take 2 tablets (1,000 mg) by mouth every 8 hours as needed for mild pain, Disp: , Rfl:     Amino Acids-Protein Hydrolys (PRO-STAT PO), Take 30 mLs by mouth 2 times daily Give 2 times a day for due to patient not getting double protein on meals. 4/24/2024, Disp: , Rfl:     amoxicillin-clavulanate (AUGMENTIN) 875-125 MG tablet, Take 1 tablet by mouth 2 times daily for 21 days, Disp: 42 tablet, Rfl: 0    aspirin (ASA) 325 MG EC tablet, Take 1 tablet (325 mg) by mouth daily, Disp: , Rfl:     atorvastatin (LIPITOR) 80 MG tablet, Take 1 tablet (80 mg) by mouth daily, Disp: 90 tablet, Rfl: 3    bumetanide (BUMEX) 2 MG tablet, Take 1 tablet (2 mg) by mouth 2 times daily, Disp: , Rfl:     carvedilol (COREG) 25 MG tablet, Take 1 tablet (25 mg) by mouth 2 times daily (with meals), Disp: , Rfl:     cefuroxime (CEFTIN) 500 MG tablet, Take 1 tablet (500 mg) by mouth 2 times daily for 21 days, Disp: 42 tablet, Rfl: 0    doxycycline hyclate (VIBRAMYCIN) 100 MG capsule, Take 1 capsule (100 mg) by mouth 2 times daily for 21 days, Disp: 42 capsule, Rfl: 0    ergocalciferol (ERGOCALCIFEROL) 1.25 MG (68725 UT) capsule, Take 50,000 Units by mouth once a week Every Wednesday for anemia for 12 weeks ACTIVE 4/24/24- 6/26/24, Disp: , Rfl:     insulin aspart (NOVOLOG VIAL) 100 UNITS/ML vial, Inject 5 Units Subcutaneous 3 times daily (with meals), Disp: , Rfl:     insulin glargine (LANTUS PEN) 100 UNIT/ML pen, Inject 32 Units Subcutaneous at bedtime, Disp: , Rfl:     insulin lispro (HUMALOG KWIKPEN) 100 UNIT/ML (1 unit dial) KWIKPEN,  Inject 5 Units Subcutaneous 3 times daily (with meals), Disp: , Rfl:     insulin lispro (HUMALOG VIAL) 100 UNIT/ML vial, Inject Subcutaneous 3 times daily (before meals) Inject as per sliding scale: if  = 4 units, 70 or below see hypoglycemia protocol: 150-199= 8 units;  200-249 =10 units;  250-299= 12 units; 300-349= 14 units; 350-399= 16 units; 400 or greater give 18 units, if >400x 2 call MD/NP, Disp: , Rfl:     loperamide (IMODIUM) 2 MG capsule, Take 2 mg by mouth as needed for diarrhea (give 4mg every 6 hours as needed for Diarrhea), Disp: , Rfl:     losartan (COZAAR) 25 MG tablet, Take 1 tablet (25 mg) by mouth daily, Disp: , Rfl:     melatonin 5 MG tablet, Take 5 mg by mouth nightly as needed for sleep, Disp: , Rfl:     metFORMIN (GLUCOPHAGE XR) 500 MG 24 hr tablet, Take 1 tablet (500 mg) by mouth daily (with dinner), Disp: , Rfl:     mineral oil-hydrophilic petrolatum (AQUAPHOR) external ointment, Apply topically 2 times daily, Disp: , Rfl:     multivitamin w/minerals (THERA-VIT-M) tablet, Take 1 tablet by mouth daily, Disp: , Rfl:     nicotine (NICODERM CQ) 14 MG/24HR 24 hr patch, Place 1 patch onto the skin every 24 hours, Disp: , Rfl:     ondansetron (ZOFRAN) 4 MG tablet, Take 4 mg by mouth every 8 hours as needed for nausea, Disp: , Rfl:     oxyCODONE (ROXICODONE) 5 MG tablet, Take 1-2 tablets (5-10 mg) by mouth every 4 hours as needed for moderate to severe pain, Disp: 12 tablet, Rfl: 0    oxyCODONE (ROXICODONE) 5 MG tablet, Take 1 tablet (5 mg) by mouth every 6 hours as needed for severe pain, Disp: 15 tablet, Rfl:     pantoprazole (PROTONIX) 40 MG EC tablet, Take 1 tablet (40 mg) by mouth every morning (before breakfast), Disp: , Rfl:     sulfamethoxazole-trimethoprim (BACTRIM DS) 800-160 MG tablet, Take 1 tablet by mouth 2 times daily, Disp: 20 tablet, Rfl: 0    Urea-Lactic Acid 10-4 % CREA, Externally apply topically 2 times daily Apply to left foot/ heel topically two times a day for  moisturizing 10-4%, Disp: , Rfl:     vitamin D3 (CHOLECALCIFEROL) 50 mcg (2000 units) tablet, Take 1 tablet by mouth daily, Disp: , Rfl:     Review of Systems - 10 point Review of Systems is negative except for right heel ulcer which is noted in HPI.      OBJECTIVE:  /72   Pulse 86   Temp 98.1  F (36.7  C) (Oral)   Resp 12   SpO2 98%   General appearance: Patient is alert and fully cooperative with history & exam.  No sign of distress is noted during the visit.    Vascular: Dorsalis pedis palpableRight.  Dermatologic:    Negative Pressure Wound Therapy Heel Right (Active)       VASC Wound Right heel (Active)   Pre Size Length 0.6 06/06/24 1300   Pre Size Width 0.8 06/06/24 1300   Pre Size Depth 0.4 06/06/24 1300   Pre Total Sq cm 0.48 06/06/24 1300   Post Size Length 1.5 05/23/24 1300   Post Size Width 1 05/23/24 1300   Post Size Depth 1.5 05/23/24 1300   Post Total Sq cm 1.5 05/23/24 1300       Incision/Surgical Site with Packing 02/16/24 Right Heel Qty Placed: 1 (Active)       Incision/Surgical Site 02/16/24 Right;Inner Plantar (Active)       Incision/Surgical Site 05/02/24 Right Foot (Active)   Granular base right heel ulceration, no erythema, slight increased depth.  Neurologic: Diminished to light touch Right.  Musculoskeletal: Contracted digits noted Right.      Picture:

## 2024-06-11 ENCOUNTER — PATIENT OUTREACH (OUTPATIENT)
Dept: CARE COORDINATION | Facility: CLINIC | Age: 49
End: 2024-06-11
Payer: COMMERCIAL

## 2024-06-11 NOTE — PROGRESS NOTES
Contact   Chart Review     Situation: Patient chart reviewed by .    Background: following for discharge from TCU.     Assessment: patient continues to be in TCU since 5-8-2024.    Plan/Recommendations: Will contact patient at discharge, or do chart review in 30 days.     Georgiana Isaacs,   [facility]  [phone]

## 2024-06-13 ENCOUNTER — OFFICE VISIT (OUTPATIENT)
Dept: VASCULAR SURGERY | Facility: CLINIC | Age: 49
End: 2024-06-13
Attending: PODIATRIST
Payer: COMMERCIAL

## 2024-06-13 VITALS — SYSTOLIC BLOOD PRESSURE: 105 MMHG | DIASTOLIC BLOOD PRESSURE: 70 MMHG | HEART RATE: 83 BPM | OXYGEN SATURATION: 96 %

## 2024-06-13 DIAGNOSIS — E08.621 DIABETIC ULCER OF RIGHT HEEL ASSOCIATED WITH DIABETES MELLITUS DUE TO UNDERLYING CONDITION, WITH MUSCLE INVOLVEMENT WITHOUT EVIDENCE OF NECROSIS (H): Primary | ICD-10-CM

## 2024-06-13 DIAGNOSIS — L97.415 DIABETIC ULCER OF RIGHT HEEL ASSOCIATED WITH DIABETES MELLITUS DUE TO UNDERLYING CONDITION, WITH MUSCLE INVOLVEMENT WITHOUT EVIDENCE OF NECROSIS (H): Primary | ICD-10-CM

## 2024-06-13 PROCEDURE — G0463 HOSPITAL OUTPT CLINIC VISIT: HCPCS | Performed by: PODIATRIST

## 2024-06-13 PROCEDURE — 99024 POSTOP FOLLOW-UP VISIT: CPT | Performed by: PODIATRIST

## 2024-06-13 ASSESSMENT — PAIN SCALES - GENERAL: PAINLEVEL: NO PAIN (0)

## 2024-06-13 NOTE — PROGRESS NOTES
Podiatry Progress Note        ASSESSMENT:   Acute osteomyelitis right calcaneus  Ulceration right heel into bone  DM2      TREATMENT:  -Ulceration on the right heel is progressing well. Theraskin graft is intact.     -Recommend he continue with the wound vac at 100 mmHg, changed 2x per week with application of adaptic with each wound vac change. Continue non-weight bearing on the right foot.     -He will follow-up with me in 2 weeks.     Dong Sanders DPM  [facility] Podiatry/Foot & Ankle Surgery      HPI: Floyd Capps was seen again today for a right heel ulceration s/p application of Theraskin.  Patient reports that he has remained nonweightbearing on his right foot.    Past Medical History:   Diagnosis Date    Acute hypoxemic respiratory failure (H)     Acute renal failure, unspecified acute renal failure type (H24)     CAD (coronary artery disease)     Callus of foot     Diabetes (H)     Diarrhea     Essential hypertension     Fracture of left distal radius     History of stroke     Insomnia     Nausea     Non-pressure chronic ulcer of right heel and midfoot with unspecified severity (H)     Normocytic anemia     Type 2 diabetes mellitus with foot ulcer (H)        No Known Allergies      Current Outpatient Medications:     acetaminophen (TYLENOL) 500 MG tablet, Take 2 tablets (1,000 mg) by mouth every 8 hours as needed for mild pain, Disp: , Rfl:     Amino Acids-Protein Hydrolys (PRO-STAT PO), Take 30 mLs by mouth 2 times daily Give 2 times a day for due to patient not getting double protein on meals. 4/24/2024, Disp: , Rfl:     aspirin (ASA) 325 MG EC tablet, Take 1 tablet (325 mg) by mouth daily, Disp: , Rfl:     atorvastatin (LIPITOR) 80 MG tablet, Take 1 tablet (80 mg) by mouth daily, Disp: 90 tablet, Rfl: 3    bumetanide (BUMEX) 2 MG tablet, Take 1 tablet (2 mg) by mouth 2 times daily, Disp: , Rfl:     carvedilol (COREG) 25 MG tablet, Take 1 tablet (25 mg) by mouth 2 times daily (with meals),  Disp: , Rfl:     ergocalciferol (ERGOCALCIFEROL) 1.25 MG (88550 UT) capsule, Take 50,000 Units by mouth once a week Every Wednesday for anemia for 12 weeks ACTIVE 4/24/24- 6/26/24, Disp: , Rfl:     insulin aspart (NOVOLOG VIAL) 100 UNITS/ML vial, Inject 5 Units Subcutaneous 3 times daily (with meals), Disp: , Rfl:     insulin glargine (LANTUS PEN) 100 UNIT/ML pen, Inject 32 Units Subcutaneous at bedtime, Disp: , Rfl:     insulin lispro (HUMALOG KWIKPEN) 100 UNIT/ML (1 unit dial) KWIKPEN, Inject 5 Units Subcutaneous 3 times daily (with meals), Disp: , Rfl:     insulin lispro (HUMALOG VIAL) 100 UNIT/ML vial, Inject Subcutaneous 3 times daily (before meals) Inject as per sliding scale: if  = 4 units, 70 or below see hypoglycemia protocol: 150-199= 8 units;  200-249 =10 units;  250-299= 12 units; 300-349= 14 units; 350-399= 16 units; 400 or greater give 18 units, if >400x 2 call MD/NP, Disp: , Rfl:     loperamide (IMODIUM) 2 MG capsule, Take 2 mg by mouth as needed for diarrhea (give 4mg every 6 hours as needed for Diarrhea), Disp: , Rfl:     losartan (COZAAR) 25 MG tablet, Take 1 tablet (25 mg) by mouth daily, Disp: , Rfl:     melatonin 5 MG tablet, Take 5 mg by mouth nightly as needed for sleep, Disp: , Rfl:     metFORMIN (GLUCOPHAGE XR) 500 MG 24 hr tablet, Take 1 tablet (500 mg) by mouth daily (with dinner), Disp: , Rfl:     mineral oil-hydrophilic petrolatum (AQUAPHOR) external ointment, Apply topically 2 times daily, Disp: , Rfl:     multivitamin w/minerals (THERA-VIT-M) tablet, Take 1 tablet by mouth daily, Disp: , Rfl:     nicotine (NICODERM CQ) 14 MG/24HR 24 hr patch, Place 1 patch onto the skin every 24 hours, Disp: , Rfl:     ondansetron (ZOFRAN) 4 MG tablet, Take 4 mg by mouth every 8 hours as needed for nausea, Disp: , Rfl:     oxyCODONE (ROXICODONE) 5 MG tablet, Take 1-2 tablets (5-10 mg) by mouth every 4 hours as needed for moderate to severe pain, Disp: 12 tablet, Rfl: 0    oxyCODONE (ROXICODONE) 5  MG tablet, Take 1 tablet (5 mg) by mouth every 6 hours as needed for severe pain, Disp: 15 tablet, Rfl:     pantoprazole (PROTONIX) 40 MG EC tablet, Take 1 tablet (40 mg) by mouth every morning (before breakfast), Disp: , Rfl:     sulfamethoxazole-trimethoprim (BACTRIM DS) 800-160 MG tablet, Take 1 tablet by mouth 2 times daily, Disp: 20 tablet, Rfl: 0    Urea-Lactic Acid 10-4 % CREA, Externally apply topically 2 times daily Apply to left foot/ heel topically two times a day for moisturizing 10-4%, Disp: , Rfl:     vitamin D3 (CHOLECALCIFEROL) 50 mcg (2000 units) tablet, Take 1 tablet by mouth daily, Disp: , Rfl:     Review of Systems - Negative       OBJECTIVE:  Appearance: alert, well appearing, and in no distress.    /70   Pulse 83   SpO2 96%     @LDAVASC(10,16,17)@           Ulceration on the lateral right heel has intact Theraskin graft. No erythema right foot. Neurovascular status unchanged right foot.

## 2024-06-13 NOTE — PATIENT INSTRUCTIONS
Important lnstructions      WEIGHT BEARING STATUS: You are to remain NON WEIGHT BEARING on your right foot. NON WEIGHT BEARING MEANS NO PRESSURE ON YOUR FOOT OR HEEL AT ANY TIME FOR ANY REASON!    2. OFFLOADING DEVICE: Must use a A WHEELCHAIR at all times! (do not use affected foot to push wheelchair)    3. STABILIZATION DEVICE: Use a PRAFO BOOT . You will need to WEAR THIS AT ALL TIMES EVEN WHILE IN BED.     4. ELEVATE: Elevating your leg means laying with your head on a pillow and your foot ABOVE YOUR HEART.     5. DO NOT MOVE YOUR FOOT.  There is a risk of worsening the wound or incision. To give yourself a higher chance of healing, please DO NOT swing foot back and forth and wiggle foot/toes especially when inside a stabilization device. Limited movement is allowable with therapy as recommended by the doctor.     6. TAKE A PROTEIN SHAKE TWICE A DAY.  (For ex: Boost, Ensure, Glucerna)    7. KEEP YOUR WOUND DRY AT ALL TIMES    Use a shower bag or a cast cover to keep your foot/leg dry during showers. These can be purchased on [employer] or any pharmacy.         Dressing Change lnstructions     **WOUND VAC IS NOT REPLACED IN CLINIC , NEEDS TO REPLACED WITHIN 24 HOURS**    - Wound Vac Instructions     1. 2x weekly and as needed cleanse the wound with NS     2. Pat dry     3. Apply Cavilon no sting barrier wipe to the skin surrounding the wound to protect from drainage/maceration     4. Apply drape to perla wound. Cut strip of drape and apply to skin if bridging is needed; plan this area in advance; should not be over bony prominence      5. Cut the foam to fit the size of the wound. APPLY ADAPTIC TOUCH (or other similar nonadherent) OVER WOUND/GRAFT SITE. Then     6. Apply foam to wound     7. Cut narrow strip of foam if bridging if needed     8. Cover foam with drape to obtain air tight seal     9. Cut opening the size of a quarter for where the suction pad will be applied     10. Apply Suction pad     11. 100mmHG  suction continuous      [employer] can be reached at [phone], 24 hours a day 7 days a week      - If you do not have a back up plan in place:      If the negative pressure wound therapy malfunctions or unable to maintain seal: dressing must be removed and reapplied within 2 hours of the incident. If unable to reapply negative pressure wound dressing, place Normal Saline moistened gauze in wound bed and cover with appropriate dressing to keep wound bed moist.  Change wet-to-dry dressing two times a day until healthcare staff can re-implement negative pressure therapy. Change canister at least weekly.  [employer] can be reached at [phone], 24 hours a day 7 days a week     - Information on Vacuum-Assisted Closure of a Wound  Vacuum-assisted closure (VAC) of a wound is a type of treatment to help wounds heal. It's also known as negative pressure wound therapy. During the treatment, a device lowers air pressure on the wound. This can help the wound heal more quickly.  - Understanding the wound VAC system  A wound VAC system has several parts. A foam or gauze dressing is put directly on the wound. The dressing is changed every 24 to 72 hours. An adhesive film covers and seals the dressing and wound. A drainage tube leads from under the adhesive film and connects to a portable vacuum pump. This pump removes air pressure over the wound. It may do this constantly. Or it may do it in cycles. During the treatment, you'll need to carry the portable pump everywhere you go.  - Why wound VAC is used  You might need this therapy for a recent traumatic wound. Or you may need it for a chronic wound. This is a wound that does not heal the way it should over time. This can happen with wounds in people who have diabetes. You may need a wound VAC if you've had a recent skin graft. And you may need a wound VAC for a large wound. Large wounds can take a longer time to heal.  A wound vacuum system may help your  wound heal more quickly by:  Draining extra fluid from the wound  Reducing swelling  Reducing bacteria in the wound  Keeping your wound moist and warm  Helping draw together wound edges  Increasing blood flow to your wound  Decreasing inflammation  Wound VAC offers some other advantages over other types of wound care. It may decrease your overall discomfort. The dressings usually need to be changed less often. And they may be easier to keep in position.  - Risks of wound VAC  Wound VAC has some rare risks, such as:  Bleeding (which may be severe)  Wound infection  An abnormal connection between the intestinal tract and the skin (enteric fistula)  Proper training in dressing changes can help reduce the risk for these complications. Also, your doctor will carefully evaluate you to make sure you are a good candidate for the therapy. Certain problems can increase your risk for complications. These include:  Exposed organs or blood vessels  High risk of bleeding from another medical problem  Wound infection  Nearby bone infection  Dead wound tissue  Cancer tissue  Fragile skin, such as from aging or longtime use of topical steroids  Allergy to adhesive  Very poor blood flow to your wound  Wounds close to joints that may reopen because of movement  Your doctor will discuss the risks that apply to you. Make sure to talk with him or her about all of your questions and concerns.  - Getting ready for wound VAC  You likely won't need to do much to get ready for wound VAC. In some cases, you may need to wait a while before having this therapy. For example, your doctor may first need to treat an infection in your wound. Dead or damaged tissue may also need to be removed from your wound.  You or a caregiver may need training on how to use the wound VAC device. This is done if you will be able to have your wound vacuum therapy at home. In other cases, you may need to have your wound vacuum therapy in a health care facility.  - On  the day of your procedure  A health care provider will cover your wound with foam or gauze wound dressing. An adhesive film will be put over the dressing and wound. This seals the wound. The foam connects to a drainage tube, which leads to a vacuum pump. This pump is portable. When the pump is turned on, it draws fluid through the foam and out the drainage tubing. The pump may run constantly, or it may cycle off and on. Your exact setup will depend on the specific type of wound vacuum system that you use.  - Managing your wound  You may need the dressing changed about once a day. You may need it changed more or less often, depending on your wound. You or your caregiver may be trained to do this at home. Or it may be done by a visiting health care provider. Your doctor may prescribe a pain medicine. This is to prevent or reduce pain during the dressing change.  You will likely need to use the wound VAC system for several weeks or months. During this time, you'll carry the portable pump everywhere you go.  - Nutrition for wound healing  During this time, make sure you follow a healthy diet. This is needed so the wound can heal and to prevent infection. Your doctor can tell you more about what to include in your diet during this time.  follow up with your doctor if you have a medical condition that led to your wound, such as diabetes. He or she can help you prevent future wounds.  - Follow-up care  Your doctor will carefully keep track of your healing. Make sure to keep all follow-up appointments.  - When to call your health care provider  Call your health care provider right away if you have any of these:  Fever of 100.4 F (38.0 C) or higher  Increased redness, swelling, or warmth around wound  Increased pain  Bright red blood or blood clots in tubing or the collection chamber of the vacuum

## 2024-07-02 ENCOUNTER — PATIENT OUTREACH (OUTPATIENT)
Dept: CARE COORDINATION | Facility: CLINIC | Age: 49
End: 2024-07-02

## 2024-07-02 ENCOUNTER — OFFICE VISIT (OUTPATIENT)
Dept: VASCULAR SURGERY | Facility: CLINIC | Age: 49
End: 2024-07-02
Attending: PODIATRIST
Payer: COMMERCIAL

## 2024-07-02 VITALS
SYSTOLIC BLOOD PRESSURE: 111 MMHG | HEIGHT: 70 IN | DIASTOLIC BLOOD PRESSURE: 78 MMHG | RESPIRATION RATE: 12 BRPM | HEART RATE: 78 BPM | OXYGEN SATURATION: 98 % | WEIGHT: 220 LBS | BODY MASS INDEX: 31.5 KG/M2

## 2024-07-02 DIAGNOSIS — E11.621 DIABETIC ULCER OF RIGHT HEEL ASSOCIATED WITH TYPE 2 DIABETES MELLITUS, LIMITED TO BREAKDOWN OF SKIN (H): Primary | ICD-10-CM

## 2024-07-02 DIAGNOSIS — L97.411 DIABETIC ULCER OF RIGHT HEEL ASSOCIATED WITH TYPE 2 DIABETES MELLITUS, LIMITED TO BREAKDOWN OF SKIN (H): Primary | ICD-10-CM

## 2024-07-02 PROCEDURE — 11042 DBRDMT SUBQ TIS 1ST 20SQCM/<: CPT | Performed by: PODIATRIST

## 2024-07-02 ASSESSMENT — PAIN SCALES - GENERAL: PAINLEVEL: NO PAIN (0)

## 2024-07-02 NOTE — PROGRESS NOTES
Clinic Care Coordination Contact  Care Team Conversations  Call from sister Rubina, not on consent to communicate. She is trying to assist her brother as he needs a knee crutch and it cost $150.  Not sure if insurance would cover.  Informed her that writer could call patient to get permission to talk to her or get her the consent to communicate form to have patient sign off.  She gave her email as [email or web address]  Sent consent to her.  Her brother is not a good advocate for himself. He refuses to talk to the NP at the TCU. He doesn't remember who his podiatrist is.  He gave her my name to call.      Plan- assist with discharge from tcu.  Assess needs after discharge.     Georgiana Isaacs,   [facility]  [phone]

## 2024-07-02 NOTE — PATIENT INSTRUCTIONS
*Wheelchairs are never guaranteed at the front door, if you have your own wheel chair or knee walker, please bring that with you to your appointment. Thank you for your understanding!*    Wound care supplies were not ordered or needed today.    Important lnstructions      STOP WOUND VAC TODAY      WEIGHT BEARING STATUS: You are to remain NON WEIGHT BEARING on your right foot. NON WEIGHT BEARING MEANS NO PRESSURE ON YOUR FOOT OR HEEL AT ANY TIME FOR ANY REASON!    2. OFFLOADING DEVICE: Must use a A ROLLING KNEE WALKER or knee crutch at all times! (do not use affected foot to push wheelchair)    3. STABILIZATION DEVICE: Use a PRAFO BOOT . You will need to WEAR THIS AT ALL TIMES EVEN WHILE IN BED.     4. ELEVATE: Elevating your leg means laying with your head on a pillow and your foot ABOVE YOUR HEART.     5. DO NOT MOVE YOUR FOOT.  There is a risk of worsening the wound or incision. To give yourself a higher chance of healing, please DO NOT swing foot back and forth and wiggle foot/toes especially when inside a stabilization device. Limited movement is allowable with therapy as recommended by the doctor.     6. TAKE A PROTEIN SHAKE TWICE A DAY.  (For ex: Boost, Ensure, Glucerna)    7. KEEP YOUR WOUND DRY AT ALL TIMES    Use a shower bag or a cast cover to keep your foot/leg dry during showers. These can be purchased on [employer] or any pharmacy.         Dressing Change lnstructions    - Dressing Application of  Endoform for right heel wound:    1. Endoform should be cut to the size of the wound.  It should touch the edges of the ulcer. It is okay if it overlap the edges a small amount.  Then, moisten the Endoform with saline.(If it is easier for you, you can moisten it before laying it in the wound)    2. Cover the wound with Endoform, followed by dry gauze, and secure with roll gauze if needed.     3. Endoform is naturally used by the body over time so you may not find it in the wound when you change your dressing.  If  you do find some, gently cleanse the wound with saline. Do not remove the remaining Endoform, which may appear as an off-white to taylor gel, just add Endoform on top.  Usually, more Endoform will need to be added every 5 days.     4. Change your top dressing every 2 days or as needed depending on drainage.    -Endoform is a collagen dressing created from ovine (sheep) fore-stomach tissue. The collagen extracellular matrix transforms into a soft conforming sheet, which is naturally incorporated into the wound over time.  Collagen dressings are used to stimulate wound healing.   ,            SEEK MEDICAL CARE IF:  You have an increase in swelling, pain, or redness around the wound.  You have an increase in the amount of pus coming from the wound.  There is a bad smell coming from the wound.  The wound appears to be worsening/enlarging  You have a fever greater than 101.5 F      It is ok to continue current wound care treatment/products for the next 2-3 days until new wound care supplies are ordered and arrive. If longer than this please contact our office at [phone].      We want to hear from you!   In the next few weeks, you should receive a call or email to complete a survey about your visit at [facility]. Please help us improve your appointment experience by letting us know how we did today. We strive to make your experience good and value any ways in which we could do better.      We value your input and suggestions.    Thank you for choosing the [facility]!

## 2024-07-02 NOTE — PROGRESS NOTES
Pt called today asking for summary to be updated with new orders for using a rolling knee walker or knee crutch instead of a wheelchair. Discussed with Dr. Sanders's staff, confirmed that this is ok. New orders faxed to [facility] [place] at [phone] (provided by [facility] staff). Also called and discussed with Airam - gave her updated information. She will also inform pt that we did talk about the new offloading.

## 2024-07-02 NOTE — PROGRESS NOTES
FOOT AND ANKLE SURGERY/PODIATRY Progress Note      ASSESSMENT:   Acute osteomyelitis right calcaneus  Ulceration right heel into subcutaneous tissue   DM2      TREATMENT:  -I discussed with the patient that the overall presentation of the right heel ulceration has not significantly improved from the previous visit.  There is no increased depth on exam today and I recommend we discontinue the wound VAC and begin use of endoform.    -Staples removed today.  Recommend continued nonweightbearing on the right foot and offloading of the right heel at all times with proper boot or floating of the right foot.    -After discussion of risk factors, nursing staff removed dressing, cleansed wound and consent obtained 2% Lidocaine HCL jelly was applied, under clean conditions, the right heel ulceration(s) were debrided using currette.  Devitalized and nonviable tissue, along with any fibrin and slough, was removed to improve granulation tissue formation, stimulate wound healing, decrease overall bacteria load, disrupt biofilm formation and decrease edge senescence. Wound drainage was scant No. Total excisional debridement was 0.4 sq cm into the subcutaneous tissue with a depth of 0.2 cm.   Ulcers were improved afterwards and .  Measures were as noted on the flow sheet.  Endoform with gauze dressing applied today which we reapplied every 4 to 5 days.    -He will follow-up in 3 weeks    Dong Sanders DPM  [facility] [place]      HPI: Floyd Capps was seen again today for a right heel ulceration.  Since his last visit with me patient reports he has been using the wound VAC as directed.  He is also remain nonweightbearing on the right foot.    Past Medical History:   Diagnosis Date    Acute hypoxemic respiratory failure (H)     Acute renal failure, unspecified acute renal failure type (H24)     CAD (coronary artery disease)     Callus of foot     Diabetes (H)     Diarrhea     Essential hypertension      Fracture of left distal radius     History of stroke     Insomnia     Nausea     Non-pressure chronic ulcer of right heel and midfoot with unspecified severity (H)     Normocytic anemia     Type 2 diabetes mellitus with foot ulcer (H)        Past Surgical History:   Procedure Laterality Date    APPENDECTOMY      CV CORONARY ANGIOGRAM N/A 3/1/2024    Procedure: CV CORONARY ANGIOGRAM;  Surgeon: Stephen Berger MD;  Location: [facility] CATH LAB CV    CV LEFT HEART CATH N/A 3/1/2024    Procedure: Left Heart Catheterization;  Surgeon: Stephen Berger MD;  Location: [facility] CATH LAB CV    EXCISE EXOSTOSIS FOOT Right 5/2/2024    Procedure: excision of bone from calcaneus with application of theraskin;  Surgeon: Dong Sanders DPM;  [place] [facility] [place]    INCISION AND DRAINAGE FOOT, COMBINED Right 5/2/2024    Procedure: INCISION AND DRAINAGE, right heel with;  Surgeon: Dong Sanders DPM;  [place] [facility] [place]    INCISION AND DRAINAGE OF WOUND      groin abscess    IRRIGATION AND DEBRIDEMENT FOOT, COMBINED Right 2/16/2024    Procedure: IRRIGATION AND DEBRIDEMENT RIGHT FOOT;  Surgeon: Cristofer Sims DPM;  [place] [facility] [place]    PICC DOUBLE LUMEN PLACEMENT  2/29/2024       No Known Allergies      Current Outpatient Medications:     acetaminophen (TYLENOL) 500 MG tablet, Take 2 tablets (1,000 mg) by mouth every 8 hours as needed for mild pain, Disp: , Rfl:     aspirin (ASA) 325 MG EC tablet, Take 1 tablet (325 mg) by mouth daily, Disp: , Rfl:     atorvastatin (LIPITOR) 80 MG tablet, Take 1 tablet (80 mg) by mouth daily, Disp: 90 tablet, Rfl: 3    bumetanide (BUMEX) 2 MG tablet, Take 1 tablet (2 mg) by mouth 2 times daily, Disp: , Rfl:     carvedilol (COREG) 25 MG tablet, Take 1 tablet (25 mg) by mouth 2 times daily (with meals), Disp: , Rfl:     ergocalciferol (ERGOCALCIFEROL) 1.25 MG (85065 UT) capsule, Take 50,000 Units by mouth once a week Every Wednesday for anemia for 12 weeks  ACTIVE 4/24/24- 6/26/24, Disp: , Rfl:     insulin aspart (NOVOLOG VIAL) 100 UNITS/ML vial, Inject 5 Units Subcutaneous 3 times daily (with meals), Disp: , Rfl:     insulin glargine (LANTUS PEN) 100 UNIT/ML pen, Inject 32 Units Subcutaneous at bedtime, Disp: , Rfl:     insulin lispro (HUMALOG KWIKPEN) 100 UNIT/ML (1 unit dial) KWIKPEN, Inject 5 Units Subcutaneous 3 times daily (with meals), Disp: , Rfl:     insulin lispro (HUMALOG VIAL) 100 UNIT/ML vial, Inject Subcutaneous 3 times daily (before meals) Inject as per sliding scale: if  = 4 units, 70 or below see hypoglycemia protocol: 150-199= 8 units;  200-249 =10 units;  250-299= 12 units; 300-349= 14 units; 350-399= 16 units; 400 or greater give 18 units, if >400x 2 call MD/NP, Disp: , Rfl:     loperamide (IMODIUM) 2 MG capsule, Take 2 mg by mouth as needed for diarrhea (give 4mg every 6 hours as needed for Diarrhea), Disp: , Rfl:     losartan (COZAAR) 25 MG tablet, Take 1 tablet (25 mg) by mouth daily, Disp: , Rfl:     melatonin 5 MG tablet, Take 5 mg by mouth nightly as needed for sleep, Disp: , Rfl:     metFORMIN (GLUCOPHAGE XR) 500 MG 24 hr tablet, Take 1 tablet (500 mg) by mouth daily (with dinner), Disp: , Rfl:     mineral oil-hydrophilic petrolatum (AQUAPHOR) external ointment, Apply topically 2 times daily, Disp: , Rfl:     multivitamin w/minerals (THERA-VIT-M) tablet, Take 1 tablet by mouth daily, Disp: , Rfl:     nicotine (NICODERM CQ) 14 MG/24HR 24 hr patch, Place 1 patch onto the skin every 24 hours, Disp: , Rfl:     ondansetron (ZOFRAN) 4 MG tablet, Take 4 mg by mouth every 8 hours as needed for nausea, Disp: , Rfl:     oxyCODONE (ROXICODONE) 5 MG tablet, Take 1-2 tablets (5-10 mg) by mouth every 4 hours as needed for moderate to severe pain, Disp: 12 tablet, Rfl: 0    oxyCODONE (ROXICODONE) 5 MG tablet, Take 1 tablet (5 mg) by mouth every 6 hours as needed for severe pain, Disp: 15 tablet, Rfl:     pantoprazole (PROTONIX) 40 MG EC tablet, Take 1  "tablet (40 mg) by mouth every morning (before breakfast), Disp: , Rfl:     sulfamethoxazole-trimethoprim (BACTRIM DS) 800-160 MG tablet, Take 1 tablet by mouth 2 times daily, Disp: 20 tablet, Rfl: 0    Urea-Lactic Acid 10-4 % CREA, Externally apply topically 2 times daily Apply to left foot/ heel topically two times a day for moisturizing 10-4%, Disp: , Rfl:     vitamin D3 (CHOLECALCIFEROL) 50 mcg (2000 units) tablet, Take 1 tablet by mouth daily, Disp: , Rfl:     Amino Acids-Protein Hydrolys (PRO-STAT PO), Take 30 mLs by mouth 2 times daily Give 2 times a day for due to patient not getting double protein on meals. 4/24/2024 (Patient not taking: Reported on 7/2/2024), Disp: , Rfl:     Review of Systems - 10 point Review of Systems is negative except for right heel ulcer which is noted in HPI.      OBJECTIVE:  /78   Pulse 78   Resp 12   Ht 5' 10\" (1.778 m)   Wt 240 lb (108.9 kg)   SpO2 98%   BMI 34.44 kg/m    General appearance: Patient is alert and fully cooperative with history & exam.  No sign of distress is noted during the visit.    Vascular: Dorsalis pedis non-palpableRight.  Dermatologic:    Negative Pressure Wound Therapy Heel Right (Active)       VASC Wound Right heel (Active)   Pre Size Length 2 07/02/24 0835   Pre Size Width 1 07/02/24 0835   Pre Size Depth 0.2 07/02/24 0835   Pre Total Sq cm 2 07/02/24 0835   Post Size Length 0.8 07/02/24 0835   Post Size Width 0.5 07/02/24 0835   Post Size Depth 0.2 07/02/24 0835   Post Total Sq cm 0.4 07/02/24 0835       Incision/Surgical Site with Packing 02/16/24 Right Heel Qty Placed: 1 (Active)       Incision/Surgical Site 02/16/24 Right;Inner Plantar (Active)       Incision/Surgical Site 05/02/24 Right Foot (Active)   Ulceration posterior aspect of the right heel has a granular base, no increased depth or erythema identified.  Neurologic: Diminished to light touch Right.  Musculoskeletal: Contracted digits noted Right.      Picture:     "

## 2024-07-08 ENCOUNTER — PATIENT OUTREACH (OUTPATIENT)
Dept: CARE COORDINATION | Facility: CLINIC | Age: 49
End: 2024-07-08
Payer: COMMERCIAL

## 2024-07-08 NOTE — PROGRESS NOTES
Contact   Chart Review     Situation: Patient chart reviewed by .    Background: following for discharge from TCU.     Assessment: In TCU, and no word on discharge.      Plan/Recommendations: Will follow for another month to offer support at discharge.      Georgiana Isaacs,   [facility]  [phone]

## 2024-07-10 ENCOUNTER — PATIENT OUTREACH (OUTPATIENT)
Dept: CARE COORDINATION | Facility: CLINIC | Age: 49
End: 2024-07-10
Payer: COMMERCIAL

## 2024-07-10 DIAGNOSIS — Z59.9 FINANCIAL DIFFICULTIES: Primary | ICD-10-CM

## 2024-07-10 SDOH — ECONOMIC STABILITY - INCOME SECURITY: PROBLEM RELATED TO HOUSING AND ECONOMIC CIRCUMSTANCES, UNSPECIFIED: Z59.9

## 2024-07-10 ASSESSMENT — ACTIVITIES OF DAILY LIVING (ADL): DEPENDENT_IADLS:: INDEPENDENT

## 2024-07-10 NOTE — Clinical Note
[facility].  He got a text from pharmacy that something was going to cost him $900. He will call pharmacy and then call clinic to see what can be done.  Thanks, Georgiana

## 2024-07-10 NOTE — PROGRESS NOTES
Clinic Care Coordination Contact  Clinic Care Coordination Contact  OUTREACH with Post Discharge Assessment    Referral Information:  Referral Source: PCP    Primary Diagnosis: Psychosocial    Chief Complaint   Patient presents with    Clinic Care Coordination - Initial        Universal Utilization: will work with patient on lessening use of ED.   Clinic Utilization  Difficulty keeping appointments:: No  Compliance Concerns: No  No-Show Concerns: No  No PCP office visit in Past Year: No  Utilization      No Show Count (past year)  3             ED Visits  10             Hospital Admissions  3                    Current as of: 7/8/2024  4:31 PM                Clinical Concerns:  Current Medical Concerns:  48 yrs old, wound on his heel and is off the wound vac.  Weak after being in TCU since February.     Current Behavioral Concerns: nervous about being home and on his own.     Education Provided to patient: reviewed care coordination.    Pain  Pain (GOAL):: No  Health Maintenance Reviewed: Due/Overdue   Health Maintenance Due   Topic Date Due    YEARLY PREVENTIVE VISIT  Never done    HF ACTION PLAN  Never done    MICROALBUMIN  Never done    DIABETIC FOOT EXAM  Never done    ADVANCE CARE PLANNING  Never done    EYE EXAM  Never done    COLORECTAL CANCER SCREENING  Never done    HIV SCREENING  Never done    HEPATITIS B IMMUNIZATION (1 of 3 - 19+ 3-dose series) Never done    Pneumococcal Vaccine: Pediatrics (0 to 5 Years) and At-Risk Patients (6 to 64 Years) (2 of 2 - PCV) 10/11/2008    COVID-19 Vaccine (3 - 2023-24 season) 09/01/2023    PHQ-2 (once per calendar year)  01/01/2024    A1C  05/14/2024    ZOSTER IMMUNIZATION (1 of 2) 12/19/2023       Clinical Pathway: None    Transitions of Care Outreach    Most Recent Admission Date:   Most Recent Admission Diagnosis: wound care    Most Recent Discharge Date: July 9-2024  Most Recent Discharge Diagnosis: wound care    Transitions of Care Assessment    Discharge  Assessment  How are you doing now that you are home?: ok, weak, need pcp appt.  How are your symptoms? (Red Flag symptoms escalate to triage hotline per guidelines): Unchanged  Do you know how to contact your clinic care team if you have future questions or changes to your health status? : Yes  Does the patient have their discharge instructions? : No - Review discharge instructions (didn't get any)  Does the patient have questions regarding their discharge instructions? : No  Were you started on any new medications or were there changes to any of your previous medications? : Yes  Does the patient have all of their medications?: Yes  Do you have questions regarding any of your medications? : No  Do you have all of your needed medical supplies or equipment (DME)?  (i.e. oxygen tank, CPAP, cane, etc.): No - What equipment or supplies are needed? (needle to check his blood, wants crutches)         Post-op (Clinicians Only)  Did the patient have surgery or a procedure: No  Eating & Drinking: eating and drinking without complaints/concerns  PO Intake: other (diabetic diet)  Bowel Function: normal  Urinary Status: voiding without complaint/concerns    Care Management       Care Mgmt General Assessment  Referral  Referral Source: PCP  Health Care Home/Utilization  Preferred [facility]: [facility]  [phone]  Preferred Urgent Care: [facility] [phone]  Living Situation  Current living arrangement:: I live alone;I live in a private home  Type of residence:: Apartment  Resources  Patient receiving home care services:: Yes  Skilled Home Care Services: Skilled Nursing;Physical Therapy ([facility])  Community Resources: [facility];Home Care  Supplies Currently Used at Home: Diabetic Supplies;Wound Care Supplies  Equipment Currently Used at Home: wheelchair, manual;tub bench;other (see comments) (knee walker)  Referrals Placed: Financial Services  Employment Status:  unemployed  Psychosocial  Lutheran or spiritual beliefs that impact treatment:: No  Mental health DX:: No  Mental health management concern (GOAL):: No  Chemical Dependency Status: No Current Concerns  Informal Support system:: Family;Friends  Functional Status  Dependent ADLs:: Ambulation-walker;Bathing  Dependent IADLs:: Independent  Bed or wheelchair confined:: No  Mobility Status: Independent w/Device  Fallen 2 or more times in the past year?: No  Any fall with injury in the past year?: No  Advance Care Plan/Directive  Advanced Care Plans/Directives on file:: No  Discussed with patient/caregiver:: Declined Further Information    Medication Management:  Medication review status: Medications reviewed and no changes reported per patient.        Has his meds.      Functional Status:  Dependent ADLs:: Ambulation-walker, Bathing  Dependent IADLs:: Independent  Bed or wheelchair confined:: No  Mobility Status: Independent w/Device  Fallen 2 or more times in the past year?: No  Any fall with injury in the past year?: No    Living Situation:  Current living arrangement:: I live alone, I live in a private home  Type of residence:: Apartment    Lifestyle & Psychosocial Needs:    Social Determinants of Health     Food Insecurity: High Risk (10/20/2023)    Food Insecurity     Within the past 12 months, did you worry that your food would run out before you got money to buy more?: Yes     Within the past 12 months, did the food you bought just not last and you didn t have money to get more?: No   Depression: Not at risk (10/20/2023)    PHQ-2     PHQ-2 Score: 0   Housing Stability: High Risk (10/20/2023)    Housing Stability     Do you have housing? : Yes     Are you worried about losing your housing?: Yes   Tobacco Use: Medium Risk (7/2/2024)    Patient History     Smoking Tobacco Use: Former     Smokeless Tobacco Use: Never     Passive Exposure: Not on file   Financial Resource Strain: Low Risk  (10/20/2023)    Financial  Resource Strain     Within the past 12 months, have you or your family members you live with been unable to get utilities (heat, electricity) when it was really needed?: No   Alcohol Use: Not At Risk (4/27/2019)    [facility]     Frequency of Alcohol Consumption: Never     Average Number of Drinks: Not on file     Frequency of Binge Drinking: Not on file   Transportation Needs: Low Risk  (10/20/2023)    Transportation Needs     Within the past 12 months, has lack of transportation kept you from medical appointments, getting your medicines, non-medical meetings or appointments, work, or from getting things that you need?: No   Physical Activity: Not on file   Interpersonal Safety: Low Risk  (10/20/2023)    Interpersonal Safety     Do you feel physically and emotionally safe where you currently live?: Yes     Within the past 12 months, have you been hit, slapped, kicked or otherwise physically hurt by someone?: No     Within the past 12 months, have you been humiliated or emotionally abused in other ways by your partner or ex-partner?: No   Stress: Not on file   Social Connections: Not on file   Health Literacy: Not on file     Diet:: Diabetic diet  Inadequate nutrition (GOAL):: No  Tube Feeding: No  Inadequate activity/exercise (GOAL):: No  Significant changes in sleep pattern (GOAL): No  Transportation means:: Friend, Family, Regular car, Medical transport     Methodist or spiritual beliefs that impact treatment:: No  Mental health DX:: No  Mental health management concern (GOAL):: No  Chemical Dependency Status: No Current Concerns  Informal Support system:: Family, Friends     Patient is at home and his sister bought him some food.  He is able to make a sandwich.  He is using knee scooter but would prefer to have crutches. He has appt in two weeks with Dr. Sanders. He tried to schedule appt with PCP and they didn't have anything within 7 days and they tried to schedule  him in [place] and he didn't schedule as he doesn't know if his medical rides would bring him that far.  Will assist.  He expected [facility] home care as that is what the  set up. He did not get a discharge plan.  They did provide some medications and he had some at home and thinks he is ok. He does not have the lancet to draw his blood for glucose monitoring. He is not using insulin because of that.  They sent him home with application for SNAP and he would like help and order was made for financial resource worker.  He is not smoking and on nicotine patch.  He is weak and needs to build up his endurance.  Encouraged him to be safe at home.      After call, called [facility] and they declined the order.  Called [facility] to see if they sent the order to another home care agency, and they did not.      Called patient back and informed him that PCP or other provider will need to see him in order to get home care started.      Resources and Interventions:  Current Resources:   Skilled Home Care Services: Skilled Nursing, Physical Therapy ([facility])  Community Resources: [facility], Home Care  Supplies Currently Used at Home: Diabetic Supplies, Wound Care Supplies  Equipment Currently Used at Home: wheelchair, manual, tub bench, other (knee walker)  Employment Status: unemployed     Advance Care Plan/Directive  Advanced Care Plans/Directives on file:: No  Discussed with patient/caregiver:: Declined Further Information    Referrals Placed: Financial Services     Care Plan:   Care Plan: Food Insecurity       Problem: Unable to prepare meals       Goal: Identify Options for Meal Assistance               Goal: Learn how to Prepare Reliable Meals                       Problem: Reliable food source       Long-Range Goal: Establish Options for Affordable Food Sources       Start Date: 7/10/2024 Expected End Date: 7/9/2025    Priority: High    Note:     Barriers: needs help with SNAP application, just  discharged from TCU.   Strengths: motivated.   Patient expressed understanding of goal: yes  Action steps to achieve this goal:  1. I will work with Financial Resource Worker to complete SNAP application.   2. I will submit application to [facility] when completed.   3. I will report progress towards this goal at scheduled outreach telephone calls from the CCC team.                                Care Plan: Help At Home       Problem: Insufficient In-home support       Long-Range Goal: Establish adequate home support       Start Date: 7/10/2024 Expected End Date: 7/9/2025    Priority: High    Note:     Barriers: recent discharge from TCU, unsure what I need.   Strengths: home care has been ordered.    Patient expressed understanding of goal: yes  Action steps to achieve this goal:  1. I will work with [facility].   2. I will work with [facility] to identify needed supports.  3. I will report progress towards this goal at scheduled outreach telephone calls from the CCC team.                                  Patient/Caregiver understanding: enrolling in care coordination.      Update- PCP appt scheduled for tomorrow. Called patient. He has no income and would be interested in Cash if eligible. Sent message to [facility].  He has $8,000 from his mother's bank account to use for expenses. He hopes to return to work in two months.      7- Completed his PCP appt and home care was ordered.  [facility] declined the order.    Sent to [facility].  - no  Called [facility]- no  Sent to [facility]- ACCEPTED.  Called patient and gave update. He was able to get medical ride this morning, but they rushed him when picking him up early and called him during his appt and had to rush again.  He got a text from pharmacy that something was going to cost $900 and he is unsure what. He will contact pharmacy and then contact pcp for advice.        Outreach Frequency: monthly, more frequently as needed  Future Appointments                 In 2 weeks Dong Sanders DPM [facility]            Follow up Plan     Discharge Follow-Up  Discharge follow up appointment scheduled in alignment with recommended follow up timeframe or Transitions of Risk Category? (Low = within 30 days; Moderate= within 14 days; High= within 7 days): No  Patient's follow up appointment not scheduled: Patient declined scheduling support. Education on the importance of transitions of care follow up. Provided scheduling phone number.    Future Appointments   Date Time Provider Department Center   7/24/2024  8:20 AM [facility]       Outpatient Plan as outlined on AVS reviewed with patient.    For any urgent concerns, please contact our 24 hour nurse triage line: [phone] ([phone])

## 2024-07-11 ENCOUNTER — OFFICE VISIT (OUTPATIENT)
Dept: INTERNAL MEDICINE | Facility: CLINIC | Age: 49
End: 2024-07-11
Payer: COMMERCIAL

## 2024-07-11 VITALS
BODY MASS INDEX: 32.21 KG/M2 | TEMPERATURE: 97.9 F | DIASTOLIC BLOOD PRESSURE: 76 MMHG | OXYGEN SATURATION: 99 % | SYSTOLIC BLOOD PRESSURE: 118 MMHG | HEIGHT: 70 IN | RESPIRATION RATE: 14 BRPM | WEIGHT: 225 LBS | HEART RATE: 97 BPM

## 2024-07-11 DIAGNOSIS — L97.411 DIABETIC ULCER OF RIGHT HEEL ASSOCIATED WITH TYPE 2 DIABETES MELLITUS, LIMITED TO BREAKDOWN OF SKIN (H): Primary | ICD-10-CM

## 2024-07-11 DIAGNOSIS — E11.65 POORLY CONTROLLED DIABETES MELLITUS (H): ICD-10-CM

## 2024-07-11 DIAGNOSIS — F17.200 TOBACCO USE DISORDER: ICD-10-CM

## 2024-07-11 DIAGNOSIS — E66.811 CLASS 1 OBESITY DUE TO EXCESS CALORIES WITH SERIOUS COMORBIDITY AND BODY MASS INDEX (BMI) OF 32.0 TO 32.9 IN ADULT: ICD-10-CM

## 2024-07-11 DIAGNOSIS — E66.01 CLASS 2 SEVERE OBESITY DUE TO EXCESS CALORIES WITH SERIOUS COMORBIDITY IN ADULT, UNSPECIFIED BMI (H): ICD-10-CM

## 2024-07-11 DIAGNOSIS — E11.621 DIABETIC ULCER OF RIGHT HEEL ASSOCIATED WITH TYPE 2 DIABETES MELLITUS, LIMITED TO BREAKDOWN OF SKIN (H): Primary | ICD-10-CM

## 2024-07-11 DIAGNOSIS — E78.01 FAMILIAL HYPERCHOLESTEROLEMIA: ICD-10-CM

## 2024-07-11 DIAGNOSIS — E11.65 UNCONTROLLED TYPE 2 DIABETES MELLITUS WITH HYPERGLYCEMIA, WITH LONG-TERM CURRENT USE OF INSULIN (H): ICD-10-CM

## 2024-07-11 DIAGNOSIS — E66.812 CLASS 2 SEVERE OBESITY DUE TO EXCESS CALORIES WITH SERIOUS COMORBIDITY IN ADULT, UNSPECIFIED BMI (H): ICD-10-CM

## 2024-07-11 DIAGNOSIS — Z86.73 HISTORY OF STROKE: ICD-10-CM

## 2024-07-11 DIAGNOSIS — R79.89 ELEVATED TROPONIN: ICD-10-CM

## 2024-07-11 DIAGNOSIS — Z11.59 NEED FOR HEPATITIS C SCREENING TEST: ICD-10-CM

## 2024-07-11 DIAGNOSIS — R80.9 ALBUMINURIA: ICD-10-CM

## 2024-07-11 DIAGNOSIS — Z79.4 UNCONTROLLED TYPE 2 DIABETES MELLITUS WITH HYPERGLYCEMIA, WITH LONG-TERM CURRENT USE OF INSULIN (H): ICD-10-CM

## 2024-07-11 DIAGNOSIS — I50.21 ACUTE SYSTOLIC HEART FAILURE (H): ICD-10-CM

## 2024-07-11 DIAGNOSIS — Z98.890 S/P FOOT SURGERY, RIGHT: ICD-10-CM

## 2024-07-11 DIAGNOSIS — E66.09 CLASS 1 OBESITY DUE TO EXCESS CALORIES WITH SERIOUS COMORBIDITY AND BODY MASS INDEX (BMI) OF 32.0 TO 32.9 IN ADULT: ICD-10-CM

## 2024-07-11 LAB
ALBUMIN SERPL BCG-MCNC: 3.7 G/DL (ref 3.5–5.2)
ALP SERPL-CCNC: 105 U/L (ref 40–150)
ALT SERPL W P-5'-P-CCNC: 25 U/L (ref 0–70)
ANION GAP SERPL CALCULATED.3IONS-SCNC: 13 MMOL/L (ref 7–15)
AST SERPL W P-5'-P-CCNC: 16 U/L (ref 0–45)
BASOPHILS # BLD AUTO: 0.1 10E3/UL (ref 0–0.2)
BASOPHILS NFR BLD AUTO: 1 %
BILIRUB SERPL-MCNC: 0.4 MG/DL
BUN SERPL-MCNC: 36.1 MG/DL (ref 6–20)
CALCIUM SERPL-MCNC: 10.4 MG/DL (ref 8.6–10)
CHLORIDE SERPL-SCNC: 104 MMOL/L (ref 98–107)
CREAT SERPL-MCNC: 1.14 MG/DL (ref 0.67–1.17)
CREAT UR-MCNC: 92.8 MG/DL
DEPRECATED HCO3 PLAS-SCNC: 22 MMOL/L (ref 22–29)
EGFRCR SERPLBLD CKD-EPI 2021: 78 ML/MIN/1.73M2
EOSINOPHIL # BLD AUTO: 0.3 10E3/UL (ref 0–0.7)
EOSINOPHIL NFR BLD AUTO: 3 %
ERYTHROCYTE [DISTWIDTH] IN BLOOD BY AUTOMATED COUNT: 12.3 % (ref 10–15)
GLUCOSE SERPL-MCNC: 319 MG/DL (ref 70–99)
HBA1C MFR BLD: 9.4 % (ref 0–5.6)
HCT VFR BLD AUTO: 39.2 % (ref 40–53)
HCV AB SERPL QL IA: NONREACTIVE
HGB BLD-MCNC: 13.4 G/DL (ref 13.3–17.7)
IMM GRANULOCYTES # BLD: 0 10E3/UL
IMM GRANULOCYTES NFR BLD: 0 %
LYMPHOCYTES # BLD AUTO: 2.1 10E3/UL (ref 0.8–5.3)
LYMPHOCYTES NFR BLD AUTO: 16 %
MCH RBC QN AUTO: 31.8 PG (ref 26.5–33)
MCHC RBC AUTO-ENTMCNC: 34.2 G/DL (ref 31.5–36.5)
MCV RBC AUTO: 93 FL (ref 78–100)
MICROALBUMIN UR-MCNC: 1926 MG/L
MICROALBUMIN/CREAT UR: 2075.43 MG/G CR (ref 0–17)
MONOCYTES # BLD AUTO: 0.8 10E3/UL (ref 0–1.3)
MONOCYTES NFR BLD AUTO: 6 %
NEUTROPHILS # BLD AUTO: 9.6 10E3/UL (ref 1.6–8.3)
NEUTROPHILS NFR BLD AUTO: 74 %
PLATELET # BLD AUTO: 315 10E3/UL (ref 150–450)
POTASSIUM SERPL-SCNC: 4.6 MMOL/L (ref 3.4–5.3)
PROT SERPL-MCNC: 7.5 G/DL (ref 6.4–8.3)
RBC # BLD AUTO: 4.21 10E6/UL (ref 4.4–5.9)
SODIUM SERPL-SCNC: 139 MMOL/L (ref 135–145)
WBC # BLD AUTO: 12.9 10E3/UL (ref 4–11)

## 2024-07-11 PROCEDURE — 99214 OFFICE O/P EST MOD 30 MIN: CPT | Performed by: NURSE PRACTITIONER

## 2024-07-11 PROCEDURE — 80053 COMPREHEN METABOLIC PANEL: CPT | Performed by: NURSE PRACTITIONER

## 2024-07-11 PROCEDURE — 85025 COMPLETE CBC W/AUTO DIFF WBC: CPT | Performed by: NURSE PRACTITIONER

## 2024-07-11 PROCEDURE — 82570 ASSAY OF URINE CREATININE: CPT | Performed by: NURSE PRACTITIONER

## 2024-07-11 PROCEDURE — 83036 HEMOGLOBIN GLYCOSYLATED A1C: CPT | Performed by: NURSE PRACTITIONER

## 2024-07-11 PROCEDURE — 36415 COLL VENOUS BLD VENIPUNCTURE: CPT | Performed by: NURSE PRACTITIONER

## 2024-07-11 PROCEDURE — 82043 UR ALBUMIN QUANTITATIVE: CPT | Performed by: NURSE PRACTITIONER

## 2024-07-11 PROCEDURE — 86803 HEPATITIS C AB TEST: CPT | Performed by: NURSE PRACTITIONER

## 2024-07-11 RX ORDER — ATORVASTATIN CALCIUM 80 MG/1
80 TABLET, FILM COATED ORAL DAILY
Qty: 90 TABLET | Refills: 3 | Status: SHIPPED | OUTPATIENT
Start: 2024-07-11

## 2024-07-11 RX ORDER — INSULIN LISPRO 100 [IU]/ML
5 INJECTION, SOLUTION INTRAVENOUS; SUBCUTANEOUS
Qty: 15 ML | Refills: 3 | Status: SHIPPED | OUTPATIENT
Start: 2024-07-11 | End: 2024-08-19

## 2024-07-11 NOTE — PROGRESS NOTES
Assessment & Plan   Problem List Items Addressed This Visit       Hyperlipidemia    Relevant Medications    atorvastatin (LIPITOR) 80 MG tablet    Obesity    Relevant Medications    insulin glargine (LANTUS PEN) 100 UNIT/ML pen    insulin lispro (HUMALOG KWIKPEN) 100 UNIT/ML (1 unit dial) KWIKPEN    Tobacco use disorder    Relevant Medications    atorvastatin (LIPITOR) 80 MG tablet    Elevated troponin    Relevant Medications    atorvastatin (LIPITOR) 80 MG tablet    History of stroke    Relevant Medications    atorvastatin (LIPITOR) 80 MG tablet    Class 2 severe obesity due to excess calories with serious comorbidity in adult (H)    Relevant Medications    insulin glargine (LANTUS PEN) 100 UNIT/ML pen    insulin lispro (HUMALOG KWIKPEN) 100 UNIT/ML (1 unit dial) KWIKPEN    atorvastatin (LIPITOR) 80 MG tablet    Poorly controlled diabetes mellitus (H)    Relevant Medications    insulin glargine (LANTUS PEN) 100 UNIT/ML pen    insulin lispro (HUMALOG KWIKPEN) 100 UNIT/ML (1 unit dial) KWIKPEN    Acute systolic heart failure (H)    Relevant Medications    atorvastatin (LIPITOR) 80 MG tablet    Diabetic ulcer of right heel associated with type 2 diabetes mellitus, limited to breakdown of skin (H) - Primary    Relevant Medications    insulin glargine (LANTUS PEN) 100 UNIT/ML pen    insulin lispro (HUMALOG KWIKPEN) 100 UNIT/ML (1 unit dial) KWIKPEN    Other Relevant Orders    HEMOGLOBIN A1C    Home Care Referral    Crutches Order for DME - ONLY FOR DME     Other Visit Diagnoses       Uncontrolled type 2 diabetes mellitus with hyperglycemia, with long-term current use of insulin (H)        Relevant Medications    insulin glargine (LANTUS PEN) 100 UNIT/ML pen    insulin lispro (HUMALOG KWIKPEN) 100 UNIT/ML (1 unit dial) KWIKPEN    Continuous Glucose  (FREESTYLE LAMINE 2 READER) ALEAH    Continuous Glucose Sensor (FREESTYLE LAMINE 2 SENSOR) MISC    Other Relevant Orders    Home Care Referral    CBC with platelets  "and differential    Comprehensive metabolic panel    Albumin Random Urine Quantitative with Creat Ratio    S/P foot surgery, right        Relevant Medications    atorvastatin (LIPITOR) 80 MG tablet           Needs strength training due several months of immobility      MED REC REQUIRED  Post Medication Reconciliation Status: discharge medications reconciled and changed, per note/orders  BMI  Estimated body mass index is 32.28 kg/m  as calculated from the following:    Height as of this encounter: 1.778 m (5' 10\").    Weight as of this encounter: 102.1 kg (225 lb).             Alexandra Barrientos is a 48 year old, presenting for the following health issues:  Referral and Hospital F/U        7/11/2024     9:28 AM   Additional Questions   Roomed by Jacques LAMBERT CMA     History of Present Illness       Reason for visit:  To be refferd for home health care    He eats 2-3 servings of fruits and vegetables daily.He consumes 0 sweetened beverage(s) daily.He exercises with enough effort to increase his heart rate 30 to 60 minutes per day.  He exercises with enough effort to increase his heart rate 7 days per week. He is missing 2 dose(s) of medications per week.          Hospital Follow-up Visit:    Hospital/Nursing Home/ Rehab Facility:  [facility]  Date of Discharge: 07/09/2024  Reason(s) for Admission: diabetic ulcer of right heel  Was the patient in the ICU or did the patient experience delirium during hospitalization?  No    Problems taking medications regularly:  None  Medication changes since discharge: None  Problems adhering to non-medication therapy:  None    Summary of hospitalization:  [facility] hospital discharge summary reviewed  Diagnostic Tests/Treatments reviewed.  Follow up needed: ortho, diabetes, cardiology wound care, home care, nephrology, ID, Care Coordination   Other Healthcare Providers Involved in Patient s Care:         Homecare, Care Coordination, Specialist appointment - per " "note, and Physical Therapy  Update since discharge: stable.         Plan of care communicated with patient                 Review of Systems  Constitutional, HEENT, cardiovascular, pulmonary, GI, , musculoskeletal, neuro, skin, endocrine and psych systems are negative, except as otherwise noted.      Objective    /76 (BP Location: Left arm, Patient Position: Sitting, Cuff Size: Adult Regular)   Pulse 97   Temp 97.9  F (36.6  C) (Oral)   Resp 14   Ht 1.778 m (5' 10\")   Wt 102.1 kg (225 lb)   SpO2 99%   BMI 32.28 kg/m    Body mass index is 32.28 kg/m .  Physical Exam   GENERAL: alert and no distress  EYES: Eyes grossly normal to inspection, PERRL and conjunctivae and sclerae normal  RESP: lungs clear to auscultation - no rales, rhonchi or wheezes  CV: regular rate and rhythm, normal S1 S2  Patient is utilizing an assistive device to ambulate  PSYCH: mentation appears normal, affect normal/bright    Admission on 05/02/2024, Discharged on 05/02/2024   Component Date Value Ref Range Status    GLUCOSE BY METER POCT 05/02/2024 298 (H)  70 - 99 mg/dL Final    Case Report 05/02/2024    Final                    Value:Surgical Pathology Report                         Case: [id number]                                  Authorizing Provider:  Dong Sanders DPM Collected:           05/02/2024 09:26 AM          Ordering Location:     [facility]      Received:            05/02/2024 09:53 AM                                 Micky BRIZUELA Phase II                                                          Pathologist:           Garima Richmond MD                                                      Specimen:    Heel, Right, right calcaneus                                                               Final Diagnosis 05/02/2024    Final                    Value:This result contains rich text formatting which cannot be displayed here.    Clinical Information 05/02/2024    Final                    " Value:This result contains rich text formatting which cannot be displayed here.    Gross Description 05/02/2024    Final                    Value:This result contains rich text formatting which cannot be displayed here.    Microscopic Description 05/02/2024    Final                    Value:This result contains rich text formatting which cannot be displayed here.    Performing Labs 05/02/2024    Final                    Value:This result contains rich text formatting which cannot be displayed here.    Gram Stain Result 05/02/2024 2+ Gram positive cocci (A)   Final    Gram Stain Result 05/02/2024 3+ WBC seen (A)   Final    Predominantly PMNs    Culture 05/02/2024 4+ Staphylococcus aureus MRSA (A)   Final    Culture 05/02/2024 2+ Proteus hauseri (A)   Final    Culture 05/02/2024 2+ Enterococcus faecalis (A)   Final    Culture 05/02/2024 1+ Streptococcus mitis/oralis (A)   Final    Comment: Not isolated or reported on routine aerobic culture  This organism is susceptible to ampicillin, penicillin, vancomycin and the cephalosporins. If treatment is required and your patient is allergic to penicillin, contact the microbiology lab within 5                            days to request susceptibility testing.    Culture 05/02/2024 2+ Mixed Aerobic and Anaerobic melody   Final           Signed Electronically by: Glendy Benavides NP

## 2024-07-12 ENCOUNTER — PATIENT OUTREACH (OUTPATIENT)
Dept: CARE COORDINATION | Facility: CLINIC | Age: 49
End: 2024-07-12
Payer: COMMERCIAL

## 2024-07-15 ENCOUNTER — PATIENT OUTREACH (OUTPATIENT)
Dept: CARE COORDINATION | Facility: CLINIC | Age: 49
End: 2024-07-15
Payer: COMMERCIAL

## 2024-07-15 NOTE — PROGRESS NOTES
"Clinic Care Coordination Contact  Follow Up Progress Note      Assessment: Call from patient who got a call from [facility] and they told him they cannot serve him.      Sent referral to [facility].  - no  Sent to [facility].  Left message at [facility]. - no  [facility]- no  [facility]-  Already tried [facility], [facility]    He fell at 2 AM one night going to the bathroom when he was tired.  No injuries.   7-17 called [facility] and they have not reviewed yet.  Received VM from patient that he gives permission for his sister to talk to clinic staff.      VM from patient giving writer permission to talk to his sister Rubina.      Call from Rubina. She was with patient last evening helping with his wound care. The healing is looking good and he has enough covering for two more applications and he can get more supplies after his appt on 7-24 with podiatrist.  She doesn't like helping with his care as they don't have the greatest relationship, but she wants to assist.  She doesn't always trust what he says. She has blank consent to communicate and will get it signed.  She is worried about his income as she doesn't think that he will be able to return to work as a  due to his stroke. \"He can't cut his finger nails, so he cannot weld.\"  He is using his savings and that will run out and he won't have money to pay his rent.  Discussed how to apply for Social Security disability and she will bring it up.  He may also need to get into subsidized apartment as he is paying $1200 per month.  She is worried that he is not moving enough as he is scared of falling. She told him to walk more in the hallways.  She wishes he would have applied for Social Security a long time ago.  She appreciates the help.  There is no one else available in his family to assist him.      Called patient after reading that podiatry was able to find home care through [facility].  He talked to nurse who will be coming out on " 7-22. They do not have physical therapy, only nursing.  He wants to purchase a bath chair as it would be faster. Discussed that insurance would cover, but he doesn't care.  Encouraged him to set up medical ride for next appt. He hopes that podiatry signs off so he can put pressure on his heel.  He is using the knee scooter to get around and thinks he is getting stronger. He is careful so he doesn't fall.         Care Gaps:    Health Maintenance Due   Topic Date Due    YEARLY PREVENTIVE VISIT  Never done    HF ACTION PLAN  Never done    DIABETIC FOOT EXAM  Never done    ADVANCE CARE PLANNING  Never done    EYE EXAM  Never done    COLORECTAL CANCER SCREENING  Never done    HIV SCREENING  Never done    HEPATITIS B IMMUNIZATION (1 of 3 - 19+ 3-dose series) Never done    Pneumococcal Vaccine: Pediatrics (0 to 5 Years) and At-Risk Patients (6 to 64 Years) (2 of 2 - PCV) 10/11/2008    COVID-19 Vaccine (3 - 2023-24 season) 09/01/2023    ZOSTER IMMUNIZATION (1 of 2) 12/19/2023       Postponed to next PCP appt     Care Plans  Care Plan: Food Insecurity       Problem: Reliable food source       Long-Range Goal: Establish Options for Affordable Food Sources       Start Date: 7/10/2024 Expected End Date: 7/9/2025    Priority: High    Note:     Barriers: needs help with SNAP application, just [facility] from [facility].   Strengths: motivated.   Patient expressed understanding of goal: yes  Action steps to achieve this goal:  1. I will work with Financial Resource Worker to complete SNAP application.   2. I will submit application to [facility] when completed.   3. I will report progress towards this goal at scheduled outreach telephone calls from the CCC team.                                Care Plan: Help At Home       Problem: Insufficient In-home support       Long-Range Goal: Establish adequate home support       Start Date: 7/10/2024 Expected End Date: 7/9/2025    Priority: High    Note:     Barriers: recent discharge from [facility]CU,  unsure what I need.   Strengths: home care has been ordered.    Patient expressed understanding of goal: yes  Action steps to achieve this goal:  1. I will work with [facility].   2. I will work with [facility] to identify needed supports.  3. I will report progress towards this goal at scheduled outreach telephone calls from the CCC team.                                  Intervention/Education provided during outreach: support with home care.      Outreach Frequency: monthly, more frequently as needed      Plan:   Will contact patient in two weeks.   Georgiana Isaacs,   [facility]  [phone]

## 2024-07-15 NOTE — Clinical Note
Podiatry found a home care agency which only can provide nursing and will start on Monday. So jared.  Georgiana

## 2024-07-16 ENCOUNTER — TELEPHONE (OUTPATIENT)
Dept: VASCULAR SURGERY | Facility: CLINIC | Age: 49
End: 2024-07-16
Payer: COMMERCIAL

## 2024-07-16 ENCOUNTER — PATIENT OUTREACH (OUTPATIENT)
Dept: CARE COORDINATION | Facility: CLINIC | Age: 49
End: 2024-07-16
Payer: COMMERCIAL

## 2024-07-16 NOTE — TELEPHONE ENCOUNTER
Patient returning call to clinic. He was connected to primary care, not specialty. Routing back to specialty clinic to call patient again.

## 2024-07-16 NOTE — TELEPHONE ENCOUNTER
Spoke with Yuliya. Per her, pt is not able to do wound care and has no caregivers. He also has no transportation to come to clinic. Attempted to call pt, LMTCB.    Referral faxed to [facility], requested reply ASAP.     Daisha Mandujano RN, CWOCN

## 2024-07-16 NOTE — TELEPHONE ENCOUNTER
ARLENE Dwyer calling back again to get an update regarding home care. She is asking for an update ASAP as she states patient was in need of a dressing change today and that was not done.

## 2024-07-16 NOTE — TELEPHONE ENCOUNTER
Caller: Lyle from [facility]    Provider: AMY Sanders    Detailed reason for call: lyle is calling stating that Floyd has recently been discharged from the [facility] and is in need of home care.  However the only home care referral was [facility] to [facility] and they aren't covered thru his MA.  Please resend new orders to find home care for Floyd    Best phone number to contact: [phone]    Best time to contact: any    Ok to leave a detailed message: Yes    Ok to speak to authorized person if needed: No      (Noted to patient if reason is related to wound or incision, to please send a photo via email or [email or web address].)

## 2024-07-17 NOTE — TELEPHONE ENCOUNTER
[facility] unable to accept r/t branch at capacity for nurse visits    HC referrals faxed to:  [facility]- 7/17 [facility]- unable to accept d/t staffing  [facility]- 7/17 [facility]- unable to accept   [facility]- 7/17 [facility]- CAN ACCEPT THIS PATIENT; SOC planned for 7/24 or 7/25 [facility]    LVM and sent myContactCard message to alert him that home care accepted and phone number for agency.    Brionna Rico RN, CWOCN

## 2024-07-24 ENCOUNTER — PATIENT OUTREACH (OUTPATIENT)
Dept: CARE COORDINATION | Facility: CLINIC | Age: 49
End: 2024-07-24

## 2024-07-24 ENCOUNTER — OFFICE VISIT (OUTPATIENT)
Dept: VASCULAR SURGERY | Facility: CLINIC | Age: 49
End: 2024-07-24
Attending: PODIATRIST
Payer: COMMERCIAL

## 2024-07-24 VITALS
DIASTOLIC BLOOD PRESSURE: 85 MMHG | HEART RATE: 83 BPM | RESPIRATION RATE: 18 BRPM | SYSTOLIC BLOOD PRESSURE: 146 MMHG | TEMPERATURE: 98.2 F | OXYGEN SATURATION: 96 %

## 2024-07-24 DIAGNOSIS — L97.411 DIABETIC ULCER OF RIGHT HEEL ASSOCIATED WITH TYPE 2 DIABETES MELLITUS, LIMITED TO BREAKDOWN OF SKIN (H): Primary | ICD-10-CM

## 2024-07-24 DIAGNOSIS — E11.621 DIABETIC ULCER OF RIGHT HEEL ASSOCIATED WITH TYPE 2 DIABETES MELLITUS, LIMITED TO BREAKDOWN OF SKIN (H): Primary | ICD-10-CM

## 2024-07-24 PROCEDURE — 99024 POSTOP FOLLOW-UP VISIT: CPT | Performed by: PODIATRIST

## 2024-07-24 PROCEDURE — G0463 HOSPITAL OUTPT CLINIC VISIT: HCPCS | Performed by: PODIATRIST

## 2024-07-24 NOTE — PATIENT INSTRUCTIONS
Congratulations! Your wound has healed.    Until you receive your new diabetic shoes & inserts Dr. Sanders would like you to remain Limited weight bearing means that it is only okay for you to apply light pressure on the affected foot when transferring from your assistive device to a chair or bed. on your right foot with the use of your CAM BOOT, to allow the newly healed skin to become stronger.    You may begin showering as normal in 2 weeks    You should avoid soaking your right foot for the next  2 weeks, this includes swimming and hot tubs.    Continue to monitor the area for breakdown & call us if your wound reopens.      Your doctor recommends you use a pumice stone to get rid of your callous. You can purchase a pumice stone at your local drug store.    How to use your Pumice Stone        1. Soak your calloused skin in warm water. The most common part of the body to exfoliate with a pumice stone is the feet. Heels tend to develop a layer of hard, calloused skin that can become cracked or scaled. Soak the calloused body part in warm water for about five minutes to soften the skin.    2. Wait until your dry skin has softened. The skin will be easier to remove if it's soft and supple. Feel your skin after several minutes of soaking. If it still feels tough, wait a few more minutes (giving the water a warm-up if necessary). If it's soft, your skin is ready for the pumice stone.     3. Wet the stone. Wetting the stone will help it slide more easily across your skin, rather than catching on it. Run the stone under warm water, or dip it in the water where you're soaking your skin, in order to thoroughly wet it.    4. Rub it gently over the calloused area. Use a circular motion to start sloughing away the dead skin with the pumice stone. If the skin is nice and soft, it should start coming right off. Keep going until you remove the dead skin and get to the fresh, supple skin underneath.   Don't press too hard. Light  pressure is all that is needed; let the surface of the stone do the work.   If you're working on your feet, focus on the heels, the sides of your toes, and other areas where dry skin tends to build up.    5. Rinse and repeat. Rinse off the dead skin and take a look to see if you need to keep going. If you still see bits of dead skin, go over the area again with the pumice stone. Continue using the stone on the area until you're satisfied with the results.   Since the pumice stone will wear down slightly while you use it, you may need to turn it over to get a fresh surface you can use to exfoliate your skin.   Rinse the pumice stone often to keep its surface clean and effective.    6. Dry and moisturize your skin. When you're finished, use a towel to pat your skin dry. Coat the area with an oil or cream to prevent it from drying out too quickly. Your formerly calloused skin should now be soft, supple and gleaming.   Coconut oil, almond oil, or body lotion are all fine to use to condition your skin after pumicing.   Repeat as often as needed to keep your skin in good shape.    CARING FOR YOUR PUMICE STONE:    1. Scrub it after use. Dead skin will build up in the pores of the stone as you use it, so you'll want to clean the stone after use. Use a scrub brush to scrub the stone while holding it under running water. Add a bit of soap to help clean the stone completely. This way your stone will be clean and ready to use next time you need it.     2. Allow it to completely dry out. Set the pumice stone in a dry place so that it doesn't stay damp in between uses. Some pumice stones come with a string attached that allows you to hang the stone to dry. If you let the stone stay wet, bacteria could grow in the pores, making it unsafe to use.     3. Boil it if necessary. Every once in a while, you'll want to give the stone a deep cleaning to make sure it isn't harboring bacteria. Bring a small pot of water to a full boil, drop  in the stone, and boil it for five minutes. Use tongs to remove the stone from the water and allow it to dry completely before storing.   If you use the stone frequently, boil it every two weeks to ensure it stays clean.   If you'd like, you can add a capful of bleach to the water to be certain all the bacteria is killed.    4. Replace the stone when it wears down. Pumice is a soft stone that will eventually wear away after you've used it for awhile. When it gets too small to handle easily, or the surface becomes too smooth to be effective, purchase a new one. Pumice stones are inexpensive and can be found at any store that sells beauty supplies.       Please call one of the [place] locations below to schedule an appointment. If you received a prescription please bring it with you to your appointment. Some locations are limited to what they carry.     Office Locations    [facility]  [address] [place] [address]  Home Medical Equipment, [address]   Phone: [phone]   Orthotics and Prosthetics, [address]   Phone: [phone]    [facility]  Home Medical Equipment  [address], [address], [place] [address]   Phone: [phone]    Orthotics and Prosthetics ([facility])    [address], [address], [place] [address]  Phone: [phone]    [facility] [place]  [address]   [place] [address]   Phone: [phone]    [facility] Professional Bldg.  [address]  [place] [address]  Phone: [phone]    [facility] Medical Bldg[facility]   [address]  [place] [address]  Phone: [phone]    [facility]  [address]  [place] [address]  Phone: [phone]    [facility]  [address]  [place] [address]  Phone: [phone]    [address] [place] [address]   Phone: [phone]        We want to hear from you!   In the next few weeks, you should receive a call or email to complete a survey about your visit at [facility]. Please help us improve your appointment experience by letting us know how we did today. We strive to make your experience good and value any ways in which we could do better.      We value your input and suggestions.    Thank you for choosing the [facility]!

## 2024-07-24 NOTE — PROGRESS NOTES
FOOT AND ANKLE SURGERY/PODIATRY Progress Note      ASSESSMENT:   Acute osteomyelitis right calcaneus  Ulceration right heel, resolved   DM2      TREATMENT:  -The right heel ulceration has resolved.     -We discussed that due to his anatomy, there is a pressure point which will continue to build callus tissue. If the callus tissue becomes too thick, skin breakdown will likely occur.     -I recommend use of a pumice stone x2-3 per week to remove callus tissue. I have asked that he return for sharp debridement of the callus prn.     -I have referred him to [facility] for diabetic shoes and inserts to offload the area of increased skin pressure. He will begin to ambulate in a CAM boot until the diabetic inserts/shoes are available.     -He is discharged from my care at this time but encouraged to return as concerns develop.     30 minutes spent on the day of encounter doing chart review, history and exam, documentation, and further activities as noted.     Dong Sanders DPM  [facility]      HPI: Floyd Capps was seen again today for a right heel ulceration. He has remained non-weight bearing on his right foot.      Past Medical History:   Diagnosis Date    Acute hypoxemic respiratory failure (H)     Acute renal failure, unspecified acute renal failure type (H24)     CAD (coronary artery disease)     Callus of foot     Diabetes (H)     Diarrhea     Essential hypertension     Fracture of left distal radius     History of stroke     Insomnia     Nausea     Non-pressure chronic ulcer of right heel and midfoot with unspecified severity (H)     Normocytic anemia     Type 2 diabetes mellitus with foot ulcer (H)        Past Surgical History:   Procedure Laterality Date    APPENDECTOMY      CV CORONARY ANGIOGRAM N/A 3/1/2024    Procedure: CV CORONARY ANGIOGRAM;  Surgeon: Stephen Berger MD;  Location: [facility] CATH LAB CV    CV LEFT HEART CATH N/A 3/1/2024    Procedure: Left Heart Catheterization;   Surgeon: Stephen Berger MD;  Location: [facility] LAB CV    EXCISE EXOSTOSIS FOOT Right 5/2/2024    Procedure: excision of bone from calcaneus with application of theraskin;  Surgeon: Dong Sanders DPM;  Location: [facility] OR    INCISION AND DRAINAGE FOOT, COMBINED Right 5/2/2024    Procedure: INCISION AND DRAINAGE, right heel with;  Surgeon: Dong Sanders DPM;  Location: [facility] OR    INCISION AND DRAINAGE OF WOUND      groin abscess    IRRIGATION AND DEBRIDEMENT FOOT, COMBINED Right 2/16/2024    Procedure: IRRIGATION AND DEBRIDEMENT RIGHT FOOT;  Surgeon: Cristofer Sims DPM;  Location: [facility] OR    PICC DOUBLE LUMEN PLACEMENT  2/29/2024       No Known Allergies      Current Outpatient Medications:     acetaminophen (TYLENOL) 500 MG tablet, Take 2 tablets (1,000 mg) by mouth every 8 hours as needed for mild pain, Disp: , Rfl:     aspirin (ASA) 325 MG EC tablet, Take 1 tablet (325 mg) by mouth daily, Disp: , Rfl:     atorvastatin (LIPITOR) 80 MG tablet, Take 1 tablet (80 mg) by mouth daily, Disp: 90 tablet, Rfl: 3    bumetanide (BUMEX) 2 MG tablet, Take 1 tablet (2 mg) by mouth 2 times daily, Disp: , Rfl:     carvedilol (COREG) 25 MG tablet, Take 1 tablet (25 mg) by mouth 2 times daily (with meals), Disp: , Rfl:     Continuous Glucose Sensor (FREESTYLE LAMINE 2 SENSOR) [facility] [place], Inject 1 each subcutaneously every 14 days Use 1 sensor every 14 days. Use to read blood sugars per 's instructions., Disp: 6 each, Rfl: 5    ergocalciferol (ERGOCALCIFEROL) 1.25 MG (82448 UT) capsule, Take 50,000 Units by mouth once a week Every Wednesday for anemia for 12 weeks ACTIVE 4/24/24- 6/26/24, Disp: , Rfl:     insulin aspart (NOVOLOG VIAL) 100 UNITS/ML vial, Inject 5 Units Subcutaneous 3 times daily (with meals), Disp: , Rfl:     insulin glargine (LANTUS PEN) 100 UNIT/ML pen, Inject 32 Units subcutaneously at bedtime, Disp: 15 mL, Rfl: 3    insulin lispro (HUMALOG KWIKPEN) 100  UNIT/ML (1 unit dial) KWIKPEN, Inject 5 Units subcutaneously 3 times daily (with meals) If humalog is not covered by insurance, may substitute with novolog or other fast acting insulin, Disp: 15 mL, Rfl: 3    insulin lispro (HUMALOG VIAL) 100 UNIT/ML vial, Inject Subcutaneous 3 times daily (before meals) Inject as per sliding scale: if  = 4 units, 70 or below see hypoglycemia protocol: 150-199= 8 units;  200-249 =10 units;  250-299= 12 units; 300-349= 14 units; 350-399= 16 units; 400 or greater give 18 units, if >400x 2 call MD/NP, Disp: , Rfl:     loperamide (IMODIUM) 2 MG capsule, Take 2 mg by mouth as needed for diarrhea (give 4mg every 6 hours as needed for Diarrhea), Disp: , Rfl:     losartan (COZAAR) 25 MG tablet, Take 1 tablet (25 mg) by mouth daily, Disp: , Rfl:     melatonin 5 MG tablet, Take 5 mg by mouth nightly as needed for sleep, Disp: , Rfl:     metFORMIN (GLUCOPHAGE XR) 500 MG 24 hr tablet, Take 1 tablet (500 mg) by mouth daily (with dinner), Disp: , Rfl:     mineral oil-hydrophilic petrolatum (AQUAPHOR) external ointment, Apply topically 2 times daily, Disp: , Rfl:     multivitamin w/minerals (THERA-VIT-M) tablet, Take 1 tablet by mouth daily, Disp: , Rfl:     nicotine (NICODERM CQ) 14 MG/24HR 24 hr patch, Place 1 patch onto the skin every 24 hours, Disp: , Rfl:     ondansetron (ZOFRAN) 4 MG tablet, Take 4 mg by mouth every 8 hours as needed for nausea, Disp: , Rfl:     oxyCODONE (ROXICODONE) 5 MG tablet, Take 1-2 tablets (5-10 mg) by mouth every 4 hours as needed for moderate to severe pain, Disp: 12 tablet, Rfl: 0    oxyCODONE (ROXICODONE) 5 MG tablet, Take 1 tablet (5 mg) by mouth every 6 hours as needed for severe pain, Disp: 15 tablet, Rfl:     pantoprazole (PROTONIX) 40 MG EC tablet, Take 1 tablet (40 mg) by mouth every morning (before breakfast), Disp: , Rfl:     Urea-Lactic Acid 10-4 % CREA, Externally apply topically 2 times daily Apply to left foot/ heel topically two times a day for  moisturizing 10-4%, Disp: , Rfl:     vitamin D3 (CHOLECALCIFEROL) 50 mcg (2000 units) tablet, Take 1 tablet by mouth daily, Disp: , Rfl:     Review of Systems - 10 point Review of Systems is negative except for right heel ulcer which is noted in HPI.      OBJECTIVE:  BP (!) 146/85   Pulse 83   Temp 98.2  F (36.8  C)   Resp 18   SpO2 96%   General appearance: Patient is alert and fully cooperative with history & exam.  No sign of distress is noted during the visit.    Vascular: Dorsalis pedis palpableRight.  Dermatologic:    Negative Pressure Wound Therapy Heel Right (Active)       VASC Wound Right heel (Active)   Pre Size Length 0 07/24/24 0800   Pre Size Width 0 07/24/24 0800   Pre Size Depth 0 07/24/24 0800   Pre Total Sq cm 0 07/24/24 0800   Post Size Length 0.8 07/02/24 0835   Post Size Width 0.5 07/02/24 0835   Post Size Depth 0.2 07/02/24 0835   Post Total Sq cm 0.4 07/02/24 0835       Incision/Surgical Site with Packing 02/16/24 Right Heel Qty Placed: 1 (Active)       Incision/Surgical Site 02/16/24 Right;Inner Plantar (Active)       Incision/Surgical Site 05/02/24 Right Foot (Active)     Neurologic: Diminished to light touch Right.  Musculoskeletal: Contracted digits noted Right.      Picture:

## 2024-07-30 ENCOUNTER — PATIENT OUTREACH (OUTPATIENT)
Dept: CARE COORDINATION | Facility: CLINIC | Age: 49
End: 2024-07-30
Payer: COMMERCIAL

## 2024-07-31 ENCOUNTER — TELEPHONE (OUTPATIENT)
Dept: VASCULAR SURGERY | Facility: CLINIC | Age: 49
End: 2024-07-31
Payer: COMMERCIAL

## 2024-07-31 NOTE — TELEPHONE ENCOUNTER
Called Yuliya. Since pt was discharged from this clinic, instructed her to call PCP for PT orders. She voiced understanding.    Brionna Rico RN, CWOCN

## 2024-07-31 NOTE — TELEPHONE ENCOUNTER
Yuliya from [facility] calling because patient called them to request physical therapy.  She is asking for a call back with orders for physical therapy.  [phone]

## 2024-07-31 NOTE — TELEPHONE ENCOUNTER
Spoke with Shahnaz, ok to discharge as long has he does not have any other needs. She verbalized understanding

## 2024-07-31 NOTE — TELEPHONE ENCOUNTER
Caller: home care    Provider: AMY Sanders    Detailed reason for call: home care has been trying to reach this patient to schedule their next visit but he is not answering his phone or returning their calls.  Per the last note with Dr. Sanders, he is discharged from our clinic at this time.  She would like a call back to advise if they should continue to reach out to this patient to schedule or if they should discharge home care as well.  Asking orders to be faxed to: [phone]    Best phone number to contact: [phone]    Best time to contact: any    Ok to leave a detailed message: Yes

## 2024-08-01 ENCOUNTER — PATIENT OUTREACH (OUTPATIENT)
Dept: CARE COORDINATION | Facility: CLINIC | Age: 49
End: 2024-08-01
Payer: COMMERCIAL

## 2024-08-01 DIAGNOSIS — E11.621 DIABETIC ULCER OF RIGHT HEEL ASSOCIATED WITH TYPE 2 DIABETES MELLITUS, WITH NECROSIS OF BONE (H): ICD-10-CM

## 2024-08-01 DIAGNOSIS — Z86.73 HISTORY OF STROKE: Primary | ICD-10-CM

## 2024-08-01 DIAGNOSIS — L97.414 DIABETIC ULCER OF RIGHT HEEL ASSOCIATED WITH TYPE 2 DIABETES MELLITUS, WITH NECROSIS OF BONE (H): ICD-10-CM

## 2024-08-01 NOTE — PROGRESS NOTES
Clinic Care Coordination Contact  Follow Up Progress Note      Assessment: Call back from patient. He missed calls from [facility] and was given phone number to reach out to [facility] to get help with SNAP. He has been playing phone tag with [facility]. Encouraged him to keep trying to get it scheduled.  His in home nursing has stopped as his heel is healed. Would still benefit from PT to help with his ambulation.  He has appt on August 5th to get fitted for inserts and diabetic shoes.      He had a fight with his sister and now they are not talking.  He doesn't want to beg her to see him.      His fluid retention is better as he is taking his meds are prescribed.      Called [facility] and they have availability for home care PT but would need a new order.  They could start services early next week.     Will ask PCP to put in new order just for PT.   8-7-2024 new order was created. Sent to [facility] who declined due to insurance.  Sent to [facility].  No  Sent to [facility].no  Sent to [facility]    Called patient and left a VM asking for a call back to discuss options.    Care Gaps:    Health Maintenance Due   Topic Date Due    YEARLY PREVENTIVE VISIT  Never done    HF ACTION PLAN  Never done    DIABETIC FOOT EXAM  Never done    ADVANCE CARE PLANNING  Never done    EYE EXAM  Never done    COLORECTAL CANCER SCREENING  Never done    HIV SCREENING  Never done    HEPATITIS B IMMUNIZATION (1 of 3 - 19+ 3-dose series) Never done    Pneumococcal Vaccine: Pediatrics (0 to 5 Years) and At-Risk Patients (6 to 64 Years) (2 of 2 - PCV) 10/11/2008    COVID-19 Vaccine (3 - 2023-24 season) 09/01/2023    ZOSTER IMMUNIZATION (1 of 2) 12/19/2023       Declined due to not wanting to schedule at this time.      Care Plans  Care Plan: Food Insecurity       Problem: Reliable food source       Long-Range Goal: Establish Options for Affordable Food Sources       Start Date: 7/10/2024 Expected End Date:  7/9/2025    This Visit's Progress: 20% Recent Progress: 10%    Priority: High    Note:     Barriers: needs help with SNAP application, just discharged from [facility].   Strengths: motivated.   Patient expressed understanding of goal: yes  Action steps to achieve this goal:  1. I will work with Financial Resource Worker to complete SNAP application.   2. I will submit application to [facility] when completed.   3. I will report progress towards this goal at scheduled outreach telephone calls from the CCC team.                                Care Plan: Help At Home       Problem: Insufficient In-home support       Long-Range Goal: Establish adequate home support       Start Date: 7/10/2024 Expected End Date: 7/9/2025    This Visit's Progress: 20% Recent Progress: 10%    Priority: High    Note:     Barriers: recent discharge from [facility], unsure what I need.   Strengths: home care has been ordered.    Patient expressed understanding of goal: yes  Action steps to achieve this goal:  1. I will work with [facility].   2. I will work with [facility] to identify needed supports.  3. I will report progress towards this goal at scheduled outreach telephone calls from the CCC team.                                  Intervention/Education provided during outreach: support.      Outreach Frequency: monthly, more frequently as needed    Plan:     Care Coordinator will follow up in one week.     [facility]/Voicemail    Clinical Data: Care Coordinator Outreach    Outreach Documentation Number of Outreach Attempt   8/1/2024   2:08 PM 1       Left message on patient's voicemail with call back information and requested return call.    Plan: Care Coordinator will try to reach patient again in 3-5 business days.  8-7  Update, new order has been signed. Secure email was sent to [facility].    Georgiana Isaacs,   [facility]  [phone]\

## 2024-08-06 ENCOUNTER — TELEPHONE (OUTPATIENT)
Dept: INTERNAL MEDICINE | Facility: CLINIC | Age: 49
End: 2024-08-06
Payer: COMMERCIAL

## 2024-08-06 DIAGNOSIS — L97.414 DIABETIC ULCER OF RIGHT HEEL ASSOCIATED WITH TYPE 2 DIABETES MELLITUS, WITH NECROSIS OF BONE (H): Primary | ICD-10-CM

## 2024-08-06 DIAGNOSIS — E11.621 DIABETIC ULCER OF RIGHT HEEL ASSOCIATED WITH TYPE 2 DIABETES MELLITUS, WITH NECROSIS OF BONE (H): Primary | ICD-10-CM

## 2024-08-06 NOTE — TELEPHONE ENCOUNTER
Per care coordinator:  he has finished with his in home nursing now that his wound has healed. He would benefit from in home PT as it is difficult to get him out to [facility] yet.  When he got home care before, we couldn't find any agency that had openings for both PT and nursing. Now, [facility] can provide him with PT but need a new order

## 2024-08-09 ENCOUNTER — PATIENT OUTREACH (OUTPATIENT)
Dept: CARE COORDINATION | Facility: CLINIC | Age: 49
End: 2024-08-09
Payer: COMMERCIAL

## 2024-08-09 ENCOUNTER — MYC MEDICAL ADVICE (OUTPATIENT)
Dept: INTERNAL MEDICINE | Facility: CLINIC | Age: 49
End: 2024-08-09
Payer: COMMERCIAL

## 2024-08-09 DIAGNOSIS — E08.621 DIABETIC ULCER OF RIGHT HEEL ASSOCIATED WITH DIABETES MELLITUS DUE TO UNDERLYING CONDITION, WITH MUSCLE INVOLVEMENT WITHOUT EVIDENCE OF NECROSIS (H): ICD-10-CM

## 2024-08-09 DIAGNOSIS — L97.415 DIABETIC ULCER OF RIGHT HEEL ASSOCIATED WITH DIABETES MELLITUS DUE TO UNDERLYING CONDITION, WITH MUSCLE INVOLVEMENT WITHOUT EVIDENCE OF NECROSIS (H): ICD-10-CM

## 2024-08-09 DIAGNOSIS — E11.65 UNCONTROLLED TYPE 2 DIABETES MELLITUS WITH HYPERGLYCEMIA, WITH LONG-TERM CURRENT USE OF INSULIN (H): Primary | ICD-10-CM

## 2024-08-09 DIAGNOSIS — E11.65 UNCONTROLLED TYPE 2 DIABETES MELLITUS WITH HYPERGLYCEMIA, WITH LONG-TERM CURRENT USE OF INSULIN (H): ICD-10-CM

## 2024-08-09 DIAGNOSIS — Z86.73 HISTORY OF STROKE: Primary | ICD-10-CM

## 2024-08-09 DIAGNOSIS — Z79.4 UNCONTROLLED TYPE 2 DIABETES MELLITUS WITH HYPERGLYCEMIA, WITH LONG-TERM CURRENT USE OF INSULIN (H): ICD-10-CM

## 2024-08-09 DIAGNOSIS — Z79.4 UNCONTROLLED TYPE 2 DIABETES MELLITUS WITH HYPERGLYCEMIA, WITH LONG-TERM CURRENT USE OF INSULIN (H): Primary | ICD-10-CM

## 2024-08-09 NOTE — PROGRESS NOTES
Clinic Care Coordination Contact  Follow Up Progress Note      Assessment: No agency has accepted the home care PT order.  Talked to patient and he would be willing to do outpatient therapy instead.  He is riding a stationary bike at home and is walking three times a day and doing 100 yards at a time to build up strength.  His new shoes and inserts should be ready on 8-27. Until then, he is being careful not to do too much like weight lifting.    He is out of pen needles for his insulin.  [facility] is out of stock.  [employer] [facility] [employer] do not carry them.  [employer] will not ship needles.  He has 8 left and he has paid for them himself.  Will route to PCP for assistance with putting in an order for diabetic supplies and for outpatient PT.      He has food.     Care Gaps:    Health Maintenance Due   Topic Date Due    YEARLY PREVENTIVE VISIT  Never done    HF ACTION PLAN  Never done    DIABETIC FOOT EXAM  Never done    ADVANCE CARE PLANNING  Never done    EYE EXAM  Never done    COLORECTAL CANCER SCREENING  Never done    HIV SCREENING  Never done    HEPATITIS B IMMUNIZATION (1 of 3 - 19+ 3-dose series) Never done    Pneumococcal Vaccine: Pediatrics (0 to 5 Years) and At-Risk Patients (6 to 64 Years) (2 of 2 - PCV) 10/11/2008    COVID-19 Vaccine (3 - 2023-24 season) 09/01/2023    ZOSTER IMMUNIZATION (1 of 2) 12/19/2023       Postponed to next pcp appt.     Care Plans  Care Plan: Food Insecurity       Problem: Reliable food source       Long-Range Goal: Establish Options for Affordable Food Sources       Start Date: 7/10/2024 Expected End Date: 7/9/2025    This Visit's Progress: 30% Recent Progress: 20%    Priority: High    Note:     Barriers: needs help with SNAP application, just discharged from [facility].   Strengths: motivated.   Patient expressed understanding of goal: yes  Action steps to achieve this goal:  1. I will work with Financial Resource Worker to complete SNAP application.   2. I will submit application to [facility] when completed.   3. I will report progress towards this goal at scheduled outreach telephone calls from the CCC team.                                Care Plan: Help At Home       Problem: Insufficient In-home support       Long-Range Goal: Establish adequate home support       Start Date: 7/10/2024 Expected End Date: 7/9/2025    Recent Progress: 20%    Priority: High    Note:     Barriers: recent discharge from [facility], unsure what I need.   Strengths: home care has been ordered.    Patient expressed understanding of goal: yes  Action steps to achieve this goal:  1. I will work with [facility].   2. I will work with [facility] to identify needed supports.  3. I will report progress towards this goal at scheduled outreach telephone calls from the [facility] team.  8-9- graduated from home care.                                 Intervention/Education provided during outreach: support.     Outreach Frequency: monthly, more frequently as needed      Plan:     Care Coordinator will follow up in one week.

## 2024-08-14 ENCOUNTER — PATIENT OUTREACH (OUTPATIENT)
Dept: CARE COORDINATION | Facility: CLINIC | Age: 49
End: 2024-08-14
Payer: COMMERCIAL

## 2024-08-14 NOTE — PROGRESS NOTES
[facility]/Voicemail    Clinical Data: Care Coordinator Outreach    Outreach Documentation Number of Outreach Attempt   8/1/2024   2:08 PM 1   8/14/2024   2:57 PM 1       Left message on patient's voicemail with call back information and requested return call.    Plan: Care Coordinator will try to reach patient again in 10 business days.    Georgiana Isaacs,   [facility]  [phone]

## 2024-08-19 ENCOUNTER — MYC REFILL (OUTPATIENT)
Dept: INTERNAL MEDICINE | Facility: CLINIC | Age: 49
End: 2024-08-19
Payer: COMMERCIAL

## 2024-08-19 DIAGNOSIS — Z79.4 UNCONTROLLED TYPE 2 DIABETES MELLITUS WITH HYPERGLYCEMIA, WITH LONG-TERM CURRENT USE OF INSULIN (H): ICD-10-CM

## 2024-08-19 DIAGNOSIS — E11.65 UNCONTROLLED TYPE 2 DIABETES MELLITUS WITH HYPERGLYCEMIA, WITH LONG-TERM CURRENT USE OF INSULIN (H): ICD-10-CM

## 2024-08-19 RX ORDER — INSULIN LISPRO 100 [IU]/ML
5 INJECTION, SOLUTION INTRAVENOUS; SUBCUTANEOUS
Qty: 15 ML | Refills: 0 | Status: SHIPPED | OUTPATIENT
Start: 2024-08-19 | End: 2024-09-15

## 2024-08-22 ENCOUNTER — TELEPHONE (OUTPATIENT)
Dept: INTERNAL MEDICINE | Facility: CLINIC | Age: 49
End: 2024-08-22
Payer: COMMERCIAL

## 2024-08-22 DIAGNOSIS — E11.65 POORLY CONTROLLED DIABETES MELLITUS (H): ICD-10-CM

## 2024-08-22 DIAGNOSIS — I50.21 ACUTE SYSTOLIC HEART FAILURE (H): ICD-10-CM

## 2024-08-22 RX ORDER — METFORMIN HCL 500 MG
500 TABLET, EXTENDED RELEASE 24 HR ORAL
Qty: 30 TABLET | Refills: 1 | Status: SHIPPED | OUTPATIENT
Start: 2024-08-22 | End: 2024-10-05

## 2024-08-22 RX ORDER — BUMETANIDE 2 MG/1
2 TABLET ORAL
Qty: 30 TABLET | Refills: 1 | Status: SHIPPED | OUTPATIENT
Start: 2024-08-22 | End: 2024-09-05

## 2024-08-22 RX ORDER — CARVEDILOL 25 MG/1
25 TABLET ORAL 2 TIMES DAILY WITH MEALS
Qty: 60 TABLET | Refills: 1 | Status: SHIPPED | OUTPATIENT
Start: 2024-08-22 | End: 2024-10-05

## 2024-08-22 NOTE — TELEPHONE ENCOUNTER
Reason for Call:  Medication refill:    Do you use a [facility]? No  Name of the pharmacy and phone number for the current request:  [facility] [address] [place] [address] - [address] [place] [address] E     Name of the medication requested: bumetanide (BUMEX) 2 MG tablet   carvedilol (COREG) 25 MG tablet   metFORMIN (GLUCOPHAGE XR) 500 MG 24 hr tablet     Last Visit with PCP: 7/11/24    Patient expresses frustration states he requested refill on Monday 08/19/24 and has not heard back.     Can we leave a detailed message on this number? YES Cl[employer]open message is just fine.     Phone number patient can be reached at: Cell number on file:    Telephone Information:   Mobile [phone]       Best Time: any    Call taken on 8/22/2024 at 11:31 AM by [place]

## 2024-08-22 NOTE — TELEPHONE ENCOUNTER
Yuliya, from [facility] is calling to request home PT orders be placed for Floyd.     She states that he was discharged from Dr. Sanders's care and never given PT and does not know how to walk on his foot at this time.     Patient was told to contact PCP for orders.     After writer hung up, can see that PCP already placed these orders for patient on 8/9- please see patient outreach from 8/9    Mychart sent also

## 2024-08-26 ENCOUNTER — THERAPY VISIT (OUTPATIENT)
Dept: PHYSICAL THERAPY | Facility: REHABILITATION | Age: 49
End: 2024-08-26
Attending: NURSE PRACTITIONER
Payer: COMMERCIAL

## 2024-08-26 DIAGNOSIS — Z86.73 HISTORY OF STROKE: ICD-10-CM

## 2024-08-26 DIAGNOSIS — E08.621 DIABETIC ULCER OF RIGHT HEEL ASSOCIATED WITH DIABETES MELLITUS DUE TO UNDERLYING CONDITION, WITH MUSCLE INVOLVEMENT WITHOUT EVIDENCE OF NECROSIS (H): ICD-10-CM

## 2024-08-26 DIAGNOSIS — L97.415 DIABETIC ULCER OF RIGHT HEEL ASSOCIATED WITH DIABETES MELLITUS DUE TO UNDERLYING CONDITION, WITH MUSCLE INVOLVEMENT WITHOUT EVIDENCE OF NECROSIS (H): ICD-10-CM

## 2024-08-26 PROCEDURE — 97161 PT EVAL LOW COMPLEX 20 MIN: CPT | Mod: GP | Performed by: PHYSICAL THERAPIST

## 2024-08-26 PROCEDURE — 97116 GAIT TRAINING THERAPY: CPT | Mod: GP | Performed by: PHYSICAL THERAPIST

## 2024-08-26 NOTE — PROGRESS NOTES
PHYSICAL THERAPY EVALUATION  Type of Visit: Evaluation       Fall Risk Screen:  Fall screen completed by: PT  Have you fallen 2 or more times in the past year?: No  Have you fallen and had an injury in the past year?: No  Is patient a fall risk?: No    Subjective   Floyd is a 49 y/o male  currently not working secondary to medical issues, but wants to return to his job as soon as possible. Medical history significant includeing but not limited to CVA, diabetic foot ulcer, osteomyelitis.( see EMR for full history). Per patient he is here today for PT to get more information on what he can be doing to get stronger and return to normal walking. Pt should be ambulating in his CAM boot until his custom diabetic shoes are available. He is currently using a regular shoe and just not putting any pressure on his heel. He reports that he is also using a SEC, because trying to walk on the ball of his foot can throw off his balance. He reports that he does use the cam boot to walk 300 ft with the cane several times per day, but in his apartment he just walks around the house on the ball of his foot. He reports that he has difficulty checking his feet. He doesn't have a good mirror, and reports some frustration with using the camera on his phone to inspecti his feel. He is eager to get started with PT, feel better, and return to work as soon as possible.       Presenting condition or subjective complaint: wanting to know when or if i can walk normal heel to toe walking  Date of onset: 08/12/24 (date of PT order)    Relevant medical history:     Dates & types of surgery: feb 16 2024foot surgery    Prior diagnostic imaging/testing results: MRI; CT scan; X-ray; EMG; Bone scan     Prior therapy history for the same diagnosis, illness or injury: No      Prior Level of Function  Transfers: Independent  Ambulation: Independent, Assistive equipment  ADL: Independent      Living Environment  Social support: Alone   Type of home:  Apartment/condo; 1 level   Stairs to enter the home: No       Ramp: No   Stairs inside the home: No       Help at home: None  Equipment owned: Straight Cane; Walker; Crutches; Standard wheelchair     Employment: No     Hobbies/Interests: none    Patient goals for therapy: walk normal again    Pain assessment: Pain denied     Objective      Cognitive Status Examination  Orientation: Oriented to person, place and time   Level of Consciousness: Alert  Follows Commands and Answers Questions: 100% of the time  Personal Safety and Judgement: Intact  Memory: Intact    INTEGUMENTARY:  no redness, swelling, some dry skin on heel  POSTURE: Sitting Posture: Rounded shoulders, Forward head, Thoracic kyphosis increased  PALPATION: NA  RANGE OF MOTION:   (Degrees) Left AROM Left PROM  Right AROM Right PROM   Ankle Dorsiflexion   5    Ankle Plantarflexion   35    Ankle Inversion       Ankle Eversion       Great Toe Flexion       Great Toe Extension       Pain:   End feel:   STRENGTH:   Pain: - none + mild ++ moderate +++ severe  Strength Scale: 0-5/5 Left Right   Hip Flexion 4 4   Hip Extension 4 4   Hip Abduction 4 4   Hip Adduction 4- 4-   Knee Flexion 5 5   Knee Extension 5 5   Ankle DF 5 5              TRANSFERS: Independent      GAIT:   Level of [place]: Independent  Assistive Device(s): CAM  Gait Deviations: Base of support increased  Keena decreased  Stairs: 4 stairs x 3 with B hand rails and reciprocal gait.    BALANCE:  to be assessed as appropriate        Assessment & Plan   CLINICAL IMPRESSIONS  Medical Diagnosis: History of stroke, Diabetic ulcer of right heel associated with diabetes mellitus due to underlying condition, with muscle involvement without evidence of necrosis (H)    Treatment Diagnosis: History of stroke, Diabetic ulcer of right heel associated with diabetes mellitus due to underlying condition, with muscle involvement without evidence of necrosis (H)   Impression/Assessment: Patient is a 48  year old male with weakness and gait  complaints.  The following significant findings have been identified: Decreased ROM/flexibility, Decreased joint mobility, Decreased strength, Impaired balance, Decreased proprioception, Impaired gait, Impaired muscle performance, and Decreased activity tolerance. These impairments interfere with their ability to perform self care tasks, work tasks, recreational activities, household chores, household mobility, and community mobility as compared to previous level of function.     Clinical Decision Making (Complexity):  Clinical Presentation: Stable/Uncomplicated  Clinical Presentation Rationale: based on medical and personal factors listed in PT evaluation  Clinical Decision Making (Complexity): Low complexity    PLAN OF CARE  Treatment Interventions:  Interventions: Gait Training, Manual Therapy, Neuromuscular Re-education, Therapeutic Activity, Therapeutic Exercise, Self-Care/Home Management    Long Term Goals     PT Goal 1  Goal Identifier: HEP  Goal Description: Patient will be independent with HEP and self management of symptoms  Rationale: to maximize safety and independence with performance of ADLs and functional tasks  Goal Progress: initial  Target Date: 10/25/24  PT Goal 2  Goal Identifier: ambulation  Goal Description: Patient will ambulate 1250 ft for community distances with diabetic shoes and no AD safely  Rationale: to maximize safety and independence within the community;to maximize safety and independence within the home  Goal Progress: initial  Target Date: 10/25/24  PT Goal 3  Goal Identifier: return to work  Goal Description: Patient will demonstrate improved ability to return to work.  Rationale: to maximize safety and independence with performance of ADLs and functional tasks;to maximize safety and independence within the home  Goal Progress: initial  Target Date: 10/25/24      Frequency of Treatment: 1x/week  Duration of Treatment: 60 days    Recommended  Referrals to Other Professionals: not at this time  Education Assessment:   Learner/Method: Patient    Risks and benefits of evaluation/treatment have been explained.   Patient/Family/caregiver agrees with Plan of Care.     Evaluation Time:     PT Eval, Low Complexity Minutes (41364): 30       Signing Clinician: DAMARIS Rizo [facility]                                                                                   OUTPATIENT PHYSICAL THERAPY      PLAN OF TREATMENT FOR OUTPATIENT REHABILITATION   Patient's Last Name, First Name, Floyd Juarez YOB: 1975   Provider's Name   [facility]   Medical Record No.  [id number]     Onset Date: 08/12/24 (date of PT order)  Start of Care Date: 08/26/24     Medical Diagnosis:  History of stroke, Diabetic ulcer of right heel associated with diabetes mellitus due to underlying condition, with muscle involvement without evidence of necrosis (H)      PT Treatment Diagnosis:  History of stroke, Diabetic ulcer of right heel associated with diabetes mellitus due to underlying condition, with muscle involvement without evidence of necrosis (H) Plan of Treatment  Frequency/Duration: 1x/week/ 60 days    Certification date from 08/26/24 to 10/25/24         See note for plan of treatment details and functional goals     Gadiel Odell, PT                         I CERTIFY THE NEED FOR THESE SERVICES FURNISHED UNDER        THIS PLAN OF TREATMENT AND WHILE UNDER MY CARE     (Physician attestation of this document indicates review and certification of the therapy plan).              Referring Provider:  Glendy Benavides NP       Initial Assessment  See Epic Evaluation- Start of Care Date: 08/26/24

## 2024-08-29 ENCOUNTER — MYC REFILL (OUTPATIENT)
Dept: INTERNAL MEDICINE | Facility: CLINIC | Age: 49
End: 2024-08-29
Payer: COMMERCIAL

## 2024-08-29 DIAGNOSIS — E11.65 POORLY CONTROLLED DIABETES MELLITUS (H): ICD-10-CM

## 2024-08-30 ENCOUNTER — PATIENT OUTREACH (OUTPATIENT)
Dept: CARE COORDINATION | Facility: CLINIC | Age: 49
End: 2024-08-30
Payer: COMMERCIAL

## 2024-08-30 NOTE — PROGRESS NOTES
Clinic Care Coordination Contact  Follow Up Progress Note      Assessment: Patient is waiting for his diabetic shoes and is frustrated as they were supposed to be picked on on 8-27 and he talked to [email or web address] and there was a mess up. He was supposed to get a call later in the week to see if the problem was fixed.  Offered to call them.  Talked to [email or web address] on [address].  They clarified that the vendor is delaying shipment due to a computer problem. They hope that the shoes will be delivered next week and will call him when they are in. Called patient back and explained. He will try to be patient.      He is doing ok with food, staying away from salt as best he can.  Has his diabetic supplies and is reordering as needed.  He had his first PT appt and next is on 9-3. He was told that he can walk normally even though he uses the boot. His hips are weak and he is sore for the exercises. He is anxious to return to work and hopes that it will be in a month or so. Created new goal and completed the goal regarding in home supports.      He and his sister are talking again.     He would like to have the OxyContin medication removed from his med list as he isn't taking it and is scared of it. Will route to PCP for consideration.      Care Gaps:    Health Maintenance Due   Topic Date Due    YEARLY PREVENTIVE VISIT  Never done    HF ACTION PLAN  Never done    DIABETIC FOOT EXAM  Never done    ADVANCE CARE PLANNING  Never done    EYE EXAM  Never done    COLORECTAL CANCER SCREENING  Never done    HIV SCREENING  Never done    HEPATITIS B IMMUNIZATION (1 of 3 - 19+ 3-dose series) Never done    Pneumococcal Vaccine: Pediatrics (0 to 5 Years) and At-Risk Patients (6 to 64 Years) (2 of 2 - PCV) 10/11/2008    COVID-19 Vaccine (3 - 2023-24 season) 09/01/2023    ZOSTER IMMUNIZATION (1 of 2) 12/19/2023       Postponed to next pcp appt     Care Plans  Care Plan: Food Insecurity       Problem: Reliable food source       Long-Range  Goal: Establish Options for Affordable Food Sources       Start Date: 7/10/2024 Expected End Date: 7/9/2025    This Visit's Progress: 50% Recent Progress: 30%    Priority: High    Note:     Barriers: needs help with SNAP application, just discharged from [facility].   Strengths: motivated.   Patient expressed understanding of goal: yes  Action steps to achieve this goal:  1. I will work with Financial Resource Worker to complete SNAP application.   2. I will submit application to [facility] when completed.   3. I will report progress towards this goal at scheduled outreach telephone calls from the CCC team.                                Care Plan: Financial Wellbeing       Problem: Patient expresses financial resource strain       Long-Range Goal: Go back to work.       Start Date: 8/30/2024 Expected End Date: 8/29/2025    Priority: High    Note:     Barriers: shoes are being delayed due to vendor issue.   Strengths: has insurance coverage.   Patient expressed understanding of goal: yes  Action steps to achieve this goal:  1. I will attend PT.  2. I will get my new shoes.  3. I will report progress towards this goal at scheduled outreach telephone calls from the CCC team.                                  Intervention/Education provided during outreach: support and help with shoes.      Outreach Frequency: monthly, more frequently as needed    Plan:     Care Coordinator will follow up in one month.  Georgiana Isaacs,   [facility]  [phone]

## 2024-08-30 NOTE — Clinical Note
Nicholas Pike, he would like to have the Oxy Contin med removed from his list of meds.  He is not taking it and it frightens him due to knowing people who  from opoids.  If you want to close that med, or he may send you a My Chart.  Thanks, Georgiana

## 2024-09-03 ENCOUNTER — THERAPY VISIT (OUTPATIENT)
Dept: PHYSICAL THERAPY | Facility: REHABILITATION | Age: 49
End: 2024-09-03
Payer: COMMERCIAL

## 2024-09-03 DIAGNOSIS — E08.621 DIABETIC ULCER OF RIGHT HEEL ASSOCIATED WITH DIABETES MELLITUS DUE TO UNDERLYING CONDITION, WITH MUSCLE INVOLVEMENT WITHOUT EVIDENCE OF NECROSIS (H): ICD-10-CM

## 2024-09-03 DIAGNOSIS — Z86.73 HISTORY OF STROKE: Primary | ICD-10-CM

## 2024-09-03 DIAGNOSIS — L97.415 DIABETIC ULCER OF RIGHT HEEL ASSOCIATED WITH DIABETES MELLITUS DUE TO UNDERLYING CONDITION, WITH MUSCLE INVOLVEMENT WITHOUT EVIDENCE OF NECROSIS (H): ICD-10-CM

## 2024-09-03 PROCEDURE — 97112 NEUROMUSCULAR REEDUCATION: CPT | Mod: GP

## 2024-09-03 PROCEDURE — 97110 THERAPEUTIC EXERCISES: CPT | Mod: GP

## 2024-09-05 ENCOUNTER — MYC REFILL (OUTPATIENT)
Dept: INTERNAL MEDICINE | Facility: CLINIC | Age: 49
End: 2024-09-05
Payer: COMMERCIAL

## 2024-09-05 ENCOUNTER — TELEPHONE (OUTPATIENT)
Dept: INTERNAL MEDICINE | Facility: CLINIC | Age: 49
End: 2024-09-05
Payer: COMMERCIAL

## 2024-09-05 DIAGNOSIS — I50.21 ACUTE SYSTOLIC HEART FAILURE (H): ICD-10-CM

## 2024-09-05 NOTE — TELEPHONE ENCOUNTER
FAX [facility] Prior Authorization Request    insulin lispro (HUMALOG KWIKPEN) 100 UNIT/ML (1 unit dial) KWIKPEN     [email or web address]    KEY: [id number]

## 2024-09-06 ENCOUNTER — MYC MEDICAL ADVICE (OUTPATIENT)
Dept: INTERNAL MEDICINE | Facility: CLINIC | Age: 49
End: 2024-09-06
Payer: COMMERCIAL

## 2024-09-06 DIAGNOSIS — I50.21 ACUTE SYSTOLIC HEART FAILURE (H): ICD-10-CM

## 2024-09-06 RX ORDER — BUMETANIDE 2 MG/1
2 TABLET ORAL
Qty: 180 TABLET | Refills: 1 | OUTPATIENT
Start: 2024-09-06

## 2024-09-06 RX ORDER — BUMETANIDE 2 MG/1
2 TABLET ORAL
Qty: 30 TABLET | Refills: 1 | Status: SHIPPED | OUTPATIENT
Start: 2024-09-06 | End: 2024-10-05

## 2024-09-06 NOTE — TELEPHONE ENCOUNTER
Central Prior Authorization Team - Phone: [phone]     PA Initiation    Medication: SEMGLEE 100 UNIT/ML SC SOPN  Insurance Company: [employer] Part D - Phone [phone] Fax [phone]  Pharmacy Filling the Rx: [facility] [address] [place], [place] - [address] [place] [address] E  Filling Pharmacy Phone: [phone]  Filling Pharmacy Fax:    Start Date: 9/6/2024

## 2024-09-07 ENCOUNTER — HEALTH MAINTENANCE LETTER (OUTPATIENT)
Age: 49
End: 2024-09-07

## 2024-09-10 ENCOUNTER — THERAPY VISIT (OUTPATIENT)
Dept: PHYSICAL THERAPY | Facility: REHABILITATION | Age: 49
End: 2024-09-10
Payer: COMMERCIAL

## 2024-09-10 DIAGNOSIS — E08.621 DIABETIC ULCER OF RIGHT HEEL ASSOCIATED WITH DIABETES MELLITUS DUE TO UNDERLYING CONDITION, WITH MUSCLE INVOLVEMENT WITHOUT EVIDENCE OF NECROSIS (H): ICD-10-CM

## 2024-09-10 DIAGNOSIS — L97.415 DIABETIC ULCER OF RIGHT HEEL ASSOCIATED WITH DIABETES MELLITUS DUE TO UNDERLYING CONDITION, WITH MUSCLE INVOLVEMENT WITHOUT EVIDENCE OF NECROSIS (H): ICD-10-CM

## 2024-09-10 DIAGNOSIS — Z86.73 HISTORY OF STROKE: Primary | ICD-10-CM

## 2024-09-10 PROCEDURE — 97112 NEUROMUSCULAR REEDUCATION: CPT | Mod: GP

## 2024-09-10 PROCEDURE — 97110 THERAPEUTIC EXERCISES: CPT | Mod: GP

## 2024-09-11 ENCOUNTER — PATIENT OUTREACH (OUTPATIENT)
Dept: CARE COORDINATION | Facility: CLINIC | Age: 49
End: 2024-09-11
Payer: COMMERCIAL

## 2024-09-11 NOTE — PROGRESS NOTES
Clinic Care Coordination Contact  Follow Up Progress Note      Assessment: VM from patient who has not heard from orthotics regarding his shoes. He is afraid to call them as he might lose his temper so is asking for help.  Called orthotics and the shoes have arrived. Did a three way call and set up appt. For 9-19 at 12:30.  He is driving now. He puts on regular shoes to drive then puts on the boot again. He is anxious to get the shoes so he can start looking for work.  He can't walk very far with the boot.  He is doing PT.      Care Gaps:    Health Maintenance Due   Topic Date Due    YEARLY PREVENTIVE VISIT  Never done    HF ACTION PLAN  Never done    DIABETIC FOOT EXAM  Never done    ADVANCE CARE PLANNING  Never done    EYE EXAM  Never done    COLORECTAL CANCER SCREENING  Never done    HIV SCREENING  Never done    HEPATITIS B IMMUNIZATION (1 of 3 - 19+ 3-dose series) Never done    Pneumococcal Vaccine: Pediatrics (0 to 5 Years) and At-Risk Patients (6 to 64 Years) (2 of 2 - PCV) 10/11/2008    INFLUENZA VACCINE (1) 09/01/2024    COVID-19 Vaccine (3 - 2023-24 season) 09/01/2024    ZOSTER IMMUNIZATION (1 of 2) 12/19/2023       Postponed to next pcp appt     Care Plans  Care Plan: Food Insecurity       Problem: Reliable food source       Long-Range Goal: Establish Options for Affordable Food Sources       Start Date: 7/10/2024 Expected End Date: 7/9/2025    This Visit's Progress: 50% Recent Progress: 30%    Priority: High    Note:     Barriers: needs help with SNAP application, just [facility] from [facility].   Strengths: motivated.   Patient expressed understanding of goal: yes  Action steps to achieve this goal:  1. I will work with Financial Resource Worker to complete SNAP application.   2. I will submit application to [facility] when completed.   3. I will report progress towards this goal at scheduled outreach telephone calls from the CCC team.                                Care Plan: Financial Wellbeing        Problem: Patient expresses financial resource strain       Long-Range Goal: Go back to work.       Start Date: 8/30/2024 Expected End Date: 8/29/2025    Priority: High    Note:     Barriers: shoes are being delayed due to vendor issue.   Strengths: has insurance coverage.   Patient expressed understanding of goal: yes  Action steps to achieve this goal:  1. I will attend PT.  2. I will get my new shoes.  3. I will report progress towards this goal at scheduled outreach telephone calls from the CCC team.                                  Intervention/Education provided during outreach: support with shoes.     Outreach Frequency: monthly, more frequently as needed  Plan:     Care Coordinator will follow up in 30 days.  Georgiana Isaacs,   [facility]  [phone]

## 2024-09-12 NOTE — TELEPHONE ENCOUNTER
Central Prior Authorization Team - Phone: [phone]     Prior Authorization Approval    Medication: SEMGLEE 100 UNIT/ML SC SOPN  Authorization Effective Date: 9/6/2024  Authorization Expiration Date: 9/6/2025  Approved Dose/Quantity: 15  Reference #:     Insurance Company: [employer] Part D - Phone [phone] Fax [phone]  Expected CoPay: $    CoPay Card Available:      Financial Assistance Needed:   Which Pharmacy is filling the prescription: [facility] [address] [place] [address] E  Pharmacy Notified: YES  Patient Notified: YES Instructed pharmacy to notify patient once order is ready.

## 2024-09-15 ENCOUNTER — MYC REFILL (OUTPATIENT)
Dept: CARE COORDINATION | Facility: CLINIC | Age: 49
End: 2024-09-15
Payer: COMMERCIAL

## 2024-09-15 ENCOUNTER — MYC REFILL (OUTPATIENT)
Dept: INTERNAL MEDICINE | Facility: CLINIC | Age: 49
End: 2024-09-15
Payer: COMMERCIAL

## 2024-09-15 DIAGNOSIS — E11.65 UNCONTROLLED TYPE 2 DIABETES MELLITUS WITH HYPERGLYCEMIA, WITH LONG-TERM CURRENT USE OF INSULIN (H): ICD-10-CM

## 2024-09-15 DIAGNOSIS — Z79.4 UNCONTROLLED TYPE 2 DIABETES MELLITUS WITH HYPERGLYCEMIA, WITH LONG-TERM CURRENT USE OF INSULIN (H): ICD-10-CM

## 2024-09-16 ENCOUNTER — MYC REFILL (OUTPATIENT)
Dept: INTERNAL MEDICINE | Facility: CLINIC | Age: 49
End: 2024-09-16
Payer: COMMERCIAL

## 2024-09-16 DIAGNOSIS — E11.65 UNCONTROLLED TYPE 2 DIABETES MELLITUS WITH HYPERGLYCEMIA, WITH LONG-TERM CURRENT USE OF INSULIN (H): ICD-10-CM

## 2024-09-16 DIAGNOSIS — Z79.4 UNCONTROLLED TYPE 2 DIABETES MELLITUS WITH HYPERGLYCEMIA, WITH LONG-TERM CURRENT USE OF INSULIN (H): ICD-10-CM

## 2024-09-16 RX ORDER — INSULIN LISPRO 100 [IU]/ML
5 INJECTION, SOLUTION INTRAVENOUS; SUBCUTANEOUS
Qty: 15 ML | Refills: 0 | Status: SHIPPED | OUTPATIENT
Start: 2024-09-16 | End: 2024-09-25

## 2024-09-17 ENCOUNTER — NURSE TRIAGE (OUTPATIENT)
Dept: INTERNAL MEDICINE | Facility: CLINIC | Age: 49
End: 2024-09-17
Payer: COMMERCIAL

## 2024-09-17 ENCOUNTER — MYC MEDICAL ADVICE (OUTPATIENT)
Dept: INTERNAL MEDICINE | Facility: CLINIC | Age: 49
End: 2024-09-17
Payer: COMMERCIAL

## 2024-09-17 DIAGNOSIS — E11.65 UNCONTROLLED TYPE 2 DIABETES MELLITUS WITH HYPERGLYCEMIA, WITH LONG-TERM CURRENT USE OF INSULIN (H): ICD-10-CM

## 2024-09-17 DIAGNOSIS — Z79.4 UNCONTROLLED TYPE 2 DIABETES MELLITUS WITH HYPERGLYCEMIA, WITH LONG-TERM CURRENT USE OF INSULIN (H): ICD-10-CM

## 2024-09-17 RX ORDER — INSULIN LISPRO 100 [IU]/ML
5 INJECTION, SOLUTION INTRAVENOUS; SUBCUTANEOUS
Qty: 15 ML | Refills: 0 | OUTPATIENT
Start: 2024-09-17

## 2024-09-17 NOTE — TELEPHONE ENCOUNTER
Advised walk in care for eye concern as no openings in clinic today.  Patient declined stating he has MA and is concerned about urgent care being covered.  Offered 7 am VV with PCP and patient took visit.  Advised patient seek urgent care for any worsening symptoms prior to VV on 9-18.

## 2024-09-17 NOTE — TELEPHONE ENCOUNTER
Called pharmacy and confirmed there are refills available for pen needles.    Patient informed.     Patient asking about Humalog directions. Rx directions state below-    Sig - Route: Inject 5 Units subcutaneously 3 times daily (with meals)    Patient reports he is not doing this currently as he was using a sliding scale previously. Please see below-     Inject Subcutaneous 3 times daily (before meals) Inject as per sliding scale: if  = 4 units, 70 or below see hypoglycemia protocol: 150-199= 8 units; 200-249 =10 units; 250-299= 12 units; 300-349= 14 units; 350-399= 16 units; 400 or greater give 18 units, if >400x 2 call MD/NP, LUIS CARLOS, Historical    Patient asking what is the correct doing directions he should be doing.     Patient requesting message sent to provider for clarification.     Writer states will get message to PCP and reach back out to patient with further instructions.     Patient thanked for call.

## 2024-09-17 NOTE — TELEPHONE ENCOUNTER
"Reason for Disposition    MODERATE eye pain (e.g., interferes with normal activities)    Additional Information    Negative: [1] Eyelids are very swollen (shut or almost) AND [2] fever    Negative: [1] Eyelid (outer) is very red (or tender to touch) AND [2] fever    Negative: Patient sounds very sick or weak to the triager    Negative: SEVERE eye pain (e.g., excruciating)    Negative: Eye exposure to chemical or fumes    Negative: Redness of white of eye (sclera), but no pus or only a small amount of brief pus    Answer Assessment - Initial Assessment Questions  1. EYE DISCHARGE: \"Is the discharge in one or both eyes?\" \"What color is it?\" \"How much is there?\" \"When did the discharge start?\"       White pus in left eye  2. REDNESS OF SCLERA: \"Is the redness in one or both eyes?\" \"When did the redness start?\"       Yes, just in left eye  3. EYELIDS: \"Are the eyelids red or swollen?\" If Yes, ask: \"How much?\"       Yeah  4. VISION: \"Is there any difficulty seeing clearly?\"       Only when full of pus  5. PAIN: \"Is there any pain? If Yes, ask: \"How bad is it?\" (Scale 1-10; or mild, moderate, severe)     - MILD (1-3): doesn't interfere with normal activities      - MODERATE (4-7): interferes with normal activities or awakens from sleep     - SEVERE (8-10): excruciating pain, unable to do any normal activities        Yes, irritated from removing pus  6. CONTACT LENS: \"Do you wear contacts?\"      No  7. OTHER SYMPTOMS: \"Do you have any other symptoms?\" (e.g., fever, runny nose, cough)      Runny nose  8. PREGNANCY: \"Is there any chance you are pregnant?\" \"When was your last menstrual period?\"      NA    Protocols used: Eye - Pus or [id number]    "

## 2024-09-17 NOTE — TELEPHONE ENCOUNTER
Patient calling to get a medication refill on medications attached     Disp Refills Start End LUIS CARLOS   insulin pen needle (32G X 4 MM) 32G X 4 MM miscellaneous 200 each 4 8/12/2024 -- No   Sig: Use 4 pen needles daily or as directed.   Sent to pharmacy as: Insulin Pen Needle 32G X 4 MM (32G X 4 MM)   Class: E-Prescribe   Order: [id number]   E-Prescribing Status: Receipt confirmed by pharmacy (8/12/2024  6:24 AM CDT)       -Patient never picked up medication attached     Looking to get a refill placed again     Contact Information  [phone] (Mobile)

## 2024-09-18 ENCOUNTER — VIRTUAL VISIT (OUTPATIENT)
Dept: INTERNAL MEDICINE | Facility: CLINIC | Age: 49
End: 2024-09-18
Payer: COMMERCIAL

## 2024-09-18 ENCOUNTER — TELEPHONE (OUTPATIENT)
Dept: INTERNAL MEDICINE | Facility: CLINIC | Age: 49
End: 2024-09-18

## 2024-09-18 DIAGNOSIS — Z79.4 UNCONTROLLED TYPE 2 DIABETES MELLITUS WITH HYPERGLYCEMIA, WITH LONG-TERM CURRENT USE OF INSULIN (H): ICD-10-CM

## 2024-09-18 DIAGNOSIS — E11.65 UNCONTROLLED TYPE 2 DIABETES MELLITUS WITH HYPERGLYCEMIA, WITH LONG-TERM CURRENT USE OF INSULIN (H): ICD-10-CM

## 2024-09-18 DIAGNOSIS — H10.9 BACTERIAL CONJUNCTIVITIS OF LEFT EYE: Primary | ICD-10-CM

## 2024-09-18 DIAGNOSIS — I50.21 ACUTE SYSTOLIC HEART FAILURE (H): ICD-10-CM

## 2024-09-18 PROCEDURE — G2211 COMPLEX E/M VISIT ADD ON: HCPCS | Mod: 95 | Performed by: NURSE PRACTITIONER

## 2024-09-18 PROCEDURE — 99214 OFFICE O/P EST MOD 30 MIN: CPT | Mod: 95 | Performed by: NURSE PRACTITIONER

## 2024-09-18 RX ORDER — POLYMYXIN B SULFATE AND TRIMETHOPRIM 1; 10000 MG/ML; [USP'U]/ML
1-2 SOLUTION OPHTHALMIC EVERY 4 HOURS
Qty: 10 ML | Refills: 0 | Status: SHIPPED | OUTPATIENT
Start: 2024-09-18

## 2024-09-18 NOTE — TELEPHONE ENCOUNTER
Received phone call from Raquel  with [facility].     Patient had virtual visit with Glendy Benavides NP this morning. He forgot to discuss his insulin lispro. Raquel states patient is wondering how he should be taking the insulin lispro. Raquel unsure how patient is currently taking. Attempted to contact patient to confirm how he has been taking insulin lispro, left voicemail message requesting return call.     Additionally, Raquel states that pharmacy requests new prescription for insulin lispro with refills. Insulin lispro sent 9/16/24.     Sending to [facility] team to follow up on once patient confirms how he is taking the insulin.

## 2024-09-18 NOTE — PROGRESS NOTES
"Floyd is a 48 year old who is being evaluated via a billable video visit.    How would you like to obtain your AVS? MyChart  If the video visit is dropped, the invitation should be resent by: Text to cell phone: [phone]  Will anyone else be joining your video visit? No      Assessment & Plan   Problem List Items Addressed This Visit       Acute systolic heart failure (H)    Relevant Orders    Adult Cardiology Eval  Referral     Other Visit Diagnoses       Bacterial conjunctivitis of left eye    -  Primary    Relevant Medications    polymixin b-trimethoprim (POLYTRIM) 74527-4.1 UNIT/ML-% ophthalmic solution    Uncontrolled type 2 diabetes mellitus with hyperglycemia, with long-term current use of insulin (H)        Relevant Medications    insulin pen needle (32G X 4 MM) 32G X 4 MM miscellaneous    Other Relevant Orders    Hemoglobin A1c           Blood sugars are remaining below 200 and continues to take medications as prescribed.         BMI  Estimated body mass index is 32.28 kg/m  as calculated from the following:    Height as of 7/11/24: 1.778 m (5' 10\").    Weight as of 7/11/24: 102.1 kg (225 lb).             Subjective   Floyd is a 48 year old, presenting for the following health issues:  No chief complaint on file.        7/11/2024     9:28 AM   Additional Questions   Roomed by Jacques LAMBERT CMA       Video Start Time: 7:02 AM    History of Present Illness       Reason for visit:  Possible pink eye  Symptoms include:  Puss in eye runny nose  Symptom intensity:  Severe  Symptom progression:  Staying the same  Had these symptoms before:  Yes  Has tried/received treatment for these symptoms:  Yes  Previous treatment was successful:  Yes  Prior treatment description:  Meds  What makes it worse:  No  What makes it better:  No   He is taking medications regularly.               Review of Systems  Constitutional, HEENT, cardiovascular, pulmonary, GI, , musculoskeletal, neuro, skin, endocrine and psych " systems are negative, except as otherwise noted.      Objective           Vitals:  No vitals were obtained today due to virtual visit.    Physical Exam   GENERAL: alert and no distress  EYES: erythema and mattering left eye   RESP: No audible wheeze, cough, or visible cyanosis.    SKIN: Visible skin clear. No significant rash, abnormal pigmentation or lesions.  NEURO: Cranial nerves grossly intact.  Mentation and speech appropriate for age.  PSYCH: Appropriate affect, tone, and pace of words      Current Outpatient Medications:     insulin pen needle (32G X 4 MM) 32G X 4 MM miscellaneous, Use 4 pen needles daily or as directed., Disp: 200 each, Rfl: 4    polymixin b-trimethoprim (POLYTRIM) 54048-1.1 UNIT/ML-% ophthalmic solution, Place 1-2 drops Into the left eye every 4 hours., Disp: 10 mL, Rfl: 0    acetaminophen (TYLENOL) 500 MG tablet, Take 2 tablets (1,000 mg) by mouth every 8 hours as needed for mild pain, Disp: , Rfl:     aspirin (ASA) 325 MG EC tablet, Take 1 tablet (325 mg) by mouth daily, Disp: , Rfl:     atorvastatin (LIPITOR) 80 MG tablet, Take 1 tablet (80 mg) by mouth daily, Disp: 90 tablet, Rfl: 3    bumetanide (BUMEX) 2 MG tablet, Take 1 tablet (2 mg) by mouth 2 times daily., Disp: 30 tablet, Rfl: 1    carvedilol (COREG) 25 MG tablet, Take 1 tablet (25 mg) by mouth 2 times daily (with meals)., Disp: 60 tablet, Rfl: 1    Continuous Glucose Sensor (FREESTYLE LAMINE 2 SENSOR) [facility], Inject 1 each subcutaneously every 14 days Use 1 sensor every 14 days. Use to read blood sugars per 's instructions., Disp: 6 each, Rfl: 5    ergocalciferol (ERGOCALCIFEROL) 1.25 MG (22900 UT) capsule, Take 50,000 Units by mouth once a week Every Wednesday for anemia for 12 weeks ACTIVE 4/24/24- 6/26/24, Disp: , Rfl:     insulin aspart (NOVOLOG VIAL) 100 UNITS/ML vial, Inject 5 Units Subcutaneous 3 times daily (with meals), Disp: , Rfl:     insulin glargine (LANTUS PEN) 100 UNIT/ML pen, Inject 32 Units  subcutaneously at bedtime, Disp: 15 mL, Rfl: 3    insulin lispro (HUMALOG KWIKPEN) 100 UNIT/ML (1 unit dial) KWIKPEN, Inject 5 Units subcutaneously 3 times daily (with meals). If humalog is not covered by insurance, may substitute with novolog or other fast acting insulin, Disp: 15 mL, Rfl: 0    insulin lispro (HUMALOG VIAL) 100 UNIT/ML vial, Inject Subcutaneous 3 times daily (before meals) Inject as per sliding scale: if  = 4 units, 70 or below see hypoglycemia protocol: 150-199= 8 units;  200-249 =10 units;  250-299= 12 units; 300-349= 14 units; 350-399= 16 units; 400 or greater give 18 units, if >400x 2 call MD/NP, Disp: , Rfl:     loperamide (IMODIUM) 2 MG capsule, Take 2 mg by mouth as needed for diarrhea (give 4mg every 6 hours as needed for Diarrhea), Disp: , Rfl:     losartan (COZAAR) 25 MG tablet, Take 1 tablet (25 mg) by mouth daily, Disp: , Rfl:     melatonin 5 MG tablet, Take 5 mg by mouth nightly as needed for sleep, Disp: , Rfl:     metFORMIN (GLUCOPHAGE XR) 500 MG 24 hr tablet, Take 1 tablet (500 mg) by mouth daily (with dinner)., Disp: 30 tablet, Rfl: 1    mineral oil-hydrophilic petrolatum (AQUAPHOR) external ointment, Apply topically 2 times daily, Disp: , Rfl:     multivitamin w/minerals (THERA-VIT-M) tablet, Take 1 tablet by mouth daily, Disp: , Rfl:     nicotine (NICODERM CQ) 14 MG/24HR 24 hr patch, Place 1 patch onto the skin every 24 hours, Disp: , Rfl:     ondansetron (ZOFRAN) 4 MG tablet, Take 4 mg by mouth every 8 hours as needed for nausea, Disp: , Rfl:     pantoprazole (PROTONIX) 40 MG EC tablet, Take 1 tablet (40 mg) by mouth every morning (before breakfast), Disp: , Rfl:     Urea-Lactic Acid 10-4 % CREA, Externally apply topically 2 times daily Apply to left foot/ heel topically two times a day for moisturizing 10-4%, Disp: , Rfl:     vitamin D3 (CHOLECALCIFEROL) 50 mcg (2000 units) tablet, Take 1 tablet by mouth daily, Disp: , Rfl:         Video-Visit Details    Type of service:   Video Visit   Video End Time:7:11 AM  Originating Location (pt. Location): Home    Distant Location (provider location):  Off-site  Platform used for Video Visit: Marya  Signed Electronically by: Glendy Benavides NP

## 2024-09-18 NOTE — TELEPHONE ENCOUNTER
"Pt states since leaving the [facility], he was told to take 10 units of insulin lispro and a sliding scale.     Pt states \"so when my Blood sugars are 200 I am taking 20 units, the initial 10 and another 10 units per the sliding scale.\"    Pt wanting to know if he should continue at the initial 10 units plus sliding scale, or if it is 5 units and sliding scale that he should be doing at this time.   "

## 2024-09-18 NOTE — TELEPHONE ENCOUNTER
Please clarify -     Does he often need the sliding scale on top of doing 10 units of lispro with meals?     When was he discharged from TCU - so how long has he been doing the 10 units of lispro with meals + sliding scale vs 5 units?

## 2024-09-23 ENCOUNTER — PATIENT OUTREACH (OUTPATIENT)
Dept: CARE COORDINATION | Facility: CLINIC | Age: 49
End: 2024-09-23
Payer: COMMERCIAL

## 2024-09-23 NOTE — PROGRESS NOTES
Clinic Care Coordination Contact  Follow Up Progress Note      Assessment: Patient didn't understand how much money he had in his bank account. Late Friday, he learned that he only has $20 left from his account he has been using since discharge from [facility].  Now he doesn't have rent money.  Encouraged him to contact his landlord to explain. Encouraged him to go to [facility] office at [facility] to apply for emergency assistance.  He doesn't have anyone he knows who can loan him money.  He is applying for jobs and will take anything.  He has the walking boot now.      Care Gaps:    Health Maintenance Due   Topic Date Due    YEARLY PREVENTIVE VISIT  Never done    HF ACTION PLAN  Never done    DIABETIC FOOT EXAM  Never done    ADVANCE CARE PLANNING  Never done    EYE EXAM  Never done    COLORECTAL CANCER SCREENING  Never done    HIV SCREENING  Never done    HEPATITIS B IMMUNIZATION (1 of 3 - 19+ 3-dose series) Never done    Pneumococcal Vaccine: Pediatrics (0 to 5 Years) and At-Risk Patients (6 to 64 Years) (2 of 2 - PCV) 10/11/2008    INFLUENZA VACCINE (1) 09/01/2024    COVID-19 Vaccine (3 - 2024-25 season) 09/01/2024    ZOSTER IMMUNIZATION (1 of 2) 12/19/2023       Postponed to next pcp appt     Care Plans  Care Plan: Food Insecurity       Problem: Reliable food source       Long-Range Goal: Establish Options for Affordable Food Sources       Start Date: 7/10/2024 Expected End Date: 7/9/2025    This Visit's Progress: 50% Recent Progress: 50%    Priority: High    Note:     Barriers: needs help with SNAP application, just discharged from [facility].   Strengths: motivated.   Patient expressed understanding of goal: yes  Action steps to achieve this goal:  1. I will work with Financial Resource Worker to complete SNAP application.   2. I will submit application to [facility] when completed.   3. I will report progress towards this goal at scheduled outreach telephone calls from the CCC team.                                 Care Plan: Financial Wellbeing       Problem: Patient expresses financial resource strain       Long-Range Goal: Go back to work.       Start Date: 8/30/2024 Expected End Date: 8/29/2025    This Visit's Progress: 10%    Priority: High    Note:     Barriers: shoes are being delayed due to vendor issue.   Strengths: has insurance coverage.   Patient expressed understanding of goal: yes  Action steps to achieve this goal:  1. I will attend PT.  2. I will get my new shoes.  3. I will report progress towards this goal at scheduled outreach telephone calls from the CCC team.                                  Intervention/Education provided during outreach: support     Outreach Frequency: monthly, more frequently as needed    Plan:     Care Coordinator will follow up in 30 days

## 2024-09-23 NOTE — LETTER
[facility] CARE COORDINATION  [facility]    October 9, 2024        Floyd Hodges  [address] [place] [address] [address]  [place] [address]          Dear Floyd,     Attached is an updated Complex Care Plan for your continued enrollment in Care Coordination. Please let us know if you have additional questions, concerns, or goals that we can assist with.    Sincerely,    Georgiana Isaacs,   [facility]  [phone]        [facility]  Patient Centered Plan of Care  About Me:        Patient Name:  Floyd Hodges    YOB: 1975  Age:         48 year old   [place] MRN:    [id number] Telephone Information:  Home Phone [phone]   Mobile [phone]       Address:  [address] [place] [address] [address]  [place] [address] Email address:  [email or web address]      Emergency Contact(s)    Name Relationship Lgl Grd Work Phone Home Phone Mobile Phone   1. CHANDANA GURROLA Friend  [phone]     2. ROSSY HODGES Brother    [phone]   3. PRATEEK HODGES Sister    [phone]           Primary language:  English     needed? No   [place] [employer]:  [phone] op. 1  Other communication barriers:Lack of coping; Physical impairment    Preferred Method of Communication:     Current living arrangement: I live alone; I live in a private home    Mobility Status/ Medical Equipment: Independent w/Device        Health Maintenance  Health Maintenance Reviewed: Due/Overdue   Health Maintenance Due   Topic Date Due    YEARLY PREVENTIVE VISIT  Never done    HF ACTION PLAN  Never done    DIABETIC FOOT EXAM  Never done    ADVANCE CARE PLANNING  Never done    EYE EXAM  Never done    COLORECTAL CANCER SCREENING  Never done    HIV SCREENING  Never done    HEPATITIS B IMMUNIZATION (1 of 3 - 19+ 3-dose series) Never done    Pneumococcal Vaccine: Pediatrics (0 to 5 Years) and At-Risk Patients (6 to 64 Years) (2 of 2 - PCV) 10/11/2008    INFLUENZA VACCINE (1) 09/01/2024    COVID-19 Vaccine (3 -  2024-25 season) 09/01/2024    ZOSTER IMMUNIZATION (1 of 2) 12/19/2023    A1C  10/11/2024           My Access Plan  Medical Emergency 911   Primary Clinic Line [facility] - [phone]   24 Hour Appointment Line [phone] or  [phone]) (toll-free)   24 Hour Nurse Line [phone] (toll-free)   Preferred Urgent Care [facility], [phone]     Preferred Hospital [facility]  [phone]     Preferred Pharmacy [id number] - [facility], [place] - [address]     Behavioral Health Crisis Line The National Suicide Prevention Lifeline at 1-932.979.6523 or Text/Call 578           My Care Team Members  Patient Care Team         Relationship Specialty Notifications Start End    Glendy Benavides, NP PCP - General  Abnormal results only, Admissions 10/20/23     Phone: [phone] Fax: [phone]         [address] [place] [address]    Glendy Benavides, NP Assigned PCP   10/11/23     Phone: [phone] Fax: [phone]         [address] [place] [address]    Eugene Spaulding MD MD Otolaryngology  10/20/23     Phone: [phone] Fax: [phone]         [address] [place], [place] [address]  [facility] [address]    May Mishra DO Assigned Neuroscience Provider   2/23/24     Phone: [phone] Fax: [phone]         [address] [address], [address] [address]    Sue Mclaughlin, PAEricC Physician Assistant Cardiology  3/13/24     Phone: [phone] Fax: [phone] [address] [facility] [address] [facility] [address]    Dong Sanders DPM Assigned Surgical Provider   4/23/24     Phone: [phone] Fax: [phone]         [facility] [facility] [address]    Georgiana Isaacs LSW Lead Care Coordinator Primary Care - CC Admissions 5/6/24     Phone: [phone]         Floyd Marcus MD Physician Infectious Diseases  5/7/24     Phone:  [phone] Fax: [phone]         [address] [facility] [address]    Floyd Marcus MD Assigned Infectious Disease Provider   5/23/24     Phone: [phone] Fax: [phone]         [address] [facility] [address]    Reny Lin MD MD Internal Medicine  7/29/24     Phone: [phone] Fax: [phone]         [address] [facility] [address]    Gee Hopson DO Physician Cardiovascular Disease  9/18/24     Phone: [phone] Fax: [phone] [address] [facility] [address]                My Care Plans  Self Management and Treatment Plan    Care Plan  Care Plan: Food Insecurity       Problem: Reliable food source       Long-Range Goal: Establish Options for Affordable Food Sources       Start Date: 7/10/2024 Expected End Date: 7/9/2025    This Visit's Progress: 50% Recent Progress: 50%    Priority: High    Note:     Barriers: needs help with SNAP application, just discharged from [facility].   Strengths: motivated.   Patient expressed understanding of goal: yes  Action steps to achieve this goal:  1. I will work with Financial Resource Worker to complete SNAP application.   2. I will submit application to [facility] when completed.   3. I will report progress towards this goal at scheduled outreach telephone calls from the CCC team.                                Care Plan: Financial Wellbeing       Problem: Patient expresses financial resource strain       Long-Range Goal: Go back to work.       Start Date: 8/30/2024 Expected End Date: 8/29/2025    This Visit's Progress: 10%    Priority: High    Note:     Barriers: shoes are being delayed due to vendor issue.   Strengths: has insurance coverage.   Patient expressed understanding of goal: yes  Action steps to achieve this goal:  1. I will attend PT.  2. I will get my new shoes.  3. I will report progress towards this goal at scheduled outreach telephone calls from the CCC team.                                     Advance Care Plans/Directives:   Advanced Care Plan/Directives on file:   No    Discussed with patient/caregiver(s):   Declined Further Information             My Medical and Care Information  Problem List   Patient Active Problem List   Diagnosis    Dizziness    Type 2 Diabetes Mellitus - Uncomplicated, Controlled    Hyperlipidemia    Hypothyroidism    Obesity    Tobacco use disorder    Elevated troponin    History of stroke    Class 2 severe obesity due to excess calories with serious comorbidity in adult (H)    Hyperglycemia    Poorly controlled diabetes mellitus (H)    Cellulitis of right lower leg    Diabetic ulcer of right heel associated with type 2 diabetes mellitus, unspecified ulcer stage (H)    Hyponatremia    Acute systolic heart failure (H)    Closed fracture of distal end of left radius    Benign essential hypertension    Heart burn    Diabetic ulcer of right heel associated with diabetes mellitus due to underlying condition, with muscle involvement without evidence of necrosis (H)    Diabetic ulcer of right heel associated with type 2 diabetes mellitus, with necrosis of bone (H)    Diabetic ulcer of right heel associated with type 2 diabetes mellitus, limited to breakdown of skin (H)      Current Medications and Allergies:    Current Outpatient Medications   Medication Instructions    acetaminophen (TYLENOL) 1,000 mg, Oral, EVERY 8 HOURS PRN    aspirin (ASA) 325 mg, Oral, DAILY    atorvastatin (LIPITOR) 80 mg, Oral, DAILY    bumetanide (BUMEX) 2 mg, Oral, 2 TIMES DAILY (Diuretics and Nitrates)    carvedilol (COREG) 25 mg, Oral, 2 TIMES DAILY WITH MEALS    Continuous Glucose Sensor (FREESTYLE LAMINE 2 SENSOR) MISC 1 each, Subcutaneous, EVERY 14 DAYS, Use 1 sensor every 14 days. Use to read blood sugars per 's instructions.    ergocalciferol (ERGOCALCIFEROL) 50,000 Units, Oral, WEEKLY, Every Wednesday for anemia for 12 weeks ACTIVE 4/24/24- 6/26/24    insulin aspart (NOVOLOG VIAL) 5  Units, Subcutaneous, 3 TIMES DAILY WITH MEALS    insulin glargine (LANTUS PEN) 32 Units, Subcutaneous, AT BEDTIME    insulin lispro (HUMALOG KWIKPEN) 10 Units, Subcutaneous, 3 TIMES DAILY WITH MEALS, Plus sliding scale: 2 units every 50 over 150. If humalog is not covered by insurance, may substitute with novolog or other fast acting insulin    insulin lispro (HUMALOG VIAL) 100 UNIT/ML vial Subcutaneous, 3 TIMES DAILY BEFORE MEALS, Inject as per sliding scale: if  = 4 units, 70 or below see hypoglycemia protocol: 150-199= 8 units;  200-249 =10 units;  250-299= 12 units; 300-349= 14 units; 350-399= 16 units; 400 or greater give 18 units, if >400x 2 call MD/NP    insulin pen needle (32G X 4 MM) 32G X 4 MM miscellaneous Use 4 pen needles daily or as directed.    loperamide (IMODIUM) 2 mg, Oral, PRN    losartan (COZAAR) 25 mg, Oral, DAILY    melatonin 5 mg, Oral, AT BEDTIME PRN    metFORMIN (GLUCOPHAGE XR) 500 mg, Oral, DAILY WITH SUPPER    mineral oil-hydrophilic petrolatum (AQUAPHOR) external ointment Topical, 2 TIMES DAILY    multivitamin w/minerals (THERA-VIT-M) tablet 1 tablet, Oral, DAILY    nicotine (NICODERM CQ) 14 MG/24HR 24 hr patch 1 patch, Transdermal, EVERY 24 HOURS    ondansetron (ZOFRAN) 4 mg, Oral, EVERY 8 HOURS PRN    pantoprazole (PROTONIX) 40 mg, Oral, EVERY MORNING BEFORE BREAKFAST    polymixin b-trimethoprim (POLYTRIM) 16304-8.1 UNIT/ML-% ophthalmic solution 1-2 drops, Left Eye, EVERY 4 HOURS    Urea-Lactic Acid 10-4 % CREA Apply externally, 2 TIMES DAILY, Apply to left foot/ heel topically two times a day for moisturizing 10-4%    vitamin D3 (CHOLECALCIFEROL) 50 mcg (2000 units) tablet 1 tablet, Oral, DAILY         Care Coordination Start Date: 5/6/2024   Frequency of Care Coordination: monthly, more frequently as needed     Form Last Updated: 10/09/2024

## 2024-09-24 ENCOUNTER — THERAPY VISIT (OUTPATIENT)
Dept: PHYSICAL THERAPY | Facility: REHABILITATION | Age: 49
End: 2024-09-24
Payer: COMMERCIAL

## 2024-09-24 DIAGNOSIS — Z86.73 HISTORY OF STROKE: Primary | ICD-10-CM

## 2024-09-24 DIAGNOSIS — E08.621 DIABETIC ULCER OF RIGHT HEEL ASSOCIATED WITH DIABETES MELLITUS DUE TO UNDERLYING CONDITION, WITH MUSCLE INVOLVEMENT WITHOUT EVIDENCE OF NECROSIS (H): ICD-10-CM

## 2024-09-24 DIAGNOSIS — L97.415 DIABETIC ULCER OF RIGHT HEEL ASSOCIATED WITH DIABETES MELLITUS DUE TO UNDERLYING CONDITION, WITH MUSCLE INVOLVEMENT WITHOUT EVIDENCE OF NECROSIS (H): ICD-10-CM

## 2024-09-24 PROCEDURE — 97110 THERAPEUTIC EXERCISES: CPT | Mod: GP | Performed by: PHYSICAL THERAPIST

## 2024-09-24 PROCEDURE — 97116 GAIT TRAINING THERAPY: CPT | Mod: GP | Performed by: PHYSICAL THERAPIST

## 2024-09-24 NOTE — TELEPHONE ENCOUNTER
Relayed to patient that PCP states ok for patient to continue with the sliding scale directions provided to him from TCU.     Patient states the issue he runs into with the sliding issue is that he runs out of his insulin quickly.     Patient has virtual visit tomorrow morning with provider. Patient will discuss further with PCP tomorrow morning.     Patient thanked for call back.

## 2024-09-24 NOTE — PROGRESS NOTES
"    PLAN  Continue therapy per current plan of care.    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 Sanders's note 7/24/24\" ambulate in a CAM boot until the diabetic inserts/shoes are available. \"   Progress Note/Certification   Start of Care Date 08/26/24   Onset of illness/injury or Date of Surgery 08/12/24  (date of PT order)   Therapy Frequency 1x/week   Predicted Duration 60 days   Certification date from 08/26/24   Certification date to 10/25/24   Progress Note Due Date 10/25/24   Progress Note Completed Date 09/24/24   PT Goal 1   Goal Identifier HEP   Goal Description Patient will be independent with HEP and self management of symptoms   Rationale to maximize safety and independence with performance of ADLs and functional tasks   Goal Progress progressing   Target Date 10/25/24   PT Goal 2   Goal Identifier ambulation   Goal Description Patient will ambulate 1250 ft for community distances with diabetic shoes and no AD safely   Rationale to maximize safety and independence within the community;to maximize safety and independence within the home   Goal Progress 200  yards each time 3 times per day and limited by lateral hips stiffness strain and pain.  Patient is striking his feet with his heel and rolling off his toes without increase in pain patient does have steel toe shoes on.   Target Date 10/25/24   PT Goal 3   Goal " Identifier return to work   Goal Description Patient will demonstrate improved ability to return to work.   Rationale to maximize safety and independence with performance of ADLs and functional tasks;to maximize safety and independence within the home   Goal Progress Need to find a job.  Previously was a  haven't been to work in 1 year.  Had a mild stroke last August still has some symptoms in the right hand as well as some higher level balance issues.   Target Date 10/25/24   PT Goal 4   Goal Description 8.97TUG, 6 meters 5.16   Subjective Report   Subjective Report Keep it clean put the cream on have steel toe shoes. Skin is pulled and has to callus over as far as he knows there is no tearing.  Scar tissue lateral heel tightness in the central slip of the plantar fascia patient does have some dryness lateral calcaneus discussed continuing with putting cream on this patient reports he has not been doing that very much.   Objective Measures   Objective Measures Objective Measure 1   Objective Measure 1   Objective Measure 5 times sit to stand 18.41 seconds   Details 8.97TUG, 6 meters in 5.16seconds 0.86m/s   Treatment Interventions (PT)   Interventions Neuromuscular Re-education   Therapeutic Procedure/Exercise   Therapeutic Procedures: strength, endurance, ROM, flexibility minutes (54798) 25   Ther Proc 2 - Details For improved foot mobility and strength performed outside of CAM boot - nustep level 7, 6 minutes, total steps 291 with subjective measures taken. Standing calf and soleus stretch 30'' B. For functional leg strength - leg press 2 x 10 at 125#   Skilled Intervention initiatoin of HEP to improve hip strength   Patient Response/Progress tolerated well   PTRx Ther Proc 1 Sit to Stand   PTRx Ther Proc 1 - Details 18.45 times sit to stand today and 18.41 seconds.  Patient reported that initially he had pain for 2 to 3 days after doing sit to stands 10-15 repetitions.  Discussed with patient to do as many  as he can to fatigue not pain. Do them every other day to start building up his strength   PTRx Ther Proc 2 Seated Ankle Gastroc Stretch with Towel   PTRx Ther Proc 2 - Details Patient may begin doing standing calf stretches as tolerated without increase in pain.  As long as he is stretching in his shoes.   PTRx Ther Proc 3 Standing Hip Abduction   PTRx Ther Proc 3 - Details Patient demonstrated well today.   PTRx Ther Proc 4 Toe Raises   PTRx Ther Proc 4 - Details Patient demonstrated without increase in pain patient to do this with shoes on at home.   PTRx Ther Proc 5 Stepping Reactions   PTRx Ther Proc 5 - Details Not addressed today.   PTRx Ther Proc 6 Standing Gastroc Stretch   PTRx Ther Proc 6 - Details hold for 30 seconds 3 repetitions   PTRx Ther Proc 7 Standing Soleus Stretch   PTRx Ther Proc 7 - Details hold for 30 seconds 3 repetitions   PTRx Ther Proc 8 Neutral Spine Standing   PTRx Ther Proc 8 - Details Once you find your neutral spine position then tighten stomach and buttocks 5-10 seconds 5-10 reps. and breathe   Neuromuscular Re-education   Neuromuscular re-ed of mvmt, balance, coord, kinesthetic sense, posture, proprioception minutes (62905) 3   Neuro Re-ed 1 - Details For improved foot intrinsics and gait mechanics: Performed on foam mat: single leg balance on airex x 1 min, calf raises x 20. Performed on cushioned mat: step to over half foam x 20 B, side stepping over half foam x 20 B. Tandem stance on foams x 1 min B.   Skilled Intervention Balance and foot intrinsic   Patient Response/Progress Tolerated well   PTRx Neuro Re-ed 1 Semi-Tandem with Head Pitch   PTRx Neuro Re-ed 1 - Details Patient to do this in the corner as tolerated and to progress as able to when he can to bring his legs for forward or to actually do full tandem stand.   Gait Training   Gait Training Minutes, includes stair climbing (25996) 18   Gait 1 Did functional gait assessment today.  Patient needs to look down to see  where his feet are with going up and down the stairs for safety and needed to have a handrail very challenged with tandem walk.  Patient has weakness in his core and noting increased hip pain with walking 200 yards patient is up to 600 yards he is walking 200 hours 3 times a day.   Gait 1 - Details Gait training patient to continue with heel toe and add in pulling tightening the core with walking to hopefully decrease the pain in the hips.   Gait 3 discussion/instruction on use of CAM boot for all ambulation until his new shoes arrive.  Instruction to perform foot checks daily with mirror and/or phone   Patient Response/Progress verbalized understanding.   Gait Training Gait 2   Education   Learner/Method Patient   Plan   Home program PTRx (print)   Updates to plan of care Add in standing gastroc and soleus stretching added core and standing abdominal gluteal isometrics patient can roll of a washcloth and foot underneath her to the foot to help relax and loosen up the central slip of the fascia.   Plan for next session warm up, review HEP, progress LE strength, balance and endurance with foam and cushions, functional hip abd leg strength - contnue to check bottom of foot and shin skin check   Comments   Comments Patient has a healed right foot ulcer with some scarring lateral calcaneus and dry skin.  Patient reports that he has tenderness inferior medial tubercle of the calcaneus.  Patient has tightness in the scar laterally as well as tightness in the fascia plantar aspect of foot.  Did soft tissue mobilization myofascial release scarring and that improved from severe tightness to moderate tightness and medial slip of the fascia and that improved from severe tightness to moderate tightness today.  Discussed with patient contacting his  to discuss issues about housing and other issues.  Patient has a good gait today striking with the heel and rolling off the toes.  5 times sit to stand is 18.41 seconds  tug was 8.97 seconds and patient had speed over 6 minutes 0.86 m/s today.   Total Session Time   Timed Code Treatment Minutes 46   Total Treatment Time (sum of timed and untimed services) 46

## 2024-09-25 ENCOUNTER — VIRTUAL VISIT (OUTPATIENT)
Dept: INTERNAL MEDICINE | Facility: CLINIC | Age: 49
End: 2024-09-25
Payer: COMMERCIAL

## 2024-09-25 DIAGNOSIS — Z79.4 UNCONTROLLED TYPE 2 DIABETES MELLITUS WITH HYPERGLYCEMIA, WITH LONG-TERM CURRENT USE OF INSULIN (H): ICD-10-CM

## 2024-09-25 DIAGNOSIS — E11.65 UNCONTROLLED TYPE 2 DIABETES MELLITUS WITH HYPERGLYCEMIA, WITH LONG-TERM CURRENT USE OF INSULIN (H): ICD-10-CM

## 2024-09-25 PROCEDURE — G2211 COMPLEX E/M VISIT ADD ON: HCPCS | Mod: 95 | Performed by: NURSE PRACTITIONER

## 2024-09-25 PROCEDURE — 99213 OFFICE O/P EST LOW 20 MIN: CPT | Mod: 95 | Performed by: NURSE PRACTITIONER

## 2024-09-25 RX ORDER — INSULIN LISPRO 100 [IU]/ML
10 INJECTION, SOLUTION INTRAVENOUS; SUBCUTANEOUS
Qty: 15 ML | Refills: 11 | Status: SHIPPED | OUTPATIENT
Start: 2024-09-25

## 2024-09-25 NOTE — PROGRESS NOTES
"Floyd is a 48 year old who is being evaluated via a billable video visit.    How would you like to obtain your AVS? MyChart  If the video visit is dropped, the invitation should be resent by: Text to cell phone: [phone]  Will anyone else be joining your video visit? No      Assessment & Plan   Problem List Items Addressed This Visit    None  Visit Diagnoses       Uncontrolled type 2 diabetes mellitus with hyperglycemia, with long-term current use of insulin (H)        Relevant Medications    insulin lispro (HUMALOG KWIKPEN) 100 UNIT/ML (1 unit dial) KWIKPEN           Discussed with patient having to take with meals checking blood sugar prior in addition utilized scale 2 units for every 50 starting at 150.  He will also check his blood sugar 1 to 2 hours postprandial.  Follow-up in 1 week's time       BMI  Estimated body mass index is 32.28 kg/m  as calculated from the following:    Height as of 7/11/24: 1.778 m (5' 10\").    Weight as of 7/11/24: 102.1 kg (225 lb).             Subjective   Floyd is a 48 year old, presenting for the following health issues:  No chief complaint on file.        7/11/2024     9:28 AM   Additional Questions   Roomed by Jacques LAMBERT CMA       Video Start Time: 7:36 AM    History of Present Illness       Reason for visit:  Possible pink eye  Symptoms include:  Puss in eye runny nose  Symptom intensity:  Severe  Symptom progression:  Staying the same  Had these symptoms before:  Yes  Has tried/received treatment for these symptoms:  Yes  Previous treatment was successful:  Yes  Prior treatment description:  Meds  What makes it worse:  No  What makes it better:  No   He is taking medications regularly.               Review of Systems  Constitutional, HEENT, cardiovascular, pulmonary, GI, , musculoskeletal, neuro, skin, endocrine and psych systems are negative, except as otherwise noted.      Objective           Vitals:  No vitals were obtained today due to virtual visit.    Physical Exam "   GENERAL: alert and no distress  EYES: Eyes grossly normal to inspection.  No discharge or erythema, or obvious scleral/conjunctival abnormalities.  RESP: No audible wheeze, cough, or visible cyanosis.    SKIN: Visible skin clear. No significant rash, abnormal pigmentation or lesions.  NEURO: Cranial nerves grossly intact.  Mentation and speech appropriate for age.  PSYCH: Appropriate affect, tone, and pace of words          Video-Visit Details    Type of service:  Video Visit   Video End Time:7:44 AM  Originating Location (pt. Location): Home    Distant Location (provider location):  Off-site  Platform used for Video Visit: Marya  Signed Electronically by: Glendy Benavides NP

## 2024-10-01 ENCOUNTER — THERAPY VISIT (OUTPATIENT)
Dept: PHYSICAL THERAPY | Facility: REHABILITATION | Age: 49
End: 2024-10-01
Payer: COMMERCIAL

## 2024-10-01 DIAGNOSIS — E08.621 DIABETIC ULCER OF RIGHT HEEL ASSOCIATED WITH DIABETES MELLITUS DUE TO UNDERLYING CONDITION, WITH MUSCLE INVOLVEMENT WITHOUT EVIDENCE OF NECROSIS (H): ICD-10-CM

## 2024-10-01 DIAGNOSIS — L97.415 DIABETIC ULCER OF RIGHT HEEL ASSOCIATED WITH DIABETES MELLITUS DUE TO UNDERLYING CONDITION, WITH MUSCLE INVOLVEMENT WITHOUT EVIDENCE OF NECROSIS (H): ICD-10-CM

## 2024-10-01 DIAGNOSIS — Z86.73 HISTORY OF STROKE: Primary | ICD-10-CM

## 2024-10-01 PROCEDURE — 97110 THERAPEUTIC EXERCISES: CPT | Mod: GP

## 2024-10-01 PROCEDURE — 97112 NEUROMUSCULAR REEDUCATION: CPT | Mod: GP

## 2024-10-03 ENCOUNTER — VIRTUAL VISIT (OUTPATIENT)
Dept: INTERNAL MEDICINE | Facility: CLINIC | Age: 49
End: 2024-10-03
Payer: COMMERCIAL

## 2024-10-03 DIAGNOSIS — F43.9 STRESS AT HOME: ICD-10-CM

## 2024-10-03 DIAGNOSIS — E11.65 UNCONTROLLED TYPE 2 DIABETES MELLITUS WITH HYPERGLYCEMIA, WITH LONG-TERM CURRENT USE OF INSULIN (H): Primary | ICD-10-CM

## 2024-10-03 DIAGNOSIS — Z79.4 UNCONTROLLED TYPE 2 DIABETES MELLITUS WITH HYPERGLYCEMIA, WITH LONG-TERM CURRENT USE OF INSULIN (H): Primary | ICD-10-CM

## 2024-10-03 PROCEDURE — 99441 PR PHYSICIAN TELEPHONE EVALUATION 5-10 MIN: CPT | Mod: 93 | Performed by: NURSE PRACTITIONER

## 2024-10-03 NOTE — PROGRESS NOTES
"Floyd is a 48 year old who is being evaluated via a billable video visit.    How would you like to obtain your AVS? MyChart  If the video visit is dropped, the invitation should be resent by: Text to cell phone: [phone]  Will anyone else be joining your video visit? No      Assessment & Plan   Problem List Items Addressed This Visit    None  Visit Diagnoses       Uncontrolled type 2 diabetes mellitus with hyperglycemia, with long-term current use of insulin (H)    -  Primary    Stress at home                10 units with meals and metformin as prescribed.  Reports bs have been below 200 with low of 108.  Patient reports having no difficulties with medications as prescribed.  He reports having some life stress secondary to housing he is indicated he is working on this.  He denies any additional concerns we will continue on all current medications as prescribed.       BMI  Estimated body mass index is 32.28 kg/m  as calculated from the following:    Height as of 7/11/24: 1.778 m (5' 10\").    Weight as of 7/11/24: 102.1 kg (225 lb).             Subjective   Floyd is a 48 year old, presenting for the following health issues:  Follow Up (Pt was in a hurry to get connected to appt. Did not answer questions. ) and Diabetes        10/3/2024    11:00 AM   Additional Questions   Roomed by Amador SIDDIQI CMA            History of Present Illness      He is taking medications regularly.       Diabetes Follow-up       BP Readings from Last 2 Encounters:   07/24/24 (!) 146/85   07/11/24 118/76     Hemoglobin A1C (%)   Date Value   07/11/2024 9.4 (H)   02/14/2024 11.8 (H)   10/12/2011 8.7 (H)   02/11/2011 10.1 (H)     LDL Cholesterol Calculated (mg/dL)   Date Value   02/21/2024 77   08/23/2023 226 (H)              Review of Systems  Constitutional, HEENT, cardiovascular, pulmonary, GI, , musculoskeletal, neuro, skin, endocrine and psych systems are negative, except as otherwise noted.      Objective           Vitals:  No vitals " were obtained today due to virtual visit.    Physical Exam   No coughing or wheezing noted patient alert and responsive    Office Visit on 07/11/2024   Component Date Value Ref Range Status    Creatinine Urine mg/dL 07/11/2024 92.8  mg/dL Final    The reference ranges have not been established in urine creatinine. The results should be integrated into the clinical context for interpretation.    Albumin Urine mg/L 07/11/2024 1,926.0  mg/L Final    The reference ranges have not been established in urine albumin. The results should be integrated into the clinical context for interpretation.    Albumin Urine mg/g Cr 07/11/2024 2,075.43 (H)  0.00 - 17.00 mg/g Cr Final    Microalbuminuria is defined as an albumin:creatinine ratio of 17 to 299 for males and 25 to 299 for females. A ratio of albumin:creatinine of 300 or higher is indicative of overt proteinuria.  Due to biologic variability, positive results should be confirmed by a second, first-morning random or 24-hour timed urine specimen. If there is discrepancy, a third specimen is recommended. When 2 out of 3 results are in the microalbuminuria range, this is evidence for incipient nephropathy and warrants increased efforts at glucose control, blood pressure control, and institution of therapy with an angiotensin-converting-enzyme (ACE) inhibitor (if the patient can tolerate it).      Hepatitis C Antibody 07/11/2024 Nonreactive  Nonreactive Final    A nonreactive screening test result does not exclude the possibility of exposure to or infection with HCV. Nonreactive screening test results in individuals with prior exposure to HCV may be due to antibody levels below the limit of detection of this assay or lack of reactivity to the HCV antigens used in this assay. Patients with recent HCV infections (<3 months from time of exposure) may have false-negative HCV antibody results due to the time needed for seroconversion (average of 8 to 9 weeks).    Hemoglobin A1C  07/11/2024 9.4 (H)  0.0 - 5.6 % Final    Normal <5.7%   Prediabetes 5.7-6.4%    Diabetes 6.5% or higher     Note: Adopted from ADA consensus guidelines.    Sodium 07/11/2024 139  135 - 145 mmol/L Final    Potassium 07/11/2024 4.6  3.4 - 5.3 mmol/L Final    Carbon Dioxide (CO2) 07/11/2024 22  22 - 29 mmol/L Final    Anion Gap 07/11/2024 13  7 - 15 mmol/L Final    Urea Nitrogen 07/11/2024 36.1 (H)  6.0 - 20.0 mg/dL Final    Creatinine 07/11/2024 1.14  0.67 - 1.17 mg/dL Final    GFR Estimate 07/11/2024 78  >60 mL/min/1.73m2 Final    eGFR calculated using 2021 CKD-EPI equation.    Calcium 07/11/2024 10.4 (H)  8.6 - 10.0 mg/dL Final    Chloride 07/11/2024 104  98 - 107 mmol/L Final    Glucose 07/11/2024 319 (H)  70 - 99 mg/dL Final    Alkaline Phosphatase 07/11/2024 105  40 - 150 U/L Final    AST 07/11/2024 16  0 - 45 U/L Final    Reference intervals for this test were updated on 6/12/2023 to more accurately reflect our healthy population. There may be differences in the flagging of prior results with similar values performed with this method. Interpretation of those prior results can be made in the context of the updated reference intervals.    ALT 07/11/2024 25  0 - 70 U/L Final    Reference intervals for this test were updated on 6/12/2023 to more accurately reflect our healthy population. There may be differences in the flagging of prior results with similar values performed with this method. Interpretation of those prior results can be made in the context of the updated reference intervals.      Protein Total 07/11/2024 7.5  6.4 - 8.3 g/dL Final    Albumin 07/11/2024 3.7  3.5 - 5.2 g/dL Final    Bilirubin Total 07/11/2024 0.4  <=1.2 mg/dL Final    WBC Count 07/11/2024 12.9 (H)  4.0 - 11.0 10e3/uL Final    RBC Count 07/11/2024 4.21 (L)  4.40 - 5.90 10e6/uL Final    Hemoglobin 07/11/2024 13.4  13.3 - 17.7 g/dL Final    Hematocrit 07/11/2024 39.2 (L)  40.0 - 53.0 % Final    MCV 07/11/2024 93  78 - 100 fL Final    MCH  07/11/2024 31.8  26.5 - 33.0 pg Final    MCHC 07/11/2024 34.2  31.5 - 36.5 g/dL Final    RDW 07/11/2024 12.3  10.0 - 15.0 % Final    Platelet Count 07/11/2024 315  150 - 450 10e3/uL Final    % Neutrophils 07/11/2024 74  % Final    % Lymphocytes 07/11/2024 16  % Final    % Monocytes 07/11/2024 6  % Final    % Eosinophils 07/11/2024 3  % Final    % Basophils 07/11/2024 1  % Final    % Immature Granulocytes 07/11/2024 0  % Final    Absolute Neutrophils 07/11/2024 9.6 (H)  1.6 - 8.3 10e3/uL Final    Absolute Lymphocytes 07/11/2024 2.1  0.8 - 5.3 10e3/uL Final    Absolute Monocytes 07/11/2024 0.8  0.0 - 1.3 10e3/uL Final    Absolute Eosinophils 07/11/2024 0.3  0.0 - 0.7 10e3/uL Final    Absolute Basophils 07/11/2024 0.1  0.0 - 0.2 10e3/uL Final    Absolute Immature Granulocytes 07/11/2024 0.0  <=0.4 10e3/uL Final         Telephone visit:  6 minutes   Originating Location (pt. Location): Home    Distant Location (provider location):  On-site  Platform used for Video Visit: Marya  Signed Electronically by: Glendy Benavides NP

## 2024-10-04 ENCOUNTER — TELEPHONE (OUTPATIENT)
Dept: INTERNAL MEDICINE | Facility: CLINIC | Age: 49
End: 2024-10-04
Payer: COMMERCIAL

## 2024-10-04 NOTE — TELEPHONE ENCOUNTER
October 4, 2024    Patient dropped off [employer] forms for Dr. Benavides to complete and sign.  Patient was advised that paperwork takes 5-7 business days to complete.  Patient label was attached to paperwork and placed in Providerinbox to be processed.    Paul Jean

## 2024-10-05 ENCOUNTER — MYC REFILL (OUTPATIENT)
Dept: INTERNAL MEDICINE | Facility: CLINIC | Age: 49
End: 2024-10-05
Payer: COMMERCIAL

## 2024-10-05 DIAGNOSIS — R79.89 ELEVATED TROPONIN: ICD-10-CM

## 2024-10-05 DIAGNOSIS — I50.21 ACUTE SYSTOLIC HEART FAILURE (H): ICD-10-CM

## 2024-10-05 DIAGNOSIS — Z98.890 S/P FOOT SURGERY, RIGHT: ICD-10-CM

## 2024-10-05 DIAGNOSIS — E11.65 POORLY CONTROLLED DIABETES MELLITUS (H): ICD-10-CM

## 2024-10-05 DIAGNOSIS — E66.01 CLASS 2 SEVERE OBESITY DUE TO EXCESS CALORIES WITH SERIOUS COMORBIDITY IN ADULT, UNSPECIFIED BMI (H): ICD-10-CM

## 2024-10-05 DIAGNOSIS — E66.812 CLASS 2 SEVERE OBESITY DUE TO EXCESS CALORIES WITH SERIOUS COMORBIDITY IN ADULT, UNSPECIFIED BMI (H): ICD-10-CM

## 2024-10-05 DIAGNOSIS — E78.01 FAMILIAL HYPERCHOLESTEROLEMIA: ICD-10-CM

## 2024-10-05 DIAGNOSIS — F17.200 TOBACCO USE DISORDER: ICD-10-CM

## 2024-10-05 DIAGNOSIS — Z86.73 HISTORY OF STROKE: ICD-10-CM

## 2024-10-07 ENCOUNTER — TELEPHONE (OUTPATIENT)
Dept: INTERNAL MEDICINE | Facility: CLINIC | Age: 49
End: 2024-10-07
Payer: COMMERCIAL

## 2024-10-07 RX ORDER — BUMETANIDE 2 MG/1
2 TABLET ORAL
Qty: 60 TABLET | Refills: 1 | Status: SHIPPED | OUTPATIENT
Start: 2024-10-07

## 2024-10-07 RX ORDER — METFORMIN HYDROCHLORIDE 500 MG/1
500 TABLET, EXTENDED RELEASE ORAL
Qty: 30 TABLET | Refills: 2 | Status: SHIPPED | OUTPATIENT
Start: 2024-10-07

## 2024-10-07 RX ORDER — CARVEDILOL 25 MG/1
25 TABLET ORAL 2 TIMES DAILY WITH MEALS
Qty: 60 TABLET | Refills: 1 | Status: SHIPPED | OUTPATIENT
Start: 2024-10-07

## 2024-10-07 NOTE — TELEPHONE ENCOUNTER
Left message with patient that there are no updates on his form at this time, but writer will follow up again tomorrow. Provided call back number should he want to call back to clinic.

## 2024-10-07 NOTE — TELEPHONE ENCOUNTER
Patient calling back to clinic again to check on status of the below form. Informed him that writer would look into it and try to call him with an update.

## 2024-10-07 NOTE — TELEPHONE ENCOUNTER
Patient returning call to clinic regarding a form, but also mentions he had a voicemail from the heart clinic about scheduling with Dr. Arndt. He is unsure who that is and would like a call back with more information.

## 2024-10-07 NOTE — TELEPHONE ENCOUNTER
FYI - Status Update    Who is Calling: patient    Update: Forms to ok to give plasma,  Status    Does caller want a call/response back: Yes     Could we send this information to you in [employer] or would you prefer to receive a phone call?:   No preference   Okay to leave a detailed message?: Yes at Cell number on file:    Telephone Information:   Mobile [phone]

## 2024-10-08 ENCOUNTER — THERAPY VISIT (OUTPATIENT)
Dept: PHYSICAL THERAPY | Facility: REHABILITATION | Age: 49
End: 2024-10-08
Payer: COMMERCIAL

## 2024-10-08 ENCOUNTER — TELEPHONE (OUTPATIENT)
Dept: INTERNAL MEDICINE | Facility: CLINIC | Age: 49
End: 2024-10-08

## 2024-10-08 DIAGNOSIS — L97.415 DIABETIC ULCER OF RIGHT HEEL ASSOCIATED WITH DIABETES MELLITUS DUE TO UNDERLYING CONDITION, WITH MUSCLE INVOLVEMENT WITHOUT EVIDENCE OF NECROSIS (H): ICD-10-CM

## 2024-10-08 DIAGNOSIS — E08.621 DIABETIC ULCER OF RIGHT HEEL ASSOCIATED WITH DIABETES MELLITUS DUE TO UNDERLYING CONDITION, WITH MUSCLE INVOLVEMENT WITHOUT EVIDENCE OF NECROSIS (H): ICD-10-CM

## 2024-10-08 DIAGNOSIS — Z86.73 HISTORY OF STROKE: Primary | ICD-10-CM

## 2024-10-08 PROCEDURE — 97112 NEUROMUSCULAR REEDUCATION: CPT | Mod: GP

## 2024-10-08 PROCEDURE — 97110 THERAPEUTIC EXERCISES: CPT | Mod: GP

## 2024-10-08 RX ORDER — ATORVASTATIN CALCIUM 80 MG/1
80 TABLET, FILM COATED ORAL DAILY
Qty: 90 TABLET | Refills: 3 | OUTPATIENT
Start: 2024-10-08

## 2024-10-08 NOTE — TELEPHONE ENCOUNTER
PCP is in clinic on Thursdays. Pt is aware of this. Placed forms on PCP's desk to review.       Amador Polk Jr., CMA on 10/8/2024 at 3:46 PM
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
## 2024-10-08 NOTE — TELEPHONE ENCOUNTER
Attempted to reach out to pt via phone, appeared that pt answered the call, then terminated the call before a conversation took place. No answer with additional attempts.   Since patient is active on My Chart, patient was sent a My Chart Message, explaining the benefits of heart failure management, provided phone numbers for scheduling and the nursing team if any additional questions.   Stacy VILLAREAL RN BSN, CHFN, PCCN-K

## 2024-10-09 ENCOUNTER — OFFICE VISIT (OUTPATIENT)
Dept: CARDIOLOGY | Facility: CLINIC | Age: 49
End: 2024-10-09
Attending: NURSE PRACTITIONER
Payer: COMMERCIAL

## 2024-10-09 VITALS
SYSTOLIC BLOOD PRESSURE: 130 MMHG | WEIGHT: 270 LBS | DIASTOLIC BLOOD PRESSURE: 78 MMHG | HEIGHT: 70 IN | BODY MASS INDEX: 38.65 KG/M2 | HEART RATE: 79 BPM | RESPIRATION RATE: 18 BRPM

## 2024-10-09 DIAGNOSIS — I63.541: ICD-10-CM

## 2024-10-09 DIAGNOSIS — E11.621 DIABETIC ULCER OF RIGHT HEEL ASSOCIATED WITH TYPE 2 DIABETES MELLITUS, LIMITED TO BREAKDOWN OF SKIN (H): ICD-10-CM

## 2024-10-09 DIAGNOSIS — E66.812 CLASS 2 SEVERE OBESITY DUE TO EXCESS CALORIES WITH SERIOUS COMORBIDITY AND BODY MASS INDEX (BMI) OF 37.0 TO 37.9 IN ADULT (H): ICD-10-CM

## 2024-10-09 DIAGNOSIS — E66.01 CLASS 2 SEVERE OBESITY DUE TO EXCESS CALORIES WITH SERIOUS COMORBIDITY AND BODY MASS INDEX (BMI) OF 37.0 TO 37.9 IN ADULT (H): ICD-10-CM

## 2024-10-09 DIAGNOSIS — L97.411 DIABETIC ULCER OF RIGHT HEEL ASSOCIATED WITH TYPE 2 DIABETES MELLITUS, LIMITED TO BREAKDOWN OF SKIN (H): ICD-10-CM

## 2024-10-09 DIAGNOSIS — I50.23 ACUTE ON CHRONIC SYSTOLIC CONGESTIVE HEART FAILURE (H): Primary | ICD-10-CM

## 2024-10-09 DIAGNOSIS — I25.118 CORONARY ARTERY DISEASE OF NATIVE ARTERY OF NATIVE HEART WITH STABLE ANGINA PECTORIS (H): ICD-10-CM

## 2024-10-09 PROCEDURE — G2211 COMPLEX E/M VISIT ADD ON: HCPCS | Performed by: INTERNAL MEDICINE

## 2024-10-09 PROCEDURE — 99215 OFFICE O/P EST HI 40 MIN: CPT | Performed by: INTERNAL MEDICINE

## 2024-10-09 PROCEDURE — 99417 PROLNG OP E/M EACH 15 MIN: CPT | Performed by: INTERNAL MEDICINE

## 2024-10-09 RX ORDER — LOSARTAN POTASSIUM 25 MG/1
25 TABLET ORAL DAILY
Qty: 90 TABLET | Refills: 3 | Status: SHIPPED | OUTPATIENT
Start: 2024-10-09

## 2024-10-09 NOTE — PROGRESS NOTES
HEART CARE ENCOUNTER CONSULTATON NOTE      [facility]  [phone]      Assessment/Recommendations   Assessment:   Advanced ischemic cardiomyopathy with congestive heart failure with reduced ejection fraction, left ventricular ejection fraction 30%.  New York Heart Association class III.  Advanced premature coronary artery disease status post coronary angiogram which demonstrated severe multivessel disease  3.   Diabetes mellitus type 2, insulin-dependent, A1c 9.4  4.   Hyperlipidemia, LDL 77, previously 226.   5.   Acute kidney injury, improved.  6.  Cerebrovascular accident.  He has residual hand impairment of his right hand.  Has ongoing visual impairment as well    Plan:  Patient has significant social factors impacting his ability to adequately treat his medical conditions.  He is currently facing eviction, has lost his job due to inability to work related to severe dyspnea on exertion and residual effects of his CVA.  Start losartan 25 mg daily  Continue carvedilol 25 mg twice daily  Restart aspirin at 325 mg daily for coronary disease and CVA  Continue statin therapy  Continue Bumex  Need to start spironolactone, Jardiance and possibly Entresto in upcoming visits  Discussed the need for him to consider disability insurance and housing assistance due to social factors.  He has significant cardiovascular disease including congestive heart failure with reduced ejection fraction of 30%, near card association class III symptoms.  Lung discussion patient regarding revascularization.  At this time he cannot base the fact that he may need bypass surgery due to stress related to his ongoing social issues.  He feels he needs to address his social issues including potential homelessness before he can consider further interventions such as angiogram or bypass surgery.  Attempted to provide patient with resources through the [facility] and [employer].       History of Present  Illness/Subjective    HPI: Floyd Capps is a 48 year old male ischemic cardiomyopathy, advanced congestive heart failure, advanced coronary disease, diabetes mellitus insulin-dependent, obesity, history of CVA who presents to cardiology clinic to establish care with myself.    Patient was emotionally upset during initial visit.  He is very stressed regarding his social situation and inability to work and likelihood of being evicted from his home.  Long conversation with the patient regarding looking at subsistence restated [place] due to his medical conditions and considering applying for permanent disability due to his cardiomyopathy.  Provided patient with online resources and call numbers.    He continues to have significant dyspnea with minimal exertion.  Currently New York Heart Association class III.  He does have ongoing symptoms of congestive heart failure.  He is compliant with his Bumex and carvedilol.  Has not started losartan now that his renal function has recovered.  Will need to also start further medications of the spironolactone and Jardiance along with possibly Entresto.  Cost would be a barrier for the patient as well if not fully covered by insurance.    He does have some mild intermittent chest pain which has been relatively stable.  Denies any syncopal episode.  Occasionally feels palpitations but no sustained arrhythmia events noted per the patient.    No alcohol use recently    Echocardiogram Results:2/2024  The left ventricle is normal in size. There is mild concentric left  ventricular hypertrophy.  Left ventricular function is decreased. The ejection fraction is 30%  (moderately reduced). There is global hypokinesis with severe hypokinesis of  the mid to apical inferior wall.  Diastolic Doppler findings (E/E' ratio and/or other parameters) suggest left  ventricular filling pressures are increased.     The right ventricle is normal in size and function.  Normal left atrial size. Right  "atrial size is normal.  There is mild (1+) mitral regurgitation.  IVC diameter >2.1 cm collapsing <50% with sniff suggests a high RA pressure  estimated at 15 mmHg or greater.     Compared to previous study from 8/23/2023 the LV systolic function has  declined and there are regional wall motion abnormalities.        Coronary Angiogram: 2024  - multi-vessel native CAD; severely elevated L-sided filling pressures  - will stop to obtain CTS consult  - continue ASA 81mg daily indefinitely, atorva 80  - continue aggressive risk factor modification      Findings:  LM:no obstruction  LAD:proximal Ca2+ 70% narrowing, mid-distal 90% narrowing. Small D1 w/ diffuse 90% narrowing  Lcx:OM1 proximal 90%, small superior subdivision 90%, inferior 80%. OM1 small 90% narrowing  RCA:dominant, distal 85%, rPDA 95% narrowing       Physical Examination  Review of Systems   Vitals: /78 (BP Location: Right arm, Patient Position: Sitting, Cuff Size: Adult Large)   Pulse 79   Resp 18   Ht 1.778 m (5' 10\")   Wt 122.5 kg (270 lb)   BMI 38.74 kg/m    BMI= Body mass index is 38.74 kg/m .  Wt Readings from Last 3 Encounters:   10/09/24 122.5 kg (270 lb)   07/11/24 102.1 kg (225 lb)   07/02/24 99.8 kg (220 lb)        Anxious but pleasant.  Obese   ENT/Mouth: membranes moist, no oral lesions or bleeding gums.      EYES:  no scleral icterus, normal conjunctivae       Chest/Lungs:   lungs are clear to auscultation, no rales or wheezing, no sternal scar, equal chest wall expansion    Cardiovascular:   Regular. Normal first and second heart sounds with faint systolic murmur. No rubs, or gallops; the carotid, radial and posterior tibial pulses are intact, Jugular venous pressure normal, mild edema bilaterally    Abdomen:  no tenderness; bowel sounds are present   Extremities: no cyanosis or clubbing   Skin: no xanthelasma, warm.    Neurologic: normal  bilateral, no tremors     Psychiatric: alert and oriented x3, calm        Please refer " above for cardiac ROS details.        Medical History  Surgical History Family History Social History   Past Medical History:   Diagnosis Date    Acute hypoxemic respiratory failure (H)     Acute renal failure, unspecified acute renal failure type (H)     CAD (coronary artery disease)     Callus of foot     Diabetes (H)     Diarrhea     Essential hypertension     Fracture of left distal radius     History of stroke     Insomnia     Nausea     Non-pressure chronic ulcer of right heel and midfoot with unspecified severity (H)     Normocytic anemia     Type 2 diabetes mellitus with foot ulcer (H)      Past Surgical History:   Procedure Laterality Date    APPENDECTOMY      CV CORONARY ANGIOGRAM N/A 3/1/2024    Procedure: CV CORONARY ANGIOGRAM;  Surgeon: Stephen Berger MD;  Location: [facility] CATH LAB CV    CV LEFT HEART CATH N/A 3/1/2024    Procedure: Left Heart Catheterization;  Surgeon: Stephen Berger MD;  Location: [facility] LAB CV    EXCISE EXOSTOSIS FOOT Right 5/2/2024    Procedure: excision of bone from calcaneus with application of theraskin;  Surgeon: Dong Sanders DPM;  Location: [facility] OR    INCISION AND DRAINAGE FOOT, COMBINED Right 5/2/2024    Procedure: INCISION AND DRAINAGE, right heel with;  Surgeon: Dong Sanders DPM;  Location: [facility] OR    INCISION AND DRAINAGE OF WOUND      groin abscess    IRRIGATION AND DEBRIDEMENT FOOT, COMBINED Right 2/16/2024    Procedure: IRRIGATION AND DEBRIDEMENT RIGHT FOOT;  Surgeon: Cristofer Sims DPM;  Location: [facility] OR    PICC DOUBLE LUMEN PLACEMENT  2/29/2024     No family history on file.     Social History     Socioeconomic History    Marital status: Single     Spouse name: Not on file    Number of children: Not on file    Years of education: Not on file    Highest education level: Not on file   Occupational History    Not on file   Tobacco Use    Smoking status: Former     Current packs/day: 0.50     Types:  Cigarettes    Smokeless tobacco: Never   Vaping Use    Vaping status: Never Used   Substance and Sexual Activity    Alcohol use: Yes     Comment: once a week    Drug use: Yes     Types: Marijuana     Comment: Has not done since Feb    Sexual activity: Not on file   Other Topics Concern    Not on file   Social History Narrative    Patient reports that he does not have health insurance.     Social Determinants of Health     Financial Resource Strain: Low Risk  (10/20/2023)    Financial Resource Strain     Within the past 12 months, have you or your family members you live with been unable to get utilities (heat, electricity) when it was really needed?: No   Food Insecurity: High Risk (10/20/2023)    Food Insecurity     Within the past 12 months, did you worry that your food would run out before you got money to buy more?: Yes     Within the past 12 months, did the food you bought just not last and you didn t have money to get more?: No   Transportation Needs: Low Risk  (10/20/2023)    Transportation Needs     Within the past 12 months, has lack of transportation kept you from medical appointments, getting your medicines, non-medical meetings or appointments, work, or from getting things that you need?: No   Physical Activity: Not on file   Stress: Not on file   Social Connections: Not on file   Interpersonal Safety: Low Risk  (10/20/2023)    Interpersonal Safety     Do you feel physically and emotionally safe where you currently live?: Yes     Within the past 12 months, have you been hit, slapped, kicked or otherwise physically hurt by someone?: No     Within the past 12 months, have you been humiliated or emotionally abused in other ways by your partner or ex-partner?: No   Housing Stability: High Risk (10/20/2023)    Housing Stability     Do you have housing? : Yes     Are you worried about losing your housing?: Yes           Medications  Allergies   Current Outpatient Medications   Medication Sig Dispense Refill     acetaminophen (TYLENOL) 500 MG tablet Take 2 tablets (1,000 mg) by mouth every 8 hours as needed for mild pain      atorvastatin (LIPITOR) 80 MG tablet Take 1 tablet (80 mg) by mouth daily 90 tablet 3    bumetanide (BUMEX) 2 MG tablet Take 1 tablet (2 mg) by mouth 2 times daily. 60 tablet 1    carvedilol (COREG) 25 MG tablet Take 1 tablet (25 mg) by mouth 2 times daily (with meals). 60 tablet 1    insulin glargine (LANTUS PEN) 100 UNIT/ML pen Inject 32 Units subcutaneously at bedtime 15 mL 3    insulin lispro (HUMALOG KWIKPEN) 100 UNIT/ML (1 unit dial) KWIKPEN Inject 10 Units subcutaneously 3 times daily (with meals). Plus sliding scale: 2 units every 50 over 150. If humalog is not covered by insurance, may substitute with novolog or other fast acting insulin 15 mL 11    insulin pen needle (32G X 4 MM) 32G X 4 MM miscellaneous Use 4 pen needles daily or as directed. 200 each 4    losartan (COZAAR) 25 MG tablet Take 1 tablet (25 mg) by mouth daily      metFORMIN (GLUCOPHAGE XR) 500 MG 24 hr tablet Take 1 tablet (500 mg) by mouth daily (with dinner). 30 tablet 2    mineral oil-hydrophilic petrolatum (AQUAPHOR) external ointment Apply topically 2 times daily      multivitamin w/minerals (THERA-VIT-M) tablet Take 1 tablet by mouth daily      polymixin b-trimethoprim (POLYTRIM) 29524-2.1 UNIT/ML-% ophthalmic solution Place 1-2 drops Into the left eye every 4 hours. 10 mL 0    Urea-Lactic Acid 10-4 % CREA Externally apply topically 2 times daily Apply to left foot/ heel topically two times a day for moisturizing 10-4%      aspirin (ASA) 325 MG EC tablet Take 1 tablet (325 mg) by mouth daily (Patient not taking: Reported on 10/9/2024)      Continuous Glucose Sensor (FREESTYLE LAMINE 2 SENSOR) MISC Inject 1 each subcutaneously every 14 days Use 1 sensor every 14 days. Use to read blood sugars per 's instructions. (Patient not taking: Reported on 10/9/2024) 6 each 5    ergocalciferol (ERGOCALCIFEROL) 1.25 MG  (06254 UT) capsule Take 50,000 Units by mouth once a week Every Wednesday for anemia for 12 weeks ACTIVE 4/24/24- 6/26/24 (Patient not taking: Reported on 10/9/2024)      insulin aspart (NOVOLOG VIAL) 100 UNITS/ML vial Inject 5 Units Subcutaneous 3 times daily (with meals) (Patient not taking: Reported on 10/9/2024)      insulin lispro (HUMALOG VIAL) 100 UNIT/ML vial Inject Subcutaneous 3 times daily (before meals) Inject as per sliding scale: if  = 4 units, 70 or below see hypoglycemia protocol: 150-199= 8 units;  200-249 =10 units;  250-299= 12 units; 300-349= 14 units; 350-399= 16 units; 400 or greater give 18 units, if >400x 2 call MD/NP (Patient not taking: Reported on 10/9/2024)      loperamide (IMODIUM) 2 MG capsule Take 2 mg by mouth as needed for diarrhea (give 4mg every 6 hours as needed for Diarrhea) (Patient not taking: Reported on 10/9/2024)      melatonin 5 MG tablet Take 5 mg by mouth nightly as needed for sleep (Patient not taking: Reported on 10/9/2024)      nicotine (NICODERM CQ) 14 MG/24HR 24 hr patch Place 1 patch onto the skin every 24 hours (Patient not taking: Reported on 10/9/2024)      ondansetron (ZOFRAN) 4 MG tablet Take 4 mg by mouth every 8 hours as needed for nausea (Patient not taking: Reported on 10/9/2024)      pantoprazole (PROTONIX) 40 MG EC tablet Take 1 tablet (40 mg) by mouth every morning (before breakfast) (Patient not taking: Reported on 10/9/2024)      vitamin D3 (CHOLECALCIFEROL) 50 mcg (2000 units) tablet Take 1 tablet by mouth daily (Patient not taking: Reported on 10/9/2024)       No Known Allergies       Lab Results    Chemistry/lipid CBC Cardiac Enzymes/BNP/TSH/INR   Recent Labs   Lab Test 02/21/24  0537   CHOL 156   HDL 39*   LDL 77   TRIG 199*     Recent Labs   Lab Test 02/21/24  0537 08/23/23  0946   LDL 77 226*     Recent Labs   Lab Test 07/11/24  1045      POTASSIUM 4.6   CHLORIDE 104   CO2 22   *   BUN 36.1*   CR 1.14   GFRESTIMATED 78   SARAH  "10.4*     Recent Labs   Lab Test 07/11/24  1045 03/05/24  0440 03/04/24  0442   CR 1.14 1.71* 1.72*     Recent Labs   Lab Test 07/11/24  1045 02/14/24  0935 08/23/23  0946   A1C 9.4* 11.8* 11.1*          Recent Labs   Lab Test 07/11/24  1045   WBC 12.9*   HGB 13.4   HCT 39.2*   MCV 93        Recent Labs   Lab Test 07/11/24  1045 02/27/24  0540 02/23/24  0618   HGB 13.4 10.2* 10.6*    No results for input(s): \"TROPONINI\" in the last 36284 hours.  Recent Labs   Lab Test 02/19/24  0547 08/23/23  0946   NTBNPI 12,085* 857*     Recent Labs   Lab Test 08/23/23  1433   TSH 2.72     Recent Labs   Lab Test 08/23/23  0946   INR 0.95        Gee Hopson DO  Cardiologist     Today's clinic visit entailed:  68 minutes spent by me on the date of the encounter doing chart review, history and exam, documentation and further activities per the note  Provider  Link to MDM Help Grid     The longitudinal plan of care for the diagnosis(es)/condition(s) as documented were addressed during this visit. Due to the added complexity in care, I will continue to support Floyd in the subsequent management and with ongoing continuity of care.                        "

## 2024-10-09 NOTE — PATIENT INSTRUCTIONS
Please contact direct nurses line Monday through Friday 8 AM to 5 PM @ [phone]    After-hours contact cardiology office at [phone].    Heart Diagnosis:   Congestive heart failure with reduced ejection fractions, acute on chronic.  LVEF:30%, NYHA class III.    2.   Ischemic cardiomyopathy  3.   Coronary Artery disease, advanced, premature, with stable angina.     Plan:   Restart asprin 325 mg daily   Start losartan 25 mg daily      Social Support: Resources.   2. Online sliding scale/disability online: https://www-origin.ssa.gov/applyfordisability/  3. [place] Supplemental Aid (MSA) Housing Assistance or Housing Support (formerly known as Group Residential Housing) programs, contact your county or [place] office. Who we are the Housing Supports and Services Division is part of the Community Supports Administration at [facility], and is made up of a small team of professionals passionate about making sure we all have the opportunity to live in the community of their choice. Informational page (PDF)    Address:  [facility] [address] [address]    Mailing address:  [place] [employer]  [address] [address]  [place] [address]    Phone: [phone]

## 2024-10-09 NOTE — LETTER
10/9/2024    Glendy Benavides NP  [address] [facility] [address]    RE: Floyd Capps       Dear Colleague,     I had the pleasure of seeing Floyd Capps in the [facility].    HEART CARE ENCOUNTER CONSULTATON NOTE      [facility]  [phone]      Assessment/Recommendations   Assessment:   Advanced ischemic cardiomyopathy with congestive heart failure with reduced ejection fraction, left ventricular ejection fraction 30%.  New York Heart Association class III.  Advanced premature coronary artery disease status post coronary angiogram which demonstrated severe multivessel disease  3.   Diabetes mellitus type 2, insulin-dependent, A1c 9.4  4.   Hyperlipidemia, LDL 77, previously 226.   5.   Acute kidney injury, improved.  6.  Cerebrovascular accident.  He has residual hand impairment of his right hand.  Has ongoing visual impairment as well    Plan:  Patient has significant social factors impacting his ability to adequately treat his medical conditions.  He is currently facing eviction, has lost his job due to inability to work related to severe dyspnea on exertion and residual effects of his CVA.  Start losartan 25 mg daily  Continue carvedilol 25 mg twice daily  Restart aspirin at 325 mg daily for coronary disease and CVA  Continue statin therapy  Continue Bumex  Need to start spironolactone, Jardiance and possibly Entresto in upcoming visits  Discussed the need for him to consider disability insurance and housing assistance due to social factors.  He has significant cardiovascular disease including congestive heart failure with reduced ejection fraction of 30%, near card association class III symptoms.  Lung discussion patient regarding revascularization.  At this time he cannot base the fact that he may need bypass surgery due to stress related to his ongoing social issues.  He feels he needs to address his social issues including potential  homelessness before he can consider further interventions such as angiogram or bypass surgery.  Attempted to provide patient with resources through the [facility] and [facility].       History of Present Illness/Subjective    HPI: Floyd Capps is a 48 year old male ischemic cardiomyopathy, advanced congestive heart failure, advanced coronary disease, diabetes mellitus insulin-dependent, obesity, history of CVA who presents to cardiology clinic to establish care with myself.    Patient was emotionally upset during initial visit.  He is very stressed regarding his social situation and inability to work and likelihood of being evicted from his home.  Long conversation with the patient regarding looking at subsistence restated [place] due to his medical conditions and considering applying for permanent disability due to his cardiomyopathy.  Provided patient with online resources and call numbers.    He continues to have significant dyspnea with minimal exertion.  Currently New York Heart Association class III.  He does have ongoing symptoms of congestive heart failure.  He is compliant with his Bumex and carvedilol.  Has not started losartan now that his renal function has recovered.  Will need to also start further medications of the spironolactone and Jardiance along with possibly Entresto.  Cost would be a barrier for the patient as well if not fully covered by insurance.    He does have some mild intermittent chest pain which has been relatively stable.  Denies any syncopal episode.  Occasionally feels palpitations but no sustained arrhythmia events noted per the patient.    No alcohol use recently    Echocardiogram Results:2/2024  The left ventricle is normal in size. There is mild concentric left  ventricular hypertrophy.  Left ventricular function is decreased. The ejection fraction is 30%  (moderately reduced). There is global hypokinesis with severe hypokinesis of  the mid to apical inferior  "wall.  Diastolic Doppler findings (E/E' ratio and/or other parameters) suggest left  ventricular filling pressures are increased.     The right ventricle is normal in size and function.  Normal left atrial size. Right atrial size is normal.  There is mild (1+) mitral regurgitation.  IVC diameter >2.1 cm collapsing <50% with sniff suggests a high RA pressure  estimated at 15 mmHg or greater.     Compared to previous study from 8/23/2023 the LV systolic function has  declined and there are regional wall motion abnormalities.        Coronary Angiogram: 2024  - multi-vessel native CAD; severely elevated L-sided filling pressures  - will stop to obtain CTS consult  - continue ASA 81mg daily indefinitely, atorva 80  - continue aggressive risk factor modification      Findings:  LM:no obstruction  LAD:proximal Ca2+ 70% narrowing, mid-distal 90% narrowing. Small D1 w/ diffuse 90% narrowing  Lcx:OM1 proximal 90%, small superior subdivision 90%, inferior 80%. OM1 small 90% narrowing  RCA:dominant, distal 85%, rPDA 95% narrowing       Physical Examination  Review of Systems   Vitals: /78 (BP Location: Right arm, Patient Position: Sitting, Cuff Size: Adult Large)   Pulse 79   Resp 18   Ht 1.778 m (5' 10\")   Wt 122.5 kg (270 lb)   BMI 38.74 kg/m    BMI= Body mass index is 38.74 kg/m .  Wt Readings from Last 3 Encounters:   10/09/24 122.5 kg (270 lb)   07/11/24 102.1 kg (225 lb)   07/02/24 99.8 kg (220 lb)        Anxious but pleasant.  Obese   ENT/Mouth: membranes moist, no oral lesions or bleeding gums.      EYES:  no scleral icterus, normal conjunctivae       Chest/Lungs:   lungs are clear to auscultation, no rales or wheezing, no sternal scar, equal chest wall expansion    Cardiovascular:   Regular. Normal first and second heart sounds with faint systolic murmur. No rubs, or gallops; the carotid, radial and posterior tibial pulses are intact, Jugular venous pressure normal, mild edema bilaterally    Abdomen:  no " tenderness; bowel sounds are present   Extremities: no cyanosis or clubbing   Skin: no xanthelasma, warm.    Neurologic: normal  bilateral, no tremors     Psychiatric: alert and oriented x3, calm        Please refer above for cardiac ROS details.        Medical History  Surgical History Family History Social History   Past Medical History:   Diagnosis Date     Acute hypoxemic respiratory failure (H)      Acute renal failure, unspecified acute renal failure type (H)      CAD (coronary artery disease)      Callus of foot      Diabetes (H)      Diarrhea      Essential hypertension      Fracture of left distal radius      History of stroke      Insomnia      Nausea      Non-pressure chronic ulcer of right heel and midfoot with unspecified severity (H)      Normocytic anemia      Type 2 diabetes mellitus with foot ulcer (H)      Past Surgical History:   Procedure Laterality Date     APPENDECTOMY       CV CORONARY ANGIOGRAM N/A 3/1/2024    Procedure: CV CORONARY ANGIOGRAM;  Surgeon: Stephen Berger MD;  Location: [facility] CATH LAB CV     CV LEFT HEART CATH N/A 3/1/2024    Procedure: Left Heart Catheterization;  Surgeon: Stephen Berger MD;  Location: [facility] CATH LAB CV     EXCISE EXOSTOSIS FOOT Right 5/2/2024    Procedure: excision of bone from calcaneus with application of theraskin;  Surgeon: Dong Sanders DPM;  [place] [facility] [place]     INCISION AND DRAINAGE FOOT, COMBINED Right 5/2/2024    Procedure: INCISION AND DRAINAGE, right heel with;  Surgeon: Dong Sanders DPM;  [place] [facility] [place]     INCISION AND DRAINAGE OF WOUND      groin abscess     IRRIGATION AND DEBRIDEMENT FOOT, COMBINED Right 2/16/2024    Procedure: IRRIGATION AND DEBRIDEMENT RIGHT FOOT;  Surgeon: Cristofer Sims DPM;  [place] [facility] [place]     PICC DOUBLE LUMEN PLACEMENT  2/29/2024     No family history on file.     Social History     Socioeconomic History     Marital status: Single     Spouse name: Not  on file     Number of children: Not on file     Years of education: Not on file     Highest education level: Not on file   Occupational History     Not on file   Tobacco Use     Smoking status: Former     Current packs/day: 0.50     Types: Cigarettes     Smokeless tobacco: Never   Vaping Use     Vaping status: Never Used   Substance and Sexual Activity     Alcohol use: Yes     Comment: once a week     Drug use: Yes     Types: Marijuana     Comment: Has not done since Feb     Sexual activity: Not on file   Other Topics Concern     Not on file   Social History Narrative    Patient reports that he does not have health insurance.     Social Determinants of Health     Financial Resource Strain: Low Risk  (10/20/2023)    Financial Resource Strain      Within the past 12 months, have you or your family members you live with been unable to get utilities (heat, electricity) when it was really needed?: No   Food Insecurity: High Risk (10/20/2023)    Food Insecurity      Within the past 12 months, did you worry that your food would run out before you got money to buy more?: Yes      Within the past 12 months, did the food you bought just not last and you didn t have money to get more?: No   Transportation Needs: Low Risk  (10/20/2023)    Transportation Needs      Within the past 12 months, has lack of transportation kept you from medical appointments, getting your medicines, non-medical meetings or appointments, work, or from getting things that you need?: No   Physical Activity: Not on file   Stress: Not on file   Social Connections: Not on file   Interpersonal Safety: Low Risk  (10/20/2023)    Interpersonal Safety      Do you feel physically and emotionally safe where you currently live?: Yes      Within the past 12 months, have you been hit, slapped, kicked or otherwise physically hurt by someone?: No      Within the past 12 months, have you been humiliated or emotionally abused in other ways by your partner or ex-partner?:  No   Housing Stability: High Risk (10/20/2023)    Housing Stability      Do you have housing? : Yes      Are you worried about losing your housing?: Yes           Medications  Allergies   Current Outpatient Medications   Medication Sig Dispense Refill     acetaminophen (TYLENOL) 500 MG tablet Take 2 tablets (1,000 mg) by mouth every 8 hours as needed for mild pain       atorvastatin (LIPITOR) 80 MG tablet Take 1 tablet (80 mg) by mouth daily 90 tablet 3     bumetanide (BUMEX) 2 MG tablet Take 1 tablet (2 mg) by mouth 2 times daily. 60 tablet 1     carvedilol (COREG) 25 MG tablet Take 1 tablet (25 mg) by mouth 2 times daily (with meals). 60 tablet 1     insulin glargine (LANTUS PEN) 100 UNIT/ML pen Inject 32 Units subcutaneously at bedtime 15 mL 3     insulin lispro (HUMALOG KWIKPEN) 100 UNIT/ML (1 unit dial) KWIKPEN Inject 10 Units subcutaneously 3 times daily (with meals). Plus sliding scale: 2 units every 50 over 150. If humalog is not covered by insurance, may substitute with novolog or other fast acting insulin 15 mL 11     insulin pen needle (32G X 4 MM) 32G X 4 MM miscellaneous Use 4 pen needles daily or as directed. 200 each 4     losartan (COZAAR) 25 MG tablet Take 1 tablet (25 mg) by mouth daily       metFORMIN (GLUCOPHAGE XR) 500 MG 24 hr tablet Take 1 tablet (500 mg) by mouth daily (with dinner). 30 tablet 2     mineral oil-hydrophilic petrolatum (AQUAPHOR) external ointment Apply topically 2 times daily       multivitamin w/minerals (THERA-VIT-M) tablet Take 1 tablet by mouth daily       polymixin b-trimethoprim (POLYTRIM) 66832-3.1 UNIT/ML-% ophthalmic solution Place 1-2 drops Into the left eye every 4 hours. 10 mL 0     Urea-Lactic Acid 10-4 % CREA Externally apply topically 2 times daily Apply to left foot/ heel topically two times a day for moisturizing 10-4%       aspirin (ASA) 325 MG EC tablet Take 1 tablet (325 mg) by mouth daily (Patient not taking: Reported on 10/9/2024)       Continuous Glucose  Sensor (FREESTYLE LAMINE 2 SENSOR) [facility] Inject 1 each subcutaneously every 14 days Use 1 sensor every 14 days. Use to read blood sugars per 's instructions. (Patient not taking: Reported on 10/9/2024) 6 each 5     ergocalciferol (ERGOCALCIFEROL) 1.25 MG (97691 UT) capsule Take 50,000 Units by mouth once a week Every Wednesday for anemia for 12 weeks ACTIVE 4/24/24- 6/26/24 (Patient not taking: Reported on 10/9/2024)       insulin aspart (NOVOLOG VIAL) 100 UNITS/ML vial Inject 5 Units Subcutaneous 3 times daily (with meals) (Patient not taking: Reported on 10/9/2024)       insulin lispro (HUMALOG VIAL) 100 UNIT/ML vial Inject Subcutaneous 3 times daily (before meals) Inject as per sliding scale: if  = 4 units, 70 or below see hypoglycemia protocol: 150-199= 8 units;  200-249 =10 units;  250-299= 12 units; 300-349= 14 units; 350-399= 16 units; 400 or greater give 18 units, if >400x 2 call MD/NP (Patient not taking: Reported on 10/9/2024)       loperamide (IMODIUM) 2 MG capsule Take 2 mg by mouth as needed for diarrhea (give 4mg every 6 hours as needed for Diarrhea) (Patient not taking: Reported on 10/9/2024)       melatonin 5 MG tablet Take 5 mg by mouth nightly as needed for sleep (Patient not taking: Reported on 10/9/2024)       nicotine (NICODERM CQ) 14 MG/24HR 24 hr patch Place 1 patch onto the skin every 24 hours (Patient not taking: Reported on 10/9/2024)       ondansetron (ZOFRAN) 4 MG tablet Take 4 mg by mouth every 8 hours as needed for nausea (Patient not taking: Reported on 10/9/2024)       pantoprazole (PROTONIX) 40 MG EC tablet Take 1 tablet (40 mg) by mouth every morning (before breakfast) (Patient not taking: Reported on 10/9/2024)       vitamin D3 (CHOLECALCIFEROL) 50 mcg (2000 units) tablet Take 1 tablet by mouth daily (Patient not taking: Reported on 10/9/2024)       No Known Allergies       Lab Results    Chemistry/lipid CBC Cardiac Enzymes/BNP/TSH/INR   Recent Labs   Lab Test  "02/21/24  0537   CHOL 156   HDL 39*   LDL 77   TRIG 199*     Recent Labs   Lab Test 02/21/24  0537 08/23/23  0946   LDL 77 226*     Recent Labs   Lab Test 07/11/24  1045      POTASSIUM 4.6   CHLORIDE 104   CO2 22   *   BUN 36.1*   CR 1.14   GFRESTIMATED 78   SARAH 10.4*     Recent Labs   Lab Test 07/11/24  1045 03/05/24  0440 03/04/24  0442   CR 1.14 1.71* 1.72*     Recent Labs   Lab Test 07/11/24  1045 02/14/24  0935 08/23/23  0946   A1C 9.4* 11.8* 11.1*          Recent Labs   Lab Test 07/11/24  1045   WBC 12.9*   HGB 13.4   HCT 39.2*   MCV 93        Recent Labs   Lab Test 07/11/24  1045 02/27/24  0540 02/23/24  0618   HGB 13.4 10.2* 10.6*    No results for input(s): \"TROPONINI\" in the last 84220 hours.  Recent Labs   Lab Test 02/19/24  0547 08/23/23  0946   NTBNPI 12,085* 857*     Recent Labs   Lab Test 08/23/23  1433   TSH 2.72     Recent Labs   Lab Test 08/23/23  0946   INR 0.95        Gee Hopson DO  Cardiologist     Today's clinic visit entailed:  68 minutes spent by me on the date of the encounter doing chart review, history and exam, documentation and further activities per the note  Provider  Link to MDM Help Grid     The longitudinal plan of care for the diagnosis(es)/condition(s) as documented were addressed during this visit. Due to the added complexity in care, I will continue to support Floyd in the subsequent management and with ongoing continuity of care.                          Thank you for allowing me to participate in the care of your patient.      Sincerely,     Gee Hopson DO     [facility]  cc:   Glendy Benavides, CATHY  [address] [place] [address]      "

## 2024-10-10 ENCOUNTER — PATIENT OUTREACH (OUTPATIENT)
Dept: CARE COORDINATION | Facility: CLINIC | Age: 49
End: 2024-10-10
Payer: COMMERCIAL

## 2024-10-10 NOTE — TELEPHONE ENCOUNTER
Form signed & faxed to [employer] at [phone]  Pt notified  Placed forms for scanning & in 30 day bin

## 2024-10-11 ENCOUNTER — MYC REFILL (OUTPATIENT)
Dept: INTERNAL MEDICINE | Facility: CLINIC | Age: 49
End: 2024-10-11
Payer: COMMERCIAL

## 2024-10-11 DIAGNOSIS — Z86.73 HISTORY OF STROKE: ICD-10-CM

## 2024-10-11 DIAGNOSIS — E78.01 FAMILIAL HYPERCHOLESTEROLEMIA: ICD-10-CM

## 2024-10-11 DIAGNOSIS — E66.01 CLASS 2 SEVERE OBESITY DUE TO EXCESS CALORIES WITH SERIOUS COMORBIDITY IN ADULT, UNSPECIFIED BMI (H): ICD-10-CM

## 2024-10-11 DIAGNOSIS — F17.200 TOBACCO USE DISORDER: ICD-10-CM

## 2024-10-11 DIAGNOSIS — I50.21 ACUTE SYSTOLIC HEART FAILURE (H): ICD-10-CM

## 2024-10-11 DIAGNOSIS — R79.89 ELEVATED TROPONIN: ICD-10-CM

## 2024-10-11 DIAGNOSIS — E66.812 CLASS 2 SEVERE OBESITY DUE TO EXCESS CALORIES WITH SERIOUS COMORBIDITY IN ADULT, UNSPECIFIED BMI (H): ICD-10-CM

## 2024-10-11 DIAGNOSIS — Z98.890 S/P FOOT SURGERY, RIGHT: ICD-10-CM

## 2024-10-11 RX ORDER — ATORVASTATIN CALCIUM 80 MG/1
80 TABLET, FILM COATED ORAL DAILY
Qty: 90 TABLET | Refills: 3 | OUTPATIENT
Start: 2024-10-11

## 2024-10-11 NOTE — PROGRESS NOTES
Clinic Care Coordination Contact  Follow Up Progress Note      Assessment: Patient went to [place] today.  He is still waiting for decision from [facility] for emergency assistance, snap and [place].  He is getting desperate as his rental is is jeopardy.  He got an eviction notice.  Encouraged him to call [employer] to see if there is any assistance.  He had cardiology appt and is distressed to learn how significant his congestive heart failure is.  He had no idea.      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 He is unsure if he is going to be able to do the physical nature of the job as he had trouble walking up the steps when he was there.      He has nothing to sell. He has his car and if he loses his apartment, he will live in his car and probably go south.  His sister and brother don't want to be involved. He doesn't have any friends to help him.  When he lives with his mom, she got jealous if he did things with friends.  Offered support for depression and he didn't want to utilize at this time.      Care Gaps:    Health Maintenance Due   Topic Date Due    YEARLY PREVENTIVE VISIT  Never done    HF ACTION PLAN  Never done    DIABETIC FOOT EXAM  Never done    ADVANCE CARE PLANNING  Never done    EYE EXAM  Never done    COLORECTAL CANCER SCREENING  Never done    HIV SCREENING  Never done    HEPATITIS B IMMUNIZATION (1 of 3 - 19+ 3-dose series) Never done    Pneumococcal Vaccine: Pediatrics (0 to 5 Years) and At-Risk Patients (6 to 64 Years) (2 of 2 - PCV) 10/11/2008    INFLUENZA VACCINE (1) 09/01/2024    COVID-19 Vaccine (3 - 2024-25 season) 09/01/2024    ZOSTER IMMUNIZATION (1 of 2) 12/19/2023    A1C  10/11/2024       Postponed to next pcp appt.      Care Plans  Care Plan: Food Insecurity       Problem: Reliable food source       Long-Range Goal: Establish Options for Affordable Food Sources       Start Date: 7/10/2024 Expected End Date: 7/9/2025    This  Visit's Progress: 50% Recent Progress: 50%    Priority: High    Note:     Barriers: needs help with SNAP application, just [facility] from [facility].   Strengths: motivated.   Patient expressed understanding of goal: yes  Action steps to achieve this goal:  1. I will work with Financial Resource Worker to complete SNAP application.   2. I will submit application to [facility] when completed.   3. I will report progress towards this goal at scheduled outreach telephone calls from the CCC team.                                Care Plan: Financial Wellbeing       Problem: Patient expresses financial resource strain       Long-Range Goal: Go back to work.       Start Date: 8/30/2024 Expected End Date: 8/29/2025    This Visit's Progress: 20% Recent Progress: 10%    Priority: High    Note:     Barriers: shoes are being delayed due to vendor issue.   Strengths: has insurance coverage.   Patient expressed understanding of goal: yes  Action steps to achieve this goal:  1. I will attend PT.  2. I will get my new shoes.  3. I will report progress towards this goal at scheduled outreach telephone calls from the CCC team.                                Care Plan: Financial Wellbeing       Problem: Patient expresses financial resource strain       Long-Range Goal: Apply for Social Security Disability.       Start Date: 10/10/2024 Expected End Date: 10/9/2025    Priority: High    Note:     Barriers: no income, high stress, health concerns.   Strengths: motivated.   Patient expressed understanding of goal: yes  Action steps to achieve this goal:  1. I will contact [employer] to discuss getting help with application.   2. I will complete all paper work.  3. I will report progress towards this goal at scheduled outreach telephone calls from the CCC team.                                    Intervention/Education provided during outreach: support, resources for disability application and housing assistance.      Outreach  Frequency: monthly, more frequently as needed    10-  Call from patient who talked to his insurance care coordinator who signed him up for [place] Benefits on the website.  She told him that writer should be able to assist him with eviction resources.  He does have appt with [employer] for 10-17.  He sent an online message to [employer] and hasn't heard anything yet.  Gave him [employer] and [employer] to contact to get more information about if he can be evicted due to his disabilities.  He did try to work today but did not make it one hour and he was light headed.      Plan:     Care Coordinator will follow up in one week.  Georgiana Isaacs,   [facility]  [phone]

## 2024-10-12 ENCOUNTER — LAB (OUTPATIENT)
Dept: LAB | Facility: CLINIC | Age: 49
End: 2024-10-12
Payer: COMMERCIAL

## 2024-10-12 DIAGNOSIS — Z11.4 SCREENING FOR HIV (HUMAN IMMUNODEFICIENCY VIRUS): Primary | ICD-10-CM

## 2024-10-12 DIAGNOSIS — Z79.4 UNCONTROLLED TYPE 2 DIABETES MELLITUS WITH HYPERGLYCEMIA, WITH LONG-TERM CURRENT USE OF INSULIN (H): ICD-10-CM

## 2024-10-12 DIAGNOSIS — E11.65 UNCONTROLLED TYPE 2 DIABETES MELLITUS WITH HYPERGLYCEMIA, WITH LONG-TERM CURRENT USE OF INSULIN (H): ICD-10-CM

## 2024-10-12 LAB
EST. AVERAGE GLUCOSE BLD GHB EST-MCNC: 169 MG/DL
HBA1C MFR BLD: 7.5 % (ref 0–5.6)
HIV 1+2 AB+HIV1 P24 AG SERPL QL IA: NONREACTIVE

## 2024-10-12 PROCEDURE — 87389 HIV-1 AG W/HIV-1&-2 AB AG IA: CPT

## 2024-10-12 PROCEDURE — 36415 COLL VENOUS BLD VENIPUNCTURE: CPT

## 2024-10-12 PROCEDURE — 83036 HEMOGLOBIN GLYCOSYLATED A1C: CPT

## 2024-10-14 NOTE — TELEPHONE ENCOUNTER
Please see additional encounters regarding this.       Amador Polk Jr., CMA on 10/14/2024 at 12:24 PM

## 2024-10-16 ENCOUNTER — MYC REFILL (OUTPATIENT)
Dept: INTERNAL MEDICINE | Facility: CLINIC | Age: 49
End: 2024-10-16
Payer: COMMERCIAL

## 2024-10-16 DIAGNOSIS — Z79.4 UNCONTROLLED TYPE 2 DIABETES MELLITUS WITH HYPERGLYCEMIA, WITH LONG-TERM CURRENT USE OF INSULIN (H): ICD-10-CM

## 2024-10-16 DIAGNOSIS — E11.65 POORLY CONTROLLED DIABETES MELLITUS (H): ICD-10-CM

## 2024-10-16 DIAGNOSIS — E11.65 UNCONTROLLED TYPE 2 DIABETES MELLITUS WITH HYPERGLYCEMIA, WITH LONG-TERM CURRENT USE OF INSULIN (H): ICD-10-CM

## 2024-10-17 ENCOUNTER — PATIENT OUTREACH (OUTPATIENT)
Dept: CARE COORDINATION | Facility: CLINIC | Age: 49
End: 2024-10-17
Payer: COMMERCIAL

## 2024-10-17 NOTE — PROGRESS NOTES
[facility]/Voicemail    Clinical Data: Care Coordinator Outreach    Outreach Documentation Number of Outreach Attempt   10/17/2024   9:13 AM 1   10/24/2024  11:57 AM 2       Left message on patient's voicemail with call back information and requested return call.      Plan: Care Coordinator will try to reach patient again in 10 business days.    Georgiana Isaacs   [facility]  [phone]      [facility]    Clinical Data: Care Coordinator Outreach    Outreach Documentation Number of Outreach Attempt   10/17/2024   9:13 AM 1       Left message on patient's voicemail with call back information and requested return call.    Plan: Care Coordinator will try to reach patient again in 10 business days.    Georgiana Isaacs   [facility]  [phone]

## 2024-10-24 ENCOUNTER — MYC REFILL (OUTPATIENT)
Dept: INTERNAL MEDICINE | Facility: CLINIC | Age: 49
End: 2024-10-24
Payer: COMMERCIAL

## 2024-10-24 DIAGNOSIS — E11.65 UNCONTROLLED TYPE 2 DIABETES MELLITUS WITH HYPERGLYCEMIA, WITH LONG-TERM CURRENT USE OF INSULIN (H): ICD-10-CM

## 2024-10-24 DIAGNOSIS — Z79.4 UNCONTROLLED TYPE 2 DIABETES MELLITUS WITH HYPERGLYCEMIA, WITH LONG-TERM CURRENT USE OF INSULIN (H): ICD-10-CM

## 2024-10-24 RX ORDER — INSULIN LISPRO 100 [IU]/ML
10 INJECTION, SOLUTION INTRAVENOUS; SUBCUTANEOUS
Qty: 15 ML | Refills: 11 | Status: SHIPPED | OUTPATIENT
Start: 2024-10-24

## 2024-10-25 ENCOUNTER — TELEPHONE (OUTPATIENT)
Dept: INTERNAL MEDICINE | Facility: CLINIC | Age: 49
End: 2024-10-25
Payer: COMMERCIAL

## 2024-10-25 NOTE — TELEPHONE ENCOUNTER
FAX [facility] Need for Clarification    Notes to Prescriber: This RX needs a MAX # of Units per day for insurance billing.  Please send a new Rx.    Rx Written Date: 10/24/24  Last Fill Date: 10/24/24      Disp Refills Start End LUIS CARLOS    insulin lispro (HUMALOG KWIKPEN) 100 UNIT/ML (1 unit dial) KWIKPEN 15 mL 11 10/24/2024 -- No   Sig - Route: Inject 10 Units subcutaneously 3 times daily (with meals). Plus sliding scale: 2 units every 50 over 150. If humalog is not covered by insurance, may substitute with novolog or other fast acting insulin - Subcutaneous

## 2024-10-26 ENCOUNTER — MYC MEDICAL ADVICE (OUTPATIENT)
Dept: CARDIOLOGY | Facility: CLINIC | Age: 49
End: 2024-10-26
Payer: COMMERCIAL

## 2024-10-31 ENCOUNTER — PATIENT OUTREACH (OUTPATIENT)
Dept: CARE COORDINATION | Facility: CLINIC | Age: 49
End: 2024-10-31
Payer: COMMERCIAL

## 2024-10-31 NOTE — PROGRESS NOTES
Clinic Care Coordination Contact  Follow Up Progress Note      Assessment: call back from patient. He finished his first week of full time work as a . He is working second shift, 3:30-2 AM and is off on Fridays.  He is trying to get help with paying his October and/or November rent.  He has a court date on next Thursday. He has been working with [employer] and another sallie to assist. His application for Emergency Assistance was denied.  He was told he needs a letter from his PCP to get SNAP.  Encouraged him to go to [facility] today to discuss SNAP and Emergency Assistance as he may now be eligible since he has rent money going forward.  He is frustrated that he has never needed help in the past, and it is so hard to get assistance.  He gets paid next week. The job is ok.  He is tired and in pain, but is glad he has a job.     Care Gaps:    Health Maintenance Due   Topic Date Due    YEARLY PREVENTIVE VISIT  Never done    HF ACTION PLAN  Never done    DIABETIC FOOT EXAM  Never done    ADVANCE CARE PLANNING  Never done    EYE EXAM  Never done    COLORECTAL CANCER SCREENING  Never done    HEPATITIS B IMMUNIZATION (1 of 3 - 19+ 3-dose series) Never done    Pneumococcal Vaccine: Pediatrics (0 to 5 Years) and At-Risk Patients (6 to 64 Years) (2 of 2 - PCV) 10/11/2008    INFLUENZA VACCINE (1) 09/01/2024    COVID-19 Vaccine (3 - 2024-25 season) 09/01/2024    ZOSTER IMMUNIZATION (1 of 2) 12/19/2023       Postponed to next pcp appt.     Care Plans  Care Plan: Food Insecurity       Problem: Reliable food source       Long-Range Goal: Establish Options for Affordable Food Sources       Start Date: 7/10/2024 Expected End Date: 7/9/2025    This Visit's Progress: 50% Recent Progress: 50%    Priority: High    Note:     Barriers: needs help with SNAP application, just [facility] from [facility].   Strengths: motivated.   Patient expressed understanding of goal: yes  Action steps to achieve this goal:  1. I will work  with Financial Resource Worker to complete SNAP application.   2. I will submit application to [facility] when completed.   3. I will report progress towards this goal at scheduled outreach telephone calls from the CCC team.                                Care Plan: Financial Wellbeing       Problem: Patient expresses financial resource strain       Long-Range Goal: Go back to work.       Start Date: 8/30/2024 Expected End Date: 8/29/2025    This Visit's Progress: 50% Recent Progress: 20%    Priority: High    Note:     Barriers: shoes are being delayed due to vendor issue.   Strengths: has insurance coverage.   Patient expressed understanding of goal: yes  Action steps to achieve this goal:  1. I will attend PT.  2. I will get my new shoes.  3. I will report progress towards this goal at scheduled outreach telephone calls from the CCC team.  11-1 working full time as .                              Care Plan: Financial Wellbeing       Problem: Patient expresses financial resource strain       Long-Range Goal: Apply for Social Security Disability.       Start Date: 10/10/2024 Expected End Date: 10/9/2025    This Visit's Progress: On Hold    Priority: High    Note:     Barriers: no income, high stress, health concerns.   Strengths: motivated.   Patient expressed understanding of goal: yes  Action steps to achieve this goal:  1. I will contact [employer] to discuss getting help with application.   2. I will complete all paper work.  3. I will report progress towards this goal at scheduled outreach telephone calls from the CCC team.                                    Intervention/Education provided during outreach: support.      Outreach Frequency: monthly, more frequently as needed      Plan:     Care Coordinator will follow up in two weeks.    [facility]/Voicemail    Clinical Data: Care Coordinator Outreach    Outreach Documentation Number of Outreach Attempt   10/17/2024   9:13 AM 1   10/24/2024  11:57  AM 2   10/31/2024   4:16 PM 3       Left message on patient's voicemail with call back information and requested return call.      Plan: Care Coordinator will send unable to contact letter with care coordinator contact information via [employer]. Care Coordinator will try to reach patient again in 10 business days.    Georgiana Isaacs,   [facility]  [phone]

## 2024-10-31 NOTE — LETTER
[facility] CARE COORDINATION  [facility]    October 31, 2024    Floyd Capps  [address]   [place] [address]      Dear Floyd,    I have been attempting to reach you since our last contact. I would like to continue to work with you and provide any additional support you may need on achieving your health care related goals. I would appreciate if you would give me a call at [phone] to let me know if you would like to continue working together. I know that there are many things that can affect our ability to communicate and I hope we can continue to work together.    We are focused on providing you with the highest-quality healthcare experience possible.    Sincerely,    TERESA Nuñez

## 2024-11-05 ENCOUNTER — MYC REFILL (OUTPATIENT)
Dept: INTERNAL MEDICINE | Facility: CLINIC | Age: 49
End: 2024-11-05
Payer: COMMERCIAL

## 2024-11-05 DIAGNOSIS — I50.21 ACUTE SYSTOLIC HEART FAILURE (H): ICD-10-CM

## 2024-11-05 RX ORDER — BUMETANIDE 2 MG/1
2 TABLET ORAL
Qty: 60 TABLET | Refills: 1 | OUTPATIENT
Start: 2024-11-05

## 2024-11-05 NOTE — PROGRESS NOTES
"    DISCHARGE  Reason for Discharge: Patient chooses to discontinue therapy.    Equipment Issued:     Discharge Plan: Patient to continue home program.    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 Sanders's note 7/24/24\" ambulate in a CAM boot until the diabetic inserts/shoes are available. \"   Progress Note/Certification   Start of Care Date 08/26/24   Onset of illness/injury or Date of Surgery 08/12/24  (date of PT order)   Therapy Frequency 1x/week   Predicted Duration 60 days   Certification date from 08/26/24   Certification date to 10/25/24   Progress Note Due Date 10/25/24   Progress Note Completed Date 09/24/24   PT Goal 1   Goal Identifier HEP   Goal Description Patient will be independent with HEP and self management of symptoms   Rationale to maximize safety and independence with performance of ADLs and functional tasks   Goal Progress progressing   Target Date 10/25/24   PT Goal 2   Goal Identifier ambulation   Goal Description Patient will ambulate 1250 ft for community distances with diabetic shoes and no AD safely   Rationale to maximize safety and independence within the community;to maximize safety and independence within the home   Goal Progress 200  yards each time 3 times per day and limited by lateral hips stiffness strain and pain.  Patient is striking his feet with his heel and rolling off his toes without increase in pain patient does have steel toe shoes on.   Target Date 10/25/24   PT " Goal 3   Goal Identifier return to work   Goal Description Patient will demonstrate improved ability to return to work.   Rationale to maximize safety and independence with performance of ADLs and functional tasks;to maximize safety and independence within the home   Goal Progress Need to find a job.  Previously was a  haven't been to work in 1 year.  Had a mild stroke last August still has some symptoms in the right hand as well as some higher level balance issues.   Target Date 10/25/24   PT Goal 4   Goal Description 8.97TUG, 6 meters 5.16   Subjective Report   Subjective Report no new updates at this time, pt has other life priorities and planning to address these first before scheduling more PT.   Objective Measures   Objective Measures Objective Measure 1   Objective Measure 1   Objective Measure 5 times sit to stand 18.41 seconds   Details 8.97TUG, 6 meters in 5.16seconds 0.86m/s   Treatment Interventions (PT)   Interventions Neuromuscular Re-education   Therapeutic Procedure/Exercise   Therapeutic Procedures: strength, endurance, ROM, flexibility minutes (78083) 15   PTRx Ther Proc 8 - Details For improved tissue extensibility and warm up - treadmill walking, 1.1 mph for 6 minutes with subjective measures taken. Light hip stretches - standing HS stretch 30'' B, standing quad stretch 30'' B. seated piriformis and glute stretch 30'' B. For improved stamina and endurance - nustep level 6,  2 minutes with mets above 3.6 - very tired at end of session; leg fatigue.   Skilled Intervention Updated HEP to improve hip strength and mobility   Patient Response/Progress tolerated well   Neuromuscular Re-education   Neuromuscular re-ed of mvmt, balance, coord, kinesthetic sense, posture, proprioception minutes (05509) 24   PTRx Neuro Re-ed 1 - Details For improved hip and quad strength - 125# leg press 2 x 10, staggered stance 125# 2 x 10 with staggered stance x 10 B. Cues to control lowering and avoid locking knees  into extension. Step ups/downs on 6 in box x 10 B; second set to 8 reps, more fatigued in quads.   Skilled Intervention Glute strength   Patient Response/Progress Tolerated well, appropriate level of fatigue   Gait Training   Gait Training Gait 2   Gait 1 Did functional gait assessment today.  Patient needs to look down to see where his feet are with going up and down the stairs for safety and needed to have a handrail very challenged with tandem walk.  Patient has weakness in his core and noting increased hip pain with walking 200 yards patient is up to 600 yards he is walking 200 hours 3 times a day.   Gait 1 - Details Gait training patient to continue with heel toe and add in pulling tightening the core with walking to hopefully decrease the pain in the hips.   Gait 3 discussion/instruction on use of CAM boot for all ambulation until his new shoes arrive.  Instruction to perform foot checks daily with mirror and/or phone   Patient Response/Progress verbalized understanding.   Education   Learner/Method Patient   Plan   Home program PTRx (print)   Updates to plan of care Add in standing gastroc and soleus stretching added core and standing abdominal gluteal isometrics patient can roll of a washcloth and foot underneath her to the foot to help relax and loosen up the central slip of the fascia.   Plan for next session warm up, review HEP, progress LE strength, balance and endurance with foam and cushions, functional hip abd leg strength - contnue to check bottom of foot and shin skin check   Comments   Comments Patient has a healed right foot ulcer with some scarring lateral calcaneus and dry skin.  Has been walking more in orthotic shoes, at this time is continuing to look for work to support self and though would still be appropriate for PT, has other priorities at this time.   Total Session Time   Timed Code Treatment Minutes 39   Total Treatment Time (sum of timed and untimed services) 39

## 2024-11-07 ENCOUNTER — OFFICE VISIT (OUTPATIENT)
Dept: CARDIOLOGY | Facility: CLINIC | Age: 49
End: 2024-11-07
Attending: INTERNAL MEDICINE
Payer: COMMERCIAL

## 2024-11-07 VITALS
SYSTOLIC BLOOD PRESSURE: 130 MMHG | BODY MASS INDEX: 39.89 KG/M2 | HEART RATE: 83 BPM | WEIGHT: 278 LBS | RESPIRATION RATE: 18 BRPM | DIASTOLIC BLOOD PRESSURE: 62 MMHG

## 2024-11-07 DIAGNOSIS — Z79.4 TYPE 2 DIABETES MELLITUS WITH STAGE 3A CHRONIC KIDNEY DISEASE, WITH LONG-TERM CURRENT USE OF INSULIN (H): ICD-10-CM

## 2024-11-07 DIAGNOSIS — E11.22 TYPE 2 DIABETES MELLITUS WITH STAGE 3A CHRONIC KIDNEY DISEASE, WITH LONG-TERM CURRENT USE OF INSULIN (H): ICD-10-CM

## 2024-11-07 DIAGNOSIS — I25.118 CORONARY ARTERY DISEASE INVOLVING NATIVE CORONARY ARTERY OF NATIVE HEART WITH OTHER FORM OF ANGINA PECTORIS (H): Primary | ICD-10-CM

## 2024-11-07 DIAGNOSIS — N18.31 TYPE 2 DIABETES MELLITUS WITH STAGE 3A CHRONIC KIDNEY DISEASE, WITH LONG-TERM CURRENT USE OF INSULIN (H): ICD-10-CM

## 2024-11-07 DIAGNOSIS — I50.23 ACUTE ON CHRONIC SYSTOLIC CONGESTIVE HEART FAILURE (H): ICD-10-CM

## 2024-11-07 PROCEDURE — 99215 OFFICE O/P EST HI 40 MIN: CPT | Performed by: INTERNAL MEDICINE

## 2024-11-07 PROCEDURE — G2211 COMPLEX E/M VISIT ADD ON: HCPCS | Performed by: INTERNAL MEDICINE

## 2024-11-07 RX ORDER — ISOSORBIDE MONONITRATE 30 MG/1
30 TABLET, EXTENDED RELEASE ORAL DAILY
Qty: 30 TABLET | Refills: 11 | Status: SHIPPED | OUTPATIENT
Start: 2024-11-07

## 2024-11-07 NOTE — PROGRESS NOTES
HEART CARE ENCOUNTER CONSULTATON NOTE      [facility]  [phone]      Assessment/Recommendations   Assessment:   Advanced ischemic cardiomyopathy with congestive heart failure with reduced ejection fraction, left ventricular ejection fraction 30%.  New York Heart Association class III.  Advanced premature coronary artery disease status post coronary angiogram which demonstrated severe multivessel disease.   3.   Diabetes mellitus type 2, insulin-dependent, A1c 9.4  4.   Hyperlipidemia, LDL 77, previously 226.   5.   Acute kidney injury, improved.  6.  Cerebrovascular accident.  He has residual hand impairment of his right hand.  Has ongoing visual impairment as well    Plan:  Losartan 25 mg daily  Start imdur 30 mg daily fr afterload reduction.   Medication cost is an issue.    Continue carvedilol 25 mg twice daily  Aspirin at 325 mg daily for coronary disease and CVA  Continue statin therapy  Continue Bumex  Start spironolactone, Jardiance and possibly Entresto in future pending ability to pay for medications.         History of Present Illness/Subjective    HPI: Floyd Capps is a 48 year old male ischemic cardiomyopathy, advanced congestive heart failure, advanced coronary disease, diabetes mellitus insulin-dependent, obesity, history of CVA who presents to cardiology clinic in follow-up.      Currently patient has noticed some slight improvement in his overall energy level and condition.  He is struggling with returning back to work and he is working 40 hours a day with overtime to try and raise enough money to afford his rent.  He has attempted to get assistance through the federal government but has told there is over a 1 year wait.  Also he has tried to get assistance through the state which again significant delays in him finding any assistance.  He will likely be evicted and homeless in the end of this month.  He is attempting to try to raise enough money to pay his rent.    Denies  any active chest pain.  No syncope.  He has dyspnea with exertion and daytime fatigue particularly after long shifts.  He is compliant with his medications.  He has no lower extremity edema at this time.    Echocardiogram Results:2/2024  The left ventricle is normal in size. There is mild concentric left  ventricular hypertrophy.  Left ventricular function is decreased. The ejection fraction is 30%  (moderately reduced). There is global hypokinesis with severe hypokinesis of  the mid to apical inferior wall.  Diastolic Doppler findings (E/E' ratio and/or other parameters) suggest left  ventricular filling pressures are increased.     The right ventricle is normal in size and function.  Normal left atrial size. Right atrial size is normal.  There is mild (1+) mitral regurgitation.  IVC diameter >2.1 cm collapsing <50% with sniff suggests a high RA pressure  estimated at 15 mmHg or greater.     Compared to previous study from 8/23/2023 the LV systolic function has  declined and there are regional wall motion abnormalities.      Coronary Angiogram: 2024  - multi-vessel native CAD; severely elevated L-sided filling pressures  - will stop to obtain CTS consult  - continue ASA 81mg daily indefinitely, atorva 80  - continue aggressive risk factor modification      Findings:  LM:no obstruction  LAD:proximal Ca2+ 70% narrowing, mid-distal 90% narrowing. Small D1 w/ diffuse 90% narrowing  Lcx:OM1 proximal 90%, small superior subdivision 90%, inferior 80%. OM1 small 90% narrowing  RCA:dominant, distal 85%, rPDA 95% narrowing       Physical Examination  Review of Systems   Vitals: /62 (BP Location: Right arm, Patient Position: Sitting, Cuff Size: Adult Large)   Pulse 83   Resp 18   Wt 126.1 kg (278 lb)   BMI 39.89 kg/m    BMI= Body mass index is 39.89 kg/m .  Wt Readings from Last 3 Encounters:   10/09/24 122.5 kg (270 lb)   07/11/24 102.1 kg (225 lb)   07/02/24 99.8 kg (220 lb)        Anxious but pleasant.  Obese    ENT/Mouth: membranes moist, no oral lesions or bleeding gums.      EYES:  no scleral icterus, normal conjunctivae       Chest/Lungs:   lungs are clear to auscultation, no rales   Cardiovascular:   Regular. Normal first and second heart sounds with faint systolic murmur. No rubs, or gallops; the carotid, radial and posterior tibial pulses, trace edema bilaterally    Abdomen:  no tenderness; bowel sounds are present   Extremities: no cyanosis or clubbing   Skin: no xanthelasma, warm.    Neurologic: normal  bilateral, no tremors     Psychiatric: alert and oriented x3, calm        Please refer above for cardiac ROS details.        Medical History  Surgical History Family History Social History   Past Medical History:   Diagnosis Date    Acute hypoxemic respiratory failure (H)     Acute renal failure, unspecified acute renal failure type (H)     CAD (coronary artery disease)     Callus of foot     Diabetes (H)     Diarrhea     Essential hypertension     Fracture of left distal radius     History of stroke     Insomnia     Nausea     Non-pressure chronic ulcer of right heel and midfoot with unspecified severity (H)     Normocytic anemia     Type 2 diabetes mellitus with foot ulcer (H)      Past Surgical History:   Procedure Laterality Date    APPENDECTOMY      CV CORONARY ANGIOGRAM N/A 3/1/2024    Procedure: CV CORONARY ANGIOGRAM;  Surgeon: Stephen Berger MD;  [facility] CV    CV LEFT HEART CATH N/A 3/1/2024    Procedure: Left Heart Catheterization;  Surgeon: Stephen Berger MD;  Location: [facility] CV    EXCISE EXOSTOSIS FOOT Right 5/2/2024    Procedure: excision of bone from calcaneus with application of theraskin;  Surgeon: Dong Sanders DPM;  Location: [facility] [place] OR    INCISION AND DRAINAGE FOOT, COMBINED Right 5/2/2024    Procedure: INCISION AND DRAINAGE, right heel with;  Surgeon: Dong Sanders DPM;  Location: [facility] [place] OR    INCISION AND DRAINAGE OF  WOUND      groin abscess    IRRIGATION AND DEBRIDEMENT FOOT, COMBINED Right 2/16/2024    Procedure: IRRIGATION AND DEBRIDEMENT RIGHT FOOT;  Surgeon: Cristofer Sims DPM;  [facility]    [facility]  [facility]     No family history on file.     Social History     Socioeconomic History    Marital status: Single     Spouse name: Not on file    Number of children: Not on file    Years of education: Not on file    Highest education level: Not on file   Occupational History    Not on file   Tobacco Use    Smoking status: Former     Current packs/day: 0.50     Types: Cigarettes    Smokeless tobacco: Never   Vaping Use    Vaping status: Never Used   Substance and Sexual Activity    Alcohol use: Yes     Comment: once a week    Drug use: Yes     Types: Marijuana     Comment: Has not done since Feb    Sexual activity: Not on file   Other Topics Concern    Not on file   Social History Narrative    Patient reports that he does not have health insurance.     Social Drivers of Health     Financial Resource Strain: Low Risk  (10/20/2023)    Financial Resource Strain     Within the past 12 months, have you or your family members you live with been unable to get utilities (heat, electricity) when it was really needed?: No   Food Insecurity: High Risk (10/20/2023)    Food Insecurity     Within the past 12 months, did you worry that your food would run out before you got money to buy more?: Yes     Within the past 12 months, did the food you bought just not last and you didn t have money to get more?: No   Transportation Needs: Low Risk  (10/20/2023)    Transportation Needs     Within the past 12 months, has lack of transportation kept you from medical appointments, getting your medicines, non-medical meetings or appointments, work, or from getting things that you need?: No   Physical Activity: Not on file   Stress: Not on file   Social Connections: Not on file   Interpersonal Safety: Low Risk   (10/20/2023)    Interpersonal Safety     Do you feel physically and emotionally safe where you currently live?: Yes     Within the past 12 months, have you been hit, slapped, kicked or otherwise physically hurt by someone?: No     Within the past 12 months, have you been humiliated or emotionally abused in other ways by your partner or ex-partner?: No   Housing Stability: High Risk (10/20/2023)    Housing Stability     Do you have housing? : Yes     Are you worried about losing your housing?: Yes           Medications  Allergies   Current Outpatient Medications   Medication Sig Dispense Refill    acetaminophen (TYLENOL) 500 MG tablet Take 2 tablets (1,000 mg) by mouth every 8 hours as needed for mild pain      aspirin (ASA) 325 MG EC tablet Take 1 tablet (325 mg) by mouth daily (Patient not taking: Reported on 10/9/2024)      atorvastatin (LIPITOR) 80 MG tablet Take 1 tablet (80 mg) by mouth daily 90 tablet 3    bumetanide (BUMEX) 2 MG tablet Take 1 tablet (2 mg) by mouth 2 times daily. 60 tablet 1    carvedilol (COREG) 25 MG tablet Take 1 tablet (25 mg) by mouth 2 times daily (with meals). 60 tablet 1    Continuous Glucose Sensor (FREESTYLE LAMINE 2 SENSOR) MISC Inject 1 each subcutaneously every 14 days Use 1 sensor every 14 days. Use to read blood sugars per 's instructions. (Patient not taking: Reported on 10/9/2024) 6 each 5    ergocalciferol (ERGOCALCIFEROL) 1.25 MG (87724 UT) capsule Take 50,000 Units by mouth once a week Every Wednesday for anemia for 12 weeks ACTIVE 4/24/24- 6/26/24 (Patient not taking: Reported on 10/9/2024)      insulin aspart (NOVOLOG VIAL) 100 UNITS/ML vial Inject 5 Units Subcutaneous 3 times daily (with meals) (Patient not taking: Reported on 10/9/2024)      insulin glargine (LANTUS PEN) 100 UNIT/ML pen Inject 32 Units subcutaneously at bedtime. 15 mL 3    insulin lispro (HUMALOG KWIKPEN) 100 UNIT/ML (1 unit dial) KWIKPEN Inject 10 Units subcutaneously 3 times daily (with  meals). Plus sliding scale: 2 units every 50 over 150. If humalog is not covered by insurance, may substitute with novolog or other fast acting insulin 15 mL 11    insulin lispro (HUMALOG VIAL) 100 UNIT/ML vial Inject Subcutaneous 3 times daily (before meals) Inject as per sliding scale: if  = 4 units, 70 or below see hypoglycemia protocol: 150-199= 8 units;  200-249 =10 units;  250-299= 12 units; 300-349= 14 units; 350-399= 16 units; 400 or greater give 18 units, if >400x 2 call MD/NP (Patient not taking: Reported on 10/9/2024)      insulin pen needle (32G X 4 MM) 32G X 4 MM miscellaneous Use 4 pen needles daily or as directed. 200 each 4    loperamide (IMODIUM) 2 MG capsule Take 2 mg by mouth as needed for diarrhea (give 4mg every 6 hours as needed for Diarrhea) (Patient not taking: Reported on 10/9/2024)      losartan (COZAAR) 25 MG tablet Take 1 tablet (25 mg) by mouth daily. 90 tablet 3    losartan (COZAAR) 25 MG tablet Take 1 tablet (25 mg) by mouth daily      melatonin 5 MG tablet Take 5 mg by mouth nightly as needed for sleep (Patient not taking: Reported on 10/9/2024)      metFORMIN (GLUCOPHAGE XR) 500 MG 24 hr tablet Take 1 tablet (500 mg) by mouth daily (with dinner). 30 tablet 2    mineral oil-hydrophilic petrolatum (AQUAPHOR) external ointment Apply topically 2 times daily      multivitamin w/minerals (THERA-VIT-M) tablet Take 1 tablet by mouth daily      nicotine (NICODERM CQ) 14 MG/24HR 24 hr patch Place 1 patch onto the skin every 24 hours (Patient not taking: Reported on 10/9/2024)      ondansetron (ZOFRAN) 4 MG tablet Take 4 mg by mouth every 8 hours as needed for nausea (Patient not taking: Reported on 10/9/2024)      pantoprazole (PROTONIX) 40 MG EC tablet Take 1 tablet (40 mg) by mouth every morning (before breakfast) (Patient not taking: Reported on 10/9/2024)      polymixin b-trimethoprim (POLYTRIM) 15830-4.1 UNIT/ML-% ophthalmic solution Place 1-2 drops Into the left eye every 4 hours.  "10 mL 0    Urea-Lactic Acid 10-4 % CREA Externally apply topically 2 times daily Apply to left foot/ heel topically two times a day for moisturizing 10-4%      vitamin D3 (CHOLECALCIFEROL) 50 mcg (2000 units) tablet Take 1 tablet by mouth daily (Patient not taking: Reported on 10/9/2024)       No Known Allergies       Lab Results    Chemistry/lipid CBC Cardiac Enzymes/BNP/TSH/INR   Recent Labs   Lab Test 02/21/24  0537   CHOL 156   HDL 39*   LDL 77   TRIG 199*     Recent Labs   Lab Test 02/21/24  0537 08/23/23  0946   LDL 77 226*     Recent Labs   Lab Test 07/11/24  1045      POTASSIUM 4.6   CHLORIDE 104   CO2 22   *   BUN 36.1*   CR 1.14   GFRESTIMATED 78   SARAH 10.4*     Recent Labs   Lab Test 07/11/24  1045 03/05/24  0440 03/04/24  0442   CR 1.14 1.71* 1.72*     Recent Labs   Lab Test 07/11/24  1045 02/14/24  0935 08/23/23  0946   A1C 9.4* 11.8* 11.1*          Recent Labs   Lab Test 07/11/24  1045   WBC 12.9*   HGB 13.4   HCT 39.2*   MCV 93        Recent Labs   Lab Test 07/11/24  1045 02/27/24  0540 02/23/24  0618   HGB 13.4 10.2* 10.6*    No results for input(s): \"TROPONINI\" in the last 23090 hours.  Recent Labs   Lab Test 02/19/24  0547 08/23/23  0946   NTBNPI 12,085* 857*     Recent Labs   Lab Test 08/23/23  1433   TSH 2.72     Recent Labs   Lab Test 08/23/23  0946   INR 0.95        Gee Hopson DO  Cardiologist     The longitudinal plan of care for the diagnosis(es)/condition(s) as documented were addressed during this visit. Due to the added complexity in care, I will continue to support Floyd in the subsequent management and with ongoing continuity of care.    Today's clinic visit entailed:44 minutes spent by me on the date of the encounter doing chart review, history and exam, documentation and further activities per the note                "

## 2024-11-07 NOTE — PATIENT INSTRUCTIONS
Please contact direct nurses line Monday through Friday 8 AM to 5 PM @ [phone]    After-hours contact cardiology office at [phone].    Plan:    Start imdur 30 mg daily, increase heart arter blood flow.

## 2024-11-07 NOTE — LETTER
11/7/2024    Glendy Benavides, CATHY  [address] [address]    RE: Floyd Capps       Dear Colleague,     I had the pleasure of seeing Floyd Capps in the [facility].    HEART CARE ENCOUNTER CONSULTATON NOTE      [facility]  [phone]      Assessment/Recommendations   Assessment:   Advanced ischemic cardiomyopathy with congestive heart failure with reduced ejection fraction, left ventricular ejection fraction 30%.  New York Heart Association class III.  Advanced premature coronary artery disease status post coronary angiogram which demonstrated severe multivessel disease.   3.   Diabetes mellitus type 2, insulin-dependent, A1c 9.4  4.   Hyperlipidemia, LDL 77, previously 226.   5.   Acute kidney injury, improved.  6.  Cerebrovascular accident.  He has residual hand impairment of his right hand.  Has ongoing visual impairment as well    Plan:  Losartan 25 mg daily  Start imdur 30 mg daily fr afterload reduction.   Medication cost is an issue.    Continue carvedilol 25 mg twice daily  Aspirin at 325 mg daily for coronary disease and CVA  Continue statin therapy  Continue Bumex  Start spironolactone, Jardiance and possibly Entresto in future pending ability to pay for medications.         History of Present Illness/Subjective    HPI: Floyd Capps is a 48 year old male ischemic cardiomyopathy, advanced congestive heart failure, advanced coronary disease, diabetes mellitus insulin-dependent, obesity, history of CVA who presents to cardiology clinic in follow-up.      Currently patient has noticed some slight improvement in his overall energy level and condition.  He is struggling with returning back to work and he is working 40 hours a day with overtime to try and raise enough money to afford his rent.  He has attempted to get assistance through the federal government but has told there is over a 1 year wait.  Also he has tried to get  assistance through the state which again significant delays in him finding any assistance.  He will likely be evicted and homeless in the end of this month.  He is attempting to try to raise enough money to pay his rent.    Denies any active chest pain.  No syncope.  He has dyspnea with exertion and daytime fatigue particularly after long shifts.  He is compliant with his medications.  He has no lower extremity edema at this time.    Echocardiogram Results:2/2024  The left ventricle is normal in size. There is mild concentric left  ventricular hypertrophy.  Left ventricular function is decreased. The ejection fraction is 30%  (moderately reduced). There is global hypokinesis with severe hypokinesis of  the mid to apical inferior wall.  Diastolic Doppler findings (E/E' ratio and/or other parameters) suggest left  ventricular filling pressures are increased.     The right ventricle is normal in size and function.  Normal left atrial size. Right atrial size is normal.  There is mild (1+) mitral regurgitation.  IVC diameter >2.1 cm collapsing <50% with sniff suggests a high RA pressure  estimated at 15 mmHg or greater.     Compared to previous study from 8/23/2023 the LV systolic function has  declined and there are regional wall motion abnormalities.      Coronary Angiogram: 2024  - multi-vessel native CAD; severely elevated L-sided filling pressures  - will stop to obtain CTS consult  - continue ASA 81mg daily indefinitely, atorva 80  - continue aggressive risk factor modification      Findings:  LM:no obstruction  LAD:proximal Ca2+ 70% narrowing, mid-distal 90% narrowing. Small D1 w/ diffuse 90% narrowing  Lcx:OM1 proximal 90%, small superior subdivision 90%, inferior 80%. OM1 small 90% narrowing  RCA:dominant, distal 85%, rPDA 95% narrowing       Physical Examination  Review of Systems   Vitals: /62 (BP Location: Right arm, Patient Position: Sitting, Cuff Size: Adult Large)   Pulse 83   Resp 18   Wt 126.1  kg (278 lb)   BMI 39.89 kg/m    BMI= Body mass index is 39.89 kg/m .  Wt Readings from Last 3 Encounters:   10/09/24 122.5 kg (270 lb)   07/11/24 102.1 kg (225 lb)   07/02/24 99.8 kg (220 lb)        Anxious but pleasant.  Obese   ENT/Mouth: membranes moist, no oral lesions or bleeding gums.      EYES:  no scleral icterus, normal conjunctivae       Chest/Lungs:   lungs are clear to auscultation, no rales   Cardiovascular:   Regular. Normal first and second heart sounds with faint systolic murmur. No rubs, or gallops; the carotid, radial and posterior tibial pulses, trace edema bilaterally    Abdomen:  no tenderness; bowel sounds are present   Extremities: no cyanosis or clubbing   Skin: no xanthelasma, warm.    Neurologic: normal  bilateral, no tremors     Psychiatric: alert and oriented x3, calm        Please refer above for cardiac ROS details.        Medical History  Surgical History Family History Social History   Past Medical History:   Diagnosis Date     Acute hypoxemic respiratory failure (H)      Acute renal failure, unspecified acute renal failure type (H)      CAD (coronary artery disease)      Callus of foot      Diabetes (H)      Diarrhea      Essential hypertension      Fracture of left distal radius      History of stroke      Insomnia      Nausea      Non-pressure chronic ulcer of right heel and midfoot with unspecified severity (H)      Normocytic anemia      Type 2 diabetes mellitus with foot ulcer (H)      Past Surgical History:   Procedure Laterality Date     APPENDECTOMY       CV CORONARY ANGIOGRAM N/A 3/1/2024    Procedure: CV CORONARY ANGIOGRAM;  Surgeon: Stephen Berger MD;  Location: [facility] CV     CV LEFT HEART CATH N/A 3/1/2024    Procedure: Left Heart Catheterization;  Surgeon: Stephen Berger MD;  Location: [facility] CV     EXCISE EXOSTOSIS FOOT Right 5/2/2024    Procedure: excision of bone from calcaneus with application of theraskin;  Surgeon: Dong Sanders  AMY Young;  Location: [facility] OR     INCISION AND DRAINAGE FOOT, COMBINED Right 5/2/2024    Procedure: INCISION AND DRAINAGE, right heel with;  Surgeon: Dong Sanders DPM;  Location: [facility] OR     INCISION AND DRAINAGE OF WOUND      groin abscess     IRRIGATION AND DEBRIDEMENT FOOT, COMBINED Right 2/16/2024    Procedure: IRRIGATION AND DEBRIDEMENT RIGHT FOOT;  Surgeon: Cristofer Sims DPM;  Location: [facility] OR     PICC DOUBLE LUMEN PLACEMENT  2/29/2024     No family history on file.     Social History     Socioeconomic History     Marital status: Single     Spouse name: Not on file     Number of children: Not on file     Years of education: Not on file     Highest education level: Not on file   Occupational History     Not on file   Tobacco Use     Smoking status: Former     Current packs/day: 0.50     Types: Cigarettes     Smokeless tobacco: Never   Vaping Use     Vaping status: Never Used   Substance and Sexual Activity     Alcohol use: Yes     Comment: once a week     Drug use: Yes     Types: Marijuana     Comment: Has not done since Feb     Sexual activity: Not on file   Other Topics Concern     Not on file   Social History Narrative    Patient reports that he does not have health insurance.     Social Drivers of Health     Financial Resource Strain: Low Risk  (10/20/2023)    Financial Resource Strain      Within the past 12 months, have you or your family members you live with been unable to get utilities (heat, electricity) when it was really needed?: No   Food Insecurity: High Risk (10/20/2023)    Food Insecurity      Within the past 12 months, did you worry that your food would run out before you got money to buy more?: Yes      Within the past 12 months, did the food you bought just not last and you didn t have money to get more?: No   Transportation Needs: Low Risk  (10/20/2023)    Transportation Needs      Within the past 12 months, has lack of transportation kept you from  medical appointments, getting your medicines, non-medical meetings or appointments, work, or from getting things that you need?: No   Physical Activity: Not on file   Stress: Not on file   Social Connections: Not on file   Interpersonal Safety: Low Risk  (10/20/2023)    Interpersonal Safety      Do you feel physically and emotionally safe where you currently live?: Yes      Within the past 12 months, have you been hit, slapped, kicked or otherwise physically hurt by someone?: No      Within the past 12 months, have you been humiliated or emotionally abused in other ways by your partner or ex-partner?: No   Housing Stability: High Risk (10/20/2023)    Housing Stability      Do you have housing? : Yes      Are you worried about losing your housing?: Yes           Medications  Allergies   Current Outpatient Medications   Medication Sig Dispense Refill     acetaminophen (TYLENOL) 500 MG tablet Take 2 tablets (1,000 mg) by mouth every 8 hours as needed for mild pain       aspirin (ASA) 325 MG EC tablet Take 1 tablet (325 mg) by mouth daily (Patient not taking: Reported on 10/9/2024)       atorvastatin (LIPITOR) 80 MG tablet Take 1 tablet (80 mg) by mouth daily 90 tablet 3     bumetanide (BUMEX) 2 MG tablet Take 1 tablet (2 mg) by mouth 2 times daily. 60 tablet 1     carvedilol (COREG) 25 MG tablet Take 1 tablet (25 mg) by mouth 2 times daily (with meals). 60 tablet 1     Continuous Glucose Sensor (FREESTYLE LAMINE 2 SENSOR) MISC Inject 1 each subcutaneously every 14 days Use 1 sensor every 14 days. Use to read blood sugars per 's instructions. (Patient not taking: Reported on 10/9/2024) 6 each 5     ergocalciferol (ERGOCALCIFEROL) 1.25 MG (55686 UT) capsule Take 50,000 Units by mouth once a week Every Wednesday for anemia for 12 weeks ACTIVE 4/24/24- 6/26/24 (Patient not taking: Reported on 10/9/2024)       insulin aspart (NOVOLOG VIAL) 100 UNITS/ML vial Inject 5 Units Subcutaneous 3 times daily (with meals)  (Patient not taking: Reported on 10/9/2024)       insulin glargine (LANTUS PEN) 100 UNIT/ML pen Inject 32 Units subcutaneously at bedtime. 15 mL 3     insulin lispro (HUMALOG KWIKPEN) 100 UNIT/ML (1 unit dial) KWIKPEN Inject 10 Units subcutaneously 3 times daily (with meals). Plus sliding scale: 2 units every 50 over 150. If humalog is not covered by insurance, may substitute with novolog or other fast acting insulin 15 mL 11     insulin lispro (HUMALOG VIAL) 100 UNIT/ML vial Inject Subcutaneous 3 times daily (before meals) Inject as per sliding scale: if  = 4 units, 70 or below see hypoglycemia protocol: 150-199= 8 units;  200-249 =10 units;  250-299= 12 units; 300-349= 14 units; 350-399= 16 units; 400 or greater give 18 units, if >400x 2 call MD/NP (Patient not taking: Reported on 10/9/2024)       insulin pen needle (32G X 4 MM) 32G X 4 MM miscellaneous Use 4 pen needles daily or as directed. 200 each 4     loperamide (IMODIUM) 2 MG capsule Take 2 mg by mouth as needed for diarrhea (give 4mg every 6 hours as needed for Diarrhea) (Patient not taking: Reported on 10/9/2024)       losartan (COZAAR) 25 MG tablet Take 1 tablet (25 mg) by mouth daily. 90 tablet 3     losartan (COZAAR) 25 MG tablet Take 1 tablet (25 mg) by mouth daily       melatonin 5 MG tablet Take 5 mg by mouth nightly as needed for sleep (Patient not taking: Reported on 10/9/2024)       metFORMIN (GLUCOPHAGE XR) 500 MG 24 hr tablet Take 1 tablet (500 mg) by mouth daily (with dinner). 30 tablet 2     mineral oil-hydrophilic petrolatum (AQUAPHOR) external ointment Apply topically 2 times daily       multivitamin w/minerals (THERA-VIT-M) tablet Take 1 tablet by mouth daily       nicotine (NICODERM CQ) 14 MG/24HR 24 hr patch Place 1 patch onto the skin every 24 hours (Patient not taking: Reported on 10/9/2024)       ondansetron (ZOFRAN) 4 MG tablet Take 4 mg by mouth every 8 hours as needed for nausea (Patient not taking: Reported on 10/9/2024)    "    pantoprazole (PROTONIX) 40 MG EC tablet Take 1 tablet (40 mg) by mouth every morning (before breakfast) (Patient not taking: Reported on 10/9/2024)       polymixin b-trimethoprim (POLYTRIM) 39272-4.1 UNIT/ML-% ophthalmic solution Place 1-2 drops Into the left eye every 4 hours. 10 mL 0     Urea-Lactic Acid 10-4 % CREA Externally apply topically 2 times daily Apply to left foot/ heel topically two times a day for moisturizing 10-4%       vitamin D3 (CHOLECALCIFEROL) 50 mcg (2000 units) tablet Take 1 tablet by mouth daily (Patient not taking: Reported on 10/9/2024)       No Known Allergies       Lab Results    Chemistry/lipid CBC Cardiac Enzymes/BNP/TSH/INR   Recent Labs   Lab Test 02/21/24  0537   CHOL 156   HDL 39*   LDL 77   TRIG 199*     Recent Labs   Lab Test 02/21/24  0537 08/23/23  0946   LDL 77 226*     Recent Labs   Lab Test 07/11/24  1045      POTASSIUM 4.6   CHLORIDE 104   CO2 22   *   BUN 36.1*   CR 1.14   GFRESTIMATED 78   SARAH 10.4*     Recent Labs   Lab Test 07/11/24  1045 03/05/24  0440 03/04/24  0442   CR 1.14 1.71* 1.72*     Recent Labs   Lab Test 07/11/24  1045 02/14/24  0935 08/23/23  0946   A1C 9.4* 11.8* 11.1*          Recent Labs   Lab Test 07/11/24  1045   WBC 12.9*   HGB 13.4   HCT 39.2*   MCV 93        Recent Labs   Lab Test 07/11/24  1045 02/27/24  0540 02/23/24  0618   HGB 13.4 10.2* 10.6*    No results for input(s): \"TROPONINI\" in the last 15118 hours.  Recent Labs   Lab Test 02/19/24  0547 08/23/23  0946   NTBNPI 12,085* 857*     Recent Labs   Lab Test 08/23/23  1433   TSH 2.72     Recent Labs   Lab Test 08/23/23  0946   INR 0.95        Gee Hopson DO  Cardiologist     The longitudinal plan of care for the diagnosis(es)/condition(s) as documented were addressed during this visit. Due to the added complexity in care, I will continue to support Floyd in the subsequent management and with ongoing continuity of care.    Today's clinic visit entailed:44 minutes " spent by me on the date of the encounter doing chart review, history and exam, documentation and further activities per the note                  Thank you for allowing me to participate in the care of your patient.      Sincerely,     Gee Hopson DO     [facility]  cc:   Gee Hopson DO  [address] [place] [address]

## 2024-11-10 ENCOUNTER — MYC REFILL (OUTPATIENT)
Dept: INTERNAL MEDICINE | Facility: CLINIC | Age: 49
End: 2024-11-10
Payer: COMMERCIAL

## 2024-11-10 DIAGNOSIS — Z79.4 UNCONTROLLED TYPE 2 DIABETES MELLITUS WITH HYPERGLYCEMIA, WITH LONG-TERM CURRENT USE OF INSULIN (H): ICD-10-CM

## 2024-11-10 DIAGNOSIS — E11.65 UNCONTROLLED TYPE 2 DIABETES MELLITUS WITH HYPERGLYCEMIA, WITH LONG-TERM CURRENT USE OF INSULIN (H): ICD-10-CM

## 2024-11-14 ENCOUNTER — PATIENT OUTREACH (OUTPATIENT)
Dept: CARE COORDINATION | Facility: CLINIC | Age: 49
End: 2024-11-14
Payer: COMMERCIAL

## 2024-11-14 NOTE — PROGRESS NOTES
[facility]/Voicemail    Clinical Data: Care Coordinator Outreach    Outreach Documentation Number of Outreach Attempt   10/17/2024   9:13 AM 1   10/24/2024  11:57 AM 2   10/31/2024   4:16 PM 3   11/14/2024   3:58 PM 1       Left message on patient's voicemail with call back information and requested return call.      Plan: Care Coordinator will try to reach patient again in 10 business days.    Georgiana Isaacs,   [facility]  [phone]

## 2024-11-18 ENCOUNTER — MYC REFILL (OUTPATIENT)
Dept: INTERNAL MEDICINE | Facility: CLINIC | Age: 49
End: 2024-11-18
Payer: COMMERCIAL

## 2024-11-18 DIAGNOSIS — I50.21 ACUTE SYSTOLIC HEART FAILURE (H): ICD-10-CM

## 2024-11-18 RX ORDER — CARVEDILOL 25 MG/1
25 TABLET ORAL 2 TIMES DAILY WITH MEALS
Qty: 60 TABLET | Refills: 1 | Status: SHIPPED | OUTPATIENT
Start: 2024-11-18

## 2024-11-19 ENCOUNTER — MYC REFILL (OUTPATIENT)
Dept: INTERNAL MEDICINE | Facility: CLINIC | Age: 49
End: 2024-11-19
Payer: COMMERCIAL

## 2024-11-19 DIAGNOSIS — E11.65 POORLY CONTROLLED DIABETES MELLITUS (H): ICD-10-CM

## 2024-11-20 RX ORDER — METFORMIN HYDROCHLORIDE 500 MG/1
500 TABLET, EXTENDED RELEASE ORAL
Qty: 30 TABLET | Refills: 2 | Status: SHIPPED | OUTPATIENT
Start: 2024-11-20

## 2024-12-03 ENCOUNTER — PATIENT OUTREACH (OUTPATIENT)
Dept: CARE COORDINATION | Facility: CLINIC | Age: 49
End: 2024-12-03
Payer: COMMERCIAL

## 2024-12-03 ENCOUNTER — MYC REFILL (OUTPATIENT)
Dept: INTERNAL MEDICINE | Facility: CLINIC | Age: 49
End: 2024-12-03
Payer: COMMERCIAL

## 2024-12-03 DIAGNOSIS — I50.21 ACUTE SYSTOLIC HEART FAILURE (H): ICD-10-CM

## 2024-12-03 RX ORDER — BUMETANIDE 2 MG/1
2 TABLET ORAL
Qty: 60 TABLET | Refills: 1 | Status: SHIPPED | OUTPATIENT
Start: 2024-12-03

## 2024-12-03 NOTE — PROGRESS NOTES
Contact   Chart Review     Situation: Patient chart reviewed by .    Background: enrolled in care coordination.     Assessment: Has been unreachable.     Plan/Recommendations: Closing to CC and dis enrollment letter sent.     Georgiana Isaacs,   [facility]  [phone]

## 2024-12-03 NOTE — LETTER
[facility] CARE COORDINATION  [facility]    December 3, 2024    Floyd Capps  [address]   [place] [address]      Dear Floyd,    I have been unsuccessful in reaching you since our last contact. At this time the Care Coordination team will make no further attempts to reach you, however this does not change your ability to continue receiving care from your providers at your primary care clinic. If you need additional support from a care coordinator in the future please contact Georgiana at [phone].    We are focused on providing you with the highest-quality healthcare experience possible.    Sincerely,    Georgiana Isaacs,   [facility]  [phone]

## 2024-12-12 NOTE — CONFIDENTIAL NOTE
DIAGNOSIS:  Albuminuria    DATE RECEIVED:  01.23.2025    NOTES STATUS DETAILS   OFFICE NOTE from referring provider Internal 07.11.2024  Glendy Benavides NP    MEDICATION LIST Internal    LABS     CBC Internal 07.11.2024   CMP Internal 07.11.2024   BMP Internal 03.05.2024   UA Internal 02.17.2024   URINE PROTEIN Internal 02.14.2024   RENAL PANEL Internal 02.27.2024

## 2024-12-16 ENCOUNTER — TELEPHONE (OUTPATIENT)
Dept: NEPHROLOGY | Facility: CLINIC | Age: 49
End: 2024-12-16
Payer: COMMERCIAL

## 2024-12-16 NOTE — TELEPHONE ENCOUNTER
LVM & sent mychart for pt to reschedule appt on 1.23.25 with Dr. Lin (okay to reschedule to different provider), first attempt 12.16.24 AN

## 2025-01-23 ENCOUNTER — PRE VISIT (OUTPATIENT)
Dept: NEPHROLOGY | Facility: CLINIC | Age: 50
End: 2025-01-23

## 2025-01-30 ENCOUNTER — OFFICE VISIT (OUTPATIENT)
Dept: INTERNAL MEDICINE | Facility: CLINIC | Age: 50
End: 2025-01-30
Payer: COMMERCIAL

## 2025-01-30 ENCOUNTER — PATIENT OUTREACH (OUTPATIENT)
Dept: CARE COORDINATION | Facility: CLINIC | Age: 50
End: 2025-01-30

## 2025-01-30 VITALS
RESPIRATION RATE: 18 BRPM | SYSTOLIC BLOOD PRESSURE: 128 MMHG | HEART RATE: 78 BPM | HEIGHT: 70 IN | BODY MASS INDEX: 42.68 KG/M2 | OXYGEN SATURATION: 96 % | DIASTOLIC BLOOD PRESSURE: 64 MMHG | TEMPERATURE: 98.1 F | WEIGHT: 298.13 LBS

## 2025-01-30 DIAGNOSIS — L97.411 DIABETIC ULCER OF RIGHT HEEL ASSOCIATED WITH TYPE 2 DIABETES MELLITUS, LIMITED TO BREAKDOWN OF SKIN (H): ICD-10-CM

## 2025-01-30 DIAGNOSIS — R53.81 PHYSICAL DECONDITIONING: ICD-10-CM

## 2025-01-30 DIAGNOSIS — I50.21 ACUTE SYSTOLIC HEART FAILURE (H): ICD-10-CM

## 2025-01-30 DIAGNOSIS — E11.621 DIABETIC ULCER OF RIGHT HEEL ASSOCIATED WITH TYPE 2 DIABETES MELLITUS, LIMITED TO BREAKDOWN OF SKIN (H): ICD-10-CM

## 2025-01-30 DIAGNOSIS — Z59.9 FINANCIAL DIFFICULTIES: Primary | ICD-10-CM

## 2025-01-30 DIAGNOSIS — F43.21 GRIEF: ICD-10-CM

## 2025-01-30 DIAGNOSIS — E11.65 POORLY CONTROLLED DIABETES MELLITUS (H): ICD-10-CM

## 2025-01-30 DIAGNOSIS — F32.A DEPRESSION, UNSPECIFIED DEPRESSION TYPE: ICD-10-CM

## 2025-01-30 DIAGNOSIS — I10 BENIGN ESSENTIAL HYPERTENSION: Primary | ICD-10-CM

## 2025-01-30 LAB
ANION GAP SERPL CALCULATED.3IONS-SCNC: 10 MMOL/L (ref 7–15)
BUN SERPL-MCNC: 45.2 MG/DL (ref 6–20)
CALCIUM SERPL-MCNC: 10.3 MG/DL (ref 8.8–10.4)
CHLORIDE SERPL-SCNC: 97 MMOL/L (ref 98–107)
CREAT SERPL-MCNC: 1.62 MG/DL (ref 0.67–1.17)
EGFRCR SERPLBLD CKD-EPI 2021: 51 ML/MIN/1.73M2
EST. AVERAGE GLUCOSE BLD GHB EST-MCNC: 209 MG/DL
GLUCOSE SERPL-MCNC: 229 MG/DL (ref 70–99)
HBA1C MFR BLD: 8.9 % (ref 0–5.6)
HCO3 SERPL-SCNC: 30 MMOL/L (ref 22–29)
POTASSIUM SERPL-SCNC: 5.4 MMOL/L (ref 3.4–5.3)
SODIUM SERPL-SCNC: 137 MMOL/L (ref 135–145)

## 2025-01-30 PROCEDURE — 36415 COLL VENOUS BLD VENIPUNCTURE: CPT | Performed by: NURSE PRACTITIONER

## 2025-01-30 PROCEDURE — 83036 HEMOGLOBIN GLYCOSYLATED A1C: CPT | Performed by: NURSE PRACTITIONER

## 2025-01-30 PROCEDURE — 99214 OFFICE O/P EST MOD 30 MIN: CPT | Performed by: NURSE PRACTITIONER

## 2025-01-30 PROCEDURE — 96127 BRIEF EMOTIONAL/BEHAV ASSMT: CPT | Performed by: NURSE PRACTITIONER

## 2025-01-30 PROCEDURE — 80048 BASIC METABOLIC PNL TOTAL CA: CPT | Performed by: NURSE PRACTITIONER

## 2025-01-30 RX ORDER — METFORMIN HYDROCHLORIDE 500 MG/1
500 TABLET, EXTENDED RELEASE ORAL
Qty: 90 TABLET | Refills: 0 | Status: SHIPPED | OUTPATIENT
Start: 2025-01-30

## 2025-01-30 RX ORDER — CARVEDILOL 25 MG/1
25 TABLET ORAL 2 TIMES DAILY WITH MEALS
Qty: 180 TABLET | Refills: 0 | Status: SHIPPED | OUTPATIENT
Start: 2025-01-30

## 2025-01-30 RX ORDER — BUMETANIDE 2 MG/1
2 TABLET ORAL
Qty: 180 TABLET | Refills: 0 | Status: SHIPPED | OUTPATIENT
Start: 2025-01-30

## 2025-01-30 SDOH — ECONOMIC STABILITY - INCOME SECURITY: PROBLEM RELATED TO HOUSING AND ECONOMIC CIRCUMSTANCES, UNSPECIFIED: Z59.9

## 2025-01-30 ASSESSMENT — ACTIVITIES OF DAILY LIVING (ADL): DEPENDENT_IADLS:: INDEPENDENT

## 2025-01-30 ASSESSMENT — PATIENT HEALTH QUESTIONNAIRE - PHQ9
10. IF YOU CHECKED OFF ANY PROBLEMS, HOW DIFFICULT HAVE THESE PROBLEMS MADE IT FOR YOU TO DO YOUR WORK, TAKE CARE OF THINGS AT HOME, OR GET ALONG WITH OTHER PEOPLE: VERY DIFFICULT
SUM OF ALL RESPONSES TO PHQ QUESTIONS 1-9: 23
SUM OF ALL RESPONSES TO PHQ QUESTIONS 1-9: 23

## 2025-01-30 ASSESSMENT — PAIN SCALES - GENERAL: PAINLEVEL_OUTOF10: NO PAIN (0)

## 2025-01-30 NOTE — LETTER
[facility]  Patient Centered Plan of Care  About Me:        Patient Name:  Floyd Hodges    YOB: 1975  Age:         49 year old   [place] MRN:    [id number] Telephone Information:  Home Phone [phone]   Mobile [phone]       Address:  [address] [place] [address] [address] [place] [address] Email address:  [email or web address]      Emergency Contact(s)    Name Relationship Lgl Grd Work Phone Home Phone Mobile Phone   1. CHANDANA GURROLA Friend  [phone]     2. ROSSY HODGES Brother    [phone]   3. PRATEEK HODGES Sister    [phone]           Primary language:  English     needed? No   [employer]:  [phone] op. 1  Other communication barriers:Lack of coping; Physical impairment    Preferred Method of Communication:     Current living arrangement: I live alone    Mobility Status/ Medical Equipment: Independent        Health Maintenance  Health Maintenance Reviewed: Due/Overdue   Health Maintenance Due   Topic Date Due    HF ACTION PLAN  Never done    DIABETIC FOOT EXAM  Never done    ADVANCE CARE PLANNING  Never done    EYE EXAM  Never done    YEARLY PREVENTIVE VISIT  Never done    COLORECTAL CANCER SCREENING  Never done    HEPATITIS B IMMUNIZATION (1 of 3 - 19+ 3-dose series) Never done    Pneumococcal Vaccine: 50+ Years (2 of 2 - PCV) 10/11/2008    ZOSTER IMMUNIZATION (1 of 2) Never done    LUNG CANCER SCREENING  Never done    INFLUENZA VACCINE (1) 09/01/2024    COVID-19 Vaccine (3 - 2024-25 season) 09/01/2024    BMP  01/11/2025    A1C  01/12/2025    LIPID  02/21/2025          My Access Plan  Medical Emergency 911   Primary Clinic Line [facility] [phone]   24 Hour Appointment Line [phone] or  [phone]) (toll-free)   24 Hour Nurse Line [phone] (toll-free)   Preferred Urgent Care [facility] [phone]     [facility]  [phone]      Preferred Pharmacy [facility]/pharmacy #[id number] - [facility], [place] - [address]     Behavioral Health Crisis Line The National Suicide Prevention Lifeline at 1-648.497.4279 or Text/Call 988           My Care Team Members  Patient Care Team         Relationship Specialty Notifications Start End    Glendy Benavides, NP PCP - General  Abnormal results only, Admissions 10/20/23     Phone: [phone] Fax: [phone]         [address] [address]    Glendy Benavides NP Assigned PCP   10/11/23     Phone: [phone] Fax: [phone]         [address] [facility] [address]    Eugene Spaulding MD MD Otolaryngology  10/20/23     Phone: [phone] Fax: [phone]         [address] [place] [address]    May Mishra DO Assigned Neuroscience Provider   2/23/24     Phone: [phone] Fax: [phone]         [address] [address] [facility] [address] [facility] [address]    Sue Mclaughlin PAEricC Physician Assistant Cardiology  3/13/24     Phone: [phone] Fax: [phone]         [facility] [address] [facility] [address]    Dong Sanders DPM Assigned Surgical Provider   4/23/24     Phone: [phone] Fax: [phone]         [facility] [facility] [address] [facility] [address]    Floyd Marcus MD Physician Infectious Diseases  5/7/24     Phone: [phone] Fax: [phone]         [address] [facility] [address] [facility] [address]    Floyd Marcus MD Assigned Infectious Disease Provider   5/23/24     Phone: [phone] Fax: [phone]         [address] [facility] [address] [facility] [address]    Reny Lin MD MD Internal Medicine  7/29/24     Phone: [phone] Fax: [phone]         [address] [facility] [address]    Gee Hopson DO Physician Cardiovascular Disease  9/18/24     Phone: [phone] Fax: [phone] [address] [facility] [address]    Gee Hopson DO [facility] Heart and  Vascular Provider   10/23/24     Phone: [phone] Fax: [phone] [address] [facility] [address]    Georgiana Isaacs LSW Lead Care Coordinator Primary Care - CC Admissions 1/30/25     Phone: [phone]         Yuli Maldonado CHNADIYA Community Health Worker  Admissions 1/30/25                 My Care Plans  Self Management and Treatment Plan    Care Plan  Care Plan: Financial Wellbeing       Problem: Patient expresses financial resource strain       Long-Range Goal: Create an action plan to increase financial stability       Start Date: 1/30/2025 Expected End Date: 1/29/2026    Priority: High    Note:     Barriers: needs help with filling out forms. Looking for a new job  Strengths: motivated.   Patient expressed understanding of goal: yes  Action steps to achieve this goal:  1. I will look for work.  2. I will work with FRW to stay on MA, apply for energy assistance and Cash  3. I will complete all form and submit all needed documents.  4. I will report progress towards this goal at scheduled outreach telephone calls from the CCC team.                                  Advance Care Plans/Directives:   Advanced Care Plan/Directives on file: No    Discussed with patient/caregiver(s): Declined Further Information               My Medical and Care Information  Problem List   Patient Active Problem List   Diagnosis    Dizziness    Type 2 Diabetes Mellitus - Uncomplicated, Controlled    Hyperlipidemia    Hypothyroidism    Obesity    Tobacco use disorder    Elevated troponin    History of stroke    Class 2 severe obesity due to excess calories with serious comorbidity in adult (H)    Hyperglycemia    Poorly controlled diabetes mellitus (H)    Cellulitis of right lower leg    Diabetic ulcer of right heel associated with type 2 diabetes mellitus, unspecified ulcer stage (H)    Hyponatremia    Acute systolic heart failure (H)    Closed fracture of distal end of left radius    Benign essential hypertension     Heart burn    Diabetic ulcer of right heel associated with diabetes mellitus due to underlying condition, with muscle involvement without evidence of necrosis (H)    Diabetic ulcer of right heel associated with type 2 diabetes mellitus, with necrosis of bone (H)    Diabetic ulcer of right heel associated with type 2 diabetes mellitus, limited to breakdown of skin (H)      Current Medications:  Please refer to the most recent medication list provided to you by your medical team and reach out to your provider with any questions or to make any corrections.    Care Coordination Start Date: 1/30/2025   Frequency of Care Coordination: monthly, more frequently as needed     Form Last Updated: 01/30/2025

## 2025-01-30 NOTE — PROGRESS NOTES
Clinic Care Coordination Contact  Clinic Care Coordination Contact  OUTREACH    Referral Information:  Referral Source: PCP    Primary Diagnosis: Psychosocial    Chief Complaint   Patient presents with    Clinic Care Coordination - Follow-up        Universal Utilization: appropriate.   Clinic Utilization  Difficulty keeping appointments:: No  Compliance Concerns: No  No-Show Concerns: No  No PCP office visit in Past Year: No  Utilization      No Show Count (past year)  1             ED Visits  3             Hospital Admissions  2                    Current as of: 1/30/2025  2:26 PM                Clinical Concerns:  Current Medical Concerns:  Diabetes, CHF, depression, obesity.      Current Behavioral Concerns: stressed by financial concerns.     Education Provided to patient: Reviewed role of care coordination and resources.    Pain  Pain (GOAL):: No  Health Maintenance Reviewed: Due/Overdue   Health Maintenance Due   Topic Date Due    HF ACTION PLAN  Never done    DIABETIC FOOT EXAM  Never done    ADVANCE CARE PLANNING  Never done    EYE EXAM  Never done    YEARLY PREVENTIVE VISIT  Never done    COLORECTAL CANCER SCREENING  Never done    HEPATITIS B IMMUNIZATION (1 of 3 - 19+ 3-dose series) Never done    Pneumococcal Vaccine: 50+ Years (2 of 2 - PCV) 10/11/2008    ZOSTER IMMUNIZATION (1 of 2) Never done    LUNG CANCER SCREENING  Never done    INFLUENZA VACCINE (1) 09/01/2024    COVID-19 Vaccine (3 - 2024-25 season) 09/01/2024    BMP  01/11/2025    A1C  01/12/2025    LIPID  02/21/2025       Clinical Pathway: None    Medication Management:  Medication review status: Medications reviewed and no changes reported per patient.        No concerns     Functional Status:  Dependent ADLs:: Independent  Dependent IADLs:: Independent  Bed or wheelchair confined:: No  Mobility Status: Independent  Fallen 2 or more times in the past year?: No  Any fall with injury in the past year?: No    Living Situation:  Current living  arrangement:: I live alone  Type of residence:: Apartment    Lifestyle & Psychosocial Needs:    Social Drivers of Health     Food Insecurity: High Risk (10/20/2023)    Food Insecurity     Within the past 12 months, did you worry that your food would run out before you got money to buy more?: Yes     Within the past 12 months, did the food you bought just not last and you didn t have money to get more?: No   Depression: At risk (1/30/2025)    PHQ-2     PHQ-2 Score: 6   Housing Stability: High Risk (10/20/2023)    Housing Stability     Do you have housing? : Yes     Are you worried about losing your housing?: Yes   Tobacco Use: Medium Risk (1/30/2025)    Patient History     Smoking Tobacco Use: Former     Smokeless Tobacco Use: Never     Passive Exposure: Not on file   Financial Resource Strain: Low Risk  (10/20/2023)    Financial Resource Strain     Within the past 12 months, have you or your family members you live with been unable to get utilities (heat, electricity) when it was really needed?: No   Alcohol Use: Not At Risk (4/27/2019)    [facility] [employer] [facility] [employer] [employer]    AUDIT-[facility]     Frequency of Alcohol Consumption: Never     Average Number of Drinks: Not on file     Frequency of Binge Drinking: Not on file   Transportation Needs: Low Risk  (10/20/2023)    Transportation Needs     Within the past 12 months, has lack of transportation kept you from medical appointments, getting your medicines, non-medical meetings or appointments, work, or from getting things that you need?: No   Physical Activity: Not on file   Interpersonal Safety: Low Risk  (1/30/2025)    Interpersonal Safety     Do you feel physically and emotionally safe where you currently live?: Yes     Within the past 12 months, have you been hit, slapped, kicked or otherwise physically hurt by someone?: No     Within the past 12 months, have you been humiliated or emotionally abused in other ways by your partner or ex-partner?: No   Stress:  Not on file   Social Connections: Not on file   Health Literacy: Not on file     Diet:: Diabetic diet  Inadequate nutrition (GOAL):: No  Tube Feeding: No  Inadequate activity/exercise (GOAL):: No  Significant changes in sleep pattern (GOAL): No  Transportation means:: Regular car, Medical transport     Amish or spiritual beliefs that impact treatment:: No  Mental health DX:: No  Mental health management concern (GOAL):: No  Chemical Dependency Status: No Current Concerns  Informal Support system:: Friends        Worked with patient in the past.  He was at a PCP appt when they contacted writer to assist.  He talked to writer while he was still in clinic. He was laid off the end of December as he is not able to do the work assigned. He couldn't physically keep up. He worked there from the end of October to the end of December.  He is trying to find a new job. Discussed working with the [employer] to see what work he could do now.  He has no income and is not eligible for unemployment.  He would like to work with [employer] on signing up for Cash, making sure he is on MA (currrently shows he is active on [employer]) amd energy assistance. Currently getting $259 in SNAP.       Resources and Interventions:  Current Resources:      Community Resources: County Programs ([place], snap)  Supplies Currently Used at Home: Diabetic Supplies  Equipment Currently Used at Home: tub bench, glucometer  Employment Status: employment seeking         Advance Care Plan/Directive  Advanced Care Plans/Directives on file:: No  Discussed with patient/caregiver:: Declined Further Information    Referrals Placed: Financial Services         Care Plan:  Care Plan: Financial Wellbeing       Problem: Patient expresses financial resource strain       Long-Range Goal: Create an action plan to increase financial stability       Start Date: 1/30/2025 Expected End Date: 1/29/2026    Priority: High    Note:     Barriers: needs help with filling out forms.  Looking for a new job  Strengths: motivated.   Patient expressed understanding of goal: yes  Action steps to achieve this goal:  1. I will look for work.  2. I will work with FRW to stay on MA, apply for energy assistance and Cash  3. I will complete all form and submit all needed documents.  4. I will report progress towards this goal at scheduled outreach telephone calls from the CCC team.                                  Patient/Caregiver understanding: enrolled in care coordination.     Outreach Frequency: monthly, more frequently as needed      Plan: [facility] SW will continue to monitor, support patient with current goals and will be available to assist as needs arise. [facility] CHW will reach out to patient on a monthly basis to discuss progression of goals.      [facility] SW will perform Chart Review in 45 days.   Georgiana Isaacs,   [facility]  [phone]

## 2025-01-30 NOTE — LETTER
[facility] CARE COORDINATION  [facility]  January 30, 2025    Floyd Capps  [address]   [place] [address]      Dear Floyd,    I am a clinic care coordinator who works with Glendy Benavides NP with the [facility]. I wanted to thank you for spending the time to talk with me.  Below is a description of clinic care coordination and how I can further assist you.       The clinic care coordination team is made up of a registered nurse, , financial resource worker and community health worker who understand the health care system. The goal of clinic care coordination is to help you manage your health and improve access to the health care system. Our team works alongside your provider to assist you in determining your health and social needs. We can help you obtain health care and community resources, providing you with necessary information and education. We can work with you through any barriers and develop a care plan that helps coordinate and strengthen the communication between you and your care team.  Our services are voluntary and are offered without charge to you personally.    Please feel free to contact me with any questions or concerns regarding care coordination and what we can offer.      We are focused on providing you with the highest-quality healthcare experience possible.    Sincerely,     Georgiana Isaacs,   [facility]  [phone]     Enclosed: I have enclosed a copy of the Patient Centered Plan of Care. This has helpful information and goals that we have talked about. Please keep this in an easy to access place to use as needed.

## 2025-01-30 NOTE — PROGRESS NOTES
"  Assessment & Plan   Problem List Items Addressed This Visit       Poorly controlled diabetes mellitus (H)    Relevant Medications    metFORMIN (GLUCOPHAGE XR) 500 MG 24 hr tablet    Acute systolic heart failure (H)    Relevant Medications    carvedilol (COREG) 25 MG tablet    bumetanide (BUMEX) 2 MG tablet    Other Relevant Orders    Adult Cardiology Eval  Referral    Benign essential hypertension - Primary (Chronic)    Relevant Orders    BASIC METABOLIC PANEL    Adult Cardiology Eval  Referral    Diabetic ulcer of right heel associated with type 2 diabetes mellitus, limited to breakdown of skin (H)    Relevant Medications    metFORMIN (GLUCOPHAGE XR) 500 MG 24 hr tablet    Other Relevant Orders    HEMOGLOBIN A1C     Other Visit Diagnoses       Grief        Depression, unspecified depression type        Physical deconditioning                 Patient declines to start on medication for mood and declines referral for mental health provider  Discussed with patient the importance of taking his medications as prescribed signs concerning for insurance coverage and cost of care patient is connected with the care coordination for further assistance.  Also recommend scheduling follow-up with the diabetic educator, cardiology, nephrology.  Patient is contacted via phone while in clinic today with care coordination for further assistance        BMI  Estimated body mass index is 42.78 kg/m  as calculated from the following:    Height as of this encounter: 1.778 m (5' 10\").    Weight as of this encounter: 135.2 kg (298 lb 2 oz).       Depression Screening Follow Up        1/30/2025     1:46 PM   PHQ   PHQ-9 Total Score 23    Q9: Thoughts of better off dead/self-harm past 2 weeks Several days   F/U: Thoughts of suicide or self-harm No   F/U: Safety concerns No       Patient-reported         1/30/2025     1:46 PM   Last PHQ-9   1.  Little interest or pleasure in doing things 3   2.  Feeling down, depressed, or " "hopeless 3   3.  Trouble falling or staying asleep, or sleeping too much 3   4.  Feeling tired or having little energy 3   5.  Poor appetite or overeating 2   6.  Feeling bad about yourself 3   7.  Trouble concentrating 3   8.  Moving slowly or restless 2   Q9: Thoughts of better off dead/self-harm past 2 weeks 1   PHQ-9 Total Score 23    In the past two weeks have you had thoughts of suicide or self harm? No   Do you have concerns about your personal safety or the safety of others? No       Patient-reported                No data to display                    Follow Up Actions Taken  Crisis resource information provided in the After Visit Summary    Discussed the following ways the patient can remain in a safe environment:          Alexandra Barrientos is a 49 year old, presenting for the following health issues:  Follow Up and Diabetes        1/30/2025     1:54 PM   Additional Questions   Roomed by Amador SIDDIQI CMA     History of Present Illness       Reason for visit:  Check my a1c and diabetes   He is taking medications regularly.                 Review of Systems  Constitutional, HEENT, cardiovascular, pulmonary, GI, , musculoskeletal, neuro, skin, endocrine and psych systems are negative, except as otherwise noted.      Objective    /64 (BP Location: Right arm, Patient Position: Sitting, Cuff Size: Adult Large)   Pulse 78   Temp 98.1  F (36.7  C) (Oral)   Resp 18   Ht 1.778 m (5' 10\")   Wt 135.2 kg (298 lb 2 oz)   SpO2 96%   BMI 42.78 kg/m    Body mass index is 42.78 kg/m .  Physical Exam   GENERAL: alert and no distress  EYES: Eyes grossly normal to inspection,  conjunctivae and sclerae normal  NECK: no adenopathy   RESP: lungs clear to auscultation - no rales, rhonchi or wheezes  CV: regular rate and rhythm, normal S1 S2   PSYCH: mentation appears normal, affect normal/bright      Current Outpatient Medications   Medication Sig Dispense Refill    acetaminophen (TYLENOL) 500 MG tablet Take 2 tablets " (1,000 mg) by mouth every 8 hours as needed for mild pain      atorvastatin (LIPITOR) 80 MG tablet Take 1 tablet (80 mg) by mouth daily 90 tablet 3    bumetanide (BUMEX) 2 MG tablet Take 1 tablet (2 mg) by mouth 2 times daily. 180 tablet 0    carvedilol (COREG) 25 MG tablet Take 1 tablet (25 mg) by mouth 2 times daily (with meals). 180 tablet 0    insulin glargine (LANTUS PEN) 100 UNIT/ML pen Inject 32 Units subcutaneously at bedtime. 15 mL 3    insulin lispro (HUMALOG KWIKPEN) 100 UNIT/ML (1 unit dial) KWIKPEN Inject 10 Units subcutaneously 3 times daily (with meals). Plus sliding scale: 2 units every 50 over 150. If humalog is not covered by insurance, may substitute with novolog or other fast acting insulin 15 mL 11    insulin pen needle (32G X 4 MM) 32G X 4 MM miscellaneous Use 4 pen needles daily or as directed. 200 each 4    isosorbide mononitrate (IMDUR) 30 MG 24 hr tablet Take 1 tablet (30 mg) by mouth daily. 30 tablet 11    losartan (COZAAR) 25 MG tablet Take 1 tablet (25 mg) by mouth daily. 90 tablet 3    losartan (COZAAR) 25 MG tablet Take 1 tablet (25 mg) by mouth daily      metFORMIN (GLUCOPHAGE XR) 500 MG 24 hr tablet Take 1 tablet (500 mg) by mouth daily (with dinner). 90 tablet 0    mineral oil-hydrophilic petrolatum (AQUAPHOR) external ointment Apply topically 2 times daily      multivitamin w/minerals (THERA-VIT-M) tablet Take 1 tablet by mouth daily      polymixin b-trimethoprim (POLYTRIM) 89552-1.1 UNIT/ML-% ophthalmic solution Place 1-2 drops Into the left eye every 4 hours. 10 mL 0    Urea-Lactic Acid 10-4 % CREA Externally apply topically 2 times daily Apply to left foot/ heel topically two times a day for moisturizing 10-4%      Continuous Glucose Sensor (FREESTYLE LAMINE 2 SENSOR) [facility] Inject 1 each subcutaneously every 14 days Use 1 sensor every 14 days. Use to read blood sugars per 's instructions. (Patient not taking: Reported on 10/9/2024) 6 each 5     No current  facility-administered medications for this visit.               Signed Electronically by: Glendy Benavides NP

## 2025-02-05 ENCOUNTER — PATIENT OUTREACH (OUTPATIENT)
Dept: CARE COORDINATION | Facility: CLINIC | Age: 50
End: 2025-02-05
Payer: COMMERCIAL

## 2025-02-13 ENCOUNTER — PATIENT OUTREACH (OUTPATIENT)
Dept: CARE COORDINATION | Facility: CLINIC | Age: 50
End: 2025-02-13
Payer: COMMERCIAL

## 2025-02-13 NOTE — PROGRESS NOTES
Clinic Care Coordination Contact  Follow Up Progress Note      Assessment: call from patient. His phone got turned off, but it is back on. Gave him FRW phone number and he will call to get help with his [employer] benefits. He is hoping he will get unemployment, but it may be a fight.      Care Gaps:    Health Maintenance Due   Topic Date Due    HF ACTION PLAN  Never done    DIABETIC FOOT EXAM  Never done    ADVANCE CARE PLANNING  Never done    EYE EXAM  Never done    YEARLY PREVENTIVE VISIT  Never done    COLORECTAL CANCER SCREENING  Never done    HEPATITIS B IMMUNIZATION (1 of 3 - 19+ 3-dose series) Never done    Pneumococcal Vaccine: 50+ Years (2 of 2 - PCV) 10/11/2008    ZOSTER IMMUNIZATION (1 of 2) Never done    LUNG CANCER SCREENING  Never done    INFLUENZA VACCINE (1) 09/01/2024    COVID-19 Vaccine (3 - 2024-25 season) 09/01/2024    LIPID  02/21/2025       Postponed to when he has insurance.     Care Plans  Care Plan: Financial Wellbeing       Problem: Patient expresses financial resource strain       Long-Range Goal: Create an action plan to increase financial stability       Start Date: 1/30/2025 Expected End Date: 1/29/2026    This Visit's Progress: 10%    Priority: High    Note:     Barriers: needs help with filling out forms. Looking for a new job  Strengths: motivated.   Patient expressed understanding of goal: yes  Action steps to achieve this goal:  1. I will look for work.  2. I will work with FRW to stay on MA, apply for energy assistance and Cash  3. I will complete all form and submit all needed documents.  4. I will report progress towards this goal at scheduled outreach telephone calls from the CCC team.                                  Intervention/Education provided during outreach: support       Outreach Frequency: monthly, more frequently as needed        Plan:   [facility] SW will continue to monitor, support patient with current goals and will be available to assist as needs arise. [facility] CHW will reach  out to patient on a monthly basis to discuss progression of goals.      [facility] SW will perform Chart Review in 45 days.     Georgiana Isaacs,   [facility]  [phone]

## 2025-02-17 ENCOUNTER — PATIENT OUTREACH (OUTPATIENT)
Dept: CARE COORDINATION | Facility: CLINIC | Age: 50
End: 2025-02-17
Payer: COMMERCIAL

## 2025-02-17 NOTE — PROGRESS NOTES
FRW UPDATE  2/17/25:Pt called FRW and left VM. Outreach attempted x 1 was unable to reach. Left message on voicemail with call back information and requested return call.  Plan: Current outreach date reflects FRW 's follow up within one week.

## 2025-02-18 ENCOUNTER — PATIENT OUTREACH (OUTPATIENT)
Dept: CARE COORDINATION | Facility: CLINIC | Age: 50
End: 2025-02-18
Payer: COMMERCIAL

## 2025-02-25 ENCOUNTER — PATIENT OUTREACH (OUTPATIENT)
Dept: CARE COORDINATION | Facility: CLINIC | Age: 50
End: 2025-02-25

## 2025-02-25 NOTE — PROGRESS NOTES
FRW UPDATE  2/25/25:Applied for county benefits and energy assistance on 2/25. Sending energy assistance and addressed envelope for pt on 2/26.

## 2025-03-13 ENCOUNTER — PATIENT OUTREACH (OUTPATIENT)
Dept: CARE COORDINATION | Facility: CLINIC | Age: 50
End: 2025-03-13
Payer: COMMERCIAL

## 2025-03-13 NOTE — PROGRESS NOTES
Clinic Care Coordination Contact  [facility]/Voicemail    Clinical Data: Care Coordinator Outreach        Left message on patient's voicemail with call back information and requested return call.      Plan: Care Coordinator will try to reach patient again in 10 business days.    Brock [facility]  Social Work Care [facility]   Phone: [phone]

## 2025-03-23 ENCOUNTER — HOSPITAL ENCOUNTER (INPATIENT)
Facility: HOSPITAL | Age: 50
LOS: 4 days | Discharge: NURSING, REHAB OR LONG-TERM CARE | End: 2025-03-27
Attending: STUDENT IN AN ORGANIZED HEALTH CARE EDUCATION/TRAINING PROGRAM
Payer: COMMERCIAL

## 2025-03-23 ENCOUNTER — APPOINTMENT (OUTPATIENT)
Dept: CT IMAGING | Facility: HOSPITAL | Age: 50
End: 2025-03-23
Attending: STUDENT IN AN ORGANIZED HEALTH CARE EDUCATION/TRAINING PROGRAM
Payer: COMMERCIAL

## 2025-03-23 ENCOUNTER — APPOINTMENT (OUTPATIENT)
Dept: MRI IMAGING | Facility: HOSPITAL | Age: 50
End: 2025-03-23
Attending: STUDENT IN AN ORGANIZED HEALTH CARE EDUCATION/TRAINING PROGRAM
Payer: COMMERCIAL

## 2025-03-23 ENCOUNTER — APPOINTMENT (OUTPATIENT)
Dept: ULTRASOUND IMAGING | Facility: HOSPITAL | Age: 50
End: 2025-03-23
Attending: INTERNAL MEDICINE
Payer: COMMERCIAL

## 2025-03-23 ENCOUNTER — APPOINTMENT (OUTPATIENT)
Dept: ULTRASOUND IMAGING | Facility: HOSPITAL | Age: 50
End: 2025-03-23
Attending: STUDENT IN AN ORGANIZED HEALTH CARE EDUCATION/TRAINING PROGRAM
Payer: COMMERCIAL

## 2025-03-23 ENCOUNTER — APPOINTMENT (OUTPATIENT)
Dept: RADIOLOGY | Facility: HOSPITAL | Age: 50
End: 2025-03-23
Attending: STUDENT IN AN ORGANIZED HEALTH CARE EDUCATION/TRAINING PROGRAM
Payer: COMMERCIAL

## 2025-03-23 DIAGNOSIS — Z79.4 UNCONTROLLED TYPE 2 DIABETES MELLITUS WITH HYPERGLYCEMIA, WITH LONG-TERM CURRENT USE OF INSULIN (H): ICD-10-CM

## 2025-03-23 DIAGNOSIS — E11.65 POORLY CONTROLLED DIABETES MELLITUS (H): ICD-10-CM

## 2025-03-23 DIAGNOSIS — I63.9 ACUTE ISCHEMIC STROKE (H): ICD-10-CM

## 2025-03-23 DIAGNOSIS — E11.65 UNCONTROLLED TYPE 2 DIABETES MELLITUS WITH HYPERGLYCEMIA, WITH LONG-TERM CURRENT USE OF INSULIN (H): ICD-10-CM

## 2025-03-23 DIAGNOSIS — G47.00 INSOMNIA, UNSPECIFIED TYPE: ICD-10-CM

## 2025-03-23 DIAGNOSIS — F32.9 REACTIVE DEPRESSION: Primary | ICD-10-CM

## 2025-03-23 PROBLEM — I50.22 CHRONIC SYSTOLIC HEART FAILURE (H): Status: ACTIVE | Noted: 2025-03-23

## 2025-03-23 PROBLEM — N18.30 CKD (CHRONIC KIDNEY DISEASE) STAGE 3, GFR 30-59 ML/MIN (H): Status: ACTIVE | Noted: 2025-03-23

## 2025-03-23 LAB
ALBUMIN SERPL BCG-MCNC: 3.4 G/DL (ref 3.5–5.2)
ALP SERPL-CCNC: 113 U/L (ref 40–150)
ALT SERPL W P-5'-P-CCNC: 26 U/L (ref 0–70)
ANION GAP SERPL CALCULATED.3IONS-SCNC: 8 MMOL/L (ref 7–15)
AST SERPL W P-5'-P-CCNC: 19 U/L (ref 0–45)
BASOPHILS # BLD AUTO: 0.1 10E3/UL (ref 0–0.2)
BASOPHILS NFR BLD AUTO: 1 %
BILIRUB SERPL-MCNC: 0.5 MG/DL
BUN SERPL-MCNC: 28.9 MG/DL (ref 6–20)
CALCIUM SERPL-MCNC: 9.6 MG/DL (ref 8.8–10.4)
CHLORIDE SERPL-SCNC: 98 MMOL/L (ref 98–107)
CHOLEST SERPL-MCNC: 224 MG/DL
CREAT SERPL-MCNC: 1.29 MG/DL (ref 0.67–1.17)
EGFRCR SERPLBLD CKD-EPI 2021: 67 ML/MIN/1.73M2
EOSINOPHIL # BLD AUTO: 0.4 10E3/UL (ref 0–0.7)
EOSINOPHIL NFR BLD AUTO: 3 %
ERYTHROCYTE [DISTWIDTH] IN BLOOD BY AUTOMATED COUNT: 12.3 % (ref 10–15)
EST. AVERAGE GLUCOSE BLD GHB EST-MCNC: 301 MG/DL
GLUCOSE BLDC GLUCOMTR-MCNC: 235 MG/DL (ref 70–99)
GLUCOSE BLDC GLUCOMTR-MCNC: 301 MG/DL (ref 70–99)
GLUCOSE SERPL-MCNC: 336 MG/DL (ref 70–99)
HBA1C MFR BLD: 12.1 %
HCO3 SERPL-SCNC: 30 MMOL/L (ref 22–29)
HCT VFR BLD AUTO: 42.6 % (ref 40–53)
HDLC SERPL-MCNC: 53 MG/DL
HGB BLD-MCNC: 14.4 G/DL (ref 13.3–17.7)
HOLD SPECIMEN: NORMAL
IMM GRANULOCYTES # BLD: 0.1 10E3/UL
IMM GRANULOCYTES NFR BLD: 1 %
LDLC SERPL CALC-MCNC: 128 MG/DL
LYMPHOCYTES # BLD AUTO: 2.1 10E3/UL (ref 0.8–5.3)
LYMPHOCYTES NFR BLD AUTO: 16 %
MAGNESIUM SERPL-MCNC: 1.8 MG/DL (ref 1.7–2.3)
MCH RBC QN AUTO: 30.4 PG (ref 26.5–33)
MCHC RBC AUTO-ENTMCNC: 33.8 G/DL (ref 31.5–36.5)
MCV RBC AUTO: 90 FL (ref 78–100)
MONOCYTES # BLD AUTO: 1 10E3/UL (ref 0–1.3)
MONOCYTES NFR BLD AUTO: 7 %
NEUTROPHILS # BLD AUTO: 9.9 10E3/UL (ref 1.6–8.3)
NEUTROPHILS NFR BLD AUTO: 73 %
NONHDLC SERPL-MCNC: 171 MG/DL
NRBC # BLD AUTO: 0 10E3/UL
NRBC BLD AUTO-RTO: 0 /100
NT-PROBNP SERPL-MCNC: 1034 PG/ML (ref 0–900)
PLATELET # BLD AUTO: 255 10E3/UL (ref 150–450)
POTASSIUM SERPL-SCNC: 3.9 MMOL/L (ref 3.4–5.3)
PROT SERPL-MCNC: 6.8 G/DL (ref 6.4–8.3)
RBC # BLD AUTO: 4.74 10E6/UL (ref 4.4–5.9)
SODIUM SERPL-SCNC: 136 MMOL/L (ref 135–145)
TRIGL SERPL-MCNC: 213 MG/DL
TROPONIN T SERPL HS-MCNC: 30 NG/L
TROPONIN T SERPL HS-MCNC: 31 NG/L
WBC # BLD AUTO: 13.5 10E3/UL (ref 4–11)

## 2025-03-23 PROCEDURE — 84484 ASSAY OF TROPONIN QUANT: CPT | Performed by: STUDENT IN AN ORGANIZED HEALTH CARE EDUCATION/TRAINING PROGRAM

## 2025-03-23 PROCEDURE — 83880 ASSAY OF NATRIURETIC PEPTIDE: CPT | Performed by: STUDENT IN AN ORGANIZED HEALTH CARE EDUCATION/TRAINING PROGRAM

## 2025-03-23 PROCEDURE — 99254 IP/OBS CNSLTJ NEW/EST MOD 60: CPT | Mod: GC | Performed by: SURGERY

## 2025-03-23 PROCEDURE — 70496 CT ANGIOGRAPHY HEAD: CPT

## 2025-03-23 PROCEDURE — 85025 COMPLETE CBC W/AUTO DIFF WBC: CPT | Performed by: STUDENT IN AN ORGANIZED HEALTH CARE EDUCATION/TRAINING PROGRAM

## 2025-03-23 PROCEDURE — 93005 ELECTROCARDIOGRAM TRACING: CPT | Performed by: STUDENT IN AN ORGANIZED HEALTH CARE EDUCATION/TRAINING PROGRAM

## 2025-03-23 PROCEDURE — 70553 MRI BRAIN STEM W/O & W/DYE: CPT

## 2025-03-23 PROCEDURE — 83735 ASSAY OF MAGNESIUM: CPT | Performed by: INTERNAL MEDICINE

## 2025-03-23 PROCEDURE — 71046 X-RAY EXAM CHEST 2 VIEWS: CPT

## 2025-03-23 PROCEDURE — 93880 EXTRACRANIAL BILAT STUDY: CPT

## 2025-03-23 PROCEDURE — 80053 COMPREHEN METABOLIC PANEL: CPT | Performed by: STUDENT IN AN ORGANIZED HEALTH CARE EDUCATION/TRAINING PROGRAM

## 2025-03-23 PROCEDURE — 255N000002 HC RX 255 OP 636: Performed by: STUDENT IN AN ORGANIZED HEALTH CARE EDUCATION/TRAINING PROGRAM

## 2025-03-23 PROCEDURE — 0042T CT HEAD PERFUSION W CONTRAST: CPT

## 2025-03-23 PROCEDURE — 93971 EXTREMITY STUDY: CPT | Mod: LT

## 2025-03-23 PROCEDURE — 250N000011 HC RX IP 250 OP 636: Performed by: INTERNAL MEDICINE

## 2025-03-23 PROCEDURE — 99207 PR NO BILLABLE SERVICE THIS VISIT: CPT | Performed by: PHYSICIAN ASSISTANT

## 2025-03-23 PROCEDURE — 120N000001 HC R&B MED SURG/OB

## 2025-03-23 PROCEDURE — 36415 COLL VENOUS BLD VENIPUNCTURE: CPT | Performed by: STUDENT IN AN ORGANIZED HEALTH CARE EDUCATION/TRAINING PROGRAM

## 2025-03-23 PROCEDURE — 250N000013 HC RX MED GY IP 250 OP 250 PS 637: Performed by: INTERNAL MEDICINE

## 2025-03-23 PROCEDURE — 250N000011 HC RX IP 250 OP 636: Performed by: STUDENT IN AN ORGANIZED HEALTH CARE EDUCATION/TRAINING PROGRAM

## 2025-03-23 PROCEDURE — A9585 GADOBUTROL INJECTION: HCPCS | Performed by: STUDENT IN AN ORGANIZED HEALTH CARE EDUCATION/TRAINING PROGRAM

## 2025-03-23 PROCEDURE — 80061 LIPID PANEL: CPT | Performed by: INTERNAL MEDICINE

## 2025-03-23 PROCEDURE — 250N000013 HC RX MED GY IP 250 OP 250 PS 637: Performed by: STUDENT IN AN ORGANIZED HEALTH CARE EDUCATION/TRAINING PROGRAM

## 2025-03-23 PROCEDURE — 99223 1ST HOSP IP/OBS HIGH 75: CPT | Mod: AI | Performed by: INTERNAL MEDICINE

## 2025-03-23 PROCEDURE — 83036 HEMOGLOBIN GLYCOSYLATED A1C: CPT | Performed by: INTERNAL MEDICINE

## 2025-03-23 PROCEDURE — 99285 EMERGENCY DEPT VISIT HI MDM: CPT | Mod: 25

## 2025-03-23 PROCEDURE — 250N000012 HC RX MED GY IP 250 OP 636 PS 637: Performed by: INTERNAL MEDICINE

## 2025-03-23 RX ORDER — CLOPIDOGREL BISULFATE 75 MG/1
75 TABLET ORAL DAILY
Status: DISCONTINUED | OUTPATIENT
Start: 2025-03-24 | End: 2025-03-27 | Stop reason: HOSPADM

## 2025-03-23 RX ORDER — FAMOTIDINE 20 MG/1
20 TABLET, FILM COATED ORAL 2 TIMES DAILY
Status: DISCONTINUED | OUTPATIENT
Start: 2025-03-23 | End: 2025-03-27 | Stop reason: HOSPADM

## 2025-03-23 RX ORDER — NICOTINE POLACRILEX 4 MG
15-30 LOZENGE BUCCAL
Status: DISCONTINUED | OUTPATIENT
Start: 2025-03-23 | End: 2025-03-27 | Stop reason: HOSPADM

## 2025-03-23 RX ORDER — ACETAMINOPHEN 650 MG/1
650 SUPPOSITORY RECTAL EVERY 4 HOURS PRN
Status: DISCONTINUED | OUTPATIENT
Start: 2025-03-23 | End: 2025-03-27 | Stop reason: HOSPADM

## 2025-03-23 RX ORDER — LIDOCAINE 40 MG/G
CREAM TOPICAL
Status: DISCONTINUED | OUTPATIENT
Start: 2025-03-23 | End: 2025-03-27 | Stop reason: HOSPADM

## 2025-03-23 RX ORDER — ONDANSETRON 4 MG/1
4 TABLET, ORALLY DISINTEGRATING ORAL EVERY 6 HOURS PRN
Status: DISCONTINUED | OUTPATIENT
Start: 2025-03-23 | End: 2025-03-27 | Stop reason: HOSPADM

## 2025-03-23 RX ORDER — ENOXAPARIN SODIUM 100 MG/ML
40 INJECTION SUBCUTANEOUS EVERY 12 HOURS
Status: DISCONTINUED | OUTPATIENT
Start: 2025-03-23 | End: 2025-03-27

## 2025-03-23 RX ORDER — ISOSORBIDE MONONITRATE 30 MG/1
30 TABLET, EXTENDED RELEASE ORAL DAILY
Status: DISCONTINUED | OUTPATIENT
Start: 2025-03-23 | End: 2025-03-27 | Stop reason: HOSPADM

## 2025-03-23 RX ORDER — HYDRALAZINE HYDROCHLORIDE 20 MG/ML
10 INJECTION INTRAMUSCULAR; INTRAVENOUS EVERY 30 MIN PRN
Status: DISCONTINUED | OUTPATIENT
Start: 2025-03-23 | End: 2025-03-27 | Stop reason: HOSPADM

## 2025-03-23 RX ORDER — BUMETANIDE 1 MG/1
1 TABLET ORAL
Status: DISCONTINUED | OUTPATIENT
Start: 2025-03-23 | End: 2025-03-23

## 2025-03-23 RX ORDER — DEXTROSE MONOHYDRATE 25 G/50ML
25-50 INJECTION, SOLUTION INTRAVENOUS
Status: DISCONTINUED | OUTPATIENT
Start: 2025-03-23 | End: 2025-03-27 | Stop reason: HOSPADM

## 2025-03-23 RX ORDER — LABETALOL HYDROCHLORIDE 5 MG/ML
10 INJECTION, SOLUTION INTRAVENOUS EVERY 10 MIN PRN
Status: DISCONTINUED | OUTPATIENT
Start: 2025-03-23 | End: 2025-03-27 | Stop reason: HOSPADM

## 2025-03-23 RX ORDER — MAGNESIUM OXIDE 400 MG/1
400 TABLET ORAL DAILY
Status: COMPLETED | OUTPATIENT
Start: 2025-03-23 | End: 2025-03-25

## 2025-03-23 RX ORDER — GADOBUTROL 604.72 MG/ML
13 INJECTION INTRAVENOUS ONCE
Status: COMPLETED | OUTPATIENT
Start: 2025-03-23 | End: 2025-03-23

## 2025-03-23 RX ORDER — IOPAMIDOL 755 MG/ML
117 INJECTION, SOLUTION INTRAVASCULAR ONCE
Status: COMPLETED | OUTPATIENT
Start: 2025-03-23 | End: 2025-03-23

## 2025-03-23 RX ORDER — CARVEDILOL 12.5 MG/1
25 TABLET ORAL 2 TIMES DAILY WITH MEALS
Status: DISCONTINUED | OUTPATIENT
Start: 2025-03-23 | End: 2025-03-27 | Stop reason: HOSPADM

## 2025-03-23 RX ORDER — BUMETANIDE 0.25 MG/ML
0.5 INJECTION, SOLUTION INTRAMUSCULAR; INTRAVENOUS EVERY 12 HOURS
Status: DISCONTINUED | OUTPATIENT
Start: 2025-03-24 | End: 2025-03-24

## 2025-03-23 RX ORDER — ASPIRIN 81 MG/1
81 TABLET ORAL DAILY
Status: DISCONTINUED | OUTPATIENT
Start: 2025-03-24 | End: 2025-03-27 | Stop reason: HOSPADM

## 2025-03-23 RX ORDER — ATORVASTATIN CALCIUM 40 MG/1
80 TABLET, FILM COATED ORAL DAILY
Status: DISCONTINUED | OUTPATIENT
Start: 2025-03-23 | End: 2025-03-27 | Stop reason: HOSPADM

## 2025-03-23 RX ORDER — CLOPIDOGREL BISULFATE 75 MG/1
300 TABLET ORAL ONCE
Status: COMPLETED | OUTPATIENT
Start: 2025-03-23 | End: 2025-03-23

## 2025-03-23 RX ORDER — BUMETANIDE 0.25 MG/ML
1 INJECTION, SOLUTION INTRAMUSCULAR; INTRAVENOUS EVERY 12 HOURS
Status: DISCONTINUED | OUTPATIENT
Start: 2025-03-23 | End: 2025-03-23

## 2025-03-23 RX ORDER — ASPIRIN 81 MG/1
81 TABLET, CHEWABLE ORAL DAILY
Status: DISCONTINUED | OUTPATIENT
Start: 2025-03-24 | End: 2025-03-27 | Stop reason: HOSPADM

## 2025-03-23 RX ORDER — ONDANSETRON 2 MG/ML
4 INJECTION INTRAMUSCULAR; INTRAVENOUS EVERY 6 HOURS PRN
Status: DISCONTINUED | OUTPATIENT
Start: 2025-03-23 | End: 2025-03-27 | Stop reason: HOSPADM

## 2025-03-23 RX ORDER — METFORMIN HYDROCHLORIDE 500 MG/1
500 TABLET, EXTENDED RELEASE ORAL
Status: DISCONTINUED | OUTPATIENT
Start: 2025-03-23 | End: 2025-03-27 | Stop reason: HOSPADM

## 2025-03-23 RX ORDER — ACETAMINOPHEN 325 MG/1
650 TABLET ORAL EVERY 4 HOURS PRN
Status: DISCONTINUED | OUTPATIENT
Start: 2025-03-23 | End: 2025-03-27 | Stop reason: HOSPADM

## 2025-03-23 RX ORDER — ASPIRIN 81 MG/1
324 TABLET, CHEWABLE ORAL ONCE
Status: COMPLETED | OUTPATIENT
Start: 2025-03-23 | End: 2025-03-23

## 2025-03-23 RX ORDER — LOSARTAN POTASSIUM 25 MG/1
25 TABLET ORAL DAILY
Status: DISCONTINUED | OUTPATIENT
Start: 2025-03-23 | End: 2025-03-27 | Stop reason: HOSPADM

## 2025-03-23 RX ORDER — ACETAMINOPHEN 325 MG/10.15ML
650 LIQUID ORAL EVERY 4 HOURS PRN
Status: DISCONTINUED | OUTPATIENT
Start: 2025-03-23 | End: 2025-03-27 | Stop reason: HOSPADM

## 2025-03-23 RX ADMIN — GADOBUTROL 13 ML: 604.72 INJECTION INTRAVENOUS at 11:27

## 2025-03-23 RX ADMIN — ASPIRIN 81 MG CHEWABLE TABLET 324 MG: 81 TABLET CHEWABLE at 12:14

## 2025-03-23 RX ADMIN — ATORVASTATIN CALCIUM 80 MG: 40 TABLET, FILM COATED ORAL at 16:32

## 2025-03-23 RX ADMIN — IOPAMIDOL 117 ML: 755 INJECTION, SOLUTION INTRAVENOUS at 09:12

## 2025-03-23 RX ADMIN — INSULIN ASPART 7 UNITS: 100 INJECTION, SOLUTION INTRAVENOUS; SUBCUTANEOUS at 18:36

## 2025-03-23 RX ADMIN — FAMOTIDINE 20 MG: 20 TABLET, FILM COATED ORAL at 20:20

## 2025-03-23 RX ADMIN — INSULIN GLARGINE 32 UNITS: 100 INJECTION, SOLUTION SUBCUTANEOUS at 20:20

## 2025-03-23 RX ADMIN — MAGNESIUM OXIDE TAB 400 MG (241.3 MG ELEMENTAL MG) 400 MG: 400 (241.3 MG) TAB at 16:31

## 2025-03-23 RX ADMIN — ENOXAPARIN SODIUM 40 MG: 40 INJECTION SUBCUTANEOUS at 18:36

## 2025-03-23 RX ADMIN — CLOPIDOGREL 300 MG: 75 TABLET ORAL at 12:12

## 2025-03-23 RX ADMIN — ISOSORBIDE MONONITRATE 30 MG: 30 TABLET, EXTENDED RELEASE ORAL at 16:31

## 2025-03-23 ASSESSMENT — ACTIVITIES OF DAILY LIVING (ADL)
ADLS_ACUITY_SCORE: 45
ADLS_ACUITY_SCORE: 45
FALL_HISTORY_WITHIN_LAST_SIX_MONTHS: YES
ADLS_ACUITY_SCORE: 45
DIFFICULTY_EATING/SWALLOWING: NO
ADLS_ACUITY_SCORE: 59
DRESSING/BATHING_DIFFICULTY: YES
CONCENTRATING,_REMEMBERING_OR_MAKING_DECISIONS_DIFFICULTY: NO
ADLS_ACUITY_SCORE: 44
ADLS_ACUITY_SCORE: 58
ADLS_ACUITY_SCORE: 45
WEAR_GLASSES_OR_BLIND: NO
TOILETING_ISSUES: NO
ADLS_ACUITY_SCORE: 58
ADLS_ACUITY_SCORE: 58
NUMBER_OF_TIMES_PATIENT_HAS_FALLEN_WITHIN_LAST_SIX_MONTHS: 2
ADLS_ACUITY_SCORE: 44
DOING_ERRANDS_INDEPENDENTLY_DIFFICULTY: NO
HEARING_DIFFICULTY_OR_DEAF: NO
ADLS_ACUITY_SCORE: 44
DRESSING/BATHING: BATHING DIFFICULTY, ASSISTANCE 1 PERSON
ADLS_ACUITY_SCORE: 58
CHANGE_IN_FUNCTIONAL_STATUS_SINCE_ONSET_OF_CURRENT_ILLNESS/INJURY: YES
ADLS_ACUITY_SCORE: 45
WALKING_OR_CLIMBING_STAIRS_DIFFICULTY: YES
ADLS_ACUITY_SCORE: 58
DIFFICULTY_COMMUNICATING: NO
ADLS_ACUITY_SCORE: 58

## 2025-03-23 ASSESSMENT — COLUMBIA-SUICIDE SEVERITY RATING SCALE - C-SSRS
2. HAVE YOU ACTUALLY HAD ANY THOUGHTS OF KILLING YOURSELF IN THE PAST MONTH?: NO
1. IN THE PAST MONTH, HAVE YOU WISHED YOU WERE DEAD OR WISHED YOU COULD GO TO SLEEP AND NOT WAKE UP?: NO
6. HAVE YOU EVER DONE ANYTHING, STARTED TO DO ANYTHING, OR PREPARED TO DO ANYTHING TO END YOUR LIFE?: NO

## 2025-03-23 NOTE — PROGRESS NOTES
"      [facility]    Vascular Neurology Progress Note    Interval History     MRI complete and confirms acute R subcortical infarct.       Assessment and Plan     1. Acute ischemic stroke of R thalamus due to large-artery atherosclerosis vs small vessel occlusion  2. Moderate to severe R ICA stenosis (read as 40-50% but looks higher than that and some component of soft plaque)  3. ICAD      Recommendations  - Patient needs Vascular Surgery consult - prior to admitting to [facility] have asked the ED provider to consult with vascular surgery to see if he can be kept there and seen by there team or if he needs to transfer to [facility] vs [facility]  - If patient can be admitted to [facility] then admit using orderset: \"Ischemic Stroke Routine Admission\" or \"Ischemic Stroke No Thrombolytics/No Thrombectomy ICU Admission\"  - Place Neurology IP Stroke Consult order for in house team at [facility] to see and further guide management ([employer])  - Vascular surgery consult  - Carotid ultrasound  - Neurochecks and Vital Signs every 4 hours  - Permissive HTN; goal SBP < 220 mmHg  -Load DAPT now with aspirin 325 mg + Plavix 300 mg  - Statin: Continue atorvastatin 80 mg, LDL pending  - TTE (with Bubble Study if age 60 yrs or less)  - Telemetry, EKG  - Bedside Glucose Monitoring  - Nutrition: Nursing to document bedside swallow prior to oral intake  - A1c, Lipid Panel, Troponin x 3  - PT/OT/SLP  - Stroke Education  - Smoking Cessation  - Depression Screen  - Apnea screening questions  - Euthermia, Euglycemia    Case discussed with vascular neurology attending Dr. San.    My recommendations are based on the information provided over the phone by Floyd Capps's in-person providers. They are not intended to replace the clinical judgment of his in-person providers. I was not requested to personally see or examine the patient at this time.     Laura Gutierrez PA-C  Vascular Neurology    To page me or covering stroke " "neurology team member, click here: [facility]  Choose \"On Call\" tab at top, then select \"NEUROLOGY/ALL SITES\" from middle drop-down box, press Enter, then look for \"stroke\" or \"telestroke\" for your site.  "

## 2025-03-23 NOTE — H&P
[facility]    History and Physical - Hospitalist Service       Date of Admission:  3/23/2025    Assessment & Plan      Floyd Capps is a 49 year old male with past medical history of IDDM, left lacunar infarct in 2023, CAD, HTN, chronic systolic heart failure, CKD stage III, obesity who presented to ED for evaluation of left-sided weakness, slurred speech since last Thursday.    In ED, MRI brain showed 2 small foci of diffusion restriction within right thalamus most suggestive of evolving early acute infarction.  Patient admitted for further management.    #Left-sided weakness with dysmetria;  #Early acute right thalamus ischemic stroke;  #History of left lacunar stroke in 8/2023;  --Onset of symptoms since last Thursday and more constant since 8 PM of 3/22/2025.    --Patient reports left arm and leg weakness with trouble with coordination and slurred speech  --Patient reported not taking Plavix since diabetic foot surgery on 3/2024.  -- Appreciated stroke neurology input, received loading dose of aspirin and Plavix.  Start baby aspirin and Plavix from tomorrow.  Neurology consulted  -- PTA Lipitor 80 mg daily  -- Ischemic stroke order set placed.  --GAIL echocardiogram ordered    Moderate to severe right ICA stenosis per stroke neurology team;  -- Recommended vascular surgery consult.  ED provider discussed with vascular surgery on-call on-call vascular surgery team and reported okay to keep here at [facility] ordered carotid ultrasound.  --Formal vascular surgery consult placed    Uncontrolled hypertension; permissive hypertension due to acute stroke  --PTA medication includes Coreg 25 mg twice daily, losartan 25 mg daily, Imdur 30 mg daily, Bumex 2 mg twice a day.  --Patient complaining of some chest discomfort, concern for heart failure also so resume PTA Imdur and Bume on IV.  -- Gradually resume other PTA antihypertensive medications.    CAD with multivessel disease;  -- Complains of  some chest discomfort and shortness of breath, likely due to CHF exacerbation  --Coronary angiogram on 3/2024 reported multivessel CAD  -- In ED, EKG seems sinus rhythm with no significant ST-T wave changes.    --Minimally elevated troponin in the setting of CKD, delta troponin trending down  --Patient stopped taking Plavix since 3/2024 after foot surgery.  --Due to stroke now on aspirin and Plavix.  Continue statin.  --Continue Imdur.    Acute on chronic systolic heart failure;  -- Patient reported some worsening lower extremity swelling, some shortness of breath.  Elevated BNP.  CXR with mild central pulmonary vascular congestion  -- Echocardiogram on 2/2024 reported EF 30 to 35%  -- PTA Bumex changed to IV.  --Monitor intake/output, weight, labs close    Type II DM with hyperglycemia; concern for compliance  On admission A1c 12.1.  Previous A1c 8.9 on 1/30/2025  -- PTA Lantus 30 unit at bedtime.  --PTA Humalog 10 units 3 times daily with meals.  --Insulin sliding scale and hypoglycemia protocol    CKD stage III;  --Creatinine fairly stable.  Monitor labs closely    Concern for ANDIE;  --Advised outpatient sleep study          Diet: NPO for Medical/Clinical Reasons Except for: No Exceptions  NPO for Medical/Clinical Reasons Except for: No Exceptions  Combination Diet Moderate Consistent Carb (60 g CHO per Meal) Diet; Low Saturated Fat Na <2400mg Diet    DVT Prophylaxis: Enoxaparin (Lovenox) SQ  Cazares Catheter: Not present  Lines: None     Cardiac Monitoring: ACTIVE order. Indication: Stroke, acute (48 hours)  Code Status: Full Code    Clinically Significant Risk Factors Present on Admission               # Hypoalbuminemia: Lowest albumin = 3.4 g/dL at 3/23/2025  8:59 AM, will monitor as appropriate     # Hypertension: Noted on problem list    # Chronic heart failure with reduced ejection fraction: last echo with EF <40%         # DMII: A1C = 12.1 % (Ref range: <5.7 %) within past 6 months    # Severe Obesity:  "Estimated body mass index is 43.05 kg/m  as calculated from the following:    Height as of this encounter: 1.778 m (5' 10\").    Weight as of this encounter: 136.1 kg (300 lb).         # Financial/Environmental Concerns:           Disposition Plan     Medically Ready for Discharge: Anticipated in 2-4 Days           Madan SLADE MD  Hospitalist Service  [facility]  Securely message with [employer] (more info)  Text page via [email or web address] ([employer]) Paging/Directory     ______________________________________________________________________    Chief Complaint   Left-sided weakness, difficulty coordinating left side, slurred speech    History is obtained from the patient, from ED provider.  Reviewed previous medical records.    History of Present Illness   Floyd Capps is a 49 year old male with past medical history of IDDM, left lacunar infarct in 2023, CAD, HTN, chronic systolic heart failure, CKD stage III, obesity who presented to ED for evaluation of left-sided weakness, slurred speech since last Thursday.    In ED, MRI brain showed 2 small foci of diffusion restriction within right thalamus most suggestive of evolving early acute infarction.  Patient admitted for further management.    Patient reported having strokelike symptoms since last Thursday afternoon, reported has been stuttering throughout the week and more constant since last evening around 8 PM.  Patient reported trouble coordinating to his left arm and legs with possible weakness.  Patient also reports slurred speech.  Patient reported mild frontal headache with no other associated symptoms.  Patient also complaining of some chest tightness, lower extremity swelling worsening.    Patient denied drinking alcohol.  Patient reported using marijuana every night for sleep.  Denied smoking cigarettes.  Patient reports compliance with medications.  However, A1c 12.1 which is significantly up from last few months.  Patient however reported stopping taking " Plavix since foot surgery last year.        Past Medical History    Past Medical History:   Diagnosis Date    Acute hypoxemic respiratory failure (H)     Acute renal failure, unspecified acute renal failure type     CAD (coronary artery disease)     Callus of foot     Diabetes (H)     Diarrhea     Essential hypertension     Fracture of left distal radius     History of stroke     Insomnia     Nausea     Non-pressure chronic ulcer of right heel and midfoot with unspecified severity (H)     Normocytic anemia     Type 2 diabetes mellitus with foot ulcer (H)        Past Surgical History   Past Surgical History:   Procedure Laterality Date    APPENDECTOMY      CV CORONARY ANGIOGRAM N/A 3/1/2024    Procedure: CV CORONARY ANGIOGRAM;  Surgeon: Stephen Berger MD;  Location: [facility] CATH LAB CV    CV LEFT HEART CATH N/A 3/1/2024    Procedure: Left Heart Catheterization;  Surgeon: Stephen Berger MD;  Location: [facility] CATH LAB CV    EXCISE EXOSTOSIS FOOT Right 5/2/2024    Procedure: excision of bone from calcaneus with application of theraskin;  Surgeon: Dong Sanders DPM;  Location: [facility] OR    INCISION AND DRAINAGE FOOT, COMBINED Right 5/2/2024    Procedure: INCISION AND DRAINAGE, right heel with;  Surgeon: Dong Sanders DPM;  Location: [facility] OR    INCISION AND DRAINAGE OF WOUND      groin abscess    IRRIGATION AND DEBRIDEMENT FOOT, COMBINED Right 2/16/2024    Procedure: IRRIGATION AND DEBRIDEMENT RIGHT FOOT;  Surgeon: Cristofer Sims DPM;  Location: [facility] OR    PICC DOUBLE LUMEN PLACEMENT  2/29/2024       Prior to Admission Medications   Prior to Admission Medications   Prescriptions Last Dose Informant Patient Reported? Taking?   Continuous Glucose Sensor (FREESTYLE LAMINE 2 SENSOR) [facility] [place] [facility]   No No   Sig: Inject 1 each subcutaneously every 14 days Use 1 sensor every 14 days. Use to read blood sugars per 's instructions.   Patient not taking: Reported on 10/9/2024    Urea-Lactic Acid 10-4 % CREA   Yes Yes   Sig: Externally apply topically 2 times daily Apply to left foot/ heel topically two times a day for moisturizing 10-4%   acetaminophen (TYLENOL) 500 MG tablet   No Yes   Sig: Take 2 tablets (1,000 mg) by mouth every 8 hours as needed for mild pain   atorvastatin (LIPITOR) 80 MG tablet 3/22/2025 Morning  No Yes   Sig: Take 1 tablet (80 mg) by mouth daily   bumetanide (BUMEX) 2 MG tablet 3/22/2025 Noon  No Yes   Sig: Take 1 tablet (2 mg) by mouth 2 times daily.   carvedilol (COREG) 25 MG tablet 3/22/2025 Evening  No Yes   Sig: Take 1 tablet (25 mg) by mouth 2 times daily (with meals).   insulin glargine (LANTUS PEN) 100 UNIT/ML pen 3/22/2025 Bedtime  No Yes   Sig: Inject 32 Units subcutaneously at bedtime.   insulin lispro (HUMALOG KWIKPEN) 100 UNIT/ML (1 unit dial) KWIKPEN 3/22/2025 Evening  No Yes   Sig: Inject 10 Units subcutaneously 3 times daily (with meals). Plus sliding scale: 2 units every 50 over 150. If humalog is not covered by insurance, may substitute with novolog or other fast acting insulin   insulin pen needle (32G X 4 MM) 32G X 4 MM miscellaneous   No No   Sig: Use 4 pen needles daily or as directed.   isosorbide mononitrate (IMDUR) 30 MG 24 hr tablet 3/22/2025 Morning  No Yes   Sig: Take 1 tablet (30 mg) by mouth daily.   losartan (COZAAR) 25 MG tablet 3/22/2025 Morning  No Yes   Sig: Take 1 tablet (25 mg) by mouth daily.   metFORMIN (GLUCOPHAGE XR) 500 MG 24 hr tablet 3/22/2025 Evening  No Yes   Sig: Take 1 tablet (500 mg) by mouth daily (with dinner).   multivitamin w/minerals (THERA-VIT-M) tablet 3/22/2025 Morning  No Yes   Sig: Take 1 tablet by mouth daily      Facility-Administered Medications: None        Review of Systems    The 10 point Review of Systems is negative other than noted in the HPI or here.      Physical Exam   Vital Signs: Temp: 98  F (36.7  C)   BP: (!) 165/114 Pulse: 75   Resp: 21 SpO2: 97 % O2 Device: None (Room air)    Weight: 300  lbs 0 oz      General: Not in obvious distress.  HEENT: Normocephalic, supple neck  Chest: Clear to auscultation bilateral anteriorly, no wheezing  Heart: S1S2 normal, regular  Abdomen: Soft. NT, ND. Bowel sounds- active.  Extremities: ++ legs swelling, more on left than right  Neuro: alert and awake, speech seems clear but patient reported slightly different than his baseline, noticed some left arm drift, dysmetria with left upper and lower extremity, very minimal motor weakness on left side compared to right side.  Patient is right-handed.        Medical Decision Making             Data     I have personally reviewed the following data over the past 24 hrs:    13.5 (H)  \   14.4   / 255     136 98 28.9 (H) /  336 (H)   3.9 30 (H) 1.29 (H) \     ALT: 26 AST: 19 AP: 113 TBILI: 0.5   ALB: 3.4 (L) TOT PROTEIN: 6.8 LIPASE: N/A     Trop: 30 (H) BNP: 1,034 (H)     TSH: N/A T4: N/A A1C: 12.1 (H)       Imaging results reviewed over the past 24 hrs:   Recent Results (from the past 24 hours)   CTA Head Neck with Contrast    Narrative    EXAM: CTA HEAD NECK WITH CONTRAST  LOCATION: [facility]  DATE: 03/23/2025    INDICATION: Code Stroke, evaluate for LVO. PLEASE READ IMMEDIATELY.  COMPARISON: None.  CONTRAST: Isovue 370, 117 mL.  TECHNIQUE: Head and neck CT angiogram with IV contrast. Noncontrast head CT followed by axial helical CT images of the head and neck vessels obtained during the arterial phase of intravenous contrast administration. Axial 2D reconstructed images and   multiplanar 3D MIP reconstructed images of the head and neck vessels were performed by the technologist. Dose reduction techniques were used. All stenosis measurements made according to NASCET criteria unless otherwise specified.    FINDINGS:   NONCONTRAST HEAD CT:   INTRACRANIAL CONTENTS: No intracranial hemorrhage, extra-axial collection, or mass effect.  No CT evidence of acute infarct. Mild presumed chronic small vessel  ischemic changes. Chronic ischemic focus posterior limb right internal capsule. Corpus   callosum is normal. Cerebellar tonsillar position is normal. No sella or suprasellar mass/hemorrhage. Vascular calcification. Normal ventricles and sulcation for age.     VISUALIZED ORBITS/SINUSES/MASTOIDS: No acute intraorbital process. No paranasal sinus mucosal disease. No middle ear or mastoid effusion.    BONES/SOFT TISSUES: No scalp hematoma. No skull fracture.    HEAD CTA:  ANTERIOR CIRCULATION: Calcific plaque with mild stenoses cavernous ICA segments, left more than right, 25-40%. Satisfactory branch arborization bilateral HELLEN and MCA distribution. There are a few levels of mild stenoses 10-30% involving bilateral HELLEN   A2/A3 and MCA M3 segment vessels. No high-grade stenosis/occlusion, aneurysm, or high-flow vascular malformation. Standard [place] anatomy.    POSTERIOR CIRCULATION: Mild stenoses 10-30% bilateral PCA P2, P3 and P4 segments due to underlying atherosclerotic change. No high-grade stenosis/occlusion, aneurysm, or high-flow vascular malformation. Balanced vertebral arteries supply a normal basilar   artery. Satisfactory branch arborization pattern bilateral PCA distribution.    DURAL VENOUS SINUSES: Expected enhancement of the major dural venous sinuses.    NECK CTA:  RIGHT CAROTID: Calcific plaque and fibrofatty plaque at the bifurcation proximal right ICA with mild to moderate stenosis 40-50%. No tandem stenosis, high-grade stenosis or dissection of the right ICA in the neck.    LEFT CAROTID: Calcific plaque and fibrofatty plaque at the bifurcation proximal left ICA with mild stenosis 10-20%. No tandem stenosis, high-grade stenosis or dissection of the left ICA in the neck.    VERTEBRAL ARTERIES: Calcific plaque with mild stenosis 20-30% at the V1 segment level bilaterally. No high-grade stenosis or dissection. Balanced vertebral arteries.    AORTIC ARCH: Conventional three-vessel arch anatomy  with no evidence for great vessel origin stenosis or dissection.    NONVASCULAR STRUCTURES: No pathologic enhancement intracranially or within the orbits. No mass, adenopathy or fluid collections are evident within the neck soft tissues. Benign mineralization right thyroid lobe. Superior mediastinum is normal. Lung   apices are clear. Mild degenerative changes in the cervical spine.      Impression    IMPRESSION:   HEAD CT:  1.  No acute process.    2.  No mass, mass effect, hemorrhage or evidence for large/territorial infarction.    HEAD CTA:   1.  No large vessel occlusion. Scattered mild stenoses likely due to atherosclerotic changes bilateral HELLEN, MCA and PCA distribution and in the cavernous ICA segments bilaterally. No dissection. No aneurysm. No high-grade stenoses. Satisfactory branch   arborization pattern bilateral HELLEN, MCA and PCA distribution.    NECK CTA:  1.  Mild stenoses at the ICA level bilaterally and extraforaminal V1 segments with no high-grade stenosis or dissection of the major neck arteries or at the great vessel origin level.    Discussed with referring provider on 03/23/2025 at 9:19 AM CDT.       CT Head Perfusion w Contrast - For Tier 2 Stroke    Narrative    EXAM: CT HEAD PERFUSION W CONTRAST  LOCATION: [facility]  DATE: 3/23/2025    INDICATION: Code Stroke to evaluate for potential thrombolysis and thrombectomy. Evaluate mismatch between penumbra and core infarct. READ IMMEDIATELY.  COMPARISON: None.  TECHNIQUE: CT cerebral perfusion was performed utilizing a second contrast bolus. Perfusion data were post processed with generation of standard perfusion maps and estimation of ischemic/infarcted volumes utilizing standard threshold values. Dose   reduction techniques were used. All stenosis measurements made according to NASCET criteria unless otherwise specified.  CONTRAST: isovue 370 50ml    FINDINGS:     CT PERFUSION:  PERFUSION MAPS: Symmetrical cerebral  perfusion. No focal deficits in cerebral blood flow or volume to suggest ischemia/oligemia.    RAPID ANALYSIS:  CBF<30%: 0 mL  Tmax>6sec: 0 mL  Mismatch volume: 0 mL  Mismatch ratio: None      Impression    IMPRESSION:     CT PERFUSION:  1.  Normal cerebral perfusion.   Chest XR,  PA & LAT    Narrative    EXAM: XR CHEST 2 VIEWS  LOCATION: [facility]  DATE: 3/23/2025    INDICATION: Cough.  COMPARISON: None.      Impression    IMPRESSION:     Lungs are clear. No focal airspace opacities, pleural effusions, or pneumothorax. Mild central pulmonary vascular congestion, without overt pulmonary edema.    Borderline enlarged cardiac silhouette, which may be accentuated by technique.   US Lower Extremity Venous Duplex Left    Narrative    EXAM: US LOWER EXTREMITY VENOUS DUPLEX LEFT  LOCATION: [facility]  DATE: 3/23/2025    INDICATION: swelling  COMPARISON: None.  TECHNIQUE: Venous Duplex ultrasound of the left lower extremity with and without compression, augmentation and duplex. Color flow and spectral Doppler with waveform analysis performed.    FINDINGS: Exam includes the common femoral, femoral, popliteal, and contralateral common femoral veins as well as segmentally visualized deep calf veins and greater saphenous vein.     LEFT: No deep vein thrombosis. No superficial thrombophlebitis. No popliteal cyst.      Impression    IMPRESSION:  1.  No deep venous thrombosis in the left lower extremity.   MR Brain w/o & w Contrast    Narrative    EXAM: MR BRAIN W/O and W CONTRAST  LOCATION: [facility]  DATE: 3/23/2025    INDICATION: concern for stroke, left sided dysmetria  COMPARISON: Same day head CTA/CT/CT perfusion  CONTRAST: 13mL Gadavist  TECHNIQUE: Routine multiplanar multisequence head MRI without and with intravenous contrast.    FINDINGS:  INTRACRANIAL CONTENTS: Two small foci of diffusion restriction within the right thalamus that display both  ADC hypointense and T2/FLAIR hyperintense signal, most suggestive of an evolving early acute infarction. There is no mass effect or midline shift.   No evidence of hemorrhagic conversion. No abnormal intracranial enhancement. There is a mild degree of small vessel ischemic disease along with cerebral parenchymal volume loss. The cerebellar tonsils are in appropriate position. The sella is   unremarkable.    OSSEOUS STRUCTURES/SOFT TISSUES: Normal marrow signal. The major intracranial vascular flow voids are maintained.     ORBITS: No abnormality accounting for technique.     SINUSES/MASTOIDS: No paranasal sinus mucosal disease. No middle ear or mastoid effusion.       Impression    IMPRESSION:    Two small foci of diffusion restriction within the right thalamus are most suggestive of evolving early acute infarction. No evidence of hemorrhagic conversion.    These findings were communicated to Dr Humphrey at 1203PM over the phone on 3/23/2025 by Christoph Dickinson.

## 2025-03-23 NOTE — PLAN OF CARE
Problem: Adult Inpatient Plan of Care  Goal: Absence of Hospital-Acquired Illness or Injury  Intervention: Identify and Manage Fall Risk  Recent Flowsheet Documentation  Taken 3/23/2025 1400 by Cielo Ramey, RN  Safety Promotion/Fall Prevention:   mobility aid in reach   lighting adjusted   nonskid shoes/slippers when out of bed     Problem: Skin Injury Risk Increased  Goal: Skin Health and Integrity  Intervention: Optimize Skin Protection  Recent Flowsheet Documentation  Taken 3/23/2025 1400 by Cielo Ramey, RN  Activity Management: activity adjusted per tolerance   Goal Outcome Evaluation:    Admitted to room 123 alert cooperative with cares NIH 6 left arm and leg drift speech slurred, denies headache, upset crying said he has no one to help him. Social work notified. Passed swallow study at bedside.

## 2025-03-23 NOTE — CONSULTS
[facility]    Stroke Telephone Note    I was called by Darleen Humphrey on 03/23/25 regarding patient Floyd Capps. The patient is a 49 year old male with PMH HTN, DM2, CAD, ischemic cardiomyopathy, tobacco use, prior stroke. Thursday evening noted onset of LUE/LLE weakness/uncoordinated, decreased sensation to left lower lip. Symptoms fluctuating since then but more persistent since last evening. Presenting /84.    In reviewing his chart, in 2023 he presented with right-sided weakness, found to have left corona radiata/basal ganglia and punctate right frontal semiovale infarcts.  Vessel imaging notable for diffuse intracranial athero, right cervical ICA 55-60%.  He was treated with DAPT x 90 days followed by aspirin monotherapy.    Vitals  BP: (!) 188/84   Pulse: 84   Resp: 20   Temp: 98  F (36.7  C)   Weight: 136.1 kg (300 lb)    Stroke Code Data (for stroke code without tele)  Stroke code activated 03/23/25  0859   Stroke provider first response 03/23/25  0900   Last known normal 03/20/25   (unclear exact time)      Time of discovery (or onset of symptoms) 03/20/25   (unclear exact time)   Head CT read by Stroke Neuro Provider 03/23/25  0910   Was stroke code de-escalated? Yes  03/23/25  0982     Imaging Findings  HEAD CT:  1.  No acute process.     2.  No mass, mass effect, hemorrhage or evidence for large/territorial infarction.     HEAD CTA:   1.  No large vessel occlusion. Scattered mild stenoses likely due to atherosclerotic changes bilateral HELLEN, MCA and PCA distribution and in the cavernous ICA segments bilaterally. No dissection. No aneurysm. No high-grade stenoses. Satisfactory branch   arborization pattern bilateral HELLEN, MCA and PCA distribution.     NECK CTA:  1.  Mild stenoses at the ICA level bilaterally and extraforaminal V1 segments with no high-grade stenosis or dissection of the major neck arteries or at the great vessel origin level.    CTP: Normal cerebral  "perfusion.     Intravenous Thrombolysis  Not given due to:   - unclear or unfavorable risk-benefit profile for extended window thrombolysis beyond the conventional 4.5 hour time window    Endovascular Treatment  Not initiated due to absence of proximal vessel occlusion    Impression  Suspected acute ischemic stroke of R cerebral hemisphere due to undetermined etiology - in the setting of severe R ICA stenosis may be larger arthero  Severe R ICA stenosis - pending imaging may be symptomatic  3.   ICAD    Recommendations   -Load with DAPT aspirin 325 mg + Plavix 300 mg x1 (per ED patient had stated that he is on a blood thinner but I do not see any in his records and last cardiology visit it seems like he was not taking anything except aspirin so please confirm no blood thinner prior to plavix load)   -MRI brain w/wo contrast - please notify stroke neurology when MRI is complete for review and further recommendations  -Pending MRI may recommend transfer for carotid revascularization  -permissive HTN, Treat for SBP>220    Please do not admit the patient to [facility] without reviewing with our team first after MRI is complete as transfer may be recommended.      Case discussed with vascular neurology attending Dr. San.    My recommendations are based on the information provided over the phone by Floyd Capps's in-person providers. They are not intended to replace the clinical judgment of his in-person providers. I was not requested to personally see or examine the patient at this time.     Laura Gutierrez PA-C  Vascular Neurology    To page me or covering stroke neurology team member, click here: AMCOM  Choose \"On Call\" tab at top, then select \"NEUROLOGY/ALL SITES\" from middle drop-down box, press Enter, then look for \"stroke\" or \"telestroke\" for your site.    "

## 2025-03-23 NOTE — ED NOTES
Bed: JNED-05  Expected date:   Expected time:   Means of arrival:   Comments:  [facility]- 50yo M left arm weakness for 72 hours

## 2025-03-23 NOTE — PHARMACY-ADMISSION MEDICATION HISTORY
Pharmacy Intern Admission Medication History    Admission medication history is complete. The information provided in this note is only as accurate as the sources available at the time of the update.    Information Source(s): Patient and CareEverywhere/SureScripts via in-person    Pertinent Information: Patient did not have any medications prior to arrival.     Changes made to PTA medication list:  Added: None  Deleted: None  Changed: None    Allergies reviewed with patient and updates made in EHR: yes    Medication History Completed By: Shiva DIANA 3/23/2025 10:32 AM    PTA Med List   Medication Sig Last Dose/Taking    acetaminophen (TYLENOL) 500 MG tablet Take 2 tablets (1,000 mg) by mouth every 8 hours as needed for mild pain Taking As Needed    atorvastatin (LIPITOR) 80 MG tablet Take 1 tablet (80 mg) by mouth daily 3/22/2025 Morning    bumetanide (BUMEX) 2 MG tablet Take 1 tablet (2 mg) by mouth 2 times daily. 3/22/2025 Noon    carvedilol (COREG) 25 MG tablet Take 1 tablet (25 mg) by mouth 2 times daily (with meals). 3/22/2025 Evening    insulin glargine (LANTUS PEN) 100 UNIT/ML pen Inject 32 Units subcutaneously at bedtime. 3/22/2025 Bedtime    insulin lispro (HUMALOG KWIKPEN) 100 UNIT/ML (1 unit dial) KWIKPEN Inject 10 Units subcutaneously 3 times daily (with meals). Plus sliding scale: 2 units every 50 over 150. If humalog is not covered by insurance, may substitute with novolog or other fast acting insulin 3/22/2025 Evening    isosorbide mononitrate (IMDUR) 30 MG 24 hr tablet Take 1 tablet (30 mg) by mouth daily. 3/22/2025 Morning    losartan (COZAAR) 25 MG tablet Take 1 tablet (25 mg) by mouth daily. 3/22/2025 Morning    metFORMIN (GLUCOPHAGE XR) 500 MG 24 hr tablet Take 1 tablet (500 mg) by mouth daily (with dinner). 3/22/2025 Evening    multivitamin w/minerals (THERA-VIT-M) tablet Take 1 tablet by mouth daily 3/22/2025 Morning    Urea-Lactic Acid 10-4 % CREA Externally apply topically 2 times daily  Apply to left foot/ heel topically two times a day for moisturizing 10-4% Taking

## 2025-03-23 NOTE — CONSULTS
"Vascular Surgery Consult Note    Primary Team/Attending: Madan Yu  Reason for Consult: \"Patient admitted for acute stroke. Stroke neurology reported moderate to severe right ICA and recommended vascular surgery evaluation\"    Length of Stay: 0 days  Code Status: Full Code         Assessment/Plan: Patient is a 49 year old male presenting with TIA with acute right thalamic infarcts. He has a history significant for previous left hemispheric CVA in 2023, was treated with medical management and started on DAPT, although he reports his plavix was discontinued a year ago for foot surgery and was never restarted. His symptoms have mostly resolved at this time. CTA demonstrates mild bilateral carotid stenosis worse on the right, with closer to 32% right ICA stenosis based on NASCET calculations rather than 40-50% stenosis as suggested in the report. US also unimpressive, with ICA velocities <180 cm/s and ICA/CCA ratio 2.1 with only mild calcified plaque which correlates with CTA findings. His CTA does show diffuse intracranial vascular disease with multifocal mild to moderate stenoses. Additionally, he has multiple risk factors that make him high risk for any intervention. We had a long discussion about the natural history, risk factors, and etiology of carotid stenosis. At this time, he would be best served by being treated with best medical therapy and risk factor modification rather than surgery. He will require continued annual carotid surveillance, and should follow with vascular medicine upon discharge.     - No vascular surgical intervention indicated.   - Recommend best medical therapy for cardiovascular disease:   - Anti-platelet therapy with ASA   - Cholesterol control with high-intensity statin, goal LDL<100   - Glycemic control with goal A1c <7%   - Hypertensive control with goal BP <130/80   - Weight loss with goal BMI <25   - Continued avoidance of smoking/tobacco products  - Please make sure patient has " referral to vascular medicine upon discharge.   - Further care per Neurology and Medicine primary team. Vascular Surgery signing off at this time. Thank you for the opportunity to participate in this patient's care. Please call or page with any questions or concerns.     Staff: Dr. Jenifer Green MD  PGY-[id number]  Vascular Surgery  Pager: [phone]    Please page me directly with any questions/concerns during regular weekday hours before 5PM. If there is no response, if it is a weekend, or if it is during evening hours, then please use the [employer] system to page the first-call resident on call for vascular surgery.          HPI: Floyd Capps is a 49 year old male presenting with acute-onset left lower extremity weakness and heaviness. He states he experienced this briefly on Thursday and Friday nights with resolution within a few hours. When he experienced these symptoms again last night, he decided to come into the hospital. He also had associated numbness of the left fingertips and left side of the mouth as well as some dysarthria and dysmetria, but no facial droop. His symptoms have largely resolved now, although he perceives some subjective slurred speech still. He has a history of previous CVA in 2023 with right-sided arm and leg weakness without any residual effects; this was treated medically, and the patient was placed on plavix at the time. However, he underwent surgery last year with debridement of a diabetic foot wound for which plavix was held, and he states he was never told to restart it and so never did. Does not follow with a vascular medicine specialist.     PMH:   Past Medical History:   Diagnosis Date    Acute hypoxemic respiratory failure (H)     Acute renal failure, unspecified acute renal failure type     CAD (coronary artery disease)     Callus of foot     Diabetes (H)     Diarrhea     Essential hypertension     Fracture of left distal radius     History of stroke     Insomnia      Nausea     Non-pressure chronic ulcer of right heel and midfoot with unspecified severity (H)     Normocytic anemia     Type 2 diabetes mellitus with foot ulcer (H)        PSH:   Past Surgical History:   Procedure Laterality Date    APPENDECTOMY      CV CORONARY ANGIOGRAM N/A 3/1/2024    Procedure: CV CORONARY ANGIOGRAM;  Surgeon: Stephen Berger MD;  Location: [facility] CATH LAB CV    CV LEFT HEART CATH N/A 3/1/2024    Procedure: Left Heart Catheterization;  Surgeon: Stephen Berger MD;  Location: [facility] CATH LAB CV    EXCISE EXOSTOSIS FOOT Right 5/2/2024    Procedure: excision of bone from calcaneus with application of theraskin;  Surgeon: Dong Sanders DPM;  Location: [facility] OR    INCISION AND DRAINAGE FOOT, COMBINED Right 5/2/2024    Procedure: INCISION AND DRAINAGE, right heel with;  Surgeon: Dong Sanders DPM;  Location: [facility] OR    INCISION AND DRAINAGE OF WOUND      groin abscess    IRRIGATION AND DEBRIDEMENT FOOT, COMBINED Right 2/16/2024    Procedure: IRRIGATION AND DEBRIDEMENT RIGHT FOOT;  Surgeon: Cristofer Sims DPM;  Location: [facility] OR    PICC DOUBLE LUMEN PLACEMENT  2/29/2024        SHx:   Social History     Tobacco Use    Smoking status: Former     Current packs/day: 0.50     Types: Cigarettes    Smokeless tobacco: Never   Vaping Use    Vaping status: Never Used   Substance Use Topics    Alcohol use: Yes     Comment: once a week    Drug use: Yes     Types: Marijuana     Comment: Has not done since Feb       FHx:   No family history on file.     Allergies: No Known Allergies    Meds:  Current Outpatient Medications   Medication Instructions    acetaminophen (TYLENOL) 1,000 mg, Oral, EVERY 8 HOURS PRN    atorvastatin (LIPITOR) 80 mg, Oral, DAILY    bumetanide (BUMEX) 2 mg, Oral, 2 TIMES DAILY (Diuretics and Nitrates)    carvedilol (COREG) 25 mg, Oral, 2 TIMES DAILY WITH MEALS    Continuous Glucose Sensor (FREESTYLE LAMINE 2 SENSOR) MISC 1 each,  "Subcutaneous, EVERY 14 DAYS, Use 1 sensor every 14 days. Use to read blood sugars per 's instructions.    insulin glargine (LANTUS PEN) 32 Units, Subcutaneous, AT BEDTIME    insulin lispro (HUMALOG KWIKPEN) 10 Units, Subcutaneous, 3 TIMES DAILY WITH MEALS, Plus sliding scale: 2 units every 50 over 150. If humalog is not covered by insurance, may substitute with novolog or other fast acting insulin    insulin pen needle (32G X 4 MM) 32G X 4 MM miscellaneous Use 4 pen needles daily or as directed.    isosorbide mononitrate (IMDUR) 30 mg, Oral, DAILY    losartan (COZAAR) 25 mg, Oral, DAILY    metFORMIN (GLUCOPHAGE XR) 500 mg, Oral, DAILY WITH SUPPER    multivitamin w/minerals (THERA-VIT-M) tablet 1 tablet, Oral, DAILY    Urea-Lactic Acid 10-4 % CREA 2 TIMES DAILY        ROS: Negative except as in HPI.    Physical Exam:  BP (!) 150/73 (BP Location: Left arm)   Pulse 79   Temp 97.7  F (36.5  C) (Oral)   Resp 18   Ht 1.778 m (5' 10\")   Wt 136.1 kg (300 lb)   SpO2 96%   BMI 43.05 kg/m     Gen: NAD, alert, cooperative, AAO  CV: RRR by peripheral pulse  Resp: NLB on RA  HEENT: neck without scars, rashes, or skin changes.  Abd: Obese, soft, NTND, non-peritonitic.    Groins: soft BL. Rash present in right groin; no wounds, or scars.  Ext: Feet WWP, CR>2, no wounds.   Neuro: SILT and 5/5 strength in BLE including 5/5 dorsiflexion/plantarflexion. Facial motor function intact and symmetric, no lateral tongue deviation.   Vasc: palpable DP/PT BL.     Labs:  Recent Labs   Lab 03/23/25  0859   WBC 13.5*   HGB 14.4        Recent Labs   Lab 03/23/25  0859      POTASSIUM 3.9   SARAH 9.6   BUN 28.9*   CR 1.29*     Recent Labs   Lab 03/23/25  0859   AST 19   ALT 26   ALKPHOS 113   BILITOTAL 0.5   ALBUMIN 3.4*     No lab results found in last 7 days.  No lab results found in last 7 days.  Recent Labs   Lab 03/23/25  1049   CHOL 224*   HDL 53   *   TRIG 213*     Recent Labs   Lab 03/23/25  0859   A1C " 12.1*     Recent Labs   Lab 03/23/25  1049 03/23/25  0859   CTROPT 30* 31*     Pertinent Imaging:  Results for orders placed or performed during the hospital encounter of 03/23/25   CTA Head Neck with Contrast    Impression    IMPRESSION:   HEAD CT:  1.  No acute process.    2.  No mass, mass effect, hemorrhage or evidence for large/territorial infarction.    HEAD CTA:   1.  No large vessel occlusion. Scattered mild stenoses likely due to atherosclerotic changes bilateral HELLEN, MCA and PCA distribution and in the cavernous ICA segments bilaterally. No dissection. No aneurysm. No high-grade stenoses. Satisfactory branch   arborization pattern bilateral HELLEN, MCA and PCA distribution.    NECK CTA:  1.  Mild stenoses at the ICA level bilaterally and extraforaminal V1 segments with no high-grade stenosis or dissection of the major neck arteries or at the great vessel origin level.    Discussed with referring provider on 03/23/2025 at 9:19 AM CDT.       CT Head Perfusion w Contrast - For Tier 2 Stroke    Impression    IMPRESSION:     CT PERFUSION:  1.  Normal cerebral perfusion.   US Lower Extremity Venous Duplex Left    Impression    IMPRESSION:  1.  No deep venous thrombosis in the left lower extremity.   Chest XR,  PA & LAT    Impression    IMPRESSION:     Lungs are clear. No focal airspace opacities, pleural effusions, or pneumothorax. Mild central pulmonary vascular congestion, without overt pulmonary edema.    Borderline enlarged cardiac silhouette, which may be accentuated by technique.   MR Brain w/o & w Contrast    Impression    IMPRESSION:    Two small foci of diffusion restriction within the right thalamus are most suggestive of evolving early acute infarction. No evidence of hemorrhagic conversion.    These findings were communicated to Dr Humphrey at 1203PM over the phone on 3/23/2025 by Christoph Dickinson.    US Carotid Bilateral    Impression    IMPRESSION:  1.  Mild plaque formation, velocities consistent with  less than 50% stenosis in the right internal carotid artery.  2.  Mild plaque formation, velocities consistent with less than 50% stenosis in the left internal carotid artery.  3.  Flow within the vertebral arteries is antegrade.     Vascular surgery attending staff note: I agree with the documentation by our fellow, Dr. Green.  Patient is a 49-year-old male who is now admitted with a right thalamic infarct.  He had been off his clopidogrel.  He has had a history of a previous cerebrovascular accident and 2023.  I have reviewed the imaging carefully and the degree of stenosis is less than 50% by NASCET criteria on the CT angiogram as well as on sonography.  Would advise against surgical intervention of right carotid endarterectomy.  Advised best medical management.    Will sign off.    MEAGAN HUMPHREY M.D.

## 2025-03-23 NOTE — ED NOTES
Patient noted on monitor to have desaturation to 79-80% on room air at rest. Patient has no known history of ANDIE. Applied nasal cannula at 2L to support oxygenation while resting. Dr. Humphrey was notified.

## 2025-03-23 NOTE — ED TRIAGE NOTES
Patient comes from his apartment with [facility], patient reports to them he had stroke in 2023 with some right sided deficit since.  Starting on Thursday he has noted left arm and leg weakness. EMS reports + [place] scale with slurred speech, arm drift, unsteady gait and facial numbness. . Patient reports 2 falls last night, states he did not hit his head, is on blood thinners. Admits to marijuana use around 0230 to help him sleep     Triage Assessment (Adult)       Row Name 03/23/25 0851          Triage Assessment    Airway WDL WDL        Respiratory WDL    Respiratory WDL WDL        Skin Circulation/Temperature WDL    Skin Circulation/Temperature WDL WDL        Cardiac WDL    Cardiac WDL WDL        Peripheral/Neurovascular WDL    Peripheral Neurovascular WDL X;neurovascular assessment upper;neurovascular assessment lower        Cognitive/Neuro/Behavioral WDL    Cognitive/Neuro/Behavioral WDL motor response;speech     Speech slurred        LUE Neurovascular Assessment    Temperature LUE warm     Color LUE pale        RUE Neurovascular Assessment    Temperature RUE warm     Color RUE pale        LLE Neurovascular Assessment    Temperature LLE warm     Color LLE pale        RLE Neurovascular Assessment    Temperature RLE warm     Color RLE pale

## 2025-03-23 NOTE — ED PROVIDER NOTES
NAME: Floyd Capps  AGE: 49 year old male  YOB: 1975  MRN: [id number]  EVALUATION DATE & TIME: 3/23/2025  8:49 AM    PCP: Glendy Benavides  ED PROVIDER: Darleen Humphrey MD.    Chief Complaint   Patient presents with    Stroke Symptoms       FINAL IMPRESSION:  1. Acute ischemic stroke (H)      MEDICAL DECISION MAKIN:55 AM I met with the patient, obtained history, performed an initial exam, and discussed options and plan for diagnostics and treatment here in the ED.   9:02 AM Talked to Laura Gutierrez from stroke neurology regarding patient.   9:13 AM I talked to radiology regarding patient.   9:26 AM I talked to stroke team regarding patient.   11:53 AM Talked to stroke team regarding patient.   11:56 AM updated patient.   12:02 PM I talked to radiology regarding patient.   12:04 PM spoke to Dr. Burgess from vascular surgery regarding patient. They agree patient an stay at [facility], they can see patient's here and perform endarterectomy if needed  12:14 PM spoke to hospitalist  regarding patient.   12:52 PM Spoke to stroke team regarding. They are in agreement with plan.    MDM: 48 y/o M with h/o T2DM, HLP, HTN, hyothyroidism, stroke, CHF among others who presents with stroke symptoms.  Symptoms started Thursday afternoon and have been stuttering throughout the week but more constant since yesterday evening around 8 PM.  He is noting trouble with coordination to his left arm and leg with some possible weakness to these.  On exam he has some mild left arm drift as well as dysmetria to both the left arm and leg and reports some decrease sensation to his left lower lip.    Tier 2 code stroke was called. Due to timing not a lytics candidate. Was found to have 2 areas of early acute infarct in the right thalamus. Loaded with aspirin and plavix (reports he is not taking these at home).  Neurology does feel there is some significant right carotid stenosis and they did have me confirm with vascular  surgery and they are able to see him here/do an endarterectomy here if needed.    He has noticed a little bit of chest tightness and some left calf discomfort recently.  DVT ultrasound is negative. Good pulses to the leg.  EKG is sinus rhythm without acute ST changes.  Delta troponin 32-->30, lower suspicion for ACS. BNP slightly elevated. CXR with mild central pulmonary vascular congestion without overt pulmonary edema. Did desat while sleeping and placed on some oxygen, possibly some ANDIE. Considered PE but no pleuritic chest pain, negative DVT US, etc - discussed with hospitalist as well and he is okay with holding on PE scan right now.    Other labs show creatinine near baseline, hyperglycemia without DKA, mild leukocytosis.    Patient accepted for neuro telemetry admission by the hospitalist.    Medical Decision Making  Obtained supplemental history:Supplemental history obtained?: Patient   Reviewed external records: External records reviewed?: Outpatient Record: Patient was seen at [facility] on 01/30/25  Care impacted by chronic illness:Diabetes, Hyperlipidemia, Hypertension, and CAD, acute hypoxemic respiratory failure, CAD, stroke, and hypothyroidism   Did you consider but not order tests?: Work up considered but not performed and documented in chart, if applicable  Did you interpret images independently?: Independent interpretation of ECG and images noted in documentation, when applicable.  Consultation discussion with other provider:Did you involve another provider (consultant, , pharmacy, etc.)?: I discussed the care with another health care provider, see documentation for details.  Admit.    MIPS: Not Applicable    The patient has stroke symptoms:         ED Stroke specific documentation           NIHSS PDF     Patient last known well time: 8 pm 3/22/25  ED Provider first to bedside at: Dr. Humphrey  CT Results received at: 9:13 am    Thrombolytics:   Not given due to:   - unclear or  unfavorable risk-benefit profile for extended window thrombolysis beyond the conventional 4.5 hour time window    If treating with thrombolytics: Ensure SBP<180 and DBP<105 prior to treatment with thrombolytics.  Administering thrombolytics after treatment with IV labetalol, hydralazine, or nicardipine is reasonable once BP control is established.    Endovascular Retrieval:  Not initiated due to absence of proximal vessel occlusion    National Institutes of Health Stroke Scale (Baseline)  Time Performed: 9:00 AM     Score    Level of consciousness: (0)   Alert, keenly responsive    LOC questions: (0)   Answers both questions correctly    LOC commands: (0)   Performs both tasks correctly    Best gaze: (0)   Normal    Visual: (0)   No visual loss    Facial palsy: (0)   Normal symmetrical movements    Motor arm (left): (1)   Drift    Motor arm (right): (0)   No drift    Motor leg (left): (0)   No drift    Motor leg (right): (0)   No drift    Limb ataxia: (2)   Present in two limbs    Sensory: (1)   Mild to moderate sensory loss    Best language: (0)   Normal- no aphasia    Dysarthria: (0)   Normal    Extinction and inattention: (0)   No abnormality        Total Score:  4      Stroke Mimics were considered (including migraine headache, seizure disorder, hypoglycemia (or hyperglycemia), head or spinal trauma, CNS infection, Toxin ingestion and shock state (e.g. sepsis) .      MEDICATIONS GIVEN IN THE EMERGENCY:  Medications   iopamidol (ISOVUE-370) solution 117 mL (117 mLs Intravenous $Given 3/23/25 0912)     And   sodium chloride 0.9 % bag for CT scan flush use (has no administration in time range)   gadobutrol (GADAVIST) injection 13 mL (13 mLs Intravenous $Given 3/23/25 1127)   aspirin (ASA) chewable tablet 324 mg (324 mg Oral $Given 3/23/25 1214)   clopidogrel (PLAVIX) tablet 300 mg (300 mg Oral $Given 3/23/25 1212)       NEW PRESCRIPTIONS STARTED AT TODAY'S ER VISIT:  New Prescriptions    No medications on file       "  =================================================================  HPI    Patient information was obtained from: Patient   Use of : N/A    Floyd Capps is a 49 year old male with a past medical history of Diabetes, Hyperlipidemia, Hypertension, and CAD, acute hypoxemic respiratory failure, CAD, stroke, and hypothyroidism  who presents to  ED for stroke symptoms.     Per chart review, patient had an ofice visit at [facility] on 01/30/25. Patient declined starting on medication for mood. He also declined referral to mental health provider. He was scheduled to follow up with with a diabetic educator, cariology, and nephrology.     Per nurse, patient comes in from his apartment via EMS for evaluation of left arm and leg weakness. Patient has a previous stroke back in 2023 and has had right sided deficit since then.     Patient reports he was resting and sitting at home when is symptoms came on   around 4 pm Thursday (03/21/5). Patient endorses weakness to the left leg and arm that occurs intermittently. He did not think too much of it and went to lay down. However,symptoms became worse around 8-9 pm last night making it hard for patient to fall asleep. Patient states \"it came on hard\" last night and has been constant since then. He also endorses chest tightness along with some rest of the symptoms. Patient reports when he went to the  bathroom last night, he had to drag is left leg around due to the weakness. He also fell because of weakness and instability of the leg. No LOC or injury to the head with the fall.      Patient also mentioned that his left arm has difficulty with coordination. This has now resolved. Patient currently endorses left sided headache, cough and nausea. He reports some numbness in the left tongue area. He also reports rhinorrhea and bilateral swelling to the legs. No medications taken this morning.  No vision changes, fevers or vomiting. No other complaints at this " "time.     PHYSICAL EXAM:    Vitals: BP (!) 172/85   Pulse 75   Temp 98  F (36.7  C)   Resp 18   Ht 1.778 m (5' 10\")   Wt 136.1 kg (300 lb)   SpO2 98%   BMI 43.05 kg/m     Constitutional: Well developed, well nourished. No acute distress.  HENT: Normocephalic, atraumatic. Poor dentition. Neck-gross ROM intact.No C spine tenderness.   Eyes: Pupils mid-range and equal, sclera white  Respiratory: CTAB, no respiratory distress  Cardiovascular: Normal heart rate, regular rhythm. Mild bilateral LE edema without erythema or warmth. Intact DP pulses. Extremiteis warm and well perfused  GI: Soft, not distended, non tender, no guarding  Musculoskeletal: Moving extremities intentionally and without pain. No obvious deformity.  Skin: Warm, dry. Few scabs to shins  Neurologic: Alert & oriented, speech clear, decreased sensation to the left lower lip otherwise Cns grossly intact, some left arm drift, no leg drift, dysmetria with the left leg and arm, non on the right     LAB:  All pertinent labs reviewed and interpreted.  Labs Ordered and Resulted from Time of ED Arrival to Time of ED Departure   TROPONIN T, HIGH SENSITIVITY - Abnormal       Result Value    Troponin T, High Sensitivity 31 (*)    COMPREHENSIVE METABOLIC PANEL - Abnormal    Sodium 136      Potassium 3.9      Carbon Dioxide (CO2) 30 (*)     Anion Gap 8      Urea Nitrogen 28.9 (*)     Creatinine 1.29 (*)     GFR Estimate 67      Calcium 9.6      Chloride 98      Glucose 336 (*)     Alkaline Phosphatase 113      AST 19      ALT 26      Protein Total 6.8      Albumin 3.4 (*)     Bilirubin Total 0.5     CBC WITH PLATELETS AND DIFFERENTIAL - Abnormal    WBC Count 13.5 (*)     RBC Count 4.74      Hemoglobin 14.4      Hematocrit 42.6      MCV 90      MCH 30.4      MCHC 33.8      RDW 12.3      Platelet Count 255      % Neutrophils 73      % Lymphocytes 16      % Monocytes 7      % Eosinophils 3      % Basophils 1      % Immature Granulocytes 1      NRBCs per 100 WBC 0 "      Absolute Neutrophils 9.9 (*)     Absolute Lymphocytes 2.1      Absolute Monocytes 1.0      Absolute Eosinophils 0.4      Absolute Basophils 0.1      Absolute Immature Granulocytes 0.1      Absolute NRBCs 0.0     TROPONIN T, HIGH SENSITIVITY - Abnormal    Troponin T, High Sensitivity 30 (*)    NT PROBNP INPATIENT - Abnormal    N terminal Pro BNP Inpatient 1,034 (*)    GLUCOSE MONITOR NURSING POCT       RADIOLOGY:  MR Brain w/o & w Contrast   Final Result   IMPRESSION:      Two small foci of diffusion restriction within the right thalamus are most suggestive of evolving early acute infarction. No evidence of hemorrhagic conversion.      These findings were communicated to Dr Humphrey at 1203PM over the phone on 3/23/2025 by Christoph Dickinson.       US Lower Extremity Venous Duplex Left   Final Result   IMPRESSION:   1.  No deep venous thrombosis in the left lower extremity.      Chest XR,  PA & LAT   Final Result   IMPRESSION:       Lungs are clear. No focal airspace opacities, pleural effusions, or pneumothorax. Mild central pulmonary vascular congestion, without overt pulmonary edema.      Borderline enlarged cardiac silhouette, which may be accentuated by technique.      CT Head Perfusion w Contrast - For Tier 2 Stroke   Final Result   IMPRESSION:       CT PERFUSION:   1.  Normal cerebral perfusion.      CTA Head Neck with Contrast   Final Result   IMPRESSION:    HEAD CT:   1.  No acute process.      2.  No mass, mass effect, hemorrhage or evidence for large/territorial infarction.      HEAD CTA:    1.  No large vessel occlusion. Scattered mild stenoses likely due to atherosclerotic changes bilateral HELLEN, MCA and PCA distribution and in the cavernous ICA segments bilaterally. No dissection. No aneurysm. No high-grade stenoses. Satisfactory branch    arborization pattern bilateral HELLEN, MCA and PCA distribution.      NECK CTA:   1.  Mild stenoses at the ICA level bilaterally and extraforaminal V1 segments with no  high-grade stenosis or dissection of the major neck arteries or at the great vessel origin level.      Discussed with referring provider on 03/23/2025 at 9:19 AM CDT.              EKG:   Performed at: 9:31 AM  Impression: Sinus rhythm  Rate: 79 BPM  Rhythm: Sinus rhythm  QRS Interval: 90 ms  QTc Interval: 460 ms  Comparison: When compared with ECG of 23-Aug-2023, No significant change was found.     I have independently reviewed and interpreted the EKG(s) documented above.     I, Annita Ariza, am serving as a scribe to document services personally performed by Dr. Darleen Humphrey based on my observation and the provider's statements to me. I, Darleen Humphrey MD attest that Annita Ariza is acting in a scribe capacity, has observed my performance of the services and has documented them in accordance with my direction.    Darleen Humphrey M.D.  Emergency Medicine  [facility] EMERGENCY DEPARTMENT  [address] [place] [id number] [phone]  Dept: [phone]     Darleen Humphrey MD  03/23/25 1075

## 2025-03-24 ENCOUNTER — PATIENT OUTREACH (OUTPATIENT)
Dept: CARE COORDINATION | Facility: CLINIC | Age: 50
End: 2025-03-24
Payer: COMMERCIAL

## 2025-03-24 ENCOUNTER — APPOINTMENT (OUTPATIENT)
Dept: SPEECH THERAPY | Facility: HOSPITAL | Age: 50
End: 2025-03-24
Attending: INTERNAL MEDICINE
Payer: COMMERCIAL

## 2025-03-24 LAB
ANION GAP SERPL CALCULATED.3IONS-SCNC: 7 MMOL/L (ref 7–15)
BUN SERPL-MCNC: 21.6 MG/DL (ref 6–20)
CALCIUM SERPL-MCNC: 9.2 MG/DL (ref 8.8–10.4)
CHLORIDE SERPL-SCNC: 104 MMOL/L (ref 98–107)
CREAT SERPL-MCNC: 1.14 MG/DL (ref 0.67–1.17)
EGFRCR SERPLBLD CKD-EPI 2021: 78 ML/MIN/1.73M2
ERYTHROCYTE [DISTWIDTH] IN BLOOD BY AUTOMATED COUNT: 12.4 % (ref 10–15)
GLUCOSE BLDC GLUCOMTR-MCNC: 186 MG/DL (ref 70–99)
GLUCOSE BLDC GLUCOMTR-MCNC: 187 MG/DL (ref 70–99)
GLUCOSE BLDC GLUCOMTR-MCNC: 204 MG/DL (ref 70–99)
GLUCOSE BLDC GLUCOMTR-MCNC: 212 MG/DL (ref 70–99)
GLUCOSE BLDC GLUCOMTR-MCNC: 230 MG/DL (ref 70–99)
GLUCOSE SERPL-MCNC: 171 MG/DL (ref 70–99)
HCO3 SERPL-SCNC: 28 MMOL/L (ref 22–29)
HCT VFR BLD AUTO: 37.8 % (ref 40–53)
HGB BLD-MCNC: 12.9 G/DL (ref 13.3–17.7)
MCH RBC QN AUTO: 30.9 PG (ref 26.5–33)
MCHC RBC AUTO-ENTMCNC: 34.1 G/DL (ref 31.5–36.5)
MCV RBC AUTO: 90 FL (ref 78–100)
PLATELET # BLD AUTO: 214 10E3/UL (ref 150–450)
POTASSIUM SERPL-SCNC: 3.5 MMOL/L (ref 3.4–5.3)
RBC # BLD AUTO: 4.18 10E6/UL (ref 4.4–5.9)
SODIUM SERPL-SCNC: 139 MMOL/L (ref 135–145)
WBC # BLD AUTO: 11.7 10E3/UL (ref 4–11)

## 2025-03-24 PROCEDURE — 92523 SPEECH SOUND LANG COMPREHEN: CPT | Mod: GN

## 2025-03-24 PROCEDURE — 99232 SBSQ HOSP IP/OBS MODERATE 35: CPT | Performed by: STUDENT IN AN ORGANIZED HEALTH CARE EDUCATION/TRAINING PROGRAM

## 2025-03-24 PROCEDURE — 250N000013 HC RX MED GY IP 250 OP 250 PS 637: Performed by: STUDENT IN AN ORGANIZED HEALTH CARE EDUCATION/TRAINING PROGRAM

## 2025-03-24 PROCEDURE — 250N000013 HC RX MED GY IP 250 OP 250 PS 637: Performed by: INTERNAL MEDICINE

## 2025-03-24 PROCEDURE — 250N000011 HC RX IP 250 OP 636: Performed by: INTERNAL MEDICINE

## 2025-03-24 PROCEDURE — 92507 TX SP LANG VOICE COMM INDIV: CPT | Mod: GN

## 2025-03-24 PROCEDURE — 85027 COMPLETE CBC AUTOMATED: CPT | Performed by: INTERNAL MEDICINE

## 2025-03-24 PROCEDURE — 87186 SC STD MICRODIL/AGAR DIL: CPT | Performed by: STUDENT IN AN ORGANIZED HEALTH CARE EDUCATION/TRAINING PROGRAM

## 2025-03-24 PROCEDURE — 36415 COLL VENOUS BLD VENIPUNCTURE: CPT | Performed by: INTERNAL MEDICINE

## 2025-03-24 PROCEDURE — 87081 CULTURE SCREEN ONLY: CPT | Performed by: STUDENT IN AN ORGANIZED HEALTH CARE EDUCATION/TRAINING PROGRAM

## 2025-03-24 PROCEDURE — 99254 IP/OBS CNSLTJ NEW/EST MOD 60: CPT | Performed by: STUDENT IN AN ORGANIZED HEALTH CARE EDUCATION/TRAINING PROGRAM

## 2025-03-24 PROCEDURE — 80048 BASIC METABOLIC PNL TOTAL CA: CPT | Performed by: INTERNAL MEDICINE

## 2025-03-24 PROCEDURE — 120N000001 HC R&B MED SURG/OB

## 2025-03-24 RX ORDER — BUMETANIDE 2 MG/1
2 TABLET ORAL
Status: DISCONTINUED | OUTPATIENT
Start: 2025-03-24 | End: 2025-03-27 | Stop reason: HOSPADM

## 2025-03-24 RX ADMIN — BUMETANIDE 2 MG: 2 TABLET ORAL at 13:00

## 2025-03-24 RX ADMIN — ACETAMINOPHEN 650 MG: 325 TABLET ORAL at 00:28

## 2025-03-24 RX ADMIN — INSULIN GLARGINE 32 UNITS: 100 INJECTION, SOLUTION SUBCUTANEOUS at 21:52

## 2025-03-24 RX ADMIN — INSULIN ASPART 3 UNITS: 100 INJECTION, SOLUTION INTRAVENOUS; SUBCUTANEOUS at 13:00

## 2025-03-24 RX ADMIN — ENOXAPARIN SODIUM 40 MG: 40 INJECTION SUBCUTANEOUS at 06:20

## 2025-03-24 RX ADMIN — FAMOTIDINE 20 MG: 20 TABLET, FILM COATED ORAL at 09:20

## 2025-03-24 RX ADMIN — ISOSORBIDE MONONITRATE 30 MG: 30 TABLET, EXTENDED RELEASE ORAL at 09:20

## 2025-03-24 RX ADMIN — ENOXAPARIN SODIUM 40 MG: 40 INJECTION SUBCUTANEOUS at 18:32

## 2025-03-24 RX ADMIN — CLOPIDOGREL BISULFATE 75 MG: 75 TABLET, FILM COATED ORAL at 09:20

## 2025-03-24 RX ADMIN — INSULIN ASPART 3 UNITS: 100 INJECTION, SOLUTION INTRAVENOUS; SUBCUTANEOUS at 18:31

## 2025-03-24 RX ADMIN — ATORVASTATIN CALCIUM 80 MG: 40 TABLET, FILM COATED ORAL at 09:19

## 2025-03-24 RX ADMIN — INSULIN ASPART 2 UNITS: 100 INJECTION, SOLUTION INTRAVENOUS; SUBCUTANEOUS at 09:21

## 2025-03-24 RX ADMIN — MAGNESIUM OXIDE TAB 400 MG (241.3 MG ELEMENTAL MG) 400 MG: 400 (241.3 MG) TAB at 09:20

## 2025-03-24 RX ADMIN — ASPIRIN 81 MG: 81 TABLET, COATED ORAL at 09:22

## 2025-03-24 RX ADMIN — FAMOTIDINE 20 MG: 20 TABLET, FILM COATED ORAL at 21:52

## 2025-03-24 RX ADMIN — BUMETANIDE 2 MG: 2 TABLET ORAL at 18:33

## 2025-03-24 ASSESSMENT — ACTIVITIES OF DAILY LIVING (ADL)
ADLS_ACUITY_SCORE: 44
ADLS_ACUITY_SCORE: 46
ADLS_ACUITY_SCORE: 44
DEPENDENT_IADLS:: INDEPENDENT
ADLS_ACUITY_SCORE: 44
ADLS_ACUITY_SCORE: 44
ADLS_ACUITY_SCORE: 46
ADLS_ACUITY_SCORE: 46
ADLS_ACUITY_SCORE: 44
ADLS_ACUITY_SCORE: 44
ADLS_ACUITY_SCORE: 46
ADLS_ACUITY_SCORE: 46
ADLS_ACUITY_SCORE: 44
ADLS_ACUITY_SCORE: 46
ADLS_ACUITY_SCORE: 44
ADLS_ACUITY_SCORE: 46
ADLS_ACUITY_SCORE: 46

## 2025-03-24 NOTE — PLAN OF CARE
Goal Outcome Evaluation:    Problem: Stroke, Ischemic (Includes Transient Ischemic Attack)  Goal: Optimal Coping  Outcome: Progressing  Goal: Effective Bowel Elimination  Outcome: Progressing  Goal: Optimal Cerebral Tissue Perfusion  Outcome: Progressing  Goal: Optimal Cognitive Function  Outcome: Progressing  Goal: Improved Communication Skills  Outcome: Progressing  Goal: Optimal Functional Ability  Outcome: Progressing  Intervention: Optimize Functional Ability  Recent Flowsheet Documentation  Taken 3/24/2025 0816 by Kg Anderson RN  Activity Management: activity adjusted per tolerance  Goal: Optimal Nutrition Intake  Outcome: Progressing  Goal: Effective Oxygenation and Ventilation  Outcome: Progressing  Goal: Improved Sensorimotor Function  Outcome: Progressing  Goal: Safe and Effective Swallow  Outcome: Progressing  Goal: Effective Urinary Elimination  Outcome: Progressing   Alert and oriented.   Vss  Neuro checks Q 4hrs.

## 2025-03-24 NOTE — PROGRESS NOTES
Speech-Language Pathology: Speech, Language, and Cognitive Evaluation     03/24/25 1100   Appointment Info   Signing Clinician's Name / Credentials (SLP) Glendy Briones MA CCC-SLP   General Information   Onset of Illness/Injury or Date of Surgery 03/23/25   Referring Physician Dr. Vargas   Pertinent History of Current Problem Per EMR: 49 year old male with past medical history of IDDM, left lacunar infarct in 2023, CAD, HTN, chronic systolic heart failure, CKD stage III, obesity who presented to ED for evaluation of left-sided weakness, slurred speech since last Thursday.    In ED, MRI brain showed 2 small foci of diffusion restriction within right thalamus most suggestive of evolving early acute infarction.  Patient admitted for further management.     #Left-sided weakness with dysmetria;  #Early acute right thalamus ischemic stroke;  #History of left lacunar stroke in 8/2023;  --Onset of symptoms since last Thursday and more constant since 8 PM of 3/22/2025.    --Patient reports left arm and leg weakness with trouble with coordination and slurred speech  --Patient reported not taking Plavix since diabetic foot surgery on 3/2024.  -- Appreciated stroke neurology input, received loading dose of aspirin and Plavix.  Start baby aspirin and Plavix from tomorrow.  Neurology consulted  -- PTA Lipitor 80 mg daily  -- Ischemic stroke order set placed.  --GAIL echocardiogram ordered   General Observations Alert and generally cooperative   Type of Evaluation   Type of Evaluation Speech, Language, Cognition   Oral Motor   Oral Musculature generally intact   Oral Motor Deficits Observed   (None)   Facial Symmetry (Oral Motor)   Facial Symmetry (Oral Motor) WNL   Lip Function (Oral Motor)   Comment, Lip Function (Oral Motor) WNL   Tongue Function (Oral Motor)   Comment, Tongue Function (Oral Motor) WNL   Vocal Quality/Secretion Management (Oral Motor)   Vocal Quality (Oral Motor) WNL   Motor Speech   Speech  Intelligibility (Motor Speech) WFL;phrase/sentence level;conversational level   Comment, Motor Speech Assessment 100% intelligible at word to sentence level though occasional imprecision is noted; Patient endorses slurred speech and wants to address. He does acknowledge good intellibility with staff. RN reports no significant slurring and able to communicate wants/needs readily. He does have significantly reduced dentition which may impact preciision. He was able to produce /pataka/ with rate and precision WFL, mild increased effort possible   Articulation (Motor Speech) WFL  (minimal imprecision)   Conversational Level, Speech Intelligibility (Motor Speech) intact   Phrase/Sentence Level, Speech Intelligibility (Motor Speech) intact   Auditory Comprehension   Follows Commands (Auditory Comprehension) 3-step commands;WFL   Yes/No Questions (Auditory Comprehension) simple/factual questions;biographical/personal questions;WFL   Verbal Expression   Comment, Assessment (Verbal Expression) No evidence of word finding deficits   Word Finding Skills (Verbal Expression) generative naming;WFL  (10 items readily then he asked to stop)   Cognition   Cognitive Status Exam Comments Alert and oriented to situation and temporal information   Clinical Impression   Criteria for Skilled Therapeutic Interventions Met (SLP Eval) Yes, treatment indicated   Risks & Benefits of therapy have been explained evaluation/treatment results reviewed;care plan/treatment goals reviewed;participants included;patient;participants voiced agreement with care plan   Clinical Impression Comments Speech Language Evaluation completed following acute CVA. NIH has been 6 and reports of slurred speech. Patient does endorse slurred speech and reports concern. Minima articulatory imprecision noted, fluctuates a small degree. Overall, minimal to mild dysarthria, readily improved with strategies. Patient reports motivation to improve and address during inpatient  stay. SLP to follow for short course.   SLP Total Evaluation Time   Eval: Sound production with lang comprehension and expression Minutes (89654) 15   SLP Goals   Therapy Frequency (SLP Eval) 3 times/week   SLP Predicted Duration/Target Date for Goal Attainment 03/31/25   SLP Goals SLP Goal 1   SLP: Goal 1 Patient will verbalize speech strategies and demonstrate use at the sentence level IND   Interventions   Interventions Quick Adds Speech, Language, Voice & Communication   Speech, Language, Voice Communication&/or Auditory Processing   Treatment of Speech, Language, Voice Communication&/or Auditory Processing Minutes (36895) 8   Treatment Detail/Skilled Intervention Introduced dysarthria speech strategies and agility exercise with word 'buttercup.' Patient able to utilize with cues and encouragment.   SLP Discharge Planning   SLP Plan Mon1/3: continue dys strategy training and agility exercises as needed   SLP Rationale for DC Rec Anticipate goals will be met by d/c   SLP Brief overview of current status  Reg/thin diet. Minimal dysarthria, short course ST to address.

## 2025-03-24 NOTE — PLAN OF CARE
"  Problem: Stroke, Ischemic (Includes Transient Ischemic Attack)  Goal: Optimal Cognitive Function  Outcome: Progressing     Problem: Stroke, Ischemic (Includes Transient Ischemic Attack)  Goal: Safe and Effective Swallow  Outcome: Progressing     Problem: Adult Inpatient Plan of Care  Goal: Optimal Comfort and Wellbeing  Outcome: Progressing   Goal Outcome Evaluation:    A&O x4. Flat affect, at times pt is irritable. When grabbing 0200 blood sugar and giving scheduled meds pt would raise his voice and say :\"what do you want?! Why are you in here?!\" Scoring 6 on NIHSS. Tylenol given for leg pain. Tele SR. Refuses SCD pumps. Uses urinal at the bedside. Able to make needs known. Low fat low NA 60 g carb diet.                         "

## 2025-03-24 NOTE — PROGRESS NOTES
Infection Prevention Progress Note  3/24/2025      Patient Name: Floyd Capps [id number]  Admit Date: 3/23/2025    Infection Status as of 3/24/2025 1:58 PM: MRSA  Isolation Status as of 3/24/2025 1:58 PM: Contact     MDRO Discontinuation  Infection Prevention has reviewed this patient's chart per the MDRO D/C Policy and have taken the following action:    Patient requires one negative culture from nares prior to continuing discontinuation evaluation. Surveillance culture orders placed, date: 3/24/2025    If you have any questions, please contact Infection Prevention.    Serenity Chatterjee, Infection Prevention

## 2025-03-24 NOTE — CONSULTS
Care Management Initial Consult    General Information  Assessment completed with: Patient,    Type of CM/SW Visit: Initial Assessment    Primary Care Provider verified and updated as needed: Yes   Readmission within the last 30 days: no previous admission in last 30 days      Reason for Consult: discharge planning  Advance Care Planning: Advance Care Planning Reviewed: no concerns identified          Communication Assessment  Patient's communication style: spoken language (English or Bilingual)    Hearing Difficulty or Deaf: no   Wear Glasses or Blind: no    Cognitive  Cognitive/Neuro/Behavioral: .WDL except, speech  Level of Consciousness: alert  Arousal Level: arouses to voice  Orientation: oriented x 4  Mood/Behavior: cooperative, flat affect  Best Language: 1 - Mild to moderate  Speech: slurred    Living Environment:   People in home: alone     Current living Arrangements: apartment      Able to return to prior arrangements: yes       Family/Social Support:  Care provided by: self  Provides care for: no one     Support system:            Description of Support System:           Current Resources:   Patient receiving home care services: No        Community Resources: County Programs,   Equipment currently used at home: none  Supplies currently used at home: Diabetic Supplies    Employment/Financial:  Employment Status: employment seeking        Financial Concerns: eviction pending, unable to afford rent/mortgage, unemployed   Referral to Financial Worker: No       Does the patient's insurance plan have a 3 day qualifying hospital stay waiver?  No    Lifestyle & Psychosocial Needs:  Social Drivers of Health     Food Insecurity: Low Risk  (3/23/2025)    Food Insecurity     Within the past 12 months, did you worry that your food would run out before you got money to buy more?: No     Within the past 12 months, did the food you bought just not last and you didn t have money to get more?: No    Depression: At risk (1/30/2025)    PHQ-2     PHQ-2 Score: 6   Housing Stability: Low Risk  (3/23/2025)    Housing Stability     Do you have housing? : Yes     Are you worried about losing your housing?: No   Tobacco Use: Medium Risk (1/30/2025)    Patient History     Smoking Tobacco Use: Former     Smokeless Tobacco Use: Never     Passive Exposure: Not on file   Financial Resource Strain: High Risk (3/23/2025)    Financial Resource Strain     Within the past 12 months, have you or your family members you live with been unable to get utilities (heat, electricity) when it was really needed?: Yes   Alcohol Use: Not At Risk (4/27/2019)    [facility] [employer] [employer]    AUDIT-C     Frequency of Alcohol Consumption: Never     Average Number of Drinks: Not on file     Frequency of Binge Drinking: Not on file   Transportation Needs: High Risk (3/23/2025)    Transportation Needs     Within the past 12 months, has lack of transportation kept you from medical appointments, getting your medicines, non-medical meetings or appointments, work, or from getting things that you need?: Yes   Physical Activity: Not on file   Interpersonal Safety: Low Risk  (3/23/2025)    Interpersonal Safety     Do you feel physically and emotionally safe where you currently live?: Yes     Within the past 12 months, have you been hit, slapped, kicked or otherwise physically hurt by someone?: No     Within the past 12 months, have you been humiliated or emotionally abused in other ways by your partner or ex-partner?: No   Stress: Not on file   Social Connections: Not on file   Health Literacy: Not on file       Functional Status:  Prior to admission patient needed assistance:   Dependent ADLs:: Independent  Dependent IADLs:: Independent       Mental Health Status:          Chemical Dependency Status:                Values/Beliefs:  Spiritual, Cultural Beliefs, Quaker Practices, Values that affect care:                 Discussed   Partnership in Safe Discharge Planning  document with patient/family: No    Additional Information:  Met with patient to review role of care management, progression of care and possible need for services at discharge, including OP services, home care, or skilled nursing care. Patient alert, oriented and engaged in the conversation. Writer then verified patient demographics and updated any changes needed in the patient chart.       Pt lives in an apartment alone. He has no equipment in the home but tells writer that a  named Georgiana has been helping him with housing. He does not have Georgiana's number due to his phone battery being dead. He tells writer that he will not have money to pay his April rent and his car only goes 40mph.  He also has a job interview tomorrow but cannot make it due to being hospitalized. We discussed that I can give him resources for housing and transport but he tells writer that Georgiana is helping with this. Writer did give the resources just in case. Pt to be seen by neuro and therapy. Writer will await for therapy recs for final discharge planning. Pt may need transport set up due to his car problems.     Writer did get an ambulatory hand in on this pt from a Georgiana Isaacs and since pt has been speaking of a Georgiana that is helping him with housing writer did give her a call to see if she is helping with this. No answer so writer RENETTA just letting her know that pt is under the impression that a Georgiana is helping with housing and if she is helping with this that the pt would appreciate a call back.    Ambulatory hand in- Georgiana Isaacs   [facility]  [phone]    Next Steps: follow for therapy recs.    Rashmi Plunkett RN

## 2025-03-24 NOTE — DISCHARGE INSTRUCTIONS
For information on housing, contact [facility] located at [address] [place] [address], [address] in [place], [place] [address].  Their telephone number is [phone].    For transport assist:   Call Transit Link [phone] [email or web address]  OR  County resources contact information: [facility]- Phone: [phone]; [email or web address]

## 2025-03-24 NOTE — PROGRESS NOTES
Clinic Care Coordination Contact  Follow Up Progress Note      Assessment: Call from patient who is in the hospital. 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 There is no way he can work right now.  He got a letter from [facility] last week denying him benefits.  He has SNAP, but had hoped to get [place].  He is unsure if her MA is active.  Tried to reassure him that he needs to focus on his health right now and the CC team and FRW will connect with him when he is discharged.     Care Gaps:    Health Maintenance Due   Topic Date Due    HF ACTION PLAN  Never done    DIABETIC FOOT EXAM  Never done    ADVANCE CARE PLANNING  Never done    DEPRESSION ACTION PLAN  Never done    EYE EXAM  Never done    YEARLY PREVENTIVE VISIT  Never done    COLORECTAL CANCER SCREENING  Never done    HEPATITIS B IMMUNIZATION (1 of 3 - 19+ 3-dose series) Never done    Pneumococcal Vaccine: 50+ Years (2 of 2 - PCV) 10/11/2008    ZOSTER IMMUNIZATION (1 of 2) Never done    LUNG CANCER SCREENING  Never done    INFLUENZA VACCINE (1) 09/01/2024    COVID-19 Vaccine (3 - 2024-25 season) 09/01/2024       Postponed to when he is discharged from hospital.     Care Plans  Care Plan: Financial Wellbeing       Problem: Patient expresses financial resource strain       Long-Range Goal: Create an action plan to increase financial stability       Start Date: 1/30/2025 Expected End Date: 1/29/2026    This Visit's Progress: 20% Recent Progress: 10%    Priority: High    Note:     Barriers: needs help with filling out forms. Looking for a new job  Strengths: motivated.   Patient expressed understanding of goal: yes  Action steps to achieve this goal:  1. I will look for work.  2. I will work with FRW to stay on MA, apply for energy assistance and Cash  3. I will complete all form and submit all needed documents.  4. I will report progress towards this goal at scheduled outreach telephone calls from the  CCC team.                                  Intervention/Education provided during outreach: support.     Outreach Frequency: monthly, more frequently as needed    Plan:     Care Coordinator will follow up after discharge.     Clinic Care Coordination Contact    Care Team Conversations  VM from inpatient care team, Rashmi who is working with patient today. He reports that he will be homeless April 1 and that he works with writer.  Called Rashmi back and left VM on general VM for team.  Writer can connect with patient after discharge, but no resources have been provided to patient for housing. Assisting with County benefits.      Plan- assist as needed for safe discharge.  Contact patient after discharge.   Georgiana Isaacs,   [facility]  [phone]

## 2025-03-24 NOTE — CONSULTS
[facility]      Neurology Stroke Consult    Patient Name: Floyd Capps  : 1975 MRN#: [id number]    STROKE DATA    Stroke Code Data (for stroke code without tele)  Stroke code activated 25  0859   Stroke provider first response 25  0900   Last known normal 25   (unclear exact time)      Time of discovery (or onset of symptoms) 25   (unclear exact time)   Head CT read by Stroke Neuro Provider 25  0910   Was stroke code de-escalated? Yes  25  0995      Intravenous Thrombolysis  Not given due to:   - unclear or unfavorable risk-benefit profile for extended window thrombolysis beyond the conventional 4.5 hour time window     Endovascular Treatment  Not initiated due to absence of proximal vessel occlusion    National Institutes of Health Stroke Scale (at presentation)  NIHSS done at:  time patient seen      Score    Level of consciousness:  (0)   Alert, keenly responsive     LOC questions:  (0)   Answers both questions correctly    LOC commands:  (0)   Performs both tasks correctly    Best gaze:  (0)   Normal    Visual:  (0)   No visual loss    Facial palsy:  (0)   Normal symmetrical movements    Motor arm (left):  (0)   No drift    Motor arm (right):  (0)   No drift    Motor leg (left):  (0)   No drift    Motor leg (right):  (0)   No drift    Limb ataxia:  (2)   Present in two limbs    Sensory:  (0)   Normal- no sensory loss    Best language:  (0)   Normal- no aphasia    Dysarthria:  (1)   Mild to moderate dysarthria    Extinction and inattention:  (0)   No abnormality        NIHSS Total Score:  3        Dysphagia Screen  Time of screening: 3/23/25 145  Screening results: Passed screening, no dysarthria - Regular Diet with thin liquids  3/23/25 1451     ASSESSMENT & RECOMMENDATIONS     The patient was seen for poor coordination of the left upper and left lower extremity and he was found to have an acute/subacute stroke of the right thalamus.  He  does have intracranial atherosclerosis so I do recommend 3 months of treatment with dual antiplatelet therapy.  He is also at risk for small vessel disease due to his uncontrolled diabetes and uncontrolled hyperlipidemia.  I encouraged him on the consistent use of a daily aspirin exercise, the Mediterranean diet to reduce the risk of further stroke.  He expressed understanding of this, though he described very difficult social situation regarding employment and housing.  I will recommend the full use of our social service network while he is here in the hospital to help with these problems that can certainly interfere with his health and secondary prevention.     Impression:   #Acute ischemic stroke of R thalamus secondary to intracranial atherosclerosis  #Diabetes with incomplete control  #Hyperlipidemia  #History of tobacco use disorder    Recommendations:  Acute Ischemic Stroke (without tPA) Plan  - Avoid hypotonic IV fluids  - Daily aspirin 81 mg mg for secondary stroke prevention  - Plavix (clopidogrel) 300 mg PO loading dose x 1-completed  - Plavix (clopidogrel) 75 mg PO Daily for 3 months, ending 6/24/2025  - Statin: Atorvastatin 80 daily  -Long-term blood pressure goal less than 130/80  - Telemetry, EKG  - Bedside Glucose Monitoring  - A1c, Lipid Panel, Troponin x 3  - PT/OT/SLP  - Stroke Education  - Depression Screen-recommend referral for outpatient psychotherapy (ordered) initiation of SSRI here if pt is amenable   - Apnea Screen  - Euthermia, Euglycemia  -Appreciate coordination of care with social work regarding housing, employment which have recently become unstable  - Outpatient follow-up with stroke neurology in 3 months  - Inpatient neurology will sign off at this time, please call if any new questions arise      HPI  Floyd Capps is a 49 year old male with HTN, DM2, CAD, ischemic cardiomyopathy, tobacco use, prior stroke. Thursday evening noted onset of LUE/LLE weakness/uncoordinated, decreased  sensation to left lower lip.  The difficulty with the left arm and leg was intermittent, it seemed to improve when he awoke on Friday, and then it returned again on Saturday.  He presented to the emergency department on Sunday and acute stroke involving the right thalamus.    Pertinent Past Medical/Surgical History  Past Medical History:   Diagnosis Date    Acute hypoxemic respiratory failure (H)     Acute renal failure, unspecified acute renal failure type     CAD (coronary artery disease)     Callus of foot     Diabetes (H)     Diarrhea     Essential hypertension     Fracture of left distal radius     History of stroke     Insomnia     Nausea     Non-pressure chronic ulcer of right heel and midfoot with unspecified severity (H)     Normocytic anemia     Type 2 diabetes mellitus with foot ulcer (H)        Past Surgical History:   Procedure Laterality Date    APPENDECTOMY      CV CORONARY ANGIOGRAM N/A 3/1/2024    Procedure: CV CORONARY ANGIOGRAM;  Surgeon: Stephen Berger MD;  Location: [facility] CATH LAB CV    CV LEFT HEART CATH N/A 3/1/2024    Procedure: Left Heart Catheterization;  Surgeon: Stephen Berger MD;  Location: [facility] CATH LAB CV    EXCISE EXOSTOSIS FOOT Right 5/2/2024    Procedure: excision of bone from calcaneus with application of theraskin;  Surgeon: Dong Sanders DPM;  Location: [facility] [place]    INCISION AND DRAINAGE FOOT, COMBINED Right 5/2/2024    Procedure: INCISION AND DRAINAGE, right heel with;  Surgeon: Dong Sanders DPM;  Location: [facility] [place] OR    INCISION AND DRAINAGE OF WOUND      groin abscess    IRRIGATION AND DEBRIDEMENT FOOT, COMBINED Right 2/16/2024    Procedure: IRRIGATION AND DEBRIDEMENT RIGHT FOOT;  Surgeon: Cristofer Sims DPM;  Location: [facility] [place] OR    PICC DOUBLE LUMEN PLACEMENT  2/29/2024       Medications:   Current Facility-Administered Medications   Medication Dose Route Frequency Provider Last Rate Last Admin    acetaminophen (TYLENOL)  tablet 650 mg  650 mg Oral Q4H PRN Madan SLADE MD   650 mg at 03/24/25 0028    Or    acetaminophen (TYLENOL) oral liquid 650 mg  650 mg Oral Q4H PRN Madan SLADE MD        Or    acetaminophen (TYLENOL) Suppository 650 mg  650 mg Rectal Q4H PRN Madan SLADE MD        aspirin EC tablet 81 mg  81 mg Oral Daily Madan SLADE MD        Or    aspirin (ASA) chewable tablet 81 mg  81 mg Oral or NG Tube Daily Madan SLADE MD        atorvastatin (LIPITOR) tablet 80 mg  80 mg Oral Daily Madan SLADE MD   80 mg at 03/23/25 1632    bumetanide (BUMEX) injection 0.5 mg  0.5 mg Intravenous Q12H Madan SLADE MD        [Held by provider] carvedilol (COREG) tablet 25 mg  25 mg Oral BID w/meals Madan SLADE MD        clopidogrel (PLAVIX) tablet 75 mg  75 mg Oral or NG Tube Daily Madan SLADE MD        glucose gel 15-30 g  15-30 g Oral Q15 Min PRN Madan SLADE MD        Or    dextrose 50 % injection 25-50 mL  25-50 mL Intravenous Q15 Min PRN Madan SLADE MD        Or    glucagon injection 1 mg  1 mg Subcutaneous Q15 Min PRN Madan SLADE MD        enoxaparin ANTICOAGULANT (LOVENOX) injection 40 mg  40 mg Subcutaneous Q12H Madan SLADE MD   40 mg at 03/24/25 0620    famotidine (PEPCID) injection 20 mg  20 mg Intravenous BID Madan SLADE MD        Or    famotidine (PEPCID) tablet 20 mg  20 mg Oral or NG Tube BID Madan SLADE MD   20 mg at 03/23/25 2020    hydrALAZINE (APRESOLINE) injection 10 mg  10 mg Intravenous Q30 Min PRN Madan SLADE MD        insulin aspart (NovoLOG) injection (RAPID ACTING)  10 Units Subcutaneous TID w/meals Madan SLADE MD   10 Units at 03/23/25 1851    insulin aspart (NovoLOG) injection (RAPID ACTING)  1-10 Units Subcutaneous TID AC Madan SLADE MD   7 Units at 03/23/25 1836    insulin aspart (NovoLOG) injection (RAPID ACTING)  1-7 Units Subcutaneous At Bedtime Madan SLADE MD   2 Units at 03/23/25 2135    insulin glargine (LANTUS PEN) injection 32 Units  32 Units Subcutaneous At Bedtime Madan SLADE MD   32 Units at 03/23/25 2020    isosorbide  "mononitrate (IMDUR) 24 hr tablet 30 mg  30 mg Oral Daily Madan SLADE MD   30 mg at 03/23/25 1631    labetalol (NORMODYNE/TRANDATE) injection 10 mg  10 mg Intravenous Q10 Min PRN Madan SLADE MD        lidocaine (LMX4) cream   Topical Q1H PRN Madan SLADE MD        lidocaine 1 % 0.1-1 mL  0.1-1 mL Other Q1H PRN Madan SLADE MD        [Held by provider] losartan (COZAAR) tablet 25 mg  25 mg Oral Daily Madan SLADE MD        magnesium oxide (MAG-OX) tablet 400 mg  400 mg Oral Daily Madan SLADE MD   400 mg at 03/23/25 1631    [Held by provider] metFORMIN (GLUCOPHAGE XR) 24 hr tablet 500 mg  500 mg Oral Daily with supper Madan SLADE MD        ondansetron (ZOFRAN ODT) ODT tab 4 mg  4 mg Oral Q6H PRN Madan SLADE MD        Or    ondansetron (ZOFRAN) injection 4 mg  4 mg Intravenous Q6H PRN Madan SLADE MD        sodium chloride (PF) 0.9% PF flush 3 mL  3 mL Intracatheter Q8H YUSEF Madan SLADE MD   3 mL at 03/24/25 0629    sodium chloride (PF) 0.9% PF flush 3 mL  3 mL Intracatheter q1 min prn Madan SALDE MD        sodium chloride 0.9 % bag for CT scan flush use  100 mL As instructed Once Darleen Humphrey MD       .    Allergies: No Known Allergies.    Family History: No family history on file..    Social History:   Social History     Tobacco Use    Smoking status: Former     Current packs/day: 0.50     Types: Cigarettes    Smokeless tobacco: Never   Substance Use Topics    Alcohol use: Yes     Comment: once a week   .    Tobacco use: Former smoker      Physical Examination   Vitals: /67 (BP Location: Left arm, Patient Position: Right side, Cuff Size: Adult Regular)   Pulse 79   Temp 97.4  F (36.3  C) (Oral)   Resp 14   Ht 1.778 m (5' 10\")   Wt (!) 137.4 kg (302 lb 14.6 oz)   SpO2 94%   BMI 43.46 kg/m    General: Adult patient, lying in bed, NAD  HEENT: NC/AT, no icterus, op pink and moist  Cardiac: RRR  Pulmonary: non-labored on RA  Gastointestinal: S/NT/ND  Musculoskeletal: Warm, no edema  Skin: No rash or lesion   Psychiatric: " Frustrated to be in the hospital, describes depression prior to his admission here for difficulty with stable housing and stable employment  Neurologic:  Mental status: Awake, alert, attentive, oriented to self, time, place, and circumstance. Language is fluent and coherent with intact comprehension of complex commands, naming and repetition.  Cranial nerves: VFF, PERRL, conjugate gaze, EOMI, facial sensation intact, face symmetric, shoulder shrug strong, tongue/uvula midline.   Mild dysarthria with word such as , Huckleberry.  Patient is aware of this himself.  Some reduced sensation to temperature and light touch of the left cheek/chin  Motor: Normal bulk and tone. No abnormal movements. 5/5 strength in 4/4 extremities.   There is no drift, ataxia in the left upper extremity is notable, though strength is preserved  Reflexes: No clonus, toes are downgoing bilaterally  Sensory: Intact to light touch and temperature at the lateral hands, though there is some numbness of the fingertips on the left side.  Intact sensation of the legs, no extinction  Coordination: Ataxia of the left upper extremity and left lower extremity  Gait: Unable to assess at this time due to patient's fatigue    Labs  Labs and Imaging reviewed and used in developing the plan; pertinent results included.     Lab Results   Component Value Date     (H) 03/24/2025     Carotid ultrasound 3/23  IMPRESSION:  1.  Mild plaque formation, velocities consistent with less than 50% stenosis in the right internal carotid artery.  2.  Mild plaque formation, velocities consistent with less than 50% stenosis in the left internal carotid artery.  3.  Flow within the vertebral arteries is antegrade.    IMPRESSION:   HEAD CT:  1.  No acute process.     2.  No mass, mass effect, hemorrhage or evidence for large/territorial infarction.     HEAD CTA:   1.  No large vessel occlusion. Scattered mild stenoses likely due to atherosclerotic changes  bilateral HELLEN, MCA and PCA distribution and in the cavernous ICA segments bilaterally. No dissection. No aneurysm. No high-grade stenoses. Satisfactory branch   arborization pattern bilateral HELLEN, MCA and PCA distribution.     NECK CTA:  1.  Mild stenoses at the ICA level bilaterally and extraforaminal V1 segments with no high-grade stenosis or dissection of the major neck arteries or at the great vessel origin level.    MRI Brain 3/23/25  IMPRESSION:     Two small foci of diffusion restriction within the right thalamus are most suggestive of evolving early acute infarction. No evidence of hemorrhagic conversion.      A1c 12.1    Dwight Valencia MD     Billed as a level 4 consultation due to patient presenting with an acute illness posing a severe threat to bodily function.  I have reviewed notes from the primary team, nursing notes.  And I have reviewed labs above including ALT, AST, BMP, CBC.

## 2025-03-24 NOTE — PROGRESS NOTES
[facility]    Medicine Progress Note - Hospitalist Service    Date of Admission:  3/23/2025    Assessment & Plan   Floyd Capps is a 49 year old male with past medical history of IDDM, left lacunar infarct in 2023, CAD, HTN, chronic systolic heart failure, CKD stage III, obesity who presented to ED for evaluation of left-sided weakness, slurred speech since last Thursday.  In ED, MRI brain showed 2 small foci of diffusion restriction within right thalamus most suggestive of evolving early acute infarction.  Patient admitted for further management.     Left-sided weakness with dysmetria  Early acute right thalamus ischemic stroke  History of left lacunar stroke in 8/2023  Onset of symptoms since last Thursday and more constant since 8 PM of 3/22/2025.    Patient reports left arm and leg weakness with trouble with coordination and slurred speech  Neurology consult appreciated  Continue aspirin 81 mg oral daily  Continue Plavix 75 mg oral daily  PTA Lipitor 80 mg daily  Ischemic stroke order set placed.  GAIL echocardiogram: result pending   PT/OT/SLP     Moderate to severe right ICA stenosis   Carotid artery ultrasound reported with less than 50% stenosis bilaterally  Appreciate vascular surgery consult.  No surgical intervention is recommended  Continue medical therapy with dual antiplatelets and statin     Uncontrolled hypertension; permissive hypertension due to acute stroke  PTA medication includes Coreg 25 mg twice daily, losartan 25 mg daily, Imdur 30 mg daily, Bumex 2 mg twice a day.   resume other PTA antihypertensive medications by tomorrow      CAD with multivessel disease;  Complains of some chest discomfort and shortness of breath, likely due to CHF exacerbation  Coronary angiogram on 3/2024 reported multivessel CAD  In ED, EKG seems sinus rhythm with no significant ST-T wave changes.    Minimally elevated troponin in the setting of CKD, delta troponin trending down  Patient  "stopped taking Plavix since 3/2024 after foot surgery.  Due to stroke now on aspirin and Plavix.  Continue statin.  Continue Imdur  Echo pending      Acute on chronic systolic heart failure;  Patient reported some worsening lower extremity swelling, some shortness of breath.  Elevated BNP.  CXR with mild central pulmonary vascular congestion  Echocardiogram on 2/2024 reported EF 30 to 35%  Patient is on room air.  Continue home medications with oral Bumex, Imdur.  Plan to resume Coreg and losartan by tomorrow  Monitor intake/output, weight, labs close     Type II DM with hyperglycemia; concern for compliance  On admission A1c 12.1.  Previous A1c 8.9 on 1/30/2025  PTA Lantus 30 unit at bedtime.  PTA Humalog 10 units 3 times daily with meals.  Insulin sliding scale and hypoglycemia protocol     CKD stage III;  Creatinine fairly stable.  Monitor labs closely     Concern for ANDIE;  Advised outpatient sleep study             Diet: Combination Diet Regular Diet; Moderate Consistent Carb (60 g CHO per Meal) Diet; Low Saturated Fat Na <2400mg Diet    DVT Prophylaxis: Enoxaparin (Lovenox) SQ  Cazares Catheter: Not present  Lines: None     Cardiac Monitoring: ACTIVE order. Indication: Stroke, acute (48 hours)  Code Status: Full Code      Clinically Significant Risk Factors               # Hypoalbuminemia: Lowest albumin = 3.4 g/dL at 3/23/2025  8:59 AM, will monitor as appropriate     # Hypertension: Noted on problem list    # Chronic heart failure with reduced ejection fraction: last echo with EF <40%          # DMII: A1C = 12.1 % (Ref range: <5.7 %) within past 6 months, PRESENT ON ADMISSION  # Severe Obesity: Estimated body mass index is 43.46 kg/m  as calculated from the following:    Height as of this encounter: 1.778 m (5' 10\").    Weight as of this encounter: 137.4 kg (302 lb 14.6 oz)., PRESENT ON ADMISSION       # Financial/Environmental Concerns: eviction pending;unable to afford rent/mortgage;unemployed         Social " Drivers of Health    Depression: At risk (1/30/2025)    PHQ-2     PHQ-2 Score: 6   Tobacco Use: Medium Risk (1/30/2025)    Patient History     Smoking Tobacco Use: Former     Smokeless Tobacco Use: Never   Financial Resource Strain: High Risk (3/23/2025)    Financial Resource Strain     Within the past 12 months, have you or your family members you live with been unable to get utilities (heat, electricity) when it was really needed?: Yes   Transportation Needs: High Risk (3/23/2025)    Transportation Needs     Within the past 12 months, has lack of transportation kept you from medical appointments, getting your medicines, non-medical meetings or appointments, work, or from getting things that you need?: Yes          Disposition Plan     Medically Ready for Discharge: Anticipated Tomorrow             Camila Vargas MD  Hospitalist Service  [facility]  Securely message with [email or web address] (more info)  Text page via [email or web address] Paging/Directory   ______________________________________________________________________    Interval History   No distress noted.  Patient was unhappy due to multiple interruptions while he was trying to rest.  No distress noted.  No new complaints.  Able to move all 4 extremities.  Noted to have some dysarthria.  Management plan discussed with the patient and he expressed understanding.    Physical Exam   Vital Signs: Temp: 98  F (36.7  C) Temp src: Oral BP: (!) 142/78 (rn notified) Pulse: 83   Resp: 22 SpO2: 95 % O2 Device: None (Room air) Oxygen Delivery: 2 LPM  Weight: 302 lbs 14.59 oz    General Appearance: No distress noted  Respiratory: Good air entry bilaterally  Cardiovascular: S1 and S2 are heard, no murmur or gallop  GI: Soft abdomen, no tenderness, normoactive bowel sound  Skin: Intact and warm      Medical Decision Making       40 MINUTES SPENT BY ME on the date of service doing chart review, history, exam, documentation & further activities per the note.       Data

## 2025-03-24 NOTE — PLAN OF CARE
Goal Outcome Evaluation:      Plan of Care Reviewed With: patient          Outcome Evaluation: once pt is medically ready to discharge pt will likely go home pending therapy recommendation.

## 2025-03-24 NOTE — PLAN OF CARE
"  Problem: Adult Inpatient Plan of Care  Goal: Plan of Care Review  Description: The Plan of Care Review/Shift note should be completed every shift.  The Outcome Evaluation is a brief statement about your assessment that the patient is improving, declining, or no change.  This information will be displayed automatically on your shiftnote.  Outcome: Progressing  Goal: Patient-Specific Goal (Individualized)  Description: You can add care plan individualizations to a care plan. Examples of Individualization might be:  \"Parent requests to be called daily at 9am for status\", \"I have a hard time hearing out of my right ear\", or \"Do not touch me to wake me up as it startlesme\".  Outcome: Progressing  Goal: Absence of Hospital-Acquired Illness or Injury  Outcome: Progressing  Intervention: Prevent and Manage VTE (Venous Thromboembolism) Risk  Recent Flowsheet Documentation  Taken 3/23/2025 1621 by Marjan Payton RN  VTE Prevention/Management: SCDs on (sequential compression devices)  Goal: Optimal Comfort and Wellbeing  Outcome: Progressing  Goal: Readiness for Transition of Care  Outcome: Progressing  Intervention: Mutually Develop Transition Plan  Recent Flowsheet Documentation  Taken 3/23/2025 1800 by Marjan Payton RN  Equipment Currently Used at Home: none     Problem: Skin Injury Risk Increased  Goal: Skin Health and Integrity  Outcome: Progressing  Intervention: Optimize Skin Protection  Recent Flowsheet Documentation  Taken 3/23/2025 1621 by Marjan Payton RN  Activity Management: activity adjusted per tolerance     Problem: Stroke, Ischemic (Includes Transient Ischemic Attack)  Goal: Optimal Coping  Outcome: Progressing  Goal: Effective Bowel Elimination  Outcome: Progressing  Goal: Optimal Cerebral Tissue Perfusion  Outcome: Progressing  Goal: Optimal Cognitive Function  Outcome: Progressing  Goal: Improved Communication Skills  Outcome: Progressing  Goal: Optimal Functional Ability  Outcome: " Progressing  Intervention: Optimize Functional Ability  Recent Flowsheet Documentation  Taken 3/23/2025 1621 by Marjan Payton, RN  Activity Management: activity adjusted per tolerance  Goal: Optimal Nutrition Intake  Outcome: Progressing  Goal: Effective Oxygenation and Ventilation  Outcome: Progressing  Goal: Improved Sensorimotor Function  Outcome: Progressing  Goal: Safe and Effective Swallow  Outcome: Progressing  Goal: Effective Urinary Elimination  Outcome: Progressing   Goal Outcome Evaluation:       Denies pain. Scoring 6 on NiHSS. L side weakness. Passed Dysphasia screen. Went down for carotid ultrasound. Tele  show SR

## 2025-03-25 ENCOUNTER — APPOINTMENT (OUTPATIENT)
Dept: CARDIOLOGY | Facility: HOSPITAL | Age: 50
End: 2025-03-25
Attending: INTERNAL MEDICINE
Payer: COMMERCIAL

## 2025-03-25 ENCOUNTER — APPOINTMENT (OUTPATIENT)
Dept: PHYSICAL THERAPY | Facility: HOSPITAL | Age: 50
End: 2025-03-25
Attending: INTERNAL MEDICINE
Payer: COMMERCIAL

## 2025-03-25 LAB
ANION GAP SERPL CALCULATED.3IONS-SCNC: 10 MMOL/L (ref 7–15)
BUN SERPL-MCNC: 19.8 MG/DL (ref 6–20)
CALCIUM SERPL-MCNC: 8.9 MG/DL (ref 8.8–10.4)
CHLORIDE SERPL-SCNC: 99 MMOL/L (ref 98–107)
CREAT SERPL-MCNC: 1.25 MG/DL (ref 0.67–1.17)
EGFRCR SERPLBLD CKD-EPI 2021: 70 ML/MIN/1.73M2
ERYTHROCYTE [DISTWIDTH] IN BLOOD BY AUTOMATED COUNT: 12.3 % (ref 10–15)
GLUCOSE BLDC GLUCOMTR-MCNC: 209 MG/DL (ref 70–99)
GLUCOSE BLDC GLUCOMTR-MCNC: 227 MG/DL (ref 70–99)
GLUCOSE BLDC GLUCOMTR-MCNC: 244 MG/DL (ref 70–99)
GLUCOSE BLDC GLUCOMTR-MCNC: 262 MG/DL (ref 70–99)
GLUCOSE SERPL-MCNC: 207 MG/DL (ref 70–99)
HCO3 SERPL-SCNC: 26 MMOL/L (ref 22–29)
HCT VFR BLD AUTO: 37.7 % (ref 40–53)
HGB BLD-MCNC: 13.1 G/DL (ref 13.3–17.7)
LVEF ECHO: NORMAL
MCH RBC QN AUTO: 31 PG (ref 26.5–33)
MCHC RBC AUTO-ENTMCNC: 34.7 G/DL (ref 31.5–36.5)
MCV RBC AUTO: 89 FL (ref 78–100)
PLATELET # BLD AUTO: 210 10E3/UL (ref 150–450)
POTASSIUM SERPL-SCNC: 3.4 MMOL/L (ref 3.4–5.3)
RBC # BLD AUTO: 4.22 10E6/UL (ref 4.4–5.9)
SODIUM SERPL-SCNC: 135 MMOL/L (ref 135–145)
WBC # BLD AUTO: 13.1 10E3/UL (ref 4–11)

## 2025-03-25 PROCEDURE — 250N000013 HC RX MED GY IP 250 OP 250 PS 637: Performed by: STUDENT IN AN ORGANIZED HEALTH CARE EDUCATION/TRAINING PROGRAM

## 2025-03-25 PROCEDURE — 97530 THERAPEUTIC ACTIVITIES: CPT | Mod: GP

## 2025-03-25 PROCEDURE — 120N000001 HC R&B MED SURG/OB

## 2025-03-25 PROCEDURE — C8929 TTE W OR WO FOL WCON,DOPPLER: HCPCS

## 2025-03-25 PROCEDURE — 97116 GAIT TRAINING THERAPY: CPT | Mod: GP

## 2025-03-25 PROCEDURE — 250N000011 HC RX IP 250 OP 636: Performed by: INTERNAL MEDICINE

## 2025-03-25 PROCEDURE — 250N000013 HC RX MED GY IP 250 OP 250 PS 637: Performed by: INTERNAL MEDICINE

## 2025-03-25 PROCEDURE — 99232 SBSQ HOSP IP/OBS MODERATE 35: CPT | Performed by: STUDENT IN AN ORGANIZED HEALTH CARE EDUCATION/TRAINING PROGRAM

## 2025-03-25 PROCEDURE — 80048 BASIC METABOLIC PNL TOTAL CA: CPT | Performed by: STUDENT IN AN ORGANIZED HEALTH CARE EDUCATION/TRAINING PROGRAM

## 2025-03-25 PROCEDURE — 97161 PT EVAL LOW COMPLEX 20 MIN: CPT | Mod: GP

## 2025-03-25 PROCEDURE — 93306 TTE W/DOPPLER COMPLETE: CPT | Mod: 26 | Performed by: INTERNAL MEDICINE

## 2025-03-25 PROCEDURE — 36415 COLL VENOUS BLD VENIPUNCTURE: CPT | Performed by: STUDENT IN AN ORGANIZED HEALTH CARE EDUCATION/TRAINING PROGRAM

## 2025-03-25 PROCEDURE — 85027 COMPLETE CBC AUTOMATED: CPT | Performed by: STUDENT IN AN ORGANIZED HEALTH CARE EDUCATION/TRAINING PROGRAM

## 2025-03-25 PROCEDURE — 255N000002 HC RX 255 OP 636: Performed by: INTERNAL MEDICINE

## 2025-03-25 RX ADMIN — CARVEDILOL 25 MG: 12.5 TABLET, FILM COATED ORAL at 10:23

## 2025-03-25 RX ADMIN — INSULIN GLARGINE 32 UNITS: 100 INJECTION, SOLUTION SUBCUTANEOUS at 21:04

## 2025-03-25 RX ADMIN — ATORVASTATIN CALCIUM 80 MG: 40 TABLET, FILM COATED ORAL at 10:25

## 2025-03-25 RX ADMIN — LOSARTAN POTASSIUM 25 MG: 25 TABLET, FILM COATED ORAL at 10:24

## 2025-03-25 RX ADMIN — ISOSORBIDE MONONITRATE 30 MG: 30 TABLET, EXTENDED RELEASE ORAL at 10:24

## 2025-03-25 RX ADMIN — PERFLUTREN 1.5 ML: 6.52 INJECTION, SUSPENSION INTRAVENOUS at 11:15

## 2025-03-25 RX ADMIN — BUMETANIDE 2 MG: 2 TABLET ORAL at 19:27

## 2025-03-25 RX ADMIN — INSULIN ASPART 3 UNITS: 100 INJECTION, SOLUTION INTRAVENOUS; SUBCUTANEOUS at 10:21

## 2025-03-25 RX ADMIN — ASPIRIN 81 MG CHEWABLE TABLET 81 MG: 81 TABLET CHEWABLE at 10:23

## 2025-03-25 RX ADMIN — INSULIN ASPART 5 UNITS: 100 INJECTION, SOLUTION INTRAVENOUS; SUBCUTANEOUS at 13:29

## 2025-03-25 RX ADMIN — BUMETANIDE 2 MG: 2 TABLET ORAL at 10:22

## 2025-03-25 RX ADMIN — FAMOTIDINE 20 MG: 20 TABLET, FILM COATED ORAL at 19:27

## 2025-03-25 RX ADMIN — INSULIN ASPART 4 UNITS: 100 INJECTION, SOLUTION INTRAVENOUS; SUBCUTANEOUS at 17:27

## 2025-03-25 RX ADMIN — MAGNESIUM OXIDE TAB 400 MG (241.3 MG ELEMENTAL MG) 400 MG: 400 (241.3 MG) TAB at 10:24

## 2025-03-25 RX ADMIN — FAMOTIDINE 20 MG: 20 TABLET, FILM COATED ORAL at 10:23

## 2025-03-25 RX ADMIN — ENOXAPARIN SODIUM 40 MG: 40 INJECTION SUBCUTANEOUS at 06:34

## 2025-03-25 RX ADMIN — CLOPIDOGREL BISULFATE 75 MG: 75 TABLET, FILM COATED ORAL at 10:24

## 2025-03-25 RX ADMIN — CARVEDILOL 25 MG: 12.5 TABLET, FILM COATED ORAL at 19:27

## 2025-03-25 RX ADMIN — ENOXAPARIN SODIUM 40 MG: 40 INJECTION SUBCUTANEOUS at 19:27

## 2025-03-25 ASSESSMENT — ACTIVITIES OF DAILY LIVING (ADL)
ADLS_ACUITY_SCORE: 46
ADLS_ACUITY_SCORE: 48
ADLS_ACUITY_SCORE: 48
ADLS_ACUITY_SCORE: 46
ADLS_ACUITY_SCORE: 48
ADLS_ACUITY_SCORE: 46
ADLS_ACUITY_SCORE: 48
ADLS_ACUITY_SCORE: 46
ADLS_ACUITY_SCORE: 48
ADLS_ACUITY_SCORE: 46
ADLS_ACUITY_SCORE: 48
ADLS_ACUITY_SCORE: 48
ADLS_ACUITY_SCORE: 46

## 2025-03-25 NOTE — PROGRESS NOTES
"PT Evaluation   03/25/25 1320   Appointment Info   Signing Clinician's Name / Credentials (PT) Hetal Westbrook PT, DPT   Living Environment   People in Home alone   Current Living Arrangements apartment   Living Environment Comments will need to ask patient next visit if he has stairs, update appropriately   Self-Care   Usual Activity Tolerance good   Current Activity Tolerance moderate   Equipment Currently Used at Home none   Fall history within last six months yes   Number of times patient has fallen within last six months 2   Activity/Exercise/Self-Care Comment IND with i/ADLs. Drives.   General Information   Onset of Illness/Injury or Date of Surgery 03/23/25   Referring Physician Madan SLADE MD   Patient/Family Therapy Goals Statement (PT) none stated   Pertinent History of Current Problem (include personal factors and/or comorbidities that impact the POC) Per chart: \"49 year old male with past medical history of IDDM, left lacunar infarct in 2023, CAD, HTN, chronic systolic heart failure, CKD stage III, obesity who presented to ED for evaluation of left-sided weakness, slurred speech since last Thursday.  In ED, MRI brain showed 2 small foci of diffusion restriction within right thalamus most suggestive of evolving early acute infarction.  Patient admitted for further management.\"   Existing Precautions/Restrictions fall   Cognition   Affect/Mental Status (Cognition) low arousal/lethargic;sad/depressed affect   Orientation Status (Cognition) oriented to;person;place;situation  (did not formally assess)   Follows Commands (Cognition) WFL   Cognitive Status Comments Patient does express frustration when asked more questions   Pain Assessment   Patient Currently in Pain Yes, see Vital Sign flowsheet  (LBP with supine>sit)   Posture    Posture Forward head position   Range of Motion (ROM)   Range of Motion ROM is WFL   Strength (Manual Muscle Testing)   Strength (Manual Muscle Testing) Deficits observed during " functional mobility   Bed Mobility   Bed Mobility supine-sit;sit-supine   Supine-Sit Crane (Bed Mobility) minimum assist (75% patient effort);1 person assist   Sit-Supine Crane (Bed Mobility) supervision   Bed Mobility Limitations impaired ability to control trunk for mobility;decreased ability to use legs for bridging/pushing   Impairments Contributing to Impaired Bed Mobility impaired balance;decreased strength   Assistive Device (Bed Mobility) bed rails   Transfers   Transfers sit-stand transfer   Transfer Safety Concerns Noted decreased balance during turns;decreased weight-shifting ability   Impairments Contributing to Impaired Transfers impaired balance;decreased strength   Sit-Stand Transfer   Sit-Stand Crane (Transfers) contact guard;1 person assist   Assistive Device (Sit-Stand Transfers)   (none)   Comment, (Sit-Stand Transfer) slightly unsteady, holds lightly on to therapists' arm in standing for support   Gait/Stairs (Locomotion)   Crane Level (Gait) minimum assist (75% patient effort)   Assistive Device (Gait)   (none)   Distance in Feet (Gait) 3'   Pattern (Gait) step-to   Deviations/Abnormal Patterns (Gait) gait speed decreased;stride length decreased;weight shifting decreased;kelle decreased;left sided deviations   Left Sided Gait Deviations heel strike decreased   Comment, (Gait/Stairs) unsteady without AD - much more steady with FWW although does demonstrate difficulty with coordinating turns d/t difficulty turning LLE   Balance   Balance other (describe)   Balance Comments Solo 3' amb without AD, amb & transfers improve to CGA with FWW. reports some dizziness w sitting & standing.   Sensory Examination   Sensory Perception patient reports no sensory changes   Coordination   Coordination other (see comments)   Coordination Comments generally inc difficulty with coordination of LLE movements with functional mobility compared to RLE.   Clinical Impression   Criteria for  Skilled Therapeutic Intervention Yes, treatment indicated   PT Diagnosis (PT) Impaired functional mobility   Influenced by the following impairments Impaired strength, balance, activity tolerance   Functional limitations due to impairments Bed mobility, transfers, gait, endurance   Clinical Presentation (PT Evaluation Complexity) evolving   Clinical Presentation Rationale Clinical judgment   Clinical Decision Making (Complexity) low complexity   Planned Therapy Interventions (PT) balance training;bed mobility training;gait training;home exercise program;neuromuscular re-education;patient/family education;stair training;strengthening;stretching;transfer training;progressive activity/exercise;home program guidelines   Risk & Benefits of therapy have been explained evaluation/treatment results reviewed;participants included;patient;participants voiced agreement with care plan   PT Total Evaluation Time   PT Eval, Low Complexity Minutes (37241) 10   Physical Therapy Goals   PT Frequency Daily   PT Predicted Duration/Target Date for Goal Attainment 04/01/25   PT Goals Bed Mobility;Transfers;Gait   PT: Bed Mobility Independent;Supine to/from sit  (flat HOB)   PT: Transfers Modified independent;Sit to/from stand;Bed to/from chair;Assistive device   PT: Gait Supervision/stand-by assist;Rolling walker;Greater than 200 feet   Interventions   Interventions Quick Adds Gait Training;Therapeutic Activity   Therapeutic Activity   Therapeutic Activities: dynamic activities to improve functional performance Minutes (86618) 8   Symptoms Noted During/After Treatment Fatigue   Treatment Detail/Skilled Intervention additional sit/stands recliner/eob<>FWW CGA prog to SBA w cues for technique & management FWW. facilitated standing marching w BUEs on FWW, seated BLE ankle pf/df. pt declines further activity d/t high fatigue. cga-Solo for standing balance during standing voiding at toilet. pt requesting to return back to bed rather than  sitting up in the chair d/t fatigue. educated on benefits of sitting up in chair. sit>supine SBA w use of bedrails. pt ind with scooting self up in bed. Pt left supine w call light & all other needs in reach, chair alarm engaged.   Gait Training   Gait Training Minutes (15883) 8   Symptoms Noted During/After Treatment (Gait Training) fatigue   Treatment Detail/Skilled Intervention with FWW CGA with intermittent Solo to ensure proximity/safe navigation of FWW particulary w turns & navigating narrow spaces within room & bathroom. cues for upright posture & clearance of BLEs. Pt with inc difficulty clearing LLE, moreso with turns than forward amb.   Distance in Feet 150', 10'   Grand Level (Gait Training) minimum assist (75% patient effort)   Physical Assistance Level (Gait Training) verbal cues;nonverbal cues (demo/gestures);1 person assist   Assistive Device (Gait Training) rolling walker   Gait Analysis Deviations decreased kelle;decreased velocity of limb motion;decreased step length;decreased stride length;decreased toe-to-floor clearance;decreased weight-shifting ability   Impairments (Gait Analysis/Training) balance impaired;coordination impaired;strength decreased   PT Discharge Planning   PT Plan Prog transf & gait FWW; BLE strengthening; coordination & balance   PT Discharge Recommendation (DC Rec) Transitional Care Facility   PT Rationale for DC Rec Patient currently requiring Ax1 with transfers and ambulation with FWW. Patient is unsteady on feet without AD and demonstrates gait & balance impairment & weakness placing him at increased risk of falls. Pt lives alone at baseline. Recommend TCU as best option at this time. Pending inc tolerance & participation with activity may be good candidate for ARU.   PT Brief overview of current status CGA with FWW for transfers, CGA-Solo amb FWW; Solo sup>sit, SBA sit>sup   PT Total Distance Amb During Session (feet) 170   PT Equipment Needed at Discharge  walker, rolling  (CARLO)   Physical Therapy Time and Intention   Timed Code Treatment Minutes 16   Total Session Time (sum of timed and untimed services) 26

## 2025-03-25 NOTE — PROGRESS NOTES
[facility]    Medicine Progress Note - Hospitalist Service    Date of Admission:  3/23/2025    Assessment & Plan   Floyd Capps is a 49 year old male with past medical history of IDDM, left lacunar infarct in 2023, CAD, HTN, chronic systolic heart failure, CKD stage III, obesity who presented to ED for evaluation of left-sided weakness, slurred speech since last Thursday.  In ED, MRI brain showed 2 small foci of diffusion restriction within right thalamus most suggestive of evolving early acute infarction.  Patient admitted for further management.     Left-sided weakness with dysmetria  Acute right thalamus ischemic stroke  History of left lacunar stroke in 8/2023  Onset of symptoms since last Thursday and more constant since 8 PM of 3/22/2025.    Patient reports left arm and leg weakness with trouble with coordination and slurred speech  Neurology consult appreciated. Now SO  Continue aspirin 81 mg oral daily  Continue Plavix 75 mg oral daily  PTA Lipitor 80 mg daily  Ischemic stroke order set placed.  GAIL echocardiogram: result pending   PT/OT/SLP     Moderate to severe right ICA stenosis   Carotid artery ultrasound reported with less than 50% stenosis bilaterally  Appreciate vascular surgery consult.  No surgical intervention is recommended  Continue medical therapy with dual antiplatelets and statin     Uncontrolled hypertension  PTA Bumex 2 mg oral twice daily  PTA carvedilol 25 mg oral twice daily  PTA Imdur 30 mg oral daily  PTA losartan 25 mg oral daily  Hydralazine as needed     CAD with multivessel disease  Coronary angiogram on 3/2024 reported multivessel CAD  Patient stopped taking Plavix since 3/2024 after foot surgery.  Denies having chest pain currently  Echo pending      Type II DM with hyperglycemia; concern for compliance  On admission A1c 12.1.  Previous A1c 8.9 on 1/30/2025  Lantus 32 unit at bedtime.  Humalog 15 units 3 times daily with meals.  Insulin sliding scale and  "hypoglycemia protocol  Consider resuming metformin soon     CKD stage III;  Creatinine fairly stable.  Monitor labs closely     Concern for ANDIE;  Advised outpatient sleep study  Sleep study referral placed on the discharge navigator             Diet: Combination Diet Regular Diet; Moderate Consistent Carb (60 g CHO per Meal) Diet; Low Saturated Fat Na <2400mg Diet    DVT Prophylaxis: Enoxaparin (Lovenox) SQ  Cazares Catheter: Not present  Lines: None     Cardiac Monitoring: None  Code Status: Full Code      Clinically Significant Risk Factors               # Hypoalbuminemia: Lowest albumin = 3.4 g/dL at 3/23/2025  8:59 AM, will monitor as appropriate     # Hypertension: Noted on problem list  # Chronic heart failure with reduced ejection fraction: last echo with EF <40%          # DMII: A1C = 12.1 % (Ref range: <5.7 %) within past 6 months, PRESENT ON ADMISSION  # Severe Obesity: Estimated body mass index is 43.46 kg/m  as calculated from the following:    Height as of this encounter: 1.778 m (5' 10\").    Weight as of this encounter: 137.4 kg (302 lb 14.6 oz)., PRESENT ON ADMISSION     # Financial/Environmental Concerns: eviction pending;unable to afford rent/mortgage;unemployed         Social Drivers of Health    Depression: At risk (1/30/2025)    PHQ-2     PHQ-2 Score: 6   Tobacco Use: Medium Risk (1/30/2025)    Patient History     Smoking Tobacco Use: Former     Smokeless Tobacco Use: Never   Financial Resource Strain: High Risk (3/23/2025)    Financial Resource Strain     Within the past 12 months, have you or your family members you live with been unable to get utilities (heat, electricity) when it was really needed?: Yes   Transportation Needs: High Risk (3/23/2025)    Transportation Needs     Within the past 12 months, has lack of transportation kept you from medical appointments, getting your medicines, non-medical meetings or appointments, work, or from getting things that you need?: Yes          Disposition " Plan     Medically Ready for Discharge: Anticipated Tomorrow             Camila Vargas MD  Hospitalist Service  [facility]  Securely message with [employer] (more info)  Text page via [employer] Paging/Directory   ______________________________________________________________________    Interval History   Patient is easily irritable for being asked.  Questions stating that he wants to rest.  Otherwise he is on room air.  He denies having significant pain or discomfort.  He is on room air.  Management plan discussed with the patient and he expressed understanding.    Physical Exam   Vital Signs: Temp: 98.2  F (36.8  C) Temp src: Oral BP: (!) 147/72 Pulse: 83   Resp: 20 SpO2: 92 % O2 Device: None (Room air)    Weight: 302 lbs 14.59 oz    General Appearance:  No distress noted  Respiratory: Good air entry bilaterally  Cardiovascular: S1 and S2 are heard, no murmur or gallop  GI: Soft abdomen, no tenderness, normoactive bowel sound  Skin: Intact and warm       Medical Decision Making       40 MINUTES SPENT BY ME on the date of service doing chart review, history, exam, documentation & further activities per the note.      Data

## 2025-03-25 NOTE — PLAN OF CARE
Problem: Stroke, Ischemic (Includes Transient Ischemic Attack)  Goal: Optimal Coping  Outcome: Progressing   Goal Outcome Evaluation:       Pt alert/oriented, denies pain. NIH score is 2 for ataxia and dysarthria. MRSA result pending, to discontinue contact precaution if negative. On tele, NSR. Call light within reach, make needs known.

## 2025-03-25 NOTE — PLAN OF CARE
Goal Outcome Evaluation:      Plan of Care Reviewed With: patient    Overall Patient Progress: improvingOverall Patient Progress: improving         Problem: Stroke, Ischemic (Includes Transient Ischemic Attack)  Goal: Optimal Coping  Outcome: Progressing     Problem: Stroke, Ischemic (Includes Transient Ischemic Attack)  Goal: Optimal Functional Ability  Outcome: Progressing  Intervention: Optimize Functional Ability  Recent Flowsheet Documentation  Taken 3/25/2025 1358 by Daxa Patel, RN  Activity Management:   back to bed   ambulated to bathroom  Taken 3/25/2025 1300 by Daxa Patel, RN  Activity Management:   ambulated in room   ambulated to bathroom   ambulated outside room   up in chair     Problem: Adult Inpatient Plan of Care  Goal: Plan of Care Review  Description: The Plan of Care Review/Shift note should be completed every shift.  The Outcome Evaluation is a brief statement about your assessment that the patient is improving, declining, or no change.  This information will be displayed automatically on your shiftnote.  Outcome: Progressing  Flowsheets (Taken 3/25/2025 1407)  Plan of Care Reviewed With: patient  Overall Patient Progress: improving     Pt needs a lot of reinforcement and encouragement. Swearing at staff at beginning of shift and refusing assessments. This afternoon agreed to work with therapy and ordered lunch. NIHSS-numbness lower lip and tips of left finger, ataxia left arm and leg and slurrred speech. Slurred speech not noted by writer but pt states he can tell it's off. Up with SBA and walker and GB. Echo completed.

## 2025-03-25 NOTE — PLAN OF CARE
Problem: Adult Inpatient Plan of Care  Goal: Optimal Comfort and Wellbeing  3/24/2025 2211 by Zee Fatima, RN  Outcome: Progressing  3/24/2025 2210 by Zee Fatima RN  Outcome: Progressing   Goal Outcome Evaluation:    Pt is A+O x4, on RA. Denies pain. Scoring 2 on NIHS for mild limb ataxia and dysarthria. Pt is frustrated about his health situation, was supposed to have job interview today. Worried about his umemployment status. Sent MRSA swab to lab, results pending- can disc contact precaution if negative per infection prevention. Pt declined a walk in hallway. Up in chair briefly. Standby assist. Uses urinal at bedside.  Eating,drinking and voidng well. Able to make his needs known.     Zee Fatima RN.

## 2025-03-26 ENCOUNTER — APPOINTMENT (OUTPATIENT)
Dept: OCCUPATIONAL THERAPY | Facility: HOSPITAL | Age: 50
End: 2025-03-26
Payer: COMMERCIAL

## 2025-03-26 ENCOUNTER — APPOINTMENT (OUTPATIENT)
Dept: PHYSICAL THERAPY | Facility: HOSPITAL | Age: 50
End: 2025-03-26
Payer: COMMERCIAL

## 2025-03-26 ENCOUNTER — APPOINTMENT (OUTPATIENT)
Dept: SPEECH THERAPY | Facility: HOSPITAL | Age: 50
End: 2025-03-26
Payer: COMMERCIAL

## 2025-03-26 LAB
ANION GAP SERPL CALCULATED.3IONS-SCNC: 10 MMOL/L (ref 7–15)
BUN SERPL-MCNC: 27.9 MG/DL (ref 6–20)
CALCIUM SERPL-MCNC: 8.8 MG/DL (ref 8.8–10.4)
CHLORIDE SERPL-SCNC: 98 MMOL/L (ref 98–107)
CREAT SERPL-MCNC: 1.5 MG/DL (ref 0.67–1.17)
EGFRCR SERPLBLD CKD-EPI 2021: 56 ML/MIN/1.73M2
ERYTHROCYTE [DISTWIDTH] IN BLOOD BY AUTOMATED COUNT: 12.2 % (ref 10–15)
GLUCOSE BLDC GLUCOMTR-MCNC: 181 MG/DL (ref 70–99)
GLUCOSE BLDC GLUCOMTR-MCNC: 251 MG/DL (ref 70–99)
GLUCOSE BLDC GLUCOMTR-MCNC: 252 MG/DL (ref 70–99)
GLUCOSE BLDC GLUCOMTR-MCNC: 297 MG/DL (ref 70–99)
GLUCOSE SERPL-MCNC: 260 MG/DL (ref 70–99)
HCO3 SERPL-SCNC: 25 MMOL/L (ref 22–29)
HCT VFR BLD AUTO: 37.4 % (ref 40–53)
HGB BLD-MCNC: 12.7 G/DL (ref 13.3–17.7)
MCH RBC QN AUTO: 30.8 PG (ref 26.5–33)
MCHC RBC AUTO-ENTMCNC: 34 G/DL (ref 31.5–36.5)
MCV RBC AUTO: 91 FL (ref 78–100)
PLATELET # BLD AUTO: 198 10E3/UL (ref 150–450)
POTASSIUM SERPL-SCNC: 3.6 MMOL/L (ref 3.4–5.3)
RBC # BLD AUTO: 4.13 10E6/UL (ref 4.4–5.9)
SODIUM SERPL-SCNC: 133 MMOL/L (ref 135–145)
WBC # BLD AUTO: 11.4 10E3/UL (ref 4–11)

## 2025-03-26 PROCEDURE — 97116 GAIT TRAINING THERAPY: CPT | Mod: GP

## 2025-03-26 PROCEDURE — 36415 COLL VENOUS BLD VENIPUNCTURE: CPT | Performed by: STUDENT IN AN ORGANIZED HEALTH CARE EDUCATION/TRAINING PROGRAM

## 2025-03-26 PROCEDURE — 97112 NEUROMUSCULAR REEDUCATION: CPT | Mod: GP

## 2025-03-26 PROCEDURE — 97530 THERAPEUTIC ACTIVITIES: CPT | Mod: GP

## 2025-03-26 PROCEDURE — 120N000001 HC R&B MED SURG/OB

## 2025-03-26 PROCEDURE — 80048 BASIC METABOLIC PNL TOTAL CA: CPT | Performed by: STUDENT IN AN ORGANIZED HEALTH CARE EDUCATION/TRAINING PROGRAM

## 2025-03-26 PROCEDURE — 250N000013 HC RX MED GY IP 250 OP 250 PS 637: Performed by: STUDENT IN AN ORGANIZED HEALTH CARE EDUCATION/TRAINING PROGRAM

## 2025-03-26 PROCEDURE — 82310 ASSAY OF CALCIUM: CPT | Performed by: STUDENT IN AN ORGANIZED HEALTH CARE EDUCATION/TRAINING PROGRAM

## 2025-03-26 PROCEDURE — 92507 TX SP LANG VOICE COMM INDIV: CPT | Mod: GN

## 2025-03-26 PROCEDURE — 97166 OT EVAL MOD COMPLEX 45 MIN: CPT | Mod: GO

## 2025-03-26 PROCEDURE — 250N000013 HC RX MED GY IP 250 OP 250 PS 637: Performed by: INTERNAL MEDICINE

## 2025-03-26 PROCEDURE — 250N000011 HC RX IP 250 OP 636: Performed by: INTERNAL MEDICINE

## 2025-03-26 PROCEDURE — 99232 SBSQ HOSP IP/OBS MODERATE 35: CPT | Performed by: STUDENT IN AN ORGANIZED HEALTH CARE EDUCATION/TRAINING PROGRAM

## 2025-03-26 PROCEDURE — 85027 COMPLETE CBC AUTOMATED: CPT | Performed by: STUDENT IN AN ORGANIZED HEALTH CARE EDUCATION/TRAINING PROGRAM

## 2025-03-26 PROCEDURE — 97535 SELF CARE MNGMENT TRAINING: CPT | Mod: GO

## 2025-03-26 RX ADMIN — ASPIRIN 81 MG: 81 TABLET, COATED ORAL at 10:25

## 2025-03-26 RX ADMIN — INSULIN ASPART 7 UNITS: 100 INJECTION, SOLUTION INTRAVENOUS; SUBCUTANEOUS at 13:53

## 2025-03-26 RX ADMIN — FAMOTIDINE 20 MG: 20 TABLET, FILM COATED ORAL at 21:02

## 2025-03-26 RX ADMIN — BUMETANIDE 2 MG: 2 TABLET ORAL at 10:24

## 2025-03-26 RX ADMIN — CARVEDILOL 25 MG: 12.5 TABLET, FILM COATED ORAL at 10:24

## 2025-03-26 RX ADMIN — LOSARTAN POTASSIUM 25 MG: 25 TABLET, FILM COATED ORAL at 10:23

## 2025-03-26 RX ADMIN — FAMOTIDINE 20 MG: 20 TABLET, FILM COATED ORAL at 10:25

## 2025-03-26 RX ADMIN — INSULIN ASPART 5 UNITS: 100 INJECTION, SOLUTION INTRAVENOUS; SUBCUTANEOUS at 18:08

## 2025-03-26 RX ADMIN — INSULIN ASPART 5 UNITS: 100 INJECTION, SOLUTION INTRAVENOUS; SUBCUTANEOUS at 10:21

## 2025-03-26 RX ADMIN — ISOSORBIDE MONONITRATE 30 MG: 30 TABLET, EXTENDED RELEASE ORAL at 10:25

## 2025-03-26 RX ADMIN — ATORVASTATIN CALCIUM 80 MG: 40 TABLET, FILM COATED ORAL at 10:25

## 2025-03-26 RX ADMIN — CLOPIDOGREL BISULFATE 75 MG: 75 TABLET, FILM COATED ORAL at 10:23

## 2025-03-26 RX ADMIN — BUMETANIDE 2 MG: 2 TABLET ORAL at 18:05

## 2025-03-26 RX ADMIN — CARVEDILOL 25 MG: 12.5 TABLET, FILM COATED ORAL at 18:11

## 2025-03-26 RX ADMIN — ENOXAPARIN SODIUM 40 MG: 40 INJECTION SUBCUTANEOUS at 21:02

## 2025-03-26 RX ADMIN — ENOXAPARIN SODIUM 40 MG: 40 INJECTION SUBCUTANEOUS at 10:32

## 2025-03-26 RX ADMIN — METFORMIN ER 500 MG 500 MG: 500 TABLET ORAL at 18:09

## 2025-03-26 ASSESSMENT — ACTIVITIES OF DAILY LIVING (ADL)
ADLS_ACUITY_SCORE: 48
ADLS_ACUITY_SCORE: 50
ADLS_ACUITY_SCORE: 48
ADLS_ACUITY_SCORE: 50

## 2025-03-26 NOTE — PLAN OF CARE
Problem: Adult Inpatient Plan of Care  Goal: Readiness for Transition of Care  Outcome: Progressing     Problem: Stroke, Ischemic (Includes Transient Ischemic Attack)  Goal: Optimal Functional Ability  Outcome: Progressing   Goal Outcome Evaluation:    Patient alert and oriented with VSS on RA. NIH scoring a 3, assessments difficult as patient refuses cares. Voiding using bedside urinal. Up late last night did not want blood sugar taken this AM as he is going to sleep for awhile yet. No new concerns.

## 2025-03-26 NOTE — PROGRESS NOTES
Speech Language Therapy Discharge Summary    Reason for therapy discharge:    All goals and outcomes met, no further needs identified.     Progress towards therapy goal(s). See goals on Care Plan in [facility] electronic health record for goal details.  Goals met    Therapy recommendation(s):    Continued therapy is recommended.  Rationale/Recommendations:   Regular and thin diet. Speech is 100% intelligible and patient is able to utilize dysarthria strategies independently to express needs. Consider OP ST for higher level strategy training for dysarthria if patient continues to have concerns when returning to daily life tasks.

## 2025-03-26 NOTE — PROGRESS NOTES
Care Management Follow Up    Length of Stay (days): 3    Expected Discharge Date: 03/27/2025    Anticipated Discharge Plan:   TCU    Transportation: TBD    PT Recommendations: Transitional Care Facility  OT Recommendations:        Barriers to Discharge: placement    Prior Living Situation: apartment with alone    Advanced Directive on File: no concerns identified     Patient/Spokesperson Updated: Yes. Who? Pt    Additional Information:  SW met with Pt at bedside to discuss PT recommendation for TCU. Pt is agreeable to TCU. He states he does not care where he goes as long as it's not [facility]. He spoke about having a very poor experience there prior. SW stated they will send referrals to TCUs in the area and will update Pt. Referrals sent, pending.    1:47 PM  Pt accepted at [facility] for tomorrow. JUAN C met with Pt at bedside; he is agreeable and confirms he will need wheelchair transport. Plan for Pt to discharge to [facility] tomorrow 3/27 via [facility] wheelchair transport between 11:10-11:50AM.    Sylvie Garcia, DAYANW

## 2025-03-26 NOTE — PROGRESS NOTES
[facility]    Medicine Progress Note - Hospitalist Service    Date of Admission:  3/23/2025    Assessment & Plan   Floyd Capps is a 49 year old male with past medical history of IDDM, left lacunar infarct in 2023, CAD, HTN, chronic systolic heart failure, CKD stage III, obesity who presented to ED for evaluation of left-sided weakness, slurred speech since three days prior to presentation.  MRI brain showed 2 small foci of diffusion restriction within right thalamus most suggestive of evolving early acute infarction.  Patient admitted for further management.  Had been evaluated by neurologist.  He is medically ready for discharge once TCU is available     Left-sided weakness with dysmetria  Acute right thalamus ischemic stroke  History of left lacunar stroke in 8/2023  Onset of symptoms since last Thursday and more constant since 8 PM of 3/22/2025.    Patient reports left arm and leg weakness with trouble with coordination and slurred speech  Neurology consult appreciated. Now SO  Continue aspirin 81 mg oral daily  Continue Plavix 75 mg oral daily  PTA Lipitor 80 mg daily  Ischemic stroke order set placed.  GAIL echocardiogram: Reported with estimated left ventricular ejection fraction of 30 to 35% which is similar to previous results  PT/OT/SLP: Recommending TCU  Patient tends to refuse cares multiple times     Moderate to severe right ICA stenosis   Carotid artery ultrasound reported with less than 50% stenosis bilaterally  Appreciate vascular surgery consult.  No surgical intervention is recommended  Continue medical therapy with dual antiplatelets and statin     Uncontrolled hypertension  PTA Bumex 2 mg oral twice daily  PTA carvedilol 25 mg oral twice daily  PTA Imdur 30 mg oral daily  PTA losartan 25 mg oral daily  Hydralazine as needed     CAD with multivessel disease  Coronary angiogram on 3/2024 reported multivessel CAD  Patient stopped taking Plavix since 3/2024 after foot  "surgery.  Denies having chest pain currently  Continue aspirin, Plavix and atorvastatin       Type II DM with hyperglycemia; concern for compliance  On admission A1c 12.1.  Previous A1c 8.9 on 1/30/2025  Increase Lantus 32-35 unit at bedtime.  Increase Humalog 20 units 3 times daily with meals.  Insulin sliding scale and hypoglycemia protocol  resume metformin     CKD stage III;  Creatinine fairly stable.  Monitor labs closely     Concern for ANDIE;  Advised outpatient sleep study  Sleep study referral placed on the discharge navigator             Diet: Combination Diet Regular Diet; Moderate Consistent Carb (60 g CHO per Meal) Diet; Low Saturated Fat Na <2400mg Diet    DVT Prophylaxis: Enoxaparin (Lovenox) SQ  Cazares Catheter: Not present  Lines: None     Cardiac Monitoring: None  Code Status: Full Code      Clinically Significant Risk Factors               # Hypoalbuminemia: Lowest albumin = 3.4 g/dL at 3/23/2025  8:59 AM, will monitor as appropriate     # Hypertension: Noted on problem list  # Chronic heart failure with reduced ejection fraction: last echo with EF <40%          # DMII: A1C = 12.1 % (Ref range: <5.7 %) within past 6 months, PRESENT ON ADMISSION  # Severe Obesity: Estimated body mass index is 42.63 kg/m  as calculated from the following:    Height as of this encounter: 1.778 m (5' 10\").    Weight as of this encounter: 134.8 kg (297 lb 1.6 oz)., PRESENT ON ADMISSION     # Financial/Environmental Concerns: eviction pending;unable to afford rent/mortgage;unemployed         Social Drivers of Health    Depression: At risk (1/30/2025)    PHQ-2     PHQ-2 Score: 6   Tobacco Use: Medium Risk (1/30/2025)    Patient History     Smoking Tobacco Use: Former     Smokeless Tobacco Use: Never   Financial Resource Strain: High Risk (3/23/2025)    Financial Resource Strain     Within the past 12 months, have you or your family members you live with been unable to get utilities (heat, electricity) when it was really " needed?: Yes   Transportation Needs: High Risk (3/23/2025)    Transportation Needs     Within the past 12 months, has lack of transportation kept you from medical appointments, getting your medicines, non-medical meetings or appointments, work, or from getting things that you need?: Yes          Disposition Plan     Medically Ready for Discharge: Ready Now             Camila Vargas MD  Hospitalist Service  [facility]  Securely message with [employer] (more info)  Text page via [facility] Paging/Directory   ______________________________________________________________________    Interval History   No distress noted.  Patient tends to refuse cares most of the time.  His blood sugar is above range.  Insulin dose has been adjusted.  Management plan discussed with the patient and he expressed understanding.  Patient is medically ready for discharge once TCU is available.    Physical Exam   Vital Signs: Temp: 98.3  F (36.8  C) Temp src: Oral BP: 128/58 Pulse: 83   Resp: 20 SpO2: 93 % O2 Device: None (Room air)    Weight: 297 lbs 1.6 oz    General Appearance:  No distress noted  Respiratory: Good air entry bilaterally  Cardiovascular: S1 and S2 are heard, no murmur or gallop  GI: Soft abdomen, no tenderness, normoactive bowel sound  Skin: Intact and warm       Medical Decision Making       40 MINUTES SPENT BY ME on the date of service doing chart review, history, exam, documentation & further activities per the note.      Data

## 2025-03-26 NOTE — PLAN OF CARE
Goal Outcome Evaluation:                      A&Ox4. Irritable mood and unpleasant with staff, pt gets very frustrated with staff anytime they enter the room. NIH scores were both 3, scoring for slurred speech, numbness of left side lips, and ataxia in left arm. Pt refused to use incentive spirometer.

## 2025-03-26 NOTE — PROGRESS NOTES
"OT Evaluation     03/26/25 1100   Appointment Info   Signing Clinician's Name / Credentials (OT) Rancho Dawn, OTR/L   Living Environment   People in Home alone   Current Living Arrangements apartment   Home Accessibility no concerns  (elevator access)   Transportation Anticipated public transportation   Living Environment Comments Tub/shower combo w/ grab bars, standard toilet w/ grab bars, laundry in same building (down hallway)   Self-Care   Usual Activity Tolerance good   Current Activity Tolerance moderate   Regular Exercise No   Equipment Currently Used at Home grab bar, toilet;grab bar, tub/shower;walker, standard   Fall history within last six months yes   Number of times patient has fallen within last six months 2   Activity/Exercise/Self-Care Comment IND with ADLs   Instrumental Activities of Daily Living (IADL)   IADL Comments IND with IADLs, drives, was working as    General Information   Onset of Illness/Injury or Date of Surgery 03/23/25   Referring Physician Camila Vargas MD   Patient/Family Therapy Goal Statement (OT) None stated   Additional Occupational Profile Info/Pertinent History of Current Problem Per chart: \"49 year old male with past medical history of IDDM, left lacunar infarct in 2023, CAD, HTN, chronic systolic heart failure, CKD stage III, obesity who presented to ED for evaluation of left-sided weakness, slurred speech since last Thursday.  In ED, MRI brain showed 2 small foci of diffusion restriction within right thalamus most suggestive of evolving early acute infarction.  Patient admitted for further management.\"   Existing Precautions/Restrictions fall   Limitations/Impairments safety/cognitive   Cognitive Status Examination   Orientation Status orientation to person, place and time   Visual Perception   Visual Impairment/Limitations WFL   Sensory   Sensory Quick Adds sensation intact   Range of Motion Comprehensive   General Range of Motion bilateral upper " extremity ROM WFL   Strength Comprehensive (MMT)   Comment, General Manual Muscle Testing (MMT) Assessment Generalized weakness   Muscle Tone Assessment   Muscle Tone Quick Adds No deficits were identified   Coordination   Upper Extremity Coordination No deficits were identified   Bed Mobility   Bed Mobility supine-sit   Supine-Sit [place] (Bed Mobility) supervision   Assistive Device (Bed Mobility) bed rails   Transfers   Transfers sit-stand transfer   Sit-Stand Transfer   Sit-Stand [place] (Transfers) contact guard   Assistive Device (Sit-Stand Transfers) walker, front-wheeled   Sit/Stand Transfer Comments CGA for increased safety   Balance   Balance Assessment sit to stand dynamic balance   Sit-to-Stand Balance contact guard   Balance Comments CGA for increased safety   Activities of Daily Living   BADL Assessment/Intervention lower body dressing   Lower Body Dressing Assessment/Training   Position (Lower Body Dressing) edge of bed sitting   Comment, (Lower Body Dressing) Patient grossly requires maximal assistance to don socks from EOB   [place] Level (Lower Body Dressing) doff;don;socks   Clinical Impression   Criteria for Skilled Therapeutic Interventions Met (OT) Yes, treatment indicated   OT Diagnosis Decreased IND with ADLs, transfers, mobility, and home management   OT Problem List-Impairments impacting ADL problems related to;activity tolerance impaired;balance;flexibility;mobility;strength   Assessment of Occupational Performance 3-5 Performance Deficits   Identified Performance Deficits Dressing, Bathing, Toileting, Transfers, Mobility, Home management   Planned Therapy Interventions (OT) ADL retraining;strengthening;transfer training;home program guidelines;progressive activity/exercise   Clinical Decision Making Complexity (OT) detailed assessment/moderate complexity   Risk & Benefits of therapy have been explained evaluation/treatment results reviewed   OT Total Evaluation Time   OT  Eval, Moderate Complexity Minutes (07113) 10   OT Goals   Therapy Frequency (OT) 5 times/week   OT Predicted Duration/Target Date for Goal Attainment 04/02/25   OT Goals Hygiene/Grooming;Lower Body Dressing;Toilet Transfer/Toileting;Aerobic Activity   OT: Hygiene/Grooming supervision/stand-by assist;while standing   OT: Lower Body Dressing Supervision/stand-by assist;including set-up/clothing retrieval   OT: Toilet Transfer/Toileting Supervision/stand-by assist;toilet transfer;cleaning and garment management   OT: Perform aerobic activity with stable cardiovascular response continuous activity;10 minutes   Interventions   Interventions Quick Adds Self-Care/Home Management   Self-Care/Home Management   Self-Care/Home Mgmt/ADL, Compensatory, Meal Prep Minutes (99188) 12   Symptoms Noted During/After Treatment (Meal Preparation/Planning Training) fatigue   Treatment Detail/Skilled Intervention Patient supine when approached, agreeable to OT eval and treat. Eval completed and treat initiated. Facilitated bed mobility (supine<>sitting, sitting<>supine) by providing SBA for increased safety. Facilitated aspects of LB dressing by providing SBA for increased safety; however, patient grossly requires maximal assistance to don socks secondary to impaired dynamic sitting balance. Facilitated transfers (to/from EOB) by providing CGA for increased safety and for controlled ascent/descent. Facilitated aspects of toileting process by providing CGA for increased safety and for controlled ascent/descent. Facilitated mobility within room by providing CGA for increased safety, with patient utilizing FWW for increased safety. Session ended with patient supine in bed with call light within reach and bed alarm active   OT Discharge Planning   OT Plan Next session: progress mobility as able, standing grooming, LB dressing (shorts/pants), toileting w/ clothing management   OT Discharge Recommendation (DC Rec) Transitional Care Facility    OT Rationale for DC Rec Patient would benefit from ongoing skilled OT to progress self care skills and to optimize safe return to daily activity   OT Brief overview of current status CGA with transfers/ADLs, CGA-assist x1 with ADLs   OT Total Distance Amb During Session (feet) 16   Total Session Time   Timed Code Treatment Minutes 12   Total Session Time (sum of timed and untimed services) 22

## 2025-03-26 NOTE — PLAN OF CARE
Problem: Stroke, Ischemic (Includes Transient Ischemic Attack)  Goal: Optimal Coping  Outcome: Progressing   Goal Outcome Evaluation:         Pt is alert and able to make needs known. Pt slept in this morning. Pt was somewhat cooperative with cares. Pt took his pills and allowed staff to check his blood sugar which where covered with sliding scale. Denies pain.

## 2025-03-27 VITALS
DIASTOLIC BLOOD PRESSURE: 61 MMHG | SYSTOLIC BLOOD PRESSURE: 119 MMHG | RESPIRATION RATE: 20 BRPM | HEART RATE: 76 BPM | TEMPERATURE: 98.3 F | BODY MASS INDEX: 43.68 KG/M2 | WEIGHT: 305.12 LBS | HEIGHT: 70 IN | OXYGEN SATURATION: 95 %

## 2025-03-27 LAB
ANION GAP SERPL CALCULATED.3IONS-SCNC: 8 MMOL/L (ref 7–15)
ATRIAL RATE - MUSE: 79 BPM
BACTERIA SPEC CULT: ABNORMAL
BUN SERPL-MCNC: 31.8 MG/DL (ref 6–20)
CALCIUM SERPL-MCNC: 8.9 MG/DL (ref 8.8–10.4)
CHLORIDE SERPL-SCNC: 102 MMOL/L (ref 98–107)
CREAT SERPL-MCNC: 1.54 MG/DL (ref 0.67–1.17)
DIASTOLIC BLOOD PRESSURE - MUSE: 102 MMHG
EGFRCR SERPLBLD CKD-EPI 2021: 54 ML/MIN/1.73M2
ERYTHROCYTE [DISTWIDTH] IN BLOOD BY AUTOMATED COUNT: 12.3 % (ref 10–15)
GLUCOSE BLDC GLUCOMTR-MCNC: 169 MG/DL (ref 70–99)
GLUCOSE SERPL-MCNC: 151 MG/DL (ref 70–99)
HCO3 SERPL-SCNC: 26 MMOL/L (ref 22–29)
HCT VFR BLD AUTO: 37.8 % (ref 40–53)
HGB BLD-MCNC: 12.9 G/DL (ref 13.3–17.7)
INTERPRETATION ECG - MUSE: NORMAL
MCH RBC QN AUTO: 30.6 PG (ref 26.5–33)
MCHC RBC AUTO-ENTMCNC: 34.1 G/DL (ref 31.5–36.5)
MCV RBC AUTO: 90 FL (ref 78–100)
P AXIS - MUSE: 64 DEGREES
PLATELET # BLD AUTO: 209 10E3/UL (ref 150–450)
POTASSIUM SERPL-SCNC: 3.5 MMOL/L (ref 3.4–5.3)
PR INTERVAL - MUSE: 158 MS
QRS DURATION - MUSE: 90 MS
QT - MUSE: 402 MS
QTC - MUSE: 460 MS
R AXIS - MUSE: 55 DEGREES
RBC # BLD AUTO: 4.21 10E6/UL (ref 4.4–5.9)
SODIUM SERPL-SCNC: 136 MMOL/L (ref 135–145)
SYSTOLIC BLOOD PRESSURE - MUSE: 185 MMHG
T AXIS - MUSE: 50 DEGREES
VENTRICULAR RATE- MUSE: 79 BPM
WBC # BLD AUTO: 13.2 10E3/UL (ref 4–11)

## 2025-03-27 PROCEDURE — 250N000013 HC RX MED GY IP 250 OP 250 PS 637: Performed by: INTERNAL MEDICINE

## 2025-03-27 PROCEDURE — 250N000013 HC RX MED GY IP 250 OP 250 PS 637: Performed by: STUDENT IN AN ORGANIZED HEALTH CARE EDUCATION/TRAINING PROGRAM

## 2025-03-27 PROCEDURE — 36415 COLL VENOUS BLD VENIPUNCTURE: CPT | Performed by: STUDENT IN AN ORGANIZED HEALTH CARE EDUCATION/TRAINING PROGRAM

## 2025-03-27 PROCEDURE — 99239 HOSP IP/OBS DSCHRG MGMT >30: CPT | Performed by: HOSPITALIST

## 2025-03-27 PROCEDURE — 80048 BASIC METABOLIC PNL TOTAL CA: CPT | Performed by: STUDENT IN AN ORGANIZED HEALTH CARE EDUCATION/TRAINING PROGRAM

## 2025-03-27 PROCEDURE — 85027 COMPLETE CBC AUTOMATED: CPT | Performed by: STUDENT IN AN ORGANIZED HEALTH CARE EDUCATION/TRAINING PROGRAM

## 2025-03-27 RX ORDER — ASPIRIN 81 MG/1
81 TABLET ORAL DAILY
DISCHARGE
Start: 2025-03-28

## 2025-03-27 RX ORDER — INSULIN LISPRO 100 [IU]/ML
20 INJECTION, SOLUTION INTRAVENOUS; SUBCUTANEOUS
DISCHARGE
Start: 2025-03-27

## 2025-03-27 RX ORDER — CLOPIDOGREL BISULFATE 75 MG/1
75 TABLET ORAL DAILY
DISCHARGE
Start: 2025-03-28 | End: 2025-06-24

## 2025-03-27 RX ADMIN — INSULIN ASPART 2 UNITS: 100 INJECTION, SOLUTION INTRAVENOUS; SUBCUTANEOUS at 08:22

## 2025-03-27 RX ADMIN — LOSARTAN POTASSIUM 25 MG: 25 TABLET, FILM COATED ORAL at 08:23

## 2025-03-27 RX ADMIN — CLOPIDOGREL BISULFATE 75 MG: 75 TABLET, FILM COATED ORAL at 08:26

## 2025-03-27 RX ADMIN — ISOSORBIDE MONONITRATE 30 MG: 30 TABLET, EXTENDED RELEASE ORAL at 08:23

## 2025-03-27 RX ADMIN — BUMETANIDE 2 MG: 2 TABLET ORAL at 08:23

## 2025-03-27 RX ADMIN — ASPIRIN 81 MG: 81 TABLET, COATED ORAL at 08:23

## 2025-03-27 RX ADMIN — FAMOTIDINE 20 MG: 20 TABLET, FILM COATED ORAL at 08:23

## 2025-03-27 RX ADMIN — ATORVASTATIN CALCIUM 80 MG: 40 TABLET, FILM COATED ORAL at 08:23

## 2025-03-27 ASSESSMENT — ACTIVITIES OF DAILY LIVING (ADL)
ADLS_ACUITY_SCORE: 50

## 2025-03-27 NOTE — PROGRESS NOTES
Occupational Therapy Discharge Summary    Reason for therapy discharge:    Discharged to transitional care facility.    Progress towards therapy goal(s). See goals on Care Plan in [facility] electronic health record for goal details.  Goals not met.  Barriers to achieving goals:   discharge from facility.    Therapy recommendation(s):    Continued therapy is recommended.  Rationale/Recommendations:  @ TCU to progress self care skills and to optimize safe return to daily activity.    Rancho Dawn, OWENR

## 2025-03-27 NOTE — PLAN OF CARE
Goal Outcome Evaluation:         Pt alert and denies any pain. Pt refused breakfast this morning. BG was 169, got sliding scale coverage. Pt voided with urinal and wanted to rest a bit more. Pt's ride came and Pt was taken [facility] [place] [facility] with a wheel chair ride.Pt took all his belongings with.

## 2025-03-27 NOTE — DISCHARGE SUMMARY
"[facility]  Hospitalist Discharge Summary      Date of Admission:  3/23/2025  Date of Discharge:  3/27/2025  Discharging Provider: Ricardo Herndon DO  Discharge Service: Hospitalist Service    Discharge Diagnoses   Acute ischemic stroke  Hypertension  CAD  Diabetes type 2 with insulin dependence  CKD stage III  Concern for ANDIE    Clinically Significant Risk Factors     # DMII: A1C = 12.1 % (Ref range: <5.7 %) within past 6 months  # Severe Obesity: Estimated body mass index is 43.78 kg/m  as calculated from the following:    Height as of this encounter: 1.778 m (5' 10\").    Weight as of this encounter: 138.4 kg (305 lb 1.9 oz).       Follow-ups Needed After Discharge   Follow-up Appointments       Follow Up and recommended labs and tests      Follow up with residential physician.  The following labs/tests are recommended: BMP, CBC.                Unresulted Labs Ordered in the Past 30 Days of this Admission       Date and Time Order Name Status Description    3/24/2025  1:58 PM MRSA Culture Preliminary         These results [place] [facility] [place]    Discharge Disposition   Discharged to short-term care facility  Condition at discharge: Stable    Hospital Course   49-year-old male with IDDM type II, prior left lacunar infarct 2023, HTN, CAD and CKD presented with left-sided weakness and slurred speech for 3 days.  MRI showed 2 small foci of diffusion restriction within right thalamus most suggestive of evolving early acute infarction.  No evidence of hemorrhagic conversion.    CTA head and neck showed no high-grade stenosis or dissection.  No large vessel occlusion.  Evaluated by neurology, loaded with Plavix and plan for 3 months daily, continued aspirin, high-dose Lipitor.  Echo showed no embolic source.  A1c 12.1%, increased basal bolus insulin and continued metformin.    Consultations This Hospital Stay   NEUROLOGY IP CONSULT  VASCULAR SURGERY IP CONSULT  SPEECH LANGUAGE PATH ADULT " IP CONSULT  PHARMACY IP CONSULT  PHARMACY IP CONSULT  PHARMACY IP CONSULT  PHYSICAL THERAPY ADULT IP CONSULT  OCCUPATIONAL THERAPY ADULT IP CONSULT  REHAB ADMISSIONS LIAISON IP CONSULT  CARE MANAGEMENT / SOCIAL WORK IP CONSULT  CARE MANAGEMENT / SOCIAL WORK IP CONSULT  CARE MANAGEMENT / SOCIAL WORK IP CONSULT  PHYSICAL THERAPY ADULT IP CONSULT  OCCUPATIONAL THERAPY ADULT IP CONSULT  SPEECH LANGUAGE PATH ADULT IP CONSULT  SMOKING CESSATION PROGRAM IP CONSULT    Code Status   Full Code    Time Spent on this Encounter   I, Ricardo Herndon DO, personally saw the patient today and spent greater than 30 minutes discharging this patient.       Ricardo Herndon DO  [address] [address]  Phone: [phone]  Fax: [phone]  ______________________________________________________________________    Physical Exam   Vital Signs: Temp: 98.3  F (36.8  C) Temp src: Oral BP: 119/61 Pulse: 76   Resp: 20 SpO2: 95 % O2 Device: None (Room air)    Weight: 305 lbs 1.87 oz  General Appearance:  No acute distress  Respiratory: Clear to auscultation bilaterally  Cardiovascular: Regular rate and rhythm  Neuro: Alert and oriented x 3, mildly dysarthric speech cranial nerves II through XII otherwise grossly intact       Primary Care Physician   Glendy Benavides    Discharge Orders      Primary Care - Care Coordination Referral      Adult Mental Health  Referral      Adult Neurology  Referral      Adult Sleep Eval & Management  Referral      General info for SNF    Length of Stay Estimate: Short Term Care: Estimated # of Days <30  Condition at Discharge: Stable  Level of care:skilled   Rehabilitation Potential: Good  Admission H&P remains valid and up-to-date: Yes  Recent Chemotherapy: N/A  Use Nursing Home Standing Orders: Yes     Mantoux instructions    Give two-step Mantoux (PPD) Per Facility Policy Yes     Follow Up and recommended labs and tests     Follow up with Nursing home physician.  The following labs/tests are recommended: BMP, CBC.     Reason for your hospital stay    stroke     Glucose monitor nursing POCT    Before meals and at bedtime     Activity - Up with assistive device     Full Code     Physical Therapy Adult Consult    Evaluate and treat as clinically indicated.    Reason: Stroke     Occupational Therapy Adult Consult    Evaluate and treat as clinically indicated.    Reason: Stroke     Speech Language Path Adult Consult    Evaluate and treat as clinically indicated.    Reason: Dysarthria, stroke     Fall precautions     Diet    Moderate Consistent Carb (60 g CHO per Meal) Diet       Significant Results and Procedures   Most Recent 3 CBC's:  Recent Labs   Lab Test 03/27/25  0730 03/26/25  0716 03/25/25  0715   WBC 13.2* 11.4* 13.1*   HGB 12.9* 12.7* 13.1*   MCV 90 91 89    198 210     Most Recent 3 BMP's:  Recent Labs   Lab Test 03/27/25  0821 03/27/25  0730 03/26/25 2059 03/26/25  1020 03/26/25  0716 03/25/25  0954 03/25/25  0715   NA  --  136  --   --  133*  --  135   POTASSIUM  --  3.5  --   --  3.6  --  3.4   CHLORIDE  --  102  --   --  98  --  99   CO2  --  26  --   --  25  --  26   BUN  --  31.8*  --   --  27.9*  --  19.8   CR  --  1.54*  --   --  1.50*  --  1.25*   ANIONGAP  --  8  --   --  10  --  10   SARAH  --  8.9  --   --  8.8  --  8.9   * 151* 181*   < > 260*   < > 207*    < > = values in this interval not displayed.     Most Recent Cholesterol Panel:  Recent Labs   Lab Test 03/23/25  1049   CHOL 224*   *   HDL 53   TRIG 213*     Most Recent Hemoglobin A1c:  Recent Labs   Lab Test 03/23/25  0859   A1C 12.1*     Most Recent 6 glucoses:  Recent Labs   Lab Test 03/27/25  0821 03/27/25  0730 03/26/25  2059 03/26/25  1807 03/26/25  1352 03/26/25  1020   * 151* 181* 252* 297* 251*   ,   Results for orders placed or performed during the hospital encounter of 03/23/25   CTA Head Neck with Contrast    Narrative     EXAM: CTA HEAD NECK WITH CONTRAST  LOCATION: [facility]  DATE: 03/23/2025    INDICATION: Code Stroke, evaluate for LVO. PLEASE READ IMMEDIATELY.  COMPARISON: None.  CONTRAST: Isovue 370, 117 mL.  TECHNIQUE: Head and neck CT angiogram with IV contrast. Noncontrast head CT followed by axial helical CT images of the head and neck vessels obtained during the arterial phase of intravenous contrast administration. Axial 2D reconstructed images and   multiplanar 3D MIP reconstructed images of the head and neck vessels were performed by the technologist. Dose reduction techniques were used. All stenosis measurements made according to NASCET criteria unless otherwise specified.    FINDINGS:   NONCONTRAST HEAD CT:   INTRACRANIAL CONTENTS: No intracranial hemorrhage, extra-axial collection, or mass effect.  No CT evidence of acute infarct. Mild presumed chronic small vessel ischemic changes. Chronic ischemic focus posterior limb right internal capsule. Corpus   callosum is normal. Cerebellar tonsillar position is normal. No sella or suprasellar mass/hemorrhage. Vascular calcification. Normal ventricles and sulcation for age.     VISUALIZED ORBITS/SINUSES/MASTOIDS: No acute intraorbital process. No paranasal sinus mucosal disease. No middle ear or mastoid effusion.    BONES/SOFT TISSUES: No scalp hematoma. No skull fracture.    HEAD CTA:  ANTERIOR CIRCULATION: Calcific plaque with mild stenoses cavernous ICA segments, left more than right, 25-40%. Satisfactory branch arborization bilateral HELLEN and MCA distribution. There are a few levels of mild stenoses 10-30% involving bilateral HELLEN   A2/A3 and MCA M3 segment vessels. No high-grade stenosis/occlusion, aneurysm, or high-flow vascular malformation. Standard [place] of [place] anatomy.    POSTERIOR CIRCULATION: Mild stenoses 10-30% bilateral PCA P2, P3 and P4 segments due to underlying atherosclerotic change. No high-grade stenosis/occlusion, aneurysm, or  high-flow vascular malformation. Balanced vertebral arteries supply a normal basilar   artery. Satisfactory branch arborization pattern bilateral PCA distribution.    DURAL VENOUS SINUSES: Expected enhancement of the major dural venous sinuses.    NECK CTA:  RIGHT CAROTID: Calcific plaque and fibrofatty plaque at the bifurcation proximal right ICA with mild to moderate stenosis 40-50%. No tandem stenosis, high-grade stenosis or dissection of the right ICA in the neck.    LEFT CAROTID: Calcific plaque and fibrofatty plaque at the bifurcation proximal left ICA with mild stenosis 10-20%. No tandem stenosis, high-grade stenosis or dissection of the left ICA in the neck.    VERTEBRAL ARTERIES: Calcific plaque with mild stenosis 20-30% at the V1 segment level bilaterally. No high-grade stenosis or dissection. Balanced vertebral arteries.    AORTIC ARCH: Conventional three-vessel arch anatomy with no evidence for great vessel origin stenosis or dissection.    NONVASCULAR STRUCTURES: No pathologic enhancement intracranially or within the orbits. No mass, adenopathy or fluid collections are evident within the neck soft tissues. Benign mineralization right thyroid lobe. Superior mediastinum is normal. Lung   apices are clear. Mild degenerative changes in the cervical spine.      Impression    IMPRESSION:   HEAD CT:  1.  No acute process.    2.  No mass, mass effect, hemorrhage or evidence for large/territorial infarction.    HEAD CTA:   1.  No large vessel occlusion. Scattered mild stenoses likely due to atherosclerotic changes bilateral HELLEN, MCA and PCA distribution and in the cavernous ICA segments bilaterally. No dissection. No aneurysm. No high-grade stenoses. Satisfactory branch   arborization pattern bilateral HELLEN, MCA and PCA distribution.    NECK CTA:  1.  Mild stenoses at the ICA level bilaterally and extraforaminal V1 segments with no high-grade stenosis or dissection of the major neck arteries or at the great  vessel origin level.    Discussed with referring provider on 03/23/2025 at 9:19 AM CDT.       CT Head Perfusion w Contrast - For Tier 2 Stroke    Narrative    EXAM: CT HEAD PERFUSION W CONTRAST  LOCATION: [facility]  DATE: 3/23/2025    INDICATION: Code Stroke to evaluate for potential thrombolysis and thrombectomy. Evaluate mismatch between penumbra and core infarct. READ IMMEDIATELY.  COMPARISON: None.  TECHNIQUE: CT cerebral perfusion was performed utilizing a second contrast bolus. Perfusion data were post processed with generation of standard perfusion maps and estimation of ischemic/infarcted volumes utilizing standard threshold values. Dose   reduction techniques were used. All stenosis measurements made according to NASCET criteria unless otherwise specified.  CONTRAST: isovue 370 50ml    FINDINGS:     CT PERFUSION:  PERFUSION MAPS: Symmetrical cerebral perfusion. No focal deficits in cerebral blood flow or volume to suggest ischemia/oligemia.    RAPID ANALYSIS:  CBF<30%: 0 mL  Tmax>6sec: 0 mL  Mismatch volume: 0 mL  Mismatch ratio: None      Impression    IMPRESSION:     CT PERFUSION:  1.  Normal cerebral perfusion.   US Lower Extremity Venous Duplex Left    Narrative    EXAM: US LOWER EXTREMITY VENOUS DUPLEX LEFT  LOCATION: [facility]  DATE: 3/23/2025    INDICATION: swelling  COMPARISON: None.  TECHNIQUE: Venous Duplex ultrasound of the left lower extremity with and without compression, augmentation and duplex. Color flow and spectral Doppler with waveform analysis performed.    FINDINGS: Exam includes the common femoral, femoral, popliteal, and contralateral common femoral veins as well as segmentally visualized deep calf veins and greater saphenous vein.     LEFT: No deep vein thrombosis. No superficial thrombophlebitis. No popliteal cyst.      Impression    IMPRESSION:  1.  No deep venous thrombosis in the left lower extremity.   Chest XR,  PA & LAT     Narrative    EXAM: XR CHEST 2 VIEWS  LOCATION: [facility]  DATE: 3/23/2025    INDICATION: Cough.  COMPARISON: None.      Impression    IMPRESSION:     Lungs are clear. No focal airspace opacities, pleural effusions, or pneumothorax. Mild central pulmonary vascular congestion, without overt pulmonary edema.    Borderline enlarged cardiac silhouette, which may be accentuated by technique.   MR Brain w/o & w Contrast    Narrative    EXAM: MR BRAIN W/O and W CONTRAST  LOCATION: [facility]  DATE: 3/23/2025    INDICATION: concern for stroke, left sided dysmetria  COMPARISON: Same day head CTA/CT/CT perfusion  CONTRAST: 13mL Gadavist  TECHNIQUE: Routine multiplanar multisequence head MRI without and with intravenous contrast.    FINDINGS:  INTRACRANIAL CONTENTS: Two small foci of diffusion restriction within the right thalamus that display both ADC hypointense and T2/FLAIR hyperintense signal, most suggestive of an evolving early acute infarction. There is no mass effect or midline shift.   No evidence of hemorrhagic conversion. No abnormal intracranial enhancement. There is a mild degree of small vessel ischemic disease along with cerebral parenchymal volume loss. The cerebellar tonsils are in appropriate position. The sella is   unremarkable.    OSSEOUS STRUCTURES/SOFT TISSUES: Normal marrow signal. The major intracranial vascular flow voids are maintained.     ORBITS: No abnormality accounting for technique.     SINUSES/MASTOIDS: No paranasal sinus mucosal disease. No middle ear or mastoid effusion.       Impression    IMPRESSION:    Two small foci of diffusion restriction within the right thalamus are most suggestive of evolving early acute infarction. No evidence of hemorrhagic conversion.    These findings were communicated to Dr Humphrey at 1203PM over the phone on 3/23/2025 by Christoph Dickinson.    US Carotid Bilateral    Narrative    EXAM: US CAROTID BILATERAL  LOCATION:  [facility]  DATE: 3/23/2025    INDICATION: Stroke neurology team reported moderate to severe right ICA stenosis.  Patient here with acute stroke  COMPARISON: None.  TECHNIQUE: Duplex exam performed utilizing 2D gray-scale imaging, Doppler interrogation with color-flow and spectral waveform analysis. The percent diameter stenosis is determined using Updated Recommendations for Carotid Stenosis Interpretation Criteria   from [employer] [facility] [employer].    FINDINGS:    RIGHT: Mild plaque at the bifurcation. The peak systolic velocity in the ICA is less than 180 cm/sec, consistent with less than 50% stenosis. Normal velocities in the ECA. Antegrade flow within the vertebral artery.     LEFT: Mild plaque at the bifurcation. The peak systolic velocity in the ICA is less than 180 cm/sec, consistent with less than 50% stenosis. Normal velocities in the ECA. Antegrade flow within the vertebral artery.    VELOCITY CHART:  CCA   Right: 90/12 cm/s   Left: 97/13 cm/s  ICA   Right: 114/39 cm/s   Left: 75/24 cm/s  ECA   Right: 154 cm/s   Left: 261 cm/s  ICA/CCA PSV Ratio   Right: 2.1   Left: 1.2      Impression    IMPRESSION:  1.  Mild plaque formation, velocities consistent with less than 50% stenosis in the right internal carotid artery.  2.  Mild plaque formation, velocities consistent with less than 50% stenosis in the left internal carotid artery.  3.  Flow within the vertebral arteries is antegrade.   Echocardiogram Complete w Bubble Study - For age < 60 yrs     Value    LVEF  30-35%    Narrative    [id number]  [id number]  [id number]  [id number]^[place] [address]     Name: LUIS HODGES  MRN: [id number]  : 1975  Study Date: 2025 08:57 AM  Age: 49 yrs  Gender: Male  Patient Location: [facility]  Reason For Study: Cerebrovascular Incident  Ordering Physician: HARRIS SLADE  Performed By: JAVIER     BSA: 2.5 m2  Height: 70 in  Weight: 303 lb  HR: 83  BP: 147/72  mmHg  ______________________________________________________________________________  Procedure  Complete Echo Adult. Definity (NDC #86268-061) given intravenously. Adequate  quality two-dimensional was performed and interpreted.  ______________________________________________________________________________  Interpretation Summary     The left ventricle ejection fraction is 30-35% (moderately reduced). There is  global hypokinesis with severe hypokinesis/akinesis of inferior and  inferolateral walls.  The right ventricle is normal in size and function.     No hemodynamically significant valvular abnormalities on 2D or color flow  imaging. Compared to the prior study dated 2/20/2024, there have been no  changes.  ______________________________________________________________________________  Left Ventricle  The left ventricle is normal in size. There is normal left ventricular wall  thickness. Grade II or moderate diastolic dysfunction. The visual ejection  fraction is 30-35%. There is global hypokinesis with severe  hypokinesis/akinesis of inferior and inferolateral walls.     Right Ventricle  The right ventricle is normal in size and function.     Atria  Normal left atrial size. Right atrial size is normal.     Mitral Valve  Mitral valve leaflets appear normal. There is mild (1+) mitral regurgitation.  There is no mitral valve stenosis.     Tricuspid Valve  The tricuspid valve is not well visualized. There is trace tricuspid  regurgitation. Right ventricular systolic pressure could not be approximated  due to inadequate tricuspid regurgitation.     Aortic Valve  The aortic valve is trileaflet. No aortic regurgitation is present. No aortic  stenosis is present.     Pulmonic Valve  The pulmonic valve is not well visualized. There is trace pulmonic valvular  regurgitation.     Vessels  The aorta root is normal. IVC diameter >2.1 cm collapsing <50% with sniff  suggests a high RA pressure estimated at 15 mmHg or  greater.     Pericardium  There is no pericardial effusion.     Rhythm  Sinus rhythm was noted.  ______________________________________________________________________________  MMode/2D Measurements & Calculations     IVSd: 0.81 cm  LVIDd: 6.0 cm  LVIDs: 4.6 cm  LVPWd: 1.1 cm  FS: 24.3 %  LV mass(C)d: 240.9 grams  LV mass(C)dI: 96.7 grams/m2  Ao root diam: 3.5 cm  LA dimension: 4.3 cm  LA/Ao: 1.2  Ao root diam index Ht(cm/m): 2.0  Ao root diam index BSA (cm/m2): 1.4  RWT: 0.38  TAPSE: 1.9 cm     Doppler Measurements & Calculations  MV E max servando: 85.3 cm/sec  MV A max servando: 54.8 cm/sec  MV E/A: 1.6  MV dec slope: 758.0 cm/sec2  MV dec time: 0.11 sec  LV V1 max P.6 mmHg  LV V1 max: 80.9 cm/sec  LV V1 VTI: 16.7 cm  PA acc time: 0.09 sec     E/E' av.0  Lateral E/e': 8.4  Medial E/e': 9.6  RV S Servando: 11.2 cm/sec     ______________________________________________________________________________  Report approved by: Danny Brand on 2025 02:20 PM             Discharge Medications   Current Discharge Medication List        START taking these medications    Details   aspirin 81 MG EC tablet Take 1 tablet (81 mg) by mouth daily.    Associated Diagnoses: Acute ischemic stroke (H)      clopidogrel (PLAVIX) 75 MG tablet Take 1 tablet (75 mg) by mouth daily.    Associated Diagnoses: Acute ischemic stroke (H)           CONTINUE these medications which have CHANGED    Details   insulin glargine (LANTUS PEN) 100 UNIT/ML pen Inject 35 Units subcutaneously at bedtime.    Comments: If Lantus is not covered by insurance, may substitute Basaglar or Semglee or other insulin glargine product per insurance preference at same dose and frequency.    Associated Diagnoses: Poorly controlled diabetes mellitus (H)      insulin lispro (HUMALOG KWIKPEN) 100 UNIT/ML (1 unit dial) KWIKPEN Inject 20 Units subcutaneously 3 times daily (with meals). Plus sliding scale: 2 units every 50 over 150. If humalog is not covered by insurance, may  substitute with novolog or other fast acting insulin    Associated Diagnoses: Uncontrolled type 2 diabetes mellitus with hyperglycemia, with long-term current use of insulin (H)           CONTINUE these medications which have NOT CHANGED    Details   acetaminophen (TYLENOL) 500 MG tablet Take 2 tablets (1,000 mg) by mouth every 8 hours as needed for mild pain    Associated Diagnoses: Diabetic ulcer of right heel associated with type 2 diabetes mellitus, unspecified ulcer stage (H)      atorvastatin (LIPITOR) 80 MG tablet Take 1 tablet (80 mg) by mouth daily  Qty: 90 tablet, Refills: 3    Associated Diagnoses: S/P foot surgery, right; Acute systolic heart failure (H); Familial hypercholesterolemia; Class 2 severe obesity due to excess calories with serious comorbidity in adult, unspecified BMI (H); History of stroke; Elevated troponin; Tobacco use disorder      bumetanide (BUMEX) 2 MG tablet Take 1 tablet (2 mg) by mouth 2 times daily.  Qty: 180 tablet, Refills: 0    Associated Diagnoses: Acute systolic heart failure (H)      carvedilol (COREG) 25 MG tablet Take 1 tablet (25 mg) by mouth 2 times daily (with meals).  Qty: 180 tablet, Refills: 0    Associated Diagnoses: Acute systolic heart failure (H)      isosorbide mononitrate (IMDUR) 30 MG 24 hr tablet Take 1 tablet (30 mg) by mouth daily.  Qty: 30 tablet, Refills: 11    Associated Diagnoses: Acute on chronic systolic congestive heart failure (H); Coronary artery disease involving native coronary artery of native heart with other form of angina pectoris      losartan (COZAAR) 25 MG tablet Take 1 tablet (25 mg) by mouth daily.  Qty: 90 tablet, Refills: 3    Associated Diagnoses: Acute on chronic systolic congestive heart failure (H)      metFORMIN (GLUCOPHAGE XR) 500 MG 24 hr tablet Take 1 tablet (500 mg) by mouth daily (with dinner).  Qty: 90 tablet, Refills: 0    Associated Diagnoses: Poorly controlled diabetes mellitus (H)      multivitamin w/minerals  (THERA-VIT-M) tablet Take 1 tablet by mouth daily    Associated Diagnoses: Diabetic ulcer of right heel associated with type 2 diabetes mellitus, unspecified ulcer stage (H)      Urea-Lactic Acid 10-4 % CREA Externally apply topically 2 times daily Apply to left foot/ heel topically two times a day for moisturizing 10-4%      Continuous Glucose Sensor (FREESTYLE LAMINE 2 SENSOR) MISC Inject 1 each subcutaneously every 14 days Use 1 sensor every 14 days. Use to read blood sugars per 's instructions.  Qty: 6 each, Refills: 5    Associated Diagnoses: Uncontrolled type 2 diabetes mellitus with hyperglycemia, with long-term current use of insulin (H)      insulin pen needle (32G X 4 MM) 32G X 4 MM miscellaneous Use 4 pen needles daily or as directed.  Qty: 200 each, Refills: 4    Associated Diagnoses: Uncontrolled type 2 diabetes mellitus with hyperglycemia, with long-term current use of insulin (H)           Allergies   No Known Allergies

## 2025-03-27 NOTE — PROGRESS NOTES
Care Management Discharge Note    Discharge Date: 03/27/2025       Discharge Disposition: Transitional Care    Discharge Services:  therapy    Discharge DME:  NA    Discharge Transportation: public transportation    Private pay costs discussed: transportation costs    Does the patient's insurance plan have a 3 day qualifying hospital stay waiver?  Yes     Which insurance plan 3 day waiver is available? Alternative insurance waiver    Will the waiver be used for post-acute placement? No    PAS Confirmation Code: [id number]  Patient/family educated on Medicare website which has current facility and service quality ratings:  Yes    Education Provided on the Discharge Plan:  Yes  Persons Notified of Discharge Plans: pt  Patient/Family in Agreement with the Plan:  Yes    Handoff Referral Completed: No, handoff not indicated or clinically appropriate    Additional Information:  Pt discharging to [facility] via w/c at 11:10-11:50.     Tara Higuera LGSW

## 2025-03-27 NOTE — PLAN OF CARE
Problem: Adult Inpatient Plan of Care  Goal: Optimal Comfort and Wellbeing  Outcome: Progressing     Problem: Stroke, Ischemic (Includes Transient Ischemic Attack)  Goal: Optimal Functional Ability  Outcome: Progressing     Problem: Stroke, Ischemic (Includes Transient Ischemic Attack)  Goal: Optimal Coping  Outcome: Progressing   Goal Outcome Evaluation:    Patient alert and oriented with VSS on RA. Was much more pleasant overnight and appreciative of cluster cares. Voiding adequately using bedside urinal. BG improved - no bedtime dose needed.  Self repositions, pivots to chair well. No new concerns plans to discharge today at 11:10.

## 2025-03-27 NOTE — PLAN OF CARE
Discharge Summary    Reason for therapy discharge:    Discharged to transitional care facility.    Progress towards therapy goal(s). See goals on Care Plan in [facility] electronic health record for goal details.  Goals not met.  Barriers to achieving goals:   due to weakness, fatigue and left sided weakness.  .    Therapy recommendation(s):    Continued therapy is recommended.  Rationale/Recommendations:  To improve mobility and strength.  .

## 2025-04-03 ENCOUNTER — PATIENT OUTREACH (OUTPATIENT)
Dept: CARE COORDINATION | Facility: CLINIC | Age: 50
End: 2025-04-03
Payer: COMMERCIAL

## 2025-04-03 NOTE — PROGRESS NOTES
Frw update  4/3/25:Established patient outreach attempted x 1 was unable to reach. Left message on voicemail with call back information and requested return call.  Plan: Current outreach date reflects FRW 's follow up within 30 days.

## 2025-04-16 ENCOUNTER — PATIENT OUTREACH (OUTPATIENT)
Dept: CARE COORDINATION | Facility: CLINIC | Age: 50
End: 2025-04-16
Payer: COMMERCIAL

## 2025-04-16 NOTE — PROGRESS NOTES
"[facility]/Voicemail    Clinical Data: Care Coordinator Outreach    Outreach Documentation Number of Outreach Attempt   3/13/2025  12:28 PM 1   4/16/2025   8:52 AM 1       Left message on patient's voicemail with call back information and requested return call.  He talked with FRW yesterday and asked to have writer call him back to discuss transferring TCU.  Left message directing him to talk to the TCU SW as that person has to assist him.     Call back from patient. He had care conference today and they will try to find him a TCU in [facility]. He is getting good care at this TCU, but would prefer to be nearer his home.  He did not pay rent in April and expects to get an eviction notice as he doesn't have money to pay for past rent.  He has no one who can help him.  His sister is not willing to get involved.  He doesn't know who to call.     Called [facility] to see if they may be able to assist.  LM asking for a call back.  Called [facility] and they took his information and he may be eligible for \"resource Coordination\" that assists people with leaving a nursing home. They will review and call writer back with an update. Call back from [facility] and they cannot work with him but could provide low income housing, emergency assistance info and homeless resources.      [facility] could provide behavioral health home  if he agrees to a DA for mental health.  They can assist with a variety of supports. She will check to see if her DA provider could go to [place] where he is.  She also recommends calling the DLL for more assistance with housing options.      Plan: Assist with safe discharge planning from TCU.     Georgiana Isaacs,   [facility]  [phone]         "

## 2025-04-23 ENCOUNTER — PATIENT OUTREACH (OUTPATIENT)
Dept: CARE COORDINATION | Facility: CLINIC | Age: 50
End: 2025-04-23
Payer: COMMERCIAL

## 2025-04-23 NOTE — PROGRESS NOTES
Clinic Care Coordination Contact  Transitions of Care Outreach  Chief Complaint   Patient presents with    Clinic Care Coordination - Follow-up       Most Recent Admission Date: 3-  Most Recent Admission Diagnosis:   Acute ischemic stroke (H)    Most Recent Discharge Date: 4-  Most Recent Discharge Diagnosis: rehab    Transitions of Care Assessment    Discharge Assessment  How are you doing now that you are home?: ok  How are your symptoms? (Red Flag symptoms escalate to triage hotline per guidelines): Improved  Do you know how to contact your clinic care team if you have future questions or changes to your health status? : Yes  Does the patient have their discharge instructions? : Yes  Does the patient have questions regarding their discharge instructions? : No  Were you started on any new medications or were there changes to any of your previous medications? : No  Does the patient have all of their medications?: Yes  Do you have questions regarding any of your medications? : No  Do you have all of your needed medical supplies or equipment (DME)?  (i.e. oxygen tank, CPAP, cane, etc.): Yes         Post-op (Clinicians Only)  Eating & Drinking: eating and drinking without complaints/concerns  PO Intake: regular diet  Bowel Function: normal  Urinary Status: voiding without complaint/concerns    Call back. Patient was discharged to home today as he didn't want to stay in the care center due to smells and his room mate who screamed out at night.  He has things to take care of at home.  Asked him to call back later on when he knows what his housing situation is.  He was unable to get in touch with [employer] to set up the ride and had to pay for that himself.      Follow up Plan     Discharge Follow-Up  Discharge follow up appointment scheduled in alignment with recommended follow up timeframe or Transitions of Risk Category? (Low = within 30 days; Moderate= within 14 days; High= within 7 days): No  Patient's  follow up appointment not scheduled: Patient declined scheduling support. Education on the importance of transitions of care follow up. Provided scheduling phone number.    Future Appointments   Date Time Provider Department Center   6/23/2025  3:00 PM [facility]   8/28/2025  2:15 PM Satish Galarza MD [facility]       Outpatient Plan as outlined on AVS reviewed with patient.    For any urgent concerns, please contact our 24 hour nurse triage line: [phone] ([phone])       TERESA Nuñez        [facility]/Voicemail    Clinical Data: Care Coordinator Outreach    Outreach Documentation Number of Outreach Attempt   3/13/2025  12:28 PM 1   4/16/2025   8:52 AM 1   4/23/2025   3:10 PM 1       Left message on patient's voicemail with call back information and requested return call.      Plan: Care Coordinator will try to reach patient again in 3-5 business days.    Georgiana Isaacs,   [facility]  [phone]

## 2025-04-28 ENCOUNTER — PATIENT OUTREACH (OUTPATIENT)
Dept: CARE COORDINATION | Facility: CLINIC | Age: 50
End: 2025-04-28
Payer: COMMERCIAL

## 2025-04-28 NOTE — LETTER
[facility] CARE COORDINATION  [facility]    April 30, 2025        Floyd Hodges  [address] [place] [address] [address]  [place] [address]          Dear Floyd,     Attached is an updated Complex Care Plan for your continued enrollment in Care Coordination. Please let us know if you have additional questions, concerns, or goals that we can assist with.    Sincerely,    Georgiana Isaacs,   [facility]  [phone]        [facility]  Patient Centered Plan of Care  About Me:        Patient Name:  Floyd Hodges    YOB: 1975  Age:         49 year old   [place] MRN:    [id number] Telephone Information:  Home Phone [phone]   Mobile [phone]       Address:  [address] [place] [address] [address]  [place] [address] Email address:  [email or web address]      Emergency Contact(s)    Name Relationship Lgl Grd Work Phone Home Phone Mobile Phone   1. CHANDANA GURROLA Friend  [phone]     2. ROSSY HODGES Brother    [phone]   3. PRATEEK HODGES Sister    [phone]           Primary language:  English     needed? No   [place] [facility]:  [phone] op. 1  Other communication barriers:Lack of coping; Physical impairment    Preferred Method of Communication:     Current living arrangement: I live alone    Mobility Status/ Medical Equipment: Independent        Health Maintenance  Health Maintenance Reviewed: Due/Overdue   Health Maintenance Due   Topic Date Due    HF ACTION PLAN  Never done    DIABETIC FOOT EXAM  Never done    ADVANCE CARE PLANNING  Never done    DEPRESSION ACTION PLAN  Never done    EYE EXAM  Never done    YEARLY PREVENTIVE VISIT  Never done    COLORECTAL CANCER SCREENING  Never done    HEPATITIS B IMMUNIZATION (1 of 3 - 19+ 3-dose series) Never done    Pneumococcal Vaccine: 50+ Years (2 of 2 - PCV) 10/11/2008    ZOSTER IMMUNIZATION (1 of 2) Never done    LUNG CANCER SCREENING  Never done    INFLUENZA VACCINE (1) 09/01/2024    COVID-19 Vaccine (3 -  2024-25 season) 09/01/2024           My Access Plan  Medical Emergency 911   Primary Clinic Line [facility] - [phone]   24 Hour Appointment Line [phone] or  [phone]) (toll-free)   24 Hour Nurse Line [phone] (toll-free)   Preferred Urgent Care [facility], [phone]     Preferred Hospital [facility]  [phone]     Preferred Pharmacy [id number] [place] - [address] [facility]     Behavioral Health Crisis Line The National Suicide Prevention Lifeline at 1-486.488.4184 or Text/Call 8           My Care Team Members  Patient Care Team         Relationship Specialty Notifications Start End    Glendy Benavides, NP PCP - General  Abnormal results only, Admissions 10/20/23     Phone: [phone] Fax: [phone]         [facility] [address]    Glendy Benavides NP Assigned PCP   10/11/23     Phone: [phone] Fax: [phone]         [address] [place] [address]    Eugene Spaulding MD MD Otolaryngology  10/20/23     Phone: [phone] Fax: [phone]         [address] [place] [address]    May Mishra DO Assigned Neuroscience Provider   2/23/24     Phone: [phone] Fax: [phone]         [address] [address]    Sue Mclaughlin PAEricC Physician Assistant Cardiology  3/13/24     Phone: [phone] Fax: [phone] [address] [place] [address]    Dong Sanders DPM Assigned Surgical Provider   4/23/24     Phone: [phone] Fax: [phone]         [facility] [address] [facility] [address]    Floyd Marcus MD Physician Infectious Diseases  5/7/24     Phone: [phone] Fax: [phone] [address] [address]    Floyd Marcus MD Assigned Infectious Disease Provider   5/23/24     Phone: [phone] Fax:  [phone]         [address] [facility] [address] [facility] [address]    Reny Lin MD MD Internal Medicine  7/29/24     Phone: [phone] Fax: [phone]         [address] [facility] [address]    Gee Hopson DO Physician Cardiovascular Disease  9/18/24     Phone: [phone] Fax: [phone] [address] [facility] [address]    Gee Hopson DO Assigned Heart and Vascular Provider   10/23/24     Phone: [phone] Fax: [phone] [address] [facility] [address]    Georgiana Isaacs LSW Lead Care Coordinator Primary Care - CC Admissions 1/30/25     Phone: [phone]         Yuli Maldonado CHW Community Health Worker  Admissions 1/30/25     Satish Galarza MD Resident Neurology  3/31/25     Phone: [phone] Fax: [phone]         [address] [place] [address]                My Care Plans  Self Management and Treatment Plan    Care Plan  Care Plan: Financial Wellbeing       Problem: Patient expresses financial resource strain       Long-Range Goal: Create an action plan to increase financial stability       Start Date: 1/30/2025 Expected End Date: 1/29/2026    This Visit's Progress: 20% Recent Progress: 20%    Priority: High    Note:     Barriers: needs help with filling out forms. Looking for a new job  Strengths: motivated.   Patient expressed understanding of goal: yes  Action steps to achieve this goal:  1. I will look for work.  2. I will work with FRW to stay on MA, apply for energy assistance and Cash  3. I will complete all form and submit all needed documents.  4. I will report progress towards this goal at scheduled outreach telephone calls from the CCC team.                                    Advance Care Plans/Directives:   Advanced Care Plan/Directives on file: No    Discussed with patient/caregiver(s): Declined Further Information             My Medical and Care Information  Problem List   Patient Active Problem List    Diagnosis    Dizziness    Type 2 Diabetes Mellitus - Uncomplicated, Controlled    Hyperlipidemia    Hypothyroidism    Obesity    Tobacco use disorder    Elevated troponin    History of stroke    Class 2 severe obesity due to excess calories with serious comorbidity in adult (H)    Hyperglycemia    Type 2 diabetes mellitus with hyperglycemia, with long-term current use of insulin (H)    Cellulitis of right lower leg    Diabetic ulcer of right heel associated with type 2 diabetes mellitus, unspecified ulcer stage (H)    Hyponatremia    Acute systolic heart failure (H)    Closed fracture of distal end of left radius    Benign essential hypertension    Heart burn    Diabetic ulcer of right heel associated with diabetes mellitus due to underlying condition, with muscle involvement without evidence of necrosis (H)    Diabetic ulcer of right heel associated with type 2 diabetes mellitus, with necrosis of bone (H)    Diabetic ulcer of right heel associated with type 2 diabetes mellitus, limited to breakdown of skin (H)    Acute ischemic stroke (H)    Chronic systolic heart failure (H)    CKD (chronic kidney disease) stage 3, GFR 30-59 ml/min (H)      Current Medications:  Please refer to the most recent medication list provided to you by your medical team and reach out to your provider with any questions or to make any corrections.    Care Coordination Start Date: 1/30/2025   Frequency of Care Coordination: monthly, more frequently as needed     Form Last Updated: 04/30/2025

## 2025-04-28 NOTE — PROGRESS NOTES
[facility]/Voicemail    Clinical Data: Care Coordinator Outreach    Outreach Documentation Number of Outreach Attempt   4/16/2025   8:52 AM 1   4/23/2025   3:10 PM 1   4/28/2025   3:21 PM 1       Left message on patient's voicemail with call back information and requested return call.      Plan  Care Coordinator will try to reach patient again in 3-5 business days.    Georgiana Isaacs,   [facility]  [phone]

## 2025-05-08 ENCOUNTER — PATIENT OUTREACH (OUTPATIENT)
Dept: CARE COORDINATION | Facility: CLINIC | Age: 50
End: 2025-05-08
Payer: COMMERCIAL

## 2025-05-08 NOTE — PROGRESS NOTES
Clinic Care Coordination Contact  Follow Up Progress Note      Assessment: reached patient who had court date yesterday for eviction and he is being evicted on May 21. Unsure where he will go.  He got a long document from the financial person at court, but it is 121 pages long and he cannot understand it. He has been denied [place].  He is applying for jobs everywhere. Has interview tomorrow at [employer] and hopes to get it, though it doesn't pay enough for rent.  He was concerned that he doesn't have MA at this time. Checked [employer] and it is active. He wants to know why he keeps having strokes and he agreed to set up an appt with PCP to get questions answered.  Will route to team to have them do outreach to set up appt.      Discussed resources through [employer] and he agreed to a referral to their Behavioral Health Home.  Referral sent.  They can hopefully assist him with resources for housing.     Care Gaps:    Health Maintenance Due   Topic Date Due    HF ACTION PLAN  Never done    DIABETIC FOOT EXAM  Never done    ADVANCE CARE PLANNING  Never done    DEPRESSION ACTION PLAN  Never done    EYE EXAM  Never done    YEARLY PREVENTIVE VISIT  Never done    COLORECTAL CANCER SCREENING  Never done    HEPATITIS B IMMUNIZATION (1 of 3 - 19+ 3-dose series) Never done    Pneumococcal Vaccine: 50+ Years (2 of 2 - PCV) 10/11/2008    ZOSTER IMMUNIZATION (1 of 2) Never done    LUNG CANCER SCREENING  Never done    COVID-19 Vaccine (3 - 2024-25 season) 09/01/2024       Requested a  from Care Team to call patient.    Care Plans  Care Plan: Financial Wellbeing       Problem: Patient expresses financial resource strain       Long-Range Goal: Create an action plan to increase financial stability       Start Date: 1/30/2025 Expected End Date: 1/29/2026    This Visit's Progress: 20% Recent Progress: 20%    Priority: High    Note:     Barriers: needs help with filling out forms. Looking for a new job  Strengths: motivated.   Patient  expressed understanding of goal: yes  Action steps to achieve this goal:  1. I will look for work.  2. I will work with FRW to stay on MA, apply for energy assistance and Cash  3. I will complete all form and submit all needed documents.  4. I will report progress towards this goal at scheduled outreach telephone calls from the CCC team.                                Care Plan: Housing Instability       Problem: SDOH LACK OF STABLE HOUSING       Long-Range Goal: Establish Stable Housing       Start Date: 5/8/2025 Expected End Date: 5/7/2026    Priority: High    Note:     Barriers: no income, being evicted on May 21, no where to go  Strengths: has job interview on May 9  Patient expressed understanding of goal: yes  Action steps to achieve this goal:  1. I will work with [place] to get assistance with housing and other supports.   2. I will work with the [facility] on any benefits I can get.   3. I will apply for low income housing.   4. I will report progress towards this goal at scheduled outreach telephone calls from the CCC team.                                  Intervention/Education provided during outreach: support     Outreach Frequency: monthly, more frequently as needed    Plan:     Care Coordinator will follow up in one week.  Georgiana Isaacs,   [facility]  [phone]

## 2025-05-15 ENCOUNTER — PATIENT OUTREACH (OUTPATIENT)
Dept: CARE COORDINATION | Facility: CLINIC | Age: 50
End: 2025-05-15
Payer: COMMERCIAL

## 2025-05-15 NOTE — PROGRESS NOTES
[facility]/Voicemail    Clinical Data: Care Coordinator Outreach    Outreach Documentation Number of Outreach Attempt   4/16/2025   8:52 AM 1   4/23/2025   3:10 PM 1   4/28/2025   3:21 PM 1   5/15/2025   2:03 PM 1       Left message on patient's voicemail with call back information and requested return call.      Plan:Care Coordinator will try to reach patient again in 3-5 business days.    Georgiana Isaacs,   [facility]  [phone]

## 2025-05-28 ENCOUNTER — NURSE TRIAGE (OUTPATIENT)
Dept: INTERNAL MEDICINE | Facility: CLINIC | Age: 50
End: 2025-05-28
Payer: COMMERCIAL

## 2025-05-28 ENCOUNTER — HOSPITAL ENCOUNTER (INPATIENT)
Facility: HOSPITAL | Age: 50
End: 2025-05-28
Attending: EMERGENCY MEDICINE
Payer: COMMERCIAL

## 2025-05-28 ENCOUNTER — APPOINTMENT (OUTPATIENT)
Dept: RADIOLOGY | Facility: HOSPITAL | Age: 50
End: 2025-05-28
Attending: EMERGENCY MEDICINE
Payer: COMMERCIAL

## 2025-05-28 ENCOUNTER — APPOINTMENT (OUTPATIENT)
Dept: MRI IMAGING | Facility: HOSPITAL | Age: 50
End: 2025-05-28
Attending: EMERGENCY MEDICINE
Payer: COMMERCIAL

## 2025-05-28 DIAGNOSIS — I63.9 CEREBROVASCULAR ACCIDENT (CVA), UNSPECIFIED MECHANISM (H): ICD-10-CM

## 2025-05-28 DIAGNOSIS — Z71.89 OTHER SPECIFIED COUNSELING: Chronic | ICD-10-CM

## 2025-05-28 DIAGNOSIS — I50.22 CHRONIC SYSTOLIC HEART FAILURE (H): ICD-10-CM

## 2025-05-28 DIAGNOSIS — E11.65 POORLY CONTROLLED DIABETES MELLITUS (H): ICD-10-CM

## 2025-05-28 DIAGNOSIS — R06.09 DOE (DYSPNEA ON EXERTION): Primary | ICD-10-CM

## 2025-05-28 DIAGNOSIS — I25.118 CORONARY ARTERY DISEASE OF NATIVE ARTERY OF NATIVE HEART WITH STABLE ANGINA PECTORIS: ICD-10-CM

## 2025-05-28 DIAGNOSIS — L97.411 DIABETIC ULCER OF RIGHT HEEL ASSOCIATED WITH TYPE 2 DIABETES MELLITUS, LIMITED TO BREAKDOWN OF SKIN (H): ICD-10-CM

## 2025-05-28 DIAGNOSIS — L97.419 ULCER OF RIGHT HEEL AND MIDFOOT, UNSPECIFIED ULCER STAGE (H): ICD-10-CM

## 2025-05-28 DIAGNOSIS — E11.621 DIABETIC ULCER OF RIGHT HEEL ASSOCIATED WITH TYPE 2 DIABETES MELLITUS, LIMITED TO BREAKDOWN OF SKIN (H): ICD-10-CM

## 2025-05-28 DIAGNOSIS — I50.21 ACUTE SYSTOLIC HEART FAILURE (H): ICD-10-CM

## 2025-05-28 LAB
ALBUMIN UR-MCNC: 600 MG/DL
ANION GAP SERPL CALCULATED.3IONS-SCNC: 10 MMOL/L (ref 7–15)
APPEARANCE UR: CLEAR
ATRIAL RATE - MUSE: 86 BPM
B-OH-BUTYR SERPL-SCNC: 0.76 MMOL/L
BACTERIA #/AREA URNS HPF: ABNORMAL /HPF
BASOPHILS # BLD AUTO: 0.1 10E3/UL (ref 0–0.2)
BASOPHILS NFR BLD AUTO: 1 %
BILIRUB UR QL STRIP: NEGATIVE
BUN SERPL-MCNC: 20 MG/DL (ref 6–20)
CALCIUM SERPL-MCNC: 9.2 MG/DL (ref 8.8–10.4)
CHLORIDE SERPL-SCNC: 101 MMOL/L (ref 98–107)
COLOR UR AUTO: ABNORMAL
CREAT SERPL-MCNC: 1.29 MG/DL (ref 0.67–1.17)
DIASTOLIC BLOOD PRESSURE - MUSE: NORMAL MMHG
EGFRCR SERPLBLD CKD-EPI 2021: 67 ML/MIN/1.73M2
EOSINOPHIL # BLD AUTO: 0.2 10E3/UL (ref 0–0.7)
EOSINOPHIL NFR BLD AUTO: 2 %
ERYTHROCYTE [DISTWIDTH] IN BLOOD BY AUTOMATED COUNT: 12.8 % (ref 10–15)
EST. AVERAGE GLUCOSE BLD GHB EST-MCNC: 278 MG/DL
FLUAV RNA SPEC QL NAA+PROBE: NEGATIVE
FLUBV RNA RESP QL NAA+PROBE: NEGATIVE
GLUCOSE BLDC GLUCOMTR-MCNC: 309 MG/DL (ref 70–99)
GLUCOSE SERPL-MCNC: 354 MG/DL (ref 70–99)
GLUCOSE UR STRIP-MCNC: >1000 MG/DL
HBA1C MFR BLD: 11.3 %
HCO3 SERPL-SCNC: 25 MMOL/L (ref 22–29)
HCT VFR BLD AUTO: 38.4 % (ref 40–53)
HGB BLD-MCNC: 12.8 G/DL (ref 13.3–17.7)
HGB UR QL STRIP: ABNORMAL
HYALINE CASTS: 1 /LPF
IMM GRANULOCYTES # BLD: 0.1 10E3/UL
IMM GRANULOCYTES NFR BLD: 1 %
INTERPRETATION ECG - MUSE: NORMAL
KETONES UR STRIP-MCNC: 10 MG/DL
LEUKOCYTE ESTERASE UR QL STRIP: NEGATIVE
LYMPHOCYTES # BLD AUTO: 1.6 10E3/UL (ref 0.8–5.3)
LYMPHOCYTES NFR BLD AUTO: 12 %
MAGNESIUM SERPL-MCNC: 1.9 MG/DL (ref 1.7–2.3)
MCH RBC QN AUTO: 30.7 PG (ref 26.5–33)
MCHC RBC AUTO-ENTMCNC: 33.3 G/DL (ref 31.5–36.5)
MCV RBC AUTO: 92 FL (ref 78–100)
MONOCYTES # BLD AUTO: 0.8 10E3/UL (ref 0–1.3)
MONOCYTES NFR BLD AUTO: 6 %
NEUTROPHILS # BLD AUTO: 10.7 10E3/UL (ref 1.6–8.3)
NEUTROPHILS NFR BLD AUTO: 80 %
NITRATE UR QL: NEGATIVE
NRBC # BLD AUTO: 0 10E3/UL
NRBC BLD AUTO-RTO: 0 /100
NT-PROBNP SERPL-MCNC: 2328 PG/ML (ref 0–138)
P AXIS - MUSE: 66 DEGREES
PH UR STRIP: 7 [PH] (ref 5–7)
PHOSPHATE SERPL-MCNC: 2.6 MG/DL (ref 2.5–4.5)
PLATELET # BLD AUTO: 267 10E3/UL (ref 150–450)
POTASSIUM SERPL-SCNC: 4.4 MMOL/L (ref 3.4–5.3)
PR INTERVAL - MUSE: 146 MS
QRS DURATION - MUSE: 88 MS
QT - MUSE: 388 MS
QTC - MUSE: 464 MS
R AXIS - MUSE: 66 DEGREES
RBC # BLD AUTO: 4.17 10E6/UL (ref 4.4–5.9)
RBC URINE: 1 /HPF
RSV RNA SPEC NAA+PROBE: NEGATIVE
SARS-COV-2 RNA RESP QL NAA+PROBE: NEGATIVE
SODIUM SERPL-SCNC: 136 MMOL/L (ref 135–145)
SP GR UR STRIP: 1.02 (ref 1–1.03)
SYSTOLIC BLOOD PRESSURE - MUSE: NORMAL MMHG
T AXIS - MUSE: 43 DEGREES
TROPONIN T SERPL HS-MCNC: 29 NG/L
TROPONIN T SERPL HS-MCNC: 31 NG/L
UROBILINOGEN UR STRIP-MCNC: NORMAL MG/DL
VENTRICULAR RATE- MUSE: 86 BPM
WBC # BLD AUTO: 13.4 10E3/UL (ref 4–11)
WBC URINE: 1 /HPF

## 2025-05-28 PROCEDURE — 83880 ASSAY OF NATRIURETIC PEPTIDE: CPT | Performed by: EMERGENCY MEDICINE

## 2025-05-28 PROCEDURE — 99207 PR NO BILLABLE SERVICE THIS VISIT: CPT | Performed by: STUDENT IN AN ORGANIZED HEALTH CARE EDUCATION/TRAINING PROGRAM

## 2025-05-28 PROCEDURE — 82435 ASSAY OF BLOOD CHLORIDE: CPT | Performed by: EMERGENCY MEDICINE

## 2025-05-28 PROCEDURE — 85048 AUTOMATED LEUKOCYTE COUNT: CPT | Performed by: EMERGENCY MEDICINE

## 2025-05-28 PROCEDURE — 84100 ASSAY OF PHOSPHORUS: CPT | Performed by: STUDENT IN AN ORGANIZED HEALTH CARE EDUCATION/TRAINING PROGRAM

## 2025-05-28 PROCEDURE — 87633 RESP VIRUS 12-25 TARGETS: CPT | Performed by: STUDENT IN AN ORGANIZED HEALTH CARE EDUCATION/TRAINING PROGRAM

## 2025-05-28 PROCEDURE — 83735 ASSAY OF MAGNESIUM: CPT | Performed by: STUDENT IN AN ORGANIZED HEALTH CARE EDUCATION/TRAINING PROGRAM

## 2025-05-28 PROCEDURE — 84484 ASSAY OF TROPONIN QUANT: CPT | Performed by: EMERGENCY MEDICINE

## 2025-05-28 PROCEDURE — 99223 1ST HOSP IP/OBS HIGH 75: CPT | Performed by: STUDENT IN AN ORGANIZED HEALTH CARE EDUCATION/TRAINING PROGRAM

## 2025-05-28 PROCEDURE — A9585 GADOBUTROL INJECTION: HCPCS | Performed by: EMERGENCY MEDICINE

## 2025-05-28 PROCEDURE — 36415 COLL VENOUS BLD VENIPUNCTURE: CPT | Performed by: EMERGENCY MEDICINE

## 2025-05-28 PROCEDURE — 87581 M.PNEUMON DNA AMP PROBE: CPT | Performed by: STUDENT IN AN ORGANIZED HEALTH CARE EDUCATION/TRAINING PROGRAM

## 2025-05-28 PROCEDURE — 80048 BASIC METABOLIC PNL TOTAL CA: CPT | Performed by: EMERGENCY MEDICINE

## 2025-05-28 PROCEDURE — 250N000013 HC RX MED GY IP 250 OP 250 PS 637: Performed by: STUDENT IN AN ORGANIZED HEALTH CARE EDUCATION/TRAINING PROGRAM

## 2025-05-28 PROCEDURE — 93005 ELECTROCARDIOGRAM TRACING: CPT | Performed by: STUDENT IN AN ORGANIZED HEALTH CARE EDUCATION/TRAINING PROGRAM

## 2025-05-28 PROCEDURE — 99285 EMERGENCY DEPT VISIT HI MDM: CPT | Mod: 25

## 2025-05-28 PROCEDURE — 83036 HEMOGLOBIN GLYCOSYLATED A1C: CPT | Performed by: STUDENT IN AN ORGANIZED HEALTH CARE EDUCATION/TRAINING PROGRAM

## 2025-05-28 PROCEDURE — 82010 KETONE BODYS QUAN: CPT | Performed by: STUDENT IN AN ORGANIZED HEALTH CARE EDUCATION/TRAINING PROGRAM

## 2025-05-28 PROCEDURE — 85004 AUTOMATED DIFF WBC COUNT: CPT | Performed by: EMERGENCY MEDICINE

## 2025-05-28 PROCEDURE — 255N000002 HC RX 255 OP 636: Performed by: EMERGENCY MEDICINE

## 2025-05-28 PROCEDURE — 70553 MRI BRAIN STEM W/O & W/DYE: CPT

## 2025-05-28 PROCEDURE — 87637 SARSCOV2&INF A&B&RSV AMP PRB: CPT | Performed by: STUDENT IN AN ORGANIZED HEALTH CARE EDUCATION/TRAINING PROGRAM

## 2025-05-28 PROCEDURE — 82962 GLUCOSE BLOOD TEST: CPT

## 2025-05-28 PROCEDURE — 71046 X-RAY EXAM CHEST 2 VIEWS: CPT

## 2025-05-28 PROCEDURE — 81001 URINALYSIS AUTO W/SCOPE: CPT | Performed by: STUDENT IN AN ORGANIZED HEALTH CARE EDUCATION/TRAINING PROGRAM

## 2025-05-28 PROCEDURE — 93005 ELECTROCARDIOGRAM TRACING: CPT | Performed by: EMERGENCY MEDICINE

## 2025-05-28 PROCEDURE — 120N000001 HC R&B MED SURG/OB

## 2025-05-28 RX ORDER — AMOXICILLIN 250 MG
1 CAPSULE ORAL 2 TIMES DAILY PRN
Status: DISCONTINUED | OUTPATIENT
Start: 2025-05-28 | End: 2025-06-05 | Stop reason: HOSPADM

## 2025-05-28 RX ORDER — LANOLIN ALCOHOL/MO/W.PET/CERES
1000 CREAM (GRAM) TOPICAL DAILY
COMMUNITY

## 2025-05-28 RX ORDER — ONDANSETRON 4 MG/1
4 TABLET, ORALLY DISINTEGRATING ORAL EVERY 6 HOURS PRN
Status: DISCONTINUED | OUTPATIENT
Start: 2025-05-28 | End: 2025-06-05 | Stop reason: HOSPADM

## 2025-05-28 RX ORDER — POLYETHYLENE GLYCOL 3350 17 G/17G
17 POWDER, FOR SOLUTION ORAL 2 TIMES DAILY PRN
Status: DISCONTINUED | OUTPATIENT
Start: 2025-05-28 | End: 2025-06-05 | Stop reason: HOSPADM

## 2025-05-28 RX ORDER — GADOBUTROL 604.72 MG/ML
14 INJECTION INTRAVENOUS ONCE
Status: COMPLETED | OUTPATIENT
Start: 2025-05-28 | End: 2025-05-28

## 2025-05-28 RX ORDER — TRAZODONE HYDROCHLORIDE 50 MG/1
50 TABLET ORAL AT BEDTIME
Status: DISCONTINUED | OUTPATIENT
Start: 2025-05-28 | End: 2025-06-05 | Stop reason: HOSPADM

## 2025-05-28 RX ORDER — LIDOCAINE 40 MG/G
CREAM TOPICAL
Status: DISCONTINUED | OUTPATIENT
Start: 2025-05-28 | End: 2025-06-05 | Stop reason: HOSPADM

## 2025-05-28 RX ORDER — ACETAMINOPHEN 650 MG/1
650 SUPPOSITORY RECTAL EVERY 4 HOURS PRN
Status: DISCONTINUED | OUTPATIENT
Start: 2025-05-28 | End: 2025-06-05 | Stop reason: HOSPADM

## 2025-05-28 RX ORDER — NICOTINE POLACRILEX 4 MG
15-30 LOZENGE BUCCAL
Status: DISCONTINUED | OUTPATIENT
Start: 2025-05-28 | End: 2025-06-02

## 2025-05-28 RX ORDER — DEXTROSE MONOHYDRATE 25 G/50ML
25-50 INJECTION, SOLUTION INTRAVENOUS
Status: DISCONTINUED | OUTPATIENT
Start: 2025-05-28 | End: 2025-06-02

## 2025-05-28 RX ORDER — CALCIUM CARBONATE 500 MG/1
1000 TABLET, CHEWABLE ORAL 4 TIMES DAILY PRN
Status: DISCONTINUED | OUTPATIENT
Start: 2025-05-28 | End: 2025-06-05 | Stop reason: HOSPADM

## 2025-05-28 RX ORDER — ONDANSETRON 2 MG/ML
4 INJECTION INTRAMUSCULAR; INTRAVENOUS EVERY 6 HOURS PRN
Status: DISCONTINUED | OUTPATIENT
Start: 2025-05-28 | End: 2025-06-05 | Stop reason: HOSPADM

## 2025-05-28 RX ORDER — ACETAMINOPHEN 325 MG/1
650 TABLET ORAL EVERY 4 HOURS PRN
Status: DISCONTINUED | OUTPATIENT
Start: 2025-05-28 | End: 2025-06-05 | Stop reason: HOSPADM

## 2025-05-28 RX ORDER — AMOXICILLIN 250 MG
2 CAPSULE ORAL 2 TIMES DAILY PRN
Status: DISCONTINUED | OUTPATIENT
Start: 2025-05-28 | End: 2025-06-05 | Stop reason: HOSPADM

## 2025-05-28 RX ADMIN — TRAZODONE HYDROCHLORIDE 50 MG: 50 TABLET ORAL at 23:44

## 2025-05-28 RX ADMIN — GADOBUTROL 14 ML: 604.72 INJECTION INTRAVENOUS at 19:53

## 2025-05-28 RX ADMIN — Medication 5 MG: at 23:44

## 2025-05-28 ASSESSMENT — ACTIVITIES OF DAILY LIVING (ADL)
ADLS_ACUITY_SCORE: 58

## 2025-05-28 ASSESSMENT — COLUMBIA-SUICIDE SEVERITY RATING SCALE - C-SSRS
1. IN THE PAST MONTH, HAVE YOU WISHED YOU WERE DEAD OR WISHED YOU COULD GO TO SLEEP AND NOT WAKE UP?: NO
6. HAVE YOU EVER DONE ANYTHING, STARTED TO DO ANYTHING, OR PREPARED TO DO ANYTHING TO END YOUR LIFE?: NO
2. HAVE YOU ACTUALLY HAD ANY THOUGHTS OF KILLING YOURSELF IN THE PAST MONTH?: NO

## 2025-05-28 NOTE — ED PROVIDER NOTES
EMERGENCY DEPARTMENT ENCOUNTER      NAME: Floyd Capps  AGE: 49 year old male  YOB: 1975  MRN: [id number]  EVALUATION DATE & TIME: No admission date for patient encounter.    PCP: Glendy Benavides    ED PROVIDER: Nellie Evans MD    Chief Complaint   Patient presents with    Shortness of Breath         FINAL IMPRESSION:  1. HOWELL (dyspnea on exertion)    2. Cerebrovascular accident (CVA), unspecified mechanism (H)    3. Ulcer of right heel and midfoot, unspecified ulcer stage (H)          ED COURSE & MEDICAL DECISION MAKING:    Pertinent Labs & Imaging studies reviewed. (See chart for details)  49 year old male with history of HTN, HLD, DM, CAD with CHF with EF of 30 to 35%, CKD, previous CVA who presents to the Emergency Department for evaluation of chronic dyspnea worse over the course of the last 1 to 3 weeks, lower extremity edema despite compliance with his Bumex and vaguely feeling like his balance is off.  Differential includes CHF exacerbation, symptomatic anemia, arrhythmia, ACS, worsening kidney dysfunction, CVA, TIA.    Patient initially seen evaluate myself in triage area due to boarding crisis.  Twelve-lead EKG shows sinus rhythm.  CBC, BMP notable for baseline leukocytosis anemia and renal insufficiency.  Troponin in the indeterminate range at 31 which also appears to be baseline.  BNP elevated at 2328, this is up from approximately 1000 in March.  Chest x-ray unremarkable.  MRI brain with and without contrast shows evidence of recent infarct involving the right thalamus/internal capsule.  Patient is on DAPT.  Case discussed with stroke neurology, I do not feel that the MRI today represents a new stroke compared to March but rather evolution of his previous CVA.  No additional stroke workup needed.  However with patient's complaints of increasing difficulties ambulating compounded by his dyspnea, an ulcer on the heel, homelessness excetra offered hospital admission for further  "evaluation.  Patient agreeable.        ED Course as of 05/28/25 2219   Wed May 28, 2025   1745 WBC(!): 13.4  chronic   1745 Hemoglobin(!): 12.8  baseline   1752 Glucose(!): 354  Known DM   1752 Creatinine(!): 1.29  baseline   1802 Troponin T, High Sensitivity(!): 31  Similar to prev   1803 N-Terminal Pro Bnp(!): 2,328  Vs 1000 in march 1849 XR Chest 2 Views  Chest x-ray independently interpreted by myself without significant cardiomegaly infiltrate or effusion   2004 Troponin T, High Sensitivity(!): 29  Down trending   2119 I spoke with Hospitalist Dr. Del Rio about admission.    2154 I spoke with stroke neuro, Dr. Cordon. Don't think MRI is new CVA and doesn't need any additional CVA workup inpt.        Medical Decision Making  I obtained history from Other: na  Admit.    MIPS (CTPE, Dental pain, Cazares, Sinusitis, Asthma/COPD, Head Trauma): Not Applicable    SEPSIS: None          At the conclusion of the encounter I discussed the results of all of the tests and the disposition. The questions were answered. The patient or family acknowledged understanding and was agreeable with the care plan.    MEDICATIONS GIVEN IN THE EMERGENCY:  Medications   gadobutrol (GADAVIST) injection 14 mL (14 mLs Intravenous $Given 5/28/25 1953)       NEW PRESCRIPTIONS STARTED AT TODAY'S ER VISIT  New Prescriptions    No medications on file          =================================================================    HPI    Patient information was obtained from: patient    Use of Intrepreter: N/A      Floyd Capps is a 49 year old male with pertinent medical history of HTN, HLD, DM, CAD with CHF, CKD, previous CVA who presents with complaint of shortness of breath and feeling off balance.  Patient states that he is chronically short of breath.  And ever since \"I had that issue with my heart\" he has exertional dyspnea.  He feels like that has worsened over the course of the last several weeks, particularly over the last week.  He is on " "Bumex daily and states \"I been puffing up\" even though he states he has been compliant.  Does not weigh himself regularly, unfortunately he was evicted from his apartment approximately a week ago and is currently homeless.  Patient admits that that increased stress or anxiety No Doubt might be contributing to some of his symptoms of shortness of breath as well.  He also states that he vaguely feels off balance.  After previous CVA he has chronic bilateral hand numbness, this is not worse than his baseline.  Notes previous CVA where he had upper extremity weakness on the right and then upper extremity weakness on his left with his 1st and 2nd stroke respectively.  He has not noticed any focal weakness but states he just feels like he is not walking like he does at baseline.    Per chart review patient had coronary angiogram February 2024.  At that time had multivessel CAD with up to 90% narrowing of the LAD, 90% of the OM1, 85% of the RCA.  No angiogram was performed, CV surgery was consulted but patient declined to CABG.  Per chart review last echo March 2025 with EF of 30 to 35%.      PAST MEDICAL HISTORY:  Past Medical History:   Diagnosis Date    Acute hypoxemic respiratory failure (H)     Acute renal failure, unspecified acute renal failure type     CAD (coronary artery disease)     Callus of foot     Diabetes (H)     Diarrhea     Essential hypertension     Fracture of left distal radius     History of stroke     Insomnia     Nausea     Non-pressure chronic ulcer of right heel and midfoot with unspecified severity (H)     Normocytic anemia     Type 2 diabetes mellitus with foot ulcer (H)        PAST SURGICAL HISTORY:  Past Surgical History:   Procedure Laterality Date    APPENDECTOMY      CV CORONARY ANGIOGRAM N/A 3/1/2024    Procedure: CV CORONARY ANGIOGRAM;  Surgeon: Stephen Berger MD;  Location: [facility] CATH LAB CV    CV LEFT HEART CATH N/A 3/1/2024    Procedure: Left Heart Catheterization;  Surgeon: " Stephen Berger MD;  Location: [facility] CATH LAB CV    EXCISE EXOSTOSIS FOOT Right 5/2/2024    Procedure: excision of bone from calcaneus with application of theraskin;  Surgeon: Dong Sanders DPM;  Location: [facility] OR    INCISION AND DRAINAGE FOOT, COMBINED Right 5/2/2024    Procedure: INCISION AND DRAINAGE, right heel with;  Surgeon: Dong Sanders DPM;  Location: [facility] OR    INCISION AND DRAINAGE OF WOUND      groin abscess    IRRIGATION AND DEBRIDEMENT FOOT, COMBINED Right 2/16/2024    Procedure: IRRIGATION AND DEBRIDEMENT RIGHT FOOT;  Surgeon: Cristofer Sims DPM;  Location: [facility] OR    PICC DOUBLE LUMEN PLACEMENT  2/29/2024       CURRENT MEDICATIONS:    Prior to Admission Medications   Prescriptions Last Dose Informant Patient Reported? Taking?   Continuous Glucose Sensor (FREESTYLE LAMINE 2 SENSOR) [facility]   No No   Sig: Inject 1 each subcutaneously every 14 days Use 1 sensor every 14 days. Use to read blood sugars per 's instructions.   Patient not taking: Reported on 10/9/2024   acetaminophen (TYLENOL) 500 MG tablet   No Yes   Sig: Take 2 tablets (1,000 mg) by mouth every 8 hours as needed for mild pain   aspirin 81 MG EC tablet Unknown  No Yes   Sig: Take 1 tablet (81 mg) by mouth daily.   atorvastatin (LIPITOR) 80 MG tablet 5/26/2025 Evening  No Yes   Sig: Take 1 tablet (80 mg) by mouth daily   bumetanide (BUMEX) 2 MG tablet 5/26/2025 Evening  No Yes   Sig: Take 1 tablet (2 mg) by mouth 2 times daily.   carvedilol (COREG) 25 MG tablet 5/26/2025 Evening  No Yes   Sig: Take 1 tablet (25 mg) by mouth 2 times daily (with meals).   clopidogrel (PLAVIX) 75 MG tablet 5/26/2025 Evening  No Yes   Sig: Take 1 tablet (75 mg) by mouth daily.   cyanocobalamin (VITAMIN B-12) 1000 MCG tablet 5/26/2025  Yes Yes   Sig: Take 1,000 mcg by mouth daily.   insulin glargine (LANTUS PEN) 100 UNIT/ML pen Unknown  No Yes   Sig: Inject 35 Units subcutaneously at bedtime.   insulin  lispro (HUMALOG KWIKPEN) 100 UNIT/ML (1 unit dial) KWIKPEN Unknown  No Yes   Sig: Inject 20 Units subcutaneously 3 times daily (with meals). Plus sliding scale: 2 units every 50 over 150. If humalog is not covered by insurance, may substitute with novolog or other fast acting insulin   insulin pen needle (32G X 4 MM) 32G X 4 MM miscellaneous   No No   Sig: Use 4 pen needles daily or as directed.   isosorbide mononitrate (IMDUR) 30 MG 24 hr tablet 5/26/2025 Evening  No Yes   Sig: Take 1 tablet (30 mg) by mouth daily.   losartan (COZAAR) 25 MG tablet 5/26/2025 Evening  No Yes   Sig: Take 1 tablet (25 mg) by mouth daily.   metFORMIN (GLUCOPHAGE XR) 500 MG 24 hr tablet 5/26/2025 Evening  No Yes   Sig: Take 1 tablet (500 mg) by mouth daily (with dinner).   multivitamin w/minerals (THERA-VIT-M) tablet 5/26/2025 Morning  No Yes   Sig: Take 1 tablet by mouth daily      Facility-Administered Medications: None       ALLERGIES:  No Known Allergies    FAMILY HISTORY:  No family history on file.    SOCIAL HISTORY:  Social History     Tobacco Use    Smoking status: Former     Current packs/day: 0.50     Types: Cigarettes    Smokeless tobacco: Never   Vaping Use    Vaping status: Never Used   Substance Use Topics    Alcohol use: Yes     Comment: once a week    Drug use: Yes     Types: Marijuana     Comment: Has not done since Feb        VITALS:  Patient Vitals for the past 24 hrs:   BP Temp Temp src Pulse Resp SpO2 Weight   05/28/25 2145 (!) 160/77 -- -- 80 -- 95 % --   05/28/25 2130 (!) 159/88 -- -- 83 -- 96 % --   05/28/25 2120 (!) 154/83 -- -- 81 -- 97 % --   05/28/25 1915 (!) 175/92 -- -- 84 19 94 % --   05/28/25 1841 (!) 168/89 -- -- 84 17 95 % --   05/28/25 1800 (!) 165/86 -- -- 84 -- 94 % --   05/28/25 1746 (!) 154/82 -- -- 83 -- 93 % --   05/28/25 1655 (!) 176/96 97.9  F (36.6  C) Oral 91 22 98 % (!) 140.2 kg (309 lb)       PHYSICAL EXAM    General Appearance: Ill-appearing, appears older than stated age.   Disheveled  Head:  Normocephalic, atraumatic  Eyes:  PERRL, conjunctiva/corneas clear, EOM's intact  ENT:  membranes are moist without pallor, poor dentition  Neck:  Supple  Cardio:  Regular rate and rhythm, no murmur/gallop/rub, hypertensive  Pulm:  No respiratory distress, clear to auscultation bilaterally  Abdomen:  Soft, non-tender, morbidly obese  Extremities: Moves extremities normally, normal gait.  Patient has 1+ edema bilateral lower extremities. Small ulcer on right heel.   Skin:  Skin warm, dry, no rashes  Neuro:  Alert and oriented ×3, cranial nerves III through XII intact, moving all extremities with 5 out of 5 upper lower extremity strength, no gross sensory defects, no aphasia or dysarthria     RADIOLOGY/LABS:  Reviewed all pertinent imaging. Please see official radiology report. All pertinent labs reviewed and interpreted.    Results for orders placed or performed during the hospital encounter of 05/28/25   XR Chest 2 Views    Impression    IMPRESSION: Negative chest.   MR Brain w/o & w Contrast    Impression    IMPRESSION:  1.  Recent infarct involving the right thalamus/internal capsule demonstrates expected evolution with decreased volume of diffusion restriction compared to the prior.  2.  No new territories of acute infarct.  3.  No evidence of hemorrhage.  4.  Chronic changes as detailed.   Basic metabolic panel   Result Value Ref Range    Sodium 136 135 - 145 mmol/L    Potassium 4.4 3.4 - 5.3 mmol/L    Chloride 101 98 - 107 mmol/L    Carbon Dioxide (CO2) 25 22 - 29 mmol/L    Anion Gap 10 7 - 15 mmol/L    Urea Nitrogen 20.0 6.0 - 20.0 mg/dL    Creatinine 1.29 (H) 0.67 - 1.17 mg/dL    GFR Estimate 67 >60 mL/min/1.73m2    Calcium 9.2 8.8 - 10.4 mg/dL    Glucose 354 (H) 70 - 99 mg/dL   Result Value Ref Range    Troponin T, High Sensitivity 31 (H) <=22 ng/L   NT-proBNP   Result Value Ref Range    NT-proBNP 2,328 (H) 0 - 138 pg/mL   CBC with platelets and differential   Result Value Ref Range    WBC  Count 13.4 (H) 4.0 - 11.0 10e3/uL    RBC Count 4.17 (L) 4.40 - 5.90 10e6/uL    Hemoglobin 12.8 (L) 13.3 - 17.7 g/dL    Hematocrit 38.4 (L) 40.0 - 53.0 %    MCV 92 78 - 100 fL    MCH 30.7 26.5 - 33.0 pg    MCHC 33.3 31.5 - 36.5 g/dL    RDW 12.8 10.0 - 15.0 %    Platelet Count 267 150 - 450 10e3/uL    % Neutrophils 80 %    % Lymphocytes 12 %    % Monocytes 6 %    % Eosinophils 2 %    % Basophils 1 %    % Immature Granulocytes 1 %    NRBCs per 100 WBC 0 <1 /100    Absolute Neutrophils 10.7 (H) 1.6 - 8.3 10e3/uL    Absolute Lymphocytes 1.6 0.8 - 5.3 10e3/uL    Absolute Monocytes 0.8 0.0 - 1.3 10e3/uL    Absolute Eosinophils 0.2 0.0 - 0.7 10e3/uL    Absolute Basophils 0.1 0.0 - 0.2 10e3/uL    Absolute Immature Granulocytes 0.1 <=0.4 10e3/uL    Absolute NRBCs 0.0 10e3/uL   Result Value Ref Range    Troponin T, High Sensitivity 29 (H) <=22 ng/L   ECG 12-LEAD WITH MUSE (LHE)   Result Value Ref Range    Systolic Blood Pressure  mmHg    Diastolic Blood Pressure  mmHg    Ventricular Rate 86 BPM    Atrial Rate 86 BPM    ME Interval 146 ms    QRS Duration 88 ms     ms    QTc 464 ms    P Axis 66 degrees    R AXIS 66 degrees    T Axis 43 degrees    Interpretation ECG       Sinus rhythm  Low voltage QRS  Borderline ECG  When compared with ECG of 23-Mar-2025 09:31,  No significant change was found  Confirmed by SEE ED PROVIDER NOTE FOR, ECG INTERPRETATION ([id number]),  HODA CHAMBERS ([id number]) on 5/28/2025 9:36:07 PM         EKG:  Performed at: 17:06  28-May-2025    Impression: Sinus rhythm. Low voltage QRS. Borderline ECG. No ST changes when compared with ECG from 3/23/2025.    Rate: 86 BPM  Rhythm: Sinus rhythm  Axis: 66 66 43  ME Interval: 146 ms  QRS Interval: 88 ms  QTc Interval: 388/464 ms  ST Changes: None   Comparison: No ST changes when compared with ECG from 3/23/2025.  I have independently reviewed and interpreted the EKG(s) documented above.    The creation of this record is based on the corbyibe s observations of  the work being performed by Nellie Evans MD and the provider s statements to them. It was created on her behalf by Danny Holder, a trained medical scribe. This document has been checked and approved by the attending provider.    Nellie Evans MD  Emergency Medicine  [facility] EMERGENCY DEPARTMENT  [address] [place] [id number]  [phone]  Dept: [phone]     Nellie Evans MD  05/28/25 [id number]

## 2025-05-28 NOTE — TELEPHONE ENCOUNTER
"S-(situation): Weakness and SOB.    B-(background): Hx of stroke x 2. Hx of CHF.    A-(assessment): SOB with ambulation, unable to walk more than 100 feet. Blurry vision, bilateral eyes since 1st stroke in 2023. R hand numbness, tingling in L fingertips. Unable to coordinate hands to mouth or hands to nose. \"Feels like I am coughing up a lung after I walk\". Denies chest pain or any other symptoms.    R-(recommendations): Be seen in ED. Has a ride that will take him to the ER. Expressed understanding and acceptance of the plan. Had no further questions at this time.  Advised can call back to clinic at any time with concerns.     Windy GARVIN, RN, PHN  Reason for Disposition   Neurologic deficit of gradual onset (e.g., days to weeks), ANY of the following: * Weakness of the face, arm, or leg on one side of the body* Numbness of the face, arm, or leg on one side of the body* Loss of speech or garbled speech    Additional Information   Negative: SEVERE weakness (e.g., unable to walk or barely able to walk, requires support) and new-onset or getting worse   Negative: Difficult to awaken or acting confused (e.g., disoriented, slurred speech)   Negative: Sudden onset of weakness of the face, arm or leg on one side of the body and present now (Exception: Bell's palsy suspected: weakness on one side of the face developing over hours to days, with no other symptoms.)   Negative: Sudden onset of numbness of the face, arm or leg on one side of the body and present now   Negative: Sudden onset of loss of speech or garbled speech and present now   Negative: Sounds like a life-threatening emergency to the triager   Negative: Confusion, disorientation, or hallucinations is main symptom   Negative: Dizziness is main symptom   Negative: Followed a head injury within last 3 days   Negative: Headache (with neurologic deficit)   Negative: Unable to urinate (or only a few drops) and bladder feels very full   Negative: Loss of bladder or " "bowel control (urine or bowel incontinence; wetting self, leaking stool) of new-onset   Negative: Back pain with numbness (loss of sensation) in groin or rectal area   Negative: Neurologic deficit that was brief (now gone), ANY of the following: * Weakness of the face, arm, or leg on one side of the body * Numbness of the face, arm, or leg on one side of the body * Loss of speech or garbled speech   Negative: Patient sounds very sick or weak to the triager    Answer Assessment - Initial Assessment Questions  1. SYMPTOM: \"What is the main symptom you are concerned about?\" (e.g., weakness, numbness)      Weakness and SOB  2. ONSET: \"When did this start?\" (minutes, hours, days; while sleeping)      Ongoing for last couple weeks, worsening.  3. LAST NORMAL: \"When was the last time you (the patient) were normal (no symptoms)?\"      After hospitalization in March  4. PATTERN \"Does this come and go, or has it been constant since it started?\"  \"Is it present now?\"      Constant  5. CARDIAC SYMPTOMS: \"Have you had any of the following symptoms: chest pain, difficulty breathing, palpitations?\"      SOB  6. NEUROLOGIC SYMPTOMS: \"Have you had any of the following symptoms: headache, dizziness, vision loss, double vision, changes in speech, unsteady on your feet?\"      Vision changes are chronic from first stroke in 2023.  7. OTHER SYMPTOMS: \"Do you have any other symptoms?\"      SOB with exertion. Unable to walk more than 100 feet. \"Feels like I am coughing up a lung after walking\". Hand-eye coordination has declined.  8. PREGNANCY: \"Is there any chance you are pregnant?\" \"When was your last menstrual period?\"      N/A    Protocols used: Neurologic Deficit-A-OH    "

## 2025-05-28 NOTE — ED TRIAGE NOTES
Patient arrives by private car for evaluation of increasing shortness of breath with exertion.  Patient states that he feels off balance.  Has productive cough.  Has bilateral hand numbness.  Patient is currently homeless.   Dr. Evans performed neuro exam in triage.

## 2025-05-29 ENCOUNTER — APPOINTMENT (OUTPATIENT)
Dept: MRI IMAGING | Facility: HOSPITAL | Age: 50
End: 2025-05-29
Attending: STUDENT IN AN ORGANIZED HEALTH CARE EDUCATION/TRAINING PROGRAM
Payer: COMMERCIAL

## 2025-05-29 ENCOUNTER — DOCUMENTATION ONLY (OUTPATIENT)
Dept: OTHER | Facility: CLINIC | Age: 50
End: 2025-05-29

## 2025-05-29 ENCOUNTER — APPOINTMENT (OUTPATIENT)
Dept: CARDIOLOGY | Facility: HOSPITAL | Age: 50
End: 2025-05-29
Attending: STUDENT IN AN ORGANIZED HEALTH CARE EDUCATION/TRAINING PROGRAM
Payer: COMMERCIAL

## 2025-05-29 ENCOUNTER — APPOINTMENT (OUTPATIENT)
Dept: OCCUPATIONAL THERAPY | Facility: HOSPITAL | Age: 50
End: 2025-05-29
Attending: STUDENT IN AN ORGANIZED HEALTH CARE EDUCATION/TRAINING PROGRAM
Payer: COMMERCIAL

## 2025-05-29 VITALS
HEART RATE: 75 BPM | TEMPERATURE: 97.6 F | SYSTOLIC BLOOD PRESSURE: 98 MMHG | WEIGHT: 309 LBS | RESPIRATION RATE: 16 BRPM | DIASTOLIC BLOOD PRESSURE: 55 MMHG | OXYGEN SATURATION: 93 % | BODY MASS INDEX: 44.34 KG/M2

## 2025-05-29 LAB
ANION GAP SERPL CALCULATED.3IONS-SCNC: 9 MMOL/L (ref 7–15)
BUN SERPL-MCNC: 19.9 MG/DL (ref 6–20)
C PNEUM DNA SPEC QL NAA+PROBE: NOT DETECTED
CALCIUM SERPL-MCNC: 9 MG/DL (ref 8.8–10.4)
CHLORIDE SERPL-SCNC: 103 MMOL/L (ref 98–107)
CREAT SERPL-MCNC: 1.33 MG/DL (ref 0.67–1.17)
CRP SERPL-MCNC: 21.4 MG/L
EGFRCR SERPLBLD CKD-EPI 2021: 65 ML/MIN/1.73M2
ERYTHROCYTE [DISTWIDTH] IN BLOOD BY AUTOMATED COUNT: 12.9 % (ref 10–15)
FLUAV H1 2009 PAND RNA SPEC QL NAA+PROBE: NOT DETECTED
FLUAV H1 RNA SPEC QL NAA+PROBE: NOT DETECTED
FLUAV H3 RNA SPEC QL NAA+PROBE: NOT DETECTED
FLUAV RNA SPEC QL NAA+PROBE: NOT DETECTED
FLUBV RNA SPEC QL NAA+PROBE: NOT DETECTED
GLUCOSE BLDC GLUCOMTR-MCNC: 164 MG/DL (ref 70–99)
GLUCOSE BLDC GLUCOMTR-MCNC: 193 MG/DL (ref 70–99)
GLUCOSE BLDC GLUCOMTR-MCNC: 226 MG/DL (ref 70–99)
GLUCOSE BLDC GLUCOMTR-MCNC: 284 MG/DL (ref 70–99)
GLUCOSE SERPL-MCNC: 279 MG/DL (ref 70–99)
HADV DNA SPEC QL NAA+PROBE: NOT DETECTED
HCO3 SERPL-SCNC: 25 MMOL/L (ref 22–29)
HCOV PNL SPEC NAA+PROBE: NOT DETECTED
HCT VFR BLD AUTO: 35.8 % (ref 40–53)
HGB BLD-MCNC: 11.6 G/DL (ref 13.3–17.7)
HMPV RNA SPEC QL NAA+PROBE: NOT DETECTED
HPIV1 RNA SPEC QL NAA+PROBE: NOT DETECTED
HPIV2 RNA SPEC QL NAA+PROBE: NOT DETECTED
HPIV3 RNA SPEC QL NAA+PROBE: NOT DETECTED
HPIV4 RNA SPEC QL NAA+PROBE: NOT DETECTED
LVEF ECHO: NORMAL
M PNEUMO DNA SPEC QL NAA+PROBE: NOT DETECTED
MAGNESIUM SERPL-MCNC: 2 MG/DL (ref 1.7–2.3)
MCH RBC QN AUTO: 30.5 PG (ref 26.5–33)
MCHC RBC AUTO-ENTMCNC: 32.4 G/DL (ref 31.5–36.5)
MCV RBC AUTO: 94 FL (ref 78–100)
PHOSPHATE SERPL-MCNC: 3.8 MG/DL (ref 2.5–4.5)
PLATELET # BLD AUTO: 226 10E3/UL (ref 150–450)
POTASSIUM SERPL-SCNC: 3.7 MMOL/L (ref 3.4–5.3)
RBC # BLD AUTO: 3.8 10E6/UL (ref 4.4–5.9)
RSV RNA SPEC QL NAA+PROBE: NOT DETECTED
RSV RNA SPEC QL NAA+PROBE: NOT DETECTED
RV+EV RNA SPEC QL NAA+PROBE: NOT DETECTED
SODIUM SERPL-SCNC: 137 MMOL/L (ref 135–145)
WBC # BLD AUTO: 12 10E3/UL (ref 4–11)

## 2025-05-29 PROCEDURE — 99207 PR APP CREDIT; MD BILLING SHARED VISIT: CPT | Mod: FS | Performed by: INTERNAL MEDICINE

## 2025-05-29 PROCEDURE — 999N000197 HC STATISTIC WOC PT EDUCATION, 0-15 MIN

## 2025-05-29 PROCEDURE — 99233 SBSQ HOSP IP/OBS HIGH 50: CPT | Performed by: STUDENT IN AN ORGANIZED HEALTH CARE EDUCATION/TRAINING PROGRAM

## 2025-05-29 PROCEDURE — 99254 IP/OBS CNSLTJ NEW/EST MOD 60: CPT | Mod: FS | Performed by: INTERNAL MEDICINE

## 2025-05-29 PROCEDURE — 120N000001 HC R&B MED SURG/OB

## 2025-05-29 PROCEDURE — 73720 MRI LWR EXTREMITY W/O&W/DYE: CPT | Mod: RT

## 2025-05-29 PROCEDURE — 82374 ASSAY BLOOD CARBON DIOXIDE: CPT | Performed by: STUDENT IN AN ORGANIZED HEALTH CARE EDUCATION/TRAINING PROGRAM

## 2025-05-29 PROCEDURE — 97165 OT EVAL LOW COMPLEX 30 MIN: CPT | Mod: GO

## 2025-05-29 PROCEDURE — 93308 TTE F-UP OR LMTD: CPT | Mod: 26 | Performed by: INTERNAL MEDICINE

## 2025-05-29 PROCEDURE — 36415 COLL VENOUS BLD VENIPUNCTURE: CPT | Performed by: STUDENT IN AN ORGANIZED HEALTH CARE EDUCATION/TRAINING PROGRAM

## 2025-05-29 PROCEDURE — 255N000002 HC RX 255 OP 636: Performed by: STUDENT IN AN ORGANIZED HEALTH CARE EDUCATION/TRAINING PROGRAM

## 2025-05-29 PROCEDURE — 82962 GLUCOSE BLOOD TEST: CPT

## 2025-05-29 PROCEDURE — 250N000012 HC RX MED GY IP 250 OP 636 PS 637: Performed by: STUDENT IN AN ORGANIZED HEALTH CARE EDUCATION/TRAINING PROGRAM

## 2025-05-29 PROCEDURE — 250N000013 HC RX MED GY IP 250 OP 250 PS 637: Performed by: STUDENT IN AN ORGANIZED HEALTH CARE EDUCATION/TRAINING PROGRAM

## 2025-05-29 PROCEDURE — 250N000011 HC RX IP 250 OP 636: Performed by: STUDENT IN AN ORGANIZED HEALTH CARE EDUCATION/TRAINING PROGRAM

## 2025-05-29 PROCEDURE — 85018 HEMOGLOBIN: CPT | Performed by: STUDENT IN AN ORGANIZED HEALTH CARE EDUCATION/TRAINING PROGRAM

## 2025-05-29 PROCEDURE — 86140 C-REACTIVE PROTEIN: CPT | Performed by: STUDENT IN AN ORGANIZED HEALTH CARE EDUCATION/TRAINING PROGRAM

## 2025-05-29 PROCEDURE — 93325 DOPPLER ECHO COLOR FLOW MAPG: CPT | Mod: 26 | Performed by: INTERNAL MEDICINE

## 2025-05-29 PROCEDURE — 84100 ASSAY OF PHOSPHORUS: CPT | Performed by: STUDENT IN AN ORGANIZED HEALTH CARE EDUCATION/TRAINING PROGRAM

## 2025-05-29 PROCEDURE — 85014 HEMATOCRIT: CPT | Performed by: STUDENT IN AN ORGANIZED HEALTH CARE EDUCATION/TRAINING PROGRAM

## 2025-05-29 PROCEDURE — A9585 GADOBUTROL INJECTION: HCPCS | Performed by: STUDENT IN AN ORGANIZED HEALTH CARE EDUCATION/TRAINING PROGRAM

## 2025-05-29 PROCEDURE — 80048 BASIC METABOLIC PNL TOTAL CA: CPT | Performed by: STUDENT IN AN ORGANIZED HEALTH CARE EDUCATION/TRAINING PROGRAM

## 2025-05-29 PROCEDURE — 93321 DOPPLER ECHO F-UP/LMTD STD: CPT | Mod: 26 | Performed by: INTERNAL MEDICINE

## 2025-05-29 PROCEDURE — 93325 DOPPLER ECHO COLOR FLOW MAPG: CPT

## 2025-05-29 PROCEDURE — 250N000011 HC RX IP 250 OP 636: Performed by: PHYSICIAN ASSISTANT

## 2025-05-29 PROCEDURE — 97535 SELF CARE MNGMENT TRAINING: CPT | Mod: GO

## 2025-05-29 PROCEDURE — 83735 ASSAY OF MAGNESIUM: CPT | Performed by: STUDENT IN AN ORGANIZED HEALTH CARE EDUCATION/TRAINING PROGRAM

## 2025-05-29 RX ORDER — HEPARIN SODIUM 5000 [USP'U]/.5ML
5000 INJECTION, SOLUTION INTRAVENOUS; SUBCUTANEOUS EVERY 8 HOURS
Status: DISCONTINUED | OUTPATIENT
Start: 2025-05-29 | End: 2025-06-05 | Stop reason: HOSPADM

## 2025-05-29 RX ORDER — GADOBUTROL 604.72 MG/ML
14 INJECTION INTRAVENOUS ONCE
Status: COMPLETED | OUTPATIENT
Start: 2025-05-29 | End: 2025-05-29

## 2025-05-29 RX ORDER — CARVEDILOL 12.5 MG/1
25 TABLET ORAL 2 TIMES DAILY WITH MEALS
Status: DISCONTINUED | OUTPATIENT
Start: 2025-05-29 | End: 2025-06-05 | Stop reason: HOSPADM

## 2025-05-29 RX ORDER — LANOLIN ALCOHOL/MO/W.PET/CERES
1000 CREAM (GRAM) TOPICAL DAILY
Status: DISCONTINUED | OUTPATIENT
Start: 2025-05-29 | End: 2025-06-05 | Stop reason: HOSPADM

## 2025-05-29 RX ORDER — MULTIPLE VITAMINS W/ MINERALS TAB 9MG-400MCG
1 TAB ORAL DAILY
Status: DISCONTINUED | OUTPATIENT
Start: 2025-05-29 | End: 2025-06-05 | Stop reason: HOSPADM

## 2025-05-29 RX ORDER — CLOPIDOGREL BISULFATE 75 MG/1
75 TABLET ORAL EVERY EVENING
Status: DISCONTINUED | OUTPATIENT
Start: 2025-05-29 | End: 2025-06-05 | Stop reason: HOSPADM

## 2025-05-29 RX ORDER — BUMETANIDE 2 MG/1
2 TABLET ORAL
Status: DISCONTINUED | OUTPATIENT
Start: 2025-05-29 | End: 2025-05-30

## 2025-05-29 RX ORDER — LOSARTAN POTASSIUM 25 MG/1
25 TABLET ORAL EVERY EVENING
Status: DISCONTINUED | OUTPATIENT
Start: 2025-05-29 | End: 2025-05-29 | Stop reason: ALTCHOICE

## 2025-05-29 RX ORDER — ASPIRIN 81 MG/1
81 TABLET ORAL DAILY
Status: DISCONTINUED | OUTPATIENT
Start: 2025-05-29 | End: 2025-06-05 | Stop reason: HOSPADM

## 2025-05-29 RX ORDER — ISOSORBIDE MONONITRATE 30 MG/1
30 TABLET, EXTENDED RELEASE ORAL EVERY EVENING
Status: DISCONTINUED | OUTPATIENT
Start: 2025-05-29 | End: 2025-06-05 | Stop reason: HOSPADM

## 2025-05-29 RX ORDER — ATORVASTATIN CALCIUM 40 MG/1
80 TABLET, FILM COATED ORAL EVERY EVENING
Status: DISCONTINUED | OUTPATIENT
Start: 2025-05-29 | End: 2025-06-05 | Stop reason: HOSPADM

## 2025-05-29 RX ORDER — FUROSEMIDE 10 MG/ML
60 INJECTION INTRAMUSCULAR; INTRAVENOUS
Status: DISCONTINUED | OUTPATIENT
Start: 2025-05-29 | End: 2025-05-29

## 2025-05-29 RX ADMIN — CARVEDILOL 25 MG: 12.5 TABLET, FILM COATED ORAL at 08:22

## 2025-05-29 RX ADMIN — ISOSORBIDE MONONITRATE 30 MG: 30 TABLET, EXTENDED RELEASE ORAL at 22:15

## 2025-05-29 RX ADMIN — FUROSEMIDE 60 MG: 10 INJECTION, SOLUTION INTRAMUSCULAR; INTRAVENOUS at 12:34

## 2025-05-29 RX ADMIN — INSULIN GLARGINE 40 UNITS: 100 INJECTION, SOLUTION SUBCUTANEOUS at 22:15

## 2025-05-29 RX ADMIN — INSULIN ASPART 4 UNITS: 100 INJECTION, SOLUTION INTRAVENOUS; SUBCUTANEOUS at 08:21

## 2025-05-29 RX ADMIN — GADOBUTROL 14 ML: 604.72 INJECTION INTRAVENOUS at 14:23

## 2025-05-29 RX ADMIN — CARVEDILOL 25 MG: 12.5 TABLET, FILM COATED ORAL at 17:30

## 2025-05-29 RX ADMIN — CYANOCOBALAMIN TAB 1000 MCG 1000 MCG: 1000 TAB at 08:22

## 2025-05-29 RX ADMIN — INSULIN ASPART 3 UNITS: 100 INJECTION, SOLUTION INTRAVENOUS; SUBCUTANEOUS at 17:30

## 2025-05-29 RX ADMIN — TRAZODONE HYDROCHLORIDE 50 MG: 50 TABLET ORAL at 22:15

## 2025-05-29 RX ADMIN — HEPARIN SODIUM 5000 UNITS: 5000 INJECTION, SOLUTION INTRAVENOUS; SUBCUTANEOUS at 17:30

## 2025-05-29 RX ADMIN — CLOPIDOGREL BISULFATE 75 MG: 75 TABLET ORAL at 22:15

## 2025-05-29 RX ADMIN — BUMETANIDE 2 MG: 2 TABLET ORAL at 08:22

## 2025-05-29 RX ADMIN — ASPIRIN 81 MG: 81 TABLET, COATED ORAL at 08:22

## 2025-05-29 RX ADMIN — HEPARIN SODIUM 5000 UNITS: 5000 INJECTION, SOLUTION INTRAVENOUS; SUBCUTANEOUS at 06:58

## 2025-05-29 RX ADMIN — Medication 5 MG: at 22:15

## 2025-05-29 RX ADMIN — INSULIN ASPART 4 UNITS: 100 INJECTION, SOLUTION INTRAVENOUS; SUBCUTANEOUS at 12:35

## 2025-05-29 RX ADMIN — Medication 1 TABLET: at 08:22

## 2025-05-29 RX ADMIN — ATORVASTATIN CALCIUM 80 MG: 40 TABLET, FILM COATED ORAL at 22:14

## 2025-05-29 ASSESSMENT — ACTIVITIES OF DAILY LIVING (ADL)
ADLS_ACUITY_SCORE: 41
DEPENDENT_IADLS:: INDEPENDENT
ADLS_ACUITY_SCORE: 60
ADLS_ACUITY_SCORE: 41
ADLS_ACUITY_SCORE: 60
ADLS_ACUITY_SCORE: 41
ADLS_ACUITY_SCORE: 60
ADLS_ACUITY_SCORE: 60
ADLS_ACUITY_SCORE: 41
ADLS_ACUITY_SCORE: 60
ADLS_ACUITY_SCORE: 60
ADLS_ACUITY_SCORE: 41
ADLS_ACUITY_SCORE: 41

## 2025-05-29 NOTE — CONSULTS
5/29 Test claim- Entresto $0, Jardiance $0 both covered 100% no cost for the patient. Thank you for allowing me to help with your patient  Vanessa Millard [facility]  Pharmacy Discharge Liaison   [facility]/[facility] locations  Available on [facility] and [facility]

## 2025-05-29 NOTE — CONSULTS
5/29 Test claim- Entresto $0, Jardiance $0 both covered 100% no cost for the patient. Thank you for allowing me to help with your patient  Vanessa Millard [facility]  Pharmacy Discharge Liaison   [facility]/[place]/[facility] locations  Available on [facility] and [facility]

## 2025-05-29 NOTE — PROGRESS NOTES
[facility]    Medicine Progress Note - Hospitalist Service    Date of Admission:  5/28/2025    Assessment & Plan   Floyd Capps is a 49 year old male with a past medical history of Previous CVA, CAD, HFrEF who was admitted on 5/28/25 after presenting to [facility] ED for Generalized Weakness and Dizziness.      Shortness of Breath with Exertion  HFrEF exacerbation   CAD with multivessel disease  - In the ED - HR 80s, no fever, WBC 13.4  - Troponin mild elevation, BNP 2300.  - CXR in ED showing no signs of pulmonary congestion or pneumonia.  - Last Echo 03/2025 with EF 30-35% with global hypokinesis  - Home Meds: losartan 25mg, carvedilol 25mg, bumex 2mg BID  - Cardiology consult appreciated  - Recheck Echo TTE  - Coronary angiogram on 3/2024 reported multivessel CAD - outpatient cardiology previously considering bypass surgery  - PTA Meds: aspirin 81mg, plavix 75mg, atorvastatin 80mg, ISMN 30mg daily  - Checking Echo TTE  - Lasix 60 mg IV 3 times daily  -PTA losartan changed to Entresto 24-26 mg twice daily  -Imdur 30 mg oral daily  -Atorvastatin 80 mg oral daily  -Continue aspirin 81 mg oral daily  -Continue Plavix 75 mg oral daily  -Monitor daily weights, strict input and output, monitor BMP      Dizziness, Generalized Weakness  History of Strokes   - Previous strokes left lacunar stroke 08/2023, right thalamic 03/2025  - Home meds: Aspirin, Plavix, Atorvastatin  - MRI Brain in ED showing interval change of previous right thalamic stroke with no acute ischemia.  - Continue aspirin, plavix, statin     Right Heel Ulcer  History of Right Foot Osteomyelitis  - Previous surgeries 02/2024, 05/2024 on right foot for osteomyelitis.  - Wound team consult placed  - MRI to rule out osteomyelitis is pending   - Holding off on antibiotics as above  CRP is slightly high : 21.4     Type 2 Diabetes, poorly controlled   - Last A1c was 12.1 in 03/2025.  - Takes metformin at home.  - Home medications include  insulin glargine 35 units nightly, lispro scheduled 20 units with meals + sliding scale.  - Hold home metformin  - increase  glargine 40 units nightly  - Continue home lispro 20 units scheduled with meals  - Sliding Scale Insulin with Glucose Checks  -  A1c: 11.3      CKD stage III;  Creatinine fairly stable.  Monitor labs closely             Diet: Moderate Consistent Carb (60 g CHO per Meal) Diet    DVT Prophylaxis: Heparin SQ  Cazares Catheter: Not present  Lines: None     Cardiac Monitoring: ACTIVE order. Indication: Chest pain/ ACS rule out (24 hours)  Code Status: Full Code      Clinically Significant Risk Factors Present on Admission                 # Drug Induced Platelet Defect: home medication list includes an antiplatelet medication   # Hypertension: Noted on problem list  # Chronic heart failure with reduced ejection fraction: last echo with EF <40%         # DMII: A1C = 11.3 % (Ref range: <5.7 %) within past 6 months        # Financial/Environmental Concerns:           Social Drivers of Health    Depression: At risk (1/30/2025)    PHQ-2     PHQ-2 Score: 6   Tobacco Use: Medium Risk (1/30/2025)    Patient History     Smoking Tobacco Use: Former     Smokeless Tobacco Use: Never   Financial Resource Strain: High Risk (3/23/2025)    Financial Resource Strain     Within the past 12 months, have you or your family members you live with been unable to get utilities (heat, electricity) when it was really needed?: Yes   Transportation Needs: High Risk (3/23/2025)    Transportation Needs     Within the past 12 months, has lack of transportation kept you from medical appointments, getting your medicines, non-medical meetings or appointments, work, or from getting things that you need?: Yes          Disposition Plan     Medically Ready for Discharge: Anticipated in 2-4 Days             Camila Vargas MD  Hospitalist Service  [facility]  Securely message with [employer] (more info)  Text  page via [employer] Paging/Directory   ______________________________________________________________________    Interval History   Boarding in ER awaiting bed assignment.  No distress noted.  He reports shortness of breath with minimal exertion.  Denies having chest pain or palpitation.  MRI of foot is pending.  Evaluated by cardiologist and medications adjusted.  Management plan discussed with the patient and he expressed understanding.    Physical Exam   Vital Signs: Temp: 98.2  F (36.8  C) Temp src: Oral BP: 133/63 Pulse: 69   Resp: 18 SpO2: 92 % O2 Device: None (Room air)    Weight: 309 lbs 0 oz    General Appearance:  No distress noted  Respiratory: Manage daily through bilaterally basally  Cardiovascular: S1 and S2 heard, no murmur or gallop  GI: Soft abdomen, obese, normoactive bowel sounds  Skin: Intact and warm  Other: +2 pretibial edema    Medical Decision Making       40 MINUTES SPENT BY ME on the date of service doing chart review, history, exam, documentation & further activities per the note.      Data

## 2025-05-29 NOTE — PLAN OF CARE
Goal Outcome Evaluation:      Plan of Care Reviewed With: patient          Outcome Evaluation: Unknown at this time.

## 2025-05-29 NOTE — H&P
[facility]    History and Physical - Hospitalist Service       Date of Admission:  5/28/2025    Assessment & Plan      Floyd Capps is a 49 year old male with a past medical history of Previous CVA, CAD, HFrEF who was admitted on 5/28/25 after presenting to [facility] ED for Generalized Weakness and Dizziness.      #Leukocytosis  #Shortness of Breath with Exertion  - In the ED - HR 80s, no fever, WBC 13.4  - Troponin mild elevation, BNP 2300.  - CXR in ED showing no signs of pulmonary congestion or pneumonia.  Plan  - Checking Resp viral panel, Flu/Covid test  - Checking UA  - Hold off on antibiotics for now    #History of HFrEF  - Last Echo 03/2025 with EF 30-35% with global hypokinesis  - Home Meds: losartan 25mg, carvedilol 25mg, bumex 2mg BID  - BNP 2300, CXR without fluid overload  Plan  - Cardiology consult order placed  - Continue home losartan, carvedilol, home bumex  - Recheck Echo TTE    #Chest Pain  #CAD with multivessel disease  - Coronary angiogram on 3/2024 reported multivessel CAD - outpatient cardiology previously considering bypass surgery  - Patient with intermittent chest pain prior to admission, no chest pain currently.  - PTA Meds: aspirin 81mg, plavix 75mg, atorvastatin 80mg, ISMN 30mg daily  - In the ED - Troponin 31 -> 28, EKG no T-ST changes   Plan  - Cardiology consult order placed  - Continue PTA aspirin, Plavix, atorvastatin, ISMN  - Checking Echo TTE      #Dizziness, Generalized Weakness  #History of Strokes   - Previous strokes left lacunar stroke 08/2023, right thalamic 03/2025  - Home meds: Aspirin, Plavix, Atorvastatin  - MRI Brain in ED showing interval change of previous right thalamic stroke with no acute ischemia.  Plan  - Continue aspirin, plavix, statin    #Right Heel Ulcer  #History of Right Foot Osteomyelitis  - Previous surgeries 02/2024, 05/2024 on right foot for osteomyelitis.  Plan  - Wound team consult placed  - Checking MRI to rule out osteomyelitis  with leukocytosis  - Holding off on antibiotics as above    #Type 2 Diabetes  - Last A1c was 12.1 in 03/2025.  - Takes metformin at home.  - Home medications include insulin glargine 35 units nightly, lispro scheduled 20 units with meals + sliding scale.  - Ketone mild elevation 0.76 in the ED, no acidosis on BMP  Plan  - Hold home metformin  - Continue home glargine 35 units nightly  - Continue home lispro 20 units scheduled with meals  - Sliding Scale Insulin with Glucose Checks  - Recheck A1c    #CKD stage III;  Creatinine fairly stable.  Monitor labs closely          Diet: Moderate Consistent Carb (60 g CHO per Meal) Diet  DVT Prophylaxis: Heparin SQ and Pneumatic Compression Devices  Cazares Catheter: Not present  Lines: None     Cardiac Monitoring: ACTIVE order. Indication: Chest pain/ ACS rule out (24 hours)  Code Status: Full Code      Clinically Significant Risk Factors Present on Admission                 # Drug Induced Platelet Defect: home medication list includes an antiplatelet medication   # Hypertension: Noted on problem list  # Chronic heart failure with reduced ejection fraction: last echo with EF <40%         # DMII: A1C = 11.3 % (Ref range: <5.7 %) within past 6 months          # Financial/Environmental Concerns:           Disposition Plan     Medically Ready for Discharge: Anticipated in 2-4 Days           Earnest Del Rio MD  Hospitalist Service  [facility]  Securely message with [employer] (more info)  Text page via [employer] Paging/Directory     ______________________________________________________________________    Chief Complaint   Generalized Weakness    History is obtained from the patient, electronic health record, and emergency department physician    History of Present Illness   Floyd Capps is a 49 year old male with a past medical history of Previous CVA, CAD, HFrEF who was admitted on 5/28/25 after presenting to [facility] ED for Generalized Weakness and Dizziness.    Per  chart review, patient recently hospitalized 03/2025 for acute ischemic stroke of the right thalamic area.  Echo at that time showed known EF 30-35%.  Patient also has known history of coronary artery disease and follows outpatient cardiology.  Previous recommendation for CABG surgery but patient declined based on social issues.     Patient discharged to TCU where she stayed for few weeks until early May.  After leaving TCU patient began to slowly get weaker, started having shortness of breath with exertion, some mild dizziness with exertion.  Patient presented to the ED today for further evaluation.      Past Medical History    Past Medical History:   Diagnosis Date    Acute hypoxemic respiratory failure (H)     Acute renal failure, unspecified acute renal failure type     CAD (coronary artery disease)     Callus of foot     Diabetes (H)     Diarrhea     Essential hypertension     Fracture of left distal radius     History of stroke     Insomnia     Nausea     Non-pressure chronic ulcer of right heel and midfoot with unspecified severity (H)     Normocytic anemia     Type 2 diabetes mellitus with foot ulcer (H)        Past Surgical History   Past Surgical History:   Procedure Laterality Date    APPENDECTOMY      CV CORONARY ANGIOGRAM N/A 3/1/2024    Procedure: CV CORONARY ANGIOGRAM;  Surgeon: Stephen Berger MD;  Location: [facility] CATH LAB CV    CV LEFT HEART CATH N/A 3/1/2024    Procedure: Left Heart Catheterization;  Surgeon: Stephen Berger MD;  Location: [facility] CATH LAB CV    EXCISE EXOSTOSIS FOOT Right 5/2/2024    Procedure: excision of bone from calcaneus with application of theraskin;  Surgeon: Dong Sanders DPM;  Location: [facility] OR    INCISION AND DRAINAGE FOOT, COMBINED Right 5/2/2024    Procedure: INCISION AND DRAINAGE, right heel with;  Surgeon: Dong Sanders DPM;  Location: [facility] OR    INCISION AND DRAINAGE OF WOUND      groin abscess    IRRIGATION AND  DEBRIDEMENT FOOT, COMBINED Right 2/16/2024    Procedure: IRRIGATION AND DEBRIDEMENT RIGHT FOOT;  Surgeon: Cristofer Sims DPM;  Location: [facility] OR    PICC DOUBLE LUMEN PLACEMENT  2/29/2024       Prior to Admission Medications   Prior to Admission Medications   Prescriptions Last Dose Informant Patient Reported? Taking?   Continuous Glucose Sensor (FREESTYLE LAMINE 2 SENSOR) [facility]   No No   Sig: Inject 1 each subcutaneously every 14 days Use 1 sensor every 14 days. Use to read blood sugars per 's instructions.   Patient not taking: Reported on 10/9/2024   acetaminophen (TYLENOL) 500 MG tablet   No Yes   Sig: Take 2 tablets (1,000 mg) by mouth every 8 hours as needed for mild pain   aspirin 81 MG EC tablet Unknown  No Yes   Sig: Take 1 tablet (81 mg) by mouth daily.   atorvastatin (LIPITOR) 80 MG tablet 5/26/2025 Evening  No Yes   Sig: Take 1 tablet (80 mg) by mouth daily   bumetanide (BUMEX) 2 MG tablet 5/26/2025 Evening  No Yes   Sig: Take 1 tablet (2 mg) by mouth 2 times daily.   carvedilol (COREG) 25 MG tablet 5/26/2025 Evening  No Yes   Sig: Take 1 tablet (25 mg) by mouth 2 times daily (with meals).   clopidogrel (PLAVIX) 75 MG tablet 5/26/2025 Evening  No Yes   Sig: Take 1 tablet (75 mg) by mouth daily.   cyanocobalamin (VITAMIN B-12) 1000 MCG tablet 5/26/2025  Yes Yes   Sig: Take 1,000 mcg by mouth daily.   insulin glargine (LANTUS PEN) 100 UNIT/ML pen Unknown  No Yes   Sig: Inject 35 Units subcutaneously at bedtime.   insulin lispro (HUMALOG KWIKPEN) 100 UNIT/ML (1 unit dial) KWIKPEN Unknown  No Yes   Sig: Inject 20 Units subcutaneously 3 times daily (with meals). Plus sliding scale: 2 units every 50 over 150. If humalog is not covered by insurance, may substitute with novolog or other fast acting insulin   insulin pen needle (32G X 4 MM) 32G X 4 MM miscellaneous   No No   Sig: Use 4 pen needles daily or as directed.   isosorbide mononitrate (IMDUR) 30 MG 24 hr tablet 5/26/2025 Evening  No  Yes   Sig: Take 1 tablet (30 mg) by mouth daily.   losartan (COZAAR) 25 MG tablet 5/26/2025 Evening  No Yes   Sig: Take 1 tablet (25 mg) by mouth daily.   metFORMIN (GLUCOPHAGE XR) 500 MG 24 hr tablet 5/26/2025 Evening  No Yes   Sig: Take 1 tablet (500 mg) by mouth daily (with dinner).   multivitamin w/minerals (THERA-VIT-M) tablet 5/26/2025 Morning  No Yes   Sig: Take 1 tablet by mouth daily      Facility-Administered Medications: None        Social History   I have reviewed this patient's social history and updated it with pertinent information if needed.  Social History     Tobacco Use    Smoking status: Former     Current packs/day: 0.50     Types: Cigarettes    Smokeless tobacco: Never   Vaping Use    Vaping status: Never Used   Substance Use Topics    Alcohol use: Yes     Comment: once a week    Drug use: Yes     Types: Marijuana     Comment: Has not done since Feb       Allergies   No Known Allergies     Physical Exam   Vital Signs: Temp: 98.2  F (36.8  C) Temp src: Oral BP: (!) 152/73 Pulse: 82   Resp: 21 SpO2: 98 % O2 Device: None (Room air)    Weight: 309 lbs 0 oz    General: Alert and Oriented x3. Answers questions appropriately. Follows commands.  Resp: Breath sounds equal throughout. No crackles. No wheezing.  Cardio: Regular rate and rhythm. No murmurs. No friction rub.  Abd: Soft. Non-distended. Non-tender. Bowel sounds normal. No rebound tenderness.  MSK:   - RLE Right heel with area of ulceration  Neuro: CN 2-12 grossly intact. Strength 5/5 in all 4 extremities.  Skin:No rashes. No jaundice.       Medical Decision Making       75 MINUTES SPENT BY ME on the date of service doing chart review, history, exam, documentation & further activities per the note.  MANAGEMENT DISCUSSED with the following over the past 24 hours: RN, ED Physician   Tests ORDERED & REVIEWED in the past 24 hours:  - BMP  - CBC  - BNP  - Troponin  Medical complexity over the past 24 hours:  - Prescription DRUG MANAGEMENT  performed  - Treatment limited by SOCIAL DETERMINANTS OF HEALTH      Data     I have personally reviewed the following data over the past 24 hrs:    13.4 (H)  \   12.8 (L)   / 267     136 101 20.0 /  309 (H)   4.4 25 1.29 (H) \     Trop: 29 (H) BNP: 2,328 (H)     TSH: N/A T4: N/A A1C: 11.3 (H)       Imaging results reviewed over the past 24 hrs:   Recent Results (from the past 24 hours)   XR Chest 2 Views    Narrative    EXAM: XR CHEST 2 VIEWS  LOCATION: [facility]  DATE: 5/28/2025    INDICATION: Shortness of breath  COMPARISON: 3/23/2025      Impression    IMPRESSION: Negative chest.   MR Brain w/o & w Contrast    Narrative    EXAM: MR BRAIN W/O AND W CONTRAST  LOCATION: [facility]  DATE: 5/28/2025    INDICATION: Balance coordination problems.  COMPARISON: Head MRI 3/23/2025.  CONTRAST: 14 mL Gadavist.  TECHNIQUE: Routine multiplanar multisequence head MRI without and with intravenous contrast.    FINDINGS:  Recent infarct involving the right thalamus/internal capsule demonstrates expected evolution with decreased volume of diffusion restriction compared to the prior. No new territories of acute infarct.    Old lacunar infarct involving the left centrum semiovale and left putamen. Scattered nonspecific white matter T2 hyperintensities likely represent chronic small vessel ischemic change. No abnormal intracranial enhancement.    Marrow signal is within normal limits. Incidental bilateral ocular proptosis related to prominent intraorbital fat. Mild paranasal sinus mucosal thickening. Trace fluid signal within the bilateral mastoid cavities, presumed inflammatory. Presumed benign   nasopharyngeal submucosal cysts.      Impression    IMPRESSION:  1.  Recent infarct involving the right thalamus/internal capsule demonstrates expected evolution with decreased volume of diffusion restriction compared to the prior.  2.  No new territories of acute infarct.  3.  No evidence  of hemorrhage.  4.  Chronic changes as detailed.

## 2025-05-29 NOTE — CONSULTS
"  [facility]    Stroke Telephone Note    I was called by Earnest Del Rio on 05/29/25 regarding patient Floyd Capps. The patient is a 49 year old man with HTN, HLD, DM, CAD with CHF with EF of 30 to 35%, CKD, recent stroke involving right thalamus and internal capsule (3/2025) who presents to the Emergency Department for evaluation of chronic dyspnea worse over the course of the last 1 to 3 weeks, lower extremity edema despite compliance with his Bumex and vaguely feeling like his balance is off.  Mri was repeated today and showed known stroke in the right thalamus. Stroke Neuro was called to confirm that this is not new.    Vitals  BP: 133/63   Pulse: 69   Resp: 18   Temp: 98.2  F (36.8  C)   Weight: (!) 140.2 kg (309 lb)    Imaging Findings  MRI Brain: Recent infarct involving the right thalamus/internal capsule demonstrates expected evolution with decreased volume of diffusion restriction compared to the prior (3/2025)     Impression  MRI with evidence of recent infarct involving the right thalamus/internal capsule demonstrates expected evolution with decreased volume of diffusion restriction compared to the prior.     Recommendations  With no definite laterlaizing new neuro deficits and MRI showing evolution in known area of stroke, no further stroke work up needed at this time.    My recommendations are based on the information provided over the phone by Floyd Capps's in-person providers. They are not intended to replace the clinical judgment of his in-person providers. I was not requested to personally see or examine the patient at this time.     Darcy Cordon MD  Vascular Neurology    To page me or covering stroke neurology team member, click here: [employer]  Choose \"On Call\" tab at top, then select \"NEUROLOGY/ALL SITES\" from middle drop-down box, press Enter, then look for \"stroke\" or \"telestroke\" for your site.   "

## 2025-05-29 NOTE — PROGRESS NOTES
Physical Therapy    Pt is not appropriate for skilled PT services at this time due to close to baseline with mobility and endurance.  Will defer to OT for mobility/endurance.  Plan was discussed with OT.  Will D/C current PT orders.  Please reorder if status changes. Thank you.    Christelle Beach, PT, DPT  5/29/2025

## 2025-05-29 NOTE — PROGRESS NOTES
05/29/25 1445   Appointment Info   Signing Clinician's Name / Credentials (OT) Gaby Hairston OTR/L   Living Environment   People in Home alone   Current Living Arrangements homeless   Living Environment Comments pt has been living in his truck since he was evicted from his apt a few months ago   Self-Care   Usual Activity Tolerance moderate   Current Activity Tolerance fair   Regular Exercise No   Equipment Currently Used at Home none   Activity/Exercise/Self-Care Comment pt typically I with all ADL's and IADL's   General Information   Onset of Illness/Injury or Date of Surgery 05/28/25   Referring Physician Dr Vargas   Patient/Family Therapy Goal Statement (OT) none stated   Additional Occupational Profile Info/Pertinent History of Current Problem 49 year old male with a past medical history of Previous CVA, CAD, HFrEF who was admitted on 5/28/25 after presenting to [facility] ED for Generalized Weakness and Dizziness.   Cognitive Status Examination   Orientation Status orientation to person, place and time   Follows Commands WNL   Pain Assessment   Patient Currently in Pain No   Range of Motion Comprehensive   General Range of Motion no range of motion deficits identified   Strength Comprehensive (MMT)   General Manual Muscle Testing (MMT) Assessment upper extremity strength deficits identified   Bed Mobility   Bed Mobility sit-supine;supine-sit   Supine-Sit [place] (Bed Mobility) supervision   Sit-Supine [place] (Bed Mobility) supervision   Transfers   Transfers toilet transfer;bed-chair transfer;sit-stand transfer   Transfer Comments some SOB with mobility task during session, cues for PLB   Transfer Skill: Bed to Chair/Chair to Bed   Bed-Chair [place] (Transfers) supervision   Sit-Stand Transfer   Sit-Stand [place] (Transfers) supervision   Toilet Transfer   Type (Toilet Transfer) sit-stand;stand-sit   [place] Level (Toilet Transfer) independent   Activities of Daily Living    BADL Assessment/Intervention toileting   Toileting   [place] Level (Toileting) perform perineal hygiene;independent;adjust/manage clothing   Position (Toileting) unsupported sitting   Clinical Impression   Criteria for Skilled Therapeutic Interventions Met (OT) Yes, treatment indicated   OT Diagnosis decreased ADL endurance   Influenced by the following impairments fatigue, SOB   OT Problem List-Impairments impacting ADL activity tolerance impaired;strength;mobility   Assessment of Occupational Performance 3-5 Performance Deficits   Identified Performance Deficits trsfs, mobility, ADL endurance for dressing, toileting   Planned Therapy Interventions (OT) ADL retraining;strengthening;progressive activity/exercise;risk factor education   Clinical Decision Making Complexity (OT) problem focused assessment/low complexity   Risk & Benefits of therapy have been explained patient   OT Total Evaluation Time   OT Eval, Low Complexity Minutes (80907) 14   OT Goals   Therapy Frequency (OT) Daily   OT Predicted Duration/Target Date for Goal Attainment 06/05/25   OT: Understanding of cardiac education to maximize quality of life, condition management, and health outcomes Patient;Verbalize   OT: Perform aerobic activity with stable cardiovascular response continuous;10 minutes;ambulation   OT: Functional/aerobic ambulation tolerance with stable cardiovascular response in order to return to home and community environment Independent;200 feet   OT: Navigation of stairs simulating home set up with stable cardiovascular response in order to return to home and community environment Independent;5 stairs   Self-Care/Home Management   Self-Care/Home Mgmt/ADL, Compensatory, Meal Prep Minutes (38048) 10   Symptoms Noted During/After Treatment (Meal Preparation/Planning Training) shortness of breath   Treatment Detail/Skilled Intervention Eval completed, treatment initiated.  Pt up ambulating upon arrival, demonstrated SBA without  AD, no LOB demonstrated however pt states he doesn't feel strong with mobility, some SOB with activity, cues for PLB.  Completed trsfs, toilet, bed with SBA, no AD used during session.  Completed all toileting tasks I'ly including washing hands at sink.  Initiated education with CHF including giving CHF booklet, discussed walking program and diet restrictions.  Pt verbalized an understanding, waiting for CTS consult in regards to potential CABG.  Pt left at bedside with lunch   OT Discharge Planning   OT Plan longer mobility task, endurance, CHF education   OT Discharge Recommendation (DC Rec) other (see comments)   OT Rationale for DC Rec discharge rec is still pending based on medical plan as pt may need CABG which would change plan potentially.  As of now, pt can discharge home but he is homeless   OT Brief overview of current status SBA mobility, tasks   OT Total Distance Amb During Session (feet) 75   Total Session Time   Timed Code Treatment Minutes 10   Total Session Time (sum of timed and untimed services) 24

## 2025-05-29 NOTE — ED NOTES
Report called to Yuli CHEEK on P1 @ 3712. Was informed that the room is in the process of being cleaned. RN will call back to let us know when the room is clean and patient can transfer to room 116.

## 2025-05-29 NOTE — INTERIM SUMMARY
Brief cardiology update:    I personally reviewed the results of the TTE and discussed with Dr Brand. EF has improved compared to March of this year, EF 40-45%, previously 30-35%. Thre is moderate global hypokinesia of LV, poorly visualized, no valve disease. IVC collapses, no evidence    Etiology of dyspnea less likely HF, possibly an anginal equivalent. Will as CV surgery to re-evaluate, as patient is open to CAB at this time. If pursued, will need to r/o active infection and ask vascular's input on his carotid artery disease.     Do not recommend further diuresis, resume PTA Bumex 2 mg BID tomorrow.     Sue Mclaughlin PA-C

## 2025-05-29 NOTE — PHARMACY-ADMISSION MEDICATION HISTORY
Medication Scribe Admission Medication History    Admission medication history is complete. The information provided in this note is only as accurate as the sources available at the time of the update.    Information Source(s): Patient via in-person    Pertinent Information: The patient reported that he stopped taking his insulin due to a lack of proper storage. He has not been consistent with his medication regimen.     The patient stated that his last dose was taken on 05/26/25.     He is unsure of the number of insulin units he was taking when he was using it.    Changes made to PTA medication list:  Added:   Vitamin B12 per patient   Deleted:   Urea-lactic acid   Changed: None    Allergies reviewed with patient and updates made in EHR: yes    Medication History Completed By: Aklilu Gebreyesus 5/28/2025 8:58 PM    PTA Med List   Medication Sig Last Dose/Taking    acetaminophen (TYLENOL) 500 MG tablet Take 2 tablets (1,000 mg) by mouth every 8 hours as needed for mild pain Taking As Needed    aspirin 81 MG EC tablet Take 1 tablet (81 mg) by mouth daily. Unknown    atorvastatin (LIPITOR) 80 MG tablet Take 1 tablet (80 mg) by mouth daily 5/26/2025 Evening    bumetanide (BUMEX) 2 MG tablet Take 1 tablet (2 mg) by mouth 2 times daily. 5/26/2025 Evening    carvedilol (COREG) 25 MG tablet Take 1 tablet (25 mg) by mouth 2 times daily (with meals). 5/26/2025 Evening    clopidogrel (PLAVIX) 75 MG tablet Take 1 tablet (75 mg) by mouth daily. 5/26/2025 Evening    cyanocobalamin (VITAMIN B-12) 1000 MCG tablet Take 1,000 mcg by mouth daily. 5/26/2025    insulin glargine (LANTUS PEN) 100 UNIT/ML pen Inject 35 Units subcutaneously at bedtime. Unknown    insulin lispro (HUMALOG KWIKPEN) 100 UNIT/ML (1 unit dial) KWIKPEN Inject 20 Units subcutaneously 3 times daily (with meals). Plus sliding scale: 2 units every 50 over 150. If humalog is not covered by insurance, may substitute with novolog or other fast acting insulin Unknown     isosorbide mononitrate (IMDUR) 30 MG 24 hr tablet Take 1 tablet (30 mg) by mouth daily. 5/26/2025 Evening    losartan (COZAAR) 25 MG tablet Take 1 tablet (25 mg) by mouth daily. 5/26/2025 Evening    metFORMIN (GLUCOPHAGE XR) 500 MG 24 hr tablet Take 1 tablet (500 mg) by mouth daily (with dinner). 5/26/2025 Evening    multivitamin w/minerals (THERA-VIT-M) tablet Take 1 tablet by mouth daily 5/26/2025 Morning

## 2025-05-29 NOTE — CONSULTS
[facility] Nurse Inpatient Assessment     Consulted for: JG heel    Summary: Attempted to see patient, but he was in a procedure. Will plan to see in AM to assess heel.    [facility] nurse follow-up plan: weekly    Patient History (according to provider note(s):    Assessment & Plan  Floyd Capps is a 49 year old male with a past medical history of Previous CVA, CAD, HFrEF who was admitted on 5/28/25 after presenting to [facility] ED for Generalized Weakness and Dizziness.    Assessment:        Treatment Plan:       Orders: Reviewed    RECOMMEND PRIMARY TEAM ORDER: None, at this time  Education provided: Not appropriate today   Discussed plan of care with: NA today  Notify [facility] if wound(s) deteriorate.  Nursing to notify the Provider(s) and re-consult the [facility] Nurse if new skin concern.    DATA:     Current support surface: Standard  Standard gel mattress (Isoflex)  Containment of urine/stool: Continent of bladder and Continent of bowel  BMI: Body mass index is 44.34 kg/m .   Active diet order: Orders Placed This Encounter      Moderate Consistent Carb (60 g CHO per Meal) Diet     Output: I/O last 3 completed shifts:  In: 240 [P.O.:240]  Out: 500 [Urine:500]     Labs:   Recent Labs   Lab 05/29/25  0650 05/28/25  1728   HGB 11.6* 12.8*   WBC 12.0* 13.4*   A1C  --  11.3*     Pressure injury risk assessment:   Sensory Perception: 3-->slightly limited  Moisture: 4-->rarely moist  Activity: 3-->walks occasionally  Mobility: 3-->slightly limited  Nutrition: 2-->probably inadequate  Friction and Shear: 3-->no apparent problem  Rolo Score: 18    Vidya Villanueva, MSN RN CWOCN  Pager no longer is use, please contact through [employer] group: [facility]

## 2025-05-29 NOTE — CONSULTS
"Care Management Initial Consult    General Information  Assessment completed with: Floyd Hansen  Type of CM/SW Visit: Initial Assessment    Primary Care Provider verified and updated as needed: Yes   Readmission within the last 30 days: no previous admission in last 30 days      Reason for Consult: discharge planning, housing concerns/homeless, nonalliance concerns  Advance Care Planning: Advance Care Planning Reviewed: no concerns identified (\"I don't have one written\")          Communication Assessment  Patient's communication style: spoken language (English or Bilingual)          Cognitive  Cognitive/Neuro/Behavioral: WDL                      Living Environment:   People in home: alone     Current living Arrangements: homeless, other (see comments) (\"I live out of my truck. I was evicted on May 21\".)      Able to return to prior arrangements: yes       Family/Social Support:  Care provided by: self  Provides care for: no one, unable/limited ability to care for self  Marital Status: Single  Support system: None, Other (specify) (\"I have no one. I am estranged from all family and friends.\")          Description of Support System: Uninvolved    Support Assessment: Lacks adequate emotional support, Limited social contact and support, Minimal outside structure and leisure time activities, Difficulty establishing and maintaining relationships, Inadequate interpersonal communication skills    Current Resources:   Patient receiving home care services: No        Community Resources: [facility],  (\"I had a meeting today 5/29/25 with Katherine for housing help, but I am missing it because in the hospital. Katherine was going to help with a referral to their Behavioral Health Home or other housing\".)  Equipment currently used at home: none (\"I don't have my diabetes equipment or a glucometer anymore\".)  Supplies currently used at home: None    Employment/Financial:  Employment Status: unemployed, employment " "seeking (\"I was a , but am not working. I was laid off 12/2024\".)     Employment/ Comments: \"no  history\"  Financial Concerns: unable to afford rent/mortgage, unemployed   Referral to Financial Worker: No  Finance Comments: \"I have a  Georgiana helping me\". Georgiana Isaacs  [facility] [phone]. I alerted him to his hospitalization and she is \"aware he is homeless\".    Does the patient's insurance plan have a 3 day qualifying hospital stay waiver?  No    Lifestyle & Psychosocial Needs:  Social Drivers of Health     Food Insecurity: Low Risk  (3/23/2025)    Food Insecurity     Within the past 12 months, did you worry that your food would run out before you got money to buy more?: No     Within the past 12 months, did the food you bought just not last and you didn t have money to get more?: No   Depression: At risk (1/30/2025)    PHQ-2     PHQ-2 Score: 6   Housing Stability: Low Risk  (3/23/2025)    Housing Stability     Do you have housing? : Yes     Are you worried about losing your housing?: No   Tobacco Use: Medium Risk (1/30/2025)    Patient History     Smoking Tobacco Use: Former     Smokeless Tobacco Use: Never     Passive Exposure: Not on file   Financial Resource Strain: High Risk (3/23/2025)    Financial Resource Strain     Within the past 12 months, have you or your family members you live with been unable to get utilities (heat, electricity) when it was really needed?: Yes   Alcohol Use: Not At Risk (4/27/2019)    Received from [employer]    AUDIT-C     Frequency of Alcohol Consumption: Never     Average Number of Drinks: Not on file     Frequency of Binge Drinking: Not on file   Transportation Needs: High Risk (3/23/2025)    Transportation Needs     Within the past 12 months, has lack of transportation kept you from medical appointments, getting your medicines, non-medical meetings or appointments, work, or from getting things that you need?: Yes " "  Physical Activity: Not on file   Interpersonal Safety: Low Risk  (3/23/2025)    Interpersonal Safety     Do you feel physically and emotionally safe where you currently live?: Yes     Within the past 12 months, have you been hit, slapped, kicked or otherwise physically hurt by someone?: No     Within the past 12 months, have you been humiliated or emotionally abused in other ways by your partner or ex-partner?: No   Stress: Not on file   Social Connections: Not on file   Health Literacy: Not on file       Functional Status:  Prior to admission patient needed assistance:   Dependent ADLs:: Independent, Ambulation-no assistive device  Dependent IADLs:: Independent  Assesssment of Functional Status: Not at baseline with ADL Functioning, Not at baseline with mobility, Not at  functional baseline    Mental Health Status:  Mental Health Status: Other (see comment) (unknown)       Chemical Dependency Status:  Chemical Dependency Status: Other (see comment) (unknown)             Values/Beliefs:  Spiritual, Cultural Beliefs, Sikh Practices, Values that affect care: no               Discussed  Partnership in Safe Discharge Planning  document with patient/family: No    Additional Information:  Floyd is currently homeless and living in his truck. \"I was evicted on May 21\". He is independent with ADLs and IADLs.    \"I was a , but am not working. I was laid off 12/2024. I am constantly looking for work. I have no income. I need more services. Gets $259 in SNAP. Georgiana is working with me to apply for Social Security disability. I had a meeting today 5/29/25 with Katherine for housing help, but I am missing it because I'm in the hospital. Katherine was going to help with a referral to their Behavioral Health Home or other housing\".    \"I have no one. I am estranged from all family and friends.\"    He was admitted 3/23/25 - 3/27/25 and upon that discharge he went to [facility]. He states, \"I would like to go " "to a TCU again\".    Ride: Other: \"My truck is parked in the ER\".    Georgiana Patelar  [facility] [phone].     CM to follow for medical progression of care, discharge recommendations, and final discharge plan. Writer verified patient demographics and updated any changes needed in the patient chart.    Next Steps:   Medical plan/delay: Medical clearance. PT/OT recommendations.    Dispo: \"I want to go to a TCU again\".    CM to do: awaiting PT/OT recommendations.    Inés Damico RN    "

## 2025-05-29 NOTE — CONSULTS
[facility]  Cardiology Consultation   Date of Admission:  5/28/2025    Assessment & Plan   Floyd Capps is a 49 year old male with past medical history of severe multivessel coronary artery disease, CKD and heart failure with reduced ejection fraction, diabetes, hypertension, hyperlipidemia and previous CVA and more outlined below who was admitted on 5/28/2025 with creasing shortness of breath with exertion worsening over the last 1 to 3 weeks, lower extremity edema and feeling somewhat off balance.  EKG shows sinus rhythm.  Troponin minimally elevated to 31-26.  NT-proBNP elevated to 2328.  Chest x-ray without cardiopulmonary etiology.  MRI brain shows recent infarct involving the right thalamus internal capsule    Dyspnea  Coronary artery disease, severe, multivessel  Heart failure with reduced ejection fraction, acute decompensated heart failure   Patient's EF dropped from 55-60 to 30 to 35% in February 2024.  There is also mild LVH and global hypokinesis with severe hypokinesis of the mid to apical inferior wall.  Patient subsequently underwent coronary angiogram later in February 2020 for which showed multivessel native coronary artery disease and severely elevated left-sided filling pressures.  He presents now with worsening HOWELL with minimal activity over the last 1-3 weeks, and medication noncompliance x 3-4 days. Weight 272 in February during admission, now 309 with peripheral edema, abdominal bloating and PND, concerning for acute decompensated heart failure. He shares he is open to CABG if necessary.   Carvedilol 25 mg twice daily  Furosemide 60 IV three times daily (PTA was on Bumex 2 mg BID)  Intake/output -strict  Daily standing weight   2gm Na restriction, 2L fluid restriction  Daily BMP, replete electrolytes as needd  Change PTA losartan 25 mg daily to Entresto 24-26 mg BID (cost $0 for patient)  Continue Imdur 30 mg every evening  Continue atorvastatin 80 mg daily  Continue  aspirin and Plavix  Pending results of MRI, if no evidence of osteomyelitis/infectino, will discuss with CVS and vascular surgery  Carotid Artery Disease, mild based on US, CT neck shows calcific plaque and fibrofatty plaque with moderate stenosis of pICA.   Brain MRI with evidence of recent infarct.  Involving the right thalamus/internal capsule with expected evolution and decreased volume of diffusion restriction compared to prior.  Seen by neurology who do not feel like there are lateralizing neurological defects and no further workup was recommended.  Leukocytosis, Respiratory virus panel negative.   Anemia  Elevated CRP  Dyslipidemia, Most recent  and . Patient is prescribed atorvastatin, unclear if he was taking.   Continue atorvastatin 80 mg daily   We discussed the role diet, exercise and weight management can play in managing dyslipidemia.   Right heel ulcer, hx of RLE osteomyelitis, surgery in February and May of this year. MRI pending to check for osteo. CRP lower than February.     High complexity     Sue Mclaughlin PA-C  Case discussed with Dr Brand.     Primary Care Physician   Glendy Benavides    Reason for Consult   Reason for consult: I was asked by internal medicine to evaluate this patient for dyspnea on exertion.    History of Present Illness   Floyd Capps is a 49 year old male who presents with social over the last 1 to 3 weeks.  Patient shares that after his stroke and surgeries in March 2025 he was at a TCU and doing quite well.  He could walk 100 feet and do exercises without issue.  He returned home and was able to continue these activities until about 3 weeks ago when he started noticed dyspnea on exertion that has progressively worsened since that time.  Now he notices dyspnea with even getting out of bed or doing minimal activity.  This is atypical for him.  With walking he also gets back pain and sometimes shoulder pain.  He is having worsening edema, abdominal bloating  and at times orthopnea and PND.  He also feels offkilter and dizzy.    Regarding cardiac history, patient's EF dropped from 55-60 to 30 to 35% in February 2024.  There is also mild LVH and global hypokinesis with severe hypokinesis of the mid to apical inferior wall.  Patient subsequently underwent coronary angiogram later in February 2020 for which showed multivessel native coronary artery disease and severely elevated left-sided filling pressures.  Subsequently has followed in our clinic and is mostly recently seen in November 2024 by my colleague Dr. Hopson. At that time he is working 40 hours/week and they reviewed his medications, Imdur 30 mg daily was added for afterload reduction.    PTA medications: Aspirin 81 mg daily, atorvastatin 80 mg nightly, Bumex 2 mg twice daily, carvedilol 25 mg twice a day, clopidogrel 75 mg daily, Imdur 30 mg daily, losartan 25 mg daily      Diagnostics Reviewed:  3/23/2025 TTE:  The left ventricle ejection fraction is 30-35% (moderately reduced). There is  global hypokinesis with severe hypokinesis/akinesis of inferior and  inferolateral walls.  The right ventricle is normal in size and function.     No hemodynamically significant valvular abnormalities on 2D or color flow  imaging. Compared to the prior study dated 2/20/2024, there have been no  changes.    2/2024 Cardiac Catheterization:  Floyd Capps is a 48 year old old male with HTN, HL, DM, smoking, high BMI who is here for w/up of new cardiomyopathy/admission for ADHF.     - multi-vessel native CAD; severely elevated L-sided filling pressures  - will stop to obtain CTS consult  - continue ASA 81mg daily indefinitely, atorva 80  - continue aggressive risk factor modification        Findings:  LM:no obstruction  LAD:proximal Ca2+ 70% narrowing, mid-distal 90% narrowing. Small D1 w/ diffuse 90% narrowing  Lcx:OM1 proximal 90%, small superior subdivision 90%, inferior 80%. OM1 small 90% narrowing  RCA:dominant, distal 85%,  rPDA 95% narrowing     LVEDP:35    3/2025 US Carotid Bilateral:  IMPRESSION:  1.  Mild plaque formation, velocities consistent with less than 50% stenosis in the right internal carotid artery.  2.  Mild plaque formation, velocities consistent with less than 50% stenosis in the left internal carotid artery.  3.  Flow within the vertebral arteries is antegrade.    2/2024 TTE:  The left ventricle is normal in size. There is mild concentric left  ventricular hypertrophy.  Left ventricular function is decreased. The ejection fraction is 30-35%  (moderately reduced). There is global hypokinesis with severe hypokinesis of  the mid to apical inferior wall.  Diastolic Doppler findings (E/E' ratio and/or other parameters) suggest left  ventricular filling pressures are increased.     The right ventricle is normal in size and function.  Normal left atrial size. Right atrial size is normal.  There is mild (1+) mitral regurgitation.  IVC diameter >2.1 cm collapsing <50% with sniff suggests a high RA pressure  estimated at 15 mmHg or greater.  8/2023 TTE:  1. Technically difficult study.  2. Normal left ventricular size and systolic performance with a visually  estimated ejection fraction of 55%.  3. On selected views, there appears to be a fairly small discrete apical wall  motion abnormality (no apical mural thrombus is detected).  4. No significant valvular heart disease is identified on this study.  5. Probable normal right ventricular size and systolic performance though  right-sided structures are not clearly visualized on all views on this study.  6. Echo contrast examination was performed using agitated NS as contrast  agent. The right heart opacity was suboptimal. There was no obvious evidence  of right to left shunting during spontaneous respiration or following release  of Valsalva though the sensitivity &specificity are both reduced due to  suboptimal acoustic imaging..    Past Medical History   Past Medical History:    Diagnosis Date    Acute hypoxemic respiratory failure (H)     Acute renal failure, unspecified acute renal failure type     CAD (coronary artery disease)     Callus of foot     Diabetes (H)     Diarrhea     Essential hypertension     Fracture of left distal radius     History of stroke     Insomnia     Nausea     Non-pressure chronic ulcer of right heel and midfoot with unspecified severity (H)     Normocytic anemia     Type 2 diabetes mellitus with foot ulcer (H)          Past Surgical History   Past Surgical History:   Procedure Laterality Date    APPENDECTOMY      CV CORONARY ANGIOGRAM N/A 3/1/2024    Procedure: CV CORONARY ANGIOGRAM;  Surgeon: Stephen Berger MD;  Location: [facility] CATH LAB CV    CV LEFT HEART CATH N/A 3/1/2024    Procedure: Left Heart Catheterization;  Surgeon: Stephen Berger MD;  Location: [facility] CATH LAB CV    EXCISE EXOSTOSIS FOOT Right 5/2/2024    Procedure: excision of bone from calcaneus with application of theraskin;  Surgeon: Dong Sanders DPM;  [place] [facility] [place]    INCISION AND DRAINAGE FOOT, COMBINED Right 5/2/2024    Procedure: INCISION AND DRAINAGE, right heel with;  Surgeon: Dong Sanders DPM;  [place] [facility] [place]    INCISION AND DRAINAGE OF WOUND      groin abscess    IRRIGATION AND DEBRIDEMENT FOOT, COMBINED Right 2/16/2024    Procedure: IRRIGATION AND DEBRIDEMENT RIGHT FOOT;  Surgeon: Cristofer Sims DPM;  [place] [facility] [place]    PICC DOUBLE LUMEN PLACEMENT  2/29/2024         Prior to Admission Medications   Prior to Admission Medications   Prescriptions Last Dose Informant Patient Reported? Taking?   Continuous Glucose Sensor (FREESTYLE LAMINE 2 SENSOR) [facility]   No No   Sig: Inject 1 each subcutaneously every 14 days Use 1 sensor every 14 days. Use to read blood sugars per 's instructions.   Patient not taking: Reported on 10/9/2024   acetaminophen (TYLENOL) 500 MG tablet   No Yes   Sig: Take 2 tablets (1,000 mg) by  mouth every 8 hours as needed for mild pain   aspirin 81 MG EC tablet Unknown  No Yes   Sig: Take 1 tablet (81 mg) by mouth daily.   atorvastatin (LIPITOR) 80 MG tablet 5/26/2025 Evening  No Yes   Sig: Take 1 tablet (80 mg) by mouth daily   bumetanide (BUMEX) 2 MG tablet 5/26/2025 Evening  No Yes   Sig: Take 1 tablet (2 mg) by mouth 2 times daily.   carvedilol (COREG) 25 MG tablet 5/26/2025 Evening  No Yes   Sig: Take 1 tablet (25 mg) by mouth 2 times daily (with meals).   clopidogrel (PLAVIX) 75 MG tablet 5/26/2025 Evening  No Yes   Sig: Take 1 tablet (75 mg) by mouth daily.   cyanocobalamin (VITAMIN B-12) 1000 MCG tablet 5/26/2025  Yes Yes   Sig: Take 1,000 mcg by mouth daily.   insulin glargine (LANTUS PEN) 100 UNIT/ML pen Unknown  No Yes   Sig: Inject 35 Units subcutaneously at bedtime.   insulin lispro (HUMALOG KWIKPEN) 100 UNIT/ML (1 unit dial) KWIKPEN Unknown  No Yes   Sig: Inject 20 Units subcutaneously 3 times daily (with meals). Plus sliding scale: 2 units every 50 over 150. If humalog is not covered by insurance, may substitute with novolog or other fast acting insulin   insulin pen needle (32G X 4 MM) 32G X 4 MM miscellaneous   No No   Sig: Use 4 pen needles daily or as directed.   isosorbide mononitrate (IMDUR) 30 MG 24 hr tablet 5/26/2025 Evening  No Yes   Sig: Take 1 tablet (30 mg) by mouth daily.   losartan (COZAAR) 25 MG tablet 5/26/2025 Evening  No Yes   Sig: Take 1 tablet (25 mg) by mouth daily.   metFORMIN (GLUCOPHAGE XR) 500 MG 24 hr tablet 5/26/2025 Evening  No Yes   Sig: Take 1 tablet (500 mg) by mouth daily (with dinner).   multivitamin w/minerals (THERA-VIT-M) tablet 5/26/2025 Morning  No Yes   Sig: Take 1 tablet by mouth daily      Facility-Administered Medications: None     Current Facility-Administered Medications   Medication Dose Route Frequency Provider Last Rate Last Admin    acetaminophen (TYLENOL) tablet 650 mg  650 mg Oral Q4H PRN Earnest Del Rio MD        Or    acetaminophen  (TYLENOL) Suppository 650 mg  650 mg Rectal Q4H PRN Earnest Del Rio MD        aspirin EC tablet 81 mg  81 mg Oral Daily Earnest Del Rio MD   81 mg at 05/29/25 0822    atorvastatin (LIPITOR) tablet 80 mg  80 mg Oral QPM Earnest Del Rio MD        bumetanide (BUMEX) tablet 2 mg  2 mg Oral BID Earnest Del Rio MD   2 mg at 05/29/25 0822    calcium carbonate (TUMS) chewable tablet 1,000 mg  1,000 mg Oral 4x Daily PRN Earnest Del Rio MD        carvedilol (COREG) tablet 25 mg  25 mg Oral BID w/meals Earnest Del Rio MD   25 mg at 05/29/25 0822    clopidogrel (PLAVIX) tablet 75 mg  75 mg Oral QPM Earnest Del Rio MD        cyanocobalamin (VITAMIN B-12) tablet 1,000 mcg  1,000 mcg Oral Daily Earnest Del Rio MD   1,000 mcg at 05/29/25 0822    glucose gel 15-30 g  15-30 g Oral Q15 Min PRN Earnest Del Rio MD        Or    dextrose 50 % injection 25-50 mL  25-50 mL Intravenous Q15 Min PRN Earnest Del Rio MD        Or    glucagon injection 1 mg  1 mg Subcutaneous Q15 Min PRN Earnest Del Rio MD        heparin ANTICOAGULANT injection 5,000 Units  5,000 Units Subcutaneous Q8H Earnest Del Rio MD   5,000 Units at 05/29/25 0658    insulin aspart (NovoLOG) injection (RAPID ACTING)  20 Units Subcutaneous TID w/meals Earnest Del Rio MD   20 Units at 05/29/25 0822    insulin aspart (NovoLOG) injection (RAPID ACTING)  1-10 Units Subcutaneous TID AC Earnest Del Rio MD   4 Units at 05/29/25 0821    insulin aspart (NovoLOG) injection (RAPID ACTING)  1-7 Units Subcutaneous At Bedtime Earnest Del Rio MD   5 Units at 05/28/25 2343    insulin glargine (LANTUS PEN) injection 35 Units  35 Units Subcutaneous At Bedtime Earnest Del Rio MD        isosorbide mononitrate (IMDUR) 24 hr tablet 30 mg  30 mg Oral QPM Earnest Del Rio MD        lidocaine (LMX4) cream   Topical Q1H PRN Earnest Del Rio MD        lidocaine 1 % 0.1-1 mL  0.1-1 mL Other Q1H PRN Earnest Del Rio MD        losartan (COZAAR) tablet 25 mg  25 mg Oral  QPM Earnest Del Rio MD        melatonin tablet 5 mg  5 mg Oral At Bedtime Earnest Del Rio MD   5 mg at 05/28/25 2344    multivitamin w/minerals (THERA-VIT-M) tablet 1 tablet  1 tablet Oral Daily Earnest Del Rio MD   1 tablet at 05/29/25 0822    ondansetron (ZOFRAN ODT) ODT tab 4 mg  4 mg Oral Q6H PRN Earnest Del Rio MD        Or    ondansetron (ZOFRAN) injection 4 mg  4 mg Intravenous Q6H PRN Earnest Del Rio MD        polyethylene glycol (MIRALAX) Packet 17 g  17 g Oral BID PRN Earnest Del Rio MD        senna-docusate (SENOKOT-S/PERICOLACE) 8.6-50 MG per tablet 1 tablet  1 tablet Oral BID PRN Earnest Del Rio MD        Or    senna-docusate (SENOKOT-S/PERICOLACE) 8.6-50 MG per tablet 2 tablet  2 tablet Oral BID PRN Earnest Del Rio MD        sodium chloride (PF) 0.9% PF flush 3 mL  3 mL Intracatheter Q8H Earnest Del Rio MD   3 mL at 05/29/25 0658    sodium chloride (PF) 0.9% PF flush 3 mL  3 mL Intracatheter q1 min prn Earnest Del Rio MD        traZODone (DESYREL) tablet 50 mg  50 mg Oral At Bedtime Earnest Del Rio MD   50 mg at 05/28/25 2344     Current Outpatient Medications   Medication Sig Dispense Refill    acetaminophen (TYLENOL) 500 MG tablet Take 2 tablets (1,000 mg) by mouth every 8 hours as needed for mild pain      aspirin 81 MG EC tablet Take 1 tablet (81 mg) by mouth daily.      atorvastatin (LIPITOR) 80 MG tablet Take 1 tablet (80 mg) by mouth daily 90 tablet 3    bumetanide (BUMEX) 2 MG tablet Take 1 tablet (2 mg) by mouth 2 times daily. 180 tablet 0    carvedilol (COREG) 25 MG tablet Take 1 tablet (25 mg) by mouth 2 times daily (with meals). 180 tablet 0    clopidogrel (PLAVIX) 75 MG tablet Take 1 tablet (75 mg) by mouth daily.      cyanocobalamin (VITAMIN B-12) 1000 MCG tablet Take 1,000 mcg by mouth daily.      insulin glargine (LANTUS PEN) 100 UNIT/ML pen Inject 35 Units subcutaneously at bedtime.      insulin lispro (HUMALOG KWIKPEN) 100 UNIT/ML (1 unit dial) KWIKPEN Inject 20  Units subcutaneously 3 times daily (with meals). Plus sliding scale: 2 units every 50 over 150. If humalog is not covered by insurance, may substitute with novolog or other fast acting insulin      isosorbide mononitrate (IMDUR) 30 MG 24 hr tablet Take 1 tablet (30 mg) by mouth daily. 30 tablet 11    losartan (COZAAR) 25 MG tablet Take 1 tablet (25 mg) by mouth daily. 90 tablet 3    metFORMIN (GLUCOPHAGE XR) 500 MG 24 hr tablet Take 1 tablet (500 mg) by mouth daily (with dinner). 90 tablet 0    multivitamin w/minerals (THERA-VIT-M) tablet Take 1 tablet by mouth daily      Continuous Glucose Sensor (FREESTYLE LAMINE 2 SENSOR) MISC Inject 1 each subcutaneously every 14 days Use 1 sensor every 14 days. Use to read blood sugars per 's instructions. (Patient not taking: Reported on 10/9/2024) 6 each 5    insulin pen needle (32G X 4 MM) 32G X 4 MM miscellaneous Use 4 pen needles daily or as directed. 200 each 4     Current Facility-Administered Medications   Medication Dose Route Frequency Provider Last Rate Last Admin    acetaminophen (TYLENOL) tablet 650 mg  650 mg Oral Q4H PRN Earnest Del Rio MD        Or    acetaminophen (TYLENOL) Suppository 650 mg  650 mg Rectal Q4H PRN Earnest Del Rio MD        aspirin EC tablet 81 mg  81 mg Oral Daily Earnest Del Rio MD   81 mg at 05/29/25 0822    atorvastatin (LIPITOR) tablet 80 mg  80 mg Oral QPM Earnest Del Rio MD        bumetanide (BUMEX) tablet 2 mg  2 mg Oral BID Earnest Del Rio MD   2 mg at 05/29/25 0822    calcium carbonate (TUMS) chewable tablet 1,000 mg  1,000 mg Oral 4x Daily PRN Earnest Del Rio MD        carvedilol (COREG) tablet 25 mg  25 mg Oral BID w/meals Earnest Del Rio MD   25 mg at 05/29/25 0822    clopidogrel (PLAVIX) tablet 75 mg  75 mg Oral QPM Earnest Del Rio MD        cyanocobalamin (VITAMIN B-12) tablet 1,000 mcg  1,000 mcg Oral Daily Earnest Del Rio MD   1,000 mcg at 05/29/25 0822    glucose gel 15-30 g  15-30 g Oral Q15 Min  PRN Earnest Del Rio MD        Or    dextrose 50 % injection 25-50 mL  25-50 mL Intravenous Q15 Min PRN Earnest Del Rio MD        Or    glucagon injection 1 mg  1 mg Subcutaneous Q15 Min PRN Earnest Del Rio MD        heparin ANTICOAGULANT injection 5,000 Units  5,000 Units Subcutaneous Q8H Earnest Del Rio MD   5,000 Units at 05/29/25 0658    insulin aspart (NovoLOG) injection (RAPID ACTING)  20 Units Subcutaneous TID w/meals Earnest Del Rio MD   20 Units at 05/29/25 0822    insulin aspart (NovoLOG) injection (RAPID ACTING)  1-10 Units Subcutaneous TID AC Earnest Del Rio MD   4 Units at 05/29/25 0821    insulin aspart (NovoLOG) injection (RAPID ACTING)  1-7 Units Subcutaneous At Bedtime Earnest Del Rio MD   5 Units at 05/28/25 2343    insulin glargine (LANTUS PEN) injection 35 Units  35 Units Subcutaneous At Bedtime Earnest Del Rio MD        isosorbide mononitrate (IMDUR) 24 hr tablet 30 mg  30 mg Oral QPM Earnest Del Rio MD        lidocaine (LMX4) cream   Topical Q1H PRN Earnest Del Rio MD        lidocaine 1 % 0.1-1 mL  0.1-1 mL Other Q1H PRN Earnest Del Rio MD        losartan (COZAAR) tablet 25 mg  25 mg Oral QPM Earnest Del Rio MD        melatonin tablet 5 mg  5 mg Oral At Bedtime Earnest Del Rio MD   5 mg at 05/28/25 2344    multivitamin w/minerals (THERA-VIT-M) tablet 1 tablet  1 tablet Oral Daily Earnest Del Rio MD   1 tablet at 05/29/25 0822    ondansetron (ZOFRAN ODT) ODT tab 4 mg  4 mg Oral Q6H PRN Eanrest Del Rio MD        Or    ondansetron (ZOFRAN) injection 4 mg  4 mg Intravenous Q6H PRN Earnest Del Rio MD        polyethylene glycol (MIRALAX) Packet 17 g  17 g Oral BID PRN Earnest Del Rio MD        senna-docusate (SENOKOT-S/PERICOLACE) 8.6-50 MG per tablet 1 tablet  1 tablet Oral BID PRN Earnest Del Rio MD        Or    senna-docusate (SENOKOT-S/PERICOLACE) 8.6-50 MG per tablet 2 tablet  2 tablet Oral BID PRN Earnest Del Rio MD        sodium chloride (PF) 0.9% PF flush  3 mL  3 mL Intracatheter Q8H Earnest Del Rio MD   3 mL at 05/29/25 0658    sodium chloride (PF) 0.9% PF flush 3 mL  3 mL Intracatheter q1 min prn Earnest Del Rio MD        traZODone (DESYREL) tablet 50 mg  50 mg Oral At Bedtime Earnest Del Rio MD   50 mg at 05/28/25 0974     Current Outpatient Medications   Medication Sig Dispense Refill    acetaminophen (TYLENOL) 500 MG tablet Take 2 tablets (1,000 mg) by mouth every 8 hours as needed for mild pain      aspirin 81 MG EC tablet Take 1 tablet (81 mg) by mouth daily.      atorvastatin (LIPITOR) 80 MG tablet Take 1 tablet (80 mg) by mouth daily 90 tablet 3    bumetanide (BUMEX) 2 MG tablet Take 1 tablet (2 mg) by mouth 2 times daily. 180 tablet 0    carvedilol (COREG) 25 MG tablet Take 1 tablet (25 mg) by mouth 2 times daily (with meals). 180 tablet 0    clopidogrel (PLAVIX) 75 MG tablet Take 1 tablet (75 mg) by mouth daily.      cyanocobalamin (VITAMIN B-12) 1000 MCG tablet Take 1,000 mcg by mouth daily.      insulin glargine (LANTUS PEN) 100 UNIT/ML pen Inject 35 Units subcutaneously at bedtime.      insulin lispro (HUMALOG KWIKPEN) 100 UNIT/ML (1 unit dial) KWIKPEN Inject 20 Units subcutaneously 3 times daily (with meals). Plus sliding scale: 2 units every 50 over 150. If humalog is not covered by insurance, may substitute with novolog or other fast acting insulin      isosorbide mononitrate (IMDUR) 30 MG 24 hr tablet Take 1 tablet (30 mg) by mouth daily. 30 tablet 11    losartan (COZAAR) 25 MG tablet Take 1 tablet (25 mg) by mouth daily. 90 tablet 3    metFORMIN (GLUCOPHAGE XR) 500 MG 24 hr tablet Take 1 tablet (500 mg) by mouth daily (with dinner). 90 tablet 0    multivitamin w/minerals (THERA-VIT-M) tablet Take 1 tablet by mouth daily      Continuous Glucose Sensor (FREESTYLE LAMINE 2 SENSOR) MISC Inject 1 each subcutaneously every 14 days Use 1 sensor every 14 days. Use to read blood sugars per 's instructions. (Patient not taking: Reported on  "10/9/2024) 6 each 5    insulin pen needle (32G X 4 MM) 32G X 4 MM miscellaneous Use 4 pen needles daily or as directed. 200 each 4     Allergies   No Known Allergies    Social History    reports that he has quit smoking. His smoking use included cigarettes. He has never used smokeless tobacco. He reports current alcohol use. He reports current drug use. Drug: Marijuana.  Currently homeless    Family History     Mother passed away this spring        Review of Systems   A comprehensive review of system was performed and is negative other than that noted in the HPI or here.     Physical Exam   Vital Signs with Ranges  Temp:  [45.7  F (7.6  C)-98.2  F (36.8  C)] 45.7  F (7.6  C)  Pulse:  [69-91] 69  Resp:  [12-22] 12  BP: (133-176)/(63-96) 133/63  SpO2:  [92 %-98 %] 94 %  Wt Readings from Last 4 Encounters:   05/28/25 (!) 140.2 kg (309 lb)   03/27/25 (!) 138.4 kg (305 lb 1.9 oz)   01/30/25 135.2 kg (298 lb 2 oz)   11/07/24 126.1 kg (278 lb)     I/O last 3 completed shifts:  In: 240 [P.O.:240]  Out: 500 [Urine:500]      Vitals: /63   Pulse 69   Temp (!) 45.7  F (7.6  C) (Oral)   Resp 12   Wt (!) 140.2 kg (309 lb)   SpO2 94%   BMI 44.34 kg/m      Physical Exam:   General - Alert and oriented to time place and person in no acute distress  Eyes - No scleral icterus  HEENT - Neck supple, moist mucous membranes  Cardiovascular - RRR no murmur  Extremities - There is minimal edema  Respiratory - breathing non labored, diminished breath sounds  Skin - No pallor or cyanosis  Gastrointestinal - Non tender and non distended without rebound or guarding  Psych - Appropriate affect   Neurological - No gross motor neurological focal deficits    No lab results found in last 7 days.    Invalid input(s): \"TROPONINIES\"    Recent Labs   Lab 05/29/25  0807 05/29/25  0650 05/28/25  2315 05/28/25  1728   WBC  --  12.0*  --  13.4*   HGB  --  11.6*  --  12.8*   MCV  --  94  --  92   PLT  --  226  --  267   NA  --  137  --  136 " "  POTASSIUM  --  3.7  --  4.4   CHLORIDE  --  103  --  101   CO2  --  25  --  25   BUN  --  19.9  --  20.0   CR  --  1.33*  --  1.29*   GFRESTIMATED  --  65  --  67   ANIONGAP  --  9  --  10   SARAH  --  9.0  --  9.2   * 279* 309* 354*     Recent Labs   Lab Test 03/23/25  1049 02/21/24  0537   CHOL 224* 156   HDL 53 39*   * 77   TRIG 213* 199*     Recent Labs   Lab 05/29/25  0650 05/28/25  1728   WBC 12.0* 13.4*   HGB 11.6* 12.8*   HCT 35.8* 38.4*   MCV 94 92    267     No results for input(s): \"PH\", \"PHV\", \"PO2\", \"PO2V\", \"SAT\", \"PCO2\", \"PCO2V\", \"HCO3\", \"HCO3V\" in the last 168 hours.  Recent Labs   Lab 05/28/25  1728   NTBNP 2,328*     No results for input(s): \"DD\" in the last 168 hours.  No results for input(s): \"SED\", \"CRP\" in the last 168 hours.  Recent Labs   Lab 05/29/25  0650 05/28/25  1728    267     No results for input(s): \"TSH\" in the last 168 hours.  Recent Labs   Lab 05/28/25  2312   COLOR Light Yellow   APPEARANCE Clear   URINEGLC >1000*   URINEBILI Negative   URINEKETONE 10*   SG 1.025   UBLD 0.2 mg/dL*   URINEPH 7.0   PROTEIN 600*   NITRITE Negative   LEUKEST Negative   RBCU 1   WBCU 1       Imaging:  Recent Results (from the past 48 hours)   XR Chest 2 Views    Narrative    EXAM: XR CHEST 2 VIEWS  LOCATION: [facility]  DATE: 5/28/2025    INDICATION: Shortness of breath  COMPARISON: 3/23/2025      Impression    IMPRESSION: Negative chest.   MR Brain w/o & w Contrast    Narrative    EXAM: MR BRAIN W/O AND W CONTRAST  LOCATION: [facility]  DATE: 5/28/2025    INDICATION: Balance coordination problems.  COMPARISON: Head MRI 3/23/2025.  CONTRAST: 14 mL Gadavist.  TECHNIQUE: Routine multiplanar multisequence head MRI without and with intravenous contrast.    FINDINGS:  Recent infarct involving the right thalamus/internal capsule demonstrates expected evolution with decreased volume of diffusion restriction compared to the prior. No " new territories of acute infarct.    Old lacunar infarct involving the left centrum semiovale and left putamen. Scattered nonspecific white matter T2 hyperintensities likely represent chronic small vessel ischemic change. No abnormal intracranial enhancement.    Marrow signal is within normal limits. Incidental bilateral ocular proptosis related to prominent intraorbital fat. Mild paranasal sinus mucosal thickening. Trace fluid signal within the bilateral mastoid cavities, presumed inflammatory. Presumed benign   nasopharyngeal submucosal cysts.      Impression    IMPRESSION:  1.  Recent infarct involving the right thalamus/internal capsule demonstrates expected evolution with decreased volume of diffusion restriction compared to the prior.  2.  No new territories of acute infarct.  3.  No evidence of hemorrhage.  4.  Chronic changes as detailed.       Clinically Significant Risk Factors Present on Admission                 # Drug Induced Platelet Defect: home medication list includes an antiplatelet medication   # Hypertension: Noted on problem list  # Chronic heart failure with reduced ejection fraction: last echo with EF <40%         # DMII: A1C = 11.3 % (Ref range: <5.7 %) within past 6 months        # Financial/Environmental Concerns:          Cardiomyopathy  Systolic chronic    Fluid overload, unspecified

## 2025-05-29 NOTE — PLAN OF CARE
Goal Outcome Evaluation:    Problem: Adult Inpatient Plan of Care  Goal: Optimal Comfort and Wellbeing  Outcome: Progressing     Problem: Pain Acute  Goal: Optimal Pain Control and Function  Outcome: Progressing        Pt AO, VSS on RA, denies pain. K, Mg, Phos morning rechecks. Rheel wound AN. Makes needs known, calls appropriately.

## 2025-05-29 NOTE — ED NOTES
Patient is back from MRI. Patient placed back on the monitor. Echo tech is also here to do patient's echocardiogram.

## 2025-05-30 LAB
ANION GAP SERPL CALCULATED.3IONS-SCNC: 6 MMOL/L (ref 7–15)
BUN SERPL-MCNC: 21.2 MG/DL (ref 6–20)
CALCIUM SERPL-MCNC: 8.7 MG/DL (ref 8.8–10.4)
CHLORIDE SERPL-SCNC: 102 MMOL/L (ref 98–107)
CREAT SERPL-MCNC: 1.4 MG/DL (ref 0.67–1.17)
EGFRCR SERPLBLD CKD-EPI 2021: 61 ML/MIN/1.73M2
ERYTHROCYTE [DISTWIDTH] IN BLOOD BY AUTOMATED COUNT: 12.7 % (ref 10–15)
GLUCOSE BLDC GLUCOMTR-MCNC: 159 MG/DL (ref 70–99)
GLUCOSE BLDC GLUCOMTR-MCNC: 199 MG/DL (ref 70–99)
GLUCOSE BLDC GLUCOMTR-MCNC: 206 MG/DL (ref 70–99)
GLUCOSE BLDC GLUCOMTR-MCNC: 237 MG/DL (ref 70–99)
GLUCOSE SERPL-MCNC: 184 MG/DL (ref 70–99)
HCO3 SERPL-SCNC: 27 MMOL/L (ref 22–29)
HCT VFR BLD AUTO: 34.4 % (ref 40–53)
HGB BLD-MCNC: 11.5 G/DL (ref 13.3–17.7)
MAGNESIUM SERPL-MCNC: 2 MG/DL (ref 1.7–2.3)
MCH RBC QN AUTO: 30.8 PG (ref 26.5–33)
MCHC RBC AUTO-ENTMCNC: 33.4 G/DL (ref 31.5–36.5)
MCV RBC AUTO: 92 FL (ref 78–100)
PHOSPHATE SERPL-MCNC: 4.3 MG/DL (ref 2.5–4.5)
PLATELET # BLD AUTO: 216 10E3/UL (ref 150–450)
POTASSIUM SERPL-SCNC: 3.6 MMOL/L (ref 3.4–5.3)
RBC # BLD AUTO: 3.73 10E6/UL (ref 4.4–5.9)
SODIUM SERPL-SCNC: 135 MMOL/L (ref 135–145)
WBC # BLD AUTO: 11.2 10E3/UL (ref 4–11)

## 2025-05-30 PROCEDURE — 250N000013 HC RX MED GY IP 250 OP 250 PS 637: Performed by: PHYSICIAN ASSISTANT

## 2025-05-30 PROCEDURE — 99253 IP/OBS CNSLTJ NEW/EST LOW 45: CPT | Performed by: PODIATRIST

## 2025-05-30 PROCEDURE — 36415 COLL VENOUS BLD VENIPUNCTURE: CPT | Performed by: STUDENT IN AN ORGANIZED HEALTH CARE EDUCATION/TRAINING PROGRAM

## 2025-05-30 PROCEDURE — 85018 HEMOGLOBIN: CPT | Performed by: STUDENT IN AN ORGANIZED HEALTH CARE EDUCATION/TRAINING PROGRAM

## 2025-05-30 PROCEDURE — 120N000001 HC R&B MED SURG/OB

## 2025-05-30 PROCEDURE — 99232 SBSQ HOSP IP/OBS MODERATE 35: CPT | Performed by: PHYSICIAN ASSISTANT

## 2025-05-30 PROCEDURE — 250N000013 HC RX MED GY IP 250 OP 250 PS 637: Performed by: STUDENT IN AN ORGANIZED HEALTH CARE EDUCATION/TRAINING PROGRAM

## 2025-05-30 PROCEDURE — 250N000011 HC RX IP 250 OP 636: Performed by: STUDENT IN AN ORGANIZED HEALTH CARE EDUCATION/TRAINING PROGRAM

## 2025-05-30 PROCEDURE — 99232 SBSQ HOSP IP/OBS MODERATE 35: CPT | Performed by: STUDENT IN AN ORGANIZED HEALTH CARE EDUCATION/TRAINING PROGRAM

## 2025-05-30 PROCEDURE — G0463 HOSPITAL OUTPT CLINIC VISIT: HCPCS

## 2025-05-30 PROCEDURE — 84100 ASSAY OF PHOSPHORUS: CPT | Performed by: STUDENT IN AN ORGANIZED HEALTH CARE EDUCATION/TRAINING PROGRAM

## 2025-05-30 PROCEDURE — 83735 ASSAY OF MAGNESIUM: CPT | Performed by: STUDENT IN AN ORGANIZED HEALTH CARE EDUCATION/TRAINING PROGRAM

## 2025-05-30 PROCEDURE — 80048 BASIC METABOLIC PNL TOTAL CA: CPT | Performed by: STUDENT IN AN ORGANIZED HEALTH CARE EDUCATION/TRAINING PROGRAM

## 2025-05-30 RX ORDER — BUMETANIDE 2 MG/1
2 TABLET ORAL
Status: DISCONTINUED | OUTPATIENT
Start: 2025-05-31 | End: 2025-06-04

## 2025-05-30 RX ADMIN — HEPARIN SODIUM 5000 UNITS: 5000 INJECTION, SOLUTION INTRAVENOUS; SUBCUTANEOUS at 00:06

## 2025-05-30 RX ADMIN — SACUBITRIL AND VALSARTAN 1 TABLET: 24; 26 TABLET, FILM COATED ORAL at 10:04

## 2025-05-30 RX ADMIN — INSULIN ASPART 3 UNITS: 100 INJECTION, SOLUTION INTRAVENOUS; SUBCUTANEOUS at 17:47

## 2025-05-30 RX ADMIN — ATORVASTATIN CALCIUM 80 MG: 40 TABLET, FILM COATED ORAL at 20:12

## 2025-05-30 RX ADMIN — SACUBITRIL AND VALSARTAN 1 TABLET: 24; 26 TABLET, FILM COATED ORAL at 20:13

## 2025-05-30 RX ADMIN — HEPARIN SODIUM 5000 UNITS: 5000 INJECTION, SOLUTION INTRAVENOUS; SUBCUTANEOUS at 17:47

## 2025-05-30 RX ADMIN — ISOSORBIDE MONONITRATE 30 MG: 30 TABLET, EXTENDED RELEASE ORAL at 20:13

## 2025-05-30 RX ADMIN — CARVEDILOL 25 MG: 12.5 TABLET, FILM COATED ORAL at 09:59

## 2025-05-30 RX ADMIN — Medication 5 MG: at 21:49

## 2025-05-30 RX ADMIN — ASPIRIN 81 MG: 81 TABLET, COATED ORAL at 09:59

## 2025-05-30 RX ADMIN — CYANOCOBALAMIN TAB 1000 MCG 1000 MCG: 1000 TAB at 10:00

## 2025-05-30 RX ADMIN — INSULIN ASPART 4 UNITS: 100 INJECTION, SOLUTION INTRAVENOUS; SUBCUTANEOUS at 13:33

## 2025-05-30 RX ADMIN — TRAZODONE HYDROCHLORIDE 50 MG: 50 TABLET ORAL at 21:49

## 2025-05-30 RX ADMIN — CARVEDILOL 25 MG: 12.5 TABLET, FILM COATED ORAL at 17:49

## 2025-05-30 RX ADMIN — HEPARIN SODIUM 5000 UNITS: 5000 INJECTION, SOLUTION INTRAVENOUS; SUBCUTANEOUS at 09:59

## 2025-05-30 RX ADMIN — Medication 1 TABLET: at 10:00

## 2025-05-30 RX ADMIN — CLOPIDOGREL BISULFATE 75 MG: 75 TABLET ORAL at 20:13

## 2025-05-30 ASSESSMENT — ACTIVITIES OF DAILY LIVING (ADL)
ADLS_ACUITY_SCORE: 41
ADLS_ACUITY_SCORE: 36
ADLS_ACUITY_SCORE: 35
ADLS_ACUITY_SCORE: 36
ADLS_ACUITY_SCORE: 35
ADLS_ACUITY_SCORE: 36
ADLS_ACUITY_SCORE: 35
ADLS_ACUITY_SCORE: 36
ADLS_ACUITY_SCORE: 35
ADLS_ACUITY_SCORE: 35
ADLS_ACUITY_SCORE: 41
ADLS_ACUITY_SCORE: 35

## 2025-05-30 NOTE — PROGRESS NOTES
[facility]  Cardiology Progress Note  Date of Service: 05/30/2025  Primary Cardiologist: Dr Hopson    Assessment & Plan   Floyd Capps is a 49 year old male with past medical history of severe multivessel coronary artery disease, CKD and heart failure with reduced ejection fraction, diabetes, hypertension, hyperlipidemia and previous CVA and more outlined below who was admitted on 5/28/2025 with creasing shortness of breath with exertion worsening over the last 1 to 3 weeks, lower extremity edema and feeling somewhat off balance.  EKG shows sinus rhythm.  Troponin minimally elevated to 31-26.  NT-proBNP elevated to 2328.  Chest x-ray without cardiopulmonary etiology.  MRI brain shows recent infarct involving the right thalamus internal capsule     Assessment and Plan:  Dyspnea  Coronary artery disease, severe, multivessel  Heart failure with reduced ejection fraction, acute decompensated heart failure   Patient's EF dropped from 55-60 to 30 to 35% in February 2024.  There is also mild LVH and global hypokinesis with severe hypokinesis of the mid to apical inferior wall.  Patient subsequently underwent coronary angiogram later in February 2024 for which showed multivessel native coronary artery disease and severely elevated left-sided filling pressures.  He presents now with worsening HOWELL with minimal activity over the last 1-3 weeks, and medication noncompliance x 3-4 days. Weight 272 in February during admission, now 309 with peripheral edema, abdominal bloating and PND, concerning for acute decompensated heart failure, however, EF has improved compared to March of this year, EF 40-45%, previously 30-35%. Thre is moderate global hypokinesia of LV, poorly visualized, no valve disease. IVC collapses, no evidence of hypervolemia. BMP slighlty up from baseline.   Consult to CV surgery, patient now open to CAB. Unclear if dyspnea represents angina, but will appreciate the perspective of CVS.    Carvedilol 25 mg twice daily  Resume Bumex 2 mg BID starting tomorrow  Intake/output -strict  Daily standing weight   2gm Na restriction, 2L fluid restriction  Daily BMP, replete electrolytes as needd  PTA losartan 25 mg daily changed to to Entresto 24-26 mg BID this admission (cost $0 for patient)  Continue Imdur 30 mg every evening  Continue atorvastatin 80 mg daily  Continue aspirin and Plavix  Carotid Artery Disease, mild based on US, CT neck shows calcific plaque and fibrofatty plaque with moderate stenosis of pICA.   Brain MRI with evidence of recent infarct.  Involving the right thalamus/internal capsule with expected evolution and decreased volume of diffusion restriction compared to prior.  Seen by neurology who do not feel like there are lateralizing neurological defects and no further workup was recommended.  Leukocytosis, Respiratory virus panel negative.   Anemia, stable  Elevated CRP,improved from prior admission   Dyslipidemia, Most recent  and . Patient is prescribed atorvastatin, unclear if he was taking.   Continue atorvastatin 80 mg daily   We discussed the role diet, exercise and weight management can play in managing dyslipidemia.   Right heel ulcer, hx of RLE osteomyelitis, surgery in February and May of this year. MRI negative for osteo. CRP lower than February.     25 minutes spent by me on the date of service doing chart review, history, exam, documentation, coordination and discussion with other care providers & further activities per the note. MD time separate.   Sue Mclaughlin PA-C  [facility]  Pager: through Vocera    Interval History   No acute events overnight.  Patient slept poor.  Laying flat on his back without any issues.  Reports that he feels about the same.  Continues to numbness in his hands.  Does not feel short of breath if he is at rest.  No chest pain or pressure.  No leg swelling today.      Data   Last 24 hours diagnostics reviewed:  Last Comprehensive  Metabolic Panel:  Lab Results   Component Value Date     05/30/2025    POTASSIUM 3.6 05/30/2025    CHLORIDE 102 05/30/2025    CO2 27 05/30/2025    ANIONGAP 6 (L) 05/30/2025     (H) 05/30/2025    BUN 21.2 (H) 05/30/2025    CR 1.40 (H) 05/30/2025    GFRESTIMATED 61 05/30/2025    SARAH 8.7 (L) 05/30/2025     CBC RESULTS:   Recent Labs   Lab Test 05/30/25  0724   WBC 11.2*   RBC 3.73*   HGB 11.5*   HCT 34.4*   MCV 92   MCH 30.8   MCHC 33.4   RDW 12.7          Intake/Output Summary (Last 24 hours) at 5/30/2025 0847  Last data filed at 5/29/2025 2253  Gross per 24 hour   Intake 720 ml   Output 1150 ml   Net -430 ml       5/29/2025 Echocardiogram :  TDS. Limited views seen due to patient body habitus  The left ventricle is borderline dilated. The visual ejection fraction is 40-  45%.  There is moderate global hypokinesia of the left ventricle. Regional wall  motion abnormalities cannot be excluded due to limited visualization.  No significant valve disease.    3/23/2025 TTE:  The left ventricle ejection fraction is 30-35% (moderately reduced). There is  global hypokinesis with severe hypokinesis/akinesis of inferior and  inferolateral walls.  The right ventricle is normal in size and function.     No hemodynamically significant valvular abnormalities on 2D or color flow  imaging. Compared to the prior study dated 2/20/2024, there have been no  changes.     2/2024 Cardiac Catheterization:  Floyd Capps is a 48 year old old male with HTN, HL, DM, smoking, high BMI who is here for w/up of new cardiomyopathy/admission for ADHF.     - multi-vessel native CAD; severely elevated L-sided filling pressures  - will stop to obtain CTS consult  - continue ASA 81mg daily indefinitely, atorva 80  - continue aggressive risk factor modification        Findings:  LM:no obstruction  LAD:proximal Ca2+ 70% narrowing, mid-distal 90% narrowing. Small D1 w/ diffuse 90% narrowing  Lcx:OM1 proximal 90%, small superior  subdivision 90%, inferior 80%. OM1 small 90% narrowing  RCA:dominant, distal 85%, rPDA 95% narrowing     LVEDP:35     3/2025 US Carotid Bilateral:  IMPRESSION:  1.  Mild plaque formation, velocities consistent with less than 50% stenosis in the right internal carotid artery.  2.  Mild plaque formation, velocities consistent with less than 50% stenosis in the left internal carotid artery.  3.  Flow within the vertebral arteries is antegrade.     2/2024 TTE:  The left ventricle is normal in size. There is mild concentric left  ventricular hypertrophy.  Left ventricular function is decreased. The ejection fraction is 30-35%  (moderately reduced). There is global hypokinesis with severe hypokinesis of  the mid to apical inferior wall.  Diastolic Doppler findings (E/E' ratio and/or other parameters) suggest left  ventricular filling pressures are increased.     The right ventricle is normal in size and function.  Normal left atrial size. Right atrial size is normal.  There is mild (1+) mitral regurgitation.  IVC diameter >2.1 cm collapsing <50% with sniff suggests a high RA pressure  estimated at 15 mmHg or greater.  8/2023 TTE:  1. Technically difficult study.  2. Normal left ventricular size and systolic performance with a visually  estimated ejection fraction of 55%.  3. On selected views, there appears to be a fairly small discrete apical wall  motion abnormality (no apical mural thrombus is detected).  4. No significant valvular heart disease is identified on this study.  5. Probable normal right ventricular size and systolic performance though  right-sided structures are not clearly visualized on all views on this study.  6. Echo contrast examination was performed using agitated NS as contrast  agent. The right heart opacity was suboptimal. There was no obvious evidence  of right to left shunting during spontaneous respiration or following release  of Valsalva though the sensitivity &specificity are both reduced  due to  suboptimal acoustic imaging..        Physical Exam   Temp: 97.6  F (36.4  C) Temp src: Oral BP: 98/55 Pulse: 75   Resp: 16 SpO2: 93 % O2 Device: None (Room air)    Vitals:    05/28/25 1655 05/30/25 0330   Weight: (!) 140.2 kg (309 lb) (!) 139.8 kg (308 lb 3.3 oz)       GEN: Awake, alert, laying flat on back  HEART: Regular rate and rhythm no murmur  LUNGS: Breathing nonlabored, lungs clear  EXT: No swelling    Medications   Current Facility-Administered Medications   Medication Dose Route Frequency Provider Last Rate Last Admin     Current Facility-Administered Medications   Medication Dose Route Frequency Provider Last Rate Last Admin    aspirin EC tablet 81 mg  81 mg Oral Daily Earnest Del Rio MD   81 mg at 05/29/25 0822    atorvastatin (LIPITOR) tablet 80 mg  80 mg Oral QPM Earnest Del Rio MD   80 mg at 05/29/25 2214    [Held by provider] bumetanide (BUMEX) tablet 2 mg  2 mg Oral BID Earnest Del Rio MD   2 mg at 05/29/25 0822    carvedilol (COREG) tablet 25 mg  25 mg Oral BID w/meals Earnest Del Rio MD   25 mg at 05/29/25 1730    clopidogrel (PLAVIX) tablet 75 mg  75 mg Oral QPM Earnest Del Rio MD   75 mg at 05/29/25 2215    cyanocobalamin (VITAMIN B-12) tablet 1,000 mcg  1,000 mcg Oral Daily Earnest Del Rio MD   1,000 mcg at 05/29/25 0822    heparin ANTICOAGULANT injection 5,000 Units  5,000 Units Subcutaneous Q8H Earnest Del Rio MD   5,000 Units at 05/30/25 0006    insulin aspart (NovoLOG) injection (RAPID ACTING)  20 Units Subcutaneous TID w/meals Earnest Del Rio MD   20 Units at 05/29/25 1730    insulin aspart (NovoLOG) injection (RAPID ACTING)  1-10 Units Subcutaneous TID AC Earnest Del Rio MD   3 Units at 05/29/25 1730    insulin aspart (NovoLOG) injection (RAPID ACTING)  1-7 Units Subcutaneous At Bedtime Earnest Del Rio MD   4 Units at 05/29/25 1230    insulin glargine (LANTUS PEN) injection 40 Units  40 Units Subcutaneous At Bedtime Camila Vargas MD   40 Units at  05/29/25 2215    isosorbide mononitrate (IMDUR) 24 hr tablet 30 mg  30 mg Oral QPM Earnest Del Rio MD   30 mg at 05/29/25 2215    melatonin tablet 5 mg  5 mg Oral At Bedtime Earnest Del Rio MD   5 mg at 05/29/25 2215    multivitamin w/minerals (THERA-VIT-M) tablet 1 tablet  1 tablet Oral Daily Earnest Del Rio MD   1 tablet at 05/29/25 0822    sacubitril-valsartan (ENTRESTO) 24-26 MG per tablet 1 tablet  1 tablet Oral BID Seu Mclaughlin PA-C        sodium chloride (PF) 0.9% PF flush 3 mL  3 mL Intracatheter Q8H Earnest Del Rio MD   3 mL at 05/30/25 0007    traZODone (DESYREL) tablet 50 mg  50 mg Oral At Bedtime Earenst Del Rio MD   50 mg at 05/29/25 2215

## 2025-05-30 NOTE — PROGRESS NOTES
[facility]    Medicine Progress Note - Hospitalist Service    Date of Admission:  5/28/2025    Assessment & Plan   Floyd Capps is a 49 year old male with a past medical history of Previous CVA, CAD, HFrEF who was admitted on 5/28/25 after presenting to [facility] ED for Generalized Weakness Dizziness and SOB        Shortness of Breath with Exertion  HFrEF exacerbation   CAD with multivessel disease  - In the ED - HR 80s, no fever, WBC 13.4  - Troponin mild elevation, BNP 2300.  - CXR in ED showing no signs of pulmonary congestion or pneumonia.  - Last Echo 03/2025 with EF 30-35% with global hypokinesis. Now estimated left ventricular ejection fraction is 40 to 45%.  - Home Meds: losartan 25mg, carvedilol 25mg, bumex 2mg BID  - Cardiology consult appreciated  - Coronary angiogram on 3/2024 reported multivessel CAD - outpatient cardiology previously considering bypass surgery  - CVS consulted for evaluation of bypass surgery  - PTA Meds: aspirin 81mg, plavix 75mg, atorvastatin 80mg, ISMN 30mg daily  -PTA losartan changed to Entresto 24-26 mg twice daily  -Imdur 30 mg oral daily  -Atorvastatin 80 mg oral daily  -Continue aspirin 81 mg oral daily  -Continue Plavix 75 mg oral daily  -Monitor daily weights, strict input and output, monitor BMP   -Hold diuresis for creatinine is trending up     Dizziness, Generalized Weakness  History of Strokes   - Previous strokes left lacunar stroke 08/2023, right thalamic 03/2025  - Home meds: Aspirin, Plavix, Atorvastatin  - MRI Brain in ED showing interval change of previous right thalamic stroke with no acute ischemia.  - Continue aspirin, plavix, statin     Right Heel Ulcer  History of Right Foot Osteomyelitis  - Previous surgeries 02/2024, 05/2024 on right foot for osteomyelitis.  - Wound team consult appreciated  - MRI: Negative for evidence of osteomyelitis  - CRP is slightly high but is no worse than previously value: 21.4  -Appreciate podiatry consult.  No  plan for procedure.  Recommending Bharti hillman      Type 2 Diabetes, poorly controlled   - Last A1c was 12.1 in 03/2025.  - Takes metformin at home.  - Home medications include insulin glargine 35 units nightly, lispro scheduled 20 units with meals + sliding scale.  - Hold home metformin  - increase  glargine 45 units nightly  - Continue home lispro 20 units scheduled with meals  - Sliding Scale Insulin with Glucose Checks  -  A1c: 11.3      CKD stage III;  Creatinine fairly stable.  Monitor labs closely             Diet: Moderate Consistent Carb (60 g CHO per Meal) Diet    DVT Prophylaxis: Heparin SQ  Cazares Catheter: Not present  Lines: None     Cardiac Monitoring: ACTIVE order. Indication: Chest pain/ ACS rule out (24 hours)  Code Status: Full Code      Clinically Significant Risk Factors                   # Hypertension: Noted on problem list           # DMII: A1C = 11.3 % (Ref range: <5.7 %) within past 6 months, PRESENT ON ADMISSION      # Financial/Environmental Concerns: unable to afford rent/mortgage;unemployed  # Support System: poor social support noted in nursing assessment  # Housing Instability: noted in nursing assessment         Social Drivers of Health    Food Insecurity: High Risk (5/29/2025)    Food Insecurity     Within the past 12 months, did you worry that your food would run out before you got money to buy more?: Yes     Within the past 12 months, did the food you bought just not last and you didn t have money to get more?: No   Depression: At risk (1/30/2025)    PHQ-2     PHQ-2 Score: 6   Housing Stability: High Risk (5/29/2025)    Housing Stability     Do you have housing? : No     Are you worried about losing your housing?: Patient unable to answer   Tobacco Use: Medium Risk (1/30/2025)    Patient History     Smoking Tobacco Use: Former     Smokeless Tobacco Use: Never   Financial Resource Strain: High Risk (5/29/2025)    Financial Resource Strain     Within the past 12 months, have you or  your family members you live with been unable to get utilities (heat, electricity) when it was really needed?: Yes   Transportation Needs: Low Risk  (5/29/2025)    Transportation Needs     Within the past 12 months, has lack of transportation kept you from medical appointments, getting your medicines, non-medical meetings or appointments, work, or from getting things that you need?: No   Recent Concern: Transportation Needs - High Risk (3/23/2025)    Transportation Needs     Within the past 12 months, has lack of transportation kept you from medical appointments, getting your medicines, non-medical meetings or appointments, work, or from getting things that you need?: Yes   Interpersonal Safety: Unknown (5/29/2025)    Interpersonal Safety     Do you feel physically and emotionally safe where you currently live?: Patient declined     Within the past 12 months, have you been hit, slapped, kicked or otherwise physically hurt by someone?: Patient declined     Within the past 12 months, have you been humiliated or emotionally abused in other ways by your partner or ex-partner?: Patient declined          Disposition Plan     Medically Ready for Discharge: Anticipated in 5+ Days             Camila Vargas MD  Hospitalist Service  [facility]  Securely message with [employer] (more info)  Text page via [facility] Paging/Directory   ______________________________________________________________________    Interval History   No distress noted.  Patient is open to CV surgery evaluation now.  He is on room air lying flat.  Management plan and test results discussed with the patient and he expressed understanding.    Physical Exam   Vital Signs: Temp: 97.7  F (36.5  C) Temp src: Oral BP: 115/58 Pulse: 75   Resp: 18 SpO2: (!) 90 % O2 Device: None (Room air)    Weight: 308 lbs 3.25 oz    General Appearance:  No distress noted  Respiratory: Manage daily through bilaterally basally  Cardiovascular: S1 and S2  heard, no murmur or gallop  GI: Soft abdomen, obese, normoactive bowel sounds  Skin: Intact and warm  Other:  +2 pretibial edema       Medical Decision Making       40 MINUTES SPENT BY ME on the date of service doing chart review, history, exam, documentation & further activities per the note.      Data

## 2025-05-30 NOTE — PLAN OF CARE
Problem: Skin Injury Risk Increased  Goal: Skin Health and Integrity  Outcome: Progressing  Intervention: Plan: Nurse Driven Intervention: Friction and Shear  Recent Flowsheet Documentation  Taken 5/29/2025 1605 by Antoinette Naranjo RN  Friction/Shear Interventions: Pad bony prominence (elbow pads, heel pads, ear protectors)  Intervention: Optimize Skin Protection  Recent Flowsheet Documentation  Taken 5/29/2025 1605 by Antoinette Naranjo, RN  Activity Management: activity adjusted per tolerance     Problem: Gas Exchange Impaired  Goal: Optimal Gas Exchange  Outcome: Progressing   Goal Outcome Evaluation:       A&Ox4, vitally stable. Blood sugars stable. Flat affect, rude at times, but overall calm demeanor. Pt would probably benefit greatly from social work or spiritual care consult. Seems to be having a very difficult year medically and financially, pt reports he is living in his truck now and got evicted from his apartment. Reports he is no longer able to work as a  as the job duties became too physically difficult. Is on SNAP but unsure if benefits will last him through the next month. LS clear/diminished in all fields, heart sound WNL. Refuses bed alarm, provider aware, reapproached and education provided, still refusing. On tele. Small ulcer on R heel. Call light within reach, care ongoing.

## 2025-05-30 NOTE — CONSULTS
[facility] Inpatient Consult    ASSESSMENT:    1.  Decubitus ulcer right heel    RECOMMENDATIONS:    I have explained to Floyd about the current condition involving the right foot.  We discussed the importance of offloading the right heel to allow healing of the pressure ulcer.  At this point, I see no need for surgical debridement.  I will defer to [facility] RN for recommended wound care treatment.  I am recommending a PRAFO boot to be worn at all times to completely offload the right heel.  Recommend non weight bearing on the right foot with use of either a knee walker or quad walker with toe touch only.      I appreciate the consult.      MAGAD Miner D.P.M., F.A.JULIANNE.F.A.S.     --------------------------------------------------------------------------------------------------------------------------------------------------------------------------------------------------------------------------------------  HPI:  Floyd is a 49 year old male with a past medical history of previous CVA, CAD, HFrEF who was admitted on 5/28/25 after presenting to [facility] ED for generalized weakness and dizziness   Dr. Vargas requested an inpatient  consultation regarding the right foot condition.     REVIEW OF SYSTEMS:  Constitutional, HEENT, cardiovascular, pulmonary, GI, , musculoskeletal, neuro, skin, endocrine and psych systems are negative, except as otherwise noted.     PAST MEDICAL HISTORY:   Past Medical History:   Diagnosis Date    Acute hypoxemic respiratory failure (H)     Acute renal failure, unspecified acute renal failure type     CAD (coronary artery disease)     Callus of foot     Diabetes (H)     Diarrhea     Essential hypertension     Fracture of left distal radius     History of stroke     Insomnia     Nausea     Non-pressure chronic ulcer of right heel and midfoot with unspecified severity (H)     Normocytic anemia     Type 2 diabetes mellitus with foot ulcer (H)         PAST SURGICAL  HISTORY:   Past Surgical History:   Procedure Laterality Date    APPENDECTOMY      CV CORONARY ANGIOGRAM N/A 3/1/2024    Procedure: CV CORONARY ANGIOGRAM;  Surgeon: Stephen Berger MD;  Location: [facility] LAB CV    CV LEFT HEART CATH N/A 3/1/2024    Procedure: Left Heart Catheterization;  Surgeon: Stephen Berger MD;  Location: [facility] LAB CV    EXCISE EXOSTOSIS FOOT Right 5/2/2024    Procedure: excision of bone from calcaneus with application of theraskin;  Surgeon: Dong Sanders DPM;  Location: [facility] OR    INCISION AND DRAINAGE FOOT, COMBINED Right 5/2/2024    Procedure: INCISION AND DRAINAGE, right heel with;  Surgeon: Dong Sanders DPM;  Location: [facility] OR    INCISION AND DRAINAGE OF WOUND      groin abscess    IRRIGATION AND DEBRIDEMENT FOOT, COMBINED Right 2/16/2024    Procedure: IRRIGATION AND DEBRIDEMENT RIGHT FOOT;  Surgeon: Cristofer Sims DPM;  Location: [facility] OR    PICC DOUBLE LUMEN PLACEMENT  2/29/2024        MEDICATIONS:   Current Facility-Administered Medications:     acetaminophen (TYLENOL) tablet 650 mg, 650 mg, Oral, Q4H PRN **OR** acetaminophen (TYLENOL) Suppository 650 mg, 650 mg, Rectal, Q4H PRN, Earnest Del Rio MD    aspirin EC tablet 81 mg, 81 mg, Oral, Daily, Earnest Del Rio MD, 81 mg at 05/30/25 0959    atorvastatin (LIPITOR) tablet 80 mg, 80 mg, Oral, QPM, Earnest Del Rio MD, 80 mg at 05/29/25 2214    [Held by provider] bumetanide (BUMEX) tablet 2 mg, 2 mg, Oral, BID, Sue Mclaughlin PA-C    calcium carbonate (TUMS) chewable tablet 1,000 mg, 1,000 mg, Oral, 4x Daily PRN, Earnest Del Rio MD    carvedilol (COREG) tablet 25 mg, 25 mg, Oral, BID w/meals, Earnest Del Rio MD, 25 mg at 05/30/25 0959    clopidogrel (PLAVIX) tablet 75 mg, 75 mg, Oral, QPM, Earnest Del Rio MD, 75 mg at 05/29/25 9826    cyanocobalamin (VITAMIN B-12) tablet 1,000 mcg, 1,000 mcg, Oral, Daily, Earnest Del Rio MD, 1,000 mcg at 05/30/25 1000     glucose gel 15-30 g, 15-30 g, Oral, Q15 Min PRN **OR** dextrose 50 % injection 25-50 mL, 25-50 mL, Intravenous, Q15 Min PRN **OR** glucagon injection 1 mg, 1 mg, Subcutaneous, Q15 Min PRN, Earnest Del Rio MD    heparin ANTICOAGULANT injection 5,000 Units, 5,000 Units, Subcutaneous, Q8H, Earnest Del Rio MD, 5,000 Units at 05/30/25 0959    insulin aspart (NovoLOG) injection (RAPID ACTING), 20 Units, Subcutaneous, TID w/meals, Earnest Del Rio MD, 20 Units at 05/30/25 1334    insulin aspart (NovoLOG) injection (RAPID ACTING), 1-10 Units, Subcutaneous, TID AC, Earnest Del Rio MD, 4 Units at 05/30/25 1333    insulin aspart (NovoLOG) injection (RAPID ACTING), 1-7 Units, Subcutaneous, At Bedtime, Earnest Del Rio MD, 4 Units at 05/29/25 1230    insulin glargine (LANTUS PEN) injection 40 Units, 40 Units, Subcutaneous, At Bedtime, Camila Vargas MD, 40 Units at 05/29/25 2215    isosorbide mononitrate (IMDUR) 24 hr tablet 30 mg, 30 mg, Oral, QPM, Earnest Del Rio MD, 30 mg at 05/29/25 2215    lidocaine (LMX4) cream, , Topical, Q1H PRN, Earnest Del Rio MD    lidocaine 1 % 0.1-1 mL, 0.1-1 mL, Other, Q1H PRN, Earnest Del Rio MD    melatonin tablet 5 mg, 5 mg, Oral, At Bedtime, Earnest Del Rio MD, 5 mg at 05/29/25 2215    multivitamin w/minerals (THERA-VIT-M) tablet 1 tablet, 1 tablet, Oral, Daily, Earnest Del Rio MD, 1 tablet at 05/30/25 1000    ondansetron (ZOFRAN ODT) ODT tab 4 mg, 4 mg, Oral, Q6H PRN **OR** ondansetron (ZOFRAN) injection 4 mg, 4 mg, Intravenous, Q6H PRN, Earnest Del Rio MD    polyethylene glycol (MIRALAX) Packet 17 g, 17 g, Oral, BID PRN, Earnest Del Rio MD    sacubitril-valsartan (ENTRESTO) 24-26 MG per tablet 1 tablet, 1 tablet, Oral, BID, Sue Mclaughlin PA-C, 1 tablet at 05/30/25 1004    senna-docusate (SENOKOT-S/PERICOLACE) 8.6-50 MG per tablet 1 tablet, 1 tablet, Oral, BID PRN **OR** senna-docusate (SENOKOT-S/PERICOLACE) 8.6-50 MG per tablet 2 tablet, 2 tablet, Oral, BID  PRN, Earnest Del Rio MD    sodium chloride (PF) 0.9% PF flush 3 mL, 3 mL, Intracatheter, Q8H, Earnest Del Rio MD, 3 mL at 05/30/25 1334    sodium chloride (PF) 0.9% PF flush 3 mL, 3 mL, Intracatheter, q1 min prn, Earnest DelR io MD    traZODone (DESYREL) tablet 50 mg, 50 mg, Oral, At Bedtime, Earnest Del Rio MD, 50 mg at 05/29/25 2215     ALLERGIES:  No Known Allergies     SOCIAL HISTORY:   Social History     Socioeconomic History    Marital status: Single     Spouse name: Not on file    Number of children: Not on file    Years of education: Not on file    Highest education level: Not on file   Occupational History    Not on file   Tobacco Use    Smoking status: Former     Current packs/day: 0.50     Types: Cigarettes    Smokeless tobacco: Never   Vaping Use    Vaping status: Never Used   Substance and Sexual Activity    Alcohol use: Yes     Comment: once a week    Drug use: Yes     Types: Marijuana     Comment: Has not done since Feb    Sexual activity: Not on file   Other Topics Concern    Not on file   Social History Narrative    Patient reports that he does not have health insurance.     Social Drivers of Health     Financial Resource Strain: High Risk (5/29/2025)    Financial Resource Strain     Within the past 12 months, have you or your family members you live with been unable to get utilities (heat, electricity) when it was really needed?: Yes   Food Insecurity: High Risk (5/29/2025)    Food Insecurity     Within the past 12 months, did you worry that your food would run out before you got money to buy more?: Yes     Within the past 12 months, did the food you bought just not last and you didn t have money to get more?: No   Transportation Needs: Low Risk  (5/29/2025)    Transportation Needs     Within the past 12 months, has lack of transportation kept you from medical appointments, getting your medicines, non-medical meetings or appointments, work, or from getting things that you need?: No   Recent  Concern: Transportation Needs - High Risk (3/23/2025)    Transportation Needs     Within the past 12 months, has lack of transportation kept you from medical appointments, getting your medicines, non-medical meetings or appointments, work, or from getting things that you need?: Yes   Physical Activity: Not on file   Stress: Not on file   Social Connections: Not on file   Interpersonal Safety: Unknown (5/29/2025)    Interpersonal Safety     Do you feel physically and emotionally safe where you currently live?: Patient declined     Within the past 12 months, have you been hit, slapped, kicked or otherwise physically hurt by someone?: Patient declined     Within the past 12 months, have you been humiliated or emotionally abused in other ways by your partner or ex-partner?: Patient declined   Housing Stability: High Risk (5/29/2025)    Housing Stability     Do you have housing? : No     Are you worried about losing your housing?: Patient unable to answer        FAMILY HISTORY: No family history on file.     EXAM:Vitals: /58 (BP Location: Left arm)   Pulse 75   Temp 97.7  F (36.5  C) (Oral)   Resp 18   Wt (!) 139.8 kg (308 lb 3.3 oz)   SpO2 (!) 90%   BMI 44.22 kg/m    BMI= Body mass index is 44.22 kg/m .    General appearance: Patient is alert and fully cooperative with history & exam.  No sign of distress is noted during the visit.     Psychiatric: Affect is pleasant & appropriate.  Patient appears motivated to improve health.     Respiratory: Breathing is regular & unlabored while sitting.     HEENT: Hearing is intact to spoken word.  Speech is clear.  No gross evidence of visual impairment that would impact ambulation.     Dermatologic:  Noted hyperkeratotic buildup of skin on the plantar aspect of the right heel with subdermal hemorrhage noted.  Minimal surrounding erythema noted.  No drainage noted.     Media Information      Document Information    Other: Photograph   R heel   05/30/2025 10:49 AM    Attached To:   Hospital Encounter on 5/28/25   Source Information    Vidya Villanueva, RN  [facility]   Document History      Vascular: DP & PT pulses are intact & regular bilaterally.  CFT and skin temperature is normal to both lower extremities.     Neurologic: Lower extremity sensation is intact to light touch.  No evidence of weakness or contracture in the lower extremities.  Noted evidence of neuropathy.     Musculoskeletal: Patient is ambulatory without assistive device or brace.  No gross ankle deformity noted.  No foot or ankle joint effusion is noted.    LABS:    CBC   Component  Ref Range & Units (hover)     7:24 AM  (5/30/25)    1 d ago  (5/29/25)    2 d ago  (5/28/25)    2 mo ago  (3/27/25)    2 mo ago  (3/26/25)    2 mo ago  (3/25/25)    2 mo ago  (3/24/25)       WBC Count 11.2 High  12.0 High  13.4 High  13.2 High  11.4 High  13.1 High  11.7 High     RBC Count 3.73 Low  3.80 Low  4.17 Low  4.21 Low  4.13 Low  4.22 Low  4.18 Low     Hemoglobin 11.5 Low  11.6 Low  12.8 Low  12.9 Low  12.7 Low  13.1 Low  12.9 Low     Hematocrit 34.4 Low  35.8 Low  38.4 Low  37.8 Low  37.4 Low  37.7 Low  37.8 Low     MCV 92 94 92 90 91 89 90    MCH 30.8 30.5 30.7 30.6 30.8 31.0 30.9    MCHC 33.4 32.4 33.3 34.1 34.0 34.7 34.1    RDW 12.7 12.9 12.8 12.3 12.2 12.3 12.4    Platelet Count 216 226 267 209 198 210 214     Basic Metabolic Panel   Component  Ref Range & Units (hover)     7:24 AM  (5/30/25)    1 d ago  (5/29/25)    2 d ago  (5/28/25)    2 mo ago  (3/27/25)    2 mo ago  (3/26/25)    2 mo ago  (3/25/25)    2 mo ago  (3/24/25)       Sodium 135 137 136 136 133 Low  135 139    Potassium 3.6 3.7 4.4 3.5 3.6 3.4 3.5    Chloride 102 103 101 102 98 99 104    Carbon Dioxide (CO2) 27 25 25 26 25 26 28    Anion Gap 6 Low  9 10 8 10 10 7    Urea Nitrogen 21.2 High  19.9 20.0 31.8 High  27.9 High  19.8 21.6 High     Creatinine 1.40 High  1.33 High  1.29 High  1.54 High  1.50 High  1.25 High  1.14    GFR Estimate 61 65  CM 67 CM 54 Low  CM 56 Low  CM 70 CM 78 CM   Comment: eGFR calculated using 2021 CKD-EPI equation.    Calcium 8.7 Low  9.0 9.2 8.9 8.8 8.9 9.2    Glucose 184 High  279 High  354 High  151 High  260 High  207 High  171 High      CRP    Component  Ref Range & Units (hover) 1 d ago  (5/29/25) 1 yr ago  (2/14/24) 1 yr ago  (1/8/24)     CRP Inflammation 21.40 High  85.10 High  36.40 High      DIAGNOSTICS:    Images were independently reviewed and I concur with findings by the radiologist below:    EXAM: MR FOOT RIGHT W/O and W CONTRAST  LOCATION: [facility]  DATE: 5/29/2025     INDICATION: Diabetic foot wound, concern for osteomyelitis, heel pain.  COMPARISON: 02/14/2024 MRI.  TECHNIQUE: Routine. Additional postgadolinium T1 sequences were obtained.  IV CONTRAST: 14 ml Gadavist     FINDINGS:      JOINTS AND BONES:   -No fracture or contusion. No evidence of osteomyelitis. There is a moderate-sized, broad-based ulcer along the plantar/lateral aspect of the calcaneal tuberosity with some fibrosis/scarring at its deep extent. No evidence of cellulitis. No joint   effusion. Degenerative changes at the naviculocuneiform joint.     TENDONS:   -No tendon tear, tendinopathy, or tenosynovitis.      LIGAMENTS:   -The ankle ligaments are intact. No evidence of tearing.     MUSCLES AND SOFT TISSUES:   -No soft tissue edema or fluid collection. Marked atrophy of the abductor digiti minimi muscle. Mild atrophy of the remaining intrinsic musculature of the foot. Chronic changes of proximal plantar fasciitis.                                                                      IMPRESSION:  1.  No evidence of an abscess or osteomyelitis.     2.  Moderate-sized soft tissue ulcer along the plantar/lateral aspect of the calcaneal tuberosity.     3.  Changes of chronic plantar fasciitis.     4.  Marked atrophy of the abductor digiti minimi muscle.  Sandip Mancini MD J. Benjamin Buren, D.P.PETERSON., F.A.C.F.A.S.

## 2025-05-30 NOTE — PROGRESS NOTES
[facility] Nurse Inpatient Assessment     Consulted for: R heel    Summary: Attempted to see patient, but he was in a procedure. Will plan to see in AM to assess heel.    [facility] nurse follow-up plan: weekly    Patient History (according to provider note(s):    Assessment & Plan  Floyd Capps is a 49 year old male with a past medical history of Previous CVA, CAD, HFrEF who was admitted on 5/28/25 after presenting to [facility] ED for Generalized Weakness and Dizziness.    Assessment:    Wound location: R heel     Last photo: 5/30 5/28  Wound due to: Diabetic Ulcer  Wound history/plan of care: Patient states he is homeless and does not have ability to provide for cares on foot d/t lack of finances and limited ability to reach the area needing treatment. Patient also states he does not have the ability to feel sensations in his feet likely related to diabetic neuropathy.  Wound base: Majority is callus and dry skin with small denudement with pink viable tissue approximately 0.5 cm x 1 cm. Total affected area is approximately 2.5 cm x 5 cm.     Palpation of the wound bed: boggy/fluctuant feeling underneath the callus     Drainage: scant     Description of drainage: serous  Periwound skin: Dry/scaly      Color: Dark colored skin - may be blood trapped in fissures which has stained the tissue      Temperature: normal   Odor: none  Pain: denies   Pain interventions prior to dressing change: patient tolerated well and slow and gentle cares   Treatment goal: Maintain (prevention of deterioration) and Protection  STATUS: initial assessment  Supplies ordered: supplies stored on unit      Treatment Plan:   R heel - daily  Keep dry.  Paint with betadine swab.  Will defer to podiatry if they determine an alternate plan of care.    Orders: Written    RECOMMEND PRIMARY TEAM ORDER: Podiatry consult  Education provided: plan of care  Discussed plan of care with:  Patient and Nurse (Charge)  Notify [facility] if wound(s) deteriorate.  Nursing to notify the Provider(s) and re-consult the WO Nurse if new skin concern.    DATA:     Current support surface: Standard  Standard gel mattress (Isoflex)  Containment of urine/stool: Continent of bladder and Continent of bowel  BMI: Body mass index is 44.22 kg/m .   Active diet order: Orders Placed This Encounter      Moderate Consistent Carb (60 g CHO per Meal) Diet     Output: I/O last 3 completed shifts:  In: 720 [P.O.:720]  Out: 1150 [Urine:1150]     Labs:   Recent Labs   Lab 05/30/25  0724 05/29/25  0650 05/28/25  1728   HGB 11.5*   < > 12.8*   WBC 11.2*   < > 13.4*   A1C  --   --  11.3*    < > = values in this interval not displayed.     Pressure injury risk assessment:   Sensory Perception: 3-->slightly limited  Moisture: 4-->rarely moist  Activity: 3-->walks occasionally  Mobility: 3-->slightly limited  Nutrition: 2-->probably inadequate  Friction and Shear: 2-->potential problem  Rolo Score: 17    Vidya Villanueva MSN RN CWOCN  Pager no longer is use, please contact through [employer] group: [facility]

## 2025-05-30 NOTE — PLAN OF CARE
Problem: Skin Injury Risk Increased  Goal: Skin Health and Integrity  Outcome: Progressing  Intervention: Plan: Nurse Driven Intervention: Moisture Management  Recent Flowsheet Documentation  Taken 5/30/2025 0000 by Carey Bansal RN  Moisture Interventions: Encourage regular toileting  Bathing/Skin Care: patient refused  Intervention: Plan: Nurse Driven Intervention: Friction and Shear  Recent Flowsheet Documentation  Taken 5/30/2025 0000 by Carey Bansal RN  Friction/Shear Interventions: Pad bony prominence (elbow pads, heel pads, ear protectors)  Intervention: Optimize Skin Protection  Recent Flowsheet Documentation  Taken 5/30/2025 0000 by Carey Bansal RN  Activity Management: activity adjusted per tolerance     Problem: Gas Exchange Impaired  Goal: Optimal Gas Exchange  Outcome: Progressing   Goal Outcome Evaluation:    Slept well. Denies pain but reports right calf from ankle to knee feels tight. Tele- NSR. Cardiology following. Refused standing weight- bed weight in flow sheet, maybe more willing to do on day shift. Also refused second set of vitals and 0200 bloodsugar check stating he does not want to be bothered at night and will talk to doctor if he needs to. Refusing bed alarm, MD aware, urinal at bedside. Saline locked. Denies shortness of breath at rest. Lung sounds clear. K, mag, phos protocols all re-check this am.     Temp: 97.6  F (36.4  C) Temp src: Oral BP: 98/55 Pulse: 75   Resp: 16 SpO2: 93 % O2 Device: None (Room air)

## 2025-05-30 NOTE — PLAN OF CARE
Problem: Skin Injury Risk Increased  Goal: Skin Health and Integrity  Outcome: Progressing  Intervention: Plan: Nurse Driven Intervention: Moisture Management  Recent Flowsheet Documentation  Taken 5/30/2025 1009 by Dhara Rinaldi RN  Moisture Interventions: Encourage regular toileting  Intervention: Plan: Nurse Driven Intervention: Friction and Shear  Recent Flowsheet Documentation  Taken 5/30/2025 1009 by Dhara Rinaldi RN  Friction/Shear Interventions: HOB 30 degrees or less  Intervention: Optimize Skin Protection  Recent Flowsheet Documentation  Taken 5/30/2025 1009 by Dhara Rinaldi RN  Pressure Reduction Techniques: heels elevated off bed  Activity Management: activity adjusted per tolerance  Head of Bed (HOB) Positioning: HOB at 30 degrees     Problem: Gas Exchange Impaired  Goal: Optimal Gas Exchange  Outcome: Progressing  Intervention: Optimize Oxygenation and Ventilation  Recent Flowsheet Documentation  Taken 5/30/2025 1009 by Dhara Rinalid RN  Head of Bed (HOB) Positioning: HOB at 30 degrees   Goal Outcome Evaluation:         Patient denied pain. Reported RLE feeling tight. Seen by WOC and podiatry today. Patient to have Cards surgical consult. Encourage patient to ambulate. Bumex to restart tomorrow. A&Ox4. Uses urinal. Refused breakfast but ate 100% for lunch.

## 2025-05-31 ENCOUNTER — APPOINTMENT (OUTPATIENT)
Dept: OCCUPATIONAL THERAPY | Facility: HOSPITAL | Age: 50
End: 2025-05-31
Attending: PHYSICIAN ASSISTANT
Payer: COMMERCIAL

## 2025-05-31 LAB
ANION GAP SERPL CALCULATED.3IONS-SCNC: 7 MMOL/L (ref 7–15)
BUN SERPL-MCNC: 18.5 MG/DL (ref 6–20)
CALCIUM SERPL-MCNC: 8.9 MG/DL (ref 8.8–10.4)
CHLORIDE SERPL-SCNC: 105 MMOL/L (ref 98–107)
CREAT SERPL-MCNC: 1.34 MG/DL (ref 0.67–1.17)
EGFRCR SERPLBLD CKD-EPI 2021: 64 ML/MIN/1.73M2
ERYTHROCYTE [DISTWIDTH] IN BLOOD BY AUTOMATED COUNT: 12.6 % (ref 10–15)
GLUCOSE BLDC GLUCOMTR-MCNC: 130 MG/DL (ref 70–99)
GLUCOSE BLDC GLUCOMTR-MCNC: 144 MG/DL (ref 70–99)
GLUCOSE BLDC GLUCOMTR-MCNC: 145 MG/DL (ref 70–99)
GLUCOSE BLDC GLUCOMTR-MCNC: 154 MG/DL (ref 70–99)
GLUCOSE SERPL-MCNC: 78 MG/DL (ref 70–99)
HCO3 SERPL-SCNC: 27 MMOL/L (ref 22–29)
HCT VFR BLD AUTO: 33.6 % (ref 40–53)
HGB BLD-MCNC: 11.5 G/DL (ref 13.3–17.7)
MAGNESIUM SERPL-MCNC: 2.1 MG/DL (ref 1.7–2.3)
MCH RBC QN AUTO: 31.2 PG (ref 26.5–33)
MCHC RBC AUTO-ENTMCNC: 34.2 G/DL (ref 31.5–36.5)
MCV RBC AUTO: 91 FL (ref 78–100)
PHOSPHATE SERPL-MCNC: 4.2 MG/DL (ref 2.5–4.5)
PLATELET # BLD AUTO: 235 10E3/UL (ref 150–450)
POTASSIUM SERPL-SCNC: 3.6 MMOL/L (ref 3.4–5.3)
RBC # BLD AUTO: 3.69 10E6/UL (ref 4.4–5.9)
SODIUM SERPL-SCNC: 139 MMOL/L (ref 135–145)
WBC # BLD AUTO: 11.8 10E3/UL (ref 4–11)

## 2025-05-31 PROCEDURE — 97110 THERAPEUTIC EXERCISES: CPT | Mod: GO

## 2025-05-31 PROCEDURE — 36415 COLL VENOUS BLD VENIPUNCTURE: CPT | Performed by: STUDENT IN AN ORGANIZED HEALTH CARE EDUCATION/TRAINING PROGRAM

## 2025-05-31 PROCEDURE — 99232 SBSQ HOSP IP/OBS MODERATE 35: CPT | Performed by: STUDENT IN AN ORGANIZED HEALTH CARE EDUCATION/TRAINING PROGRAM

## 2025-05-31 PROCEDURE — 84100 ASSAY OF PHOSPHORUS: CPT | Performed by: STUDENT IN AN ORGANIZED HEALTH CARE EDUCATION/TRAINING PROGRAM

## 2025-05-31 PROCEDURE — 250N000013 HC RX MED GY IP 250 OP 250 PS 637: Performed by: PHYSICIAN ASSISTANT

## 2025-05-31 PROCEDURE — 85027 COMPLETE CBC AUTOMATED: CPT | Performed by: STUDENT IN AN ORGANIZED HEALTH CARE EDUCATION/TRAINING PROGRAM

## 2025-05-31 PROCEDURE — 250N000013 HC RX MED GY IP 250 OP 250 PS 637: Performed by: STUDENT IN AN ORGANIZED HEALTH CARE EDUCATION/TRAINING PROGRAM

## 2025-05-31 PROCEDURE — 120N000001 HC R&B MED SURG/OB

## 2025-05-31 PROCEDURE — 99232 SBSQ HOSP IP/OBS MODERATE 35: CPT | Performed by: INTERNAL MEDICINE

## 2025-05-31 PROCEDURE — 250N000011 HC RX IP 250 OP 636: Performed by: STUDENT IN AN ORGANIZED HEALTH CARE EDUCATION/TRAINING PROGRAM

## 2025-05-31 PROCEDURE — 83735 ASSAY OF MAGNESIUM: CPT | Performed by: STUDENT IN AN ORGANIZED HEALTH CARE EDUCATION/TRAINING PROGRAM

## 2025-05-31 PROCEDURE — 80048 BASIC METABOLIC PNL TOTAL CA: CPT | Performed by: STUDENT IN AN ORGANIZED HEALTH CARE EDUCATION/TRAINING PROGRAM

## 2025-05-31 RX ADMIN — CLOPIDOGREL BISULFATE 75 MG: 75 TABLET ORAL at 20:54

## 2025-05-31 RX ADMIN — SACUBITRIL AND VALSARTAN 1 TABLET: 24; 26 TABLET, FILM COATED ORAL at 20:54

## 2025-05-31 RX ADMIN — HEPARIN SODIUM 5000 UNITS: 5000 INJECTION, SOLUTION INTRAVENOUS; SUBCUTANEOUS at 01:13

## 2025-05-31 RX ADMIN — SACUBITRIL AND VALSARTAN 1 TABLET: 24; 26 TABLET, FILM COATED ORAL at 10:09

## 2025-05-31 RX ADMIN — HEPARIN SODIUM 5000 UNITS: 5000 INJECTION, SOLUTION INTRAVENOUS; SUBCUTANEOUS at 10:11

## 2025-05-31 RX ADMIN — INSULIN ASPART 1 UNITS: 100 INJECTION, SOLUTION INTRAVENOUS; SUBCUTANEOUS at 12:38

## 2025-05-31 RX ADMIN — Medication 1 TABLET: at 10:09

## 2025-05-31 RX ADMIN — HEPARIN SODIUM 5000 UNITS: 5000 INJECTION, SOLUTION INTRAVENOUS; SUBCUTANEOUS at 17:50

## 2025-05-31 RX ADMIN — ATORVASTATIN CALCIUM 80 MG: 40 TABLET, FILM COATED ORAL at 20:54

## 2025-05-31 RX ADMIN — CYANOCOBALAMIN TAB 1000 MCG 1000 MCG: 1000 TAB at 10:09

## 2025-05-31 RX ADMIN — ASPIRIN 81 MG: 81 TABLET, COATED ORAL at 10:09

## 2025-05-31 RX ADMIN — ISOSORBIDE MONONITRATE 30 MG: 30 TABLET, EXTENDED RELEASE ORAL at 20:54

## 2025-05-31 RX ADMIN — CARVEDILOL 25 MG: 12.5 TABLET, FILM COATED ORAL at 10:09

## 2025-05-31 RX ADMIN — TRAZODONE HYDROCHLORIDE 50 MG: 50 TABLET ORAL at 22:06

## 2025-05-31 RX ADMIN — Medication 5 MG: at 22:06

## 2025-05-31 RX ADMIN — CARVEDILOL 25 MG: 12.5 TABLET, FILM COATED ORAL at 17:51

## 2025-05-31 NOTE — PLAN OF CARE
Problem: Gas Exchange Impaired  Goal: Optimal Gas Exchange  Outcome: Progressing  Intervention: Optimize Oxygenation and Ventilation  Recent Flowsheet Documentation  Taken 5/31/2025 0112 by Aiden Egan, RN  Head of Bed (HOB) Positioning: HOB at 20-30 degrees  Taken 5/30/2025 2014 by Aiden Egan, RN  Head of Bed (HOB) Positioning: HOB at 20-30 degrees     Problem: Comorbidity Management  Goal: Blood Glucose Levels Within Targeted Range  Outcome: Progressing  Intervention: Monitor and Manage Glycemia  Recent Flowsheet Documentation  Taken 5/31/2025 0112 by Aiden Egan, RN  Medication Review/Management: medications reviewed  Taken 5/30/2025 2014 by Aiden Egan, RN  Medication Review/Management: medications reviewed   Goal Outcome Evaluation:       Pt alert and oriented x4. Denies pain.NSR on telemetry. Voiding using urinal at bedside.HS BG was 159 and pt refused 2 am blood sugar check VS at 4 am. Right heel elevated with pillow. Non weight bearing on right leg.On K, Mag and Phos protocol recheck in am. VSS, on room air.

## 2025-05-31 NOTE — PLAN OF CARE
Problem: Skin Injury Risk Increased  Goal: Skin Health and Integrity  Outcome: Progressing  Intervention: Plan: Nurse Driven Intervention: Moisture Management  Recent Flowsheet Documentation  Taken 5/30/2025 1534 by Ashanti Jean Baptiste RN  Moisture Interventions:   Encourage regular toileting   Perineal cleanser  Intervention: Plan: Nurse Driven Intervention: Friction and Shear  Recent Flowsheet Documentation  Taken 5/30/2025 1534 by Ashanti Jean Baptiste RN  Friction/Shear Interventions: HOB 30 degrees or less  Intervention: Optimize Skin Protection  Recent Flowsheet Documentation  Taken 5/30/2025 1534 by Ashanti Jean Baptiste RN  Activity Management: activity adjusted per tolerance     Problem: Fall Injury Risk  Goal: Absence of Fall and Fall-Related Injury  Outcome: Progressing  Intervention: Identify and Manage Contributors  Recent Flowsheet Documentation  Taken 5/30/2025 1534 by Ashanti Jean Baptiste RN  Medication Review/Management: medications reviewed  Intervention: Promote Injury-Free Environment  Recent Flowsheet Documentation  Taken 5/30/2025 1534 by Ashanti Jean Baptiste RN  Safety Promotion/Fall Prevention:   clutter free environment maintained   nonskid shoes/slippers when out of bed   patient and family education   room organization consistent   safety round/check completed     Problem: Pain Acute  Goal: Optimal Pain Control and Function  Outcome: Progressing  Intervention: Prevent or Manage Pain  Recent Flowsheet Documentation  Taken 5/30/2025 1534 by Ashanti Jean Baptiste RN  Medication Review/Management: medications reviewed   Goal Outcome Evaluation:       Patient alert and oriented x 4. Denied pain. Right foot elevated. Betadine applied on right heel ulcer. Activity encouraged, but patient just stayed in bed.

## 2025-05-31 NOTE — PLAN OF CARE
Problem: Adult Inpatient Plan of Care  Goal: Optimal Comfort and Wellbeing  Outcome: Progressing     Problem: Gas Exchange Impaired  Goal: Optimal Gas Exchange  Outcome: Progressing     Problem: Pain Acute  Goal: Optimal Pain Control and Function  Outcome: Progressing  Intervention: Prevent or Manage Pain  Recent Flowsheet Documentation  Taken 5/31/2025 1700 by Ashanti Jean Baptiste RN  Medication Review/Management: medications reviewed  Taken 5/31/2025 1015 by Ashanti Jean Baptiste RN  Medication Review/Management: medications reviewed     Problem: Comorbidity Management  Goal: Blood Glucose Levels Within Targeted Range  Outcome: Progressing  Intervention: Monitor and Manage Glycemia  Recent Flowsheet Documentation  Taken 5/31/2025 1700 by Ashanti Jean Baptiste RN  Medication Review/Management: medications reviewed  Taken 5/31/2025 1015 by Ashanti Jean Baptiste RN  Medication Review/Management: medications reviewed   Goal Outcome Evaluation:       Patient alert and oriented x 4. Denied pain. Medication in the morning not given on time as patient requested that he be allowed to sleep more with less interruptions. Uses urinal at bedside. Wound swabbed with betadine and foot raised with a pillow.

## 2025-05-31 NOTE — PROGRESS NOTES
Cardiology    Impression and Plan        Assessment and Plan:  Dyspnea: Likely from multi vessel CAD.  Coronary artery disease: severe, multivessel; I spoke to CT surgery team today, they feel he is a poor candidate in terms of comorbidities especially lower extremity wound, coronary anatomy.  They would like an updated angiogram and then outpatient follow-up.  Continue Imdur, aspirin, Plavix, statin, Coreg  Heart failure with reduced ejection fraction:   Patient's EF dropped from 55-60 to 30 to 35% in February 2024.  This admission EF 40-45%, weight gain likely from obesity.  Likely ischemic cardiomyopathy.  Continue home diuretic regimen, Coreg, Entresto;  Carotid Artery Disease, mild based on US, CT neck shows calcific plaque and fibrofatty plaque with moderate stenosis of pICA.   Brain MRI with evidence of recent infarct.  Involving the right thalamus/internal capsule with expected evolution and decreased volume of diffusion restriction compared to prior.  Seen by neurology who do not feel like there are lateralizing neurological defects and no further workup was recommended.  Dyslipidemia, Most recent  and . Patient is prescribed atorvastatin, unclear if he was taking.   Continue atorvastatin 80 mg daily   We discussed the role diet, exercise and weight management can play in managing dyslipidemia.   Right heel ulcer, hx of RLE osteomyelitis, surgery in February and May of this year. MRI negative for osteo. CRP lower than February.        HPI   Feels about the same, still short of breath, has not moved around much.      Physical Examination   VITALS: BP 96/54 (BP Location: Left arm, Patient Position: Right side, Cuff Size: Adult Large)   Pulse 70   Temp 98.3  F (36.8  C) (Oral)   Resp 19   Wt (!) 141 kg (310 lb 13.6 oz)   SpO2 (!) 91%   BMI 44.60 kg/m    BMI: Body mass index is 44.6 kg/m .  Wt Readings from Last 3 Encounters:   05/31/25 (!) 141 kg (310 lb 13.6 oz)   03/27/25 (!) 138.4 kg (305  "lb 1.9 oz)   01/30/25 135.2 kg (298 lb 2 oz)       Intake/Output Summary (Last 24 hours) at 5/31/2025 0834  Last data filed at 5/31/2025 0100  Gross per 24 hour   Intake 920 ml   Output 2300 ml   Net -1380 ml       General: pleasant male. No acute distress.   HENT: external ears normal. Nares patent. Mucous membranes moist.  Neck: JVP hard to assess but does not seem to be severely elevated  Lungs: clear to auscultation  COR: Soft heart sounds, regular, no murmurs appreciated   Abd: Obese, BS present  Extrem: Trace edema              Lab Results Reviewed    Chemistry/lipid CBC Cardiac Enzymes/BNP/TSH/INR   Recent Labs   Lab Test 03/23/25  1049   CHOL 224*   HDL 53   *   TRIG 213*     Recent Labs   Lab Test 03/23/25  1049 02/21/24  0537 08/23/23  0946   * 77 226*     Recent Labs   Lab Test 05/31/25  0656      POTASSIUM 3.6   CHLORIDE 105   CO2 27   GLC 78   BUN 18.5   CR 1.34*   GFRESTIMATED 64   SARAH 8.9     Recent Labs   Lab Test 05/31/25  0656 05/30/25  0724 05/29/25  0650   CR 1.34* 1.40* 1.33*     Recent Labs   Lab Test 05/28/25  1728 03/23/25  0859 01/30/25  1457   A1C 11.3* 12.1* 8.9*          Recent Labs   Lab Test 05/31/25  0656   WBC 11.8*   HGB 11.5*   HCT 33.6*   MCV 91        Recent Labs   Lab Test 05/31/25  0656 05/30/25  0724 05/29/25  0650   HGB 11.5* 11.5* 11.6*    No results for input(s): \"TROPONINI\" in the last 04756 hours.  Recent Labs   Lab Test 05/28/25  1728 03/23/25  0859 02/19/24  0547 08/23/23  0946   NTBNPI  --  1,034* 12,085* 857*   NTBNP 2,328*  --   --   --      Recent Labs   Lab Test 08/23/23  1433   TSH 2.72     Recent Labs   Lab Test 08/23/23  0946   INR 0.95           Current Inpatient Scheduled Medications   Scheduled Meds:  Current Facility-Administered Medications   Medication Dose Route Frequency Provider Last Rate Last Admin    aspirin EC tablet 81 mg  81 mg Oral Daily Earnest Del Rio MD   81 mg at 05/30/25 0959    atorvastatin (LIPITOR) tablet 80 mg  " 80 mg Oral QPM Earnest Del Rio MD   80 mg at 05/30/25 2012    [Held by provider] bumetanide (BUMEX) tablet 2 mg  2 mg Oral BID Sue Mclaughlin PA-C        carvedilol (COREG) tablet 25 mg  25 mg Oral BID w/meals Earnest Del Rio MD   25 mg at 05/30/25 1749    clopidogrel (PLAVIX) tablet 75 mg  75 mg Oral QPM Earnest Del Rio MD   75 mg at 05/30/25 2013    cyanocobalamin (VITAMIN B-12) tablet 1,000 mcg  1,000 mcg Oral Daily Earnest Del Rio MD   1,000 mcg at 05/30/25 1000    heparin ANTICOAGULANT injection 5,000 Units  5,000 Units Subcutaneous Q8H Earnest Del Rio MD   5,000 Units at 05/31/25 0113    insulin aspart (NovoLOG) injection (RAPID ACTING)  20 Units Subcutaneous TID w/meals Earnest Del Rio MD   20 Units at 05/30/25 1748    insulin aspart (NovoLOG) injection (RAPID ACTING)  1-10 Units Subcutaneous TID AC Earnest Del Rio MD   3 Units at 05/30/25 1747    insulin aspart (NovoLOG) injection (RAPID ACTING)  1-7 Units Subcutaneous At Bedtime Earnest Del Rio MD   4 Units at 05/29/25 1230    insulin glargine (LANTUS PEN) injection 45 Units  45 Units Subcutaneous At Bedtime Camila Vargas MD   45 Units at 05/30/25 2150    isosorbide mononitrate (IMDUR) 24 hr tablet 30 mg  30 mg Oral QPM Earnest Del Rio MD   30 mg at 05/30/25 2013    melatonin tablet 5 mg  5 mg Oral At Bedtime Earnest Del Rio MD   5 mg at 05/30/25 2149    multivitamin w/minerals (THERA-VIT-M) tablet 1 tablet  1 tablet Oral Daily Earnest Del Rio MD   1 tablet at 05/30/25 1000    sacubitril-valsartan (ENTRESTO) 24-26 MG per tablet 1 tablet  1 tablet Oral BID Sue Mclaughlin PA-C   1 tablet at 05/30/25 2013    sodium chloride (PF) 0.9% PF flush 3 mL  3 mL Intracatheter Q8H Earnest Del Rio MD   3 mL at 05/31/25 0632    traZODone (DESYREL) tablet 50 mg  50 mg Oral At Bedtime Earnest Del Rio MD   50 mg at 05/30/25 1749     Continuous Infusions:  Current Facility-Administered Medications   Medication Dose Route Frequency  Provider Last Rate Last Admin       No current outpatient medications on file.          Medications Prior to Admission   Prior to Admission medications    Medication Sig Start Date End Date Taking? Authorizing Provider   acetaminophen (TYLENOL) 500 MG tablet Take 2 tablets (1,000 mg) by mouth every 8 hours as needed for mild pain 3/5/24  Yes Nata Baca MD   aspirin 81 MG EC tablet Take 1 tablet (81 mg) by mouth daily. 3/28/25  Yes Ricardo Herndon DO   atorvastatin (LIPITOR) 80 MG tablet Take 1 tablet (80 mg) by mouth daily 7/11/24  Yes Glendy Benavides NP   bumetanide (BUMEX) 2 MG tablet Take 1 tablet (2 mg) by mouth 2 times daily. 1/30/25  Yes Glendy Benavides NP   carvedilol (COREG) 25 MG tablet Take 1 tablet (25 mg) by mouth 2 times daily (with meals). 1/30/25  Yes Glendy Benavides NP   clopidogrel (PLAVIX) 75 MG tablet Take 1 tablet (75 mg) by mouth daily. 3/28/25 6/24/25 Yes Ricardo Herndon DO   cyanocobalamin (VITAMIN B-12) 1000 MCG tablet Take 1,000 mcg by mouth daily.   Yes Reported, Patient   insulin glargine (LANTUS PEN) 100 UNIT/ML pen Inject 35 Units subcutaneously at bedtime. 3/27/25  Yes Ricardo Herndon DO   insulin lispro (HUMALOG KWIKPEN) 100 UNIT/ML (1 unit dial) KWIKPEN Inject 20 Units subcutaneously 3 times daily (with meals). Plus sliding scale: 2 units every 50 over 150. If humalog is not covered by insurance, may substitute with novolog or other fast acting insulin 3/27/25  Yes Ricardo Herndon DO   isosorbide mononitrate (IMDUR) 30 MG 24 hr tablet Take 1 tablet (30 mg) by mouth daily. 11/7/24  Yes Gee Hopson DO   losartan (COZAAR) 25 MG tablet Take 1 tablet (25 mg) by mouth daily. 10/9/24  Yes Gee Hopson DO   metFORMIN (GLUCOPHAGE XR) 500 MG 24 hr tablet Take 1 tablet (500 mg) by mouth daily (with dinner). 1/30/25  Yes Glendy Benavides NP   multivitamin w/minerals (THERA-VIT-M) tablet Take 1 tablet by mouth daily 3/5/24  Yes Nata Baca MD    Continuous Glucose Sensor (FREESTYLE LAMINE 2 SENSOR) [facility] Inject 1 each subcutaneously every 14 days Use 1 sensor every 14 days. Use to read blood sugars per 's instructions.  Patient not taking: Reported on 10/9/2024 7/11/24   Glendy Benavides, NP   insulin pen needle (32G X 4 MM) 32G X 4 MM miscellaneous Use 4 pen needles daily or as directed. 9/18/24   Glendy Benavides, NP          Danny Brand MD MPH  Non-invasive Cardiologist  [facility]

## 2025-05-31 NOTE — PROGRESS NOTES
"Care Management Follow Up    Length of Stay (days): 3    Expected Discharge Date: 6/2/2025     Concerns to be Addressed: discharge planning, homelessness     Patient plan of care discussed at interdisciplinary rounds: Yes    Anticipated Discharge Disposition: Transitional Care              Anticipated Discharge Services: Other (see comment) ()  Anticipated Discharge DME: ? Boot    Patient/family educated on Medicare website which has current facility and service quality ratings: yes  Education Provided on the Discharge Plan: Yesplan TBD    Referrals Placed by CM/SW:  on [facility] list for TCU  Private pay costs discussed: Not applicable    Discussed  Partnership in Safe Discharge Planning  document with patient/family: No     Handoff Completed: Yes, non-[facility] PCP: External handoff communication completed    Additional Information:  Patient is homeless. Truck with near empty gas tank is in ER lot.He has no money or family support. He requests TCU as he can not walk without getting short of breath/dizzy. He does not feel that he is at his baseline. He is wondering \"if they are going to crack me open\". He says he has been working with [employer] regarding disability and had an appointment in the [place] [facility] to meet with them the day he came to the ER    Next Steps: CM to work on TCU placement. Encourage patient to followup with disability services.    TERESA Taylor     "

## 2025-05-31 NOTE — PROGRESS NOTES
[facility]    Medicine Progress Note - Hospitalist Service    Date of Admission:  5/28/2025    Assessment & Plan   Floyd Capps is a 49 year old male with a past medical history of Previous CVA, CAD, HFrEF who was admitted on 5/28/25 after presenting to [facility] ED for Generalized Weakness Dizziness and SOB      Shortness of Breath with Exertion  HFrEF exacerbation   CAD with multivessel disease  - In the ED - HR 80s, no fever, WBC 13.4  - Troponin mild elevation, BNP 2300.  - CXR in ED showing no signs of pulmonary congestion or pneumonia.  - Last Echo 03/2025 with EF 30-35% with global hypokinesis. Now estimated left ventricular ejection fraction is 40 to 45%.  - Home Meds: losartan 25mg, carvedilol 25mg, bumex 2mg BID  - Cardiology consult appreciated  - Coronary angiogram on 3/2024 reported multivessel CAD - outpatient cardiology considering bypass surgery  - PTA Meds: aspirin 81mg, plavix 75mg, atorvastatin 80mg, ISMN 30mg daily  -PTA losartan changed to Entresto 24-26 mg twice daily  -Imdur 30 mg oral daily  -Atorvastatin 80 mg oral daily  -Continue aspirin 81 mg oral daily  -Continue Plavix 75 mg oral daily  -Monitor daily weights, strict input and output, monitor BMP   -Hold diuresis for creatinine is trending up     Dizziness, Generalized Weakness  History of Strokes   - Previous strokes left lacunar stroke 08/2023, right thalamic 03/2025  - Home meds: Aspirin, Plavix, Atorvastatin  - MRI Brain in ED showing interval change of previous right thalamic stroke with no acute ischemia.  - Continue aspirin, plavix, statin     Right Heel Ulcer  History of Right Foot Osteomyelitis  - Previous surgeries 02/2024, 05/2024 on right foot for osteomyelitis.  - Wound team consult appreciated  - MRI: Negative for evidence of osteomyelitis  - CRP is slightly high but is no worse than previously value: 21.4  -Appreciate podiatry consult.  No plan for procedure.  Recommending [employer] boots      Type 2  Diabetes, poorly controlled   - Last A1c was 12.1 in 03/2025.  - Takes metformin at home.  - Home medications include insulin glargine 35 units nightly, lispro scheduled 20 units with meals + sliding scale.  - Hold home metformin  -  glargine 45 units nightly  - Continue home lispro 20 units scheduled with meals  - Sliding Scale Insulin with Glucose Checks  -  A1c: 11.3      CKD stage III;  Creatinine fairly stable.  Monitor labs closely             Diet: Moderate Consistent Carb (60 g CHO per Meal) Diet  Fluid restriction 2000 ML FLUID    DVT Prophylaxis: Heparin SQ  Cazares Catheter: Not present  Lines: None     Cardiac Monitoring: None  Code Status: Full Code      Clinically Significant Risk Factors                   # Hypertension: Noted on problem list           # DMII: A1C = 11.3 % (Ref range: <5.7 %) within past 6 months, PRESENT ON ADMISSION      # Financial/Environmental Concerns: unable to afford rent/mortgage;unemployed  # Support System: poor social support noted in nursing assessment  # Housing Instability: noted in nursing assessment         Social Drivers of Health    Food Insecurity: High Risk (5/29/2025)    Food Insecurity     Within the past 12 months, did you worry that your food would run out before you got money to buy more?: Yes     Within the past 12 months, did the food you bought just not last and you didn t have money to get more?: No   Depression: At risk (1/30/2025)    PHQ-2     PHQ-2 Score: 6   Housing Stability: High Risk (5/29/2025)    Housing Stability     Do you have housing? : No     Are you worried about losing your housing?: Patient unable to answer   Tobacco Use: Medium Risk (1/30/2025)    Patient History     Smoking Tobacco Use: Former     Smokeless Tobacco Use: Never   Financial Resource Strain: High Risk (5/29/2025)    Financial Resource Strain     Within the past 12 months, have you or your family members you live with been unable to get utilities (heat, electricity) when it  was really needed?: Yes   Transportation Needs: Low Risk  (5/29/2025)    Transportation Needs     Within the past 12 months, has lack of transportation kept you from medical appointments, getting your medicines, non-medical meetings or appointments, work, or from getting things that you need?: No   Recent Concern: Transportation Needs - High Risk (3/23/2025)    Transportation Needs     Within the past 12 months, has lack of transportation kept you from medical appointments, getting your medicines, non-medical meetings or appointments, work, or from getting things that you need?: Yes   Interpersonal Safety: Unknown (5/29/2025)    Interpersonal Safety     Do you feel physically and emotionally safe where you currently live?: Patient declined     Within the past 12 months, have you been hit, slapped, kicked or otherwise physically hurt by someone?: Patient declined     Within the past 12 months, have you been humiliated or emotionally abused in other ways by your partner or ex-partner?: Patient declined          Disposition Plan     Medically Ready for Discharge: Anticipated in 5+ Days             Camila Vargas MD  Hospitalist Service  [facility]  Securely message with [employer] (more info)  Text page via [facility] Paging/Directory   ______________________________________________________________________    Interval History   No distress noted.  He is on room air.  He appears unmotivated to engage in physical activity.  But he stated that he is interested to get surgery done if it is going to make him feel better.  He denies having chest pain currently.  Management plan discussed with the patient and he expressed understanding.    Physical Exam   Vital Signs: Temp: 98.6  F (37  C) Temp src: Oral BP: 111/61 Pulse: 71   Resp: 18 SpO2: (!) 90 % O2 Device: None (Room air)    Weight: 310 lbs 13.58 oz    General Appearance:  No distress noted  Respiratory: Manage daily through bilaterally  basally  Cardiovascular: S1 and S2 heard, no murmur or gallop  GI: Soft abdomen, obese, normoactive bowel sounds  Skin: Intact and warm  Other:  +1 pretibial edema    Medical Decision Making       40 MINUTES SPENT BY ME on the date of service doing chart review, history, exam, documentation & further activities per the note.      Data

## 2025-05-31 NOTE — PROGRESS NOTES
CT Surgery     Patient should recover from current osteomyelitis, uncontrolled diabetes (AIC 12.1), recent stroke should follow up in outpatient clinic with Dr. Boateng in two weeks.     CHAMP Rose CNP

## 2025-06-01 ENCOUNTER — APPOINTMENT (OUTPATIENT)
Dept: PHYSICAL THERAPY | Facility: HOSPITAL | Age: 50
End: 2025-06-01
Attending: STUDENT IN AN ORGANIZED HEALTH CARE EDUCATION/TRAINING PROGRAM
Payer: COMMERCIAL

## 2025-06-01 LAB
ANION GAP SERPL CALCULATED.3IONS-SCNC: 8 MMOL/L (ref 7–15)
BUN SERPL-MCNC: 18.3 MG/DL (ref 6–20)
CALCIUM SERPL-MCNC: 8.7 MG/DL (ref 8.8–10.4)
CHLORIDE SERPL-SCNC: 104 MMOL/L (ref 98–107)
CREAT SERPL-MCNC: 1.38 MG/DL (ref 0.67–1.17)
EGFRCR SERPLBLD CKD-EPI 2021: 62 ML/MIN/1.73M2
GLUCOSE BLDC GLUCOMTR-MCNC: 179 MG/DL (ref 70–99)
GLUCOSE BLDC GLUCOMTR-MCNC: 180 MG/DL (ref 70–99)
GLUCOSE BLDC GLUCOMTR-MCNC: 188 MG/DL (ref 70–99)
GLUCOSE BLDC GLUCOMTR-MCNC: 253 MG/DL (ref 70–99)
GLUCOSE SERPL-MCNC: 184 MG/DL (ref 70–99)
HCO3 SERPL-SCNC: 25 MMOL/L (ref 22–29)
MAGNESIUM SERPL-MCNC: 2.2 MG/DL (ref 1.7–2.3)
MCV RBC AUTO: 93 FL (ref 78–100)
PHOSPHATE SERPL-MCNC: 3.9 MG/DL (ref 2.5–4.5)
PLATELET # BLD AUTO: 220 10E3/UL (ref 150–450)
POTASSIUM SERPL-SCNC: 3.9 MMOL/L (ref 3.4–5.3)
SODIUM SERPL-SCNC: 137 MMOL/L (ref 135–145)

## 2025-06-01 PROCEDURE — 250N000013 HC RX MED GY IP 250 OP 250 PS 637: Performed by: STUDENT IN AN ORGANIZED HEALTH CARE EDUCATION/TRAINING PROGRAM

## 2025-06-01 PROCEDURE — 36415 COLL VENOUS BLD VENIPUNCTURE: CPT | Performed by: STUDENT IN AN ORGANIZED HEALTH CARE EDUCATION/TRAINING PROGRAM

## 2025-06-01 PROCEDURE — 82435 ASSAY OF BLOOD CHLORIDE: CPT | Performed by: STUDENT IN AN ORGANIZED HEALTH CARE EDUCATION/TRAINING PROGRAM

## 2025-06-01 PROCEDURE — 97162 PT EVAL MOD COMPLEX 30 MIN: CPT | Mod: GP

## 2025-06-01 PROCEDURE — 250N000013 HC RX MED GY IP 250 OP 250 PS 637: Performed by: PHYSICIAN ASSISTANT

## 2025-06-01 PROCEDURE — 80048 BASIC METABOLIC PNL TOTAL CA: CPT | Performed by: STUDENT IN AN ORGANIZED HEALTH CARE EDUCATION/TRAINING PROGRAM

## 2025-06-01 PROCEDURE — 84100 ASSAY OF PHOSPHORUS: CPT | Performed by: STUDENT IN AN ORGANIZED HEALTH CARE EDUCATION/TRAINING PROGRAM

## 2025-06-01 PROCEDURE — 99232 SBSQ HOSP IP/OBS MODERATE 35: CPT | Performed by: INTERNAL MEDICINE

## 2025-06-01 PROCEDURE — 85049 AUTOMATED PLATELET COUNT: CPT | Performed by: STUDENT IN AN ORGANIZED HEALTH CARE EDUCATION/TRAINING PROGRAM

## 2025-06-01 PROCEDURE — 120N000001 HC R&B MED SURG/OB

## 2025-06-01 PROCEDURE — 83735 ASSAY OF MAGNESIUM: CPT | Performed by: STUDENT IN AN ORGANIZED HEALTH CARE EDUCATION/TRAINING PROGRAM

## 2025-06-01 PROCEDURE — 97530 THERAPEUTIC ACTIVITIES: CPT | Mod: GP

## 2025-06-01 PROCEDURE — 250N000011 HC RX IP 250 OP 636: Performed by: STUDENT IN AN ORGANIZED HEALTH CARE EDUCATION/TRAINING PROGRAM

## 2025-06-01 PROCEDURE — 99232 SBSQ HOSP IP/OBS MODERATE 35: CPT | Performed by: STUDENT IN AN ORGANIZED HEALTH CARE EDUCATION/TRAINING PROGRAM

## 2025-06-01 RX ADMIN — HEPARIN SODIUM 5000 UNITS: 5000 INJECTION, SOLUTION INTRAVENOUS; SUBCUTANEOUS at 10:14

## 2025-06-01 RX ADMIN — HEPARIN SODIUM 5000 UNITS: 5000 INJECTION, SOLUTION INTRAVENOUS; SUBCUTANEOUS at 18:35

## 2025-06-01 RX ADMIN — CARVEDILOL 25 MG: 12.5 TABLET, FILM COATED ORAL at 10:11

## 2025-06-01 RX ADMIN — CYANOCOBALAMIN TAB 1000 MCG 1000 MCG: 1000 TAB at 10:11

## 2025-06-01 RX ADMIN — INSULIN ASPART 2 UNITS: 100 INJECTION, SOLUTION INTRAVENOUS; SUBCUTANEOUS at 18:32

## 2025-06-01 RX ADMIN — CARVEDILOL 25 MG: 12.5 TABLET, FILM COATED ORAL at 18:37

## 2025-06-01 RX ADMIN — CLOPIDOGREL BISULFATE 75 MG: 75 TABLET ORAL at 20:25

## 2025-06-01 RX ADMIN — ISOSORBIDE MONONITRATE 30 MG: 30 TABLET, EXTENDED RELEASE ORAL at 20:25

## 2025-06-01 RX ADMIN — INSULIN ASPART 2 UNITS: 100 INJECTION, SOLUTION INTRAVENOUS; SUBCUTANEOUS at 10:13

## 2025-06-01 RX ADMIN — HEPARIN SODIUM 5000 UNITS: 5000 INJECTION, SOLUTION INTRAVENOUS; SUBCUTANEOUS at 00:54

## 2025-06-01 RX ADMIN — ATORVASTATIN CALCIUM 80 MG: 40 TABLET, FILM COATED ORAL at 20:25

## 2025-06-01 RX ADMIN — ASPIRIN 81 MG: 81 TABLET, COATED ORAL at 10:11

## 2025-06-01 RX ADMIN — Medication 1 TABLET: at 10:11

## 2025-06-01 RX ADMIN — SACUBITRIL AND VALSARTAN 1 TABLET: 24; 26 TABLET, FILM COATED ORAL at 10:12

## 2025-06-01 RX ADMIN — Medication 5 MG: at 22:38

## 2025-06-01 RX ADMIN — TRAZODONE HYDROCHLORIDE 50 MG: 50 TABLET ORAL at 22:38

## 2025-06-01 RX ADMIN — SACUBITRIL AND VALSARTAN 1 TABLET: 24; 26 TABLET, FILM COATED ORAL at 20:25

## 2025-06-01 NOTE — PLAN OF CARE
Problem: Adult Inpatient Plan of Care  Goal: Optimal Comfort and Wellbeing  5/31/2025 1900 by Ashanti Jean Baptiste RN  Outcome: Progressing  5/31/2025 1858 by Ashanti Jean Baptiste RN  Outcome: Progressing     Problem: Skin Injury Risk Increased  Goal: Skin Health and Integrity  Outcome: Progressing  Intervention: Plan: Nurse Driven Intervention: Moisture Management  Recent Flowsheet Documentation  Taken 5/31/2025 1700 by Ashanti Jean Baptiste RN  Moisture Interventions: Encourage regular toileting  Taken 5/31/2025 1015 by Ashanti Jean Baptiste RN  Moisture Interventions: Encourage regular toileting  Intervention: Plan: Nurse Driven Intervention: Friction and Shear  Recent Flowsheet Documentation  Taken 5/31/2025 1700 by Ashanti Jean Baptiste RN  Friction/Shear Interventions: HOB 30 degrees or less  Taken 5/31/2025 1015 by Ashanti Jean Baptiste RN  Friction/Shear Interventions: HOB 30 degrees or less  Intervention: Optimize Skin Protection  Recent Flowsheet Documentation  Taken 5/31/2025 1700 by Ashanti Jean Baptiste RN  Activity Management: activity adjusted per tolerance  Taken 5/31/2025 1015 by Ashanti Jean Baptiste RN  Activity Management: activity adjusted per tolerance     Problem: Gas Exchange Impaired  Goal: Optimal Gas Exchange  Outcome: Progressing     Problem: Pain Acute  Goal: Optimal Pain Control and Function  Outcome: Progressing  Intervention: Prevent or Manage Pain  Recent Flowsheet Documentation  Taken 5/31/2025 1700 by Ashanti Jean Baptiste RN  Medication Review/Management: medications reviewed  Taken 5/31/2025 1015 by Ashanti Jean Baptiste RN  Medication Review/Management: medications reviewed     Problem: Comorbidity Management  Goal: Blood Glucose Levels Within Targeted Range  5/31/2025 1900 by Ashanti Jean Baptiste RN  Outcome: Progressing  5/31/2025 1858 by Ashanti Jean Baptiste RN  Outcome: Progressing  Intervention: Monitor and Manage Glycemia  Recent Flowsheet Documentation  Taken 5/31/2025 1700 by Ashanti Jean Baptiste RN  Medication Review/Management: medications  reviewed  Taken 5/31/2025 1015 by Ashanti Jean Baptiste, RN  Medication Review/Management: medications reviewed   Goal Outcome Evaluation:       No acute events. Denied pain. Activity encouraged. Patient just stayed in bed.

## 2025-06-01 NOTE — PROGRESS NOTES
[facility]    Medicine Progress Note - Hospitalist Service    Date of Admission:  5/28/2025    Assessment & Plan   Floyd Capps is a 49 year old male with a past medical history of Previous CVA, CAD, HFrEF who was admitted on 5/28/25 after presenting to [facility] ED for Generalized Weakness Dizziness and SOB      Shortness of Breath with Exertion  HFrEF exacerbation   CAD with multivessel disease  - CXR in ED showing no signs of pulmonary congestion or pneumonia.  - Last Echo 03/2025 with EF 30-35% with global hypokinesis. Now estimated left ventricular ejection fraction is 40 to 45%.  - Home Meds: losartan 25mg, carvedilol 25mg, bumex 2mg BID  - Cardiology consult appreciated  - Coronary angiogram on 3/2024 reported multivessel CAD - outpatient cardiology considering bypass surgery  - PTA Meds: aspirin 81mg, plavix 75mg, atorvastatin 80mg, ISMN 30mg daily  -Entresto 24-26 mg twice daily  -Imdur 30 mg oral daily  -Atorvastatin 80 mg oral daily  -Continue aspirin 81 mg oral daily  -Continue Plavix 75 mg oral daily  -Monitor daily weights, strict input and output, monitor BMP   -Hold diuresis for creatinine is trending up     Dizziness, Generalized Weakness  History of Strokes   - Previous strokes left lacunar stroke 08/2023, right thalamic 03/2025  - Home meds: Aspirin, Plavix, Atorvastatin  - MRI Brain in ED showing interval change of previous right thalamic stroke with no acute ischemia.  - Continue aspirin, plavix, statin  - PT/OT:  recommending TCU     Right Heel Ulcer  History of Right Foot Osteomyelitis  - Previous surgeries 02/2024, 05/2024 on right foot for osteomyelitis.  - Wound team consult appreciated  - MRI: Negative for evidence of osteomyelitis  - CRP is slightly high but is no worse than previously value: 21.4  -Appreciate podiatry consult.  No plan for procedure.  Recommending Prafo boots      Type 2 Diabetes, poorly controlled   - Last A1c was 12.1 in 03/2025.  - Takes metformin  at home.  - Home medications include insulin glargine 35 units nightly, lispro scheduled 20 units with meals + sliding scale.  - Hold home metformin  -  glargine 45 units nightly  - Continue home lispro 20 units scheduled with meals  - Sliding Scale Insulin with Glucose Checks  -  A1c: 11.3      CKD stage III;  Creatinine fairly stable.  Monitor labs closely             Diet: Moderate Consistent Carb (60 g CHO per Meal) Diet  Fluid restriction 2000 ML FLUID    DVT Prophylaxis: Heparin SQ  Cazares Catheter: Not present  Lines: None     Cardiac Monitoring: None  Code Status: Full Code      Clinically Significant Risk Factors                   # Hypertension: Noted on problem list           # DMII: A1C = 11.3 % (Ref range: <5.7 %) within past 6 months, PRESENT ON ADMISSION      # Financial/Environmental Concerns: unable to afford rent/mortgage;unemployed  # Support System: poor social support noted in nursing assessment  # Housing Instability: noted in nursing assessment         Social Drivers of Health    Food Insecurity: High Risk (5/29/2025)    Food Insecurity     Within the past 12 months, did you worry that your food would run out before you got money to buy more?: Yes     Within the past 12 months, did the food you bought just not last and you didn t have money to get more?: No   Depression: At risk (1/30/2025)    PHQ-2     PHQ-2 Score: 6   Housing Stability: High Risk (5/29/2025)    Housing Stability     Do you have housing? : No     Are you worried about losing your housing?: Patient unable to answer   Tobacco Use: Medium Risk (1/30/2025)    Patient History     Smoking Tobacco Use: Former     Smokeless Tobacco Use: Never   Financial Resource Strain: High Risk (5/29/2025)    Financial Resource Strain     Within the past 12 months, have you or your family members you live with been unable to get utilities (heat, electricity) when it was really needed?: Yes   Transportation Needs: Low Risk  (5/29/2025)     Transportation Needs     Within the past 12 months, has lack of transportation kept you from medical appointments, getting your medicines, non-medical meetings or appointments, work, or from getting things that you need?: No   Recent Concern: Transportation Needs - High Risk (3/23/2025)    Transportation Needs     Within the past 12 months, has lack of transportation kept you from medical appointments, getting your medicines, non-medical meetings or appointments, work, or from getting things that you need?: Yes   Interpersonal Safety: Unknown (5/29/2025)    Interpersonal Safety     Do you feel physically and emotionally safe where you currently live?: Patient declined     Within the past 12 months, have you been hit, slapped, kicked or otherwise physically hurt by someone?: Patient declined     Within the past 12 months, have you been humiliated or emotionally abused in other ways by your partner or ex-partner?: Patient declined          Disposition Plan     Medically Ready for Discharge: Ready Now             Camila Vargas MD  Hospitalist Service  [facility]  Securely message with [employer] (more info)  Text page via [employer] Paging/Directory   ______________________________________________________________________    Interval History   No distress noted.  He reports that he does not get good night sleep but it is always hard to wake him up in the morning.  Patient appears to be having less motivation to engage in physical therapy.  Unclear if he is having depression.  Management plan discussed with the patient and he expressed understanding.    Physical Exam   Vital Signs: Temp: 98  F (36.7  C) Temp src: Oral BP: 91/51 Pulse: 68   Resp: 20 SpO2: 92 % O2 Device: None (Room air)    Weight: 308 lbs 6.78 oz    General Appearance:  No distress noted  Respiratory: Manage daily through bilaterally basally  Cardiovascular: S1 and S2 heard, no murmur or gallop  GI: Soft abdomen, obese,  normoactive bowel sounds  Skin: Intact and warm  Other:  +1 pretibial edema       Medical Decision Making       40 MINUTES SPENT BY ME on the date of service doing chart review, history, exam, documentation & further activities per the note.      Data

## 2025-06-01 NOTE — PROGRESS NOTES
"   06/01/25 1500   Appointment Info   Signing Clinician's Name / Credentials (PT) Claudia Laird, PT, DPT   Living Environment   People in Home alone   Current Living Arrangements homeless   Transportation Anticipated car, drives self   Living Environment Comments Patient has been living in his truck.   Self-Care   Usual Activity Tolerance moderate   Current Activity Tolerance fair   Regular Exercise No   Equipment Currently Used at Home none   Fall history within last six months no   Activity/Exercise/Self-Care Comment Patient is IND with functional mobility at baseline.   General Information   Onset of Illness/Injury or Date of Surgery 05/28/25   Referring Physician Camila Vargas MD   Patient/Family Therapy Goals Statement (PT) Go to TCU   Pertinent History of Current Problem (include personal factors and/or comorbidities that impact the POC) Per chart, \"Floyd Capps is a 49 year old male with a past medical history of Previous CVA, CAD, HFrEF who was admitted on 5/28/25 after presenting to [facility] ED for Generalized Weakness Dizziness and SOB.\"   Existing Precautions/Restrictions fall;weight bearing   Weight-Bearing Status - RLE other (see comments)  (Per podiatry, \"I am recommending a PRAFO boot to be worn at all times to completely offload the right heel.  Recommend non weight bearing on the right foot with use of either a knee walker or quad walker with toe touch only.\")   Range of Motion (ROM)   Range of Motion ROM is WFL   Strength (Manual Muscle Testing)   Strength (Manual Muscle Testing) Deficits observed during functional mobility   Bed Mobility   Bed Mobility no deficits identified   Transfers   Transfers sit-stand transfer   Maintains Weight-bearing Status (Transfers) verbal cues to maintain   Transfer Safety Concerns Noted inability to maintain weight-bearing restrictions w/o assist   Impairments Contributing to Impaired Transfers impaired balance   Sit-Stand Transfer   Sit-Stand [place] " (Transfers) supervision;verbal cues   Assistive Device (Sit-Stand Transfers) other (see comments)  (None)   Gait/Stairs (Locomotion)   [place] Level (Gait) supervision;verbal cues   Assistive Device (Gait) other (see comments)  (None)   Distance in Feet (Gait) 10'   Pattern (Gait) step-through   Deviations/Abnormal Patterns (Gait) gait speed decreased;stride length decreased;weight shifting decreased   Clinical Impression   Criteria for Skilled Therapeutic Intervention Yes, treatment indicated   PT Diagnosis (PT) Impaired functional mobility   Influenced by the following impairments Weightbearing restrictions, impaired balance, impaired activity tolerance   Functional limitations due to impairments Transfers, gait   Clinical Presentation (PT Evaluation Complexity) evolving   Clinical Presentation Rationale Patient presents as medically diagnosed.   Clinical Decision Making (Complexity) moderate complexity   Planned Therapy Interventions (PT) balance training;gait training;home exercise program;postural re-education;transfer training;progressive activity/exercise;home program guidelines   Risk & Benefits of therapy have been explained evaluation/treatment results reviewed;care plan/treatment goals reviewed;patient   PT Total Evaluation Time   PT Eval, Moderate Complexity Minutes (48053) 15   Physical Therapy Goals   PT Frequency 4x/week   PT Predicted Duration/Target Date for Goal Attainment 06/08/25   PT Goals Transfers;Gait   PT: Transfers Modified independent;Sit to/from stand;Assistive device;Within precautions  (LRAD)   PT: Gait Modified independent;Assistive device;Within precautions;Greater than 200 feet  (LRAD)   Interventions   Interventions Quick Adds Therapeutic Activity   Therapeutic Activity   Therapeutic Activities: dynamic activities to improve functional performance Minutes (85769) 10   Symptoms Noted During/After Treatment Fatigue;Shortness of breath   Treatment Detail/Skilled Intervention  Educated patient on new weightbearing restrictions for RLE. Patient somewhat receptive to education but demonstrated poor follow through with functional mobility in room. Therapist offered cane or FWW to assist with maintaining toe touch RLE, but patient declined and instead reached for furniture, therapist's hand, and walls. After long discussion, patient agreed to trial AD next session. Patient agreed to sit up in recliner at end of session.   PT Discharge Planning   PT Plan Bring AD to trial for toe touch RLE, encourage ambulation outside of room   PT Discharge Recommendation (DC Rec) Transitional Care Facility   PT Rationale for DC Rec Patient lives alone in his truck and needs to be able to ambulate functional distances, currently limited by shortness of breath and weightbearing restrictions.   PT Brief overview of current status Patient needs reinforcement to follow through with toe touch RLE.   PT Total Distance Amb During Session (feet) 20   Physical Therapy Time and Intention   Timed Code Treatment Minutes 10   Total Session Time (sum of timed and untimed services) 25

## 2025-06-01 NOTE — PLAN OF CARE
Problem: Gas Exchange Impaired  Goal: Optimal Gas Exchange  Outcome: Progressing  Intervention: Optimize Oxygenation and Ventilation  Recent Flowsheet Documentation  Taken 6/1/2025 0052 by Aiden Egan, RN  Head of Bed (HOB) Positioning: HOB at 20-30 degrees  Taken 5/31/2025 2051 by Aiden Egan, RN  Head of Bed (HOB) Positioning: HOB at 20-30 degrees     Problem: Comorbidity Management  Goal: Blood Glucose Levels Within Targeted Range  Outcome: Progressing  Intervention: Monitor and Manage Glycemia  Recent Flowsheet Documentation  Taken 6/1/2025 0052 by Aiden Egan, RN  Medication Review/Management: medications reviewed  Taken 5/31/2025 2051 by Aiden Egan, RN  Medication Review/Management: medications reviewed   Goal Outcome Evaluation:       Pt alert and oriented x4. Pt denies pain. No dizziness reported. HS BG was 145, 2 am BG refused. Voiding using urinal at bedside. On room air sating at low 90's. No complaints of sob or trouble breathing. VSS. No acute changes overnight.

## 2025-06-01 NOTE — PLAN OF CARE
Problem: Adult Inpatient Plan of Care  Goal: Optimal Comfort and Wellbeing  Outcome: Progressing     Problem: Skin Injury Risk Increased  Goal: Skin Health and Integrity  Outcome: Progressing  Intervention: Plan: Nurse Driven Intervention: Moisture Management  Recent Flowsheet Documentation  Taken 6/1/2025 1016 by Ashanti Jean Baptiste RN  Moisture Interventions: Encourage regular toileting  Intervention: Plan: Nurse Driven Intervention: Friction and Shear  Recent Flowsheet Documentation  Taken 6/1/2025 1016 by Ashanti Jean Baptiste RN  Friction/Shear Interventions: HOB 30 degrees or less  Intervention: Optimize Skin Protection  Recent Flowsheet Documentation  Taken 6/1/2025 1016 by Ashanti Jean Baptiste RN  Activity Management: activity adjusted per tolerance     Problem: Gas Exchange Impaired  Goal: Optimal Gas Exchange  Outcome: Progressing     Problem: Pain Acute  Goal: Optimal Pain Control and Function  Outcome: Progressing  Intervention: Prevent or Manage Pain  Recent Flowsheet Documentation  Taken 6/1/2025 1016 by Ashanti Jean Baptiste RN  Medication Review/Management: medications reviewed     Problem: Comorbidity Management  Goal: Blood Glucose Levels Within Targeted Range  Outcome: Progressing  Intervention: Monitor and Manage Glycemia  Recent Flowsheet Documentation  Taken 6/1/2025 1016 by Ashanti Jean Baptiste RN  Medication Review/Management: medications reviewed   Goal Outcome Evaluation:       Patient alert and oriented x 4. Denied pain. Patient  sleeping and does not want to be bothered in the morning; thus, morning medications given late. Patient up with assist of 1. Denied shortness of breath. K, Mg, and phos recheck in the morning.

## 2025-06-02 ENCOUNTER — DOCUMENTATION ONLY (OUTPATIENT)
Dept: CARDIOLOGY | Facility: CLINIC | Age: 50
End: 2025-06-02
Payer: COMMERCIAL

## 2025-06-02 LAB
ANION GAP SERPL CALCULATED.3IONS-SCNC: 8 MMOL/L (ref 7–15)
BUN SERPL-MCNC: 19.6 MG/DL (ref 6–20)
CALCIUM SERPL-MCNC: 8.8 MG/DL (ref 8.8–10.4)
CHLORIDE SERPL-SCNC: 105 MMOL/L (ref 98–107)
CREAT SERPL-MCNC: 1.46 MG/DL (ref 0.67–1.17)
EGFRCR SERPLBLD CKD-EPI 2021: 58 ML/MIN/1.73M2
GLUCOSE BLDC GLUCOMTR-MCNC: 128 MG/DL (ref 70–99)
GLUCOSE BLDC GLUCOMTR-MCNC: 181 MG/DL (ref 70–99)
GLUCOSE BLDC GLUCOMTR-MCNC: 187 MG/DL (ref 70–99)
GLUCOSE BLDC GLUCOMTR-MCNC: 208 MG/DL (ref 70–99)
GLUCOSE SERPL-MCNC: 188 MG/DL (ref 70–99)
HCO3 SERPL-SCNC: 24 MMOL/L (ref 22–29)
HOLD SPECIMEN: NORMAL
MAGNESIUM SERPL-MCNC: 2.1 MG/DL (ref 1.7–2.3)
PHOSPHATE SERPL-MCNC: 3.4 MG/DL (ref 2.5–4.5)
POTASSIUM SERPL-SCNC: 4.4 MMOL/L (ref 3.4–5.3)
SODIUM SERPL-SCNC: 137 MMOL/L (ref 135–145)

## 2025-06-02 PROCEDURE — 86901 BLOOD TYPING SEROLOGIC RH(D): CPT

## 2025-06-02 PROCEDURE — 84100 ASSAY OF PHOSPHORUS: CPT | Performed by: STUDENT IN AN ORGANIZED HEALTH CARE EDUCATION/TRAINING PROGRAM

## 2025-06-02 PROCEDURE — 86850 RBC ANTIBODY SCREEN: CPT

## 2025-06-02 PROCEDURE — 250N000013 HC RX MED GY IP 250 OP 250 PS 637: Performed by: STUDENT IN AN ORGANIZED HEALTH CARE EDUCATION/TRAINING PROGRAM

## 2025-06-02 PROCEDURE — 250N000013 HC RX MED GY IP 250 OP 250 PS 637: Performed by: PHYSICIAN ASSISTANT

## 2025-06-02 PROCEDURE — 99232 SBSQ HOSP IP/OBS MODERATE 35: CPT

## 2025-06-02 PROCEDURE — 36415 COLL VENOUS BLD VENIPUNCTURE: CPT | Performed by: STUDENT IN AN ORGANIZED HEALTH CARE EDUCATION/TRAINING PROGRAM

## 2025-06-02 PROCEDURE — 120N000001 HC R&B MED SURG/OB

## 2025-06-02 PROCEDURE — L4396 STATIC OR DYNAMI AFO PRE CST: HCPCS

## 2025-06-02 PROCEDURE — 99232 SBSQ HOSP IP/OBS MODERATE 35: CPT | Performed by: STUDENT IN AN ORGANIZED HEALTH CARE EDUCATION/TRAINING PROGRAM

## 2025-06-02 PROCEDURE — 250N000011 HC RX IP 250 OP 636: Performed by: STUDENT IN AN ORGANIZED HEALTH CARE EDUCATION/TRAINING PROGRAM

## 2025-06-02 PROCEDURE — 80048 BASIC METABOLIC PNL TOTAL CA: CPT | Performed by: STUDENT IN AN ORGANIZED HEALTH CARE EDUCATION/TRAINING PROGRAM

## 2025-06-02 PROCEDURE — 83735 ASSAY OF MAGNESIUM: CPT | Performed by: STUDENT IN AN ORGANIZED HEALTH CARE EDUCATION/TRAINING PROGRAM

## 2025-06-02 RX ORDER — DIAZEPAM 10 MG/1
10 TABLET ORAL
Status: DISCONTINUED | OUTPATIENT
Start: 2025-06-02 | End: 2025-06-05 | Stop reason: HOSPADM

## 2025-06-02 RX ORDER — NICOTINE POLACRILEX 4 MG
15-30 LOZENGE BUCCAL
Status: DISCONTINUED | OUTPATIENT
Start: 2025-06-02 | End: 2025-06-05 | Stop reason: HOSPADM

## 2025-06-02 RX ORDER — DEXTROSE MONOHYDRATE 25 G/50ML
25-50 INJECTION, SOLUTION INTRAVENOUS
Status: DISCONTINUED | OUTPATIENT
Start: 2025-06-02 | End: 2025-06-05 | Stop reason: HOSPADM

## 2025-06-02 RX ADMIN — Medication 1 TABLET: at 09:44

## 2025-06-02 RX ADMIN — HEPARIN SODIUM 5000 UNITS: 5000 INJECTION, SOLUTION INTRAVENOUS; SUBCUTANEOUS at 09:46

## 2025-06-02 RX ADMIN — Medication 5 MG: at 21:46

## 2025-06-02 RX ADMIN — ASPIRIN 81 MG: 81 TABLET, COATED ORAL at 09:43

## 2025-06-02 RX ADMIN — CLOPIDOGREL BISULFATE 75 MG: 75 TABLET ORAL at 20:03

## 2025-06-02 RX ADMIN — INSULIN ASPART 2 UNITS: 100 INJECTION, SOLUTION INTRAVENOUS; SUBCUTANEOUS at 18:31

## 2025-06-02 RX ADMIN — ISOSORBIDE MONONITRATE 30 MG: 30 TABLET, EXTENDED RELEASE ORAL at 20:03

## 2025-06-02 RX ADMIN — CARVEDILOL 25 MG: 12.5 TABLET, FILM COATED ORAL at 18:38

## 2025-06-02 RX ADMIN — HEPARIN SODIUM 5000 UNITS: 5000 INJECTION, SOLUTION INTRAVENOUS; SUBCUTANEOUS at 18:36

## 2025-06-02 RX ADMIN — ATORVASTATIN CALCIUM 80 MG: 40 TABLET, FILM COATED ORAL at 20:03

## 2025-06-02 RX ADMIN — SACUBITRIL AND VALSARTAN 1 TABLET: 24; 26 TABLET, FILM COATED ORAL at 09:43

## 2025-06-02 RX ADMIN — TRAZODONE HYDROCHLORIDE 50 MG: 50 TABLET ORAL at 21:46

## 2025-06-02 RX ADMIN — INSULIN GLARGINE 50 UNITS: 100 INJECTION, SOLUTION SUBCUTANEOUS at 21:46

## 2025-06-02 RX ADMIN — INSULIN ASPART 2 UNITS: 100 INJECTION, SOLUTION INTRAVENOUS; SUBCUTANEOUS at 12:46

## 2025-06-02 RX ADMIN — INSULIN ASPART 3 UNITS: 100 INJECTION, SOLUTION INTRAVENOUS; SUBCUTANEOUS at 09:41

## 2025-06-02 RX ADMIN — CYANOCOBALAMIN TAB 1000 MCG 1000 MCG: 1000 TAB at 09:42

## 2025-06-02 RX ADMIN — SACUBITRIL AND VALSARTAN 1 TABLET: 24; 26 TABLET, FILM COATED ORAL at 20:03

## 2025-06-02 RX ADMIN — HEPARIN SODIUM 5000 UNITS: 5000 INJECTION, SOLUTION INTRAVENOUS; SUBCUTANEOUS at 01:08

## 2025-06-02 NOTE — PROGRESS NOTES
"Care Management Follow Up    Length of Stay (days): 5    Expected Discharge Date: 06/04/2025    Anticipated Discharge Plan:  Transitional Care    Transportation: Anticipate medical transport    PT Recommendations: Transitional Care Facility  OT Recommendations:  Transitional Care Facility     Barriers to Discharge: medical stability    Prior Living Situation: homeless, other (see comments) (\"I live out of my truck. I was evicted on May 21\".) with alone    Discussed  Partnership in Safe Discharge Planning  document with patient/family: No     Handoff Completed: No, handoff not indicated or clinically appropriate    Patient/Spokesperson Updated: No    Additional Information:  9:23 AM  SW sent a message to [facility] admissions liaison, to follow up on pending referral for transitional care.     10:44 AM  Patient accepted at [facility]. Updated MD. Patient is not medically ready for discharge at this time.     Next steps: follow up with [facility] admissions, when patient is cleared for discharge.       TERESA Escalante on 06/02/25   "

## 2025-06-02 NOTE — PROGRESS NOTES
Jeannine Howe APRN CNP O'Brien, Jessica, RN    Floyd Capps, MR [id number]. He is sick, we have seen in a year ago and he said no to surgery. He has horrible targets. Had stroke, healing from osteo. Dr. Boateng says we can have him return to clinic in 2 weeks.

## 2025-06-02 NOTE — PROGRESS NOTES
[facility]    Medicine Progress Note - Hospitalist Service    Date of Admission:  5/28/2025    Assessment & Plan   Floyd Capps is a 49 year old male with a past medical history of Previous CVA, CAD, HFrEF who was admitted on 5/28/25 after presenting to [facility] ED for Generalized Weakness Dizziness and SOB.  Patient still having significant dyspnea on minimal exertion.  CTS team to decide when and if they are considering CABG.       Dizziness, Generalized Weakness  History of Strokes   - Previous strokes left lacunar stroke 08/2023, right thalamic 03/2025  - Home meds: Aspirin, Plavix, Atorvastatin  - MRI Brain in ED showing interval change of previous right thalamic stroke with no acute ischemia.  - Continue aspirin, plavix, statin  - PT/OT:  recommending TCU    HFrEF exacerbation   CAD with multivessel disease  - CXR in ED showing no signs of pulmonary congestion or pneumonia.  - Last Echo 03/2025 with EF 30-35% with global hypokinesis. Now estimated left ventricular ejection fraction is 40 to 45%.  - Home Meds: losartan 25mg, carvedilol 25mg, bumex 2mg BID  - Cardiology consult appreciated  - Coronary angiogram on 3/2024 reported multivessel CAD - outpatient cardiology considering bypass surgery  -Entresto 24-26 mg twice daily  -Imdur 30 mg oral daily  -Atorvastatin 80 mg oral daily  -Continue aspirin 81 mg oral daily  -Continue Plavix 75 mg oral daily  -Monitor daily weights, strict input and output, monitor BMP   -Hold diuresis for creatinine is trending up  -Patient might need updated coronary angiogram prior to CTS evaluation.     Right Heel Ulcer  History of Right Foot Osteomyelitis  - Previous surgeries 02/2024, 05/2024 on right foot for osteomyelitis.  - Wound team consult appreciated  - MRI: Negative for evidence of osteomyelitis  - CRP is slightly high but is no worse than previously value: 21.4  -Appreciate podiatry consult.  No plan for procedure.  Recommending Prafo boots       Type 2 Diabetes, poorly controlled   - Last A1c was 12.1 in 03/2025.  - Takes metformin at home.  - Home medications include insulin glargine 35 units nightly, lispro scheduled 20 units with meals + sliding scale.  - Hold home metformin  -  glargine 50 units nightly  - Continue home lispro 20 units scheduled with meals  - Sliding Scale Insulin with Glucose Checks  -  A1c: 11.3      CKD stage III;  Diuretics on hold due to uptrending creatinine           Diet: Moderate Consistent Carb (60 g CHO per Meal) Diet  Fluid restriction 2000 ML FLUID    DVT Prophylaxis: Heparin SQ  Cazares Catheter: Not present  Lines: None     Cardiac Monitoring: None  Code Status: Full Code      Clinically Significant Risk Factors                   # Hypertension: Noted on problem list           # DMII: A1C = 11.3 % (Ref range: <5.7 %) within past 6 months       # Financial/Environmental Concerns: unable to afford rent/mortgage;unemployed  # Support System: poor social support noted in nursing assessment  # Housing Instability: noted in nursing assessment         Social Drivers of Health    Food Insecurity: High Risk (5/29/2025)    Food Insecurity     Within the past 12 months, did you worry that your food would run out before you got money to buy more?: Yes     Within the past 12 months, did the food you bought just not last and you didn t have money to get more?: No   Depression: At risk (1/30/2025)    PHQ-2     PHQ-2 Score: 6   Housing Stability: High Risk (5/29/2025)    Housing Stability     Do you have housing? : No     Are you worried about losing your housing?: Patient unable to answer   Tobacco Use: Medium Risk (1/30/2025)    Patient History     Smoking Tobacco Use: Former     Smokeless Tobacco Use: Never   Financial Resource Strain: High Risk (5/29/2025)    Financial Resource Strain     Within the past 12 months, have you or your family members you live with been unable to get utilities (heat, electricity) when it was really  needed?: Yes   Transportation Needs: Low Risk  (5/29/2025)    Transportation Needs     Within the past 12 months, has lack of transportation kept you from medical appointments, getting your medicines, non-medical meetings or appointments, work, or from getting things that you need?: No   Recent Concern: Transportation Needs - High Risk (3/23/2025)    Transportation Needs     Within the past 12 months, has lack of transportation kept you from medical appointments, getting your medicines, non-medical meetings or appointments, work, or from getting things that you need?: Yes   Interpersonal Safety: Unknown (5/29/2025)    Interpersonal Safety     Do you feel physically and emotionally safe where you currently live?: Patient declined     Within the past 12 months, have you been hit, slapped, kicked or otherwise physically hurt by someone?: Patient declined     Within the past 12 months, have you been humiliated or emotionally abused in other ways by your partner or ex-partner?: Patient declined          Disposition Plan     Medically Ready for Discharge: Anticipated Tomorrow             Camila Vargas MD  Hospitalist Service  [facility]  Securely message with [employer] (more info)  Text page via [facility] Paging/Directory   ______________________________________________________________________    Interval History   No distress noted.  Patient still reports having significant shortness of breath with minimal exertion.  He denies having chest pain.  Also exhibits minimal motivation to engage in activities.  Management plan discussed with the patient and he expressed understanding.    Physical Exam   Vital Signs: Temp: 97.6  F (36.4  C) Temp src: Oral BP: 112/59 Pulse: 72   Resp: 20 SpO2: 92 % O2 Device: None (Room air)    Weight: 308 lbs 6.78 oz    Respiratory: Manage daily through bilaterally basally  Cardiovascular: S1 and S2 heard, no murmur or gallop  GI: Soft abdomen, obese, normoactive bowel  sounds  Skin: Intact and warm  Other:  +1 pretibial edema       Medical Decision Making       40 MINUTES SPENT BY ME on the date of service doing chart review, history, exam, documentation & further activities per the note.      Data

## 2025-06-02 NOTE — PLAN OF CARE
Problem: Adult Inpatient Plan of Care  Goal: Optimal Comfort and Wellbeing  6/1/2025 1935 by Ashanti Jean Baptiste RN  Outcome: Progressing  6/1/2025 1455 by Ashanti Jean Baptiste RN  Outcome: Progressing     Problem: Skin Injury Risk Increased  Goal: Skin Health and Integrity  Outcome: Progressing  Intervention: Plan: Nurse Driven Intervention: Moisture Management  Recent Flowsheet Documentation  Taken 6/1/2025 1812 by Ashanti Jean Baptiste RN  Moisture Interventions: Encourage regular toileting  Taken 6/1/2025 1016 by Ashanti Jean Baptiste RN  Moisture Interventions: Encourage regular toileting  Intervention: Plan: Nurse Driven Intervention: Friction and Shear  Recent Flowsheet Documentation  Taken 6/1/2025 1812 by Ashanti Jean Baptiste RN  Friction/Shear Interventions: HOB 30 degrees or less  Taken 6/1/2025 1016 by Ashanti Jaen Baptiste RN  Friction/Shear Interventions: HOB 30 degrees or less  Intervention: Optimize Skin Protection  Recent Flowsheet Documentation  Taken 6/1/2025 1812 by Ashanti Jean Baptiste RN  Pressure Reduction Techniques: heels elevated off bed  Activity Management: activity adjusted per tolerance  Taken 6/1/2025 1528 by Ashanti Jean Baptiste RN  Activity Management: back to bed  Taken 6/1/2025 1354 by Ashanti Jean Baptiste RN  Activity Management: up in chair  Taken 6/1/2025 1222 by Ashanti Jean Baptiste RN  Activity Management: (with PT) up in chair  Taken 6/1/2025 1016 by Ashanti Jean Baptiste RN  Activity Management: activity adjusted per tolerance     Problem: Gas Exchange Impaired  Goal: Optimal Gas Exchange  6/1/2025 1935 by Ashanti Jean Baptiste RN  Outcome: Progressing  6/1/2025 1455 by Ashanti Jean Baptiste RN  Outcome: Progressing     Problem: Pain Acute  Goal: Optimal Pain Control and Function  6/1/2025 1935 by Ashanti Jean Baptiste RN  Outcome: Progressing  6/1/2025 1455 by Ashanti Jean Baptiste RN  Outcome: Progressing  Intervention: Prevent or Manage Pain  Recent Flowsheet Documentation  Taken 6/1/2025 1812 by Ashanti Jean Baptiste RN  Medication Review/Management: medications  reviewed  Taken 6/1/2025 1016 by Ashanti Jean Baptiste RN  Medication Review/Management: medications reviewed     Problem: Comorbidity Management  Goal: Blood Glucose Levels Within Targeted Range  6/1/2025 1935 by Ashanti Jean Baptiste RN  Outcome: Progressing  6/1/2025 1455 by Ashanti Jean Baptiste, RN  Outcome: Progressing  Intervention: Monitor and Manage Glycemia  Recent Flowsheet Documentation  Taken 6/1/2025 1812 by Ashanti Jean Baptiste RN  Medication Review/Management: medications reviewed  Taken 6/1/2025 1016 by Ashanti Jean Baptiste RN  Medication Review/Management: medications reviewed   Goal Outcome Evaluation:       No acute events. Patient was asked to call staff when ready to take medications and when ready for blood sugar check. Patient verbalized wanting to have cardiac surgery done so he can get back to his feet. No complaints of pain. Still having SOB with activity but not needing oxygen. Activity being encouraged. Patient also reminded to call for staff assist with transfers and ambulation.

## 2025-06-02 NOTE — PROGRESS NOTES
[facility] [address]  [place] [address]   [email or web address]   OFFICE: [phone]     CARDIOLOGY FOLLOW-UP NOTE      Impression and Plan     ASSESSMENT  Multivessel CAD  Seen on angiogram March 2024, declined CABG at that time.  Increasing HOWELL, troponin mission 31-26.  CTS recommended 2-week outpatient follow-up with Dr. Boateng due to need to recover from current osteomyelitis, uncontrolled diabetes and recent stroke.   Currently on Imdur 30 mg, aspirin, Plavix, atorvastatin 80, Coreg 25 mg twice daily  Reporting persistent HOWELL.  Chronic systolic heart failure  LVEF 40 to 45% this admission (previously 30 to 35%)  Likely ischemic  PTA regimen 2 mg twice daily, carvedilol 25 mg twice daily, losartan 25 mg  Currently on carvedilol 25 mg twice daily, Entresto 24-26.  Diuretics held.  Difficult to assess volume status due to body habitus but does not appear volume overloaded on echo  Dyslipidemia    Other active problems:  CKD -baseline creatinine 1.5-1.7  DM2 - A1c 12.1  Recent CVA via brain MRI-right thalamus/internal capsule, no further patient workup  Right heel ulcer, s/p surgery February May of this year, MRI negative for osteomyelitis    PLAN  Possible updated angiogram this admission - Will disucss with CTS to see if this is still needed and plan for possible CABG  Likely outpatient follow-up with CTS    Follow up: TBD    ADDENDUM:    Discussed with CTS.  Requesting updated angiogram for help with planning CABG and possible need for IABP.  Angiogram needed prior to outpatient follow up appointment.      Discussed about the indication for coronary angiogram/possible PCI and the risks associated with angiogram including <0.1% of MI and CVA or death or any of the following to the degree that it could threaten his life: allergic reaction, arrhythmia, renal failure, hemorrhage, thrombosis and infection.    Pt agreeable to proceed.        Marc Silva,  JULIA  June 2, 2025  3:30 PM      Primary Cardiologist: Emiliana Morris     49-year-old male with history of severe multivessel CAD, DM2, CKD, HFrEF, HTN, HLD, previous CVA, previous osteomyelitis admitted 5/20/2025 for HOWELL and lower extremity edema.  MRI showed infarct of the liver, send internal capsule.  Echo this admission with LVEF 40 to 45%    Patient reporting frustration this morning.  When discussing why he is here and what the current plan is patient reports he does not know that nobody tells him anything.  He says they come in for 1 or 2 minutes and then said they need to leave.  He says he regrets not getting his bypass surgery last year when offered and continue repeats he just wants to be fixed.  He says he still gets short of breath when walking around.  Denies any chest pain or discomfort.    Cardiac Diagnostics   Telemetry (personally reviewed): N/A    ECG (personally reviewed and interpreted):   ECG, 3/28/2025: Sinus rhythm, no ischemic changes, low voltages    Echocardiogram (results reviewed):   Limited TTE, 5/29/2025  TDS. Limited views seen due to patient body habitus  The left ventricle is borderline dilated. The visual ejection fraction is 40-  45%.  There is moderate global hypokinesia of the left ventricle. Regional wall  motion abnormalities cannot be excluded due to limited visualization.  No significant valve disease.    Cardiac Cath (results reviewed):   3/1/2024  Floyd Capps is a 48 year old old male with HTN, HL, DM, smoking, high BMI who is here for w/up of new cardiomyopathy/admission for ADHF.     - multi-vessel native CAD; severely elevated L-sided filling pressures  - will stop to obtain CTS consult  - continue ASA 81mg daily indefinitely, atorva 80  - continue aggressive risk factor modification    LM:no obstruction  LAD:proximal Ca2+ 70% narrowing, mid-distal 90% narrowing. Small D1 w/ diffuse 90% narrowing  Lcx:OM1 proximal 90%, small superior subdivision 90%, inferior  80%. OM1 small 90% narrowing  RCA:dominant, distal 85%, rPDA 95% narrowing      Physical Examination       /68 (BP Location: Left arm)   Pulse 83   Temp 97.6  F (36.4  C) (Oral)   Resp 20   Wt (!) 139.9 kg (308 lb 6.8 oz)   SpO2 95%   BMI 44.25 kg/m          Intake/Output Summary (Last 24 hours) at 6/2/2025 0752  Last data filed at 6/1/2025 2200  Gross per 24 hour   Intake 1610 ml   Output 550 ml   Net 1060 ml       General: Obese, in no acute distress. Resting comfortably in exam bed  Skin: No clubbing, no cyanosis.  Eyes: Extra ocular movements intact  Neck: Unable to visualize JVP  Lungs: Clear to auscultation bilaterally  Heart: Regular rhythm, PMI not displaced, S1, S2 normal, no S3, no S4, no heaves, no rub and no murmur.  Abdomen: Soft, nontender, bowel sounds normal.  Extremities: No peripheral edema . Grade 2/4 distal pulses bilaterally.  Neuro: Oriented to person, place and time, alert, cooperative, gait coordinated.             Lab Results   Lab Results   Component Value Date    CHOL 224 (H) 03/23/2025    HDL 53 03/23/2025    TRIG 213 (H) 03/23/2025    BUN 18.3 06/01/2025     06/01/2025    CO2 25 06/01/2025       Lab Results   Component Value Date    WBC 11.8 (H) 05/31/2025    HGB 11.5 (L) 05/31/2025    HCT 33.6 (L) 05/31/2025    MCV 93 06/01/2025     06/01/2025       Lab Results   Component Value Date    TSH 2.72 08/23/2023    INR 0.95 08/23/2023               Current Inpatient Scheduled Medications   Scheduled Meds:  Current Facility-Administered Medications   Medication Dose Route Frequency Provider Last Rate Last Admin    aspirin EC tablet 81 mg  81 mg Oral Daily Earnest Del Rio MD   81 mg at 06/01/25 1011    atorvastatin (LIPITOR) tablet 80 mg  80 mg Oral QPM Earnest Del Rio MD   80 mg at 06/01/25 2025    [Held by provider] bumetanide (BUMEX) tablet 2 mg  2 mg Oral BID Sue Mclaughlin PA-C        carvedilol (COREG) tablet 25 mg  25 mg Oral BID w/meals Earnest Del Rio MD    25 mg at 06/01/25 1837    clopidogrel (PLAVIX) tablet 75 mg  75 mg Oral QPM Earnest Del Rio MD   75 mg at 06/01/25 2025    cyanocobalamin (VITAMIN B-12) tablet 1,000 mcg  1,000 mcg Oral Daily Earnest Del Rio MD   1,000 mcg at 06/01/25 1011    heparin ANTICOAGULANT injection 5,000 Units  5,000 Units Subcutaneous Q8H Earnest Del Rio MD   5,000 Units at 06/02/25 0108    insulin aspart (NovoLOG) injection (RAPID ACTING)  20 Units Subcutaneous TID w/meals Earnest Del Rio MD   20 Units at 06/01/25 1833    insulin aspart (NovoLOG) injection (RAPID ACTING)  1-10 Units Subcutaneous TID AC Earnest Del Rio MD   2 Units at 06/01/25 1832    insulin aspart (NovoLOG) injection (RAPID ACTING)  1-7 Units Subcutaneous At Bedtime Earnest Del Rio MD   4 Units at 05/29/25 1230    insulin glargine (LANTUS PEN) injection 50 Units  50 Units Subcutaneous At Bedtime Camila Vargas MD        isosorbide mononitrate (IMDUR) 24 hr tablet 30 mg  30 mg Oral QPM Earnest Del Rio MD   30 mg at 06/01/25 2025    melatonin tablet 5 mg  5 mg Oral At Bedtime Earnest Del Rio MD   5 mg at 06/01/25 2238    multivitamin w/minerals (THERA-VIT-M) tablet 1 tablet  1 tablet Oral Daily Earnest Del Rio MD   1 tablet at 06/01/25 1011    sacubitril-valsartan (ENTRESTO) 24-26 MG per tablet 1 tablet  1 tablet Oral BID Sue Mclaughlin PA-C   1 tablet at 06/01/25 2025    sodium chloride (PF) 0.9% PF flush 3 mL  3 mL Intracatheter Q8H Earnest Del Rio MD   3 mL at 06/02/25 0608    traZODone (DESYREL) tablet 50 mg  50 mg Oral At Bedtime Earnest Del Rio MD   50 mg at 06/01/25 2238     Continuous Infusions:  Current Facility-Administered Medications   Medication Dose Route Frequency Provider Last Rate Last Admin            Medications Prior to Admission   Prior to Admission medications    Medication Sig Start Date End Date Taking? Authorizing Provider   acetaminophen (TYLENOL) 500 MG tablet Take 2 tablets (1,000 mg) by mouth every 8 hours as  needed for mild pain 3/5/24  Yes Nata Baca MD   aspirin 81 MG EC tablet Take 1 tablet (81 mg) by mouth daily. 3/28/25  Yes Ricardo Herndon DO   atorvastatin (LIPITOR) 80 MG tablet Take 1 tablet (80 mg) by mouth daily 7/11/24  Yes Glendy Benavides NP   bumetanide (BUMEX) 2 MG tablet Take 1 tablet (2 mg) by mouth 2 times daily. 1/30/25  Yes Glendy Benavides NP   carvedilol (COREG) 25 MG tablet Take 1 tablet (25 mg) by mouth 2 times daily (with meals). 1/30/25  Yes Glendy Benavides NP   clopidogrel (PLAVIX) 75 MG tablet Take 1 tablet (75 mg) by mouth daily. 3/28/25 6/24/25 Yes Ricardo Herndon DO   cyanocobalamin (VITAMIN B-12) 1000 MCG tablet Take 1,000 mcg by mouth daily.   Yes Reported, Patient   insulin glargine (LANTUS PEN) 100 UNIT/ML pen Inject 35 Units subcutaneously at bedtime. 3/27/25  Yes Ricardo Herndon DO   insulin lispro (HUMALOG KWIKPEN) 100 UNIT/ML (1 unit dial) KWIKPEN Inject 20 Units subcutaneously 3 times daily (with meals). Plus sliding scale: 2 units every 50 over 150. If humalog is not covered by insurance, may substitute with novolog or other fast acting insulin 3/27/25  Yes Ricardo Herndon DO   isosorbide mononitrate (IMDUR) 30 MG 24 hr tablet Take 1 tablet (30 mg) by mouth daily. 11/7/24  Yes Gee Hopson DO   losartan (COZAAR) 25 MG tablet Take 1 tablet (25 mg) by mouth daily. 10/9/24  Yes Gee Hopson DO   metFORMIN (GLUCOPHAGE XR) 500 MG 24 hr tablet Take 1 tablet (500 mg) by mouth daily (with dinner). 1/30/25  Yes Glendy Benavides NP   multivitamin w/minerals (THERA-VIT-M) tablet Take 1 tablet by mouth daily 3/5/24  Yes Nata Baca MD   Continuous Glucose Sensor (FREESTYLE LAMINE 2 SENSOR) MISC Inject 1 each subcutaneously every 14 days Use 1 sensor every 14 days. Use to read blood sugars per 's instructions.  Patient not taking: Reported on 10/9/2024 7/11/24   Glendy Benavides, NP   insulin pen needle (32G X 4 MM) 32G X 4 MM  miscellaneous Use 4 pen needles daily or as directed. 9/18/24   Glendy Benavides, NP          Marc Silva PA-C

## 2025-06-02 NOTE — PLAN OF CARE
Problem: Gas Exchange Impaired  Goal: Optimal Gas Exchange  Outcome: Progressing  Intervention: Optimize Oxygenation and Ventilation  Recent Flowsheet Documentation  Taken 6/2/2025 0100 by Aiden Egan, RN  Head of Bed (HOB) Positioning: HOB at 20-30 degrees  Taken 6/1/2025 2030 by Aiden Egan, RN  Head of Bed (HOB) Positioning: HOB at 20-30 degrees     Problem: Comorbidity Management  Goal: Blood Glucose Levels Within Targeted Range  Outcome: Progressing  Intervention: Monitor and Manage Glycemia  Recent Flowsheet Documentation  Taken 6/2/2025 0100 by Aiden Egan, RN  Medication Review/Management: medications reviewed  Taken 6/1/2025 2030 by Aiden Egan, RN  Medication Review/Management: medications reviewed   Goal Outcome Evaluation:       Pt alert and oriented x4. Up independently in room. Pt denies pain. No sob or dizziness reported. HS BG was 179, 2 am BG refused. On K, Mag and phos protocol all recheck in am. VSS, on room air.

## 2025-06-02 NOTE — PROGRESS NOTES
Floyd was seen for fit and delivery of a right PRAFO per order of his physician.      The PRAFO was custom fit by trimming all straps and pads to appropriate length.      The goal of the orthosis is to provide protection of the foot and ankle post-operatively and to prevent plantarflexion contracture.      Instructions on use and application was given to him along with a card for contact after discharge.      I plan to see him PRN.      Electronically signed by Jersey Meyers CPO, LOUISE Palma O&P   [phone]

## 2025-06-02 NOTE — PLAN OF CARE
Problem: Adult Inpatient Plan of Care  Goal: Absence of Hospital-Acquired Illness or Injury  Intervention: Prevent Infection  Recent Flowsheet Documentation  Taken 6/2/2025 1000 by Rosibel Sanchez RN  Infection Prevention: hand hygiene promoted     Problem: Skin Injury Risk Increased  Goal: Skin Health and Integrity  Outcome: Progressing  Intervention: Plan: Nurse Driven Intervention: Moisture Management  Recent Flowsheet Documentation  Taken 6/2/2025 1000 by Rosibel Sanchez RN  Moisture Interventions: Encourage regular toileting  Intervention: Plan: Nurse Driven Intervention: Friction and Shear  Recent Flowsheet Documentation  Taken 6/2/2025 1000 by Rosibel Sanchez RN  Friction/Shear Interventions: HOB 30 degrees or less  Intervention: Optimize Skin Protection  Recent Flowsheet Documentation  Taken 6/2/2025 1000 by Rosibel Sanchez RN  Activity Management: activity adjusted per tolerance   Goal Outcome Evaluation:       Pt alert and somewhat cooperative with cares. Pt did not eat breakfast and had very late lunch. Pt got a right AFO boot today. Pt denies pain. BG's were   208 and 187 for breakfast and lunch, got sliding scale coverages.

## 2025-06-03 LAB
ABO + RH BLD: NORMAL
ANION GAP SERPL CALCULATED.3IONS-SCNC: 10 MMOL/L (ref 7–15)
BLD GP AB SCN SERPL QL: NEGATIVE
BUN SERPL-MCNC: 19 MG/DL (ref 6–20)
CALCIUM SERPL-MCNC: 9.2 MG/DL (ref 8.8–10.4)
CHLORIDE SERPL-SCNC: 107 MMOL/L (ref 98–107)
CREAT SERPL-MCNC: 1.34 MG/DL (ref 0.67–1.17)
EGFRCR SERPLBLD CKD-EPI 2021: 64 ML/MIN/1.73M2
GLUCOSE BLDC GLUCOMTR-MCNC: 131 MG/DL (ref 70–99)
GLUCOSE BLDC GLUCOMTR-MCNC: 187 MG/DL (ref 70–99)
GLUCOSE BLDC GLUCOMTR-MCNC: 196 MG/DL (ref 70–99)
GLUCOSE SERPL-MCNC: 125 MG/DL (ref 70–99)
HCO3 SERPL-SCNC: 21 MMOL/L (ref 22–29)
MAGNESIUM SERPL-MCNC: 2 MG/DL (ref 1.7–2.3)
PHOSPHATE SERPL-MCNC: 3.7 MG/DL (ref 2.5–4.5)
POTASSIUM SERPL-SCNC: 4.3 MMOL/L (ref 3.4–5.3)
SODIUM SERPL-SCNC: 138 MMOL/L (ref 135–145)
SPECIMEN EXP DATE BLD: NORMAL

## 2025-06-03 PROCEDURE — 99207 PR APP CREDIT; MD BILLING SHARED VISIT: CPT | Mod: FS

## 2025-06-03 PROCEDURE — 250N000011 HC RX IP 250 OP 636: Mod: JZ | Performed by: INTERNAL MEDICINE

## 2025-06-03 PROCEDURE — 272N000001 HC OR GENERAL SUPPLY STERILE: Performed by: INTERNAL MEDICINE

## 2025-06-03 PROCEDURE — B211YZZ FLUOROSCOPY OF MULTIPLE CORONARY ARTERIES USING OTHER CONTRAST: ICD-10-PCS | Performed by: INTERNAL MEDICINE

## 2025-06-03 PROCEDURE — 250N000013 HC RX MED GY IP 250 OP 250 PS 637: Performed by: PHYSICIAN ASSISTANT

## 2025-06-03 PROCEDURE — C1894 INTRO/SHEATH, NON-LASER: HCPCS | Performed by: INTERNAL MEDICINE

## 2025-06-03 PROCEDURE — 99232 SBSQ HOSP IP/OBS MODERATE 35: CPT | Mod: FS | Performed by: INTERNAL MEDICINE

## 2025-06-03 PROCEDURE — 84100 ASSAY OF PHOSPHORUS: CPT | Performed by: STUDENT IN AN ORGANIZED HEALTH CARE EDUCATION/TRAINING PROGRAM

## 2025-06-03 PROCEDURE — 120N000001 HC R&B MED SURG/OB

## 2025-06-03 PROCEDURE — 93458 L HRT ARTERY/VENTRICLE ANGIO: CPT | Performed by: INTERNAL MEDICINE

## 2025-06-03 PROCEDURE — C1887 CATHETER, GUIDING: HCPCS | Performed by: INTERNAL MEDICINE

## 2025-06-03 PROCEDURE — 93458 L HRT ARTERY/VENTRICLE ANGIO: CPT | Mod: 26 | Performed by: INTERNAL MEDICINE

## 2025-06-03 PROCEDURE — 83735 ASSAY OF MAGNESIUM: CPT | Performed by: STUDENT IN AN ORGANIZED HEALTH CARE EDUCATION/TRAINING PROGRAM

## 2025-06-03 PROCEDURE — 36415 COLL VENOUS BLD VENIPUNCTURE: CPT | Performed by: STUDENT IN AN ORGANIZED HEALTH CARE EDUCATION/TRAINING PROGRAM

## 2025-06-03 PROCEDURE — 258N000003 HC RX IP 258 OP 636

## 2025-06-03 PROCEDURE — 250N000009 HC RX 250: Performed by: INTERNAL MEDICINE

## 2025-06-03 PROCEDURE — 99233 SBSQ HOSP IP/OBS HIGH 50: CPT | Performed by: STUDENT IN AN ORGANIZED HEALTH CARE EDUCATION/TRAINING PROGRAM

## 2025-06-03 PROCEDURE — 250N000013 HC RX MED GY IP 250 OP 250 PS 637: Performed by: INTERNAL MEDICINE

## 2025-06-03 PROCEDURE — 82947 ASSAY GLUCOSE BLOOD QUANT: CPT | Performed by: STUDENT IN AN ORGANIZED HEALTH CARE EDUCATION/TRAINING PROGRAM

## 2025-06-03 PROCEDURE — 250N000013 HC RX MED GY IP 250 OP 250 PS 637: Performed by: STUDENT IN AN ORGANIZED HEALTH CARE EDUCATION/TRAINING PROGRAM

## 2025-06-03 PROCEDURE — 255N000002 HC RX 255 OP 636: Performed by: INTERNAL MEDICINE

## 2025-06-03 PROCEDURE — 999N000099 HC STATISTIC MODERATE SEDATION < 10 MIN: Performed by: INTERNAL MEDICINE

## 2025-06-03 PROCEDURE — 4A023N7 MEASUREMENT OF CARDIAC SAMPLING AND PRESSURE, LEFT HEART, PERCUTANEOUS APPROACH: ICD-10-PCS | Performed by: INTERNAL MEDICINE

## 2025-06-03 PROCEDURE — 250N000011 HC RX IP 250 OP 636: Performed by: STUDENT IN AN ORGANIZED HEALTH CARE EDUCATION/TRAINING PROGRAM

## 2025-06-03 PROCEDURE — 80048 BASIC METABOLIC PNL TOTAL CA: CPT | Performed by: STUDENT IN AN ORGANIZED HEALTH CARE EDUCATION/TRAINING PROGRAM

## 2025-06-03 RX ORDER — FENTANYL CITRATE 50 UG/ML
25 INJECTION, SOLUTION INTRAMUSCULAR; INTRAVENOUS
Refills: 0 | Status: DISCONTINUED | OUTPATIENT
Start: 2025-06-03 | End: 2025-06-05 | Stop reason: HOSPADM

## 2025-06-03 RX ORDER — LIDOCAINE 40 MG/G
CREAM TOPICAL
Status: DISCONTINUED | OUTPATIENT
Start: 2025-06-03 | End: 2025-06-05 | Stop reason: HOSPADM

## 2025-06-03 RX ORDER — NALOXONE HYDROCHLORIDE 0.4 MG/ML
0.2 INJECTION, SOLUTION INTRAMUSCULAR; INTRAVENOUS; SUBCUTANEOUS
Status: DISCONTINUED | OUTPATIENT
Start: 2025-06-03 | End: 2025-06-05 | Stop reason: HOSPADM

## 2025-06-03 RX ORDER — NALOXONE HYDROCHLORIDE 0.4 MG/ML
0.4 INJECTION, SOLUTION INTRAMUSCULAR; INTRAVENOUS; SUBCUTANEOUS
Status: DISCONTINUED | OUTPATIENT
Start: 2025-06-03 | End: 2025-06-05 | Stop reason: HOSPADM

## 2025-06-03 RX ORDER — ASPIRIN 81 MG/1
243 TABLET ORAL ONCE
Status: COMPLETED | OUTPATIENT
Start: 2025-06-03 | End: 2025-06-03

## 2025-06-03 RX ORDER — FENTANYL CITRATE 50 UG/ML
25 INJECTION, SOLUTION INTRAMUSCULAR; INTRAVENOUS
Status: DISCONTINUED | OUTPATIENT
Start: 2025-06-03 | End: 2025-06-03 | Stop reason: HOSPADM

## 2025-06-03 RX ORDER — OXYCODONE HYDROCHLORIDE 5 MG/1
10 TABLET ORAL EVERY 4 HOURS PRN
Status: DISCONTINUED | OUTPATIENT
Start: 2025-06-03 | End: 2025-06-05 | Stop reason: HOSPADM

## 2025-06-03 RX ORDER — NALOXONE HYDROCHLORIDE 0.4 MG/ML
0.2 INJECTION, SOLUTION INTRAMUSCULAR; INTRAVENOUS; SUBCUTANEOUS
Status: ACTIVE | OUTPATIENT
Start: 2025-06-03 | End: 2025-06-03

## 2025-06-03 RX ORDER — LIDOCAINE 40 MG/G
CREAM TOPICAL
Status: DISCONTINUED | OUTPATIENT
Start: 2025-06-03 | End: 2025-06-03 | Stop reason: HOSPADM

## 2025-06-03 RX ORDER — ATROPINE SULFATE 0.1 MG/ML
0.5 INJECTION INTRAVENOUS
Status: ACTIVE | OUTPATIENT
Start: 2025-06-03 | End: 2025-06-03

## 2025-06-03 RX ORDER — SODIUM CHLORIDE 9 MG/ML
INJECTION, SOLUTION INTRAVENOUS CONTINUOUS
Status: DISCONTINUED | OUTPATIENT
Start: 2025-06-03 | End: 2025-06-03 | Stop reason: HOSPADM

## 2025-06-03 RX ORDER — FENTANYL CITRATE 50 UG/ML
INJECTION, SOLUTION INTRAMUSCULAR; INTRAVENOUS
Status: DISCONTINUED | OUTPATIENT
Start: 2025-06-03 | End: 2025-06-03 | Stop reason: HOSPADM

## 2025-06-03 RX ORDER — NALOXONE HYDROCHLORIDE 0.4 MG/ML
0.4 INJECTION, SOLUTION INTRAMUSCULAR; INTRAVENOUS; SUBCUTANEOUS
Status: ACTIVE | OUTPATIENT
Start: 2025-06-03 | End: 2025-06-03

## 2025-06-03 RX ORDER — ACETAMINOPHEN 325 MG/1
650 TABLET ORAL EVERY 4 HOURS PRN
Status: DISCONTINUED | OUTPATIENT
Start: 2025-06-03 | End: 2025-06-03

## 2025-06-03 RX ORDER — OXYCODONE HYDROCHLORIDE 5 MG/1
5 TABLET ORAL EVERY 4 HOURS PRN
Status: DISCONTINUED | OUTPATIENT
Start: 2025-06-03 | End: 2025-06-05 | Stop reason: HOSPADM

## 2025-06-03 RX ORDER — HEPARIN SODIUM 200 [USP'U]/100ML
INJECTION, SOLUTION INTRAVENOUS
Status: DISCONTINUED | OUTPATIENT
Start: 2025-06-03 | End: 2025-06-03 | Stop reason: HOSPADM

## 2025-06-03 RX ORDER — IODIXANOL 320 MG/ML
INJECTION, SOLUTION INTRAVASCULAR
Status: DISCONTINUED | OUTPATIENT
Start: 2025-06-03 | End: 2025-06-03 | Stop reason: HOSPADM

## 2025-06-03 RX ORDER — FLUMAZENIL 0.1 MG/ML
0.2 INJECTION, SOLUTION INTRAVENOUS
Status: ACTIVE | OUTPATIENT
Start: 2025-06-03 | End: 2025-06-03

## 2025-06-03 RX ADMIN — ASPIRIN 243 MG: 81 TABLET, COATED ORAL at 11:22

## 2025-06-03 RX ADMIN — ATORVASTATIN CALCIUM 80 MG: 40 TABLET, FILM COATED ORAL at 20:34

## 2025-06-03 RX ADMIN — OXYCODONE HYDROCHLORIDE 5 MG: 5 TABLET ORAL at 13:24

## 2025-06-03 RX ADMIN — ACETAMINOPHEN 650 MG: 325 TABLET ORAL at 13:24

## 2025-06-03 RX ADMIN — INSULIN GLARGINE 50 UNITS: 100 INJECTION, SOLUTION SUBCUTANEOUS at 22:11

## 2025-06-03 RX ADMIN — INSULIN ASPART 3 UNITS: 100 INJECTION, SOLUTION INTRAVENOUS; SUBCUTANEOUS at 17:47

## 2025-06-03 RX ADMIN — SACUBITRIL AND VALSARTAN 1 TABLET: 24; 26 TABLET, FILM COATED ORAL at 20:34

## 2025-06-03 RX ADMIN — TRAZODONE HYDROCHLORIDE 50 MG: 50 TABLET ORAL at 22:11

## 2025-06-03 RX ADMIN — CYANOCOBALAMIN TAB 1000 MCG 1000 MCG: 1000 TAB at 10:04

## 2025-06-03 RX ADMIN — CLOPIDOGREL BISULFATE 75 MG: 75 TABLET ORAL at 20:34

## 2025-06-03 RX ADMIN — CARVEDILOL 25 MG: 12.5 TABLET, FILM COATED ORAL at 18:13

## 2025-06-03 RX ADMIN — Medication 5 MG: at 22:11

## 2025-06-03 RX ADMIN — Medication 1 TABLET: at 10:03

## 2025-06-03 RX ADMIN — ASPIRIN 81 MG: 81 TABLET, COATED ORAL at 10:03

## 2025-06-03 RX ADMIN — SODIUM CHLORIDE: 0.9 INJECTION, SOLUTION INTRAVENOUS at 10:59

## 2025-06-03 RX ADMIN — HEPARIN SODIUM 5000 UNITS: 5000 INJECTION, SOLUTION INTRAVENOUS; SUBCUTANEOUS at 02:01

## 2025-06-03 RX ADMIN — ISOSORBIDE MONONITRATE 30 MG: 30 TABLET, EXTENDED RELEASE ORAL at 20:34

## 2025-06-03 RX ADMIN — CARVEDILOL 25 MG: 12.5 TABLET, FILM COATED ORAL at 10:03

## 2025-06-03 NOTE — PLAN OF CARE
Problem: Adult Inpatient Plan of Care  Goal: Optimal Comfort and Wellbeing  Outcome: Progressing     Problem: Gas Exchange Impaired  Goal: Optimal Gas Exchange  6/3/2025 1852 by Ashanti Jean Baptiste RN  Outcome: Progressing  6/3/2025 1444 by Ashanti Jean Baptiste RN  Outcome: Progressing     Problem: Pain Acute  Goal: Optimal Pain Control and Function  6/3/2025 1852 by Ashanti Jean Baptiste RN  Outcome: Progressing  6/3/2025 1444 by Ashanti Jean Baptiste RN  Outcome: Progressing     Problem: Comorbidity Management  Goal: Blood Glucose Levels Within Targeted Range  6/3/2025 1852 by Ashanti Jean Baptiste RN  Outcome: Progressing  6/3/2025 1444 by Ashanti Jean Baptiste RN  Outcome: Progressing   Goal Outcome Evaluation:       Patient alert and oriented x 4. Came back from cardiac cath at 1500. No complaints of pain. No complications noted at right wrist. Betadine applied on right heel ulcer. PRAFO boot in place. K, Mg, and phos recheck in the morning.

## 2025-06-03 NOTE — PROGRESS NOTES
[facility] [address]  [place] [address]   [email or web address]   OFFICE: [phone]     CARDIOLOGY FOLLOW-UP NOTE      Impression and Plan     ASSESSMENT  Multivessel CAD  Seen on angiogram March 2024, declined CABG at that time.  Increasing HOWELL, troponin on admission 31-26.  CTS recommended 2-week outpatient follow-up with Dr. Boateng due to need to recover from current osteomyelitis, uncontrolled diabetes and recent stroke.   Currently on Imdur 30 mg, aspirin, Plavix, atorvastatin 80, Coreg 25 mg twice daily  Reporting persistent HOWELL.  Had some chest discomfort this morning that resolved  Plan for angiogram later today  Chronic systolic heart failure  LVEF 40 to 45% this admission (previously 30 to 35%)  Likely ischemic  PTA regimen 2 mg twice daily, carvedilol 25 mg twice daily, losartan 25 mg  Currently on carvedilol 25 mg twice daily, Entresto 24-26.  Diuretics held.  Difficult to assess volume status due to body habitus but does not appear volume overloaded on echo   Dyslipidemia   restarted on atorvastatin 80 mg    Other active problems:  CKD -baseline creatinine 1.5-1.7  DM2 - A1c 12.1  Recent CVA via brain MRI-right thalamus/internal capsule, no further patient workup  Right heel ulcer, s/p surgery February May of this year, MRI negative for osteomyelitis    PLAN  Angiogram today for assistance with CABG planning, tentatively scheduled for 4:30  Assessment of LVEDP on left heart cath today to help assess volume status  Follow-up with CTS in 2 weeks    Follow up: Dr. Boateng 2 weeks        Primary Cardiologist: Emiliana Morris     49-year-old male with history of severe multivessel CAD, DM2, CKD, HFrEF, HTN, HLD, previous CVA, previous osteomyelitis admitted 5/20/2025 for HOWELL and lower extremity edema.  MRI showed infarct of the liver, send internal capsule.  Echo this admission with LVEF 40 to 45%    Patient reporting some chest  discomfort on the left side of his chest this morning that resolved.  Denies any current chest pain or discomfort.  Still feels short of breath.        Cardiac Diagnostics   Telemetry (personally reviewed): N/A    ECG (personally reviewed and interpreted):   ECG, 3/28/2025: Sinus rhythm, no ischemic changes, low voltages    Echocardiogram (results reviewed):   Limited TTE, 5/29/2025  TDS. Limited views seen due to patient body habitus  The left ventricle is borderline dilated. The visual ejection fraction is 40-  45%.  There is moderate global hypokinesia of the left ventricle. Regional wall  motion abnormalities cannot be excluded due to limited visualization.  No significant valve disease.    Cardiac Cath (results reviewed):   3/1/2024  Floyd Capps is a 48 year old old male with HTN, HL, DM, smoking, high BMI who is here for w/up of new cardiomyopathy/admission for ADHF.     - multi-vessel native CAD; severely elevated L-sided filling pressures  - will stop to obtain CTS consult  - continue ASA 81mg daily indefinitely, atorva 80  - continue aggressive risk factor modification    LM:no obstruction  LAD:proximal Ca2+ 70% narrowing, mid-distal 90% narrowing. Small D1 w/ diffuse 90% narrowing  Lcx:OM1 proximal 90%, small superior subdivision 90%, inferior 80%. OM1 small 90% narrowing  RCA:dominant, distal 85%, rPDA 95% narrowing      Physical Examination       BP 94/46 (BP Location: Left arm)   Pulse 74   Temp 98.1  F (36.7  C) (Oral)   Resp 18   Wt (!) 141 kg (310 lb 13.6 oz)   SpO2 92%   BMI 44.60 kg/m            Intake/Output Summary (Last 24 hours) at 6/3/2025 0738  Last data filed at 6/3/2025 0532  Gross per 24 hour   Intake 393 ml   Output 400 ml   Net -7 ml       General: Obese, in no acute distress. Resting comfortably in exam bed  Skin: No clubbing, no cyanosis.  Eyes: Extra ocular movements intact  Neck: Unable to visualize JVP  Lungs: Clear to auscultation bilaterally  Heart: Regular rhythm, PMI not  displaced, S1, S2 normal, no S3, no S4, no heaves, no rub and no murmur.  Abdomen: Soft, nontender, bowel sounds normal.  Extremities: No peripheral edema . Grade 2/4 distal pulses bilaterally.  Neuro: Oriented to person, place and time, alert, cooperative, gait coordinated.             Lab Results   Lab Results   Component Value Date    CHOL 224 (H) 03/23/2025    HDL 53 03/23/2025    TRIG 213 (H) 03/23/2025    BUN 19.0 06/03/2025     06/03/2025    CO2 21 (L) 06/03/2025       Lab Results   Component Value Date    WBC 11.8 (H) 05/31/2025    HGB 11.5 (L) 05/31/2025    HCT 33.6 (L) 05/31/2025    MCV 93 06/01/2025     06/01/2025       Lab Results   Component Value Date    TSH 2.72 08/23/2023    INR 0.95 08/23/2023               Current Inpatient Scheduled Medications   Scheduled Meds:  Current Facility-Administered Medications   Medication Dose Route Frequency Provider Last Rate Last Admin    aspirin EC tablet 81 mg  81 mg Oral Daily Earnest Del Rio MD   81 mg at 06/02/25 0943    atorvastatin (LIPITOR) tablet 80 mg  80 mg Oral QPM Earnest Del Rio MD   80 mg at 06/02/25 2003    [Held by provider] bumetanide (BUMEX) tablet 2 mg  2 mg Oral BID Sue Mclaughlin PA-C        carvedilol (COREG) tablet 25 mg  25 mg Oral BID w/meals Earnest Del Rio MD   25 mg at 06/02/25 1838    clopidogrel (PLAVIX) tablet 75 mg  75 mg Oral QPM Earnest Del Rio MD   75 mg at 06/02/25 2003    cyanocobalamin (VITAMIN B-12) tablet 1,000 mcg  1,000 mcg Oral Daily Earnest Del Rio MD   1,000 mcg at 06/02/25 0942    [Held by provider] heparin ANTICOAGULANT injection 5,000 Units  5,000 Units Subcutaneous Q8H Earnest Del Rio MD   5,000 Units at 06/03/25 0201    insulin aspart (NovoLOG) injection (RAPID ACTING)  20 Units Subcutaneous TID w/meals Earnest Del Rio MD   20 Units at 06/02/25 1832    insulin aspart (NovoLOG) injection (RAPID ACTING)  1-10 Units Subcutaneous TID  Earnest Del Rio MD   2 Units at 06/02/25 1831     insulin aspart (NovoLOG) injection (RAPID ACTING)  1-7 Units Subcutaneous At Bedtime Earnest Del Rio MD   4 Units at 05/29/25 1230    insulin glargine (LANTUS PEN) injection 50 Units  50 Units Subcutaneous At Bedtime Camila Vargas MD   50 Units at 06/02/25 2146    isosorbide mononitrate (IMDUR) 24 hr tablet 30 mg  30 mg Oral QPM Earnest Del Roi MD   30 mg at 06/02/25 2003    melatonin tablet 5 mg  5 mg Oral At Bedtime Earnest Del Rio MD   5 mg at 06/02/25 2146    multivitamin w/minerals (THERA-VIT-M) tablet 1 tablet  1 tablet Oral Daily Earnest Del Rio MD   1 tablet at 06/02/25 0944    sacubitril-valsartan (ENTRESTO) 24-26 MG per tablet 1 tablet  1 tablet Oral BID Sue Mclaughlin PA-C   1 tablet at 06/02/25 2003    sodium chloride (PF) 0.9% PF flush 3 mL  3 mL Intracatheter Q8H Earnest Del Rio MD   3 mL at 06/02/25 2146    traZODone (DESYREL) tablet 50 mg  50 mg Oral At Bedtime Earnest Del Rio MD   50 mg at 06/02/25 2146     Continuous Infusions:  Current Facility-Administered Medications   Medication Dose Route Frequency Provider Last Rate Last Admin            Medications Prior to Admission   Prior to Admission medications    Medication Sig Start Date End Date Taking? Authorizing Provider   acetaminophen (TYLENOL) 500 MG tablet Take 2 tablets (1,000 mg) by mouth every 8 hours as needed for mild pain 3/5/24  Yes Nata Baca MD   aspirin 81 MG EC tablet Take 1 tablet (81 mg) by mouth daily. 3/28/25  Yes Ricardo Herndon DO   atorvastatin (LIPITOR) 80 MG tablet Take 1 tablet (80 mg) by mouth daily 7/11/24  Yes Glendy Benavides NP   bumetanide (BUMEX) 2 MG tablet Take 1 tablet (2 mg) by mouth 2 times daily. 1/30/25  Yes Glendy Benavides NP   carvedilol (COREG) 25 MG tablet Take 1 tablet (25 mg) by mouth 2 times daily (with meals). 1/30/25  Yes Glendy Benavides, NP   clopidogrel (PLAVIX) 75 MG tablet Take 1 tablet (75 mg) by mouth daily. 3/28/25 6/24/25 Yes Ricardo Herndon, DO    cyanocobalamin (VITAMIN B-12) 1000 MCG tablet Take 1,000 mcg by mouth daily.   Yes Reported, Patient   insulin glargine (LANTUS PEN) 100 UNIT/ML pen Inject 35 Units subcutaneously at bedtime. 3/27/25  Yes Ricardo Herndon, DO   insulin lispro (HUMALOG KWIKPEN) 100 UNIT/ML (1 unit dial) KWIKPEN Inject 20 Units subcutaneously 3 times daily (with meals). Plus sliding scale: 2 units every 50 over 150. If humalog is not covered by insurance, may substitute with novolog or other fast acting insulin 3/27/25  Yes Ricardo Herndon, DO   isosorbide mononitrate (IMDUR) 30 MG 24 hr tablet Take 1 tablet (30 mg) by mouth daily. 11/7/24  Yes Gee Hopson, DO   losartan (COZAAR) 25 MG tablet Take 1 tablet (25 mg) by mouth daily. 10/9/24  Yes Gee Hopson, DO   metFORMIN (GLUCOPHAGE XR) 500 MG 24 hr tablet Take 1 tablet (500 mg) by mouth daily (with dinner). 1/30/25  Yes Glendy Benavides NP   multivitamin w/minerals (THERA-VIT-M) tablet Take 1 tablet by mouth daily 3/5/24  Yes Nata Baca MD   Continuous Glucose Sensor (FREESTYLE LAMINE 2 SENSOR) MISC Inject 1 each subcutaneously every 14 days Use 1 sensor every 14 days. Use to read blood sugars per 's instructions.  Patient not taking: Reported on 10/9/2024 7/11/24   Glendy Benavides NP   insulin pen needle (32G X 4 MM) 32G X 4 MM miscellaneous Use 4 pen needles daily or as directed. 9/18/24   Glendy Benavides NP Daniel L. Oress, PA-C

## 2025-06-03 NOTE — PROGRESS NOTES
[facility]    Medicine Progress Note - Hospitalist Service    Date of Admission:  5/28/2025    Assessment & Plan   Floyd Capps is a 49 year old male with a past medical history of Previous CVA, CAD, HFrEF who was admitted on 5/28/25 after presenting to [facility] ED for Generalized Weakness Dizziness and SOB.  Patient still having significant dyspnea on minimal exertion.  CTS team to decide when and if they are considering CABG.        Dizziness, Generalized Weakness  History of Strokes   - Previous strokes left lacunar stroke 08/2023, right thalamic 03/2025  - Home meds: Aspirin, Plavix, Atorvastatin  - MRI Brain in ED showing interval change of previous right thalamic stroke with no acute ischemia.  - Continue aspirin, plavix, statin  - PT/OT:  recommending TCU     HFrEF exacerbation   CAD with multivessel disease  - CXR in ED showing no signs of pulmonary congestion or pneumonia.  - Last Echo 03/2025 with EF 30-35% with global hypokinesis. Now estimated left ventricular ejection fraction is 40 to 45%.  - Home Meds: losartan 25mg, carvedilol 25mg, bumex 2mg BID  - Cardiology consult appreciated  - Coronary angiogram on 3/2024 reported multivessel CAD - outpatient cardiology considering bypass surgery  -Entresto 24-26 mg twice daily  -Imdur 30 mg oral daily  -Atorvastatin 80 mg oral daily  -Continue aspirin 81 mg oral daily  -Continue Plavix 75 mg oral daily  -Monitor daily weights, strict input and output, monitor BMP   -Resume home Bumex on 06/04  -Coronary angiogram updated on 06/03 prior to CTS evaluation.     Right Heel Ulcer  History of Right Foot Osteomyelitis  - Previous surgeries 02/2024, 05/2024 on right foot for osteomyelitis.  - Wound team consult appreciated  - MRI: Negative for evidence of osteomyelitis  - CRP is slightly high but is no worse than previously value: 21.4  -Appreciate podiatry consult.  No plan for procedure.  Recommending Prafo boots      Type 2 Diabetes, poorly  controlled   - Last A1c was 12.1 in 03/2025.  - Takes metformin at home.  - Home medications include insulin glargine 35 units nightly, lispro scheduled 20 units with meals + sliding scale.  - Hold home metformin  -  Glargine 50 units nightly  - Continue home lispro 20 units scheduled with meals  - Sliding Scale Insulin with Glucose Checks  -  A1c: 11.3      CKD stage IIIa;  -- Monitor BMP  -- Avoid nephrotoxins as able.          Diet: Fluid restriction 2000 ML FLUID  Regular Diet Adult    DVT Prophylaxis: Heparin SQ  Cazares Catheter: Not present  Lines: PRESENT             Cardiac Monitoring: ACTIVE order. Indication: Post- PCI/Angiogram (24 hours)  Code Status: Full Code      Clinically Significant Risk Factors                   # Hypertension: Noted on problem list           # DMII: A1C = 11.3 % (Ref range: <5.7 %) within past 6 months       # Financial/Environmental Concerns: unable to afford rent/mortgage;unemployed  # Support System: poor social support noted in nursing assessment  # Housing Instability: noted in nursing assessment         Social Drivers of Health    Food Insecurity: High Risk (5/29/2025)    Food Insecurity     Within the past 12 months, did you worry that your food would run out before you got money to buy more?: Yes     Within the past 12 months, did the food you bought just not last and you didn t have money to get more?: No   Depression: At risk (1/30/2025)    PHQ-2     PHQ-2 Score: 6   Housing Stability: High Risk (5/29/2025)    Housing Stability     Do you have housing? : No     Are you worried about losing your housing?: Patient unable to answer   Tobacco Use: Medium Risk (1/30/2025)    Patient History     Smoking Tobacco Use: Former     Smokeless Tobacco Use: Never   Financial Resource Strain: High Risk (5/29/2025)    Financial Resource Strain     Within the past 12 months, have you or your family members you live with been unable to get utilities (heat, electricity) when it was really  needed?: Yes   Transportation Needs: Low Risk  (5/29/2025)    Transportation Needs     Within the past 12 months, has lack of transportation kept you from medical appointments, getting your medicines, non-medical meetings or appointments, work, or from getting things that you need?: No   Recent Concern: Transportation Needs - High Risk (3/23/2025)    Transportation Needs     Within the past 12 months, has lack of transportation kept you from medical appointments, getting your medicines, non-medical meetings or appointments, work, or from getting things that you need?: Yes   Interpersonal Safety: Unknown (5/29/2025)    Interpersonal Safety     Do you feel physically and emotionally safe where you currently live?: Patient declined     Within the past 12 months, have you been hit, slapped, kicked or otherwise physically hurt by someone?: Patient declined     Within the past 12 months, have you been humiliated or emotionally abused in other ways by your partner or ex-partner?: Patient declined          Disposition Plan     Medically Ready for Discharge: Anticipated Tomorrow             Caitlyn Alonso MD  Hospitalist Service  [facility]  Securely message with pic5 (more info)  Text page via [facility] Paging/Directory   ______________________________________________________________________    Interval History   Patient is new to me today. Chart reviewed.  Patient is seen and examined at bedside.   Denied chest pain or newell during exam  Plan of care discussed with patient. All questions answered. Pt verbalized understanding.     Physical Exam   Vital Signs: Temp: 97.7  F (36.5  C) Temp src: Oral BP: (!) 143/65 Pulse: 73   Resp: 18 SpO2: 97 % O2 Device: None (Room air) Oxygen Delivery: 2 LPM  Weight: 310 lbs 13.58 oz    GEN: Alert and oriented. Not in acute distress.  HEENT: Atraumatic, mucous membrane- moist and pink.  Chest: Bilateral air entry.  CVS: S1S2 regular.   Abdomen: Soft. Non-tender,  non-distended. No organomegaly. No guarding or rigidity. Bowel sounds active.   Extremities: b/l LE edema +  CNS: No involuntary movements.  Skin: no cyanosis or clubbing.     Medical Decision Making       55 MINUTES SPENT BY ME on the date of service doing chart review, history, exam, documentation & further activities per the note.      Data

## 2025-06-03 NOTE — PLAN OF CARE
Problem: Adult Inpatient Plan of Care  Goal: Optimal Comfort and Wellbeing  Outcome: Progressing     Problem: Gas Exchange Impaired  Goal: Optimal Gas Exchange  6/3/2025 1852 by Ashanti Jean Baptiste RN  Outcome: Progressing  6/3/2025 1444 by Ashanti Jean Baptiste RN  Outcome: Progressing     Problem: Pain Acute  Goal: Optimal Pain Control and Function  6/3/2025 1852 by Ashanti Jean Baptiste RN  Outcome: Progressing  6/3/2025 1444 by Ashanti Jean Baptiste RN  Outcome: Progressing     Problem: Comorbidity Management  Goal: Blood Glucose Levels Within Targeted Range  6/3/2025 1852 by Ashanti Jean Baptiste RN  Outcome: Progressing  6/3/2025 1444 by Ashanti Jean Baptiste RN  Outcome: Progressing   Goal Outcome Evaluation:       Patient alert and oriented x 4. Came back from cardiac cath at 1500. Vitals done every hour x 4. No complaints of pain. No complications noted at right wrist. Betadine applied on right heel ulcer. PRAFO boot in place. K, Mg, and phos recheck in the morning.

## 2025-06-03 NOTE — PRE-PROCEDURE
GENERAL PRE-PROCEDURE:   Procedure:  Coronary angiogram, left heart catheterization  Date/Time:  6/3/2025 11:03 AM    Written consent obtained?: Yes    Risks and benefits: Risks, benefits and alternatives were discussed    Consent given by:  Patient  Patient states understanding of procedure being performed: Yes    Patient's understanding of procedure matches consent: Yes    Procedure consent matches procedure scheduled: Yes    Expected level of sedation:  Moderate  Appropriately NPO:  Yes  ASA Class:  4 (severe multi-vessel CAD, elevated troponin, HFmrEF, recent CVA, CKD Stage II-IIIa, Class III obesity; BMI 44.60kg/m2)  Mallampati  :  Grade 2- soft palate, base of uvula, tonsillar pillars, and portion of posterior pharyngeal wall visible  Lungs:  Lungs clear with good breath sounds bilaterally  Heart:  Normal heart sounds and rate  History & Physical reviewed:  History and physical reviewed and updates made (see comment)  H&P Comments:  Clinically Significant Risk Factors Present on Admission    Cardiovascular : severe multi-vessel CAD, elevated troponin, HFmrEF, Class III obesity; BMI 44.60kg/m2    Fluid & Electrolyte Disorders : Not present on admission    Gastroenterology : Not present on admission    Hematology/Oncology : Not present on admission    Nephrology : CKD Stage II-IIIa; will minimize contrast administration, IV hydration per protocol    Neurology : recent CVA    Pulmonology : Not present on admission    Systemic : Not present on admission    Statement of review:  I have reviewed the lab findings, diagnostic data, medications, and the plan for sedation

## 2025-06-03 NOTE — PROGRESS NOTES
TCU bed available at [facility] for tomorrow, 6/4. Met with patient. His preference is to go to [facility] as he has been there in the past and had a positive experience. Message sent to Jeremy Patino liaison to check availability.     Informed patient that update will be available tomorrow morning. Pt verbalizes understanding.       TERESA Escalatne on 06/03/25

## 2025-06-03 NOTE — PLAN OF CARE
Problem: Gas Exchange Impaired  Goal: Optimal Gas Exchange  Outcome: Progressing     Problem: Pain Acute  Goal: Optimal Pain Control and Function  Outcome: Progressing     Problem: Comorbidity Management  Goal: Blood Glucose Levels Within Targeted Range  Outcome: Progressing     Problem: Skin Injury Risk Increased  Goal: Skin Health and Integrity  Intervention: Plan: Nurse Driven Intervention: Moisture Management  Recent Flowsheet Documentation  Taken 6/3/2025 1000 by Ashanti Jean Baptiste, RN  Moisture Interventions: Encourage regular toileting  Intervention: Plan: Nurse Driven Intervention: Friction and Shear  Recent Flowsheet Documentation  Taken 6/3/2025 1000 by Ashanti Jean Baptiste, RN  Friction/Shear Interventions: HOB 30 degrees or less   Goal Outcome Evaluation:       Patient alert and oriented x 4. Denied pain.  NPO. Patient taken to Cardiac Cath.

## 2025-06-03 NOTE — PLAN OF CARE
Pt is alert and oriented on RA. Pt ate his dinner and BG was checked 128. Denies pain.    /62 (BP Location: Left arm)   Pulse 73   Temp 98.2  F (36.8  C) (Oral)   Resp 20   Wt (!) 139.9 kg (308 lb 6.8 oz)   SpO2 (!) 91%   BMI 44.25 kg/m        Trevor Perez RN      Problem: Adult Inpatient Plan of Care  Goal: Plan of Care Review  Description: The Plan of Care Review/Shift note should be completed every shift.  The Outcome Evaluation is a brief statement about your assessment that the patient is improving, declining, or no change.  This information will be displayed automatically on your shift  note.  Outcome: Progressing   Goal Outcome Evaluation:

## 2025-06-04 LAB
ANION GAP SERPL CALCULATED.3IONS-SCNC: 9 MMOL/L (ref 7–15)
BUN SERPL-MCNC: 19 MG/DL (ref 6–20)
CALCIUM SERPL-MCNC: 9 MG/DL (ref 8.8–10.4)
CHLORIDE SERPL-SCNC: 108 MMOL/L (ref 98–107)
CREAT SERPL-MCNC: 1.48 MG/DL (ref 0.67–1.17)
EGFRCR SERPLBLD CKD-EPI 2021: 57 ML/MIN/1.73M2
GLUCOSE BLDC GLUCOMTR-MCNC: 130 MG/DL (ref 70–99)
GLUCOSE BLDC GLUCOMTR-MCNC: 137 MG/DL (ref 70–99)
GLUCOSE BLDC GLUCOMTR-MCNC: 143 MG/DL (ref 70–99)
GLUCOSE BLDC GLUCOMTR-MCNC: 148 MG/DL (ref 70–99)
GLUCOSE SERPL-MCNC: 119 MG/DL (ref 70–99)
HCO3 SERPL-SCNC: 20 MMOL/L (ref 22–29)
MAGNESIUM SERPL-MCNC: 2 MG/DL (ref 1.7–2.3)
MCV RBC AUTO: 95 FL (ref 78–100)
PHOSPHATE SERPL-MCNC: 4.1 MG/DL (ref 2.5–4.5)
PLATELET # BLD AUTO: 239 10E3/UL (ref 150–450)
POTASSIUM SERPL-SCNC: 4.3 MMOL/L (ref 3.4–5.3)
SODIUM SERPL-SCNC: 137 MMOL/L (ref 135–145)

## 2025-06-04 PROCEDURE — 99232 SBSQ HOSP IP/OBS MODERATE 35: CPT | Mod: FS | Performed by: INTERNAL MEDICINE

## 2025-06-04 PROCEDURE — 250N000013 HC RX MED GY IP 250 OP 250 PS 637: Performed by: STUDENT IN AN ORGANIZED HEALTH CARE EDUCATION/TRAINING PROGRAM

## 2025-06-04 PROCEDURE — 250N000012 HC RX MED GY IP 250 OP 636 PS 637: Performed by: STUDENT IN AN ORGANIZED HEALTH CARE EDUCATION/TRAINING PROGRAM

## 2025-06-04 PROCEDURE — 83735 ASSAY OF MAGNESIUM: CPT | Performed by: STUDENT IN AN ORGANIZED HEALTH CARE EDUCATION/TRAINING PROGRAM

## 2025-06-04 PROCEDURE — 84100 ASSAY OF PHOSPHORUS: CPT | Performed by: STUDENT IN AN ORGANIZED HEALTH CARE EDUCATION/TRAINING PROGRAM

## 2025-06-04 PROCEDURE — 80048 BASIC METABOLIC PNL TOTAL CA: CPT | Performed by: STUDENT IN AN ORGANIZED HEALTH CARE EDUCATION/TRAINING PROGRAM

## 2025-06-04 PROCEDURE — 250N000013 HC RX MED GY IP 250 OP 250 PS 637: Performed by: PHYSICIAN ASSISTANT

## 2025-06-04 PROCEDURE — 99233 SBSQ HOSP IP/OBS HIGH 50: CPT | Performed by: STUDENT IN AN ORGANIZED HEALTH CARE EDUCATION/TRAINING PROGRAM

## 2025-06-04 PROCEDURE — 99207 PR APP CREDIT; MD BILLING SHARED VISIT: CPT | Mod: FS

## 2025-06-04 PROCEDURE — 85049 AUTOMATED PLATELET COUNT: CPT | Performed by: STUDENT IN AN ORGANIZED HEALTH CARE EDUCATION/TRAINING PROGRAM

## 2025-06-04 PROCEDURE — 36415 COLL VENOUS BLD VENIPUNCTURE: CPT | Performed by: STUDENT IN AN ORGANIZED HEALTH CARE EDUCATION/TRAINING PROGRAM

## 2025-06-04 PROCEDURE — 120N000001 HC R&B MED SURG/OB

## 2025-06-04 RX ORDER — BUMETANIDE 2 MG/1
2 TABLET ORAL
Status: DISCONTINUED | OUTPATIENT
Start: 2025-06-04 | End: 2025-06-05 | Stop reason: HOSPADM

## 2025-06-04 RX ADMIN — BUMETANIDE 2 MG: 2 TABLET ORAL at 18:05

## 2025-06-04 RX ADMIN — INSULIN GLARGINE 50 UNITS: 100 INJECTION, SOLUTION SUBCUTANEOUS at 22:19

## 2025-06-04 RX ADMIN — SENNOSIDES AND DOCUSATE SODIUM 2 TABLET: 50; 8.6 TABLET ORAL at 21:37

## 2025-06-04 RX ADMIN — SACUBITRIL AND VALSARTAN 1 TABLET: 24; 26 TABLET, FILM COATED ORAL at 21:44

## 2025-06-04 RX ADMIN — Medication 1 TABLET: at 10:17

## 2025-06-04 RX ADMIN — ATORVASTATIN CALCIUM 80 MG: 40 TABLET, FILM COATED ORAL at 21:37

## 2025-06-04 RX ADMIN — BUMETANIDE 2 MG: 2 TABLET ORAL at 10:17

## 2025-06-04 RX ADMIN — CLOPIDOGREL BISULFATE 75 MG: 75 TABLET ORAL at 21:37

## 2025-06-04 RX ADMIN — SACUBITRIL AND VALSARTAN 1 TABLET: 24; 26 TABLET, FILM COATED ORAL at 10:18

## 2025-06-04 RX ADMIN — CARVEDILOL 25 MG: 12.5 TABLET, FILM COATED ORAL at 18:07

## 2025-06-04 RX ADMIN — CARVEDILOL 25 MG: 12.5 TABLET, FILM COATED ORAL at 10:17

## 2025-06-04 RX ADMIN — ASPIRIN 81 MG: 81 TABLET, COATED ORAL at 10:16

## 2025-06-04 RX ADMIN — TRAZODONE HYDROCHLORIDE 50 MG: 50 TABLET ORAL at 21:37

## 2025-06-04 RX ADMIN — ISOSORBIDE MONONITRATE 30 MG: 30 TABLET, EXTENDED RELEASE ORAL at 21:37

## 2025-06-04 RX ADMIN — INSULIN ASPART 1 UNITS: 100 INJECTION, SOLUTION INTRAVENOUS; SUBCUTANEOUS at 13:17

## 2025-06-04 RX ADMIN — Medication 5 MG: at 21:37

## 2025-06-04 RX ADMIN — CYANOCOBALAMIN TAB 1000 MCG 1000 MCG: 1000 TAB at 10:17

## 2025-06-04 NOTE — PROGRESS NOTES
[facility] [address]  [place] [address]   [email or web address]   OFFICE: [phone]     CARDIOLOGY FOLLOW-UP NOTE      Impression and Plan     ASSESSMENT  Multivessel CAD  Seen on angiogram March 2024, declined CABG at that time.  Repeat angiogram this admission with some progression of LCx disease.  Troponin on admission 31-26.  CTS recommended 2-week outpatient follow-up with Dr. Boateng due to need to recover from current osteomyelitis, uncontrolled diabetes and recent stroke.   Currently on Imdur 30 mg, aspirin, Plavix, atorvastatin 80, Coreg 25 mg twice daily  Feeling better this morning  Chronic systolic heart failure  LVEF 40 to 45% this admission (previously 30 to 35%)  Likely ischemic  PTA regimen 2 mg twice daily, carvedilol 25 mg twice daily, losartan 25 mg  Currently on carvedilol 25 mg twice daily, Entresto 24-26.   Difficult to assess volume status due to body habitus, angio with mildly elevated filling pressures, LVEDP 20, restarted Bumex today  Dyslipidemia   restarted on atorvastatin 80 mg    Other active problems:  CKD -baseline creatinine 1.5-1.7  DM2 - A1c 12.1  Recent CVA via brain MRI-right thalamus/internal capsule, no further patient workup  Right heel ulcer, s/p surgery February May of this year, MRI negative for osteomyelitis    PLAN  Restart home Bumex dose.  Discharge to TCU as planned  Follow-up with CTS in 2 weeks    Cardiology will sign off, please call with any further questions. Thank you for allowing us to partake in the care of this patient.     Follow up: Dr. Boateng 6/25      Primary Cardiologist: Emiliana Morris     49-year-old male with history of severe multivessel CAD, DM2, CKD, HFrEF, HTN, HLD, previous CVA, previous osteomyelitis admitted 5/20/2025 for HOWELL and lower extremity edema.  MRI showed infarct of the liver, send internal capsule.  Echo this admission with LVEF 40 to 45%    Patient feeling little  better this morning.  Reports some soreness in his right forearm.        Cardiac Diagnostics   Telemetry (personally reviewed): NSR no significant arrhythmias    ECG (personally reviewed and interpreted):   ECG, 3/28/2025: Sinus rhythm, no ischemic changes, low voltages    Echocardiogram (results reviewed):   Limited TTE, 5/29/2025  TDS. Limited views seen due to patient body habitus  The left ventricle is borderline dilated. The visual ejection fraction is 40-  45%.  There is moderate global hypokinesia of the left ventricle. Regional wall  motion abnormalities cannot be excluded due to limited visualization.  No significant valve disease.    Cardiac Cath (results reviewed):   6/3/2025  Floyd Capps is a 49 year old old male with multi-vessel CAD, HFrEF, HTN, HL, DM, smoking, high BMI who is here for updated angiography prior to CABG.  - known multi-vessel native CAD w/ some progression in Lcx territory; mildly elevated L-sided filling pressures  - proceed w/ CABG planning per CTS consult  - continue ASA 81mg daily indefinitely, atorva 80  - continue aggressive risk factor modification      3/1/2024  Floyd Capps is a 48 year old old male with HTN, HL, DM, smoking, high BMI who is here for w/up of new cardiomyopathy/admission for ADHF.     - multi-vessel native CAD; severely elevated L-sided filling pressures  - will stop to obtain CTS consult  - continue ASA 81mg daily indefinitely, atorva 80  - continue aggressive risk factor modification    LM:no obstruction  LAD:proximal Ca2+ 70% narrowing, mid-distal 90% narrowing. Small D1 w/ diffuse 90% narrowing  Lcx:OM1 proximal 90%, small superior subdivision 90%, inferior 80%. OM1 small 90% narrowing  RCA:dominant, distal 85%, rPDA 95% narrowing      Physical Examination       BP 93/51 (BP Location: Left arm, Patient Position: Semi-Arnett's, Cuff Size: Adult Large)   Pulse 70   Temp 98  F (36.7  C) (Oral)   Resp 19   Wt (!) 140.9 kg (310 lb 10.1 oz)   SpO2 92%    BMI 44.57 kg/m              Intake/Output Summary (Last 24 hours) at 6/4/2025 0740  Last data filed at 6/3/2025 1821  Gross per 24 hour   Intake 1030 ml   Output 400 ml   Net 630 ml       General: Obese, in no acute distress. Resting comfortably in exam bed  Skin: No clubbing, no cyanosis.  Eyes: Extra ocular movements intact  Neck: Unable to visualize JVP  Lungs: Clear to auscultation bilaterally  Heart: Regular rhythm, PMI not displaced, S1, S2 normal, no S3, no S4, no heaves, no rub and no murmur.  Abdomen: Soft, nontender, bowel sounds normal.  Extremities: No peripheral edema . Grade 2/4 distal pulses bilaterally.  Neuro: Oriented to person, place and time, alert, cooperative, gait coordinated.             Lab Results   Lab Results   Component Value Date    CHOL 224 (H) 03/23/2025    HDL 53 03/23/2025    TRIG 213 (H) 03/23/2025    BUN 19.0 06/04/2025     06/04/2025    CO2 20 (L) 06/04/2025       Lab Results   Component Value Date    WBC 11.8 (H) 05/31/2025    HGB 11.5 (L) 05/31/2025    HCT 33.6 (L) 05/31/2025    MCV 95 06/04/2025     06/04/2025       Lab Results   Component Value Date    TSH 2.72 08/23/2023    INR 0.95 08/23/2023               Current Inpatient Scheduled Medications   Scheduled Meds:  Current Facility-Administered Medications   Medication Dose Route Frequency Provider Last Rate Last Admin    aspirin EC tablet 81 mg  81 mg Oral Daily Earnest Del Rio MD   81 mg at 06/03/25 1003    atorvastatin (LIPITOR) tablet 80 mg  80 mg Oral QPM Earnest Del Rio MD   80 mg at 06/03/25 2034    bumetanide (BUMEX) tablet 2 mg  2 mg Oral BID Caitlyn Alonso MD        carvedilol (COREG) tablet 25 mg  25 mg Oral BID w/meals Earnest Del Rio MD   25 mg at 06/03/25 1813    clopidogrel (PLAVIX) tablet 75 mg  75 mg Oral QPM Earnest Del Rio MD   75 mg at 06/03/25 2034    cyanocobalamin (VITAMIN B-12) tablet 1,000 mcg  1,000 mcg Oral Daily Earnest Del Rio MD   1,000 mcg at 06/03/25 1004    [Held  by provider] heparin ANTICOAGULANT injection 5,000 Units  5,000 Units Subcutaneous Q8H Earnest Del Rio MD   5,000 Units at 06/03/25 0201    insulin aspart (NovoLOG) injection (RAPID ACTING)  20 Units Subcutaneous TID w/meals Earnest Del Rio MD   20 Units at 06/03/25 1747    insulin aspart (NovoLOG) injection (RAPID ACTING)  1-10 Units Subcutaneous TID AC Earnest Del Rio MD   3 Units at 06/03/25 1747    insulin aspart (NovoLOG) injection (RAPID ACTING)  1-7 Units Subcutaneous At Bedtime Earnest Del Rio MD   4 Units at 05/29/25 1230    insulin glargine (LANTUS PEN) injection 50 Units  50 Units Subcutaneous At Bedtime Camila Vargas MD   50 Units at 06/03/25 2211    isosorbide mononitrate (IMDUR) 24 hr tablet 30 mg  30 mg Oral QPM Earnest Del Rio MD   30 mg at 06/03/25 2034    melatonin tablet 5 mg  5 mg Oral At Bedtime Earnest Del Rio MD   5 mg at 06/03/25 2211    multivitamin w/minerals (THERA-VIT-M) tablet 1 tablet  1 tablet Oral Daily Earnest Del Rio MD   1 tablet at 06/03/25 1003    sacubitril-valsartan (ENTRESTO) 24-26 MG per tablet 1 tablet  1 tablet Oral BID Sue Mclaughlin PA-C   1 tablet at 06/03/25 2034    sodium chloride (PF) 0.9% PF flush 3 mL  3 mL Intracatheter Q8H [facility] Stephen Berger MD   3 mL at 06/04/25 0547    sodium chloride (PF) 0.9% PF flush 3 mL  3 mL Intracatheter Q8H Earnest Del Rio MD   3 mL at 06/04/25 0547    traZODone (DESYREL) tablet 50 mg  50 mg Oral At Bedtime Earnest Del Rio MD   50 mg at 06/03/25 2211     Continuous Infusions:  Current Facility-Administered Medications   Medication Dose Route Frequency Provider Last Rate Last Admin            Medications Prior to Admission   Prior to Admission medications    Medication Sig Start Date End Date Taking? Authorizing Provider   acetaminophen (TYLENOL) 500 MG tablet Take 2 tablets (1,000 mg) by mouth every 8 hours as needed for mild pain 3/5/24  Yes Nata Baca MD   aspirin 81 MG EC tablet Take 1 tablet  (81 mg) by mouth daily. 3/28/25  Yes Ricardo Herndon DO   atorvastatin (LIPITOR) 80 MG tablet Take 1 tablet (80 mg) by mouth daily 7/11/24  Yes Glendy Benavides NP   bumetanide (BUMEX) 2 MG tablet Take 1 tablet (2 mg) by mouth 2 times daily. 1/30/25  Yes Glendy Benavides NP   carvedilol (COREG) 25 MG tablet Take 1 tablet (25 mg) by mouth 2 times daily (with meals). 1/30/25  Yes Glendy Benavides NP   clopidogrel (PLAVIX) 75 MG tablet Take 1 tablet (75 mg) by mouth daily. 3/28/25 6/24/25 Yes Ricardo Herndon DO   cyanocobalamin (VITAMIN B-12) 1000 MCG tablet Take 1,000 mcg by mouth daily.   Yes Reported, Patient   insulin glargine (LANTUS PEN) 100 UNIT/ML pen Inject 35 Units subcutaneously at bedtime. 3/27/25  Yes Ricardo Herndon DO   insulin lispro (HUMALOG KWIKPEN) 100 UNIT/ML (1 unit dial) KWIKPEN Inject 20 Units subcutaneously 3 times daily (with meals). Plus sliding scale: 2 units every 50 over 150. If humalog is not covered by insurance, may substitute with novolog or other fast acting insulin 3/27/25  Yes Ricardo Herndon DO   isosorbide mononitrate (IMDUR) 30 MG 24 hr tablet Take 1 tablet (30 mg) by mouth daily. 11/7/24  Yes Gee Hopson DO   losartan (COZAAR) 25 MG tablet Take 1 tablet (25 mg) by mouth daily. 10/9/24  Yes Gee Hopson DO   metFORMIN (GLUCOPHAGE XR) 500 MG 24 hr tablet Take 1 tablet (500 mg) by mouth daily (with dinner). 1/30/25  Yes Glendy Benavides NP   multivitamin w/minerals (THERA-VIT-M) tablet Take 1 tablet by mouth daily 3/5/24  Yes Nata Baca MD   Continuous Glucose Sensor (FREESTYLE LAMINE 2 SENSOR) MISC Inject 1 each subcutaneously every 14 days Use 1 sensor every 14 days. Use to read blood sugars per 's instructions.  Patient not taking: Reported on 10/9/2024 7/11/24   Glendy Benavides, NP   insulin pen needle (32G X 4 MM) 32G X 4 MM miscellaneous Use 4 pen needles daily or as directed. 9/18/24   Glendy Benavides, CATHY Tran  DANYELL Silva PA-C

## 2025-06-04 NOTE — PLAN OF CARE
Problem: Gas Exchange Impaired  Goal: Optimal Gas Exchange  Outcome: Progressing  Intervention: Optimize Oxygenation and Ventilation  Recent Flowsheet Documentation  Taken 6/4/2025 0030 by Aiden Egan, RN  Head of Bed (HOB) Positioning: HOB at 20-30 degrees  Taken 6/3/2025 2036 by Aiden Egan, RN  Head of Bed (HOB) Positioning: HOB at 20-30 degrees     Problem: Comorbidity Management  Goal: Blood Glucose Levels Within Targeted Range  Outcome: Progressing  Goal: Maintenance of Heart Failure Symptom Control  Outcome: Progressing   Goal Outcome Evaluation:       Alert and oriented x4. No complaints pain. No sob or dizziness reported. HS BG was 187, 2 am BG refused. VSS, on room air.

## 2025-06-04 NOTE — PROGRESS NOTES
[facility]    Medicine Progress Note - Hospitalist Service    Date of Admission:  5/28/2025    Assessment & Plan   Floyd Capps is a 49 year old male with a past medical history of Previous CVA, CAD, HFrEF who was admitted on 5/28/25 after presenting to [facility] ED for Generalized Weakness Dizziness and SOB.  Patient still having significant dyspnea on minimal exertion.  CTS team to decide when and if they are considering CABG.        Dizziness, Generalized Weakness  History of Strokes   - Previous strokes left lacunar stroke 08/2023, right thalamic 03/2025  - Home meds: Aspirin, Plavix, Atorvastatin  - MRI Brain in ED showing interval change of previous right thalamic stroke with no acute ischemia.  - Continue aspirin, plavix, statin  - PT/OT:  recommending TCU     HFrEF exacerbation   CAD with multivessel disease  - CXR in ED showing no signs of pulmonary congestion or pneumonia.  - Last Echo 03/2025 with EF 30-35% with global hypokinesis. Now estimated left ventricular ejection fraction is 40 to 45%.  - Home Meds: losartan 25mg, carvedilol 25mg, bumex 2mg BID  - Cardiology consult appreciated  - Coronary angiogram on 3/2024 reported multivessel CAD - outpatient cardiology considering bypass surgery  -Entresto 24-26 mg twice daily  -Imdur 30 mg oral daily  -Atorvastatin 80 mg oral daily  -Continue aspirin 81 mg oral daily  -Continue Plavix 75 mg oral daily  -Monitor daily weights, strict input and output, monitor BMP   -Resumed home Bumex on 06/04 per cards  -Coronary angiogram updated on 06/03 prior to CTS evaluation.     Right Heel Ulcer  History of Right Foot Osteomyelitis  - Previous surgeries 02/2024, 05/2024 on right foot for osteomyelitis.  - Wound team consult appreciated  - MRI: Negative for evidence of osteomyelitis  - CRP is slightly high but is no worse than previously value: 21.4  -Appreciate podiatry consult.  No plan for procedure.  Recommending Prafo boots      Type 2 Diabetes,  poorly controlled   - Last A1c was 12.1 in 03/2025.  - Takes metformin at home.  - Home medications include insulin glargine 35 units nightly, lispro scheduled 20 units with meals + sliding scale.  - Hold home metformin  -  Glargine 50 units nightly  - Continue home lispro 20 units scheduled with meals  - Sliding Scale Insulin with Glucose Checks  -  A1c: 11.3      CKD stage IIIa;  -- Monitor BMP  -- Avoid nephrotoxins as able.          Diet: Fluid restriction 2000 ML FLUID  Moderate Consistent Carb (60 g CHO per Meal) Diet    DVT Prophylaxis: Heparin SQ  Cazares Catheter: Not present  Lines: PRESENT             Cardiac Monitoring: ACTIVE order. Indication: Post- PCI/Angiogram (24 hours)  Code Status: Full Code      Clinically Significant Risk Factors          # Hyperchloremia: Highest Cl = 108 mmol/L in last 2 days, will monitor as appropriate              # Hypertension: Noted on problem list           # DMII: A1C = 11.3 % (Ref range: <5.7 %) within past 6 months         # Financial/Environmental Concerns: unable to afford rent/mortgage;unemployed  # Support System: poor social support noted in nursing assessment  # Housing Instability: noted in nursing assessment         Social Drivers of Health    Food Insecurity: High Risk (5/29/2025)    Food Insecurity     Within the past 12 months, did you worry that your food would run out before you got money to buy more?: Yes     Within the past 12 months, did the food you bought just not last and you didn t have money to get more?: No   Depression: At risk (1/30/2025)    PHQ-2     PHQ-2 Score: 6   Housing Stability: High Risk (5/29/2025)    Housing Stability     Do you have housing? : No     Are you worried about losing your housing?: Patient unable to answer   Tobacco Use: Medium Risk (1/30/2025)    Patient History     Smoking Tobacco Use: Former     Smokeless Tobacco Use: Never   Financial Resource Strain: High Risk (5/29/2025)    Financial Resource Strain     Within the  past 12 months, have you or your family members you live with been unable to get utilities (heat, electricity) when it was really needed?: Yes   Transportation Needs: Low Risk  (5/29/2025)    Transportation Needs     Within the past 12 months, has lack of transportation kept you from medical appointments, getting your medicines, non-medical meetings or appointments, work, or from getting things that you need?: No   Recent Concern: Transportation Needs - High Risk (3/23/2025)    Transportation Needs     Within the past 12 months, has lack of transportation kept you from medical appointments, getting your medicines, non-medical meetings or appointments, work, or from getting things that you need?: Yes   Interpersonal Safety: Unknown (5/29/2025)    Interpersonal Safety     Do you feel physically and emotionally safe where you currently live?: Patient declined     Within the past 12 months, have you been hit, slapped, kicked or otherwise physically hurt by someone?: Patient declined     Within the past 12 months, have you been humiliated or emotionally abused in other ways by your partner or ex-partner?: Patient declined          Disposition Plan     Medically Ready for Discharge: Anticipated Tomorrow             Caitlyn Alonso MD  Hospitalist Service  [facility]  Securely message with [employer] (more info)  Text page via [facility] Paging/Directory   ______________________________________________________________________    Interval History   Patient is seen and examined at bedside.   No complaints.  Plan of care discussed with patient. All questions answered. Pt verbalized understanding.     Physical Exam   Vital Signs: Temp: 97.9  F (36.6  C) Temp src: Oral BP: 89/45 Pulse: 73   Resp: 18 SpO2: 95 % O2 Device: None (Room air)    Weight: 310 lbs 10.05 oz    GEN: Alert and oriented. Not in acute distress.  HEENT: Atraumatic, mucous membrane- moist and pink.  Chest: Bilateral air entry.  CVS: S1S2  regular.   Abdomen: Soft. Non-tender, non-distended. No organomegaly. No guarding or rigidity. Bowel sounds active.   Extremities: b/l LE edema +  CNS: No involuntary movements.  Skin: no cyanosis or clubbing.     Medical Decision Making       51 MINUTES SPENT BY ME on the date of service doing chart review, history, exam, documentation & further activities per the note.      Data

## 2025-06-04 NOTE — PROGRESS NOTES
"Care Management Follow Up    Length of Stay (days): 7    Expected Discharge Date: 06/05/2025    Anticipated Discharge Plan:  Transitional Care    Transportation: Confirmed medical transport    PT Recommendations: Transitional Care Facility  OT Recommendations:  Transitional Care Facility     Barriers to Discharge: transport     Prior Living Situation: homeless, other (see comments) (\"I live out of my truck. I was evicted on May 21\".) with alone    Discussed  Partnership in Safe Discharge Planning  document with patient/family: No     Handoff Completed: No, handoff not indicated or clinically appropriate    Patient/Spokesperson Updated: Yes. Who? Patient     Additional Information:  [facility] (pt's preferred [facility]), however, called transport and there are no rides available today. Wheel chair transport arranged for  on 6/5 between [id number]. PAS completed and on chart.   Patient has his truck in the parking lot, reports there are clothes in his truck that he needs prior to discharge. Asked RN if someone would be able to take patient to his truck to get these things prior to discharge.     Updated MD.     11:50 AM  Met with patient at bedside to inform him of discharge plan and timing. Patient confirms agreement with discharge plan.     Next Steps: confirm discharge     TERESA Escalante      "

## 2025-06-04 NOTE — PLAN OF CARE
Problem: Adult Inpatient Plan of Care  Goal: Optimal Comfort and Wellbeing  Outcome: Progressing     Problem: Skin Injury Risk Increased  Goal: Skin Health and Integrity  Outcome: Progressing  Intervention: Plan: Nurse Driven Intervention: Moisture Management  Recent Flowsheet Documentation  Taken 6/4/2025 1026 by Ashanti Jean Baptiste RN  Moisture Interventions: Encourage regular toileting  Intervention: Plan: Nurse Driven Intervention: Friction and Shear  Recent Flowsheet Documentation  Taken 6/4/2025 1026 by Ashanti Jean Baptiste RN  Friction/Shear Interventions: HOB 30 degrees or less  Intervention: Optimize Skin Protection  Recent Flowsheet Documentation  Taken 6/4/2025 1026 by Ashanti Jean Baptiste RN  Pressure Reduction Techniques: (PRAFO boot in place) heels elevated off bed  Activity Management: activity adjusted per tolerance  Taken 6/4/2025 0955 by Ashanti Jean Baptiste RN  Head of Bed (HOB) Positioning: HOB lowered     Problem: Gas Exchange Impaired  Goal: Optimal Gas Exchange  Outcome: Progressing  Intervention: Optimize Oxygenation and Ventilation  Recent Flowsheet Documentation  Taken 6/4/2025 0955 by Ashanti Jean Baptiste RN  Head of Bed (HOB) Positioning: HOB lowered     Problem: Comorbidity Management  Goal: Blood Glucose Levels Within Targeted Range  Outcome: Progressing  Goal: Maintenance of Heart Failure Symptom Control  Outcome: Progressing   Goal Outcome Evaluation:       Patient alert and oriented x 4. Denied pain. Uses urinal at bedside. Patient asked if he wanted to go to his truck to get his belongings, but patient said he will deal with it later during the day. PRAFO boot in place. K, Mg, and phos recheck in the morning.

## 2025-06-05 ENCOUNTER — MEDICAL CORRESPONDENCE (OUTPATIENT)
Dept: HEALTH INFORMATION MANAGEMENT | Facility: CLINIC | Age: 50
End: 2025-06-05

## 2025-06-05 VITALS
WEIGHT: 310.63 LBS | HEART RATE: 71 BPM | OXYGEN SATURATION: 96 % | DIASTOLIC BLOOD PRESSURE: 58 MMHG | SYSTOLIC BLOOD PRESSURE: 107 MMHG | BODY MASS INDEX: 44.57 KG/M2 | TEMPERATURE: 97.5 F | RESPIRATION RATE: 18 BRPM

## 2025-06-05 LAB
ANION GAP SERPL CALCULATED.3IONS-SCNC: 9 MMOL/L (ref 7–15)
BUN SERPL-MCNC: 23.8 MG/DL (ref 6–20)
CALCIUM SERPL-MCNC: 8.9 MG/DL (ref 8.8–10.4)
CHLORIDE SERPL-SCNC: 106 MMOL/L (ref 98–107)
CREAT SERPL-MCNC: 1.63 MG/DL (ref 0.67–1.17)
EGFRCR SERPLBLD CKD-EPI 2021: 51 ML/MIN/1.73M2
GLUCOSE BLDC GLUCOMTR-MCNC: 166 MG/DL (ref 70–99)
GLUCOSE SERPL-MCNC: 144 MG/DL (ref 70–99)
HCO3 SERPL-SCNC: 22 MMOL/L (ref 22–29)
HOLD SPECIMEN: NORMAL
MAGNESIUM SERPL-MCNC: 1.9 MG/DL (ref 1.7–2.3)
PHOSPHATE SERPL-MCNC: 5 MG/DL (ref 2.5–4.5)
POTASSIUM SERPL-SCNC: 4.3 MMOL/L (ref 3.4–5.3)
SODIUM SERPL-SCNC: 137 MMOL/L (ref 135–145)

## 2025-06-05 PROCEDURE — 250N000013 HC RX MED GY IP 250 OP 250 PS 637: Performed by: STUDENT IN AN ORGANIZED HEALTH CARE EDUCATION/TRAINING PROGRAM

## 2025-06-05 PROCEDURE — 82374 ASSAY BLOOD CARBON DIOXIDE: CPT | Performed by: STUDENT IN AN ORGANIZED HEALTH CARE EDUCATION/TRAINING PROGRAM

## 2025-06-05 PROCEDURE — 36415 COLL VENOUS BLD VENIPUNCTURE: CPT | Performed by: STUDENT IN AN ORGANIZED HEALTH CARE EDUCATION/TRAINING PROGRAM

## 2025-06-05 PROCEDURE — 83735 ASSAY OF MAGNESIUM: CPT | Performed by: STUDENT IN AN ORGANIZED HEALTH CARE EDUCATION/TRAINING PROGRAM

## 2025-06-05 PROCEDURE — 250N000012 HC RX MED GY IP 250 OP 636 PS 637: Performed by: STUDENT IN AN ORGANIZED HEALTH CARE EDUCATION/TRAINING PROGRAM

## 2025-06-05 PROCEDURE — 250N000013 HC RX MED GY IP 250 OP 250 PS 637: Performed by: PHYSICIAN ASSISTANT

## 2025-06-05 PROCEDURE — 80048 BASIC METABOLIC PNL TOTAL CA: CPT | Performed by: STUDENT IN AN ORGANIZED HEALTH CARE EDUCATION/TRAINING PROGRAM

## 2025-06-05 PROCEDURE — 84100 ASSAY OF PHOSPHORUS: CPT | Performed by: STUDENT IN AN ORGANIZED HEALTH CARE EDUCATION/TRAINING PROGRAM

## 2025-06-05 PROCEDURE — 99239 HOSP IP/OBS DSCHRG MGMT >30: CPT | Performed by: STUDENT IN AN ORGANIZED HEALTH CARE EDUCATION/TRAINING PROGRAM

## 2025-06-05 RX ORDER — BUMETANIDE 2 MG/1
2 TABLET ORAL DAILY
DISCHARGE
Start: 2025-06-06

## 2025-06-05 RX ADMIN — INSULIN ASPART 1 UNITS: 100 INJECTION, SOLUTION INTRAVENOUS; SUBCUTANEOUS at 10:10

## 2025-06-05 RX ADMIN — Medication 1 TABLET: at 10:05

## 2025-06-05 RX ADMIN — BUMETANIDE 2 MG: 2 TABLET ORAL at 10:06

## 2025-06-05 RX ADMIN — SACUBITRIL AND VALSARTAN 1 TABLET: 24; 26 TABLET, FILM COATED ORAL at 10:05

## 2025-06-05 RX ADMIN — CYANOCOBALAMIN TAB 1000 MCG 1000 MCG: 1000 TAB at 10:05

## 2025-06-05 RX ADMIN — ASPIRIN 81 MG: 81 TABLET, COATED ORAL at 10:05

## 2025-06-05 ASSESSMENT — ACTIVITIES OF DAILY LIVING (ADL)
ADLS_ACUITY_SCORE: 35

## 2025-06-05 NOTE — PLAN OF CARE
Physical Therapy Discharge Summary    Reason for therapy discharge:    Discharged to transitional care facility.    Progress towards therapy goal(s). See goals on Care Plan in [facility] electronic health record for goal details.  Goals partially met.  Barriers to achieving goals:   discharge from facility.    Therapy recommendation(s):    Continued therapy is recommended.  Rationale/Recommendations:  recommend continued PT at TCU.    Yolanda Gill, PT  6/5/2025

## 2025-06-05 NOTE — DISCHARGE SUMMARY
[facility]  Hospitalist Discharge Summary      Date of Admission:  5/28/2025  Date of Discharge:  6/5/2025  Discharging Provider: Caitlyn Alonso MD  Discharge Service: Hospitalist Service    Discharge Diagnoses   HFrEF exacerbation  Dizziness    Clinically Significant Risk Factors     # DMII: A1C = 11.3 % (Ref range: <5.7 %) within past 6 months       Follow-ups Needed After Discharge   Follow-up Appointments       Follow Up and recommended labs and tests      Follow up with detention physician.  The following labs/tests are recommended: BMP, Mg in a couple of days. If Creatinine improves, Bumetanide can be increased to 2 mg twice a day- defer to NH provider.  Follow up with primary care provider in 1-2 weeks.  The following labs/tests are recommended: BMP, Mg.  Follow with Cardiothoracic surgery on June 25 as scheduled.   Follow with Cardiology and neurology as scheduled.                Unresulted Labs Ordered in the Past 30 Days of this Admission       No orders found from 4/28/2025 to 5/29/2025.            Discharge Disposition   Discharged to short-term care facility  Condition at discharge: Stable    Hospital Course   Floyd Capps is a 49 year old male with a past medical history of Previous CVA, CAD, HFrEF who was admitted on 5/28/25 after presenting to [facility] ED for Generalized Weakness Dizziness and SOB.    -- CHF exacerbation improved. Bumex 2 mg once a day on discharge due to increase in creatinine. Check Cr in about 2 days and if creatinine improves, can resume his home dose of bumex 2 mg twice a day.  -- MVCAD: follow up with CTS as scheduled for CABG     Dizziness, Generalized Weakness  History of Strokes   - Previous strokes left lacunar stroke 08/2023, right thalamic 03/2025  - Home meds: Aspirin, Plavix, Atorvastatin  - MRI Brain in ED showing interval change of previous right thalamic stroke with no acute ischemia.  - Continue aspirin, plavix, statin  - PT/OT:   recommending TCU     HFrEF exacerbation   CAD with multivessel disease  - CXR in ED showing no signs of pulmonary congestion or pneumonia.  - Last Echo 03/2025 with EF 30-35% with global hypokinesis. Now estimated left ventricular ejection fraction is 40 to 45%.  - Home Meds: losartan 25mg, carvedilol 25mg, bumex 2mg BID  - Cardiology consult appreciated  - Coronary angiogram on 3/2024 reported multivessel CAD - outpatient cardiology considering bypass surgery  -Entresto 24-26 mg twice daily  -Imdur 30 mg oral daily  -Atorvastatin 80 mg oral daily  -Continue aspirin 81 mg oral daily  -Continue Plavix 75 mg oral daily  -Monitor daily weights, strict input and output, monitor BMP   -Resumed home Bumex on 06/04 per cards  -Coronary angiogram updated on 06/03 prior to CTS evaluation.  - 06/05:  Discussed with Dr. Noel, cardiology: Bumex 2 mg once a day on discharge due to increase in creatinine. Check Cr in about 2 days and if creatinine improves, can resume his home dose of bumex 2 mg twice a day.  Right Heel Ulcer  History of Right Foot Osteomyelitis  - Previous surgeries 02/2024, 05/2024 on right foot for osteomyelitis.  - Wound team consult appreciated  - MRI: Negative for evidence of osteomyelitis  - CRP is slightly high but is no worse than previously value: 21.4  -Appreciate podiatry consult.  No plan for procedure.  Recommending Prafo boots and wound care.  -Podiatry referral given  Type 2 Diabetes, poorly controlled   - Last A1c was 12.1 in 03/2025.  - Takes metformin at home.  - Home medications include insulin glargine 35 units nightly, lispro scheduled 20 units with meals + sliding scale.  - Hold home metformin- may resume when kidney function is improving.  -  Glargine 50 units nightly will continue on discharge  - Continue home lispro 20 units scheduled with meals  - Accu-checks  -  A1c: 11.3   CKD stage IIIa;  -- Monitor BMP. Baseline 1.3-1.5. Currently 1.6. Diuretics dose decreased. Check renal function in  a couple of days at TCU. Monitor I/O.  -- Avoid nephrotoxins as able.  Patient is clinically and hemodynamically stable for discharge. Medication reconciliation was done. All labs and radiologic findings discussed with patient. Follow up appointments and recommendations as shown below. Patient/family verbalized understanding and agreed to plan of care. All questions answered.     Consultations This Hospital Stay   PHYSICAL THERAPY ADULT IP CONSULT  OCCUPATIONAL THERAPY ADULT IP CONSULT  WOUND OSTOMY CONTINENCE NURSE  IP CONSULT  CARDIOLOGY IP CONSULT  CARE MANAGEMENT / SOCIAL WORK IP CONSULT  PHARMACY LIAISON FOR MEDICATION COVERAGE CONSULT  PHARMACY LIAISON FOR MEDICATION COVERAGE CONSULT  CARDIOTHORACIC SURGERY IP CONSULT  CARE MANAGEMENT / SOCIAL WORK IP CONSULT  CARE MANAGEMENT / SOCIAL WORK IP CONSULT  CARDIAC REHAB IP CONSULT  PODIATRY IP CONSULT  CARDIOTHORACIC SURGERY IP CONSULT  CARDIOTHORACIC SURGERY IP CONSULT  PHYSICAL THERAPY ADULT IP CONSULT  PHYSICAL THERAPY ADULT IP CONSULT  OCCUPATIONAL THERAPY ADULT IP CONSULT    Code Status   Full Code    Time Spent on this Encounter   ICaitlyn MD, personally saw the patient today and spent greater than 30 minutes discharging this patient.       Caitlyn Alonso MD  [place] [id number]  Phone: [phone]  Fax: [phone]  ______________________________________________________________________    Physical Exam   Vital Signs: Temp: 98.3  F (36.8  C) Temp src: Oral BP: 93/53 Pulse: 71   Resp: 18 SpO2: 98 % O2 Device: None (Room air)    Weight: 310 lbs 10.05 oz  GEN: Alert and oriented. Not in acute distress.  HEENT: Atraumatic, mucous membrane- moist and pink.  Chest: Bilateral air entry.  CVS: S1S2 regular.   Abdomen: Soft. Non-tender, non-distended. No organomegaly. No guarding or rigidity. Bowel sounds active.   Extremities: B/l LE edema +  CNS: No involuntary movements.  Skin: no  cyanosis or clubbing.        Primary Care Physician   Glendy Benavides    Discharge Orders      Primary Care - Care Coordination Referral      Orthopedic  Referral      Discharge Instructions - IF on Metformin (Glucophage or Glucovance) or Metformin containing medications on day of the procedure and for 48 hours after IV contrast given- Patients with acute kidney injury or severe chronic kidney disease (stage IV or stage V; i.e., eGFR less than 30)    IF on Metformin (Glucophage or Glucovance) or Metformin containing medications , schedule a Basic Metabolic Panel at [facility] or Primary [facility] in 48 - 72 hours post procedure and PRIOR TO resuming the Metformin or Metformin containing medications.  Hold Metformin (Glucophage or Glucovance) or Metformin containing medications until after the Basic Metabolic Panel on the 2nd or 3rd day following the procedure.  May resume after blood draw is complete.     General info for SNF    Length of Stay Estimate: Short Term Care: Estimated # of Days <30  Condition at Discharge: Stable  Level of care:skilled   Rehabilitation Potential: Fair  Admission H&P remains valid and up-to-date: Yes  Recent Chemotherapy: N/A  Use Nursing Home Standing Orders: Yes     Mantoux instructions    Give two-step Mantoux (PPD) Per Facility Policy Yes     Reason for your hospital stay    HFrEF exacerbation     Wound care    Site:  Right heel  Instructions:  Keep dry. Paint with betadine swab.     Activity - Up with nursing assistance    PRAFO boot to be worn at all times to completely offload the right heel.  Recommend non weight bearing on the right foot with use of either a knee walker or quad walker with toe touch only.     Follow Up and recommended labs and tests    Follow up with penitentiary physician.  The following labs/tests are recommended: BMP, Mg in a couple of days. If Creatinine improves, Bumetanide can be increased to 2 mg twice a day- defer to NH provider.  Follow up with  primary care provider in 1-2 weeks.  The following labs/tests are recommended: BMP, Mg.  Follow with Cardiothoracic surgery on June 25 as scheduled.   Follow with Cardiology and neurology as scheduled.     Physical Therapy Adult Consult    Evaluate and treat as clinically indicated.    Reason:  Impaired functional mobility     Occupational Therapy Adult Consult    Evaluate and treat as clinically indicated.    Reason:  Inability to complete ADLs/IADLs     Contact Isolation     Fall precautions     Diet    Follow this diet upon discharge: Current Diet:Orders Placed This Encounter      Fluid restriction 2000 ML FLUID      Moderate Consistent Carb (60 g CHO per Meal) Diet       Significant Results and Procedures       Discharge Medications   Current Discharge Medication List        START taking these medications    Details   sacubitril-valsartan (ENTRESTO) 24-26 MG per tablet Take 1 tablet by mouth 2 times daily.    Associated Diagnoses: Chronic systolic heart failure (H)           CONTINUE these medications which have CHANGED    Details   bumetanide (BUMEX) 2 MG tablet Take 1 tablet (2 mg) by mouth daily.    Associated Diagnoses: Acute systolic heart failure (H)      insulin glargine (LANTUS PEN) 100 UNIT/ML pen Inject 50 Units subcutaneously at bedtime.    Comments: If Lantus is not covered by insurance, may substitute Basaglar or Semglee or other insulin glargine product per insurance preference at same dose and frequency.    Associated Diagnoses: Poorly controlled diabetes mellitus (H)           CONTINUE these medications which have NOT CHANGED    Details   acetaminophen (TYLENOL) 500 MG tablet Take 2 tablets (1,000 mg) by mouth every 8 hours as needed for mild pain    Associated Diagnoses: Diabetic ulcer of right heel associated with type 2 diabetes mellitus, unspecified ulcer stage (H)      aspirin 81 MG EC tablet Take 1 tablet (81 mg) by mouth daily.    Associated Diagnoses: Acute ischemic stroke (H)       atorvastatin (LIPITOR) 80 MG tablet Take 1 tablet (80 mg) by mouth daily  Qty: 90 tablet, Refills: 3    Associated Diagnoses: S/P foot surgery, right; Acute systolic heart failure (H); Familial hypercholesterolemia; Class 2 severe obesity due to excess calories with serious comorbidity in adult, unspecified BMI (H); History of stroke; Elevated troponin; Tobacco use disorder      carvedilol (COREG) 25 MG tablet Take 1 tablet (25 mg) by mouth 2 times daily (with meals).  Qty: 180 tablet, Refills: 0    Associated Diagnoses: Acute systolic heart failure (H)      clopidogrel (PLAVIX) 75 MG tablet Take 1 tablet (75 mg) by mouth daily.    Associated Diagnoses: Acute ischemic stroke (H)      cyanocobalamin (VITAMIN B-12) 1000 MCG tablet Take 1,000 mcg by mouth daily.      insulin lispro (HUMALOG KWIKPEN) 100 UNIT/ML (1 unit dial) KWIKPEN Inject 20 Units subcutaneously 3 times daily (with meals). Plus sliding scale: 2 units every 50 over 150. If humalog is not covered by insurance, may substitute with novolog or other fast acting insulin    Associated Diagnoses: Uncontrolled type 2 diabetes mellitus with hyperglycemia, with long-term current use of insulin (H)      isosorbide mononitrate (IMDUR) 30 MG 24 hr tablet Take 1 tablet (30 mg) by mouth daily.  Qty: 30 tablet, Refills: 11    Associated Diagnoses: Acute on chronic systolic congestive heart failure (H); Coronary artery disease involving native coronary artery of native heart with other form of angina pectoris      metFORMIN (GLUCOPHAGE XR) 500 MG 24 hr tablet Take 1 tablet (500 mg) by mouth daily (with dinner).  Qty: 90 tablet, Refills: 0    Associated Diagnoses: Poorly controlled diabetes mellitus (H)      multivitamin w/minerals (THERA-VIT-M) tablet Take 1 tablet by mouth daily    Associated Diagnoses: Diabetic ulcer of right heel associated with type 2 diabetes mellitus, unspecified ulcer stage (H)      Continuous Glucose Sensor (FREESTYLE LAMINE 2 SENSOR) MISC  Inject 1 each subcutaneously every 14 days Use 1 sensor every 14 days. Use to read blood sugars per 's instructions.  Qty: 6 each, Refills: 5    Associated Diagnoses: Uncontrolled type 2 diabetes mellitus with hyperglycemia, with long-term current use of insulin (H)      insulin pen needle (32G X 4 MM) 32G X 4 MM miscellaneous Use 4 pen needles daily or as directed.  Qty: 200 each, Refills: 4    Associated Diagnoses: Uncontrolled type 2 diabetes mellitus with hyperglycemia, with long-term current use of insulin (H)           STOP taking these medications       losartan (COZAAR) 25 MG tablet Comments:   Reason for Stopping:             Allergies   No Known Allergies

## 2025-06-05 NOTE — PROGRESS NOTES
Care Management Discharge Note    Discharge Date: 06/05/2025       Discharge Disposition: Transitional Care    Discharge Services: None    Discharge DME: None    Discharge Transportation: car, drives self    Private pay costs discussed: transportation     Does the patient's insurance plan have a 3 day qualifying hospital stay waiver?  No    PAS Confirmation Code: [id number]  Patient/family educated on Medicare website which has current facility and service quality ratings: yes    Education Provided on the Discharge Plan: Yes  Persons Notified of Discharge Plans: patient   Patient/Family in Agreement with the Plan: yes    Handoff Referral Completed: No, handoff not indicated or clinically appropriate    Additional Information:  Patient discharging to [facility] (pt's preferred TCU) via [facility] wheel chair transport arranged for  between [id number]. PAS completed and on chart.     TERESA Escalante

## 2025-06-05 NOTE — PLAN OF CARE
"  Problem: Adult Inpatient Plan of Care  Goal: Plan of Care Review  Description: The Plan of Care Review/Shift note should be completed every shift.  The Outcome Evaluation is a brief statement about your assessment that the patient is improving, declining, or no change.  This information will be displayed automatically on your shift  note.  Outcome: Adequate for Care Transition  Goal: Patient-Specific Goal (Individualized)  Description: You can add care plan individualizations to a care plan. Examples of Individualization might be:  \"Parent requests to be called daily at 9am for status\", \"I have a hard time hearing out of my right ear\", or \"Do not touch me to wake me up as it startles  me\".  Outcome: Adequate for Care Transition  Goal: Absence of Hospital-Acquired Illness or Injury  Outcome: Adequate for Care Transition  Intervention: Identify and Manage Fall Risk  Recent Flowsheet Documentation  Taken 6/5/2025 0955 by Ashanti Jean Baptiste RN  Safety Promotion/Fall Prevention: nonskid shoes/slippers when out of bed  Intervention: Prevent Skin Injury  Recent Flowsheet Documentation  Taken 6/5/2025 0955 by Ashanti Jean Baptiste RN  Body Position:   side-lying   position changed independently  Goal: Optimal Comfort and Wellbeing  Outcome: Adequate for Care Transition  Goal: Readiness for Transition of Care  Outcome: Adequate for Care Transition     Problem: Skin Injury Risk Increased  Goal: Skin Health and Integrity  Outcome: Adequate for Care Transition  Intervention: Plan: Nurse Driven Intervention: Moisture Management  Recent Flowsheet Documentation  Taken 6/5/2025 0955 by Ashanti Jean Baptiste RN  Moisture Interventions: Encourage regular toileting  Intervention: Plan: Nurse Driven Intervention: Friction and Shear  Recent Flowsheet Documentation  Taken 6/5/2025 0955 by Ashanti Jean Baptiste RN  Friction/Shear Interventions: HOB 30 degrees or less  Intervention: Optimize Skin Protection  Recent Flowsheet Documentation  Taken 6/5/2025 0955 by " Ashanti Jean Baptiste, RN  Pressure Reduction Techniques: (PRAFO boot in place) heels elevated off bed  Activity Management: activity adjusted per tolerance     Problem: Fall Injury Risk  Goal: Absence of Fall and Fall-Related Injury  Outcome: Adequate for Care Transition  Intervention: Promote Injury-Free Environment  Recent Flowsheet Documentation  Taken 6/5/2025 0934 by Ashanti Jean Baptiste, RN  Safety Promotion/Fall Prevention: nonskid shoes/slippers when out of bed     Problem: Gas Exchange Impaired  Goal: Optimal Gas Exchange  Outcome: Adequate for Care Transition     Problem: Pain Acute  Goal: Optimal Pain Control and Function  Outcome: Adequate for Care Transition     Problem: Comorbidity Management  Goal: Blood Glucose Levels Within Targeted Range  Outcome: Adequate for Care Transition  Goal: Maintenance of Heart Failure Symptom Control  Outcome: Adequate for Care Transition   Goal Outcome Evaluation:       Patient denied pain. Betadine applied on right heel ulcer. PRAFO boot in place. Patient discharging to TCU. Security was notified about patient's white Blazer truck being parked at the ER parking lot. Patient left with all personal belongings including mobile phone and . Report given to RN at [place] [facility].

## 2025-06-05 NOTE — PLAN OF CARE
Goal Outcome Evaluation:      Plan of Care Reviewed With: patient    Overall Patient Progress: improvingOverall Patient Progress: improving     Patient to discharge tomorrow. Has three bags of belongings to take with him upon discharge that are in his room. Denies pain. Requested not to be disturbed overnight. No sliding scale insulin needed, sugars controlled with scheduled doses.

## 2025-06-05 NOTE — PLAN OF CARE
Goal Outcome Evaluation:    Pt A.O x4. Refused NOC vitals. Denies pain. Tele NSR. Requested to not be disturbed overnight. [facility] between [id number]. Pt has 3 bags of belongings to go with.

## 2025-06-09 ENCOUNTER — PATIENT OUTREACH (OUTPATIENT)
Dept: CARE COORDINATION | Facility: CLINIC | Age: 50
End: 2025-06-09
Payer: COMMERCIAL

## 2025-06-23 ENCOUNTER — OFFICE VISIT (OUTPATIENT)
Dept: SLEEP MEDICINE | Facility: CLINIC | Age: 50
End: 2025-06-23
Payer: COMMERCIAL

## 2025-06-23 ENCOUNTER — TELEPHONE (OUTPATIENT)
Dept: CARDIOLOGY | Facility: CLINIC | Age: 50
End: 2025-06-23

## 2025-06-23 VITALS
WEIGHT: 309 LBS | OXYGEN SATURATION: 95 % | HEART RATE: 73 BPM | RESPIRATION RATE: 16 BRPM | SYSTOLIC BLOOD PRESSURE: 104 MMHG | HEIGHT: 70 IN | DIASTOLIC BLOOD PRESSURE: 64 MMHG | BODY MASS INDEX: 44.24 KG/M2

## 2025-06-23 DIAGNOSIS — G47.33 OBSTRUCTIVE SLEEP APNEA: Primary | ICD-10-CM

## 2025-06-23 DIAGNOSIS — G47.00 INSOMNIA, UNSPECIFIED TYPE: ICD-10-CM

## 2025-06-23 PROCEDURE — 99203 OFFICE O/P NEW LOW 30 MIN: CPT | Performed by: STUDENT IN AN ORGANIZED HEALTH CARE EDUCATION/TRAINING PROGRAM

## 2025-06-23 RX ORDER — ZOLPIDEM TARTRATE 10 MG/1
TABLET ORAL
Qty: 1 TABLET | Refills: 0 | Status: SHIPPED | OUTPATIENT
Start: 2025-06-23

## 2025-06-23 ASSESSMENT — SLEEP AND FATIGUE QUESTIONNAIRES
HOW LIKELY ARE YOU TO NOD OFF OR FALL ASLEEP WHILE SITTING QUIETLY AFTER LUNCH WITHOUT ALCOHOL: SLIGHT CHANCE OF DOZING
HOW LIKELY ARE YOU TO NOD OFF OR FALL ASLEEP IN A CAR, WHILE STOPPED FOR A FEW MINUTES IN TRAFFIC: WOULD NEVER DOZE
HOW LIKELY ARE YOU TO NOD OFF OR FALL ASLEEP WHILE SITTING AND TALKING TO SOMEONE: WOULD NEVER DOZE
HOW LIKELY ARE YOU TO NOD OFF OR FALL ASLEEP WHILE LYING DOWN TO REST IN THE AFTERNOON WHEN CIRCUMSTANCES PERMIT: SLIGHT CHANCE OF DOZING
HOW LIKELY ARE YOU TO NOD OFF OR FALL ASLEEP WHEN YOU ARE A PASSENGER IN A CAR FOR AN HOUR WITHOUT A BREAK: SLIGHT CHANCE OF DOZING
HOW LIKELY ARE YOU TO NOD OFF OR FALL ASLEEP WHILE WATCHING TV: SLIGHT CHANCE OF DOZING
HOW LIKELY ARE YOU TO NOD OFF OR FALL ASLEEP WHILE SITTING AND READING: SLIGHT CHANCE OF DOZING
HOW LIKELY ARE YOU TO NOD OFF OR FALL ASLEEP WHILE SITTING INACTIVE IN A PUBLIC PLACE: SLIGHT CHANCE OF DOZING

## 2025-06-23 NOTE — ASSESSMENT & PLAN NOTE
STOP BANG score is 8/8. Patient likely has sleep apnea based on clinical history of Snoring, EDS(ESS 06/24), Witnessed Apneas, HTN, BMI, Age > 50, Neck Circumference > 16 inches(40 cm),  Gender, Insomnia(EDI 20/28), Nocturia, Morning Dry Mouth, Sleep Inertia,  Non-restorative Sleep.   Obstructive sleep apnea reviewed. Complications of Untreated Sleep Apnea Reviewed.  HST/ Polysomnography reviewed. Information provided regarding treatment options for ANDIE.  Recommend Diagnostic In Lab Sleep Study  to assess for sleep disordered breathing due to high pretest probability for ANDIE.

## 2025-06-23 NOTE — PATIENT INSTRUCTIONS
"MY INFORMATION ON SLEEP APNEA-  Floyd Capps    DOCTOR : Tiffanie Ceballos MD  SLEEP CENTER :      MY CONTACT NUMBER:    [facility]   [phone]  [facility]    Am I having a sleep study at a sleep center?  Make sure you have an appointment for the study before you leave!  [email or web address]?[email or web address]    Am I having a home sleep study?  Watch the video for the device you are using:  /drop off device, Noxturnal T-3: [email or web address]/watch?v=yGGFBdELGhk  Disposable device sent out require phone/computer application, WatchPAT1: https://[email or web address].com/watch?v=BCce_vbiwxE  The sleep lab will call you for this appointment   If you wish to reschedule the sleep study or contact the sleep lab scheduling call [phone]        Frequently asked questions:  1. What is Obstructive Sleep Apnea (ANDIE)? ANDIE is the most common type of sleep apnea. Apnea means, \"without breath.\"  Apnea is most often caused by narrowing or collapse of the upper airway as muscles relax during sleep.   Almost everyone has occasional apneas. Most people with sleep apnea have had brief interruptions at night frequently for many years.  The severity of sleep apnea is related to how frequent and severe the events are.   Apneas are any events where there is no breathing.  Hypopneas are any events where there is shallow breathing. Sleep studies will track and then add all of the apneas and hypopneas into a total and then divided this number by the total time asleep to obtain the apnea hypopnea index(AHI). 0-5 events/per hour = No Sleep Apnea; 5-15 events/per hour = Mild Sleep Apnea; 15-30 events/per hour = Moderate Sleep Apnea; Greater than 30 events/per hour = Severe Sleep Apnea.  Sleep apnea is typically worse during REM sleep due to complete muscle relaxation, including the muscles of the airway. Sleep apnea is also typically worse " during supine(sleeping on your back) sleep due to internal structures more easily falling on the top of the airway and external pressures more easily crushing the airway.  The respiratory disturbance index(RDI) includes apneas, hypopneas, and other respiratory events such as snoring that may disturb your sleep.  2. What are the consequences of ANDIE? Symptoms include: feeling sleepy during the day, snoring loudly, gasping or stopping of breathing, trouble sleeping, and occasionally morning headaches or heartburn at night.  Sleepiness can be serious and even increase the risk of falling asleep while driving. Other health consequences may include development of high blood pressure and other cardiovascular disease in persons who are susceptible. Untreated ANDIE  can contribute to heart disease, stroke and diabetes.   3. What are the treatment options? In most situations, sleep apnea is a lifelong disease that must be managed with daily therapy. Medications are not effective for sleep apnea and surgery is generally not considered until other therapies have been tried. Your treatment is your choice . Continuous Positive Airway (CPAP) works right away and is the therapy that is effective in nearly everyone. An oral device to hold your jaw forward is usually the next most reliable option. Other options include postioning devices (to keep you off your back), weight loss, and surgery including a tongue pacing device. There is more detail about some of these options below.    Important tips for using CPAP and similar devices   Know your equipment:  CPAP is continuous positive airway pressure that prevents obstructive sleep apnea by keeping the throat from collapsing while you are sleeping. In most cases, the device is  smart  and can slowly self-adjusts if your throat collapses and keeps a record every day of how well you are treated-this information is available to you and your care team.  BPAP is bilevel positive airway  pressure that keeps your throat open and also assists each breath with a pressure boost to maintain adequate breathing.  Special kinds of BPAP are used in patients who have inadequate breathing from lung or heart disease. In most cases, the device is  smart  and can slowly self-adjusts to assist breathing. Like CPAP, the device keeps a record of how well you are treated.  Your mask is your connection to the device. You get to choose what feels most comfortable and the staff will help to make sure if fits. Here: are some examples of the different masks that are available:       Key points to remember on your journey with sleep apnea:  Sleep study.  PAP devices often need to be adjusted during a sleep study to show that they are effective and adjusted right.  Good tips to remember: Try wearing just the mask during a quiet time during the day so your body adapts to wearing it. A humidifier is recommended for comfort in most cases to prevent drying of your nose and throat. Allergy medication from your provider may help you if you are having nasal congestion.  Getting settled-in. It takes more than one night for most of us to get used to wearing a mask. Try wearing just the mask during a quiet time during the day so your body adapts to wearing it. A humidifier is recommended for comfort in most cases. Our team will work with you carefully on the first day and will be in contact within 4 days and again at 2 and 4 weeks for advice and remote device adjustments. Your therapy is evaluated by the device each day.   Use it every night. The more you are able to sleep naturally for 7-8 hours, the more likely you will have good sleep and to prevent health risks or symptoms from sleep apnea. Even if you use it 4 hours it helps. Occasionally all of us are unable to use a medical therapy, in sleep apnea, it is not dangerous to miss one night.   Communicate. Call our skilled team on the number provided on the first day if your visit  for problems that make it difficult to wear the device. Over 2 out of 3 patients can learn to wear the device long-term with help from our team. Remember to call our team or your sleep providers if you are unable to wear the device as we may have other solutions for those who cannot adapt to mask CPAP therapy. It is recommended that you sleep your sleep provider within the first 3 months and yearly after that if you are not having problems.   Take care of your equipment. Make sure you clean your mask and tubing using directions every day and that your filter and mask are replaced as recommended or if they are not working.     BESIDES CPAP, WHAT OTHER THERAPIES ARE THERE?    Positioning Device  Positioning devices are generally used when sleep apnea is mild and only occurs on your back.This example shows a pillow that straps around the waist. It may be appropriate for those whose sleep study shows milder sleep apnea that occurs primarily when lying flat on one's back. Preliminary studies have shown benefit but effectiveness at home may need to be verified by a home sleep test. These devices are generally not covered by medical insurance.    Oral Appliance  What is oral appliance therapy?  An oral appliance device fits on your teeth at night like a retainer used after having braces. The device is made by a specialized dentist and requires several visits over 1-2 months before a manufactured device is made to fit your teeth and is adjusted to prevent your sleep apnea. Once an oral device is working properly, snoring should be improved. A home sleep test may be recommended at that time if to determine whether the sleep apnea is adequately treated.       Some things to remember:  -Oral devices are often, but not always, covered by your medical insurance. Be sure to check with your insurance provider.   -If you are referred for oral therapy, you will be given a list of specialized dentists to consider or you may choose to  visit the Web site of the American Academy of Dental Sleep Medicine  -Oral devices are less likely to work if you have severe sleep apnea or are extremely overweight.     More detailed information  An oral appliance is a small acrylic device that fits over the upper and lower teeth  (similar to a retainer or a mouth guard). This device slightly moves jaw forward, which moves the base of the tongue forward, opens the airway, improves breathing for effective treat snoring and obstructive sleep apnea in perhaps 7 out of 10 people .  The best working devices are custom-made by a dental device  after a mold is made of the teeth 1, 2, 3.  When is an oral appliance indicated?  Oral appliance therapy is recommended as a first-line treatment for patients with primary snoring, mild sleep apnea, and for patients with moderate sleep apnea who prefer appliance therapy to use of CPAP4, 5. Severity of sleep apnea is determined by sleep testing and is based on the number of respiratory events per hour of sleep.   How successful is oral appliance therapy?  The success rate of oral appliance therapy in patients with mild sleep apnea is 75-80% while in patients with moderate sleep apnea it is 50-70%. The chance of success in patients with severe sleep apnea is 40-50%. The research also shows that oral appliances have a beneficial effect on the cardiovascular health of ANDIE patients at the same magnitude as CPAP therapy7.  Oral appliances should be a second-line treatment in cases of severe sleep apnea, but if not completely successful then a combination therapy utilizing CPAP plus oral appliance therapy may be effective. Oral appliances tend to be effective in a broad range of patients although studies show that the patients who have the highest success are females, younger patients, those with milder disease, and less severe obesity. 3, 6.   Finding a dentist that practices dental sleep medicine  Specific training is  available through the American Academy of Dental Sleep Medicine for dentists interested in working in the field of sleep. To find a dentist who is educated in the field of sleep and the use of oral appliances, near you, visit the Web site of the American Academy of Dental Sleep Medicine.    References  1. Davin et al. Objectively measured vs self-reported compliance during oral appliance therapy for sleep-disordered breathing. Chest 2013; 144(5): 3087-3103.  2. Ryland, et al. Objective measurement of compliance during oral appliance therapy for sleep-disordered breathing. Thorax 2013; 68(1): 91-96.  3. Luke et al. Mandibular advancement devices in 620 men and women with ANDIE and snoring: tolerability and predictors of treatment success. Chest 2004; 125: 5033-2607.  4. Obed et al. Oral appliances for snoring and ANDIE: a review. Sleep 2006; 29: 244-262.  5. Nadege, et al. Oral appliance treatment for ANDIE: an update. J Clin Sleep Med 2014; 10(2): 215-227.  6. Jet et al. Predictors of OSAH treatment outcome. J Dent Res 2007; 86: 9329-4465.      Weight Loss:    Weight loss is a long-term strategy that may improve sleep apnea in some patients.    Weight management is a personal decision.  If you are interested in exploring weight loss strategies, the following discussion covers the impact on weight loss on sleep apnea and the approaches that may be successful.    Weight loss decreases severity of sleep apnea in most people with obesity. For those with mild obesity who have developed snoring with weight gain, even 15-30 pound weight loss can improve and occasionally eliminate sleep apnea.  Structured and life-long dietary and health habits are necessary to lose weight and keep healthier weight levels.     Though there may be significant health benefits from weight loss, long-term weight loss is very difficult to achieve- studies show success with dietary management in less than 10% of people. In  addition, substantial weight loss may require years of dietary control and may be difficult if patients have severe obesity. In these cases, surgical management may be considered.  Finally, older individuals who have tolerated obesity without health complications may be less likely to benefit from weight loss strategies.    Your BMI is Body mass index is 44.34 kg/m .    Weight management plan: Discussed healthy diet and exercise guidelines    Surgery:    Surgery for obstructive sleep apnea is considered generally only when other therapies fail to work. Surgery may be discussed with you if you are having a difficult time tolerating CPAP and or when there is an abnormal structure that requires surgical correction.  Nose and throat surgeries often enlarge the airway to prevent collapse.  Most of these surgeries create pain for 1-2 weeks and up to half of the most common surgeries are not effective throughout life.  You should carefully discuss the benefits and drawbacks to surgery with your sleep provider and surgeon to determine if it is the best solution for you.   More information  Surgery for ANDIE is directed at areas that are responsible for narrowing or complete obstruction of the airway during sleep.  There are a wide range of procedures available to enlarge and/or stabilize the airway to prevent blockage of breathing in the three major areas where it can occur: the palate, tongue, and nasal regions.  Successful surgical treatment depends on the accurate identification of the factors responsible for obstructive sleep apnea in each person.  A personalized approach is required because there is no single treatment that works well for everyone.  Because of anatomic variation, consultation with an examination by a sleep surgeon is a critical first step in determining what surgical options are best for each patient.  In some cases, examination during sedation may be recommended in order to guide the selection of  procedures.  Patients will be counseled about risks and benefits as well as the typical recovery course after surgery. Surgery is typically not a cure for a person s ANDIE.  However, surgery will often significantly improve one s ANDIE severity (termed  success rate ).  Even in the absence of a cure, surgery will decrease the cardiovascular risk associated with OSA7; improve overall quality of life8 (sleepiness, functionality, sleep quality, etc).      Palate Procedures:  Patients with ANDIE often have narrowing of their airway in the region of their tonsils and uvula.  The goals of palate procedures are to widen the airway in this region as well as to help the tissues resist collapse.  Modern palate procedure techniques focus on tissue conservation and soft tissue rearrangement, rather than tissue removal.  Often the uvula is preserved in this procedure. Residual sleep apnea is common in patient after pharyngoplasty with an average reduction in sleep apnea events of 33%2.      Tongue Procedures:  ExamWhile patients are awake, the muscles that surround the throat are active and keep this region open for breathing. These muscles relax during sleep, allowing the tongue and other structures to collapse and block breathing.  There are several different tongue procedures available.  Selection of a tongue base procedure depends on characteristics seen on physical exam.  Generally, procedures are aimed at removing bulky tissues in this area or preventing the back of the tongue from falling back during sleep.  Success rates for tongue surgery range from 50-62%3.    Hypoglossal Nerve Stimulation:  Hypoglossal nerve stimulation has recently received approval from the [place] Food and Drug Administration for the treatment of obstructive sleep apnea.  This is based on research showing that the system was safe and effective in treating sleep apnea6.  Results showed that the median AHI score decreased 68%, from 29.3 to 9.0. This  therapy uses an implant system that senses breathing patterns and delivers mild stimulation to airway muscles, which keeps the airway open during sleep.  The system consists of three fully implanted components: a small generator (similar in size to a pacemaker), a breathing sensor, and a stimulation lead.  Using a small handheld remote, a patient turns the therapy on before bed and off upon awakening.    Candidates for this device must be greater than 22 years of age, have moderate to severe ANDIE (AHI between 20-65), BMI less than 32, have tried CPAP/oral appliance without success, and have appropriate upper airway anatomy (determined by a sleep endoscopy performed by Dr. Ziegler).    Hypoglossal Nerve Stimulation Pathway:    The sleep surgeon s office will work with the patient through the insurance prior-authorization process (including communications and appeals).    Nasal Procedures:  Nasal obstruction can interfere with nasal breathing during the day and night.  Studies have shown that relief of nasal obstruction can improve the ability of some patients to tolerate positive airway pressure therapy for obstructive sleep apnea1.  Treatment options include medications such as nasal saline, topical corticosteroid and antihistamine sprays, and oral medications such as antihistamines or decongestants. Non-surgical treatments can include external nasal dilators for selected patients. If these are not successful by themselves, surgery can improve the nasal airway either alone or in combination with these other options.      Combination Procedures:  Combination of surgical procedures and other treatments may be recommended, particularly if patients have more than one area of narrowing or persistent positional disease.  The success rate of combination surgery ranges from 66-80%2,3.    References  Alis WHITAKER. The Role of the Nose in Snoring and Obstructive Sleep Apnoea: An Update.  Eur Arch Otorhinolaryngol. 2011; 268:  1365-73.   Vishnu SM; Hoang JA; Emilia JR; Pallanch JF; Lluvia MB; Mike SG; Eli JOHANSEN. Surgical modifications of the upper airway for obstructive sleep apnea in adults: a systematic review and meta-analysis. SLEEP 2010;33(10):9581-5581. Ibrahima SAMS. Hypopharyngeal surgery in obstructive sleep apnea: an evidence-based medicine review.  Arch Otolaryngol Head Neck Surg. 2006 Feb;132(2):206-13.  Ryan YH1, Judy Y, Bakari BERLIN. The efficacy of anatomically based multilevel surgery for obstructive sleep apnea. Otolaryngol Head Neck Surg. 2003 Oct;129(4):327-35.  Ibrahima SAMS, Goldberg A. Hypopharyngeal Surgery in Obstructive Sleep Apnea: An Evidence-Based Medicine Review. Arch Otolaryngol Head Neck Surg. 2006 Feb;132(2):206-13.  Gelacio DIAZ et al. Upper-Airway Stimulation for Obstructive Sleep Apnea.  N Engl J Med. 2014 Jan 9;370(2):139-49.  Bassem Y et al. Increased Incidence of Cardiovascular Disease in Middle-aged Men with Obstructive Sleep Apnea. Am J Respir Crit Care Med; 2002 166: 159-165  Gomez EM et al. Studying Life Effects and Effectiveness of Palatopharyngoplasty (SLEEP) study: Subjective Outcomes of Isolated Uvulopalatopharyngoplasty. Otolaryngol Head Neck Surg. 2011; 144: 623-631.

## 2025-06-23 NOTE — PROGRESS NOTES
[facility]  Consultation Note    Name: Floyd Capps MRN#: [id number]   Age: 49 year old YOB: 1975     Date of Consultation: 06/23/25  Consultation is requested by: Camila Vargas  Primary care provider: Glendy Benavides NP    History of Present Illness:   Floyd Capps is a 49 year old male patient with HTN, T2DM, HLD, HFrEF, CAD, CKD, Hx of CVA, and Hx of R foot osteomyelitis   He is sent by Camila Vargas for a sleep consultation regarding Sleep Problem  .    Floyd Capps main reason for visit: (Patient-Rptd) i was told to go  Patient states problem(s) started: (Patient-Rptd) had to say  Floyd Capps's goals for this visit: (Patient-Rptd) i have no clue    He has not had a previous sleep study.    CPAP: No    Assessment and Plan:     Problem List Items Addressed This Visit       Obstructive sleep apnea - Primary    STOP BANG score is 8/8. Patient likely has sleep apnea based on clinical history of Snoring, EDS(ESS 06/24), Witnessed Apneas, HTN, BMI, Age > 50, Neck Circumference > 16 inches(40 cm),  Gender, Insomnia(EDI 20/28), Nocturia, Morning Dry Mouth, Sleep Inertia,  Non-restorative Sleep.   Obstructive sleep apnea reviewed. Complications of Untreated Sleep Apnea Reviewed.  HST/ Polysomnography reviewed. Information provided regarding treatment options for ANDIE.  Recommend Diagnostic In Lab Sleep Study  to assess for sleep disordered breathing due to high pretest probability for ANDIE.          Relevant Orders    Comprehensive Sleep Study     Other Visit Diagnoses         Insomnia, unspecified type                SLEEP-WAKE SCHEDULE:   Work/School Days: Patient goes to school/work: (Patient-Rptd) No   Usually gets into bed at (Patient-Rptd) 1 am  Takes patient about (Patient-Rptd) 1-3 hours to fall asleep  Has trouble falling asleep (Patient-Rptd) every night nights per week  Wakes up in the middle of the night (Patient-Rptd) 2 to 3 times a night times.  Wakes up due  to (Patient-Rptd) Use the bathroom;Other  He has trouble falling back asleep (Patient-Rptd) every night times a week.   It usually takes (Patient-Rptd) 1 to 2 hours to get back to sleep  Patient is usually up at (Patient-Rptd) 10 am  Uses alarm: (Patient-Rptd) No  Sleep Inertia: 0    Weekends/Non-work Days/All Other Days:  Usually gets into bed at (Patient-Rptd) 1 am   Takes patient about (Patient-Rptd) 1-3 hours to fall asleep  Patient is usually up at (Patient-Rptd) 10 am  Uses alarm: (Patient-Rptd) No    Sleep Need  Patient gets  (Patient-Rptd) 4-6 hours sleep on average   Patient thinks He needs about (Patient-Rptd) 8 sleep    Floyd Capps prefers to sleep in this position(s): (Patient-Rptd) Side;Head Elevated   Patient states they do the following activities in bed: (Patient-Rptd) Watch TV    Naps  Patient takes a purposeful nap (Patient-Rptd) every day times a week and naps are usually (Patient-Rptd) 1-3 hours in duration  He feels better after a nap: (Patient-Rptd) No  He dozes off unintentionally (Patient-Rptd) everyday days per week  Patient has had a driving accident or near-miss due to sleepiness/drowsiness: (Patient-Rptd) No      SLEEP DISRUPTIONS:  Breathing/Snoring  Patient snores:(Patient-Rptd) Yes  Other people complain about His snoring: (Patient-Rptd) No  Patient has been told He stops breathing in His sleep:(Patient-Rptd) Yes  He has issues with the following: (Patient-Rptd) Morning mouth dryness;Stuffy nose when you wake up;Getting up to urinate more than once    Movement:  Patient gets pain, discomfort, with an urge to move:  (Patient-Rptd) Yes  It happens when He is resting:  (Patient-Rptd) Yes  It happens more at night:  (Patient-Rptd) Yes  Patient has been told Floyd Capps kicks His legs at night:  (Patient-Rptd) No     Behaviors in Sleep:  Floyd Capps has experienced the following behaviors while sleeping: (Patient-Rptd) Teeth grinding;See or hear things that are not really there upon  awakening or just falling asleep  He has experienced sudden muscle weakness during the day: (Patient-Rptd) No    Is there anything else you would like your sleep provider to know:        CAFFEINE AND OTHER SUBSTANCES:  Patient consumes caffeinated beverages per day:  (Patient-Rptd) 0  Last caffeine use is usually: (Patient-Rptd) 0  List of any prescribed or over the counter stimulants that patient takes: (Patient-Rptd) 0  List of any prescribed or over the counter sleep medication patient takes:    List of previous sleep medications that patient has tried: (Patient-Rptd) melitonin  Patient drinks alcohol to help them sleep: (Patient-Rptd) No  Patient drinks alcohol near bedtime: (Patient-Rptd) No    Family History:  Patient has a family member been diagnosed with a sleep disorder: (Patient-Rptd) Yes  (Patient-Rptd) mother         SCALES:  EPWORTH SLEEPINESS SCALE       6/23/2025     2:40 PM    Meridian Sleepiness Scale ( TAVO Capps  [id number]<br>ESS - [place]/English - Final version - 21 Nov 07 - [facility] [place].)   Sitting and reading Slight chance of dozing   Watching TV Slight chance of dozing   Sitting, inactive in a public place (e.g. a theatre or a meeting) Slight chance of dozing   As a passenger in a car for an hour without a break Slight chance of dozing   Lying down to rest in the afternoon when circumstances permit Slight chance of dozing   Sitting and talking to someone Would never doze   Sitting quietly after a lunch without alcohol Slight chance of dozing   In a car, while stopped for a few minutes in traffic Would never doze   Meridian Score (MC) 6   Meridian Score (Sleep) 6        Patient-reported       INSOMNIA SEVERITY INDEX (EDI)        6/23/2025     2:21 PM   Insomnia Severity Index (EDI)   Difficulty falling asleep 3   Difficulty staying asleep 3   Problems waking up too early 3   How SATISFIED/DISSATISFIED are you with your CURRENT sleep pattern? 3   How NOTICEABLE to others do you think your  "sleep problem is in terms of impairing the quality of your life? 2   How WORRIED/DISTRESSED are you about your current sleep problem? 3   To what extent do you consider your sleep problem to INTERFERE with your daily functioning (e.g. daytime fatigue, mood, ability to function at work/daily chores, concentration, memory, mood, etc.) CURRENTLY? 3   EDI Total Score 20        Patient-reported    Guidelines for Scoring/Interpretation:  Total score categories:  0-7 = No clinically significant insomnia   8-14 = Subthreshold insomnia   15-21 = Clinical insomnia (moderate severity)  22-28 = Clinical insomnia (severe)  Used via courtesy of www.[employer].va.gov with permission from Dimitry Luna PhD., [facility]      STOP BANG   ANDIE High Risk             Total Score: 8    ANDIE: Snores loudly    ANDIE: Often tired    ANDIE: Observed stopped breathing    ANDIE: Hypertension    ANDIE: BMI over 35 kg/m2    ANDIE: Over 50 ys old    ANDIE: Neck Circum >16 in    ANDIE: Male          GAD7       No data to display                  CAGE-AID       No data to display              CAGE-AID reprinted with permission from the [place] Medical Journal, JOYCELYN Spaulding. and JUNIOR Galvez, \"Conjoint screening questionnaires for alcohol and drug abuse\" [place] Medical Journal 94: 135-140, 1995.      PATIENT HEALTH QUESTIONNAIRE-9 (PHQ - 9)      1/30/2025     1:46 PM   PHQ-9 ([employer])   1.  Little interest or pleasure in doing things 3   2.  Feeling down, depressed, or hopeless 3   3.  Trouble falling or staying asleep, or sleeping too much 3   4.  Feeling tired or having little energy 3   5.  Poor appetite or overeating 2   6.  Feeling bad about yourself - or that you are a failure or have let yourself or your family down 3   7.  Trouble concentrating on things, such as reading the newspaper or watching television 3   8.  Moving or speaking so slowly that other people could have noticed. Or the opposite - being so fidgety or restless that you have been " moving around a lot more than usual 2   9.  Thoughts that you would be better off dead, or of hurting yourself in some way 1   PHQ-9 Total Score 23    6.  Feeling bad about yourself 3   7.  Trouble concentrating 3   8.  Moving slowly or restless 2   9.  Suicidal or self-harm thoughts 1   In the past two weeks have you had thoughts of suicide or self harm? No   Do you have concerns about your personal safety or the safety of others? No   1.  Little interest or pleasure in doing things Nearly every day   2.  Feeling down, depressed, or hopeless Nearly every day   3.  Trouble falling or staying asleep, or sleeping too much Nearly every day   4.  Feeling tired or having little energy Nearly every day   5.  Poor appetite or overeating More than half the days   6.  Feeling bad about yourself Nearly every day   7.  Trouble concentrating Nearly every day   8.  Moving slowly or restless More than half the days   9.  Suicidal or self-harm thoughts Several days   PHQ-9 via [email or web address] TOTAL SCORE-----> 23 (Severe depression)   Difficulty at work, home, or with people Very difficult   F/U: Thoughts of suicide or self harm? No   F/U: Safety concerns for self or others? No       Patient-reported     Developed by Marvin Saleh, Gabi Silver, Randolph Guerrero and colleagues, with an educational mor from [employer] No permission required to reproduce, translate, display or distribute.      Allergies:    No Known Allergies    Medications:    Current Outpatient Medications   Medication Sig Dispense Refill    acetaminophen (TYLENOL) 500 MG tablet Take 2 tablets (1,000 mg) by mouth every 8 hours as needed for mild pain      aspirin 81 MG EC tablet Take 1 tablet (81 mg) by mouth daily.      atorvastatin (LIPITOR) 80 MG tablet Take 1 tablet (80 mg) by mouth daily 90 tablet 3    bumetanide (BUMEX) 2 MG tablet Take 1 tablet (2 mg) by mouth daily.      carvedilol (COREG) 25 MG tablet Take 1 tablet (25 mg) by mouth 2 times daily  (with meals). 180 tablet 0    clopidogrel (PLAVIX) 75 MG tablet Take 1 tablet (75 mg) by mouth daily.      Continuous Glucose Sensor (FREESTYLE LAMINE 2 SENSOR) MISC Inject 1 each subcutaneously every 14 days Use 1 sensor every 14 days. Use to read blood sugars per 's instructions. (Patient not taking: Reported on 10/9/2024) 6 each 5    cyanocobalamin (VITAMIN B-12) 1000 MCG tablet Take 1,000 mcg by mouth daily.      insulin glargine (LANTUS PEN) 100 UNIT/ML pen Inject 50 Units subcutaneously at bedtime.      insulin lispro (HUMALOG KWIKPEN) 100 UNIT/ML (1 unit dial) KWIKPEN Inject 20 Units subcutaneously 3 times daily (with meals). Plus sliding scale: 2 units every 50 over 150. If humalog is not covered by insurance, may substitute with novolog or other fast acting insulin      insulin pen needle (32G X 4 MM) 32G X 4 MM miscellaneous Use 4 pen needles daily or as directed. 200 each 4    isosorbide mononitrate (IMDUR) 30 MG 24 hr tablet Take 1 tablet (30 mg) by mouth daily. 30 tablet 11    [Paused] metFORMIN (GLUCOPHAGE XR) 500 MG 24 hr tablet Take 1 tablet (500 mg) by mouth daily (with dinner). 90 tablet 0    multivitamin w/minerals (THERA-VIT-M) tablet Take 1 tablet by mouth daily      sacubitril-valsartan (ENTRESTO) 24-26 MG per tablet Take 1 tablet by mouth 2 times daily.         Problem List:  Patient Active Problem List    Diagnosis Date Noted    Obstructive sleep apnea 06/23/2025     Priority: Medium    HOWELL (dyspnea on exertion) 05/28/2025     Priority: Medium    Cerebrovascular accident (CVA), unspecified mechanism (H) 05/28/2025     Priority: Medium    Ulcer of right heel and midfoot, unspecified ulcer stage (H) 05/28/2025     Priority: Medium    Acute ischemic stroke (H) 03/23/2025     Priority: Medium    Chronic systolic heart failure (H) 03/23/2025     Priority: Medium    CKD (chronic kidney disease) stage 3, GFR 30-59 ml/min (H) 03/23/2025     Priority: Medium    Diabetic ulcer of right heel  associated with type 2 diabetes mellitus, limited to breakdown of skin (H) 07/02/2024     Priority: Medium    Diabetic ulcer of right heel associated with type 2 diabetes mellitus, with necrosis of bone (H) 04/24/2024     Priority: Medium    Diabetic ulcer of right heel associated with diabetes mellitus due to underlying condition, with muscle involvement without evidence of necrosis (H) 03/22/2024     Priority: Medium    Closed fracture of distal end of left radius 03/05/2024     Priority: Medium    Benign essential hypertension 03/05/2024     Priority: Medium    Heart burn 03/05/2024     Priority: Medium    Acute systolic heart failure (H) 03/01/2024     Priority: Medium    Hyperglycemia 02/14/2024     Priority: Medium    Type 2 diabetes mellitus with hyperglycemia, with long-term current use of insulin (H) 02/14/2024     Priority: Medium    Cellulitis of right lower leg 02/14/2024     Priority: Medium    Diabetic ulcer of right heel associated with type 2 diabetes mellitus, unspecified ulcer stage (H) 02/14/2024     Priority: Medium    Hyponatremia 02/14/2024     Priority: Medium    Class 2 severe obesity due to excess calories with serious comorbidity in adult (H) 11/03/2023     Priority: Medium    Elevated troponin 08/23/2023     Priority: Medium    History of stroke 08/23/2023     Priority: Medium    Dizziness      Priority: Medium     Created by Conversion        Type 2 Diabetes Mellitus - Uncomplicated, Controlled      Priority: Medium     Created by Conversion        Hypothyroidism 10/12/2007     Priority: Medium     Formatting of this note might be different from the original.  Hypothyroidism Primary      Hyperlipidemia 10/08/2007     Priority: Medium    Obesity 08/27/2007     Priority: Medium    Tobacco use disorder 08/27/2007     Priority: Medium     Formatting of this note might be different from the original.  Tobacco Abuse          Past Medical/Surgical History:  Past Medical History:   Diagnosis Date     Acute hypoxemic respiratory failure (H)     Acute renal failure, unspecified acute renal failure type     CAD (coronary artery disease)     Callus of foot     Diabetes (H)     Diarrhea     Essential hypertension     Fracture of left distal radius     History of stroke     Insomnia     Nausea     Non-pressure chronic ulcer of right heel and midfoot with unspecified severity (H)     Normocytic anemia     Type 2 diabetes mellitus with foot ulcer (H)      Past Surgical History:   Procedure Laterality Date    APPENDECTOMY      CV CORONARY ANGIOGRAM N/A 3/1/2024    Procedure: CV CORONARY ANGIOGRAM;  Surgeon: Stephen Berger MD;  Location: [facility] CATH LAB CV    CV CORONARY ANGIOGRAM N/A 6/3/2025    Procedure: Coronary Angiogram;  Surgeon: Stephen Berger MD;  Location: [facility] CATH LAB CV    CV LEFT HEART CATH N/A 3/1/2024    Procedure: Left Heart Catheterization;  Surgeon: Stephen Berger MD;  Location: [facility] CATH LAB CV    CV LEFT HEART CATH N/A 6/3/2025    Procedure: Left Heart Catheterization;  Surgeon: Stephen Berger MD;  Location: [facility] LAB CV    EXCISE EXOSTOSIS FOOT Right 5/2/2024    Procedure: excision of bone from calcaneus with application of theraskin;  Surgeon: Dong Sanders DPM;  Location: [facility] OR    INCISION AND DRAINAGE FOOT, COMBINED Right 5/2/2024    Procedure: INCISION AND DRAINAGE, right heel with;  Surgeon: Dong Sanders DPM;  Location: [facility] OR    INCISION AND DRAINAGE OF WOUND      groin abscess    IRRIGATION AND DEBRIDEMENT FOOT, COMBINED Right 2/16/2024    Procedure: IRRIGATION AND DEBRIDEMENT RIGHT FOOT;  Surgeon: Cristofer Sims DPM;  Location: [facility] OR    PICC DOUBLE LUMEN PLACEMENT  2/29/2024       Social History:  Social History     Socioeconomic History    Marital status: Single     Spouse name: Not on file    Number of children: Not on file    Years of education: Not on file    Highest education level: Not on file    Occupational History    Not on file   Tobacco Use    Smoking status: Former     Current packs/day: 0.50     Types: Cigarettes    Smokeless tobacco: Never   Vaping Use    Vaping status: Never Used   Substance and Sexual Activity    Alcohol use: Yes     Comment: once a week    Drug use: Yes     Types: Marijuana     Comment: Has not done since Feb    Sexual activity: Not on file   Other Topics Concern    Not on file   Social History Narrative    Patient reports that he does not have health insurance.     Social Drivers of Health     Financial Resource Strain: High Risk (5/29/2025)    Financial Resource Strain     Within the past 12 months, have you or your family members you live with been unable to get utilities (heat, electricity) when it was really needed?: Yes   Food Insecurity: High Risk (5/29/2025)    Food Insecurity     Within the past 12 months, did you worry that your food would run out before you got money to buy more?: Yes     Within the past 12 months, did the food you bought just not last and you didn t have money to get more?: No   Transportation Needs: Low Risk  (5/29/2025)    Transportation Needs     Within the past 12 months, has lack of transportation kept you from medical appointments, getting your medicines, non-medical meetings or appointments, work, or from getting things that you need?: No   Recent Concern: Transportation Needs - High Risk (3/23/2025)    Transportation Needs     Within the past 12 months, has lack of transportation kept you from medical appointments, getting your medicines, non-medical meetings or appointments, work, or from getting things that you need?: Yes   Physical Activity: Not on file   Stress: Not on file   Social Connections: Not on file   Interpersonal Safety: Unknown (5/29/2025)    Interpersonal Safety     Do you feel physically and emotionally safe where you currently live?: Patient declined     Within the past 12 months, have you been hit, slapped, kicked or  "otherwise physically hurt by someone?: Patient declined     Within the past 12 months, have you been humiliated or emotionally abused in other ways by your partner or ex-partner?: Patient declined   Housing Stability: High Risk (5/29/2025)    Housing Stability     Do you have housing? : No     Are you worried about losing your housing?: Patient unable to answer       Family History:  No family history on file.    Review of Systems:   A complete review of systems reviewed by me is negative with the exeption of what has been mentioned in the history of present illness.  In the last TWO WEEKS have you experienced any of the following symptoms?  Fevers: (Patient-Rptd) No  Night Sweats: (Patient-Rptd) No  Weight Gain: (Patient-Rptd) Yes  Pain at Night: (Patient-Rptd) No  Double Vision: (Patient-Rptd) No  Changes in Vision: (Patient-Rptd) Yes  Difficulty Breathing through Nose: (Patient-Rptd) No  Sore Throat in Morning: (Patient-Rptd) No  Dry Mouth in the Morning: (Patient-Rptd) Yes  Shortness of Breath Lying Flat: (Patient-Rptd) Yes  Shortness of Breath With Activity: (Patient-Rptd) Yes  Awakening with Shortness of Breath: (Patient-Rptd) Yes  Increased Cough: (Patient-Rptd) Yes  Heart Racing at Night: (Patient-Rptd) Yes  Swelling in Feet or Legs: (Patient-Rptd) Yes  Diarrhea at Night: (Patient-Rptd) No  Heartburn at Night: (Patient-Rptd) No  Urinating More than Once at Night: (Patient-Rptd) Yes  Losing Control of Urine at Night: (Patient-Rptd) No  Joint Pains at Night: (Patient-Rptd) Yes  Headaches in Morning: (Patient-Rptd) No  Weakness in Arms or Legs: (Patient-Rptd) Yes  Depressed Mood: (Patient-Rptd) Yes  Anxiety: (Patient-Rptd) No     Physical Exam:   Vitals: /64   Pulse 73   Resp 16   Ht 1.778 m (5' 10\")   Wt (!) 140.2 kg (309 lb)   SpO2 95%   BMI 44.34 kg/m    BMI= Body mass index is 44.34 kg/m .  Neck Cir (cm): 53 cm  GENERAL: alert, no distress, obese, and elderly  EYES: Eyes grossly normal to " "inspection.  No discharge or erythema, or obvious scleral/conjunctival abnormalities.  RESP: No audible wheeze, cough, or visible cyanosis.    SKIN: Visible skin clear. No significant rash, abnormal pigmentation or lesions.  NEURO: Cranial nerves grossly intact.  Mentation and speech appropriate for age.  PSYCH: Appropriate affect, tone, and pace of words         Data: All pertinent previous laboratory data reviewed     Recent Labs   Lab Test 06/05/25  0954 06/05/25  0725 06/04/25  1013 06/04/25  0621   NA  --  137  --  137   POTASSIUM  --  4.3  --  4.3   CHLORIDE  --  106  --  108*   CO2  --  22  --  20*   ANIONGAP  --  9  --  9   * 144*   < > 119*   BUN  --  23.8*  --  19.0   CR  --  1.63*  --  1.48*   SARAH  --  8.9  --  9.0    < > = values in this interval not displayed.       Recent Labs   Lab Test 06/04/25  0621 06/01/25  0849 05/31/25  0656   WBC  --   --  11.8*   RBC  --   --  3.69*   HGB  --   --  11.5*   HCT  --   --  33.6*   MCV 95   < > 91   MCH  --   --  31.2   MCHC  --   --  34.2   RDW  --   --  12.6      < > 235    < > = values in this interval not displayed.       Recent Labs   Lab Test 03/23/25  0859   PROTTOTAL 6.8   ALBUMIN 3.4*   BILITOTAL 0.5   ALKPHOS 113   AST 19   ALT 26       TSH (uIU/mL)   Date Value   08/23/2023 2.72       No results found for: \"UAMP\", \"UBARB\", \"BENZODIAZEUR\", \"UCANN\", \"UCOC\", \"OPIT\", \"UPCP\"    Iron Sat Index   Date/Time Value Ref Range Status   02/29/2024 04:22 AM 10 (L) 15 - 46 % Final     Ferritin   Date/Time Value Ref Range Status   02/29/2024 04:22  31 - 409 ng/mL Final       No results found for: \"PH\", \"PHARTERIAL\", \"PO2\", \"[id number]\", \"SAT\", \"PCO2\", \"HCO3\", \"BASEEXCESS\", \"MONIE\", \"BEB\"    @LABRCNTIPR(phv:4,pco2v:4,po2v:4,hco3v:4,shanice:4,o2per:4)@    Echocardiology: No results found for this or any previous visit (from the past 4320 hours).    Chest x-ray:   XR Chest 2 Views 05/28/2025    Narrative  EXAM: XR CHEST 2 VIEWS  LOCATION: [facility]  DATE: 5/28/2025    INDICATION: Shortness of breath  COMPARISON: 3/23/2025    Impression  IMPRESSION: Negative chest.      Chest CT:   No results found for this or any previous visit from the past 365 days.      PFT: Most Recent [facility]ze Pulmonary Function Testing  No results    Patient was strongly advised to avoid driving, operating any heavy machinery or other hazardous situations while drowsy or sleepy.  Patient was counseled on the importance of driving while alert, to pull over if drowsy, or nap before getting into the vehicle if sleepy.      Plan is for Floyd Capps to follow up following PSG.     The above note was dictated using voice recognition software. Although reviewed after completion, some word and grammatical error may remain . Please contact the author for any clarifications.    Total time spent reviewing medical records, history and physical examination, review of previous testing and interpretation as well as documentation on this date, 06/23/25: 18 minutes    Tiffanie Ceballos MD on 06/23/25  [facility]   [address], [address]   [address] [place], [place] [address]. [place] [address]   Appointments: [phone]     CC: Camila Vargas

## 2025-06-23 NOTE — NURSING NOTE
"Chief Complaint   Patient presents with    Sleep Problem       Initial /64   Pulse 73   Resp 16   Ht 1.778 m (5' 10\")   Wt (!) 140.2 kg (309 lb)   SpO2 95%   BMI 44.34 kg/m   Estimated body mass index is 44.34 kg/m  as calculated from the following:    Height as of this encounter: 1.778 m (5' 10\").    Weight as of this encounter: 140.2 kg (309 lb).    Medication Reconciliation: complete    Neck circumference:  53 centimeters.    DME: none      "

## 2025-06-26 ENCOUNTER — OFFICE VISIT (OUTPATIENT)
Dept: CARDIOLOGY | Facility: CLINIC | Age: 50
End: 2025-06-26
Payer: COMMERCIAL

## 2025-06-26 VITALS
SYSTOLIC BLOOD PRESSURE: 90 MMHG | WEIGHT: 311 LBS | BODY MASS INDEX: 44.62 KG/M2 | HEART RATE: 74 BPM | DIASTOLIC BLOOD PRESSURE: 64 MMHG | RESPIRATION RATE: 16 BRPM

## 2025-06-26 DIAGNOSIS — I25.10 CORONARY ARTERY DISEASE INVOLVING NATIVE CORONARY ARTERY OF NATIVE HEART, UNSPECIFIED WHETHER ANGINA PRESENT: Primary | ICD-10-CM

## 2025-06-26 NOTE — PATIENT INSTRUCTIONS
You were seen today in the [facility]    Surgery will be scheduled once your are cleared by neurology due to your prior stroke. Urgent neurology referral ordered.    Please call [facility] surgery coordinator with any questions.  Thank you.    Shandra Woodall RN  Cardiovascular Surgery  Phone [phone]  Fax [phone]

## 2025-06-30 ENCOUNTER — PATIENT OUTREACH (OUTPATIENT)
Dept: CARE COORDINATION | Facility: CLINIC | Age: 50
End: 2025-06-30
Payer: COMMERCIAL

## 2025-06-30 NOTE — PROGRESS NOTES
Contact   Chart Review     Situation: Patient chart reviewed by .    Background: enrolled in care coordination. In TCU.     Assessment: Has been in TCU for four weeks.      Plan/Recommendations: Will do chart review in four weeks and will provide assessment after discharge as appropriate.     Georgiana Isaacs,   [facility]  [phone]

## 2025-07-02 NOTE — CONFIDENTIAL NOTE
NEUROLOGY - PRE VISIT PLANNING             Referring Provider Reason for Referral Office Visit Notes   Dwight Valencia MD - [facility] Acute ischemic stroke (H) C  Additional Information:   Lacunar stroke, intracranial athero  3/23/2025 - 3/27/2025 - ED        IMAGING/NOTES/SCANS Status/ Location  DATE   MRI/MRA(HEAD, NECK, SPINE) Internal  5/28/2025, 3/23/2025, 8/23/2023    CT/CTA   Internal  3/23/2025, 8/23/2023    EMG N/A    EEG N/A    LABS Internal, External    Neuropsychological testing  N/A    Additional Testing/Notes N/A      Does patient have C2C?  Year last updated Action     YES   [x]   2024   Please update at appointment if outdated more than 5 years       NO     []   N/A   Please complete C2C at appointment

## 2025-07-07 ENCOUNTER — PRE VISIT (OUTPATIENT)
Dept: NEUROLOGY | Facility: CLINIC | Age: 50
End: 2025-07-07

## 2025-07-07 ENCOUNTER — LAB (OUTPATIENT)
Dept: LAB | Facility: CLINIC | Age: 50
End: 2025-07-07
Payer: COMMERCIAL

## 2025-07-07 ENCOUNTER — OFFICE VISIT (OUTPATIENT)
Dept: NEUROLOGY | Facility: CLINIC | Age: 50
End: 2025-07-07
Attending: STUDENT IN AN ORGANIZED HEALTH CARE EDUCATION/TRAINING PROGRAM
Payer: COMMERCIAL

## 2025-07-07 VITALS — OXYGEN SATURATION: 96 % | HEART RATE: 74 BPM | DIASTOLIC BLOOD PRESSURE: 72 MMHG | SYSTOLIC BLOOD PRESSURE: 112 MMHG

## 2025-07-07 DIAGNOSIS — F17.200 TOBACCO USE DISORDER: ICD-10-CM

## 2025-07-07 DIAGNOSIS — I69.30 LATE EFFECT OF ISCHEMIC CEREBRAL STROKE: ICD-10-CM

## 2025-07-07 DIAGNOSIS — E78.5 DYSLIPIDEMIA: ICD-10-CM

## 2025-07-07 DIAGNOSIS — I67.2 INTRACRANIAL ATHEROSCLEROSIS: ICD-10-CM

## 2025-07-07 DIAGNOSIS — I63.9 ACUTE ISCHEMIC STROKE (H): ICD-10-CM

## 2025-07-07 DIAGNOSIS — I25.10 CORONARY ARTERY DISEASE INVOLVING NATIVE HEART WITHOUT ANGINA PECTORIS, UNSPECIFIED VESSEL OR LESION TYPE: ICD-10-CM

## 2025-07-07 DIAGNOSIS — I67.9 CEREBROVASCULAR SMALL VESSEL DISEASE: ICD-10-CM

## 2025-07-07 DIAGNOSIS — E11.69 TYPE 2 DIABETES MELLITUS WITH OTHER SPECIFIED COMPLICATION, WITH LONG-TERM CURRENT USE OF INSULIN (H): Primary | ICD-10-CM

## 2025-07-07 DIAGNOSIS — I50.22 CHRONIC SYSTOLIC HEART FAILURE (H): ICD-10-CM

## 2025-07-07 DIAGNOSIS — R79.89 ELEVATED SERUM CREATININE: ICD-10-CM

## 2025-07-07 DIAGNOSIS — Z79.4 TYPE 2 DIABETES MELLITUS WITH OTHER SPECIFIED COMPLICATION, WITH LONG-TERM CURRENT USE OF INSULIN (H): Primary | ICD-10-CM

## 2025-07-07 DIAGNOSIS — N18.31 STAGE 3A CHRONIC KIDNEY DISEASE (H): ICD-10-CM

## 2025-07-07 DIAGNOSIS — R79.89 ELEVATED SERUM CREATININE: Primary | ICD-10-CM

## 2025-07-07 DIAGNOSIS — I10 ESSENTIAL HYPERTENSION: ICD-10-CM

## 2025-07-07 LAB
ALBUMIN UR-MCNC: >=300 MG/DL
APPEARANCE UR: CLEAR
BACTERIA #/AREA URNS HPF: ABNORMAL /HPF
BILIRUB UR QL STRIP: NEGATIVE
COLOR UR AUTO: YELLOW
ERYTHROCYTE [DISTWIDTH] IN BLOOD BY AUTOMATED COUNT: 12.3 % (ref 10–15)
GLUCOSE UR STRIP-MCNC: NEGATIVE MG/DL
HCT VFR BLD AUTO: 37.5 % (ref 40–53)
HGB BLD-MCNC: 12.6 G/DL (ref 13.3–17.7)
HGB UR QL STRIP: ABNORMAL
KETONES UR STRIP-MCNC: NEGATIVE MG/DL
LEUKOCYTE ESTERASE UR QL STRIP: NEGATIVE
MCH RBC QN AUTO: 30.7 PG (ref 26.5–33)
MCHC RBC AUTO-ENTMCNC: 33.6 G/DL (ref 31.5–36.5)
MCV RBC AUTO: 92 FL (ref 78–100)
NITRATE UR QL: NEGATIVE
PH UR STRIP: 5.5 [PH] (ref 5–7)
PLATELET # BLD AUTO: 294 10E3/UL (ref 150–450)
RBC # BLD AUTO: 4.1 10E6/UL (ref 4.4–5.9)
RBC #/AREA URNS AUTO: ABNORMAL /HPF
SP GR UR STRIP: 1.02 (ref 1–1.03)
UROBILINOGEN UR STRIP-ACNC: 0.2 E.U./DL
WBC # BLD AUTO: 12.1 10E3/UL (ref 4–11)
WBC #/AREA URNS AUTO: ABNORMAL /HPF

## 2025-07-07 PROCEDURE — 36415 COLL VENOUS BLD VENIPUNCTURE: CPT

## 2025-07-07 PROCEDURE — 82043 UR ALBUMIN QUANTITATIVE: CPT

## 2025-07-07 PROCEDURE — 84156 ASSAY OF PROTEIN URINE: CPT

## 2025-07-07 PROCEDURE — 99205 OFFICE O/P NEW HI 60 MIN: CPT | Performed by: PSYCHIATRY & NEUROLOGY

## 2025-07-07 PROCEDURE — 85027 COMPLETE CBC AUTOMATED: CPT

## 2025-07-07 PROCEDURE — 82570 ASSAY OF URINE CREATININE: CPT

## 2025-07-07 PROCEDURE — 81001 URINALYSIS AUTO W/SCOPE: CPT

## 2025-07-07 PROCEDURE — 80069 RENAL FUNCTION PANEL: CPT

## 2025-07-07 NOTE — PROGRESS NOTES
_____________________________________________________________    [facility]    at [facility] [phone]  _____________________________________________________________      Chief Complaint: No chief complaint on file.      History of Present Illness: Floyd Capps is a 49 year old male presenting as a new patient for ***. He was referred to our clinic by ***.    Anjana     First stroke:   2023. Right side weakness and coordination. Arm more than leg. Residual numbness and mild weakness fingers 4 and 5 (can't adduct completely). He was told because of diabetes. Before the stroke was not taking meds but was watching his diet. After the stroke conditions at home got worse and even the diet he did not care about.     Mother diet in 2024.     Things got better. Got a job. Lost job end of the year. Got depressed.     Second stroke:   2025. Weakness and numbness arm and leg. Poor balance. Was getting better. Then a couple of weeks later had dizziness, decreased energy, shortness of breath with activity, weakness of both legs, pain in the buttocks with movement (none with rest).     In TCU since discharge.     Poor diet at TCU.     Evicted. TCU SW helping.     Has not seen PCP in a long time. Last time Jan 2025.   No endocrinologist despite having DM for over 20 years.      Mother witnessed apnea. Has lily with sleep coming up in Sept or Oct.         ***    Diagnosis:  Problem List Items Addressed This Visit          Neurology/Sleep    Acute ischemic stroke (H)       Impression & Plan:     As discussed with the patient    Diagnosis:     Plan:   -diabetic diet.   -check your blood pressure every other day and keep record of the readings.         - Follow up in as needed.     Stroke Education provided.  He will call us with any questions.  For any acute neurologic deficits he was advised to  go directly to the hospital rather than call the clinic.    Hazel Simmons MD,  "SHERMAN Portillo FAAN   of Neurology  [facility]     07/07/2025 12:52 PM  To page me or covering stroke neurology team member, click here: AMCOM  Choose \"On Call\" tab at top, then search dropdown box for \"Neurology Adult\" & press Enter, look for Neuro ICU/Stroke    ___________________________________________________________________    Current Medications  Current Outpatient Medications   Medication Sig Dispense Refill    acetaminophen (TYLENOL) 500 MG tablet Take 2 tablets (1,000 mg) by mouth every 8 hours as needed for mild pain      aspirin 81 MG EC tablet Take 1 tablet (81 mg) by mouth daily.      atorvastatin (LIPITOR) 80 MG tablet Take 1 tablet (80 mg) by mouth daily 90 tablet 3    bumetanide (BUMEX) 2 MG tablet Take 1 tablet (2 mg) by mouth daily.      carvedilol (COREG) 25 MG tablet Take 1 tablet (25 mg) by mouth 2 times daily (with meals). 180 tablet 0    clopidogrel (PLAVIX) 75 MG tablet Take 1 tablet (75 mg) by mouth daily.      Continuous Glucose Sensor (FREESTYLE LAMINE 2 SENSOR) MISC Inject 1 each subcutaneously every 14 days Use 1 sensor every 14 days. Use to read blood sugars per 's instructions. (Patient not taking: Reported on 10/9/2024) 6 each 5    cyanocobalamin (VITAMIN B-12) 1000 MCG tablet Take 1,000 mcg by mouth daily.      insulin glargine (LANTUS PEN) 100 UNIT/ML pen Inject 50 Units subcutaneously at bedtime.      insulin lispro (HUMALOG KWIKPEN) 100 UNIT/ML (1 unit dial) KWIKPEN Inject 20 Units subcutaneously 3 times daily (with meals). Plus sliding scale: 2 units every 50 over 150. If humalog is not covered by insurance, may substitute with novolog or other fast acting insulin      insulin pen needle (32G X 4 MM) 32G X 4 MM miscellaneous Use 4 pen needles daily or as directed. 200 each 4    isosorbide mononitrate (IMDUR) 30 MG 24 hr tablet Take 1 tablet (30 mg) by mouth daily. 30 tablet 11    [Paused] metFORMIN (GLUCOPHAGE XR) 500 MG 24 hr tablet Take 1 " tablet (500 mg) by mouth daily (with dinner). 90 tablet 0    multivitamin w/minerals (THERA-VIT-M) tablet Take 1 tablet by mouth daily      sacubitril-valsartan (ENTRESTO) 24-26 MG per tablet Take 1 tablet by mouth 2 times daily.      zolpidem (AMBIEN) 10 MG tablet Take tablet by mouth 15 minutes prior to sleep, for Sleep Study 1 tablet 0     No current facility-administered medications for this visit.       Past Medical History  Past Medical History:   Diagnosis Date    Acute hypoxemic respiratory failure (H)     Acute renal failure, unspecified acute renal failure type     CAD (coronary artery disease)     Callus of foot     Diabetes (H)     Diarrhea     Essential hypertension     Fracture of left distal radius     History of stroke     Insomnia     Nausea     Non-pressure chronic ulcer of right heel and midfoot with unspecified severity (H)     Normocytic anemia     Type 2 diabetes mellitus with foot ulcer (H)        Social History  Social History     Tobacco Use    Smoking status: Former     Current packs/day: 0.50     Types: Cigarettes    Smokeless tobacco: Never   Vaping Use    Vaping status: Never Used   Substance Use Topics    Alcohol use: Yes     Comment: once a week    Drug use: Yes     Types: Marijuana     Comment: Has not done since Feb       Family History  No family history on file.    Physical Exam    /72   Pulse 74   SpO2 96%     General Exam:  GEN:  Awake, NAD  HEAD:  Atraumatic/Normocephalic  EYES:  Non-icterus, no conjunctivitis  EARS:  No otic discharge  NOSE:  Patent nares, no discharge  THROAT:  MMM, No oral lesions  CV:  RRR  PULM:  Breathing comfortably on room air, no tachypnea, not using accessory muscles of respiration  ABD:  Soft, NT, ND  MSK:  No peripheral edema  SKIN:  No dermatitis, no apparent rash  VASCULAR:  Good bilateral radial pulses and bilateral dorsalis pedal pulses     NEUROLOGICAL EXAM: world forward and backward, j10 then self corrected 10j. Finger tapping faster  on left, left hand better sensation,      Mental State:   Awake, Oriented to time, place, and persons. Attentive. Reactive affect.   LANGUAGE/SPEECH:    No dysphasia or paraphasic errors.    CN:   I:  Deferred  II:  Fundoscopic exam did not show any papilledema; RADHA, No APD, VFF  III/IV/VI: EOMI, no nystagmus  V:  V1-V3 intact  VII:  Face was symmetric bilaterally  VIII:  Hearing was intact to conversational voice  IX/X:  Palatal raise was intact bilaterally  XI:  Shoulder shrug was strong bilaterally; Pt is able to rotate head left and right against resistance  XII:  Tongue midline; able to move it left and right without any difficulties  MOTOR:  State and Bulk:  No atrophy, hypertrophy, or fasciculations.   Tone:  Normal in bilateral UE and LE  Dexterity and speed: Intact  Power: no focal weakness, 5/5 in all muscle groups  Reflexes:   DTR: non-pathologic and symmetric   Plantar reflex: Toes are downgoing bilaterally  Sensory:   No hemihypesthesia  No stocking hypesthesia  Coordination:  Intact finger to nose and heel to shin bilaterally; no dysmetria or decomposition of movement  Involuntary Movement:    None observed  Gait:    Normal gait, normal arm swing, normal tandem, negative Romberg  Meningeal signs:   Negative Kernig's or Brudzinski's signs         Neuroimaging: as per HPI. I personally reviewed those images***    Labs:    Recent Labs   Lab Test 08/23/23  0946   INR 0.95        Recent Labs   Lab Test 03/23/25  1049 02/21/24  0537   CHOL 224* 156   HDL 53 39*   * 77   TRIG 213* 199*       Recent Labs   Lab Test 05/28/25  1728 03/23/25  0859 01/30/25  1457   A1C 11.3* 12.1* 8.9*         Billing disclosure:    *** min spent on the date of the encounter in chart review, patient visit, review of tests, documentation and/or discussion with other providers about the issues documented above.        meals). Plus sliding scale: 2 units every 50 over 150. If humalog is not covered by insurance, may substitute with novolog or other fast acting insulin      insulin pen needle (32G X 4 MM) 32G X 4 MM miscellaneous Use 4 pen needles daily or as directed. 200 each 4    isosorbide mononitrate (IMDUR) 30 MG 24 hr tablet Take 1 tablet (30 mg) by mouth daily. 30 tablet 11    [Paused] metFORMIN (GLUCOPHAGE XR) 500 MG 24 hr tablet Take 1 tablet (500 mg) by mouth daily (with dinner). 90 tablet 0    multivitamin w/minerals (THERA-VIT-M) tablet Take 1 tablet by mouth daily      sacubitril-valsartan (ENTRESTO) 24-26 MG per tablet Take 1 tablet by mouth 2 times daily.      zolpidem (AMBIEN) 10 MG tablet Take tablet by mouth 15 minutes prior to sleep, for Sleep Study 1 tablet 0     No current facility-administered medications for this visit.       Past Medical History  Past Medical History:   Diagnosis Date    Acute hypoxemic respiratory failure (H)     Acute renal failure, unspecified acute renal failure type     CAD (coronary artery disease)     Callus of foot     Diabetes (H)     Diarrhea     Essential hypertension     Fracture of left distal radius     History of stroke     Insomnia     Nausea     Non-pressure chronic ulcer of right heel and midfoot with unspecified severity (H)     Normocytic anemia     Type 2 diabetes mellitus with foot ulcer (H)        Social History  Social History     Tobacco Use    Smoking status: Former     Current packs/day: 0.50     Types: Cigarettes    Smokeless tobacco: Never   Vaping Use    Vaping status: Never Used   Substance Use Topics    Alcohol use: Yes     Comment: once a week    Drug use: Yes     Types: Marijuana     Comment: Has not done since Feb       Family History  No family history on file.    Physical Exam    /72   Pulse 74   SpO2 96%     General Exam:  GEN:  obese, unkempt, adult  male, looks older than his age   HEAD:  Atraumatic/Normocephalic  EYES:  Non-icterus,  no conjunctivitis  EARS:  No otic discharge  NOSE:  Patent nares, no discharge  THROAT:  dry oral mucosa   PULM:  Breathing comfortably on room air, no tachypnea, not using accessory muscles of respiration     NEUROLOGICAL EXAM:    Mental State:   Awake, Oriented to time, place, and persons. Able to spell 'world' forward and backward, able to follow trail B up to 10j then made mistakes. Dysporic affect.   LANGUAGE/SPEECH:    No dysphasia or paraphasic errors.    CN:   I:  Deferred  II:  VFF  III/IV/VI: EOMI, no nystagmus  V:  V1-V3 intact  VII:  Face was symmetric bilaterally  VIII:  Hearing was intact to conversational voice  IX/X:  Palatal raise was intact bilaterally  XI:  Shoulder shrug was strong bilaterally; Pt is able to rotate head left and right against resistance  XII:  Tongue midline; able to move it left and right without any difficulties  MOTOR:  State and Bulk:  No atrophy, hypertrophy, or fasciculations.   Tone:  Normal in bilateral UE and LE  Dexterity and speed: decreased Finger tapping faster on right   Power: no focal weakness, 5/5 in all muscle groups  Sensory:   Right hand hypesthesia to pinprick   Coordination:  No ataxia   Involuntary Movement:    None observed  Gait:    Antalgic gait       Neuroimaging: as per HPI. I personally reviewed those images:   CTA and MRI brain done in 2023   CTA and MRI brain done in 2025   Findings as detailed above.     Labs:    Recent Labs   Lab Test 08/23/23  0946   INR 0.95        Recent Labs   Lab Test 03/23/25  1049 02/21/24  0537   CHOL 224* 156   HDL 53 39*   * 77   TRIG 213* 199*       Recent Labs   Lab Test 05/28/25  1728 03/23/25  0859 01/30/25  1457   A1C 11.3* 12.1* 8.9*         Billing disclosure:    60 min spent on the date of the encounter in chart review, patient visit, review of tests, documentation and/or discussion with other providers about the issues documented above.

## 2025-07-07 NOTE — PATIENT INSTRUCTIONS
Diagnosis:   You had 2 strokes, one in 2023 and one in 2025. Both strokes were caused by small vessel disease due to diabetes and high blood pressure. Your risk factors are not well controlled.   You also have heart disease and you may need bypass surgery.     Plan:   Continue on the current medications. No medication changes today.   You need to eat a diabetic diet at the TCU.   Check your blood pressure every other day and keep record of it.   If the heart surgery can be done 3 months after your most recent stroke.   I will give you a referral to endocrinology (diabetes specialist).   I will also give you a referral to the eye doctor. This is important because diabetes causes damage to the eyes.   Keep your appointment with the sleep clinic.   When you leave the TCU, you should follow up with your primary.     Follow up with me as needed.     Hazel Simmons MD, Msc, SHERMAN, FAAN   of Neurology  [facility]

## 2025-07-07 NOTE — LETTER
7/7/2025      Floyd Capps  [address]  [place] [address]      Dear Colleague,    Thank you for referring your patient, Floyd Capps, to the [facility]. Please see a copy of my visit note below.      _____________________________________________________________    [facility]    at Critical access hospital and [facility] [phone]  _____________________________________________________________      Chief Complaint: Stroke     History of Present Illness: Floyd Capps is a 49 year old right handed  male with obesity, DM (for 20 years), HTN, HLD, CAD, CHF with low EF, and prior stroke. The patient had 2 strokes. The first one was in 2023 for which he was admitted to [facility] for 3 days in August. Patient presented with right sided weakness, numbness, and incoordination. Is MRI actually showed 2 infarcts, a small one in the left lateral putamen/external capsul and a smaller one in the right centrum semiovale. CTA showed multifocal cervical and intracranial atherosclerotic disease including bilat V1 origin calcification (stenosis severity hard to ascertain because of imaging quality), mild left and moderate right ICA origin stenoses with mixed plaque (soft and calcified portions), bilateral intracranial ICA calcification, bilateral PCA stenoses (worse on the right). TTE and GAIL showed normal EF and focal hypokinetic segment in the apex without intramural thrombus. A1C was 11.1% and LDL was 226. He was put on DAPT for 90 days then ASA monotherapy.     The patient tells me that he did not take any medications before that stroke even though he had known DM. He thought he could control his conditions by lifestyle changes. Patient also notes that many of his family members are obese but he is the only one with DM which probably contributed to him underestimating his disease. That first stroke made him more aware of his need to take care of himself  and control his DM and other medical conditions better. However, his mother passed away in 2024 which depressed him and made him less motivated. In Feb 2024, he was admitted with sepsis due to diabetic foot ulcer, pneumonia, respiratory failure, and acute systolic heart failure. His EF was 30-35%. He had a diagnostic coronary angiogram which showed multivessel severe disease. Patient was offered CABG but he declined. He was maintained on ASA. Things got better later that year and he got a job. Unfortunately he lost that job at the end of the year and subsequently got depressed and lost his motivation to stay healthy.     Then in March 2025, patient was admitted to [facility] with his second stroke. He presented with left sided weakness and numbness arm and leg. Poor balance. MRI showed 2 small right thalamocapsular infarctions. CTA head and neck showed similar findings as the one done the year before. TTE showed EF 30-35%, global hypokinesis with severe hypokinesis/akinesis of inferior and inferolateral walls. LDL was 126 and A1c 12.1%. He was discharged on DAPT with plan to stay on it for 3 months.      Then a couple of weeks ago he had dizziness, decreased energy, shortness of breath with activity, weakness of both legs, pain in the buttocks with movement. MRI in the ED showed no new stroke. He was diagnosed with heart failure exacerbation and admitted to the hospital. Medications were adjusted and he was discharged to TCU after being stable with plan to follow up with outpatient cardiothoracic surgery to discuss CAGB. After discharge he was seen at the cardiothoracic surgery and CABG planned in July pending neurology approval.     Patient is here today for follow up. He is unaccompanied.   Patient reports that the symptoms of the most recent stroke have subsided, however, he still has mild right sided weakness and numbness residual from the first stroke.   he is still at the same TCU since most recent  discharge.   Poor diet at TCU.   Mood is done.   Got evicted, so would be homeless if discharged out of TCU. TCU SW helping him figure out the housing situation.   More aware now of his chronic conditions and physical limitations.   Hoping so much that the CABG would give him the energy to improve his function and be able to participate in therapy, exercise, etc.   Does not understand why his DM is so hard to control. Has not had an endocrinologist despite having DM for over 20 years.    Mother witnessed apnea. Was seen in sleep clinic, screened positive for ANDIE. Has lily with sleep study coming up in Sept or Oct.     mRS 2       Diagnosis:  Problem List Items Addressed This Visit          Cardiovascular/Peripheral Vascular    Chronic systolic heart failure (H)       Behavioral Health    Tobacco use disorder       Neurology/Sleep    Acute ischemic stroke (H)       Genitourinary/Renal    CKD (chronic kidney disease) stage 3, GFR 30-59 ml/min (H)     Other Visit Diagnoses         Type 2 diabetes mellitus with other specified complication, with long-term current use of insulin (H)    -  Primary    Relevant Orders    Adult Endocrinology  Referral    Adult Eye  Referral      Late effect of ischemic cerebral stroke          Intracranial atherosclerosis          Essential hypertension          Dyslipidemia          Cerebrovascular small vessel disease          Coronary artery disease involving native heart without angina pectoris, unspecified vessel or lesion type                Impression & Plan:     As discussed with the patient    Diagnosis:   You had 2 strokes, one in 2023 and one in 2025. Both strokes were caused by small vessel disease due to diabetes and high blood pressure. Your risk factors are not well controlled.   You also have heart disease and you may need bypass surgery.     Plan:   Continue on the current medications. No medication changes today.   You need to eat a diabetic diet at the  "TCU.   Check your blood pressure every other day and keep record of it.   If the heart surgery can be done 3 months after your most recent stroke.   I will give you a referral to endocrinology (diabetes specialist).   I will also give you a referral to the eye doctor. This is important because diabetes causes damage to the eyes.   Keep your appointment with the sleep clinic.   When you leave the TCU, you should follow up with your primary.     Follow up with me as needed.     Stroke Education provided.  He will call us with any questions.  For any acute neurologic deficits he was advised to  go directly to the hospital rather than call the clinic.    Hazel Simmons MD, Msc, SHERMAN, PAZN   of Neurology  [facility]     07/07/2025 12:52 PM  To page me or covering stroke neurology team member, click here: AMCOM  Choose \"On Call\" tab at top, then search dropdown box for \"Neurology Adult\" & press Enter, look for Neuro ICU/Stroke    ___________________________________________________________________    Current Medications  Current Outpatient Medications   Medication Sig Dispense Refill     acetaminophen (TYLENOL) 500 MG tablet Take 2 tablets (1,000 mg) by mouth every 8 hours as needed for mild pain       aspirin 81 MG EC tablet Take 1 tablet (81 mg) by mouth daily.       atorvastatin (LIPITOR) 80 MG tablet Take 1 tablet (80 mg) by mouth daily 90 tablet 3     bumetanide (BUMEX) 2 MG tablet Take 1 tablet (2 mg) by mouth daily.       carvedilol (COREG) 25 MG tablet Take 1 tablet (25 mg) by mouth 2 times daily (with meals). 180 tablet 0     clopidogrel (PLAVIX) 75 MG tablet Take 1 tablet (75 mg) by mouth daily.       Continuous Glucose Sensor (FREESTYLE LAMINE 2 SENSOR) [facility] Inject 1 each subcutaneously every 14 days Use 1 sensor every 14 days. Use to read blood sugars per 's instructions. (Patient not taking: Reported on 10/9/2024) 6 each 5     cyanocobalamin (VITAMIN B-12) 1000 " MCG tablet Take 1,000 mcg by mouth daily.       insulin glargine (LANTUS PEN) 100 UNIT/ML pen Inject 50 Units subcutaneously at bedtime.       insulin lispro (HUMALOG KWIKPEN) 100 UNIT/ML (1 unit dial) KWIKPEN Inject 20 Units subcutaneously 3 times daily (with meals). Plus sliding scale: 2 units every 50 over 150. If humalog is not covered by insurance, may substitute with novolog or other fast acting insulin       insulin pen needle (32G X 4 MM) 32G X 4 MM miscellaneous Use 4 pen needles daily or as directed. 200 each 4     isosorbide mononitrate (IMDUR) 30 MG 24 hr tablet Take 1 tablet (30 mg) by mouth daily. 30 tablet 11     [Paused] metFORMIN (GLUCOPHAGE XR) 500 MG 24 hr tablet Take 1 tablet (500 mg) by mouth daily (with dinner). 90 tablet 0     multivitamin w/minerals (THERA-VIT-M) tablet Take 1 tablet by mouth daily       sacubitril-valsartan (ENTRESTO) 24-26 MG per tablet Take 1 tablet by mouth 2 times daily.       zolpidem (AMBIEN) 10 MG tablet Take tablet by mouth 15 minutes prior to sleep, for Sleep Study 1 tablet 0     No current facility-administered medications for this visit.       Past Medical History  Past Medical History:   Diagnosis Date     Acute hypoxemic respiratory failure (H)      Acute renal failure, unspecified acute renal failure type      CAD (coronary artery disease)      Callus of foot      Diabetes (H)      Diarrhea      Essential hypertension      Fracture of left distal radius      History of stroke      Insomnia      Nausea      Non-pressure chronic ulcer of right heel and midfoot with unspecified severity (H)      Normocytic anemia      Type 2 diabetes mellitus with foot ulcer (H)        Social History  Social History     Tobacco Use     Smoking status: Former     Current packs/day: 0.50     Types: Cigarettes     Smokeless tobacco: Never   Vaping Use     Vaping status: Never Used   Substance Use Topics     Alcohol use: Yes     Comment: once a week     Drug use: Yes     Types:  Marijuana     Comment: Has not done since Feb       Family History  No family history on file.    Physical Exam    /72   Pulse 74   SpO2 96%     General Exam:  GEN:  obese, unkempt, adult  male, looks older than his age   HEAD:  Atraumatic/Normocephalic  EYES:  Non-icterus, no conjunctivitis  EARS:  No otic discharge  NOSE:  Patent nares, no discharge  THROAT:  dry oral mucosa   PULM:  Breathing comfortably on room air, no tachypnea, not using accessory muscles of respiration     NEUROLOGICAL EXAM:    Mental State:   Awake, Oriented to time, place, and persons. Able to spell 'world' forward and backward, able to follow trail B up to 10j then made mistakes. Dysporic affect.   LANGUAGE/SPEECH:    No dysphasia or paraphasic errors.    CN:   I:  Deferred  II:  VFF  III/IV/VI: EOMI, no nystagmus  V:  V1-V3 intact  VII:  Face was symmetric bilaterally  VIII:  Hearing was intact to conversational voice  IX/X:  Palatal raise was intact bilaterally  XI:  Shoulder shrug was strong bilaterally; Pt is able to rotate head left and right against resistance  XII:  Tongue midline; able to move it left and right without any difficulties  MOTOR:  State and Bulk:  No atrophy, hypertrophy, or fasciculations.   Tone:  Normal in bilateral UE and LE  Dexterity and speed: decreased Finger tapping faster on right   Power: no focal weakness, 5/5 in all muscle groups  Sensory:   Right hand hypesthesia to pinprick   Coordination:  No ataxia   Involuntary Movement:    None observed  Gait:    Antalgic gait       Neuroimaging: as per HPI. I personally reviewed those images:   CTA and MRI brain done in 2023   CTA and MRI brain done in 2025   Findings as detailed above.     Labs:    Recent Labs   Lab Test 08/23/23  0946   INR 0.95        Recent Labs   Lab Test 03/23/25  1049 02/21/24  0537   CHOL 224* 156   HDL 53 39*   * 77   TRIG 213* 199*       Recent Labs   Lab Test 05/28/25  1728 03/23/25  0859 01/30/25  1457   A1C  11.3* 12.1* 8.9*         Billing disclosure:    60 min spent on the date of the encounter in chart review, patient visit, review of tests, documentation and/or discussion with other providers about the issues documented above.         Again, thank you for allowing me to participate in the care of your patient.        Sincerely,        Hazel Simmons MD    Electronically signed

## 2025-07-08 ENCOUNTER — PATIENT OUTREACH (OUTPATIENT)
Dept: CARE COORDINATION | Facility: CLINIC | Age: 50
End: 2025-07-08

## 2025-07-08 ENCOUNTER — OFFICE VISIT (OUTPATIENT)
Dept: NEPHROLOGY | Facility: CLINIC | Age: 50
End: 2025-07-08
Payer: COMMERCIAL

## 2025-07-08 VITALS
HEART RATE: 78 BPM | DIASTOLIC BLOOD PRESSURE: 71 MMHG | SYSTOLIC BLOOD PRESSURE: 105 MMHG | WEIGHT: 311 LBS | BODY MASS INDEX: 44.62 KG/M2 | OXYGEN SATURATION: 96 %

## 2025-07-08 DIAGNOSIS — N18.31 CKD STAGE 3A, GFR 45-59 ML/MIN (H): Primary | ICD-10-CM

## 2025-07-08 LAB
ALBUMIN MFR UR ELPH: 286 MG/DL
ALBUMIN SERPL BCG-MCNC: 3.3 G/DL (ref 3.5–5.2)
ANION GAP SERPL CALCULATED.3IONS-SCNC: 13 MMOL/L (ref 7–15)
BUN SERPL-MCNC: 30.8 MG/DL (ref 6–20)
CALCIUM SERPL-MCNC: 9.7 MG/DL (ref 8.8–10.4)
CHLORIDE SERPL-SCNC: 102 MMOL/L (ref 98–107)
CREAT SERPL-MCNC: 1.31 MG/DL (ref 0.67–1.17)
CREAT UR-MCNC: 119 MG/DL
CREAT UR-MCNC: 119 MG/DL
EGFRCR SERPLBLD CKD-EPI 2021: 66 ML/MIN/1.73M2
GLUCOSE SERPL-MCNC: 134 MG/DL (ref 70–99)
HCO3 SERPL-SCNC: 22 MMOL/L (ref 22–29)
MICROALBUMIN UR-MCNC: 1994 MG/L
MICROALBUMIN/CREAT UR: 1675.63 MG/G CR (ref 0–17)
PHOSPHATE SERPL-MCNC: 3.6 MG/DL (ref 2.5–4.5)
POTASSIUM SERPL-SCNC: 4.7 MMOL/L (ref 3.4–5.3)
PROT/CREAT 24H UR: 2.4 MG/MG CR (ref 0–0.2)
SODIUM SERPL-SCNC: 137 MMOL/L (ref 135–145)

## 2025-07-08 PROCEDURE — 99204 OFFICE O/P NEW MOD 45 MIN: CPT | Performed by: INTERNAL MEDICINE

## 2025-07-08 RX ORDER — INSULIN ASPART 100 [IU]/ML
INJECTION, SOLUTION INTRAVENOUS; SUBCUTANEOUS
COMMUNITY
Start: 2025-07-03

## 2025-07-08 ASSESSMENT — PAIN SCALES - GENERAL: PAINLEVEL_OUTOF10: NO PAIN (0)

## 2025-07-08 NOTE — PATIENT INSTRUCTIONS
It was a pleasure taking care of you today.  I've included a brief summary of our discussion and care plan from today's visit below.      My recommendations are summarized as follows:  - Start Jardiance 10 mg orally daily.  - Labs in 2 weeks  - Return to Nephrology Clinic in 6 months to review your progress.     Who do I call with any questions after my visit?  Please be in touch if there are any further questions that arise following today's visit.  There are multiple ways to contact your nephrology care team.      During business hours, you may reach your Nephrology RN Care Coordinator, Carrie Springer at [phone]. For urgent/emergent questions after business hours, you may reach the on-call Nephrology Fellow by contacting the [facility]  at [phone]. You can always send a secure message through itsDapper.  itsDapper messages are answered by your nurse or doctor typically within 24 hours.  Please allow extra time on weekends and holidays.      How do I schedule appointments?  To schedule or reschedule an appointment, please call [phone]. To schedule imaging please call [phone]. To schedule your lab appointment at [facility] 1st floor lab call [phone].    How will I get the results of any tests ordered?    You will receive all of your results.  If you have signed up for itsDapper, any tests ordered at your visit will be available to you after your physician reviews them.  Typically this takes 1-2 weeks.  If there are urgent results that require a change in your care plan, your physician or nurse will call you to discuss the next steps.      What is itsDapper?  itsDapper is a secure way for you to access all of your healthcare records from the [facility].  It is a web based computer program, so you can sign on to it from any location.  It also allows you to send secure messages to your care team.  I recommend signing up for itsDapper access if you have not already  done so and are comfortable with using a computer.      Sincerely,    Musa Cunha MD  [facility]  Division of Nephrology and Hypertension

## 2025-07-08 NOTE — PROGRESS NOTES
Nephrology Outpatient Consult Note (07/08/2025)     Requesting Provider  Glendy Benavides NP    Chief Complaint/Reason for Visit  Chronic kidney disease. Proteinuria.    History of Present Illness  This is a 49-year-old male with a past medical history notable for coronary artery disease, heart failure with reduced ejection fraction, and CVA who was referred to our clinic for further evaluation of chronic kidney disease.    The patient was hospitalized in May 2025 for heart failure exacerbation.  In addition, the patient was hospitalized in March 2025 for acute ischemic stroke of the right thalamic area.  Echo at that time showed known EF 30-35%.  Patient also has known history of coronary artery disease and follows outpatient cardiology.     Overall, the patient is doing okay today.  He is in TCU following his recent hospitalization.  He indicates that he was the primary caregiver of his mother until she passed away.  He also indicated that he neglected his health as he was under stress of caring for his mother and also working as well.  His mother has since passed away.  He feels that he is recovering in TCU.  He denies acute symptoms today.  He denies chest pain or difficulty breathing.  He denies abdominal pain.  He denies dysuria or gross hematuria.  He has no lower extremity edema.  He has used NyQuil daily for 10 years however he quit a while back.  In addition, he used to take Excedrin.  He also quit after being instructed to do so.     With regards to his kidney function, we have laboratory data dating back to 2023.  Back then, his kidney function was normal with a creatinine measurement of 0.7 mg/dL.  In 2024 his creatinine measurements have been higher and he seems to have had a few episodes of acute kidney injury.    Over the course of the last 6 months, his serum creatinine had ranged between 1.2 to 1.6 mg/dL.    His most recent laboratory evaluation was performed earlier this month.  Serum  creatinine was 1.3 mg/dL with an estimated GFR 66 mL/min.  Electrolytes are within the normal range.  Serum albumin slightly decreased at 3.3.  Urine albumin to creatinine ratio is 1.6 g/g.  Glucose is 134.  Hemoglobin is 12.6.  Urinalysis is notable for proteinuria with a urine protein to creatinine ratio of 2.4 g/g.  Hemoglobin A1c measurements in May was 11.3%. 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    The following portions of the patient's history were reviewed and updated as appropriate: allergies, current medications, past family history, past medical history, past social history, past surgical history, and problem list.    Subjective   Review of Systems  A comprehensive review of systems was performed. Pertinent positives and negatives are described above.    Past Medical/Surgical History  Patient  has a past medical history of Acute hypoxemic respiratory failure (H), Acute renal failure, unspecified acute renal failure type, CAD (coronary artery disease), Callus of foot, Diabetes (H), Diarrhea, Essential hypertension, Fracture of left distal radius, History of stroke, Insomnia, Nausea, Non-pressure chronic ulcer of right heel and midfoot with unspecified severity (H), Normocytic anemia, and Type 2 diabetes mellitus with foot ulcer (H).    He has no past medical history of Malignant hyperthermia or PONV (postoperative nausea and vomiting).  Patient  has a past surgical history that includes Incision And Drainage Of Wound; appendectomy; Irrigation and debridement foot, combined (Right, 2/16/2024); PICC/Midline Placement (2/29/2024); Coronary Angiogram (N/A, 3/1/2024); Left Heart Catheterization (N/A, 3/1/2024); Incision and drainage foot, combined (Right, 5/2/2024); Excise exostosis foot (Right, 5/2/2024); Left Heart Catheterization (N/A, 6/3/2025); and Coronary Angiogram (N/A, 6/3/2025).    Social History  Patient  reports that he has quit  smoking. His smoking use included cigarettes. He has never used smokeless tobacco. He reports current alcohol use. He reports current drug use. Drug: Marijuana.    Family History  family history is not on file.     Physical Examination  Blood pressure 105/71, pulse 78, weight (!) 141.1 kg (311 lb), SpO2 96%. Body mass index is 44.62 kg/m .  General:  Comfortable.  Eyes: conjunctiva clear, EOM intact, PERRL.   Throat:  No erythema, exudates or lesions.   Neck:  neck supple, no masses  Heart:  RRR, no murmur   Lungs:  CTA bilaterally, no wheezes, rhonchi, rales.  Breathing unlabored.   Abdomen:  Soft, NT/ND, no HSM, no masses.   Extremities: No deformities, clubbing, cyanosis, or edema.   Neurologic: A/O x 3. No focal deficits.     Objective   Pertinent Laboratory and Imaging Results  Most Recent Serum Chemistries  Lab Results   Component Value Date    WBC 12.1 (H) 07/07/2025    HGB 12.6 (L) 07/07/2025    HCT 37.5 (L) 07/07/2025     07/07/2025     07/07/2025    POTASSIUM 4.7 07/07/2025    CHLORIDE 102 07/07/2025    CO2 22 07/07/2025    BUN 30.8 (H) 07/07/2025    CR 1.31 (H) 07/07/2025     (H) 07/07/2025    SED 99 (H) 02/14/2024    NTBNPI 1,034 (H) 03/23/2025    NTBNP 2,328 (H) 05/28/2025    AST 19 03/23/2025    ALT 26 03/23/2025    ALKPHOS 113 03/23/2025    INR 0.95 08/23/2023     Most Recent Urinalysis  Lab Results   Component Value Date    COLOR Yellow 07/07/2025    APPEARANCE Clear 07/07/2025    URINEGLC Negative 07/07/2025    URINEBILI Negative 07/07/2025    URINEKETONE Negative 07/07/2025    SG 1.020 07/07/2025    URINEPH 5.5 07/07/2025    PROTEIN >=300 (A) 07/07/2025    UROBILINOGEN 0.2 07/07/2025    NITRITE Negative 07/07/2025    LEUKEST Negative 07/07/2025     Current Outpatient Medications   Medication Sig Dispense Refill    acetaminophen (TYLENOL) 500 MG tablet Take 2 tablets (1,000 mg) by mouth every 8 hours as needed for mild pain      aspirin 81 MG EC tablet Take 1 tablet (81 mg) by  mouth daily.      atorvastatin (LIPITOR) 80 MG tablet Take 1 tablet (80 mg) by mouth daily 90 tablet 3    bumetanide (BUMEX) 2 MG tablet Take 1 tablet (2 mg) by mouth daily.      carvedilol (COREG) 25 MG tablet Take 1 tablet (25 mg) by mouth 2 times daily (with meals). 180 tablet 0    clopidogrel (PLAVIX) 75 MG tablet Take 1 tablet (75 mg) by mouth daily.      Continuous Glucose Sensor (FREESTYLE LAMINE 2 SENSOR) MISC Inject 1 each subcutaneously every 14 days Use 1 sensor every 14 days. Use to read blood sugars per 's instructions. 6 each 5    cyanocobalamin (VITAMIN B-12) 1000 MCG tablet Take 1,000 mcg by mouth daily.      empagliflozin (JARDIANCE) 10 MG TABS tablet Take 1 tablet (10 mg) by mouth daily. 30 tablet 5    insulin glargine (LANTUS PEN) 100 UNIT/ML pen Inject subcutaneously at bedtime.      isosorbide mononitrate (IMDUR) 30 MG 24 hr tablet Take 1 tablet (30 mg) by mouth daily. 30 tablet 11    [Paused] metFORMIN (GLUCOPHAGE XR) 500 MG 24 hr tablet Take 1 tablet (500 mg) by mouth daily (with dinner). 90 tablet 0    multivitamin w/minerals (THERA-VIT-M) tablet Take 1 tablet by mouth daily      NOVOLOG FLEXPEN 100 UNIT/ML soln       sacubitril-valsartan (ENTRESTO) 24-26 MG per tablet Take 1 tablet by mouth 2 times daily.      zolpidem (AMBIEN) 10 MG tablet Take tablet by mouth 15 minutes prior to sleep, for Sleep Study 1 tablet 0    insulin glargine (LANTUS PEN) 100 UNIT/ML pen Inject 50 Units subcutaneously at bedtime. (Patient not taking: Reported on 7/8/2025)      insulin lispro (HUMALOG KWIKPEN) 100 UNIT/ML (1 unit dial) KWIKPEN Inject 20 Units subcutaneously 3 times daily (with meals). Plus sliding scale: 2 units every 50 over 150. If humalog is not covered by insurance, may substitute with novolog or other fast acting insulin (Patient not taking: Reported on 7/8/2025)      insulin pen needle (32G X 4 MM) 32G X 4 MM miscellaneous Use 4 pen needles daily or as directed. (Patient not taking:  Reported on 7/8/2025) 200 each 4     No current facility-administered medications for this visit.        Assessment & Plan   # Chronic kidney disease stage IIIa  # Subnephrotic range proteinuria  # Type 2 diabetes mellitus complicated by diabetic foot ulcer and diabetic nephropathy  # Chronic systolic heart failure  # History of CVA  # Obesity, BMI 44    The patient has chronic kidney disease, currently in stage 3a.  The etiology is not entirely clear but is likely due to a combination of diabetic nephropathy, and prior NSAIDs use.  His CT scan is negative for obstruction or hydronephrosis.  His urinalysis is notable for heavy albuminuria and proteinuria likely due to diabetic nephropathy.     With regards to workup, I would like to obtain a basic serological evaluation for parenchymal kidney disease (glomerulonephritis).  I would like to obtain serological evaluation for monoclonal gammopathy.  If the results of the evaluation is normal, then no additional workup would be warranted.     With regards to management, I emphasized to the patient the importance of diabetes control as a method to slow down the progression of his kidney disease.  I discussed with him the indications, risk, benefits of SGLT 2 inhibitors.  I think he would benefit from connecting with endocrinology and a referral was made recently.    My recommendations are the following:  - Start Jardiance 10 mg orally daily.  - Labs in 2 weeks  - Enroll in CKD journey  - Return to Nephrology Clinic in 6 months to review your progress.  Check a CKD laboratory panel prior to the visit.    Total time was of this encounter on the date of service was 50 minutes. All questions were answered to the patient's satisfaction.  Chart documentation was completed, in part, with [employer] voice-recognition software. Even though reviewed, some grammatical, spelling, and word errors may remain.    Orders placed today:  Orders Placed This Encounter   Procedures    ANCA IgG by  IFA with Reflex to Titer    Hepatitis B Surface Antibody    Hepatitis B surface antigen    Hepatitis C antibody    Complement C3    Complement C4    DNA double stranded antibodies    Myeloperoxidase and Proteinase 3 Panel    Anti Nuclear Ellen IgG by IFA with Reflex    Protein Immunofixation Serum    Basic metabolic panel    CKD Jourmelvin Cunha MD  Division of Nephrology and Hypertension  Imagry Web (Versus)   Vocera Web Console (Versus)

## 2025-07-09 ENCOUNTER — TELEPHONE (OUTPATIENT)
Dept: ENDOCRINOLOGY | Facility: CLINIC | Age: 50
End: 2025-07-09
Payer: COMMERCIAL

## 2025-07-09 NOTE — TELEPHONE ENCOUNTER
Sent Mychart (1st Attempt) and Patient Contacted for the patient to call back and schedule the following:    Appointment type: NEW DIABETES  Provider: CHAMP Porras CNP or any who sees T2D  Return date: next available, [facility] [place]  Specialty phone number: [phone]  Additional appointment(s) needed: N/A  Additonal Notes: Spoke with pt, he asked for a call back tomorrow. MyCx1    Per Endocrine RN Triage Scheduling Protocol patient is to be seen in open diabetes or [facility] within 1-2 weeks in this order:   1-PA   2-Fellow   3-Preferred diabetes provider   4-Other provider that sees new diabetes patients   Will send to Clinic Coordinators to assist in scheduling sooner visit.   Arline James RN     Available appts:  Juan 07/10 8:00 in person WB  Ruanpeng 07/15 1:00 in person WB fast pass  Juan 07/17 in person WB  Juan 07/18 in person MPLW  Navneet 08/12 in person/virtual [facility] schedule:            Every Monday in [place]: In-person in the morning, Virtual in the afternoon            Every Tuesday in [place]: In-Person all day            Every Wednesday in [place]: In-person all day.            Every Thursday in [place]: All-day in person            Every Friday in [place]: Virtual in the morning (no afternoon clinic)  Full days run 7:30am-4pm  Friday half days run 7:30am-11:30am  Please use only the HOLD slots and not the ADMIN slots.    Melissa Zambrano on 7/9/2025 at 4:12 PM

## 2025-07-10 NOTE — TELEPHONE ENCOUNTER
Unable to Contact for the patient to call back and schedule the following:     Appointment type: NEW DIABETES  Provider: CHAMP Porras CNP or any who sees T2D  Return date: next available, [facility] [place]  Specialty phone number: [phone]  Additional appointment(s) needed: N/A  Additonal Notes: Pt contacted x1, MyCx1, unable to LVM.     Per Endocrine RN Triage Scheduling Protocol patient is to be seen in open diabetes or [facility] within 1-2 weeks in this order:   1-PA   2-Fellow   3-Preferred diabetes provider   4-Other provider that sees new diabetes patients   Will send to Clinic Coordinators to assist in scheduling sooner visit.   Arline James RN      Available appts:  Juan 07/23 8:00 in person CSC  Vani 07/24 8:30 in person CSC  Juan 07/30 8:00 in person CSC  Elia 07/31 10:15 in person [facility]  Navneet 08/12 in person/virtual [facility] schedule:            Every Monday in [place]: In-person in the morning, Virtual in the afternoon            Every Tuesday in [place]: In-Person all day            Every Wednesday in [place]: In-person all day.            Every Thursday in [place]: All-day in person            Every Friday in [place]: Virtual in the morning (no afternoon clinic)  Full days run 7:30am-4pm  Friday half days run 7:30am-11:30am  Please use only the HOLD slots and not the ADMIN slots.     Melissa Zambrano on 7/10/2025 at 9:26 AM

## 2025-07-14 ENCOUNTER — TELEPHONE (OUTPATIENT)
Dept: CARDIOLOGY | Facility: CLINIC | Age: 50
End: 2025-07-14
Payer: COMMERCIAL

## 2025-07-14 NOTE — TELEPHONE ENCOUNTER
Called pt to follow up on scheduling CAB surgery with Dr. Boateng. Called phone and it rings but no voicemail set up, so unable to leave message.    Will continue to follow.      ----- Message from Hazel Simmons sent at 7/11/2025 12:29 PM CDT -----  Ok for cardiac surgery. I will finish my notes later today. I've been on call since clinic day.     --  Hazel  ----- Message -----  From: Shandra Woodall, RN  Sent: 7/11/2025   8:46 AM CDT  To: Hazel Simmons MD    Hi Dr. Simmons,    You saw this pt recently on 7/7 for prior strokes and preoperative clearance. I can't tell from your note, is he ok to proceed with open heart surgery or are you recommending further follow up first? Pt needs a CABG.    Thank you,  Shandra  CV surgery RNCC

## 2025-07-17 ENCOUNTER — TELEPHONE (OUTPATIENT)
Dept: CARDIOLOGY | Facility: CLINIC | Age: 50
End: 2025-07-17
Payer: COMMERCIAL

## 2025-07-17 ENCOUNTER — PREP FOR PROCEDURE (OUTPATIENT)
Dept: ADMINISTRATIVE | Facility: CLINIC | Age: 50
End: 2025-07-17
Payer: COMMERCIAL

## 2025-07-17 DIAGNOSIS — I25.10 CAD (CORONARY ARTERY DISEASE): Primary | ICD-10-CM

## 2025-07-17 DIAGNOSIS — I25.10 CORONARY ARTERY DISEASE INVOLVING NATIVE CORONARY ARTERY OF NATIVE HEART, UNSPECIFIED WHETHER ANGINA PRESENT: Primary | ICD-10-CM

## 2025-07-17 NOTE — TELEPHONE ENCOUNTER
Health Call Center    Phone Message    May a detailed message be left on voicemail: yes     Reason for Call: Other: Please reach out to Windy from [facility], the TCU patient is currently living at, to schedule CAB surgery. The number there is [phone]     Action Taken: Other: Cardiology    Travel Screening: Not Applicable     Thank you!  [facility]

## 2025-07-17 NOTE — TELEPHONE ENCOUNTER
Call placed to contact at [facility] providing CV RN contact info.    Spoke to Windy, offered CABG on 7/30 with Dr. Boateng. Pt is already scheduled for labs on 7/22 at [facility]. She will confirm with the pt if he'd like surgery on this date. Labs can be added onto 7/22 appt.

## 2025-07-18 ENCOUNTER — MEDICAL CORRESPONDENCE (OUTPATIENT)
Dept: HEALTH INFORMATION MANAGEMENT | Facility: CLINIC | Age: 50
End: 2025-07-18
Payer: COMMERCIAL

## 2025-07-21 LAB
ABO + RH BLD: NORMAL
BLD GP AB SCN SERPL QL: NEGATIVE
SPECIMEN EXP DATE BLD: NORMAL

## 2025-07-22 ENCOUNTER — LAB (OUTPATIENT)
Dept: LAB | Facility: CLINIC | Age: 50
End: 2025-07-22
Attending: THORACIC SURGERY (CARDIOTHORACIC VASCULAR SURGERY)
Payer: COMMERCIAL

## 2025-07-22 ENCOUNTER — HOSPITAL ENCOUNTER (OUTPATIENT)
Dept: CARDIOLOGY | Facility: CLINIC | Age: 50
Discharge: HOME | End: 2025-07-22
Attending: THORACIC SURGERY (CARDIOTHORACIC VASCULAR SURGERY)
Payer: COMMERCIAL

## 2025-07-22 ENCOUNTER — MEDICAL CORRESPONDENCE (OUTPATIENT)
Dept: HEALTH INFORMATION MANAGEMENT | Facility: CLINIC | Age: 50
End: 2025-07-22

## 2025-07-22 ENCOUNTER — HOSPITAL ENCOUNTER (OUTPATIENT)
Dept: GENERAL RADIOLOGY | Facility: CLINIC | Age: 50
Discharge: HOME | End: 2025-07-22
Attending: THORACIC SURGERY (CARDIOTHORACIC VASCULAR SURGERY)
Payer: COMMERCIAL

## 2025-07-22 DIAGNOSIS — N18.31 CKD STAGE 3A, GFR 45-59 ML/MIN (H): ICD-10-CM

## 2025-07-22 DIAGNOSIS — I25.10 CORONARY ARTERY DISEASE INVOLVING NATIVE CORONARY ARTERY OF NATIVE HEART, UNSPECIFIED WHETHER ANGINA PRESENT: ICD-10-CM

## 2025-07-22 LAB
ALBUMIN SERPL BCG-MCNC: 3.3 G/DL (ref 3.5–5.2)
ALP SERPL-CCNC: 116 U/L (ref 40–150)
ALT SERPL W P-5'-P-CCNC: 19 U/L (ref 0–70)
ANION GAP SERPL CALCULATED.3IONS-SCNC: 11 MMOL/L (ref 7–15)
APTT PPP: 26 SECONDS (ref 22–38)
AST SERPL W P-5'-P-CCNC: 15 U/L (ref 0–45)
BILIRUB SERPL-MCNC: 0.4 MG/DL
BUN SERPL-MCNC: 29.4 MG/DL (ref 6–20)
CALCIUM SERPL-MCNC: 9.3 MG/DL (ref 8.8–10.4)
CHLORIDE SERPL-SCNC: 101 MMOL/L (ref 98–107)
CREAT SERPL-MCNC: 1.39 MG/DL (ref 0.67–1.17)
EGFRCR SERPLBLD CKD-EPI 2021: 61 ML/MIN/1.73M2
ERYTHROCYTE [DISTWIDTH] IN BLOOD BY AUTOMATED COUNT: 12.5 % (ref 10–15)
GLUCOSE SERPL-MCNC: 186 MG/DL (ref 70–99)
HBV SURFACE AB SERPL IA-ACNC: <3.5 M[IU]/ML
HBV SURFACE AB SERPL IA-ACNC: NONREACTIVE M[IU]/ML
HBV SURFACE AG SERPL QL IA: NONREACTIVE
HCO3 SERPL-SCNC: 23 MMOL/L (ref 22–29)
HCT VFR BLD AUTO: 36.4 % (ref 40–53)
HCV AB SERPL QL IA: NONREACTIVE
HGB BLD-MCNC: 12.3 G/DL (ref 13.3–17.7)
INR PPP: 1.04 (ref 0.85–1.15)
MAGNESIUM SERPL-MCNC: 2.4 MG/DL (ref 1.7–2.3)
MCH RBC QN AUTO: 31.1 PG (ref 26.5–33)
MCHC RBC AUTO-ENTMCNC: 33.8 G/DL (ref 31.5–36.5)
MCV RBC AUTO: 92 FL (ref 78–100)
PLATELET # BLD AUTO: 284 10E3/UL (ref 150–450)
POTASSIUM SERPL-SCNC: 4.7 MMOL/L (ref 3.4–5.3)
PREALB SERPL-MCNC: 23.2 MG/DL (ref 20–40)
PROT SERPL-MCNC: 6.5 G/DL (ref 6.4–8.3)
PROT SERPL-MCNC: 7 G/DL (ref 6.4–8.3)
PROTHROMBIN TIME: 13.4 SECONDS (ref 11.8–14.8)
RBC # BLD AUTO: 3.96 10E6/UL (ref 4.4–5.9)
SODIUM SERPL-SCNC: 135 MMOL/L (ref 135–145)
WBC # BLD AUTO: 13.4 10E3/UL (ref 4–11)

## 2025-07-22 PROCEDURE — 87340 HEPATITIS B SURFACE AG IA: CPT

## 2025-07-22 PROCEDURE — 84165 PROTEIN E-PHORESIS SERUM: CPT | Mod: 26 | Performed by: PATHOLOGY

## 2025-07-22 PROCEDURE — 86038 ANTINUCLEAR ANTIBODIES: CPT

## 2025-07-22 PROCEDURE — 85730 THROMBOPLASTIN TIME PARTIAL: CPT

## 2025-07-22 PROCEDURE — 86036 ANCA SCREEN EACH ANTIBODY: CPT

## 2025-07-22 PROCEDURE — 86334 IMMUNOFIX E-PHORESIS SERUM: CPT | Mod: 26 | Performed by: PATHOLOGY

## 2025-07-22 PROCEDURE — 86225 DNA ANTIBODY NATIVE: CPT

## 2025-07-22 PROCEDURE — 85014 HEMATOCRIT: CPT

## 2025-07-22 PROCEDURE — 84155 ASSAY OF PROTEIN SERUM: CPT

## 2025-07-22 PROCEDURE — 86160 COMPLEMENT ANTIGEN: CPT

## 2025-07-22 PROCEDURE — 71046 X-RAY EXAM CHEST 2 VIEWS: CPT

## 2025-07-22 PROCEDURE — 85610 PROTHROMBIN TIME: CPT

## 2025-07-22 PROCEDURE — 84165 PROTEIN E-PHORESIS SERUM: CPT | Mod: TC | Performed by: PATHOLOGY

## 2025-07-22 PROCEDURE — 86706 HEP B SURFACE ANTIBODY: CPT

## 2025-07-22 PROCEDURE — 84134 ASSAY OF PREALBUMIN: CPT

## 2025-07-22 PROCEDURE — 83735 ASSAY OF MAGNESIUM: CPT

## 2025-07-22 PROCEDURE — 86334 IMMUNOFIX E-PHORESIS SERUM: CPT | Performed by: PATHOLOGY

## 2025-07-22 PROCEDURE — 86803 HEPATITIS C AB TEST: CPT

## 2025-07-22 PROCEDURE — 36415 COLL VENOUS BLD VENIPUNCTURE: CPT

## 2025-07-22 PROCEDURE — 86850 RBC ANTIBODY SCREEN: CPT

## 2025-07-22 PROCEDURE — 83876 ASSAY MYELOPEROXIDASE: CPT

## 2025-07-22 RX ORDER — DEXMEDETOMIDINE HYDROCHLORIDE 4 UG/ML
0.2-1.2 INJECTION, SOLUTION INTRAVENOUS CONTINUOUS
OUTPATIENT
Start: 2025-07-22

## 2025-07-22 RX ORDER — ASPIRIN 81 MG/1
81 TABLET, CHEWABLE ORAL
OUTPATIENT
Start: 2025-07-22

## 2025-07-22 RX ORDER — CHLORHEXIDINE GLUCONATE ORAL RINSE 1.2 MG/ML
10 SOLUTION DENTAL ONCE
OUTPATIENT
Start: 2025-07-22 | End: 2025-07-22

## 2025-07-22 RX ORDER — ASPIRIN 81 MG/1
162 TABLET, CHEWABLE ORAL
OUTPATIENT
Start: 2025-07-22

## 2025-07-22 RX ORDER — PHENYLEPHRINE HCL IN 0.9% NACL 50MG/250ML
.1-6 PLASTIC BAG, INJECTION (ML) INTRAVENOUS CONTINUOUS
OUTPATIENT
Start: 2025-07-22

## 2025-07-22 RX ORDER — EPINEPHRINE IN 0.9 % SOD CHLOR 5 MG/250ML
.01-.3 PLASTIC BAG, INJECTION (ML) INTRAVENOUS CONTINUOUS
OUTPATIENT
Start: 2025-07-22

## 2025-07-22 RX ORDER — HEPARIN SOD,PORCINE/0.9 % NACL 30K/1000ML
INTRAVENOUS SOLUTION INTRAVENOUS
OUTPATIENT
Start: 2025-07-22

## 2025-07-22 RX ORDER — LIDOCAINE 40 MG/G
CREAM TOPICAL
OUTPATIENT
Start: 2025-07-22

## 2025-07-22 RX ORDER — SODIUM CHLORIDE, SODIUM GLUCONATE, SODIUM ACETATE, POTASSIUM CHLORIDE AND MAGNESIUM CHLORIDE 526; 502; 368; 37; 30 MG/100ML; MG/100ML; MG/100ML; MG/100ML; MG/100ML
1000 INJECTION, SOLUTION INTRAVENOUS
OUTPATIENT
Start: 2025-07-22 | End: 2025-07-22

## 2025-07-23 DIAGNOSIS — I25.10 CORONARY ARTERY DISEASE INVOLVING NATIVE CORONARY ARTERY OF NATIVE HEART, UNSPECIFIED WHETHER ANGINA PRESENT: Primary | ICD-10-CM

## 2025-07-23 LAB
ALBUMIN SERPL ELPH-MCNC: 3.1 G/DL (ref 3.7–5.1)
ALPHA1 GLOB SERPL ELPH-MCNC: 0.4 G/DL (ref 0.2–0.4)
ALPHA2 GLOB SERPL ELPH-MCNC: 1.2 G/DL (ref 0.5–0.9)
ANA SER QL IF: NEGATIVE
ANCA AB PATTERN SER IF-IMP: NORMAL
B-GLOBULIN SERPL ELPH-MCNC: 0.9 G/DL (ref 0.6–1)
C-ANCA TITR SER IF: NORMAL {TITER}
C3 SERPL-MCNC: 200 MG/DL (ref 81–157)
C4 SERPL-MCNC: 39 MG/DL (ref 13–39)
GAMMA GLOB SERPL ELPH-MCNC: 1 G/DL (ref 0.7–1.6)
LOCATION OF TASK: ABNORMAL
LOCATION OF TASK: NORMAL
M PROTEIN SERPL ELPH-MCNC: 0 G/DL
PROT PATTERN SERPL ELPH-IMP: ABNORMAL
PROT PATTERN SERPL IFE-IMP: NORMAL

## 2025-07-24 LAB
ATRIAL RATE - MUSE: 76 BPM
DIASTOLIC BLOOD PRESSURE - MUSE: NORMAL MMHG
DSDNA AB SER-ACNC: 2.4 IU/ML
INTERPRETATION ECG - MUSE: NORMAL
P AXIS - MUSE: 54 DEGREES
PR INTERVAL - MUSE: 172 MS
QRS DURATION - MUSE: 86 MS
QT - MUSE: 406 MS
QTC - MUSE: 456 MS
R AXIS - MUSE: 43 DEGREES
SYSTOLIC BLOOD PRESSURE - MUSE: NORMAL MMHG
T AXIS - MUSE: 55 DEGREES
VENTRICULAR RATE- MUSE: 76 BPM

## 2025-07-25 LAB
MYELOPEROXIDASE AB SER IA-ACNC: <0.3 U/ML
MYELOPEROXIDASE AB SER IA-ACNC: NEGATIVE
PROTEINASE3 AB SER IA-ACNC: <1 U/ML
PROTEINASE3 AB SER IA-ACNC: NEGATIVE

## 2025-07-28 ENCOUNTER — PATIENT OUTREACH (OUTPATIENT)
Dept: CARE COORDINATION | Facility: CLINIC | Age: 50
End: 2025-07-28
Payer: COMMERCIAL

## 2025-07-28 ENCOUNTER — MEDICAL CORRESPONDENCE (OUTPATIENT)
Dept: HEALTH INFORMATION MANAGEMENT | Facility: CLINIC | Age: 50
End: 2025-07-28
Payer: COMMERCIAL

## 2025-07-28 NOTE — PROGRESS NOTES
Contact   Chart Review     Situation: Patient chart reviewed by .    Background: following for discharge from TCU    Assessment: Patient has been in TCU for 8 weeks.     Plan/Recommendations: Will close to follow up per protocol.      Georgiana Isaacs,   [facility]  [phone]

## 2025-07-29 ENCOUNTER — ANESTHESIA EVENT (OUTPATIENT)
Dept: SURGERY | Facility: HOSPITAL | Age: 50
End: 2025-07-29
Payer: COMMERCIAL

## 2025-07-29 RX ORDER — LOPERAMIDE HYDROCHLORIDE 2 MG/1
2 TABLET ORAL 4 TIMES DAILY PRN
Status: ON HOLD | COMMUNITY

## 2025-07-30 ENCOUNTER — ANESTHESIA (OUTPATIENT)
Dept: SURGERY | Facility: HOSPITAL | Age: 50
End: 2025-07-30
Payer: COMMERCIAL

## 2025-07-30 ENCOUNTER — HOSPITAL ENCOUNTER (INPATIENT)
Facility: HOSPITAL | Age: 50
End: 2025-07-30
Attending: THORACIC SURGERY (CARDIOTHORACIC VASCULAR SURGERY) | Admitting: THORACIC SURGERY (CARDIOTHORACIC VASCULAR SURGERY)
Payer: COMMERCIAL

## 2025-07-30 ENCOUNTER — APPOINTMENT (OUTPATIENT)
Dept: RADIOLOGY | Facility: HOSPITAL | Age: 50
End: 2025-07-30
Attending: NURSE PRACTITIONER
Payer: COMMERCIAL

## 2025-07-30 ENCOUNTER — APPOINTMENT (OUTPATIENT)
Dept: RADIOLOGY | Facility: HOSPITAL | Age: 50
End: 2025-07-30
Attending: THORACIC SURGERY (CARDIOTHORACIC VASCULAR SURGERY)
Payer: COMMERCIAL

## 2025-07-30 ENCOUNTER — APPOINTMENT (OUTPATIENT)
Dept: RADIOLOGY | Facility: HOSPITAL | Age: 50
End: 2025-07-30
Attending: PHYSICIAN ASSISTANT
Payer: COMMERCIAL

## 2025-07-30 VITALS — DIASTOLIC BLOOD PRESSURE: 69 MMHG | TEMPERATURE: 95.7 F | SYSTOLIC BLOOD PRESSURE: 70 MMHG

## 2025-07-30 DIAGNOSIS — L97.411 DIABETIC ULCER OF RIGHT HEEL ASSOCIATED WITH TYPE 2 DIABETES MELLITUS, LIMITED TO BREAKDOWN OF SKIN (H): ICD-10-CM

## 2025-07-30 DIAGNOSIS — I25.10 CORONARY ARTERY DISEASE INVOLVING NATIVE CORONARY ARTERY OF NATIVE HEART, UNSPECIFIED WHETHER ANGINA PRESENT: ICD-10-CM

## 2025-07-30 DIAGNOSIS — E11.65 TYPE 2 DIABETES MELLITUS WITH HYPERGLYCEMIA, WITH LONG-TERM CURRENT USE OF INSULIN (H): ICD-10-CM

## 2025-07-30 DIAGNOSIS — E11.621 DIABETIC ULCER OF RIGHT HEEL ASSOCIATED WITH TYPE 2 DIABETES MELLITUS, LIMITED TO BREAKDOWN OF SKIN (H): ICD-10-CM

## 2025-07-30 DIAGNOSIS — Z95.1 S/P CABG (CORONARY ARTERY BYPASS GRAFT): Primary | ICD-10-CM

## 2025-07-30 DIAGNOSIS — Z79.4 TYPE 2 DIABETES MELLITUS WITH HYPERGLYCEMIA, WITH LONG-TERM CURRENT USE OF INSULIN (H): ICD-10-CM

## 2025-07-30 PROBLEM — I25.810 CORONARY ARTERY DISEASE INVOLVING AUTOLOGOUS ARTERY CORONARY BYPASS GRAFT WITHOUT ANGINA PECTORIS: Status: ACTIVE | Noted: 2025-07-30

## 2025-07-30 LAB
ABO + RH BLD: NORMAL
ALBUMIN SERPL BCG-MCNC: 2.9 G/DL (ref 3.5–5.2)
ALP SERPL-CCNC: 90 U/L (ref 40–150)
ALT SERPL W P-5'-P-CCNC: 15 U/L (ref 0–70)
ANION GAP SERPL CALCULATED.3IONS-SCNC: 9 MMOL/L (ref 7–15)
APTT PPP: 36 SECONDS (ref 22–38)
APTT PPP: 40 SECONDS (ref 22–38)
AST SERPL W P-5'-P-CCNC: 37 U/L (ref 0–45)
BASE EXCESS BLDA CALC-SCNC: -1 MMOL/L (ref -3–3)
BASE EXCESS BLDA CALC-SCNC: -1.5 MMOL/L (ref -3–3)
BASE EXCESS BLDA CALC-SCNC: -3 MMOL/L (ref -3–3)
BASE EXCESS BLDA CALC-SCNC: -3 MMOL/L (ref -3–3)
BASE EXCESS BLDA CALC-SCNC: -3.5 MMOL/L (ref -3–3)
BASE EXCESS BLDA CALC-SCNC: -3.7 MMOL/L (ref -3–3)
BASE EXCESS BLDA CALC-SCNC: -5 MMOL/L (ref -3–3)
BASE EXCESS BLDV CALC-SCNC: -0.4 MMOL/L (ref -3–3)
BASE EXCESS BLDV CALC-SCNC: -3.9 MMOL/L (ref -3–3)
BILIRUB SERPL-MCNC: 0.6 MG/DL
BLD GP AB SCN SERPL QL: NEGATIVE
BLD PROD TYP BPU: NORMAL
BLD PROD TYP BPU: NORMAL
BLOOD COMPONENT TYPE: NORMAL
BLOOD COMPONENT TYPE: NORMAL
BUN SERPL-MCNC: 24.7 MG/DL (ref 6–20)
CA-I BLD-MCNC: 4.4 MG/DL (ref 4.4–5.2)
CA-I BLD-MCNC: 4.5 MG/DL (ref 4.4–5.2)
CA-I BLD-MCNC: 4.5 MG/DL (ref 4.4–5.2)
CA-I BLD-MCNC: 4.6 MG/DL (ref 4.4–5.2)
CA-I BLD-MCNC: 4.7 MG/DL (ref 4.4–5.2)
CA-I BLD-MCNC: 4.8 MG/DL (ref 4.4–5.2)
CA-I BLD-MCNC: 5.2 MG/DL (ref 4.4–5.2)
CALCIUM SERPL-MCNC: 8.6 MG/DL (ref 8.8–10.4)
CHLORIDE SERPL-SCNC: 107 MMOL/L (ref 98–107)
CODING SYSTEM: NORMAL
CODING SYSTEM: NORMAL
CPB POCT: NO
CREAT SERPL-MCNC: 1.17 MG/DL (ref 0.67–1.17)
CROSSMATCH: NORMAL
CROSSMATCH: NORMAL
EGFRCR SERPLBLD CKD-EPI 2021: 75 ML/MIN/1.73M2
ERYTHROCYTE [DISTWIDTH] IN BLOOD BY AUTOMATED COUNT: 12.9 % (ref 10–15)
ERYTHROCYTE [DISTWIDTH] IN BLOOD BY AUTOMATED COUNT: 13.1 % (ref 10–15)
FIBRINOGEN PPP-MCNC: 475 MG/DL (ref 170–510)
GLUCOSE BLD-MCNC: 148 MG/DL (ref 70–99)
GLUCOSE BLD-MCNC: 150 MG/DL (ref 70–99)
GLUCOSE BLD-MCNC: 156 MG/DL (ref 70–99)
GLUCOSE BLD-MCNC: 171 MG/DL (ref 70–99)
GLUCOSE BLD-MCNC: 190 MG/DL (ref 70–99)
GLUCOSE BLD-MCNC: 207 MG/DL (ref 70–99)
GLUCOSE BLDC GLUCOMTR-MCNC: 121 MG/DL (ref 70–99)
GLUCOSE BLDC GLUCOMTR-MCNC: 133 MG/DL (ref 70–99)
GLUCOSE BLDC GLUCOMTR-MCNC: 137 MG/DL (ref 70–99)
GLUCOSE BLDC GLUCOMTR-MCNC: 145 MG/DL (ref 70–99)
GLUCOSE BLDC GLUCOMTR-MCNC: 150 MG/DL (ref 70–99)
GLUCOSE BLDC GLUCOMTR-MCNC: 155 MG/DL (ref 70–99)
GLUCOSE BLDC GLUCOMTR-MCNC: 155 MG/DL (ref 70–99)
GLUCOSE BLDC GLUCOMTR-MCNC: 157 MG/DL (ref 70–99)
GLUCOSE BLDC GLUCOMTR-MCNC: 183 MG/DL (ref 70–99)
GLUCOSE BLDC GLUCOMTR-MCNC: 184 MG/DL (ref 70–99)
GLUCOSE BLDC GLUCOMTR-MCNC: 185 MG/DL (ref 70–99)
GLUCOSE BLDC GLUCOMTR-MCNC: 194 MG/DL (ref 70–99)
GLUCOSE SERPL-MCNC: 216 MG/DL (ref 70–99)
HCO3 BLD-SCNC: 24 MMOL/L (ref 21–28)
HCO3 BLDA-SCNC: 21 MMOL/L (ref 21–28)
HCO3 BLDA-SCNC: 21 MMOL/L (ref 21–28)
HCO3 BLDA-SCNC: 22 MMOL/L (ref 21–28)
HCO3 BLDA-SCNC: 24 MMOL/L (ref 21–28)
HCO3 BLDV-SCNC: 21 MMOL/L (ref 21–28)
HCO3 BLDV-SCNC: 25 MMOL/L (ref 21–28)
HCO3 SERPL-SCNC: 22 MMOL/L (ref 22–29)
HCT VFR BLD AUTO: 30.1 % (ref 40–53)
HCT VFR BLD AUTO: 31.2 % (ref 40–53)
HCT VFR BLD CALC: 37 % (ref 40–53)
HGB BLD-MCNC: 10 G/DL (ref 13.3–17.7)
HGB BLD-MCNC: 10.1 G/DL (ref 13.3–17.7)
HGB BLD-MCNC: 10.2 G/DL (ref 13.3–17.7)
HGB BLD-MCNC: 10.4 G/DL (ref 13.3–17.7)
HGB BLD-MCNC: 11.4 G/DL (ref 13.3–17.7)
HGB BLD-MCNC: 12 G/DL (ref 13.3–17.7)
HGB BLD-MCNC: 12.6 G/DL (ref 13.3–17.7)
HGB BLD-MCNC: 8.6 G/DL (ref 13.3–17.7)
HGB BLD-MCNC: 9.8 G/DL (ref 13.3–17.7)
INR PPP: 1.19 (ref 0.85–1.15)
INR PPP: 1.46 (ref 0.85–1.15)
ISSUE DATE AND TIME: NORMAL
ISSUE DATE AND TIME: NORMAL
LACTATE BLD-SCNC: 1.3 MMOL/L (ref 0.7–2)
LACTATE BLD-SCNC: 1.7 MMOL/L (ref 0.7–2)
LACTATE BLD-SCNC: 1.9 MMOL/L (ref 0.7–2)
LACTATE BLD-SCNC: 2 MMOL/L (ref 0.7–2)
LACTATE BLD-SCNC: 2.2 MMOL/L (ref 0.7–2)
LACTATE BLD-SCNC: 2.3 MMOL/L (ref 0.7–2)
LACTATE SERPL-SCNC: 1.9 MMOL/L (ref 0.7–2)
MAGNESIUM SERPL-MCNC: 2.3 MG/DL (ref 1.7–2.3)
MCH RBC QN AUTO: 29.9 PG (ref 26.5–33)
MCH RBC QN AUTO: 30.4 PG (ref 26.5–33)
MCHC RBC AUTO-ENTMCNC: 32.6 G/DL (ref 31.5–36.5)
MCHC RBC AUTO-ENTMCNC: 33.3 G/DL (ref 31.5–36.5)
MCV RBC AUTO: 91 FL (ref 78–100)
MCV RBC AUTO: 92 FL (ref 78–100)
O2/TOTAL GAS SETTING VFR VENT: 41 %
O2/TOTAL GAS SETTING VFR VENT: 5 %
OXYHGB MFR BLDA: 96 % (ref 92–100)
OXYHGB MFR BLDA: 96 % (ref 92–100)
OXYHGB MFR BLDA: 97 % (ref 92–100)
OXYHGB MFR BLDA: 97 % (ref 92–100)
OXYHGB MFR BLDA: 99 % (ref 92–100)
OXYHGB MFR BLDA: 99 % (ref 92–100)
OXYHGB MFR BLDV: 62 % (ref 70–75)
OXYHGB MFR BLDV: 83 % (ref 70–75)
PCO2 BLD: 42 MM HG (ref 35–45)
PCO2 BLDA: 36 MM HG (ref 35–45)
PCO2 BLDA: 40 MM HG (ref 35–45)
PCO2 BLDA: 44 MM HG (ref 35–45)
PCO2 BLDA: 44 MM HG (ref 35–45)
PCO2 BLDA: 45 MM HG (ref 35–45)
PCO2 BLDA: 46 MM HG (ref 35–45)
PCO2 BLDV: 45 MM HG (ref 40–50)
PCO2 BLDV: 50 MM HG (ref 40–50)
PH BLD: 7.36 [PH] (ref 7.35–7.45)
PH BLDA: 7.29 [PH] (ref 7.35–7.45)
PH BLDA: 7.31 [PH] (ref 7.35–7.45)
PH BLDA: 7.32 [PH] (ref 7.35–7.45)
PH BLDA: 7.32 [PH] (ref 7.35–7.45)
PH BLDA: 7.35 [PH] (ref 7.35–7.45)
PH BLDA: 7.42 [PH] (ref 7.35–7.45)
PH BLDV: 7.27 [PH] (ref 7.32–7.43)
PH BLDV: 7.36 [PH] (ref 7.32–7.43)
PHOSPHATE SERPL-MCNC: 2.5 MG/DL (ref 2.5–4.5)
PLATELET # BLD AUTO: 160 10E3/UL (ref 150–450)
PLATELET # BLD AUTO: 187 10E3/UL (ref 150–450)
PO2 BLD: 93 MM HG (ref 80–105)
PO2 BLDA: 111 MM HG (ref 80–105)
PO2 BLDA: 118 MM HG (ref 80–105)
PO2 BLDA: 264 MM HG (ref 80–105)
PO2 BLDA: 325 MM HG (ref 80–105)
PO2 BLDA: 480 MM HG (ref 80–105)
PO2 BLDA: 99 MM HG (ref 80–105)
PO2 BLDV: 34 MM HG (ref 25–47)
PO2 BLDV: 52 MM HG (ref 25–47)
POTASSIUM BLD-SCNC: 4.3 MMOL/L (ref 3.4–5.3)
POTASSIUM BLD-SCNC: 4.5 MMOL/L (ref 3.4–5.3)
POTASSIUM BLD-SCNC: 4.8 MMOL/L (ref 3.4–5.3)
POTASSIUM BLD-SCNC: 4.9 MMOL/L (ref 3.4–5.3)
POTASSIUM BLD-SCNC: 5.3 MMOL/L (ref 3.4–5.3)
POTASSIUM SERPL-SCNC: 5 MMOL/L (ref 3.4–5.3)
POTASSIUM SERPL-SCNC: 5 MMOL/L (ref 3.4–5.3)
PROT SERPL-MCNC: 5.6 G/DL (ref 6.4–8.3)
PROTHROMBIN TIME: 15.3 SECONDS (ref 11.8–14.8)
PROTHROMBIN TIME: 17.8 SECONDS (ref 11.8–14.8)
RBC # BLD AUTO: 3.28 10E6/UL (ref 4.4–5.9)
RBC # BLD AUTO: 3.42 10E6/UL (ref 4.4–5.9)
SAO2 % BLDA: 100 % (ref 96–97)
SAO2 % BLDA: 97.5 % (ref 96–97)
SAO2 % BLDA: 98 % (ref 96–97)
SAO2 % BLDA: 99 % (ref 96–97)
SAO2 % BLDA: 99 % (ref 96–97)
SAO2 % BLDV: 62.7 % (ref 70–75)
SAO2 % BLDV: 84 % (ref 70–75)
SODIUM BLD-SCNC: 134 MMOL/L (ref 135–145)
SODIUM BLD-SCNC: 137 MMOL/L (ref 135–145)
SODIUM BLD-SCNC: 138 MMOL/L (ref 135–145)
SODIUM SERPL-SCNC: 138 MMOL/L (ref 135–145)
SPECIMEN EXP DATE BLD: NORMAL
UNIT ABO/RH: NORMAL
UNIT ABO/RH: NORMAL
UNIT NUMBER: NORMAL
UNIT NUMBER: NORMAL
UNIT STATUS: NORMAL
UNIT STATUS: NORMAL
UNIT TYPE ISBT: 6200
UNIT TYPE ISBT: 6200
WBC # BLD AUTO: 22 10E3/UL (ref 4–11)
WBC # BLD AUTO: 25.7 10E3/UL (ref 4–11)

## 2025-07-30 PROCEDURE — 85014 HEMATOCRIT: CPT | Performed by: PHYSICIAN ASSISTANT

## 2025-07-30 PROCEDURE — 99222 1ST HOSP IP/OBS MODERATE 55: CPT | Mod: 57 | Performed by: THORACIC SURGERY (CARDIOTHORACIC VASCULAR SURGERY)

## 2025-07-30 PROCEDURE — 999N000141 HC STATISTIC PRE-PROCEDURE NURSING ASSESSMENT: Performed by: THORACIC SURGERY (CARDIOTHORACIC VASCULAR SURGERY)

## 2025-07-30 PROCEDURE — 250N000011 HC RX IP 250 OP 636: Performed by: ANESTHESIOLOGY

## 2025-07-30 PROCEDURE — 84100 ASSAY OF PHOSPHORUS: CPT | Performed by: PHYSICIAN ASSISTANT

## 2025-07-30 PROCEDURE — 272N000001 HC OR GENERAL SUPPLY STERILE: Performed by: THORACIC SURGERY (CARDIOTHORACIC VASCULAR SURGERY)

## 2025-07-30 PROCEDURE — 250N000011 HC RX IP 250 OP 636: Performed by: THORACIC SURGERY (CARDIOTHORACIC VASCULAR SURGERY)

## 2025-07-30 PROCEDURE — 94002 VENT MGMT INPAT INIT DAY: CPT

## 2025-07-30 PROCEDURE — 85730 THROMBOPLASTIN TIME PARTIAL: CPT | Performed by: THORACIC SURGERY (CARDIOTHORACIC VASCULAR SURGERY)

## 2025-07-30 PROCEDURE — 370N000017 HC ANESTHESIA TECHNICAL FEE, PER MIN: Performed by: THORACIC SURGERY (CARDIOTHORACIC VASCULAR SURGERY)

## 2025-07-30 PROCEDURE — 250N000009 HC RX 250: Performed by: THORACIC SURGERY (CARDIOTHORACIC VASCULAR SURGERY)

## 2025-07-30 PROCEDURE — 999N000253 HC STATISTIC WEANING TRIALS

## 2025-07-30 PROCEDURE — 250N000012 HC RX MED GY IP 250 OP 636 PS 637: Performed by: THORACIC SURGERY (CARDIOTHORACIC VASCULAR SURGERY)

## 2025-07-30 PROCEDURE — 999N000065 XR CHEST PORT 1 VIEW

## 2025-07-30 PROCEDURE — 82805 BLOOD GASES W/O2 SATURATION: CPT

## 2025-07-30 PROCEDURE — 36415 COLL VENOUS BLD VENIPUNCTURE: CPT | Performed by: THORACIC SURGERY (CARDIOTHORACIC VASCULAR SURGERY)

## 2025-07-30 PROCEDURE — 86900 BLOOD TYPING SEROLOGIC ABO: CPT | Performed by: THORACIC SURGERY (CARDIOTHORACIC VASCULAR SURGERY)

## 2025-07-30 PROCEDURE — 200N000001 HC R&B ICU

## 2025-07-30 PROCEDURE — P9045 ALBUMIN (HUMAN), 5%, 250 ML: HCPCS | Mod: JZ | Performed by: THORACIC SURGERY (CARDIOTHORACIC VASCULAR SURGERY)

## 2025-07-30 PROCEDURE — 83735 ASSAY OF MAGNESIUM: CPT | Performed by: PHYSICIAN ASSISTANT

## 2025-07-30 PROCEDURE — 999N000157 HC STATISTIC RCP TIME EA 10 MIN

## 2025-07-30 PROCEDURE — 74018 RADEX ABDOMEN 1 VIEW: CPT

## 2025-07-30 PROCEDURE — C1898 LEAD, PMKR, OTHER THAN TRANS: HCPCS | Performed by: THORACIC SURGERY (CARDIOTHORACIC VASCULAR SURGERY)

## 2025-07-30 PROCEDURE — 82805 BLOOD GASES W/O2 SATURATION: CPT | Performed by: THORACIC SURGERY (CARDIOTHORACIC VASCULAR SURGERY)

## 2025-07-30 PROCEDURE — C1760 CLOSURE DEV, VASC: HCPCS | Performed by: THORACIC SURGERY (CARDIOTHORACIC VASCULAR SURGERY)

## 2025-07-30 PROCEDURE — 82040 ASSAY OF SERUM ALBUMIN: CPT | Performed by: PHYSICIAN ASSISTANT

## 2025-07-30 PROCEDURE — 85384 FIBRINOGEN ACTIVITY: CPT | Performed by: THORACIC SURGERY (CARDIOTHORACIC VASCULAR SURGERY)

## 2025-07-30 PROCEDURE — 250N000011 HC RX IP 250 OP 636: Performed by: PHYSICIAN ASSISTANT

## 2025-07-30 PROCEDURE — 410N000003 HC PER-PERFUSION 1ST 30 MIN: Performed by: THORACIC SURGERY (CARDIOTHORACIC VASCULAR SURGERY)

## 2025-07-30 PROCEDURE — 83605 ASSAY OF LACTIC ACID: CPT | Performed by: PHYSICIAN ASSISTANT

## 2025-07-30 PROCEDURE — 82803 BLOOD GASES ANY COMBINATION: CPT

## 2025-07-30 PROCEDURE — 82330 ASSAY OF CALCIUM: CPT | Performed by: PHYSICIAN ASSISTANT

## 2025-07-30 PROCEDURE — 85730 THROMBOPLASTIN TIME PARTIAL: CPT | Performed by: PHYSICIAN ASSISTANT

## 2025-07-30 PROCEDURE — 999N000259 HC STATISTIC EXTUBATION

## 2025-07-30 PROCEDURE — 410N000004: Performed by: THORACIC SURGERY (CARDIOTHORACIC VASCULAR SURGERY)

## 2025-07-30 PROCEDURE — 250N000009 HC RX 250: Performed by: ANESTHESIOLOGY

## 2025-07-30 PROCEDURE — 258N000003 HC RX IP 258 OP 636: Performed by: ANESTHESIOLOGY

## 2025-07-30 PROCEDURE — 02L70CK OCCLUSION OF LEFT ATRIAL APPENDAGE WITH EXTRALUMINAL DEVICE, OPEN APPROACH: ICD-10-PCS | Performed by: THORACIC SURGERY (CARDIOTHORACIC VASCULAR SURGERY)

## 2025-07-30 PROCEDURE — 5A1221Z PERFORMANCE OF CARDIAC OUTPUT, CONTINUOUS: ICD-10-PCS | Performed by: THORACIC SURGERY (CARDIOTHORACIC VASCULAR SURGERY)

## 2025-07-30 PROCEDURE — 85018 HEMOGLOBIN: CPT | Performed by: THORACIC SURGERY (CARDIOTHORACIC VASCULAR SURGERY)

## 2025-07-30 PROCEDURE — 021109W BYPASS CORONARY ARTERY, TWO ARTERIES FROM AORTA WITH AUTOLOGOUS VENOUS TISSUE, OPEN APPROACH: ICD-10-PCS | Performed by: THORACIC SURGERY (CARDIOTHORACIC VASCULAR SURGERY)

## 2025-07-30 PROCEDURE — 258N000003 HC RX IP 258 OP 636: Performed by: THORACIC SURGERY (CARDIOTHORACIC VASCULAR SURGERY)

## 2025-07-30 PROCEDURE — 06BQ4ZZ EXCISION OF LEFT SAPHENOUS VEIN, PERCUTANEOUS ENDOSCOPIC APPROACH: ICD-10-PCS | Performed by: THORACIC SURGERY (CARDIOTHORACIC VASCULAR SURGERY)

## 2025-07-30 PROCEDURE — 85610 PROTHROMBIN TIME: CPT | Performed by: THORACIC SURGERY (CARDIOTHORACIC VASCULAR SURGERY)

## 2025-07-30 PROCEDURE — 82330 ASSAY OF CALCIUM: CPT

## 2025-07-30 PROCEDURE — 99291 CRITICAL CARE FIRST HOUR: CPT | Performed by: NURSE PRACTITIONER

## 2025-07-30 PROCEDURE — 33518 CABG ARTERY-VEIN TWO: CPT | Performed by: THORACIC SURGERY (CARDIOTHORACIC VASCULAR SURGERY)

## 2025-07-30 PROCEDURE — 250N000013 HC RX MED GY IP 250 OP 250 PS 637: Performed by: ANESTHESIOLOGY

## 2025-07-30 PROCEDURE — 02100Z9 BYPASS CORONARY ARTERY, ONE ARTERY FROM LEFT INTERNAL MAMMARY, OPEN APPROACH: ICD-10-PCS | Performed by: THORACIC SURGERY (CARDIOTHORACIC VASCULAR SURGERY)

## 2025-07-30 PROCEDURE — 86923 COMPATIBILITY TEST ELECTRIC: CPT | Performed by: THORACIC SURGERY (CARDIOTHORACIC VASCULAR SURGERY)

## 2025-07-30 PROCEDURE — 272N000002 HC OR SUPPLY OTHER OPNP: Performed by: THORACIC SURGERY (CARDIOTHORACIC VASCULAR SURGERY)

## 2025-07-30 PROCEDURE — 250N000009 HC RX 250: Performed by: PHYSICIAN ASSISTANT

## 2025-07-30 PROCEDURE — 250N000025 HC SEVOFLURANE, PER MIN: Performed by: THORACIC SURGERY (CARDIOTHORACIC VASCULAR SURGERY)

## 2025-07-30 PROCEDURE — 82805 BLOOD GASES W/O2 SATURATION: CPT | Performed by: INTERNAL MEDICINE

## 2025-07-30 PROCEDURE — 85610 PROTHROMBIN TIME: CPT | Performed by: PHYSICIAN ASSISTANT

## 2025-07-30 PROCEDURE — 84132 ASSAY OF SERUM POTASSIUM: CPT

## 2025-07-30 PROCEDURE — 33508 ENDOSCOPIC VEIN HARVEST: CPT | Mod: 59 | Performed by: THORACIC SURGERY (CARDIOTHORACIC VASCULAR SURGERY)

## 2025-07-30 PROCEDURE — 84295 ASSAY OF SERUM SODIUM: CPT | Performed by: PHYSICIAN ASSISTANT

## 2025-07-30 PROCEDURE — 360N000079 HC SURGERY LEVEL 6, PER MIN: Performed by: THORACIC SURGERY (CARDIOTHORACIC VASCULAR SURGERY)

## 2025-07-30 PROCEDURE — 278N000051 HC OR IMPLANT GENERAL: Performed by: THORACIC SURGERY (CARDIOTHORACIC VASCULAR SURGERY)

## 2025-07-30 PROCEDURE — 250N000013 HC RX MED GY IP 250 OP 250 PS 637: Performed by: PHYSICIAN ASSISTANT

## 2025-07-30 PROCEDURE — P9016 RBC LEUKOCYTES REDUCED: HCPCS | Performed by: THORACIC SURGERY (CARDIOTHORACIC VASCULAR SURGERY)

## 2025-07-30 PROCEDURE — 94799 UNLISTED PULMONARY SVC/PX: CPT

## 2025-07-30 PROCEDURE — 250N000013 HC RX MED GY IP 250 OP 250 PS 637: Performed by: THORACIC SURGERY (CARDIOTHORACIC VASCULAR SURGERY)

## 2025-07-30 PROCEDURE — 33268 EXCL LAA OPN OTH PX ANY METH: CPT | Performed by: THORACIC SURGERY (CARDIOTHORACIC VASCULAR SURGERY)

## 2025-07-30 PROCEDURE — 71045 X-RAY EXAM CHEST 1 VIEW: CPT

## 2025-07-30 PROCEDURE — 272N000004 HC RX 272: Performed by: THORACIC SURGERY (CARDIOTHORACIC VASCULAR SURGERY)

## 2025-07-30 PROCEDURE — 33533 CABG ARTERIAL SINGLE: CPT | Performed by: THORACIC SURGERY (CARDIOTHORACIC VASCULAR SURGERY)

## 2025-07-30 DEVICE — IMP ATRICLIP FLEX V DEVICE LAA EXLUSION 50MM ACHV50: Type: IMPLANTABLE DEVICE | Site: HEART | Status: FUNCTIONAL

## 2025-07-30 RX ORDER — ASPIRIN 81 MG/1
162 TABLET, CHEWABLE ORAL ONCE
Status: COMPLETED | OUTPATIENT
Start: 2025-07-30 | End: 2025-07-30

## 2025-07-30 RX ORDER — INDOMETHACIN 25 MG/1
CAPSULE ORAL
Status: COMPLETED
Start: 2025-07-30 | End: 2025-07-30

## 2025-07-30 RX ORDER — DEXMEDETOMIDINE HYDROCHLORIDE 4 UG/ML
.1-1.2 INJECTION, SOLUTION INTRAVENOUS CONTINUOUS
Status: DISCONTINUED | OUTPATIENT
Start: 2025-07-30 | End: 2025-07-31

## 2025-07-30 RX ORDER — HYDRALAZINE HYDROCHLORIDE 20 MG/ML
10 INJECTION INTRAMUSCULAR; INTRAVENOUS EVERY 30 MIN PRN
Status: ACTIVE | OUTPATIENT
Start: 2025-07-30

## 2025-07-30 RX ORDER — NALOXONE HYDROCHLORIDE 0.4 MG/ML
0.2 INJECTION, SOLUTION INTRAMUSCULAR; INTRAVENOUS; SUBCUTANEOUS
Status: DISCONTINUED | OUTPATIENT
Start: 2025-07-30 | End: 2025-07-30

## 2025-07-30 RX ORDER — SODIUM CHLORIDE, SODIUM GLUCONATE, SODIUM ACETATE, POTASSIUM CHLORIDE AND MAGNESIUM CHLORIDE 526; 502; 368; 37; 30 MG/100ML; MG/100ML; MG/100ML; MG/100ML; MG/100ML
1000 INJECTION, SOLUTION INTRAVENOUS
Status: DISCONTINUED | OUTPATIENT
Start: 2025-07-30 | End: 2025-07-30

## 2025-07-30 RX ORDER — SACUBITRIL AND VALSARTAN 24; 26 MG/1; MG/1
1 TABLET, FILM COATED ORAL 2 TIMES DAILY
Status: ACTIVE | OUTPATIENT
Start: 2025-07-30

## 2025-07-30 RX ORDER — PHENYLEPHRINE HCL IN 0.9% NACL 50MG/250ML
.1-4 PLASTIC BAG, INJECTION (ML) INTRAVENOUS CONTINUOUS
Status: DISCONTINUED | OUTPATIENT
Start: 2025-07-30 | End: 2025-07-31

## 2025-07-30 RX ORDER — INSULIN ASPART 100 [IU]/ML
INJECTION, SOLUTION INTRAVENOUS; SUBCUTANEOUS SEE ADMIN INSTRUCTIONS
Status: ON HOLD | COMMUNITY

## 2025-07-30 RX ORDER — ASPIRIN 300 MG/1
300 SUPPOSITORY RECTAL ONCE
Status: COMPLETED | OUTPATIENT
Start: 2025-07-30 | End: 2025-07-30

## 2025-07-30 RX ORDER — METHADONE HYDROCHLORIDE 10 MG/ML
INJECTION, SOLUTION INTRAMUSCULAR; INTRAVENOUS; SUBCUTANEOUS PRN
Status: DISCONTINUED | OUTPATIENT
Start: 2025-07-30 | End: 2025-07-30

## 2025-07-30 RX ORDER — LANOLIN ALCOHOL/MO/W.PET/CERES
1000 CREAM (GRAM) TOPICAL DAILY
Status: DISPENSED | OUTPATIENT
Start: 2025-07-31

## 2025-07-30 RX ORDER — ASPIRIN 81 MG/1
162 TABLET, CHEWABLE ORAL
Status: COMPLETED | OUTPATIENT
Start: 2025-07-30 | End: 2025-07-30

## 2025-07-30 RX ORDER — GLYCOPYRROLATE 0.2 MG/ML
INJECTION, SOLUTION INTRAMUSCULAR; INTRAVENOUS PRN
Status: DISCONTINUED | OUTPATIENT
Start: 2025-07-30 | End: 2025-07-30

## 2025-07-30 RX ORDER — CEFAZOLIN SODIUM 2 G/50ML
2 SOLUTION INTRAVENOUS EVERY 8 HOURS
Status: COMPLETED | OUTPATIENT
Start: 2025-07-30 | End: 2025-07-31

## 2025-07-30 RX ORDER — KETOROLAC TROMETHAMINE 15 MG/ML
15 INJECTION, SOLUTION INTRAMUSCULAR; INTRAVENOUS ONCE
Status: COMPLETED | OUTPATIENT
Start: 2025-07-30 | End: 2025-07-30

## 2025-07-30 RX ORDER — ONDANSETRON 2 MG/ML
4 INJECTION INTRAMUSCULAR; INTRAVENOUS EVERY 6 HOURS PRN
Status: DISPENSED | OUTPATIENT
Start: 2025-07-30

## 2025-07-30 RX ORDER — PHENYLEPHRINE HCL IN 0.9% NACL 50MG/250ML
.1-4 PLASTIC BAG, INJECTION (ML) INTRAVENOUS CONTINUOUS PRN
Status: DISCONTINUED | OUTPATIENT
Start: 2025-07-30 | End: 2025-07-30

## 2025-07-30 RX ORDER — NALOXONE HYDROCHLORIDE 0.4 MG/ML
0.4 INJECTION, SOLUTION INTRAMUSCULAR; INTRAVENOUS; SUBCUTANEOUS
Status: ACTIVE | OUTPATIENT
Start: 2025-07-30

## 2025-07-30 RX ORDER — OXYCODONE HYDROCHLORIDE 5 MG/1
5 TABLET ORAL EVERY 4 HOURS PRN
Status: ACTIVE | OUTPATIENT
Start: 2025-07-30

## 2025-07-30 RX ORDER — CEFAZOLIN SODIUM/WATER 3 G/30 ML
3 SYRINGE (ML) INTRAVENOUS
Status: COMPLETED | OUTPATIENT
Start: 2025-07-30 | End: 2025-07-30

## 2025-07-30 RX ORDER — HYDROMORPHONE HCL IN WATER/PF 6 MG/30 ML
0.2 PATIENT CONTROLLED ANALGESIA SYRINGE INTRAVENOUS
Status: DISPENSED | OUTPATIENT
Start: 2025-07-30

## 2025-07-30 RX ORDER — HEPARIN SOD,PORCINE/0.9 % NACL 30K/1000ML
INTRAVENOUS SOLUTION INTRAVENOUS
Status: DISCONTINUED | OUTPATIENT
Start: 2025-07-30 | End: 2025-07-30 | Stop reason: HOSPADM

## 2025-07-30 RX ORDER — HYDROMORPHONE HCL IN WATER/PF 6 MG/30 ML
0.4 PATIENT CONTROLLED ANALGESIA SYRINGE INTRAVENOUS
Status: DISPENSED | OUTPATIENT
Start: 2025-07-30

## 2025-07-30 RX ORDER — LIDOCAINE 40 MG/G
CREAM TOPICAL
Status: DISCONTINUED | OUTPATIENT
Start: 2025-07-30 | End: 2025-07-30 | Stop reason: HOSPADM

## 2025-07-30 RX ORDER — SODIUM CHLORIDE, SODIUM LACTATE, POTASSIUM CHLORIDE, CALCIUM CHLORIDE 600; 310; 30; 20 MG/100ML; MG/100ML; MG/100ML; MG/100ML
INJECTION, SOLUTION INTRAVENOUS CONTINUOUS
Status: DISCONTINUED | OUTPATIENT
Start: 2025-07-30 | End: 2025-07-30 | Stop reason: HOSPADM

## 2025-07-30 RX ORDER — CARVEDILOL 12.5 MG/1
12.5 TABLET ORAL 2 TIMES DAILY WITH MEALS
Status: ON HOLD | COMMUNITY

## 2025-07-30 RX ORDER — AMOXICILLIN 250 MG
1 CAPSULE ORAL 2 TIMES DAILY
Status: DISPENSED | OUTPATIENT
Start: 2025-07-30

## 2025-07-30 RX ORDER — SODIUM CHLORIDE 9 MG/ML
INJECTION, SOLUTION INTRAVENOUS CONTINUOUS PRN
Status: DISCONTINUED | OUTPATIENT
Start: 2025-07-30 | End: 2025-07-30

## 2025-07-30 RX ORDER — NALOXONE HYDROCHLORIDE 0.4 MG/ML
0.2 INJECTION, SOLUTION INTRAMUSCULAR; INTRAVENOUS; SUBCUTANEOUS
Status: ACTIVE | OUTPATIENT
Start: 2025-07-30

## 2025-07-30 RX ORDER — METHADONE HYDROCHLORIDE 10 MG/ML
INJECTION, SOLUTION INTRAMUSCULAR; INTRAVENOUS; SUBCUTANEOUS
Status: DISCONTINUED
Start: 2025-07-30 | End: 2025-07-30 | Stop reason: HOSPADM

## 2025-07-30 RX ORDER — INDOMETHACIN 25 MG/1
50 CAPSULE ORAL ONCE
Status: COMPLETED | OUTPATIENT
Start: 2025-07-30 | End: 2025-07-30

## 2025-07-30 RX ORDER — ONDANSETRON 4 MG/1
4 TABLET, ORALLY DISINTEGRATING ORAL EVERY 6 HOURS PRN
Status: ACTIVE | OUTPATIENT
Start: 2025-07-30

## 2025-07-30 RX ORDER — PROTAMINE SULFATE 10 MG/ML
INJECTION, SOLUTION INTRAVENOUS PRN
Status: DISCONTINUED | OUTPATIENT
Start: 2025-07-30 | End: 2025-07-30

## 2025-07-30 RX ORDER — DEXMEDETOMIDINE HYDROCHLORIDE 4 UG/ML
.2-1.2 INJECTION, SOLUTION INTRAVENOUS CONTINUOUS
Status: DISCONTINUED | OUTPATIENT
Start: 2025-07-30 | End: 2025-07-30 | Stop reason: HOSPADM

## 2025-07-30 RX ORDER — NICOTINE POLACRILEX 4 MG
15-30 LOZENGE BUCCAL
Status: ACTIVE | OUTPATIENT
Start: 2025-07-30

## 2025-07-30 RX ORDER — INDOMETHACIN 25 MG/1
CAPSULE ORAL PRN
Status: DISCONTINUED | OUTPATIENT
Start: 2025-07-30 | End: 2025-07-30

## 2025-07-30 RX ORDER — NALOXONE HYDROCHLORIDE 0.4 MG/ML
0.4 INJECTION, SOLUTION INTRAMUSCULAR; INTRAVENOUS; SUBCUTANEOUS
Status: DISCONTINUED | OUTPATIENT
Start: 2025-07-30 | End: 2025-07-30

## 2025-07-30 RX ORDER — NEOSTIGMINE METHYLSULFATE 1 MG/ML
VIAL (ML) INJECTION PRN
Status: DISCONTINUED | OUTPATIENT
Start: 2025-07-30 | End: 2025-07-30

## 2025-07-30 RX ORDER — MULTIPLE VITAMINS W/ MINERALS TAB 9MG-400MCG
1 TAB ORAL DAILY
Status: DISPENSED | OUTPATIENT
Start: 2025-07-31

## 2025-07-30 RX ORDER — CHLORHEXIDINE GLUCONATE ORAL RINSE 1.2 MG/ML
15 SOLUTION DENTAL EVERY 12 HOURS
Status: DISCONTINUED | OUTPATIENT
Start: 2025-07-30 | End: 2025-07-30

## 2025-07-30 RX ORDER — POLYETHYLENE GLYCOL 3350 17 G/17G
17 POWDER, FOR SOLUTION ORAL DAILY
Status: DISPENSED | OUTPATIENT
Start: 2025-07-31

## 2025-07-30 RX ORDER — LIDOCAINE 4 G/G
1-2 PATCH TOPICAL EVERY 24 HOURS
Status: DISPENSED | OUTPATIENT
Start: 2025-07-30

## 2025-07-30 RX ORDER — BUMETANIDE 1 MG/1
1 TABLET ORAL DAILY
Status: ON HOLD | COMMUNITY

## 2025-07-30 RX ORDER — ACETAMINOPHEN 325 MG/1
975 TABLET ORAL ONCE
Status: COMPLETED | OUTPATIENT
Start: 2025-07-30 | End: 2025-07-30

## 2025-07-30 RX ORDER — ACETAMINOPHEN 325 MG/1
975 TABLET ORAL EVERY 8 HOURS
Status: DISPENSED | OUTPATIENT
Start: 2025-07-30 | End: 2025-08-09

## 2025-07-30 RX ORDER — METFORMIN HYDROCHLORIDE 500 MG/1
500 TABLET, EXTENDED RELEASE ORAL
Status: ACTIVE | OUTPATIENT
Start: 2025-07-30

## 2025-07-30 RX ORDER — HEPARIN SODIUM 5000 [USP'U]/.5ML
5000 INJECTION, SOLUTION INTRAVENOUS; SUBCUTANEOUS EVERY 8 HOURS
Status: DISPENSED | OUTPATIENT
Start: 2025-07-31

## 2025-07-30 RX ORDER — MAGNESIUM HYDROXIDE 1200 MG/15ML
LIQUID ORAL PRN
Status: DISCONTINUED | OUTPATIENT
Start: 2025-07-30 | End: 2025-07-30 | Stop reason: HOSPADM

## 2025-07-30 RX ORDER — OXYCODONE HYDROCHLORIDE 5 MG/1
10 TABLET ORAL EVERY 4 HOURS PRN
Status: DISPENSED | OUTPATIENT
Start: 2025-07-30

## 2025-07-30 RX ORDER — PROCHLORPERAZINE MALEATE 10 MG
10 TABLET ORAL EVERY 6 HOURS PRN
Status: ACTIVE | OUTPATIENT
Start: 2025-07-30

## 2025-07-30 RX ORDER — VANCOMYCIN HYDROCHLORIDE 1 G/20ML
INJECTION, POWDER, LYOPHILIZED, FOR SOLUTION INTRAVENOUS PRN
Status: DISCONTINUED | OUTPATIENT
Start: 2025-07-30 | End: 2025-07-30 | Stop reason: HOSPADM

## 2025-07-30 RX ORDER — CLOPIDOGREL BISULFATE 75 MG/1
75 TABLET ORAL DAILY
Status: ACTIVE | OUTPATIENT
Start: 2025-07-30

## 2025-07-30 RX ORDER — ASPIRIN 81 MG/1
162 TABLET, CHEWABLE ORAL DAILY
Status: DISPENSED | OUTPATIENT
Start: 2025-07-31

## 2025-07-30 RX ORDER — CEFAZOLIN SODIUM/WATER 3 G/30 ML
3 SYRINGE (ML) INTRAVENOUS SEE ADMIN INSTRUCTIONS
Status: DISCONTINUED | OUTPATIENT
Start: 2025-07-30 | End: 2025-07-30 | Stop reason: HOSPADM

## 2025-07-30 RX ORDER — ASPIRIN 300 MG/1
300 SUPPOSITORY RECTAL DAILY
Status: DISCONTINUED | OUTPATIENT
Start: 2025-07-31 | End: 2025-07-31

## 2025-07-30 RX ORDER — LIDOCAINE HYDROCHLORIDE 10 MG/ML
INJECTION, SOLUTION INFILTRATION; PERINEURAL PRN
Status: DISCONTINUED | OUTPATIENT
Start: 2025-07-30 | End: 2025-07-30

## 2025-07-30 RX ORDER — PHENYLEPHRINE HCL IN 0.9% NACL 50MG/250ML
.1-6 PLASTIC BAG, INJECTION (ML) INTRAVENOUS CONTINUOUS
Status: DISCONTINUED | OUTPATIENT
Start: 2025-07-30 | End: 2025-07-30 | Stop reason: HOSPADM

## 2025-07-30 RX ORDER — NITROGLYCERIN 10 MG/100ML
INJECTION INTRAVENOUS PRN
Status: DISCONTINUED | OUTPATIENT
Start: 2025-07-30 | End: 2025-07-30

## 2025-07-30 RX ORDER — PANTOPRAZOLE SODIUM 40 MG/1
40 TABLET, DELAYED RELEASE ORAL DAILY
Status: DISPENSED | OUTPATIENT
Start: 2025-07-30

## 2025-07-30 RX ORDER — HEPARIN SODIUM 1000 [USP'U]/ML
INJECTION, SOLUTION INTRAVENOUS; SUBCUTANEOUS PRN
Status: DISCONTINUED | OUTPATIENT
Start: 2025-07-30 | End: 2025-07-30

## 2025-07-30 RX ORDER — ACETAMINOPHEN 650 MG/1
650 SUPPOSITORY RECTAL EVERY 8 HOURS
Status: DISPENSED | OUTPATIENT
Start: 2025-07-30 | End: 2025-08-02

## 2025-07-30 RX ORDER — ASPIRIN 81 MG/1
81 TABLET, CHEWABLE ORAL
Status: COMPLETED | OUTPATIENT
Start: 2025-07-30 | End: 2025-07-30

## 2025-07-30 RX ORDER — CALCIUM GLUCONATE 20 MG/ML
2 INJECTION, SOLUTION INTRAVENOUS
Status: ACTIVE | OUTPATIENT
Start: 2025-07-30 | End: 2025-08-07

## 2025-07-30 RX ORDER — CEFAZOLIN SODIUM 1 G/50ML
2000 SOLUTION INTRAVENOUS ONCE
Status: COMPLETED | OUTPATIENT
Start: 2025-07-30 | End: 2025-07-30

## 2025-07-30 RX ORDER — CALCIUM GLUCONATE 20 MG/ML
1 INJECTION, SOLUTION INTRAVENOUS
Status: ACTIVE | OUTPATIENT
Start: 2025-07-30 | End: 2025-08-07

## 2025-07-30 RX ORDER — LIDOCAINE HYDROCHLORIDE 10 MG/ML
INJECTION, SOLUTION INFILTRATION; PERINEURAL
Status: COMPLETED | OUTPATIENT
Start: 2025-07-30 | End: 2025-07-30

## 2025-07-30 RX ORDER — PROPOFOL 10 MG/ML
INJECTION, EMULSION INTRAVENOUS PRN
Status: DISCONTINUED | OUTPATIENT
Start: 2025-07-30 | End: 2025-07-30

## 2025-07-30 RX ORDER — DEXTROSE MONOHYDRATE 25 G/50ML
25-50 INJECTION, SOLUTION INTRAVENOUS
Status: ACTIVE | OUTPATIENT
Start: 2025-07-30

## 2025-07-30 RX ORDER — ATORVASTATIN CALCIUM 40 MG/1
80 TABLET, FILM COATED ORAL AT BEDTIME
Status: DISPENSED | OUTPATIENT
Start: 2025-07-30

## 2025-07-30 RX ORDER — BISACODYL 10 MG
10 SUPPOSITORY, RECTAL RECTAL DAILY PRN
Status: ACTIVE | OUTPATIENT
Start: 2025-07-30

## 2025-07-30 RX ORDER — FENTANYL CITRATE 50 UG/ML
INJECTION, SOLUTION INTRAMUSCULAR; INTRAVENOUS PRN
Status: DISCONTINUED | OUTPATIENT
Start: 2025-07-30 | End: 2025-07-30

## 2025-07-30 RX ORDER — CHLORHEXIDINE GLUCONATE ORAL RINSE 1.2 MG/ML
10 SOLUTION DENTAL ONCE
Status: COMPLETED | OUTPATIENT
Start: 2025-07-30 | End: 2025-07-30

## 2025-07-30 RX ADMIN — LIDOCAINE HYDROCHLORIDE 1 ML: 10 INJECTION, SOLUTION INFILTRATION; PERINEURAL at 07:18

## 2025-07-30 RX ADMIN — PHENYLEPHRINE HYDROCHLORIDE 100 MCG: 10 INJECTION INTRAVENOUS at 09:15

## 2025-07-30 RX ADMIN — HYDROMORPHONE HYDROCHLORIDE 0.4 MG: 0.2 INJECTION, SOLUTION INTRAMUSCULAR; INTRAVENOUS; SUBCUTANEOUS at 15:16

## 2025-07-30 RX ADMIN — GLYCOPYRROLATE 0.8 MG: 0.2 INJECTION, SOLUTION INTRAMUSCULAR; INTRAVENOUS at 12:54

## 2025-07-30 RX ADMIN — PHENYLEPHRINE HYDROCHLORIDE 100 MCG: 10 INJECTION INTRAVENOUS at 09:26

## 2025-07-30 RX ADMIN — FENTANYL CITRATE 50 MCG: 50 INJECTION, SOLUTION INTRAMUSCULAR; INTRAVENOUS at 07:25

## 2025-07-30 RX ADMIN — FENTANYL CITRATE 50 MCG: 50 INJECTION, SOLUTION INTRAMUSCULAR; INTRAVENOUS at 07:20

## 2025-07-30 RX ADMIN — HYDROMORPHONE HYDROCHLORIDE 0.4 MG: 0.2 INJECTION, SOLUTION INTRAMUSCULAR; INTRAVENOUS; SUBCUTANEOUS at 21:54

## 2025-07-30 RX ADMIN — ALBUMIN HUMAN 12.5 G: 0.05 INJECTION, SOLUTION INTRAVENOUS at 19:02

## 2025-07-30 RX ADMIN — PROPOFOL 200 MG: 10 INJECTION, EMULSION INTRAVENOUS at 07:34

## 2025-07-30 RX ADMIN — OXYCODONE HYDROCHLORIDE 10 MG: 5 TABLET ORAL at 20:20

## 2025-07-30 RX ADMIN — ACETAMINOPHEN 975 MG: 325 TABLET ORAL at 06:40

## 2025-07-30 RX ADMIN — INDOMETHACIN 50 MEQ: 25 CAPSULE ORAL at 13:39

## 2025-07-30 RX ADMIN — LIDOCAINE HYDROCHLORIDE 1 ML: 10 INJECTION, SOLUTION INFILTRATION; PERINEURAL at 07:27

## 2025-07-30 RX ADMIN — NITROGLYCERIN 20 MCG: 10 INJECTION INTRAVENOUS at 11:47

## 2025-07-30 RX ADMIN — ROCURONIUM 10 MG: 50 INJECTION, SOLUTION INTRAVENOUS at 07:34

## 2025-07-30 RX ADMIN — DEXMEDETOMIDINE HYDROCHLORIDE 0.7 MCG/KG/HR: 400 INJECTION INTRAVENOUS at 15:21

## 2025-07-30 RX ADMIN — ONDANSETRON 4 MG: 2 INJECTION, SOLUTION INTRAMUSCULAR; INTRAVENOUS at 15:29

## 2025-07-30 RX ADMIN — PHENYLEPHRINE HYDROCHLORIDE 0.5 MCG/KG/MIN: 10 INJECTION INTRAVENOUS at 07:45

## 2025-07-30 RX ADMIN — DEXMEDETOMIDINE HYDROCHLORIDE 0.7 MCG/KG/HR: 400 INJECTION INTRAVENOUS at 11:24

## 2025-07-30 RX ADMIN — PROTAMINE SULFATE 400 MG: 10 INJECTION, SOLUTION INTRAVENOUS at 11:27

## 2025-07-30 RX ADMIN — NEOSTIGMINE METHYLSULFATE 5 MG: 1 INJECTION, SOLUTION INTRAVENOUS at 12:54

## 2025-07-30 RX ADMIN — ACETAMINOPHEN 975 MG: 325 TABLET ORAL at 20:21

## 2025-07-30 RX ADMIN — INSULIN HUMAN 6 UNITS/HR: 1 INJECTION, SOLUTION INTRAVENOUS at 18:11

## 2025-07-30 RX ADMIN — Medication 3 G: at 08:00

## 2025-07-30 RX ADMIN — ATORVASTATIN CALCIUM 80 MG: 40 TABLET, FILM COATED ORAL at 22:53

## 2025-07-30 RX ADMIN — PHENYLEPHRINE HYDROCHLORIDE 100 MCG: 10 INJECTION INTRAVENOUS at 07:45

## 2025-07-30 RX ADMIN — KETOROLAC TROMETHAMINE 15 MG: 15 INJECTION, SOLUTION INTRAMUSCULAR; INTRAVENOUS at 21:52

## 2025-07-30 RX ADMIN — Medication 200 MG: at 07:35

## 2025-07-30 RX ADMIN — SENNOSIDES, DOCUSATE SODIUM 1 TABLET: 50; 8.6 TABLET, FILM COATED ORAL at 22:53

## 2025-07-30 RX ADMIN — LIDOCAINE 1 PATCH: 4 PATCH TOPICAL at 17:02

## 2025-07-30 RX ADMIN — PANTOPRAZOLE SODIUM 40 MG: 40 INJECTION, POWDER, FOR SOLUTION INTRAVENOUS at 17:14

## 2025-07-30 RX ADMIN — HYDROMORPHONE HYDROCHLORIDE 0.4 MG: 0.2 INJECTION, SOLUTION INTRAMUSCULAR; INTRAVENOUS; SUBCUTANEOUS at 13:21

## 2025-07-30 RX ADMIN — OXYCODONE HYDROCHLORIDE 10 MG: 5 TABLET ORAL at 23:59

## 2025-07-30 RX ADMIN — AMINOCAPROIC ACID 1 G/HR: 250 INJECTION, SOLUTION INTRAVENOUS at 09:01

## 2025-07-30 RX ADMIN — ASPIRIN 81 MG CHEWABLE TABLET 81 MG: 81 TABLET CHEWABLE at 06:26

## 2025-07-30 RX ADMIN — ROCURONIUM 50 MG: 50 INJECTION, SOLUTION INTRAVENOUS at 09:53

## 2025-07-30 RX ADMIN — SODIUM BICARBONATE 50 MEQ: 84 INJECTION, SOLUTION INTRAVENOUS at 13:39

## 2025-07-30 RX ADMIN — INSULIN HUMAN 7 UNITS/HR: 1 INJECTION, SOLUTION INTRAVENOUS at 08:35

## 2025-07-30 RX ADMIN — ROCURONIUM 90 MG: 50 INJECTION, SOLUTION INTRAVENOUS at 07:48

## 2025-07-30 RX ADMIN — ALBUMIN HUMAN 12.5 G: 0.05 INJECTION, SOLUTION INTRAVENOUS at 15:15

## 2025-07-30 RX ADMIN — PHENYLEPHRINE HYDROCHLORIDE 100 MCG: 10 INJECTION INTRAVENOUS at 07:47

## 2025-07-30 RX ADMIN — SODIUM CHLORIDE: 9 INJECTION, SOLUTION INTRAVENOUS at 08:04

## 2025-07-30 RX ADMIN — SODIUM CHLORIDE 2000 MG: 0.9 INJECTION, SOLUTION INTRAVENOUS at 08:21

## 2025-07-30 RX ADMIN — HYDROMORPHONE HYDROCHLORIDE 0.2 MG: 0.2 INJECTION, SOLUTION INTRAMUSCULAR; INTRAVENOUS; SUBCUTANEOUS at 20:14

## 2025-07-30 RX ADMIN — AMINOCAPROIC ACID 5 G: 250 INJECTION, SOLUTION INTRAVENOUS at 08:04

## 2025-07-30 RX ADMIN — FENTANYL CITRATE 100 MCG: 50 INJECTION, SOLUTION INTRAMUSCULAR; INTRAVENOUS at 11:52

## 2025-07-30 RX ADMIN — SODIUM CHLORIDE, SODIUM LACTATE, POTASSIUM CHLORIDE, AND CALCIUM CHLORIDE: .6; .31; .03; .02 INJECTION, SOLUTION INTRAVENOUS at 06:55

## 2025-07-30 RX ADMIN — CEFAZOLIN SODIUM 2 G: 2 SOLUTION INTRAVENOUS at 15:33

## 2025-07-30 RX ADMIN — ALBUMIN HUMAN 12.5 G: 0.05 INJECTION, SOLUTION INTRAVENOUS at 14:52

## 2025-07-30 RX ADMIN — ACETAMINOPHEN 650 MG: 650 SUPPOSITORY RECTAL at 13:39

## 2025-07-30 RX ADMIN — NITROGLYCERIN 20 MCG: 10 INJECTION INTRAVENOUS at 11:40

## 2025-07-30 RX ADMIN — ASPIRIN 81 MG CHEWABLE TABLET 162 MG: 81 TABLET CHEWABLE at 17:14

## 2025-07-30 RX ADMIN — PHENYLEPHRINE HYDROCHLORIDE 100 MCG: 10 INJECTION INTRAVENOUS at 09:13

## 2025-07-30 RX ADMIN — Medication 20 MG: at 08:17

## 2025-07-30 RX ADMIN — LIDOCAINE HYDROCHLORIDE 5 ML: 10 INJECTION, SOLUTION INFILTRATION; PERINEURAL at 07:33

## 2025-07-30 RX ADMIN — FENTANYL CITRATE 100 MCG: 50 INJECTION, SOLUTION INTRAMUSCULAR; INTRAVENOUS at 07:33

## 2025-07-30 RX ADMIN — SODIUM BICARBONATE 50 MEQ: 84 INJECTION, SOLUTION INTRAVENOUS at 11:40

## 2025-07-30 RX ADMIN — HEPARIN SODIUM 55000 UNITS: 1000 INJECTION, SOLUTION INTRAVENOUS; SUBCUTANEOUS at 09:19

## 2025-07-30 RX ADMIN — CHLORHEXIDINE GLUCONATE 10 ML: 1.2 SOLUTION ORAL at 06:28

## 2025-07-30 RX ADMIN — MIDAZOLAM HYDROCHLORIDE 2 MG: 1 INJECTION, SOLUTION INTRAMUSCULAR; INTRAVENOUS at 07:04

## 2025-07-30 RX ADMIN — EPINEPHRINE 0.03 MCG/KG/MIN: 1 INJECTION INTRAMUSCULAR; INTRAVENOUS; SUBCUTANEOUS at 11:10

## 2025-07-30 ASSESSMENT — ACTIVITIES OF DAILY LIVING (ADL)
ADLS_ACUITY_SCORE: 39
ADLS_ACUITY_SCORE: 51
ADLS_ACUITY_SCORE: 51
ADLS_ACUITY_SCORE: 37
ADLS_ACUITY_SCORE: 49
ADLS_ACUITY_SCORE: 30
ADLS_ACUITY_SCORE: 51
ADLS_ACUITY_SCORE: 49
ADLS_ACUITY_SCORE: 37
ADLS_ACUITY_SCORE: 49
ADLS_ACUITY_SCORE: 51
ADLS_ACUITY_SCORE: 37
ADLS_ACUITY_SCORE: 37

## 2025-07-30 ASSESSMENT — LIFESTYLE VARIABLES: TOBACCO_USE: 1

## 2025-07-30 NOTE — H&P
Chief Complaint:   Heart blockages and heart failure  HISTORY OF PRESENT ILLNESS:  Mr. Capps has been seen by me several times.  He presented in late May with a heart failure exacerbation.   He has also had a thalamic stroke this year.  He has severe triple vessel coronary artery disease with an ischemic cardiomyopathy.  Additionally he has poorly controlled diabetes.  He reports that he has a new cough and his white count last week was elevated  SURGICAL PROCEDURES:  Per my note of 2025  MEDICAL PROBLEMS:  Per my note of 2025  ALLERGIES:  None known  CURRENT MEDICATIONS:  See chart  FAMILY HISTORY:  Noncontributory.  Mother recently   SOCIAL HISTORY: He has quit smoking.  He was recently homeless and lived in his truck  REVIEW OF SYSTEMS:  Per my note of 2025  PHYSICAL EXAMINATION:    Vital Signs:  Afebrile.  SKIN:  Lesion of right heel  HEENT:  Normocephalic.  PERRLA.  Poor dentitation  CHEST:  Without deformity.  Lungs are clear  HEART:  RRR without murmurs, gallops or rubs  PULSES:  Intact  ABDOMEN: Obese, nontender.  Bowel sounds are present  /RECTAL: Deferred  NEUROLOGIC:  Grossly intact  BACK:  Without deformity  EXTREMITIES:  Full ROM without clubbing, cyanosis, or edema  LABORATORY:  WBC over 13,000 last week  ASSESSMENT:  He has severe triple vessel coronary artery disease with reduced left ventricular function and obesity with recent CVA and CKD.  We plan to perform a multi-vessel coronary artery bypass grafting procedure.  For conduit we will use the left internal mammary artery and reversed saphenous vein graft.  He understands that the risks for this procedure include: bleeding, infection, stroke, sternal dehiscence, myocardial infarction, arrhythmias, the need for a pacemaker, prolonged ventilation, pneumonia, liver/renal failure, aortic dissection, and an operative mortality of 4 to 8 percent.  We will follow the recommendations of Neurology and Nephrology.

## 2025-07-30 NOTE — CONSULTS
PULMONARY / CRITICAL CARE CONSULTATION NOTE      Consultation - Pulmonary/Critical Care Medicine  Floyd Capps,  1975, MRN [id number]  Date / Time of Admission:  2025  5:11 AM    Admitting Dx: CAD (coronary artery disease) [I25.10]  Coronary artery disease involving autologous artery coronary bypass graft without angina pectoris [I25.810]    PCP: Glendy Benavides, [phone]  Consulting physician:  Haydee Boateng MD   Code status:  Full Code       Extended Emergency Contact Information  Primary Emergency Contact: Balwinder Capps  Mobile Phone: [phone]  Relation: Brother  Secondary Emergency Contact: Rubina Capps  Mobile Phone: [phone]  Relation: Sister       ID:  Floyd Capps is a 49 year old male with CAD, HFrEF 35%, CVA, CKD, T2DM, diabetic foot wound on right heel. Underwent CABG x 3 with Dr. Boateng today.         ASSESSMENT & PLAN BY SYSTEM   Systems to Assess:   Pulmonary: Post op vent management; no underlying lung disease documented but does have smoking hx (quit 1-year ago) and chronic cough.   Wean supplemental O2 as tolerated; goal O2 sat > 92%.  HOB > 30 degrees to limit aspiration risk.    Check ABG and adjust support as indicated; avoid acidotic state.   Check CXR  Once hemodynamically stable, plan to proceed to wake, wean, and extubate with goal < 6-hours.  Goal extubation by 1844  Cardiovascular: CAD s/p CABG x 3; HFrEF with EF 30-35%.   Cardiac monitoring.   SBP goal 120-140.    Goal CI 2-3   Vasoactive gtts per CV-surgery.   Temporary pacing wires present; will use in setting of symptomatic arrhythmia.   Currently paced @ 90  Neurological: sedation for vent synchrony and comfort. Hx thalamic stroke in    Neuro checks per ICU protocol.   Sedate with precedex until ready to wean and extubate.   Pain control: PRN  Goal RASS 0 to -1   Drive blood pressures a bit higher for cerebral perfusion  GI/: no acute issues; severe obesity BMI 44  NPO until extubated and  fully awake.   Cazares catheter in place for accurate measurement of I/O.   GI prophylaxis:  Omeprazole  Renal: CKD    Monitor I/O's.  Electrolyte repletion PRN.  Avoid/limit nephrotoxic agents.   IVFs: per CV-surgery  Heme/Coag: Acute blood loss anemia; coagulopathy secondary to pump run.   Monitor H/H.   Transfuse per CV-surgery.   Monitor chest tube output hourly; notify CV-surgery for excessive output or abrupt stop in output.   DVT prophylaxis:  SubQ heparin  Infectious disease:   General precautions.   Remove lines and drains once no longer required.   Endocrine: hyperglycemia, poorly controlled T2DM    RHI gtt per ICU protocol.   Musculoskeletal: non-healing right foot wound on heel.   Bedrest; initiate mobility protocol.   Will ask WOC to see    Lines: A-line, Day# 1, Central line, Day# 1        Code Status:  FULL CODE    Thank you for this consultation.  Please call if any questions or concerns.        This patient is considered critically ill and requires ICU level of care due to immediate post CV-surgical procedure. High risk for acute hemodynamic collapse and death.     Total Critical Care time, not including separate billable procedure time:  35 minutes.    Carey Hooks, CNP  [facility] Pulmonary/Critical Care      Chief Complaint chief complaint       HPI    Floyd Capps is a 49 year old male with CAD, HFrEF 35%, CVA, CKD, T2DM, diabetic foot wound on right heel. Underwent CABG x 3 with Dr. Boateng today. Intra-op course unremarkable. No difficulty with intubation using Glidescope; noted poor dentition by anesthesia staff. Received 20mg IV methadone. No difficulty going on nor coming off pump. Intra-op Echo showed decreased LV function with EF of 35%; RV looked ok and no valve abnormalities. EBL 350mL; received 1-unit PRBC in addition to 600mL Cell Saver. Arrived to ICU on epi and phenylephrine; sedated on precedex and on full vent support.   Direct sign out received at the bedside from   Kilo and Dr. Blas.          Review of Systems   Review of systems not obtained due to patient factors.     Active Problem List   Patient Active Problem List    Diagnosis Date Noted    Coronary artery disease involving autologous artery coronary bypass graft without angina pectoris 07/30/2025     Priority: Medium    Obstructive sleep apnea 06/23/2025     Priority: Medium    HOWELL (dyspnea on exertion) 05/28/2025     Priority: Medium    Cerebrovascular accident (CVA), unspecified mechanism (H) 05/28/2025     Priority: Medium    Ulcer of right heel and midfoot, unspecified ulcer stage (H) 05/28/2025     Priority: Medium    Acute ischemic stroke (H) 03/23/2025     Priority: Medium    Chronic systolic heart failure (H) 03/23/2025     Priority: Medium    CKD (chronic kidney disease) stage 3, GFR 30-59 ml/min (H) 03/23/2025     Priority: Medium    Diabetic ulcer of right heel associated with type 2 diabetes mellitus, limited to breakdown of skin (H) 07/02/2024     Priority: Medium    Diabetic ulcer of right heel associated with type 2 diabetes mellitus, with necrosis of bone (H) 04/24/2024     Priority: Medium    Diabetic ulcer of right heel associated with diabetes mellitus due to underlying condition, with muscle involvement without evidence of necrosis (H) 03/22/2024     Priority: Medium    Closed fracture of distal end of left radius 03/05/2024     Priority: Medium    Benign essential hypertension 03/05/2024     Priority: Medium    Heart burn 03/05/2024     Priority: Medium    Acute systolic heart failure (H) 03/01/2024     Priority: Medium    Hyperglycemia 02/14/2024     Priority: Medium    Type 2 diabetes mellitus with hyperglycemia, with long-term current use of insulin (H) 02/14/2024     Priority: Medium    Cellulitis of right lower leg 02/14/2024     Priority: Medium    Diabetic ulcer of right heel associated with type 2 diabetes mellitus, unspecified ulcer stage (H) 02/14/2024     Priority: Medium     Hyponatremia 02/14/2024     Priority: Medium    Class 2 severe obesity due to excess calories with serious comorbidity in adult (H) 11/03/2023     Priority: Medium    Elevated troponin 08/23/2023     Priority: Medium    History of stroke 08/23/2023     Priority: Medium    Dizziness      Priority: Medium     Created by Conversion        Type 2 Diabetes Mellitus - Uncomplicated, Controlled      Priority: Medium     Created by Conversion        Hypothyroidism 10/12/2007     Priority: Medium     Formatting of this note might be different from the original.  Hypothyroidism Primary      Hyperlipidemia 10/08/2007     Priority: Medium    Obesity 08/27/2007     Priority: Medium    Tobacco use disorder 08/27/2007     Priority: Medium     Formatting of this note might be different from the original.  Tobacco Abuse           Medical/Surgical History   Past Medical History:   Diagnosis Date    Acute hypoxemic respiratory failure (H)     Acute renal failure, unspecified acute renal failure type     CAD (coronary artery disease)     Callus of foot     Diabetes (H)     Diarrhea     Essential hypertension     Fracture of left distal radius     Heel ulcer (H)     Right    History of stroke     Insomnia     Morbid obesity (H)     Nausea     Non-pressure chronic ulcer of right heel and midfoot with unspecified severity (H)     Normocytic anemia     Peripheral neuropathy     Type 2 diabetes mellitus with foot ulcer (H)      Past Surgical History:   Procedure Laterality Date    APPENDECTOMY      CV CORONARY ANGIOGRAM N/A 3/1/2024    Procedure: CV CORONARY ANGIOGRAM;  Surgeon: Stehpen Berger MD;  Location: [facility] CATH LAB CV    CV CORONARY ANGIOGRAM N/A 6/3/2025    Procedure: Coronary Angiogram;  Surgeon: Stephen Berger MD;  Location: [facility] CATH LAB CV    CV LEFT HEART CATH N/A 3/1/2024    Procedure: Left Heart Catheterization;  Surgeon: Stephen Berger MD;  Location: [facility] CATH LAB CV    CV LEFT HEART CATH N/A 6/3/2025     Procedure: Left Heart Catheterization;  Surgeon: Stephen Berger MD;  Location: [facility] CATH LAB CV    EXCISE EXOSTOSIS FOOT Right 5/2/2024    Procedure: excision of bone from calcaneus with application of theraskin;  Surgeon: Dong Sanders DPM;  Location: [facility] OR    INCISION AND DRAINAGE FOOT, COMBINED Right 5/2/2024    Procedure: INCISION AND DRAINAGE, right heel with;  Surgeon: Dong Sanders DPM;  Location: [facility] OR    INCISION AND DRAINAGE OF WOUND      groin abscess    IRRIGATION AND DEBRIDEMENT FOOT, COMBINED Right 2/16/2024    Procedure: IRRIGATION AND DEBRIDEMENT RIGHT FOOT;  Surgeon: Cristofer Sims DPM;  Location: [facility] OR    PICC DOUBLE LUMEN PLACEMENT  2/29/2024         Allergies   No Known Allergies      Medications:  OUTpatient medications   Prior to Admission medications   Medication Sig Start Date End Date Taking? Authorizing Provider   acetaminophen (TYLENOL) 500 MG tablet Take 2 tablets (1,000 mg) by mouth every 8 hours as needed for mild pain 3/5/24  Yes Nata Baca MD   aspirin 81 MG EC tablet Take 1 tablet (81 mg) by mouth daily. 3/28/25  Yes Ricardo Herndon DO   atorvastatin (LIPITOR) 80 MG tablet Take 1 tablet (80 mg) by mouth daily 7/11/24  Yes Glendy Benavides, NP   bumetanide (BUMEX) 1 MG tablet Take 1 mg by mouth daily.   Yes Unknown, Entered By History   carvedilol (COREG) 12.5 MG tablet Take 12.5 mg by mouth 2 times daily (with meals).   Yes Unknown, Entered By History   clopidogrel (PLAVIX) 75 MG tablet Take 1 tablet (75 mg) by mouth daily. 3/28/25 7/30/25 Yes Ricardo Herndon DO   Continuous Glucose Sensor (FREESTYLE LAMINE 2 SENSOR) MISC Inject 1 each subcutaneously every 14 days Use 1 sensor every 14 days. Use to read blood sugars per 's instructions. 7/11/24  Yes Glendy Benavides NP   cyanocobalamin (VITAMIN B-12) 1000 MCG tablet Take 1,000 mcg by mouth daily.   Yes Reported, Patient   empagliflozin (JARDIANCE) 10  MG TABS tablet Take 1 tablet (10 mg) by mouth daily. 7/8/25  Yes Musa Cunha MD   insulin aspart (NOVOLOG FLEXPEN) 100 UNIT/ML pen Inject subcutaneously See Admin Instructions. Inject as directed per sliding scale:  For blood sugar 150-200 = 2 units  201-250 = 4 units  251-300 = 6 units  301-350 = 8 units  351-400 = 10 units  401+ = 12 units and call MD   Yes Unknown, Entered By History   insulin glargine (LANTUS PEN) 100 UNIT/ML pen Inject 55 Units subcutaneously at bedtime.   Yes Unknown, Entered By History   isosorbide mononitrate (IMDUR) 30 MG 24 hr tablet Take 1 tablet (30 mg) by mouth daily. 11/7/24  Yes Gee Hopson,    loperamide (IMODIUM A-D) 2 MG tablet Take 2 mg by mouth 4 times daily as needed for diarrhea.   Yes Reported, Patient   [Paused] metFORMIN (GLUCOPHAGE XR) 500 MG 24 hr tablet Take 1 tablet (500 mg) by mouth daily (with dinner).  Wait to take this until your doctor or other care provider tells you to start again. 1/30/25  Yes Glendy Benavides NP   multivitamin w/minerals (THERA-VIT-M) tablet Take 1 tablet by mouth daily 3/5/24  Yes Nata Baca MD   NOVOLOG FLEXPEN 100 UNIT/ML soln Inject 20 Units subcutaneously 3 times daily (with meals). 7/3/25  Yes Reported, Patient   sacubitril-valsartan (ENTRESTO) 24-26 MG per tablet Take 1 tablet by mouth 2 times daily. 6/4/25  Yes Caitlyn Alonso MD   insulin pen needle (32G X 4 MM) 32G X 4 MM miscellaneous Use 4 pen needles daily or as directed.  Patient not taking: Reported on 7/8/2025 9/18/24   Glendy Benavides NP           Medications:  INpatient medications   Current Facility-Administered Medications   Medication Dose Route Frequency Provider Last Rate Last Admin    acetaminophen (TYLENOL) Suppository 650 mg  650 mg Rectal Q8H Geno Howard PA-C        acetaminophen (TYLENOL) tablet 975 mg  975 mg Oral Q8H Anita, Geno Roxanne, PA-C        aspirin (ASA) chewable tablet 162 mg  162 mg Oral or NG Tube Once  Geno Howard PA-C        Or    aspirin (ASA) Suppository 300 mg  300 mg Rectal Once Geno Howard PA-C        [START ON 7/31/2025] aspirin (ASA) chewable tablet 162 mg  162 mg Oral or NG Tube Daily Geno Howard PA-C        Or    [START ON 7/31/2025] aspirin (ASA) Suppository 300 mg  300 mg Rectal Daily Geno Howard PA-C        atorvastatin (LIPITOR) tablet 80 mg  80 mg Oral At Bedtime Geno Howard PA-C        ceFAZolin (ANCEF) 2 g in dextrose 50 mL intermittent infusion  2 g Intravenous Q8H Geno Howard PA-C        chlorhexidine (PERIDEX) 0.12 % solution 15 mL  15 mL Mouth/Throat Q12H Carey Hooks APRN CNP        [Held by provider] clopidogrel (PLAVIX) tablet 75 mg  75 mg Oral Daily Geno Howard PA-C        [START ON 7/31/2025] cyanocobalamin (VITAMIN B-12) tablet 1,000 mcg  1,000 mcg Oral Daily Geno Howard PA-C        [Held by provider] empagliflozin (JARDIANCE) tablet 10 mg  10 mg Oral Daily Geno Howard PA-C        [START ON 7/31/2025] heparin ANTICOAGULANT injection 5,000 Units  5,000 Units Subcutaneous Q8H Geno Howard PA-C        Lidocaine (LIDOCARE) 4 % Patch 1-2 patch  1-2 patch Transdermal Q24H Geno Howard PA-C        [Held by provider] metFORMIN (GLUCOPHAGE XR) 24 hr tablet 500 mg  500 mg Oral Daily with supper Geno Howard PA-C        [START ON 7/31/2025] multivitamin w/minerals (THERA-VIT-M) tablet 1 tablet  1 tablet Oral Daily Geno Howard PA-C        pantoprazole (PROTONIX) 2 mg/mL suspension 40 mg  40 mg Oral or NG Tube Daily Geno Howard PA-C        Or    pantoprazole (PROTONIX) EC tablet 40 mg  40 mg Oral Daily Geno Howard PA-C        [START ON 7/31/2025] polyethylene glycol (MIRALAX) Packet 17 g  17 g Oral Daily Geno Howard PA-C        [Held by provider] sacubitril-valsartan (ENTRESTO) 24-26 MG per tablet 1 tablet  1  tablet Oral BID Geno Howard PA-C        senna-docusate (SENOKOT-S/PERICOLACE) 8.6-50 MG per tablet 1 tablet  1 tablet Oral BID Geno Howard PA-C               Family History  Social History   Reviewed  No family history on file.  Reviewed  Social History     Socioeconomic History    Marital status: Single     Spouse name: Not on file    Number of children: Not on file    Years of education: Not on file    Highest education level: Not on file   Occupational History    Not on file   Tobacco Use    Smoking status: Former     Current packs/day: 0.50     Types: Cigarettes     Passive exposure: Never    Smokeless tobacco: Never   Vaping Use    Vaping status: Never Used   Substance and Sexual Activity    Alcohol use: Yes     Comment: once a week    Drug use: Yes     Types: Marijuana     Comment: Last dose 7/30/25 2100    Sexual activity: Not on file   Other Topics Concern    Not on file   Social History Narrative    Patient reports that he does not have health insurance.     Social Drivers of Health     Financial Resource Strain: High Risk (5/29/2025)    Financial Resource Strain     Within the past 12 months, have you or your family members you live with been unable to get utilities (heat, electricity) when it was really needed?: Yes   Food Insecurity: High Risk (5/29/2025)    Food Insecurity     Within the past 12 months, did you worry that your food would run out before you got money to buy more?: Yes     Within the past 12 months, did the food you bought just not last and you didn t have money to get more?: No   Transportation Needs: Low Risk  (5/29/2025)    Transportation Needs     Within the past 12 months, has lack of transportation kept you from medical appointments, getting your medicines, non-medical meetings or appointments, work, or from getting things that you need?: No   Recent Concern: Transportation Needs - High Risk (3/23/2025)    Transportation Needs     Within the past 12 months,  "has lack of transportation kept you from medical appointments, getting your medicines, non-medical meetings or appointments, work, or from getting things that you need?: Yes   Physical Activity: Not on file   Stress: Not on file   Social Connections: Not on file   Interpersonal Safety: Low Risk  (7/30/2025)    Interpersonal Safety     Do you feel physically and emotionally safe where you currently live?: Yes     Within the past 12 months, have you been hit, slapped, kicked or otherwise physically hurt by someone?: No     Within the past 12 months, have you been humiliated or emotionally abused in other ways by your partner or ex-partner?: No   Housing Stability: High Risk (5/29/2025)    Housing Stability     Do you have housing? : No     Are you worried about losing your housing?: Patient unable to answer      Psychosocial Needs  Social History     Social History Narrative    Patient reports that he does not have health insurance.     Additional psychosocial needs reviewed per nursing assessment.      Physical Exam   VITALS:  /62   Pulse 80   Temp 97.5  F (36.4  C)   Resp 18   Ht 1.778 m (5' 10\")   Wt (!) 140.6 kg (310 lb)   SpO2 95%   BMI 44.48 kg/m    [unfilled]    PHYSICAL EXAM:   GEN: intubated and sedated  HEENT:  OETT in place; AT/NC  NECK: Supple.  RIJ introducer in place.   PULM:  unlabored on full vent; lungs are clear and equal. No wheeze.   CVS:   paced; S1S2 no murmur. Chest tubes x 4.   ABDOMEN:   obese, soft.   EXTREMITIES/SKIN:    left leg wrapped; right heel with dressing in place - I did not take down.   NEURO:  .  sedated    I&O:    Intake/Output Summary (Last 24 hours) at 7/30/2025 1306  Last data filed at 7/30/2025 1300  Gross per 24 hour   Intake 2600 ml   Output 1215 ml   Net 1385 ml        Pertinent Labs   Lab Results: personally reviewed.      CBC:  Recent Labs   Lab 07/30/25  1211 07/30/25  1133 07/30/25  1132   WBC  --   --  22.0*   HGB 12.0* 10.2* 9.8*   HCT  --   --  30.1* "   PLT  --   --  160     Chemistry:  Recent Labs   Lab 07/30/25  1211 07/30/25  1133 07/30/25  1058    137 137     Coags:  Lab Results   Component Value Date    INR 1.46 (H) 07/30/2025    INR 1.04 07/22/2025    INR 0.95 08/23/2023    PROTIME 17.8 (H) 07/30/2025    PROTIME 13.4 07/22/2025        Microbiology:  MRSA nares positive        Radiology:  Chest X-Ray: post op film pending

## 2025-07-30 NOTE — ANESTHESIA PROCEDURE NOTES
Perioperative GAIL Procedure Note    Staff -        Anesthesiologist:  Fannie Blas MD       Performed By: anesthesiologist  Preanesthesia Checklist:  Patient identified, IV assessed, risks and benefits discussed, monitors and equipment assessed, procedure being performed at surgeon's request and anesthesia consent obtained.    GAIL Probe Insertion    Probe Status PRE Insertion: NO obvious damage  Probe type:  Adult 3D  Bite block used:   None           Reason: Abnormal Dentition  Insertion Technique: Jaw Lift  Insertion complications: None obvious  Billing Report:GAIL report by Anesthesiologist (See Separate Report note)  Probe Status POST Removal: NO obvious damage  Comments: PreCPB:  Normal LV size with moderately reduced function.  LVEF estimated at 35%/  Normal RV size and function.  Trileaflet aortic valve with trivial regurgitation.  Mild MR, mild TR.  MERA free of thrombus.  No apparent PFO.    PostCPB:  No significant changes.  LV functions remains moderately diminished.    GAIL Report  General Procedure Information  Images for this study have been archived.    Post Intervention Findings  Procedure(s) performed:  CABG.  Regional wall motion:. Surgeon(s) notified of all postintervention findings: Yes.                 Echocardiogram Comments

## 2025-07-30 NOTE — BRIEF OP NOTE
[facility]    Brief Operative Note    Pre-operative diagnosis: CAD (coronary artery disease) [I25.10]  Post-operative diagnosis Same as pre-operative diagnosis    Procedure: CORONARY ARTERY BYPASS GRAFT TIMES THREE, WITH LEFT LEG ENDOSCOPIC VESSEL PROCUREMENT, LEFT INTERNAL MAMMARY ARTERY HARVEST, CLIPPING OF LEFT ATRIAL APPENDAGE, N/A - Chest  ECHOCARDIOGRAM, TRANSESOPHAGEAL, INTRAOPERATIVE, EPIAORTIC ULTRASOUND, N/A - Heart    Surgeon: Surgeons and Role:     * Haydee Boateng MD - Primary     * Violetta Stokes MD - Fellow - Assisting  Anesthesia: General   Estimated Blood Loss: 350 ml    Drains: 2 mediastinal chest tubes,1 left pleural tube, 1 right pleural tube  Specimens: * No specimens in log *  Findings:   Good targets for grafting identified on rPDA, D1, and LAD. No suitable OM territory target was found (very small vessels with heavy disease).  Complications: None.  Implants:   Implant Name Type Inv. Item Serial No.  Lot No. LRB No. Used Action   IMP ATRICLIP FLEX V DEVICE MERA EXLUSION 50MM [id number] - [id number] Clip IMP ATRICLIP FLEX V DEVICE MERA EXLUSION 50MM [id number]  [employer] [id number] N/A 1 Implanted

## 2025-07-30 NOTE — PROGRESS NOTES
[facility] - ICU Admission Note       Dual Skin Assessment:     Patient admitted from OR/PACU to ICU.      Comprehensive skin inspection completed by myself and Marie CHILD RN.      Abnormal skin assessment findings: Yes      If yes above, LDA initiated for skin breakdown/non-blanchable erythema:  Yes     Provider notified: Yes     WOC consult order obtained: Yes

## 2025-07-30 NOTE — ANESTHESIA PROCEDURE NOTES
Arterial Line Procedure Note    Pre-Procedure   Staff -        Anesthesiologist:  Fannie Blas MD       Performed By: anesthesiologist       Location: OR       Pre-Anesthestic Checklist: patient identified, IV checked, risks and benefits discussed, informed consent, monitors and equipment checked, pre-op evaluation and at physician/surgeon's request  Timeout:       Correct Patient: Yes        Correct Procedure: Yes        Correct Site: Yes        Correct Position: Yes   Line Placement:   This line was placed Pre Induction starting at 7/30/2025 7:18 AM and ending at 7/30/2025 7:31 AM  Procedure   Procedure: arterial line       Laterality: right       Insertion Site: brachial.  Sterile Prep        Standard elements of sterile barrier followed       Skin prep: Chloraprep  Insertion/Injection        Technique: ultrasound guided and Seldinger Technique        1. Ultrasound was used to evaluate the access site.       2. Artery evaluated via ultrasound for patency/adequacy.       3. Using real-time ultrasound the needle/catheter was observed entering the artery/vein.       4. Permanent image was captured and entered into the patient's record.       5. The visualized structures were anatomically normal.       6. There were no apparent abnormal pathologic findings.       Catheter Type/Size: 20 G, 12 cm  Narrative         Secured by: anchor securement device       Tegaderm dressing used.       Complications: None apparent,        Arterial waveform: Yes        IBP within 10% of NIBP: Yes   Comments:  2 attempts at right radial with ultrasound guidance.  Needle and wire visualized in vessel on US, but no flow in catheter.  Radial aborted.

## 2025-07-30 NOTE — OP NOTE
OPERATIVE REPORT     OPERATING ROOM:  Room 8    July 30, 2025    PROCEDURES PERFORMED:   Median sternotomy   Take down of the left internal mammary artery    Endoscopic greater saphenous vein procurement from the left lower extremity    Epiaortic ultrasound of the ascending aorta    Placement on central cardiopulmonary bypass    Triple vessel coronary artery bypass grafting;   -  Left internal mammary artery to the left anterior descending coronary artery  -  Separate reversed saphenous vein grafts to the left anterior descending diagonal branch, and the right posterior descending coronary arteries  7.     Placement of a 50 mm Flex V AtriClip on the left atrial   appendage.  8.  Placement of temporary atrial and ventricular pacing wires     SURGEONS:    Attending Surgeon:  Haydee Boateng MD  First Assistant: Sue Mora. STSAC  Cardiac Surgery Fellow:  Violetta Stokes MD     ANESTHESIA:  General endotracheal    SKIN PREP:  Duraprep    INCISION:  Median sternotomy, skip incision left upper leg     DRAINS: Two 32 Fr mediastinal and one 24 Fr Neeraj drain right and left pleural spaces   CULTURES: None  SPECIMENS: None  CLOSURE: Routine     PREOPERATIVE DIAGNOSES:  HFrEF  Severe multivessel coronary artery disease  Poorly controlled diabetes mellitus  Recent CVA  CKD     POSTOPERATIVE DIAGNOSES:  Same     BRIEF HISTORY:    Floyd Capps is a 49 year old year old gentleman who presented in May with ADHF and HFrEF.   He was found to have severe multivessel disease on coronary angiogram    The above procedures were planned.     FINDINGS AT OPERATION:  His ascending aorta was free of any plaque seen on epiaortic ultrasound.  His pulmonary artery pressures were one third systemic.  His overall left ventricular ejection fraction was about 35%.  His heart was globally enlarged.  The left atrial appendage sized to a 50 mm Flex V AtriClip.  The left internal mammary artery was thin walled and a 2 mm conduit with excellent  flow.  The reversed saphenous vein graft measured 4 to 5 mm in diameter and was of good quality.  The right posterior descending coronary artery was diffusely diseased and a 2 mm in diameter target vessel, a fair to good vessel for bypass grafting.  The obtuse marginal coronary arteries were explored but none of them were suitable targets.  The left anterior descending diagonal branch coronary artery was a 1.5 mm in diameter target vessel, a fair to good vessel for bypass grafting.  The left anterior descending coronary artery in its apical third was a 2 mm in diameter target vessel, a fair to good vessel for bypass grafting.  There was evidence of an old infarct in the RCA distribution.      PROCEDURES:  The patient arrived in the operating room and was positioned supine.  An arterial line was placed.  Satisfactory general endotracheal anesthesia was induced.  A transesophageal probe, [place] catheter and Cazares catheter were inserted.  The patient's neck, chest, abdomen, both groins and lower extremities were prepped and draped in a standard sterile fashion.      A complete median sternotomy was made.  With the aid of the Rultract retractor, the left internal mammary artery was taken down from the xiphoid process to the subclavian vein.   At the same time Sue Mora made an incision in the left upper leg and 5 cm of the greater saphenous vein was exposed. The endoscope was then passed proximally and distally and the greater saphenous vein was dissected out circumferentially.  Its branches were clipped or cauterized.  It was clipped proximally and distally and extracted. It was cannulated and distended.  Its branches were controlled with Ligaclips.  The leg wound was rendered hemostatic and closed in layers using running 2-0 and 3-0 Vicryl.  Dermabond was applied to the skin.     Heparin was administered.  The left internal mammary artery was prepared in the usual fashion.  An Octobase retractor was inserted.  The  pericardium was opened and tented up on pericardial sutures.  The aorta was  from the pulmonary artery.  Epiaortic ultrasound of the ascending aorta was carried out.  Pursestring sutures were placed in the ascending aorta and right atrium for cannulation.  When the ACT was appropriate cannulation was performed and central cardiopulmonary bypass was established.  The patient's temperature was allowed to drift.  A retrograde cardioplegia catheter was placed in the coronary sinus via the right atrium. The aorta was cross-clamped and 500 mL of cold blood cardioplegia was administered antegrade and an additional 700 mL of cold blood cardioplegia was administered retrograde.  A good arrest was achieved.  Cold topical saline and slush was applied to the heart intermittently during the period of aortic cross-clamp.      Attention was first turned to the right posterior descending coronary artery.  It was dissected out for a distance of 1 cm and then opened for a distance of 8 mm.  It was bypassed in an end to side fashion using reversed saphenous vein graft and running 7-0 Prolene.  The vein graft was flushed with cold blood cardioplegia and sized to the ascending aorta.  The patient received 500 mL of cold blood cardioplegia in a retrograde fashion. Next attention was turned to the all of the obtuse marginals coronary artery.  They were all dissected out for a distance of 1 cm and none were suitable target vessels.   The patient received another 500 mL of cold blood cardioplegia in a retrograde fashion.   Next attention was turned to the left anterior descending diagonal branch coronary artery.  It was dissected out for a distance of 1 cm and then opened for a distance of 8 mm.  It was bypassed in an end to side fashion using reversed saphenous vein graft and running 7-0 Prolene. The vein graft was flushed with cold blood cardioplegia and sized to the ascending aorta and the patient received another 500 mL of cold  blood cardioplegia in a retrograde fashion.  The left atrial appendage sized to a 50 mm Flex V AtriClip.  This was placed at the base of the left atrial appendage and secured.  Finally attention was turned to the left anterior descending coronary artery in the interventricular groove.  It was dissected out in its apical third for a distance of 1 cm and then opened for a distance of 8 mm.  A pleural rent was created for passage of the left internal mammary artery and then the final distal anastomosis was performed between the left internal mammary artery and the left anterior descending coronary artery in an end to side fashion using running 7-0 Prolene. As this last distal anastomosis was being performed gentle warming was begun.  The gray occluder was briefly released from the left internal mammary artery and good flow was seen down the left anterior descending coronary artery.  The gray occluder was once more applied to the left internal mammary artery and the patient received a final dose of cold blood cardioplegia in a retrograde fashion.  It had been decided to perform the 2 proximal anastomoses with the aortic cross-clamp in place. Therefore 2, 4 mm punch aortotomies were created.  The proximal aortosaphenous anastomoses were performed in an end to side fashion using running 6-0 Prolene.     The gray occluder was released from the left internal mammary artery and a hot shot was delivered in a retrograde fashion.  The aortic cross-clamp was released. The aortic cross-clamp time was 1 hour and 14 minutes.  The proximal and distal anastomoses were examined and found to be hemostatic.  He required defibrillation once.  Ventricular and atrial pacing wires were applied and the patient was A-V sequentially paced at a rate of 90 and then just atrially paced.   The retrograde cardioplegia catheter was removed and that site repaired with two sets of 4-0 Prolene.  The left and right pleural spaces were drained of any  accumulated blood and gentle ventilation resumed. The root vent was removed and that site repaired with plegetted 4-0 Prolene.      The patient was placed on low dose epinephrine and neosynephrine.  When he was warm the heart was allowed to fill and eject and the patient  from cardiopulmonary bypass without difficulty. Total time on cardiopulmonary bypass was 1 hour and 31 minutes.  Protamine was administered to reverse the heparin.  The patient was decannulated and the cannulation sites were repaired with 4-0 Prolene.  Hemostasis was satisfactory.    The drains were placed and secured to the skin. The sternum was approximated with a combination of single and double stainless steel sternal wires.   The sternal wound was irrigated with warm antibiotic-containing solution.  It was closed in 5 layers using 0 Stratafix for 2 layers, 2-0 Stratafix for the next two layers and a subcuticular stitch of 4-0 Monocryl.      The sponge, needle and instrument counts were reported as correct.      The estimated blood loss was 350 mL.      Floyd Capps was brought to the Intensive Care Unit in critical but stable condition.    Complications: None     Haydee Boateng MD on 7/30/2025 at 12:34 PM

## 2025-07-30 NOTE — ANESTHESIA PROCEDURE NOTES
Airway       Patient location during procedure: OR       Procedure Start/Stop Times: 7/30/2025 7:36 AM  Staff -        CRNA: Porfirio Viveros APRN CRNA       Performed By: CRNAIndications and Patient Condition       Indications for airway management: perla-procedural       Induction type:intravenous       Mask difficulty assessment: 1 - vent by mask    Final Airway Details       Final airway type: endotracheal airway       Successful airway: ETT - single  Endotracheal Airway Details        ETT size (mm): 8.0       Cuffed: yes       Successful intubation technique: video laryngoscopy       VL Blade Size: Glidescope 3       Grade View of Cords: 1       Adjucts: stylet       Position: Right       Measured from: gums/teeth       Secured at (cm): 23       Bite block used: None    Post intubation assessment        Placement verified by: capnometry, equal breath sounds and chest rise        Number of attempts at approach: 1       Number of other approaches attempted: 0       Secured with: tape       Ease of procedure: easy       Dentition: Intact and Unchanged       Dental guard used and removed. Dental Guard Type: Standard White.    Medication(s) Administered   Medication Administration Time: 7/30/2025 7:36 AM

## 2025-07-30 NOTE — PROGRESS NOTES
RT PROGRESS NOTE    VENT DAY # 1    CURRENT SETTINGS:  FiO2 (%): 80 %, Resp: 18, Vent Mode: VC/AC, Resp Rate (Set): 18 breaths/min, Tidal Volume (Set, mL): 550 mL, PEEP (cm H2O): 8 cmH2O, Resp Rate (Set): 18 breaths/min, Tidal Volume (Set, mL): 550 mL, PEEP (cm H2O): 8 cmH2O      ETT SIZE 8.0 Secured at 23 cm at teeth/gums    NOTE / SHIFT SUMMARY:       Pt has been received from OR around 12:50 pm and placed on vent with above mentioned settings. Weaning trial will be initiated when appropriate.     Omid Simon, RRT

## 2025-07-30 NOTE — PHARMACY-ADMISSION MEDICATION HISTORY
Pharmacist Admission Medication History    Admission medication history is complete. The information provided in this note is only as accurate as the sources available at the time of the update.    Information Source(s): MAR     Pertinent Information: Patient usually takes 55 units of Lantus at bedtime, but took 44 units last night (a reduced dose) [facility] [place].     Changes made to PTA medication list:  Added: None  Deleted: None  Changed: Bumex 2 mg to 1 mg     Allergies reviewed with patient and updates made in EHR: yes    Medication History Completed By: Jayme Harper RPH 7/30/2025 7:26 AM    PTA Med List   Medication Sig Last Dose/Taking    acetaminophen (TYLENOL) 500 MG tablet Take 2 tablets (1,000 mg) by mouth every 8 hours as needed for mild pain Taking As Needed    aspirin 81 MG EC tablet Take 1 tablet (81 mg) by mouth daily. 7/29/2025 Morning    atorvastatin (LIPITOR) 80 MG tablet Take 1 tablet (80 mg) by mouth daily Past Week Evening    bumetanide (BUMEX) 1 MG tablet Take 1 mg by mouth daily. Past Week Morning    carvedilol (COREG) 12.5 MG tablet Take 12.5 mg by mouth 2 times daily (with meals). 7/30/2025 at  4:00 AM    clopidogrel (PLAVIX) 75 MG tablet Take 1 tablet (75 mg) by mouth daily. Past Month Morning    Continuous Glucose Sensor (FREESTYLE LAMINE 2 SENSOR) [facility] Inject 1 each subcutaneously every 14 days Use 1 sensor every 14 days. Use to read blood sugars per 's instructions. Taking    cyanocobalamin (VITAMIN B-12) 1000 MCG tablet Take 1,000 mcg by mouth daily. 7/29/2025 Morning    empagliflozin (JARDIANCE) 10 MG TABS tablet Take 1 tablet (10 mg) by mouth daily. Past Week Morning    insulin aspart (NOVOLOG FLEXPEN) 100 UNIT/ML pen Inject subcutaneously See Admin Instructions. Inject as directed per sliding scale:  For blood sugar 150-200 = 2 units  201-250 = 4 units  251-300 = 6 units  301-350 = 8 units  351-400 = 10 units  401+ = 12  units and call MD 7/29/2025 Morning    insulin glargine (LANTUS PEN) 100 UNIT/ML pen Inject 55 Units subcutaneously at bedtime. 7/29/2025 Bedtime    isosorbide mononitrate (IMDUR) 30 MG 24 hr tablet Take 1 tablet (30 mg) by mouth daily. 7/29/2025 Morning    loperamide (IMODIUM A-D) 2 MG tablet Take 2 mg by mouth 4 times daily as needed for diarrhea. More than a month    [Paused] metFORMIN (GLUCOPHAGE XR) 500 MG 24 hr tablet Take 1 tablet (500 mg) by mouth daily (with dinner). 7/28/2025 Evening    multivitamin w/minerals (THERA-VIT-M) tablet Take 1 tablet by mouth daily Past Month Morning    NOVOLOG FLEXPEN 100 UNIT/ML soln Inject 20 Units subcutaneously 3 times daily (with meals). 7/29/2025 Morning    sacubitril-valsartan (ENTRESTO) 24-26 MG per tablet Take 1 tablet by mouth 2 times daily. 7/29/2025 Evening

## 2025-07-30 NOTE — ANESTHESIA PROCEDURE NOTES
Central Line/PA Catheter Placement    Pre-Procedure   Staff -        Anesthesiologist:  Fannie Blas MD       Performed By: anesthesiologist       Location: OR       Pre-Anesthestic Checklist: patient identified, IV checked, site marked, risks and benefits discussed, informed consent, monitors and equipment checked, pre-op evaluation and at physician/surgeon's request  Timeout:       Correct Patient: Yes        Correct Procedure: Yes        Correct Site: Yes        Correct Position: Yes        Correct Laterality: Yes   Line Placement:   This line was placed Post Induction starting at 7/30/2025 7:38 AM and ending at 7/30/2025 7:49 AM    Procedure   Procedure: central line       Laterality: right       Insertion Site: internal jugular.       Patient Position: Trendelenburg  Sterile Prep        All elements of maximal sterile barrier technique followed       Patient Prep/Sterile Barriers: draped, hand hygiene, gloves , hat , mask , draped, gown, sterile gel and probe cover       Skin prep: Chloraprep  Insertion/Injection        Technique: ultrasound guided and Seldinger Technique        1. Ultrasound was used to evaluate the access site.       2. Vein evaluated via ultrasound for patency/adequacy.       3. Using real-time ultrasound the needle/catheter was observed entering the artery/vein.       4. Permanent image was captured and entered into the patient's record.       5. The visualized structures were anatomically normal.       6. There were no apparent abnormal pathologic findings.       Introducer Type: 9 Fr, 2-lumen MAC        Type: PA/CVC with Introducer       PA Catheter Type: CCO         Appropriate RV, RA and PA waveforms noted:  Yes            Withdrawn and Locked at cm: 49            Balloon down at end of the procedure:   Narrative         Secured by: suture       Tegaderm dressing used.       Complications: None apparent,        blood aspirated from all lumens,        Verification method: Placement  to be verified post-op

## 2025-07-30 NOTE — PROGRESS NOTES
Multiple apnea spells happened during first ventilator wean, which lasted for ~10 minutes. Second SBT via PS 5 cmH20, PEEP 8 cmH20, and fiO2 35% completed for a total of 45 minutes. Patient was extubated at 1844 to 5 lpm nasal cannula with oxygen saturation in the low 90s. BBS diminished. Strong, dry cough. Plan for BiPAP at night as indicated.

## 2025-07-30 NOTE — ANESTHESIA CARE TRANSFER NOTE
Patient: Floyd Capps    Procedure: Procedure(s):  CORONARY ARTERY BYPASS GRAFT TIMES THREE, WITH LEFT LEG ENDOSCOPIC VESSEL PROCUREMENT, LEFT INTERNAL MAMMARY ARTERY HARVEST, CLIPPING OF LEFT ATRIAL APPENDAGE  ECHOCARDIOGRAM, TRANSESOPHAGEAL, INTRAOPERATIVE, EPIAORTIC ULTRASOUND       Diagnosis: CAD (coronary artery disease) [I25.10]  Diagnosis Additional Information: No value filed.    Anesthesia Type:   General     Note:    Oropharynx: endotracheal tube in place  Level of Consciousness: iatrogenic sedation      Independent Airway: airway patency satisfactory and stable  Dentition: dentition unchanged  Vital Signs Stable: post-procedure vital signs reviewed and stable  Report to RN Given: handoff report given  Patient transferred to: ICU    ICU Handoff: Call for PAUSE to initiate/utilize ICU HANDOFF, Identified Patient, Identified Responsible Provider, Reviewed the Pertinent Medical History, Discussed Surgical Course, Reviewed Intra-OP Anesthesia Management and Issues during Anesthesia, Set Expectations for Post Procedure Period and Allowed Opportunity for Questions and Acknowledgement of Understanding      Vitals:  Vitals Value Taken Time   /70    Temp 36.8    Pulse 89 07/30/25 12:56   Resp 18 07/30/25 12:48   SpO2 99 % 07/30/25 12:56   Vitals shown include unfiled device data.    Electronically Signed By: CHAMP Rod CRNA  July 30, 2025  12:58 PM

## 2025-07-30 NOTE — ANESTHESIA PREPROCEDURE EVALUATION
Anesthesia Pre-Procedure Evaluation    Patient: Floyd Capps   MRN: [id number] : 1975          Procedure : Procedure(s):  CORONARY ARTERY BYPASS GRAFT, WITH ENDOSCOPIC VESSEL PROCUREMENT, INTERNAL MAMMARY ARTERY HARVEST  ECHOCARDIOGRAM, TRANSESOPHAGEAL, INTRAOPERATIVE  POSSIBLE LEFT RADIAL ARTERY HARVEST         Past Medical History:   Diagnosis Date     Acute hypoxemic respiratory failure (H)      Acute renal failure, unspecified acute renal failure type      CAD (coronary artery disease)      Callus of foot      Diabetes (H)      Diarrhea      Essential hypertension      Fracture of left distal radius      History of stroke      Insomnia      Nausea      Non-pressure chronic ulcer of right heel and midfoot with unspecified severity (H)      Normocytic anemia      Type 2 diabetes mellitus with foot ulcer (H)       Past Surgical History:   Procedure Laterality Date     APPENDECTOMY       CV CORONARY ANGIOGRAM N/A 3/1/2024    Procedure: CV CORONARY ANGIOGRAM;  Surgeon: Stephen Berger MD;  Location: [facility] CV     CV CORONARY ANGIOGRAM N/A 6/3/2025    Procedure: Coronary Angiogram;  Surgeon: Stephen Berger MD;  Location: [facility] LAB CV     CV LEFT HEART CATH N/A 3/1/2024    Procedure: Left Heart Catheterization;  Surgeon: Stephen Berger MD;  Location: [facility] CV     CV LEFT HEART CATH N/A 6/3/2025    Procedure: Left Heart Catheterization;  Surgeon: Stephen Berger MD;  Location: [facility] CV     EXCISE EXOSTOSIS FOOT Right 2024    Procedure: excision of bone from calcaneus with application of theraskin;  Surgeon: Dong Sanders DPM;  [place] [facility] [place]     INCISION AND DRAINAGE FOOT, COMBINED Right 2024    Procedure: INCISION AND DRAINAGE, right heel with;  Surgeon: Dong Sanders DPM;  [place] [facility] [place]     INCISION AND DRAINAGE OF WOUND      groin abscess     IRRIGATION AND DEBRIDEMENT FOOT, COMBINED Right 2024     Procedure: IRRIGATION AND DEBRIDEMENT RIGHT FOOT;  Surgeon: Cristofer Sims DPM;  Location: [facility] OR     PICC DOUBLE LUMEN PLACEMENT  2/29/2024      No Known Allergies   Social History     Tobacco Use     Smoking status: Former     Current packs/day: 0.50     Types: Cigarettes     Passive exposure: Never     Smokeless tobacco: Never   Substance Use Topics     Alcohol use: Yes     Comment: once a week      Wt Readings from Last 1 Encounters:   07/30/25 (!) 140.6 kg (310 lb)        Anesthesia Evaluation   Pt has had prior anesthetic. Type: General and MAC.    No history of anesthetic complications       ROS/MED HX  ENT/Pulmonary:     (+)                tobacco use, Past use,                       Neurologic:     (+)          CVA,                      Cardiovascular: Comment: Echo  TDS. Limited views seen due to patient body habitus  The left ventricle is borderline dilated. The visual ejection fraction is 40-  45%.  There is moderate global hypokinesia of the left ventricle. Regional wall  motion abnormalities cannot be excluded due to limited visualization.  No significant valve disease.      (+)  hypertension- -  CAD -  - -      CHF                                METS/Exercise Tolerance:     Hematologic:       Musculoskeletal:       GI/Hepatic:    (-) GERD   Renal/Genitourinary:     (+) renal disease,             Endo:     (+)  type II DM,             Obesity,       Psychiatric/Substance Use:     (+)     Recreational drug usage: Cannabis.    Infectious Disease:       Malignancy:       Other:              Physical Exam  Airway  Mallampati: II  TM distance: >3 FB  Neck ROM: full    Cardiovascular   Rhythm: regular   Comments: Echo  TDS. Limited views seen due to patient body habitus  The left ventricle is borderline dilated. The visual ejection fraction is 40-  45%.  There is moderate global hypokinesia of the left ventricle. Regional wall  motion abnormalities cannot be excluded due to limited  "visualization.  No significant valve disease.    Dental   (+) Multiple visibly decayed, broken teeth      Pulmonary Breath sounds clear to auscultation        Neurological   He appears awake and alert.    Other Findings       OUTSIDE LABS:  CBC:   Lab Results   Component Value Date    WBC 13.4 (H) 07/22/2025    WBC 12.1 (H) 07/07/2025    HGB 12.3 (L) 07/22/2025    HGB 12.6 (L) 07/07/2025    HCT 36.4 (L) 07/22/2025    HCT 37.5 (L) 07/07/2025     07/22/2025     07/07/2025     BMP:   Lab Results   Component Value Date     07/22/2025     07/07/2025    POTASSIUM 4.7 07/22/2025    POTASSIUM 4.7 07/07/2025    CHLORIDE 101 07/22/2025    CHLORIDE 102 07/07/2025    CO2 23 07/22/2025    CO2 22 07/07/2025    BUN 29.4 (H) 07/22/2025    BUN 30.8 (H) 07/07/2025    CR 1.39 (H) 07/22/2025    CR 1.31 (H) 07/07/2025     (H) 07/30/2025     (H) 07/22/2025     COAGS:   Lab Results   Component Value Date    PTT 26 07/22/2025    INR 1.04 07/22/2025     POC: No results found for: \"BGM\", \"HCG\", \"HCGS\"  HEPATIC:   Lab Results   Component Value Date    ALBUMIN 3.3 (L) 07/22/2025    PROTTOTAL 7.0 07/22/2025    ALT 19 07/22/2025    AST 15 07/22/2025    ALKPHOS 116 07/22/2025    BILITOTAL 0.4 07/22/2025     OTHER:   Lab Results   Component Value Date    A1C 11.3 (H) 05/28/2025    SARAH 9.3 07/22/2025    PHOS 3.6 07/07/2025    MAG 2.4 (H) 07/22/2025    LIPASE 16 02/27/2024    TSH 2.72 08/23/2023    SED 99 (H) 02/14/2024       Anesthesia Plan    ASA Status:  4      NPO Status: NPO Appropriate   Anesthesia Type: General.  Airway: oral.  Induction: intravenous.  Maintenance: Balanced.   Techniques and Equipment:     - Airway:  Planned airway equipment includes video laryngoscope.     - Monitoring Plan: standard ASA monitoring, train of four monitoring, GAIL, pulmonary artery catheter, cerebral oximetry, arterial line kit, central line kit     Consents    Anesthesia Plan(s) and associated risks, benefits, and " "realistic alternatives discussed. Questions answered and patient/representative(s) expressed understanding.     - Discussed:     - Discussed with:  Patient        - Pt is DNR/DNI Status: no DNR     Blood Consent:      - Discussed with: patient.     - Consented: consented to blood products     Postoperative Care    Pain management: multimodal analgesia.     Comments:                 Fannie Blas MD    I have reviewed the pertinent notes and labs in the chart from the past 30 days and (re)examined the patient.  Any updates or changes from those notes are reflected in this note.    Clinically Significant Risk Factors Present on Admission                 # Drug Induced Platelet Defect: home medication list includes an antiplatelet medication   # Hypertension: Noted on problem list  # Heart failure, mildly reduced ejection fraction: home medication list includes sacubitril/valsartan (Entresto) and last echo with EF 40-50%         # DMII: A1C = 11.3 % (Ref range: <5.7 %) within past 6 months    # Morbid Obesity: Estimated body mass index is 44.48 kg/m  as calculated from the following:    Height as of this encounter: 1.778 m (5' 10\").    Weight as of this encounter: 140.6 kg (310 lb).       # Financial/Environmental Concerns:    # Support System: poor social support noted in nursing assessment  # Housing Instability: noted in nursing assessment               "

## 2025-07-31 ENCOUNTER — APPOINTMENT (OUTPATIENT)
Dept: RADIOLOGY | Facility: HOSPITAL | Age: 50
End: 2025-07-31
Attending: PHYSICIAN ASSISTANT
Payer: COMMERCIAL

## 2025-07-31 ENCOUNTER — APPOINTMENT (OUTPATIENT)
Dept: CARDIOLOGY | Facility: HOSPITAL | Age: 50
End: 2025-07-31
Attending: INTERNAL MEDICINE
Payer: COMMERCIAL

## 2025-07-31 ENCOUNTER — APPOINTMENT (OUTPATIENT)
Dept: OCCUPATIONAL THERAPY | Facility: HOSPITAL | Age: 50
End: 2025-07-31
Attending: PHYSICIAN ASSISTANT
Payer: COMMERCIAL

## 2025-07-31 VITALS
OXYGEN SATURATION: 96 % | RESPIRATION RATE: 12 BRPM | HEIGHT: 70 IN | DIASTOLIC BLOOD PRESSURE: 62 MMHG | SYSTOLIC BLOOD PRESSURE: 100 MMHG | HEART RATE: 86 BPM | WEIGHT: 315 LBS | TEMPERATURE: 98 F | BODY MASS INDEX: 45.1 KG/M2

## 2025-07-31 LAB
ALBUMIN SERPL BCG-MCNC: 3.1 G/DL (ref 3.5–5.2)
ALP SERPL-CCNC: 69 U/L (ref 40–150)
ALT SERPL W P-5'-P-CCNC: 12 U/L (ref 0–70)
ANION GAP SERPL CALCULATED.3IONS-SCNC: 8 MMOL/L (ref 7–15)
ANION GAP SERPL CALCULATED.3IONS-SCNC: 9 MMOL/L (ref 7–15)
AST SERPL W P-5'-P-CCNC: 34 U/L (ref 0–45)
ATRIAL RATE - MUSE: 87 BPM
ATRIAL RATE - MUSE: 96 BPM
BASE EXCESS BLDV CALC-SCNC: -0.9 MMOL/L (ref -3–3)
BILIRUB SERPL-MCNC: 0.4 MG/DL
BUN SERPL-MCNC: 22.6 MG/DL (ref 6–20)
BUN SERPL-MCNC: 24.9 MG/DL (ref 6–20)
CA-I BLD-MCNC: 4.7 MG/DL (ref 4.4–5.2)
CA-I BLD-MCNC: 4.8 MG/DL (ref 4.4–5.2)
CALCIUM SERPL-MCNC: 8.6 MG/DL (ref 8.8–10.4)
CALCIUM SERPL-MCNC: 8.7 MG/DL (ref 8.8–10.4)
CHLORIDE SERPL-SCNC: 106 MMOL/L (ref 98–107)
CHLORIDE SERPL-SCNC: 108 MMOL/L (ref 98–107)
CREAT SERPL-MCNC: 1.23 MG/DL (ref 0.67–1.17)
CREAT SERPL-MCNC: 1.28 MG/DL (ref 0.67–1.17)
DIASTOLIC BLOOD PRESSURE - MUSE: NORMAL MMHG
DIASTOLIC BLOOD PRESSURE - MUSE: NORMAL MMHG
EGFRCR SERPLBLD CKD-EPI 2021: 68 ML/MIN/1.73M2
EGFRCR SERPLBLD CKD-EPI 2021: 71 ML/MIN/1.73M2
ERYTHROCYTE [DISTWIDTH] IN BLOOD BY AUTOMATED COUNT: 13.5 % (ref 10–15)
GLUCOSE BLDC GLUCOMTR-MCNC: 114 MG/DL (ref 70–99)
GLUCOSE BLDC GLUCOMTR-MCNC: 117 MG/DL (ref 70–99)
GLUCOSE BLDC GLUCOMTR-MCNC: 124 MG/DL (ref 70–99)
GLUCOSE BLDC GLUCOMTR-MCNC: 131 MG/DL (ref 70–99)
GLUCOSE BLDC GLUCOMTR-MCNC: 131 MG/DL (ref 70–99)
GLUCOSE BLDC GLUCOMTR-MCNC: 134 MG/DL (ref 70–99)
GLUCOSE BLDC GLUCOMTR-MCNC: 138 MG/DL (ref 70–99)
GLUCOSE BLDC GLUCOMTR-MCNC: 140 MG/DL (ref 70–99)
GLUCOSE BLDC GLUCOMTR-MCNC: 142 MG/DL (ref 70–99)
GLUCOSE BLDC GLUCOMTR-MCNC: 142 MG/DL (ref 70–99)
GLUCOSE BLDC GLUCOMTR-MCNC: 145 MG/DL (ref 70–99)
GLUCOSE BLDC GLUCOMTR-MCNC: 151 MG/DL (ref 70–99)
GLUCOSE BLDC GLUCOMTR-MCNC: 154 MG/DL (ref 70–99)
GLUCOSE BLDC GLUCOMTR-MCNC: 155 MG/DL (ref 70–99)
GLUCOSE BLDC GLUCOMTR-MCNC: 157 MG/DL (ref 70–99)
GLUCOSE BLDC GLUCOMTR-MCNC: 160 MG/DL (ref 70–99)
GLUCOSE BLDC GLUCOMTR-MCNC: 164 MG/DL (ref 70–99)
GLUCOSE SERPL-MCNC: 143 MG/DL (ref 70–99)
GLUCOSE SERPL-MCNC: 152 MG/DL (ref 70–99)
HCO3 BLDV-SCNC: 25 MMOL/L (ref 21–28)
HCO3 SERPL-SCNC: 23 MMOL/L (ref 22–29)
HCO3 SERPL-SCNC: 24 MMOL/L (ref 22–29)
HCT VFR BLD AUTO: 31.2 % (ref 40–53)
HGB BLD-MCNC: 10.3 G/DL (ref 13.3–17.7)
INTERPRETATION ECG - MUSE: NORMAL
INTERPRETATION ECG - MUSE: NORMAL
LVEF ECHO: NORMAL
MAGNESIUM SERPL-MCNC: 2.2 MG/DL (ref 1.7–2.3)
MAGNESIUM SERPL-MCNC: 2.3 MG/DL (ref 1.7–2.3)
MCH RBC QN AUTO: 30.1 PG (ref 26.5–33)
MCHC RBC AUTO-ENTMCNC: 33 G/DL (ref 31.5–36.5)
MCV RBC AUTO: 91 FL (ref 78–100)
O2/TOTAL GAS SETTING VFR VENT: 25 %
OXYHGB MFR BLDV: 61 % (ref 70–75)
P AXIS - MUSE: 30 DEGREES
P AXIS - MUSE: 69 DEGREES
PCO2 BLDV: 47 MM HG (ref 40–50)
PH BLDV: 7.34 [PH] (ref 7.32–7.43)
PHOSPHATE SERPL-MCNC: 3.7 MG/DL (ref 2.5–4.5)
PHOSPHATE SERPL-MCNC: 4.8 MG/DL (ref 2.5–4.5)
PLATELET # BLD AUTO: 207 10E3/UL (ref 150–450)
PO2 BLDV: 34 MM HG (ref 25–47)
POTASSIUM SERPL-SCNC: 4.3 MMOL/L (ref 3.4–5.3)
POTASSIUM SERPL-SCNC: 4.4 MMOL/L (ref 3.4–5.3)
POTASSIUM SERPL-SCNC: 4.5 MMOL/L (ref 3.4–5.3)
PR INTERVAL - MUSE: 152 MS
PR INTERVAL - MUSE: 158 MS
PROT SERPL-MCNC: 5.5 G/DL (ref 6.4–8.3)
QRS DURATION - MUSE: 106 MS
QRS DURATION - MUSE: 110 MS
QT - MUSE: 398 MS
QT - MUSE: 418 MS
QTC - MUSE: 502 MS
QTC - MUSE: 502 MS
R AXIS - MUSE: 39 DEGREES
R AXIS - MUSE: 72 DEGREES
RBC # BLD AUTO: 3.42 10E6/UL (ref 4.4–5.9)
SAO2 % BLDV: 62.6 % (ref 70–75)
SODIUM SERPL-SCNC: 138 MMOL/L (ref 135–145)
SODIUM SERPL-SCNC: 140 MMOL/L (ref 135–145)
SYSTOLIC BLOOD PRESSURE - MUSE: NORMAL MMHG
SYSTOLIC BLOOD PRESSURE - MUSE: NORMAL MMHG
T AXIS - MUSE: 18 DEGREES
T AXIS - MUSE: 35 DEGREES
TROPONIN T SERPL HS-MCNC: 389 NG/L
VENTRICULAR RATE- MUSE: 87 BPM
VENTRICULAR RATE- MUSE: 96 BPM
WBC # BLD AUTO: 22.8 10E3/UL (ref 4–11)

## 2025-07-31 PROCEDURE — 93010 ELECTROCARDIOGRAM REPORT: CPT | Mod: 76 | Performed by: STUDENT IN AN ORGANIZED HEALTH CARE EDUCATION/TRAINING PROGRAM

## 2025-07-31 PROCEDURE — 93005 ELECTROCARDIOGRAM TRACING: CPT | Performed by: PHYSICIAN ASSISTANT

## 2025-07-31 PROCEDURE — 97535 SELF CARE MNGMENT TRAINING: CPT | Mod: GO

## 2025-07-31 PROCEDURE — 250N000011 HC RX IP 250 OP 636: Mod: JZ | Performed by: THORACIC SURGERY (CARDIOTHORACIC VASCULAR SURGERY)

## 2025-07-31 PROCEDURE — 250N000013 HC RX MED GY IP 250 OP 250 PS 637: Performed by: PHYSICIAN ASSISTANT

## 2025-07-31 PROCEDURE — 82310 ASSAY OF CALCIUM: CPT | Performed by: PHYSICIAN ASSISTANT

## 2025-07-31 PROCEDURE — 250N000009 HC RX 250: Performed by: PHYSICIAN ASSISTANT

## 2025-07-31 PROCEDURE — 255N000002 HC RX 255 OP 636: Performed by: INTERNAL MEDICINE

## 2025-07-31 PROCEDURE — 83735 ASSAY OF MAGNESIUM: CPT | Performed by: THORACIC SURGERY (CARDIOTHORACIC VASCULAR SURGERY)

## 2025-07-31 PROCEDURE — 85014 HEMATOCRIT: CPT | Performed by: PHYSICIAN ASSISTANT

## 2025-07-31 PROCEDURE — 999N000156 HC STATISTIC RCP CONSULT EA 30 MIN

## 2025-07-31 PROCEDURE — 258N000003 HC RX IP 258 OP 636: Performed by: THORACIC SURGERY (CARDIOTHORACIC VASCULAR SURGERY)

## 2025-07-31 PROCEDURE — 82247 BILIRUBIN TOTAL: CPT | Performed by: PHYSICIAN ASSISTANT

## 2025-07-31 PROCEDURE — 97165 OT EVAL LOW COMPLEX 30 MIN: CPT | Mod: GO

## 2025-07-31 PROCEDURE — 82330 ASSAY OF CALCIUM: CPT | Performed by: THORACIC SURGERY (CARDIOTHORACIC VASCULAR SURGERY)

## 2025-07-31 PROCEDURE — G0463 HOSPITAL OUTPT CLINIC VISIT: HCPCS

## 2025-07-31 PROCEDURE — 250N000011 HC RX IP 250 OP 636: Performed by: THORACIC SURGERY (CARDIOTHORACIC VASCULAR SURGERY)

## 2025-07-31 PROCEDURE — 999N000157 HC STATISTIC RCP TIME EA 10 MIN

## 2025-07-31 PROCEDURE — 84100 ASSAY OF PHOSPHORUS: CPT | Performed by: THORACIC SURGERY (CARDIOTHORACIC VASCULAR SURGERY)

## 2025-07-31 PROCEDURE — 93010 ELECTROCARDIOGRAM REPORT: CPT | Performed by: STUDENT IN AN ORGANIZED HEALTH CARE EDUCATION/TRAINING PROGRAM

## 2025-07-31 PROCEDURE — 94799 UNLISTED PULMONARY SVC/PX: CPT

## 2025-07-31 PROCEDURE — 84100 ASSAY OF PHOSPHORUS: CPT | Performed by: PHYSICIAN ASSISTANT

## 2025-07-31 PROCEDURE — 250N000011 HC RX IP 250 OP 636: Performed by: PHYSICIAN ASSISTANT

## 2025-07-31 PROCEDURE — 200N000001 HC R&B ICU

## 2025-07-31 PROCEDURE — 82330 ASSAY OF CALCIUM: CPT | Performed by: PHYSICIAN ASSISTANT

## 2025-07-31 PROCEDURE — 82805 BLOOD GASES W/O2 SATURATION: CPT | Performed by: PHYSICIAN ASSISTANT

## 2025-07-31 PROCEDURE — 71045 X-RAY EXAM CHEST 1 VIEW: CPT

## 2025-07-31 PROCEDURE — 83735 ASSAY OF MAGNESIUM: CPT | Performed by: PHYSICIAN ASSISTANT

## 2025-07-31 PROCEDURE — 93321 DOPPLER ECHO F-UP/LMTD STD: CPT

## 2025-07-31 PROCEDURE — 84132 ASSAY OF SERUM POTASSIUM: CPT | Performed by: THORACIC SURGERY (CARDIOTHORACIC VASCULAR SURGERY)

## 2025-07-31 PROCEDURE — 84484 ASSAY OF TROPONIN QUANT: CPT | Performed by: THORACIC SURGERY (CARDIOTHORACIC VASCULAR SURGERY)

## 2025-07-31 PROCEDURE — 93005 ELECTROCARDIOGRAM TRACING: CPT

## 2025-07-31 PROCEDURE — 94660 CPAP INITIATION&MGMT: CPT

## 2025-07-31 PROCEDURE — 93005 ELECTROCARDIOGRAM TRACING: CPT | Performed by: THORACIC SURGERY (CARDIOTHORACIC VASCULAR SURGERY)

## 2025-07-31 PROCEDURE — 99232 SBSQ HOSP IP/OBS MODERATE 35: CPT | Performed by: INTERNAL MEDICINE

## 2025-07-31 PROCEDURE — P9045 ALBUMIN (HUMAN), 5%, 250 ML: HCPCS | Mod: JZ | Performed by: THORACIC SURGERY (CARDIOTHORACIC VASCULAR SURGERY)

## 2025-07-31 RX ORDER — NITROGLYCERIN 0.4 MG/1
0.4 TABLET SUBLINGUAL EVERY 5 MIN PRN
Status: ACTIVE | OUTPATIENT
Start: 2025-07-31

## 2025-07-31 RX ORDER — KETOROLAC TROMETHAMINE 15 MG/ML
15 INJECTION, SOLUTION INTRAMUSCULAR; INTRAVENOUS EVERY 6 HOURS PRN
Status: DISPENSED | OUTPATIENT
Start: 2025-07-31

## 2025-07-31 RX ORDER — METHOCARBAMOL 500 MG/1
500 TABLET, FILM COATED ORAL 4 TIMES DAILY PRN
Status: DISPENSED | OUTPATIENT
Start: 2025-07-31

## 2025-07-31 RX ORDER — FUROSEMIDE 10 MG/ML
40 INJECTION INTRAMUSCULAR; INTRAVENOUS ONCE
Status: COMPLETED | OUTPATIENT
Start: 2025-07-31 | End: 2025-07-31

## 2025-07-31 RX ADMIN — METHOCARBAMOL 500 MG: 500 TABLET ORAL at 11:26

## 2025-07-31 RX ADMIN — INSULIN HUMAN 2 UNITS/HR: 1 INJECTION, SOLUTION INTRAVENOUS at 14:23

## 2025-07-31 RX ADMIN — OXYCODONE HYDROCHLORIDE 10 MG: 5 TABLET ORAL at 04:03

## 2025-07-31 RX ADMIN — ONDANSETRON 4 MG: 2 INJECTION, SOLUTION INTRAMUSCULAR; INTRAVENOUS at 09:46

## 2025-07-31 RX ADMIN — HYDROMORPHONE HYDROCHLORIDE 0.2 MG: 0.2 INJECTION, SOLUTION INTRAMUSCULAR; INTRAVENOUS; SUBCUTANEOUS at 13:25

## 2025-07-31 RX ADMIN — LIDOCAINE 1 PATCH: 4 PATCH TOPICAL at 18:26

## 2025-07-31 RX ADMIN — EPINEPHRINE 0.03 MCG/KG/MIN: 1 INJECTION INTRAMUSCULAR; INTRAVENOUS; SUBCUTANEOUS at 07:18

## 2025-07-31 RX ADMIN — ATORVASTATIN CALCIUM 80 MG: 40 TABLET, FILM COATED ORAL at 20:28

## 2025-07-31 RX ADMIN — ALBUMIN HUMAN 12.5 G: 0.05 INJECTION, SOLUTION INTRAVENOUS at 08:09

## 2025-07-31 RX ADMIN — SENNOSIDES, DOCUSATE SODIUM 1 TABLET: 50; 8.6 TABLET, FILM COATED ORAL at 20:28

## 2025-07-31 RX ADMIN — ACETAMINOPHEN 975 MG: 325 TABLET ORAL at 11:26

## 2025-07-31 RX ADMIN — SENNOSIDES, DOCUSATE SODIUM 1 TABLET: 50; 8.6 TABLET, FILM COATED ORAL at 08:05

## 2025-07-31 RX ADMIN — POLYETHYLENE GLYCOL 3350 17 G: 17 POWDER, FOR SOLUTION ORAL at 08:05

## 2025-07-31 RX ADMIN — PANTOPRAZOLE SODIUM 40 MG: 40 TABLET, DELAYED RELEASE ORAL at 08:05

## 2025-07-31 RX ADMIN — HEPARIN SODIUM 5000 UNITS: 10000 INJECTION, SOLUTION INTRAVENOUS; SUBCUTANEOUS at 20:28

## 2025-07-31 RX ADMIN — CEFAZOLIN SODIUM 2 G: 2 SOLUTION INTRAVENOUS at 07:30

## 2025-07-31 RX ADMIN — PERFLUTREN 3 ML (DILUTED): 6.52 INJECTION, SUSPENSION INTRAVENOUS at 22:40

## 2025-07-31 RX ADMIN — PROCHLORPERAZINE EDISYLATE 10 MG: 5 INJECTION INTRAMUSCULAR; INTRAVENOUS at 07:29

## 2025-07-31 RX ADMIN — ONDANSETRON 4 MG: 2 INJECTION, SOLUTION INTRAMUSCULAR; INTRAVENOUS at 04:11

## 2025-07-31 RX ADMIN — HYDROMORPHONE HYDROCHLORIDE 0.4 MG: 0.2 INJECTION, SOLUTION INTRAMUSCULAR; INTRAVENOUS; SUBCUTANEOUS at 20:27

## 2025-07-31 RX ADMIN — ACETAMINOPHEN 975 MG: 325 TABLET ORAL at 20:27

## 2025-07-31 RX ADMIN — Medication 1 TABLET: at 08:05

## 2025-07-31 RX ADMIN — HEPARIN SODIUM 5000 UNITS: 10000 INJECTION, SOLUTION INTRAVENOUS; SUBCUTANEOUS at 11:26

## 2025-07-31 RX ADMIN — FUROSEMIDE 40 MG: 10 INJECTION, SOLUTION INTRAVENOUS at 16:45

## 2025-07-31 RX ADMIN — Medication 1000 MCG: at 08:05

## 2025-07-31 RX ADMIN — ACETAMINOPHEN 975 MG: 325 TABLET ORAL at 04:04

## 2025-07-31 RX ADMIN — KETOROLAC TROMETHAMINE 15 MG: 15 INJECTION, SOLUTION INTRAMUSCULAR; INTRAVENOUS at 21:25

## 2025-07-31 RX ADMIN — ASPIRIN 81 MG CHEWABLE TABLET 162 MG: 81 TABLET CHEWABLE at 08:04

## 2025-07-31 RX ADMIN — CEFAZOLIN SODIUM 2 G: 2 SOLUTION INTRAVENOUS at 00:13

## 2025-07-31 ASSESSMENT — ACTIVITIES OF DAILY LIVING (ADL)
ADLS_ACUITY_SCORE: 53
ADLS_ACUITY_SCORE: 49
ADLS_ACUITY_SCORE: 45
ADLS_ACUITY_SCORE: 53
ADLS_ACUITY_SCORE: 49
ADLS_ACUITY_SCORE: 53
ADLS_ACUITY_SCORE: 45
DEPENDENT_IADLS:: CLEANING;COOKING;LAUNDRY;SHOPPING;MEAL PREPARATION;MEDICATION MANAGEMENT;TRANSPORTATION
ADLS_ACUITY_SCORE: 53
ADLS_ACUITY_SCORE: 53
ADLS_ACUITY_SCORE: 49
ADLS_ACUITY_SCORE: 45
ADLS_ACUITY_SCORE: 49
ADLS_ACUITY_SCORE: 53
ADLS_ACUITY_SCORE: 49
ADLS_ACUITY_SCORE: 53
ADLS_ACUITY_SCORE: 49
ADLS_ACUITY_SCORE: 45

## 2025-07-31 NOTE — CONSULTS
Care Management Initial Consult    General Information  Assessment completed with: Patient,    Type of CM/SW Visit: Initial Assessment    Primary Care Provider verified and updated as needed: Yes   Readmission within the last 30 days: no previous admission in last 30 days         Advance Care Planning: Advance Care Planning Reviewed:  (no HCD)          Communication Assessment  Patient's communication style: spoken language (English or Bilingual)    Hearing Difficulty or Deaf: no   Wear Glasses or Blind: no    Cognitive  Cognitive/Neuro/Behavioral: .WDL except, mood/behavior  Level of Consciousness: alert  Arousal Level: opens eyes spontaneously  Orientation: oriented x 4  Mood/Behavior: calm, cooperative  Best Language: 0 - No aphasia  Speech: clear, spontaneous, logical    Living Environment:   People in home: facility resident     Current living Arrangements: extended care facility  Name of Facility: [facility]   Able to return to prior arrangements:         Family/Social Support:  Care provided by: self, other (see comments)  Provides care for: no one, unable/limited ability to care for self     Support system: Sibling(s), Facility resident(s)/Staff          Description of Support System: Supportive         Current Resources:   Patient receiving home care services: No        Community Resources: None  Equipment currently used at home: none  Supplies currently used at home: None    Employment/Financial:  Employment Status:          Financial Concerns:             Does the patient's insurance plan have a 3 day qualifying hospital stay waiver?  Verification neede    Lifestyle & Psychosocial Needs:  Social Drivers of Health     Food Insecurity: High Risk (5/29/2025)    Food Insecurity     Within the past 12 months, did you worry that your food would run out before you got money to buy more?: Yes     Within the past 12 months, did the food you bought just not last and you didn t have money to get  more?: No   Depression: At risk (1/30/2025)    PHQ-2     PHQ-2 Score: 6   Housing Stability: High Risk (5/29/2025)    Housing Stability     Do you have housing? : No     Are you worried about losing your housing?: Patient unable to answer   Tobacco Use: Medium Risk (7/30/2025)    Patient History     Smoking Tobacco Use: Former     Smokeless Tobacco Use: Never     Passive Exposure: Never   Financial Resource Strain: High Risk (5/29/2025)    Financial Resource Strain     Within the past 12 months, have you or your family members you live with been unable to get utilities (heat, electricity) when it was really needed?: Yes   Alcohol Use: Not At Risk (4/27/2019)    Received from [employer]    AUDIT-C     Frequency of Alcohol Consumption: Never     Average Number of Drinks: Not on file     Frequency of Binge Drinking: Not on file   Transportation Needs: Low Risk  (5/29/2025)    Transportation Needs     Within the past 12 months, has lack of transportation kept you from medical appointments, getting your medicines, non-medical meetings or appointments, work, or from getting things that you need?: No   Recent Concern: Transportation Needs - High Risk (3/23/2025)    Transportation Needs     Within the past 12 months, has lack of transportation kept you from medical appointments, getting your medicines, non-medical meetings or appointments, work, or from getting things that you need?: Yes   Physical Activity: Not on file   Interpersonal Safety: Low Risk  (7/30/2025)    Interpersonal Safety     Do you feel physically and emotionally safe where you currently live?: Yes     Within the past 12 months, have you been hit, slapped, kicked or otherwise physically hurt by someone?: No     Within the past 12 months, have you been humiliated or emotionally abused in other ways by your partner or ex-partner?: No   Stress: Not on file   Social Connections: Not on file   Health Literacy: Not on file       Functional Status:  Prior to  admission patient needed assistance:   Dependent ADLs:: Bathing  Dependent IADLs:: Cleaning, Cooking, Laundry, Shopping, Meal Preparation, Medication Management, Transportation       Mental Health Status:          Chemical Dependency Status:                Values/Beliefs:  Spiritual, Cultural Beliefs, Hindu Practices, Values that affect care:                 Discussed  Partnership in Safe Discharge Planning  document with patient/family: No    Additional Information:    Assessment completed with patient. Patient comes from [facility] where he has a bed hold. Prior he was homeless.  Patient requires some assist with ADLs and assisted with all IADLs. He states he ambulates without devices. Patient's sister Rubina is primary family contact. [facility] to transport at discharge.    Patient's stated goal is to return to [facility]. Bed hold confirmed.    Primary care coordination hand off completed.    Next Steps:     Follow progression and coordinate return to [facility] at discharge.        Caryl Ervin RN

## 2025-07-31 NOTE — TREATMENT PLAN
"Respiratory Treatment Plan     Patient Score: 14  Patient Acuity: 3    Clinical Indication for Therapy: CVTS    Therapy Ordered:     Broncho-Pulmonary Hygiene: Flutter valve QID - encourage pt to perform 10 reps Q1H while awake    Volume Expansion: Incentive spirometry QID - encourage pt to perform 10 reps Q1H while awake    Verspap QID      History:   -Smoking History: yes, quit a year ago      -ANDIE Hx: recommended for sleep appt, has one set for this fall, has been snoring while here.      Assessment Summary: Patient is POD#1 of CABG x3. Past smoking history but no diagnoiss of asthma or COPD.     Currently on 1-2L nasal canula at rest. Completes pulmonary therapies as directed without issues. Achieves over 1500 on his IS with predicted of 2600. Will continue current treatment plan.     He has x4 chest tubes in place.         Current Oxygen Usage: 1-2L nasal canula  Chest X-Ray 7/31 0550 \"No significant effusion. Right IJ sheath. Remote fracture right clavicle \"      Yuli Barrera, RT  7/31/2025  "

## 2025-07-31 NOTE — ANESTHESIA POSTPROCEDURE EVALUATION
Patient: Floyd Capps    Procedure: Procedure(s):  CORONARY ARTERY BYPASS GRAFT TIMES THREE, WITH LEFT LEG ENDOSCOPIC VESSEL PROCUREMENT, LEFT INTERNAL MAMMARY ARTERY HARVEST, CLIPPING OF LEFT ATRIAL APPENDAGE  ECHOCARDIOGRAM, TRANSESOPHAGEAL, INTRAOPERATIVE, EPIAORTIC ULTRASOUND       Anesthesia Type:  General    Note:  Disposition: ICU            ICU Sign Out: Anesthesiologist/ICU physician sign out WAS performed   Postop Pain Control: Uneventful            Sign Out: Well controlled pain   PONV: No   Neuro/Psych: Uneventful            Sign Out: Acceptable/Baseline neuro status   Airway/Respiratory: Uneventful            Sign Out: Acceptable/Baseline resp. status   CV/Hemodynamics: Uneventful            Sign Out: Acceptable CV status; No obvious hypovolemia; No obvious fluid overload   Other NRE: NONE   DID A NON-ROUTINE EVENT OCCUR? No    Event details/Postop Comments:  Patient seen in ICU on POD1.   Up in chair.   Sleeping, but giving a thumbs up.  No apparent anesthesia complications.           Last vitals:  Vitals:    07/31/25 0630 07/31/25 0645 07/31/25 0700   BP:      Pulse: 97 96 96   Resp: 16 12 14   Temp:      SpO2: 97% 95% 97%       Electronically Signed By: Fannie Blas MD  July 31, 2025  8:02 AM

## 2025-07-31 NOTE — PROGRESS NOTES
Cardiac rehab      07/31/25 1345   Appointment Info   Signing Clinician's Name / Credentials (OT) Danuta Alvarado OTR/L   Living Environment   People in Home alone   Current Living Arrangements other (see comments)  (reporting he has been living at [facility])   Home Accessibility no concerns   Transportation Anticipated family or friend will provide   Living Environment Comments Pt states he has been staying at [facility] prior to hosptialization and plans to return there   Self-Care   Usual Activity Tolerance good   Current Activity Tolerance moderate   Regular Exercise No   Equipment Currently Used at Home none   Fall history within last six months no   Activity/Exercise/Self-Care Comment Ind. w/ ADL routine   Instrumental Activities of Daily Living (IADL)   IADL Comments gets assist w/ IADL tasks   General Information   Onset of Illness/Injury or Date of Surgery 07/30/25   Referring Physician Haydee Boateng MD   Patient/Family Therapy Goal Statement (OT) d/c home   Additional Occupational Profile Info/Pertinent History of Current Problem POD#1 s/p CABG x3   Existing Precautions/Restrictions cardiac;sternal;fall   Cognitive Status Examination   Orientation Status orientation to person, place and time   Range of Motion Comprehensive   Comment, General Range of Motion NT d/t sternal prec.   Bed Mobility   Bed Mobility supine-sit   Supine-Sit Faber (Bed Mobility) moderate assist (50% patient effort);2 person assist   Comment (Bed Mobility) w/ Log roll tech d/t sternal prec.   Transfers   Transfers sit-stand transfer   Sit-Stand Transfer   Sit-Stand Faber (Transfers) minimum assist (75% patient effort);2 person assist   Balance   Balance Assessment sit to stand dynamic balance   Sit-to-Stand Balance minimal assist   Activities of Daily Living   BADL Assessment/Intervention lower body dressing   Lower Body Dressing Assessment/Training   Faber Level (Lower Body Dressing) minimum assist (75% patient effort)    Clinical Impression   Criteria for Skilled Therapeutic Interventions Met (OT) Yes, treatment indicated   OT Diagnosis decreased act. tolerance   Influenced by the following impairments s/p CABG   OT Problem List-Impairments impacting ADL problems related to;activity tolerance impaired;mobility;strength;post-surgical precautions   Assessment of Occupational Performance 3-5 Performance Deficits   Identified Performance Deficits trnf safety, act. tolerance/endurance, cardiovascular response to exercise, ADL ind   Planned Therapy Interventions (OT) ADL retraining;balance training;strengthening;transfer training;home program guidelines;progressive activity/exercise   Clinical Decision Making Complexity (OT) detailed assessment/moderate complexity   Risk & Benefits of therapy have been explained evaluation/treatment results reviewed;risks/benefits reviewed;patient   OT Total Evaluation Time   OT Eval, Low Complexity Minutes (65460) 10   OT Goals   Therapy Frequency (OT) 2 times/day   OT Predicted Duration/Target Date for Goal Attainment 08/07/25   OT Goals Cardiac Phase 1   OT: Understanding of cardiac education to maximize quality of life, condition management, and health outcomes Patient;Demonstrate;Verbalize   OT: Perform aerobic activity with stable cardiovascular response continuous;15 minutes;ambulation   OT: Functional/aerobic ambulation tolerance with stable cardiovascular response in order to return to home and community environment Modified independent;Greater than 300 feet   Self-Care/Home Management   Self-Care/Home Mgmt/ADL, Compensatory, Meal Prep Minutes (84362) 10   Treatment Detail/Skilled Intervention Pt completed additional STS w/ min.A cues for tech, pt able to pivot to recliner Min/mod.Ax1 no LOB but occasionally leaning back w/ tactile cues for safety/stability, pt able to static stand for ~5' while RN got recliner ready/prepped mult. lines. Pt ed. on sternal prec, pt very irritable w/ therapist/RN  throughout session, left in recliner RN present.   OT Discharge Planning   OT Plan progress ambulation once lines out   OT Discharge Recommendation (DC Rec) Transitional Care Facility   OT Rationale for DC Rec Pt reporting he has been living at TCU and plans to return there to get stronger. Would likely benefit from TCU upon d/c as pt reports he does not have support at home to assist.   OT Brief overview of current status min/modA STS   Total Session Time   Timed Code Treatment Minutes 10   Total Session Time (sum of timed and untimed services) 20

## 2025-07-31 NOTE — PROGRESS NOTES
CRITICAL CARE PROGRESS NOTE:    Assessment/Plan:  Floyd Capps is a 49 year old male with CAD, HFrEF 35%, CVA, CKD, T2DM, diabetic foot wound on right heel. Underwent CABG x 3 with Dr. Boateng on 7/30; c/b cardiogenic shock requiring multiple vasoactive drips.     RESP:  Acute post-operative respiratory failure due to CHF, no known lung dz history, former smoker.   Cont LPV, Vt 6cc/kg IBW, OK for now  Pplat <30  Titrate FiO2 for goal SpO2 94-96%, avoid hyperoxia  Cut epo to 1/2 strength today  ABG's per CV surgery  SBT and extubate if remains stable  Chest tube mgmt per CXR hx of surgery    CV:  Hx of CAD s/p 4v CABG and MERA clipping on 7/30 with Dr. Coates. Postop had cardiogenic shock requiring multiple vasoactive drips. Echo 5/28: EF 40-45%, no valve issues. Intra-op GAIL: EF 35%, normal RV size/function, no sig valve disease.   MAP >65, wean drips as above; vasoactive drips per CV surgery  Temp pacer mgmt per CV surgery  Tele monitoring  Cont ASA  Cont atorvastatin  Hold PTA entresto, carvedilol, bumetanide, clopidogrel, empagliflozin, imdur    NEURO:  Normal mental status when sedation lightened. Denies pain  APAP prn pain  Avoid benzo's if able  Cautious use of narcotics  Cont dex, RASS goal 0 to -1  SAT for SBT today    GI:  No issues. No feeding tube; expect to extubate today  NPO for now  Continue PPI (not a PTA med)  Bowel regimen prn    RENAL:  SCHUYLER on CKD. Baseline Scr ~1.2-1.3  Avoid nephrotoxins  Maintain donovan  Follow BUN/scr, lytes  Hold PTA diuretic as above    ID:  No acute infectious issues  Monitor off abx  Follow up any culture data    HEMATOLOGIC:  Leukocytosis due to stress process  Follow counts  Hgb >7    ENDOCRINE:  Very poorly controlled DM, A1c >11  FSBG checks, insulin sliding scale/drip per ICU protocol  Hold PTA metformin     ICU PROPHYLAXIS:  HSQ for DVT ppx  PPI (not a PTA med) for stress ulcer ppx  Hold on chlorhexidine; expect extubtation today    CODE STATUS, DISPOSITION, FAMILY  "COMMUNICATION: full code    Lines/Drains/Tubes:  PIV  Arterial line 7/30  RIJ introducer sheath + PA catheter 7/30  Chest tubes x4 per CV surgery, 7/30  Cazares 7/30      Darnell Perea MD (Avi)  [facility] ([facility])  Send me a secure message using [employer]  [facility] [phone]  Fax [phone]     Clinically Significant Risk Factors         # Hyponatremia: Lowest Na = 134 mmol/L in last 2 days, will monitor as appropriate       # Hypoalbuminemia: Lowest albumin = 2.9 g/dL at 7/30/2025  1:04 PM, will monitor as appropriate  # Coagulation Defect: INR = 1.19 (Ref range: 0.85 - 1.15) and/or PTT = 36 Seconds (Ref range: 22 - 38 Seconds), will monitor for bleeding    # Hypertension: Noted on problem list  # Heart failure, mildly reduced ejection fraction: home medication list includes sacubitril/valsartan (Entresto) and last echo with EF 40-50%          # DMII: A1C = 11.3 % (Ref range: <5.7 %) within past 6 months, PRESENT ON ADMISSION  # Morbid Obesity: Estimated body mass index is 45.81 kg/m  as calculated from the following:    Height as of this encounter: 1.778 m (5' 10\").    Weight as of this encounter: 144.8 kg (319 lb 4.8 oz)., PRESENT ON ADMISSION     # Financial/Environmental Concerns:    # Support System: poor social support noted in nursing assessment  # Housing Instability: noted in nursing assessment                 Overnight events:  No major events  Remains on vent  Mild sedation  Multiple vasoactive drips, pressors  Awake, following commands  Full strength epo for oxygenation.    Subjective:  Denies pain.     Objective:  Physical Exam:  Vent settings for last 24 hours:  FiO2 (%): 35 %, Resp: 13, Vent Mode: (S) PS, Resp Rate (Set): 20 breaths/min, Tidal Volume (Set, mL): 550 mL, PEEP (cm H2O): 8 cmH2O, Pressure Support (cm H2O): 5 cmH2O, Resp Rate (Set): 20 breaths/min, Tidal Volume (Set, mL): 550 mL, PEEP (cm H2O): 8 cmH2O    /62 (BP Location: Left arm)   Pulse " "94   Temp 98.6  F (37  C) (Pulmonary Artery)   Resp 13   Ht 1.778 m (5' 10\")   Wt (!) 144.8 kg (319 lb 4.8 oz)   SpO2 97%   BMI 45.81 kg/m      Intake/Output last 3 shifts:  I/O last 3 completed shifts:  In: 5549.45 [P.O.:340; I.V.:3066.45; Other:600; IV Piggyback:755]  Out: 3307 [Urine:2350; Emesis/NG output:30; Blood:350; Chest Tube:577]  Intake/Output this shift:  No intake/output data recorded.    Physical Exam  Gen: awake, no distress  HEENT: no OP lesions, no MATEUS  CV: RRR, no m/g/r  Resp: clear ant no w/r/r   Abd: soft, nontender, BS+  Neuro: PERRL, nonfocal  Ext: no edema    LAB:  Recent Labs   Lab 07/31/25  0423   WBC 22.8*   HGB 10.3*   HCT 31.2*        Recent Labs   Lab 07/31/25  0423 07/30/25  1329 07/30/25  1304    138 138   CO2 24  --  22   BUN 24.9*  --  24.7*   ALKPHOS 69  --  90   ALT 12  --  15   AST 34  --  37       No current outpatient medications on file.       Critical care attestation: 35 minutes spent managing the following issues: acute respiratory failure requiring intubation/IMV, circulatory shock requiring continuous vasopressor infusions, acute kidney injury on CKD, post-cardiac surgery with high risk procedural complications. High risk for organ deterioration and death requiring ICU level care.   "

## 2025-07-31 NOTE — PLAN OF CARE
"[facility] - ICU    RN Progress Note:            Pertinent Assessments:      Please refer to flowsheet rows for full assessment     - a little bit restless, agitated but redirectable  - absent posterior tibialis pulses on both legs, Dr. Boateng was informed  - At 1855H, the CVP = 3, systolic blood pressure = 90. Albumin IV was given  - refused to turn and repositioned in the bed, he verbalized, \"Leave me alone\"           Key Events - This Shift:       - Precedex was stopped at around 1600H. Followed commands and got extubated to 2 liters per minute at 1844H. No desaturation nor dyspnea   - Maintained on Epinephrine drip. Cardene was placed on hold.  RN Managed Protocols Ordered:  Yes  Protocols:Potassium, Magnesium, and Phosphorus  Protocols Status: Endorsed to Oncoming RN                Barriers to Discharge / Downgrade:     - immediate post operative open heart       CT EXTUBATION FAST TRACK:    [facility] - ICU     FastTrack Candidate: Yes, Extubation Goal Time ( 6 Hours ) 1844H     Patient Status: No desaturation nor dyspnea noted. Extubated at 1844H                                   "

## 2025-07-31 NOTE — PROGRESS NOTES
CVTS Daily Progress Note   POD#1 s/p CABG x3 (LIMA-->LAD, rSVG-->HEENA, rSVG-->RPD) and MERA clippage  Attending: Dr. Boateng  LOS: 1    SUBJECTIVE/INTERVAL EVENTS:    Patient arrived to ICU from OR yesterday afternoon. He was subsequently extubated and is being slowly weaned from pressors. Still on 0.02 of epi. Maintaining oxygen saturations on 1L O2 NC. Not paced. Ongoing low CVP 3-6 and soft SBP as low as 90.  2.2L up overnight total fluids, which includes blood products, albumin, and LR. Ambulating with therapy. Pain adequately controlled, adding prn robaxin. No BM. Good UOP. Chest tube output appropriate and serosanguinous. Hgb stable, 10.3 this AM. Pt denies chest pain, SOB, abdominal pain, nausea. Reports leg tingling, endorses 10-15 years hx of bilateral diabetic neuropathy up to his knees. Pt has no further questions.        OBJECTIVE:  Temp:  [98.4  F (36.9  C)-100.2  F (37.9  C)] (P) 98.4  F (36.9  C)  Pulse:  [] 94  Resp:  [12-35] 13  BP: (100-146)/(54-70) 100/62  MAP:  [60 mmHg-169 mmHg] 89 mmHg  Arterial Line BP: ()/() 144/63  FiO2 (%):  [35 %-80 %] 35 %  SpO2:  [87 %-100 %] 97 %  Vitals:    07/30/25 0539 07/31/25 0600   Weight: (!) 140.6 kg (310 lb) (!) 144.8 kg (319 lb 4.8 oz)       Clinically Significant Risk Factors         # Hyponatremia: Lowest Na = 134 mmol/L in last 2 days, will monitor as appropriate       # Hypoalbuminemia: Lowest albumin = 2.9 g/dL at 7/30/2025  1:04 PM, will monitor as appropriate  # Coagulation Defect: INR = 1.19 (Ref range: 0.85 - 1.15) and/or PTT = 36 Seconds (Ref range: 22 - 38 Seconds), will monitor for bleeding    # Hypertension: Noted on problem list  # Heart failure, mildly reduced ejection fraction: home medication list includes sacubitril/valsartan (Entresto) and last echo with EF 40-50%          # DMII: A1C = 11.3 % (Ref range: <5.7 %) within past 6 months, PRESENT ON ADMISSION  # Morbid Obesity: Estimated body mass index is 45.81 kg/m  as  "calculated from the following:    Height as of this encounter: 1.778 m (5' 10\").    Weight as of this encounter: 144.8 kg (319 lb 4.8 oz)., PRESENT ON ADMISSION     # Financial/Environmental Concerns:    # Support System: poor social support noted in nursing assessment  # Housing Instability: noted in nursing assessment            Current Medications:    Scheduled Meds:  Current Facility-Administered Medications   Medication Dose Route Frequency Provider Last Rate Last Admin    acetaminophen (TYLENOL) Suppository 650 mg  650 mg Rectal Q8H Geno Howard PA-C   650 mg at 07/30/25 1339    acetaminophen (TYLENOL) tablet 975 mg  975 mg Oral Q8H Geno Howard PA-C   975 mg at 07/31/25 0404    aspirin (ASA) chewable tablet 162 mg  162 mg Oral or NG Tube Daily Geno Howard PA-C   162 mg at 07/31/25 0804    Or    aspirin (ASA) Suppository 300 mg  300 mg Rectal Daily Geno Howard PA-C        atorvastatin (LIPITOR) tablet 80 mg  80 mg Oral At Bedtime Geno Howard PA-C   80 mg at 07/30/25 2253    [Held by provider] clopidogrel (PLAVIX) tablet 75 mg  75 mg Oral Daily Geno Howard PA-C        cyanocobalamin (VITAMIN B-12) tablet 1,000 mcg  1,000 mcg Oral Daily Geno Howard PA-C   1,000 mcg at 07/31/25 0805    [Held by provider] empagliflozin (JARDIANCE) tablet 10 mg  10 mg Oral Daily Geno Howard PA-C        heparin ANTICOAGULANT injection 5,000 Units  5,000 Units Subcutaneous Q8H Geno Howard PA-C        Lidocaine (LIDOCARE) 4 % Patch 1-2 patch  1-2 patch Transdermal Q24H Geno Howard PA-C   1 patch at 07/30/25 1702    [Held by provider] metFORMIN (GLUCOPHAGE XR) 24 hr tablet 500 mg  500 mg Oral Daily with supper Geno Howard PA-C        multivitamin w/minerals (THERA-VIT-M) tablet 1 tablet  1 tablet Oral Daily Geno Howard PA-C   1 tablet at 07/31/25 0805    pantoprazole (PROTONIX) 2 mg/mL suspension " 40 mg  40 mg Oral or NG Tube Daily Geno Howard PA-C        Or    pantoprazole (PROTONIX) EC tablet 40 mg  40 mg Oral Daily Geno Howard PA-C   40 mg at 07/31/25 0805    polyethylene glycol (MIRALAX) Packet 17 g  17 g Oral Daily Geno Howard PA-C   17 g at 07/31/25 0805    [Held by provider] sacubitril-valsartan (ENTRESTO) 24-26 MG per tablet 1 tablet  1 tablet Oral BID Geno Howard PA-C        senna-docusate (SENOKOT-S/PERICOLACE) 8.6-50 MG per tablet 1 tablet  1 tablet Oral BID Geno Howard PA-C   1 tablet at 07/31/25 0805     Continuous Infusions:  Current Facility-Administered Medications   Medication Dose Route Frequency Provider Last Rate Last Admin    EPINEPHrine (ADRENALIN) 5 mg in sodium chloride 0.9 % 250 mL infusion CENTRAL  0.01-0.1 mcg/kg/min Intravenous Continuous Haydee Boateng MD 12.7 mL/hr at 07/31/25 0800 0.03 mcg/kg/min at 07/31/25 0800    insulin regular (MYXREDLIN) 1 unit/mL infusion  0-24 Units/hr Intravenous Continuous Geno Howard PA-C 6 mL/hr at 07/31/25 0945 6 Units/hr at 07/31/25 0945     PRN Meds:.  Current Facility-Administered Medications   Medication Dose Route Frequency Provider Last Rate Last Admin    bisacodyl (DULCOLAX) suppository 10 mg  10 mg Rectal Daily PRN Geno Howard PA-C        calcium gluconate 1 g in 50 mL in sodium chloride intermittent infusion  1 g Intravenous Once PRN Geno Howard PA-C        calcium gluconate 2 g in  mL intermittent infusion  2 g Intravenous Once PRN Geno Howard PA-C        calcium gluconate 3 g in sodium chloride 0.9 % 100 mL intermittent infusion  3 g Intravenous Once PRN Geno Howard PA-C        glucose gel 15-30 g  15-30 g Oral Q15 Min PRN Geno Howard PA-C        Or    dextrose 50 % injection 25-50 mL  25-50 mL Intravenous Q15 Min JANUSZN Geno Howard PA-C        Or    glucagon injection 1 mg  1 mg  Subcutaneous Q15 Min PRN Geno Howard PA-C        hydrALAZINE (APRESOLINE) injection 10 mg  10 mg Intravenous Q30 Min PRN Geno Howard PA-C        HYDROmorphone (DILAUDID) injection 0.2 mg  0.2 mg Intravenous Q2H PRN Geno Howard PA-C   0.2 mg at 07/30/25 2014    Or    HYDROmorphone (DILAUDID) injection 0.4 mg  0.4 mg Intravenous Q2H PRN Geno Howard PA-C   0.4 mg at 07/30/25 2154    lactated ringers BOLUS 250 mL  250 mL Intravenous Q15 Min PRN Geno Howard PA-C        magnesium hydroxide (MILK OF MAGNESIA) suspension 30 mL  30 mL Oral Daily PRN Geno Howard PA-C        methocarbamol (ROBAXIN) tablet 500 mg  500 mg Oral 4x Daily PRN Geno Howard PA-C        naloxone (NARCAN) injection 0.2 mg  0.2 mg Intravenous Q2 Min PRN Haydee Boateng MD        Or    naloxone (NARCAN) injection 0.4 mg  0.4 mg Intravenous Q2 Min PRN Haydee Boateng MD        Or    naloxone (NARCAN) injection 0.2 mg  0.2 mg Intramuscular Q2 Min PRN Haydee Boateng MD        Or    naloxone (NARCAN) injection 0.4 mg  0.4 mg Intramuscular Q2 Min PRN Haydee Boateng MD        ondansetron (ZOFRAN ODT) ODT tab 4 mg  4 mg Oral Q6H PRN Geno Howard PA-C        Or    ondansetron (ZOFRAN) injection 4 mg  4 mg Intravenous Q6H PRN Geno Howard PA-C   4 mg at 07/31/25 0946    oxyCODONE (ROXICODONE) tablet 5 mg  5 mg Oral Q4H PRN Geno Howard PA-C        Or    oxyCODONE (ROXICODONE) tablet 10 mg  10 mg Oral Q4H PRN Geno Howard PA-C   10 mg at 07/31/25 0403    prochlorperazine (COMPAZINE) injection 10 mg  10 mg Intravenous Q6H PRN Geno Howard PA-C   10 mg at 07/31/25 0729    Or    prochlorperazine (COMPAZINE) tablet 10 mg  10 mg Oral Q6H PRN Geno Howard PA-C           Cardiographics:    Telemetry monitoring demonstrates NSR with rates in the 90s per my personal review.    Imaging:  Results for orders  placed or performed during the hospital encounter of 07/30/25   XR Chest Port 1 View    Impression    IMPRESSION: Negative chest.   XR Chest Port 1 View    Impression    IMPRESSION: The endotracheal tube tip is 6.4 cm from the edelmira. An NG or OG tube terminates off the field-of-view. A right IJ sheath transmits a [place] catheter which terminates in the lower pulmonary artery. Lung volumes are low. No pleural fluid or   pneumothorax. The heart is not well assessed. Sternotomy suture wires are present. There are degenerative changes of the left shoulder. Old healed right clavicle fracture is suspected.   XR Chest Port 1 View    Impression    IMPRESSION: Interval removal endotracheal and enteric tube. Median sternotomy. Mediastinal drain, right chest 2 views. No significant effusion. Right IJ sheath. Remote fracture right clavicle.   XR Abdomen Port 1 View    Impression    IMPRESSION: An OG tube terminates in the stomach. Subxiphoid drains are noted.       Labs, personally reviewed.  Hemoglobin   Date Value Ref Range Status   07/31/2025 10.3 (L) 13.3 - 17.7 g/dL Final   07/30/2025 10.4 (L) 13.3 - 17.7 g/dL Final   07/30/2025 9.8 (L) 13.3 - 17.7 g/dL Final     Hemoglobin POCT   Date Value Ref Range Status   07/30/2025 12.6 (L) 13.3 - 17.7 g/dL Final   07/30/2025 12.0 (L) 13.3 - 17.7 g/dL Final   07/30/2025 10.2 (L) 13.3 - 17.7 g/dL Final     WBC Count   Date Value Ref Range Status   07/31/2025 22.8 (H) 4.0 - 11.0 10e3/uL Final   07/30/2025 25.7 (H) 4.0 - 11.0 10e3/uL Final   07/30/2025 22.0 (H) 4.0 - 11.0 10e3/uL Final     Platelet Count   Date Value Ref Range Status   07/31/2025 207 150 - 450 10e3/uL Final   07/30/2025 187 150 - 450 10e3/uL Final   07/30/2025 160 150 - 450 10e3/uL Final     Creatinine   Date Value Ref Range Status   07/31/2025 1.28 (H) 0.67 - 1.17 mg/dL Final   07/30/2025 1.17 0.67 - 1.17 mg/dL Final   07/22/2025 1.39 (H) 0.67 - 1.17 mg/dL Final     Potassium   Date Value Ref Range Status    07/31/2025 4.3 3.4 - 5.3 mmol/L Final   07/30/2025 5.0 3.4 - 5.3 mmol/L Final   07/30/2025 5.0 3.4 - 5.3 mmol/L Final     Potassium POCT   Date Value Ref Range Status   07/30/2025 4.8 3.4 - 5.3 mmol/L Final   07/30/2025 4.8 3.4 - 5.3 mmol/L Final   07/30/2025 4.9 3.4 - 5.3 mmol/L Final     Magnesium   Date Value Ref Range Status   07/31/2025 2.3 1.7 - 2.3 mg/dL Final   07/30/2025 2.3 1.7 - 2.3 mg/dL Final   07/22/2025 2.4 (H) 1.7 - 2.3 mg/dL Final          I/O:  I/O last 3 completed shifts:  In: 5549.45 [P.O.:340; I.V.:3066.45; Other:600; IV Piggyback:755]  Out: 3307 [Urine:2350; Emesis/NG output:30; Blood:350; Chest Tube:577]       Physical Exam:    General: Patient seen up in chair. Awake, alert, in no acute distress, conversant.   CV: RRR on monitor. Mild edema of bilateral lower extremities. Incision C/D/I. Absent posterior tibialis pulses bilaterally.   Pulm: Non-labored effort on 1L NC. Chest tubes in place, serosanguinous output, no air leak.  Abd: Soft, NT, ND  : Cazares with clear saeed urine  Ext: Mild pedal edema, SCDs in place, warm and moist.  Neuro: CNs grossly intact. Moves all 4 extremities. Bilateral lower extremity sensation diminished but intact.   Psych: Restless, a bit irritable. Conversant, cooperative with direction.     ASSESSMENT/PLAN:    Floyd Capps is a 49 year old male with a history of HFrEF (EF 40-45% from TTE 5/2025), ischemic CVA on 8/2023 and 3/2025, poorly-controlled and insulin-dependent T2DM with diabetic neuropathy with foot wound on R heel, and CKD stage 3 who is s/p CABG x3 and MERA clippage with Dr. Boateng on 7/30/2025.    Principal Problem:    Coronary artery disease involving autologous artery coronary bypass graft without angina pectoris        NEURO:   - Scheduled Tylenol/lidocaine patches and PRN Tylenol/oxycodone/dilaudid for pain  - Added robaxin 500mg 4x daily prn for adjunct pain management    CV:   - Pre-op EF 40-45% from TTE; intra-op GAIL EF 35%  - Weaning  pressors as hemodynamics allow; currently on 0.02 of epi.  - Beta blocker pending weaning from pressors; will resume PTA Coreg when appropriate   - ASA 162mg daily, will decrease to 81mg when Plavix is resumed  - Atorvastatin 80mg daily  - Chest tubes to remain today  - Will resume PTA Plavix 75mg when TPW are removed (Previously rx for severe CAD with complication of CVA)   - Note: Groin incision closed with staples - remove prior to discharge/at follow up appt  - Hold PTA Entresto   - Hold PTA Bumex    PULM:   - Extubated on POD#0  - Maintaining oxygen saturations on 1L nasal cannula  - Encourage pulmonary toilet    FEN/GI:  - Continue electrolyte replacement protocol  - Cardiac; ADAT  - Bowel regimen  - (+/-) Albumin and IVF to maintain CVP and SBP     RENAL:  - Adequate UOP/hr. Continue to monitor closely.  - Cr 1.28, baseline 1.3-1.5  - Cazares to remain in for close monitoring of I/O and during period of diuresis/relative immobility  - Diuresis PRN and pending weaning from pressors; will resume PTA bumex when appropriate   - BMP q12h for now     HEME:  - Acute blood loss anemia post-op.   - Hgb stable, no bleeding concerns. Hep SQ, ASA  - Daily CBC in AM    ID:  - Luly op ppx complete, afebrile. No concerns for infection    ENDO:   - Most recent A1c 11.3% on 5/28/2025. PTA takes 55 units insulin of Lantus at bedtime + Novolog.  - Continue insulin gtt  - Hold PTA metformin   - Hold PTA jardiance     PPx:   - DVT: SCDs, SQ heparin TID, ambulation   - GI: Protonix 40mg IV/PO daily    DISPO:   - Continue critical care in ICU  - Medically Ready for Discharge: Anticipated in 5+ Days     ATIYA Campbell Student   [facility][1]     Patient discussed with Dr. Coates.      PADDY Attestation  I, Geno Howard PA-C, was present with the PADDY student who participated in the service and in the documentation of the note.  I have verified the history and personally performed the physical exam and medical  decision making.  I agree with the assessment and plan of care as documented in the note.      Geno Howard PA-C  Date of Service (when I saw the patient): 07/31/2025

## 2025-07-31 NOTE — CONSULTS
[facility]  WO Nurse Inpatient Assessment     Consulted for: R heel    Summary: Patient known to WOC team from previous admissions    WO nurse follow-up plan: weekly    Patient History (according to provider note(s):    Chief Complaint:   Heart blockages and heart failure  HISTORY OF PRESENT ILLNESS:  Mr. Capps has been seen by me several times.  He presented in late May with a heart failure exacerbation.   He has also had a thalamic stroke this year.  He has severe triple vessel coronary artery disease with an ischemic cardiomyopathy.  Additionally he has poorly controlled diabetes.  He reports that he has a new cough and his white count last week was elevated  ASSESSMENT: He has severe triple vessel coronary artery disease with reduced left ventricular function and obesity with recent CVA and CKD. We plan to perform a multi-vessel coronary artery bypass grafting procedure. For conduit we will use the left internal mammary artery and reversed saphenous vein graft. He understands that the risks for this procedure include: bleeding, infection, stroke, sternal dehiscence, myocardial infarction, arrhythmias, the need for a pacemaker, prolonged ventilation, pneumonia, liver/renal failure, aortic dissection, and an operative mortality of 4 to 8 percent. We will follow the recommendations of Neurology and Nephrology.     Assessment:    Skin Injury Location: JG heel     Last photo: 7/31 5/28 (From last admission)  Skin injury due to: Diabetic ulcer  Skin history and plan of care: Chronic area of injury for patient  Affected area:      Skin assessment: Callus around wound - depth present today which was not exposed during last admission     Measurements (length x width x depth, in cm) 0.5 cm x 0.5 cm x 0.5       Color: normal and consistent with surrounding tissue and Brown callus     Temperature  normal      Drainage: scant .      Color: serosanguinous       Odor: none  Pain: denies  and no grimacing or signs of discomfort  Pain interventions prior to dressing change: patient tolerated well and slow and gentle cares   Treatment goal: Heal , Infection control/prevention, Maintain (prevention of deterioration), and Protection  STATUS: initial assessment this admission  Supplies ordered: ordered Medihoney alginate    Treatment Plan:   R  heel - every other day (please start 7/31 when supplies arrive)  Cleanse wound with saline; gently dry.  Cut a small piece of Medihoney alginate (PS# [id number]) and place into wound bed; cover with Mepilex.    Orders: Written    RECOMMEND PRIMARY TEAM ORDER: None, at this time  Education provided: plan of care  Discussed plan of care with: Patient  Notify [facility] if wound(s) deteriorate.  Nursing to notify the Provider(s) and re-consult the [facility] Nurse if new skin concern.    DATA:     Current support surface: Standard  Low air loss (ROBERT pump, Isolibrium, Pulsate)  Containment of urine/stool: Continent of bladder and Continent of bowel  BMI: Body mass index is 45.81 kg/m .   Active diet order: Orders Placed This Encounter      Advance Diet as Tolerated: Clear Liquid Diet     Output: I/O last 3 completed shifts:  In: 5549.45 [P.O.:340; I.V.:3066.45; Other:600; IV Piggyback:755]  Out: 3307 [Urine:2350; Emesis/NG output:30; Blood:350; Chest Tube:577]     Labs:   Recent Labs   Lab 07/31/25  0423 07/30/25  1329 07/30/25  1304   ALBUMIN 3.1*  --  2.9*   HGB 10.3*   < > 10.4*   INR  --   --  1.19*   WBC 22.8*  --  25.7*    < > = values in this interval not displayed.     Pressure injury risk assessment:   Sensory Perception: 3-->slightly limited  Moisture: 3-->occasionally moist  Activity: 2-->chairfast  Mobility: 3-->slightly limited  Nutrition: 2-->probably inadequate  Friction and Shear: 2-->potential problem  Rolo Score: 15    Vidya Villanueva MSN RN CWOCN  Pager no longer is use, please contact through [phone] group: [facility]  [place]  [facility]

## 2025-07-31 NOTE — PROGRESS NOTES
[facility]:  CRITICAL CARE PROGRESS NOTE     Date / Time of Admission:  7/30/2025  5:11 AM    ID: Floyd Capps is a 49 year old male with morbid obesity (BMI 45.81 kg/m2), CAD, HFrEF (LVEF 40-45% on TTE 05/2025), ischemic CVA 08/2023, CKD, T2DM, diabetic foot wound on right heel who underwent 3-vessel CABG with Dr. Boateng on 7/30/2025 and admitted to the ICU for post-operative management. Course complicated by metabolic acidosis, received bicarb in the afternoon and was ultimately extubated at 1844 hours on 7/31/25.     Reason for Consult:  Post-operative ventilator management         Assessment:   Acute hypoxic respiratory failure, improved. Second SBT trial in the afternoon, tolerated well and extubated at 1844 hr 7/30. Post-extubation ABG 7/30 with pH 7.36 pCO2 45, pO2 93. Met Fasttrack timing within 6 hours of arrival. Currently on 1 L/min NC, incentive spirometry use ~ 1500 mL. No history of lung disease, denies h/o asthma or COPD. Is a former smoker.   Snoring.  Possible undiagnosed sleep apnea, had some transient hypoxia overnight with sleep, remained on nasal cannula. Has Sleep Medicine appointment 10/19/25 for evaluation. No evidence of CO2 retention at baseline, serum bicarb prior to hospitalization have been in the normal range.  Prior tobacco abuse. Currently not active smoker. Does occasionally use marijuana.  Severe multivessel CAD s/p 3-vessel CABG 7/30, ischemic cardiomyopathy (LVEF 40-45% on TTE 05/2025). Went to OR with Dr. Boateng . Intra-op course unremarkable. No difficulty with intubation using Glidescope; noted poor dentition by anesthesia staff. Received 20mg IV methadone. No difficulty going on nor coming off pump. Intra-op Echo showed decreased LV function with EF of 35%; RV looked ok and no valve abnormalities.   Circulatory shock, improved.  Vasoplegia in setting of therapeutic sedation, post-operative.  History of ischemic CVA 2023.  Patient had acute lacunar  infarct of the left corona radiata and basal ganglia. No thrombolysis due to outside 4.5 hour window   Insulin dependent DM type 2, poorly controlled.  HgbA1c 11.8% on 5/23.  Anemia of acute blood loss. In OR 7/30: EBL 350mL; received 1-unit PRBC in addition to 600mL Cell Saver.      CODE STATUS: Full Code.         Plan:   Wean supplemental O2 as tolerates, goal O2 sat > 92%.   Encourage continued tobacco cessation.  Patient has risk factors for obstructive sleep apnea including body habitus, neck girth.  Has sleep medicine visit scheduled 10/19/25 for continued evaluation.   Pulmonary hygiene: Encourage OOB to chair, PT/OT when able, IS Q2H while awake, Acapella QID per protocol.   Reviewed incentive spirometry technique with patient, doing well, 1500 mL completed on initial attempts.   Chest tubes to suction, management per primary team.  ABG, chest x-rays reviewed.   Insulin gtt/glucose management per CT surgery protocol.   PPI for DVT prophylaxis, Heparin subcutaneous for DVT prophylaxis.    Patient and/or family were educated on the above recommendations.   Thank you for allowing our service to participate in the care of this patient.  Please call with any questions or concerns. Critical Care service will sign off at this time.    Total patient care time: 35 minutes, with over 50% spent in counseling/coordination of care.      Corazon Patterson MD, MPH  Pulmonary/Critical Care Medicine  07/31/2025  8:30 AM        Subjective/Interval History:   Doing okay today but feeling nausea with all the pills he just took  Chest discomfort, use of pillow is helpful  No difficulty breathing, no wheezing  Thinks he might have had a sleep study in the past but doesn't recall when or the outcome  Denies any known diagnosis of sleep apnea    Review of Systems:  Pertinent items are noted in HPI.  The Review of Systems is negative other than noted in the HPI        Allergies/Medications:   Allergies:   No Known  Allergies    Continuous Infusions:  Current Facility-Administered Medications   Medication Dose Route Frequency Provider Last Rate Last Admin    EPINEPHrine (ADRENALIN) 5 mg in sodium chloride 0.9 % 250 mL infusion CENTRAL  0.01-0.1 mcg/kg/min Intravenous Continuous Haydee Boateng MD 12.7 mL/hr at 07/31/25 0800 0.03 mcg/kg/min at 07/31/25 0800    insulin regular (MYXREDLIN) 1 unit/mL infusion  0-24 Units/hr Intravenous Continuous Geno Howard PA-C 4 mL/hr at 07/31/25 0803 4 Units/hr at 07/31/25 0803     Scheduled Medications:  Current Facility-Administered Medications   Medication Dose Route Frequency Provider Last Rate Last Admin    acetaminophen (TYLENOL) Suppository 650 mg  650 mg Rectal Q8H Geno Howard PA-C   650 mg at 07/30/25 1339    acetaminophen (TYLENOL) tablet 975 mg  975 mg Oral Q8H Geno Howard PA-C   975 mg at 07/31/25 0404    aspirin (ASA) chewable tablet 162 mg  162 mg Oral or NG Tube Daily Geno Howard PA-C   162 mg at 07/31/25 0804    Or    aspirin (ASA) Suppository 300 mg  300 mg Rectal Daily Geno Howard PA-C        atorvastatin (LIPITOR) tablet 80 mg  80 mg Oral At Bedtime Geno Howard PA-C   80 mg at 07/30/25 2253    [Held by provider] clopidogrel (PLAVIX) tablet 75 mg  75 mg Oral Daily Geno Howard PA-C        cyanocobalamin (VITAMIN B-12) tablet 1,000 mcg  1,000 mcg Oral Daily Geno Howard PA-C   1,000 mcg at 07/31/25 0805    [Held by provider] empagliflozin (JARDIANCE) tablet 10 mg  10 mg Oral Daily Geno Howard PA-C        heparin ANTICOAGULANT injection 5,000 Units  5,000 Units Subcutaneous Q8H Geno Howard PA-C        Lidocaine (LIDOCARE) 4 % Patch 1-2 patch  1-2 patch Transdermal Q24H Geno Howard PA-C   1 patch at 07/30/25 1702    [Held by provider] metFORMIN (GLUCOPHAGE XR) 24 hr tablet 500 mg  500 mg Oral Daily with Geno Bonilla PA-C         "multivitamin w/minerals (THERA-VIT-M) tablet 1 tablet  1 tablet Oral Daily Geno Howard PA-C   1 tablet at 07/31/25 0805    pantoprazole (PROTONIX) 2 mg/mL suspension 40 mg  40 mg Oral or NG Tube Daily Geno Howard PA-C        Or    pantoprazole (PROTONIX) EC tablet 40 mg  40 mg Oral Daily Geno Howard PA-C   40 mg at 07/31/25 0805    polyethylene glycol (MIRALAX) Packet 17 g  17 g Oral Daily Geno Howard PA-C   17 g at 07/31/25 0805    [Held by provider] sacubitril-valsartan (ENTRESTO) 24-26 MG per tablet 1 tablet  1 tablet Oral BID Geno Howard PA-C        senna-docusate (SENOKOT-S/PERICOLACE) 8.6-50 MG per tablet 1 tablet  1 tablet Oral BID Geno Howard PA-C   1 tablet at 07/31/25 0805            Objective:   Vitals:  /62 (BP Location: Left arm)   Pulse 92   Temp 98.6  F (37  C) (Pulmonary Artery)   Resp 14   Ht 1.778 m (5' 10\")   Wt (!) 144.8 kg (319 lb 4.8 oz)   SpO2 99%   BMI 45.81 kg/m    GEN: Pleasant but fatigued, sitting up in the chair in mild discomfort.  HEENT: Normocephalic, atraumatic.  Extraoccular eye movements intact, anicteric sclera. Moist mucous membranes.   NECK: Supple.  RIJ Introducer  CHEST: Chest tubes to suction. Midsternal incision without drainage/bleeding  PULM: Non-labored breathing.  No use of accessory muscles.  Diminished breath sounds.   CVS: Regular rate and rhythm.  Normal S1, S2.  + rub  ABDOMEN: Hypoactive bowel sounds.  Obese, soft.  EXTREMITES:  No clubbing, cyanosis, or edema.    NEURO:  Awake.  Oriented to person, place, time and situation.  Cranial nerves 2-12 grossly intact.  Moving all extremities.      Intake/Output:  I/O last 3 completed shifts:  In: 5549.45 [P.O.:340; I.V.:3066.45; Other:600; IV Piggyback:755]  Out: 3307 [Urine:2350; Emesis/NG output:30; Blood:350; Chest Tube:577]        Pertinent Studies:   All laboratory data reviewed  Serum Glucose range:   Recent Labs   Lab " "07/31/25  0803 07/31/25  0706 07/31/25  0620 07/31/25  0513 07/31/25  0423 07/31/25  0418   * 138* 140* 154* 152* 160*     ABG:   Recent Labs   Lab 07/31/25  0423 07/30/25  2058 07/30/25  1907 07/30/25  1329 07/30/25  1211   PH  --   --  7.36 7.29* 7.42   PCO2  --   --  42 44 36   PO2  --   --  93 264* 111*   HCO3  --   --  24 21 24   O2PER 25 41 5  --   --      CBC:   Recent Labs   Lab 07/31/25 0423 07/30/25  1329 07/30/25  1304 07/30/25  1133 07/30/25  1132   WBC 22.8*  --  25.7*  --  22.0*   HGB 10.3* 12.6* 10.4*   < > 9.8*   HCT 31.2* 37* 31.2*  --  30.1*   MCV 91  --  91  --  92     --  187  --  160    < > = values in this interval not displayed.     Chemistry:   Recent Labs   Lab 07/31/25 0423 07/30/25  1329 07/30/25  1304    138 138   POTASSIUM 4.3 4.8 5.0  5.0   CHLORIDE 106  --  107   CO2 24  --  22   BUN 24.9*  --  24.7*   CR 1.28*  --  1.17   GFRESTIMATED 68  --  75   SARAH 8.7*  --  8.6*   MAG 2.3  --  2.3   PROTTOTAL 5.5*  --  5.6*   ALBUMIN 3.1*  --  2.9*   AST 34  --  37   ALT 12  --  15   ALKPHOS 69  --  90   BILITOTAL 0.4  --  0.6     Coags:  Recent Labs   Lab 07/30/25  1304 07/30/25  1132   INR 1.19* 1.46*   PROTIME 15.3* 17.8*   PTT 36 40*     Cardiac Markers:  No results for input(s): \"CKTOTAL\", \"TROPONINI\" in the last 168 hours.     Microbiology:  None        Cardiology/Radiology:   Cardiac: All cardiac studies reviewed by me.  EKG:  Reviewed  TTE, 5/29/25:  Summary:  TDS. Limited views seen due to patient body habitus The left ventricle is borderline dilated. The visual ejection fraction is 40- 45%. There is moderate global hypokinesia of the left ventricle. Regional wall motion abnormalities cannot be excluded due to limited visualization. No significant valve disease.    Radiology: All imaging studies reviewed by me.  Chest X-Ray, 7/31/25:  Interval removal endotracheal and enteric tube. Median sternotomy. Mediastinal drain, right chest 2 views. No significant effusion. " Right IJ sheath. Remote fracture right clavicle.

## 2025-07-31 NOTE — DISCHARGE INSTRUCTIONS
R  heel - every other day (please start 7/31 when supplies arrive)  Cleanse wound with saline; gently dry.  Cut a small piece of Medihoney alginate and place into wound bed; cover with Mepilex.   you, we highly recommend you participate. If you have problems arranging your cardiac rehab, please call [phone] for all locations, with the exception of [place], please call [phone] and [place], please call [phone].    Avoid sitting for prolonged periods of time, try to walk every hour during the day. If you have a leg incision, elevate your leg often when you are not walking.    Take your blood pressure daily. If the top number is consistently <90 or >130 please call our office for further instructions.    Check your weight when you get home from the hospital and continue to check it daily for at least a month. If you notice a weight gain of 3 pounds overnight or 5 pounds in a week, call your surgeon or cardiologist.     Bowel activity may be slow after surgery. If necessary, you may take an over the counter laxative such as Milk of Magnesia or Miralax. You may have stool softeners prescribed (docusate sodium, Senokot). We recommend using stool softeners while using narcotics for pain (oxycodone/percocet, hydrocodone/vicodin, hydromorphone/dilaudid).      Wean OFF of narcotics (oxycodone, dilaudid, hydrocodone) as soon as possible. You should continue taking acetaminophen as long as you have any surgical pain as the first choice for pain control and add narcotics as necessary for pain to be tolerable.      DO NOT SMOKE. IF YOU NEED HELP QUITTING, PLEASE TALK WITH YOUR CARDIOLOGIST OR PRIMARY DOCTOR.    You are on a blood thinner, follow the instructions you were given in the hospital and DO NOT SKIP this medication. Try and take it the same time everyday. Your primary care physician or coumadin clinic will manage the dosing. INR goal is 2-3.    REGARDING PRESCRIPTION REFILLS.  If you need a refill on your pain medication contact us to discuss your pain and a possible one time refill.   All other medications will be adjusted, discontinued and re-filled by your primary care physician and/or your  cardiologist as they were prior to your surgery. We have given you enough for one to three month with possibly one refill.    POST-OPERATIVE CLINIC VISITS  You have a follow up visit with [facility] Surgery Advance Care Practitioners at the [facility].  You will then return to the care of your primary provider and your cardiologist. Future medication refills should come from your PCP or Cardiologist.   You should see your primary care provider in 1-2 weeks after discharge. Call to schedule this appointment ASAP.  It is important to see your cardiologist about 4-6 weeks after discharge.   Additional follow-up: None    SURGICAL QUESTIONS  Please call TARI Waters at [phone] with surgical recovery and medication questions.  She will assist you with your needs and contact other surgery care team members as indicated.    On weekends or after hours, please call [phone] and ask the  to page the Cardiothoracic Surgery fellow on call.      Thank you,    Your Cardiothoracic Surgery Team       R  heel - every other day (please start 7/31 when supplies arrive)  Cleanse wound with saline; gently dry.  Cut a small piece of Medihoney alginate and place into wound bed; cover with Mepilex.

## 2025-07-31 NOTE — PLAN OF CARE
[facility] - ICU    RN Progress Note:            Pertinent Assessments:      Please refer to flowsheet rows for full assessment     - alert and oriented. Follows commands. Stable gait, assist of 2-3 because of the lines and chest tubes, but otherwise, he could pivot easily from recliner to bed  - intermittent nausea, zofran and compazine PRN given  - PA pressures was around 40/25 and 40 ml of urine output for 2 hours           Key Events - This Shift:       - At 0809H, CVP = 3, systolic blood pressure was between 70-80s, MAP= 58. Albumin 250 ml was given once as ordered by Dr. Coates.  - Gave Robaxin and Dilaudid for incisional pain  - Lasix was given once. PA pressures went down to around 25-30/15-20  - Epinephrine was paused at around 1829H       RN Managed Protocols Ordered:  Yes  Protocols:Potassium, Magnesium, and Phosphorus  PRN'S:iCal  Protocols Status: Endorsed to Oncoming RN                Barriers to Discharge / Downgrade:     Still on epinephrine         Point of Contact Update:   Name:  Phone Number:  Summary of Conversation:

## 2025-07-31 NOTE — PLAN OF CARE
Goal Outcome Evaluation:             [facility] - ICU Admission Note       Dual Skin Assessment:     Patient transferred from OR/PACU to ICU.      Comprehensive skin inspection completed by myself and Aminata Matta RN.      Abnormal skin assessment findings: Yes      If yes above, LDA initiated for skin breakdown/non-blanchable erythema: Left groin erythema, blanchable. Right heel chronic open ulcer (see Epic image) (Mepilex in place). Scab left foot, 2nd and 4th toes.     Provider notified: No, Endorsed to next shift.      WOC consult order obtained: Yes         [facility] - ICU    RN Progress Note:            Pertinent Assessments:      Please refer to flowsheet rows for full assessment     Sinus rhythym with BBB and ST elevation Lead II. Pacer at bedside, on standby. Pain controlled with 10 mg oxycodone, scheduled Tylenol, one-time dose Toradol 15mg, and prn Dilaudid. Turned every 2 hours. Patient refusing positioning on left side related to neck and rib pain. Tolerated dangle and stand at bedside at 2300. Up to chair in am with stable VS. CT and UO WNL. IS to 1250. 1L NC. Tolerating clear liquids. Congested nonproductive cough. No flatus. See skin assessment above.           Key Events - This Shift:       Sleep promotion, pain control, increasing activity.     RN Managed Protocols Ordered:  Yes  Protocols:Potassium, Magnesium, and Phosphorus  PRN'S:iCal  Protocols Status: In Progress                Barriers to Discharge / Downgrade:     Epi infusion and pacer.         Point of Contact Update: YES-OR-NO: No  If No, reason: Sleep promotion

## 2025-08-01 ENCOUNTER — APPOINTMENT (OUTPATIENT)
Dept: OCCUPATIONAL THERAPY | Facility: HOSPITAL | Age: 50
End: 2025-08-01
Attending: THORACIC SURGERY (CARDIOTHORACIC VASCULAR SURGERY)
Payer: COMMERCIAL

## 2025-08-01 LAB
ANION GAP SERPL CALCULATED.3IONS-SCNC: 8 MMOL/L (ref 7–15)
ANION GAP SERPL CALCULATED.3IONS-SCNC: 9 MMOL/L (ref 7–15)
ATRIAL RATE - MUSE: 87 BPM
BUN SERPL-MCNC: 24 MG/DL (ref 6–20)
BUN SERPL-MCNC: 24.1 MG/DL (ref 6–20)
CA-I BLD-MCNC: 4.7 MG/DL (ref 4.4–5.2)
CALCIUM SERPL-MCNC: 8.5 MG/DL (ref 8.8–10.4)
CALCIUM SERPL-MCNC: 8.5 MG/DL (ref 8.8–10.4)
CHLORIDE SERPL-SCNC: 105 MMOL/L (ref 98–107)
CHLORIDE SERPL-SCNC: 108 MMOL/L (ref 98–107)
CREAT SERPL-MCNC: 1.2 MG/DL (ref 0.67–1.17)
CREAT SERPL-MCNC: 1.25 MG/DL (ref 0.67–1.17)
DIASTOLIC BLOOD PRESSURE - MUSE: NORMAL MMHG
EGFRCR SERPLBLD CKD-EPI 2021: 70 ML/MIN/1.73M2
EGFRCR SERPLBLD CKD-EPI 2021: 73 ML/MIN/1.73M2
ERYTHROCYTE [DISTWIDTH] IN BLOOD BY AUTOMATED COUNT: 13.6 % (ref 10–15)
GLUCOSE BLDC GLUCOMTR-MCNC: 106 MG/DL (ref 70–99)
GLUCOSE BLDC GLUCOMTR-MCNC: 127 MG/DL (ref 70–99)
GLUCOSE BLDC GLUCOMTR-MCNC: 151 MG/DL (ref 70–99)
GLUCOSE BLDC GLUCOMTR-MCNC: 163 MG/DL (ref 70–99)
GLUCOSE BLDC GLUCOMTR-MCNC: 163 MG/DL (ref 70–99)
GLUCOSE BLDC GLUCOMTR-MCNC: 169 MG/DL (ref 70–99)
GLUCOSE BLDC GLUCOMTR-MCNC: 170 MG/DL (ref 70–99)
GLUCOSE BLDC GLUCOMTR-MCNC: 172 MG/DL (ref 70–99)
GLUCOSE BLDC GLUCOMTR-MCNC: 95 MG/DL (ref 70–99)
GLUCOSE SERPL-MCNC: 174 MG/DL (ref 70–99)
GLUCOSE SERPL-MCNC: 183 MG/DL (ref 70–99)
HCO3 SERPL-SCNC: 23 MMOL/L (ref 22–29)
HCO3 SERPL-SCNC: 23 MMOL/L (ref 22–29)
HCT VFR BLD AUTO: 27.6 % (ref 40–53)
HGB BLD-MCNC: 8.8 G/DL (ref 13.3–17.7)
INTERPRETATION ECG - MUSE: NORMAL
MAGNESIUM SERPL-MCNC: 2.2 MG/DL (ref 1.7–2.3)
MCH RBC QN AUTO: 30.2 PG (ref 26.5–33)
MCHC RBC AUTO-ENTMCNC: 31.9 G/DL (ref 31.5–36.5)
MCV RBC AUTO: 95 FL (ref 78–100)
P AXIS - MUSE: 30 DEGREES
PHOSPHATE SERPL-MCNC: 3.6 MG/DL (ref 2.5–4.5)
PLATELET # BLD AUTO: 132 10E3/UL (ref 150–450)
POTASSIUM SERPL-SCNC: 4.3 MMOL/L (ref 3.4–5.3)
POTASSIUM SERPL-SCNC: 4.4 MMOL/L (ref 3.4–5.3)
PR INTERVAL - MUSE: 158 MS
QRS DURATION - MUSE: 110 MS
QT - MUSE: 418 MS
QTC - MUSE: 502 MS
R AXIS - MUSE: 39 DEGREES
RBC # BLD AUTO: 2.91 10E6/UL (ref 4.4–5.9)
SODIUM SERPL-SCNC: 137 MMOL/L (ref 135–145)
SODIUM SERPL-SCNC: 139 MMOL/L (ref 135–145)
SYSTOLIC BLOOD PRESSURE - MUSE: NORMAL MMHG
T AXIS - MUSE: 18 DEGREES
TROPONIN T SERPL HS-MCNC: 371 NG/L
TROPONIN T SERPL HS-MCNC: 388 NG/L
VENTRICULAR RATE- MUSE: 87 BPM
WBC # BLD AUTO: 15 10E3/UL (ref 4–11)

## 2025-08-01 PROCEDURE — 999N000157 HC STATISTIC RCP TIME EA 10 MIN

## 2025-08-01 PROCEDURE — 85027 COMPLETE CBC AUTOMATED: CPT | Performed by: PHYSICIAN ASSISTANT

## 2025-08-01 PROCEDURE — 94799 UNLISTED PULMONARY SVC/PX: CPT

## 2025-08-01 PROCEDURE — 200N000001 HC R&B ICU

## 2025-08-01 PROCEDURE — 93321 DOPPLER ECHO F-UP/LMTD STD: CPT | Mod: 26 | Performed by: INTERNAL MEDICINE

## 2025-08-01 PROCEDURE — 94660 CPAP INITIATION&MGMT: CPT

## 2025-08-01 PROCEDURE — 84484 ASSAY OF TROPONIN QUANT: CPT | Performed by: THORACIC SURGERY (CARDIOTHORACIC VASCULAR SURGERY)

## 2025-08-01 PROCEDURE — 272N000202 HC AEROBIKA WITH MANOMETER

## 2025-08-01 PROCEDURE — 83735 ASSAY OF MAGNESIUM: CPT | Performed by: THORACIC SURGERY (CARDIOTHORACIC VASCULAR SURGERY)

## 2025-08-01 PROCEDURE — 250N000013 HC RX MED GY IP 250 OP 250 PS 637: Performed by: PHYSICIAN ASSISTANT

## 2025-08-01 PROCEDURE — 250N000012 HC RX MED GY IP 250 OP 636 PS 637: Performed by: PHYSICIAN ASSISTANT

## 2025-08-01 PROCEDURE — 97530 THERAPEUTIC ACTIVITIES: CPT | Mod: GO

## 2025-08-01 PROCEDURE — 93308 TTE F-UP OR LMTD: CPT | Mod: 26 | Performed by: INTERNAL MEDICINE

## 2025-08-01 PROCEDURE — 250N000011 HC RX IP 250 OP 636: Performed by: THORACIC SURGERY (CARDIOTHORACIC VASCULAR SURGERY)

## 2025-08-01 PROCEDURE — 82330 ASSAY OF CALCIUM: CPT | Performed by: PHYSICIAN ASSISTANT

## 2025-08-01 PROCEDURE — 250N000011 HC RX IP 250 OP 636: Performed by: PHYSICIAN ASSISTANT

## 2025-08-01 PROCEDURE — 80048 BASIC METABOLIC PNL TOTAL CA: CPT | Performed by: PHYSICIAN ASSISTANT

## 2025-08-01 PROCEDURE — 84100 ASSAY OF PHOSPHORUS: CPT | Performed by: THORACIC SURGERY (CARDIOTHORACIC VASCULAR SURGERY)

## 2025-08-01 PROCEDURE — 93325 DOPPLER ECHO COLOR FLOW MAPG: CPT | Mod: 26 | Performed by: INTERNAL MEDICINE

## 2025-08-01 RX ORDER — CARVEDILOL 3.12 MG/1
3.12 TABLET ORAL 2 TIMES DAILY WITH MEALS
Status: DISCONTINUED | OUTPATIENT
Start: 2025-08-01 | End: 2025-08-02

## 2025-08-01 RX ORDER — BUMETANIDE 0.25 MG/ML
2 INJECTION, SOLUTION INTRAMUSCULAR; INTRAVENOUS 3 TIMES DAILY
Status: DISCONTINUED | OUTPATIENT
Start: 2025-08-01 | End: 2025-08-04

## 2025-08-01 RX ORDER — DEXTROSE MONOHYDRATE 25 G/50ML
25-50 INJECTION, SOLUTION INTRAVENOUS
Status: DISCONTINUED | OUTPATIENT
Start: 2025-08-01 | End: 2025-08-01

## 2025-08-01 RX ORDER — NICOTINE POLACRILEX 4 MG
15-30 LOZENGE BUCCAL
Status: DISCONTINUED | OUTPATIENT
Start: 2025-08-01 | End: 2025-08-01

## 2025-08-01 RX ADMIN — KETOROLAC TROMETHAMINE 15 MG: 15 INJECTION, SOLUTION INTRAMUSCULAR; INTRAVENOUS at 03:15

## 2025-08-01 RX ADMIN — ATORVASTATIN CALCIUM 80 MG: 40 TABLET, FILM COATED ORAL at 20:58

## 2025-08-01 RX ADMIN — ACETAMINOPHEN 975 MG: 325 TABLET ORAL at 12:31

## 2025-08-01 RX ADMIN — POLYETHYLENE GLYCOL 3350 17 G: 17 POWDER, FOR SOLUTION ORAL at 09:07

## 2025-08-01 RX ADMIN — ONDANSETRON 4 MG: 2 INJECTION, SOLUTION INTRAMUSCULAR; INTRAVENOUS at 02:51

## 2025-08-01 RX ADMIN — BUMETANIDE 2 MG: 0.25 INJECTION INTRAMUSCULAR; INTRAVENOUS at 09:20

## 2025-08-01 RX ADMIN — OXYCODONE HYDROCHLORIDE 10 MG: 5 TABLET ORAL at 23:47

## 2025-08-01 RX ADMIN — CYANOCOBALAMIN TAB 1000 MCG 1000 MCG: 1000 TAB at 09:07

## 2025-08-01 RX ADMIN — INSULIN ASPART 1 UNITS: 100 INJECTION, SOLUTION INTRAVENOUS; SUBCUTANEOUS at 12:32

## 2025-08-01 RX ADMIN — CARVEDILOL 3.12 MG: 3.12 TABLET, FILM COATED ORAL at 12:34

## 2025-08-01 RX ADMIN — INSULIN ASPART 2 UNITS: 100 INJECTION, SOLUTION INTRAVENOUS; SUBCUTANEOUS at 17:21

## 2025-08-01 RX ADMIN — PANTOPRAZOLE SODIUM 40 MG: 40 TABLET, DELAYED RELEASE ORAL at 09:07

## 2025-08-01 RX ADMIN — HEPARIN SODIUM 5000 UNITS: 5000 INJECTION, SOLUTION INTRAVENOUS; SUBCUTANEOUS at 12:31

## 2025-08-01 RX ADMIN — Medication 1 TABLET: at 09:07

## 2025-08-01 RX ADMIN — SENNOSIDES, DOCUSATE SODIUM 1 TABLET: 50; 8.6 TABLET, FILM COATED ORAL at 20:43

## 2025-08-01 RX ADMIN — ACETAMINOPHEN 975 MG: 325 TABLET ORAL at 20:43

## 2025-08-01 RX ADMIN — SENNOSIDES, DOCUSATE SODIUM 1 TABLET: 50; 8.6 TABLET, FILM COATED ORAL at 09:07

## 2025-08-01 RX ADMIN — INSULIN GLARGINE 19 UNITS: 100 INJECTION, SOLUTION SUBCUTANEOUS at 12:31

## 2025-08-01 RX ADMIN — CARVEDILOL 3.12 MG: 3.12 TABLET, FILM COATED ORAL at 17:16

## 2025-08-01 RX ADMIN — HEPARIN SODIUM 5000 UNITS: 5000 INJECTION, SOLUTION INTRAVENOUS; SUBCUTANEOUS at 20:43

## 2025-08-01 RX ADMIN — ONDANSETRON 4 MG: 4 TABLET, ORALLY DISINTEGRATING ORAL at 23:46

## 2025-08-01 RX ADMIN — LIDOCAINE 1 PATCH: 4 PATCH TOPICAL at 18:30

## 2025-08-01 RX ADMIN — OXYCODONE HYDROCHLORIDE 10 MG: 5 TABLET ORAL at 15:41

## 2025-08-01 RX ADMIN — BUMETANIDE 2 MG: 0.25 INJECTION INTRAMUSCULAR; INTRAVENOUS at 17:17

## 2025-08-01 RX ADMIN — BUMETANIDE 2 MG: 0.25 INJECTION INTRAMUSCULAR; INTRAVENOUS at 12:32

## 2025-08-01 RX ADMIN — ASPIRIN 81 MG CHEWABLE TABLET 162 MG: 81 TABLET CHEWABLE at 09:07

## 2025-08-01 ASSESSMENT — ACTIVITIES OF DAILY LIVING (ADL)
ADLS_ACUITY_SCORE: 54

## 2025-08-01 NOTE — PROGRESS NOTES
Care Management Follow Up    Length of Stay (days): 2    Expected Discharge Date: 08/05/2025    Anticipated Discharge Plan:   Bed hold and back to [facility]    Transportation: TBD    PT Recommendations:    OT Recommendations:  Transitional Care Facility     Barriers to Discharge: medical stability, insulin gtt    Prior Living Situation: Patient comes from [facility] where he has a bed hold. Prior he was homeless. Patient requires some assist with ADLs and assisted with all IADLs. He states he ambulates without devices. Patient's sister Rubina is primary family contact. [facility] to transport at discharge.     Discussed  Partnership in Safe Discharge Planning  document with patient/family: No     Handoff Completed: Yes, [facility] PCP: Internal handoff referral completed    Patient/Spokesperson Updated: No    Additional Information:  Per chart review, cards following. Pt on insulin gtt. Pt not med ready.     Next Steps: Monitor progression of care and review team recs.    Pako Toro RN  Acute Care Management  [facility]  For further questions/concerns you can reach us at Vocera Web Console

## 2025-08-01 NOTE — PROGRESS NOTES
"SPIRITUAL HEALTH SERVICES Progress Note  [facility], ICU    Saw pt Floyd Capps per length of stay. Floyd was lying in his chair and was tired at time of visit, so the interaction was brief. He shared about his heart surgery and that he had expected the surgery and knew it would be a \"tough recovery.\" He confirmed that he comes from [employer] [place] background. He declined offer for a prayer at this time.    Plan of Care - A  will continue to visit as able or per request by patient/family/staff.      Aroldo Aelxis MDiv, [facility]  /Manager Spiritual Health Services  [phone]       Spiritual Health Services is available 24/7 for emergent requests and consults, either by paging the on-call  or by entering an ASAP/STAT consult in [facility], which will also page the on-call .    "

## 2025-08-01 NOTE — PROGRESS NOTES
PADDY Attestation  I, Geno Howard PA-C, was present with the PADDY student who participated in the service and in the documentation of the note.  I have verified the history and personally performed the physical exam and medical decision making.  I agree with the assessment and plan of care as documented in the note.      Geno Howard PA-C  Date of Service (when I saw the patient): 08/01/2025  ______________________    CVTS Daily Progress Note   POD#2 s/p CABG x3 (LIMA-->LAD, rSVG-->HEENA, rSVG-->RPD) and MERA clippage  Attending: Dr. Boateng  LOS: 2    SUBJECTIVE/INTERVAL EVENTS:    Pt c/o chest pain and SOB last night around 10pm that was not relieved by medication. EKG found to have ST changes. Troponin elevated to 387, delta stable at 388. Echo ordered to assess for wall motion abnormalities - structures not well visualized and no obvious changes/abnormalities identified. Per cardiology, symptoms are most likely 2/2 acute pericarditis post CABG.     Otherwise pt has been normotensive since being weaned off pressors last evening. Having some episodes of apnea during sleep. Trials of BiPAP, not tolerating due to development of epistaxis. Maintaining oxygen saturation on 2-4L O2 NC/oxymask. Ambulating with therapy. Pain adequately controlled. Good UOP. - BM / - flatus. Some episodes of nausea and vomiting overnight, improved with antiemetics. Denies abdominal pain. Chest pain and SOB slightly improved from yesterday, reports the medications help. Pt has no other questions or concerns today.        OBJECTIVE:  Temp:  [97.9  F (36.6  C)-98.7  F (37.1  C)] 98.7  F (37.1  C)  Pulse:  [83-94] 88  Resp:  [11-17] 12  MAP:  [66 mmHg-164 mmHg] 96 mmHg  Arterial Line BP: ()/(44-71) 143/69  SpO2:  [87 %-100 %] 91 %  Vitals:    07/30/25 0539 07/31/25 0600   Weight: (!) 140.6 kg (310 lb) (!) 144.8 kg (319 lb 4.8 oz)       Clinically Significant Risk Factors          # Hyperchloremia: Highest Cl = 108 mmol/L  "in last 2 days, will monitor as appropriate          # Hypoalbuminemia: Lowest albumin = 2.9 g/dL at 7/30/2025  1:04 PM, will monitor as appropriate    # Coagulation Defect: INR = 1.19 (Ref range: 0.85 - 1.15) and/or PTT = 36 Seconds (Ref range: 22 - 38 Seconds), will monitor for bleeding    # Hypertension: Noted on problem list  # Heart failure with preserved ejection fraction: EF >50% and home medication list includes sacubitril/valsartan (Entresto)          # DMII: A1C = 11.3 % (Ref range: <5.7 %) within past 6 months, PRESENT ON ADMISSION  # Morbid Obesity: Estimated body mass index is 45.81 kg/m  as calculated from the following:    Height as of this encounter: 1.778 m (5' 10\").    Weight as of this encounter: 144.8 kg (319 lb 4.8 oz)., PRESENT ON ADMISSION       # Financial/Environmental Concerns:     # History of CABG: noted on surgical history          Current Medications:    Scheduled Meds:  Current Facility-Administered Medications   Medication Dose Route Frequency Provider Last Rate Last Admin    acetaminophen (TYLENOL) Suppository 650 mg  650 mg Rectal Q8H Geno Howard PA-C   650 mg at 07/30/25 1339    acetaminophen (TYLENOL) tablet 975 mg  975 mg Oral Q8H Geno Howard PA-C   975 mg at 07/31/25 2027    aspirin (ASA) chewable tablet 162 mg  162 mg Oral or NG Tube Daily Geno Howard PA-C   162 mg at 08/01/25 0907    atorvastatin (LIPITOR) tablet 80 mg  80 mg Oral At Bedtime Geno Howard PA-C   80 mg at 07/31/25 2028    bumetanide (BUMEX) injection 2 mg  2 mg Intravenous TID Geno Howard PA-C   2 mg at 08/01/25 0920    [Held by provider] clopidogrel (PLAVIX) tablet 75 mg  75 mg Oral Daily Geno Howard PA-C        cyanocobalamin (VITAMIN B-12) tablet 1,000 mcg  1,000 mcg Oral Daily Geno Howard PA-C   1,000 mcg at 08/01/25 0907    [Held by provider] empagliflozin (JARDIANCE) tablet 10 mg  10 mg Oral Daily Geno Howard, " JULIA        heparin ANTICOAGULANT injection 5,000 Units  5,000 Units Subcutaneous Q8H Geno Howard PA-C   5,000 Units at 07/31/25 2028    Lidocaine (LIDOCARE) 4 % Patch 1-2 patch  1-2 patch Transdermal Q24H Geno Howard PA-C   1 patch at 07/31/25 1826    [Held by provider] metFORMIN (GLUCOPHAGE XR) 24 hr tablet 500 mg  500 mg Oral Daily with supper Geno Howard PA-C        multivitamin w/minerals (THERA-VIT-M) tablet 1 tablet  1 tablet Oral Daily Geno Howard PA-C   1 tablet at 08/01/25 0907    pantoprazole (PROTONIX) EC tablet 40 mg  40 mg Oral Daily Geno Howard PA-C   40 mg at 08/01/25 0907    polyethylene glycol (MIRALAX) Packet 17 g  17 g Oral Daily Geno Howard PA-C   17 g at 08/01/25 0907    [Held by provider] sacubitril-valsartan (ENTRESTO) 24-26 MG per tablet 1 tablet  1 tablet Oral BID Geno Howard PA-C        senna-docusate (SENOKOT-S/PERICOLACE) 8.6-50 MG per tablet 1 tablet  1 tablet Oral BID Geno Howard PA-C   1 tablet at 08/01/25 0907     Continuous Infusions:  Current Facility-Administered Medications   Medication Dose Route Frequency Provider Last Rate Last Admin    insulin regular (MYXREDLIN) 1 unit/mL infusion  0-24 Units/hr Intravenous Continuous Geno Howard PA-C 3 mL/hr at 08/01/25 0924 3 Units/hr at 08/01/25 0924     PRN Meds:.  Current Facility-Administered Medications   Medication Dose Route Frequency Provider Last Rate Last Admin    bisacodyl (DULCOLAX) suppository 10 mg  10 mg Rectal Daily PRN Geno Howard PA-C        calcium gluconate 1 g in 50 mL in sodium chloride intermittent infusion  1 g Intravenous Once PRN Geno Howard PA-C        calcium gluconate 2 g in  mL intermittent infusion  2 g Intravenous Once PRN Geno Howard PA-C        calcium gluconate 3 g in sodium chloride 0.9 % 100 mL intermittent infusion  3 g Intravenous Once PRN Geon Howard  JULIA Oliveira        glucose gel 15-30 g  15-30 g Oral Q15 Min PRN Geno Howard PA-C        Or    dextrose 50 % injection 25-50 mL  25-50 mL Intravenous Q15 Min PRN Geno Howard PA-C        Or    glucagon injection 1 mg  1 mg Subcutaneous Q15 Min PRN Geno Howard PA-C        hydrALAZINE (APRESOLINE) injection 10 mg  10 mg Intravenous Q30 Min PRN Geno Howard PA-C        HYDROmorphone (DILAUDID) injection 0.2 mg  0.2 mg Intravenous Q2H PRN Geno Howard PA-C   0.2 mg at 07/31/25 1325    Or    HYDROmorphone (DILAUDID) injection 0.4 mg  0.4 mg Intravenous Q2H PRN Geno Howard PA-C   0.4 mg at 07/31/25 2027    lactated ringers BOLUS 250 mL  250 mL Intravenous Q15 Min PRN Geno Howard PA-C        magnesium hydroxide (MILK OF MAGNESIA) suspension 30 mL  30 mL Oral Daily PRN Geno Howard PA-C        methocarbamol (ROBAXIN) tablet 500 mg  500 mg Oral 4x Daily PRN Geno Howard PA-C   500 mg at 07/31/25 1126    naloxone (NARCAN) injection 0.2 mg  0.2 mg Intravenous Q2 Min PRN Haydee Boateng MD        Or    naloxone (NARCAN) injection 0.4 mg  0.4 mg Intravenous Q2 Min PRN Haydee Boateng MD        Or    naloxone (NARCAN) injection 0.2 mg  0.2 mg Intramuscular Q2 Min PRN Hadyee Boateng MD        Or    naloxone (NARCAN) injection 0.4 mg  0.4 mg Intramuscular Q2 Min PRN Haydee Boateng MD        nitroGLYcerin (NITROSTAT) sublingual tablet 0.4 mg  0.4 mg Sublingual Q5 Min PRN Feliciano Ontiveros MD        ondansetron (ZOFRAN ODT) ODT tab 4 mg  4 mg Oral Q6H PRN Geno Howard PA-C        Or    ondansetron (ZOFRAN) injection 4 mg  4 mg Intravenous Q6H PRN Geno Howard PA-C   4 mg at 08/01/25 0251    oxyCODONE (ROXICODONE) tablet 5 mg  5 mg Oral Q4H PRN Geno Howard PA-C        Or    oxyCODONE (ROXICODONE) tablet 10 mg  10 mg Oral Q4H PRN Geno Howard PA-C   10 mg at  07/31/25 0403    prochlorperazine (COMPAZINE) injection 10 mg  10 mg Intravenous Q6H PRN Geno Howard PA-C   10 mg at 07/31/25 0729    Or    prochlorperazine (COMPAZINE) tablet 10 mg  10 mg Oral Q6H PRN Geno Howard PA-C           Cardiographics:    Telemetry monitoring demonstrates NSR with rates in the 90s per my personal review.    Imaging:  Results for orders placed or performed during the hospital encounter of 07/30/25   XR Chest Port 1 View    Impression    IMPRESSION: Negative chest.   XR Chest Port 1 View    Impression    IMPRESSION: The endotracheal tube tip is 6.4 cm from the edelmira. An NG or OG tube terminates off the field-of-view. A right IJ sheath transmits a [place] catheter which terminates in the lower pulmonary artery. Lung volumes are low. No pleural fluid or   pneumothorax. The heart is not well assessed. Sternotomy suture wires are present. There are degenerative changes of the left shoulder. Old healed right clavicle fracture is suspected.   XR Chest Port 1 View    Impression    IMPRESSION: Interval removal endotracheal and enteric tube. Median sternotomy. Mediastinal drain, right chest 2 views. No significant effusion. Right IJ sheath. Remote fracture right clavicle.   XR Abdomen Port 1 View    Impression    IMPRESSION: An OG tube terminates in the stomach. Subxiphoid drains are noted.       Labs, personally reviewed.  Hemoglobin   Date Value Ref Range Status   08/01/2025 8.8 (L) 13.3 - 17.7 g/dL Final   07/31/2025 10.3 (L) 13.3 - 17.7 g/dL Final   07/30/2025 10.4 (L) 13.3 - 17.7 g/dL Final     Hemoglobin POCT   Date Value Ref Range Status   07/30/2025 12.6 (L) 13.3 - 17.7 g/dL Final   07/30/2025 12.0 (L) 13.3 - 17.7 g/dL Final   07/30/2025 10.2 (L) 13.3 - 17.7 g/dL Final     WBC Count   Date Value Ref Range Status   08/01/2025 15.0 (H) 4.0 - 11.0 10e3/uL Final   07/31/2025 22.8 (H) 4.0 - 11.0 10e3/uL Final   07/30/2025 25.7 (H) 4.0 - 11.0 10e3/uL Final     Platelet  Count   Date Value Ref Range Status   08/01/2025 132 (L) 150 - 450 10e3/uL Final   07/31/2025 207 150 - 450 10e3/uL Final   07/30/2025 187 150 - 450 10e3/uL Final     Creatinine   Date Value Ref Range Status   08/01/2025 1.25 (H) 0.67 - 1.17 mg/dL Final   07/31/2025 1.23 (H) 0.67 - 1.17 mg/dL Final   07/31/2025 1.28 (H) 0.67 - 1.17 mg/dL Final     Potassium   Date Value Ref Range Status   08/01/2025 4.4 3.4 - 5.3 mmol/L Final   07/31/2025 4.5 3.4 - 5.3 mmol/L Final   07/31/2025 4.4 3.4 - 5.3 mmol/L Final     Potassium POCT   Date Value Ref Range Status   07/30/2025 4.8 3.4 - 5.3 mmol/L Final   07/30/2025 4.8 3.4 - 5.3 mmol/L Final   07/30/2025 4.9 3.4 - 5.3 mmol/L Final     Magnesium   Date Value Ref Range Status   08/01/2025 2.2 1.7 - 2.3 mg/dL Final   07/31/2025 2.2 1.7 - 2.3 mg/dL Final   07/31/2025 2.3 1.7 - 2.3 mg/dL Final          I/O:  I/O last 3 completed shifts:  In: 1840.6 [P.O.:840; I.V.:1000.6]  Out: 2815 [Urine:1775; Chest Tube:1040]       Physical Exam:    General: Patient seen up in chair. Awake, alert, in no acute distress, conversant.   CV: RRR on monitor. Mild edema of bilateral lower extremities, improving. Incision C/D/I. Absent posterior tibialis pulses bilaterally.   Pulm: Non-labored effort on 4L NC. Chest tubes in place, serosanguinous output, no air leak.  Abd: Soft, NT, ND  : Cazares with clear yellow urine  Ext: Mild pedal edema, SCDs in place, warm and moist.  Neuro: CNs grossly intact. Moves all 4 extremities. Bilateral lower extremity sensation diminished but intact.   Psych: Restless, a bit irritable. Answering mostly with yes/no today.     ASSESSMENT/PLAN:    Floyd Capps is a 49 year old male with a history of HFrEF (EF 40-45% from TTE 5/2025), ischemic CVA on 8/2023 and 3/2025, poorly-controlled and insulin-dependent T2DM with diabetic neuropathy with foot wound on R heel, and CKD stage 3 who is s/p CABG x3 and MERA clippage with Dr. Boateng on 7/30/2025.    Principal Problem:     Coronary artery disease involving autologous artery coronary bypass graft without angina pectoris        NEURO:   - Scheduled Tylenol/lidocaine patches and PRN Tylenol/oxycodone/dilaudid/robaxin for pain  - Added IV Toradol 15mg q6h prn for pain     CV:   - Pre-op EF 40-45% from TTE; intra-op GAIL EF 35%  - Normotensive off pressors  - Will resume PTA Coreg at lower dose (3.12mg BID) today with hold parameters  - ASA 162mg daily, will decrease to 81mg when Plavix is resumed  - Atorvastatin 80mg daily  - Resume PTA Bumex for diuresis s/p CABG  - Chest tubes to remain today  - Will resume PTA Plavix 75mg when TPW are removed (Previously rx for severe CAD with complication of CVA)   - Note: Groin incision closed with staples - remove prior to discharge/at follow up appt  - Hold PTA Entresto   - Added SL nitroglycerin 0.4mg prn for chest pain     PULM:   - Extubated on POD#0  - Maintaining oxygen saturations on 4L nasal cannula  - Intermittent episodes of apnea during sleep, closely monitor  - Encourage pulmonary toilet    FEN/GI:  - Continue electrolyte replacement protocol  - Cardiac; ADAT  - Bowel regimen    RENAL:  - Adequate UOP/hr. Continue to monitor closely.  - Cr 1.25, baseline 1.3-1.5  - Cazares to be removed today  - Diuresis with IV Bumex 2mg TID until baseline weight  - BMP q12h for now     HEME:  - Acute blood loss anemia post-op.   - Hgb stable, no bleeding concerns. Hep SQ, ASA  - Daily CBC in AM    ID:  - Luly op ppx complete, afebrile. No concerns for infection    ENDO:   - Most recent A1c 11.3% on 5/28/2025. PTA takes 55 units insulin of Lantus at bedtime + Novolog.  - Transition to sliding scale insulin + Lantus (19 units)  - Hold PTA metformin and jardiance, consider starting tomorrow pending renal labs    PPx:   - DVT: SCDs, SQ heparin TID, ambulation   - GI: Protonix 40mg IV/PO daily    DISPO:   - Continue critical care in ICU  - Medically Ready for Discharge: Anticipated in 5+ Days     Shandra Matson  PA Student   [facility]     Patient discussed with Dr. Coates.

## 2025-08-01 NOTE — PLAN OF CARE
[facility] - ICU    RN Progress Note:            Pertinent Assessments:      Please refer to flowsheet rows for full assessment     VSS   Afebrile  Normotensive  C/O moderate to severe pain, relieved with Oxycodone 10mg PRN             Key Events - This Shift:   Cazares Catheter removed, no void at this time, will continue to monitor.    Ok to remove central lines, patient in recliner at this time.  Will pull lines once back in bed.         RN Managed Protocols Ordered:  Yes  Protocols:Potassium, Magnesium, and Phosphorus  PRN'S:iCal  Protocols Status: Reviewed with Oncoming RN                Barriers to Discharge / Downgrade:                Problem: Adult Inpatient Plan of Care  Goal: Absence of Hospital-Acquired Illness or Injury  Intervention: Identify and Manage Fall Risk  Recent Flowsheet Documentation  Taken 8/1/2025 1600 by Cristiano Sheppard, RN  Safety Promotion/Fall Prevention:   activity supervised   assistive device/personal items within reach   clutter free environment maintained   nonskid shoes/slippers when out of bed   room near nurse's station   room organization consistent   safety round/check completed   supervised activity  Taken 8/1/2025 1200 by Cristiano Shepprad, RN  Safety Promotion/Fall Prevention:   activity supervised   assistive device/personal items within reach   clutter free environment maintained   nonskid shoes/slippers when out of bed   room near nurse's station   room organization consistent   safety round/check completed   supervised activity  Taken 8/1/2025 0800 by Cristiano Sheppard, RN  Safety Promotion/Fall Prevention:   activity supervised   assistive device/personal items within reach   clutter free environment maintained   nonskid shoes/slippers when out of bed   room near nurse's station   room organization consistent   safety round/check completed   supervised activity  Intervention: Prevent Skin Injury  Recent Flowsheet Documentation  Taken 8/1/2025 184  by Cristiano Sheppard, RN  Body Position: turned  Taken 8/1/2025 1648 by Cristiano Sheppard, RN  Body Position: turned  Taken 8/1/2025 1600 by Cristiano Sheppard, RN  Skin Protection:   incontinence pads utilized   silicone foam dressing in place   transparent dressing maintained   tubing/devices free from skin contact  Taken 8/1/2025 1248 by Cristiano Sheppard, RN  Body Position: turned  Taken 8/1/2025 1200 by Cristiano Sheppard, RN  Skin Protection:   incontinence pads utilized   silicone foam dressing in place   transparent dressing maintained   tubing/devices free from skin contact  Taken 8/1/2025 0800 by Cristiano Sheppard, RN  Skin Protection:   incontinence pads utilized   silicone foam dressing in place   transparent dressing maintained   tubing/devices free from skin contact  Intervention: Prevent and Manage VTE (Venous Thromboembolism) Risk  Recent Flowsheet Documentation  Taken 8/1/2025 1600 by Cristiano Sheppard, RN  VTE Prevention/Management: SCDs off (sequential compression devices)  Taken 8/1/2025 1200 by Cristiano Sheppard, HAM  VTE Prevention/Management: SCDs off (sequential compression devices)  Taken 8/1/2025 0800 by Cristiano Sheppard, HAM  VTE Prevention/Management: SCDs off (sequential compression devices)  Goal: Optimal Comfort and Wellbeing  Intervention: Provide Person-Centered Care  Recent Flowsheet Documentation  Taken 8/1/2025 1600 by Cristiano Sheppard, RN  Trust Relationship/Rapport: care explained  Taken 8/1/2025 1200 by Cristiano Sheppard, RN  Trust Relationship/Rapport: care explained  Taken 8/1/2025 0800 by Cristiano Sheppard, RN  Trust Relationship/Rapport: care explained   Goal Outcome Evaluation:

## 2025-08-01 NOTE — PLAN OF CARE
"[facility] - ICU    RN Progress Note:            Pertinent Assessments:      Please refer to flowsheet rows for full assessment     ***           Key Events - This Shift:       At 20:00 patient c/o chest pain with SOB, CV surgeon paged. RN was advised to obtain an EKG and administer pain medication (see MAR). Pain not alleviated by pain medication. EKG obtained. Dr. Ontiveros requested RN page cardiology to call him directly to discuss EKG findings. Cardiology ordered labs and an echocardiogram. Additional pain medication administered (see MAR) with minimal relief of symptoms.     RN Managed Protocols Ordered:  Yes  Protocols:Potassium, Magnesium, and Phosphorus  PRN'S:iCal  Protocols Status: {Protocol Status:821391::\"In Progress\"}                Barriers to Discharge / Downgrade:     ***         Point of Contact Update: YES-OR-NO: Yes  If No, reason: ***  Name:***  Phone Number:***  Summary of Conversation: ***         Problem: Adult Inpatient Plan of Care  Goal: Absence of Hospital-Acquired Illness or Injury  Intervention: Prevent Skin Injury  Recent Flowsheet Documentation  Taken 7/31/2025 2027 by Carey Flynn RN  Body Position: position changed independently  Goal: Optimal Comfort and Wellbeing  Intervention: Monitor Pain and Promote Comfort  Recent Flowsheet Documentation  Taken 7/31/2025 2000 by Carey Flynn RN  Pain Management Interventions: medication (see MAR)     Problem: Restraint, Nonviolent  Goal: Absence of Harm or Injury  Intervention: Protect Skin and Joint Integrity  Recent Flowsheet Documentation  Taken 7/31/2025 2027 by Carey Flynn RN  Body Position: position changed independently     Problem: Cardiovascular Surgery  Goal: Acceptable Pain Control  Intervention: Prevent or Manage Pain  Recent Flowsheet Documentation  Taken 7/31/2025 2000 by Carey Flynn RN  Pain Management Interventions: medication (see MAR)     Problem: Pain Acute  Goal: " Optimal Pain Control and Function  Intervention: Develop Pain Management Plan  Recent Flowsheet Documentation  Taken 7/31/2025 2000 by Carey Flynn, RN  Pain Management Interventions: medication (see MAR)     Problem: Skin Injury Risk Increased  Goal: Skin Health and Integrity  Intervention: Optimize Skin Protection  Recent Flowsheet Documentation  Taken 7/31/2025 2000 by Carey Flynn, RN  Activity Management:   activity adjusted per tolerance   up in chair

## 2025-08-01 NOTE — PROGRESS NOTES
Preliminary echo: mild lv systolic dysfunction, inferior lateral hypokinesia to akinesia(old), no significant effusions.  Based on ecg and echo suspect acute pericarditis post CABG. Use toredol prn for pain

## 2025-08-02 ENCOUNTER — APPOINTMENT (OUTPATIENT)
Dept: OCCUPATIONAL THERAPY | Facility: HOSPITAL | Age: 50
End: 2025-08-02
Attending: THORACIC SURGERY (CARDIOTHORACIC VASCULAR SURGERY)
Payer: COMMERCIAL

## 2025-08-02 LAB
ANION GAP SERPL CALCULATED.3IONS-SCNC: 5 MMOL/L (ref 7–15)
BUN SERPL-MCNC: 25.8 MG/DL (ref 6–20)
CA-I BLD-MCNC: 4.9 MG/DL (ref 4.4–5.2)
CALCIUM SERPL-MCNC: 9 MG/DL (ref 8.8–10.4)
CHLORIDE SERPL-SCNC: 103 MMOL/L (ref 98–107)
CREAT SERPL-MCNC: 1.2 MG/DL (ref 0.67–1.17)
EGFRCR SERPLBLD CKD-EPI 2021: 73 ML/MIN/1.73M2
ERYTHROCYTE [DISTWIDTH] IN BLOOD BY AUTOMATED COUNT: 13.2 % (ref 10–15)
GLUCOSE BLDC GLUCOMTR-MCNC: 198 MG/DL (ref 70–99)
GLUCOSE BLDC GLUCOMTR-MCNC: 201 MG/DL (ref 70–99)
GLUCOSE BLDC GLUCOMTR-MCNC: 208 MG/DL (ref 70–99)
GLUCOSE BLDC GLUCOMTR-MCNC: 213 MG/DL (ref 70–99)
GLUCOSE SERPL-MCNC: 187 MG/DL (ref 70–99)
HCO3 SERPL-SCNC: 28 MMOL/L (ref 22–29)
HCT VFR BLD AUTO: 28.8 % (ref 40–53)
HGB BLD-MCNC: 9.1 G/DL (ref 13.3–17.7)
MAGNESIUM SERPL-MCNC: 2.1 MG/DL (ref 1.7–2.3)
MCH RBC QN AUTO: 30.4 PG (ref 26.5–33)
MCHC RBC AUTO-ENTMCNC: 31.6 G/DL (ref 31.5–36.5)
MCV RBC AUTO: 96 FL (ref 78–100)
PHOSPHATE SERPL-MCNC: 2.8 MG/DL (ref 2.5–4.5)
PLATELET # BLD AUTO: 159 10E3/UL (ref 150–450)
POTASSIUM SERPL-SCNC: 4.3 MMOL/L (ref 3.4–5.3)
RBC # BLD AUTO: 2.99 10E6/UL (ref 4.4–5.9)
SODIUM SERPL-SCNC: 136 MMOL/L (ref 135–145)
WBC # BLD AUTO: 13.2 10E3/UL (ref 4–11)

## 2025-08-02 PROCEDURE — 82330 ASSAY OF CALCIUM: CPT | Performed by: PHYSICIAN ASSISTANT

## 2025-08-02 PROCEDURE — 83735 ASSAY OF MAGNESIUM: CPT | Performed by: THORACIC SURGERY (CARDIOTHORACIC VASCULAR SURGERY)

## 2025-08-02 PROCEDURE — 97110 THERAPEUTIC EXERCISES: CPT | Mod: GO

## 2025-08-02 PROCEDURE — 84100 ASSAY OF PHOSPHORUS: CPT | Performed by: THORACIC SURGERY (CARDIOTHORACIC VASCULAR SURGERY)

## 2025-08-02 PROCEDURE — 999N000157 HC STATISTIC RCP TIME EA 10 MIN

## 2025-08-02 PROCEDURE — 94799 UNLISTED PULMONARY SVC/PX: CPT

## 2025-08-02 PROCEDURE — 36415 COLL VENOUS BLD VENIPUNCTURE: CPT | Performed by: PHYSICIAN ASSISTANT

## 2025-08-02 PROCEDURE — 210N000001 HC R&B IMCU HEART CARE

## 2025-08-02 PROCEDURE — 94660 CPAP INITIATION&MGMT: CPT

## 2025-08-02 PROCEDURE — 85027 COMPLETE CBC AUTOMATED: CPT | Performed by: PHYSICIAN ASSISTANT

## 2025-08-02 PROCEDURE — 250N000013 HC RX MED GY IP 250 OP 250 PS 637: Performed by: PHYSICIAN ASSISTANT

## 2025-08-02 PROCEDURE — 80048 BASIC METABOLIC PNL TOTAL CA: CPT | Performed by: PHYSICIAN ASSISTANT

## 2025-08-02 PROCEDURE — 250N000011 HC RX IP 250 OP 636: Mod: JZ | Performed by: PHYSICIAN ASSISTANT

## 2025-08-02 RX ORDER — TAMSULOSIN HYDROCHLORIDE 0.4 MG/1
0.4 CAPSULE ORAL DAILY
Status: DISCONTINUED | OUTPATIENT
Start: 2025-08-02 | End: 2025-08-05

## 2025-08-02 RX ORDER — GUAIFENESIN 600 MG/1
600 TABLET, EXTENDED RELEASE ORAL 2 TIMES DAILY
Status: DISCONTINUED | OUTPATIENT
Start: 2025-08-02 | End: 2025-08-07 | Stop reason: HOSPADM

## 2025-08-02 RX ORDER — CARVEDILOL 3.12 MG/1
3.12 TABLET ORAL ONCE
Status: COMPLETED | OUTPATIENT
Start: 2025-08-02 | End: 2025-08-02

## 2025-08-02 RX ORDER — DEXTROSE MONOHYDRATE 25 G/50ML
25-50 INJECTION, SOLUTION INTRAVENOUS
Status: DISCONTINUED | OUTPATIENT
Start: 2025-08-02 | End: 2025-08-07 | Stop reason: HOSPADM

## 2025-08-02 RX ORDER — NICOTINE POLACRILEX 4 MG
15-30 LOZENGE BUCCAL
Status: DISCONTINUED | OUTPATIENT
Start: 2025-08-02 | End: 2025-08-07 | Stop reason: HOSPADM

## 2025-08-02 RX ORDER — METFORMIN HYDROCHLORIDE 500 MG/1
500 TABLET, EXTENDED RELEASE ORAL
Status: DISCONTINUED | OUTPATIENT
Start: 2025-08-02 | End: 2025-08-05

## 2025-08-02 RX ORDER — CARVEDILOL 3.12 MG/1
6.25 TABLET ORAL 2 TIMES DAILY WITH MEALS
Status: DISCONTINUED | OUTPATIENT
Start: 2025-08-02 | End: 2025-08-04

## 2025-08-02 RX ADMIN — INSULIN ASPART 3 UNITS: 100 INJECTION, SOLUTION INTRAVENOUS; SUBCUTANEOUS at 15:43

## 2025-08-02 RX ADMIN — CYANOCOBALAMIN TAB 1000 MCG 1000 MCG: 1000 TAB at 08:22

## 2025-08-02 RX ADMIN — TAMSULOSIN HYDROCHLORIDE 0.4 MG: 0.4 CAPSULE ORAL at 11:52

## 2025-08-02 RX ADMIN — BUMETANIDE 2 MG: 0.25 INJECTION INTRAMUSCULAR; INTRAVENOUS at 08:22

## 2025-08-02 RX ADMIN — PANTOPRAZOLE SODIUM 40 MG: 40 TABLET, DELAYED RELEASE ORAL at 08:23

## 2025-08-02 RX ADMIN — OXYCODONE HYDROCHLORIDE 10 MG: 5 TABLET ORAL at 17:23

## 2025-08-02 RX ADMIN — ACETAMINOPHEN 975 MG: 325 TABLET ORAL at 11:52

## 2025-08-02 RX ADMIN — CARVEDILOL 3.12 MG: 3.12 TABLET, FILM COATED ORAL at 08:23

## 2025-08-02 RX ADMIN — INSULIN ASPART 3 UNITS: 100 INJECTION, SOLUTION INTRAVENOUS; SUBCUTANEOUS at 11:57

## 2025-08-02 RX ADMIN — HEPARIN SODIUM 5000 UNITS: 5000 INJECTION, SOLUTION INTRAVENOUS; SUBCUTANEOUS at 11:57

## 2025-08-02 RX ADMIN — Medication 1 TABLET: at 08:23

## 2025-08-02 RX ADMIN — CARVEDILOL 3.12 MG: 3.12 TABLET, FILM COATED ORAL at 11:52

## 2025-08-02 RX ADMIN — SENNOSIDES, DOCUSATE SODIUM 1 TABLET: 50; 8.6 TABLET, FILM COATED ORAL at 08:23

## 2025-08-02 RX ADMIN — INSULIN ASPART 3 UNITS: 100 INJECTION, SOLUTION INTRAVENOUS; SUBCUTANEOUS at 08:23

## 2025-08-02 RX ADMIN — GUAIFENESIN 600 MG: 600 TABLET ORAL at 20:10

## 2025-08-02 RX ADMIN — LIDOCAINE 2 PATCH: 4 PATCH TOPICAL at 17:23

## 2025-08-02 RX ADMIN — METFORMIN ER 500 MG 500 MG: 500 TABLET ORAL at 15:55

## 2025-08-02 RX ADMIN — HEPARIN SODIUM 5000 UNITS: 5000 INJECTION, SOLUTION INTRAVENOUS; SUBCUTANEOUS at 06:05

## 2025-08-02 RX ADMIN — POLYETHYLENE GLYCOL 3350 17 G: 17 POWDER, FOR SOLUTION ORAL at 08:23

## 2025-08-02 RX ADMIN — BUMETANIDE 2 MG: 0.25 INJECTION INTRAMUSCULAR; INTRAVENOUS at 15:55

## 2025-08-02 RX ADMIN — CARVEDILOL 6.25 MG: 3.12 TABLET, FILM COATED ORAL at 15:55

## 2025-08-02 RX ADMIN — BUMETANIDE 2 MG: 0.25 INJECTION INTRAMUSCULAR; INTRAVENOUS at 11:57

## 2025-08-02 RX ADMIN — ACETAMINOPHEN 975 MG: 325 TABLET ORAL at 06:04

## 2025-08-02 RX ADMIN — ATORVASTATIN CALCIUM 80 MG: 40 TABLET, FILM COATED ORAL at 20:10

## 2025-08-02 RX ADMIN — GUAIFENESIN 600 MG: 600 TABLET ORAL at 12:11

## 2025-08-02 RX ADMIN — ACETAMINOPHEN 975 MG: 325 TABLET ORAL at 20:10

## 2025-08-02 RX ADMIN — ASPIRIN 81 MG CHEWABLE TABLET 162 MG: 81 TABLET CHEWABLE at 08:23

## 2025-08-02 RX ADMIN — HEPARIN SODIUM 5000 UNITS: 5000 INJECTION, SOLUTION INTRAVENOUS; SUBCUTANEOUS at 20:12

## 2025-08-02 RX ADMIN — SENNOSIDES, DOCUSATE SODIUM 1 TABLET: 50; 8.6 TABLET, FILM COATED ORAL at 20:11

## 2025-08-02 ASSESSMENT — ACTIVITIES OF DAILY LIVING (ADL)
ADLS_ACUITY_SCORE: 53
ADLS_ACUITY_SCORE: 53
ADLS_ACUITY_SCORE: 54
ADLS_ACUITY_SCORE: 53
ADLS_ACUITY_SCORE: 52
ADLS_ACUITY_SCORE: 54
ADLS_ACUITY_SCORE: 52
ADLS_ACUITY_SCORE: 52
ADLS_ACUITY_SCORE: 53
ADLS_ACUITY_SCORE: 53
ADLS_ACUITY_SCORE: 54
ADLS_ACUITY_SCORE: 54
ADLS_ACUITY_SCORE: 52
ADLS_ACUITY_SCORE: 53
ADLS_ACUITY_SCORE: 53
ADLS_ACUITY_SCORE: 54
ADLS_ACUITY_SCORE: 52
ADLS_ACUITY_SCORE: 53
ADLS_ACUITY_SCORE: 54
ADLS_ACUITY_SCORE: 53
ADLS_ACUITY_SCORE: 54

## 2025-08-02 NOTE — PROGRESS NOTES
Care Management Follow Up    Length of Stay (days): 3    Expected Discharge Date: 08/06/2025    Anticipated Discharge Plan:   Transitional care (TCU) is recommended for continued therapy and skilled nursing.    Transportation: Anticipate medical transport    PT Recommendations:    OT Recommendations:  Transitional Care Facility     Barriers to Discharge: medical stability, o2 needs.    Prior Living Situation: extended care facility with facility resident    Discussed  Partnership in Safe Discharge Planning  document with patient/family: No     Handoff Completed: Yes, [facility] PCP: Internal handoff referral completed    Patient/Spokesperson Updated: No    Additional Information:  Patient comes to us from [facility] where he has a bed hold in place. Prior to that, he was homeless. Patient has Blue Plus MA. Patient requires some assist with activities of daily living and assist with all instrumental activities of daily living (IADLs). He stated he ambulates without devices. Patient's sister Rubina is primary family contact.   Plan [facility] to transport at discharge.     Next Steps: CM will continue to monitor progression of care, review team recommendations and provide discharge planning assist as needed.      Mariaelena Millan RN  Acute Care Management  [facility]  For further questions/concerns you can reach us at Vocera Web Console

## 2025-08-02 NOTE — PLAN OF CARE
Problem: Cardiovascular Surgery  Goal: Improved Activity Tolerance  Outcome: Progressing  Goal: Optimal Coping with Heart Surgery  Outcome: Progressing  Goal: Absence of Bleeding  Outcome: Progressing  Goal: Effective Bowel Elimination  Outcome: Progressing  Goal: Effective Cardiac Function  Outcome: Progressing  Goal: Optimal Cerebral Tissue Perfusion  Outcome: Progressing  Goal: Fluid and Electrolyte Balance  Outcome: Progressing  Goal: Blood Glucose Level Within Targeted Range  Outcome: Progressing  Goal: Absence of Infection Signs and Symptoms  Outcome: Progressing  Goal: Anesthesia/Sedation Recovery  Outcome: Progressing  Goal: Acceptable Pain Control  Outcome: Progressing  Goal: Nausea and Vomiting Relief  Outcome: Progressing  Goal: Effective Urinary Elimination  Outcome: Progressing  Goal: Effective Oxygenation and Ventilation  Outcome: Progressing     Problem: Comorbidity Management  Goal: Blood Glucose Levels Within Targeted Range  Outcome: Progressing  Goal: Maintenance of Heart Failure Symptom Control  Outcome: Progressing  Goal: Blood Pressure in Desired Range  Outcome: Progressing     Problem: Skin Injury Risk Increased  Goal: Skin Health and Integrity  Intervention: Optimize Skin Protection  Recent Flowsheet Documentation  Taken 8/2/2025 1309 by Darleen Mendoza RN  Activity Management: activity adjusted per tolerance     Problem: Risk for Delirium  Goal: Optimal Coping  Outcome: Progressing  Goal: Improved Behavioral Control  Outcome: Progressing  Goal: Improved Attention and Thought Clarity  Outcome: Progressing  Goal: Improved Sleep  Outcome: Progressing   Goal Outcome Evaluation:  Grouping cares upon arrival was beneficial for improved mood & cooperation.  Improved recliner is now available to pt. Pt request door and curtain to remain closed. Contact Isolation precautions in progress. Will continue to monitor pt's needs. 2nd RN skin check was not done, Meplix was removed from buttocks, skin is  intact. Pt has had poor PO intake, Writer encouraged protein for healing. Will continue to group cares for pt's needs.

## 2025-08-02 NOTE — PROGRESS NOTES
PADDY Attestation  I, Geno Howard PA-C, was present with the PADDY student who participated in the service and in the documentation of the note.  I have verified the history and personally performed the physical exam and medical decision making.  I agree with the assessment and plan of care as documented in the note.      Geno Howard PA-C  Date of Service (when I saw the patient): 08/02/2025    ______________________    CVTS Daily Progress Note   POD#3 s/p CABG x3 (LIMA-->LAD, rSVG-->HEENA, rSVG-->RPD) and MERA clippage  Attending: Dr. Boateng  LOS: 3    SUBJECTIVE/INTERVAL EVENTS:    Slowly making some progression, however pt remains somewhat difficult with staff and unmotivated with his cares at times. Normotensive/hypertensive overnight, off pressors, on Coreg. Central lines and donovan catheter removed last night. ISC x1 overnight, however able to void with straining and intermittent stream in AM. Continues to desats to the 40s in deep sleep on 6L oxymask, refusing to wear BiPAP. Transfers with therapy. Some surgical pain, improving. - BM / + flatus. Chest output appropriate and serosanguineous. Denies chest pain, N/V, abdominal pain, calf pain. Reports some SOB and productive cough, would like to try some Mucinex. No other questions or concerns at this time. Thoroughly encouraged patient to ambulate and participate in therapies today.       OBJECTIVE:  Temp:  [98.5  F (36.9  C)] 98.5  F (36.9  C)  Pulse:  [83-93] 88  Resp:  [18] 18  BP: (111-156)/(57-74) 144/69  MAP:  [85 mmHg-99 mmHg] 87 mmHg  Arterial Line BP: (122-144)/(59-72) 135/59  SpO2:  [79 %-100 %] 100 %  Vitals:    07/30/25 0539 07/31/25 0600 08/02/25 0604   Weight: (!) 140.6 kg (310 lb) (!) 144.8 kg (319 lb 4.8 oz) (!) 147.6 kg (325 lb 6.4 oz)       Clinically Significant Risk Factors          # Hyperchloremia: Highest Cl = 108 mmol/L in last 2 days, will monitor as appropriate          # Hypoalbuminemia: Lowest albumin = 2.9 g/dL at  "7/30/2025  1:04 PM, will monitor as appropriate       # Hypertension: Noted on problem list  # Heart failure with preserved ejection fraction: EF >50% and home medication list includes sacubitril/valsartan (Entresto)          # DMII: A1C = 11.3 % (Ref range: <5.7 %) within past 6 months, PRESENT ON ADMISSION  # Morbid Obesity: Estimated body mass index is 46.69 kg/m  as calculated from the following:    Height as of this encounter: 1.778 m (5' 10\").    Weight as of this encounter: 147.6 kg (325 lb 6.4 oz)., PRESENT ON ADMISSION       # Financial/Environmental Concerns:     # History of CABG: noted on surgical history          Current Medications:    Scheduled Meds:  Current Facility-Administered Medications   Medication Dose Route Frequency Provider Last Rate Last Admin    acetaminophen (TYLENOL) tablet 975 mg  975 mg Oral Q8H Geno Howard PA-C   975 mg at 08/02/25 0604    aspirin (ASA) chewable tablet 162 mg  162 mg Oral or NG Tube Daily Geno Howard PA-C   162 mg at 08/02/25 0823    atorvastatin (LIPITOR) tablet 80 mg  80 mg Oral At Bedtime Geno Howard PA-C   80 mg at 08/01/25 2058    bumetanide (BUMEX) injection 2 mg  2 mg Intravenous TID Geno Howard PA-C   2 mg at 08/02/25 0822    carvedilol (COREG) tablet 3.125 mg  3.125 mg Oral Once Geno Howard PA-C        carvedilol (COREG) tablet 6.25 mg  6.25 mg Oral BID w/meals Geno Howard PA-C        [Held by provider] clopidogrel (PLAVIX) tablet 75 mg  75 mg Oral Daily Geno Howard PA-C        cyanocobalamin (VITAMIN B-12) tablet 1,000 mcg  1,000 mcg Oral Daily Geno Howard PA-C   1,000 mcg at 08/02/25 0822    [Held by provider] empagliflozin (JARDIANCE) tablet 10 mg  10 mg Oral Daily Anita, Geno Roxanne, PA-C        guaiFENesin (MUCINEX) 12 hr tablet 600 mg  600 mg Oral BID Geno Howard, JULIA        heparin ANTICOAGULANT injection 5,000 Units  5,000 Units " Subcutaneous Q8H Geno Howard PA-C   5,000 Units at 08/02/25 0605    insulin aspart (NovoLOG) injection (RAPID ACTING)  1-10 Units Subcutaneous TID AC Geno Howard PA-C   3 Units at 08/02/25 0823    insulin aspart (NovoLOG) injection (RAPID ACTING)  1-7 Units Subcutaneous At Bedtime Geno Howard PA-C        insulin glargine (LANTUS PEN) injection 10 Units  10 Units Subcutaneous QAM AC Geno Howard PA-C        Lidocaine (LIDOCARE) 4 % Patch 1-2 patch  1-2 patch Transdermal Q24H Geno Howard PA-C   1 patch at 08/01/25 1830    metFORMIN (GLUCOPHAGE XR) 24 hr tablet 500 mg  500 mg Oral Daily with supper Geno Howard PA-C        multivitamin w/minerals (THERA-VIT-M) tablet 1 tablet  1 tablet Oral Daily Geno Howard PA-C   1 tablet at 08/02/25 0823    pantoprazole (PROTONIX) EC tablet 40 mg  40 mg Oral Daily Geno Howard PA-C   40 mg at 08/02/25 0823    polyethylene glycol (MIRALAX) Packet 17 g  17 g Oral Daily Geno Howard PA-C   17 g at 08/02/25 0823    [Held by provider] sacubitril-valsartan (ENTRESTO) 24-26 MG per tablet 1 tablet  1 tablet Oral BID Geno Howard PA-C        senna-docusate (SENOKOT-S/PERICOLACE) 8.6-50 MG per tablet 1 tablet  1 tablet Oral BID Geno Howard PA-C   1 tablet at 08/02/25 0823    tamsulosin (FLOMAX) capsule 0.4 mg  0.4 mg Oral Daily Geno Howard PA-C         Continuous Infusions:  Current Facility-Administered Medications   Medication Dose Route Frequency Provider Last Rate Last Admin     PRN Meds:.  Current Facility-Administered Medications   Medication Dose Route Frequency Provider Last Rate Last Admin    bisacodyl (DULCOLAX) suppository 10 mg  10 mg Rectal Daily PRN Geno Howard PA-C        calcium gluconate 1 g in 50 mL in sodium chloride intermittent infusion  1 g Intravenous Once PRN Geno Howard PA-C        calcium gluconate 2 g in   mL intermittent infusion  2 g Intravenous Once PRN Geno Howard PA-C        calcium gluconate 3 g in sodium chloride 0.9 % 100 mL intermittent infusion  3 g Intravenous Once PRN Geno Howard PA-C        glucose gel 15-30 g  15-30 g Oral Q15 Min PRN Haydee Boateng MD        Or    dextrose 50 % injection 25-50 mL  25-50 mL Intravenous Q15 Min PRN Haydee Boateng MD        Or    glucagon injection 1 mg  1 mg Subcutaneous Q15 Min PRN Haydee Boateng MD        hydrALAZINE (APRESOLINE) injection 10 mg  10 mg Intravenous Q30 Min PRN Geno Howard PA-C        lactated ringers BOLUS 250 mL  250 mL Intravenous Q15 Min PRN Geno Howard PA-C        magnesium hydroxide (MILK OF MAGNESIA) suspension 30 mL  30 mL Oral Daily PRN Geno Howard PA-C        methocarbamol (ROBAXIN) tablet 500 mg  500 mg Oral 4x Daily PRN Geno Howard PA-C   500 mg at 07/31/25 1126    naloxone (NARCAN) injection 0.2 mg  0.2 mg Intravenous Q2 Min PRHaydee Lizarraga MD        Or    naloxone (NARCAN) injection 0.4 mg  0.4 mg Intravenous Q2 Min PRN Haydee Baoteng MD        Or    naloxone (NARCAN) injection 0.2 mg  0.2 mg Intramuscular Q2 Min PRN Haydee Boateng MD        Or    naloxone (NARCAN) injection 0.4 mg  0.4 mg Intramuscular Q2 Min PRHaydee Lizarraga MD        ondansetron (ZOFRAN ODT) ODT tab 4 mg  4 mg Oral Q6H PRN Geno Howard PA-C   4 mg at 08/01/25 2346    Or    ondansetron (ZOFRAN) injection 4 mg  4 mg Intravenous Q6H PRN Geno Howard PA-C   4 mg at 08/01/25 0251    oxyCODONE (ROXICODONE) tablet 5 mg  5 mg Oral Q4H PRN Geno Howard PA-C        Or    oxyCODONE (ROXICODONE) tablet 10 mg  10 mg Oral Q4H PRN Geno Howard PA-C   10 mg at 08/01/25 2347    prochlorperazine (COMPAZINE) injection 10 mg  10 mg Intravenous Q6H PRN Geno Howard PA-C   10 mg at 07/31/25 0729    Or    prochlorperazine  (COMPAZINE) tablet 10 mg  10 mg Oral Q6H PRN Geno Howard PA-C           Cardiographics:    Telemetry monitoring demonstrates NSR with rates in the 90s per my personal review.    Imaging:  Results for orders placed or performed during the hospital encounter of 07/30/25   XR Chest Port 1 View    Impression    IMPRESSION: Negative chest.   XR Chest Port 1 View    Impression    IMPRESSION: The endotracheal tube tip is 6.4 cm from the edelmira. An NG or OG tube terminates off the field-of-view. A right IJ sheath transmits a Anderson-Oxana catheter which terminates in the lower pulmonary artery. Lung volumes are low. No pleural fluid or   pneumothorax. The heart is not well assessed. Sternotomy suture wires are present. There are degenerative changes of the left shoulder. Old healed right clavicle fracture is suspected.   XR Chest Port 1 View    Impression    IMPRESSION: Interval removal endotracheal and enteric tube. Median sternotomy. Mediastinal drain, right chest 2 views. No significant effusion. Right IJ sheath. Remote fracture right clavicle.   XR Abdomen Port 1 View    Impression    IMPRESSION: An OG tube terminates in the stomach. Subxiphoid drains are noted.       Labs, personally reviewed.  Hemoglobin   Date Value Ref Range Status   08/02/2025 9.1 (L) 13.3 - 17.7 g/dL Final   08/01/2025 8.8 (L) 13.3 - 17.7 g/dL Final   07/31/2025 10.3 (L) 13.3 - 17.7 g/dL Final     WBC Count   Date Value Ref Range Status   08/02/2025 13.2 (H) 4.0 - 11.0 10e3/uL Final   08/01/2025 15.0 (H) 4.0 - 11.0 10e3/uL Final   07/31/2025 22.8 (H) 4.0 - 11.0 10e3/uL Final     Platelet Count   Date Value Ref Range Status   08/02/2025 159 150 - 450 10e3/uL Final   08/01/2025 132 (L) 150 - 450 10e3/uL Final   07/31/2025 207 150 - 450 10e3/uL Final     Creatinine   Date Value Ref Range Status   08/02/2025 1.20 (H) 0.67 - 1.17 mg/dL Final   08/01/2025 1.20 (H) 0.67 - 1.17 mg/dL Final   08/01/2025 1.25 (H) 0.67 - 1.17 mg/dL Final      Potassium   Date Value Ref Range Status   08/02/2025 4.3 3.4 - 5.3 mmol/L Final   08/01/2025 4.3 3.4 - 5.3 mmol/L Final   08/01/2025 4.4 3.4 - 5.3 mmol/L Final     Potassium POCT   Date Value Ref Range Status   07/30/2025 4.8 3.4 - 5.3 mmol/L Final   07/30/2025 4.8 3.4 - 5.3 mmol/L Final   07/30/2025 4.9 3.4 - 5.3 mmol/L Final     Magnesium   Date Value Ref Range Status   08/02/2025 2.1 1.7 - 2.3 mg/dL Final   08/01/2025 2.2 1.7 - 2.3 mg/dL Final   07/31/2025 2.2 1.7 - 2.3 mg/dL Final          I/O:  I/O last 3 completed shifts:  In: 1326.2 [P.O.:1080; I.V.:246.2]  Out: 2840 [Urine:2400; Chest Tube:440]       Physical Exam:    General: Patient seen up in chair. Awake, alert, in no acute distress.   CV: RRR on monitor. Mild edema of bilateral lower extremities. Incision C/D/I. Absent posterior tibialis pulses bilaterally.   Pulm: Non-labored effort on RA. Chest tubes in place, serosanguinous output, no air leak.  Abd: Soft, NT, ND  Ext: Mild pedal edema, SCDs in place, warm and moist.  Neuro: CNs grossly intact. Moves all 4 extremities. Bilateral lower extremity sensation diminished but intact.   Psych: Restless, a bit irritable. Answering mostly with yes/no today.     ASSESSMENT/PLAN:    Floyd Capps is a 49 year old male with a history of HFrEF (EF 40-45% from TTE 5/2025), ischemic CVA on 8/2023 and 3/2025, poorly-controlled and insulin-dependent T2DM with diabetic neuropathy with foot wound on R heel, and CKD stage 3 who is s/p CABG x3 and MERA clippage with Dr. Boateng on 7/30/2025.    Principal Problem:    Coronary artery disease involving autologous artery coronary bypass graft without angina pectoris      NEURO:   - Scheduled Tylenol/lidocaine patches and PRN Tylenol/oxycodone/dilaudid/robaxin for pain    CV:   - Pre-op EF 40-45% from TTE; intra-op GAIL EF 35%  - Normotensive off pressors  - Increased PTA Coreg today to 6.25mg BID with hold parameters  - ASA 162mg daily, will decrease to 81mg when Plavix is  resumed  - Atorvastatin 80mg daily  - Continue PTA Bumex for diuresis s/p CABG  - Chest tubes to remain today  - Will resume PTA Plavix 75mg when TPW are removed (Previously rx for severe CAD with complication of CVA)   - Note: Groin incision closed with staples - remove prior to discharge/at follow up appt  - Hold PTA Entresto     PULM:   - Extubated on POD#0  - Maintaining oxygen saturations on room air while awake  - Intermittent episodes of apnea and severe desats during sleep requiring continuous 6L oxymask, closely monitor and continue to encourage BiPAP  - Encourage pulmonary toilet  - Added Mucinex to thin secretions   - Consider sleep apnea/referral at discharge     FEN/GI:  - Continue electrolyte replacement protocol  - Cardiac; ADAT  - Bowel regimen    RENAL:  - Adequate UOP/hr. Continue to monitor closely.  - Cr 1.20, baseline 1.3-1.5  - Cazares removed, monitor for urinary retention   - Diuresis with IV Bumex 2mg TID until baseline weight  - BMP daily in AM  - Added Flomax daily for straining/intermittent urinary stream    HEME:  - Acute blood loss anemia post-op.   - Hgb stable, no bleeding concerns. Hep SQ, ASA  - Daily CBC in AM    ID:  - Luly op ppx complete, afebrile. No concerns for infection    ENDO:   - Most recent A1c 11.3% on 5/28/2025. PTA takes 55 units insulin of Lantus at bedtime + Novolog.  - Transition to sliding scale insulin + Lantus (10 units)  - Resume PTA metformin today  - Hold PTA jardiance, consider starting tomorrow    PPx:   - DVT: SCDs, SQ heparin TID, ambulation   - GI: Protonix 40mg IV/PO daily    DISPO:   - Transfer to general telemetry status  - Medically Ready for Discharge: Anticipated in 2-4 Days     ATIYA Campbell Student   [facility]     Patient discussed with Dr. Coates.

## 2025-08-02 NOTE — PROGRESS NOTES
Patient continues to refuse wearing BIPAP with sleep. RN notified that the patient dropped his sats to the mid 40s tonight a couple of times while sleeping. Increased O2 to 6L and patient's sats recovered quickly.    RT will continue to follow and encourage PAP therapy.

## 2025-08-02 NOTE — PLAN OF CARE
Pt arrived to P3 irritable and demanding from ICU @ 1300.  Writer and NA settled pt. Pt requested his fan, his cushion that he had in ICU, Pt stated that he will not be able to use our recliner, writer reassured pt. Fan and cushion were brought from ICU, Writer will look for another recliner for pt. VSS,  NSR, Pt used good sternal precautions with assist 2. Will allow pt to adjust after transfer.

## 2025-08-02 NOTE — PROGRESS NOTES
Patient refusing to use BIPAP with sleep. Machine pulled from room once transferred out of ICU. BIPAP/CPAP available if patient changes his mind.     Kandice Bustamante, RT

## 2025-08-02 NOTE — PLAN OF CARE
Goal Outcome Evaluation:                  [facility] - ICU    RN Progress Note:            Pertinent Assessments:      Please refer to flowsheet rows for full assessment     Patient refuses Bipap, Oxygen Sats significantly drop while patient is in a deep sleep, sats as low as 48% noted.  Increased Oxy mask to 6L while sleeping.  Educated patient re: risks of severe decline in oxygen saturation while sleeping (especially after heart surgery), encouraged BiPAP.  Patient continued to refuse.    Patient refused to get up in chair this morning despite education and encouragement.  Patient rude and dismissive to staff most of shift, does not appreciate explanation of cares.             Key Events - This Shift:       Removed Central Lines, Patient unable to void after Catheter removal.  Straight Cathed for 700ml x1.  Patient unable to void in this morning, refusing to get out of bed, encouraged to walk to bathroom to use toilet, patient refused.  Bladder scanned for 278 at 0600.       RN Managed Protocols Ordered:  Yes  Protocols:Potassium, Magnesium, and Phosphorus  PRN'S:iCal  Protocols Status: Endorsed to Oncoming RN                Barriers to Discharge / Downgrade:     None          Point of Contact Update: YES-OR-NO: No   If No, reason: patient able to comunicate  Name:na  Phone Number:na    Summary of Conversation: faina

## 2025-08-03 ENCOUNTER — APPOINTMENT (OUTPATIENT)
Dept: OCCUPATIONAL THERAPY | Facility: HOSPITAL | Age: 50
End: 2025-08-03
Attending: THORACIC SURGERY (CARDIOTHORACIC VASCULAR SURGERY)
Payer: COMMERCIAL

## 2025-08-03 LAB
ANION GAP SERPL CALCULATED.3IONS-SCNC: 8 MMOL/L (ref 7–15)
BUN SERPL-MCNC: 28.5 MG/DL (ref 6–20)
CA-I BLD-MCNC: 4.8 MG/DL (ref 4.4–5.2)
CALCIUM SERPL-MCNC: 8.7 MG/DL (ref 8.8–10.4)
CHLORIDE SERPL-SCNC: 102 MMOL/L (ref 98–107)
CREAT SERPL-MCNC: 1.32 MG/DL (ref 0.67–1.17)
EGFRCR SERPLBLD CKD-EPI 2021: 65 ML/MIN/1.73M2
ERYTHROCYTE [DISTWIDTH] IN BLOOD BY AUTOMATED COUNT: 12.9 % (ref 10–15)
GLUCOSE BLDC GLUCOMTR-MCNC: 171 MG/DL (ref 70–99)
GLUCOSE BLDC GLUCOMTR-MCNC: 191 MG/DL (ref 70–99)
GLUCOSE BLDC GLUCOMTR-MCNC: 193 MG/DL (ref 70–99)
GLUCOSE BLDC GLUCOMTR-MCNC: 197 MG/DL (ref 70–99)
GLUCOSE BLDC GLUCOMTR-MCNC: 226 MG/DL (ref 70–99)
GLUCOSE SERPL-MCNC: 204 MG/DL (ref 70–99)
HCO3 SERPL-SCNC: 24 MMOL/L (ref 22–29)
HCT VFR BLD AUTO: 26.1 % (ref 40–53)
HGB BLD-MCNC: 8.5 G/DL (ref 13.3–17.7)
MAGNESIUM SERPL-MCNC: 1.9 MG/DL (ref 1.7–2.3)
MCH RBC QN AUTO: 30.5 PG (ref 26.5–33)
MCHC RBC AUTO-ENTMCNC: 32.6 G/DL (ref 31.5–36.5)
MCV RBC AUTO: 94 FL (ref 78–100)
PHOSPHATE SERPL-MCNC: 3 MG/DL (ref 2.5–4.5)
PLATELET # BLD AUTO: 184 10E3/UL (ref 150–450)
POTASSIUM SERPL-SCNC: 4.2 MMOL/L (ref 3.4–5.3)
RBC # BLD AUTO: 2.79 10E6/UL (ref 4.4–5.9)
SODIUM SERPL-SCNC: 134 MMOL/L (ref 135–145)
WBC # BLD AUTO: 13.2 10E3/UL (ref 4–11)

## 2025-08-03 PROCEDURE — 250N000013 HC RX MED GY IP 250 OP 250 PS 637: Performed by: PHYSICIAN ASSISTANT

## 2025-08-03 PROCEDURE — 250N000011 HC RX IP 250 OP 636: Mod: JZ | Performed by: PHYSICIAN ASSISTANT

## 2025-08-03 PROCEDURE — 250N000013 HC RX MED GY IP 250 OP 250 PS 637: Performed by: THORACIC SURGERY (CARDIOTHORACIC VASCULAR SURGERY)

## 2025-08-03 PROCEDURE — 97110 THERAPEUTIC EXERCISES: CPT | Mod: GO

## 2025-08-03 PROCEDURE — 80048 BASIC METABOLIC PNL TOTAL CA: CPT | Performed by: PHYSICIAN ASSISTANT

## 2025-08-03 PROCEDURE — 999N000157 HC STATISTIC RCP TIME EA 10 MIN

## 2025-08-03 PROCEDURE — 94799 UNLISTED PULMONARY SVC/PX: CPT

## 2025-08-03 PROCEDURE — 93005 ELECTROCARDIOGRAM TRACING: CPT

## 2025-08-03 PROCEDURE — 93010 ELECTROCARDIOGRAM REPORT: CPT | Performed by: INTERNAL MEDICINE

## 2025-08-03 PROCEDURE — 82330 ASSAY OF CALCIUM: CPT | Performed by: PHYSICIAN ASSISTANT

## 2025-08-03 PROCEDURE — 93005 ELECTROCARDIOGRAM TRACING: CPT | Performed by: PHYSICIAN ASSISTANT

## 2025-08-03 PROCEDURE — 36415 COLL VENOUS BLD VENIPUNCTURE: CPT | Performed by: PHYSICIAN ASSISTANT

## 2025-08-03 PROCEDURE — 84100 ASSAY OF PHOSPHORUS: CPT | Performed by: PHYSICIAN ASSISTANT

## 2025-08-03 PROCEDURE — 999N000156 HC STATISTIC RCP CONSULT EA 30 MIN

## 2025-08-03 PROCEDURE — 210N000001 HC R&B IMCU HEART CARE

## 2025-08-03 PROCEDURE — 83735 ASSAY OF MAGNESIUM: CPT | Performed by: PHYSICIAN ASSISTANT

## 2025-08-03 PROCEDURE — 97535 SELF CARE MNGMENT TRAINING: CPT | Mod: GO

## 2025-08-03 PROCEDURE — 85027 COMPLETE CBC AUTOMATED: CPT | Performed by: PHYSICIAN ASSISTANT

## 2025-08-03 RX ORDER — MAGNESIUM OXIDE 400 MG/1
400 TABLET ORAL EVERY 4 HOURS
Status: COMPLETED | OUTPATIENT
Start: 2025-08-03 | End: 2025-08-03

## 2025-08-03 RX ORDER — ASPIRIN 81 MG/1
81 TABLET, CHEWABLE ORAL DAILY
Status: DISCONTINUED | OUTPATIENT
Start: 2025-08-03 | End: 2025-08-07 | Stop reason: HOSPADM

## 2025-08-03 RX ORDER — CLOPIDOGREL BISULFATE 75 MG/1
75 TABLET ORAL DAILY
Status: DISCONTINUED | OUTPATIENT
Start: 2025-08-04 | End: 2025-08-07 | Stop reason: HOSPADM

## 2025-08-03 RX ADMIN — CYANOCOBALAMIN TAB 1000 MCG 1000 MCG: 1000 TAB at 08:16

## 2025-08-03 RX ADMIN — ONDANSETRON 4 MG: 4 TABLET, ORALLY DISINTEGRATING ORAL at 10:28

## 2025-08-03 RX ADMIN — ACETAMINOPHEN 975 MG: 325 TABLET ORAL at 04:01

## 2025-08-03 RX ADMIN — PANTOPRAZOLE SODIUM 40 MG: 40 TABLET, DELAYED RELEASE ORAL at 08:16

## 2025-08-03 RX ADMIN — GUAIFENESIN 600 MG: 600 TABLET ORAL at 08:16

## 2025-08-03 RX ADMIN — GUAIFENESIN 600 MG: 600 TABLET ORAL at 21:11

## 2025-08-03 RX ADMIN — Medication 1 TABLET: at 08:16

## 2025-08-03 RX ADMIN — CARVEDILOL 6.25 MG: 3.12 TABLET, FILM COATED ORAL at 08:15

## 2025-08-03 RX ADMIN — POLYETHYLENE GLYCOL 3350 17 G: 17 POWDER, FOR SOLUTION ORAL at 13:36

## 2025-08-03 RX ADMIN — ONDANSETRON 4 MG: 4 TABLET, ORALLY DISINTEGRATING ORAL at 21:15

## 2025-08-03 RX ADMIN — OXYCODONE HYDROCHLORIDE 10 MG: 5 TABLET ORAL at 10:28

## 2025-08-03 RX ADMIN — OXYCODONE HYDROCHLORIDE 10 MG: 5 TABLET ORAL at 17:42

## 2025-08-03 RX ADMIN — INSULIN ASPART 3 UNITS: 100 INJECTION, SOLUTION INTRAVENOUS; SUBCUTANEOUS at 08:17

## 2025-08-03 RX ADMIN — METFORMIN ER 500 MG 500 MG: 500 TABLET ORAL at 17:10

## 2025-08-03 RX ADMIN — ACETAMINOPHEN 975 MG: 325 TABLET ORAL at 11:25

## 2025-08-03 RX ADMIN — INSULIN ASPART 3 UNITS: 100 INJECTION, SOLUTION INTRAVENOUS; SUBCUTANEOUS at 17:39

## 2025-08-03 RX ADMIN — MAGNESIUM OXIDE TAB 400 MG (241.3 MG ELEMENTAL MG) 400 MG: 400 (241.3 MG) TAB at 08:16

## 2025-08-03 RX ADMIN — BUMETANIDE 2 MG: 0.25 INJECTION INTRAMUSCULAR; INTRAVENOUS at 16:45

## 2025-08-03 RX ADMIN — LIDOCAINE 2 PATCH: 4 PATCH TOPICAL at 16:52

## 2025-08-03 RX ADMIN — MAGNESIUM OXIDE TAB 400 MG (241.3 MG ELEMENTAL MG) 400 MG: 400 (241.3 MG) TAB at 11:26

## 2025-08-03 RX ADMIN — CARVEDILOL 6.25 MG: 3.12 TABLET, FILM COATED ORAL at 16:46

## 2025-08-03 RX ADMIN — HEPARIN SODIUM 5000 UNITS: 5000 INJECTION, SOLUTION INTRAVENOUS; SUBCUTANEOUS at 21:18

## 2025-08-03 RX ADMIN — OXYCODONE HYDROCHLORIDE 10 MG: 5 TABLET ORAL at 21:17

## 2025-08-03 RX ADMIN — ACETAMINOPHEN 975 MG: 325 TABLET ORAL at 21:11

## 2025-08-03 RX ADMIN — ASPIRIN 81 MG CHEWABLE TABLET 81 MG: 81 TABLET CHEWABLE at 08:16

## 2025-08-03 RX ADMIN — OXYCODONE HYDROCHLORIDE 10 MG: 5 TABLET ORAL at 02:39

## 2025-08-03 RX ADMIN — SENNOSIDES, DOCUSATE SODIUM 1 TABLET: 50; 8.6 TABLET, FILM COATED ORAL at 21:11

## 2025-08-03 RX ADMIN — TAMSULOSIN HYDROCHLORIDE 0.4 MG: 0.4 CAPSULE ORAL at 08:16

## 2025-08-03 RX ADMIN — ATORVASTATIN CALCIUM 80 MG: 40 TABLET, FILM COATED ORAL at 21:11

## 2025-08-03 RX ADMIN — SENNOSIDES, DOCUSATE SODIUM 1 TABLET: 50; 8.6 TABLET, FILM COATED ORAL at 13:35

## 2025-08-03 RX ADMIN — HEPARIN SODIUM 5000 UNITS: 5000 INJECTION, SOLUTION INTRAVENOUS; SUBCUTANEOUS at 04:03

## 2025-08-03 RX ADMIN — INSULIN ASPART 3 UNITS: 100 INJECTION, SOLUTION INTRAVENOUS; SUBCUTANEOUS at 11:26

## 2025-08-03 ASSESSMENT — ACTIVITIES OF DAILY LIVING (ADL)
ADLS_ACUITY_SCORE: 48
ADLS_ACUITY_SCORE: 50
ADLS_ACUITY_SCORE: 50
ADLS_ACUITY_SCORE: 53
ADLS_ACUITY_SCORE: 50
ADLS_ACUITY_SCORE: 53
ADLS_ACUITY_SCORE: 50
ADLS_ACUITY_SCORE: 48
ADLS_ACUITY_SCORE: 50
ADLS_ACUITY_SCORE: 50
ADLS_ACUITY_SCORE: 53
ADLS_ACUITY_SCORE: 48
ADLS_ACUITY_SCORE: 50
ADLS_ACUITY_SCORE: 53
ADLS_ACUITY_SCORE: 50

## 2025-08-03 NOTE — PROGRESS NOTES
PADDY Attestation  I, eGno Howard PA-C, was present with the PADDY student who participated in the service and in the documentation of the note.  I have verified the history and personally performed the physical exam and medical decision making.  I agree with the assessment and plan of care as documented in the note.      Geno Howard PA-C  Date of Service (when I saw the patient): 08/03/2025     ______________________    CVTS Daily Progress Note   POD#4 s/p CABG x3 (LIMA-->LAD, rSVG-->HEENA, rSVG-->RPD) and MERA clippage  Attending: Dr. Boateng  LOS: 4    SUBJECTIVE/INTERVAL EVENTS:    NAEON. Transferred to general telemetry floor yesterday. Continues to remain somewhat difficult with staff and unmotivated with cares. Normotensive on Coreg, off pressors. 3L NC overnight, refusing BiPAP, RA while awake. Chest tubes and TPW removed today. Good UOP, voiding spontaneously but needs to stand to urinate. Transfers to chair with therapy/nursing only occasionally. Some surgical pain, improving. - BM / + flatus. C/o chest pain 2/2 acute pericarditis, continues to improve, pain medications help. SOB and productive cough improving, pt reports Mucinex helps. Otherwise denies nausea, vomiting, abdominal pain, fever, chills, or calf pain.        OBJECTIVE:  Temp:  [97.9  F (36.6  C)-98.4  F (36.9  C)] 98  F (36.7  C)  Pulse:  [84-91] 85  Resp:  [18-22] 18  BP: (104-156)/(56-74) 112/58  SpO2:  [94 %-98 %] 98 %  Vitals:    07/30/25 0539 07/31/25 0600 08/02/25 0604   Weight: (!) 140.6 kg (310 lb) (!) 144.8 kg (319 lb 4.8 oz) (!) 147.6 kg (325 lb 6.4 oz)       Clinically Significant Risk Factors         # Hyponatremia: Lowest Na = 134 mmol/L in last 2 days, will monitor as appropriate       # Hypoalbuminemia: Lowest albumin = 2.9 g/dL at 7/30/2025  1:04 PM, will monitor as appropriate       # Hypertension: Noted on problem list  # Heart failure with preserved ejection fraction: EF >50% and home medication list  "includes sacubitril/valsartan (Entresto)          # DMII: A1C = 11.3 % (Ref range: <5.7 %) within past 6 months   # Morbid Obesity: Estimated body mass index is 46.69 kg/m  as calculated from the following:    Height as of this encounter: 1.778 m (5' 10\").    Weight as of this encounter: 147.6 kg (325 lb 6.4 oz).        # Financial/Environmental Concerns:     # History of CABG: noted on surgical history          Current Medications:    Scheduled Meds:  Current Facility-Administered Medications   Medication Dose Route Frequency Provider Last Rate Last Admin    acetaminophen (TYLENOL) tablet 975 mg  975 mg Oral Q8H Geno Howard PA-C   975 mg at 08/03/25 0401    aspirin (ASA) chewable tablet 81 mg  81 mg Oral or NG Tube Daily Geno Howard PA-C   81 mg at 08/03/25 0816    atorvastatin (LIPITOR) tablet 80 mg  80 mg Oral At Bedtime Geno Howard PA-C   80 mg at 08/02/25 2010    bumetanide (BUMEX) injection 2 mg  2 mg Intravenous TID Geno Howard PA-C   2 mg at 08/02/25 1555    carvedilol (COREG) tablet 6.25 mg  6.25 mg Oral BID w/meals Geno Howard PA-C   6.25 mg at 08/03/25 0815    [Held by provider] clopidogrel (PLAVIX) tablet 75 mg  75 mg Oral Daily Geno Howard PA-C        cyanocobalamin (VITAMIN B-12) tablet 1,000 mcg  1,000 mcg Oral Daily Geno Howard PA-C   1,000 mcg at 08/03/25 0816    [START ON 8/4/2025] empagliflozin (JARDIANCE) tablet 10 mg  10 mg Oral Daily Geno Howard PA-C        guaiFENesin (MUCINEX) 12 hr tablet 600 mg  600 mg Oral BID Geno Howard PA-C   600 mg at 08/03/25 0816    heparin ANTICOAGULANT injection 5,000 Units  5,000 Units Subcutaneous Q8H Geno Howard PA-C   5,000 Units at 08/03/25 0403    insulin aspart (NovoLOG) injection (RAPID ACTING)  1-10 Units Subcutaneous TID AC Geno Howard PA-C   3 Units at 08/03/25 0817    insulin aspart (NovoLOG) injection (RAPID ACTING)  1-7 " Units Subcutaneous At Bedtime Geno Howard PA-C        insulin glargine (LANTUS PEN) injection 10 Units  10 Units Subcutaneous QAM AC Geno Howard PA-C   10 Units at 08/03/25 0817    insulin glargine (LANTUS PEN) injection 3 Units  3 Units Subcutaneous Once Geno Howard PA-C        Lidocaine (LIDOCARE) 4 % Patch 1-2 patch  1-2 patch Transdermal Q24H Geno Howard PA-C   2 patch at 08/02/25 1723    magnesium oxide (MAG-OX) tablet 400 mg  400 mg Oral Q4H KiloHaydee alford MD   400 mg at 08/03/25 0816    metFORMIN (GLUCOPHAGE XR) 24 hr tablet 500 mg  500 mg Oral Daily with supper Geno Howard PA-C   500 mg at 08/02/25 1555    multivitamin w/minerals (THERA-VIT-M) tablet 1 tablet  1 tablet Oral Daily Geno Howard PA-C   1 tablet at 08/03/25 0816    pantoprazole (PROTONIX) EC tablet 40 mg  40 mg Oral Daily Geno Howard PA-C   40 mg at 08/03/25 0816    polyethylene glycol (MIRALAX) Packet 17 g  17 g Oral Daily Geno Howard PA-C   17 g at 08/02/25 0823    [Held by provider] sacubitril-valsartan (ENTRESTO) 24-26 MG per tablet 1 tablet  1 tablet Oral BID Geno Howard PA-C        senna-docusate (SENOKOT-S/PERICOLACE) 8.6-50 MG per tablet 1 tablet  1 tablet Oral BID Geno Howard PA-C   1 tablet at 08/02/25 2011    tamsulosin (FLOMAX) capsule 0.4 mg  0.4 mg Oral Daily Geno Howard PA-C   0.4 mg at 08/03/25 0816     Continuous Infusions:  Current Facility-Administered Medications   Medication Dose Route Frequency Provider Last Rate Last Admin     PRN Meds:.  Current Facility-Administered Medications   Medication Dose Route Frequency Provider Last Rate Last Admin    bisacodyl (DULCOLAX) suppository 10 mg  10 mg Rectal Daily PRN Geno Howard PA-C        calcium gluconate 1 g in 50 mL in sodium chloride intermittent infusion  1 g Intravenous Once PRN Geno Howard PA-C        calcium  gluconate 2 g in  mL intermittent infusion  2 g Intravenous Once PRN Geno Howard PA-C        calcium gluconate 3 g in sodium chloride 0.9 % 100 mL intermittent infusion  3 g Intravenous Once PRN Geno Howard PA-C        glucose gel 15-30 g  15-30 g Oral Q15 Min PRN Haydee Boateng MD        Or    dextrose 50 % injection 25-50 mL  25-50 mL Intravenous Q15 Min PRN Haydee Boateng MD        Or    glucagon injection 1 mg  1 mg Subcutaneous Q15 Min PRN Haydee Boateng MD        hydrALAZINE (APRESOLINE) injection 10 mg  10 mg Intravenous Q30 Min PRN Geon Howard PA-C        lactated ringers BOLUS 250 mL  250 mL Intravenous Q15 Min PRN Geno Howard PA-C        magnesium hydroxide (MILK OF MAGNESIA) suspension 30 mL  30 mL Oral Daily PRN Geno Howard PA-C        methocarbamol (ROBAXIN) tablet 500 mg  500 mg Oral 4x Daily PRN Geno Howard PA-C   500 mg at 07/31/25 1126    naloxone (NARCAN) injection 0.2 mg  0.2 mg Intravenous Q2 Min PRN Geno Howard PA-C        Or    naloxone (NARCAN) injection 0.4 mg  0.4 mg Intravenous Q2 Min PRN Geno Howard PA-C        Or    naloxone (NARCAN) injection 0.2 mg  0.2 mg Intramuscular Q2 Min PRN Geno Howard PA-C        Or    naloxone (NARCAN) injection 0.4 mg  0.4 mg Intramuscular Q2 Min PRN Geno Howard PA-C        ondansetron (ZOFRAN ODT) ODT tab 4 mg  4 mg Oral Q6H PRN Geno Howard PA-C   4 mg at 08/01/25 2346    Or    ondansetron (ZOFRAN) injection 4 mg  4 mg Intravenous Q6H PRN Geno Howard PA-C   4 mg at 08/01/25 0251    oxyCODONE (ROXICODONE) tablet 5 mg  5 mg Oral Q4H PRN Geno Howard PA-C        Or    oxyCODONE (ROXICODONE) tablet 10 mg  10 mg Oral Q4H PRN Geno Howard PA-C   10 mg at 08/03/25 0239    prochlorperazine (COMPAZINE) injection 10 mg  10 mg Intravenous Q6H PRN Geno Howard PA-C   10 mg at  07/31/25 0729    Or    prochlorperazine (COMPAZINE) tablet 10 mg  10 mg Oral Q6H PRN Geno Howard PA-C           Cardiographics:    Telemetry monitoring demonstrates NSR with rates in the 90s per my personal review.    Imaging:  Results for orders placed or performed during the hospital encounter of 07/30/25   XR Chest Port 1 View    Impression    IMPRESSION: Negative chest.   XR Chest Port 1 View    Impression    IMPRESSION: The endotracheal tube tip is 6.4 cm from the edelmira. An NG or OG tube terminates off the field-of-view. A right IJ sheath transmits a [place] catheter which terminates in the lower pulmonary artery. Lung volumes are low. No pleural fluid or   pneumothorax. The heart is not well assessed. Sternotomy suture wires are present. There are degenerative changes of the left shoulder. Old healed right clavicle fracture is suspected.   XR Chest Port 1 View    Impression    IMPRESSION: Interval removal endotracheal and enteric tube. Median sternotomy. Mediastinal drain, right chest 2 views. No significant effusion. Right IJ sheath. Remote fracture right clavicle.   XR Abdomen Port 1 View    Impression    IMPRESSION: An OG tube terminates in the stomach. Subxiphoid drains are noted.       Labs, personally reviewed.  Hemoglobin   Date Value Ref Range Status   08/03/2025 8.5 (L) 13.3 - 17.7 g/dL Final   08/02/2025 9.1 (L) 13.3 - 17.7 g/dL Final   08/01/2025 8.8 (L) 13.3 - 17.7 g/dL Final     WBC Count   Date Value Ref Range Status   08/03/2025 13.2 (H) 4.0 - 11.0 10e3/uL Final   08/02/2025 13.2 (H) 4.0 - 11.0 10e3/uL Final   08/01/2025 15.0 (H) 4.0 - 11.0 10e3/uL Final     Platelet Count   Date Value Ref Range Status   08/03/2025 184 150 - 450 10e3/uL Final   08/02/2025 159 150 - 450 10e3/uL Final   08/01/2025 132 (L) 150 - 450 10e3/uL Final     Creatinine   Date Value Ref Range Status   08/03/2025 1.32 (H) 0.67 - 1.17 mg/dL Final   08/02/2025 1.20 (H) 0.67 - 1.17 mg/dL Final   08/01/2025 1.20  (H) 0.67 - 1.17 mg/dL Final     Potassium   Date Value Ref Range Status   08/03/2025 4.2 3.4 - 5.3 mmol/L Final   08/02/2025 4.3 3.4 - 5.3 mmol/L Final   08/01/2025 4.3 3.4 - 5.3 mmol/L Final     Potassium POCT   Date Value Ref Range Status   07/30/2025 4.8 3.4 - 5.3 mmol/L Final   07/30/2025 4.8 3.4 - 5.3 mmol/L Final   07/30/2025 4.9 3.4 - 5.3 mmol/L Final     Magnesium   Date Value Ref Range Status   08/03/2025 1.9 1.7 - 2.3 mg/dL Final   08/02/2025 2.1 1.7 - 2.3 mg/dL Final   08/01/2025 2.2 1.7 - 2.3 mg/dL Final          I/O:  I/O last 3 completed shifts:  In: 810 [P.O.:810]  Out: 2180 [Urine:1950; Chest Tube:230]       Physical Exam:    General: Patient seen up in chair. Awake, alert, in no acute distress.   CV: RRR on monitor. Mild edema of bilateral lower extremities. Incision C/D/I. Absent posterior tibialis pulses bilaterally.   Pulm: Non-labored effort on RA. Prior to removal, chest tubes with serosanguinous output, no air leak.   Abd: Soft, NT, ND  Ext: Mild pedal edema, SCDs in place, warm and moist.  Neuro: CNs grossly intact. Moves all 4 extremities. Bilateral lower extremity sensation diminished but intact.   Psych: Restless, a bit irritable.     ASSESSMENT/PLAN:    Floyd Capps is a 49 year old male with a history of HFrEF (EF 40-45% from TTE 5/2025), ischemic CVA on 8/2023 and 3/2025, poorly-controlled and insulin-dependent T2DM with diabetic neuropathy with foot wound on R heel, and CKD stage 3 who is s/p CABG x3 and MERA clippage with Dr. Boateng on 7/30/2025.    Principal Problem:    Coronary artery disease involving autologous artery coronary bypass graft without angina pectoris      NEURO:   - Scheduled Tylenol/lidocaine patches and PRN Tylenol/oxycodone/robaxin for pain    CV:   - Pre-op EF 40-45% from TTE; intra-op GAIL EF 35%  - Normotensive off pressors  - Chest tubes and TPW removed today 8/3  - Resume PTA Coreg today to 6.25mg BID with hold parameters  - Resume PTA Plavix 75mg   - Decrease  to ASA 81mg daily with addition of Plavix today  - Atorvastatin 80mg daily  - Continue PTA Bumex for diuresis s/p CABG  - Note: Groin incision closed with staples - remove prior to discharge/at follow up appt  - Hold PTA Entresto     PULM:   - Extubated on POD#0  - Maintaining oxygen saturations on room air while awake, 3L NC while sleeping  - Encourage pulmonary toilet  - Added Mucinex to thin secretions   - Consider sleep apnea/referral at discharge     FEN/GI:  - Continue electrolyte replacement protocol  - Cardiac; ADAT  - Bowel regimen; last BM preop    RENAL:  - Adequate UOP/hr. Continue to monitor closely.  - Cr 1.32 increased from 1.2 yesterday, baseline 1.3-1.5  - Cazares removed, monitor for urinary retention   - Diuresis with IV Bumex 2mg TID until baseline weight  - BMP daily in AM  - Added Flomax daily for straining/intermittent urinary stream    HEME:  - Acute blood loss anemia post-op.   - Hgb stable, no bleeding concerns. Hep SQ, ASA  - Daily CBC in AM    ID:  - Luly op ppx complete, afebrile. No concerns for infection    ENDO:   - Most recent A1c 11.3% on 5/28/2025. PTA takes 55 units insulin of Lantus at bedtime + Novolog.  - SSI + Lantus (13 units)  - Resumed PTA metformin   - Resumed PTA jardiance    PPx:   - DVT: SCDs, SQ heparin TID, ambulation   - GI: Protonix 40mg IV/PO daily    DISPO:   - General telemetry status since POD#3  - Medically Ready for Discharge: Anticipated Tomorrow   - Plan: TCU      ATIYA Campbell Student   [facility]     Patient discussed with Dr. Coates.

## 2025-08-03 NOTE — PLAN OF CARE
"  Problem: Cardiovascular Surgery  Goal: Effective Bowel Elimination  8/3/2025 0729 by Luz Marina Walters RN  Outcome: Not Progressing  8/3/2025 0729 by Luz Marina Walters RN  Reactivated     Problem: Adult Inpatient Plan of Care  Goal: Plan of Care Review  Description: The Plan of Care Review/Shift note should be completed every shift.  The Outcome Evaluation is a brief statement about your assessment that the patient is improving, declining, or no change.  This information will be displayed automatically on your shift  note.  8/3/2025 0729 by Luz Marina Walters RN  Outcome: Progressing  8/3/2025 0729 by Luz Marina Walters RN  Reactivated     Problem: Adult Inpatient Plan of Care  Goal: Readiness for Transition of Care  8/3/2025 0729 by Luz Marina Walters RN  Outcome: Progressing  8/3/2025 0729 by Luz Marina Walters RN  Reactivated     Problem: Adult Inpatient Plan of Care  Goal: Patient-Specific Goal (Individualized)  Description: You can add care plan individualizations to a care plan. Examples of Individualization might be:  \"Parent requests to be called daily at 9am for status\", \"I have a hard time hearing out of my right ear\", or \"Do not touch me to wake me up as it startles  me\".  Reactivated     Problem: Adult Inpatient Plan of Care  Goal: Absence of Hospital-Acquired Illness or Injury  Reactivated  Intervention: Identify and Manage Fall Risk  Recent Flowsheet Documentation  Taken 8/3/2025 0530 by Luz Marina Walters RN  Safety Promotion/Fall Prevention:   treat underlying cause   safety round/check completed   room organization consistent   room near nurse's station   patient and family education   nonskid shoes/slippers when out of bed   lighting adjusted   clutter free environment maintained  Taken 8/3/2025 0010 by Luz Marina Walters RN  Safety Promotion/Fall Prevention:   treat underlying cause   safety round/check completed   room organization consistent   room near nurse's station   patient and family " education   nonskid shoes/slippers when out of bed   lighting adjusted   clutter free environment maintained  Intervention: Prevent Skin Injury  Recent Flowsheet Documentation  Taken 8/3/2025 0530 by Luz Marina Walters RN  Body Position: position changed independently  Taken 8/3/2025 0010 by Luz Marina Walters RN  Body Position: position changed independently  Intervention: Prevent and Manage VTE (Venous Thromboembolism) Risk  Recent Flowsheet Documentation  Taken 8/3/2025 0530 by Luz Marina Walters RN  VTE Prevention/Management: SCDs off (sequential compression devices)  Taken 8/3/2025 0010 by Luz Marina Walters RN  VTE Prevention/Management: SCDs off (sequential compression devices)     Problem: Adult Inpatient Plan of Care  Goal: Absence of Hospital-Acquired Illness or Injury  Intervention: Identify and Manage Fall Risk  Recent Flowsheet Documentation  Taken 8/3/2025 0530 by Luz Marina Walters RN  Safety Promotion/Fall Prevention:   treat underlying cause   safety round/check completed   room organization consistent   room near nurse's station   patient and family education   nonskid shoes/slippers when out of bed   lighting adjusted   clutter free environment maintained  Taken 8/3/2025 0010 by Luz Marina Walters RN  Safety Promotion/Fall Prevention:   treat underlying cause   safety round/check completed   room organization consistent   room near nurse's station   patient and family education   nonskid shoes/slippers when out of bed   lighting adjusted   clutter free environment maintained   Goal Outcome Evaluation:       sc  Patient Name: Floyd Capps   MRN: [id number]   Date of Admission: 7/30/2025    Procedure: Procedure(s):  CORONARY ARTERY BYPASS GRAFT TIMES THREE, WITH LEFT LEG ENDOSCOPIC VESSEL PROCUREMENT, LEFT INTERNAL MAMMARY ARTERY HARVEST, CLIPPING OF LEFT ATRIAL APPENDAGE  ECHOCARDIOGRAM, TRANSESOPHAGEAL, INTRAOPERATIVE, EPIAORTIC ULTRASOUND    Post Op day #:4    Subjective (Patient focus/Primary  Problem for shift): oxygenation, snoring during sleep and desats on room air to70's-80%. Patient refuses to wear mask.          Pain Goal0 Pain [id number]           Pain Medication/ Regime effective to reduce patient painOxycodone    Objective (Physical assessment):           Rhythm: normal sinus rhythm            Bowel Activity: no if Yes indicate when: na          Bowel Medications: yes            Incision: healing well          Incentive Spirometry Q 1-2 hour when awake:  yes Volume: refused            Epicardial Pacing Wires:  yes            Patient Activity:           Up to chair for meals: no, patient wanted an another hour to sleep.          Ambulation with RN x2 (Not including CR): yes           Shower Daily {YES/NO/NA:507882          Nasal  Nozin BID AM/PM : yes              Chest Tubes   Pleural: yes Draining: yes               Suction: yes              Mediastinal: yes Draining: yes               Suction: yes   Dressing Change Daily:yes If No, why?na                     Urinary Catheter: not applicable           Preventative WOC consult (need MD order): no       Assessment (Nursing primary shift focus): sleep for healing, pain management    Plan (Patient Care Plan/focus): pain management/sleep    Patient has no motivation, this writer explained to patient that it is very important to start moving around, walk up down the halls. Blood sugar 171 at 0239. Patient needs much encouragement. Did not want to get up in the chair this am and said he needed an other hour and did not want to eat breakfast. Bed alarm on for safety.    Luz Marina Walters RN   8/3/2025   7:32 AM

## 2025-08-03 NOTE — PLAN OF CARE
Goal Outcome Evaluation:       sc  Patient Name: Floyd Capps   MRN: [id number]   Date of Admission: 7/30/2025    Procedure: Procedure(s):  CORONARY ARTERY BYPASS GRAFT TIMES THREE, WITH LEFT LEG ENDOSCOPIC VESSEL PROCUREMENT, LEFT INTERNAL MAMMARY ARTERY HARVEST, CLIPPING OF LEFT ATRIAL APPENDAGE  ECHOCARDIOGRAM, TRANSESOPHAGEAL, INTRAOPERATIVE, EPIAORTIC ULTRASOUND    Post Op day #: 3     Subjective (Patient focus/Primary Problem for shift): Movement and pain control           Pain Goal5 Pain Rating 5           Pain Medication/ Regime effective to reduce patient painapap and prn oxycodone and lidocaine patches    Objective (Physical assessment):           Rhythm: normal sinus rhythm            Bowel Activity: no if Yes indicate when:            Bowel Medications: yes            Incision: healing well          Incentive Spirometry Q 1-2 hour when awake:  yes Volume: 1500          Epicardial Pacing Wires:  yes            Patient Activity:           Up to chair for meals: no          Ambulation with RN x2 (Not including CR): no           Shower Daily NO          Nasal  Nozin BID PM yes              Chest Tubes   Pleural: yes Draining: yes               Suction: yes              Mediastinal: yes Draining: yes               Suction: yes   Dressing Change Daily:yes If No, why?                      Urinary Catheter: no           Preventative WOC consult (need MD order): no       Assessment (Nursing primary shift focus): PT refused to get out of bed this evening. PT states he will move tomorrow.  Writer explained risk and benefits and pt states he understands.    PT reports chest incisional pain with coughing.    Plan (Patient Care Plan/focus): PT was encouraged to use breathing IS and flutter and move out of bed.  Pt pain has been controlled.   PT is short with staff and becomes irritated with assessment     Jorge A Kumar RN   8/2/2025   10:02 PM

## 2025-08-03 NOTE — PROGRESS NOTES
..sc  Patient Name: Floyd Capps   MRN: [id number]   Date of Admission: 7/30/2025    Procedure: Procedure(s):  CORONARY ARTERY BYPASS GRAFT TIMES THREE, WITH LEFT LEG ENDOSCOPIC VESSEL PROCUREMENT, LEFT INTERNAL MAMMARY ARTERY HARVEST, CLIPPING OF LEFT ATRIAL APPENDAGE  ECHOCARDIOGRAM, TRANSESOPHAGEAL, INTRAOPERATIVE, EPIAORTIC ULTRASOUND    Post Op day #:POD4    Subjective (Patient focus/Primary Problem for shift): Improve cooperation, PO intake, activity.          Pain Goal0 Pain Rating7           Pain Medication/ Regime effective to reduce patient painyes    Objective (Physical assessment):           Rhythm: normal sinus rhythm            Bowel Activity: no if Yes indicate when: passing flatus, encouraging solid food vs just clear liquids and encouraging activity.          Bowel Medications: yes            Incision: healing well          Incentive Spirometry Q 1-2 hour when awake:  not applicable Volume: 1500 Pt would only demonstrate once for writer.           Epicardial Pacing Wires:  no            Patient Activity:           Up to chair for meals: yes          Ambulation with RN x2 (Not including CR): no           Shower Daily {YES/NO/NA:391965          Nasal  Nozin BID AM/PM : yes              Chest Tubes   Pleural: no Draining: no               Suction: no              Mediastinal: no Draining: no               Suction: no   Dressing Change Daily:no If No, why? Chest Tubes removed this AM 8-3 without complications.                      Urinary Catheter: no           Preventative WOC consult (need MD order): yes WOC is seeing pt for right heel ulcer. Dressing changed today as ordered.       Assessment (Nursing primary shift focus): Improve mood and cooperation.     Plan (Patient Care Plan/focus): TCU planning.      Darleen Mendoza RN   8/3/2025   2:49 PM

## 2025-08-03 NOTE — PROGRESS NOTES
Pt reluctant to do IS/Aerobika when approached. Pt reports doing on own, needs no review. Pt states he's comfortable with use and technique. Pt encouraged to use IS/Aerobika QID, and to call if he has any questions. O2 3 lpm via NC.

## 2025-08-04 LAB
ANION GAP SERPL CALCULATED.3IONS-SCNC: 9 MMOL/L (ref 7–15)
ATRIAL RATE - MUSE: 81 BPM
BUN SERPL-MCNC: 35.6 MG/DL (ref 6–20)
CA-I BLD-MCNC: 4.7 MG/DL (ref 4.4–5.2)
CALCIUM SERPL-MCNC: 9.2 MG/DL (ref 8.8–10.4)
CHLORIDE SERPL-SCNC: 98 MMOL/L (ref 98–107)
CREAT SERPL-MCNC: 1.55 MG/DL (ref 0.67–1.17)
DIASTOLIC BLOOD PRESSURE - MUSE: NORMAL MMHG
EGFRCR SERPLBLD CKD-EPI 2021: 54 ML/MIN/1.73M2
ERYTHROCYTE [DISTWIDTH] IN BLOOD BY AUTOMATED COUNT: 12.8 % (ref 10–15)
GLUCOSE BLDC GLUCOMTR-MCNC: 188 MG/DL (ref 70–99)
GLUCOSE BLDC GLUCOMTR-MCNC: 193 MG/DL (ref 70–99)
GLUCOSE BLDC GLUCOMTR-MCNC: 196 MG/DL (ref 70–99)
GLUCOSE BLDC GLUCOMTR-MCNC: 201 MG/DL (ref 70–99)
GLUCOSE SERPL-MCNC: 203 MG/DL (ref 70–99)
HCO3 SERPL-SCNC: 26 MMOL/L (ref 22–29)
HCT VFR BLD AUTO: 25.9 % (ref 40–53)
HGB BLD-MCNC: 8.7 G/DL (ref 13.3–17.7)
INTERPRETATION ECG - MUSE: NORMAL
MAGNESIUM SERPL-MCNC: 2.1 MG/DL (ref 1.7–2.3)
MCH RBC QN AUTO: 31 PG (ref 26.5–33)
MCHC RBC AUTO-ENTMCNC: 33.6 G/DL (ref 31.5–36.5)
MCV RBC AUTO: 92 FL (ref 78–100)
P AXIS - MUSE: 36 DEGREES
PHOSPHATE SERPL-MCNC: 3.9 MG/DL (ref 2.5–4.5)
PLATELET # BLD AUTO: 214 10E3/UL (ref 150–450)
POTASSIUM SERPL-SCNC: 4.3 MMOL/L (ref 3.4–5.3)
PR INTERVAL - MUSE: 152 MS
QRS DURATION - MUSE: 106 MS
QT - MUSE: 406 MS
QTC - MUSE: 471 MS
R AXIS - MUSE: 70 DEGREES
RBC # BLD AUTO: 2.81 10E6/UL (ref 4.4–5.9)
SODIUM SERPL-SCNC: 133 MMOL/L (ref 135–145)
SYSTOLIC BLOOD PRESSURE - MUSE: NORMAL MMHG
T AXIS - MUSE: 56 DEGREES
VENTRICULAR RATE- MUSE: 81 BPM
WBC # BLD AUTO: 12.7 10E3/UL (ref 4–11)

## 2025-08-04 PROCEDURE — 80048 BASIC METABOLIC PNL TOTAL CA: CPT | Performed by: PHYSICIAN ASSISTANT

## 2025-08-04 PROCEDURE — 85027 COMPLETE CBC AUTOMATED: CPT | Performed by: PHYSICIAN ASSISTANT

## 2025-08-04 PROCEDURE — 210N000001 HC R&B IMCU HEART CARE

## 2025-08-04 PROCEDURE — 82330 ASSAY OF CALCIUM: CPT | Performed by: PHYSICIAN ASSISTANT

## 2025-08-04 PROCEDURE — 999N000156 HC STATISTIC RCP CONSULT EA 30 MIN

## 2025-08-04 PROCEDURE — 84100 ASSAY OF PHOSPHORUS: CPT | Performed by: THORACIC SURGERY (CARDIOTHORACIC VASCULAR SURGERY)

## 2025-08-04 PROCEDURE — 36415 COLL VENOUS BLD VENIPUNCTURE: CPT | Performed by: PHYSICIAN ASSISTANT

## 2025-08-04 PROCEDURE — 250N000013 HC RX MED GY IP 250 OP 250 PS 637: Performed by: PHYSICIAN ASSISTANT

## 2025-08-04 PROCEDURE — 94660 CPAP INITIATION&MGMT: CPT

## 2025-08-04 PROCEDURE — 99252 IP/OBS CONSLTJ NEW/EST SF 35: CPT | Mod: GC

## 2025-08-04 PROCEDURE — 250N000011 HC RX IP 250 OP 636: Performed by: PHYSICIAN ASSISTANT

## 2025-08-04 PROCEDURE — 83735 ASSAY OF MAGNESIUM: CPT | Performed by: THORACIC SURGERY (CARDIOTHORACIC VASCULAR SURGERY)

## 2025-08-04 PROCEDURE — 94799 UNLISTED PULMONARY SVC/PX: CPT

## 2025-08-04 PROCEDURE — 999N000157 HC STATISTIC RCP TIME EA 10 MIN

## 2025-08-04 RX ORDER — CARVEDILOL 3.12 MG/1
3.12 TABLET ORAL 2 TIMES DAILY WITH MEALS
Status: DISCONTINUED | OUTPATIENT
Start: 2025-08-04 | End: 2025-08-07 | Stop reason: HOSPADM

## 2025-08-04 RX ORDER — BUMETANIDE 0.25 MG/ML
2 INJECTION, SOLUTION INTRAMUSCULAR; INTRAVENOUS 2 TIMES DAILY
Status: DISCONTINUED | OUTPATIENT
Start: 2025-08-05 | End: 2025-08-07

## 2025-08-04 RX ORDER — METFORMIN HYDROCHLORIDE 500 MG/1
500 TABLET, EXTENDED RELEASE ORAL
Status: CANCELLED | OUTPATIENT
Start: 2025-08-04

## 2025-08-04 RX ADMIN — BUMETANIDE 2 MG: 0.25 INJECTION INTRAMUSCULAR; INTRAVENOUS at 08:54

## 2025-08-04 RX ADMIN — HEPARIN SODIUM 5000 UNITS: 5000 INJECTION, SOLUTION INTRAVENOUS; SUBCUTANEOUS at 22:26

## 2025-08-04 RX ADMIN — CYANOCOBALAMIN TAB 1000 MCG 1000 MCG: 1000 TAB at 09:12

## 2025-08-04 RX ADMIN — CARVEDILOL 3.12 MG: 3.12 TABLET, FILM COATED ORAL at 09:12

## 2025-08-04 RX ADMIN — ACETAMINOPHEN 975 MG: 325 TABLET ORAL at 13:44

## 2025-08-04 RX ADMIN — INSULIN ASPART 3 UNITS: 100 INJECTION, SOLUTION INTRAVENOUS; SUBCUTANEOUS at 09:06

## 2025-08-04 RX ADMIN — OXYCODONE HYDROCHLORIDE 5 MG: 5 TABLET ORAL at 12:13

## 2025-08-04 RX ADMIN — INSULIN ASPART 3 UNITS: 100 INJECTION, SOLUTION INTRAVENOUS; SUBCUTANEOUS at 12:13

## 2025-08-04 RX ADMIN — HEPARIN SODIUM 5000 UNITS: 5000 INJECTION, SOLUTION INTRAVENOUS; SUBCUTANEOUS at 04:12

## 2025-08-04 RX ADMIN — LIDOCAINE 2 PATCH: 4 PATCH TOPICAL at 19:22

## 2025-08-04 RX ADMIN — INSULIN ASPART 3 UNITS: 100 INJECTION, SOLUTION INTRAVENOUS; SUBCUTANEOUS at 18:44

## 2025-08-04 RX ADMIN — POLYETHYLENE GLYCOL 3350 17 G: 17 POWDER, FOR SOLUTION ORAL at 09:12

## 2025-08-04 RX ADMIN — SENNOSIDES, DOCUSATE SODIUM 1 TABLET: 50; 8.6 TABLET, FILM COATED ORAL at 22:25

## 2025-08-04 RX ADMIN — ACETAMINOPHEN 975 MG: 325 TABLET ORAL at 22:25

## 2025-08-04 RX ADMIN — PANTOPRAZOLE SODIUM 40 MG: 40 TABLET, DELAYED RELEASE ORAL at 09:12

## 2025-08-04 RX ADMIN — ACETAMINOPHEN 975 MG: 325 TABLET ORAL at 09:10

## 2025-08-04 RX ADMIN — CLOPIDOGREL BISULFATE 75 MG: 75 TABLET, FILM COATED ORAL at 09:12

## 2025-08-04 RX ADMIN — TAMSULOSIN HYDROCHLORIDE 0.4 MG: 0.4 CAPSULE ORAL at 09:12

## 2025-08-04 RX ADMIN — ATORVASTATIN CALCIUM 80 MG: 40 TABLET, FILM COATED ORAL at 22:25

## 2025-08-04 RX ADMIN — HEPARIN SODIUM 5000 UNITS: 5000 INJECTION, SOLUTION INTRAVENOUS; SUBCUTANEOUS at 13:44

## 2025-08-04 RX ADMIN — ACETAMINOPHEN 975 MG: 325 TABLET ORAL at 04:12

## 2025-08-04 RX ADMIN — SENNOSIDES, DOCUSATE SODIUM 1 TABLET: 50; 8.6 TABLET, FILM COATED ORAL at 09:11

## 2025-08-04 RX ADMIN — OXYCODONE HYDROCHLORIDE 10 MG: 5 TABLET ORAL at 02:09

## 2025-08-04 RX ADMIN — Medication 1 TABLET: at 09:12

## 2025-08-04 RX ADMIN — CARVEDILOL 3.12 MG: 3.12 TABLET, FILM COATED ORAL at 18:49

## 2025-08-04 RX ADMIN — GUAIFENESIN 600 MG: 600 TABLET ORAL at 22:25

## 2025-08-04 RX ADMIN — ONDANSETRON 4 MG: 2 INJECTION, SOLUTION INTRAMUSCULAR; INTRAVENOUS at 08:55

## 2025-08-04 RX ADMIN — GUAIFENESIN 600 MG: 600 TABLET ORAL at 09:12

## 2025-08-04 RX ADMIN — OXYCODONE HYDROCHLORIDE 5 MG: 5 TABLET ORAL at 19:21

## 2025-08-04 RX ADMIN — ASPIRIN 81 MG CHEWABLE TABLET 81 MG: 81 TABLET CHEWABLE at 09:11

## 2025-08-04 ASSESSMENT — ACTIVITIES OF DAILY LIVING (ADL)
ADLS_ACUITY_SCORE: 46
ADLS_ACUITY_SCORE: 50
ADLS_ACUITY_SCORE: 46
ADLS_ACUITY_SCORE: 50
ADLS_ACUITY_SCORE: 46
ADLS_ACUITY_SCORE: 50
ADLS_ACUITY_SCORE: 46
ADLS_ACUITY_SCORE: 46
ADLS_ACUITY_SCORE: 50
ADLS_ACUITY_SCORE: 50
ADLS_ACUITY_SCORE: 46
ADLS_ACUITY_SCORE: 50
ADLS_ACUITY_SCORE: 46
ADLS_ACUITY_SCORE: 50
ADLS_ACUITY_SCORE: 46
ADLS_ACUITY_SCORE: 46

## 2025-08-04 NOTE — CONSULTS
[facility]  Consult Note - Hospitalist Service  Date of Admission:  7/30/2025  Consult Requested by: Dr. Howard  Reason for Consult: T2DM management    Assessment & Plan   Floyd Capps is a 49 year old male admitted on 7/30/2025. He has history of CAD, HFrEF, HTN, prior CVA, tobacco use disorder and T2DM, was admitted for multivessel coronary artery disease and had triple vessel coronary artery bypass grafting on 7/30 in which family medicine service has been consulted for T2DM management.    T2DM  Poorly controlled. Last A1C was 11 two months ago. Lives in TCU, doesn't manage his own medications but is mostly aware of what he is taking. Since admission, initially was on insulin gtt, full day of IV insulin with appropriate control took 99u over 24 hours. Since then, glargine 10u and HDSS were used, with fasting sugars 190+ and lowest preprandial sugar was 171. Home regimen includes glargine 55u, Novolog 20-24u (reported per patient, not on med rec completed at admission) with meals, metformin 500 and jardiance 10mg. Since admission, has been eating less given his advance diet order as tolerated low fat and 2g NA as well as not his typical meals. Given decreased appetite, that is likely why his sugars haven't spiked > 250 despite much less insulin than home regimen. Calculated insulin requirements inpatient based on insulin gtt suggests 50u glargine and 15u aspart TID with meals would be adequate. Will start a bit lower than that given diet change, titrate up as needed.  - Glargine increase to 25u nightly  - Add mealtime insulin aspart 15u TID with meals   - For today, given already got 10u glargine this AM   - Tomorrow, may lower down to 10u TID with meals  - Continue high dose sliding scale  - POCT 4 times daily: Fasting AM/preprandial meals and bedtime  - Hypoglycemia protocol PRN       The patient's care was discussed with the Attending Physician, Dr. Spaulding.    Clinically Significant Risk  "Factors         # Hyponatremia: Lowest Na = 133 mmol/L in last 2 days, will monitor as appropriate       # Hypoalbuminemia: Lowest albumin = 2.9 g/dL at 7/30/2025  1:04 PM, will monitor as appropriate     # Hypertension: Noted on problem list    # Heart failure with preserved ejection fraction: EF >50% and home medication list includes sacubitril/valsartan (Entresto)          # DMII: A1C = 11.3 % (Ref range: <5.7 %) within past 6 months   # Morbid Obesity: Estimated body mass index is 46.69 kg/m  as calculated from the following:    Height as of this encounter: 1.778 m (5' 10\").    Weight as of this encounter: 147.6 kg (325 lb 6.4 oz).        # Financial/Environmental Concerns:     # History of CABG: noted on surgical history       Terry Ag MD  Hospitalist Service  Securely message with [employer] (more info)  Text page via [facility] Paging/Directory   ______________________________________________________________________    Chief Complaint   Consult for T2DM management    History is obtained from the patient and chart review      History of Present Illness   Floyd Capps is a 49 year old male who has history of CAD, HFrEF, HTN, prior CVA, tobacco use disorder and T2DM and was admitted for multivessel coronary artery disease and had triple vessel coronary artery bypass grafting on 7/30 in which family medicine service has been consulted for T2DM management.    Since admission, he was getting insulin via insulin gtt since immediately post-op through AM 8/1, then was transitioned to lantus 19u with aspart sliding scale, currently has been getting lantus 10u daily with high dose sliding scale and metformin 500mg daily. Since transitioning off the insulin drip, the blood sugars have been 190-200 AM fasting, with sliding scale sugars have never been lower than 171. Outpatient, he has been taking Glargine 55u daily, along with 22-25u Novolog TID with meals and sliding scale aspart, as well as metformin 500mg daily, " "jardiance 10mg daily. Last A1C was 11.3 2 months ago. Wears CGM outpatient. He is upset with his diabetes management and at healthcare in general, lives at [facility] and feels that he doesn't have great input on his diabetes. Frustrated that \"doctors just do what they want with my medications\". Frustrated with not understanding why his A1C is higher.      Past Medical History    Past Medical History:   Diagnosis Date    Acute hypoxemic respiratory failure (H)     Acute renal failure, unspecified acute renal failure type     CAD (coronary artery disease)     Callus of foot     Diabetes (H)     Diarrhea     Essential hypertension     Fracture of left distal radius     Heel ulcer (H)     Right    History of stroke     Insomnia     Morbid obesity (H)     Nausea     Non-pressure chronic ulcer of right heel and midfoot with unspecified severity (H)     Normocytic anemia     Peripheral neuropathy     Type 2 diabetes mellitus with foot ulcer (H)        Past Surgical History   Past Surgical History:   Procedure Laterality Date    APPENDECTOMY      CORONARY ARTERY BYPASS GRAFT, WITH ENDOSCOPIC VESSEL PROCUREMENT N/A 7/30/2025    Procedure: CORONARY ARTERY BYPASS GRAFT TIMES THREE, WITH LEFT LEG ENDOSCOPIC VESSEL PROCUREMENT, LEFT INTERNAL MAMMARY ARTERY HARVEST, CLIPPING OF LEFT ATRIAL APPENDAGE;  Surgeon: Haydee Boateng MD;  [place] [facility] [place]    CV CORONARY ANGIOGRAM N/A 3/1/2024    Procedure: CV CORONARY ANGIOGRAM;  Surgeon: Stephen Berger MD;  Location: [facility] LAB CV    CV CORONARY ANGIOGRAM N/A 6/3/2025    Procedure: Coronary Angiogram;  Surgeon: Stephen Berger MD;  Location: [facility] LAB CV    CV LEFT HEART CATH N/A 3/1/2024    Procedure: Left Heart Catheterization;  Surgeon: Stephen Berger MD;  Location: [facility] LAB CV    CV LEFT HEART CATH N/A 6/3/2025    Procedure: Left Heart Catheterization;  Surgeon: Stephen Berger MD;  Location: [facility] LAB CV    EXCISE EXOSTOSIS FOOT " Right 5/2/2024    Procedure: excision of bone from calcaneus with application of theraskin;  Surgeon: Dong Sanders DPM;  Location: [facility] OR    INCISION AND DRAINAGE FOOT, COMBINED Right 5/2/2024    Procedure: INCISION AND DRAINAGE, right heel with;  Surgeon: Dong Sanders DPM;  Location: [facility] OR    INCISION AND DRAINAGE OF WOUND      groin abscess    IRRIGATION AND DEBRIDEMENT FOOT, COMBINED Right 2/16/2024    Procedure: IRRIGATION AND DEBRIDEMENT RIGHT FOOT;  Surgeon: Cristofer Sims DPM;  Location: [facility] OR    PICC DOUBLE LUMEN PLACEMENT  2/29/2024    TRANSESOPHAGEAL ECHOCARDIOGRAM INTRAOPERATIVE N/A 7/30/2025    Procedure: ECHOCARDIOGRAM, TRANSESOPHAGEAL, INTRAOPERATIVE, EPIAORTIC ULTRASOUND;  Surgeon: Haydee Boateng MD;  Location: [facility] OR       Medications   I have reviewed this patient's current medications          Physical Exam   Vital Signs: Temp: 98.7  F (37.1  C) Temp src: Oral BP: 132/60 Pulse: 81   Resp: 20 SpO2: 96 % O2 Device: Nasal cannula Oxygen Delivery: 3 LPM  Weight: 325 lbs 6.4 oz    General: No acute distress. Well kept, appears stated age.  Head: Normocephalic, atraumatic.  Respiratory: No signs of respiratory distress.  Cardiovascular: Appears well perfused.          Medical Decision Making             Data

## 2025-08-04 NOTE — PROGRESS NOTES
RCAT Treatment Plan    Patient Score: 9  Patient Acuity: 4    Clinical Indication for Therapy: prevent atelectasis    Therapy Ordered: PEP/PAP BID    Assessment Summary: Pt admitted post CABG x 3. PMH includes CAD, CVA, Tobacco, DM2, ANDIE, CHF, and undiagnosed hypoventilation syndrome. Pt is relatively unmotivated with cares and continues to have poor effort. Desats to high 70s while asleep. Refusing CPAP and requesting O2 NC while asleep. Requiring 4 L HS, on RA while awake. BS are clear and diminished through out. RT will continue to follow.     Lois Grajeda, RRT  8/4/2025

## 2025-08-04 NOTE — PLAN OF CARE
Goal Outcome Evaluation:  sc  Patient Name: Floyd Capps   MRN: [id number]   Date of Admission: 7/30/2025    Procedure: Procedure(s):  CORONARY ARTERY BYPASS GRAFT TIMES THREE, WITH LEFT LEG ENDOSCOPIC VESSEL PROCUREMENT, LEFT INTERNAL MAMMARY ARTERY HARVEST, CLIPPING OF LEFT ATRIAL APPENDAGE  ECHOCARDIOGRAM, TRANSESOPHAGEAL, INTRAOPERATIVE, EPIAORTIC ULTRASOUND    Post Op day #:4    Subjective (Patient focus/Primary Problem for shift): activity           Pain Goal 5 Pain Rating 5           Pain Medication/ Regime effective to reduce patient pain apap, oxycodone, lidocaine    Objective (Physical assessment):           Rhythm: normal sinus rhythm            Bowel Activity: no if Yes indicate when:            Bowel Medications: yes            Incision: healing well          Incentive Spirometry Q 1-2 hour when awake:  Pt will not use when asked Volume:  1500           Epicardial Pacing Wires:  no            Patient Activity:           Up to chair for meals: yes          Ambulation with RN x2 (Not including CR): no   REFUSED          Shower Daily {YES/NO/NA:191488          Nasal  Nozin BID AM/PM : yes              Chest Tubes   Pleural: no Draining: no               Suction: no              Mediastinal: no Draining: no               Suction: no   Dressing Change Daily:yes If No, why?                      Urinary Catheter: no           Preventative WOC consult (need MD order): yes       Assessment (Nursing primary shift focus):  Pt is refusing to walk and activity.  Pt states he understands when attempting education.  Pt reports pain 7/10 with coughing.  Pt is able to stand to urinate.  Pt has not had bm.  Pt refused to drink mirlax stating it taste bad.  PT is irritable with staff.     Plan (Patient Care Plan/focus): Pt is encouraged to increase activity. Pt is encouraged to us is and flutter.        Jorge A Kumar RN   8/3/2025   9:25 PM

## 2025-08-04 NOTE — PROGRESS NOTES
PADDY Attestation  I, Geno Howard PA-C, was present with the PADDY student who participated in the service and in the documentation of the note.  I have verified the history and personally performed the physical exam and medical decision making.  I agree with the assessment and plan of care as documented in the note.      Geno Howard PA-C  Date of Service (when I saw the patient): 08/04/2025     ______________________    CVTS Daily Progress Note   POD#5 s/p CABG x3 (LIMA-->LAD, rSVG-->HEENA, rSVG-->RPD) and MERA clippage  Attending: Dr. Boateng  LOS: 5    SUBJECTIVE/INTERVAL EVENTS:    Continues to remain somewhat difficult with staff and unmotivated with cares. Some episodes of hypotension yesterday, as low as 86/52. Asymptomatic and no intervention required. Satting ~91% on RA overnight, refused oxygen, now satting well while awake on 2L NC. Good UOP, stands to void spontaneously. - BM / + flatus. Increasing appetite, denies abdominal pain, nausea, or vomiting. Pain well controlled with medications, however appears drowsy and slightly loopy this AM. Otherwise denies dizziness, lightheadedness, fever, chills, calf pain. Agrees to ambulate more with cardiac rehab + nursing today, walked only once yesterday.       OBJECTIVE:  Temp:  [97.9  F (36.6  C)-98.7  F (37.1  C)] 98.5  F (36.9  C)  Pulse:  [80-86] 80  Resp:  [16-18] 18  BP: ()/(43-59) 117/59  SpO2:  [90 %-94 %] 94 %  Vitals:    07/30/25 0539 07/31/25 0600 08/02/25 0604   Weight: (!) 140.6 kg (310 lb) (!) 144.8 kg (319 lb 4.8 oz) (!) 147.6 kg (325 lb 6.4 oz)       Clinically Significant Risk Factors         # Hyponatremia: Lowest Na = 133 mmol/L in last 2 days, will monitor as appropriate       # Hypoalbuminemia: Lowest albumin = 2.9 g/dL at 7/30/2025  1:04 PM, will monitor as appropriate       # Hypertension: Noted on problem list  # Heart failure with preserved ejection fraction: EF >50% and home medication list includes  "sacubitril/valsartan (Entresto)          # DMII: A1C = 11.3 % (Ref range: <5.7 %) within past 6 months   # Morbid Obesity: Estimated body mass index is 46.69 kg/m  as calculated from the following:    Height as of this encounter: 1.778 m (5' 10\").    Weight as of this encounter: 147.6 kg (325 lb 6.4 oz).        # Financial/Environmental Concerns:     # History of CABG: noted on surgical history          Current Medications:    Scheduled Meds:  Current Facility-Administered Medications   Medication Dose Route Frequency Provider Last Rate Last Admin    acetaminophen (TYLENOL) tablet 975 mg  975 mg Oral Q8H Geno Howard PA-C   975 mg at 08/04/25 0910    aspirin (ASA) chewable tablet 81 mg  81 mg Oral or NG Tube Daily Geno Howard PA-C   81 mg at 08/04/25 0911    atorvastatin (LIPITOR) tablet 80 mg  80 mg Oral At Bedtime Geno Howard PA-C   80 mg at 08/03/25 2111    bumetanide (BUMEX) injection 2 mg  2 mg Intravenous TID Geno Howard PA-C   2 mg at 08/04/25 0854    carvedilol (COREG) tablet 3.125 mg  3.125 mg Oral BID w/meals Geno Howard PA-C   3.125 mg at 08/04/25 0912    clopidogrel (PLAVIX) tablet 75 mg  75 mg Oral Daily Geno Howard PA-C   75 mg at 08/04/25 0912    cyanocobalamin (VITAMIN B-12) tablet 1,000 mcg  1,000 mcg Oral Daily Geno Howard PA-C   1,000 mcg at 08/04/25 0912    [Held by provider] empagliflozin (JARDIANCE) tablet 10 mg  10 mg Oral Daily Geno Howard PA-C        guaiFENesin (MUCINEX) 12 hr tablet 600 mg  600 mg Oral BID Geno Howard PA-C   600 mg at 08/04/25 0912    heparin ANTICOAGULANT injection 5,000 Units  5,000 Units Subcutaneous Q8H Geno Howard PA-C   5,000 Units at 08/04/25 0412    insulin aspart (NovoLOG) injection (RAPID ACTING)  1-10 Units Subcutaneous TID AC Geno Howard PA-C   3 Units at 08/04/25 0906    insulin aspart (NovoLOG) injection (RAPID ACTING)  1-7 Units " Subcutaneous At Bedtime Geno Howard PA-C   2 Units at 08/03/25 2111    insulin glargine (LANTUS PEN) injection 10 Units  10 Units Subcutaneous QAM AC Geno Howard PA-C   10 Units at 08/04/25 0905    Lidocaine (LIDOCARE) 4 % Patch 1-2 patch  1-2 patch Transdermal Q24H Geno Howard PA-C   2 patch at 08/03/25 1652    [Held by provider] metFORMIN (GLUCOPHAGE XR) 24 hr tablet 500 mg  500 mg Oral Daily with supper Geno Howard PA-C   500 mg at 08/03/25 1710    multivitamin w/minerals (THERA-VIT-M) tablet 1 tablet  1 tablet Oral Daily Geno Howard PA-C   1 tablet at 08/04/25 0912    pantoprazole (PROTONIX) EC tablet 40 mg  40 mg Oral Daily Geno Howard PA-C   40 mg at 08/04/25 0912    polyethylene glycol (MIRALAX) Packet 17 g  17 g Oral Daily Geno Howard PA-C   17 g at 08/04/25 0912    [Held by provider] sacubitril-valsartan (ENTRESTO) 24-26 MG per tablet 1 tablet  1 tablet Oral BID Geno Howard PA-C        senna-docusate (SENOKOT-S/PERICOLACE) 8.6-50 MG per tablet 1 tablet  1 tablet Oral BID Geno Howard PA-C   1 tablet at 08/04/25 0911    tamsulosin (FLOMAX) capsule 0.4 mg  0.4 mg Oral Daily Geno Howard PA-C   0.4 mg at 08/04/25 0912     Continuous Infusions:  Current Facility-Administered Medications   Medication Dose Route Frequency Provider Last Rate Last Admin     PRN Meds:.  Current Facility-Administered Medications   Medication Dose Route Frequency Provider Last Rate Last Admin    bisacodyl (DULCOLAX) suppository 10 mg  10 mg Rectal Daily PRN Geno Howard PA-C        calcium gluconate 1 g in 50 mL in sodium chloride intermittent infusion  1 g Intravenous Once PRN Geno Howard PA-C        calcium gluconate 2 g in  mL intermittent infusion  2 g Intravenous Once PRN Geno Howard PA-C        calcium gluconate 3 g in sodium chloride 0.9 % 100 mL intermittent infusion  3  g Intravenous Once PRN Geno Howard PA-C        glucose gel 15-30 g  15-30 g Oral Q15 Min PRN Haydee Boateng MD        Or    dextrose 50 % injection 25-50 mL  25-50 mL Intravenous Q15 Min PRN Haydee Boateng MD        Or    glucagon injection 1 mg  1 mg Subcutaneous Q15 Min PRN Haydee Boateng MD        hydrALAZINE (APRESOLINE) injection 10 mg  10 mg Intravenous Q30 Min PRN Geno Howard PA-C        lactated ringers BOLUS 250 mL  250 mL Intravenous Q15 Min PRN Geno Howard PA-C        magnesium hydroxide (MILK OF MAGNESIA) suspension 30 mL  30 mL Oral Daily PRN Geno Howard PA-C        methocarbamol (ROBAXIN) tablet 500 mg  500 mg Oral 4x Daily PRN Geno Howard PA-C   500 mg at 07/31/25 1126    naloxone (NARCAN) injection 0.2 mg  0.2 mg Intravenous Q2 Min PRN Geno Howard PA-C        Or    naloxone (NARCAN) injection 0.4 mg  0.4 mg Intravenous Q2 Min PRN Geno Howard PA-C        Or    naloxone (NARCAN) injection 0.2 mg  0.2 mg Intramuscular Q2 Min PRN Geno Howard PA-C        Or    naloxone (NARCAN) injection 0.4 mg  0.4 mg Intramuscular Q2 Min PRN Geno Howard PA-C        ondansetron (ZOFRAN ODT) ODT tab 4 mg  4 mg Oral Q6H PRN Geno Howard PA-C   4 mg at 08/03/25 2115    Or    ondansetron (ZOFRAN) injection 4 mg  4 mg Intravenous Q6H PRN Geno Howard PA-C   4 mg at 08/04/25 0855    oxyCODONE (ROXICODONE) tablet 5 mg  5 mg Oral Q4H PRN Geno Howard PA-C   5 mg at 08/04/25 1050    Or    oxyCODONE (ROXICODONE) tablet 10 mg  10 mg Oral Q4H PRN Geno Howard PA-C   10 mg at 08/04/25 0209    prochlorperazine (COMPAZINE) injection 10 mg  10 mg Intravenous Q6H PRN Geno Howard PA-C   10 mg at 08/04/25 1050    Or    prochlorperazine (COMPAZINE) tablet 10 mg  10 mg Oral Q6H PRN Geno Howard PA-C           Cardiographics:    Telemetry monitoring  demonstrates NSR with rates in the 90s per my personal review.    EKG 8/3/2025 - Sinus rhythm with new incomplete RBBB. Changes in anterior leads observed.     Imaging:  Results for orders placed or performed during the hospital encounter of 07/30/25   XR Chest Port 1 View    Impression    IMPRESSION: Negative chest.   XR Chest Port 1 View    Impression    IMPRESSION: The endotracheal tube tip is 6.4 cm from the edelmira. An NG or OG tube terminates off the field-of-view. A right IJ sheath transmits a [place] catheter which terminates in the lower pulmonary artery. Lung volumes are low. No pleural fluid or   pneumothorax. The heart is not well assessed. Sternotomy suture wires are present. There are degenerative changes of the left shoulder. Old healed right clavicle fracture is suspected.   XR Chest Port 1 View    Impression    IMPRESSION: Interval removal endotracheal and enteric tube. Median sternotomy. Mediastinal drain, right chest 2 views. No significant effusion. Right IJ sheath. Remote fracture right clavicle.   XR Abdomen Port 1 View    Impression    IMPRESSION: An OG tube terminates in the stomach. Subxiphoid drains are noted.       Labs, personally reviewed.  Hemoglobin   Date Value Ref Range Status   08/04/2025 8.7 (L) 13.3 - 17.7 g/dL Final   08/03/2025 8.5 (L) 13.3 - 17.7 g/dL Final   08/02/2025 9.1 (L) 13.3 - 17.7 g/dL Final     WBC Count   Date Value Ref Range Status   08/04/2025 12.7 (H) 4.0 - 11.0 10e3/uL Final   08/03/2025 13.2 (H) 4.0 - 11.0 10e3/uL Final   08/02/2025 13.2 (H) 4.0 - 11.0 10e3/uL Final     Platelet Count   Date Value Ref Range Status   08/04/2025 214 150 - 450 10e3/uL Final   08/03/2025 184 150 - 450 10e3/uL Final   08/02/2025 159 150 - 450 10e3/uL Final     Creatinine   Date Value Ref Range Status   08/04/2025 1.55 (H) 0.67 - 1.17 mg/dL Final   08/03/2025 1.32 (H) 0.67 - 1.17 mg/dL Final   08/02/2025 1.20 (H) 0.67 - 1.17 mg/dL Final     Potassium   Date Value Ref Range Status    08/04/2025 4.3 3.4 - 5.3 mmol/L Final   08/03/2025 4.2 3.4 - 5.3 mmol/L Final   08/02/2025 4.3 3.4 - 5.3 mmol/L Final     Potassium POCT   Date Value Ref Range Status   07/30/2025 4.8 3.4 - 5.3 mmol/L Final   07/30/2025 4.8 3.4 - 5.3 mmol/L Final   07/30/2025 4.9 3.4 - 5.3 mmol/L Final     Magnesium   Date Value Ref Range Status   08/04/2025 2.1 1.7 - 2.3 mg/dL Final   08/03/2025 1.9 1.7 - 2.3 mg/dL Final   08/02/2025 2.1 1.7 - 2.3 mg/dL Final          I/O:  I/O last 3 completed shifts:  In: 1370 [P.O.:1370]  Out: 440 [Urine:400; Chest Tube:40]       Physical Exam:    General: Patient seen sitting in the dark, up in chair. Awake, in no acute distress.   CV: RRR on monitor. Mild edema of bilateral lower extremities. Incision C/D/I. Absent posterior tibialis pulses bilaterally.   Pulm: Non-labored effort on 2L NC.   Abd: Soft, NT, ND  Ext: Mild pedal edema, SCDs in place, warm and moist.  Neuro: CNs grossly intact. Moves all 4 extremities. Bilateral lower extremity sensation diminished but intact.   Psych: Restless, a bit irritable.     ASSESSMENT/PLAN:    Floyd Capps is a 49 year old male with a history of HFrEF (EF 40-45% from TTE 5/2025), ischemic CVA on 8/2023 and 3/2025, poorly-controlled and insulin-dependent T2DM with diabetic neuropathy with foot wound on R heel, and CKD stage 3 who is s/p CABG x3 and MERA clippage with Dr. Boateng on 7/30/2025.     Principal Problem:    Coronary artery disease involving autologous artery coronary bypass graft without angina pectoris      NEURO:   - Scheduled Tylenol/lidocaine patches and PRN Tylenol/oxycodone/robaxin for pain  - Decrease prn oxycodone from 10mg --> 5mg due to drowsiness   - Encourage use of prn robaxin for pain adjunct     CV:   - Pre-op EF 40-45% from TTE; intra-op GAIL EF 35%  - Normotensive off pressors; some intermittent episodes of hypotension yesterday  - Chest tubes and TPW removed POD#4  - Decrease PTA Coreg today to 3.125mg BID with hold parameters    - PTA Plavix 75mg   - ASA 81mg daily   - Atorvastatin 80mg daily  - Decrease PTA Bumex for diuresis to 2mg BID for consideration of kidney function, reassess when accurate weight is taken  - Note: Groin incision closed with staples - remove prior to discharge/at follow up appt  - Hold PTA Entresto     PULM:   - Extubated on POD#0  - Maintaining oxygen saturations on 2L NC   - Encourage pulmonary toilet  - Added Mucinex to thin secretions   - Consider sleep apnea/referral at discharge     FEN/GI:  - Continue electrolyte replacement protocol  - Cardiac; ADAT  - Bowel regimen; last BM preop    RENAL:  - Adequate UOP/hr. Continue to monitor closely.  - Cr 1.55 increased from 1.32 yesterday, baseline 1.3-1.5  - Cazares removed, monitor for urinary retention   - Diuresis with IV Bumex 2mg BID until baseline weight  - BMP daily in AM  - Added Flomax daily for straining/intermittent urinary stream    HEME:  - Acute blood loss anemia post-op.   - Hgb stable, no bleeding concerns. Hep SQ, ASA  - Daily CBC in AM    ID:  - Luly op ppx complete, afebrile. No concerns for infection    ENDO:   - Most recent A1c 11.3% on 5/28/2025. PTA takes 55 units insulin of Lantus at bedtime + Novolog.  - SSI + Lantus   - Hold PTA metformin for impaired renal function   - Hold PTA jardiance  - Hospitalist consult for DM management     PPx:   - DVT: SCDs, SQ heparin TID, ambulation   - GI: Protonix 40mg IV/PO daily    DISPO:   - General telemetry status since POD#3  - Medically Ready for Discharge: Anticipated Tomorrow   - Plan: TCU      ATIYA Campbell Student   [facility]     Patient discussed with Dr. Coates.

## 2025-08-04 NOTE — DISCHARGE SUMMARY
Cardiovascular Surgery Discharge Summary    Primary Care Physician:  Glendy Benavides  Discharge Provider: CHAMP Rose CNP   Admission Date: 7/30/2025  Admission Diagnoses: CAD (coronary artery disease) [I25.10]  Coronary artery disease involving autologous artery coronary bypass graft without angina pectoris [I25.810]  Discharge Date: 8/7/2025   Disposition: TCU  Condition at Discharge: Stable  Code Status: Full Code     Principal Diagnosis:   Severe multi-vessel coronary artery disease s/p CABG    Discharge Diagnoses:    Active Problems:      Patient Active Problem List   Diagnosis    Dizziness    Type 2 Diabetes Mellitus - Uncomplicated, Controlled    Hyperlipidemia    Hypothyroidism    Obesity    Tobacco use disorder    Elevated troponin    History of stroke    Class 2 severe obesity due to excess calories with serious comorbidity in adult (H)    Hyperglycemia    Type 2 diabetes mellitus with hyperglycemia, with long-term current use of insulin (H)    Cellulitis of right lower leg    Diabetic ulcer of right heel associated with type 2 diabetes mellitus, unspecified ulcer stage (H)    Hyponatremia    Acute systolic heart failure (H)    Closed fracture of distal end of left radius    Benign essential hypertension    Heart burn    Diabetic ulcer of right heel associated with diabetes mellitus due to underlying condition, with muscle involvement without evidence of necrosis (H)    Diabetic ulcer of right heel associated with type 2 diabetes mellitus, with necrosis of bone (H)    Diabetic ulcer of right heel associated with type 2 diabetes mellitus, limited to breakdown of skin (H)    Acute ischemic stroke (H)    Chronic systolic heart failure (H)    CKD (chronic kidney disease) stage 3, GFR 30-59 ml/min (H)    HOWELL (dyspnea on exertion)    Cerebrovascular accident (CVA), unspecified mechanism (H)    Ulcer of right heel and midfoot, unspecified ulcer stage (H)    Obstructive sleep apnea    Coronary artery  disease involving autologous artery coronary bypass graft without angina pectoris         Consult/s: Dietary, critical care medicine, cardiology, hospitalist    Surgery:   7/30/2025 with Dr. Boateng   Median sternotomy   Take down of the left internal mammary artery    Endoscopic greater saphenous vein procurement from the left lower extremity    Epiaortic ultrasound of the ascending aorta    Placement on central cardiopulmonary bypass    Triple vessel coronary artery bypass grafting;   -  Left internal mammary artery to the left anterior descending coronary artery  -  Separate reversed saphenous vein grafts to the left anterior descending diagonal branch, and the right posterior descending coronary arteries  7.     Placement of a 50 mm Flex V AtriClip on the left atrial   appendage.  8.  Placement of temporary atrial and ventricular pacing wires    FINDINGS AT OPERATION:  His ascending aorta was free of any plaque seen on epiaortic ultrasound.  His pulmonary artery pressures were one third systemic.  His overall left ventricular ejection fraction was about 35%.  His heart was globally enlarged.  The left atrial appendage sized to a 50 mm Flex V AtriClip.  The left internal mammary artery was thin walled and a 2 mm conduit with excellent flow.  The reversed saphenous vein graft measured 4 to 5 mm in diameter and was of good quality.  The right posterior descending coronary artery was diffusely diseased and a 2 mm in diameter target vessel, a fair to good vessel for bypass grafting.  The obtuse marginal coronary arteries were explored but none of them were suitable targets.  The left anterior descending diagonal branch coronary artery was a 1.5 mm in diameter target vessel, a fair to good vessel for bypass grafting.  The left anterior descending coronary artery in its apical third was a 2 mm in diameter target vessel, a fair to good vessel for bypass grafting.  There was evidence of an old infarct in the RCA  distribution.        Discharge Medications:      Review of your medicines        START taking        Dose / Directions   guaiFENesin 600 MG 12 hr tablet  Commonly known as: MUCINEX  Used for: S/P CABG (coronary artery bypass graft)      Dose: 600 mg  Take 1 tablet (600 mg) by mouth 2 times daily.  Refills: 0     Lidocaine 4 % Patch  Commonly known as: LIDOCARE  Used for: S/P CABG (coronary artery bypass graft)      Dose: 1-2 patch  Place 1-2 patches over 12 hours onto the skin every 24 hours. To prevent lidocaine toxicity, patient should be patch free for 12 hrs daily.  Refills: 0     metFORMIN 500 MG 24 hr tablet  Commonly known as: GLUCOPHAGE XR  Used for: Type 2 diabetes mellitus with hyperglycemia, with long-term current use of insulin (H)      Dose: 500 mg  Take 1 tablet (500 mg) by mouth daily (with dinner).  Quantity: 90 tablet  Refills: 0     methocarbamol 500 MG tablet  Commonly known as: ROBAXIN  Used for: S/P CABG (coronary artery bypass graft)      Dose: 500 mg  Take 1 tablet (500 mg) by mouth 4 times daily as needed for muscle spasms.  Refills: 0     oxyCODONE 5 MG tablet  Commonly known as: ROXICODONE  Used for: S/P CABG (coronary artery bypass graft)      Dose: 5 mg  Take 1 tablet (5 mg) by mouth every 4 hours as needed for moderate pain.  Quantity: 20 tablet  Refills: 0     pantoprazole 40 MG EC tablet  Commonly known as: PROTONIX  Used for: S/P CABG (coronary artery bypass graft)      Dose: 40 mg  Start taking on: August 8, 2025  Take 1 tablet (40 mg) by mouth daily.  Refills: 0     tamsulosin 0.4 MG capsule  Commonly known as: FLOMAX  Used for: S/P CABG (coronary artery bypass graft)      Dose: 0.4 mg  Take 1 capsule (0.4 mg) by mouth every evening.  Refills: 0            CONTINUE these medicines which may have CHANGED, or have new prescriptions. If we are uncertain of the size of tablets/capsules you have at home, strength may be listed as something that might have changed.        Dose /  Directions   carvedilol 3.125 MG tablet  Commonly known as: COREG  This may have changed:   medication strength  how much to take  Used for: S/P CABG (coronary artery bypass graft)      Dose: 3.125 mg  Take 1 tablet (3.125 mg) by mouth 2 times daily (with meals).  Refills: 0     insulin glargine 100 UNIT/ML pen  Commonly known as: LANTUS PEN  This may have changed:   how much to take  when to take this  Used for: Type 2 diabetes mellitus with hyperglycemia, with long-term current use of insulin (H)      Dose: 40 Units  Inject 40 Units subcutaneously every morning (before breakfast).  Quantity: 15 mL  Refills: 0     * NovoLOG FLEXPEN 100 UNIT/ML soln  This may have changed: Another medication with the same name was changed. Make sure you understand how and when to take each.  Generic drug: insulin aspart      Inject subcutaneously See Admin Instructions. Inject as directed per sliding scale:  For blood sugar 150-200 = 2 units  201-250 = 4 units  251-300 = 6 units  301-350 = 8 units  351-400 = 10 units  401+ = 12 units and call MD  Refills: 0     * NovoLOG FLEXPEN 100 UNIT/ML soln  This may have changed: how much to take  Used for: Type 2 diabetes mellitus with hyperglycemia, with long-term current use of insulin (H)  Generic drug: insulin aspart      Dose: 15 Units  Inject 15 Units subcutaneously 3 times daily (with meals).  Quantity: 15 mL  Refills: 0           * This list has 2 medication(s) that are the same as other medications prescribed for you. Read the directions carefully, and ask your doctor or other care provider to review them with you.                CONTINUE these medicines which have NOT CHANGED        Dose / Directions   acetaminophen 500 MG tablet  Commonly known as: TYLENOL  Indication: Pain  Used for: Diabetic ulcer of right heel associated with type 2 diabetes mellitus, unspecified ulcer stage (H)      Dose: 1,000 mg  Take 2 tablets (1,000 mg) by mouth every 8 hours as needed for mild  pain  Refills: 0     aspirin 81 MG EC tablet  Indication: Coronary Bypass Surgery  Used for: Acute ischemic stroke (H)      Dose: 81 mg  Take 1 tablet (81 mg) by mouth daily.  Refills: 0     atorvastatin 80 MG tablet  Commonly known as: LIPITOR  Indication: Disease involving Lipid Deposits in the Arteries  Used for: S/P foot surgery, right, Acute systolic heart failure (H), Familial hypercholesterolemia, Class 2 severe obesity due to excess calories with serious comorbidity in adult, unspecified BMI (H), History of stroke, Elevated troponin, Tobacco use disorder      Dose: 80 mg  Take 1 tablet (80 mg) by mouth daily  Quantity: 90 tablet  Refills: 3     bumetanide 1 MG tablet  Commonly known as: BUMEX  Indication: Edema      Dose: 1 mg  Take 1 mg by mouth daily.  Refills: 0     clopidogrel 75 MG tablet  Commonly known as: PLAVIX  Used for: Acute ischemic stroke (H)      Dose: 75 mg  Take 1 tablet (75 mg) by mouth daily.  Refills: 0     cyanocobalamin 1000 MCG tablet  Commonly known as: VITAMIN B-12      Dose: 1,000 mcg  Take 1,000 mcg by mouth daily.  Refills: 0     empagliflozin 10 MG Tabs tablet  Commonly known as: JARDIANCE  Used for: CKD stage 3a, GFR 45-59 ml/min (H)      Dose: 10 mg  Take 1 tablet (10 mg) by mouth daily.  Quantity: 30 tablet  Refills: 5     FreeStyle Florencia 2 Sensor Misc  Used for: Uncontrolled type 2 diabetes mellitus with hyperglycemia, with long-term current use of insulin (H)      Dose: 1 each  Inject 1 each subcutaneously every 14 days Use 1 sensor every 14 days. Use to read blood sugars per 's instructions.  Quantity: 6 each  Refills: 5     insulin pen needle 32G X 4 MM miscellaneous  Commonly known as: 32G X 4 MM  Used for: Uncontrolled type 2 diabetes mellitus with hyperglycemia, with long-term current use of insulin (H)      Use 4 pen needles daily or as directed.  Quantity: 200 each  Refills: 4     loperamide 2 MG tablet  Commonly known as: IMODIUM A-D  Indication:  Diarrhea      Dose: 2 mg  Take 2 mg by mouth 4 times daily as needed for diarrhea.  Refills: 0     multivitamin w/minerals tablet  Used for: Diabetic ulcer of right heel associated with type 2 diabetes mellitus, unspecified ulcer stage (H)      Dose: 1 tablet  Take 1 tablet by mouth daily  Refills: 0            STOP taking      isosorbide mononitrate 30 MG 24 hr tablet  Commonly known as: IMDUR        sacubitril-valsartan 24-26 MG per tablet  Commonly known as: ENTRESTO                  Where to get your medicines        These medications were sent to [facility] [address] [address]      Phone: [phone]   insulin glargine 100 UNIT/ML pen  metFORMIN 500 MG 24 hr tablet  NovoLOG FLEXPEN 100 UNIT/ML soln       Some of these will need a paper prescription and others can be bought over the counter. Ask your nurse if you have questions.    Bring a paper prescription for each of these medications  oxyCODONE 5 MG tablet         Discharge Instructions:    Follow up appointment with Primary Care Physician: Glendy Benavides within 7 days of discharge from TCU.  Follow up appointment with Specialist:    Follow with CV Surgery as scheduled.   Follow-up with cardiology as scheduled.     Hospital Summary:   Floyd Capps is a 49 year old male with hx of HFrEF, CKD, HLD, HTN, recent stroke, and poorly controlled diabetes who was admitted to [facility] on 7/30/2025 following an abnormal coronary angiogram demonstrating severe multi-vessel coronary artery disease. He presented to CV surgery for scheduled coronary artery revascularization.     Patient was deemed a candidate for coronary artery bypass surgery, and was taken to the operating room on 7/30/2025 where patient underwent triple vessel coronary artery bypass and endoscopic vein harvest from the left leg, as well as left atrial appendage clippage. Surgery was uneventful and patient  "was brought to the ICU post-operatively. He was extubated on POD#0 and weaned from pressors. Patient was awake and alert, afebrile, and with stable vitals. Insulin drip was discontinued, he was transitioned to a sliding scale, and PTA T2DM medications were resumed. However, the patient's glucose levels remained elevated and kidney function became impaired so hospital medicine was consulted for DM management. He was transferred to general telemetry status on POD#3 where patient has had return of bowel function, is maintaining oxygen saturations on room air, had his chest tubes removed, and has no complaints of chest pain or shortness of breath. On 08/7/25, patient was stable enough to be discharged to TCU.    Patient will be maintained on ASA indefinitely s/p CABG. Restarted PTA Plavix 75mg, important to continue x1 year post-op per surgeon preference for graft patency. ASA dose reduced to 81mg for duration of Plavix. If/when Plavix is discontinued, ASA should be increased to 162mg daily indefinitely s/p CABG.     Patient is still clinically euvolemic, he has been resumed on his PTA Bumex of 1 mg daily. K supplementation (20 KCL daily) added to medication regimen.     PTA Entresto currently held; defer to PCP or cardiology to restart. Post echo revealed ejection fraction of 50-55%    Groin staples will need to be removed in clinic or TCU provider       Post-CABG Discharge Med Documentation:  ASA: Yes, Aspirin 81mg daily (lowered due to Plavix 75mg)  Beta blocker: Yes, Carvedilol 3.25mg BID   Statin: Yes, Atorvastatin 80mg daily     Vital signs:  Temp: 97.7  F (36.5  C) Temp src: Oral BP: 127/59 Pulse: 77   Resp: 16 SpO2: (!) 90 % O2 Device: None (Room air) Oxygen Delivery: 3 LPM Height: 177.8 cm (5' 10\") Weight: (!) 138.2 kg (304 lb 9.6 oz)  Estimated body mass index is 43.71 kg/m  as calculated from the following:    Height as of this encounter: 1.778 m (5' 10\").    Weight as of this encounter: 138.2 kg (304 lb 9.6 " oz).      Physical Exam:    Pertinent exam findings on day of discharge include:  Gen: Seen up in chair. NAD. Pleasant and conversant.   CV: RRR on monitor. Mild edema of bilateral lower extremities.   Pulm: Non-labored breathing on room air.  Abd: Soft, non-tender, non-distended  Neuro: CNs grossly intact  Inc: C/D/I      CHAMP Rose CNP

## 2025-08-04 NOTE — PLAN OF CARE
Problem: Adult Inpatient Plan of Care  Goal: Optimal Comfort and Wellbeing  Outcome: Progressing  Intervention: Monitor Pain and Promote Comfort  Recent Flowsheet Documentation  Taken 8/4/2025 0406 by Yuli De Souza RN  Pain Management Interventions: medication (see MAR)  Taken 8/4/2025 0251 by Yuli De Souza RN  Pain Management Interventions:   rest   repositioned  Taken 8/4/2025 0015 by uYli De Souza RN  Pain Management Interventions:   rest   repositioned  Intervention: Provide Person-Centered Care  Recent Flowsheet Documentation  Taken 8/4/2025 0015 by Yuli De Souza RN  Trust Relationship/Rapport:   care explained   choices provided     Problem: Cardiovascular Surgery  Goal: Effective Bowel Elimination  Outcome: Progressing     Problem: Risk for Delirium  Goal: Optimal Coping  Outcome: Progressing  Goal: Improved Behavioral Control  Outcome: Progressing  Intervention: Minimize Safety Risk  Recent Flowsheet Documentation  Taken 8/4/2025 0015 by Yuli De Souza RN  Enhanced Safety Measures: review medications for side effects with activity  Trust Relationship/Rapport:   care explained   choices provided  Goal: Improved Sleep  Outcome: Progressing     Problem: Cardiovascular Surgery  Goal: Acceptable Pain Control  Outcome: Progressing  Intervention: Prevent or Manage Pain  Recent Flowsheet Documentation  Taken 8/4/2025 0406 by Yuli De Souza RN  Pain Management Interventions: medication (see MAR)  Taken 8/4/2025 0251 by Yuli De Souza RN  Pain Management Interventions:   rest   repositioned  Taken 8/4/2025 0015 by Yuli De Souza RN  Pain Management Interventions:   rest   repositioned   Goal Outcome Evaluation:         Vitals:    08/03/25 1919 08/03/25 2022 08/03/25 2322 08/04/25 0406   BP: 93/43  (!) 86/52 97/52   BP Location: Left arm  Left arm Left arm   Patient Position:    Chair   Cuff Size:       Pulse: 85  84 86   Resp: 18  16 17   Temp: 97.9  F (36.6  C)  98.4  F (36.9  C) 98.7  F  "(37.1  C)   TempSrc: Oral  Oral Oral   SpO2: (!) 90% (!) 91% (!) 91% (!) 91%   Weight:       Height:              Patient reported pain to chest, given scheduled tylenol and PRN oxy X1 with little relief. Patient refused to walk or stand for weight tonight stating \"I'll do that later.\" Patient still demonstrating irritability and accusatory toward staff, he was looking for the permeant marker for the heart pillow and accused staff of taking it, when it was just on a shelf likely so it did not bother him when squeezing heart pillow.      sc  Patient Name: Floyd Capps   MRN: [id number]   Date of Admission: 7/30/2025    Procedure: Procedure(s):  CORONARY ARTERY BYPASS GRAFT TIMES THREE, WITH LEFT LEG ENDOSCOPIC VESSEL PROCUREMENT, LEFT INTERNAL MAMMARY ARTERY HARVEST, CLIPPING OF LEFT ATRIAL APPENDAGE  ECHOCARDIOGRAM, TRANSESOPHAGEAL, INTRAOPERATIVE, EPIAORTIC ULTRASOUND    Post Op day #: 5    Subjective (Patient focus/Primary Problem for shift): Sleep, pain improvement          Pain Goal 3/10 would be an improvement Pain Rating 7/10           Pain Medication/ Regime effective to reduce patient pain Per Patient no; according to non-verbal assessment, patient    Objective (Physical assessment):           Rhythm: normal sinus rhythm; sinus tachy            Bowel Activity: no if Yes indicate when:           Bowel Medications: no; bowel sounds normoactive, passing flatus            Incision: healing well          Incentive Spirometry Q 1-2 hour when awake:  no Volume: NA          Epicardial Pacing Wires:  no            Patient Activity:           Up to chair for meals: yes; sleeps in recliner          Ambulation with RN x2 (Not including CR): no; patient continually refusing          Shower Daily : Today would be first shower day (this morning) if patient cooperates          Nasal  Nozin BID AM/PM : not applicable              Chest Tubes   Pleural: no Draining: not applicable               Suction: not " applicable              Mediastinal: no Draining: not applicable               Suction: not applicable   Dressing Change Daily:no If No, why? Covered for now until shower                     Urinary Catheter: no           Preventative WOC consult (need MD order): no       Assessment (Nursing primary shift focus): Sleep, pain control, respectful interactions with staff    Plan (Patient Care Plan/focus): Sleep; pain control which reports is NOT improving since chest tubes were removed.       Yuli Clemente RN   8/4/2025   5:34 AM

## 2025-08-04 NOTE — PROGRESS NOTES
Care Management Follow Up    Length of Stay (days): 5    Expected Discharge Date: 08/05/2025    Anticipated Discharge Plan:       Transportation: Anticipate medical transport    PT Recommendations:    OT Recommendations:  (S) Transitional Care Facility     Barriers to Discharge: medical stability    Prior Living Situation: extended care facility with facility resident    Discussed  Partnership in Safe Discharge Planning  document with patient/family: No     Handoff Completed: No, handoff not indicated or clinically appropriate    Patient/Spokesperson Updated: No    Additional Information:  Per MD, anticipate pt will be ready to return to [facility] tomorrow. CM sent message to TCU liaison to coordinate return to TCU.     Next Steps: coordinate return to TCU    KYUNG Smith  Acute Care Management  [facility]  For further questions/concerns you can reach us at Vocera Web Console

## 2025-08-04 NOTE — PLAN OF CARE
"Goal Outcome Evaluation:           [facility]       sc  Patient Name: Floyd Capps   MRN: [id number]   Date of Admission: 7/30/2025    Procedure: Procedure(s):  CORONARY ARTERY BYPASS GRAFT TIMES THREE, WITH LEFT LEG ENDOSCOPIC VESSEL PROCUREMENT, LEFT INTERNAL MAMMARY ARTERY HARVEST, CLIPPING OF LEFT ATRIAL APPENDAGE  ECHOCARDIOGRAM, TRANSESOPHAGEAL, INTRAOPERATIVE, EPIAORTIC ULTRASOUND    Post Op day #:5    Subjective (Patient focus/Primary Problem for shift): Pt with incisional pain.          Pain Goal 0 Pain Rating 7           Pain Medication/ Regime effective to reduce patient pain Oxycodone and scheduled tylenol -  Yes    Objective (Physical assessment):           Rhythm: normal sinus rhythm            Bowel Activity: no if Yes indicate when: 7/30          Bowel Medications: yes- senna and miralax            Incision: healing well          Incentive Spirometry Q 1-2 hour when awake:  yes Volume: 1500          Epicardial Pacing Wires:  no            Patient Activity:           Up to chair for meals: yes          Ambulation with RN x2 (Not including CR): no           Shower Daily yes          Nasal  Nozin BID AM/PM : yes              Chest Tubes    Tubes out 8/3.  4 puncture sites in abdomen, open to air.                    Urinary Catheter: no           Preventative WOC consult (need MD order): already consulted for right heel      Assessment (Nursing primary shift focus): Pt irritable and frustrated with hospitalization. Pt has rambling conspiratorial conversations about \"management and higher-ups\" setting hospital stay limits on patients.  Pt is very upset with any talk about discharging stating that he is not ready and is too weak.  RN listened to patient and offered support. Pt tells nurse that he is frustrated with staff misinterpreting his displeasure with management as displeasure with nurses and nursing assistants. RN spent a lot of " time listening to patient.  Pt agreeable to walk to bathroom, shower and get weighed on scale this afternoon.  Pt likes to negotiate on timing of activities and intervention and setting times, goals.       Pt showered today with assist of 2.  Pt weaned off of oxygen and sating 96% on room air.  Pt SR on tele.  Pt ambulated to bathroom and back to chair and to standing scale. RN has seen patient using IS independently without reminders.      Plan (Patient Care Plan/focus): No Bowel movement so far.  Pt does need prn medication tonight for BM. Encourage ambulation.  Pt frustrated with staff encouraging ambulation.        Rosa Guillory RN   8/4/2025   2:10 PM

## 2025-08-05 ENCOUNTER — APPOINTMENT (OUTPATIENT)
Dept: OCCUPATIONAL THERAPY | Facility: HOSPITAL | Age: 50
End: 2025-08-05
Attending: THORACIC SURGERY (CARDIOTHORACIC VASCULAR SURGERY)
Payer: COMMERCIAL

## 2025-08-05 LAB
ANION GAP SERPL CALCULATED.3IONS-SCNC: 9 MMOL/L (ref 7–15)
BUN SERPL-MCNC: 37 MG/DL (ref 6–20)
CA-I BLD-MCNC: 4.9 MG/DL (ref 4.4–5.2)
CALCIUM SERPL-MCNC: 9.1 MG/DL (ref 8.8–10.4)
CHLORIDE SERPL-SCNC: 98 MMOL/L (ref 98–107)
CREAT SERPL-MCNC: 1.38 MG/DL (ref 0.67–1.17)
EGFRCR SERPLBLD CKD-EPI 2021: 62 ML/MIN/1.73M2
ERYTHROCYTE [DISTWIDTH] IN BLOOD BY AUTOMATED COUNT: 12.9 % (ref 10–15)
GLUCOSE BLDC GLUCOMTR-MCNC: 159 MG/DL (ref 70–99)
GLUCOSE BLDC GLUCOMTR-MCNC: 172 MG/DL (ref 70–99)
GLUCOSE BLDC GLUCOMTR-MCNC: 178 MG/DL (ref 70–99)
GLUCOSE BLDC GLUCOMTR-MCNC: 202 MG/DL (ref 70–99)
GLUCOSE SERPL-MCNC: 201 MG/DL (ref 70–99)
HCO3 SERPL-SCNC: 25 MMOL/L (ref 22–29)
HCT VFR BLD AUTO: 27 % (ref 40–53)
HGB BLD-MCNC: 8.7 G/DL (ref 13.3–17.7)
MAGNESIUM SERPL-MCNC: 2.2 MG/DL (ref 1.7–2.3)
MCH RBC QN AUTO: 29.9 PG (ref 26.5–33)
MCHC RBC AUTO-ENTMCNC: 32.2 G/DL (ref 31.5–36.5)
MCV RBC AUTO: 93 FL (ref 78–100)
PHOSPHATE SERPL-MCNC: 3.9 MG/DL (ref 2.5–4.5)
PLATELET # BLD AUTO: 256 10E3/UL (ref 150–450)
POTASSIUM SERPL-SCNC: 4 MMOL/L (ref 3.4–5.3)
RBC # BLD AUTO: 2.91 10E6/UL (ref 4.4–5.9)
SODIUM SERPL-SCNC: 132 MMOL/L (ref 135–145)
WBC # BLD AUTO: 11.9 10E3/UL (ref 4–11)

## 2025-08-05 PROCEDURE — 84100 ASSAY OF PHOSPHORUS: CPT | Performed by: THORACIC SURGERY (CARDIOTHORACIC VASCULAR SURGERY)

## 2025-08-05 PROCEDURE — 85027 COMPLETE CBC AUTOMATED: CPT | Performed by: PHYSICIAN ASSISTANT

## 2025-08-05 PROCEDURE — 250N000012 HC RX MED GY IP 250 OP 636 PS 637: Performed by: PHYSICIAN ASSISTANT

## 2025-08-05 PROCEDURE — 83735 ASSAY OF MAGNESIUM: CPT | Performed by: THORACIC SURGERY (CARDIOTHORACIC VASCULAR SURGERY)

## 2025-08-05 PROCEDURE — 97110 THERAPEUTIC EXERCISES: CPT | Mod: GO

## 2025-08-05 PROCEDURE — 250N000013 HC RX MED GY IP 250 OP 250 PS 637: Performed by: PHYSICIAN ASSISTANT

## 2025-08-05 PROCEDURE — 80048 BASIC METABOLIC PNL TOTAL CA: CPT | Performed by: PHYSICIAN ASSISTANT

## 2025-08-05 PROCEDURE — 999N000157 HC STATISTIC RCP TIME EA 10 MIN

## 2025-08-05 PROCEDURE — 82330 ASSAY OF CALCIUM: CPT | Performed by: THORACIC SURGERY (CARDIOTHORACIC VASCULAR SURGERY)

## 2025-08-05 PROCEDURE — 99231 SBSQ HOSP IP/OBS SF/LOW 25: CPT | Mod: 24

## 2025-08-05 PROCEDURE — 97535 SELF CARE MNGMENT TRAINING: CPT | Mod: GO

## 2025-08-05 PROCEDURE — 210N000001 HC R&B IMCU HEART CARE

## 2025-08-05 PROCEDURE — 36415 COLL VENOUS BLD VENIPUNCTURE: CPT | Performed by: PHYSICIAN ASSISTANT

## 2025-08-05 PROCEDURE — 250N000011 HC RX IP 250 OP 636: Mod: JZ | Performed by: PHYSICIAN ASSISTANT

## 2025-08-05 RX ORDER — TAMSULOSIN HYDROCHLORIDE 0.4 MG/1
0.4 CAPSULE ORAL EVERY EVENING
Status: DISCONTINUED | OUTPATIENT
Start: 2025-08-05 | End: 2025-08-07 | Stop reason: HOSPADM

## 2025-08-05 RX ORDER — METFORMIN HYDROCHLORIDE 500 MG/1
500 TABLET, EXTENDED RELEASE ORAL
Status: DISCONTINUED | OUTPATIENT
Start: 2025-08-05 | End: 2025-08-07 | Stop reason: HOSPADM

## 2025-08-05 RX ADMIN — OXYCODONE HYDROCHLORIDE 5 MG: 5 TABLET ORAL at 06:32

## 2025-08-05 RX ADMIN — HEPARIN SODIUM 5000 UNITS: 5000 INJECTION, SOLUTION INTRAVENOUS; SUBCUTANEOUS at 14:35

## 2025-08-05 RX ADMIN — OXYCODONE HYDROCHLORIDE 5 MG: 5 TABLET ORAL at 20:27

## 2025-08-05 RX ADMIN — CLOPIDOGREL BISULFATE 75 MG: 75 TABLET, FILM COATED ORAL at 08:32

## 2025-08-05 RX ADMIN — HEPARIN SODIUM 5000 UNITS: 5000 INJECTION, SOLUTION INTRAVENOUS; SUBCUTANEOUS at 06:32

## 2025-08-05 RX ADMIN — INSULIN ASPART 3 UNITS: 100 INJECTION, SOLUTION INTRAVENOUS; SUBCUTANEOUS at 08:30

## 2025-08-05 RX ADMIN — Medication 1 TABLET: at 08:30

## 2025-08-05 RX ADMIN — INSULIN ASPART 2 UNITS: 100 INJECTION, SOLUTION INTRAVENOUS; SUBCUTANEOUS at 14:35

## 2025-08-05 RX ADMIN — EMPAGLIFLOZIN 10 MG: 10 TABLET, FILM COATED ORAL at 08:31

## 2025-08-05 RX ADMIN — OXYCODONE HYDROCHLORIDE 5 MG: 5 TABLET ORAL at 01:01

## 2025-08-05 RX ADMIN — METHOCARBAMOL 500 MG: 500 TABLET ORAL at 20:27

## 2025-08-05 RX ADMIN — CARVEDILOL 3.12 MG: 3.12 TABLET, FILM COATED ORAL at 08:30

## 2025-08-05 RX ADMIN — ATORVASTATIN CALCIUM 80 MG: 40 TABLET, FILM COATED ORAL at 20:27

## 2025-08-05 RX ADMIN — ACETAMINOPHEN 975 MG: 325 TABLET ORAL at 14:35

## 2025-08-05 RX ADMIN — GUAIFENESIN 600 MG: 600 TABLET ORAL at 08:31

## 2025-08-05 RX ADMIN — METHOCARBAMOL 500 MG: 500 TABLET ORAL at 01:00

## 2025-08-05 RX ADMIN — ASPIRIN 81 MG CHEWABLE TABLET 81 MG: 81 TABLET CHEWABLE at 08:31

## 2025-08-05 RX ADMIN — GUAIFENESIN 600 MG: 600 TABLET ORAL at 20:27

## 2025-08-05 RX ADMIN — ACETAMINOPHEN 975 MG: 325 TABLET ORAL at 06:32

## 2025-08-05 RX ADMIN — SENNOSIDES, DOCUSATE SODIUM 1 TABLET: 50; 8.6 TABLET, FILM COATED ORAL at 08:31

## 2025-08-05 RX ADMIN — CYANOCOBALAMIN TAB 1000 MCG 1000 MCG: 1000 TAB at 08:32

## 2025-08-05 RX ADMIN — CARVEDILOL 3.12 MG: 3.12 TABLET, FILM COATED ORAL at 17:28

## 2025-08-05 RX ADMIN — LIDOCAINE 2 PATCH: 4 PATCH TOPICAL at 17:27

## 2025-08-05 RX ADMIN — PANTOPRAZOLE SODIUM 40 MG: 40 TABLET, DELAYED RELEASE ORAL at 08:33

## 2025-08-05 RX ADMIN — TAMSULOSIN HYDROCHLORIDE 0.4 MG: 0.4 CAPSULE ORAL at 20:27

## 2025-08-05 RX ADMIN — ACETAMINOPHEN 975 MG: 325 TABLET ORAL at 20:26

## 2025-08-05 RX ADMIN — HEPARIN SODIUM 5000 UNITS: 5000 INJECTION, SOLUTION INTRAVENOUS; SUBCUTANEOUS at 20:26

## 2025-08-05 RX ADMIN — INSULIN ASPART 2 UNITS: 100 INJECTION, SOLUTION INTRAVENOUS; SUBCUTANEOUS at 17:53

## 2025-08-05 RX ADMIN — SENNOSIDES, DOCUSATE SODIUM 1 TABLET: 50; 8.6 TABLET, FILM COATED ORAL at 20:27

## 2025-08-05 RX ADMIN — METHOCARBAMOL 500 MG: 500 TABLET ORAL at 08:32

## 2025-08-05 RX ADMIN — MAGNESIUM HYDROXIDE 30 ML: 400 SUSPENSION ORAL at 08:39

## 2025-08-05 RX ADMIN — METFORMIN ER 500 MG 500 MG: 500 TABLET ORAL at 17:27

## 2025-08-05 RX ADMIN — BUMETANIDE 2 MG: 0.25 INJECTION INTRAMUSCULAR; INTRAVENOUS at 08:34

## 2025-08-05 ASSESSMENT — ACTIVITIES OF DAILY LIVING (ADL)
ADLS_ACUITY_SCORE: 46

## 2025-08-05 NOTE — PLAN OF CARE
Patient Name: Floyd Capps   MRN: [id number]   Date of Admission: 7/30/2025    Procedure: Procedure(s):  CORONARY ARTERY BYPASS GRAFT TIMES THREE, WITH LEFT LEG ENDOSCOPIC VESSEL PROCUREMENT, LEFT INTERNAL MAMMARY ARTERY HARVEST, CLIPPING OF LEFT ATRIAL APPENDAGE  ECHOCARDIOGRAM, TRANSESOPHAGEAL, INTRAOPERATIVE, EPIAORTIC ULTRASOUND    Post Op day #: 6    Subjective (Patient focus/Primary Problem for shift): BM, ambulating.          Pain Goal 3 Pain Rating 5-7           Pain Medication/ Regime effective to reduce patient pain PRN: po Oxycodone 5 mg and prn po robaxin 500 mg. Schedule po tylenol 975 mg q8hr.    Objective (Physical assessment):           Rhythm: normal sinus rhythm            Bowel Activity: no if Yes indicate when:           Bowel Medications: yes, day shift gave prn milk of mag and senna.            Incision: healing well          Incentive Spirometry Q 1-2 hour when awake:  yes Volume:           Epicardial Pacing Wires:  no            Patient Activity:           Up to chair for meals: yes          Ambulation with RN x2 (Not including CR): yes           Shower Daily {YES/NO/NA:[id number]          Nasal  Nozin BID AM/PM : yes              Chest Tubes   Pleural: no Draining: not applicable               Suction: not applicable              Mediastinal: no Draining: not applicable               Suction: not applicable   Dressing Change Daily:not applicable If No, why? Open to air                     Urinary Catheter: no           Preventative WOC consult (need MD order): no       Assessment (Nursing primary shift focus): Alertx4, up to chair for dinner.  Denies pain while in chair. Applied 2 lidocaine patches on chest.    Plan (Patient Care Plan/focus): Continue to take prn milk of mag, miralax, senna for BM.      Symone Bernardo RN   8/5/2025   5:58 PM

## 2025-08-05 NOTE — PLAN OF CARE
Problem: Adult Inpatient Plan of Care  Goal: Plan of Care Review  Description: The Plan of Care Review/Shift note should be completed every shift.  The Outcome Evaluation is a brief statement about your assessment that the patient is improving, declining, or no change.  This information will be displayed automatically on your shift  note.  Outcome: Progressing  Flowsheets (Taken 8/4/2025 5063)  Plan of Care Reviewed With: patient  Overall Patient Progress: improving   Goal Outcome Evaluation:      Plan of Care Reviewed With: patient    Overall Patient Progress: improvingOverall Patient Progress: improving             POD  CABG. Sternal precautions. Magnesium, potassium and phosphorus protocol.  Labs tomorrow. Ionized calcium 4.7. Diabetic, AC and HS blood glucose checks.  Last blood glucose 188 mg/dl.  PRN oxycodone given for pain at 1921.  Tele Monitoring, NSR.

## 2025-08-05 NOTE — PROGRESS NOTES
CLINICAL NUTRITION - BRIEF    Standing order for nutrition education s/p cardiac surgery.  Pt declined nutrition education and handout.

## 2025-08-05 NOTE — PLAN OF CARE
"  Problem: Cardiovascular Surgery  Goal: Improved Activity Tolerance  Outcome: Not Progressing  Goal: Optimal Coping with Heart Surgery  Outcome: Not Progressing   Goal Outcome Evaluation:         Vitals:    08/04/25 2326 08/05/25 0055 08/05/25 0437 08/05/25 0630   BP: 131/66 91/52 121/62 108/61   BP Location: Left arm Left arm Left arm Left arm   Patient Position:       Pulse: 80 81 80    Resp: 24  22    Temp: 98.2  F (36.8  C)  98.4  F (36.9  C)    TempSrc: Oral  Oral    SpO2: 100%  95%    Weight:       Height:              Patient was able to sleep for most of the night in bed and continuously refused to get up for weight and refused to get up to the chair this morning even with reminders that CV surgery team expects these things from him; he still refused.     Patient continues to report chest pain rating 6-7/10 given PRN oxy x2 this shift. Using 4L oxygen now. Patient continues to complain about doctors stating they do not listen to him; he also complained about the evening nurse stating they didn't want to listen to him either they just wanted to boss him around. Tried robaxin for pain too. Little to no relief of pain.     RN again tried to encourage patient to get up stating the CV surgery team will be upset with him for not getting up to the chair and he acknowledged and still said \"later.\"      sc  Patient Name: Floyd Capps   MRN: [id number]   Date of Admission: 7/30/2025    Procedure: Procedure(s):  CORONARY ARTERY BYPASS GRAFT TIMES THREE, WITH LEFT LEG ENDOSCOPIC VESSEL PROCUREMENT, LEFT INTERNAL MAMMARY ARTERY HARVEST, CLIPPING OF LEFT ATRIAL APPENDAGE  ECHOCARDIOGRAM, TRANSESOPHAGEAL, INTRAOPERATIVE, EPIAORTIC ULTRASOUND    Post Op day #: 6    Subjective (Patient focus/Primary Problem for shift): Sleep          Pain Goal 5/10 Pain Rating 7/10           Pain Medication/ Regime effective to reduce patient pain No    Objective (Physical assessment):           Rhythm: normal sinus rhythm            Bowel " Activity: no if Yes indicate when:           Bowel Medications: not applicable            Incision: healing well          Incentive Spirometry Q 1-2 hour when awake:  not applicable Volume:           Epicardial Pacing Wires:  no            Patient Activity:           Up to chair for meals: no patient informed refusal          Ambulation with RN x2 (Not including CR): not applicable           Shower Daily {YES/NO/NA:[id number]          Nasal  Nozin BID AM/PM : not applicable              Chest Tubes   Pleural: no Draining: not applicable               Suction: no              Mediastinal: not applicable Draining: no               Suction: not applicable   Dressing Change Daily:no If No, why? AN                     Urinary Catheter: no           Preventative WOC consult (need MD order): no       Assessment (Nursing primary shift focus): sleep    Plan (Patient Care Plan/focus): sleep      Yuli Clemente RN   8/5/2025   7:38 AM

## 2025-08-05 NOTE — PROGRESS NOTES
Care Management Follow Up    Length of Stay (days): 6    Expected Discharge Date: 08/05/2025    Anticipated Discharge Plan:       Transportation: Confirmed Wheelchair. Transportation costs discussed? Yes. Discussed with pt    PT Recommendations:    OT Recommendations:  (S) Transitional Care Facility     Barriers to Discharge: medical stability    Prior Living Situation: extended care facility with facility resident    Discussed  Partnership in Safe Discharge Planning  document with patient/family: No     Handoff Completed: Yes, [facility] PCP: Internal handoff referral completed    Patient/Spokesperson Updated: Yes. Who? pt    Additional Information:  Per MD, pt not ready for discharge today, anticipate will be ready to return to [facility] tomorrow.     CM met with pt in room and updated on anticipated discharge for tomorrow. Pt confirms he will need medical transport and is agreeable to potential costs.     [facility] w/c transport scheduled for tomorrow at 10:40-11:20.    KYUNG Smith  Acute Care Management  [facility]  For further questions/concerns you can reach us at Vocera Web Console

## 2025-08-05 NOTE — PROGRESS NOTES
[facility]    Medicine Progress Note - Hospitalist Service    Date of Admission:  7/30/2025    Assessment & Plan   Floyd Capps is a 49 year old male admitted on 7/30/2025. He has history of CAD, HFrEF, HTN, prior CVA, tobacco use disorder and T2DM, was admitted for multivessel coronary artery disease and had triple vessel coronary artery bypass grafting on 7/30 in which family medicine service has been consulted for T2DM management.    [place] Updates:  - Sugars remained elevated at baseline as expected given that higher glargine was unable to be given yesterday. No significant change with 15u TID aspart.   - Glargine 25u today, expect lower baseline sugars, continue 15 TID aspart, uptitrate daily as needed      T2DM  Poorly controlled. Last A1C was 11 two months ago. Lives in TCU, doesn't manage his own medications but is mostly aware of what he is taking. Since admission, initially was on insulin gtt, full day of IV insulin with appropriate control took 99u over 24 hours. Since then, glargine 10u and HDSS were used, with fasting sugars 190+ and lowest preprandial sugar was 171. Home regimen includes glargine 55u, Novolog 20-24u (reported per patient, not on med rec completed at admission) with meals, metformin 500 and jardiance 10mg. Since admission, has been eating less given his advance diet order as tolerated low fat and 2g NA, not his typical meals. Given lesser diet, that is likely why his sugars haven't spiked > 250 despite much less insulin than home regimen. Calculated insulin requirements inpatient based on insulin gtt suggests 50u glargine and 15u aspart TID with meals would be adequate. Started lower given diet changes inpatient, baseline glucose levels remained high since higher dose of glargine was not able to be used on 8/4/25, expect lower baseline today.  - Glargine 25u daily  - Aspart 15u TID with meals  - Continue high dose sliding scale  - POCT 4 times daily:  "Fasting AM/preprandial meals and bedtime  - Hypoglycemia protocol PRN          Clinically Significant Risk Factors         # Hyponatremia: Lowest Na = 132 mmol/L in last 2 days, will monitor as appropriate       # Hypoalbuminemia: Lowest albumin = 2.9 g/dL at 7/30/2025  1:04 PM, will monitor as appropriate     # Hypertension: Noted on problem list    # Heart failure with preserved ejection fraction: EF >50% and home medication list includes sacubitril/valsartan (Entresto)          # DMII: A1C = 11.3 % (Ref range: <5.7 %) within past 6 months   # Morbid Obesity: Estimated body mass index is 45.99 kg/m  as calculated from the following:    Height as of this encounter: 1.778 m (5' 10\").    Weight as of this encounter: 145.4 kg (320 lb 8 oz).        # Financial/Environmental Concerns:     # History of CABG: noted on surgical history       Social Drivers of Health    Food Insecurity: High Risk (7/31/2025)    Food Insecurity     Within the past 12 months, did you worry that your food would run out before you got money to buy more?: Yes     Within the past 12 months, did the food you bought just not last and you didn t have money to get more?: No   Depression: At risk (1/30/2025)    PHQ-2     PHQ-2 Score: 6   Housing Stability: High Risk (7/31/2025)    Housing Stability     Do you have housing? : No     Are you worried about losing your housing?: No   Tobacco Use: Medium Risk (7/30/2025)    Patient History     Smoking Tobacco Use: Former     Smokeless Tobacco Use: Never     Passive Exposure: Never   Financial Resource Strain: High Risk (7/31/2025)    Financial Resource Strain     Within the past 12 months, have you or your family members you live with been unable to get utilities (heat, electricity) when it was really needed?: Yes   Transportation Needs: High Risk (7/31/2025)    Transportation Needs     Within the past 12 months, has lack of transportation kept you from medical appointments, getting your medicines, " non-medical meetings or appointments, work, or from getting things that you need?: Yes      The patient's care was discussed with the Attending Physician, Dr. Spaulding.    Terry Ag MD  Hospitalist Service  [facility]  Securely message with [employer] (more info)  Text page via [employer] Paging/Directory   ______________________________________________________________________    Interval History   Interval blood sugars remained elevated, 188 and 200 preprandial. Fasting 202 this morning. Was up walking with therapy today. Lack of appetite persists, will be ordering early lunch though.     Physical Exam   Vital Signs: Temp: 97.7  F (36.5  C) Temp src: Oral BP: 120/64 Pulse: 85   Resp: 22 SpO2: 96 % O2 Device: None (Room air) Oxygen Delivery: 4 LPM  Weight: 320 lbs 8 oz    General: No acute distress. Well kept, appears stated age.  Head: Normocephalic, atraumatic.  Respiratory: No signs of respiratory distress.  Cardiovascular: Appears well perfused.  Psych: Appropriate mood and affect.        Medical Decision Making             Data

## 2025-08-05 NOTE — PROGRESS NOTES
PADDY Attestation  I, Geno Howard PA-C, was present with the PADDY student who participated in the service and in the documentation of the note.  I have verified the history and personally performed the physical exam and medical decision making.  I agree with the assessment and plan of care as documented in the note.      Geno Howard PA-C  Date of Service (when I saw the patient): 08/05/2025    ______________________    CVTS Daily Progress Note   POD#6 s/p CABG x3 (LIMA-->LAD, rSVG-->HEENA, rSVG-->RPD) and MERA clippage  Attending: Dr. Boateng  LOS: 6    SUBJECTIVE/INTERVAL EVENTS:    NAEON. Continues to remain somewhat difficult with staff and unmotivated with cares. One episode of soft blood pressure of 91/52, otherwise normotensive. Desats to 70s while asleep, requiring 4L NC, RA while awake. Good UOP, stands to void spontaneously. - BM / + flatus. Ambulating with therapy, not as often with nursing. Using IS. Endorses surgical chest pain and incisional pain, ok with pain medications. Denies SOB, nausea, vomiting, abdominal pain, dizziness, lightheadedness, fever, chills, of calf pain.     Biggest barriers to discharge: -BM, strength, weight, and glucose control. Discharge to TCU, likely tomorrow.        OBJECTIVE:  Temp:  [97.7  F (36.5  C)-98.7  F (37.1  C)] 97.7  F (36.5  C)  Pulse:  [80-85] 85  Resp:  [18-24] 22  BP: ()/(52-66) 120/64  SpO2:  [95 %-100 %] 96 %  Vitals:    07/31/25 0600 08/02/25 0604 08/04/25 1344 08/05/25 0758   Weight: (!) 144.8 kg (319 lb 4.8 oz) (!) 147.6 kg (325 lb 6.4 oz) (!) 145.7 kg (321 lb 1.6 oz) (!) 145.4 kg (320 lb 8 oz)       Clinically Significant Risk Factors         # Hyponatremia: Lowest Na = 132 mmol/L in last 2 days, will monitor as appropriate       # Hypoalbuminemia: Lowest albumin = 2.9 g/dL at 7/30/2025  1:04 PM, will monitor as appropriate       # Hypertension: Noted on problem list  # Heart failure with preserved ejection fraction: EF >50% and home  "medication list includes sacubitril/valsartan (Entresto)          # DMII: A1C = 11.3 % (Ref range: <5.7 %) within past 6 months   # Morbid Obesity: Estimated body mass index is 45.99 kg/m  as calculated from the following:    Height as of this encounter: 1.778 m (5' 10\").    Weight as of this encounter: 145.4 kg (320 lb 8 oz).        # Financial/Environmental Concerns:     # History of CABG: noted on surgical history          Current Medications:    Scheduled Meds:  Current Facility-Administered Medications   Medication Dose Route Frequency Provider Last Rate Last Admin    acetaminophen (TYLENOL) tablet 975 mg  975 mg Oral Q8H Geno Howard PA-C   975 mg at 08/05/25 0632    aspirin (ASA) chewable tablet 81 mg  81 mg Oral or NG Tube Daily Geno Howard PA-C   81 mg at 08/05/25 0831    atorvastatin (LIPITOR) tablet 80 mg  80 mg Oral At Bedtime Geno Howard PA-C   80 mg at 08/04/25 2225    bumetanide (BUMEX) injection 2 mg  2 mg Intravenous BID Geno Howard PA-C   2 mg at 08/05/25 0834    carvedilol (COREG) tablet 3.125 mg  3.125 mg Oral BID w/meals Geno Howard PA-C   3.125 mg at 08/05/25 0830    clopidogrel (PLAVIX) tablet 75 mg  75 mg Oral Daily Geno Howard PA-C   75 mg at 08/05/25 0832    cyanocobalamin (VITAMIN B-12) tablet 1,000 mcg  1,000 mcg Oral Daily Geno Howard PA-C   1,000 mcg at 08/05/25 0832    empagliflozin (JARDIANCE) tablet 10 mg  10 mg Oral Daily Geno Howard PA-C   10 mg at 08/05/25 0831    guaiFENesin (MUCINEX) 12 hr tablet 600 mg  600 mg Oral BID Geno Howard PA-C   600 mg at 08/05/25 0831    heparin ANTICOAGULANT injection 5,000 Units  5,000 Units Subcutaneous Q8H Geno Howard PA-C   5,000 Units at 08/05/25 0632    insulin aspart (NovoLOG) injection (RAPID ACTING)  15 Units Subcutaneous TID w/meals Terry Ag MD   15 Units at 08/05/25 0834    insulin aspart (NovoLOG) injection " (RAPID ACTING)  1-10 Units Subcutaneous TID AC Geno Howard PA-C   3 Units at 08/05/25 0830    insulin aspart (NovoLOG) injection (RAPID ACTING)  1-7 Units Subcutaneous At Bedtime Geno Howard PA-C   2 Units at 08/03/25 2111    insulin glargine (LANTUS PEN) injection 25 Units  25 Units Subcutaneous QAM AC Terry Ag MD   25 Units at 08/05/25 0835    Lidocaine (LIDOCARE) 4 % Patch 1-2 patch  1-2 patch Transdermal Q24H Geno Howard PA-C   2 patch at 08/04/25 1922    metFORMIN (GLUCOPHAGE XR) 24 hr tablet 500 mg  500 mg Oral Daily with supper Geno Howard PA-C        multivitamin w/minerals (THERA-VIT-M) tablet 1 tablet  1 tablet Oral Daily Geno Howard PA-C   1 tablet at 08/05/25 0830    pantoprazole (PROTONIX) EC tablet 40 mg  40 mg Oral Daily Geno Howard PA-C   40 mg at 08/05/25 0833    polyethylene glycol (MIRALAX) Packet 17 g  17 g Oral Daily Geno Howard PA-C   17 g at 08/04/25 0912    [Held by provider] sacubitril-valsartan (ENTRESTO) 24-26 MG per tablet 1 tablet  1 tablet Oral BID Geno Howard PA-C        senna-docusate (SENOKOT-S/PERICOLACE) 8.6-50 MG per tablet 1 tablet  1 tablet Oral BID Geno Howard PA-C   1 tablet at 08/05/25 0831    tamsulosin (FLOMAX) capsule 0.4 mg  0.4 mg Oral QPM Geno Howard PA-C         Continuous Infusions:  Current Facility-Administered Medications   Medication Dose Route Frequency Provider Last Rate Last Admin     PRN Meds:.  Current Facility-Administered Medications   Medication Dose Route Frequency Provider Last Rate Last Admin    bisacodyl (DULCOLAX) suppository 10 mg  10 mg Rectal Daily PRN Geno Howard PA-C        calcium gluconate 1 g in 50 mL in sodium chloride intermittent infusion  1 g Intravenous Once PRN Geno Howard PA-C        calcium gluconate 2 g in  mL intermittent infusion  2 g Intravenous Once PRN Geno Howard  JULIA Oliveira        calcium gluconate 3 g in sodium chloride 0.9 % 100 mL intermittent infusion  3 g Intravenous Once PRN Geno Howard PA-C        glucose gel 15-30 g  15-30 g Oral Q15 Min PRN Haydee Boateng MD        Or    dextrose 50 % injection 25-50 mL  25-50 mL Intravenous Q15 Min PRN Haydee Boateng MD        Or    glucagon injection 1 mg  1 mg Subcutaneous Q15 Min PRN Haydee Boateng MD        hydrALAZINE (APRESOLINE) injection 10 mg  10 mg Intravenous Q30 Min PRN Geno Howard PA-C        lactated ringers BOLUS 250 mL  250 mL Intravenous Q15 Min PRN Geno Howard PA-C        magnesium hydroxide (MILK OF MAGNESIA) suspension 30 mL  30 mL Oral Daily PRN Geno Howard PA-C   30 mL at 08/05/25 0839    methocarbamol (ROBAXIN) tablet 500 mg  500 mg Oral 4x Daily PRN Geno Howard PA-C   500 mg at 08/05/25 0832    naloxone (NARCAN) injection 0.2 mg  0.2 mg Intravenous Q2 Min PRN Geno Howard PA-C        Or    naloxone (NARCAN) injection 0.4 mg  0.4 mg Intravenous Q2 Min PRN Geno Howard PA-C        Or    naloxone (NARCAN) injection 0.2 mg  0.2 mg Intramuscular Q2 Min PRN Geno Howard PA-C        Or    naloxone (NARCAN) injection 0.4 mg  0.4 mg Intramuscular Q2 Min PRN Geno Howard PA-C        ondansetron (ZOFRAN ODT) ODT tab 4 mg  4 mg Oral Q6H PRN Geno Howard PA-C   4 mg at 08/03/25 2115    Or    ondansetron (ZOFRAN) injection 4 mg  4 mg Intravenous Q6H PRN Geno Howard PA-C   4 mg at 08/04/25 0855    oxyCODONE (ROXICODONE) tablet 5 mg  5 mg Oral Q4H PRN Geno Howard PA-C   5 mg at 08/05/25 0632    prochlorperazine (COMPAZINE) injection 10 mg  10 mg Intravenous Q6H PRN Geno Howard PA-C   10 mg at 07/31/25 0729    Or    prochlorperazine (COMPAZINE) tablet 10 mg  10 mg Oral Q6H PRN Geno Howard PA-C           Cardiographics:    Telemetry monitoring  demonstrates NSR with rates in the 90s per my personal review.    EKG 8/3/2025 - Sinus rhythm with new incomplete RBBB. Changes in anterior leads observed.     Imaging:  Results for orders placed or performed during the hospital encounter of 07/30/25   XR Chest Port 1 View    Impression    IMPRESSION: Negative chest.   XR Chest Port 1 View    Impression    IMPRESSION: The endotracheal tube tip is 6.4 cm from the edelmira. An NG or OG tube terminates off the field-of-view. A right IJ sheath transmits a [place] catheter which terminates in the lower pulmonary artery. Lung volumes are low. No pleural fluid or   pneumothorax. The heart is not well assessed. Sternotomy suture wires are present. There are degenerative changes of the left shoulder. Old healed right clavicle fracture is suspected.   XR Chest Port 1 View    Impression    IMPRESSION: Interval removal endotracheal and enteric tube. Median sternotomy. Mediastinal drain, right chest 2 views. No significant effusion. Right IJ sheath. Remote fracture right clavicle.   XR Abdomen Port 1 View    Impression    IMPRESSION: An OG tube terminates in the stomach. Subxiphoid drains are noted.       Labs, personally reviewed.  Hemoglobin   Date Value Ref Range Status   08/05/2025 8.7 (L) 13.3 - 17.7 g/dL Final   08/04/2025 8.7 (L) 13.3 - 17.7 g/dL Final   08/03/2025 8.5 (L) 13.3 - 17.7 g/dL Final     WBC Count   Date Value Ref Range Status   08/05/2025 11.9 (H) 4.0 - 11.0 10e3/uL Final   08/04/2025 12.7 (H) 4.0 - 11.0 10e3/uL Final   08/03/2025 13.2 (H) 4.0 - 11.0 10e3/uL Final     Platelet Count   Date Value Ref Range Status   08/05/2025 256 150 - 450 10e3/uL Final   08/04/2025 214 150 - 450 10e3/uL Final   08/03/2025 184 150 - 450 10e3/uL Final     Creatinine   Date Value Ref Range Status   08/05/2025 1.38 (H) 0.67 - 1.17 mg/dL Final   08/04/2025 1.55 (H) 0.67 - 1.17 mg/dL Final   08/03/2025 1.32 (H) 0.67 - 1.17 mg/dL Final     Potassium   Date Value Ref Range Status    08/05/2025 4.0 3.4 - 5.3 mmol/L Final   08/04/2025 4.3 3.4 - 5.3 mmol/L Final   08/03/2025 4.2 3.4 - 5.3 mmol/L Final     Potassium POCT   Date Value Ref Range Status   07/30/2025 4.8 3.4 - 5.3 mmol/L Final   07/30/2025 4.8 3.4 - 5.3 mmol/L Final   07/30/2025 4.9 3.4 - 5.3 mmol/L Final     Magnesium   Date Value Ref Range Status   08/05/2025 2.2 1.7 - 2.3 mg/dL Final   08/04/2025 2.1 1.7 - 2.3 mg/dL Final   08/03/2025 1.9 1.7 - 2.3 mg/dL Final          I/O:  I/O last 3 completed shifts:  In: 900 [P.O.:900]  Out: 2000 [Urine:2000]       Physical Exam:    General: Patient seen sitting in the dark, up in chair. Awake, in no acute distress.   CV: RRR on monitor. Mild edema of bilateral lower extremities. Incision C/D/I. Absent posterior tibialis pulses bilaterally.   Pulm: Non-labored effort on RA.   Abd: Hyperactive bowel sounds. Soft, NT, ND  Ext: Mild pedal edema, SCDs in place, warm and moist.  Neuro: CNs grossly intact. Moves all 4 extremities. Bilateral lower extremity sensation diminished but intact.   Psych: Restless, a bit irritable. Poor insight.     ASSESSMENT/PLAN:    Floyd Capps is a 49 year old male with a history of HFrEF (EF 40-45% from TTE 5/2025), ischemic CVA on 8/2023 and 3/2025, poorly-controlled and insulin-dependent T2DM with diabetic neuropathy with foot wound on R heel, and CKD stage 3 who is s/p CABG x3 and MERA clippage with Dr. Boateng on 7/30/2025. POD#1 diagnosed with suspected acute pericarditis post CABG.     Principal Problem:    Coronary artery disease involving autologous artery coronary bypass graft without angina pectoris      NEURO:   - Scheduled Tylenol/lidocaine patches and PRN Tylenol/oxycodone/robaxin for pain  - Decreased prn oxycodone from 10mg --> 5mg due to drowsiness   - Encourage use of prn robaxin for pain adjunct     CV:   - Pre-op EF 40-45% from TTE; intra-op GAIL EF 35%  - Normotensive off pressors  - Chest tubes and TPW removed POD#4  - PTA Coreg today to 3.125mg  BID with hold parameters   - PTA Plavix 75mg   - ASA 81mg daily   - Atorvastatin 80mg daily  - PTA Bumex for diuresis to 2mg BID for diuresis s/p CABG, consider increase to TID when kidney function improves/stabilizes   - Note: Groin incision closed with staples - remove prior to discharge/at follow up appt  - Hold PTA Entresto     PULM:   - Extubated on POD#0  - Maintaining oxygen saturations on RA  - Encourage pulmonary toilet  - Added Mucinex to thin secretions   - Apneic episodes at night, desats to 70s. Screened positive for probable ANDIE in 6/2025. Sleep study scheduled for 10/2025.      FEN/GI:  - Continue electrolyte replacement protocol  - Cardiac; ADAT  - Bowel regimen; last BM preop    RENAL:  - Adequate UOP/hr. Continue to monitor closely.  - Cr 1.38 downtrending from 1.55 yesterday, baseline 1.3-1.5  - Cazares removed, monitor for urinary retention   - Diuresis with IV Bumex 2mg BID until baseline weight  - BMP daily in AM  - Added Flomax daily for straining/intermittent urinary stream    HEME:  - Acute blood loss anemia post-op.   - Hgb stable, no bleeding concerns. Hep SQ, ASA  - Daily CBC in AM    ID:  - Luly op ppx complete, afebrile. No concerns for infection    ENDO:   - Most recent A1c 11.3% on 5/28/2025. PTA takes 55 units insulin of Lantus at bedtime + Novolog.  - SSI + Lantus   - Resumed PTA metformin   - Resumed PTA jardiance  - Hospitalist consult for DM management     PPx:   - DVT: SCDs, SQ heparin TID, ambulation   - GI: Protonix 40mg IV/PO daily    DISPO:   - General telemetry status since POD#3  - Medically Ready for Discharge: Anticipated Tomorrow   - Plan: TCU      AITYA Campbell Student   [facility]     Patient discussed with Dr. Boateng.

## 2025-08-05 NOTE — PLAN OF CARE
Goal Outcome Evaluation:       sc  Patient Name: Floyd Capps   MRN: [id number]   Date of Admission: 7/30/2025    Procedure: Procedure(s):  CORONARY ARTERY BYPASS GRAFT TIMES THREE, WITH LEFT LEG ENDOSCOPIC VESSEL PROCUREMENT, LEFT INTERNAL MAMMARY ARTERY HARVEST, CLIPPING OF LEFT ATRIAL APPENDAGE  ECHOCARDIOGRAM, TRANSESOPHAGEAL, INTRAOPERATIVE, EPIAORTIC ULTRASOUND    Post Op day #: 6    Subjective (Patient focus/Primary Problem for shift): Activity          Pain Goal 5 Pain Rating 5-7          Pain Medication/ Regime effective to reduce patient pain apap, oxycodone, lidocaine    Objective (Physical assessment):           Rhythm: normal sinus rhythm            Bowel Activity: no if Yes indicate when:            Bowel Medications: yes  gave MOM            Incision: healing well          Incentive Spirometry Q 1-2 hour when awake:  yes Volume: 1500          Epicardial Pacing Wires:  no            Patient Activity:           Up to chair for meals: yes          Ambulation with RN x2 (Not including CR): no           Shower Daily {YES/NO/NA:[id number]          Nasal  Nozin BID AM/PM : yes              Chest Tubes   Pleural: not applicable Draining: not applicable               Suction: not applicable              Mediastinal: not applicable Draining: not applicable               Suction: not applicable   Dressing Change Daily:yes If No, why?                      Urinary Catheter: no           Preventative WOC consult (need MD order): yes       Assessment (Nursing primary shift focus): PT reluctantly got up to scale this a.m. and sat in chair   Writer requested modified diet so pt has more choices.  Pt was able to eat lunch.  PT states he has no appetite.  Pt was given MOM as no BM since admission.       Plan (Patient Care Plan/focus): Continue to encourage activity and promote bowel medications.      PT has ongoing pain 5-7/10 in chest that increases with coughing.       Jorge A Kumar RN   8/5/2025   2:13  PM

## 2025-08-06 LAB
ANION GAP SERPL CALCULATED.3IONS-SCNC: 10 MMOL/L (ref 7–15)
BUN SERPL-MCNC: 33.1 MG/DL (ref 6–20)
CA-I BLD-MCNC: 4.9 MG/DL (ref 4.4–5.2)
CALCIUM SERPL-MCNC: 9.1 MG/DL (ref 8.8–10.4)
CHLORIDE SERPL-SCNC: 101 MMOL/L (ref 98–107)
CREAT SERPL-MCNC: 1.22 MG/DL (ref 0.67–1.17)
EGFRCR SERPLBLD CKD-EPI 2021: 72 ML/MIN/1.73M2
ERYTHROCYTE [DISTWIDTH] IN BLOOD BY AUTOMATED COUNT: 12.9 % (ref 10–15)
GLUCOSE BLDC GLUCOMTR-MCNC: 157 MG/DL (ref 70–99)
GLUCOSE BLDC GLUCOMTR-MCNC: 166 MG/DL (ref 70–99)
GLUCOSE BLDC GLUCOMTR-MCNC: 175 MG/DL (ref 70–99)
GLUCOSE BLDC GLUCOMTR-MCNC: 184 MG/DL (ref 70–99)
GLUCOSE BLDC GLUCOMTR-MCNC: 198 MG/DL (ref 70–99)
GLUCOSE SERPL-MCNC: 160 MG/DL (ref 70–99)
HCO3 SERPL-SCNC: 25 MMOL/L (ref 22–29)
HCT VFR BLD AUTO: 25.5 % (ref 40–53)
HGB BLD-MCNC: 8.5 G/DL (ref 13.3–17.7)
MAGNESIUM SERPL-MCNC: 2.4 MG/DL (ref 1.7–2.3)
MCH RBC QN AUTO: 30.5 PG (ref 26.5–33)
MCHC RBC AUTO-ENTMCNC: 33.3 G/DL (ref 31.5–36.5)
MCV RBC AUTO: 91 FL (ref 78–100)
PHOSPHATE SERPL-MCNC: 3.7 MG/DL (ref 2.5–4.5)
PLATELET # BLD AUTO: 300 10E3/UL (ref 150–450)
POTASSIUM SERPL-SCNC: 4.1 MMOL/L (ref 3.4–5.3)
RBC # BLD AUTO: 2.79 10E6/UL (ref 4.4–5.9)
SODIUM SERPL-SCNC: 136 MMOL/L (ref 135–145)
WBC # BLD AUTO: 11.6 10E3/UL (ref 4–11)

## 2025-08-06 PROCEDURE — 250N000013 HC RX MED GY IP 250 OP 250 PS 637: Performed by: PHYSICIAN ASSISTANT

## 2025-08-06 PROCEDURE — 36415 COLL VENOUS BLD VENIPUNCTURE: CPT | Performed by: THORACIC SURGERY (CARDIOTHORACIC VASCULAR SURGERY)

## 2025-08-06 PROCEDURE — 83735 ASSAY OF MAGNESIUM: CPT | Performed by: THORACIC SURGERY (CARDIOTHORACIC VASCULAR SURGERY)

## 2025-08-06 PROCEDURE — 82330 ASSAY OF CALCIUM: CPT | Performed by: THORACIC SURGERY (CARDIOTHORACIC VASCULAR SURGERY)

## 2025-08-06 PROCEDURE — 36415 COLL VENOUS BLD VENIPUNCTURE: CPT | Performed by: PHYSICIAN ASSISTANT

## 2025-08-06 PROCEDURE — 99231 SBSQ HOSP IP/OBS SF/LOW 25: CPT | Mod: 24

## 2025-08-06 PROCEDURE — 210N000001 HC R&B IMCU HEART CARE

## 2025-08-06 PROCEDURE — 85018 HEMOGLOBIN: CPT | Performed by: PHYSICIAN ASSISTANT

## 2025-08-06 PROCEDURE — 80048 BASIC METABOLIC PNL TOTAL CA: CPT | Performed by: PHYSICIAN ASSISTANT

## 2025-08-06 PROCEDURE — 250N000011 HC RX IP 250 OP 636: Mod: JZ | Performed by: PHYSICIAN ASSISTANT

## 2025-08-06 PROCEDURE — 84100 ASSAY OF PHOSPHORUS: CPT | Performed by: THORACIC SURGERY (CARDIOTHORACIC VASCULAR SURGERY)

## 2025-08-06 PROCEDURE — 999N000157 HC STATISTIC RCP TIME EA 10 MIN

## 2025-08-06 RX ORDER — INSULIN ASPART 100 [IU]/ML
15 INJECTION, SOLUTION INTRAVENOUS; SUBCUTANEOUS
Qty: 15 ML | Refills: 0 | Status: SHIPPED | OUTPATIENT
Start: 2025-08-06 | End: 2025-08-07

## 2025-08-06 RX ORDER — MAGNESIUM CARB/ALUMINUM HYDROX 105-160MG
296 TABLET,CHEWABLE ORAL ONCE
Status: COMPLETED | OUTPATIENT
Start: 2025-08-06 | End: 2025-08-06

## 2025-08-06 RX ORDER — METFORMIN HYDROCHLORIDE 500 MG/1
500 TABLET, EXTENDED RELEASE ORAL
Qty: 90 TABLET | Refills: 0 | Status: SHIPPED | OUTPATIENT
Start: 2025-08-06

## 2025-08-06 RX ADMIN — HEPARIN SODIUM 5000 UNITS: 5000 INJECTION, SOLUTION INTRAVENOUS; SUBCUTANEOUS at 13:38

## 2025-08-06 RX ADMIN — ACETAMINOPHEN 975 MG: 325 TABLET ORAL at 13:38

## 2025-08-06 RX ADMIN — INSULIN ASPART 2 UNITS: 100 INJECTION, SOLUTION INTRAVENOUS; SUBCUTANEOUS at 11:36

## 2025-08-06 RX ADMIN — CYANOCOBALAMIN TAB 1000 MCG 1000 MCG: 1000 TAB at 08:34

## 2025-08-06 RX ADMIN — SENNOSIDES, DOCUSATE SODIUM 1 TABLET: 50; 8.6 TABLET, FILM COATED ORAL at 21:13

## 2025-08-06 RX ADMIN — ACETAMINOPHEN 975 MG: 325 TABLET ORAL at 06:00

## 2025-08-06 RX ADMIN — LIDOCAINE 2 PATCH: 4 PATCH TOPICAL at 18:01

## 2025-08-06 RX ADMIN — INSULIN ASPART 1 UNITS: 100 INJECTION, SOLUTION INTRAVENOUS; SUBCUTANEOUS at 16:39

## 2025-08-06 RX ADMIN — BUMETANIDE 2 MG: 0.25 INJECTION INTRAMUSCULAR; INTRAVENOUS at 08:41

## 2025-08-06 RX ADMIN — ATORVASTATIN CALCIUM 80 MG: 40 TABLET, FILM COATED ORAL at 21:04

## 2025-08-06 RX ADMIN — HEPARIN SODIUM 5000 UNITS: 5000 INJECTION, SOLUTION INTRAVENOUS; SUBCUTANEOUS at 21:13

## 2025-08-06 RX ADMIN — OXYCODONE HYDROCHLORIDE 5 MG: 5 TABLET ORAL at 03:33

## 2025-08-06 RX ADMIN — GUAIFENESIN 600 MG: 600 TABLET ORAL at 08:34

## 2025-08-06 RX ADMIN — ONDANSETRON 4 MG: 2 INJECTION, SOLUTION INTRAMUSCULAR; INTRAVENOUS at 23:47

## 2025-08-06 RX ADMIN — ACETAMINOPHEN 975 MG: 325 TABLET ORAL at 21:12

## 2025-08-06 RX ADMIN — OXYCODONE HYDROCHLORIDE 5 MG: 5 TABLET ORAL at 21:13

## 2025-08-06 RX ADMIN — BUMETANIDE 2 MG: 0.25 INJECTION INTRAMUSCULAR; INTRAVENOUS at 16:25

## 2025-08-06 RX ADMIN — CLOPIDOGREL BISULFATE 75 MG: 75 TABLET, FILM COATED ORAL at 08:34

## 2025-08-06 RX ADMIN — PANTOPRAZOLE SODIUM 40 MG: 40 TABLET, DELAYED RELEASE ORAL at 08:34

## 2025-08-06 RX ADMIN — ASPIRIN 81 MG CHEWABLE TABLET 81 MG: 81 TABLET CHEWABLE at 08:33

## 2025-08-06 RX ADMIN — MAGNESIUM CITRATE 296 ML: 1.75 LIQUID ORAL at 08:33

## 2025-08-06 RX ADMIN — CARVEDILOL 3.12 MG: 3.12 TABLET, FILM COATED ORAL at 09:03

## 2025-08-06 RX ADMIN — METHOCARBAMOL 500 MG: 500 TABLET ORAL at 03:33

## 2025-08-06 RX ADMIN — TAMSULOSIN HYDROCHLORIDE 0.4 MG: 0.4 CAPSULE ORAL at 21:13

## 2025-08-06 RX ADMIN — Medication 1 TABLET: at 08:34

## 2025-08-06 RX ADMIN — METHOCARBAMOL 500 MG: 500 TABLET ORAL at 23:47

## 2025-08-06 RX ADMIN — EMPAGLIFLOZIN 10 MG: 10 TABLET, FILM COATED ORAL at 08:35

## 2025-08-06 RX ADMIN — INSULIN ASPART 2 UNITS: 100 INJECTION, SOLUTION INTRAVENOUS; SUBCUTANEOUS at 08:40

## 2025-08-06 RX ADMIN — HEPARIN SODIUM 5000 UNITS: 5000 INJECTION, SOLUTION INTRAVENOUS; SUBCUTANEOUS at 06:00

## 2025-08-06 RX ADMIN — METHOCARBAMOL 500 MG: 500 TABLET ORAL at 08:48

## 2025-08-06 RX ADMIN — GUAIFENESIN 600 MG: 600 TABLET ORAL at 21:13

## 2025-08-06 RX ADMIN — SENNOSIDES, DOCUSATE SODIUM 1 TABLET: 50; 8.6 TABLET, FILM COATED ORAL at 08:33

## 2025-08-06 RX ADMIN — CARVEDILOL 3.12 MG: 3.12 TABLET, FILM COATED ORAL at 18:00

## 2025-08-06 RX ADMIN — OXYCODONE HYDROCHLORIDE 5 MG: 5 TABLET ORAL at 08:38

## 2025-08-06 RX ADMIN — METFORMIN ER 500 MG 500 MG: 500 TABLET ORAL at 18:00

## 2025-08-06 ASSESSMENT — ACTIVITIES OF DAILY LIVING (ADL)
ADLS_ACUITY_SCORE: 46
ADLS_ACUITY_SCORE: 49
ADLS_ACUITY_SCORE: 46
ADLS_ACUITY_SCORE: 49
ADLS_ACUITY_SCORE: 46
ADLS_ACUITY_SCORE: 49
ADLS_ACUITY_SCORE: 46
ADLS_ACUITY_SCORE: 49
ADLS_ACUITY_SCORE: 46
ADLS_ACUITY_SCORE: 49
ADLS_ACUITY_SCORE: 49
ADLS_ACUITY_SCORE: 46
ADLS_ACUITY_SCORE: 49

## 2025-08-06 NOTE — PROGRESS NOTES
"CVTS Daily Progress Note   POD#7 s/p CABG x3 (LIMA-->LAD, rSVG-->HEENA, rSVG-->RPD) and MERA packge  Attending: Dr. Boateng  LOS: 7    SUBJECTIVE/INTERVAL EVENTS:    No acute events overnight. Normotensive. NSR. Maintaining O2 sats on room air. Good UOP. - BM / + flatus. Ambulating with therapy. Pain well controlled. Denies SOB, nausea, vomiting, abdominal pain, dizziness, lightheadedness, fever, chills, of calf pain.     OBJECTIVE:  Temp:  [97.5  F (36.4  C)-98.2  F (36.8  C)] 98.2  F (36.8  C)  Pulse:  [78-90] 79  Resp:  [18-24] 20  BP: ()/(39-64) 118/62  SpO2:  [90 %-97 %] 95 %  Vitals:    08/02/25 0604 08/04/25 1344 08/05/25 0758 08/06/25 0325   Weight: (!) 147.6 kg (325 lb 6.4 oz) (!) 145.7 kg (321 lb 1.6 oz) (!) 145.4 kg (320 lb 8 oz) (!) 143.1 kg (315 lb 8 oz)       Clinically Significant Risk Factors         # Hyponatremia: Lowest Na = 132 mmol/L in last 2 days, will monitor as appropriate       # Hypoalbuminemia: Lowest albumin = 2.9 g/dL at 7/30/2025  1:04 PM, will monitor as appropriate       # Hypertension: Noted on problem list  # Heart failure with preserved ejection fraction: EF >50% and home medication list includes sacubitril/valsartan (Entresto)          # DMII: A1C = 11.3 % (Ref range: <5.7 %) within past 6 months   # Morbid Obesity: Estimated body mass index is 45.27 kg/m  as calculated from the following:    Height as of this encounter: 1.778 m (5' 10\").    Weight as of this encounter: 143.1 kg (315 lb 8 oz).        # Financial/Environmental Concerns:     # History of CABG: noted on surgical history          Current Medications:    Scheduled Meds:  Current Facility-Administered Medications   Medication Dose Route Frequency Provider Last Rate Last Admin    acetaminophen (TYLENOL) tablet 975 mg  975 mg Oral Q8H Geno Howard PA-C   975 mg at 08/06/25 0600    aspirin (ASA) chewable tablet 81 mg  81 mg Oral or NG Tube Daily Geno Howard PA-C   81 mg at 08/06/25 0833    " atorvastatin (LIPITOR) tablet 80 mg  80 mg Oral At Bedtime Geno Howard PA-C   80 mg at 08/05/25 2027    bumetanide (BUMEX) injection 2 mg  2 mg Intravenous BID Geno Howard PA-C   2 mg at 08/06/25 0841    carvedilol (COREG) tablet 3.125 mg  3.125 mg Oral BID w/meals Geno Howard PA-C   3.125 mg at 08/06/25 0903    clopidogrel (PLAVIX) tablet 75 mg  75 mg Oral Daily Geno Howard PA-C   75 mg at 08/06/25 0834    cyanocobalamin (VITAMIN B-12) tablet 1,000 mcg  1,000 mcg Oral Daily Geno Howard PA-C   1,000 mcg at 08/06/25 0834    empagliflozin (JARDIANCE) tablet 10 mg  10 mg Oral Daily Geno Howard PA-C   10 mg at 08/06/25 0835    guaiFENesin (MUCINEX) 12 hr tablet 600 mg  600 mg Oral BID Geno Howard PA-C   600 mg at 08/06/25 0834    heparin ANTICOAGULANT injection 5,000 Units  5,000 Units Subcutaneous Q8H Geno Howard PA-C   5,000 Units at 08/06/25 0600    insulin aspart (NovoLOG) injection (RAPID ACTING)  15 Units Subcutaneous TID w/meals Terry Ag MD   15 Units at 08/05/25 1753    insulin aspart (NovoLOG) injection (RAPID ACTING)  1-10 Units Subcutaneous TID AC Geno Howard PA-C   2 Units at 08/06/25 0840    insulin aspart (NovoLOG) injection (RAPID ACTING)  1-7 Units Subcutaneous At Bedtime Geno Howard PA-C   2 Units at 08/03/25 2111    insulin glargine (LANTUS PEN) injection 30 Units  30 Units Subcutaneous QAM  Terry Ag MD        Lidocaine (LIDOCARE) 4 % Patch 1-2 patch  1-2 patch Transdermal Q24H Geno Howard PA-C   2 patch at 08/05/25 1727    metFORMIN (GLUCOPHAGE XR) 24 hr tablet 500 mg  500 mg Oral Daily with supper Geno Howard PA-C   500 mg at 08/05/25 1727    multivitamin w/minerals (THERA-VIT-M) tablet 1 tablet  1 tablet Oral Daily Geno Howard PA-C   1 tablet at 08/06/25 0834    pantoprazole (PROTONIX) EC tablet 40 mg  40 mg Oral  Daily Geno Howard PA-C   40 mg at 08/06/25 0834    polyethylene glycol (MIRALAX) Packet 17 g  17 g Oral Daily Geno Howard PA-C   17 g at 08/04/25 0912    [Held by provider] sacubitril-valsartan (ENTRESTO) 24-26 MG per tablet 1 tablet  1 tablet Oral BID Geno Howard PA-C        senna-docusate (SENOKOT-S/PERICOLACE) 8.6-50 MG per tablet 1 tablet  1 tablet Oral BID Geno Howard PA-C   1 tablet at 08/06/25 0833    tamsulosin (FLOMAX) capsule 0.4 mg  0.4 mg Oral QPM Geno Howard PA-C   0.4 mg at 08/05/25 2027     Continuous Infusions:  Current Facility-Administered Medications   Medication Dose Route Frequency Provider Last Rate Last Admin     PRN Meds:.  Current Facility-Administered Medications   Medication Dose Route Frequency Provider Last Rate Last Admin    bisacodyl (DULCOLAX) suppository 10 mg  10 mg Rectal Daily PRN Geno Howard PA-C        calcium gluconate 1 g in 50 mL in sodium chloride intermittent infusion  1 g Intravenous Once PRN Geno Howard PA-C        calcium gluconate 2 g in  mL intermittent infusion  2 g Intravenous Once PRN Geno Howard PA-C        calcium gluconate 3 g in sodium chloride 0.9 % 100 mL intermittent infusion  3 g Intravenous Once PRN Geno Howard PA-C        glucose gel 15-30 g  15-30 g Oral Q15 Min PRN Haydee Boateng MD        Or    dextrose 50 % injection 25-50 mL  25-50 mL Intravenous Q15 Min PRN Haydee Boateng MD        Or    glucagon injection 1 mg  1 mg Subcutaneous Q15 Min PRN Haydee Boateng MD        hydrALAZINE (APRESOLINE) injection 10 mg  10 mg Intravenous Q30 Min PRN Geno Howard PA-C        lactated ringers BOLUS 250 mL  250 mL Intravenous Q15 Min PRN Geno Howard PA-C        magnesium hydroxide (MILK OF MAGNESIA) suspension 30 mL  30 mL Oral Daily PRN Geno Howard PA-C   30 mL at 08/05/25 0839    methocarbamol (ROBAXIN)  tablet 500 mg  500 mg Oral 4x Daily PRN Geno Howard PA-C   500 mg at 08/06/25 0848    naloxone (NARCAN) injection 0.2 mg  0.2 mg Intravenous Q2 Min PRN Geno Howard PA-C        Or    naloxone (NARCAN) injection 0.4 mg  0.4 mg Intravenous Q2 Min PRN Geno Howard PA-C        Or    naloxone (NARCAN) injection 0.2 mg  0.2 mg Intramuscular Q2 Min PRN Geno Howard PA-C        Or    naloxone (NARCAN) injection 0.4 mg  0.4 mg Intramuscular Q2 Min PRN Geno Howard PA-C        ondansetron (ZOFRAN ODT) ODT tab 4 mg  4 mg Oral Q6H PRN Geno Howard PA-C   4 mg at 08/03/25 2115    Or    ondansetron (ZOFRAN) injection 4 mg  4 mg Intravenous Q6H PRN Geno Howard PA-C   4 mg at 08/04/25 0855    oxyCODONE (ROXICODONE) tablet 5 mg  5 mg Oral Q4H PRN Geno Howard PA-C   5 mg at 08/06/25 0838    prochlorperazine (COMPAZINE) injection 10 mg  10 mg Intravenous Q6H PRN Geno Howard PA-C   10 mg at 07/31/25 0729    Or    prochlorperazine (COMPAZINE) tablet 10 mg  10 mg Oral Q6H PRN Geno Howard PA-C           Cardiographics:    Telemetry monitoring demonstrates NSR with rates in the 70s per my personal review.      Imaging:  Results for orders placed or performed during the hospital encounter of 07/30/25   XR Chest Port 1 View    Impression    IMPRESSION: Negative chest.   XR Chest Port 1 View    Impression    IMPRESSION: The endotracheal tube tip is 6.4 cm from the edelmira. An NG or OG tube terminates off the field-of-view. A right IJ sheath transmits a Pennington-Oxana catheter which terminates in the lower pulmonary artery. Lung volumes are low. No pleural fluid or   pneumothorax. The heart is not well assessed. Sternotomy suture wires are present. There are degenerative changes of the left shoulder. Old healed right clavicle fracture is suspected.   XR Chest Port 1 View    Impression    IMPRESSION: Interval removal endotracheal and  enteric tube. Median sternotomy. Mediastinal drain, right chest 2 views. No significant effusion. Right IJ sheath. Remote fracture right clavicle.   XR Abdomen Port 1 View    Impression    IMPRESSION: An OG tube terminates in the stomach. Subxiphoid drains are noted.       Labs, personally reviewed.  Hemoglobin   Date Value Ref Range Status   08/06/2025 8.5 (L) 13.3 - 17.7 g/dL Final   08/05/2025 8.7 (L) 13.3 - 17.7 g/dL Final   08/04/2025 8.7 (L) 13.3 - 17.7 g/dL Final     WBC Count   Date Value Ref Range Status   08/06/2025 11.6 (H) 4.0 - 11.0 10e3/uL Final   08/05/2025 11.9 (H) 4.0 - 11.0 10e3/uL Final   08/04/2025 12.7 (H) 4.0 - 11.0 10e3/uL Final     Platelet Count   Date Value Ref Range Status   08/06/2025 300 150 - 450 10e3/uL Final   08/05/2025 256 150 - 450 10e3/uL Final   08/04/2025 214 150 - 450 10e3/uL Final     Creatinine   Date Value Ref Range Status   08/06/2025 1.22 (H) 0.67 - 1.17 mg/dL Final   08/05/2025 1.38 (H) 0.67 - 1.17 mg/dL Final   08/04/2025 1.55 (H) 0.67 - 1.17 mg/dL Final     Potassium   Date Value Ref Range Status   08/06/2025 4.1 3.4 - 5.3 mmol/L Final   08/05/2025 4.0 3.4 - 5.3 mmol/L Final   08/04/2025 4.3 3.4 - 5.3 mmol/L Final     Potassium POCT   Date Value Ref Range Status   07/30/2025 4.8 3.4 - 5.3 mmol/L Final   07/30/2025 4.8 3.4 - 5.3 mmol/L Final   07/30/2025 4.9 3.4 - 5.3 mmol/L Final     Magnesium   Date Value Ref Range Status   08/06/2025 2.4 (H) 1.7 - 2.3 mg/dL Final   08/05/2025 2.2 1.7 - 2.3 mg/dL Final   08/04/2025 2.1 1.7 - 2.3 mg/dL Final          I/O:  I/O last 3 completed shifts:  In: 350 [P.O.:350]  Out: 3575 [Urine:3575]       Physical Exam:    General: Patient seen in bed. NAD  CV: RRR on monitor. Mild edema of bilateral lower extremities. Incision C/D/I.  Pulm: Non-labored effort on RA.   Abd: Hyperactive bowel sounds. Soft, NT, ND  Ext: Mild pedal edema, SCDs in place, warm and moist.  Neuro: CNs grossly intact. Moves all 4 extremities. Bilateral lower  extremity sensation diminished but intact.   Psych: Restless, a bit irritable. Poor insight.     ASSESSMENT/PLAN:    Floyd Capps is a 49 year old male with a history of HFrEF (EF 40-45% from TTE 5/2025), ischemic CVA on 8/2023 and 3/2025, poorly-controlled and insulin-dependent T2DM with diabetic neuropathy with foot wound on R heel, and CKD stage 3 who is s/p CABG x3 and MERA clippage with Dr. Boateng on 7/30/2025. POD#1 diagnosed with suspected acute pericarditis post CABG.     Principal Problem:    Coronary artery disease involving autologous artery coronary bypass graft without angina pectoris      NEURO:   - Scheduled Tylenol/lidocaine patches and PRN Tylenol/oxycodone/robaxin for pain    CV:   - Pre-op EF 40-45% from TTE; intra-op GAIL EF 35%  - Normotensive  - Chest tubes and TPW removed POD#4  - Coreg 3.125mg BID  - PTA Plavix 75mg   - ASA 81mg daily (decreased due to Plavix)  - Atorvastatin 80mg daily  - Note: Groin incision closed with staples - remove prior to discharge/at follow up appt  - Hold PTA Entresto     PULM:   - Extubated on POD#0  - Maintaining oxygen saturations on RA  - Encourage pulmonary toilet  - Mucinex  - Apneic episodes at night, desats to 70s. Screened positive for probable ANDIE in 6/2025. Sleep study scheduled for 10/2025.      FEN/GI:  - Continue electrolyte replacement protocol  - Cardiac; ADAT  - Bowel regimen; advancing todau    RENAL:  - Adequate UOP/hr. Continue to monitor closely.  - Cr 1.22 (1.38) . Baseline 1.3-1.5  - Diuresis with IV Bumex 2mg BID  - BMP daily  - Flomax     HEME:  - Acute blood loss anemia post-op.   - Hgb stable, no bleeding concerns. Hep SQ, ASA  - Daily CBC in AM    ID:  - Luly op ppx complete, afebrile. No concerns for infection    ENDO:   - Most recent A1c 11.3% on 5/28/2025. PTA takes 55 units insulin of Lantus at bedtime + Novolog.  - SSI + Lantus   - Resumed PTA metformin   - Resumed PTA jardiance  - Hospitalist consult for DM management     PPx:   -  DVT: SCDs, SQ heparin TID, ambulation   - GI: Protonix 40mg IV/PO daily    DISPO:   - General telemetry status since POD#3  - Medically Ready for Discharge: Anticipated Today pending BM   - Plan: TCU          Patient discussed with Dr. Boateng.      _______  Geno Howard PA-C  [facility] Cardiothoracic Surgery  Communication via Vocera Secure Messaging

## 2025-08-06 NOTE — PLAN OF CARE
Problem: Adult Inpatient Plan of Care  Goal: Plan of Care Review  Description: The Plan of Care Review/Shift note should be completed every shift.  The Outcome Evaluation is a brief statement about your assessment that the patient is improving, declining, or no change.  This information will be displayed automatically on your shift  note.  Outcome: Progressing  Flowsheets (Taken 8/6/2025 1728)  Plan of Care Reviewed With: patient  Overall Patient Progress: improving   Goal Outcome Evaluation:      Plan of Care Reviewed With: patient    Overall Patient Progress: improvingOverall Patient Progress: improving           Assumed care for this patient on this date at 1500. POD 7 CABGx3.  Cardiothoracic surgery, PT/OT following. CM following. Patient had a bowel movement today. Likely discharging tomorrow to TCU.  Diabetic. Last blood glucose 157 mg/dl.  Magnesium, potassium and phosphorus protocol.

## 2025-08-06 NOTE — PROGRESS NOTES
Care Management Follow Up    Length of Stay (days): 7    Expected Discharge Date: 08/06/2025    Anticipated Discharge Plan:       Transportation: Confirmed Wheelchair. Transportation costs discussed? Yes. Discussed with pt    PT Recommendations:    OT Recommendations:  (S) Transitional Care Facility     Barriers to Discharge: medical stability    Prior Living Situation: extended care facility with facility resident    Discussed  Partnership in Safe Discharge Planning  document with patient/family: No     Handoff Completed: Yes, [facility] PCP: Internal handoff referral completed    Patient/Spokesperson Updated: Yes. Who? pt    Additional Information:    Per previous CM note, pt confirms he will need medical transport and is agreeable to potential costs.     Spoke with MD, awaiting for bowel function to return to . Still no BM today. Anticipated discharge tomorrow if bowel function improves. Pt plans to return to [facility] w/c transport rescheduled for tomorrow 8/7 between [id number].    Pako Toro RN  Acute Care Management  [facility]  For further questions/concerns you can reach us at Vocera Web Console

## 2025-08-06 NOTE — PROGRESS NOTES
[facility]    Medicine Progress Note - Hospitalist Service    Date of Admission:  7/30/2025    Assessment & Plan   Floyd Capps is a 49 year old male admitted on 7/30/2025. He has history of CAD, HFrEF, HTN, prior CVA, tobacco use disorder and T2DM, was admitted for multivessel coronary artery disease and had triple vessel coronary artery bypass grafting on 7/30 in which family medicine service has been consulted for T2DM management.    [place] Updates:  - Sugars better controlled, fasting 160 this AM. Increase glargine to 30u, continue 15u TID w/ meals.      T2DM  Poorly controlled. Last A1C was 11 two months ago. Lives in TCU, doesn't manage his own medications but is mostly aware of what he is taking. Since admission, initially was on insulin gtt, full day of IV insulin with appropriate control took 99u over 24 hours. Since then, glargine 10u and HDSS were used, with fasting sugars 190+ and lowest preprandial sugar was 171. Home regimen includes glargine 55u, Novolog 20-24u (reported per patient, not on med rec completed at admission) with meals, metformin 500 and jardiance 10mg. Since admission, has been eating less given his advance diet order as tolerated low fat and 2g NA, not his typical meals. Given lesser diet, that is likely why his sugars haven't spiked > 250 despite much less insulin than home regimen. Sugars better controlled with uptitration of glargine, aspart with meals.  - Glargine 30u daily  - Aspart 15u TID with meals  - Continue high dose sliding scale  - POCT 4 times daily: Fasting AM/preprandial meals and bedtime  - Hypoglycemia protocol PRN          Clinically Significant Risk Factors         # Hyponatremia: Lowest Na = 132 mmol/L in last 2 days, will monitor as appropriate       # Hypoalbuminemia: Lowest albumin = 2.9 g/dL at 7/30/2025  1:04 PM, will monitor as appropriate     # Hypertension: Noted on problem list    # Heart failure with preserved ejection  "fraction: EF >50% and home medication list includes sacubitril/valsartan (Entresto)          # DMII: A1C = 11.3 % (Ref range: <5.7 %) within past 6 months   # Morbid Obesity: Estimated body mass index is 45.27 kg/m  as calculated from the following:    Height as of this encounter: 1.778 m (5' 10\").    Weight as of this encounter: 143.1 kg (315 lb 8 oz).        # Financial/Environmental Concerns:     # History of CABG: noted on surgical history       Social Drivers of Health    Food Insecurity: High Risk (7/31/2025)    Food Insecurity     Within the past 12 months, did you worry that your food would run out before you got money to buy more?: Yes     Within the past 12 months, did the food you bought just not last and you didn t have money to get more?: No   Depression: At risk (1/30/2025)    PHQ-2     PHQ-2 Score: 6   Housing Stability: High Risk (7/31/2025)    Housing Stability     Do you have housing? : No     Are you worried about losing your housing?: No   Tobacco Use: Medium Risk (7/30/2025)    Patient History     Smoking Tobacco Use: Former     Smokeless Tobacco Use: Never     Passive Exposure: Never   Financial Resource Strain: High Risk (7/31/2025)    Financial Resource Strain     Within the past 12 months, have you or your family members you live with been unable to get utilities (heat, electricity) when it was really needed?: Yes   Transportation Needs: High Risk (7/31/2025)    Transportation Needs     Within the past 12 months, has lack of transportation kept you from medical appointments, getting your medicines, non-medical meetings or appointments, work, or from getting things that you need?: Yes      The patient's care was discussed with the Attending Physician, Dr. Spaulding.    Terry Ag MD  Hospitalist Service  [facility]  Securely message with [employer] (more info)  Text page via [facility] Paging/Directory "   ______________________________________________________________________    Interval History   Sugars better controlled in interim, now fasting 160. More upset today, not interested in talking. Laying in bed.     Physical Exam   Vital Signs: Temp: 98.2  F (36.8  C) Temp src: Oral BP: 118/62 Pulse: 79   Resp: 20 SpO2: 95 % O2 Device: None (Room air)    Weight: 315 lbs 8 oz    General: No acute distress. Well kept, appears stated age.  Head: Normocephalic, atraumatic.  Respiratory: No signs of respiratory distress.  Cardiovascular: Appears well perfused.  Psych: Flat mood and affect.    Medical Decision Making             Data

## 2025-08-06 NOTE — PLAN OF CARE
"  Problem: Adult Inpatient Plan of Care  Goal: Plan of Care Review  Description: The Plan of Care Review/Shift note should be completed every shift.  The Outcome Evaluation is a brief statement about your assessment that the patient is improving, declining, or no change.  This information will be displayed automatically on your shift  note.  Outcome: Progressing  Goal: Patient-Specific Goal (Individualized)  Description: You can add care plan individualizations to a care plan. Examples of Individualization might be:  \"Parent requests to be called daily at 9am for status\", \"I have a hard time hearing out of my right ear\", or \"Do not touch me to wake me up as it startles  me\".  Outcome: Progressing  Goal: Absence of Hospital-Acquired Illness or Injury  Outcome: Progressing  Goal: Optimal Comfort and Wellbeing  Outcome: Progressing  Goal: Readiness for Transition of Care  Outcome: Not Progressing  Flowsheets (Taken 8/6/2025 0532)  Concerns to be Addressed: adjustment to diagnosis/illness  Intervention: Mutually Develop Transition Plan  Recent Flowsheet Documentation  Taken 8/6/2025 0532 by Harpal Paulino RN  Concerns to be Addressed: adjustment to diagnosis/illness   Goal Outcome Evaluation:    sc  Patient Name: Floyd Capps   MRN: [id number]   Date of Admission: 7/30/2025    Procedure: Procedure(s):  CORONARY ARTERY BYPASS GRAFT TIMES THREE, WITH LEFT LEG ENDOSCOPIC VESSEL PROCUREMENT, LEFT INTERNAL MAMMARY ARTERY HARVEST, CLIPPING OF LEFT ATRIAL APPENDAGE  ECHOCARDIOGRAM, TRANSESOPHAGEAL, INTRAOPERATIVE, EPIAORTIC ULTRASOUND    Post Op day #: 7      Subjective (Patient focus/Primary Problem for shift): Appears upset and/or frustrated with multiple issues relating his care, physical state, and/or how his recovery is progressing.  This appears as pt will intermittently become verbally aggressive towards staff.  Conversely, pt will also appear, indifferent towards cares, requests, or instructions.  In " addition, pt will also routinely refuse any particular care he doesn't feel like completing (such as getting out of bed in the morning.          Pain Goal 3 Pain Rating 2-7/10.           Pain Medication/ Regime effective to reduce patient pain Partially effective.    Objective (Physical assessment):           Rhythm: normal sinus rhythm            Bowel Activity: no if Yes indicate when: NA          Bowel Medications: yes            Incision: healing well          Incentive Spirometry Q 1-2 hour when awake:  no Volume: NA.          Epicardial Pacing Wires:  not applicable            Patient Activity:           Up to chair for meals: Selective towards when he wants to get out of bed and sit in his chair.          Ambulation with RN x2 (Not including CR): no           Shower Daily {YES/NO/NA:[id number]          Nasal  Nozin BID AM/PM : yes              Assessment (Nursing primary shift focus):     Cardiac:  NSR, rate 70's.  Low BP's at HS (see note).  Respiratory:  Variable: 16-20, non-labored, when awake.  Observing intermittent ANDIE during NOC.  LS: Diminished in the bases, bilat.  Sat's: Maintaining mid 90's at RA.  Neuro:  WNL.  GI:  Passing flatus.  No BM for HS/NOC.  BS: Present X 4 quadrants.  :  Voiding.  Good UOP (see I/O).  Pain:  Periodic.  Treated with scheduled PO Tylenol and PRN analgesics (see EMAR).  Wound site:  Healing well.  Ambulation:  Up with heavy assist of X1-2.  Labs:  AM labs pending.    Plan (Patient Care Plan/focus): Care plan for POD #7.  Possible discharge today.       Harpal Paulino RN   8/6/2025   5:34 AM

## 2025-08-06 NOTE — PROGRESS NOTES
HS BP's: L arm; 83/53 (MAP=63), R arm; 75/39 (MAP=51).  Pt denies light-headedness, dizziness, CP, lethargy.  [facility] on[facility]call (Jaime) notified.  Per [facility]: As long as no S&S observed.  Re-check BP Q 60 minutes.  If MAP <65, give 500 ml LR bolus X1.  If MAP => continue monitoring VS Q 4 hours.  Addendum:  22:00 hrs, re-check of VC=916-45, MAP=79.  No further intervention.  Continue to monitor VS Q 4 hours/PRN.

## 2025-08-06 NOTE — PROGRESS NOTES
RESPIRATORY CARE NOTE     Patient declined pulmonary hygiene therapies this AM.      Yuli Barrera, RT

## 2025-08-06 NOTE — PLAN OF CARE
"Goal Outcome Evaluation:  sc  Patient Name: Floyd Capps   MRN: [id number]   Date of Admission: 7/30/2025    Procedure: Procedure(s):  CORONARY ARTERY BYPASS GRAFT TIMES THREE, WITH LEFT LEG ENDOSCOPIC VESSEL PROCUREMENT, LEFT INTERNAL MAMMARY ARTERY HARVEST, CLIPPING OF LEFT ATRIAL APPENDAGE  ECHOCARDIOGRAM, TRANSESOPHAGEAL, INTRAOPERATIVE, EPIAORTIC ULTRASOUND    Post Op day #: 7    Subjective (Patient focus/Primary Problem for shift): BM          Pain Goal 5 Pain Rating 5           Pain Medication/ Regime effective to reduce patient pain apap, oxycodone    Objective (Physical assessment):           Rhythm: normal sinus rhythm            Bowel Activity: no if Yes indicate when:            Bowel Medications: yes            Incision: healing well          Incentive Spirometry Q 1-2 hour when awake:  no Volume:  refusing to do in front of staff          Epicardial Pacing Wires:  no            Patient Activity:           Up to chair for meals: no          Ambulation with RN x2 (Not including CR): no           Shower Daily {YES/NO/NA:[id number]          Nasal  Nozin BID AM/PM : yes              Chest Tubes   Pleural: not applicable Draining: not applicable               Suction: not applicable              Mediastinal: not applicable Draining: not applicable               Suction: not applicable   Dressing Change Daily:not applicable If No, why?                      Urinary Catheter: no           Preventative WOC consult (need MD order): yes       Assessment (Nursing primary shift focus):  Pt was given mag citrate.  PT doesn't like taste and becomes agitated when reminded to drink  PT has drank about 1/2 of dose as of 1:45 pm   Pt refused mirlax stating \"It taste bad\".  Pt was given  senna docusate tablet.   BS are hypoactive.  PT abdomen is soft and non-tender.     Plan (Patient Care Plan/focus): Continue to encourage       Jorge A Kumar RN   8/6/2025   1:46 PM                                 "

## 2025-08-07 VITALS
RESPIRATION RATE: 18 BRPM | HEART RATE: 79 BPM | BODY MASS INDEX: 43.61 KG/M2 | TEMPERATURE: 97.7 F | DIASTOLIC BLOOD PRESSURE: 64 MMHG | SYSTOLIC BLOOD PRESSURE: 122 MMHG | HEIGHT: 70 IN | WEIGHT: 304.6 LBS | OXYGEN SATURATION: 93 %

## 2025-08-07 LAB
ANION GAP SERPL CALCULATED.3IONS-SCNC: 12 MMOL/L (ref 7–15)
BUN SERPL-MCNC: 30.2 MG/DL (ref 6–20)
CALCIUM SERPL-MCNC: 9.1 MG/DL (ref 8.8–10.4)
CHLORIDE SERPL-SCNC: 99 MMOL/L (ref 98–107)
CREAT SERPL-MCNC: 1.3 MG/DL (ref 0.67–1.17)
EGFRCR SERPLBLD CKD-EPI 2021: 67 ML/MIN/1.73M2
GLUCOSE BLDC GLUCOMTR-MCNC: 189 MG/DL (ref 70–99)
GLUCOSE BLDC GLUCOMTR-MCNC: 198 MG/DL (ref 70–99)
GLUCOSE SERPL-MCNC: 175 MG/DL (ref 70–99)
HCO3 SERPL-SCNC: 24 MMOL/L (ref 22–29)
HOLD SPECIMEN: NORMAL
MAGNESIUM SERPL-MCNC: 2.4 MG/DL (ref 1.7–2.3)
PHOSPHATE SERPL-MCNC: 4.1 MG/DL (ref 2.5–4.5)
POTASSIUM SERPL-SCNC: 4 MMOL/L (ref 3.4–5.3)
SODIUM SERPL-SCNC: 135 MMOL/L (ref 135–145)

## 2025-08-07 PROCEDURE — 250N000013 HC RX MED GY IP 250 OP 250 PS 637: Performed by: NURSE PRACTITIONER

## 2025-08-07 PROCEDURE — 99231 SBSQ HOSP IP/OBS SF/LOW 25: CPT | Mod: GC

## 2025-08-07 PROCEDURE — G0463 HOSPITAL OUTPT CLINIC VISIT: HCPCS

## 2025-08-07 PROCEDURE — 250N000011 HC RX IP 250 OP 636: Mod: JZ | Performed by: PHYSICIAN ASSISTANT

## 2025-08-07 PROCEDURE — 250N000013 HC RX MED GY IP 250 OP 250 PS 637: Performed by: PHYSICIAN ASSISTANT

## 2025-08-07 PROCEDURE — 84100 ASSAY OF PHOSPHORUS: CPT | Performed by: THORACIC SURGERY (CARDIOTHORACIC VASCULAR SURGERY)

## 2025-08-07 PROCEDURE — 36415 COLL VENOUS BLD VENIPUNCTURE: CPT | Performed by: PHYSICIAN ASSISTANT

## 2025-08-07 PROCEDURE — 80048 BASIC METABOLIC PNL TOTAL CA: CPT | Performed by: PHYSICIAN ASSISTANT

## 2025-08-07 PROCEDURE — 83735 ASSAY OF MAGNESIUM: CPT | Performed by: THORACIC SURGERY (CARDIOTHORACIC VASCULAR SURGERY)

## 2025-08-07 PROCEDURE — 999N000156 HC STATISTIC RCP CONSULT EA 30 MIN

## 2025-08-07 RX ORDER — INSULIN ASPART 100 [IU]/ML
12 INJECTION, SOLUTION INTRAVENOUS; SUBCUTANEOUS
Qty: 15 ML | Refills: 0 | Status: SHIPPED | OUTPATIENT
Start: 2025-08-07

## 2025-08-07 RX ORDER — LIDOCAINE 4 G/G
1-2 PATCH TOPICAL EVERY 24 HOURS
DISCHARGE
Start: 2025-08-07

## 2025-08-07 RX ORDER — TAMSULOSIN HYDROCHLORIDE 0.4 MG/1
0.4 CAPSULE ORAL EVERY EVENING
DISCHARGE
Start: 2025-08-07

## 2025-08-07 RX ORDER — METHOCARBAMOL 500 MG/1
500 TABLET, FILM COATED ORAL 4 TIMES DAILY PRN
DISCHARGE
Start: 2025-08-07

## 2025-08-07 RX ORDER — PANTOPRAZOLE SODIUM 40 MG/1
40 TABLET, DELAYED RELEASE ORAL DAILY
DISCHARGE
Start: 2025-08-08

## 2025-08-07 RX ORDER — LIDOCAINE 4 G/G
2 PATCH TOPICAL
Status: DISCONTINUED | OUTPATIENT
Start: 2025-08-07 | End: 2025-08-07 | Stop reason: HOSPADM

## 2025-08-07 RX ORDER — POTASSIUM CHLORIDE 1500 MG/1
20 TABLET, EXTENDED RELEASE ORAL DAILY
DISCHARGE
Start: 2025-08-07

## 2025-08-07 RX ORDER — OXYCODONE HYDROCHLORIDE 5 MG/1
5 TABLET ORAL EVERY 4 HOURS PRN
Qty: 20 TABLET | Refills: 0 | Status: SHIPPED | OUTPATIENT
Start: 2025-08-07

## 2025-08-07 RX ORDER — CARVEDILOL 3.12 MG/1
3.12 TABLET ORAL 2 TIMES DAILY WITH MEALS
DISCHARGE
Start: 2025-08-07

## 2025-08-07 RX ORDER — GUAIFENESIN 600 MG/1
600 TABLET, EXTENDED RELEASE ORAL 2 TIMES DAILY
DISCHARGE
Start: 2025-08-07

## 2025-08-07 RX ADMIN — PROCHLORPERAZINE EDISYLATE 10 MG: 5 INJECTION INTRAMUSCULAR; INTRAVENOUS at 01:48

## 2025-08-07 RX ADMIN — ACETAMINOPHEN 975 MG: 325 TABLET ORAL at 05:59

## 2025-08-07 RX ADMIN — BUMETANIDE 2 MG: 0.25 INJECTION INTRAMUSCULAR; INTRAVENOUS at 07:56

## 2025-08-07 RX ADMIN — GUAIFENESIN 600 MG: 600 TABLET ORAL at 08:00

## 2025-08-07 RX ADMIN — INSULIN ASPART 3 UNITS: 100 INJECTION, SOLUTION INTRAVENOUS; SUBCUTANEOUS at 08:02

## 2025-08-07 RX ADMIN — ASPIRIN 81 MG CHEWABLE TABLET 81 MG: 81 TABLET CHEWABLE at 08:00

## 2025-08-07 RX ADMIN — OXYCODONE HYDROCHLORIDE 5 MG: 5 TABLET ORAL at 07:57

## 2025-08-07 RX ADMIN — EMPAGLIFLOZIN 10 MG: 10 TABLET, FILM COATED ORAL at 08:00

## 2025-08-07 RX ADMIN — Medication 1 TABLET: at 08:00

## 2025-08-07 RX ADMIN — ACETAMINOPHEN 975 MG: 325 TABLET ORAL at 13:42

## 2025-08-07 RX ADMIN — CLOPIDOGREL BISULFATE 75 MG: 75 TABLET, FILM COATED ORAL at 08:00

## 2025-08-07 RX ADMIN — HEPARIN SODIUM 5000 UNITS: 5000 INJECTION, SOLUTION INTRAVENOUS; SUBCUTANEOUS at 05:59

## 2025-08-07 RX ADMIN — LIDOCAINE 2 PATCH: 4 PATCH TOPICAL at 10:23

## 2025-08-07 RX ADMIN — HEPARIN SODIUM 5000 UNITS: 5000 INJECTION, SOLUTION INTRAVENOUS; SUBCUTANEOUS at 13:42

## 2025-08-07 RX ADMIN — CYANOCOBALAMIN TAB 1000 MCG 1000 MCG: 1000 TAB at 08:00

## 2025-08-07 RX ADMIN — PANTOPRAZOLE SODIUM 40 MG: 40 TABLET, DELAYED RELEASE ORAL at 08:00

## 2025-08-07 RX ADMIN — CARVEDILOL 3.12 MG: 3.12 TABLET, FILM COATED ORAL at 07:56

## 2025-08-07 RX ADMIN — OXYCODONE HYDROCHLORIDE 5 MG: 5 TABLET ORAL at 01:43

## 2025-08-07 ASSESSMENT — ACTIVITIES OF DAILY LIVING (ADL)
ADLS_ACUITY_SCORE: 48
ADLS_ACUITY_SCORE: 48
ADLS_ACUITY_SCORE: 55
ADLS_ACUITY_SCORE: 49
ADLS_ACUITY_SCORE: 55
ADLS_ACUITY_SCORE: 49
ADLS_ACUITY_SCORE: 47
ADLS_ACUITY_SCORE: 56
ADLS_ACUITY_SCORE: 48
ADLS_ACUITY_SCORE: 48
ADLS_ACUITY_SCORE: 49

## 2025-08-07 NOTE — PROGRESS NOTES
Care Management Discharge Note    Discharge Date: 08/07/2025       Discharge Disposition: Transitional Care    Discharge Services: None    Discharge DME:  (per treatment team)    Discharge Transportation: family or friend will provide    Is transportation arrangement complete? Yes    Private pay costs discussed: transportation costs    Does the patient's insurance plan have a 3 day qualifying hospital stay waiver?  No    PAS Confirmation Code: NA  Patient/family educated on Medicare website which has current facility and service quality ratings: yes    Education Provided on the Discharge Plan: Yes  Persons Notified of Discharge Plans: patient   Patient/Family in Agreement with the Plan: yes    Handoff Referral Completed: Yes, [facility] PCP: Internal handoff referral completed    Additional Information:  Discussed updates with cardiothoracic provider. Patient cleared for discharge. Plan to return to [facility] via [facility] wheel chair between 4822-2043. No PAS needed as pt is returning. Updated bedside RN and purvi in admissions at [facility] of transport time.     TERESA Escalante

## 2025-08-07 NOTE — PROGRESS NOTES
Patient A&OX4, makes needs known. Vitally stable on room air. Pain between 5-7/10. Given scheduled tylenol for relief. Intermittent nauseous. States that he has not been eating much. Met criteria to discharge. Patient discharged to TCU at 1430 with wheelchair ride via [employer]. All belongings sent with patient.

## 2025-08-07 NOTE — PROGRESS NOTES
"[facility]    Medicine Progress Note - Hospitalist Service    Date of Admission:  7/30/2025    Assessment & Plan   Floyd Capps is a 49 year old male admitted on 7/30/2025. He has history of CAD, HFrEF, HTN, prior CVA, tobacco use disorder and T2DM, was admitted for multivessel coronary artery disease and had triple vessel coronary artery bypass grafting on 7/30 in which family medicine service has been consulted for T2DM management.    Updates 8/7:  - Increase to 36u glargine daily, continue 12u Aspart TID  -Ok for discharge from primary care perspective      T2DM  Poorly controlled. Last A1C was 11 two months ago. Lives in TCU, doesn't manage his own medications but is mostly aware of what he is taking. Since admission, initially was on insulin gtt, full day of IV insulin with appropriate control took 99u over 24 hours. Home regimen includes glargine 55u, Novolog 20-24u (reported per patient, not on med rec completed at admission) with meals, metformin 500 and jardiance 10mg. Initially had restricted diet, and now on carb diet, sugars slowly being better controlled as glargine is being uptitrating.  - Glargine 40u daily  - Aspart 15u TID with meals  - Continue high dose sliding scale  - POCT 4 times daily: Fasting AM/preprandial meals and bedtime  - Hypoglycemia protocol PRN          Clinically Significant Risk Factors               # Hypoalbuminemia: Lowest albumin = 2.9 g/dL at 7/30/2025  1:04 PM, will monitor as appropriate     # Hypertension: Noted on problem list    # Heart failure with preserved ejection fraction: EF >50% and home medication list includes sacubitril/valsartan (Entresto)          # DMII: A1C = 11.3 % (Ref range: <5.7 %) within past 6 months   # Morbid Obesity: Estimated body mass index is 43.71 kg/m  as calculated from the following:    Height as of this encounter: 1.778 m (5' 10\").    Weight as of this encounter: 138.2 kg (304 lb 9.6 oz).        # " Financial/Environmental Concerns:     # History of CABG: noted on surgical history       Social Drivers of Health    Food Insecurity: High Risk (7/31/2025)    Food Insecurity     Within the past 12 months, did you worry that your food would run out before you got money to buy more?: Yes     Within the past 12 months, did the food you bought just not last and you didn t have money to get more?: No   Depression: At risk (1/30/2025)    PHQ-2     PHQ-2 Score: 6   Housing Stability: High Risk (7/31/2025)    Housing Stability     Do you have housing? : No     Are you worried about losing your housing?: No   Tobacco Use: Medium Risk (7/30/2025)    Patient History     Smoking Tobacco Use: Former     Smokeless Tobacco Use: Never     Passive Exposure: Never   Financial Resource Strain: High Risk (7/31/2025)    Financial Resource Strain     Within the past 12 months, have you or your family members you live with been unable to get utilities (heat, electricity) when it was really needed?: Yes   Transportation Needs: High Risk (7/31/2025)    Transportation Needs     Within the past 12 months, has lack of transportation kept you from medical appointments, getting your medicines, non-medical meetings or appointments, work, or from getting things that you need?: Yes      The patient's care was discussed with the Attending Physician, Dr. Puente.    Terry Ag MD  Hospitalist Service  [facility]  Securely message with [employer] (more info)  Text page via [employer] Paging/Directory   ______________________________________________________________________    Interval History   No significant change with 30u compared to 25u glargine. Patient arousable but not conversational this AM. Discussed adjusting up to 40u long acting insulin on discharge, pt agrees with plan of management.     Physical Exam   Vital Signs: Temp: 97.7  F (36.5  C) Temp src: Oral BP: 127/59 Pulse: 77   Resp: 16 SpO2: (!) 90 % O2  Device: None (Room air) Oxygen Delivery: 3 LPM  Weight: 304 lbs 9.6 oz    General: No acute distress. Well kept, appears stated age.  Head: Normocephalic, atraumatic.  Respiratory: No signs of respiratory distress.  Cardiovascular: Appears well perfused.  Psych: Flat mood and affect.        Data   Recent Results (from the past 24 hours)   Glucose by meter   Result Value Ref Range    GLUCOSE BY METER POCT 184 (H) 70 - 99 mg/dL   Glucose by meter   Result Value Ref Range    GLUCOSE BY METER POCT 157 (H) 70 - 99 mg/dL   Glucose by meter   Result Value Ref Range    GLUCOSE BY METER POCT 166 (H) 70 - 99 mg/dL   Glucose by meter   Result Value Ref Range    GLUCOSE BY METER POCT 198 (H) 70 - 99 mg/dL   Basic Metabolic Panel (Limited Occurrences)   Result Value Ref Range    Sodium 135 135 - 145 mmol/L    Potassium 4.0 3.4 - 5.3 mmol/L    Chloride 99 98 - 107 mmol/L    Carbon Dioxide (CO2) 24 22 - 29 mmol/L    Anion Gap 12 7 - 15 mmol/L    Urea Nitrogen 30.2 (H) 6.0 - 20.0 mg/dL    Creatinine 1.30 (H) 0.67 - 1.17 mg/dL    GFR Estimate 67 >60 mL/min/1.73m2    Calcium 9.1 8.8 - 10.4 mg/dL    Glucose 175 (H) 70 - 99 mg/dL   Magnesium   Result Value Ref Range    Magnesium 2.4 (H) 1.7 - 2.3 mg/dL   Phosphorus (Limited Occurrences)   Result Value Ref Range    Phosphorus 4.1 2.5 - 4.5 mg/dL   Extra Purple Top EDTA (LAB USE ONLY)   Result Value Ref Range    Hold Specimen [facility]    Glucose by meter   Result Value Ref Range    GLUCOSE BY METER POCT 198 (H) 70 - 99 mg/dL        Paxton Bowser MD  PGY-3  [facility]  [facility]  August 7, 2025

## 2025-08-07 NOTE — PROGRESS NOTES
[facility]  WO Nurse Inpatient Assessment     Consulted for: JG heel    Summary: Patient known to WOC team from previous admissions    WO nurse follow-up plan: weekly    Patient History (according to provider note(s):    Chief Complaint:   Heart blockages and heart failure  HISTORY OF PRESENT ILLNESS:  Mr. Capps has been seen by me several times.  He presented in late May with a heart failure exacerbation.   He has also had a thalamic stroke this year.  He has severe triple vessel coronary artery disease with an ischemic cardiomyopathy.  Additionally he has poorly controlled diabetes.  He reports that he has a new cough and his white count last week was elevated  ASSESSMENT: He has severe triple vessel coronary artery disease with reduced left ventricular function and obesity with recent CVA and CKD. We plan to perform a multi-vessel coronary artery bypass grafting procedure. For conduit we will use the left internal mammary artery and reversed saphenous vein graft. He understands that the risks for this procedure include: bleeding, infection, stroke, sternal dehiscence, myocardial infarction, arrhythmias, the need for a pacemaker, prolonged ventilation, pneumonia, liver/renal failure, aortic dissection, and an operative mortality of 4 to 8 percent. We will follow the recommendations of Neurology and Nephrology.     Assessment:    Skin Injury Location: R heel                                                                                5/28 (From last admission)                            8/7    Last photo: 8/7                                                                     Skin injury due to: Diabetic ulcer  Skin history and plan of care: Chronic area of injury for patient  Affected area:      Skin assessment: Callus around wound -      Measurements (length x width x depth, in cm) 0.5 cm x 0.5 cm x 0.3      Color: normal and consistent with surrounding tissue and Brown callus      Temperature  normal      Drainage: scant .      Color: serosanguinous      Odor: none  Pain: denies  and no grimacing or signs of discomfort  Pain interventions prior to dressing change: patient tolerated well and slow and gentle cares   Treatment goal: Heal , Infection control/prevention, Maintain (prevention of deterioration), and Protection  STATUS: stable   Supplies ordered: ordered Medihoney alginate    Treatment Plan:   R  heel - every other day (please start 7/31 when supplies arrive)  Cleanse wound with saline; gently dry.  Cut a small piece of Medihoney alginate (PS# [id number]) and place into wound bed; cover with Mepilex.    Orders: Reviewed    RECOMMEND PRIMARY TEAM ORDER: None, at this time  Education provided: plan of care  Discussed plan of care with: Patient  Notify WOC if wound(s) deteriorate.  Nursing to notify the Provider(s) and re-consult the WO Nurse if new skin concern.    DATA:     Current support surface: Standard  Low air loss (ROBERT pump, Isolibrium, Pulsate)  Containment of urine/stool: Continent of bladder and Continent of bowel  BMI: Body mass index is 43.71 kg/m .   Active diet order: Orders Placed This Encounter      Moderate Consistent Carb (60 g CHO per Meal) Diet      Diet     Output: I/O last 3 completed shifts:  In: 590 [P.O.:590]  Out: 4100 [Urine:4100]     Labs:   Recent Labs   Lab 08/06/25  0429   HGB 8.5*   WBC 11.6*     Pressure injury risk assessment:   Sensory Perception: 3-->slightly limited  Moisture: 4-->rarely moist  Activity: 3-->walks occasionally  Mobility: 3-->slightly limited  Nutrition: 2-->probably inadequate  Friction and Shear: 2-->potential problem  Rolo Score: 17    DAYAN GregoryN RN CWOCN  Pager no longer in use, please contact through [employer] group: [facility]

## 2025-08-07 NOTE — PROGRESS NOTES
"CVTS Daily Progress Note   POD#8 s/p CABG x3 (LIMA-->LAD, rSVG-->HEENA, rSVG-->RPD) and MERA joseph  Attending: Dr. Boateng  LOS: 8    SUBJECTIVE/INTERVAL EVENTS:    No acute events overnight. Normotensive. NSR. Maintaining O2 sats on room air. Good UOP. - BM / + flatus. Ambulating with therapy. Pain well controlled. Denies SOB, nausea, vomiting, abdominal pain, dizziness, lightheadedness, fever, chills, of calf pain.     OBJECTIVE:  Temp:  [97.7  F (36.5  C)-98.9  F (37.2  C)] 97.7  F (36.5  C)  Pulse:  [76-84] 77  Resp:  [16-20] 16  BP: ()/(55-65) 127/59  SpO2:  [90 %-97 %] 90 %  Vitals:    08/04/25 1344 08/05/25 0758 08/06/25 0325 08/07/25 0700   Weight: (!) 145.7 kg (321 lb 1.6 oz) (!) 145.4 kg (320 lb 8 oz) (!) 143.1 kg (315 lb 8 oz) (!) 138.2 kg (304 lb 9.6 oz)       Clinically Significant Risk Factors               # Hypoalbuminemia: Lowest albumin = 2.9 g/dL at 7/30/2025  1:04 PM, will monitor as appropriate       # Hypertension: Noted on problem list  # Heart failure with preserved ejection fraction: EF >50% and home medication list includes sacubitril/valsartan (Entresto)          # DMII: A1C = 11.3 % (Ref range: <5.7 %) within past 6 months   # Morbid Obesity: Estimated body mass index is 43.71 kg/m  as calculated from the following:    Height as of this encounter: 1.778 m (5' 10\").    Weight as of this encounter: 138.2 kg (304 lb 9.6 oz).        # Financial/Environmental Concerns:     # History of CABG: noted on surgical history          Current Medications:    Scheduled Meds:  Current Facility-Administered Medications   Medication Dose Route Frequency Provider Last Rate Last Admin    acetaminophen (TYLENOL) tablet 975 mg  975 mg Oral Q8H Geno Howard PA-C   975 mg at 08/07/25 0559    aspirin (ASA) chewable tablet 81 mg  81 mg Oral or NG Tube Daily Geno Howard PA-C   81 mg at 08/07/25 0800    atorvastatin (LIPITOR) tablet 80 mg  80 mg Oral At Bedtime Geno Howard, " JULIA   80 mg at 08/06/25 2104    bumetanide (BUMEX) injection 2 mg  2 mg Intravenous BID Geno Howard PA-C   2 mg at 08/07/25 0756    carvedilol (COREG) tablet 3.125 mg  3.125 mg Oral BID w/meals Geno Howard PA-C   3.125 mg at 08/07/25 0756    clopidogrel (PLAVIX) tablet 75 mg  75 mg Oral Daily Geno Howard PA-C   75 mg at 08/07/25 0800    cyanocobalamin (VITAMIN B-12) tablet 1,000 mcg  1,000 mcg Oral Daily Geno Howard PA-C   1,000 mcg at 08/07/25 0800    empagliflozin (JARDIANCE) tablet 10 mg  10 mg Oral Daily Geno Howard PA-C   10 mg at 08/07/25 0800    guaiFENesin (MUCINEX) 12 hr tablet 600 mg  600 mg Oral BID Geno Howard PA-C   600 mg at 08/07/25 0800    heparin ANTICOAGULANT injection 5,000 Units  5,000 Units Subcutaneous Q8H Geno Howard PA-C   5,000 Units at 08/07/25 0559    insulin aspart (NovoLOG) injection (RAPID ACTING)  15 Units Subcutaneous TID w/meals Terry Ag MD   15 Units at 08/06/25 1801    insulin aspart (NovoLOG) injection (RAPID ACTING)  1-10 Units Subcutaneous TID AC Geno Howard PA-C   3 Units at 08/07/25 0802    insulin aspart (NovoLOG) injection (RAPID ACTING)  1-7 Units Subcutaneous At Bedtime Geno Howard PA-C   2 Units at 08/03/25 2111    [START ON 8/8/2025] insulin glargine (LANTUS PEN) injection 40 Units  40 Units Subcutaneous QAM AC Terry Ag MD        Lidocaine (LIDOCARE) 4 % Patch 1-2 patch  1-2 patch Transdermal Q24H Geno Howard PA-C   2 patch at 08/06/25 1801    metFORMIN (GLUCOPHAGE XR) 24 hr tablet 500 mg  500 mg Oral Daily with supper Geno Howard PA-C   500 mg at 08/06/25 1800    multivitamin w/minerals (THERA-VIT-M) tablet 1 tablet  1 tablet Oral Daily Geno Howard PA-C   1 tablet at 08/07/25 0800    pantoprazole (PROTONIX) EC tablet 40 mg  40 mg Oral Daily Geno Howard PA-C   40 mg at 08/07/25 0800     polyethylene glycol (MIRALAX) Packet 17 g  17 g Oral Daily Geno Howard PA-C   17 g at 08/04/25 0912    [Held by provider] sacubitril-valsartan (ENTRESTO) 24-26 MG per tablet 1 tablet  1 tablet Oral BID Geno Howard PA-C        senna-docusate (SENOKOT-S/PERICOLACE) 8.6-50 MG per tablet 1 tablet  1 tablet Oral BID Geno Howard PA-C   1 tablet at 08/06/25 2113    tamsulosin (FLOMAX) capsule 0.4 mg  0.4 mg Oral QPM Geno Howard PA-C   0.4 mg at 08/06/25 2113     Continuous Infusions:  Current Facility-Administered Medications   Medication Dose Route Frequency Provider Last Rate Last Admin     PRN Meds:.  Current Facility-Administered Medications   Medication Dose Route Frequency Provider Last Rate Last Admin    bisacodyl (DULCOLAX) suppository 10 mg  10 mg Rectal Daily PRN Geno Howard PA-C        calcium gluconate 1 g in 50 mL in sodium chloride intermittent infusion  1 g Intravenous Once PRN eGno Howard PA-C        calcium gluconate 2 g in  mL intermittent infusion  2 g Intravenous Once PRN Geno Howard PA-C        calcium gluconate 3 g in sodium chloride 0.9 % 100 mL intermittent infusion  3 g Intravenous Once PRN Geno Howard PA-C        glucose gel 15-30 g  15-30 g Oral Q15 Min PRN Haydee Boateng MD        Or    dextrose 50 % injection 25-50 mL  25-50 mL Intravenous Q15 Min PRN Haydee Boateng MD        Or    glucagon injection 1 mg  1 mg Subcutaneous Q15 Min PRN Haydee Boateng MD        hydrALAZINE (APRESOLINE) injection 10 mg  10 mg Intravenous Q30 Min PRN Geno Howard PA-C        lactated ringers BOLUS 250 mL  250 mL Intravenous Q15 Min PRN Geno Howard PA-C        magnesium hydroxide (MILK OF MAGNESIA) suspension 30 mL  30 mL Oral Daily PRN Geno Howard PA-C   30 mL at 08/05/25 0839    methocarbamol (ROBAXIN) tablet 500 mg  500 mg Oral 4x Daily PRN Geno Howard  JULIA Oliveira   500 mg at 08/06/25 2347    naloxone (NARCAN) injection 0.2 mg  0.2 mg Intravenous Q2 Min PRN Geno Howard PA-C        Or    naloxone (NARCAN) injection 0.4 mg  0.4 mg Intravenous Q2 Min PRN Geno Howard PA-C        Or    naloxone (NARCAN) injection 0.2 mg  0.2 mg Intramuscular Q2 Min PRN Geno Howard PA-C        Or    naloxone (NARCAN) injection 0.4 mg  0.4 mg Intramuscular Q2 Min PRN Geno Howard PA-C        ondansetron (ZOFRAN ODT) ODT tab 4 mg  4 mg Oral Q6H PRN Geno Howard PA-C   4 mg at 08/03/25 2115    Or    ondansetron (ZOFRAN) injection 4 mg  4 mg Intravenous Q6H PRN Geno Howard PA-C   4 mg at 08/06/25 2347    oxyCODONE (ROXICODONE) tablet 5 mg  5 mg Oral Q4H PRN Geno Howard PA-C   5 mg at 08/07/25 0757    prochlorperazine (COMPAZINE) injection 10 mg  10 mg Intravenous Q6H PRN Geno Howard PA-C   10 mg at 08/07/25 0148    Or    prochlorperazine (COMPAZINE) tablet 10 mg  10 mg Oral Q6H PRN Geno Howard PA-C           Cardiographics:    Telemetry monitoring demonstrates NSR with rates in the 70s per my personal review.      Imaging:  Results for orders placed or performed during the hospital encounter of 07/30/25   XR Chest Port 1 View    Impression    IMPRESSION: Negative chest.   XR Chest Port 1 View    Impression    IMPRESSION: The endotracheal tube tip is 6.4 cm from the edelmira. An NG or OG tube terminates off the field-of-view. A right IJ sheath transmits a [place] catheter which terminates in the lower pulmonary artery. Lung volumes are low. No pleural fluid or   pneumothorax. The heart is not well assessed. Sternotomy suture wires are present. There are degenerative changes of the left shoulder. Old healed right clavicle fracture is suspected.   XR Chest Port 1 View    Impression    IMPRESSION: Interval removal endotracheal and enteric tube. Median sternotomy. Mediastinal drain, right  chest 2 views. No significant effusion. Right IJ sheath. Remote fracture right clavicle.   XR Abdomen Port 1 View    Impression    IMPRESSION: An OG tube terminates in the stomach. Subxiphoid drains are noted.       Labs, personally reviewed.  Hemoglobin   Date Value Ref Range Status   08/06/2025 8.5 (L) 13.3 - 17.7 g/dL Final   08/05/2025 8.7 (L) 13.3 - 17.7 g/dL Final   08/04/2025 8.7 (L) 13.3 - 17.7 g/dL Final     WBC Count   Date Value Ref Range Status   08/06/2025 11.6 (H) 4.0 - 11.0 10e3/uL Final   08/05/2025 11.9 (H) 4.0 - 11.0 10e3/uL Final   08/04/2025 12.7 (H) 4.0 - 11.0 10e3/uL Final     Platelet Count   Date Value Ref Range Status   08/06/2025 300 150 - 450 10e3/uL Final   08/05/2025 256 150 - 450 10e3/uL Final   08/04/2025 214 150 - 450 10e3/uL Final     Creatinine   Date Value Ref Range Status   08/07/2025 1.30 (H) 0.67 - 1.17 mg/dL Final   08/06/2025 1.22 (H) 0.67 - 1.17 mg/dL Final   08/05/2025 1.38 (H) 0.67 - 1.17 mg/dL Final     Potassium   Date Value Ref Range Status   08/07/2025 4.0 3.4 - 5.3 mmol/L Final   08/06/2025 4.1 3.4 - 5.3 mmol/L Final   08/05/2025 4.0 3.4 - 5.3 mmol/L Final     Potassium POCT   Date Value Ref Range Status   07/30/2025 4.8 3.4 - 5.3 mmol/L Final   07/30/2025 4.8 3.4 - 5.3 mmol/L Final   07/30/2025 4.9 3.4 - 5.3 mmol/L Final     Magnesium   Date Value Ref Range Status   08/07/2025 2.4 (H) 1.7 - 2.3 mg/dL Final   08/06/2025 2.4 (H) 1.7 - 2.3 mg/dL Final   08/05/2025 2.2 1.7 - 2.3 mg/dL Final          I/O:  I/O last 3 completed shifts:  In: 590 [P.O.:590]  Out: 4100 [Urine:4100]       Physical Exam:    General: Patient seen in bed. NAD  CV: RRR on monitor. Mild edema of bilateral lower extremities. Incision C/D/I.  Pulm: Non-labored effort on RA.   Abd: Hyperactive bowel sounds. Soft, NT, ND  Ext: Mild pedal edema, SCDs in place, warm and moist.  Neuro: CNs grossly intact. Moves all 4 extremities. Bilateral lower extremity sensation diminished but intact.   Psych: Restless,  a bit irritable. Poor insight.     ASSESSMENT/PLAN:    Floyd Capps is a 49 year old male with a history of HFrEF (EF 40-45% from TTE 5/2025), ischemic CVA on 8/2023 and 3/2025, poorly-controlled and insulin-dependent T2DM with diabetic neuropathy with foot wound on R heel, and CKD stage 3 who is s/p CABG x3 and MERA clippage with Dr. Boateng on 7/30/2025. POD#1 diagnosed with suspected acute pericarditis post CABG.     Principal Problem:    Coronary artery disease involving autologous artery coronary bypass graft without angina pectoris      NEURO:   - Scheduled Tylenol/lidocaine patches and PRN Tylenol/oxycodone/robaxin for pain    CV:   - Pre-op EF 40-45% from TTE; intra-op GAIL EF 35%, post echo 50-55%  - Normotensive  - Chest tubes and TPW removed POD#4  - Coreg 3.125mg BID  - PTA Plavix 75mg   - ASA 81mg daily (decreased due to Plavix)  - Atorvastatin 80mg daily  - Note: Groin incision closed with staples - remove prior to discharge/at follow up appt  - Hold PTA Entresto     PULM:   - Extubated on POD#0  - Maintaining oxygen saturations on RA  - Encourage pulmonary toilet  - Mucinex  - Apneic episodes at night, desats to 70s. Screened positive for probable ANDIE in 6/2025. Sleep study scheduled for 10/2025.      FEN/GI:  - Continue electrolyte replacement protocol  - Cardiac; ADAT  - Bowel regimen; advancing todau    RENAL:  - Adequate UOP/hr. Continue to monitor closely.  - Cr 1.30 (1.38) . Baseline 1.3-1.5  - Diuresis oral (bumex 1mg daily)  - BMP daily  - Flomax     HEME:  - Acute blood loss anemia post-op.   - Hgb stable, no bleeding concerns. Hep SQ, ASA  - Daily CBC in AM    ID:  - Luly op ppx complete, afebrile. No concerns for infection    ENDO:   - Most recent A1c 11.3% on 5/28/2025. PTA takes 55 units insulin of Lantus at bedtime + Novolog.  - SSI + Lantus   - Resumed PTA metformin   - Resumed PTA jardiance  - Hospitalist consult for DM management     PPx:   - DVT: SCDs, SQ heparin TID, ambulation   -  GI: Protonix 40mg IV/PO daily    DISPO:   - General telemetry status since POD#3  - Medically Ready for Discharge: Anticipated Today   - Plan: TCU          Patient discussed with Dr. Boateng.      _______  Jeannine Howe CNP  [facility]   Cardiothoracic Surgery  Secure chat or Vocera  Office: [phone]

## 2025-08-07 NOTE — PLAN OF CARE
Problem: Comorbidity Management  Goal: Blood Pressure in Desired Range  Outcome: Progressing  Intervention: Maintain Blood Pressure Management  Recent Flowsheet Documentation  Taken 8/6/2025 2337 by Sasha Cordoba RN  Medication Review/Management: medications reviewed     Problem: Pain Acute  Goal: Optimal Pain Control and Function  Intervention: Develop Pain Management Plan  Recent Flowsheet Documentation  Taken 8/7/2025 0143 by Sasha Cordoba, RN  Pain Management Interventions: medication (see MAR)  Taken 8/6/2025 2337 by Sasha Cordoba RN  Pain Management Interventions:   pillow support provided   care clustered   emotional support   Goal Outcome Evaluation:    A/O x4. C/o incisional pain, rated at 7/10, PRN Roxicodone 5 mg given with pain relief. Pt continue to have intermittent nausea, PRN Zofran and Compazine given with relief.  Have x1 large incontinence stool this AM.  Pt is too sleeping this AM and wanting to wait until 7 AM to get up. Call light within reach. Heart pillow use appropriately.     Mag/K/Phos protocols ran, recheck tomorrow.     Plan to discharge to TCU today.

## 2025-08-07 NOTE — PLAN OF CARE
Problem: Cardiovascular Surgery  Goal: Improved Activity Tolerance  8/7/2025 1207 by Aminata Mays, RN  Outcome: Progressing  8/7/2025 0938 by Aminata Mays, RN  Outcome: Progressing   Goal Outcome Evaluation:       Pt AOX4 and c/o incisional pain, prn administered w/ some relief. Lidocaine patches placed bilaterally. ICD pumps BLE. Pt up in chair. SBA. Sternal precautions. Plan to discharge to TCU this afternoon around 1400 by transport. Pt is able to make needs known. Contact precautions maintained.

## 2025-08-07 NOTE — PLAN OF CARE
Cardiac Rehab Discharge Summary    Reason for therapy discharge:    Discharged to transitional care facility.    Progress towards therapy goal(s). See goals on Care Plan in [facility] electronic health record for goal details.  Goals partially met.  Barriers to achieving goals:   discharge from facility.    Therapy recommendation(s):    Continued therapy is recommended.  Rationale/Recommendations:  maximize I/ADL IND.

## 2025-08-11 ENCOUNTER — PATIENT OUTREACH (OUTPATIENT)
Dept: CARE COORDINATION | Facility: CLINIC | Age: 50
End: 2025-08-11
Payer: COMMERCIAL

## 2025-08-11 ENCOUNTER — TELEPHONE (OUTPATIENT)
Dept: CARDIOLOGY | Facility: CLINIC | Age: 50
End: 2025-08-11
Payer: COMMERCIAL

## 2025-08-14 ENCOUNTER — PATIENT OUTREACH (OUTPATIENT)
Dept: NEPHROLOGY | Facility: CLINIC | Age: 50
End: 2025-08-14

## 2025-08-26 ENCOUNTER — OFFICE VISIT (OUTPATIENT)
Dept: CARDIOLOGY | Facility: CLINIC | Age: 50
End: 2025-08-26
Payer: COMMERCIAL

## 2025-08-26 VITALS
HEIGHT: 70 IN | BODY MASS INDEX: 44.38 KG/M2 | RESPIRATION RATE: 16 BRPM | HEART RATE: 90 BPM | WEIGHT: 310 LBS | SYSTOLIC BLOOD PRESSURE: 110 MMHG | DIASTOLIC BLOOD PRESSURE: 74 MMHG

## 2025-08-26 DIAGNOSIS — Z95.1 S/P CABG (CORONARY ARTERY BYPASS GRAFT): ICD-10-CM

## 2025-08-26 DIAGNOSIS — I25.10 CORONARY ARTERY DISEASE INVOLVING NATIVE CORONARY ARTERY OF NATIVE HEART, UNSPECIFIED WHETHER ANGINA PRESENT: Primary | ICD-10-CM

## 2025-08-26 PROCEDURE — 99024 POSTOP FOLLOW-UP VISIT: CPT | Performed by: NURSE PRACTITIONER

## 2025-08-26 RX ORDER — ZOLPIDEM TARTRATE 10 MG/1
10 TABLET ORAL
COMMUNITY

## 2025-08-26 RX ORDER — SENNOSIDES 8.6 MG
1 TABLET ORAL DAILY
COMMUNITY

## 2025-09-02 ENCOUNTER — TELEPHONE (OUTPATIENT)
Dept: CARDIOLOGY | Facility: CLINIC | Age: 50
End: 2025-09-02
Payer: COMMERCIAL

## 2025-09-04 ENCOUNTER — PATIENT OUTREACH (OUTPATIENT)
Dept: CARE COORDINATION | Facility: CLINIC | Age: 50
End: 2025-09-04
Payer: COMMERCIAL

## (undated) DEVICE — BNDG KLING 4" 2236

## (undated) DEVICE — CABLE MYO/LEAD PACING BLUE DISP 019-535

## (undated) DEVICE — EXCHANGE WIRE .035 260 STAR/JFC/035/260/ M001491681

## (undated) DEVICE — ESU PENCIL SMOKE EVAC W/ROCKER SWITCH 0703-047-000

## (undated) DEVICE — CATH FOLEY 12FR 5ML LUBRICATH LATEX 0165L12

## (undated) DEVICE — SUCTION MANIFOLD NEPTUNE 2 SYS 1 PORT 702-025-000

## (undated) DEVICE — ESU ELEC BLADE NEPTUNE E-SEP 125MM 0703-125-002

## (undated) DEVICE — GEL ULTRASOUND AQUASONIC 20GM 01-01

## (undated) DEVICE — BNDG ELASTIC 3"X5YDS UNSTERILE 6611-30

## (undated) DEVICE — SUTURE MONOCRYL+ 4-0 PS-2 27IN MCP426H

## (undated) DEVICE — CLIP SPRING FOGARTY SOFTJAW CSOFT6

## (undated) DEVICE — SYR ANGIOGRAPHY MULTIUSE KIT ACIST 014612

## (undated) DEVICE — MANIFOLD KIT ANGIO AUTOMATED 014613

## (undated) DEVICE — SU RETRACT-O-TAPE 1041

## (undated) DEVICE — KIT ENDO VASOVIEW HEMOPRO 2 VH-4000

## (undated) DEVICE — SUCTION CANISTER MEDIVAC LINER 3000ML W/LID 65651-530

## (undated) DEVICE — BONE CLEANING TIP INTERPULSE  0210-010-000

## (undated) DEVICE — TUBING INSUFFLATION W/FILTER 28-0207

## (undated) DEVICE — GLOVE LINER COTTON MED 32-2076

## (undated) DEVICE — DRAPE ISOLATION BAG 1003

## (undated) DEVICE — SURGICEL POWDER ABSORBABLE HEMOSTAT 3GM 3013SP

## (undated) DEVICE — CATH DIAGNOSTIC RADIAL 5FR TIG 4.0

## (undated) DEVICE — PREP CHLORAPREP 26ML TINTED HI-LITE ORANGE 930815

## (undated) DEVICE — KIT TURNOVER FAIRVIEW SOUTHDALE FULL SP3889

## (undated) DEVICE — RX SURGIFLO HEMOSTATIC MATRIX W/THROMBIN 8ML 2994

## (undated) DEVICE — SU PLEDGET SOFT TFE 3/8"X3/26"X1/16" PCP40

## (undated) DEVICE — CUSTOM PACK LOWER EXTREMITY SOP5BLEHEA

## (undated) DEVICE — DRSG KERLIX 4 1/2"X4YDS ROLL 6715

## (undated) DEVICE — CONTAINER URINE SPEC 4OZ STRL 1053

## (undated) DEVICE — Device

## (undated) DEVICE — SHTH INTRO 0.021IN ID 6FR DIA

## (undated) DEVICE — DRSG DRAIN 4X4" 7086

## (undated) DEVICE — SUTURE VICRYL+ 2-0 27IN CT-1 UND VCP259H

## (undated) DEVICE — SYR 10ML LL W/O NDL 302995

## (undated) DEVICE — ESU GROUND PAD ADULT REM W/15' CORD E7507DB

## (undated) DEVICE — SU PROLENE 7-0 BV175-6 24" 2 ARM MONOFILAMENT M8735

## (undated) DEVICE — SOLUTION IRRIGATION 0.9% NACL 1000ML BOTTLE R5200-01

## (undated) DEVICE — SU ETHIBOND 0 CT-1 CR 8X18" CX21D

## (undated) DEVICE — SPONGE KITNER DISSECTING 7102*

## (undated) DEVICE — SU PROLENE 4-0 RB-1DA 36" 8557H

## (undated) DEVICE — SUTURE STRATAFIX PDS 3-0 SH

## (undated) DEVICE — BLANKET BAIR ADLT PLYMR 60X36IN 63000

## (undated) DEVICE — DRSG ABD TNDRSRB WET PRUF 8IN X 10IN STRL  9194A

## (undated) DEVICE — SU ETHIBOND 3-0 BBDA 36" X588H

## (undated) DEVICE — GOWN LG DISP 9515

## (undated) DEVICE — CAST PADDING 4" STERILE 9044S

## (undated) DEVICE — GLOVE SURG PI ULTRA TOUCH M SZ 8-1/2 LF

## (undated) DEVICE — SPONGE RAY-TEC 4X8" 7318

## (undated) DEVICE — SUCTION IRR SYSTEM W/O TIP INTERPULSE HANDPIECE 0210-100-000

## (undated) DEVICE — HEMOSTASIS BONE OSTENE 2.5G SYNTHETIC 1503832

## (undated) DEVICE — CUSTOM PACK CORONARY SAN5BCRHEA

## (undated) DEVICE — DRAPE STOCKINETTE 4" 8544

## (undated) DEVICE — LEAD PACER MYOCARDIAL BIPOLAR TEMPORARY 53CM 6495F

## (undated) DEVICE — GLOVE BIOGEL PI ORTHOPRO SZ 7.5 47675

## (undated) DEVICE — CELL SAVER

## (undated) DEVICE — GOWN IMPERVIOUS BREATHABLE SMART LG 89015

## (undated) DEVICE — SU STRATAFIX PDS PLUS 0 CT 45CM SXPP1A406

## (undated) DEVICE — NDL 25GA 1.5" 305127

## (undated) DEVICE — DRESSING XEROFORM PETROLATUM 5X9 33605

## (undated) DEVICE — SU ETHILON 4-0 PS-2 18" BLACK 1667H

## (undated) DEVICE — SUTURE MONOCRYL 3-0 18 PS2 UND MCP497G

## (undated) DEVICE — PRESSURE MONITORING LINE 72IN PM6172

## (undated) DEVICE — CATH ANGIO INFINITI JL4 4FRX100CM 538420

## (undated) DEVICE — KIT HAND CONTROL ACIST 014644 AR-P54

## (undated) DEVICE — PREP POVIDONE-IODINE 10% SOLUTION 4OZ BOTTLE MDS093944

## (undated) DEVICE — SLEEVE TR BAND RADIAL COMPRESSION DEVICE 24CM TRB24-REG

## (undated) DEVICE — TRAY PREP DRY SKIN SCRUB 067

## (undated) DEVICE — SOL NACL 0.9% IRRIG 1000ML BOTTLE 2F7124

## (undated) DEVICE — SOL WATER IRRIG 1000ML BOTTLE 2F7114

## (undated) DEVICE — DRAPE NEW SLUSH/WARMER HUSHSLUSH 2.0 ESD340 ESD340

## (undated) DEVICE — ESU ELEC BLADE 2.75" COATED/INSULATED E1455

## (undated) DEVICE — SU STRATAFIX MONOCRYL 4-0 SPIRAL PS-2 SXMP1B118

## (undated) DEVICE — CATH THORACIC STRAIGHT CLOTSTOP 32FR

## (undated) DEVICE — SU MYOSTERNAL WIRE  046-267

## (undated) DEVICE — CUSTOM PACK CV ST JOES SCV5BCVHEA

## (undated) DEVICE — PREP POVIDONE-IODINE 7.5% SCRUB 4OZ BOTTLE MDS093945

## (undated) DEVICE — CLIP HORIZON MULTI MED BLUE 002204

## (undated) DEVICE — LIGACLIP LARGE AESCULAP O4120-1

## (undated) DEVICE — CUFF TOURN 18IN STRL DISP

## (undated) DEVICE — BANDAGE ESMARK 4 X 3 YARDS STL 23578-143

## (undated) DEVICE — GLOVE BIOGEL PI INDICATOR 8.0 LF 41680

## (undated) DEVICE — CATH TRAY FOLEY SURESTEP 16FR W/TMP PRB STLK LATEX A319416AM

## (undated) DEVICE — PITCHER STERILE 1000ML  SSK9004A

## (undated) DEVICE — DEVICE TISSUE STABILIZATION OCTOBASE 28707

## (undated) DEVICE — CATH SUCTION 14FR W/O CTRL DYND41962

## (undated) DEVICE — CABLE MYO/LEAD PACING WHITE DISP 019-530

## (undated) DEVICE — CONNECTOR CARDIO BLAKE DRAIN BCC2

## (undated) DEVICE — GLOVE GAMMEX NEOPRENE ULTRA SZ 7 LF 8514

## (undated) DEVICE — DRAPE STERI TOWEL LG 1010

## (undated) DEVICE — BNDG KLING 3" 2232

## (undated) DEVICE — CUSTOM PACK CAB SCV5BCBHEA

## (undated) DEVICE — ELECTRODE DEFIB CADENCE 22550R

## (undated) DEVICE — DRSG GAUZE 4X4" TRAY 6939

## (undated) DEVICE — CONNECTOR Y 3/8 X 1/4 X 1/4 STRL C430S

## (undated) DEVICE — SU STERNAL WIRE SINGLE #6 046-032

## (undated) DEVICE — GOWN XXL 9575

## (undated) DEVICE — CLIP APPLIER 11" MED LIGACLIP MCM20

## (undated) DEVICE — SU STRATAFIX PDS PLUS 2-0 SPIRAL CT-1 30CM SXPP1B410

## (undated) DEVICE — SUCTION DRY CHEST DRAIN OASIS 3600-100

## (undated) DEVICE — DRSG ADAPTIC 3X3" 6112

## (undated) DEVICE — SU SILK 3-0 SH 30" K832H

## (undated) DEVICE — CLIP HORIZON MULTI SM YELLOW 001204

## (undated) DEVICE — SU DERMABOND ADVANCED .7ML DNX12

## (undated) DEVICE — PACK MAJOR BASIN 673

## (undated) RX ORDER — FENTANYL CITRATE-0.9 % NACL/PF 10 MCG/ML
PLASTIC BAG, INJECTION (ML) INTRAVENOUS
Status: DISPENSED
Start: 2025-07-30

## (undated) RX ORDER — FENTANYL CITRATE 50 UG/ML
INJECTION, SOLUTION INTRAMUSCULAR; INTRAVENOUS
Status: DISPENSED
Start: 2025-07-30

## (undated) RX ORDER — GLYCOPYRROLATE 0.2 MG/ML
INJECTION, SOLUTION INTRAMUSCULAR; INTRAVENOUS
Status: DISPENSED
Start: 2025-07-30

## (undated) RX ORDER — FENTANYL CITRATE 50 UG/ML
INJECTION, SOLUTION INTRAMUSCULAR; INTRAVENOUS
Status: DISPENSED
Start: 2024-03-01

## (undated) RX ORDER — OXYCODONE HYDROCHLORIDE 5 MG/1
TABLET ORAL
Status: DISPENSED
Start: 2025-06-03

## (undated) RX ORDER — FENTANYL CITRATE 50 UG/ML
INJECTION, SOLUTION INTRAMUSCULAR; INTRAVENOUS
Status: DISPENSED
Start: 2025-06-03

## (undated) RX ORDER — PROPOFOL 10 MG/ML
INJECTION, EMULSION INTRAVENOUS
Status: DISPENSED
Start: 2025-07-30

## (undated) RX ORDER — DIAZEPAM 10 MG
TABLET ORAL
Status: DISPENSED
Start: 2024-03-01

## (undated) RX ORDER — VASOPRESSIN 20 [USP'U]/ML
INJECTION, SOLUTION INTRAVENOUS
Status: DISPENSED
Start: 2025-07-30

## (undated) RX ORDER — INDOMETHACIN 25 MG/1
CAPSULE ORAL
Status: DISPENSED
Start: 2025-07-30

## (undated) RX ORDER — LIDOCAINE HYDROCHLORIDE 10 MG/ML
INJECTION, SOLUTION EPIDURAL; INFILTRATION; INTRACAUDAL; PERINEURAL
Status: DISPENSED
Start: 2025-07-30

## (undated) RX ORDER — VANCOMYCIN HYDROCHLORIDE 1 G/20ML
INJECTION, POWDER, LYOPHILIZED, FOR SOLUTION INTRAVENOUS
Status: DISPENSED
Start: 2025-07-30

## (undated) RX ORDER — ONDANSETRON 2 MG/ML
INJECTION INTRAMUSCULAR; INTRAVENOUS
Status: DISPENSED
Start: 2024-02-16

## (undated) RX ORDER — LIDOCAINE HYDROCHLORIDE 10 MG/ML
INJECTION, SOLUTION EPIDURAL; INFILTRATION; INTRACAUDAL; PERINEURAL
Status: DISPENSED
Start: 2024-02-16

## (undated) RX ORDER — PROPOFOL 10 MG/ML
INJECTION, EMULSION INTRAVENOUS
Status: DISPENSED
Start: 2024-02-16

## (undated) RX ORDER — ONDANSETRON 2 MG/ML
INJECTION INTRAMUSCULAR; INTRAVENOUS
Status: DISPENSED
Start: 2024-03-01

## (undated) RX ORDER — NEOSTIGMINE METHYLSULFATE 1 MG/ML
VIAL (ML) INJECTION
Status: DISPENSED
Start: 2025-07-30

## (undated) RX ORDER — ACETAMINOPHEN 325 MG/1
TABLET ORAL
Status: DISPENSED
Start: 2025-06-03